# Patient Record
Sex: MALE | Race: WHITE | NOT HISPANIC OR LATINO | URBAN - METROPOLITAN AREA
[De-identification: names, ages, dates, MRNs, and addresses within clinical notes are randomized per-mention and may not be internally consistent; named-entity substitution may affect disease eponyms.]

---

## 2018-05-02 ENCOUNTER — OUTPATIENT (OUTPATIENT)
Dept: OUTPATIENT SERVICES | Facility: HOSPITAL | Age: 73
LOS: 1 days | Discharge: HOME | End: 2018-05-02

## 2018-05-02 ENCOUNTER — RESULT REVIEW (OUTPATIENT)
Age: 73
End: 2018-05-02

## 2018-05-02 VITALS
DIASTOLIC BLOOD PRESSURE: 92 MMHG | SYSTOLIC BLOOD PRESSURE: 161 MMHG | WEIGHT: 250 LBS | HEART RATE: 78 BPM | TEMPERATURE: 98 F | RESPIRATION RATE: 18 BRPM | HEIGHT: 65 IN

## 2018-05-02 VITALS — RESPIRATION RATE: 18 BRPM | DIASTOLIC BLOOD PRESSURE: 77 MMHG | SYSTOLIC BLOOD PRESSURE: 127 MMHG | HEART RATE: 69 BPM

## 2018-05-02 DIAGNOSIS — Z98.890 OTHER SPECIFIED POSTPROCEDURAL STATES: Chronic | ICD-10-CM

## 2018-05-02 DIAGNOSIS — Z90.49 ACQUIRED ABSENCE OF OTHER SPECIFIED PARTS OF DIGESTIVE TRACT: Chronic | ICD-10-CM

## 2018-05-04 LAB — SURGICAL PATHOLOGY STUDY: SIGNIFICANT CHANGE UP

## 2018-05-08 DIAGNOSIS — D12.3 BENIGN NEOPLASM OF TRANSVERSE COLON: ICD-10-CM

## 2018-05-08 DIAGNOSIS — Z12.11 ENCOUNTER FOR SCREENING FOR MALIGNANT NEOPLASM OF COLON: ICD-10-CM

## 2018-05-08 DIAGNOSIS — D12.5 BENIGN NEOPLASM OF SIGMOID COLON: ICD-10-CM

## 2018-05-08 DIAGNOSIS — Z86.010 PERSONAL HISTORY OF COLONIC POLYPS: ICD-10-CM

## 2018-05-08 DIAGNOSIS — K64.8 OTHER HEMORRHOIDS: ICD-10-CM

## 2018-05-08 DIAGNOSIS — K64.4 RESIDUAL HEMORRHOIDAL SKIN TAGS: ICD-10-CM

## 2018-05-08 DIAGNOSIS — I10 ESSENTIAL (PRIMARY) HYPERTENSION: ICD-10-CM

## 2018-05-08 DIAGNOSIS — E11.9 TYPE 2 DIABETES MELLITUS WITHOUT COMPLICATIONS: ICD-10-CM

## 2018-05-08 DIAGNOSIS — D12.2 BENIGN NEOPLASM OF ASCENDING COLON: ICD-10-CM

## 2018-08-31 PROBLEM — I10 ESSENTIAL (PRIMARY) HYPERTENSION: Chronic | Status: ACTIVE | Noted: 2018-05-02

## 2018-08-31 PROBLEM — Z00.00 ENCOUNTER FOR PREVENTIVE HEALTH EXAMINATION: Status: ACTIVE | Noted: 2018-08-31

## 2018-08-31 PROBLEM — E11.9 TYPE 2 DIABETES MELLITUS WITHOUT COMPLICATIONS: Chronic | Status: ACTIVE | Noted: 2018-05-02

## 2018-09-04 ENCOUNTER — EMERGENCY (EMERGENCY)
Facility: HOSPITAL | Age: 73
LOS: 0 days | Discharge: HOME | End: 2018-09-05
Attending: EMERGENCY MEDICINE | Admitting: EMERGENCY MEDICINE

## 2018-09-04 VITALS
SYSTOLIC BLOOD PRESSURE: 189 MMHG | OXYGEN SATURATION: 95 % | RESPIRATION RATE: 20 BRPM | DIASTOLIC BLOOD PRESSURE: 130 MMHG | HEIGHT: 65 IN | WEIGHT: 248.02 LBS | HEART RATE: 116 BPM | TEMPERATURE: 98 F

## 2018-09-04 DIAGNOSIS — Z79.84 LONG TERM (CURRENT) USE OF ORAL HYPOGLYCEMIC DRUGS: ICD-10-CM

## 2018-09-04 DIAGNOSIS — Z79.899 OTHER LONG TERM (CURRENT) DRUG THERAPY: ICD-10-CM

## 2018-09-04 DIAGNOSIS — Z79.891 LONG TERM (CURRENT) USE OF OPIATE ANALGESIC: ICD-10-CM

## 2018-09-04 DIAGNOSIS — Z98.890 OTHER SPECIFIED POSTPROCEDURAL STATES: Chronic | ICD-10-CM

## 2018-09-04 DIAGNOSIS — E11.9 TYPE 2 DIABETES MELLITUS WITHOUT COMPLICATIONS: ICD-10-CM

## 2018-09-04 DIAGNOSIS — Z79.82 LONG TERM (CURRENT) USE OF ASPIRIN: ICD-10-CM

## 2018-09-04 DIAGNOSIS — L02.31 CUTANEOUS ABSCESS OF BUTTOCK: ICD-10-CM

## 2018-09-04 DIAGNOSIS — Z98.890 OTHER SPECIFIED POSTPROCEDURAL STATES: ICD-10-CM

## 2018-09-04 DIAGNOSIS — Z90.49 ACQUIRED ABSENCE OF OTHER SPECIFIED PARTS OF DIGESTIVE TRACT: ICD-10-CM

## 2018-09-04 DIAGNOSIS — I10 ESSENTIAL (PRIMARY) HYPERTENSION: ICD-10-CM

## 2018-09-04 DIAGNOSIS — Z79.811 LONG TERM (CURRENT) USE OF AROMATASE INHIBITORS: ICD-10-CM

## 2018-09-04 DIAGNOSIS — Z90.49 ACQUIRED ABSENCE OF OTHER SPECIFIED PARTS OF DIGESTIVE TRACT: Chronic | ICD-10-CM

## 2018-09-04 DIAGNOSIS — Z79.2 LONG TERM (CURRENT) USE OF ANTIBIOTICS: ICD-10-CM

## 2018-09-05 LAB
HCT VFR BLD CALC: 44.6 % — SIGNIFICANT CHANGE UP (ref 42–52)
HGB BLD-MCNC: 14.9 G/DL — SIGNIFICANT CHANGE UP (ref 14–18)
MCHC RBC-ENTMCNC: 30.2 PG — SIGNIFICANT CHANGE UP (ref 27–31)
MCHC RBC-ENTMCNC: 33.4 G/DL — SIGNIFICANT CHANGE UP (ref 32–37)
MCV RBC AUTO: 90.5 FL — SIGNIFICANT CHANGE UP (ref 80–94)
NRBC # BLD: 0 /100 WBCS — SIGNIFICANT CHANGE UP (ref 0–0)
PLATELET # BLD AUTO: 172 K/UL — SIGNIFICANT CHANGE UP (ref 130–400)
RBC # BLD: 4.93 M/UL — SIGNIFICANT CHANGE UP (ref 4.7–6.1)
RBC # FLD: 12.8 % — SIGNIFICANT CHANGE UP (ref 11.5–14.5)
WBC # BLD: 9.32 K/UL — SIGNIFICANT CHANGE UP (ref 4.8–10.8)
WBC # FLD AUTO: 9.32 K/UL — SIGNIFICANT CHANGE UP (ref 4.8–10.8)

## 2018-09-05 RX ORDER — OXYCODONE AND ACETAMINOPHEN 5; 325 MG/1; MG/1
1 TABLET ORAL ONCE
Qty: 0 | Refills: 0 | Status: DISCONTINUED | OUTPATIENT
Start: 2018-09-05 | End: 2018-09-05

## 2018-09-05 RX ORDER — CEFAZOLIN SODIUM 1 G
2000 VIAL (EA) INJECTION ONCE
Qty: 0 | Refills: 0 | Status: COMPLETED | OUTPATIENT
Start: 2018-09-05 | End: 2018-09-05

## 2018-09-05 RX ADMIN — Medication 110 MILLIGRAM(S): at 04:32

## 2018-09-05 RX ADMIN — OXYCODONE AND ACETAMINOPHEN 1 TABLET(S): 5; 325 TABLET ORAL at 04:00

## 2018-09-05 NOTE — ED PROVIDER NOTE - NS ED ROS FT
Constitutional: (-) fever  Eyes/ENT: (-) blurry vision, (-) epistaxis  Neurological: (-) headache, (-) altered mental status  Psychiatric: (-) hallucinations  Allergic/Immunologic: (-) pruritus

## 2018-09-05 NOTE — ED PROVIDER NOTE - PHYSICAL EXAMINATION
Physical Exam    Vital Signs: I have reviewed the initial vital signs.  Constitutional: well-nourished, appears stated age, no acute distress  Eyes: Conjunctiva pink, Sclera clear, PERRLA, EOMI.  Cardiovascular: S1 and S2, regular rate, regular rhythm, well-perfused extremities, radial pulses equal and 2+  Respiratory: unlabored respiratory effort, clear to auscultation bilaterally no wheezing, rales and rhonchi  Gastrointestinal: soft, non-tender abdomen, no pulsatile mass, normal bowl sounds  Musculoskeletal: supple neck, no lower extremity edema, no midline tenderness  Integumentary: warm, dry, no rash + Left buttock area of swelling erythema induration and fluctuance.   Neurologic: awake, alert, cranial nerves II-XII grossly intact, extremities’ motor and sensory functions grossly intact  Psychiatric: appropriate mood, appropriate affect

## 2018-09-05 NOTE — ED ADULT NURSE NOTE - NSIMPLEMENTINTERV_GEN_ALL_ED
Implemented All Universal Safety Interventions:  [place] to call system. Call bell, personal items and telephone within reach. Instruct patient to call for assistance. Room bathroom lighting operational. Non-slip footwear when patient is off stretcher. Physically safe environment: no spills, clutter or unnecessary equipment. Stretcher in lowest position, wheels locked, appropriate side rails in place.

## 2018-09-05 NOTE — ED PROVIDER NOTE - OBJECTIVE STATEMENT
73 year old male past medical history of Diabetes, Hypertension states that he started 2 weeks ago with swelling on buttocks states getting worse. patient seen by GI dr. brito given cipro and flagyl completed a few days but getting worse. no fever/chills. no trouble breathing, no chest pain, no abd pain, no nausea, vomiting, diarrhea. no trouble with BM.

## 2018-09-12 ENCOUNTER — APPOINTMENT (OUTPATIENT)
Dept: SURGERY | Facility: CLINIC | Age: 73
End: 2018-09-12
Payer: MEDICARE

## 2018-09-12 VITALS
HEIGHT: 65 IN | DIASTOLIC BLOOD PRESSURE: 84 MMHG | BODY MASS INDEX: 41.32 KG/M2 | SYSTOLIC BLOOD PRESSURE: 142 MMHG | WEIGHT: 248 LBS | TEMPERATURE: 98.2 F | HEART RATE: 78 BPM

## 2018-09-12 DIAGNOSIS — Z78.9 OTHER SPECIFIED HEALTH STATUS: ICD-10-CM

## 2018-09-12 DIAGNOSIS — K61.0 ANAL ABSCESS: ICD-10-CM

## 2018-09-12 PROCEDURE — 99203 OFFICE O/P NEW LOW 30 MIN: CPT

## 2019-01-14 ENCOUNTER — TRANSCRIPTION ENCOUNTER (OUTPATIENT)
Age: 74
End: 2019-01-14

## 2019-10-28 ENCOUNTER — APPOINTMENT (OUTPATIENT)
Dept: NEUROLOGY | Facility: CLINIC | Age: 74
End: 2019-10-28
Payer: MEDICARE

## 2019-10-28 VITALS
HEIGHT: 65 IN | WEIGHT: 260 LBS | BODY MASS INDEX: 43.32 KG/M2 | TEMPERATURE: 94.3 F | OXYGEN SATURATION: 96 % | HEART RATE: 71 BPM | DIASTOLIC BLOOD PRESSURE: 70 MMHG | SYSTOLIC BLOOD PRESSURE: 132 MMHG

## 2019-10-28 DIAGNOSIS — Z86.69 PERSONAL HISTORY OF OTHER DISEASES OF THE NERVOUS SYSTEM AND SENSE ORGANS: ICD-10-CM

## 2019-10-28 PROCEDURE — 99214 OFFICE O/P EST MOD 30 MIN: CPT

## 2019-10-28 RX ORDER — INSULIN DEGLUDEC INJECTION 100 U/ML
100 INJECTION, SOLUTION SUBCUTANEOUS
Refills: 0 | Status: ACTIVE | COMMUNITY

## 2019-10-28 RX ORDER — METFORMIN HYDROCHLORIDE 500 MG/1
500 TABLET, COATED ORAL TWICE DAILY
Refills: 0 | Status: ACTIVE | COMMUNITY

## 2019-10-28 RX ORDER — GABAPENTIN 100 MG
100 TABLET ORAL
Refills: 0 | Status: ACTIVE | COMMUNITY

## 2019-10-28 RX ORDER — LOSARTAN POTASSIUM 50 MG/1
50 TABLET, FILM COATED ORAL DAILY
Refills: 0 | Status: ACTIVE | COMMUNITY

## 2019-10-28 NOTE — ASSESSMENT
[FreeTextEntry1] : Errol is here for followup. 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 I will see him in followup in 6 months.

## 2019-10-28 NOTE — PHYSICAL EXAM
[FreeTextEntry1] : Awake alert oriented x3. Follows commands appears slightly tired and sleepy. Shows good attention and concentration his memory recall is 3 out of 3 and his executive behavior is normal.\par Cranial nerve exam is normal\par Motor exam is normal\par Sensory exam is normal except that there is a slight decreased pinprick in the distal lower extremity. The deep tendon reflexes are present except in both ankles.\par His gait is a stable Romberg is negative.\par The carotid exam shows no bruit.\par The pulse is regular and sinus.

## 2019-10-28 NOTE — HISTORY OF PRESENT ILLNESS
[FreeTextEntry1] : Errol is now 74 years old. She was by me in 2015 after having weakness right sided upper extremity shaking/clonic activity. The workup at that point showed left and also a smaller right subdural hematoma. 4 Zestril and at this happened after his head trauma couple of months prior to that. Since then he has been taking keys Keppra and is doing very well. There is no episodes suggestive of seizure no confusion on to state no episodes of nocturnal events.\par Aside from this he does have excessive daytime sleepiness. There is a good possibility that he does have obstructive sleep apnea however he does not like to do a sleep study .\par He does have diabetes recently his hemoglobin A1c was around 10 which is quite high. He had modified his diet and that is now the fingersticks are better and he is supposed to have another hemoglobin A1 C. measurement. He saw his primary physician just yesterday according to him outside these other blood tests were OK.\par He describes his mood to be good. Currently his sleep pattern he is intubated at the goes to bed around 1 AM and wakes up around 9 AM and he naps for 1-2 hours every afternoon.\par He does also have dysesthesia in both feet and takes gabapentin for that reason.\par He denies having back pain he does have left knee pain and still debating whether he should go for surgery or not.

## 2020-01-08 ENCOUNTER — RX RENEWAL (OUTPATIENT)
Age: 75
End: 2020-01-08

## 2020-04-27 ENCOUNTER — APPOINTMENT (OUTPATIENT)
Dept: NEUROLOGY | Facility: CLINIC | Age: 75
End: 2020-04-27
Payer: MEDICARE

## 2020-04-27 PROCEDURE — 99441: CPT

## 2020-04-27 PROCEDURE — 99446 NTRPROF PH1/NTRNET/EHR 5-10: CPT

## 2021-05-19 ENCOUNTER — APPOINTMENT (OUTPATIENT)
Dept: NEUROLOGY | Facility: CLINIC | Age: 76
End: 2021-05-19
Payer: MEDICARE

## 2021-05-19 VITALS
HEART RATE: 82 BPM | DIASTOLIC BLOOD PRESSURE: 70 MMHG | BODY MASS INDEX: 43.9 KG/M2 | OXYGEN SATURATION: 99 % | WEIGHT: 263.5 LBS | HEIGHT: 65 IN | TEMPERATURE: 96.9 F | SYSTOLIC BLOOD PRESSURE: 130 MMHG

## 2021-05-19 DIAGNOSIS — E66.9 OBESITY, UNSPECIFIED: ICD-10-CM

## 2021-05-19 DIAGNOSIS — I10 ESSENTIAL (PRIMARY) HYPERTENSION: ICD-10-CM

## 2021-05-19 PROCEDURE — 99213 OFFICE O/P EST LOW 20 MIN: CPT

## 2021-05-20 NOTE — HISTORY OF PRESENT ILLNESS
[FreeTextEntry1] : Errol is a 76 year old male with PMH of DM, Neuropathy, HTN, Obesity and seizure disorder. He is here today for a follow up. He remains seizure free on Keppra 500 mgs ER qhs. Sleeping pattern is the same , he tends to go to sleep late which is always the same. Errol does have left knee pain which is limiting his ability and it is evident that he has gained more weight since last visit. Errol saw his endocrinologist recently but did not due the blood work. \par Pt is upset today due to recent loss of his younger brother due to MI. \par

## 2021-05-20 NOTE — DISCUSSION/SUMMARY
[Safety Recommendations] : The patient was advised in regards to the risk of seizures and general seizure safety recommendations including not to be bathing alone, climbing to high places and operating heavy machinery. [Compliance with Medications] : The importance of compliance with medications was reinforced. [Sleep Hygiene/Sleep Disruption Risks] : Sleep hygiene and the risks of sleep disruption were discussed. [FreeTextEntry1] : Errol is a 76 year old male with PMH of DM, Neuropathy, HTN, Obesity and seizure disorder. \par Plan:\par - continue same dose of Keppra, Rx given for levels\par - spoke with pt at length about importance of weight loss and cardiology follow up\par - pt was also advised to see orthopedic surgeon to discuss his left knee pain\par - reinforced proper sleep hygiene\par \par Case discussed with Dr. Ortega\par \par Manjula Hatch, DNP, ACNP-BC

## 2021-05-20 NOTE — PHYSICAL EXAM
[General Appearance - Alert] : alert [General Appearance - In No Acute Distress] : in no acute distress [Oriented To Time, Place, And Person] : oriented to person, place, and time [Cranial Nerves Optic (II)] : visual acuity intact bilaterally,  visual fields full to confrontation, pupils equal round and reactive to light [Cranial Nerves Oculomotor (III)] : extraocular motion intact [Cranial Nerves Trigeminal (V)] : facial sensation intact symmetrically [Cranial Nerves Facial (VII)] : face symmetrical [Cranial Nerves Vestibulocochlear (VIII)] : hearing was intact bilaterally [Cranial Nerves Glossopharyngeal (IX)] : tongue and palate midline [Cranial Nerves Accessory (XI - Cranial And Spinal)] : head turning and shoulder shrug symmetric [Cranial Nerves Hypoglossal (XII)] : there was no tongue deviation with protrusion [Motor Strength] : muscle strength was normal in all four extremities [Involuntary Movements] : no involuntary movements were seen [Paresis Pronator Drift Right-Sided] : no pronator drift on the right [Paresis Pronator Drift Left-Sided] : no pronator drift on the left [Motor Strength Upper Extremities Bilaterally] : strength was normal in both upper extremities [Motor Strength Lower Extremities Bilaterally] : strength was normal in both lower extremities [Tremor] : no tremor present

## 2021-07-08 ENCOUNTER — RX RENEWAL (OUTPATIENT)
Age: 76
End: 2021-07-08

## 2021-11-23 ENCOUNTER — APPOINTMENT (OUTPATIENT)
Dept: NEUROLOGY | Facility: CLINIC | Age: 76
End: 2021-11-23
Payer: MEDICARE

## 2021-11-23 VITALS
TEMPERATURE: 97 F | HEART RATE: 84 BPM | WEIGHT: 163 LBS | SYSTOLIC BLOOD PRESSURE: 132 MMHG | BODY MASS INDEX: 27.16 KG/M2 | DIASTOLIC BLOOD PRESSURE: 70 MMHG | HEIGHT: 65 IN | OXYGEN SATURATION: 99 %

## 2021-11-23 PROCEDURE — 99214 OFFICE O/P EST MOD 30 MIN: CPT

## 2021-11-23 RX ORDER — METFORMIN HYDROCHLORIDE 625 MG/1
TABLET ORAL
Refills: 0 | Status: DISCONTINUED | COMMUNITY
End: 2021-11-23

## 2021-11-23 RX ORDER — METRONIDAZOLE 500 MG/1
500 TABLET, FILM COATED ORAL
Refills: 0 | Status: DISCONTINUED | COMMUNITY
End: 2021-11-23

## 2021-11-23 RX ORDER — GABAPENTIN 100 MG/1
CAPSULE ORAL
Refills: 0 | Status: DISCONTINUED | COMMUNITY
End: 2021-11-23

## 2021-11-23 RX ORDER — INSULIN DEGLUDEC INJECTION 200 U/ML
INJECTION, SOLUTION SUBCUTANEOUS
Refills: 0 | Status: DISCONTINUED | COMMUNITY
End: 2021-11-23

## 2021-11-23 RX ORDER — LEVETIRACETAM 1000 MG/1
TABLET, FILM COATED ORAL
Refills: 0 | Status: DISCONTINUED | COMMUNITY
End: 2021-11-23

## 2021-11-23 RX ORDER — CIPROFLOXACIN HYDROCHLORIDE 500 MG/1
500 TABLET, FILM COATED ORAL
Refills: 0 | Status: DISCONTINUED | COMMUNITY
End: 2021-11-23

## 2021-11-23 RX ORDER — LOSARTAN POTASSIUM 100 MG/1
TABLET, FILM COATED ORAL
Refills: 0 | Status: DISCONTINUED | COMMUNITY
End: 2021-11-23

## 2021-11-23 RX ORDER — EXENATIDE 2 MG/.65ML
2 INJECTION, SUSPENSION, EXTENDED RELEASE SUBCUTANEOUS
Refills: 0 | Status: DISCONTINUED | COMMUNITY
End: 2021-11-23

## 2021-11-25 NOTE — ASSESSMENT
[FreeTextEntry1] : 1-Seizure D/O . doing well\par 2- Over weight\par 3- Knee disease\par 4- LBP\par 5- HTN and DM\par \par \par plan\par continue Keppra\par discussed the diet and weight and posture

## 2021-11-25 NOTE — HISTORY OF PRESENT ILLNESS
[FreeTextEntry1] : Errol is here with his wife .He is very much at his baseline. not reporting any events or seizures.\par He is seeing his  PMD regularly. Got two vaccines and planning to get the booster..\par He has had a period of LBP starting suddenly and radiating to the leg\par He did see pain management and received an epidural that helped him significantly\par \par However his usual knee pain , now on both sides is continuing. He is also contemplating  injections.\par His sleep pattern is good .\par He is not  that much active\par No weight loss\par reportedly his blood sugar and blood test were all good\par No change in the mood and the memory\par No vertigo\par No significant neck pain

## 2021-11-25 NOTE — PHYSICAL EXAM
[FreeTextEntry1] : A/A/Ox3\par good mood\par follows 4 step commands\par normal attention \par normal language\par No drift\par No dysmetria\par NO drift\par good range of motion of the neck\par Stable gait\par Slight limp on the right and slight hip tilt to the right

## 2022-04-13 ENCOUNTER — RX RENEWAL (OUTPATIENT)
Age: 77
End: 2022-04-13

## 2022-05-19 ENCOUNTER — APPOINTMENT (OUTPATIENT)
Dept: NEUROLOGY | Facility: CLINIC | Age: 77
End: 2022-05-19
Payer: MEDICARE

## 2022-05-19 VITALS
TEMPERATURE: 97.8 F | BODY MASS INDEX: 43.32 KG/M2 | WEIGHT: 260 LBS | HEART RATE: 70 BPM | OXYGEN SATURATION: 97 % | HEIGHT: 65 IN | SYSTOLIC BLOOD PRESSURE: 136 MMHG | DIASTOLIC BLOOD PRESSURE: 72 MMHG

## 2022-05-19 DIAGNOSIS — E11.9 TYPE 2 DIABETES MELLITUS W/OUT COMPLICATIONS: ICD-10-CM

## 2022-05-19 PROCEDURE — 99213 OFFICE O/P EST LOW 20 MIN: CPT

## 2022-05-19 NOTE — PHYSICAL EXAM
[FreeTextEntry1] : A/A/Ox3\par follows 4 step commands\par good mood\par good attention\par normal language\par normal CN exam\par No drift\par No dysmetria\par stable gait

## 2022-05-19 NOTE — ASSESSMENT
[FreeTextEntry1] : 1-DM\par 2- Seizure disorder\par 3-S/P right knee surgery\par 4- left knee pain\par \par \par Plan\par continue current dose of the Keppra\par F/U

## 2022-05-19 NOTE — HISTORY OF PRESENT ILLNESS
[FreeTextEntry1] : Errol is here for the F//U\par He is at his baseline. No events. vaccinated and saw his PMD \par His blood test shows a high Hb A1C  of 8.9\par normal CMP except high sugar and also normal PSA \par He is having some pain in the back and also left Knee.\par Yet is undecided regarding the knee surgery. 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

## 2022-09-09 ENCOUNTER — NON-APPOINTMENT (OUTPATIENT)
Age: 77
End: 2022-09-09

## 2022-09-16 ENCOUNTER — INPATIENT (INPATIENT)
Facility: HOSPITAL | Age: 77
LOS: 3 days | Discharge: HOME | End: 2022-09-20
Attending: INTERNAL MEDICINE | Admitting: INTERNAL MEDICINE

## 2022-09-16 VITALS
RESPIRATION RATE: 18 BRPM | WEIGHT: 251.99 LBS | HEIGHT: 65 IN | OXYGEN SATURATION: 98 % | TEMPERATURE: 98 F | SYSTOLIC BLOOD PRESSURE: 149 MMHG | HEART RATE: 89 BPM | DIASTOLIC BLOOD PRESSURE: 77 MMHG

## 2022-09-16 DIAGNOSIS — K75.81 NONALCOHOLIC STEATOHEPATITIS (NASH): ICD-10-CM

## 2022-09-16 DIAGNOSIS — Z98.890 OTHER SPECIFIED POSTPROCEDURAL STATES: Chronic | ICD-10-CM

## 2022-09-16 DIAGNOSIS — Z90.49 ACQUIRED ABSENCE OF OTHER SPECIFIED PARTS OF DIGESTIVE TRACT: Chronic | ICD-10-CM

## 2022-09-16 LAB
ALBUMIN SERPL ELPH-MCNC: 3.1 G/DL — LOW (ref 3.5–5.2)
ALP SERPL-CCNC: 176 U/L — HIGH (ref 30–115)
ALT FLD-CCNC: 61 U/L — HIGH (ref 0–41)
ANION GAP SERPL CALC-SCNC: 13 MMOL/L — SIGNIFICANT CHANGE UP (ref 7–14)
ANISOCYTOSIS BLD QL: SLIGHT — SIGNIFICANT CHANGE UP
AST SERPL-CCNC: 45 U/L — HIGH (ref 0–41)
BASE EXCESS BLDV CALC-SCNC: 0.5 MMOL/L — SIGNIFICANT CHANGE UP (ref -2–3)
BASOPHILS # BLD AUTO: 0.07 K/UL — SIGNIFICANT CHANGE UP (ref 0–0.2)
BASOPHILS NFR BLD AUTO: 0.9 % — SIGNIFICANT CHANGE UP (ref 0–1)
BILIRUB SERPL-MCNC: 0.8 MG/DL — SIGNIFICANT CHANGE UP (ref 0.2–1.2)
BUN SERPL-MCNC: 15 MG/DL — SIGNIFICANT CHANGE UP (ref 10–20)
CA-I SERPL-SCNC: 1.23 MMOL/L — SIGNIFICANT CHANGE UP (ref 1.15–1.33)
CALCIUM SERPL-MCNC: 9.1 MG/DL — SIGNIFICANT CHANGE UP (ref 8.4–10.5)
CHLORIDE SERPL-SCNC: 103 MMOL/L — SIGNIFICANT CHANGE UP (ref 98–110)
CO2 SERPL-SCNC: 22 MMOL/L — SIGNIFICANT CHANGE UP (ref 17–32)
CREAT SERPL-MCNC: 0.8 MG/DL — SIGNIFICANT CHANGE UP (ref 0.7–1.5)
EGFR: 92 ML/MIN/1.73M2 — SIGNIFICANT CHANGE UP
EOSINOPHIL # BLD AUTO: 0.26 K/UL — SIGNIFICANT CHANGE UP (ref 0–0.7)
EOSINOPHIL NFR BLD AUTO: 3.5 % — SIGNIFICANT CHANGE UP (ref 0–8)
GAS PNL BLDV: 134 MMOL/L — LOW (ref 136–145)
GAS PNL BLDV: SIGNIFICANT CHANGE UP
GIANT PLATELETS BLD QL SMEAR: SIGNIFICANT CHANGE UP
GLUCOSE SERPL-MCNC: 375 MG/DL — HIGH (ref 70–99)
HCO3 BLDV-SCNC: 27 MMOL/L — SIGNIFICANT CHANGE UP (ref 22–29)
HCT VFR BLD CALC: 41.1 % — LOW (ref 42–52)
HCT VFR BLDA CALC: 46 % — SIGNIFICANT CHANGE UP (ref 39–51)
HGB BLD CALC-MCNC: 15.3 G/DL — SIGNIFICANT CHANGE UP (ref 12.6–17.4)
HGB BLD-MCNC: 14 G/DL — SIGNIFICANT CHANGE UP (ref 14–18)
IMM GRANULOCYTES NFR BLD AUTO: 2.6 % — HIGH (ref 0.1–0.3)
LACTATE BLDV-MCNC: 1.9 MMOL/L — SIGNIFICANT CHANGE UP (ref 0.5–2)
LYMPHOCYTES # BLD AUTO: 1.29 K/UL — SIGNIFICANT CHANGE UP (ref 1.2–3.4)
LYMPHOCYTES # BLD AUTO: 17.5 % — LOW (ref 20.5–51.1)
MACROCYTES BLD QL: SLIGHT — SIGNIFICANT CHANGE UP
MCHC RBC-ENTMCNC: 32.5 PG — HIGH (ref 27–31)
MCHC RBC-ENTMCNC: 34.1 G/DL — SIGNIFICANT CHANGE UP (ref 32–37)
MCV RBC AUTO: 95.4 FL — HIGH (ref 80–94)
MONOCYTES # BLD AUTO: 0.74 K/UL — HIGH (ref 0.1–0.6)
MONOCYTES NFR BLD AUTO: 10 % — HIGH (ref 1.7–9.3)
NEUTROPHILS # BLD AUTO: 4.83 K/UL — SIGNIFICANT CHANGE UP (ref 1.4–6.5)
NEUTROPHILS NFR BLD AUTO: 65.5 % — SIGNIFICANT CHANGE UP (ref 42.2–75.2)
NRBC # BLD: 0 /100 WBCS — SIGNIFICANT CHANGE UP (ref 0–0)
NRBC # BLD: 0 /100 — SIGNIFICANT CHANGE UP (ref 0–0)
OVALOCYTES BLD QL SMEAR: SIGNIFICANT CHANGE UP
PCO2 BLDV: 47 MMHG — SIGNIFICANT CHANGE UP (ref 42–55)
PH BLDV: 7.36 — SIGNIFICANT CHANGE UP (ref 7.32–7.43)
PLAT MORPH BLD: NORMAL — SIGNIFICANT CHANGE UP
PLATELET # BLD AUTO: 121 K/UL — LOW (ref 130–400)
PLATELET CLUMP BLD QL SMEAR: SLIGHT
PLATELET COUNT - ESTIMATE: NORMAL — SIGNIFICANT CHANGE UP
PO2 BLDV: 22 MMHG — SIGNIFICANT CHANGE UP
POTASSIUM BLDV-SCNC: 4 MMOL/L — SIGNIFICANT CHANGE UP (ref 3.5–5.1)
POTASSIUM SERPL-MCNC: 4.6 MMOL/L — SIGNIFICANT CHANGE UP (ref 3.5–5)
POTASSIUM SERPL-SCNC: 4.6 MMOL/L — SIGNIFICANT CHANGE UP (ref 3.5–5)
PROT SERPL-MCNC: 6.3 G/DL — SIGNIFICANT CHANGE UP (ref 6–8)
RBC # BLD: 4.31 M/UL — LOW (ref 4.7–6.1)
RBC # FLD: 13.1 % — SIGNIFICANT CHANGE UP (ref 11.5–14.5)
RBC BLD AUTO: NORMAL — SIGNIFICANT CHANGE UP
SAO2 % BLDV: 34.1 % — SIGNIFICANT CHANGE UP
SODIUM SERPL-SCNC: 138 MMOL/L — SIGNIFICANT CHANGE UP (ref 135–146)
WBC # BLD: 7.38 K/UL — SIGNIFICANT CHANGE UP (ref 4.8–10.8)
WBC # FLD AUTO: 7.38 K/UL — SIGNIFICANT CHANGE UP (ref 4.8–10.8)

## 2022-09-16 PROCEDURE — 93010 ELECTROCARDIOGRAM REPORT: CPT

## 2022-09-16 PROCEDURE — 73090 X-RAY EXAM OF FOREARM: CPT | Mod: 26,RT

## 2022-09-16 PROCEDURE — 99284 EMERGENCY DEPT VISIT MOD MDM: CPT

## 2022-09-16 RX ORDER — CEFEPIME 1 G/1
1000 INJECTION, POWDER, FOR SOLUTION INTRAMUSCULAR; INTRAVENOUS ONCE
Refills: 0 | Status: COMPLETED | OUTPATIENT
Start: 2022-09-16 | End: 2022-09-16

## 2022-09-16 RX ORDER — VANCOMYCIN HCL 1 G
1000 VIAL (EA) INTRAVENOUS ONCE
Refills: 0 | Status: COMPLETED | OUTPATIENT
Start: 2022-09-16 | End: 2022-09-16

## 2022-09-16 RX ORDER — SODIUM CHLORIDE 9 MG/ML
1000 INJECTION, SOLUTION INTRAVENOUS ONCE
Refills: 0 | Status: COMPLETED | OUTPATIENT
Start: 2022-09-16 | End: 2022-09-16

## 2022-09-16 RX ADMIN — Medication 250 MILLIGRAM(S): at 21:16

## 2022-09-16 RX ADMIN — CEFEPIME 100 MILLIGRAM(S): 1 INJECTION, POWDER, FOR SOLUTION INTRAMUSCULAR; INTRAVENOUS at 21:16

## 2022-09-16 RX ADMIN — SODIUM CHLORIDE 1000 MILLILITER(S): 9 INJECTION, SOLUTION INTRAVENOUS at 21:17

## 2022-09-16 NOTE — ED ADULT NURSE NOTE - NSICDXPASTSURGICALHX_GEN_ALL_CORE_FT
PAST SURGICAL HISTORY:  H/O left knee surgery     History of appendectomy     History of cholecystectomy

## 2022-09-16 NOTE — ED ADULT NURSE NOTE - CHIEF COMPLAINT QUOTE
Right arm swelling after injury to elbow, place [place] Kef[place] [facility] [place] no improvement

## 2022-09-16 NOTE — ED ADULT TRIAGE NOTE - CHIEF COMPLAINT QUOTE
Right arm swelling after injury to elbow, place on Keflex by [facility] [place] last sunday no improvement

## 2022-09-17 LAB
A1C WITH ESTIMATED AVERAGE GLUCOSE RESULT: 8.6 % — HIGH (ref 4–5.6)
ALBUMIN SERPL ELPH-MCNC: 2.9 G/DL — LOW (ref 3.5–5.2)
ALP SERPL-CCNC: 153 U/L — HIGH (ref 30–115)
ALT FLD-CCNC: 56 U/L — HIGH (ref 0–41)
ANION GAP SERPL CALC-SCNC: 13 MMOL/L — SIGNIFICANT CHANGE UP (ref 7–14)
AST SERPL-CCNC: 46 U/L — HIGH (ref 0–41)
BILIRUB SERPL-MCNC: 0.9 MG/DL — SIGNIFICANT CHANGE UP (ref 0.2–1.2)
BUN SERPL-MCNC: 12 MG/DL — SIGNIFICANT CHANGE UP (ref 10–20)
CALCIUM SERPL-MCNC: 8.7 MG/DL — SIGNIFICANT CHANGE UP (ref 8.4–10.5)
CHLORIDE SERPL-SCNC: 108 MMOL/L — SIGNIFICANT CHANGE UP (ref 98–110)
CO2 SERPL-SCNC: 21 MMOL/L — SIGNIFICANT CHANGE UP (ref 17–32)
CREAT SERPL-MCNC: 0.8 MG/DL — SIGNIFICANT CHANGE UP (ref 0.7–1.5)
EGFR: 92 ML/MIN/1.73M2 — SIGNIFICANT CHANGE UP
ESTIMATED AVERAGE GLUCOSE: 200 MG/DL — HIGH (ref 68–114)
GLUCOSE BLDC GLUCOMTR-MCNC: 152 MG/DL — HIGH (ref 70–99)
GLUCOSE BLDC GLUCOMTR-MCNC: 168 MG/DL — HIGH (ref 70–99)
GLUCOSE BLDC GLUCOMTR-MCNC: 260 MG/DL — HIGH (ref 70–99)
GLUCOSE BLDC GLUCOMTR-MCNC: 263 MG/DL — HIGH (ref 70–99)
GLUCOSE BLDC GLUCOMTR-MCNC: 85 MG/DL — SIGNIFICANT CHANGE UP (ref 70–99)
GLUCOSE SERPL-MCNC: 75 MG/DL — SIGNIFICANT CHANGE UP (ref 70–99)
HCT VFR BLD CALC: 39.6 % — LOW (ref 42–52)
HCV AB S/CO SERPL IA: 0.04 COI — SIGNIFICANT CHANGE UP
HCV AB SERPL-IMP: SIGNIFICANT CHANGE UP
HGB BLD-MCNC: 13.4 G/DL — LOW (ref 14–18)
LACTATE SERPL-SCNC: 1.6 MMOL/L — SIGNIFICANT CHANGE UP (ref 0.7–2)
MCHC RBC-ENTMCNC: 32.2 PG — HIGH (ref 27–31)
MCHC RBC-ENTMCNC: 33.8 G/DL — SIGNIFICANT CHANGE UP (ref 32–37)
MCV RBC AUTO: 95.2 FL — HIGH (ref 80–94)
NRBC # BLD: 0 /100 WBCS — SIGNIFICANT CHANGE UP (ref 0–0)
PLATELET # BLD AUTO: 97 K/UL — LOW (ref 130–400)
POTASSIUM SERPL-MCNC: 4.3 MMOL/L — SIGNIFICANT CHANGE UP (ref 3.5–5)
POTASSIUM SERPL-SCNC: 4.3 MMOL/L — SIGNIFICANT CHANGE UP (ref 3.5–5)
PROT SERPL-MCNC: 6.2 G/DL — SIGNIFICANT CHANGE UP (ref 6–8)
RBC # BLD: 4.16 M/UL — LOW (ref 4.7–6.1)
RBC # FLD: 13.2 % — SIGNIFICANT CHANGE UP (ref 11.5–14.5)
SARS-COV-2 RNA SPEC QL NAA+PROBE: SIGNIFICANT CHANGE UP
SODIUM SERPL-SCNC: 142 MMOL/L — SIGNIFICANT CHANGE UP (ref 135–146)
WBC # BLD: 9.21 K/UL — SIGNIFICANT CHANGE UP (ref 4.8–10.8)
WBC # FLD AUTO: 9.21 K/UL — SIGNIFICANT CHANGE UP (ref 4.8–10.8)

## 2022-09-17 PROCEDURE — 76705 ECHO EXAM OF ABDOMEN: CPT | Mod: 26

## 2022-09-17 PROCEDURE — 99223 1ST HOSP IP/OBS HIGH 75: CPT | Mod: AI

## 2022-09-17 RX ORDER — DEXTROSE 50 % IN WATER 50 %
25 SYRINGE (ML) INTRAVENOUS ONCE
Refills: 0 | Status: DISCONTINUED | OUTPATIENT
Start: 2022-09-17 | End: 2022-09-20

## 2022-09-17 RX ORDER — SODIUM CHLORIDE 9 MG/ML
1000 INJECTION, SOLUTION INTRAVENOUS
Refills: 0 | Status: DISCONTINUED | OUTPATIENT
Start: 2022-09-17 | End: 2022-09-20

## 2022-09-17 RX ORDER — VANCOMYCIN HCL 1 G
1000 VIAL (EA) INTRAVENOUS EVERY 12 HOURS
Refills: 0 | Status: DISCONTINUED | OUTPATIENT
Start: 2022-09-17 | End: 2022-09-17

## 2022-09-17 RX ORDER — GLIMEPIRIDE 1 MG
0 TABLET ORAL
Qty: 0 | Refills: 0 | DISCHARGE

## 2022-09-17 RX ORDER — VANCOMYCIN HCL 1 G
1250 VIAL (EA) INTRAVENOUS EVERY 12 HOURS
Refills: 0 | Status: DISCONTINUED | OUTPATIENT
Start: 2022-09-17 | End: 2022-09-20

## 2022-09-17 RX ORDER — INSULIN DEGLUDEC 100 U/ML
45 INJECTION, SOLUTION SUBCUTANEOUS
Qty: 0 | Refills: 0 | DISCHARGE

## 2022-09-17 RX ORDER — INSULIN DEGLUDEC 100 U/ML
60 INJECTION, SOLUTION SUBCUTANEOUS
Qty: 0 | Refills: 0 | DISCHARGE

## 2022-09-17 RX ORDER — CEFAZOLIN SODIUM 1 G
2000 VIAL (EA) INJECTION EVERY 8 HOURS
Refills: 0 | Status: DISCONTINUED | OUTPATIENT
Start: 2022-09-17 | End: 2022-09-20

## 2022-09-17 RX ORDER — ASPIRIN/CALCIUM CARB/MAGNESIUM 324 MG
1 TABLET ORAL
Qty: 0 | Refills: 0 | DISCHARGE

## 2022-09-17 RX ORDER — INSULIN LISPRO 100/ML
1 VIAL (ML) SUBCUTANEOUS
Refills: 0 | Status: DISCONTINUED | OUTPATIENT
Start: 2022-09-17 | End: 2022-09-17

## 2022-09-17 RX ORDER — GABAPENTIN 400 MG/1
3 CAPSULE ORAL
Qty: 0 | Refills: 0 | DISCHARGE

## 2022-09-17 RX ORDER — POLYETHYLENE GLYCOL 3350 17 G/17G
17 POWDER, FOR SOLUTION ORAL DAILY
Refills: 0 | Status: DISCONTINUED | OUTPATIENT
Start: 2022-09-17 | End: 2022-09-20

## 2022-09-17 RX ORDER — INSULIN GLARGINE 100 [IU]/ML
20 INJECTION, SOLUTION SUBCUTANEOUS ONCE
Refills: 0 | Status: DISCONTINUED | OUTPATIENT
Start: 2022-09-17 | End: 2022-09-17

## 2022-09-17 RX ORDER — CEFAZOLIN SODIUM 1 G
500 VIAL (EA) INJECTION ONCE
Refills: 0 | Status: COMPLETED | OUTPATIENT
Start: 2022-09-17 | End: 2022-09-17

## 2022-09-17 RX ORDER — LEVETIRACETAM 250 MG/1
500 TABLET, FILM COATED ORAL
Qty: 0 | Refills: 0 | DISCHARGE

## 2022-09-17 RX ORDER — GLUCOSAMINE HCL/CHONDROITIN SU 500-400 MG
0 CAPSULE ORAL
Qty: 0 | Refills: 0 | DISCHARGE

## 2022-09-17 RX ORDER — GLUCAGON INJECTION, SOLUTION 0.5 MG/.1ML
1 INJECTION, SOLUTION SUBCUTANEOUS ONCE
Refills: 0 | Status: DISCONTINUED | OUTPATIENT
Start: 2022-09-17 | End: 2022-09-20

## 2022-09-17 RX ORDER — ACETAMINOPHEN 500 MG
650 TABLET ORAL ONCE
Refills: 0 | Status: COMPLETED | OUTPATIENT
Start: 2022-09-17 | End: 2022-09-18

## 2022-09-17 RX ORDER — GABAPENTIN 400 MG/1
1 CAPSULE ORAL
Qty: 0 | Refills: 0 | DISCHARGE

## 2022-09-17 RX ORDER — PYRIDOXINE HCL (VITAMIN B6) 100 MG
200 TABLET ORAL
Qty: 0 | Refills: 0 | DISCHARGE

## 2022-09-17 RX ORDER — CEFAZOLIN SODIUM 1 G
VIAL (EA) INJECTION
Refills: 0 | Status: DISCONTINUED | OUTPATIENT
Start: 2022-09-17 | End: 2022-09-17

## 2022-09-17 RX ORDER — DEXTROSE 50 % IN WATER 50 %
15 SYRINGE (ML) INTRAVENOUS ONCE
Refills: 0 | Status: DISCONTINUED | OUTPATIENT
Start: 2022-09-17 | End: 2022-09-20

## 2022-09-17 RX ORDER — CEFAZOLIN SODIUM 1 G
500 VIAL (EA) INJECTION EVERY 8 HOURS
Refills: 0 | Status: DISCONTINUED | OUTPATIENT
Start: 2022-09-17 | End: 2022-09-17

## 2022-09-17 RX ORDER — SODIUM CHLORIDE 9 MG/ML
1000 INJECTION INTRAMUSCULAR; INTRAVENOUS; SUBCUTANEOUS
Refills: 0 | Status: DISCONTINUED | OUTPATIENT
Start: 2022-09-17 | End: 2022-09-20

## 2022-09-17 RX ORDER — CEFEPIME 1 G/1
1000 INJECTION, POWDER, FOR SOLUTION INTRAMUSCULAR; INTRAVENOUS EVERY 8 HOURS
Refills: 0 | Status: DISCONTINUED | OUTPATIENT
Start: 2022-09-17 | End: 2022-09-17

## 2022-09-17 RX ORDER — DEXTROSE 50 % IN WATER 50 %
12.5 SYRINGE (ML) INTRAVENOUS ONCE
Refills: 0 | Status: DISCONTINUED | OUTPATIENT
Start: 2022-09-17 | End: 2022-09-20

## 2022-09-17 RX ORDER — LEVETIRACETAM 250 MG/1
500 TABLET, FILM COATED ORAL DAILY
Refills: 0 | Status: DISCONTINUED | OUTPATIENT
Start: 2022-09-17 | End: 2022-09-18

## 2022-09-17 RX ORDER — LOSARTAN POTASSIUM 100 MG/1
1 TABLET, FILM COATED ORAL
Qty: 0 | Refills: 0 | DISCHARGE

## 2022-09-17 RX ORDER — INSULIN GLARGINE 100 [IU]/ML
60 INJECTION, SOLUTION SUBCUTANEOUS AT BEDTIME
Refills: 0 | Status: DISCONTINUED | OUTPATIENT
Start: 2022-09-17 | End: 2022-09-20

## 2022-09-17 RX ORDER — INSULIN LISPRO 100/ML
VIAL (ML) SUBCUTANEOUS
Refills: 0 | Status: DISCONTINUED | OUTPATIENT
Start: 2022-09-17 | End: 2022-09-20

## 2022-09-17 RX ORDER — SENNA PLUS 8.6 MG/1
2 TABLET ORAL AT BEDTIME
Refills: 0 | Status: DISCONTINUED | OUTPATIENT
Start: 2022-09-17 | End: 2022-09-20

## 2022-09-17 RX ORDER — ENOXAPARIN SODIUM 100 MG/ML
40 INJECTION SUBCUTANEOUS EVERY 12 HOURS
Refills: 0 | Status: DISCONTINUED | OUTPATIENT
Start: 2022-09-17 | End: 2022-09-20

## 2022-09-17 RX ORDER — LOSARTAN POTASSIUM 100 MG/1
0.5 TABLET, FILM COATED ORAL
Qty: 0 | Refills: 0 | DISCHARGE

## 2022-09-17 RX ORDER — METFORMIN HYDROCHLORIDE 850 MG/1
500 TABLET ORAL
Qty: 0 | Refills: 0 | DISCHARGE

## 2022-09-17 RX ORDER — GABAPENTIN 400 MG/1
300 CAPSULE ORAL
Refills: 0 | Status: DISCONTINUED | OUTPATIENT
Start: 2022-09-17 | End: 2022-09-20

## 2022-09-17 RX ORDER — CEFEPIME 1 G/1
2000 INJECTION, POWDER, FOR SOLUTION INTRAMUSCULAR; INTRAVENOUS EVERY 12 HOURS
Refills: 0 | Status: DISCONTINUED | OUTPATIENT
Start: 2022-09-17 | End: 2022-09-17

## 2022-09-17 RX ORDER — LOSARTAN POTASSIUM 100 MG/1
25 TABLET, FILM COATED ORAL
Refills: 0 | Status: DISCONTINUED | OUTPATIENT
Start: 2022-09-17 | End: 2022-09-20

## 2022-09-17 RX ADMIN — Medication 100 MILLIGRAM(S): at 13:33

## 2022-09-17 RX ADMIN — LOSARTAN POTASSIUM 25 MILLIGRAM(S): 100 TABLET, FILM COATED ORAL at 06:31

## 2022-09-17 RX ADMIN — LEVETIRACETAM 500 MILLIGRAM(S): 250 TABLET, FILM COATED ORAL at 22:22

## 2022-09-17 RX ADMIN — Medication 6: at 17:04

## 2022-09-17 RX ADMIN — GABAPENTIN 300 MILLIGRAM(S): 400 CAPSULE ORAL at 17:06

## 2022-09-17 RX ADMIN — SODIUM CHLORIDE 50 MILLILITER(S): 9 INJECTION INTRAMUSCULAR; INTRAVENOUS; SUBCUTANEOUS at 17:06

## 2022-09-17 RX ADMIN — GABAPENTIN 300 MILLIGRAM(S): 400 CAPSULE ORAL at 06:31

## 2022-09-17 RX ADMIN — POLYETHYLENE GLYCOL 3350 17 GRAM(S): 17 POWDER, FOR SOLUTION ORAL at 15:19

## 2022-09-17 RX ADMIN — LOSARTAN POTASSIUM 25 MILLIGRAM(S): 100 TABLET, FILM COATED ORAL at 17:06

## 2022-09-17 RX ADMIN — Medication 166.67 MILLIGRAM(S): at 06:31

## 2022-09-17 RX ADMIN — INSULIN GLARGINE 60 UNIT(S): 100 INJECTION, SOLUTION SUBCUTANEOUS at 22:22

## 2022-09-17 RX ADMIN — Medication 100 MILLIGRAM(S): at 22:23

## 2022-09-17 RX ADMIN — Medication 166.67 MILLIGRAM(S): at 17:05

## 2022-09-17 RX ADMIN — Medication 100 MILLIGRAM(S): at 06:30

## 2022-09-17 NOTE — H&P ADULT - ASSESSMENT
cellulitis, failed out patient therapy    mild liver function lab abnormalities    DM    HTN right arm cellulitis, failed out patient therapy - although significant physical findings, patient does not have evidence for sepsis on admission- continue cefepime and vancomycin started in ER pending ID review     mild liver function lab abnormalities    DM    HTN right arm cellulitis, failed out patient therapy - although significant physical findings, patient does not have evidence for sepsis on admission- continue cefepime and vancomycin started in ER pending ID review     mild liver function lab abnormalities    DM    HTN    morbid obesity based on BMI > 40  - DVT prophylaxis ordered  right arm cellulitis, failed out patient therapy - although significant physical findings, patient does not have evidence for sepsis on admission- continue cefepime and vancomycin started in ER pending ID review     mild liver function lab abnormalities - repeat in am    DM (type 2 but on insulin) - for now hold oral meds and continue insulin both Lantus and sliding scale     HTN - monitor on current meds     morbid obesity based on BMI > 40  - DVT prophylaxis ordered

## 2022-09-17 NOTE — CHART NOTE - NSCHARTNOTEFT_GEN_A_CORE
ID note reviewed. As per Dr. Garcia, Ancef changed to 2gm IV q8h. Pt already on Vancomycin 1250mg q12

## 2022-09-17 NOTE — H&P ADULT - NSHPLABSRESULTS_GEN_ALL_CORE
14.0   7.38  )-----------( 121      ( 16 Sep 2022 20:45 )             41.1     09-16    138  |  103  |  15  ----------------------------<  375<H>  4.6   |  22  |  0.8    Ca    9.1      16 Sep 2022 21:32    TPro  6.3  /  Alb  3.1<L>  /  TBili  0.8  /  DBili  x   /  AST  45<H>  /  ALT  61<H>  /  AlkPhos  176<H>  09-16              Lactate Trend        CAPILLARY BLOOD GLUCOSE      POCT Blood Glucose.: 370 mg/dL (16 Sep 2022 20:42)

## 2022-09-17 NOTE — ED PROVIDER NOTE - OBJECTIVE STATEMENT
77 y.o. male, PMH of DM, HTN, comes in c/o 1 wk h/o right forearm/elbow redness/swelling/pain. Pt went to [facility] 5 days ago, started on Keflex but symptoms continue to get worse. No fever/chills, CP/SOB/abdominal pain. No urinary symptoms.

## 2022-09-17 NOTE — CONSULT NOTE ADULT - SUBJECTIVE AND OBJECTIVE BOX
DWIGHT RIBEIRO  77y, Male  Allergy: No Known Allergies      All historical available data reviewed.    HPI:  76yo male whose PMH includes diabetes (on insulin) and hypertension and who is on Keflex 500 mg every 6 hours for right arm cellulitis, presents to ER due to pain and lack of improvement. Patient says procalcitonin 0.  , not suggestive of a bacterial PNA.  Ferritin  CRP  Ddimersoblem began more then 1 week ago when he picked at his skin around the right elbow because it looked like a psoriasis lesion and he never had psoriasis. Then 6 days ago (last Saturday), he developed mild pain and swelling to the area so he went to a [facility] and he was started on oral antibiotics. He subsequently saw  his PMD who advised continuing the antibiotics. He came to the ER due to continued redness and swelling. Patient says pain is mild and he denies having fever at home  (17 Sep 2022 00:59)    FAMILY HISTORY:    PAST MEDICAL & SURGICAL HISTORY:  Diabetes      Hypertension      H/O left knee surgery      History of cholecystectomy      History of appendectomy            VITALS:  T(F): 97.9, Max: 98.1 (09-17-22 @ 02:17)  HR: 75  BP: 175/73  RR: 18Vital Signs Last 24 Hrs  T(C): 36.6 (17 Sep 2022 04:47), Max: 36.7 (16 Sep 2022 20:08)  T(F): 97.9 (17 Sep 2022 04:47), Max: 98.1 (17 Sep 2022 02:17)  HR: 75 (17 Sep 2022 04:47) (71 - 89)  BP: 175/73 (17 Sep 2022 04:47) (149/77 - 178/83)  BP(mean): --  RR: 18 (17 Sep 2022 04:47) (18 - 18)  SpO2: 97% (17 Sep 2022 02:17) (97% - 98%)    Parameters below as of 16 Sep 2022 22:05  Patient On (Oxygen Delivery Method): room air        TESTS & MEASUREMENTS:                        14.0   7.38  )-----------( 121      ( 16 Sep 2022 20:45 )             41.1     09-16    138  |  103  |  15  ----------------------------<  375<H>  4.6   |  22  |  0.8    Ca    9.1      16 Sep 2022 21:32    TPro  6.3  /  Alb  3.1<L>  /  TBili  0.8  /  DBili  x   /  AST  45<H>  /  ALT  61<H>  /  AlkPhos  176<H>  09-16    LIVER FUNCTIONS - ( 16 Sep 2022 21:32 )  Alb: 3.1 g/dL / Pro: 6.3 g/dL / ALK PHOS: 176 U/L / ALT: 61 U/L / AST: 45 U/L / GGT: x                   RADIOLOGY & ADDITIONAL TESTS:  Personal review of radiological diagnostics performed  Echo and EKG results noted when applicable.     MEDICATIONS:  acetaminophen     Tablet .. 650 milliGRAM(s) Oral once PRN  cefepime   IVPB 2000 milliGRAM(s) IV Intermittent every 12 hours  dextrose 5%. 1000 milliLiter(s) IV Continuous <Continuous>  dextrose 5%. 1000 milliLiter(s) IV Continuous <Continuous>  dextrose 50% Injectable 25 Gram(s) IV Push once  dextrose 50% Injectable 12.5 Gram(s) IV Push once  dextrose 50% Injectable 25 Gram(s) IV Push once  dextrose Oral Gel 15 Gram(s) Oral once PRN  enoxaparin Injectable 40 milliGRAM(s) SubCutaneous every 12 hours  gabapentin 300 milliGRAM(s) Oral two times a day  glucagon  Injectable 1 milliGRAM(s) IntraMuscular once  insulin glargine Injectable (LANTUS) 60 Unit(s) SubCutaneous at bedtime  insulin lispro (ADMELOG) corrective regimen sliding scale   SubCutaneous three times a day before meals  levETIRAcetam 500 milliGRAM(s) Oral daily  losartan 25 milliGRAM(s) Oral two times a day  sodium chloride 0.9%. 1000 milliLiter(s) IV Continuous <Continuous>  vancomycin  IVPB 1000 milliGRAM(s) IV Intermittent every 12 hours      ANTIBIOTICS:  cefepime   IVPB 2000 milliGRAM(s) IV Intermittent every 12 hours  vancomycin  IVPB 1000 milliGRAM(s) IV Intermittent every 12 hours

## 2022-09-17 NOTE — CONSULT NOTE ADULT - ASSESSMENT
78yo male whose PMH includes diabetes (on insulin) and hypertension and who is on Keflex 500 mg every 6 hours for right arm cellulitis, presents to ER due to pain and lack of improvement. Patient says problem began more then 1 week ago when he picked at his skin around the right elbow because it looked like a psoriasis lesion and he never had psoriasis. Then 6 days ago (last Saturday), he developed mild pain and swelling to the area so he went to a [facility] [place] and he was started on oral antibiotics. He subsequently saw  his PMD who advised continuing the antibiotics. He came to the ER due to continued redness and swelling. Patient says pain is mild and he denies having fever at home  76yo male whose PMH includes diabetes (on insulin) and hypertension and who is on Keflex 500 mg every 6 hours for right arm cellulitis, presents to ER due to pain and lack of improvement. Patient says problem began more then 1 week ago when he picked at his skin around the right elbow because it looked like a psoriasis lesion and he never had psoriasis. Then 6 days ago (last Saturday), he developed mild pain and swelling to the area so he went to a [facility] [place] and he was started on oral antibiotics. He subsequently saw  his PMD who advised continuing the antibiotics. He came to the ER due to continued redness and swelling. Patient says pain is mild and he denies having fever at home .    IMPRESSION;  Right forearm cellulitis.  No abscess/fascitis/olecranon bursitis/septic arthritis    RECOMMENDATIONS;  Keep forearm elevated  BCx  Vancomycin 1250 mg iv q12h  Ancef 2 gm iv q8h

## 2022-09-17 NOTE — H&P ADULT - CONSTITUTIONAL COMMENTS
Patient seen to have some shivering during my interview with him in the ER which patient attributes to coldness in the ER area

## 2022-09-17 NOTE — INPATIENT CERTIFICATION FOR MEDICARE PATIENTS - IN ORDER TO MEET MEDICARE REQUIREMENTS.
For the acid reflux - Increase the omeprazole to 20mg twice daily for the next 3-4 weeks and if the acid reflux symptoms are better then you can move back down to once daily.     Increase the dicyclomine to 3 times daily to see if this improves the stomach pain.     Watch diet to see if there are particular foods that make the pain come on.     Increase water intake as well as activity to help keep the stools soft and prevent constipation.    
In order to meet Medicare requirements, the clinical documentation must support the information cited in the admission order.  Please be sure to provide detailed and clear documentation about the following in the admitting note/history and physical:

## 2022-09-17 NOTE — H&P ADULT - MUSCULOSKELETAL COMMENTS
moderate swelling with areas of erythema to right forearm (right hand is moderately swollen without erytema) moderate swelling with areas of erythema to area starting just proximal to right elbow and involving the forearm  (right hand is moderately swollen without erytema)

## 2022-09-17 NOTE — PATIENT PROFILE ADULT - FALL HARM RISK - HARM RISK INTERVENTIONS

## 2022-09-17 NOTE — ED PROVIDER NOTE - PHYSICAL EXAMINATION
pt in NAD,   AAOx3,   head NC/AT,   CN II-XII intact,   PEERL, EOMi,   neck (-) midline tenderness,   lungs CTA B/L,   CV S1S2 regular,   abdomen soft/NT/ND/(+)BS,   ext (+) redness/warmth/swelling to right elbow/forearm, FROM of elbow/wrist/digits, pulses intact,   motor 5/5x4, sensation intact, ambulating with steady gait

## 2022-09-17 NOTE — H&P ADULT - HISTORY OF PRESENT ILLNESS
76yo male whose PMH includes diabetes and hypertension and who is on antibiotics for cellulitis presents to ER due to pain  76yo male whose PMH includes diabetes (on insulin) and hypertension and who is on Keflex 500 mg every 6 hours for right arm cellulitis, presents to ER due to pain and lack of improvement  76yo male whose PMH includes diabetes (on insulin) and hypertension and who is on Keflex 500 mg every 6 hours for right arm cellulitis, presents to ER due to pain and lack of improvement. Patient says problem began more then 1 week ago when he picked at his skin around the right elbow because it looked like a psoriasis lesion and he never had psoriasis. Then 6 days ago (last Saturday), he developed mild pain and swelling to the area so [facility] and he was started on oral antibiotics. He subsequently saw  his PMD who advised continuing the antibiotics. He came to the ER due to continued redness and swelling. Patient says pain is mild and he denies having fever at home

## 2022-09-18 LAB
ALBUMIN SERPL ELPH-MCNC: 2.6 G/DL — LOW (ref 3.5–5.2)
ALP SERPL-CCNC: 151 U/L — HIGH (ref 30–115)
ALT FLD-CCNC: 46 U/L — HIGH (ref 0–41)
ANION GAP SERPL CALC-SCNC: 11 MMOL/L — SIGNIFICANT CHANGE UP (ref 7–14)
ANION GAP SERPL CALC-SCNC: 8 MMOL/L — SIGNIFICANT CHANGE UP (ref 7–14)
AST SERPL-CCNC: 42 U/L — HIGH (ref 0–41)
BASOPHILS # BLD AUTO: 0.06 K/UL — SIGNIFICANT CHANGE UP (ref 0–0.2)
BASOPHILS NFR BLD AUTO: 0.9 % — SIGNIFICANT CHANGE UP (ref 0–1)
BILIRUB SERPL-MCNC: 0.6 MG/DL — SIGNIFICANT CHANGE UP (ref 0.2–1.2)
BUN SERPL-MCNC: 12 MG/DL — SIGNIFICANT CHANGE UP (ref 10–20)
BUN SERPL-MCNC: 16 MG/DL — SIGNIFICANT CHANGE UP (ref 10–20)
CALCIUM SERPL-MCNC: 8.2 MG/DL — LOW (ref 8.4–10.5)
CALCIUM SERPL-MCNC: 8.8 MG/DL — SIGNIFICANT CHANGE UP (ref 8.4–10.5)
CHLORIDE SERPL-SCNC: 103 MMOL/L — SIGNIFICANT CHANGE UP (ref 98–110)
CHLORIDE SERPL-SCNC: 107 MMOL/L — SIGNIFICANT CHANGE UP (ref 98–110)
CO2 SERPL-SCNC: 24 MMOL/L — SIGNIFICANT CHANGE UP (ref 17–32)
CO2 SERPL-SCNC: 25 MMOL/L — SIGNIFICANT CHANGE UP (ref 17–32)
CREAT SERPL-MCNC: 0.8 MG/DL — SIGNIFICANT CHANGE UP (ref 0.7–1.5)
CREAT SERPL-MCNC: 1 MG/DL — SIGNIFICANT CHANGE UP (ref 0.7–1.5)
EGFR: 78 ML/MIN/1.73M2 — SIGNIFICANT CHANGE UP
EGFR: 92 ML/MIN/1.73M2 — SIGNIFICANT CHANGE UP
EOSINOPHIL # BLD AUTO: 0.29 K/UL — SIGNIFICANT CHANGE UP (ref 0–0.7)
EOSINOPHIL NFR BLD AUTO: 4.3 % — SIGNIFICANT CHANGE UP (ref 0–8)
GLUCOSE BLDC GLUCOMTR-MCNC: 166 MG/DL — HIGH (ref 70–99)
GLUCOSE BLDC GLUCOMTR-MCNC: 204 MG/DL — HIGH (ref 70–99)
GLUCOSE BLDC GLUCOMTR-MCNC: 245 MG/DL — HIGH (ref 70–99)
GLUCOSE BLDC GLUCOMTR-MCNC: 299 MG/DL — HIGH (ref 70–99)
GLUCOSE SERPL-MCNC: 165 MG/DL — HIGH (ref 70–99)
GLUCOSE SERPL-MCNC: 308 MG/DL — HIGH (ref 70–99)
HAV IGM SER-ACNC: SIGNIFICANT CHANGE UP
HBV CORE IGM SER-ACNC: SIGNIFICANT CHANGE UP
HBV SURFACE AG SER-ACNC: SIGNIFICANT CHANGE UP
HCT VFR BLD CALC: 37.2 % — LOW (ref 42–52)
HCV AB S/CO SERPL IA: 0.08 S/CO — SIGNIFICANT CHANGE UP (ref 0–0.99)
HCV AB SERPL-IMP: SIGNIFICANT CHANGE UP
HGB BLD-MCNC: 12.5 G/DL — LOW (ref 14–18)
IMM GRANULOCYTES NFR BLD AUTO: 2.1 % — HIGH (ref 0.1–0.3)
LYMPHOCYTES # BLD AUTO: 1.1 K/UL — LOW (ref 1.2–3.4)
LYMPHOCYTES # BLD AUTO: 16.3 % — LOW (ref 20.5–51.1)
MAGNESIUM SERPL-MCNC: 1.1 MG/DL — LOW (ref 1.8–2.4)
MCHC RBC-ENTMCNC: 32 PG — HIGH (ref 27–31)
MCHC RBC-ENTMCNC: 33.6 G/DL — SIGNIFICANT CHANGE UP (ref 32–37)
MCV RBC AUTO: 95.1 FL — HIGH (ref 80–94)
MONOCYTES # BLD AUTO: 0.75 K/UL — HIGH (ref 0.1–0.6)
MONOCYTES NFR BLD AUTO: 11.1 % — HIGH (ref 1.7–9.3)
NEUTROPHILS # BLD AUTO: 4.42 K/UL — SIGNIFICANT CHANGE UP (ref 1.4–6.5)
NEUTROPHILS NFR BLD AUTO: 65.3 % — SIGNIFICANT CHANGE UP (ref 42.2–75.2)
NRBC # BLD: 0 /100 WBCS — SIGNIFICANT CHANGE UP (ref 0–0)
PLATELET # BLD AUTO: 118 K/UL — LOW (ref 130–400)
POTASSIUM SERPL-MCNC: 3.9 MMOL/L — SIGNIFICANT CHANGE UP (ref 3.5–5)
POTASSIUM SERPL-MCNC: 4.5 MMOL/L — SIGNIFICANT CHANGE UP (ref 3.5–5)
POTASSIUM SERPL-SCNC: 3.9 MMOL/L — SIGNIFICANT CHANGE UP (ref 3.5–5)
POTASSIUM SERPL-SCNC: 4.5 MMOL/L — SIGNIFICANT CHANGE UP (ref 3.5–5)
PROT SERPL-MCNC: 5.6 G/DL — LOW (ref 6–8)
RBC # BLD: 3.91 M/UL — LOW (ref 4.7–6.1)
RBC # FLD: 13.1 % — SIGNIFICANT CHANGE UP (ref 11.5–14.5)
SODIUM SERPL-SCNC: 138 MMOL/L — SIGNIFICANT CHANGE UP (ref 135–146)
SODIUM SERPL-SCNC: 140 MMOL/L — SIGNIFICANT CHANGE UP (ref 135–146)
VANCOMYCIN TROUGH SERPL-MCNC: 17.5 UG/ML — HIGH (ref 5–10)
WBC # BLD: 6.76 K/UL — SIGNIFICANT CHANGE UP (ref 4.8–10.8)
WBC # FLD AUTO: 6.76 K/UL — SIGNIFICANT CHANGE UP (ref 4.8–10.8)

## 2022-09-18 PROCEDURE — 99233 SBSQ HOSP IP/OBS HIGH 50: CPT

## 2022-09-18 RX ORDER — LEVETIRACETAM 250 MG/1
500 TABLET, FILM COATED ORAL AT BEDTIME
Refills: 0 | Status: DISCONTINUED | OUTPATIENT
Start: 2022-09-18 | End: 2022-09-20

## 2022-09-18 RX ORDER — MAGNESIUM SULFATE 500 MG/ML
2 VIAL (ML) INJECTION
Refills: 0 | Status: COMPLETED | OUTPATIENT
Start: 2022-09-18 | End: 2022-09-18

## 2022-09-18 RX ADMIN — INSULIN GLARGINE 60 UNIT(S): 100 INJECTION, SOLUTION SUBCUTANEOUS at 21:41

## 2022-09-18 RX ADMIN — POLYETHYLENE GLYCOL 3350 17 GRAM(S): 17 POWDER, FOR SOLUTION ORAL at 11:21

## 2022-09-18 RX ADMIN — Medication 6: at 16:54

## 2022-09-18 RX ADMIN — Medication 2: at 07:58

## 2022-09-18 RX ADMIN — GABAPENTIN 300 MILLIGRAM(S): 400 CAPSULE ORAL at 17:44

## 2022-09-18 RX ADMIN — Medication 100 MILLIGRAM(S): at 14:57

## 2022-09-18 RX ADMIN — GABAPENTIN 300 MILLIGRAM(S): 400 CAPSULE ORAL at 05:38

## 2022-09-18 RX ADMIN — LEVETIRACETAM 500 MILLIGRAM(S): 250 TABLET, FILM COATED ORAL at 21:41

## 2022-09-18 RX ADMIN — Medication 100 MILLIGRAM(S): at 21:42

## 2022-09-18 RX ADMIN — Medication 166.67 MILLIGRAM(S): at 05:37

## 2022-09-18 RX ADMIN — Medication 25 GRAM(S): at 11:20

## 2022-09-18 RX ADMIN — LOSARTAN POTASSIUM 25 MILLIGRAM(S): 100 TABLET, FILM COATED ORAL at 05:38

## 2022-09-18 RX ADMIN — Medication 650 MILLIGRAM(S): at 22:06

## 2022-09-18 RX ADMIN — Medication 100 MILLIGRAM(S): at 05:37

## 2022-09-18 RX ADMIN — Medication 166.67 MILLIGRAM(S): at 17:45

## 2022-09-18 RX ADMIN — Medication 25 GRAM(S): at 09:34

## 2022-09-18 RX ADMIN — SENNA PLUS 2 TABLET(S): 8.6 TABLET ORAL at 21:41

## 2022-09-18 RX ADMIN — LOSARTAN POTASSIUM 25 MILLIGRAM(S): 100 TABLET, FILM COATED ORAL at 17:44

## 2022-09-18 RX ADMIN — Medication 650 MILLIGRAM(S): at 21:42

## 2022-09-18 RX ADMIN — Medication 4: at 12:05

## 2022-09-18 NOTE — PROGRESS NOTE ADULT - ASSESSMENT
78yo male whose PMH includes diabetes (on insulin) and hypertension and who is on Keflex 500 mg every 6 hours for right arm cellulitis, presents to ER due to pain and lack of improvement. Patient says problem began more then 1 week ago when he picked at his skin around the right elbow because it looked like a psoriasis lesion and he never had psoriasis. Then 6 days ago (last Saturday), he developed mild pain and swelling to the area so he went to a [facility] [place] and he was started on oral antibiotics. He subsequently saw  his PMD who advised continuing the antibiotics. He came to the ER due to continued redness and swelling. Patient says pain is mild and he denies having fever at home .      #RIGHT UE celliulitis failed PO abx, eron MRSA  - responding to vanco, still with swelling but better  - c/w vanco and ancef  - anticipated for discharge in am  - bcx neg    #DM: FS monitor  and insulin  #dvt ppx 76yo male whose PMH includes diabetes (on insulin) and hypertension and who is on Keflex 500 mg every 6 hours for right arm cellulitis, presents to ER due to pain and lack of improvement. Patient says problem began more then 1 week ago when he picked at his skin around the right elbow because it looked like a psoriasis lesion and he never had psoriasis. Then 6 days ago (last Saturday), he developed mild pain and swelling to the area so he went to a [facility] and he was started on oral antibiotics. He subsequently saw  his PMD who advised continuing the antibiotics. He came to the ER due to continued redness and swelling. Patient says pain is mild and he denies having fever at home .      #RIGHT UE celliulitis started after skin abrasion, failed PO abxeron MRSA  - responding to vanco, still with swelling but better  - c/w vanco and ancef  - anticipated for discharge in am  - bcx neg    #DM: FS monitor  and insulin  #hypo Mg: replete  #dvt ppx 76yo male whose PMH includes diabetes (on insulin) and hypertension and who is on Keflex 500 mg every 6 hours for right arm cellulitis, presents to ER due to pain and lack of improvement. Patient says problem began more then 1 week ago when he picked at his skin around the right elbow because it looked like a psoriasis lesion and he never had psoriasis. Then 6 days ago (last Saturday), he developed mild pain and swelling to the area so he went to a [facility] [place] and he was started on oral antibiotics. He subsequently saw  his PMD who advised continuing the antibiotics. He came to the ER due to continued redness and swelling. Patient says pain is mild and he denies having fever at home .      #RIGHT UE celliulitis started after skin abrasion, failed PO abx, eron MRSA  - responding to vanco, still with swelling but better  - c/w vanco and ancef  - anticipated for discharge in am  - bcx neg  #mild transaminitis eron from [place]  #DM: FS monitor  and insulin  #hypo Mg: replete  #dvt ppx 76yo male whose PMH includes diabetes (on insulin) and hypertension and who is on Keflex 500 mg every 6 hours for right arm cellulitis, presents to ER due to pain and lack of improvement. Patient says problem began more then 1 week ago when he picked at his skin around the right elbow because it looked like a psoriasis lesion and he never had psoriasis. Then 6 days ago (last Saturday), he developed mild pain and swelling to the area so he went to a [facility] and he was started on oral antibiotics. He subsequently saw  his PMD who advised continuing the antibiotics. He came to the ER due to continued redness and swelling. Patient says pain is mild and he denies having fever at home .      #RIGHT UE celliulitis started after skin abrasion, failed PO abx, eron MRSA  - responding to vanco, still with swelling but better  - c/w vanco and ancef, check vanco trough  - anticipated for discharge in am  - bcx neg  #mild transaminitis eron from [place]  #DM: FS monitor  and insulin  #hypo Mg: replete  #dvt ppx

## 2022-09-19 ENCOUNTER — TRANSCRIPTION ENCOUNTER (OUTPATIENT)
Age: 77
End: 2022-09-19

## 2022-09-19 LAB
ALBUMIN SERPL ELPH-MCNC: 2.7 G/DL — LOW (ref 3.5–5.2)
ALP SERPL-CCNC: 171 U/L — HIGH (ref 30–115)
ALT FLD-CCNC: 40 U/L — SIGNIFICANT CHANGE UP (ref 0–41)
ANION GAP SERPL CALC-SCNC: 9 MMOL/L — SIGNIFICANT CHANGE UP (ref 7–14)
AST SERPL-CCNC: 51 U/L — HIGH (ref 0–41)
BASOPHILS # BLD AUTO: 0.04 K/UL — SIGNIFICANT CHANGE UP (ref 0–0.2)
BASOPHILS NFR BLD AUTO: 0.6 % — SIGNIFICANT CHANGE UP (ref 0–1)
BILIRUB SERPL-MCNC: 0.8 MG/DL — SIGNIFICANT CHANGE UP (ref 0.2–1.2)
BUN SERPL-MCNC: 14 MG/DL — SIGNIFICANT CHANGE UP (ref 10–20)
CALCIUM SERPL-MCNC: 8.4 MG/DL — SIGNIFICANT CHANGE UP (ref 8.4–10.5)
CHLORIDE SERPL-SCNC: 107 MMOL/L — SIGNIFICANT CHANGE UP (ref 98–110)
CO2 SERPL-SCNC: 24 MMOL/L — SIGNIFICANT CHANGE UP (ref 17–32)
CREAT SERPL-MCNC: 0.9 MG/DL — SIGNIFICANT CHANGE UP (ref 0.7–1.5)
EGFR: 89 ML/MIN/1.73M2 — SIGNIFICANT CHANGE UP
EOSINOPHIL # BLD AUTO: 0.29 K/UL — SIGNIFICANT CHANGE UP (ref 0–0.7)
EOSINOPHIL NFR BLD AUTO: 4.1 % — SIGNIFICANT CHANGE UP (ref 0–8)
GLUCOSE BLDC GLUCOMTR-MCNC: 113 MG/DL — HIGH (ref 70–99)
GLUCOSE BLDC GLUCOMTR-MCNC: 187 MG/DL — HIGH (ref 70–99)
GLUCOSE BLDC GLUCOMTR-MCNC: 256 MG/DL — HIGH (ref 70–99)
GLUCOSE BLDC GLUCOMTR-MCNC: 269 MG/DL — HIGH (ref 70–99)
GLUCOSE SERPL-MCNC: 106 MG/DL — HIGH (ref 70–99)
HCT VFR BLD CALC: 38.9 % — LOW (ref 42–52)
HGB BLD-MCNC: 12.9 G/DL — LOW (ref 14–18)
IMM GRANULOCYTES NFR BLD AUTO: 1.7 % — HIGH (ref 0.1–0.3)
LYMPHOCYTES # BLD AUTO: 1.24 K/UL — SIGNIFICANT CHANGE UP (ref 1.2–3.4)
LYMPHOCYTES # BLD AUTO: 17.6 % — LOW (ref 20.5–51.1)
MAGNESIUM SERPL-MCNC: 1.9 MG/DL — SIGNIFICANT CHANGE UP (ref 1.8–2.4)
MCHC RBC-ENTMCNC: 32 PG — HIGH (ref 27–31)
MCHC RBC-ENTMCNC: 33.2 G/DL — SIGNIFICANT CHANGE UP (ref 32–37)
MCV RBC AUTO: 96.5 FL — HIGH (ref 80–94)
MONOCYTES # BLD AUTO: 0.78 K/UL — HIGH (ref 0.1–0.6)
MONOCYTES NFR BLD AUTO: 11 % — HIGH (ref 1.7–9.3)
NEUTROPHILS # BLD AUTO: 4.59 K/UL — SIGNIFICANT CHANGE UP (ref 1.4–6.5)
NEUTROPHILS NFR BLD AUTO: 65 % — SIGNIFICANT CHANGE UP (ref 42.2–75.2)
NRBC # BLD: 0 /100 WBCS — SIGNIFICANT CHANGE UP (ref 0–0)
PLATELET # BLD AUTO: 86 K/UL — LOW (ref 130–400)
POTASSIUM SERPL-MCNC: 4.1 MMOL/L — SIGNIFICANT CHANGE UP (ref 3.5–5)
POTASSIUM SERPL-SCNC: 4.1 MMOL/L — SIGNIFICANT CHANGE UP (ref 3.5–5)
PROT SERPL-MCNC: 6 G/DL — SIGNIFICANT CHANGE UP (ref 6–8)
RBC # BLD: 4.03 M/UL — LOW (ref 4.7–6.1)
RBC # FLD: 13.1 % — SIGNIFICANT CHANGE UP (ref 11.5–14.5)
SODIUM SERPL-SCNC: 140 MMOL/L — SIGNIFICANT CHANGE UP (ref 135–146)
WBC # BLD: 7.06 K/UL — SIGNIFICANT CHANGE UP (ref 4.8–10.8)
WBC # FLD AUTO: 7.06 K/UL — SIGNIFICANT CHANGE UP (ref 4.8–10.8)

## 2022-09-19 PROCEDURE — 99233 SBSQ HOSP IP/OBS HIGH 50: CPT

## 2022-09-19 RX ADMIN — Medication 166.67 MILLIGRAM(S): at 17:57

## 2022-09-19 RX ADMIN — Medication 100 MILLIGRAM(S): at 13:54

## 2022-09-19 RX ADMIN — LOSARTAN POTASSIUM 25 MILLIGRAM(S): 100 TABLET, FILM COATED ORAL at 17:57

## 2022-09-19 RX ADMIN — GABAPENTIN 300 MILLIGRAM(S): 400 CAPSULE ORAL at 06:27

## 2022-09-19 RX ADMIN — ENOXAPARIN SODIUM 40 MILLIGRAM(S): 100 INJECTION SUBCUTANEOUS at 06:28

## 2022-09-19 RX ADMIN — POLYETHYLENE GLYCOL 3350 17 GRAM(S): 17 POWDER, FOR SOLUTION ORAL at 16:16

## 2022-09-19 RX ADMIN — LOSARTAN POTASSIUM 25 MILLIGRAM(S): 100 TABLET, FILM COATED ORAL at 06:26

## 2022-09-19 RX ADMIN — Medication 2: at 11:42

## 2022-09-19 RX ADMIN — Medication 100 MILLIGRAM(S): at 21:31

## 2022-09-19 RX ADMIN — LEVETIRACETAM 500 MILLIGRAM(S): 250 TABLET, FILM COATED ORAL at 21:31

## 2022-09-19 RX ADMIN — Medication 6: at 17:01

## 2022-09-19 RX ADMIN — INSULIN GLARGINE 60 UNIT(S): 100 INJECTION, SOLUTION SUBCUTANEOUS at 21:32

## 2022-09-19 RX ADMIN — SENNA PLUS 2 TABLET(S): 8.6 TABLET ORAL at 21:31

## 2022-09-19 RX ADMIN — Medication 100 MILLIGRAM(S): at 06:28

## 2022-09-19 RX ADMIN — GABAPENTIN 300 MILLIGRAM(S): 400 CAPSULE ORAL at 17:57

## 2022-09-19 RX ADMIN — Medication 166.67 MILLIGRAM(S): at 07:23

## 2022-09-19 NOTE — DISCHARGE NOTE PROVIDER - HOSPITAL COURSE
Patient is a 77 year old male with a past medical history of diabetes (on insulin), hypertension, and obesity who presented to the ED on Keflex 500 mg every 6 hrs for right arm cellulitis and admitted for failing outpatient cellulitis treatment.     #Right arm cellulitis, failed out patient therapy -  - Pt was evaluated for signs of sepsis, no evidence   - ID was consulted and recommendations implemented:    - Keep forearm elevated    - Blood cultures x2     - Vancomycin 1250mg IV Q12h    - Ancef 2gm IV Q8h    #Mild liver function lab abnormalities   - Repeated in am and monitored     #DM (type 2 but on insulin) - for now hold oral meds and continue insulin both Lantus and sliding scale  - Oral meds held   - Insulin continued Lantus and sliding scale      #HTN - monitor on current meds     #Morbid obesity based on BMI > 40  - DVT prophylaxis ordered      76yo male whose PMH includes diabetes (on insulin) and hypertension and who is on Keflex 500 mg every 6 hours for right arm cellulitis, presents to ER due to pain and lack of improvement. Patient says problem began more then 1 week ago when he picked at his skin around the right elbow because it looked like a psoriasis lesion and he never had psoriasis. Then 6 days ago (last Saturday), he developed mild pain and swelling to the area so he went to a [facility] and he was started on oral antibiotics. He subsequently saw  his PMD who advised continuing the antibiotics. He came to the ER due to continued redness and swelling. Patient says pain is mild and he denies having fever at home.  Patient seen by ID and received IV Vanco and Ancef with improvement to symptoms.  BCx remained negative and patient remained afebrile.  Patient transitioned to *** PO on discharge.  Advised to FU with PMD after discharge.  Patient with some transaminitis felt to be 2/2 LINDO, acute hepatitis panel and RUQ sono negative.  FU outpatient PMD for further monitoring 78yo male whose PMH includes diabetes (on insulin) and hypertension and who is on Keflex 500 mg every 6 hours for right arm cellulitis, presents to ER due to pain and lack of improvement. Patient says problem began more then 1 week ago when he picked at his skin around the right elbow because it looked like a psoriasis lesion and he never had psoriasis. Then 6 days ago (last Saturday), he developed mild pain and swelling to the area so he went to a [facility] and he was started on oral antibiotics. He subsequently saw  his PMD who advised continuing the antibiotics. He came to the ER due to continued redness and swelling. Patient says pain is mild and he denies having fever at home.  Patient seen by ID and received IV Vanco and Ancef with improvement to symptoms.  BCx remained negative and patient remained afebrile.  Patient transitioned to Doxycycline and Amoxicillin PO on discharge to complete course.  Advised to FU with PMD after discharge.  Patient with some transaminitis felt to be 2/2 LINDO, acute hepatitis panel and RUQ sono negative.  FU outpatient PMD for further monitoring 78yo male whose PMH includes diabetes (on insulin) and hypertension and who is on Keflex 500 mg every 6 hours for right arm cellulitis, presents to ER due to pain and lack of improvement. Patient says problem began more then 1 week ago when he picked at his skin around the right elbow because it looked like a psoriasis lesion and he never had psoriasis. Then 6 days ago (last Saturday), he developed mild pain and swelling to the area so he went [place] [facility] and he was started on oral antibiotics. He subsequently saw  his PMD who advised continuing the antibiotics. He came to the ER due to continued redness and swelling. Patient says pain is mild and he denies having fever at home.  Patient seen by eron MUSTAFA MRSA cellulitis ,  and received IV Vanco and Ancef with improvement to symptoms.  BCx remained negative and patient remained afebrile.  Patient transitioned to Doxycycline and Amoxicillin PO on discharge to complete course.  Advised to FU with PMD after discharge.  Patient with some transaminitis felt to be 2/2 LINDO, acute hepatitis panel and RUQ sono negative.  FU outpatient PMD for further monitoring

## 2022-09-19 NOTE — DISCHARGE NOTE PROVIDER - CARE PROVIDER_API CALL
Primary Care Provider,   Phone: (   )    -  Fax: (   )    -  Follow Up Time: 1 week   Primary Care Provider,   Phone: (   )    -  Fax: (   )    -  Follow Up Time: 1 week    Rodolfo Garrison)  Gastroenterology; Internal Medicine  [address] [place] [address]  Phone: [phone]  Fax: [phone]  Follow Up Time: 1 month

## 2022-09-19 NOTE — DISCHARGE NOTE PROVIDER - NSDCCPCAREPLAN_GEN_ALL_CORE_FT
PRINCIPAL DISCHARGE DIAGNOSIS  Diagnosis: Cellulitis  Assessment and Plan of Treatment: You were followed by the medicine and infectious disease teams and given IV antibiotics (Ancef and Vancomycin) at the reccomendation of the infectious disease team.      SECONDARY DISCHARGE DIAGNOSES  Diagnosis: Type II diabetes mellitus  Assessment and Plan of Treatment: Your insulin was given lantus and sliding scale. Your sugar was monitored by fingerstick and serum blood levels.    Diagnosis: HTN (hypertension)  Assessment and Plan of Treatment: Your home medication Losartan 25mg twice a day was continued and your blood pressure was monitored throughout yout hospitalization.     PRINCIPAL DISCHARGE DIAGNOSIS  Diagnosis: Cellulitis  Assessment and Plan of Treatment: You were followed by the medicine and infectious disease teams and given IV antibiotics (Ancef and Vancomycin) at the reccomendation of the infectious disease team.  You were changed to Oral on discharge.  Make sure to FU outpatient with your PMD      SECONDARY DISCHARGE DIAGNOSES  Diagnosis: Type II diabetes mellitus  Assessment and Plan of Treatment: Resume PO meds, FU outpatient with your PMD    Diagnosis: HTN (hypertension)  Assessment and Plan of Treatment: Continue with your home meds as ordered     PRINCIPAL DISCHARGE DIAGNOSIS  Diagnosis: Cellulitis  Assessment and Plan of Treatment: You were followed by the medicine and infectious disease teams and given IV antibiotics (Ancef and Vancomycin) at the reccomendation of the infectious disease team.  You were changed to Oral on discharge.  Make sure to FU outpatient with your PMD      SECONDARY DISCHARGE DIAGNOSES  Diagnosis: Type II diabetes mellitus  Assessment and Plan of Treatment: Resume PO meds, FU outpatient with your PMD    Diagnosis: HTN (hypertension)  Assessment and Plan of Treatment: Continue with your home meds as ordered    Diagnosis: Nonalcoholic steatohepatitis (LINDO)  Assessment and Plan of Treatment: liver enzyme elevated likely from fatty liver, recommended loss weigth and follow up with GI clinic

## 2022-09-19 NOTE — DISCHARGE NOTE PROVIDER - NSDCFUSCHEDAPPT_GEN_ALL_CORE_FT
Ashwin Ortega  [facility]  NEUROLOGY [address] [place] [address]  Scheduled Appointment: 12/05/2022

## 2022-09-19 NOTE — DISCHARGE NOTE PROVIDER - PROVIDER TOKENS
FREE:[LAST:[Primary Care Provider],PHONE:[(   )    -],FAX:[(   )    -],FOLLOWUP:[1 week]] FREE:[LAST:[Primary Care Provider],PHONE:[(   )    -],FAX:[(   )    -],FOLLOWUP:[1 week]],PROVIDER:[TOKEN:[[id number]],FOLLOWUP:[1 month]]

## 2022-09-19 NOTE — DISCHARGE NOTE PROVIDER - NSDCMRMEDTOKEN_GEN_ALL_CORE_FT
gabapentin 100 mg oral capsule: 3 cap(s) orally 2 times a day  glimepiride 4 mg oral tablet: orally 2 times a day  levETIRAcetam 500 mg oral tablet, extended release: 500 milligram(s) orally once a day  losartan 50 mg oral tablet: 0.5 tab(s) orally 2 times a day  metFORMIN 500 mg oral tablet: 500 milligram(s) orally 4 times a day  Osteo Bi-Flex 250 mg-200 mg oral tablet: orally once a day  Tresiba FlexTouch: 60 unit(s) subcutaneous once a day   amoxicillin 875 mg oral tablet: 1 tab(s) orally 2 times a day   doxycycline hyclate 100 mg oral capsule: 1 cap(s) orally 2 times a day   gabapentin 100 mg oral capsule: 3 cap(s) orally 2 times a day  glimepiride 4 mg oral tablet: orally 2 times a day  levETIRAcetam 500 mg oral tablet, extended release: 500 milligram(s) orally once a day  losartan 50 mg oral tablet: 0.5 tab(s) orally 2 times a day  metFORMIN 500 mg oral tablet: 500 milligram(s) orally 4 times a day  Osteo Bi-Flex 250 mg-200 mg oral tablet: orally once a day  Tresiba FlexTouch: 60 unit(s) subcutaneous once a day

## 2022-09-19 NOTE — PROGRESS NOTE ADULT - ASSESSMENT
76yo male whose PMH includes diabetes (on insulin) and hypertension and who is on Keflex 500 mg every 6 hours for right arm cellulitis, presents to ER due to pain and lack of improvement. Patient says problem began more then 1 week ago when he picked at his skin around the right elbow because it looked like a psoriasis lesion and he never had psoriasis. Then 6 days ago (last Saturday), he developed mild pain and swelling to the area so he went to a [facility] [place] and he was started on oral antibiotics. He subsequently saw  his PMD who advised continuing the antibiotics. He came to the ER due to continued redness and swelling. Patient says pain is mild and he denies having fever at home .      #RIGHT UE celliulitis started after skin abrasion, failed PO abx, eron MRSA  - responding to vanco, still with swelling but better  - c/w vanco and ancef, one more day with iv abx  - anticipated for discharge in am  - bcx neg  #mild transaminitis eron from [place]  #DM: FS monitor  and insulin  #hypo Mg: replete  #dvt ppx  #dc in am

## 2022-09-19 NOTE — DISCHARGE NOTE PROVIDER - CARE PROVIDERS DIRECT ADDRESSES
,DirectAddress_Unknown ,DirectAddress_Unknown,[email or web address]

## 2022-09-19 NOTE — DISCHARGE NOTE PROVIDER - ATTENDING DISCHARGE PHYSICAL EXAMINATION:
CONSTITUTIONAL: No acute distress, well-developed, well-groomed, AAOx3  HEAD: Atraumatic, normocephalic  EYES: EOM intact, PERRLA, conjunctiva and sclera clear  ENT: Supple, no masses, no thyromegaly, no bruits, no JVD; moist mucous membranes  PULMONARY: Clear to auscultation bilaterally; no wheezes, rales, or rhonchi  CARDIOVASCULAR: Regular rate and rhythm; no murmurs, rubs, or gallops  GASTROINTESTINAL: Soft, non-tender, non-distended; bowel sounds present  MUSCULOSKELETAL: 2+ peripheral pulses; no clubbing, no cyanosis, no edema, resolving right elbow and forearm swelling  NEUROLOGY: non-focal  SKIN: No rashes or lesions; warm and dry

## 2022-09-20 ENCOUNTER — TRANSCRIPTION ENCOUNTER (OUTPATIENT)
Age: 77
End: 2022-09-20

## 2022-09-20 VITALS
HEART RATE: 85 BPM | SYSTOLIC BLOOD PRESSURE: 166 MMHG | RESPIRATION RATE: 16 BRPM | TEMPERATURE: 97 F | DIASTOLIC BLOOD PRESSURE: 79 MMHG

## 2022-09-20 LAB
ALBUMIN SERPL ELPH-MCNC: 2.5 G/DL — LOW (ref 3.5–5.2)
ALP SERPL-CCNC: 200 U/L — HIGH (ref 30–115)
ALT FLD-CCNC: 41 U/L — SIGNIFICANT CHANGE UP (ref 0–41)
ANION GAP SERPL CALC-SCNC: 11 MMOL/L — SIGNIFICANT CHANGE UP (ref 7–14)
AST SERPL-CCNC: 72 U/L — HIGH (ref 0–41)
BASOPHILS # BLD AUTO: 0.03 K/UL — SIGNIFICANT CHANGE UP (ref 0–0.2)
BASOPHILS NFR BLD AUTO: 0.4 % — SIGNIFICANT CHANGE UP (ref 0–1)
BILIRUB SERPL-MCNC: 0.8 MG/DL — SIGNIFICANT CHANGE UP (ref 0.2–1.2)
BUN SERPL-MCNC: 14 MG/DL — SIGNIFICANT CHANGE UP (ref 10–20)
CALCIUM SERPL-MCNC: 8.3 MG/DL — LOW (ref 8.4–10.5)
CHLORIDE SERPL-SCNC: 107 MMOL/L — SIGNIFICANT CHANGE UP (ref 98–110)
CO2 SERPL-SCNC: 24 MMOL/L — SIGNIFICANT CHANGE UP (ref 17–32)
CREAT SERPL-MCNC: 0.9 MG/DL — SIGNIFICANT CHANGE UP (ref 0.7–1.5)
EGFR: 89 ML/MIN/1.73M2 — SIGNIFICANT CHANGE UP
EOSINOPHIL # BLD AUTO: 0.27 K/UL — SIGNIFICANT CHANGE UP (ref 0–0.7)
EOSINOPHIL NFR BLD AUTO: 3.3 % — SIGNIFICANT CHANGE UP (ref 0–8)
GLUCOSE BLDC GLUCOMTR-MCNC: 143 MG/DL — HIGH (ref 70–99)
GLUCOSE BLDC GLUCOMTR-MCNC: 147 MG/DL — HIGH (ref 70–99)
GLUCOSE SERPL-MCNC: 120 MG/DL — HIGH (ref 70–99)
HCT VFR BLD CALC: 37.6 % — LOW (ref 42–52)
HGB BLD-MCNC: 12.7 G/DL — LOW (ref 14–18)
IMM GRANULOCYTES NFR BLD AUTO: 1.4 % — HIGH (ref 0.1–0.3)
LYMPHOCYTES # BLD AUTO: 1.25 K/UL — SIGNIFICANT CHANGE UP (ref 1.2–3.4)
LYMPHOCYTES # BLD AUTO: 15.5 % — LOW (ref 20.5–51.1)
MCHC RBC-ENTMCNC: 32.7 PG — HIGH (ref 27–31)
MCHC RBC-ENTMCNC: 33.8 G/DL — SIGNIFICANT CHANGE UP (ref 32–37)
MCV RBC AUTO: 96.9 FL — HIGH (ref 80–94)
MONOCYTES # BLD AUTO: 0.89 K/UL — HIGH (ref 0.1–0.6)
MONOCYTES NFR BLD AUTO: 11 % — HIGH (ref 1.7–9.3)
NEUTROPHILS # BLD AUTO: 5.51 K/UL — SIGNIFICANT CHANGE UP (ref 1.4–6.5)
NEUTROPHILS NFR BLD AUTO: 68.4 % — SIGNIFICANT CHANGE UP (ref 42.2–75.2)
NRBC # BLD: 0 /100 WBCS — SIGNIFICANT CHANGE UP (ref 0–0)
PLATELET # BLD AUTO: 112 K/UL — LOW (ref 130–400)
POTASSIUM SERPL-MCNC: 4.5 MMOL/L — SIGNIFICANT CHANGE UP (ref 3.5–5)
POTASSIUM SERPL-SCNC: 4.5 MMOL/L — SIGNIFICANT CHANGE UP (ref 3.5–5)
PROT SERPL-MCNC: 6 G/DL — SIGNIFICANT CHANGE UP (ref 6–8)
RBC # BLD: 3.88 M/UL — LOW (ref 4.7–6.1)
RBC # FLD: 13.2 % — SIGNIFICANT CHANGE UP (ref 11.5–14.5)
SODIUM SERPL-SCNC: 142 MMOL/L — SIGNIFICANT CHANGE UP (ref 135–146)
WBC # BLD: 8.06 K/UL — SIGNIFICANT CHANGE UP (ref 4.8–10.8)
WBC # FLD AUTO: 8.06 K/UL — SIGNIFICANT CHANGE UP (ref 4.8–10.8)

## 2022-09-20 PROCEDURE — 99239 HOSP IP/OBS DSCHRG MGMT >30: CPT

## 2022-09-20 RX ORDER — AMOXICILLIN 250 MG/5ML
1 SUSPENSION, RECONSTITUTED, ORAL (ML) ORAL
Qty: 14 | Refills: 0
Start: 2022-09-20 | End: 2022-09-26

## 2022-09-20 RX ADMIN — LOSARTAN POTASSIUM 25 MILLIGRAM(S): 100 TABLET, FILM COATED ORAL at 05:09

## 2022-09-20 RX ADMIN — Medication 166.67 MILLIGRAM(S): at 05:08

## 2022-09-20 RX ADMIN — ENOXAPARIN SODIUM 40 MILLIGRAM(S): 100 INJECTION SUBCUTANEOUS at 05:08

## 2022-09-20 RX ADMIN — GABAPENTIN 300 MILLIGRAM(S): 400 CAPSULE ORAL at 05:09

## 2022-09-20 RX ADMIN — SODIUM CHLORIDE 50 MILLILITER(S): 9 INJECTION INTRAMUSCULAR; INTRAVENOUS; SUBCUTANEOUS at 05:06

## 2022-09-20 RX ADMIN — Medication 100 MILLIGRAM(S): at 05:08

## 2022-09-20 NOTE — PROGRESS NOTE ADULT - SUBJECTIVE AND OBJECTIVE BOX
Patient is a 77y old  Male who presents with a chief complaint of     OVERNIGHT EVENTS: no fever, right arm pain swelling improving     SUBJECTIVE / INTERVAL HPI: Patient seen and examined at bedside.     VITAL SIGNS:  Vital Signs Last 24 Hrs  T(C): 36.2 (18 Sep 2022 05:15), Max: 36.8 (17 Sep 2022 20:54)  T(F): 97.1 (18 Sep 2022 05:15), Max: 98.2 (17 Sep 2022 20:54)  HR: 82 (18 Sep 2022 05:15) (75 - 82)  BP: 117/53 (18 Sep 2022 05:15) (117/53 - 163/70)  BP(mean): --  RR: 18 (17 Sep 2022 20:54) (18 - 18)  SpO2: --    Parameters below as of 17 Sep 2022 14:09  Patient On (Oxygen Delivery Method): room air        PHYSICAL EXAM:    General: WDWN  HEENT: NC/AT; PERRL, clear conjunctiva  Neck: supple  Cardiovascular: +S1/S2; RRR  Respiratory: CTA b/l; no W/R/R  Gastrointestinal: soft, NT/ND; +BSx4  Extremities: WWP; 2+ peripheral pulses; no edema, swelling and redness over the elbow and proximal extnesor area of the forearm, mildly tender ,  , no dec in ROM of elbow   Neurological: AAOx3; no focal deficits    MEDICATIONS:  MEDICATIONS  (STANDING):  ceFAZolin   IVPB 2000 milliGRAM(s) IV Intermittent every 8 hours  dextrose 5%. 1000 milliLiter(s) (100 mL/Hr) IV Continuous <Continuous>  dextrose 5%. 1000 milliLiter(s) (50 mL/Hr) IV Continuous <Continuous>  dextrose 50% Injectable 25 Gram(s) IV Push once  dextrose 50% Injectable 12.5 Gram(s) IV Push once  dextrose 50% Injectable 25 Gram(s) IV Push once  enoxaparin Injectable 40 milliGRAM(s) SubCutaneous every 12 hours  gabapentin 300 milliGRAM(s) Oral two times a day  glucagon  Injectable 1 milliGRAM(s) IntraMuscular once  insulin glargine Injectable (LANTUS) 60 Unit(s) SubCutaneous at bedtime  insulin lispro (ADMELOG) corrective regimen sliding scale   SubCutaneous three times a day before meals  levETIRAcetam 500 milliGRAM(s) Oral at bedtime  losartan 25 milliGRAM(s) Oral two times a day  polyethylene glycol 3350 17 Gram(s) Oral daily  senna 2 Tablet(s) Oral at bedtime  sodium chloride 0.9%. 1000 milliLiter(s) (50 mL/Hr) IV Continuous <Continuous>  vancomycin  IVPB 1250 milliGRAM(s) IV Intermittent every 12 hours    MEDICATIONS  (PRN):  acetaminophen     Tablet .. 650 milliGRAM(s) Oral once PRN Temp greater or equal to 38C (100.4F), Mild Pain (1 - 3)  dextrose Oral Gel 15 Gram(s) Oral once PRN Blood Glucose LESS THAN 70 milliGRAM(s)/deciliter      ALLERGIES:  Allergies    No Known Allergies    Intolerances        LABS:                        12.5   6.76  )-----------( 118      ( 18 Sep 2022 07:09 )             37.2     09-18    140  |  107  |  12  ----------------------------<  165<H>  3.9   |  25  |  0.8    Ca    8.2<L>      18 Sep 2022 07:09  Mg     1.1     09-18    TPro  5.6<L>  /  Alb  2.6<L>  /  TBili  0.6  /  DBili  x   /  AST  42<H>  /  ALT  46<H>  /  AlkPhos  151<H>  09-18        CAPILLARY BLOOD GLUCOSE      POCT Blood Glucose.: 204 mg/dL (18 Sep 2022 11:27)    Culture - Blood (09.16.22 @ 20:45)    Specimen Source: .Blood Blood-Peripheral    Culture Results:   No growth to date.    
Patient is a 77y old  Male who presents with a chief complaint of cellulitis (19 Sep 2022 10:36)      OVERNIGHT EVENTS: remained stable    SUBJECTIVE / INTERVAL HPI: Patient seen and examined at bedside.     VITAL SIGNS:  Vital Signs Last 24 Hrs  T(C): 36.3 (19 Sep 2022 14:08), Max: 36.6 (18 Sep 2022 21:00)  T(F): 97.3 (19 Sep 2022 14:08), Max: 97.9 (18 Sep 2022 21:00)  HR: 74 (19 Sep 2022 14:08) (62 - 91)  BP: 155/69 (19 Sep 2022 14:08) (136/65 - 155/69)  BP(mean): --  RR: 16 (19 Sep 2022 14:08) (16 - 20)  SpO2: 97% (18 Sep 2022 21:00) (97% - 97%)    Parameters below as of 18 Sep 2022 21:00  Patient On (Oxygen Delivery Method): room air        PHYSICAL EXAM:    General: WDWN  HEENT: NC/AT; PERRL, clear conjunctiva  Neck: supple  Cardiovascular: +S1/S2; RRR  Respiratory: CTA b/l; no W/R/R  Gastrointestinal: soft, NT/ND; +BSx4  Extremities: WWP; 2+ peripheral pulses; no edema, swelling and redness over the elbow and proximal extnesor area of the forearm, mildly tender ,  Neurological: AAOx3; no focal deficits    MEDICATIONS:  MEDICATIONS  (STANDING):  ceFAZolin   IVPB 2000 milliGRAM(s) IV Intermittent every 8 hours  dextrose 5%. 1000 milliLiter(s) (100 mL/Hr) IV Continuous <Continuous>  dextrose 5%. 1000 milliLiter(s) (50 mL/Hr) IV Continuous <Continuous>  dextrose 50% Injectable 25 Gram(s) IV Push once  dextrose 50% Injectable 12.5 Gram(s) IV Push once  dextrose 50% Injectable 25 Gram(s) IV Push once  enoxaparin Injectable 40 milliGRAM(s) SubCutaneous every 12 hours  gabapentin 300 milliGRAM(s) Oral two times a day  glucagon  Injectable 1 milliGRAM(s) IntraMuscular once  insulin glargine Injectable (LANTUS) 60 Unit(s) SubCutaneous at bedtime  insulin lispro (ADMELOG) corrective regimen sliding scale   SubCutaneous three times a day before meals  levETIRAcetam 500 milliGRAM(s) Oral at bedtime  losartan 25 milliGRAM(s) Oral two times a day  polyethylene glycol 3350 17 Gram(s) Oral daily  senna 2 Tablet(s) Oral at bedtime  sodium chloride 0.9%. 1000 milliLiter(s) (50 mL/Hr) IV Continuous <Continuous>  vancomycin  IVPB 1250 milliGRAM(s) IV Intermittent every 12 hours    MEDICATIONS  (PRN):  dextrose Oral Gel 15 Gram(s) Oral once PRN Blood Glucose LESS THAN 70 milliGRAM(s)/deciliter      ALLERGIES:  Allergies    No Known Allergies    Intolerances        LABS:                        12.9   7.06  )-----------( 86       ( 19 Sep 2022 06:03 )             38.9     09-19    140  |  107  |  14  ----------------------------<  106<H>  4.1   |  24  |  0.9    Ca    8.4      19 Sep 2022 06:03  Mg     1.9     09-19    TPro  6.0  /  Alb  2.7<L>  /  TBili  0.8  /  DBili  x   /  AST  51<H>  /  ALT  40  /  AlkPhos  171<H>  09-19        CAPILLARY BLOOD GLUCOSE      POCT Blood Glucose.: 187 mg/dL (19 Sep 2022 11:34)      RADIOLOGY & ADDITIONAL TESTS: Reviewed.  Culture - Blood (09.16.22 @ 20:45)    Specimen Source: .Blood Blood-Peripheral    Culture Results:   No growth to date.    
DWIGHT RIBEIRO  77y, Male  Allergy: No Known Allergies      LOS  3d    CHIEF COMPLAINT: cellulitis (19 Sep 2022 10:36)      INTERVAL EVENTS/HPI  - No acute events overnight  - T(F): , Max: 98.9 (09-20-22 @ 05:16)  - reports swelling has improved     - WBC Count: 8.06 (09-20-22 @ 07:11)  WBC Count: 7.06 (09-19-22 @ 06:03)     - Creatinine, Serum: 0.9 (09-20-22 @ 07:11)  Creatinine, Serum: 0.9 (09-19-22 @ 06:03)       ROS  General: Denies rigors, nightsweats  HEENT: Denies headache, rhinorrhea, sore throat, eye pain  CV: Denies CP, palpitations  PULM: Denies wheezing, hemoptysis  GI: Denies hematemesis, hematochezia, melena  : Denies discharge, hematuria  MSK: Denies arthralgias, myalgias  SKIN: Denies rash, lesions  NEURO: Denies paresthesias, weakness  PSYCH: Denies depression, anxiety    VITALS:  T(F): 98.9, Max: 98.9 (09-20-22 @ 05:16)  HR: 82  BP: 132/62  RR: 18Vital Signs Last 24 Hrs  T(C): 37.2 (20 Sep 2022 05:16), Max: 37.2 (20 Sep 2022 05:16)  T(F): 98.9 (20 Sep 2022 05:16), Max: 98.9 (20 Sep 2022 05:16)  HR: 82 (20 Sep 2022 05:16) (74 - 94)  BP: 132/62 (20 Sep 2022 05:16) (132/62 - 161/75)  BP(mean): --  RR: 18 (20 Sep 2022 05:16) (16 - 18)  SpO2: --        PHYSICAL EXAM:  Gen: NAD, resting in bed  HEENT: Normocephalic, atraumatic  Neck: supple, no lymphadenopathy  CV: Regular rate & regular rhythm  Lungs: decreased BS at bases, no fremitus  Abdomen: Soft, BS present  Ext: Warm, well perfused; right arm with erythema, swelling up to dorsum of hand -- area of induration close to elbow but non-tender   Neuro: non focal, awake  Skin: no rash, no erythema  Lines: no phlebitis    FH: Non-contributory  Social Hx: Non-contributory    TESTS & MEASUREMENTS:                        12.7   8.06  )-----------( 112      ( 20 Sep 2022 07:11 )             37.6     09-20    142  |  107  |  14  ----------------------------<  120<H>  4.5   |  24  |  0.9    Ca    8.3<L>      20 Sep 2022 07:11  Mg     1.9     09-19    TPro  6.0  /  Alb  2.5<L>  /  TBili  0.8  /  DBili  x   /  AST  72<H>  /  ALT  41  /  AlkPhos  200<H>  09-20      LIVER FUNCTIONS - ( 20 Sep 2022 07:11 )  Alb: 2.5 g/dL / Pro: 6.0 g/dL / ALK PHOS: 200 U/L / ALT: 41 U/L / AST: 72 U/L / GGT: x               Culture - Blood (collected 09-16-22 @ 20:45)  Source: .Blood Blood-Peripheral  Preliminary Report (09-18-22 @ 02:01):    No growth to date.    Culture - Blood (collected 09-16-22 @ 20:45)  Source: .Blood Blood-Peripheral  Preliminary Report (09-18-22 @ 02:01):    No growth to date.        Lactate, Blood: 1.6 mmol/L (09-17-22 @ 07:02)  Blood Gas Venous - Lactate: 1.90 mmol/L (09-16-22 @ 21:30)      INFECTIOUS DISEASES TESTING  COVID-19 PCR: NotDetec (09-17-22 @ 00:56)      INFLAMMATORY MARKERS      RADIOLOGY & ADDITIONAL TESTS:  I have personally reviewed the last available Chest xray  CXR      CT      CARDIOLOGY TESTING  12 Lead ECG:   Ventricular Rate 82 BPM    Atrial Rate 82 BPM    P-R Interval 216 ms    QRS Duration 88 ms    Q-T Interval 350 ms    QTC Calculation(Bazett) 408 ms    P Axis 22 degrees    R Axis -18 degrees    T Axis 0 degrees    Diagnosis Line Sinus rhythm with sinus arrhythmia with 1st degree A-V block  Minimal voltage criteria for LVH, may be normal variant  Possible Anterior infarct , age undetermined  Abnormal ECG    Confirmed by VIVIANA ARECHIGA MD ([id number]) on 9/17/2022 8:56:06 AM (09-16-22 @ 21:11)      MEDICATIONS  ceFAZolin   IVPB 2000 IV Intermittent every 8 hours  dextrose 5%. 1000 IV Continuous <Continuous>  dextrose 5%. 1000 IV Continuous <Continuous>  dextrose 50% Injectable 25 IV Push once  dextrose 50% Injectable 12.5 IV Push once  dextrose 50% Injectable 25 IV Push once  enoxaparin Injectable 40 SubCutaneous every 12 hours  gabapentin 300 Oral two times a day  glucagon  Injectable 1 IntraMuscular once  insulin glargine Injectable (LANTUS) 60 SubCutaneous at bedtime  insulin lispro (ADMELOG) corrective regimen sliding scale  SubCutaneous three times a day before meals  levETIRAcetam 500 Oral at bedtime  losartan 25 Oral two times a day  polyethylene glycol 3350 17 Oral daily  senna 2 Oral at bedtime  sodium chloride 0.9%. 1000 IV Continuous <Continuous>  vancomycin  IVPB 1250 IV Intermittent every 12 hours      WEIGHT  Weight (kg): 114.3 (09-16-22 @ 20:08)  Creatinine, Serum: 0.9 mg/dL (09-20-22 @ 07:11)      ANTIBIOTICS:  ceFAZolin   IVPB 2000 milliGRAM(s) IV Intermittent every 8 hours  vancomycin  IVPB 1250 milliGRAM(s) IV Intermittent every 12 hours      All available historical records have been reviewed

## 2022-09-20 NOTE — PROGRESS NOTE ADULT - ASSESSMENT
78yo male whose PMH includes diabetes (on insulin) and hypertension and who is on Keflex 500 mg every 6 hours for right arm cellulitis, presents to ER due to pain and lack of improvement. Patient says problem began more then 1 week ago when he picked at his skin around the right elbow because it looked like a psoriasis lesion and he never had psoriasis. Then 6 days ago (last Saturday), he developed mild pain and swelling to the area so he went to a [facility] and he was started on oral antibiotics. He subsequently saw  his PMD who advised continuing the antibiotics. He came to the ER due to continued redness and swelling. Patient says pain is mild and he denies having fever at home .    IMPRESSION;  Right forearm cellulitis.  No abscess/fascitis/olecranon bursitis/septic arthritis  Blood Cx 9/16 NG    RECOMMENDATIONS;  Swelling persists but reports that improved from admission   Keep forearm elevated  Switch to PO doxy 100 mg BID and amoxicillin 875 mg BID to cover MRSA/MSSA/Strep    Please call or message on [employer] Teams if with any questions.  Spectra [phone]     76yo male whose PMH includes diabetes (on insulin) and hypertension and who is on Keflex 500 mg every 6 hours for right arm cellulitis, presents to ER due to pain and lack of improvement. Patient says problem began more then 1 week ago when he picked at his skin around the right elbow because it looked like a psoriasis lesion and he never had psoriasis. Then 6 days ago (last Saturday), he developed mild pain and swelling to the area so he went to a [facility] and he was started on oral antibiotics. He subsequently saw  his PMD who advised continuing the antibiotics. He came to the ER due to continued redness and swelling. Patient says pain is mild and he denies having fever at home .    IMPRESSION;  Right forearm cellulitis.  No abscess/fascitis/olecranon bursitis/septic arthritis  Blood Cx 9/16 NG    RECOMMENDATIONS;  Swelling persists but reports that improved from admission   Keep forearm elevated  Switch to PO doxy 100 mg BID and amoxicillin 875 mg BID to cover MRSA/MSSA/Strep for 10 more days     Please call or message on [employer] Teams if with any questions.  Spectra [phone]

## 2022-09-20 NOTE — DISCHARGE NOTE NURSING/CASE MANAGEMENT/SOCIAL WORK - NSDCPEFALRISK_GEN_ALL_CORE
For information on Fall & Injury Prevention, visit: [email or web address] OR  [email or web address] OR  https://www.cdc.gov/steadi/patient.html

## 2022-09-20 NOTE — DISCHARGE NOTE NURSING/CASE MANAGEMENT/SOCIAL WORK - PATIENT PORTAL LINK FT
You can access the FollowMyHealth Patient Portal offered by [facility] by registering at the following website: [email or web address]. By joining [employer]’s FollowMyHealth portal, you will also be able to view your health information using other applications (apps) compatible with our system.

## 2022-09-20 NOTE — CHART NOTE - NSCHARTNOTEFT_GEN_A_CORE
pt seen and examined, stable for discharge, complete 10 days of doxycyline and Augmentin    CONSTITUTIONAL: No acute distress, well-developed, well-groomed, AAOx3  HEAD: Atraumatic, normocephalic  EYES: EOM intact, PERRLA, conjunctiva and sclera clear  ENT: Supple, no masses, no thyromegaly, no bruits, no JVD; moist mucous membranes  PULMONARY: Clear to auscultation bilaterally; no wheezes, rales, or rhonchi  CARDIOVASCULAR: Regular rate and rhythm; no murmurs, rubs, or gallops  GASTROINTESTINAL: Soft, non-tender, non-distended; bowel sounds present  MUSCULOSKELETAL: 2+ peripheral pulses; no clubbing, no cyanosis, no edema. resolving R elbow and forearm swelling  NEUROLOGY: non-focal  SKIN: No rashes or lesions; warm and dry

## 2022-09-20 NOTE — CHART NOTE - NSCHARTNOTEFT_GEN_A_CORE
Called by medicine attending, patient stable for discharge.  Change PO ABX to Amoxicillin and Doxy to complete course per ID note.  COntinue home meds as ordered.  Outpatient FU with PMD on discharge.  Papers completed/Orders placed

## 2022-09-22 LAB
CULTURE RESULTS: SIGNIFICANT CHANGE UP
CULTURE RESULTS: SIGNIFICANT CHANGE UP
SPECIMEN SOURCE: SIGNIFICANT CHANGE UP
SPECIMEN SOURCE: SIGNIFICANT CHANGE UP

## 2022-09-23 DIAGNOSIS — E66.01 MORBID (SEVERE) OBESITY DUE TO EXCESS CALORIES: ICD-10-CM

## 2022-09-23 DIAGNOSIS — Z90.49 ACQUIRED ABSENCE OF OTHER SPECIFIED PARTS OF DIGESTIVE TRACT: ICD-10-CM

## 2022-09-23 DIAGNOSIS — I10 ESSENTIAL (PRIMARY) HYPERTENSION: ICD-10-CM

## 2022-09-23 DIAGNOSIS — Z79.4 LONG TERM (CURRENT) USE OF INSULIN: ICD-10-CM

## 2022-09-23 DIAGNOSIS — L03.113 CELLULITIS OF RIGHT UPPER LIMB: ICD-10-CM

## 2022-09-23 DIAGNOSIS — E11.9 TYPE 2 DIABETES MELLITUS WITHOUT COMPLICATIONS: ICD-10-CM

## 2022-09-23 DIAGNOSIS — Z20.822 CONTACT WITH AND (SUSPECTED) EXPOSURE TO COVID-19: ICD-10-CM

## 2022-09-23 DIAGNOSIS — Z79.82 LONG TERM (CURRENT) USE OF ASPIRIN: ICD-10-CM

## 2022-09-23 DIAGNOSIS — Z79.84 LONG TERM (CURRENT) USE OF ORAL HYPOGLYCEMIC DRUGS: ICD-10-CM

## 2022-09-23 DIAGNOSIS — R74.01 ELEVATION OF LEVELS OF LIVER TRANSAMINASE LEVELS: ICD-10-CM

## 2022-09-23 DIAGNOSIS — E83.42 HYPOMAGNESEMIA: ICD-10-CM

## 2022-09-30 ENCOUNTER — RX RENEWAL (OUTPATIENT)
Age: 77
End: 2022-09-30

## 2022-12-05 ENCOUNTER — APPOINTMENT (OUTPATIENT)
Dept: NEUROLOGY | Facility: CLINIC | Age: 77
End: 2022-12-05

## 2022-12-05 VITALS
SYSTOLIC BLOOD PRESSURE: 124 MMHG | WEIGHT: 252 LBS | HEART RATE: 60 BPM | TEMPERATURE: 97.4 F | BODY MASS INDEX: 41.99 KG/M2 | DIASTOLIC BLOOD PRESSURE: 68 MMHG | OXYGEN SATURATION: 98 % | HEIGHT: 65 IN

## 2022-12-05 DIAGNOSIS — G40.909 EPILEPSY, UNSPECIFIED, NOT INTRACTABLE, W/OUT STATUS EPILEPTICUS: ICD-10-CM

## 2022-12-05 PROCEDURE — 99214 OFFICE O/P EST MOD 30 MIN: CPT

## 2022-12-06 PROBLEM — G40.909 SEIZURE DISORDER: Status: ACTIVE | Noted: 2019-10-28

## 2022-12-06 NOTE — ASSESSMENT
[FreeTextEntry1] : 1- seizure D/O\par 2-HTN\par 3-DM\par 4- S/p right knee surgery\par 5- left knee disease\par 6- R/O LILIA\par \par \par plan\par discussed in length the possibility of sleep D/O and its impact \par suggest changing the sleeping schedule\par consider sleep study\par \par

## 2022-12-06 NOTE — HISTORY OF PRESENT ILLNESS
[FreeTextEntry1] : Errol is here with his wife. He appears tired and sleepy . He denies having had any events suggestive of seizure.Wife is not reporting any episodic or unusual events.\par They both report that he sleeps a lot. Goes to bed after watching a good amount of TV around the midnight and wakes up next day around 9-10.\par If does not go out siting down and watching TV in the morning or early afternoon he dozes off.\par Wife says in the car as a passenger , even in short distances he dozes off. \par He says he could be bored , he does not describe himself as being depressed . He snores.\par He occasionally has low energy and often has left knee pain.\par He saw his PMD in ? Sept. and according to him the blood tests were all good\par No significant Spinal pain\par No dizziness. No vertigo\par

## 2022-12-06 NOTE — PHYSICAL EXAM
[FreeTextEntry1] : A/A/Ox3\par follows 4 step commands\par tired\par sleepy\par good attention\par + humor\par CN- normal\par No drift\par No dysmetria\par stable gait

## 2022-12-11 ENCOUNTER — NON-APPOINTMENT (OUTPATIENT)
Age: 77
End: 2022-12-11

## 2023-01-30 ENCOUNTER — APPOINTMENT (OUTPATIENT)
Dept: UROLOGY | Facility: CLINIC | Age: 78
End: 2023-01-30
Payer: MEDICARE

## 2023-01-30 VITALS
RESPIRATION RATE: 19 BRPM | HEIGHT: 65 IN | WEIGHT: 252 LBS | BODY MASS INDEX: 41.99 KG/M2 | HEART RATE: 80 BPM | OXYGEN SATURATION: 99 % | TEMPERATURE: 98 F | SYSTOLIC BLOOD PRESSURE: 124 MMHG | DIASTOLIC BLOOD PRESSURE: 89 MMHG

## 2023-01-30 DIAGNOSIS — N47.1 PHIMOSIS: ICD-10-CM

## 2023-01-30 PROCEDURE — 99204 OFFICE O/P NEW MOD 45 MIN: CPT

## 2023-01-30 RX ORDER — CLOTRIMAZOLE AND BETAMETHASONE DIPROPIONATE 10; .5 MG/G; MG/G
1-0.05 CREAM TOPICAL TWICE DAILY
Qty: 1 | Refills: 2 | Status: ACTIVE | COMMUNITY
Start: 2023-01-30 | End: 1900-01-01

## 2023-01-30 NOTE — LETTER BODY
[Dear  ___] : Dear  [unfilled], [Consult Letter:] : I had the pleasure of evaluating your patient, [unfilled]. [Please see my note below.] : Please see my note below. [Sincerely,] : Sincerely, [FreeTextEntry3] : Zabrina Ramsay MD, FACS\par

## 2023-01-30 NOTE — PHYSICAL EXAM
[General Appearance - Well Developed] : well developed [General Appearance - Well Nourished] : well nourished [Normal Appearance] : normal appearance [Well Groomed] : well groomed [General Appearance - In No Acute Distress] : no acute distress [Edema] : no peripheral edema [Exaggerated Use Of Accessory Muscles For Inspiration] : no accessory muscle use [Respiration, Rhythm And Depth] : normal respiratory rhythm and effort [Abdomen Soft] : soft [Abdomen Tenderness] : non-tender [Costovertebral Angle Tenderness] : no ~M costovertebral angle tenderness [Urethral Meatus] : meatus normal [Penis Abnormality] : normal uncircumcised penis [Urinary Bladder Findings] : the bladder was normal on palpation [Scrotum] : the scrotum was normal [Testes Mass (___cm)] : there were no testicular masses [No Prostate Nodules] : no prostate nodules [FreeTextEntry1] : phimotic prepuce, extensive inflammatory changes of the scrotum consistent with fungal infection, scrotal telangiectasia  [Normal Station and Gait] : the gait and station were normal for the patient's age [] : no rash [No Focal Deficits] : no focal deficits [Oriented To Time, Place, And Person] : oriented to person, place, and time [Affect] : the affect was normal [Mood] : the mood was normal [Not Anxious] : not anxious [No Palpable Adenopathy] : no palpable adenopathy

## 2023-01-30 NOTE — HISTORY OF PRESENT ILLNESS
[FreeTextEntry1] : 77 yo male with recent complaints regarding bleeding from the scrotum\par no history of trauma\par denies gross hematuria\par denies dysuria\par denies frequency\par \par IPSS provided but not completed [Urinary Retention] : no urinary retention [Urinary Urgency] : no urinary urgency [Nocturia] : no nocturia [Straining] : no straining [Weak Stream] : no weak stream [Intermittency] : no intermittency [Erectile Dysfunction] : Erectile Dysfunction

## 2023-01-30 NOTE — ASSESSMENT
[FreeTextEntry1] : 79 yo with scrotal erythema c/w fungal infection\par phimosis\par scrotal telangiectasia\par \par - clotrimazole betamethasone\par - PSA\par - UA and Cx\par - renal and bladder US\par - f/u in 6 weeks to review and plan further care  [Phimosis (605\N47.1)] : transection of abdomen

## 2023-04-03 ENCOUNTER — APPOINTMENT (OUTPATIENT)
Dept: UROLOGY | Facility: CLINIC | Age: 78
End: 2023-04-03
Payer: MEDICARE

## 2023-04-03 VITALS
RESPIRATION RATE: 18 BRPM | TEMPERATURE: 97.4 F | DIASTOLIC BLOOD PRESSURE: 72 MMHG | SYSTOLIC BLOOD PRESSURE: 155 MMHG | HEART RATE: 73 BPM | OXYGEN SATURATION: 98 % | WEIGHT: 252 LBS | HEIGHT: 65 IN | BODY MASS INDEX: 41.99 KG/M2

## 2023-04-03 DIAGNOSIS — R21 RASH AND OTHER NONSPECIFIC SKIN ERUPTION: ICD-10-CM

## 2023-04-03 PROCEDURE — 99214 OFFICE O/P EST MOD 30 MIN: CPT

## 2023-04-04 ENCOUNTER — LABORATORY RESULT (OUTPATIENT)
Age: 78
End: 2023-04-04

## 2023-04-04 NOTE — PHYSICAL EXAM
[General Appearance - Well Developed] : well developed [General Appearance - Well Nourished] : well nourished [Normal Appearance] : normal appearance [Well Groomed] : well groomed [General Appearance - In No Acute Distress] : no acute distress [Abdomen Soft] : soft [Abdomen Tenderness] : non-tender [Costovertebral Angle Tenderness] : no ~M costovertebral angle tenderness [Urethral Meatus] : meatus normal [Penis Abnormality] : normal uncircumcised penis [Urinary Bladder Findings] : the bladder was normal on palpation [Scrotum] : the scrotum was normal [Testes Mass (___cm)] : there were no testicular masses [No Prostate Nodules] : no prostate nodules [FreeTextEntry1] : phimotic prepuce, with extensive inflammatory changes of the scrotum consistent with fungal infection, scrotal telangiectasia  [Edema] : no peripheral edema [] : no respiratory distress [Respiration, Rhythm And Depth] : normal respiratory rhythm and effort [Exaggerated Use Of Accessory Muscles For Inspiration] : no accessory muscle use [Oriented To Time, Place, And Person] : oriented to person, place, and time [Affect] : the affect was normal [Mood] : the mood was normal [Not Anxious] : not anxious [Normal Station and Gait] : the gait and station were normal for the patient's age [No Focal Deficits] : no focal deficits [No Palpable Adenopathy] : no palpable adenopathy

## 2023-04-04 NOTE — ASSESSMENT
[FreeTextEntry1] : 79 yo initially with scrotal erythema c/w fungal infection - now with extensive fungal rash of the groin and below his large panus \par \par phimosis\par scrotal telangiectasia\par \par - clotrimazole betamethasone had been prescribed but did not work or was not applied properly\par \par - PSA reordered\par - UA and Cx reordered\par - renal and bladder US reordered\par - nystatin powder\par - f/u in 2 weeks to re-assess - if not any better will refer to dermatology [Phimosis (605\N47.1)] : transection of abdomen

## 2023-04-04 NOTE — HISTORY OF PRESENT ILLNESS
[FreeTextEntry1] : 79 yo male with recent complaints regarding bleeding from the scrotum\par no history of trauma\par denies gross hematuria\par denies dysuria\par denies frequency\par \par IPSS had been previously provided but not completed\par UA and culture not provided\par PSA and US ordered but not completed [Urinary Retention] : no urinary retention [Urinary Urgency] : no urinary urgency [Nocturia] : no nocturia [Straining] : no straining [Weak Stream] : no weak stream [Intermittency] : no intermittency [Erectile Dysfunction] : Erectile Dysfunction

## 2023-04-05 LAB
APPEARANCE: ABNORMAL
BILIRUBIN URINE: NEGATIVE
BLOOD URINE: ABNORMAL
COLOR: YELLOW
GLUCOSE QUALITATIVE U: ABNORMAL
KETONES URINE: NEGATIVE
LEUKOCYTE ESTERASE URINE: ABNORMAL
NITRITE URINE: NEGATIVE
PH URINE: 5.5
PROTEIN URINE: ABNORMAL
PSA FREE FLD-MCNC: 48 %
PSA FREE SERPL-MCNC: 0.06 NG/ML
PSA SERPL-MCNC: 0.12 NG/ML
SPECIFIC GRAVITY URINE: 1.03
UROBILINOGEN URINE: ABNORMAL

## 2023-04-08 LAB — BACTERIA UR CULT: ABNORMAL

## 2023-04-10 ENCOUNTER — NON-APPOINTMENT (OUTPATIENT)
Age: 78
End: 2023-04-10

## 2023-04-11 ENCOUNTER — RX RENEWAL (OUTPATIENT)
Age: 78
End: 2023-04-11

## 2023-04-11 ENCOUNTER — NON-APPOINTMENT (OUTPATIENT)
Age: 78
End: 2023-04-11

## 2023-04-11 DIAGNOSIS — R31.29 OTHER MICROSCOPIC HEMATURIA: ICD-10-CM

## 2023-04-11 RX ORDER — NYSTATIN 100000 1/G
100000 POWDER TOPICAL
Qty: 1 | Refills: 0 | Status: ACTIVE | COMMUNITY
Start: 2023-04-03 | End: 1900-01-01

## 2023-04-11 RX ORDER — AMOXICILLIN AND CLAVULANATE POTASSIUM 500; 125 MG/1; MG/1
500-125 TABLET, FILM COATED ORAL
Qty: 14 | Refills: 0 | Status: ACTIVE | COMMUNITY
Start: 2023-04-11 | End: 1900-01-01

## 2023-04-11 RX ORDER — NYSTATIN 100000 [USP'U]/G
100000 POWDER TOPICAL
Qty: 30 | Refills: 0 | Status: ACTIVE | COMMUNITY
Start: 2023-04-11 | End: 1900-01-01

## 2023-04-15 ENCOUNTER — OUTPATIENT (OUTPATIENT)
Dept: OUTPATIENT SERVICES | Facility: HOSPITAL | Age: 78
LOS: 1 days | End: 2023-04-15
Payer: MEDICARE

## 2023-04-15 DIAGNOSIS — N20.0 CALCULUS OF KIDNEY: ICD-10-CM

## 2023-04-15 DIAGNOSIS — Z98.890 OTHER SPECIFIED POSTPROCEDURAL STATES: Chronic | ICD-10-CM

## 2023-04-15 DIAGNOSIS — N47.1 PHIMOSIS: ICD-10-CM

## 2023-04-15 DIAGNOSIS — Z90.49 ACQUIRED ABSENCE OF OTHER SPECIFIED PARTS OF DIGESTIVE TRACT: Chronic | ICD-10-CM

## 2023-04-15 DIAGNOSIS — Z00.8 ENCOUNTER FOR OTHER GENERAL EXAMINATION: ICD-10-CM

## 2023-04-15 PROCEDURE — 76770 US EXAM ABDO BACK WALL COMP: CPT

## 2023-04-15 PROCEDURE — 76770 US EXAM ABDO BACK WALL COMP: CPT | Mod: 26

## 2023-04-16 DIAGNOSIS — N20.0 CALCULUS OF KIDNEY: ICD-10-CM

## 2023-04-16 DIAGNOSIS — N47.1 PHIMOSIS: ICD-10-CM

## 2023-05-15 ENCOUNTER — APPOINTMENT (OUTPATIENT)
Dept: UROLOGY | Facility: CLINIC | Age: 78
End: 2023-05-15
Payer: MEDICARE

## 2023-05-15 VITALS
DIASTOLIC BLOOD PRESSURE: 68 MMHG | TEMPERATURE: 97 F | OXYGEN SATURATION: 98 % | WEIGHT: 252 LBS | HEIGHT: 63 IN | BODY MASS INDEX: 44.65 KG/M2 | SYSTOLIC BLOOD PRESSURE: 129 MMHG | RESPIRATION RATE: 17 BRPM | HEART RATE: 77 BPM

## 2023-05-15 DIAGNOSIS — N47.1 PHIMOSIS: ICD-10-CM

## 2023-05-15 PROCEDURE — 99214 OFFICE O/P EST MOD 30 MIN: CPT

## 2023-05-16 PROBLEM — N47.1 PHIMOSIS: Status: ACTIVE | Noted: 2023-01-30

## 2023-05-16 NOTE — HISTORY OF PRESENT ILLNESS
[Urinary Retention] : no urinary retention [FreeTextEntry1] : 77 yo male with fungal skin infection - managed with topical Nystatin\par the patient is here to review his lab and imaging studies\par \par 4/2023\par culture - E. COli, Strep agalactiae\par \par 4/2023 Renal and Bladder US\par no hydronephrosis\par PVR 50 cc\par \par PSA 0.12 ng/ml\par \par patient reports improvement of the rash but no resolution [Urinary Urgency] : no urinary urgency [Nocturia] : no nocturia [Straining] : no straining [Weak Stream] : no weak stream [Intermittency] : no intermittency [Erectile Dysfunction] : Erectile Dysfunction

## 2023-05-16 NOTE — ASSESSMENT
[FreeTextEntry1] : 79 yo initially with scrotal erythema c/w fungal infection - now with extensive fungal rash of the groin and below his large panus \par \par phimosis\par scrotal telangiectasia\par \par - will refer to dermatology\par - will follow with Urology as needed\par no indication for urologic intervention [Phimosis (605\N47.1)] : transection of abdomen

## 2023-05-16 NOTE — PHYSICAL EXAM
[General Appearance - Well Developed] : well developed [General Appearance - Well Nourished] : well nourished [Normal Appearance] : normal appearance [Well Groomed] : well groomed [General Appearance - In No Acute Distress] : no acute distress [Abdomen Tenderness] : non-tender [Abdomen Soft] : soft [Costovertebral Angle Tenderness] : no ~M costovertebral angle tenderness [FreeTextEntry1] : extensive fungal rash associated with a large overhanging panus [Urethral Meatus] : meatus normal [Penis Abnormality] : normal uncircumcised penis [Urinary Bladder Findings] : the bladder was normal on palpation [Scrotum] : the scrotum was normal [Testes Mass (___cm)] : there were no testicular masses [No Prostate Nodules] : no prostate nodules [Edema] : no peripheral edema [] : no respiratory distress [Respiration, Rhythm And Depth] : normal respiratory rhythm and effort [Exaggerated Use Of Accessory Muscles For Inspiration] : no accessory muscle use [Oriented To Time, Place, And Person] : oriented to person, place, and time [Affect] : the affect was normal [Mood] : the mood was normal [Not Anxious] : not anxious [Normal Station and Gait] : the gait and station were normal for the patient's age [No Focal Deficits] : no focal deficits [No Palpable Adenopathy] : no palpable adenopathy

## 2023-06-07 ENCOUNTER — RX RENEWAL (OUTPATIENT)
Age: 78
End: 2023-06-07

## 2023-06-13 ENCOUNTER — APPOINTMENT (OUTPATIENT)
Dept: NEUROLOGY | Facility: CLINIC | Age: 78
End: 2023-06-13

## 2023-08-29 ENCOUNTER — APPOINTMENT (OUTPATIENT)
Dept: ORTHOPEDIC SURGERY | Facility: CLINIC | Age: 78
End: 2023-08-29
Payer: MEDICARE

## 2023-08-29 ENCOUNTER — RESULT CHARGE (OUTPATIENT)
Age: 78
End: 2023-08-29

## 2023-08-29 PROCEDURE — 99203 OFFICE O/P NEW LOW 30 MIN: CPT

## 2023-08-29 PROCEDURE — 73562 X-RAY EXAM OF KNEE 3: CPT | Mod: 50

## 2023-08-29 NOTE — HISTORY OF PRESENT ILLNESS
[de-identified] : eval of b/l knee left worse than right has had injections from dr griffith feels pain has been getting worse wants to consider a tka  There is an effusion, clinically varus alignment, tenderness around the joint line, and a positive patella grind test.  ROM is 3-110 degrees and there is significant guarding throughout the arc of motion.  Mild quadriceps atrophy. Varus thrust is present with stress, no gross is instability noted.  has a hx of dm a1c 8 has to lose 25lb will f/u in oct

## 2023-09-12 ENCOUNTER — APPOINTMENT (OUTPATIENT)
Dept: ORTHOPEDIC SURGERY | Facility: CLINIC | Age: 78
End: 2023-09-12

## 2023-10-30 NOTE — ED PROVIDER NOTE - DISCUSSED CLINICAL AND RADIOLOGICAL FINDINGS WITH, MDM
family/patient Treatment Documentation: : This patient has been treated today with image-guided superficial radiation therapy for non-melanoma skin cancer. Written informed consent has been previously obtained from this patient for this treatment. This consent is documented in the patient's chart. The patient gave verbal consent to continue treatment today. The patient was treated with a specific radiation dose and setup as prescribed by the provider listed on this visit note.  A Radiation Therapist performed administration of radiation under the supervision of a provider. The treatment parameters and cumulative dose are indicated above. Prior to administering the radiation, the patient underwent a verification therapeutic radiology simulation-aided field setting defining relevant normal and abnormal target anatomy and acquiring images with high-frequency ultrasound in addition to data necessary to develop an optimal radiation treatment process for the patient. This process includes verification of the treatment port(s) and proper treatment positioning. All treatment ports were photographed within electronic medical records. The patient's customized lead blocking along with gross tumor volume and margin was confirmed. Considering superficial radiotherapy is clinical in setup, this requires the physician and radiation therapist to clarify the location interest being treated against initial images, ultrasound, pathology, and patient anatomy. Care was taken to ensure marcos treated were geometrically accurate and properly positioned using therapeutic radiology simulation-aided field setting verification per fraction. This process is also utilized to determine if any prescription or setup changes are necessary. These steps are therefore medically necessary to ensure safe and effective administration of radiation. Ongoing therapeutic radiology simulation-aided field setting verification is ordered throughout the course of therapy.\\nA high-frequency ultrasound image was acquired today for a two-dimensional evaluation of the tumor volume, depth, width, breadth, and response to treatment, in addition to geometric accuracy of field placement. \\n\\nThe field placement and ultrasound imaging, per fraction, is separate and distinct from the initial simulation and is an important task in providing safe administration of superficial radiation therapy. Physician evaluation of the ultrasound tumor depth will be ongoing throughout the course of treatment and is deemed medically necessary to ensure the efficacy of treatment and any necessary changes. Today's image was evaluated for determination of continuation of treatment with the current plan or with necessary changes as appropriate. According to the review of verification therapeutic radiology simulation-aided field setting and imaging, no change is required. Additionally, the use of ultrasound visualization and targeted assessment allows the patient to be able to see his or her cancer(s) progress, encouraging the patient to complete and maintain compliance through the full course of prescribed radiotherapy. Per Dr. Robin, continued ultrasound guidance and therapeutic radiology simulation-aided field setting verification per fraction is required for field placement, measurement of tumor depth, progress, and acute effect monitoring. Additional Change To Daily Dosage Administered Mid Treatment?: No Show Ultrasound In Note?: Yes Ultrasound Used Text: High frequency ultrasound depth is 1.76 mm, which is+0.44  mm in difference from previous imaging. Ultrasound Not Used Text: Ultrasound was not performed today due to Energy (Kv): 70 Calculate Total Cumulative Dose Automatically Or Manually: Manually Daily Dosage (Cgy): 275.88 Fraction Number: 12 Add X Modifier?: GODINEZ - Unusual Non-Overlapping Service Total Cumulative Dose (Cgy): 3284.16 Prescription Used: 1

## 2023-10-31 ENCOUNTER — APPOINTMENT (OUTPATIENT)
Dept: ORTHOPEDIC SURGERY | Facility: CLINIC | Age: 78
End: 2023-10-31
Payer: MEDICARE

## 2023-10-31 PROCEDURE — 99213 OFFICE O/P EST LOW 20 MIN: CPT

## 2024-03-16 NOTE — ED ADULT TRIAGE NOTE - CCCP TRG CHIEF CMPLNT
Problem: Discharge Planning  Goal: Discharge to home or other facility with appropriate resources  Outcome: Progressing  Flowsheets  Taken 3/15/2024 1930 by Rosi Ross RN  Discharge to home or other facility with appropriate resources: Identify barriers to discharge with patient and caregiver  Taken 3/15/2024 0815 by Taya Oneill RN  Discharge to home or other facility with appropriate resources: Identify barriers to discharge with patient and caregiver     Problem: Pain  Goal: Verbalizes/displays adequate comfort level or baseline comfort level  Outcome: Progressing  Flowsheets (Taken 3/15/2024 1930)  Verbalizes/displays adequate comfort level or baseline comfort level: Encourage patient to monitor pain and request assistance     Problem: Safety - Adult  Goal: Free from fall injury  Outcome: Progressing  Flowsheets  Taken 3/15/2024 1930 by Rosi Ross RN  Free From Fall Injury: Instruct family/caregiver on patient safety  Taken 3/15/2024 0815 by Taya Oneill RN  Free From Fall Injury: Instruct family/caregiver on patient safety     Problem: ABCDS Injury Assessment  Goal: Absence of physical injury  Outcome: Progressing  Flowsheets  Taken 3/15/2024 1930 by Rosi Ross RN  Absence of Physical Injury: Implement safety measures based on patient assessment  Taken 3/15/2024 0815 by Taya Oneill RN  Absence of Physical Injury: Implement safety measures based on patient assessment     Problem: Chronic Conditions and Co-morbidities  Goal: Patient's chronic conditions and co-morbidity symptoms are monitored and maintained or improved  Outcome: Progressing  Flowsheets  Taken 3/15/2024 1930 by Rosi Ross RN  Care Plan - Patient's Chronic Conditions and Co-Morbidity Symptoms are Monitored and Maintained or Improved: Monitor and assess patient's chronic conditions and comorbid symptoms for stability, deterioration, or improvement  Taken 3/15/2024 0815 by Taya Oneill  RN  Care Plan - Patient's Chronic Conditions and Co-Morbidity Symptoms are Monitored and Maintained or Improved: Monitor and assess patient's chronic conditions and comorbid symptoms for stability, deterioration, or improvement      arm pain/injury

## 2024-04-23 ENCOUNTER — APPOINTMENT (OUTPATIENT)
Dept: ORTHOPEDIC SURGERY | Facility: CLINIC | Age: 79
End: 2024-04-23
Payer: MEDICARE

## 2024-04-23 DIAGNOSIS — M17.12 UNILATERAL PRIMARY OSTEOARTHRITIS, LEFT KNEE: ICD-10-CM

## 2024-04-23 PROCEDURE — 99214 OFFICE O/P EST MOD 30 MIN: CPT

## 2024-04-23 RX ADMIN — Medication 0 MG/6ML: at 00:00

## 2024-04-23 NOTE — HISTORY OF PRESENT ILLNESS
[de-identified] : 79-year-old gentleman here for follow-up of the left knee.  Has not lost any weight. At this point we will put off plans for surgery.  Synvisc injection ordered.

## 2024-05-23 ENCOUNTER — RX RENEWAL (OUTPATIENT)
Age: 79
End: 2024-05-23

## 2024-05-23 RX ORDER — LEVETIRACETAM 500 MG/1
500 TABLET, FILM COATED, EXTENDED RELEASE ORAL
Qty: 90 | Refills: 0 | Status: ACTIVE | COMMUNITY
Start: 2021-07-08 | End: 1900-01-01

## 2024-06-07 ENCOUNTER — APPOINTMENT (OUTPATIENT)
Dept: ORTHOPEDIC SURGERY | Facility: CLINIC | Age: 79
End: 2024-06-07

## 2024-09-18 ENCOUNTER — RX RENEWAL (OUTPATIENT)
Age: 79
End: 2024-09-18

## 2024-10-23 ENCOUNTER — NON-APPOINTMENT (OUTPATIENT)
Age: 79
End: 2024-10-23

## 2024-10-23 ENCOUNTER — APPOINTMENT (OUTPATIENT)
Dept: UROLOGY | Facility: CLINIC | Age: 79
End: 2024-10-23
Payer: MEDICARE

## 2024-10-23 ENCOUNTER — LABORATORY RESULT (OUTPATIENT)
Age: 79
End: 2024-10-23

## 2024-10-23 PROCEDURE — 99214 OFFICE O/P EST MOD 30 MIN: CPT

## 2024-10-25 LAB
APPEARANCE: CLEAR
BACTERIA UR CULT: NORMAL
BILIRUBIN URINE: ABNORMAL
BLOOD URINE: NEGATIVE
COLOR: NORMAL
GLUCOSE QUALITATIVE U: 100 MG/DL
KETONES URINE: ABNORMAL MG/DL
LEUKOCYTE ESTERASE URINE: ABNORMAL
NITRITE URINE: POSITIVE
PH URINE: 5.5
PROTEIN URINE: 30 MG/DL
SPECIFIC GRAVITY URINE: >1.03
UROBILINOGEN URINE: 1 MG/DL

## 2024-11-25 ENCOUNTER — APPOINTMENT (OUTPATIENT)
Dept: NEUROLOGY | Facility: CLINIC | Age: 79
End: 2024-11-25

## 2024-11-26 ENCOUNTER — APPOINTMENT (OUTPATIENT)
Dept: ORTHOPEDIC SURGERY | Facility: CLINIC | Age: 79
End: 2024-11-26
Payer: MEDICARE

## 2024-11-26 DIAGNOSIS — M17.12 UNILATERAL PRIMARY OSTEOARTHRITIS, LEFT KNEE: ICD-10-CM

## 2024-11-26 PROCEDURE — 72170 X-RAY EXAM OF PELVIS: CPT

## 2024-11-26 PROCEDURE — 99214 OFFICE O/P EST MOD 30 MIN: CPT

## 2024-11-26 PROCEDURE — 73560 X-RAY EXAM OF KNEE 1 OR 2: CPT | Mod: 50

## 2024-12-06 ENCOUNTER — APPOINTMENT (OUTPATIENT)
Dept: UROLOGY | Facility: CLINIC | Age: 79
End: 2024-12-06

## 2024-12-13 ENCOUNTER — RX RENEWAL (OUTPATIENT)
Age: 79
End: 2024-12-13

## 2024-12-26 ENCOUNTER — APPOINTMENT (OUTPATIENT)
Dept: UROLOGY | Facility: CLINIC | Age: 79
End: 2024-12-26

## 2025-01-01 ENCOUNTER — RESULT REVIEW (OUTPATIENT)
Age: 80
End: 2025-01-01

## 2025-01-01 ENCOUNTER — TRANSCRIPTION ENCOUNTER (OUTPATIENT)
Age: 80
End: 2025-01-01

## 2025-01-01 ENCOUNTER — INPATIENT (INPATIENT)
Facility: HOSPITAL | Age: 80
LOS: 27 days | DRG: 853 | End: 2025-08-13
Attending: STUDENT IN AN ORGANIZED HEALTH CARE EDUCATION/TRAINING PROGRAM | Admitting: STUDENT IN AN ORGANIZED HEALTH CARE EDUCATION/TRAINING PROGRAM
Payer: MEDICARE

## 2025-01-01 ENCOUNTER — APPOINTMENT (OUTPATIENT)
Dept: GASTROENTEROLOGY | Facility: CLINIC | Age: 80
End: 2025-01-01

## 2025-01-01 VITALS
SYSTOLIC BLOOD PRESSURE: 119 MMHG | HEART RATE: 85 BPM | WEIGHT: 130.07 LBS | HEIGHT: 67 IN | DIASTOLIC BLOOD PRESSURE: 74 MMHG | TEMPERATURE: 99 F | OXYGEN SATURATION: 97 % | RESPIRATION RATE: 20 BRPM

## 2025-01-01 VITALS — HEART RATE: 97 BPM | RESPIRATION RATE: 24 BRPM

## 2025-01-01 DIAGNOSIS — Z71.89 OTHER SPECIFIED COUNSELING: ICD-10-CM

## 2025-01-01 DIAGNOSIS — Z51.5 ENCOUNTER FOR PALLIATIVE CARE: ICD-10-CM

## 2025-01-01 DIAGNOSIS — I95.9 HYPOTENSION, UNSPECIFIED: ICD-10-CM

## 2025-01-01 DIAGNOSIS — R41.82 ALTERED MENTAL STATUS, UNSPECIFIED: ICD-10-CM

## 2025-01-01 DIAGNOSIS — Z90.49 ACQUIRED ABSENCE OF OTHER SPECIFIED PARTS OF DIGESTIVE TRACT: Chronic | ICD-10-CM

## 2025-01-01 DIAGNOSIS — Z98.890 OTHER SPECIFIED POSTPROCEDURAL STATES: Chronic | ICD-10-CM

## 2025-01-01 LAB
-  AMPICILLIN: SIGNIFICANT CHANGE UP
-  CANDIDA ALBICANS: SIGNIFICANT CHANGE UP
-  FLUCONAZOLE: SIGNIFICANT CHANGE UP
-  GENTAMICIN SYNERGY: SIGNIFICANT CHANGE UP
-  STREPTOMYCIN SYNERGY: SIGNIFICANT CHANGE UP
-  VANCOMYCIN: SIGNIFICANT CHANGE UP
A1AT SERPL-MCNC: 120 MG/DL — SIGNIFICANT CHANGE UP (ref 90–200)
A1C WITH ESTIMATED AVERAGE GLUCOSE RESULT: 5 % — SIGNIFICANT CHANGE UP (ref 4–5.6)
ALBUMIN SERPL ELPH-MCNC: 1.3 G/DL — LOW (ref 3.5–5.2)
ALBUMIN SERPL ELPH-MCNC: 1.5 G/DL — LOW (ref 3.5–5.2)
ALBUMIN SERPL ELPH-MCNC: 1.6 G/DL — LOW (ref 3.5–5.2)
ALBUMIN SERPL ELPH-MCNC: 1.6 G/DL — LOW (ref 3.5–5.2)
ALBUMIN SERPL ELPH-MCNC: 1.7 G/DL — LOW (ref 3.5–5.2)
ALBUMIN SERPL ELPH-MCNC: 1.7 G/DL — LOW (ref 3.5–5.2)
ALBUMIN SERPL ELPH-MCNC: 1.8 G/DL — LOW (ref 3.5–5.2)
ALBUMIN SERPL ELPH-MCNC: 1.8 G/DL — LOW (ref 3.5–5.2)
ALBUMIN SERPL ELPH-MCNC: 1.9 G/DL — LOW (ref 3.5–5.2)
ALBUMIN SERPL ELPH-MCNC: 2 G/DL — LOW (ref 3.5–5.2)
ALBUMIN SERPL ELPH-MCNC: 2.2 G/DL — LOW (ref 3.5–5.2)
ALBUMIN SERPL ELPH-MCNC: 2.3 G/DL — LOW (ref 3.5–5.2)
ALBUMIN SERPL ELPH-MCNC: 2.4 G/DL — LOW (ref 3.5–5.2)
ALBUMIN SERPL ELPH-MCNC: 2.6 G/DL — LOW (ref 3.5–5.2)
ALBUMIN SERPL ELPH-MCNC: 2.7 G/DL — LOW (ref 3.5–5.2)
ALBUMIN SERPL ELPH-MCNC: 2.8 G/DL — LOW (ref 3.5–5.2)
ALBUMIN SERPL ELPH-MCNC: 2.8 G/DL — LOW (ref 3.5–5.2)
ALBUMIN SERPL ELPH-MCNC: 3.2 G/DL — LOW (ref 3.5–5.2)
ALBUMIN SERPL ELPH-MCNC: 3.3 G/DL — LOW (ref 3.5–5.2)
ALP SERPL-CCNC: 1005 U/L — HIGH (ref 30–115)
ALP SERPL-CCNC: 1118 U/L — HIGH (ref 30–115)
ALP SERPL-CCNC: 251 U/L — HIGH (ref 30–115)
ALP SERPL-CCNC: 269 U/L — HIGH (ref 30–115)
ALP SERPL-CCNC: 277 U/L — HIGH (ref 30–115)
ALP SERPL-CCNC: 292 U/L — HIGH (ref 30–115)
ALP SERPL-CCNC: 296 U/L — HIGH (ref 30–115)
ALP SERPL-CCNC: 298 U/L — HIGH (ref 30–115)
ALP SERPL-CCNC: 304 U/L — HIGH (ref 30–115)
ALP SERPL-CCNC: 306 U/L — HIGH (ref 30–115)
ALP SERPL-CCNC: 311 U/L — HIGH (ref 30–115)
ALP SERPL-CCNC: 316 U/L — HIGH (ref 30–115)
ALP SERPL-CCNC: 319 U/L — HIGH (ref 30–115)
ALP SERPL-CCNC: 329 U/L — HIGH (ref 30–115)
ALP SERPL-CCNC: 338 U/L — HIGH (ref 30–115)
ALP SERPL-CCNC: 362 U/L — HIGH (ref 30–115)
ALP SERPL-CCNC: 378 U/L — HIGH (ref 30–115)
ALP SERPL-CCNC: 410 U/L — HIGH (ref 30–115)
ALP SERPL-CCNC: 411 U/L — HIGH (ref 30–115)
ALP SERPL-CCNC: 451 U/L — HIGH (ref 30–115)
ALP SERPL-CCNC: 530 U/L — HIGH (ref 30–115)
ALP SERPL-CCNC: 538 U/L — HIGH (ref 30–115)
ALP SERPL-CCNC: 595 U/L — HIGH (ref 30–115)
ALP SERPL-CCNC: 614 U/L — HIGH (ref 30–115)
ALP SERPL-CCNC: 622 U/L — HIGH (ref 30–115)
ALP SERPL-CCNC: 751 U/L — HIGH (ref 30–115)
ALP SERPL-CCNC: 756 U/L — HIGH (ref 30–115)
ALP SERPL-CCNC: 805 U/L — HIGH (ref 30–115)
ALP SERPL-CCNC: 836 U/L — HIGH (ref 30–115)
ALP SERPL-CCNC: 938 U/L — HIGH (ref 30–115)
ALT FLD-CCNC: 104 U/L — HIGH (ref 0–41)
ALT FLD-CCNC: 127 U/L — HIGH (ref 0–41)
ALT FLD-CCNC: 16 U/L — SIGNIFICANT CHANGE UP (ref 0–41)
ALT FLD-CCNC: 165 U/L — HIGH (ref 0–41)
ALT FLD-CCNC: 179 U/L — HIGH (ref 0–41)
ALT FLD-CCNC: 21 U/L — SIGNIFICANT CHANGE UP (ref 0–41)
ALT FLD-CCNC: 21 U/L — SIGNIFICANT CHANGE UP (ref 0–41)
ALT FLD-CCNC: 23 U/L — SIGNIFICANT CHANGE UP (ref 0–41)
ALT FLD-CCNC: 24 U/L — SIGNIFICANT CHANGE UP (ref 0–41)
ALT FLD-CCNC: 24 U/L — SIGNIFICANT CHANGE UP (ref 0–41)
ALT FLD-CCNC: 25 U/L — SIGNIFICANT CHANGE UP (ref 0–41)
ALT FLD-CCNC: 26 U/L — SIGNIFICANT CHANGE UP (ref 0–41)
ALT FLD-CCNC: 27 U/L — SIGNIFICANT CHANGE UP (ref 0–41)
ALT FLD-CCNC: 28 U/L — SIGNIFICANT CHANGE UP (ref 0–41)
ALT FLD-CCNC: 28 U/L — SIGNIFICANT CHANGE UP (ref 0–41)
ALT FLD-CCNC: 29 U/L — SIGNIFICANT CHANGE UP (ref 0–41)
ALT FLD-CCNC: 30 U/L — SIGNIFICANT CHANGE UP (ref 0–41)
ALT FLD-CCNC: 32 U/L — SIGNIFICANT CHANGE UP (ref 0–41)
ALT FLD-CCNC: 34 U/L — SIGNIFICANT CHANGE UP (ref 0–41)
ALT FLD-CCNC: 40 U/L — SIGNIFICANT CHANGE UP (ref 0–41)
ALT FLD-CCNC: 45 U/L — HIGH (ref 0–41)
ALT FLD-CCNC: 48 U/L — HIGH (ref 0–41)
ALT FLD-CCNC: 49 U/L — HIGH (ref 0–41)
ALT FLD-CCNC: 52 U/L — HIGH (ref 0–41)
ALT FLD-CCNC: 69 U/L — HIGH (ref 0–41)
ALT FLD-CCNC: 89 U/L — HIGH (ref 0–41)
ALT FLD-CCNC: 94 U/L — HIGH (ref 0–41)
ALT FLD-CCNC: 96 U/L — HIGH (ref 0–41)
AMMONIA BLD-MCNC: 183 UMOL/L — CRITICAL HIGH (ref 11–55)
AMMONIA BLD-MCNC: 39 UMOL/L — SIGNIFICANT CHANGE UP (ref 11–55)
AMMONIA BLD-MCNC: 51 UMOL/L — SIGNIFICANT CHANGE UP (ref 11–55)
AMMONIA BLD-MCNC: 54 UMOL/L — SIGNIFICANT CHANGE UP (ref 11–55)
AMMONIA BLD-MCNC: 76 UMOL/L — HIGH (ref 11–55)
AMMONIA BLD-MCNC: 76 UMOL/L — HIGH (ref 11–55)
AMMONIA BLD-MCNC: 77 UMOL/L — HIGH (ref 11–55)
AMMONIA BLD-MCNC: 79 UMOL/L — HIGH (ref 11–55)
AMMONIA BLD-MCNC: 92 UMOL/L — HIGH (ref 11–55)
ANA TITR SER: NEGATIVE — SIGNIFICANT CHANGE UP
ANION GAP SERPL CALC-SCNC: 10 MMOL/L — SIGNIFICANT CHANGE UP (ref 7–14)
ANION GAP SERPL CALC-SCNC: 11 MMOL/L — SIGNIFICANT CHANGE UP (ref 7–14)
ANION GAP SERPL CALC-SCNC: 12 MMOL/L — SIGNIFICANT CHANGE UP (ref 7–14)
ANION GAP SERPL CALC-SCNC: 12 MMOL/L — SIGNIFICANT CHANGE UP (ref 7–14)
ANION GAP SERPL CALC-SCNC: 13 MMOL/L — SIGNIFICANT CHANGE UP (ref 7–14)
ANION GAP SERPL CALC-SCNC: 13 MMOL/L — SIGNIFICANT CHANGE UP (ref 7–14)
ANION GAP SERPL CALC-SCNC: 14 MMOL/L — SIGNIFICANT CHANGE UP (ref 7–14)
ANION GAP SERPL CALC-SCNC: 14 MMOL/L — SIGNIFICANT CHANGE UP (ref 7–14)
ANION GAP SERPL CALC-SCNC: 15 MMOL/L — HIGH (ref 7–14)
ANION GAP SERPL CALC-SCNC: 16 MMOL/L — HIGH (ref 7–14)
ANION GAP SERPL CALC-SCNC: 17 MMOL/L — HIGH (ref 7–14)
ANION GAP SERPL CALC-SCNC: 18 MMOL/L — HIGH (ref 7–14)
ANION GAP SERPL CALC-SCNC: 19 MMOL/L — HIGH (ref 7–14)
ANION GAP SERPL CALC-SCNC: 20 MMOL/L — HIGH (ref 7–14)
ANION GAP SERPL CALC-SCNC: 21 MMOL/L — HIGH (ref 7–14)
ANION GAP SERPL CALC-SCNC: 21 MMOL/L — HIGH (ref 7–14)
ANION GAP SERPL CALC-SCNC: 22 MMOL/L — HIGH (ref 7–14)
ANION GAP SERPL CALC-SCNC: 24 MMOL/L — HIGH (ref 7–14)
APPEARANCE UR: ABNORMAL
APPEARANCE UR: ABNORMAL
APTT BLD: 38 SEC — SIGNIFICANT CHANGE UP (ref 27–39.2)
APTT BLD: 44.6 SEC — HIGH (ref 27–39.2)
APTT BLD: 47.2 SEC — HIGH (ref 27–39.2)
APTT BLD: 48 SEC — HIGH (ref 27–39.2)
APTT BLD: 52.8 SEC — HIGH (ref 27–39.2)
APTT BLD: 88.6 SEC — CRITICAL HIGH (ref 27–39.2)
APTT BLD: 94.1 SEC — CRITICAL HIGH (ref 27–39.2)
AST SERPL-CCNC: 105 U/L — HIGH (ref 0–41)
AST SERPL-CCNC: 107 U/L — HIGH (ref 0–41)
AST SERPL-CCNC: 132 U/L — HIGH (ref 0–41)
AST SERPL-CCNC: 149 U/L — HIGH (ref 0–41)
AST SERPL-CCNC: 166 U/L — HIGH (ref 0–41)
AST SERPL-CCNC: 206 U/L — HIGH (ref 0–41)
AST SERPL-CCNC: 218 U/L — HIGH (ref 0–41)
AST SERPL-CCNC: 25 U/L — SIGNIFICANT CHANGE UP (ref 0–41)
AST SERPL-CCNC: 25 U/L — SIGNIFICANT CHANGE UP (ref 0–41)
AST SERPL-CCNC: 266 U/L — HIGH (ref 0–41)
AST SERPL-CCNC: 268 U/L — HIGH (ref 0–41)
AST SERPL-CCNC: 291 U/L — HIGH (ref 0–41)
AST SERPL-CCNC: 300 U/L — HIGH (ref 0–41)
AST SERPL-CCNC: 31 U/L — SIGNIFICANT CHANGE UP (ref 0–41)
AST SERPL-CCNC: 310 U/L — HIGH (ref 0–41)
AST SERPL-CCNC: 32 U/L — SIGNIFICANT CHANGE UP (ref 0–41)
AST SERPL-CCNC: 38 U/L — SIGNIFICANT CHANGE UP (ref 0–41)
AST SERPL-CCNC: 39 U/L — SIGNIFICANT CHANGE UP (ref 0–41)
AST SERPL-CCNC: 40 U/L — SIGNIFICANT CHANGE UP (ref 0–41)
AST SERPL-CCNC: 45 U/L — HIGH (ref 0–41)
AST SERPL-CCNC: 48 U/L — HIGH (ref 0–41)
AST SERPL-CCNC: 51 U/L — HIGH (ref 0–41)
AST SERPL-CCNC: 51 U/L — HIGH (ref 0–41)
AST SERPL-CCNC: 53 U/L — HIGH (ref 0–41)
AST SERPL-CCNC: 53 U/L — HIGH (ref 0–41)
AST SERPL-CCNC: 56 U/L — HIGH (ref 0–41)
AST SERPL-CCNC: 56 U/L — HIGH (ref 0–41)
AST SERPL-CCNC: 59 U/L — HIGH (ref 0–41)
AST SERPL-CCNC: 64 U/L — HIGH (ref 0–41)
AST SERPL-CCNC: 89 U/L — HIGH (ref 0–41)
B-OH-BUTYR SERPL-SCNC: <0.2 MMOL/L — SIGNIFICANT CHANGE UP
BASE EXCESS BLDA CALC-SCNC: -10.9 MMOL/L — LOW (ref -2–3)
BASE EXCESS BLDA CALC-SCNC: -6.1 MMOL/L — LOW (ref -2–3)
BASE EXCESS BLDV CALC-SCNC: -8.8 MMOL/L — LOW (ref -2–3)
BASOPHILS # BLD AUTO: 0 K/UL — SIGNIFICANT CHANGE UP (ref 0–0.2)
BASOPHILS # BLD AUTO: 0.01 K/UL — SIGNIFICANT CHANGE UP (ref 0–0.2)
BASOPHILS # BLD AUTO: 0.02 K/UL — SIGNIFICANT CHANGE UP (ref 0–0.2)
BASOPHILS # BLD AUTO: 0.03 K/UL — SIGNIFICANT CHANGE UP (ref 0–0.2)
BASOPHILS # BLD AUTO: 0.04 K/UL — SIGNIFICANT CHANGE UP (ref 0–0.2)
BASOPHILS # BLD AUTO: 0.06 K/UL — SIGNIFICANT CHANGE UP (ref 0–0.2)
BASOPHILS # BLD AUTO: 0.06 K/UL — SIGNIFICANT CHANGE UP (ref 0–0.2)
BASOPHILS # BLD AUTO: 0.07 K/UL — SIGNIFICANT CHANGE UP (ref 0–0.2)
BASOPHILS # BLD AUTO: 0.08 K/UL — SIGNIFICANT CHANGE UP (ref 0–0.2)
BASOPHILS NFR BLD AUTO: 0 % — SIGNIFICANT CHANGE UP (ref 0–2)
BASOPHILS NFR BLD AUTO: 0.1 % — SIGNIFICANT CHANGE UP (ref 0–2)
BASOPHILS NFR BLD AUTO: 0.2 % — SIGNIFICANT CHANGE UP (ref 0–2)
BASOPHILS NFR BLD AUTO: 0.3 % — SIGNIFICANT CHANGE UP (ref 0–2)
BASOPHILS NFR BLD AUTO: 0.4 % — SIGNIFICANT CHANGE UP (ref 0–2)
BASOPHILS NFR BLD AUTO: 0.5 % — SIGNIFICANT CHANGE UP (ref 0–2)
BASOPHILS NFR BLD AUTO: 0.5 % — SIGNIFICANT CHANGE UP (ref 0–2)
BASOPHILS NFR BLD AUTO: 0.6 % — SIGNIFICANT CHANGE UP (ref 0–2)
BASOPHILS NFR BLD AUTO: 0.9 % — SIGNIFICANT CHANGE UP (ref 0–2)
BASOPHILS NFR BLD AUTO: 1 % — SIGNIFICANT CHANGE UP (ref 0–2)
BILIRUB DIRECT SERPL-MCNC: 1.9 MG/DL — HIGH (ref 0–0.3)
BILIRUB INDIRECT FLD-MCNC: 0.8 MG/DL — SIGNIFICANT CHANGE UP (ref 0.2–1.2)
BILIRUB SERPL-MCNC: 2 MG/DL — HIGH (ref 0.2–1.2)
BILIRUB SERPL-MCNC: 2 MG/DL — HIGH (ref 0.2–1.2)
BILIRUB SERPL-MCNC: 2.1 MG/DL — HIGH (ref 0.2–1.2)
BILIRUB SERPL-MCNC: 2.1 MG/DL — HIGH (ref 0.2–1.2)
BILIRUB SERPL-MCNC: 2.2 MG/DL — HIGH (ref 0.2–1.2)
BILIRUB SERPL-MCNC: 2.2 MG/DL — HIGH (ref 0.2–1.2)
BILIRUB SERPL-MCNC: 2.4 MG/DL — HIGH (ref 0.2–1.2)
BILIRUB SERPL-MCNC: 2.5 MG/DL — HIGH (ref 0.2–1.2)
BILIRUB SERPL-MCNC: 2.6 MG/DL — HIGH (ref 0.2–1.2)
BILIRUB SERPL-MCNC: 2.7 MG/DL — HIGH (ref 0.2–1.2)
BILIRUB SERPL-MCNC: 2.9 MG/DL — HIGH (ref 0.2–1.2)
BILIRUB SERPL-MCNC: 3.1 MG/DL — HIGH (ref 0.2–1.2)
BILIRUB SERPL-MCNC: 3.2 MG/DL — HIGH (ref 0.2–1.2)
BILIRUB SERPL-MCNC: 3.2 MG/DL — HIGH (ref 0.2–1.2)
BILIRUB SERPL-MCNC: 3.3 MG/DL — HIGH (ref 0.2–1.2)
BILIRUB SERPL-MCNC: 3.6 MG/DL — HIGH (ref 0.2–1.2)
BILIRUB SERPL-MCNC: 3.6 MG/DL — HIGH (ref 0.2–1.2)
BILIRUB SERPL-MCNC: 3.8 MG/DL — HIGH (ref 0.2–1.2)
BILIRUB SERPL-MCNC: 3.8 MG/DL — HIGH (ref 0.2–1.2)
BILIRUB SERPL-MCNC: 4.3 MG/DL — HIGH (ref 0.2–1.2)
BILIRUB SERPL-MCNC: 4.4 MG/DL — HIGH (ref 0.2–1.2)
BILIRUB SERPL-MCNC: 4.5 MG/DL — HIGH (ref 0.2–1.2)
BILIRUB SERPL-MCNC: 5.4 MG/DL — HIGH (ref 0.2–1.2)
BILIRUB SERPL-MCNC: 5.7 MG/DL — HIGH (ref 0.2–1.2)
BILIRUB UR-MCNC: ABNORMAL
BILIRUB UR-MCNC: ABNORMAL
BLD GP AB SCN SERPL QL: SIGNIFICANT CHANGE UP
BUN SERPL-MCNC: 100 MG/DL — CRITICAL HIGH (ref 10–20)
BUN SERPL-MCNC: 101 MG/DL — CRITICAL HIGH (ref 10–20)
BUN SERPL-MCNC: 103 MG/DL — CRITICAL HIGH (ref 10–20)
BUN SERPL-MCNC: 103 MG/DL — CRITICAL HIGH (ref 10–20)
BUN SERPL-MCNC: 105 MG/DL — CRITICAL HIGH (ref 10–20)
BUN SERPL-MCNC: 108 MG/DL — CRITICAL HIGH (ref 10–20)
BUN SERPL-MCNC: 112 MG/DL — CRITICAL HIGH (ref 10–20)
BUN SERPL-MCNC: 113 MG/DL — CRITICAL HIGH (ref 10–20)
BUN SERPL-MCNC: 49 MG/DL — HIGH (ref 10–20)
BUN SERPL-MCNC: 56 MG/DL — HIGH (ref 10–20)
BUN SERPL-MCNC: 59 MG/DL — HIGH (ref 10–20)
BUN SERPL-MCNC: 60 MG/DL — HIGH (ref 10–20)
BUN SERPL-MCNC: 61 MG/DL — CRITICAL HIGH (ref 10–20)
BUN SERPL-MCNC: 62 MG/DL — CRITICAL HIGH (ref 10–20)
BUN SERPL-MCNC: 63 MG/DL — CRITICAL HIGH (ref 10–20)
BUN SERPL-MCNC: 66 MG/DL — CRITICAL HIGH (ref 10–20)
BUN SERPL-MCNC: 66 MG/DL — CRITICAL HIGH (ref 10–20)
BUN SERPL-MCNC: 68 MG/DL — CRITICAL HIGH (ref 10–20)
BUN SERPL-MCNC: 68 MG/DL — CRITICAL HIGH (ref 10–20)
BUN SERPL-MCNC: 69 MG/DL — CRITICAL HIGH (ref 10–20)
BUN SERPL-MCNC: 70 MG/DL — CRITICAL HIGH (ref 10–20)
BUN SERPL-MCNC: 70 MG/DL — CRITICAL HIGH (ref 10–20)
BUN SERPL-MCNC: 71 MG/DL — CRITICAL HIGH (ref 10–20)
BUN SERPL-MCNC: 72 MG/DL — CRITICAL HIGH (ref 10–20)
BUN SERPL-MCNC: 72 MG/DL — CRITICAL HIGH (ref 10–20)
BUN SERPL-MCNC: 74 MG/DL — CRITICAL HIGH (ref 10–20)
BUN SERPL-MCNC: 75 MG/DL — CRITICAL HIGH (ref 10–20)
BUN SERPL-MCNC: 77 MG/DL — CRITICAL HIGH (ref 10–20)
BUN SERPL-MCNC: 79 MG/DL — CRITICAL HIGH (ref 10–20)
BUN SERPL-MCNC: 79 MG/DL — CRITICAL HIGH (ref 10–20)
BUN SERPL-MCNC: 82 MG/DL — CRITICAL HIGH (ref 10–20)
BUN SERPL-MCNC: 92 MG/DL — CRITICAL HIGH (ref 10–20)
BUN SERPL-MCNC: 98 MG/DL — CRITICAL HIGH (ref 10–20)
CA-I SERPL-SCNC: 1.01 MMOL/L — LOW (ref 1.15–1.33)
CALCIUM SERPL-MCNC: 7.4 MG/DL — LOW (ref 8.4–10.5)
CALCIUM SERPL-MCNC: 7.5 MG/DL — LOW (ref 8.4–10.5)
CALCIUM SERPL-MCNC: 7.6 MG/DL — LOW (ref 8.4–10.5)
CALCIUM SERPL-MCNC: 7.6 MG/DL — LOW (ref 8.4–10.5)
CALCIUM SERPL-MCNC: 7.7 MG/DL — LOW (ref 8.4–10.5)
CALCIUM SERPL-MCNC: 7.8 MG/DL — LOW (ref 8.4–10.5)
CALCIUM SERPL-MCNC: 7.9 MG/DL — LOW (ref 8.4–10.5)
CALCIUM SERPL-MCNC: 8 MG/DL — LOW (ref 8.4–10.5)
CALCIUM SERPL-MCNC: 8.1 MG/DL — LOW (ref 8.4–10.5)
CALCIUM SERPL-MCNC: 8.2 MG/DL — LOW (ref 8.4–10.5)
CALCIUM SERPL-MCNC: 8.2 MG/DL — LOW (ref 8.4–10.5)
CALCIUM SERPL-MCNC: 8.3 MG/DL — LOW (ref 8.4–10.5)
CALCIUM SERPL-MCNC: 8.4 MG/DL — SIGNIFICANT CHANGE UP (ref 8.4–10.5)
CALCIUM SERPL-MCNC: 8.5 MG/DL — SIGNIFICANT CHANGE UP (ref 8.4–10.5)
CALCIUM SERPL-MCNC: 8.6 MG/DL — SIGNIFICANT CHANGE UP (ref 8.4–10.5)
CALCIUM SERPL-MCNC: 8.7 MG/DL — SIGNIFICANT CHANGE UP (ref 8.4–10.5)
CALCIUM SERPL-MCNC: 8.8 MG/DL — SIGNIFICANT CHANGE UP (ref 8.4–10.5)
CERULOPLASMIN SERPL-MCNC: 11 MG/DL — LOW (ref 15–30)
CHLORIDE SERPL-SCNC: 105 MMOL/L — SIGNIFICANT CHANGE UP (ref 98–110)
CHLORIDE SERPL-SCNC: 106 MMOL/L — SIGNIFICANT CHANGE UP (ref 98–110)
CHLORIDE SERPL-SCNC: 106 MMOL/L — SIGNIFICANT CHANGE UP (ref 98–110)
CHLORIDE SERPL-SCNC: 107 MMOL/L — SIGNIFICANT CHANGE UP (ref 98–110)
CHLORIDE SERPL-SCNC: 108 MMOL/L — SIGNIFICANT CHANGE UP (ref 98–110)
CHLORIDE SERPL-SCNC: 108 MMOL/L — SIGNIFICANT CHANGE UP (ref 98–110)
CHLORIDE SERPL-SCNC: 110 MMOL/L — SIGNIFICANT CHANGE UP (ref 98–110)
CHLORIDE SERPL-SCNC: 111 MMOL/L — HIGH (ref 98–110)
CHLORIDE SERPL-SCNC: 112 MMOL/L — HIGH (ref 98–110)
CHLORIDE SERPL-SCNC: 113 MMOL/L — HIGH (ref 98–110)
CHLORIDE SERPL-SCNC: 114 MMOL/L — HIGH (ref 98–110)
CHLORIDE SERPL-SCNC: 115 MMOL/L — HIGH (ref 98–110)
CHLORIDE SERPL-SCNC: 116 MMOL/L — HIGH (ref 98–110)
CHLORIDE SERPL-SCNC: 117 MMOL/L — HIGH (ref 98–110)
CHLORIDE SERPL-SCNC: 117 MMOL/L — HIGH (ref 98–110)
CK MB CFR SERPL CALC: 6.2 NG/ML — SIGNIFICANT CHANGE UP (ref 0.6–6.3)
CK SERPL-CCNC: 42 U/L — SIGNIFICANT CHANGE UP (ref 0–225)
CO2 SERPL-SCNC: 13 MMOL/L — LOW (ref 17–32)
CO2 SERPL-SCNC: 14 MMOL/L — LOW (ref 17–32)
CO2 SERPL-SCNC: 14 MMOL/L — LOW (ref 17–32)
CO2 SERPL-SCNC: 15 MMOL/L — LOW (ref 17–32)
CO2 SERPL-SCNC: 16 MMOL/L — LOW (ref 17–32)
CO2 SERPL-SCNC: 17 MMOL/L — SIGNIFICANT CHANGE UP (ref 17–32)
CO2 SERPL-SCNC: 17 MMOL/L — SIGNIFICANT CHANGE UP (ref 17–32)
CO2 SERPL-SCNC: 18 MMOL/L — SIGNIFICANT CHANGE UP (ref 17–32)
CO2 SERPL-SCNC: 19 MMOL/L — SIGNIFICANT CHANGE UP (ref 17–32)
CO2 SERPL-SCNC: 20 MMOL/L — SIGNIFICANT CHANGE UP (ref 17–32)
CO2 SERPL-SCNC: 20 MMOL/L — SIGNIFICANT CHANGE UP (ref 17–32)
CO2 SERPL-SCNC: 21 MMOL/L — SIGNIFICANT CHANGE UP (ref 17–32)
CO2 SERPL-SCNC: 22 MMOL/L — SIGNIFICANT CHANGE UP (ref 17–32)
CO2 SERPL-SCNC: 24 MMOL/L — SIGNIFICANT CHANGE UP (ref 17–32)
COLOR SPEC: SIGNIFICANT CHANGE UP
COLOR SPEC: SIGNIFICANT CHANGE UP
CREAT ?TM UR-MCNC: 93 MG/DL — SIGNIFICANT CHANGE UP
CREAT ?TM UR-MCNC: 95 MG/DL — SIGNIFICANT CHANGE UP
CREAT SERPL-MCNC: 1.1 MG/DL — SIGNIFICANT CHANGE UP (ref 0.7–1.5)
CREAT SERPL-MCNC: 1.2 MG/DL — SIGNIFICANT CHANGE UP (ref 0.7–1.5)
CREAT SERPL-MCNC: 1.3 MG/DL — SIGNIFICANT CHANGE UP (ref 0.7–1.5)
CREAT SERPL-MCNC: 1.3 MG/DL — SIGNIFICANT CHANGE UP (ref 0.7–1.5)
CREAT SERPL-MCNC: 1.4 MG/DL — SIGNIFICANT CHANGE UP (ref 0.7–1.5)
CREAT SERPL-MCNC: 1.5 MG/DL — SIGNIFICANT CHANGE UP (ref 0.7–1.5)
CREAT SERPL-MCNC: 1.5 MG/DL — SIGNIFICANT CHANGE UP (ref 0.7–1.5)
CREAT SERPL-MCNC: 1.8 MG/DL — HIGH (ref 0.7–1.5)
CREAT SERPL-MCNC: 1.8 MG/DL — HIGH (ref 0.7–1.5)
CREAT SERPL-MCNC: 1.9 MG/DL — HIGH (ref 0.7–1.5)
CREAT SERPL-MCNC: 2 MG/DL — HIGH (ref 0.7–1.5)
CREAT SERPL-MCNC: 2 MG/DL — HIGH (ref 0.7–1.5)
CREAT SERPL-MCNC: 2.1 MG/DL — HIGH (ref 0.7–1.5)
CREAT SERPL-MCNC: 2.2 MG/DL — HIGH (ref 0.7–1.5)
CREAT SERPL-MCNC: 2.2 MG/DL — HIGH (ref 0.7–1.5)
CREAT SERPL-MCNC: 2.3 MG/DL — HIGH (ref 0.7–1.5)
CREAT SERPL-MCNC: 2.4 MG/DL — HIGH (ref 0.7–1.5)
CREAT SERPL-MCNC: 2.4 MG/DL — HIGH (ref 0.7–1.5)
CREAT SERPL-MCNC: 2.5 MG/DL — HIGH (ref 0.7–1.5)
CREAT SERPL-MCNC: 2.6 MG/DL — HIGH (ref 0.7–1.5)
CREAT SERPL-MCNC: 2.7 MG/DL — HIGH (ref 0.7–1.5)
CREAT SERPL-MCNC: 2.8 MG/DL — HIGH (ref 0.7–1.5)
CREAT SERPL-MCNC: 2.8 MG/DL — HIGH (ref 0.7–1.5)
CREAT SERPL-MCNC: 2.9 MG/DL — HIGH (ref 0.7–1.5)
CREAT SERPL-MCNC: 2.9 MG/DL — HIGH (ref 0.7–1.5)
CREAT SERPL-MCNC: 3.1 MG/DL — HIGH (ref 0.7–1.5)
CREAT SERPL-MCNC: 3.2 MG/DL — HIGH (ref 0.7–1.5)
CRP SERPL-MCNC: 160.6 MG/L — HIGH
CULTURE RESULTS: ABNORMAL
CULTURE RESULTS: SIGNIFICANT CHANGE UP
D DIMER BLD IA.RAPID-MCNC: 3233 NG/ML DDU — HIGH
DIFF PNL FLD: ABNORMAL
DIFF PNL FLD: ABNORMAL
E FAECALIS DNA BLD POS QL NAA+NON-PROBE: SIGNIFICANT CHANGE UP
EGFR: 19 ML/MIN/1.73M2 — LOW
EGFR: 19 ML/MIN/1.73M2 — LOW
EGFR: 20 ML/MIN/1.73M2 — LOW
EGFR: 20 ML/MIN/1.73M2 — LOW
EGFR: 21 ML/MIN/1.73M2 — LOW
EGFR: 22 ML/MIN/1.73M2 — LOW
EGFR: 23 ML/MIN/1.73M2 — LOW
EGFR: 23 ML/MIN/1.73M2 — LOW
EGFR: 24 ML/MIN/1.73M2 — LOW
EGFR: 24 ML/MIN/1.73M2 — LOW
EGFR: 26 ML/MIN/1.73M2 — LOW
EGFR: 26 ML/MIN/1.73M2 — LOW
EGFR: 27 ML/MIN/1.73M2 — LOW
EGFR: 28 ML/MIN/1.73M2 — LOW
EGFR: 28 ML/MIN/1.73M2 — LOW
EGFR: 30 ML/MIN/1.73M2 — LOW
EGFR: 32 ML/MIN/1.73M2 — LOW
EGFR: 32 ML/MIN/1.73M2 — LOW
EGFR: 34 ML/MIN/1.73M2 — LOW
EGFR: 36 ML/MIN/1.73M2 — LOW
EGFR: 36 ML/MIN/1.73M2 — LOW
EGFR: 38 ML/MIN/1.73M2 — LOW
EGFR: 47 ML/MIN/1.73M2 — LOW
EGFR: 51 ML/MIN/1.73M2 — LOW
EGFR: 56 ML/MIN/1.73M2 — LOW
EGFR: 62 ML/MIN/1.73M2 — SIGNIFICANT CHANGE UP
EGFR: 69 ML/MIN/1.73M2 — SIGNIFICANT CHANGE UP
EGFR: 69 ML/MIN/1.73M2 — SIGNIFICANT CHANGE UP
EOSINOPHIL # BLD AUTO: 0 K/UL — SIGNIFICANT CHANGE UP (ref 0–0.5)
EOSINOPHIL # BLD AUTO: 0.01 K/UL — SIGNIFICANT CHANGE UP (ref 0–0.5)
EOSINOPHIL # BLD AUTO: 0.01 K/UL — SIGNIFICANT CHANGE UP (ref 0–0.5)
EOSINOPHIL # BLD AUTO: 0.03 K/UL — SIGNIFICANT CHANGE UP (ref 0–0.5)
EOSINOPHIL # BLD AUTO: 0.03 K/UL — SIGNIFICANT CHANGE UP (ref 0–0.5)
EOSINOPHIL # BLD AUTO: 0.04 K/UL — SIGNIFICANT CHANGE UP (ref 0–0.5)
EOSINOPHIL # BLD AUTO: 0.04 K/UL — SIGNIFICANT CHANGE UP (ref 0–0.5)
EOSINOPHIL # BLD AUTO: 0.06 K/UL — SIGNIFICANT CHANGE UP (ref 0–0.5)
EOSINOPHIL # BLD AUTO: 0.06 K/UL — SIGNIFICANT CHANGE UP (ref 0–0.5)
EOSINOPHIL # BLD AUTO: 0.09 K/UL — SIGNIFICANT CHANGE UP (ref 0–0.5)
EOSINOPHIL # BLD AUTO: 0.09 K/UL — SIGNIFICANT CHANGE UP (ref 0–0.5)
EOSINOPHIL # BLD AUTO: 0.1 K/UL — SIGNIFICANT CHANGE UP (ref 0–0.5)
EOSINOPHIL # BLD AUTO: 0.1 K/UL — SIGNIFICANT CHANGE UP (ref 0–0.5)
EOSINOPHIL # BLD AUTO: 0.11 K/UL — SIGNIFICANT CHANGE UP (ref 0–0.5)
EOSINOPHIL # BLD AUTO: 0.12 K/UL — SIGNIFICANT CHANGE UP (ref 0–0.5)
EOSINOPHIL # BLD AUTO: 0.14 K/UL — SIGNIFICANT CHANGE UP (ref 0–0.5)
EOSINOPHIL # BLD AUTO: 0.15 K/UL — SIGNIFICANT CHANGE UP (ref 0–0.5)
EOSINOPHIL # BLD AUTO: 0.15 K/UL — SIGNIFICANT CHANGE UP (ref 0–0.5)
EOSINOPHIL # BLD AUTO: 0.17 K/UL — SIGNIFICANT CHANGE UP (ref 0–0.5)
EOSINOPHIL # BLD AUTO: 0.19 K/UL — SIGNIFICANT CHANGE UP (ref 0–0.5)
EOSINOPHIL # BLD AUTO: 0.2 K/UL — SIGNIFICANT CHANGE UP (ref 0–0.5)
EOSINOPHIL # BLD AUTO: 0.21 K/UL — SIGNIFICANT CHANGE UP (ref 0–0.5)
EOSINOPHIL # BLD AUTO: 0.22 K/UL — SIGNIFICANT CHANGE UP (ref 0–0.5)
EOSINOPHIL # BLD AUTO: 0.24 K/UL — SIGNIFICANT CHANGE UP (ref 0–0.5)
EOSINOPHIL # BLD AUTO: 0.25 K/UL — SIGNIFICANT CHANGE UP (ref 0–0.5)
EOSINOPHIL # BLD AUTO: 0.26 K/UL — SIGNIFICANT CHANGE UP (ref 0–0.5)
EOSINOPHIL # BLD AUTO: 0.28 K/UL — SIGNIFICANT CHANGE UP (ref 0–0.5)
EOSINOPHIL NFR BLD AUTO: 0 % — SIGNIFICANT CHANGE UP (ref 0–6)
EOSINOPHIL NFR BLD AUTO: 0.1 % — SIGNIFICANT CHANGE UP (ref 0–6)
EOSINOPHIL NFR BLD AUTO: 0.1 % — SIGNIFICANT CHANGE UP (ref 0–6)
EOSINOPHIL NFR BLD AUTO: 0.3 % — SIGNIFICANT CHANGE UP (ref 0–6)
EOSINOPHIL NFR BLD AUTO: 0.3 % — SIGNIFICANT CHANGE UP (ref 0–6)
EOSINOPHIL NFR BLD AUTO: 0.4 % — SIGNIFICANT CHANGE UP (ref 0–6)
EOSINOPHIL NFR BLD AUTO: 0.5 % — SIGNIFICANT CHANGE UP (ref 0–6)
EOSINOPHIL NFR BLD AUTO: 0.6 % — SIGNIFICANT CHANGE UP (ref 0–6)
EOSINOPHIL NFR BLD AUTO: 0.9 % — SIGNIFICANT CHANGE UP (ref 0–6)
EOSINOPHIL NFR BLD AUTO: 1.1 % — SIGNIFICANT CHANGE UP (ref 0–6)
EOSINOPHIL NFR BLD AUTO: 1.3 % — SIGNIFICANT CHANGE UP (ref 0–6)
EOSINOPHIL NFR BLD AUTO: 1.3 % — SIGNIFICANT CHANGE UP (ref 0–6)
EOSINOPHIL NFR BLD AUTO: 1.5 % — SIGNIFICANT CHANGE UP (ref 0–6)
EOSINOPHIL NFR BLD AUTO: 1.6 % — SIGNIFICANT CHANGE UP (ref 0–6)
EOSINOPHIL NFR BLD AUTO: 1.6 % — SIGNIFICANT CHANGE UP (ref 0–6)
EOSINOPHIL NFR BLD AUTO: 1.8 % — SIGNIFICANT CHANGE UP (ref 0–6)
EOSINOPHIL NFR BLD AUTO: 1.8 % — SIGNIFICANT CHANGE UP (ref 0–6)
EOSINOPHIL NFR BLD AUTO: 2.2 % — SIGNIFICANT CHANGE UP (ref 0–6)
EOSINOPHIL NFR BLD AUTO: 2.3 % — SIGNIFICANT CHANGE UP (ref 0–6)
EOSINOPHIL NFR BLD AUTO: 2.7 % — SIGNIFICANT CHANGE UP (ref 0–6)
EOSINOPHIL NFR BLD AUTO: 2.9 % — SIGNIFICANT CHANGE UP (ref 0–6)
EOSINOPHIL NFR BLD AUTO: 3 % — SIGNIFICANT CHANGE UP (ref 0–6)
EOSINOPHIL NFR BLD AUTO: 3.1 % — SIGNIFICANT CHANGE UP (ref 0–6)
EOSINOPHIL NFR BLD AUTO: 3.3 % — SIGNIFICANT CHANGE UP (ref 0–6)
EOSINOPHIL NFR BLD AUTO: 3.3 % — SIGNIFICANT CHANGE UP (ref 0–6)
EOSINOPHIL NFR BLD AUTO: 3.6 % — SIGNIFICANT CHANGE UP (ref 0–6)
EOSINOPHIL NFR BLD AUTO: 3.7 % — SIGNIFICANT CHANGE UP (ref 0–6)
EOSINOPHIL NFR BLD AUTO: 3.8 % — SIGNIFICANT CHANGE UP (ref 0–6)
EOSINOPHIL NFR BLD AUTO: 4.3 % — SIGNIFICANT CHANGE UP (ref 0–6)
EOSINOPHIL NFR BLD AUTO: 4.5 % — SIGNIFICANT CHANGE UP (ref 0–6)
EOSINOPHIL NFR BLD AUTO: 5.4 % — SIGNIFICANT CHANGE UP (ref 0–6)
EOSINOPHIL NFR BLD AUTO: 6 % — SIGNIFICANT CHANGE UP (ref 0–6)
EOSINOPHIL NFR BLD AUTO: 6 % — SIGNIFICANT CHANGE UP (ref 0–6)
EOSINOPHIL NFR BLD AUTO: 7 % — HIGH (ref 0–6)
ERYTHROCYTE [SEDIMENTATION RATE] IN BLOOD: 35 MM/HR — HIGH (ref 0–15)
ESTIMATED AVERAGE GLUCOSE: 97 MG/DL — SIGNIFICANT CHANGE UP (ref 68–114)
FERRITIN SERPL-MCNC: 610 NG/ML — HIGH (ref 30–400)
GAS PNL BLDA: SIGNIFICANT CHANGE UP
GAS PNL BLDV: 134 MMOL/L — LOW (ref 136–145)
GAS PNL BLDV: SIGNIFICANT CHANGE UP
GAS PNL BLDV: SIGNIFICANT CHANGE UP
GGT SERPL-CCNC: 116 U/L — HIGH (ref 1–40)
GLUCOSE BLDC GLUCOMTR-MCNC: 101 MG/DL — HIGH (ref 70–99)
GLUCOSE BLDC GLUCOMTR-MCNC: 103 MG/DL — HIGH (ref 70–99)
GLUCOSE BLDC GLUCOMTR-MCNC: 104 MG/DL — HIGH (ref 70–99)
GLUCOSE BLDC GLUCOMTR-MCNC: 106 MG/DL — HIGH (ref 70–99)
GLUCOSE BLDC GLUCOMTR-MCNC: 107 MG/DL — HIGH (ref 70–99)
GLUCOSE BLDC GLUCOMTR-MCNC: 108 MG/DL — HIGH (ref 70–99)
GLUCOSE BLDC GLUCOMTR-MCNC: 110 MG/DL — HIGH (ref 70–99)
GLUCOSE BLDC GLUCOMTR-MCNC: 117 MG/DL — HIGH (ref 70–99)
GLUCOSE BLDC GLUCOMTR-MCNC: 118 MG/DL — HIGH (ref 70–99)
GLUCOSE BLDC GLUCOMTR-MCNC: 121 MG/DL — HIGH (ref 70–99)
GLUCOSE BLDC GLUCOMTR-MCNC: 121 MG/DL — HIGH (ref 70–99)
GLUCOSE BLDC GLUCOMTR-MCNC: 122 MG/DL — HIGH (ref 70–99)
GLUCOSE BLDC GLUCOMTR-MCNC: 123 MG/DL — HIGH (ref 70–99)
GLUCOSE BLDC GLUCOMTR-MCNC: 125 MG/DL — HIGH (ref 70–99)
GLUCOSE BLDC GLUCOMTR-MCNC: 125 MG/DL — HIGH (ref 70–99)
GLUCOSE BLDC GLUCOMTR-MCNC: 126 MG/DL — HIGH (ref 70–99)
GLUCOSE BLDC GLUCOMTR-MCNC: 127 MG/DL — HIGH (ref 70–99)
GLUCOSE BLDC GLUCOMTR-MCNC: 129 MG/DL — HIGH (ref 70–99)
GLUCOSE BLDC GLUCOMTR-MCNC: 130 MG/DL — HIGH (ref 70–99)
GLUCOSE BLDC GLUCOMTR-MCNC: 130 MG/DL — HIGH (ref 70–99)
GLUCOSE BLDC GLUCOMTR-MCNC: 136 MG/DL — HIGH (ref 70–99)
GLUCOSE BLDC GLUCOMTR-MCNC: 137 MG/DL — HIGH (ref 70–99)
GLUCOSE BLDC GLUCOMTR-MCNC: 140 MG/DL — HIGH (ref 70–99)
GLUCOSE BLDC GLUCOMTR-MCNC: 142 MG/DL — HIGH (ref 70–99)
GLUCOSE BLDC GLUCOMTR-MCNC: 150 MG/DL — HIGH (ref 70–99)
GLUCOSE BLDC GLUCOMTR-MCNC: 153 MG/DL — HIGH (ref 70–99)
GLUCOSE BLDC GLUCOMTR-MCNC: 154 MG/DL — HIGH (ref 70–99)
GLUCOSE BLDC GLUCOMTR-MCNC: 156 MG/DL — HIGH (ref 70–99)
GLUCOSE BLDC GLUCOMTR-MCNC: 158 MG/DL — HIGH (ref 70–99)
GLUCOSE BLDC GLUCOMTR-MCNC: 163 MG/DL — HIGH (ref 70–99)
GLUCOSE BLDC GLUCOMTR-MCNC: 164 MG/DL — HIGH (ref 70–99)
GLUCOSE BLDC GLUCOMTR-MCNC: 165 MG/DL — HIGH (ref 70–99)
GLUCOSE BLDC GLUCOMTR-MCNC: 167 MG/DL — HIGH (ref 70–99)
GLUCOSE BLDC GLUCOMTR-MCNC: 168 MG/DL — HIGH (ref 70–99)
GLUCOSE BLDC GLUCOMTR-MCNC: 170 MG/DL — HIGH (ref 70–99)
GLUCOSE BLDC GLUCOMTR-MCNC: 171 MG/DL — HIGH (ref 70–99)
GLUCOSE BLDC GLUCOMTR-MCNC: 173 MG/DL — HIGH (ref 70–99)
GLUCOSE BLDC GLUCOMTR-MCNC: 173 MG/DL — HIGH (ref 70–99)
GLUCOSE BLDC GLUCOMTR-MCNC: 177 MG/DL — HIGH (ref 70–99)
GLUCOSE BLDC GLUCOMTR-MCNC: 178 MG/DL — HIGH (ref 70–99)
GLUCOSE BLDC GLUCOMTR-MCNC: 179 MG/DL — HIGH (ref 70–99)
GLUCOSE BLDC GLUCOMTR-MCNC: 182 MG/DL — HIGH (ref 70–99)
GLUCOSE BLDC GLUCOMTR-MCNC: 184 MG/DL — HIGH (ref 70–99)
GLUCOSE BLDC GLUCOMTR-MCNC: 188 MG/DL — HIGH (ref 70–99)
GLUCOSE BLDC GLUCOMTR-MCNC: 192 MG/DL — HIGH (ref 70–99)
GLUCOSE BLDC GLUCOMTR-MCNC: 193 MG/DL — HIGH (ref 70–99)
GLUCOSE BLDC GLUCOMTR-MCNC: 194 MG/DL — HIGH (ref 70–99)
GLUCOSE BLDC GLUCOMTR-MCNC: 195 MG/DL — HIGH (ref 70–99)
GLUCOSE BLDC GLUCOMTR-MCNC: 200 MG/DL — HIGH (ref 70–99)
GLUCOSE BLDC GLUCOMTR-MCNC: 212 MG/DL — HIGH (ref 70–99)
GLUCOSE BLDC GLUCOMTR-MCNC: 215 MG/DL — HIGH (ref 70–99)
GLUCOSE BLDC GLUCOMTR-MCNC: 216 MG/DL — HIGH (ref 70–99)
GLUCOSE BLDC GLUCOMTR-MCNC: 217 MG/DL — HIGH (ref 70–99)
GLUCOSE BLDC GLUCOMTR-MCNC: 220 MG/DL — HIGH (ref 70–99)
GLUCOSE BLDC GLUCOMTR-MCNC: 223 MG/DL — HIGH (ref 70–99)
GLUCOSE BLDC GLUCOMTR-MCNC: 227 MG/DL — HIGH (ref 70–99)
GLUCOSE BLDC GLUCOMTR-MCNC: 231 MG/DL — HIGH (ref 70–99)
GLUCOSE BLDC GLUCOMTR-MCNC: 233 MG/DL — HIGH (ref 70–99)
GLUCOSE BLDC GLUCOMTR-MCNC: 237 MG/DL — HIGH (ref 70–99)
GLUCOSE BLDC GLUCOMTR-MCNC: 239 MG/DL — HIGH (ref 70–99)
GLUCOSE BLDC GLUCOMTR-MCNC: 241 MG/DL — HIGH (ref 70–99)
GLUCOSE BLDC GLUCOMTR-MCNC: 248 MG/DL — HIGH (ref 70–99)
GLUCOSE BLDC GLUCOMTR-MCNC: 252 MG/DL — HIGH (ref 70–99)
GLUCOSE BLDC GLUCOMTR-MCNC: 254 MG/DL — HIGH (ref 70–99)
GLUCOSE BLDC GLUCOMTR-MCNC: 255 MG/DL — HIGH (ref 70–99)
GLUCOSE BLDC GLUCOMTR-MCNC: 258 MG/DL — HIGH (ref 70–99)
GLUCOSE BLDC GLUCOMTR-MCNC: 259 MG/DL — HIGH (ref 70–99)
GLUCOSE BLDC GLUCOMTR-MCNC: 262 MG/DL — HIGH (ref 70–99)
GLUCOSE BLDC GLUCOMTR-MCNC: 263 MG/DL — HIGH (ref 70–99)
GLUCOSE BLDC GLUCOMTR-MCNC: 264 MG/DL — HIGH (ref 70–99)
GLUCOSE BLDC GLUCOMTR-MCNC: 266 MG/DL — HIGH (ref 70–99)
GLUCOSE BLDC GLUCOMTR-MCNC: 271 MG/DL — HIGH (ref 70–99)
GLUCOSE BLDC GLUCOMTR-MCNC: 273 MG/DL — HIGH (ref 70–99)
GLUCOSE BLDC GLUCOMTR-MCNC: 274 MG/DL — HIGH (ref 70–99)
GLUCOSE BLDC GLUCOMTR-MCNC: 274 MG/DL — HIGH (ref 70–99)
GLUCOSE BLDC GLUCOMTR-MCNC: 282 MG/DL — HIGH (ref 70–99)
GLUCOSE BLDC GLUCOMTR-MCNC: 289 MG/DL — HIGH (ref 70–99)
GLUCOSE BLDC GLUCOMTR-MCNC: 291 MG/DL — HIGH (ref 70–99)
GLUCOSE BLDC GLUCOMTR-MCNC: 296 MG/DL — HIGH (ref 70–99)
GLUCOSE BLDC GLUCOMTR-MCNC: 298 MG/DL — HIGH (ref 70–99)
GLUCOSE BLDC GLUCOMTR-MCNC: 303 MG/DL — HIGH (ref 70–99)
GLUCOSE BLDC GLUCOMTR-MCNC: 304 MG/DL — HIGH (ref 70–99)
GLUCOSE BLDC GLUCOMTR-MCNC: 304 MG/DL — HIGH (ref 70–99)
GLUCOSE BLDC GLUCOMTR-MCNC: 306 MG/DL — HIGH (ref 70–99)
GLUCOSE BLDC GLUCOMTR-MCNC: 309 MG/DL — HIGH (ref 70–99)
GLUCOSE BLDC GLUCOMTR-MCNC: 313 MG/DL — HIGH (ref 70–99)
GLUCOSE BLDC GLUCOMTR-MCNC: 324 MG/DL — HIGH (ref 70–99)
GLUCOSE BLDC GLUCOMTR-MCNC: 326 MG/DL — HIGH (ref 70–99)
GLUCOSE BLDC GLUCOMTR-MCNC: 328 MG/DL — HIGH (ref 70–99)
GLUCOSE BLDC GLUCOMTR-MCNC: 328 MG/DL — HIGH (ref 70–99)
GLUCOSE BLDC GLUCOMTR-MCNC: 334 MG/DL — HIGH (ref 70–99)
GLUCOSE BLDC GLUCOMTR-MCNC: 337 MG/DL — HIGH (ref 70–99)
GLUCOSE BLDC GLUCOMTR-MCNC: 341 MG/DL — HIGH (ref 70–99)
GLUCOSE BLDC GLUCOMTR-MCNC: 348 MG/DL — HIGH (ref 70–99)
GLUCOSE BLDC GLUCOMTR-MCNC: 352 MG/DL — HIGH (ref 70–99)
GLUCOSE BLDC GLUCOMTR-MCNC: 363 MG/DL — HIGH (ref 70–99)
GLUCOSE BLDC GLUCOMTR-MCNC: 369 MG/DL — HIGH (ref 70–99)
GLUCOSE BLDC GLUCOMTR-MCNC: 376 MG/DL — HIGH (ref 70–99)
GLUCOSE BLDC GLUCOMTR-MCNC: 384 MG/DL — HIGH (ref 70–99)
GLUCOSE BLDC GLUCOMTR-MCNC: 388 MG/DL — HIGH (ref 70–99)
GLUCOSE BLDC GLUCOMTR-MCNC: 406 MG/DL — HIGH (ref 70–99)
GLUCOSE BLDC GLUCOMTR-MCNC: 412 MG/DL — HIGH (ref 70–99)
GLUCOSE BLDC GLUCOMTR-MCNC: 425 MG/DL — HIGH (ref 70–99)
GLUCOSE BLDC GLUCOMTR-MCNC: 456 MG/DL — CRITICAL HIGH (ref 70–99)
GLUCOSE BLDC GLUCOMTR-MCNC: 471 MG/DL — CRITICAL HIGH (ref 70–99)
GLUCOSE BLDC GLUCOMTR-MCNC: 50 MG/DL — CRITICAL LOW (ref 70–99)
GLUCOSE BLDC GLUCOMTR-MCNC: 518 MG/DL — CRITICAL HIGH (ref 70–99)
GLUCOSE BLDC GLUCOMTR-MCNC: 55 MG/DL — LOW (ref 70–99)
GLUCOSE BLDC GLUCOMTR-MCNC: 57 MG/DL — LOW (ref 70–99)
GLUCOSE BLDC GLUCOMTR-MCNC: 57 MG/DL — LOW (ref 70–99)
GLUCOSE BLDC GLUCOMTR-MCNC: 60 MG/DL — LOW (ref 70–99)
GLUCOSE BLDC GLUCOMTR-MCNC: 61 MG/DL — LOW (ref 70–99)
GLUCOSE BLDC GLUCOMTR-MCNC: 61 MG/DL — LOW (ref 70–99)
GLUCOSE BLDC GLUCOMTR-MCNC: 62 MG/DL — LOW (ref 70–99)
GLUCOSE BLDC GLUCOMTR-MCNC: 69 MG/DL — LOW (ref 70–99)
GLUCOSE BLDC GLUCOMTR-MCNC: 70 MG/DL — SIGNIFICANT CHANGE UP (ref 70–99)
GLUCOSE BLDC GLUCOMTR-MCNC: 71 MG/DL — SIGNIFICANT CHANGE UP (ref 70–99)
GLUCOSE BLDC GLUCOMTR-MCNC: 73 MG/DL — SIGNIFICANT CHANGE UP (ref 70–99)
GLUCOSE BLDC GLUCOMTR-MCNC: 73 MG/DL — SIGNIFICANT CHANGE UP (ref 70–99)
GLUCOSE BLDC GLUCOMTR-MCNC: 76 MG/DL — SIGNIFICANT CHANGE UP (ref 70–99)
GLUCOSE BLDC GLUCOMTR-MCNC: 87 MG/DL — SIGNIFICANT CHANGE UP (ref 70–99)
GLUCOSE BLDC GLUCOMTR-MCNC: 89 MG/DL — SIGNIFICANT CHANGE UP (ref 70–99)
GLUCOSE BLDC GLUCOMTR-MCNC: 90 MG/DL — SIGNIFICANT CHANGE UP (ref 70–99)
GLUCOSE BLDC GLUCOMTR-MCNC: 91 MG/DL — SIGNIFICANT CHANGE UP (ref 70–99)
GLUCOSE BLDC GLUCOMTR-MCNC: 94 MG/DL — SIGNIFICANT CHANGE UP (ref 70–99)
GLUCOSE BLDC GLUCOMTR-MCNC: 95 MG/DL — SIGNIFICANT CHANGE UP (ref 70–99)
GLUCOSE BLDC GLUCOMTR-MCNC: 99 MG/DL — SIGNIFICANT CHANGE UP (ref 70–99)
GLUCOSE SERPL-MCNC: 108 MG/DL — HIGH (ref 70–99)
GLUCOSE SERPL-MCNC: 109 MG/DL — HIGH (ref 70–99)
GLUCOSE SERPL-MCNC: 110 MG/DL — HIGH (ref 70–99)
GLUCOSE SERPL-MCNC: 129 MG/DL — HIGH (ref 70–99)
GLUCOSE SERPL-MCNC: 131 MG/DL — HIGH (ref 70–99)
GLUCOSE SERPL-MCNC: 135 MG/DL — HIGH (ref 70–99)
GLUCOSE SERPL-MCNC: 156 MG/DL — HIGH (ref 70–99)
GLUCOSE SERPL-MCNC: 162 MG/DL — HIGH (ref 70–99)
GLUCOSE SERPL-MCNC: 164 MG/DL — HIGH (ref 70–99)
GLUCOSE SERPL-MCNC: 167 MG/DL — HIGH (ref 70–99)
GLUCOSE SERPL-MCNC: 172 MG/DL — HIGH (ref 70–99)
GLUCOSE SERPL-MCNC: 182 MG/DL — HIGH (ref 70–99)
GLUCOSE SERPL-MCNC: 207 MG/DL — HIGH (ref 70–99)
GLUCOSE SERPL-MCNC: 214 MG/DL — HIGH (ref 70–99)
GLUCOSE SERPL-MCNC: 235 MG/DL — HIGH (ref 70–99)
GLUCOSE SERPL-MCNC: 239 MG/DL — HIGH (ref 70–99)
GLUCOSE SERPL-MCNC: 246 MG/DL — HIGH (ref 70–99)
GLUCOSE SERPL-MCNC: 258 MG/DL — HIGH (ref 70–99)
GLUCOSE SERPL-MCNC: 280 MG/DL — HIGH (ref 70–99)
GLUCOSE SERPL-MCNC: 293 MG/DL — HIGH (ref 70–99)
GLUCOSE SERPL-MCNC: 296 MG/DL — HIGH (ref 70–99)
GLUCOSE SERPL-MCNC: 305 MG/DL — HIGH (ref 70–99)
GLUCOSE SERPL-MCNC: 314 MG/DL — HIGH (ref 70–99)
GLUCOSE SERPL-MCNC: 330 MG/DL — HIGH (ref 70–99)
GLUCOSE SERPL-MCNC: 334 MG/DL — HIGH (ref 70–99)
GLUCOSE SERPL-MCNC: 351 MG/DL — HIGH (ref 70–99)
GLUCOSE SERPL-MCNC: 389 MG/DL — HIGH (ref 70–99)
GLUCOSE SERPL-MCNC: 43 MG/DL — CRITICAL LOW (ref 70–99)
GLUCOSE SERPL-MCNC: 53 MG/DL — CRITICAL LOW (ref 70–99)
GLUCOSE SERPL-MCNC: 58 MG/DL — LOW (ref 70–99)
GLUCOSE SERPL-MCNC: 71 MG/DL — SIGNIFICANT CHANGE UP (ref 70–99)
GLUCOSE SERPL-MCNC: 75 MG/DL — SIGNIFICANT CHANGE UP (ref 70–99)
GLUCOSE SERPL-MCNC: 77 MG/DL — SIGNIFICANT CHANGE UP (ref 70–99)
GLUCOSE SERPL-MCNC: 90 MG/DL — SIGNIFICANT CHANGE UP (ref 70–99)
GLUCOSE SERPL-MCNC: 96 MG/DL — SIGNIFICANT CHANGE UP (ref 70–99)
GLUCOSE UR QL: NEGATIVE MG/DL — SIGNIFICANT CHANGE UP
GLUCOSE UR QL: NEGATIVE MG/DL — SIGNIFICANT CHANGE UP
GRAM STN FLD: ABNORMAL
HAV IGM SER-ACNC: SIGNIFICANT CHANGE UP
HBV CORE IGM SER-ACNC: SIGNIFICANT CHANGE UP
HBV SURFACE AG SER-ACNC: SIGNIFICANT CHANGE UP
HCO3 BLDA-SCNC: 13 MMOL/L — LOW (ref 21–28)
HCO3 BLDA-SCNC: 16 MMOL/L — LOW (ref 21–28)
HCO3 BLDV-SCNC: 15 MMOL/L — LOW (ref 22–29)
HCT VFR BLD CALC: 18.1 % — CRITICAL LOW (ref 39–50)
HCT VFR BLD CALC: 19.5 % — CRITICAL LOW (ref 39–50)
HCT VFR BLD CALC: 20.3 % — CRITICAL LOW (ref 39–50)
HCT VFR BLD CALC: 20.9 % — CRITICAL LOW (ref 39–50)
HCT VFR BLD CALC: 21.7 % — LOW (ref 39–50)
HCT VFR BLD CALC: 22 % — LOW (ref 39–50)
HCT VFR BLD CALC: 22.6 % — LOW (ref 39–50)
HCT VFR BLD CALC: 22.7 % — LOW (ref 39–50)
HCT VFR BLD CALC: 22.7 % — LOW (ref 39–50)
HCT VFR BLD CALC: 22.9 % — LOW (ref 39–50)
HCT VFR BLD CALC: 22.9 % — LOW (ref 39–50)
HCT VFR BLD CALC: 23.1 % — LOW (ref 39–50)
HCT VFR BLD CALC: 23.2 % — LOW (ref 39–50)
HCT VFR BLD CALC: 23.3 % — LOW (ref 39–50)
HCT VFR BLD CALC: 23.4 % — LOW (ref 39–50)
HCT VFR BLD CALC: 23.4 % — LOW (ref 39–50)
HCT VFR BLD CALC: 23.5 % — LOW (ref 39–50)
HCT VFR BLD CALC: 23.7 % — LOW (ref 39–50)
HCT VFR BLD CALC: 23.8 % — LOW (ref 39–50)
HCT VFR BLD CALC: 23.9 % — LOW (ref 39–50)
HCT VFR BLD CALC: 24 % — LOW (ref 39–50)
HCT VFR BLD CALC: 24 % — LOW (ref 39–50)
HCT VFR BLD CALC: 24.2 % — LOW (ref 39–50)
HCT VFR BLD CALC: 24.4 % — LOW (ref 39–50)
HCT VFR BLD CALC: 24.4 % — LOW (ref 39–50)
HCT VFR BLD CALC: 24.5 % — LOW (ref 39–50)
HCT VFR BLD CALC: 24.7 % — LOW (ref 39–50)
HCT VFR BLD CALC: 24.7 % — LOW (ref 39–50)
HCT VFR BLD CALC: 24.9 % — LOW (ref 39–50)
HCT VFR BLD CALC: 25.2 % — LOW (ref 39–50)
HCT VFR BLD CALC: 25.5 % — LOW (ref 39–50)
HCT VFR BLD CALC: 25.6 % — LOW (ref 39–50)
HCT VFR BLD CALC: 26.4 % — LOW (ref 39–50)
HCT VFR BLD CALC: 26.6 % — LOW (ref 39–50)
HCT VFR BLD CALC: 26.7 % — LOW (ref 39–50)
HCT VFR BLD CALC: 26.8 % — LOW (ref 39–50)
HCT VFR BLD CALC: 26.8 % — LOW (ref 39–50)
HCT VFR BLD CALC: 27 % — SIGNIFICANT CHANGE UP (ref 39–50)
HCT VFR BLD CALC: 28.6 % — LOW (ref 39–50)
HCT VFR BLD CALC: 29 % — LOW (ref 39–50)
HCT VFR BLD CALC: 30.1 % — LOW (ref 39–50)
HCT VFR BLD CALC: 30.5 % — LOW (ref 39–50)
HCT VFR BLD CALC: 32.8 % — LOW (ref 39–50)
HCT VFR BLDA CALC: 35 % — LOW (ref 39–51)
HCV AB S/CO SERPL IA: 0.2 S/CO — SIGNIFICANT CHANGE UP (ref 0–0.79)
HCV AB SERPL-IMP: SIGNIFICANT CHANGE UP
HGB BLD CALC-MCNC: 11.8 G/DL — LOW (ref 12.6–17.4)
HGB BLD-MCNC: 10.3 G/DL — LOW (ref 13–17)
HGB BLD-MCNC: 6 G/DL — CRITICAL LOW (ref 13–17)
HGB BLD-MCNC: 6.3 G/DL — CRITICAL LOW (ref 13–17)
HGB BLD-MCNC: 6.6 G/DL — CRITICAL LOW (ref 13–17)
HGB BLD-MCNC: 6.8 G/DL — CRITICAL LOW (ref 13–17)
HGB BLD-MCNC: 6.8 G/DL — CRITICAL LOW (ref 13–17)
HGB BLD-MCNC: 6.9 G/DL — CRITICAL LOW (ref 13–17)
HGB BLD-MCNC: 7 G/DL — CRITICAL LOW (ref 13–17)
HGB BLD-MCNC: 7.1 G/DL — LOW (ref 13–17)
HGB BLD-MCNC: 7.2 G/DL — LOW (ref 13–17)
HGB BLD-MCNC: 7.3 G/DL — LOW (ref 13–17)
HGB BLD-MCNC: 7.3 G/DL — LOW (ref 13–17)
HGB BLD-MCNC: 7.4 G/DL — LOW (ref 13–17)
HGB BLD-MCNC: 7.5 G/DL — LOW (ref 13–17)
HGB BLD-MCNC: 7.5 G/DL — LOW (ref 13–17)
HGB BLD-MCNC: 7.6 G/DL — LOW (ref 13–17)
HGB BLD-MCNC: 7.6 G/DL — LOW (ref 13–17)
HGB BLD-MCNC: 7.7 G/DL — LOW (ref 13–17)
HGB BLD-MCNC: 7.8 G/DL — LOW (ref 13–17)
HGB BLD-MCNC: 7.8 G/DL — LOW (ref 13–17)
HGB BLD-MCNC: 8 G/DL — LOW (ref 13–17)
HGB BLD-MCNC: 8.1 G/DL — SIGNIFICANT CHANGE UP (ref 13–17)
HGB BLD-MCNC: 8.3 G/DL — LOW (ref 13–17)
HGB BLD-MCNC: 8.4 G/DL — LOW (ref 13–17)
HGB BLD-MCNC: 8.4 G/DL — LOW (ref 13–17)
HGB BLD-MCNC: 8.5 G/DL — LOW (ref 13–17)
HGB BLD-MCNC: 8.6 G/DL — LOW (ref 13–17)
HGB BLD-MCNC: 8.6 G/DL — LOW (ref 13–17)
HGB BLD-MCNC: 8.7 G/DL — LOW (ref 13–17)
HGB BLD-MCNC: 8.9 G/DL — LOW (ref 13–17)
HGB BLD-MCNC: 9.3 G/DL — LOW (ref 13–17)
HGB BLD-MCNC: 9.5 G/DL — LOW (ref 13–17)
HGB BLD-MCNC: 9.6 G/DL — LOW (ref 13–17)
HOROWITZ INDEX BLDA+IHG-RTO: 40 — SIGNIFICANT CHANGE UP
HOROWITZ INDEX BLDA+IHG-RTO: 50 — SIGNIFICANT CHANGE UP
IMM GRANULOCYTES # BLD AUTO: 0.02 K/UL — SIGNIFICANT CHANGE UP (ref 0–0.07)
IMM GRANULOCYTES # BLD AUTO: 0.03 K/UL — SIGNIFICANT CHANGE UP (ref 0–0.07)
IMM GRANULOCYTES # BLD AUTO: 0.04 K/UL — SIGNIFICANT CHANGE UP (ref 0–0.07)
IMM GRANULOCYTES # BLD AUTO: 0.06 K/UL — SIGNIFICANT CHANGE UP (ref 0–0.07)
IMM GRANULOCYTES # BLD AUTO: 0.07 K/UL — SIGNIFICANT CHANGE UP (ref 0–0.07)
IMM GRANULOCYTES # BLD AUTO: 0.08 K/UL — HIGH (ref 0–0.07)
IMM GRANULOCYTES # BLD AUTO: 0.09 K/UL — HIGH (ref 0–0.07)
IMM GRANULOCYTES # BLD AUTO: 0.1 K/UL — HIGH (ref 0–0.07)
IMM GRANULOCYTES # BLD AUTO: 0.1 K/UL — HIGH (ref 0–0.07)
IMM GRANULOCYTES # BLD AUTO: 0.12 K/UL — HIGH (ref 0–0.07)
IMM GRANULOCYTES # BLD AUTO: 0.12 K/UL — HIGH (ref 0–0.07)
IMM GRANULOCYTES # BLD AUTO: 0.13 K/UL — HIGH (ref 0–0.07)
IMM GRANULOCYTES # BLD AUTO: 0.14 K/UL — HIGH (ref 0–0.07)
IMM GRANULOCYTES NFR BLD AUTO: 0.5 % — SIGNIFICANT CHANGE UP (ref 0–0.9)
IMM GRANULOCYTES NFR BLD AUTO: 0.6 % — SIGNIFICANT CHANGE UP (ref 0–0.9)
IMM GRANULOCYTES NFR BLD AUTO: 0.7 % — SIGNIFICANT CHANGE UP (ref 0–0.9)
IMM GRANULOCYTES NFR BLD AUTO: 0.8 % — SIGNIFICANT CHANGE UP (ref 0–0.9)
IMM GRANULOCYTES NFR BLD AUTO: 0.8 % — SIGNIFICANT CHANGE UP (ref 0–0.9)
IMM GRANULOCYTES NFR BLD AUTO: 0.9 % — SIGNIFICANT CHANGE UP (ref 0–0.9)
IMM GRANULOCYTES NFR BLD AUTO: 1 % — HIGH (ref 0–0.9)
IMM GRANULOCYTES NFR BLD AUTO: 1 % — SIGNIFICANT CHANGE UP (ref 0–0.9)
IMM GRANULOCYTES NFR BLD AUTO: 1.1 % — HIGH (ref 0–0.9)
IMM GRANULOCYTES NFR BLD AUTO: 1.2 % — HIGH (ref 0–0.9)
IMM GRANULOCYTES NFR BLD AUTO: 1.2 % — HIGH (ref 0–0.9)
IMM GRANULOCYTES NFR BLD AUTO: 1.3 % — HIGH (ref 0–0.9)
IMM GRANULOCYTES NFR BLD AUTO: 1.4 % — HIGH (ref 0–0.9)
IMM GRANULOCYTES NFR BLD AUTO: 1.4 % — HIGH (ref 0–0.9)
IMM GRANULOCYTES NFR BLD AUTO: 1.5 % — HIGH (ref 0–0.9)
IMMATURE PLATELET FRACTION #: 0.1 K/UL — LOW (ref 3.9–12.5)
IMMATURE PLATELET FRACTION #: 0.4 K/UL — LOW (ref 3.9–12.5)
IMMATURE PLATELET FRACTION #: 0.5 K/UL — LOW (ref 3.9–12.5)
IMMATURE PLATELET FRACTION #: 0.6 K/UL — LOW (ref 3.9–12.5)
IMMATURE PLATELET FRACTION #: 0.7 K/UL — LOW (ref 3.9–12.5)
IMMATURE PLATELET FRACTION #: 0.8 K/UL — LOW (ref 3.9–12.5)
IMMATURE PLATELET FRACTION #: 0.9 K/UL — LOW (ref 3.9–12.5)
IMMATURE PLATELET FRACTION #: 1 K/UL — LOW (ref 3.9–12.5)
IMMATURE PLATELET FRACTION #: 1.1 K/UL — LOW (ref 3.9–12.5)
IMMATURE PLATELET FRACTION #: 1.2 K/UL — LOW (ref 3.9–12.5)
IMMATURE PLATELET FRACTION #: 1.3 K/UL — LOW (ref 3.9–12.5)
IMMATURE PLATELET FRACTION #: 1.4 K/UL — LOW (ref 3.9–12.5)
IMMATURE PLATELET FRACTION #: 1.5 K/UL — LOW (ref 3.9–12.5)
IMMATURE PLATELET FRACTION #: 1.6 K/UL — LOW (ref 3.9–12.5)
IMMATURE PLATELET FRACTION #: 1.6 K/UL — LOW (ref 3.9–12.5)
IMMATURE PLATELET FRACTION #: 1.7 K/UL — LOW (ref 3.9–12.5)
IMMATURE PLATELET FRACTION #: SIGNIFICANT CHANGE UP (ref 3.9–12.5)
IMMATURE PLATELET FRACTION %: 0.7 % — LOW (ref 1.6–7.1)
IMMATURE PLATELET FRACTION %: 0.7 % — LOW (ref 1.6–7.1)
IMMATURE PLATELET FRACTION %: 0.8 % — LOW (ref 1.6–7.1)
IMMATURE PLATELET FRACTION %: 0.8 % — LOW (ref 1.6–7.1)
IMMATURE PLATELET FRACTION %: 0.9 % — LOW (ref 1.6–7.1)
IMMATURE PLATELET FRACTION %: 1 % — LOW (ref 1.6–7.1)
IMMATURE PLATELET FRACTION %: 1 % — LOW (ref 1.6–7.1)
IMMATURE PLATELET FRACTION %: 1.1 % — LOW (ref 1.6–7.1)
IMMATURE PLATELET FRACTION %: 1.3 % — LOW (ref 1.6–7.1)
IMMATURE PLATELET FRACTION %: 1.4 % — LOW (ref 1.6–7.1)
IMMATURE PLATELET FRACTION %: 1.5 % — LOW (ref 1.6–7.1)
IMMATURE PLATELET FRACTION %: 1.6 % — SIGNIFICANT CHANGE UP (ref 1.6–7.1)
IMMATURE PLATELET FRACTION %: 1.6 % — SIGNIFICANT CHANGE UP (ref 1.6–7.1)
IMMATURE PLATELET FRACTION %: 1.9 % — SIGNIFICANT CHANGE UP (ref 1.6–7.1)
IMMATURE PLATELET FRACTION %: 2 % — SIGNIFICANT CHANGE UP (ref 1.6–7.1)
IMMATURE PLATELET FRACTION %: 2.1 % — SIGNIFICANT CHANGE UP (ref 1.6–7.1)
IMMATURE PLATELET FRACTION %: 2.3 % — SIGNIFICANT CHANGE UP (ref 1.6–7.1)
IMMATURE PLATELET FRACTION %: 2.4 % — SIGNIFICANT CHANGE UP (ref 1.6–7.1)
IMMATURE PLATELET FRACTION %: 2.5 % — SIGNIFICANT CHANGE UP (ref 1.6–7.1)
IMMATURE PLATELET FRACTION %: 2.6 % — SIGNIFICANT CHANGE UP (ref 1.6–7.1)
IMMATURE PLATELET FRACTION %: 2.7 % — SIGNIFICANT CHANGE UP (ref 1.6–7.1)
IMMATURE PLATELET FRACTION %: 2.8 % — SIGNIFICANT CHANGE UP (ref 1.6–7.1)
IMMATURE PLATELET FRACTION %: 2.9 % — SIGNIFICANT CHANGE UP (ref 1.6–7.1)
IMMATURE PLATELET FRACTION %: 3 % — SIGNIFICANT CHANGE UP (ref 1.6–7.1)
IMMATURE PLATELET FRACTION %: 3.3 % — SIGNIFICANT CHANGE UP (ref 1.6–7.1)
IMMATURE PLATELET FRACTION %: SIGNIFICANT CHANGE UP (ref 1.6–7.1)
INR BLD: 1.38 RATIO — HIGH (ref 0.65–1.3)
INR BLD: 1.44 RATIO — HIGH (ref 0.65–1.3)
INR BLD: 1.45 RATIO — HIGH (ref 0.65–1.3)
INR BLD: 1.53 RATIO — HIGH (ref 0.65–1.3)
INR BLD: 1.55 RATIO — HIGH (ref 0.65–1.3)
INR BLD: 1.59 RATIO — HIGH (ref 0.65–1.3)
INR BLD: 1.6 RATIO — HIGH (ref 0.65–1.3)
INR BLD: 1.65 RATIO — HIGH (ref 0.65–1.3)
INR BLD: 1.68 RATIO — HIGH (ref 0.65–1.3)
INR BLD: 1.69 RATIO — HIGH (ref 0.65–1.3)
INR BLD: 1.75 RATIO — HIGH (ref 0.65–1.3)
INR BLD: 1.76 RATIO — HIGH (ref 0.65–1.3)
INR BLD: 1.92 RATIO — HIGH (ref 0.65–1.3)
INR BLD: 2.09 RATIO — HIGH (ref 0.65–1.3)
INR BLD: 2.11 RATIO — HIGH (ref 0.65–1.3)
INR BLD: 2.12 RATIO — HIGH (ref 0.65–1.3)
INR BLD: 2.17 RATIO — HIGH (ref 0.65–1.3)
INR BLD: 2.72 RATIO — HIGH (ref 0.65–1.3)
INR BLD: 3.22 RATIO — HIGH (ref 0.65–1.3)
KETONES UR QL: ABNORMAL MG/DL
KETONES UR QL: NEGATIVE MG/DL — SIGNIFICANT CHANGE UP
LACTATE BLDV-MCNC: 4.4 MMOL/L — CRITICAL HIGH (ref 0.5–2)
LACTATE SERPL-SCNC: 4.4 MMOL/L — CRITICAL HIGH (ref 0.7–2)
LACTATE SERPL-SCNC: 4.7 MMOL/L — CRITICAL HIGH (ref 0.7–2)
LACTATE SERPL-SCNC: 5.1 MMOL/L — CRITICAL HIGH (ref 0.7–2)
LDH SERPL L TO P-CCNC: 419 U/L — HIGH (ref 50–242)
LEUKOCYTE ESTERASE UR-ACNC: ABNORMAL
LEUKOCYTE ESTERASE UR-ACNC: ABNORMAL
LEVETIRACETAM SERPL-MCNC: 15.4 UG/ML — SIGNIFICANT CHANGE UP (ref 10–40)
LYMPHOCYTES # BLD AUTO: 0.29 K/UL — LOW (ref 1–3.3)
LYMPHOCYTES # BLD AUTO: 0.38 K/UL — LOW (ref 1–3.3)
LYMPHOCYTES # BLD AUTO: 0.4 K/UL — LOW (ref 1–3.3)
LYMPHOCYTES # BLD AUTO: 0.49 K/UL — LOW (ref 1–3.3)
LYMPHOCYTES # BLD AUTO: 0.52 K/UL — LOW (ref 1–3.3)
LYMPHOCYTES # BLD AUTO: 0.53 K/UL — LOW (ref 1–3.3)
LYMPHOCYTES # BLD AUTO: 0.54 K/UL — LOW (ref 1–3.3)
LYMPHOCYTES # BLD AUTO: 0.61 K/UL — LOW (ref 1–3.3)
LYMPHOCYTES # BLD AUTO: 0.61 K/UL — LOW (ref 1–3.3)
LYMPHOCYTES # BLD AUTO: 0.63 K/UL — LOW (ref 1–3.3)
LYMPHOCYTES # BLD AUTO: 0.7 K/UL — LOW (ref 1–3.3)
LYMPHOCYTES # BLD AUTO: 0.71 K/UL — LOW (ref 1–3.3)
LYMPHOCYTES # BLD AUTO: 0.73 K/UL — LOW (ref 1–3.3)
LYMPHOCYTES # BLD AUTO: 0.75 K/UL — LOW (ref 1–3.3)
LYMPHOCYTES # BLD AUTO: 0.77 K/UL — LOW (ref 1–3.3)
LYMPHOCYTES # BLD AUTO: 0.79 K/UL — LOW (ref 1–3.3)
LYMPHOCYTES # BLD AUTO: 0.81 K/UL — LOW (ref 1–3.3)
LYMPHOCYTES # BLD AUTO: 0.86 K/UL — SIGNIFICANT CHANGE UP (ref 1–3.3)
LYMPHOCYTES # BLD AUTO: 0.89 K/UL — LOW (ref 1–3.3)
LYMPHOCYTES # BLD AUTO: 0.89 K/UL — LOW (ref 1–3.3)
LYMPHOCYTES # BLD AUTO: 0.92 K/UL — LOW (ref 1–3.3)
LYMPHOCYTES # BLD AUTO: 0.95 K/UL — LOW (ref 1–3.3)
LYMPHOCYTES # BLD AUTO: 1.02 K/UL — SIGNIFICANT CHANGE UP (ref 1–3.3)
LYMPHOCYTES # BLD AUTO: 1.09 K/UL — SIGNIFICANT CHANGE UP (ref 1–3.3)
LYMPHOCYTES # BLD AUTO: 1.09 K/UL — SIGNIFICANT CHANGE UP (ref 1–3.3)
LYMPHOCYTES # BLD AUTO: 1.1 K/UL — SIGNIFICANT CHANGE UP (ref 1–3.3)
LYMPHOCYTES # BLD AUTO: 1.11 K/UL — SIGNIFICANT CHANGE UP (ref 1–3.3)
LYMPHOCYTES # BLD AUTO: 1.11 K/UL — SIGNIFICANT CHANGE UP (ref 1–3.3)
LYMPHOCYTES # BLD AUTO: 1.13 K/UL — SIGNIFICANT CHANGE UP (ref 1–3.3)
LYMPHOCYTES # BLD AUTO: 1.14 K/UL — SIGNIFICANT CHANGE UP (ref 1–3.3)
LYMPHOCYTES # BLD AUTO: 1.2 K/UL — SIGNIFICANT CHANGE UP (ref 1–3.3)
LYMPHOCYTES # BLD AUTO: 1.25 K/UL — SIGNIFICANT CHANGE UP (ref 1–3.3)
LYMPHOCYTES # BLD AUTO: 1.34 K/UL — SIGNIFICANT CHANGE UP (ref 1–3.3)
LYMPHOCYTES # BLD AUTO: 1.41 K/UL — SIGNIFICANT CHANGE UP (ref 1–3.3)
LYMPHOCYTES # BLD AUTO: 1.5 K/UL — SIGNIFICANT CHANGE UP (ref 1–3.3)
LYMPHOCYTES # BLD AUTO: 1.72 K/UL — SIGNIFICANT CHANGE UP (ref 1–3.3)
LYMPHOCYTES # BLD AUTO: 2.08 K/UL — SIGNIFICANT CHANGE UP (ref 1–3.3)
LYMPHOCYTES # BLD AUTO: 2.88 K/UL — SIGNIFICANT CHANGE UP (ref 1–3.3)
LYMPHOCYTES NFR BLD AUTO: 10 % — LOW (ref 13–44)
LYMPHOCYTES NFR BLD AUTO: 10.1 % — LOW (ref 13–44)
LYMPHOCYTES NFR BLD AUTO: 10.2 % — LOW (ref 13–44)
LYMPHOCYTES NFR BLD AUTO: 10.5 % — LOW (ref 13–44)
LYMPHOCYTES NFR BLD AUTO: 14.1 % — SIGNIFICANT CHANGE UP (ref 13–44)
LYMPHOCYTES NFR BLD AUTO: 14.2 % — SIGNIFICANT CHANGE UP (ref 13–44)
LYMPHOCYTES NFR BLD AUTO: 15.9 % — SIGNIFICANT CHANGE UP (ref 13–44)
LYMPHOCYTES NFR BLD AUTO: 16.3 % — SIGNIFICANT CHANGE UP (ref 13–44)
LYMPHOCYTES NFR BLD AUTO: 18.1 % — SIGNIFICANT CHANGE UP (ref 13–44)
LYMPHOCYTES NFR BLD AUTO: 18.5 % — SIGNIFICANT CHANGE UP (ref 13–44)
LYMPHOCYTES NFR BLD AUTO: 18.5 % — SIGNIFICANT CHANGE UP (ref 13–44)
LYMPHOCYTES NFR BLD AUTO: 19.2 % — SIGNIFICANT CHANGE UP (ref 13–44)
LYMPHOCYTES NFR BLD AUTO: 19.6 % — SIGNIFICANT CHANGE UP (ref 13–44)
LYMPHOCYTES NFR BLD AUTO: 19.7 % — SIGNIFICANT CHANGE UP (ref 13–44)
LYMPHOCYTES NFR BLD AUTO: 19.8 % — SIGNIFICANT CHANGE UP (ref 13–44)
LYMPHOCYTES NFR BLD AUTO: 20.3 % — SIGNIFICANT CHANGE UP (ref 13–44)
LYMPHOCYTES NFR BLD AUTO: 21 % — SIGNIFICANT CHANGE UP (ref 13–44)
LYMPHOCYTES NFR BLD AUTO: 21.1 % — SIGNIFICANT CHANGE UP (ref 13–44)
LYMPHOCYTES NFR BLD AUTO: 21.1 % — SIGNIFICANT CHANGE UP (ref 13–44)
LYMPHOCYTES NFR BLD AUTO: 22.8 % — SIGNIFICANT CHANGE UP (ref 13–44)
LYMPHOCYTES NFR BLD AUTO: 24.2 % — SIGNIFICANT CHANGE UP (ref 13–44)
LYMPHOCYTES NFR BLD AUTO: 24.3 % — SIGNIFICANT CHANGE UP (ref 13–44)
LYMPHOCYTES NFR BLD AUTO: 25.4 % — SIGNIFICANT CHANGE UP (ref 13–44)
LYMPHOCYTES NFR BLD AUTO: 25.4 % — SIGNIFICANT CHANGE UP (ref 13–44)
LYMPHOCYTES NFR BLD AUTO: 28.2 % — SIGNIFICANT CHANGE UP (ref 13–44)
LYMPHOCYTES NFR BLD AUTO: 3.3 % — LOW (ref 13–44)
LYMPHOCYTES NFR BLD AUTO: 33.6 % — SIGNIFICANT CHANGE UP (ref 13–44)
LYMPHOCYTES NFR BLD AUTO: 4.1 % — LOW (ref 13–44)
LYMPHOCYTES NFR BLD AUTO: 5.3 % — LOW (ref 13–44)
LYMPHOCYTES NFR BLD AUTO: 5.6 % — LOW (ref 13–44)
LYMPHOCYTES NFR BLD AUTO: 6.5 % — LOW (ref 13–44)
LYMPHOCYTES NFR BLD AUTO: 6.6 % — LOW (ref 13–44)
LYMPHOCYTES NFR BLD AUTO: 6.7 % — LOW (ref 13–44)
LYMPHOCYTES NFR BLD AUTO: 7.8 % — LOW (ref 13–44)
LYMPHOCYTES NFR BLD AUTO: 8 % — LOW (ref 13–44)
LYMPHOCYTES NFR BLD AUTO: 8.4 % — LOW (ref 13–44)
LYMPHOCYTES NFR BLD AUTO: 8.9 % — LOW (ref 13–44)
LYMPHOCYTES NFR BLD AUTO: 9.3 % — LOW (ref 13–44)
MAGNESIUM SERPL-MCNC: 1.3 MG/DL — LOW (ref 1.8–2.4)
MAGNESIUM SERPL-MCNC: 1.3 MG/DL — LOW (ref 1.8–2.4)
MAGNESIUM SERPL-MCNC: 1.7 MG/DL — LOW (ref 1.8–2.4)
MAGNESIUM SERPL-MCNC: 1.8 MG/DL — SIGNIFICANT CHANGE UP (ref 1.8–2.4)
MAGNESIUM SERPL-MCNC: 1.8 MG/DL — SIGNIFICANT CHANGE UP (ref 1.8–2.4)
MAGNESIUM SERPL-MCNC: 1.9 MG/DL — SIGNIFICANT CHANGE UP (ref 1.8–2.4)
MAGNESIUM SERPL-MCNC: 2 MG/DL — SIGNIFICANT CHANGE UP (ref 1.8–2.4)
MAGNESIUM SERPL-MCNC: 2.1 MG/DL — SIGNIFICANT CHANGE UP (ref 1.8–2.4)
MAGNESIUM SERPL-MCNC: 2.1 MG/DL — SIGNIFICANT CHANGE UP (ref 1.8–2.4)
MAGNESIUM SERPL-MCNC: 2.2 MG/DL — SIGNIFICANT CHANGE UP (ref 1.8–2.4)
MAGNESIUM SERPL-MCNC: 2.2 MG/DL — SIGNIFICANT CHANGE UP (ref 1.8–2.4)
MCHC RBC-ENTMCNC: 29 PG — SIGNIFICANT CHANGE UP (ref 27–34)
MCHC RBC-ENTMCNC: 29.1 PG — SIGNIFICANT CHANGE UP (ref 27–34)
MCHC RBC-ENTMCNC: 29.3 G/DL — LOW (ref 32–36)
MCHC RBC-ENTMCNC: 29.3 PG — SIGNIFICANT CHANGE UP (ref 27–34)
MCHC RBC-ENTMCNC: 29.7 G/DL — LOW (ref 32–36)
MCHC RBC-ENTMCNC: 29.7 PG — SIGNIFICANT CHANGE UP (ref 27–34)
MCHC RBC-ENTMCNC: 29.9 G/DL — LOW (ref 32–36)
MCHC RBC-ENTMCNC: 30 G/DL — LOW (ref 32–36)
MCHC RBC-ENTMCNC: 30 G/DL — LOW (ref 32–36)
MCHC RBC-ENTMCNC: 30 G/DL — SIGNIFICANT CHANGE UP (ref 32–36)
MCHC RBC-ENTMCNC: 30 PG — SIGNIFICANT CHANGE UP (ref 27–34)
MCHC RBC-ENTMCNC: 30 PG — SIGNIFICANT CHANGE UP (ref 27–34)
MCHC RBC-ENTMCNC: 30.1 G/DL — LOW (ref 32–36)
MCHC RBC-ENTMCNC: 30.3 PG — SIGNIFICANT CHANGE UP (ref 27–34)
MCHC RBC-ENTMCNC: 30.4 G/DL — LOW (ref 32–36)
MCHC RBC-ENTMCNC: 30.5 PG — SIGNIFICANT CHANGE UP (ref 27–34)
MCHC RBC-ENTMCNC: 30.5 PG — SIGNIFICANT CHANGE UP (ref 27–34)
MCHC RBC-ENTMCNC: 30.6 G/DL — LOW (ref 32–36)
MCHC RBC-ENTMCNC: 30.6 G/DL — LOW (ref 32–36)
MCHC RBC-ENTMCNC: 30.6 PG — SIGNIFICANT CHANGE UP (ref 27–34)
MCHC RBC-ENTMCNC: 30.7 G/DL — LOW (ref 32–36)
MCHC RBC-ENTMCNC: 30.7 PG — SIGNIFICANT CHANGE UP (ref 27–34)
MCHC RBC-ENTMCNC: 30.8 G/DL — LOW (ref 32–36)
MCHC RBC-ENTMCNC: 30.8 PG — SIGNIFICANT CHANGE UP (ref 27–34)
MCHC RBC-ENTMCNC: 30.9 G/DL — LOW (ref 32–36)
MCHC RBC-ENTMCNC: 31 PG — SIGNIFICANT CHANGE UP (ref 27–34)
MCHC RBC-ENTMCNC: 31 PG — SIGNIFICANT CHANGE UP (ref 27–34)
MCHC RBC-ENTMCNC: 31.1 G/DL — LOW (ref 32–36)
MCHC RBC-ENTMCNC: 31.1 PG — SIGNIFICANT CHANGE UP (ref 27–34)
MCHC RBC-ENTMCNC: 31.1 PG — SIGNIFICANT CHANGE UP (ref 27–34)
MCHC RBC-ENTMCNC: 31.2 G/DL — LOW (ref 32–36)
MCHC RBC-ENTMCNC: 31.2 PG — SIGNIFICANT CHANGE UP (ref 27–34)
MCHC RBC-ENTMCNC: 31.3 G/DL — LOW (ref 32–36)
MCHC RBC-ENTMCNC: 31.3 PG — SIGNIFICANT CHANGE UP (ref 27–34)
MCHC RBC-ENTMCNC: 31.4 G/DL — LOW (ref 32–36)
MCHC RBC-ENTMCNC: 31.4 G/DL — LOW (ref 32–36)
MCHC RBC-ENTMCNC: 31.4 PG — SIGNIFICANT CHANGE UP (ref 27–34)
MCHC RBC-ENTMCNC: 31.6 PG — SIGNIFICANT CHANGE UP (ref 27–34)
MCHC RBC-ENTMCNC: 31.7 G/DL — LOW (ref 32–36)
MCHC RBC-ENTMCNC: 31.7 PG — SIGNIFICANT CHANGE UP (ref 27–34)
MCHC RBC-ENTMCNC: 31.8 G/DL — LOW (ref 32–36)
MCHC RBC-ENTMCNC: 31.9 G/DL — LOW (ref 32–36)
MCHC RBC-ENTMCNC: 31.9 PG — SIGNIFICANT CHANGE UP (ref 27–34)
MCHC RBC-ENTMCNC: 31.9 PG — SIGNIFICANT CHANGE UP (ref 27–34)
MCHC RBC-ENTMCNC: 32 PG — SIGNIFICANT CHANGE UP (ref 27–34)
MCHC RBC-ENTMCNC: 32 PG — SIGNIFICANT CHANGE UP (ref 27–34)
MCHC RBC-ENTMCNC: 32.1 G/DL — SIGNIFICANT CHANGE UP (ref 32–36)
MCHC RBC-ENTMCNC: 32.1 PG — SIGNIFICANT CHANGE UP (ref 27–34)
MCHC RBC-ENTMCNC: 32.1 PG — SIGNIFICANT CHANGE UP (ref 27–34)
MCHC RBC-ENTMCNC: 32.2 G/DL — SIGNIFICANT CHANGE UP (ref 32–36)
MCHC RBC-ENTMCNC: 32.2 G/DL — SIGNIFICANT CHANGE UP (ref 32–36)
MCHC RBC-ENTMCNC: 32.2 PG — SIGNIFICANT CHANGE UP (ref 27–34)
MCHC RBC-ENTMCNC: 32.2 PG — SIGNIFICANT CHANGE UP (ref 27–34)
MCHC RBC-ENTMCNC: 32.3 G/DL — SIGNIFICANT CHANGE UP (ref 32–36)
MCHC RBC-ENTMCNC: 32.4 G/DL — SIGNIFICANT CHANGE UP (ref 32–36)
MCHC RBC-ENTMCNC: 32.4 PG — SIGNIFICANT CHANGE UP (ref 27–34)
MCHC RBC-ENTMCNC: 32.5 G/DL — SIGNIFICANT CHANGE UP (ref 32–36)
MCHC RBC-ENTMCNC: 32.5 PG — SIGNIFICANT CHANGE UP (ref 27–34)
MCHC RBC-ENTMCNC: 32.5 PG — SIGNIFICANT CHANGE UP (ref 27–34)
MCHC RBC-ENTMCNC: 32.7 G/DL — SIGNIFICANT CHANGE UP (ref 32–36)
MCHC RBC-ENTMCNC: 32.8 G/DL — SIGNIFICANT CHANGE UP (ref 32–36)
MCHC RBC-ENTMCNC: 33.1 G/DL — SIGNIFICANT CHANGE UP (ref 32–36)
MCHC RBC-ENTMCNC: 33.3 G/DL — SIGNIFICANT CHANGE UP (ref 32–36)
MCHC RBC-ENTMCNC: 33.3 G/DL — SIGNIFICANT CHANGE UP (ref 32–36)
MCHC RBC-ENTMCNC: 33.5 G/DL — SIGNIFICANT CHANGE UP (ref 32–36)
MCHC RBC-ENTMCNC: 33.6 G/DL — SIGNIFICANT CHANGE UP (ref 32–36)
MCHC RBC-ENTMCNC: 33.7 G/DL — SIGNIFICANT CHANGE UP (ref 32–36)
MCHC RBC-ENTMCNC: 33.8 G/DL — SIGNIFICANT CHANGE UP (ref 32–36)
MCHC RBC-ENTMCNC: 34.6 G/DL — SIGNIFICANT CHANGE UP (ref 32–36)
MCHC RBC-ENTMCNC: 34.9 G/DL — SIGNIFICANT CHANGE UP (ref 32–36)
MCV RBC AUTO: 100 FL — SIGNIFICANT CHANGE UP (ref 80–100)
MCV RBC AUTO: 100.4 FL — HIGH (ref 80–100)
MCV RBC AUTO: 100.4 FL — HIGH (ref 80–100)
MCV RBC AUTO: 100.8 FL — HIGH (ref 80–100)
MCV RBC AUTO: 100.9 FL — HIGH (ref 80–100)
MCV RBC AUTO: 101.3 FL — HIGH (ref 80–100)
MCV RBC AUTO: 101.7 FL — HIGH (ref 80–100)
MCV RBC AUTO: 101.8 FL — HIGH (ref 80–100)
MCV RBC AUTO: 104.1 FL — HIGH (ref 80–100)
MCV RBC AUTO: 104.7 FL — HIGH (ref 80–100)
MCV RBC AUTO: 105.2 FL — HIGH (ref 80–100)
MCV RBC AUTO: 105.6 FL — HIGH (ref 80–100)
MCV RBC AUTO: 106.9 FL — HIGH (ref 80–100)
MCV RBC AUTO: 107.1 FL — SIGNIFICANT CHANGE UP (ref 80–100)
MCV RBC AUTO: 107.4 FL — HIGH (ref 80–100)
MCV RBC AUTO: 110.8 FL — HIGH (ref 80–100)
MCV RBC AUTO: 86.5 FL — SIGNIFICANT CHANGE UP (ref 80–100)
MCV RBC AUTO: 87.3 FL — SIGNIFICANT CHANGE UP (ref 80–100)
MCV RBC AUTO: 87.5 FL — SIGNIFICANT CHANGE UP (ref 80–100)
MCV RBC AUTO: 87.5 FL — SIGNIFICANT CHANGE UP (ref 80–100)
MCV RBC AUTO: 87.9 FL — SIGNIFICANT CHANGE UP (ref 80–100)
MCV RBC AUTO: 88.5 FL — SIGNIFICANT CHANGE UP (ref 80–100)
MCV RBC AUTO: 88.8 FL — SIGNIFICANT CHANGE UP (ref 80–100)
MCV RBC AUTO: 89.1 FL — SIGNIFICANT CHANGE UP (ref 80–100)
MCV RBC AUTO: 90.1 FL — SIGNIFICANT CHANGE UP (ref 80–100)
MCV RBC AUTO: 92.1 FL — SIGNIFICANT CHANGE UP (ref 80–100)
MCV RBC AUTO: 92.3 FL — SIGNIFICANT CHANGE UP (ref 80–100)
MCV RBC AUTO: 93.3 FL — SIGNIFICANT CHANGE UP (ref 80–100)
MCV RBC AUTO: 93.6 FL — SIGNIFICANT CHANGE UP (ref 80–100)
MCV RBC AUTO: 95.1 FL — SIGNIFICANT CHANGE UP (ref 80–100)
MCV RBC AUTO: 96.1 FL — SIGNIFICANT CHANGE UP (ref 80–100)
MCV RBC AUTO: 96.3 FL — SIGNIFICANT CHANGE UP (ref 80–100)
MCV RBC AUTO: 97.1 FL — SIGNIFICANT CHANGE UP (ref 80–100)
MCV RBC AUTO: 98.4 FL — SIGNIFICANT CHANGE UP (ref 80–100)
MCV RBC AUTO: 98.4 FL — SIGNIFICANT CHANGE UP (ref 80–100)
MCV RBC AUTO: 98.7 FL — SIGNIFICANT CHANGE UP (ref 80–100)
MCV RBC AUTO: 99 FL — SIGNIFICANT CHANGE UP (ref 80–100)
MCV RBC AUTO: 99.6 FL — SIGNIFICANT CHANGE UP (ref 80–100)
METHOD TYPE: SIGNIFICANT CHANGE UP
MITOCHONDRIA AB SER-ACNC: SIGNIFICANT CHANGE UP
MONOCYTES # BLD AUTO: 0.22 K/UL — SIGNIFICANT CHANGE UP (ref 0–0.9)
MONOCYTES # BLD AUTO: 0.27 K/UL — SIGNIFICANT CHANGE UP (ref 0–0.9)
MONOCYTES # BLD AUTO: 0.29 K/UL — SIGNIFICANT CHANGE UP (ref 0–0.9)
MONOCYTES # BLD AUTO: 0.3 K/UL — SIGNIFICANT CHANGE UP (ref 0–0.9)
MONOCYTES # BLD AUTO: 0.34 K/UL — SIGNIFICANT CHANGE UP (ref 0–0.9)
MONOCYTES # BLD AUTO: 0.35 K/UL — SIGNIFICANT CHANGE UP (ref 0–0.9)
MONOCYTES # BLD AUTO: 0.36 K/UL — SIGNIFICANT CHANGE UP (ref 0–0.9)
MONOCYTES # BLD AUTO: 0.36 K/UL — SIGNIFICANT CHANGE UP (ref 0–0.9)
MONOCYTES # BLD AUTO: 0.38 K/UL — SIGNIFICANT CHANGE UP (ref 0–0.9)
MONOCYTES # BLD AUTO: 0.38 K/UL — SIGNIFICANT CHANGE UP (ref 0–0.9)
MONOCYTES # BLD AUTO: 0.39 K/UL — SIGNIFICANT CHANGE UP (ref 0–0.9)
MONOCYTES # BLD AUTO: 0.41 K/UL — SIGNIFICANT CHANGE UP (ref 0–0.9)
MONOCYTES # BLD AUTO: 0.42 K/UL — SIGNIFICANT CHANGE UP (ref 0–0.9)
MONOCYTES # BLD AUTO: 0.42 K/UL — SIGNIFICANT CHANGE UP (ref 0–0.9)
MONOCYTES # BLD AUTO: 0.44 K/UL — SIGNIFICANT CHANGE UP (ref 0–0.9)
MONOCYTES # BLD AUTO: 0.45 K/UL — SIGNIFICANT CHANGE UP (ref 0–0.9)
MONOCYTES # BLD AUTO: 0.46 K/UL — SIGNIFICANT CHANGE UP (ref 0–0.9)
MONOCYTES # BLD AUTO: 0.49 K/UL — SIGNIFICANT CHANGE UP (ref 0–0.9)
MONOCYTES # BLD AUTO: 0.5 K/UL — SIGNIFICANT CHANGE UP (ref 0–0.9)
MONOCYTES # BLD AUTO: 0.51 K/UL — SIGNIFICANT CHANGE UP (ref 0–0.9)
MONOCYTES # BLD AUTO: 0.52 K/UL — SIGNIFICANT CHANGE UP (ref 0–0.9)
MONOCYTES # BLD AUTO: 0.54 K/UL — SIGNIFICANT CHANGE UP (ref 0–0.9)
MONOCYTES # BLD AUTO: 0.56 K/UL — SIGNIFICANT CHANGE UP (ref 0–0.9)
MONOCYTES # BLD AUTO: 0.6 K/UL — SIGNIFICANT CHANGE UP (ref 0–0.9)
MONOCYTES # BLD AUTO: 0.61 K/UL — SIGNIFICANT CHANGE UP (ref 0–0.9)
MONOCYTES # BLD AUTO: 0.62 K/UL — SIGNIFICANT CHANGE UP (ref 0–0.9)
MONOCYTES # BLD AUTO: 0.64 K/UL — SIGNIFICANT CHANGE UP (ref 0–0.9)
MONOCYTES # BLD AUTO: 0.86 K/UL — SIGNIFICANT CHANGE UP (ref 0–0.9)
MONOCYTES # BLD AUTO: 0.93 K/UL — HIGH (ref 0–0.9)
MONOCYTES # BLD AUTO: 1.12 K/UL — HIGH (ref 0–0.9)
MONOCYTES # BLD AUTO: 1.26 K/UL — HIGH (ref 0–0.9)
MONOCYTES NFR BLD AUTO: 10.6 % — SIGNIFICANT CHANGE UP (ref 2–14)
MONOCYTES NFR BLD AUTO: 11.3 % — SIGNIFICANT CHANGE UP (ref 2–14)
MONOCYTES NFR BLD AUTO: 12 % — SIGNIFICANT CHANGE UP (ref 2–14)
MONOCYTES NFR BLD AUTO: 12.5 % — SIGNIFICANT CHANGE UP (ref 2–14)
MONOCYTES NFR BLD AUTO: 12.8 % — SIGNIFICANT CHANGE UP (ref 2–14)
MONOCYTES NFR BLD AUTO: 13.2 % — SIGNIFICANT CHANGE UP (ref 2–14)
MONOCYTES NFR BLD AUTO: 13.2 % — SIGNIFICANT CHANGE UP (ref 2–14)
MONOCYTES NFR BLD AUTO: 13.4 % — SIGNIFICANT CHANGE UP (ref 2–14)
MONOCYTES NFR BLD AUTO: 14.8 % — HIGH (ref 2–14)
MONOCYTES NFR BLD AUTO: 15.7 % — HIGH (ref 2–14)
MONOCYTES NFR BLD AUTO: 16 % — HIGH (ref 2–14)
MONOCYTES NFR BLD AUTO: 2.8 % — SIGNIFICANT CHANGE UP (ref 2–14)
MONOCYTES NFR BLD AUTO: 2.9 % — SIGNIFICANT CHANGE UP (ref 2–14)
MONOCYTES NFR BLD AUTO: 3 % — SIGNIFICANT CHANGE UP (ref 2–14)
MONOCYTES NFR BLD AUTO: 4.3 % — SIGNIFICANT CHANGE UP (ref 2–14)
MONOCYTES NFR BLD AUTO: 4.4 % — SIGNIFICANT CHANGE UP (ref 2–14)
MONOCYTES NFR BLD AUTO: 4.4 % — SIGNIFICANT CHANGE UP (ref 2–14)
MONOCYTES NFR BLD AUTO: 4.5 % — SIGNIFICANT CHANGE UP (ref 2–14)
MONOCYTES NFR BLD AUTO: 5.1 % — SIGNIFICANT CHANGE UP (ref 2–14)
MONOCYTES NFR BLD AUTO: 5.2 % — SIGNIFICANT CHANGE UP (ref 2–14)
MONOCYTES NFR BLD AUTO: 5.5 % — SIGNIFICANT CHANGE UP (ref 2–14)
MONOCYTES NFR BLD AUTO: 5.9 % — SIGNIFICANT CHANGE UP (ref 2–14)
MONOCYTES NFR BLD AUTO: 5.9 % — SIGNIFICANT CHANGE UP (ref 2–14)
MONOCYTES NFR BLD AUTO: 6 % — SIGNIFICANT CHANGE UP (ref 2–14)
MONOCYTES NFR BLD AUTO: 6.1 % — SIGNIFICANT CHANGE UP (ref 2–14)
MONOCYTES NFR BLD AUTO: 6.3 % — SIGNIFICANT CHANGE UP (ref 2–14)
MONOCYTES NFR BLD AUTO: 6.4 % — SIGNIFICANT CHANGE UP (ref 2–14)
MONOCYTES NFR BLD AUTO: 6.8 % — SIGNIFICANT CHANGE UP (ref 2–14)
MONOCYTES NFR BLD AUTO: 6.8 % — SIGNIFICANT CHANGE UP (ref 2–14)
MONOCYTES NFR BLD AUTO: 7.5 % — SIGNIFICANT CHANGE UP (ref 2–14)
MONOCYTES NFR BLD AUTO: 7.6 % — SIGNIFICANT CHANGE UP (ref 2–14)
MONOCYTES NFR BLD AUTO: 8.1 % — SIGNIFICANT CHANGE UP (ref 2–14)
MONOCYTES NFR BLD AUTO: 8.2 % — SIGNIFICANT CHANGE UP (ref 2–14)
MONOCYTES NFR BLD AUTO: 8.3 % — SIGNIFICANT CHANGE UP (ref 2–14)
MONOCYTES NFR BLD AUTO: 8.8 % — SIGNIFICANT CHANGE UP (ref 2–14)
MRSA PCR RESULT.: SIGNIFICANT CHANGE UP
NEUTROPHILS # BLD AUTO: 1.46 K/UL — LOW (ref 1.8–7.4)
NEUTROPHILS # BLD AUTO: 1.73 K/UL — LOW (ref 1.8–7.4)
NEUTROPHILS # BLD AUTO: 1.75 K/UL — LOW (ref 1.8–7.4)
NEUTROPHILS # BLD AUTO: 1.86 K/UL — SIGNIFICANT CHANGE UP (ref 1.8–7.4)
NEUTROPHILS # BLD AUTO: 10.39 K/UL — HIGH (ref 1.8–7.4)
NEUTROPHILS # BLD AUTO: 10.7 K/UL — HIGH (ref 1.8–7.4)
NEUTROPHILS # BLD AUTO: 10.8 K/UL — HIGH (ref 1.8–7.4)
NEUTROPHILS # BLD AUTO: 10.83 K/UL — HIGH (ref 1.8–7.4)
NEUTROPHILS # BLD AUTO: 11.62 K/UL — HIGH (ref 1.8–7.4)
NEUTROPHILS # BLD AUTO: 2.07 K/UL — SIGNIFICANT CHANGE UP (ref 1.8–7.4)
NEUTROPHILS # BLD AUTO: 2.09 K/UL — SIGNIFICANT CHANGE UP (ref 1.8–7.4)
NEUTROPHILS # BLD AUTO: 2.29 K/UL — SIGNIFICANT CHANGE UP (ref 1.8–7.4)
NEUTROPHILS # BLD AUTO: 2.56 K/UL — SIGNIFICANT CHANGE UP (ref 1.8–7.4)
NEUTROPHILS # BLD AUTO: 2.61 K/UL — SIGNIFICANT CHANGE UP (ref 1.8–7.4)
NEUTROPHILS # BLD AUTO: 2.75 K/UL — SIGNIFICANT CHANGE UP (ref 1.8–7.4)
NEUTROPHILS # BLD AUTO: 3.18 K/UL — SIGNIFICANT CHANGE UP (ref 1.8–7.4)
NEUTROPHILS # BLD AUTO: 3.69 K/UL — SIGNIFICANT CHANGE UP (ref 1.8–7.4)
NEUTROPHILS # BLD AUTO: 3.88 K/UL — SIGNIFICANT CHANGE UP (ref 1.8–7.4)
NEUTROPHILS # BLD AUTO: 3.93 K/UL — SIGNIFICANT CHANGE UP (ref 1.8–7.4)
NEUTROPHILS # BLD AUTO: 4 K/UL — SIGNIFICANT CHANGE UP (ref 1.8–7.4)
NEUTROPHILS # BLD AUTO: 4.24 K/UL — SIGNIFICANT CHANGE UP (ref 1.8–7.4)
NEUTROPHILS # BLD AUTO: 4.39 K/UL — SIGNIFICANT CHANGE UP (ref 1.8–7.4)
NEUTROPHILS # BLD AUTO: 4.59 K/UL — SIGNIFICANT CHANGE UP (ref 1.8–7.4)
NEUTROPHILS # BLD AUTO: 5.24 K/UL — SIGNIFICANT CHANGE UP (ref 1.8–7.4)
NEUTROPHILS # BLD AUTO: 5.33 K/UL — SIGNIFICANT CHANGE UP (ref 1.8–7.4)
NEUTROPHILS # BLD AUTO: 5.71 K/UL — SIGNIFICANT CHANGE UP (ref 1.8–7.4)
NEUTROPHILS # BLD AUTO: 5.79 K/UL — SIGNIFICANT CHANGE UP (ref 1.8–7.4)
NEUTROPHILS # BLD AUTO: 6.22 K/UL — SIGNIFICANT CHANGE UP (ref 1.8–7.4)
NEUTROPHILS # BLD AUTO: 6.48 K/UL — SIGNIFICANT CHANGE UP (ref 1.8–7.4)
NEUTROPHILS # BLD AUTO: 6.73 K/UL — SIGNIFICANT CHANGE UP (ref 1.8–7.4)
NEUTROPHILS # BLD AUTO: 7 K/UL — SIGNIFICANT CHANGE UP (ref 1.8–7.4)
NEUTROPHILS # BLD AUTO: 7.06 K/UL — SIGNIFICANT CHANGE UP (ref 1.8–7.4)
NEUTROPHILS # BLD AUTO: 7.06 K/UL — SIGNIFICANT CHANGE UP (ref 1.8–7.4)
NEUTROPHILS # BLD AUTO: 7.62 K/UL — HIGH (ref 1.8–7.4)
NEUTROPHILS # BLD AUTO: 7.92 K/UL — HIGH (ref 1.8–7.4)
NEUTROPHILS # BLD AUTO: 8.86 K/UL — HIGH (ref 1.8–7.4)
NEUTROPHILS # BLD AUTO: 9 K/UL — HIGH (ref 1.8–7.4)
NEUTROPHILS # BLD AUTO: 9.02 K/UL — HIGH (ref 1.8–7.4)
NEUTROPHILS NFR BLD AUTO: 43.3 % — SIGNIFICANT CHANGE UP (ref 43–77)
NEUTROPHILS NFR BLD AUTO: 50.9 % — SIGNIFICANT CHANGE UP (ref 43–77)
NEUTROPHILS NFR BLD AUTO: 53 % — SIGNIFICANT CHANGE UP (ref 43–77)
NEUTROPHILS NFR BLD AUTO: 55.1 % — SIGNIFICANT CHANGE UP (ref 43–77)
NEUTROPHILS NFR BLD AUTO: 58.1 % — SIGNIFICANT CHANGE UP (ref 43–77)
NEUTROPHILS NFR BLD AUTO: 58.6 % — SIGNIFICANT CHANGE UP (ref 43–77)
NEUTROPHILS NFR BLD AUTO: 59.5 % — SIGNIFICANT CHANGE UP (ref 43–77)
NEUTROPHILS NFR BLD AUTO: 61.9 % — SIGNIFICANT CHANGE UP (ref 43–77)
NEUTROPHILS NFR BLD AUTO: 62.4 % — SIGNIFICANT CHANGE UP (ref 43–77)
NEUTROPHILS NFR BLD AUTO: 62.6 % — SIGNIFICANT CHANGE UP (ref 43–77)
NEUTROPHILS NFR BLD AUTO: 66.3 % — SIGNIFICANT CHANGE UP (ref 43–77)
NEUTROPHILS NFR BLD AUTO: 67.1 % — SIGNIFICANT CHANGE UP (ref 43–77)
NEUTROPHILS NFR BLD AUTO: 67.8 % — SIGNIFICANT CHANGE UP (ref 43–77)
NEUTROPHILS NFR BLD AUTO: 68.3 % — SIGNIFICANT CHANGE UP (ref 43–77)
NEUTROPHILS NFR BLD AUTO: 69.8 % — SIGNIFICANT CHANGE UP (ref 43–77)
NEUTROPHILS NFR BLD AUTO: 70.9 % — SIGNIFICANT CHANGE UP (ref 43–77)
NEUTROPHILS NFR BLD AUTO: 71.2 % — SIGNIFICANT CHANGE UP (ref 43–77)
NEUTROPHILS NFR BLD AUTO: 72.3 % — SIGNIFICANT CHANGE UP (ref 43–77)
NEUTROPHILS NFR BLD AUTO: 75.6 % — SIGNIFICANT CHANGE UP (ref 43–77)
NEUTROPHILS NFR BLD AUTO: 75.8 % — SIGNIFICANT CHANGE UP (ref 43–77)
NEUTROPHILS NFR BLD AUTO: 76.6 % — SIGNIFICANT CHANGE UP (ref 43–77)
NEUTROPHILS NFR BLD AUTO: 76.6 % — SIGNIFICANT CHANGE UP (ref 43–77)
NEUTROPHILS NFR BLD AUTO: 79.3 % — HIGH (ref 43–77)
NEUTROPHILS NFR BLD AUTO: 80.8 % — HIGH (ref 43–77)
NEUTROPHILS NFR BLD AUTO: 80.9 % — HIGH (ref 43–77)
NEUTROPHILS NFR BLD AUTO: 81 % — HIGH (ref 43–77)
NEUTROPHILS NFR BLD AUTO: 81.1 % — HIGH (ref 43–77)
NEUTROPHILS NFR BLD AUTO: 81.4 % — HIGH (ref 43–77)
NEUTROPHILS NFR BLD AUTO: 82.6 % — HIGH (ref 43–77)
NEUTROPHILS NFR BLD AUTO: 83.4 % — HIGH (ref 43–77)
NEUTROPHILS NFR BLD AUTO: 84.7 % — HIGH (ref 43–77)
NEUTROPHILS NFR BLD AUTO: 85 % — HIGH (ref 43–77)
NEUTROPHILS NFR BLD AUTO: 85.5 % — HIGH (ref 43–77)
NEUTROPHILS NFR BLD AUTO: 85.6 % — HIGH (ref 43–77)
NEUTROPHILS NFR BLD AUTO: 88.9 % — HIGH (ref 43–77)
NEUTROPHILS NFR BLD AUTO: 90.7 % — HIGH (ref 43–77)
NEUTROPHILS NFR BLD AUTO: 91.5 % — HIGH (ref 43–77)
NEUTROPHILS NFR BLD AUTO: 92.9 % — HIGH (ref 43–77)
NITRITE UR-MCNC: POSITIVE
NITRITE UR-MCNC: POSITIVE
NRBC # BLD AUTO: 0 K/UL — SIGNIFICANT CHANGE UP (ref 0–0)
NRBC # BLD AUTO: 0.02 K/UL — HIGH (ref 0–0)
NRBC # BLD AUTO: 0.11 K/UL — HIGH (ref 0–0)
NRBC # BLD AUTO: 0.18 K/UL — HIGH (ref 0–0)
NRBC # BLD AUTO: SIGNIFICANT CHANGE UP (ref 0–0)
NRBC # FLD: 0 K/UL — SIGNIFICANT CHANGE UP (ref 0–0)
NRBC # FLD: 0.02 K/UL — HIGH (ref 0–0)
NRBC # FLD: 0.11 K/UL — HIGH (ref 0–0)
NRBC # FLD: 0.18 K/UL — HIGH (ref 0–0)
NRBC # FLD: SIGNIFICANT CHANGE UP (ref 0–0)
NRBC BLD AUTO-RTO: 0 /100 WBCS — SIGNIFICANT CHANGE UP (ref 0–0)
NRBC BLD AUTO-RTO: 2 /100 WBCS — HIGH (ref 0–0)
NRBC BLD AUTO-RTO: 2 /100 WBCS — HIGH (ref 0–0)
NRBC BLD AUTO-RTO: SIGNIFICANT CHANGE UP (ref 0–0)
ORGANISM # SPEC MICROSCOPIC CNT: ABNORMAL
ORGANISM # SPEC MICROSCOPIC CNT: SIGNIFICANT CHANGE UP
ORGANISM # SPEC MICROSCOPIC CNT: SIGNIFICANT CHANGE UP
OSMOLALITY UR: 334 MOS/KG — SIGNIFICANT CHANGE UP (ref 50–1200)
OSMOLALITY UR: 389 MOS/KG — SIGNIFICANT CHANGE UP (ref 50–1200)
PCO2 BLDA: 22 MMHG — LOW (ref 35–48)
PCO2 BLDA: 25 MMHG — LOW (ref 35–48)
PCO2 BLDV: 25 MMHG — LOW (ref 42–55)
PH BLDA: 7.33 — LOW (ref 7.35–7.45)
PH BLDA: 7.46 — HIGH (ref 7.35–7.45)
PH BLDV: 7.38 — SIGNIFICANT CHANGE UP (ref 7.32–7.43)
PH UR: 5.5 — SIGNIFICANT CHANGE UP (ref 5–8)
PH UR: 7.5 — SIGNIFICANT CHANGE UP (ref 5–8)
PHOSPHATE SERPL-MCNC: 3.2 MG/DL — SIGNIFICANT CHANGE UP (ref 2.1–4.9)
PHOSPHATE SERPL-MCNC: 3.6 MG/DL — SIGNIFICANT CHANGE UP (ref 2.1–4.9)
PHOSPHATE SERPL-MCNC: 3.6 MG/DL — SIGNIFICANT CHANGE UP (ref 2.1–4.9)
PHOSPHATE SERPL-MCNC: 3.7 MG/DL — SIGNIFICANT CHANGE UP (ref 2.1–4.9)
PHOSPHATE SERPL-MCNC: 3.9 MG/DL — SIGNIFICANT CHANGE UP (ref 2.1–4.9)
PHOSPHATE SERPL-MCNC: 4.2 MG/DL — SIGNIFICANT CHANGE UP (ref 2.1–4.9)
PHOSPHATE SERPL-MCNC: 4.3 MG/DL — SIGNIFICANT CHANGE UP (ref 2.1–4.9)
PHOSPHATE SERPL-MCNC: 4.7 MG/DL — SIGNIFICANT CHANGE UP (ref 2.1–4.9)
PHOSPHATE SERPL-MCNC: 4.8 MG/DL — SIGNIFICANT CHANGE UP (ref 2.1–4.9)
PHOSPHATE SERPL-MCNC: 5.3 MG/DL — HIGH (ref 2.1–4.9)
PLATELET # BLD AUTO: 101 K/UL — LOW (ref 150–400)
PLATELET # BLD AUTO: 23 K/UL — LOW (ref 150–400)
PLATELET # BLD AUTO: 27 K/UL — LOW (ref 150–400)
PLATELET # BLD AUTO: 35 K/UL — LOW (ref 150–400)
PLATELET # BLD AUTO: 36 K/UL — LOW (ref 150–400)
PLATELET # BLD AUTO: 36 K/UL — LOW (ref 150–400)
PLATELET # BLD AUTO: 4 K/UL — CRITICAL LOW (ref 150–400)
PLATELET # BLD AUTO: 41 K/UL — LOW (ref 150–400)
PLATELET # BLD AUTO: 44 K/UL — LOW (ref 150–400)
PLATELET # BLD AUTO: 48 K/UL — LOW (ref 150–400)
PLATELET # BLD AUTO: 49 K/UL — LOW (ref 150–400)
PLATELET # BLD AUTO: 49 K/UL — LOW (ref 150–400)
PLATELET # BLD AUTO: 51 K/UL — LOW (ref 150–400)
PLATELET # BLD AUTO: 52 K/UL — LOW (ref 150–400)
PLATELET # BLD AUTO: 53 K/UL — LOW (ref 150–400)
PLATELET # BLD AUTO: 54 K/UL — LOW (ref 150–400)
PLATELET # BLD AUTO: 55 K/UL — LOW (ref 150–400)
PLATELET # BLD AUTO: 57 K/UL — LOW (ref 150–400)
PLATELET # BLD AUTO: 58 K/UL — LOW (ref 150–400)
PLATELET # BLD AUTO: 58 K/UL — LOW (ref 150–400)
PLATELET # BLD AUTO: 59 K/UL — LOW (ref 150–400)
PLATELET # BLD AUTO: 59 K/UL — LOW (ref 150–400)
PLATELET # BLD AUTO: 61 K/UL — LOW (ref 150–400)
PLATELET # BLD AUTO: 61 K/UL — LOW (ref 150–400)
PLATELET # BLD AUTO: 64 K/UL — LOW (ref 150–400)
PLATELET # BLD AUTO: 66 K/UL — LOW (ref 150–400)
PLATELET # BLD AUTO: 66 K/UL — LOW (ref 150–400)
PLATELET # BLD AUTO: 67 K/UL — LOW (ref 150–400)
PLATELET # BLD AUTO: 68 K/UL — LOW (ref 150–400)
PLATELET # BLD AUTO: 68 K/UL — LOW (ref 150–400)
PLATELET # BLD AUTO: 71 K/UL — LOW (ref 150–400)
PLATELET # BLD AUTO: 72 K/UL — LOW (ref 150–400)
PLATELET # BLD AUTO: 79 K/UL — LOW (ref 150–400)
PLATELET # BLD AUTO: 79 K/UL — LOW (ref 150–400)
PLATELET # BLD AUTO: 82 K/UL — LOW (ref 150–400)
PLATELET # BLD AUTO: 82 K/UL — LOW (ref 150–400)
PLATELET # BLD AUTO: 86 K/UL — LOW (ref 150–400)
PLATELET # BLD AUTO: 88 K/UL — LOW (ref 150–400)
PLATELET # BLD AUTO: 88 K/UL — LOW (ref 150–400)
PLATELET # BLD AUTO: SIGNIFICANT CHANGE UP K/UL (ref 150–400)
PMV BLD: 10 FL — SIGNIFICANT CHANGE UP (ref 7–13)
PMV BLD: 10.2 FL — SIGNIFICANT CHANGE UP (ref 7–13)
PMV BLD: 10.3 FL — SIGNIFICANT CHANGE UP (ref 7–13)
PMV BLD: 10.4 FL — SIGNIFICANT CHANGE UP (ref 7–13)
PMV BLD: 10.6 FL — SIGNIFICANT CHANGE UP (ref 7–13)
PMV BLD: 10.6 FL — SIGNIFICANT CHANGE UP (ref 7–13)
PMV BLD: 10.7 FL — SIGNIFICANT CHANGE UP (ref 7–13)
PMV BLD: 10.8 FL — SIGNIFICANT CHANGE UP (ref 7–13)
PMV BLD: 10.9 FL — SIGNIFICANT CHANGE UP (ref 7–13)
PMV BLD: 11 FL — SIGNIFICANT CHANGE UP (ref 7–13)
PMV BLD: 11.1 FL — SIGNIFICANT CHANGE UP (ref 7–13)
PMV BLD: 11.1 FL — SIGNIFICANT CHANGE UP (ref 7–13)
PMV BLD: 11.3 FL — SIGNIFICANT CHANGE UP (ref 7–13)
PMV BLD: 11.5 FL — SIGNIFICANT CHANGE UP (ref 7–13)
PMV BLD: 11.6 FL — SIGNIFICANT CHANGE UP (ref 7–13)
PMV BLD: 11.7 FL — SIGNIFICANT CHANGE UP (ref 7–13)
PMV BLD: 11.8 FL — SIGNIFICANT CHANGE UP (ref 7–13)
PMV BLD: 11.8 FL — SIGNIFICANT CHANGE UP (ref 7–13)
PMV BLD: 11.9 FL — SIGNIFICANT CHANGE UP (ref 7–13)
PMV BLD: 12.6 FL — SIGNIFICANT CHANGE UP (ref 7–13)
PMV BLD: 9.5 FL — SIGNIFICANT CHANGE UP (ref 7–13)
PMV BLD: 9.6 FL — SIGNIFICANT CHANGE UP (ref 7–13)
PMV BLD: 9.7 FL — SIGNIFICANT CHANGE UP (ref 7–13)
PMV BLD: 9.8 FL — SIGNIFICANT CHANGE UP (ref 7–13)
PMV BLD: 9.8 FL — SIGNIFICANT CHANGE UP (ref 7–13)
PMV BLD: SIGNIFICANT CHANGE UP FL (ref 7–13)
PMV BLD: SIGNIFICANT CHANGE UP FL (ref 7–13)
PO2 BLDA: 130 MMHG — HIGH (ref 83–108)
PO2 BLDA: 255 MMHG — HIGH (ref 83–108)
PO2 BLDV: 60 MMHG — HIGH (ref 25–45)
POTASSIUM BLDV-SCNC: 4.1 MMOL/L — SIGNIFICANT CHANGE UP (ref 3.5–5.1)
POTASSIUM SERPL-MCNC: 3.2 MMOL/L — LOW (ref 3.5–5)
POTASSIUM SERPL-MCNC: 3.4 MMOL/L — LOW (ref 3.5–5)
POTASSIUM SERPL-MCNC: 3.5 MMOL/L — SIGNIFICANT CHANGE UP (ref 3.5–5)
POTASSIUM SERPL-MCNC: 3.6 MMOL/L — SIGNIFICANT CHANGE UP (ref 3.5–5)
POTASSIUM SERPL-MCNC: 3.6 MMOL/L — SIGNIFICANT CHANGE UP (ref 3.5–5)
POTASSIUM SERPL-MCNC: 3.7 MMOL/L — SIGNIFICANT CHANGE UP (ref 3.5–5)
POTASSIUM SERPL-MCNC: 3.7 MMOL/L — SIGNIFICANT CHANGE UP (ref 3.5–5)
POTASSIUM SERPL-MCNC: 3.8 MMOL/L — SIGNIFICANT CHANGE UP (ref 3.5–5)
POTASSIUM SERPL-MCNC: 3.9 MMOL/L — SIGNIFICANT CHANGE UP (ref 3.5–5)
POTASSIUM SERPL-MCNC: 3.9 MMOL/L — SIGNIFICANT CHANGE UP (ref 3.5–5)
POTASSIUM SERPL-MCNC: 4 MMOL/L — SIGNIFICANT CHANGE UP (ref 3.5–5)
POTASSIUM SERPL-MCNC: 4.1 MMOL/L — SIGNIFICANT CHANGE UP (ref 3.5–5)
POTASSIUM SERPL-MCNC: 4.2 MMOL/L — SIGNIFICANT CHANGE UP (ref 3.5–5)
POTASSIUM SERPL-MCNC: 4.3 MMOL/L — SIGNIFICANT CHANGE UP (ref 3.5–5)
POTASSIUM SERPL-MCNC: 4.4 MMOL/L — SIGNIFICANT CHANGE UP (ref 3.5–5)
POTASSIUM SERPL-MCNC: 4.5 MMOL/L — SIGNIFICANT CHANGE UP (ref 3.5–5)
POTASSIUM SERPL-MCNC: 4.5 MMOL/L — SIGNIFICANT CHANGE UP (ref 3.5–5)
POTASSIUM SERPL-MCNC: 4.6 MMOL/L — SIGNIFICANT CHANGE UP (ref 3.5–5)
POTASSIUM SERPL-MCNC: 4.6 MMOL/L — SIGNIFICANT CHANGE UP (ref 3.5–5)
POTASSIUM SERPL-MCNC: 4.9 MMOL/L — SIGNIFICANT CHANGE UP (ref 3.5–5)
POTASSIUM SERPL-MCNC: 5.4 MMOL/L — HIGH (ref 3.5–5)
POTASSIUM SERPL-SCNC: 3.2 MMOL/L — LOW (ref 3.5–5)
POTASSIUM SERPL-SCNC: 3.4 MMOL/L — LOW (ref 3.5–5)
POTASSIUM SERPL-SCNC: 3.5 MMOL/L — SIGNIFICANT CHANGE UP (ref 3.5–5)
POTASSIUM SERPL-SCNC: 3.6 MMOL/L — SIGNIFICANT CHANGE UP (ref 3.5–5)
POTASSIUM SERPL-SCNC: 3.6 MMOL/L — SIGNIFICANT CHANGE UP (ref 3.5–5)
POTASSIUM SERPL-SCNC: 3.7 MMOL/L — SIGNIFICANT CHANGE UP (ref 3.5–5)
POTASSIUM SERPL-SCNC: 3.7 MMOL/L — SIGNIFICANT CHANGE UP (ref 3.5–5)
POTASSIUM SERPL-SCNC: 3.8 MMOL/L — SIGNIFICANT CHANGE UP (ref 3.5–5)
POTASSIUM SERPL-SCNC: 3.9 MMOL/L — SIGNIFICANT CHANGE UP (ref 3.5–5)
POTASSIUM SERPL-SCNC: 3.9 MMOL/L — SIGNIFICANT CHANGE UP (ref 3.5–5)
POTASSIUM SERPL-SCNC: 4 MMOL/L — SIGNIFICANT CHANGE UP (ref 3.5–5)
POTASSIUM SERPL-SCNC: 4.1 MMOL/L — SIGNIFICANT CHANGE UP (ref 3.5–5)
POTASSIUM SERPL-SCNC: 4.2 MMOL/L — SIGNIFICANT CHANGE UP (ref 3.5–5)
POTASSIUM SERPL-SCNC: 4.3 MMOL/L — SIGNIFICANT CHANGE UP (ref 3.5–5)
POTASSIUM SERPL-SCNC: 4.4 MMOL/L — SIGNIFICANT CHANGE UP (ref 3.5–5)
POTASSIUM SERPL-SCNC: 4.5 MMOL/L — SIGNIFICANT CHANGE UP (ref 3.5–5)
POTASSIUM SERPL-SCNC: 4.5 MMOL/L — SIGNIFICANT CHANGE UP (ref 3.5–5)
POTASSIUM SERPL-SCNC: 4.6 MMOL/L — SIGNIFICANT CHANGE UP (ref 3.5–5)
POTASSIUM SERPL-SCNC: 4.6 MMOL/L — SIGNIFICANT CHANGE UP (ref 3.5–5)
POTASSIUM SERPL-SCNC: 4.9 MMOL/L — SIGNIFICANT CHANGE UP (ref 3.5–5)
POTASSIUM SERPL-SCNC: 5.4 MMOL/L — HIGH (ref 3.5–5)
POTASSIUM UR-SCNC: 26 MMOL/L — SIGNIFICANT CHANGE UP
POTASSIUM UR-SCNC: 31 MMOL/L — SIGNIFICANT CHANGE UP
POTASSIUM UR-SCNC: 47 MMOL/L — SIGNIFICANT CHANGE UP
PROT ?TM UR-MCNC: 59 MG/DL — SIGNIFICANT CHANGE UP
PROT ?TM UR-MCNC: 95 MG/DL — SIGNIFICANT CHANGE UP
PROT SERPL-MCNC: 3.3 G/DL — LOW (ref 6–8)
PROT SERPL-MCNC: 3.9 G/DL — LOW (ref 6–8)
PROT SERPL-MCNC: 4 G/DL — LOW (ref 6–8)
PROT SERPL-MCNC: 4 G/DL — LOW (ref 6–8)
PROT SERPL-MCNC: 4.2 G/DL — LOW (ref 6–8)
PROT SERPL-MCNC: 4.3 G/DL — LOW (ref 6–8)
PROT SERPL-MCNC: 4.3 G/DL — LOW (ref 6–8)
PROT SERPL-MCNC: 4.4 G/DL — LOW (ref 6–8)
PROT SERPL-MCNC: 4.5 G/DL — LOW (ref 6–8)
PROT SERPL-MCNC: 4.6 G/DL — LOW (ref 6–8)
PROT SERPL-MCNC: 4.6 G/DL — LOW (ref 6–8)
PROT SERPL-MCNC: 4.7 G/DL — LOW (ref 6–8)
PROT SERPL-MCNC: 4.8 G/DL — LOW (ref 6–8)
PROT SERPL-MCNC: 4.9 G/DL — LOW (ref 6–8)
PROT SERPL-MCNC: 5 G/DL — LOW (ref 6–8)
PROT SERPL-MCNC: 5 G/DL — LOW (ref 6–8)
PROT SERPL-MCNC: 5.2 G/DL — LOW (ref 6–8)
PROT SERPL-MCNC: 5.3 G/DL — LOW (ref 6–8)
PROT SERPL-MCNC: 5.3 G/DL — LOW (ref 6–8)
PROT UR-MCNC: 100 MG/DL
PROT UR-MCNC: 100 MG/DL
PROT/CREAT UR-RTO: 0.6 RATIO — HIGH (ref 0–0.2)
PROT/CREAT UR-RTO: 1 RATIO — HIGH (ref 0–0.2)
PROTHROM AB SERPL-ACNC: 16.4 SEC — HIGH (ref 9.95–12.87)
PROTHROM AB SERPL-ACNC: 17.1 SEC — HIGH (ref 9.95–12.87)
PROTHROM AB SERPL-ACNC: 17.3 SEC — HIGH (ref 9.95–12.87)
PROTHROM AB SERPL-ACNC: 18.2 SEC — HIGH (ref 9.95–12.87)
PROTHROM AB SERPL-ACNC: 18.4 SEC — HIGH (ref 9.95–12.87)
PROTHROM AB SERPL-ACNC: 18.9 SEC — HIGH (ref 9.95–12.87)
PROTHROM AB SERPL-ACNC: 19.1 SEC — HIGH (ref 9.95–12.87)
PROTHROM AB SERPL-ACNC: 19.7 SEC — HIGH (ref 9.95–12.87)
PROTHROM AB SERPL-ACNC: 20 SEC — HIGH (ref 9.95–12.87)
PROTHROM AB SERPL-ACNC: 20.2 SEC — HIGH (ref 9.95–12.87)
PROTHROM AB SERPL-ACNC: 20.9 SEC — HIGH (ref 9.95–12.87)
PROTHROM AB SERPL-ACNC: 21 SEC — HIGH (ref 9.95–12.87)
PROTHROM AB SERPL-ACNC: 22.9 SEC — HIGH (ref 9.95–12.87)
PROTHROM AB SERPL-ACNC: 25 SEC — HIGH (ref 9.95–12.87)
PROTHROM AB SERPL-ACNC: 25.3 SEC — HIGH (ref 9.95–12.87)
PROTHROM AB SERPL-ACNC: 25.4 SEC — HIGH (ref 9.95–12.87)
PROTHROM AB SERPL-ACNC: 26 SEC — HIGH (ref 9.95–12.87)
PROTHROM AB SERPL-ACNC: 32.7 SEC — HIGH (ref 9.95–12.87)
PROTHROM AB SERPL-ACNC: 38.9 SEC — HIGH (ref 9.95–12.87)
RBC # BLD: 1.88 M/UL — LOW (ref 4.2–5.8)
RBC # BLD: 2.03 M/UL — LOW (ref 4.2–5.8)
RBC # BLD: 2.05 M/UL — LOW (ref 4.2–5.8)
RBC # BLD: 2.2 M/UL — LOW (ref 4.2–5.8)
RBC # BLD: 2.25 M/UL — LOW (ref 4.2–5.8)
RBC # BLD: 2.25 M/UL — SIGNIFICANT CHANGE UP (ref 4.2–5.8)
RBC # BLD: 2.28 M/UL — LOW (ref 4.2–5.8)
RBC # BLD: 2.29 M/UL — LOW (ref 4.2–5.8)
RBC # BLD: 2.3 M/UL — LOW (ref 4.2–5.8)
RBC # BLD: 2.31 M/UL — LOW (ref 4.2–5.8)
RBC # BLD: 2.31 M/UL — LOW (ref 4.2–5.8)
RBC # BLD: 2.32 M/UL — LOW (ref 4.2–5.8)
RBC # BLD: 2.32 M/UL — LOW (ref 4.2–5.8)
RBC # BLD: 2.33 M/UL — LOW (ref 4.2–5.8)
RBC # BLD: 2.35 M/UL — LOW (ref 4.2–5.8)
RBC # BLD: 2.36 M/UL — LOW (ref 4.2–5.8)
RBC # BLD: 2.36 M/UL — LOW (ref 4.2–5.8)
RBC # BLD: 2.38 M/UL — LOW (ref 4.2–5.8)
RBC # BLD: 2.4 M/UL — LOW (ref 4.2–5.8)
RBC # BLD: 2.4 M/UL — LOW (ref 4.2–5.8)
RBC # BLD: 2.44 M/UL — LOW (ref 4.2–5.8)
RBC # BLD: 2.44 M/UL — LOW (ref 4.2–5.8)
RBC # BLD: 2.45 M/UL — LOW (ref 4.2–5.8)
RBC # BLD: 2.47 M/UL — LOW (ref 4.2–5.8)
RBC # BLD: 2.48 M/UL — LOW (ref 4.2–5.8)
RBC # BLD: 2.56 M/UL — LOW (ref 4.2–5.8)
RBC # BLD: 2.59 M/UL — LOW (ref 4.2–5.8)
RBC # BLD: 2.62 M/UL — LOW (ref 4.2–5.8)
RBC # BLD: 2.64 M/UL — LOW (ref 4.2–5.8)
RBC # BLD: 2.67 M/UL — LOW (ref 4.2–5.8)
RBC # BLD: 2.67 M/UL — LOW (ref 4.2–5.8)
RBC # BLD: 2.68 M/UL — LOW (ref 4.2–5.8)
RBC # BLD: 2.7 M/UL — LOW (ref 4.2–5.8)
RBC # BLD: 2.72 M/UL — LOW (ref 4.2–5.8)
RBC # BLD: 2.73 M/UL — LOW (ref 4.2–5.8)
RBC # BLD: 2.79 M/UL — LOW (ref 4.2–5.8)
RBC # BLD: 2.83 M/UL — LOW (ref 4.2–5.8)
RBC # BLD: 2.89 M/UL — LOW (ref 4.2–5.8)
RBC # BLD: 2.9 M/UL — LOW (ref 4.2–5.8)
RBC # BLD: 2.9 M/UL — LOW (ref 4.2–5.8)
RBC # BLD: 3.05 M/UL — LOW (ref 4.2–5.8)
RBC # BLD: 3.1 M/UL — LOW (ref 4.2–5.8)
RBC # BLD: 3.25 M/UL — LOW (ref 4.2–5.8)
RBC # FLD: 17.5 % — HIGH (ref 10.3–14.5)
RBC # FLD: 17.8 % — HIGH (ref 10.3–14.5)
RBC # FLD: 18.1 % — HIGH (ref 10.3–14.5)
RBC # FLD: 18.1 % — HIGH (ref 10.3–14.5)
RBC # FLD: 18.2 % — HIGH (ref 10.3–14.5)
RBC # FLD: 18.5 % — HIGH (ref 10.3–14.5)
RBC # FLD: 18.5 % — HIGH (ref 10.3–14.5)
RBC # FLD: 18.9 % — HIGH (ref 10.3–14.5)
RBC # FLD: 19 % — HIGH (ref 10.3–14.5)
RBC # FLD: 19.8 % — HIGH (ref 10.3–14.5)
RBC # FLD: 20.2 % — HIGH (ref 10.3–14.5)
RBC # FLD: 20.9 % — HIGH (ref 10.3–14.5)
RBC # FLD: 21 % — HIGH (ref 10.3–14.5)
RBC # FLD: 21.2 % — HIGH (ref 10.3–14.5)
RBC # FLD: 21.4 % — HIGH (ref 10.3–14.5)
RBC # FLD: 21.9 % — HIGH (ref 10.3–14.5)
RBC # FLD: 22 % — HIGH (ref 10.3–14.5)
RBC # FLD: 22 % — HIGH (ref 10.3–14.5)
RBC # FLD: 22.1 % — HIGH (ref 10.3–14.5)
RBC # FLD: 22.7 % — HIGH (ref 10.3–14.5)
RBC # FLD: 22.8 % — HIGH (ref 10.3–14.5)
RBC # FLD: 23.4 % — HIGH (ref 10.3–14.5)
RBC # FLD: 25 % — HIGH (ref 10.3–14.5)
RBC # FLD: 26.7 % — HIGH (ref 10.3–14.5)
RBC # FLD: 26.8 % — HIGH (ref 10.3–14.5)
RBC # FLD: 26.9 % — HIGH (ref 10.3–14.5)
RBC # FLD: 27 % — HIGH (ref 10.3–14.5)
RBC # FLD: 27 % — HIGH (ref 10.3–14.5)
RBC # FLD: 27.1 % — HIGH (ref 10.3–14.5)
RBC # FLD: 27.1 % — HIGH (ref 10.3–14.5)
RBC # FLD: 27.2 % — HIGH (ref 10.3–14.5)
RBC # FLD: 27.3 % — HIGH (ref 10.3–14.5)
RBC # FLD: 27.4 % — HIGH (ref 10.3–14.5)
RBC # FLD: 27.4 % — HIGH (ref 10.3–14.5)
RBC # FLD: 27.5 % — HIGH (ref 10.3–14.5)
RBC # FLD: 27.8 % — HIGH (ref 10.3–14.5)
RBC # FLD: 27.9 % — HIGH (ref 10.3–14.5)
RBC # FLD: 28.3 % — HIGH (ref 10.3–14.5)
RBC # FLD: 28.4 % — HIGH (ref 10.3–14.5)
RBC # FLD: 28.4 % — HIGH (ref 10.3–14.5)
RBC # FLD: 28.5 % — HIGH (ref 10.3–14.5)
RBC # FLD: 28.8 % — HIGH (ref 10.3–14.5)
RBC # FLD: SIGNIFICANT CHANGE UP % (ref 10.3–14.5)
S AUREUS DNA NOSE QL NAA+PROBE: SIGNIFICANT CHANGE UP
SAO2 % BLDA: 99.3 % — HIGH (ref 94–98)
SAO2 % BLDA: 99.3 % — HIGH (ref 94–98)
SAO2 % BLDV: 87.3 % — SIGNIFICANT CHANGE UP (ref 67–88)
SMOOTH MUSCLE AB SER-ACNC: SIGNIFICANT CHANGE UP
SODIUM SERPL-SCNC: 141 MMOL/L — SIGNIFICANT CHANGE UP (ref 135–146)
SODIUM SERPL-SCNC: 142 MMOL/L — SIGNIFICANT CHANGE UP (ref 135–146)
SODIUM SERPL-SCNC: 143 MMOL/L — SIGNIFICANT CHANGE UP (ref 135–146)
SODIUM SERPL-SCNC: 144 MMOL/L — SIGNIFICANT CHANGE UP (ref 135–146)
SODIUM SERPL-SCNC: 145 MMOL/L — SIGNIFICANT CHANGE UP (ref 135–146)
SODIUM SERPL-SCNC: 146 MMOL/L — SIGNIFICANT CHANGE UP (ref 135–146)
SODIUM SERPL-SCNC: 147 MMOL/L — HIGH (ref 135–146)
SODIUM SERPL-SCNC: 148 MMOL/L — HIGH (ref 135–146)
SODIUM SERPL-SCNC: 149 MMOL/L — HIGH (ref 135–146)
SODIUM SERPL-SCNC: 150 MMOL/L — HIGH (ref 135–146)
SODIUM SERPL-SCNC: 151 MMOL/L — HIGH (ref 135–146)
SODIUM SERPL-SCNC: 157 MMOL/L — HIGH (ref 135–146)
SODIUM UR-SCNC: 24 MMOL/L — SIGNIFICANT CHANGE UP
SODIUM UR-SCNC: <20 MMOL/L — SIGNIFICANT CHANGE UP
SODIUM UR-SCNC: <20 MMOL/L — SIGNIFICANT CHANGE UP
SP GR SPEC: 1.02 — SIGNIFICANT CHANGE UP (ref 1–1.03)
SP GR SPEC: 1.02 — SIGNIFICANT CHANGE UP (ref 1–1.03)
SPECIMEN SOURCE: SIGNIFICANT CHANGE UP
SURGICAL PATHOLOGY STUDY: SIGNIFICANT CHANGE UP
TROPONIN T, HIGH SENSITIVITY RESULT: 218 NG/L — CRITICAL HIGH (ref 6–21)
TROPONIN T, HIGH SENSITIVITY RESULT: 248 NG/L — CRITICAL HIGH (ref 6–21)
TROPONIN T, HIGH SENSITIVITY RESULT: 274 NG/L — CRITICAL HIGH (ref 6–21)
TROPONIN T, HIGH SENSITIVITY RESULT: 286 NG/L — CRITICAL HIGH (ref 6–21)
UROBILINOGEN FLD QL: 1 MG/DL — SIGNIFICANT CHANGE UP (ref 0.2–1)
UROBILINOGEN FLD QL: 1 MG/DL — SIGNIFICANT CHANGE UP (ref 0.2–1)
UUN UR-MCNC: 338 MG/DL — SIGNIFICANT CHANGE UP
UUN UR-MCNC: 648 MG/DL — SIGNIFICANT CHANGE UP
VANCOMYCIN TROUGH SERPL-MCNC: 45.4 UG/ML — HIGH (ref 5–10)
VANCOMYCIN TROUGH SERPL-MCNC: 47.1 UG/ML — HIGH (ref 5–10)
VANCOMYCIN TROUGH SERPL-MCNC: 53.7 UG/ML — HIGH (ref 5–10)
WBC # BLD: 11.07 K/UL — HIGH (ref 3.8–10.5)
WBC # BLD: 11.35 K/UL — HIGH (ref 3.8–10.5)
WBC # BLD: 11.62 K/UL — HIGH (ref 3.8–10.5)
WBC # BLD: 11.81 K/UL — HIGH (ref 3.8–10.5)
WBC # BLD: 13.43 K/UL — HIGH (ref 3.8–10.5)
WBC # BLD: 14.33 K/UL — HIGH (ref 3.8–10.5)
WBC # BLD: 14.57 K/UL — HIGH (ref 3.8–10.5)
WBC # BLD: 2.87 K/UL — LOW (ref 3.8–10.5)
WBC # BLD: 3.18 K/UL — LOW (ref 3.8–10.5)
WBC # BLD: 3.31 K/UL — LOW (ref 3.8–10.5)
WBC # BLD: 3.37 K/UL — LOW (ref 3.8–10.5)
WBC # BLD: 3.51 K/UL — LOW (ref 3.8–10.5)
WBC # BLD: 3.67 K/UL — LOW (ref 3.8–10.5)
WBC # BLD: 3.91 K/UL — SIGNIFICANT CHANGE UP (ref 3.8–10.5)
WBC # BLD: 3.99 K/UL — SIGNIFICANT CHANGE UP (ref 3.8–10.5)
WBC # BLD: 4.15 K/UL — SIGNIFICANT CHANGE UP (ref 3.8–10.5)
WBC # BLD: 4.49 K/UL — SIGNIFICANT CHANGE UP (ref 3.8–10.5)
WBC # BLD: 4.69 K/UL — SIGNIFICANT CHANGE UP (ref 3.8–10.5)
WBC # BLD: 5.16 K/UL — SIGNIFICANT CHANGE UP (ref 3.8–10.5)
WBC # BLD: 5.5 K/UL — SIGNIFICANT CHANGE UP (ref 3.8–10.5)
WBC # BLD: 5.59 K/UL — SIGNIFICANT CHANGE UP (ref 3.8–10.5)
WBC # BLD: 5.63 K/UL — SIGNIFICANT CHANGE UP (ref 3.8–10.5)
WBC # BLD: 5.65 K/UL — SIGNIFICANT CHANGE UP (ref 3.8–10.5)
WBC # BLD: 5.68 K/UL — SIGNIFICANT CHANGE UP (ref 3.8–10.5)
WBC # BLD: 6.46 K/UL — SIGNIFICANT CHANGE UP (ref 3.8–10.5)
WBC # BLD: 6.58 K/UL — SIGNIFICANT CHANGE UP (ref 3.8–10.5)
WBC # BLD: 6.61 K/UL — SIGNIFICANT CHANGE UP (ref 3.8–10.5)
WBC # BLD: 6.77 K/UL — SIGNIFICANT CHANGE UP (ref 3.8–10.5)
WBC # BLD: 6.85 K/UL — SIGNIFICANT CHANGE UP (ref 3.8–10.5)
WBC # BLD: 6.93 K/UL — SIGNIFICANT CHANGE UP (ref 3.8–10.5)
WBC # BLD: 7.32 K/UL — SIGNIFICANT CHANGE UP (ref 3.8–10.5)
WBC # BLD: 7.38 K/UL — SIGNIFICANT CHANGE UP (ref 3.8–10.5)
WBC # BLD: 7.99 K/UL — SIGNIFICANT CHANGE UP (ref 3.8–10.5)
WBC # BLD: 8.28 K/UL — SIGNIFICANT CHANGE UP (ref 3.8–10.5)
WBC # BLD: 8.73 K/UL — SIGNIFICANT CHANGE UP (ref 3.8–10.5)
WBC # BLD: 8.85 K/UL — SIGNIFICANT CHANGE UP (ref 3.8–10.5)
WBC # BLD: 9.22 K/UL — SIGNIFICANT CHANGE UP (ref 3.8–10.5)
WBC # BLD: 9.27 K/UL — SIGNIFICANT CHANGE UP (ref 3.8–10.5)
WBC # BLD: 9.3 K/UL — SIGNIFICANT CHANGE UP (ref 3.8–10.5)
WBC # BLD: 9.69 K/UL — SIGNIFICANT CHANGE UP (ref 3.8–10.5)
WBC # BLD: 9.95 K/UL — SIGNIFICANT CHANGE UP (ref 3.8–10.5)
WBC # BLD: 9.95 K/UL — SIGNIFICANT CHANGE UP (ref 3.8–10.5)
WBC # BLD: SIGNIFICANT CHANGE UP K/UL (ref 3.8–10.5)
WBC # FLD AUTO: 11.07 K/UL — HIGH (ref 3.8–10.5)
WBC # FLD AUTO: 11.35 K/UL — HIGH (ref 3.8–10.5)
WBC # FLD AUTO: 11.62 K/UL — HIGH (ref 3.8–10.5)
WBC # FLD AUTO: 11.81 K/UL — HIGH (ref 3.8–10.5)
WBC # FLD AUTO: 13.43 K/UL — HIGH (ref 3.8–10.5)
WBC # FLD AUTO: 14.33 K/UL — HIGH (ref 3.8–10.5)
WBC # FLD AUTO: 14.57 K/UL — HIGH (ref 3.8–10.5)
WBC # FLD AUTO: 2.87 K/UL — LOW (ref 3.8–10.5)
WBC # FLD AUTO: 3.18 K/UL — LOW (ref 3.8–10.5)
WBC # FLD AUTO: 3.31 K/UL — LOW (ref 3.8–10.5)
WBC # FLD AUTO: 3.37 K/UL — LOW (ref 3.8–10.5)
WBC # FLD AUTO: 3.51 K/UL — LOW (ref 3.8–10.5)
WBC # FLD AUTO: 3.67 K/UL — LOW (ref 3.8–10.5)
WBC # FLD AUTO: 3.91 K/UL — SIGNIFICANT CHANGE UP (ref 3.8–10.5)
WBC # FLD AUTO: 3.99 K/UL — SIGNIFICANT CHANGE UP (ref 3.8–10.5)
WBC # FLD AUTO: 4.15 K/UL — SIGNIFICANT CHANGE UP (ref 3.8–10.5)
WBC # FLD AUTO: 4.49 K/UL — SIGNIFICANT CHANGE UP (ref 3.8–10.5)
WBC # FLD AUTO: 4.69 K/UL — SIGNIFICANT CHANGE UP (ref 3.8–10.5)
WBC # FLD AUTO: 5.16 K/UL — SIGNIFICANT CHANGE UP (ref 3.8–10.5)
WBC # FLD AUTO: 5.5 K/UL — SIGNIFICANT CHANGE UP (ref 3.8–10.5)
WBC # FLD AUTO: 5.59 K/UL — SIGNIFICANT CHANGE UP (ref 3.8–10.5)
WBC # FLD AUTO: 5.63 K/UL — SIGNIFICANT CHANGE UP (ref 3.8–10.5)
WBC # FLD AUTO: 5.65 K/UL — SIGNIFICANT CHANGE UP (ref 3.8–10.5)
WBC # FLD AUTO: 5.68 K/UL — SIGNIFICANT CHANGE UP (ref 3.8–10.5)
WBC # FLD AUTO: 6.46 K/UL — SIGNIFICANT CHANGE UP (ref 3.8–10.5)
WBC # FLD AUTO: 6.58 K/UL — SIGNIFICANT CHANGE UP (ref 3.8–10.5)
WBC # FLD AUTO: 6.61 K/UL — SIGNIFICANT CHANGE UP (ref 3.8–10.5)
WBC # FLD AUTO: 6.77 K/UL — SIGNIFICANT CHANGE UP (ref 3.8–10.5)
WBC # FLD AUTO: 6.85 K/UL — SIGNIFICANT CHANGE UP (ref 3.8–10.5)
WBC # FLD AUTO: 6.93 K/UL — SIGNIFICANT CHANGE UP (ref 3.8–10.5)
WBC # FLD AUTO: 7.32 K/UL — SIGNIFICANT CHANGE UP (ref 3.8–10.5)
WBC # FLD AUTO: 7.38 K/UL — SIGNIFICANT CHANGE UP (ref 3.8–10.5)
WBC # FLD AUTO: 7.99 K/UL — SIGNIFICANT CHANGE UP (ref 3.8–10.5)
WBC # FLD AUTO: 8.28 K/UL — SIGNIFICANT CHANGE UP (ref 3.8–10.5)
WBC # FLD AUTO: 8.73 K/UL — SIGNIFICANT CHANGE UP (ref 3.8–10.5)
WBC # FLD AUTO: 8.85 K/UL — SIGNIFICANT CHANGE UP (ref 3.8–10.5)
WBC # FLD AUTO: 9.22 K/UL — SIGNIFICANT CHANGE UP (ref 3.8–10.5)
WBC # FLD AUTO: 9.27 K/UL — SIGNIFICANT CHANGE UP (ref 3.8–10.5)
WBC # FLD AUTO: 9.3 K/UL — SIGNIFICANT CHANGE UP (ref 3.8–10.5)
WBC # FLD AUTO: 9.69 K/UL — SIGNIFICANT CHANGE UP (ref 3.8–10.5)
WBC # FLD AUTO: 9.95 K/UL — SIGNIFICANT CHANGE UP (ref 3.8–10.5)
WBC # FLD AUTO: 9.95 K/UL — SIGNIFICANT CHANGE UP (ref 3.8–10.5)
WBC # FLD AUTO: SIGNIFICANT CHANGE UP K/UL (ref 3.8–10.5)

## 2025-01-01 PROCEDURE — 86255 FLUORESCENT ANTIBODY SCREEN: CPT

## 2025-01-01 PROCEDURE — 76770 US EXAM ABDO BACK WALL COMP: CPT | Mod: 26

## 2025-01-01 PROCEDURE — 99232 SBSQ HOSP IP/OBS MODERATE 35: CPT

## 2025-01-01 PROCEDURE — 71045 X-RAY EXAM CHEST 1 VIEW: CPT | Mod: 26,77

## 2025-01-01 PROCEDURE — 87077 CULTURE AEROBIC IDENTIFY: CPT

## 2025-01-01 PROCEDURE — 99233 SBSQ HOSP IP/OBS HIGH 50: CPT

## 2025-01-01 PROCEDURE — 71045 X-RAY EXAM CHEST 1 VIEW: CPT | Mod: 26

## 2025-01-01 PROCEDURE — C1769: CPT

## 2025-01-01 PROCEDURE — 88311 DECALCIFY TISSUE: CPT

## 2025-01-01 PROCEDURE — 88312 SPECIAL STAINS GROUP 1: CPT

## 2025-01-01 PROCEDURE — 74018 RADEX ABDOMEN 1 VIEW: CPT | Mod: 26

## 2025-01-01 PROCEDURE — 86038 ANTINUCLEAR ANTIBODIES: CPT

## 2025-01-01 PROCEDURE — 85730 THROMBOPLASTIN TIME PARTIAL: CPT

## 2025-01-01 PROCEDURE — 70450 CT HEAD/BRAIN W/O DYE: CPT | Mod: 26

## 2025-01-01 PROCEDURE — 84156 ASSAY OF PROTEIN URINE: CPT

## 2025-01-01 PROCEDURE — 88305 TISSUE EXAM BY PATHOLOGIST: CPT | Mod: 26

## 2025-01-01 PROCEDURE — 99291 CRITICAL CARE FIRST HOUR: CPT

## 2025-01-01 PROCEDURE — 70450 CT HEAD/BRAIN W/O DYE: CPT

## 2025-01-01 PROCEDURE — 82247 BILIRUBIN TOTAL: CPT

## 2025-01-01 PROCEDURE — 82977 ASSAY OF GGT: CPT

## 2025-01-01 PROCEDURE — 93971 EXTREMITY STUDY: CPT | Mod: 26,LT

## 2025-01-01 PROCEDURE — 85610 PROTHROMBIN TIME: CPT

## 2025-01-01 PROCEDURE — 83036 HEMOGLOBIN GLYCOSYLATED A1C: CPT

## 2025-01-01 PROCEDURE — C1889: CPT

## 2025-01-01 PROCEDURE — 93970 EXTREMITY STUDY: CPT | Mod: 26

## 2025-01-01 PROCEDURE — 82140 ASSAY OF AMMONIA: CPT

## 2025-01-01 PROCEDURE — 83735 ASSAY OF MAGNESIUM: CPT

## 2025-01-01 PROCEDURE — P9059: CPT

## 2025-01-01 PROCEDURE — 93306 TTE W/DOPPLER COMPLETE: CPT

## 2025-01-01 PROCEDURE — 99232 SBSQ HOSP IP/OBS MODERATE 35: CPT | Mod: FS

## 2025-01-01 PROCEDURE — P9100: CPT

## 2025-01-01 PROCEDURE — 95819 EEG AWAKE AND ASLEEP: CPT

## 2025-01-01 PROCEDURE — 82248 BILIRUBIN DIRECT: CPT

## 2025-01-01 PROCEDURE — 86140 C-REACTIVE PROTEIN: CPT

## 2025-01-01 PROCEDURE — 99223 1ST HOSP IP/OBS HIGH 75: CPT | Mod: AI

## 2025-01-01 PROCEDURE — 36430 TRANSFUSION BLD/BLD COMPNT: CPT

## 2025-01-01 PROCEDURE — 99223 1ST HOSP IP/OBS HIGH 75: CPT

## 2025-01-01 PROCEDURE — 93010 ELECTROCARDIOGRAM REPORT: CPT

## 2025-01-01 PROCEDURE — 94003 VENT MGMT INPAT SUBQ DAY: CPT

## 2025-01-01 PROCEDURE — 36415 COLL VENOUS BLD VENIPUNCTURE: CPT

## 2025-01-01 PROCEDURE — 99222 1ST HOSP IP/OBS MODERATE 55: CPT

## 2025-01-01 PROCEDURE — 95720 EEG PHY/QHP EA INCR W/VEEG: CPT

## 2025-01-01 PROCEDURE — 74176 CT ABD & PELVIS W/O CONTRAST: CPT

## 2025-01-01 PROCEDURE — 85018 HEMOGLOBIN: CPT

## 2025-01-01 PROCEDURE — 82330 ASSAY OF CALCIUM: CPT

## 2025-01-01 PROCEDURE — 92610 EVALUATE SWALLOWING FUNCTION: CPT | Mod: GN

## 2025-01-01 PROCEDURE — 43255 EGD CONTROL BLEEDING ANY: CPT | Mod: XU

## 2025-01-01 PROCEDURE — 74174 CTA ABD&PLVS W/CONTRAST: CPT

## 2025-01-01 PROCEDURE — 84300 ASSAY OF URINE SODIUM: CPT

## 2025-01-01 PROCEDURE — 80053 COMPREHEN METABOLIC PANEL: CPT

## 2025-01-01 PROCEDURE — 86900 BLOOD TYPING SEROLOGIC ABO: CPT

## 2025-01-01 PROCEDURE — 83605 ASSAY OF LACTIC ACID: CPT

## 2025-01-01 PROCEDURE — 82728 ASSAY OF FERRITIN: CPT

## 2025-01-01 PROCEDURE — 82803 BLOOD GASES ANY COMBINATION: CPT

## 2025-01-01 PROCEDURE — 74183 MRI ABD W/O CNTR FLWD CNTR: CPT | Mod: 26

## 2025-01-01 PROCEDURE — 95700 EEG CONT REC W/VID EEG TECH: CPT

## 2025-01-01 PROCEDURE — 86923 COMPATIBILITY TEST ELECTRIC: CPT

## 2025-01-01 PROCEDURE — 86901 BLOOD TYPING SEROLOGIC RH(D): CPT

## 2025-01-01 PROCEDURE — 88311 DECALCIFY TISSUE: CPT | Mod: 26

## 2025-01-01 PROCEDURE — 87186 SC STD MICRODIL/AGAR DIL: CPT

## 2025-01-01 PROCEDURE — 99497 ADVNCD CARE PLAN 30 MIN: CPT | Mod: 25

## 2025-01-01 PROCEDURE — 76700 US EXAM ABDOM COMPLETE: CPT | Mod: 26

## 2025-01-01 PROCEDURE — 85379 FIBRIN DEGRADATION QUANT: CPT

## 2025-01-01 PROCEDURE — 84133 ASSAY OF URINE POTASSIUM: CPT

## 2025-01-01 PROCEDURE — 85027 COMPLETE CBC AUTOMATED: CPT

## 2025-01-01 PROCEDURE — 88312 SPECIAL STAINS GROUP 1: CPT | Mod: 26

## 2025-01-01 PROCEDURE — 88304 TISSUE EXAM BY PATHOLOGIST: CPT | Mod: 26

## 2025-01-01 PROCEDURE — 93005 ELECTROCARDIOGRAM TRACING: CPT

## 2025-01-01 PROCEDURE — 87641 MR-STAPH DNA AMP PROBE: CPT

## 2025-01-01 PROCEDURE — 74176 CT ABD & PELVIS W/O CONTRAST: CPT | Mod: 26

## 2025-01-01 PROCEDURE — 76770 US EXAM ABDO BACK WALL COMP: CPT

## 2025-01-01 PROCEDURE — 87205 SMEAR GRAM STAIN: CPT

## 2025-01-01 PROCEDURE — 71045 X-RAY EXAM CHEST 1 VIEW: CPT

## 2025-01-01 PROCEDURE — 86850 RBC ANTIBODY SCREEN: CPT

## 2025-01-01 PROCEDURE — 74183 MRI ABD W/O CNTR FLWD CNTR: CPT

## 2025-01-01 PROCEDURE — A9579: CPT

## 2025-01-01 PROCEDURE — 99222 1ST HOSP IP/OBS MODERATE 55: CPT | Mod: FS

## 2025-01-01 PROCEDURE — 82553 CREATINE MB FRACTION: CPT

## 2025-01-01 PROCEDURE — 83935 ASSAY OF URINE OSMOLALITY: CPT

## 2025-01-01 PROCEDURE — 81001 URINALYSIS AUTO W/SCOPE: CPT

## 2025-01-01 PROCEDURE — 85652 RBC SED RATE AUTOMATED: CPT

## 2025-01-01 PROCEDURE — 87070 CULTURE OTHR SPECIMN AEROBIC: CPT

## 2025-01-01 PROCEDURE — C1880: CPT

## 2025-01-01 PROCEDURE — G0545: CPT

## 2025-01-01 PROCEDURE — 95819 EEG AWAKE AND ASLEEP: CPT | Mod: 26

## 2025-01-01 PROCEDURE — 82962 GLUCOSE BLOOD TEST: CPT

## 2025-01-01 PROCEDURE — 82570 ASSAY OF URINE CREATININE: CPT

## 2025-01-01 PROCEDURE — 85025 COMPLETE CBC W/AUTO DIFF WBC: CPT

## 2025-01-01 PROCEDURE — 99291 CRITICAL CARE FIRST HOUR: CPT | Mod: FS

## 2025-01-01 PROCEDURE — 99223 1ST HOSP IP/OBS HIGH 75: CPT | Mod: FS

## 2025-01-01 PROCEDURE — 80048 BASIC METABOLIC PNL TOTAL CA: CPT

## 2025-01-01 PROCEDURE — P9047: CPT | Mod: JZ

## 2025-01-01 PROCEDURE — 87150 DNA/RNA AMPLIFIED PROBE: CPT

## 2025-01-01 PROCEDURE — 88304 TISSUE EXAM BY PATHOLOGIST: CPT

## 2025-01-01 PROCEDURE — 43239 EGD BIOPSY SINGLE/MULTIPLE: CPT

## 2025-01-01 PROCEDURE — 93971 EXTREMITY STUDY: CPT | Mod: LT

## 2025-01-01 PROCEDURE — 84100 ASSAY OF PHOSPHORUS: CPT

## 2025-01-01 PROCEDURE — 94002 VENT MGMT INPAT INIT DAY: CPT

## 2025-01-01 PROCEDURE — 88305 TISSUE EXAM BY PATHOLOGIST: CPT

## 2025-01-01 PROCEDURE — 82390 ASSAY OF CERULOPLASMIN: CPT

## 2025-01-01 PROCEDURE — 83615 LACTATE (LD) (LDH) ENZYME: CPT

## 2025-01-01 PROCEDURE — P9035: CPT

## 2025-01-01 PROCEDURE — P9016: CPT

## 2025-01-01 PROCEDURE — 80202 ASSAY OF VANCOMYCIN: CPT

## 2025-01-01 PROCEDURE — 87040 BLOOD CULTURE FOR BACTERIA: CPT

## 2025-01-01 PROCEDURE — 95714 VEEG EA 12-26 HR UNMNTR: CPT

## 2025-01-01 PROCEDURE — 82103 ALPHA-1-ANTITRYPSIN TOTAL: CPT

## 2025-01-01 PROCEDURE — 84132 ASSAY OF SERUM POTASSIUM: CPT

## 2025-01-01 PROCEDURE — 74018 RADEX ABDOMEN 1 VIEW: CPT

## 2025-01-01 PROCEDURE — 74174 CTA ABD&PLVS W/CONTRAST: CPT | Mod: 26

## 2025-01-01 PROCEDURE — 93970 EXTREMITY STUDY: CPT

## 2025-01-01 PROCEDURE — 84540 ASSAY OF URINE/UREA-N: CPT

## 2025-01-01 PROCEDURE — 87640 STAPH A DNA AMP PROBE: CPT

## 2025-01-01 PROCEDURE — 37191 INS ENDOVAS VENA CAVA FILTR: CPT

## 2025-01-01 PROCEDURE — 82010 KETONE BODYS QUAN: CPT

## 2025-01-01 PROCEDURE — 84295 ASSAY OF SERUM SODIUM: CPT

## 2025-01-01 PROCEDURE — 85014 HEMATOCRIT: CPT

## 2025-01-01 PROCEDURE — 80074 ACUTE HEPATITIS PANEL: CPT

## 2025-01-01 PROCEDURE — 86381 MITOCHONDRIAL ANTIBODY EACH: CPT

## 2025-01-01 PROCEDURE — 84484 ASSAY OF TROPONIN QUANT: CPT

## 2025-01-01 PROCEDURE — 93306 TTE W/DOPPLER COMPLETE: CPT | Mod: 26

## 2025-01-01 PROCEDURE — 82550 ASSAY OF CK (CPK): CPT

## 2025-01-01 PROCEDURE — 80177 DRUG SCRN QUAN LEVETIRACETAM: CPT

## 2025-01-01 PROCEDURE — 99233 SBSQ HOSP IP/OBS HIGH 50: CPT | Mod: FS

## 2025-01-01 PROCEDURE — 99221 1ST HOSP IP/OBS SF/LOW 40: CPT | Mod: GC

## 2025-01-01 RX ORDER — SODIUM CHLORIDE 9 G/1000ML
500 INJECTION, SOLUTION INTRAVENOUS ONCE
Refills: 0 | Status: COMPLETED | OUTPATIENT
Start: 2025-01-01 | End: 2025-01-01

## 2025-01-01 RX ORDER — DEXTROSE 50 % IN WATER 50 %
40 SYRINGE (ML) INTRAVENOUS ONCE
Refills: 0 | Status: DISCONTINUED | OUTPATIENT
Start: 2025-01-01 | End: 2025-01-01

## 2025-01-01 RX ORDER — SODIUM CHLORIDE 9 G/1000ML
1000 INJECTION, SOLUTION INTRAVENOUS
Refills: 0 | Status: DISCONTINUED | OUTPATIENT
Start: 2025-01-01 | End: 2025-01-01

## 2025-01-01 RX ORDER — FLUCONAZOLE 150 MG
TABLET ORAL
Refills: 0 | Status: DISCONTINUED | OUTPATIENT
Start: 2025-01-01 | End: 2025-01-01

## 2025-01-01 RX ORDER — LACTULOSE 10 G/15ML
20 SOLUTION ORAL THREE TIMES A DAY
Refills: 0 | Status: DISCONTINUED | OUTPATIENT
Start: 2025-01-01 | End: 2025-01-01

## 2025-01-01 RX ORDER — DEXTROSE 50 % IN WATER 50 %
50 SYRINGE (ML) INTRAVENOUS ONCE
Refills: 0 | Status: COMPLETED | OUTPATIENT
Start: 2025-01-01 | End: 2025-01-01

## 2025-01-01 RX ORDER — DEXTROSE 50 % IN WATER 50 %
25 SYRINGE (ML) INTRAVENOUS ONCE
Refills: 0 | Status: COMPLETED | OUTPATIENT
Start: 2025-01-01 | End: 2025-01-01

## 2025-01-01 RX ORDER — ALBUMIN (HUMAN) 12.5 G/50ML
300 INJECTION, SOLUTION INTRAVENOUS ONCE
Refills: 0 | Status: COMPLETED | OUTPATIENT
Start: 2025-01-01 | End: 2025-01-01

## 2025-01-01 RX ORDER — INSULIN GLARGINE-YFGN 100 [IU]/ML
22 INJECTION, SOLUTION SUBCUTANEOUS AT BEDTIME
Refills: 0 | Status: DISCONTINUED | OUTPATIENT
Start: 2025-01-01 | End: 2025-01-01

## 2025-01-01 RX ORDER — INSULIN GLARGINE-YFGN 100 [IU]/ML
3 INJECTION, SOLUTION SUBCUTANEOUS EVERY MORNING
Refills: 0 | Status: DISCONTINUED | OUTPATIENT
Start: 2025-01-01 | End: 2025-01-01

## 2025-01-01 RX ORDER — VANCOMYCIN HCL IN 5 % DEXTROSE 1.5G/250ML
1000 PLASTIC BAG, INJECTION (ML) INTRAVENOUS ONCE
Refills: 0 | Status: COMPLETED | OUTPATIENT
Start: 2025-01-01 | End: 2025-01-01

## 2025-01-01 RX ORDER — MIDODRINE HYDROCHLORIDE 5 MG/1
5 TABLET ORAL EVERY 8 HOURS
Refills: 0 | Status: DISCONTINUED | OUTPATIENT
Start: 2025-01-01 | End: 2025-01-01

## 2025-01-01 RX ORDER — SODIUM BICARBONATE 1 MEQ/ML
1300 SYRINGE (ML) INTRAVENOUS EVERY 8 HOURS
Refills: 0 | Status: DISCONTINUED | OUTPATIENT
Start: 2025-01-01 | End: 2025-01-01

## 2025-01-01 RX ORDER — HYDROMORPHONE/SOD CHLOR,ISO/PF 2 MG/10 ML
0.2 SYRINGE (ML) INJECTION EVERY 8 HOURS
Refills: 0 | Status: DISCONTINUED | OUTPATIENT
Start: 2025-01-01 | End: 2025-01-01

## 2025-01-01 RX ORDER — NOREPINEPHRINE BITARTRATE 8 MG
0.05 SOLUTION INTRAVENOUS
Qty: 8 | Refills: 0 | Status: DISCONTINUED | OUTPATIENT
Start: 2025-01-01 | End: 2025-01-01

## 2025-01-01 RX ORDER — INSULIN LISPRO 100 U/ML
8 INJECTION, SOLUTION INTRAVENOUS; SUBCUTANEOUS
Refills: 0 | Status: DISCONTINUED | OUTPATIENT
Start: 2025-01-01 | End: 2025-01-01

## 2025-01-01 RX ORDER — MIDODRINE HYDROCHLORIDE 5 MG/1
10 TABLET ORAL EVERY 8 HOURS
Refills: 0 | Status: DISCONTINUED | OUTPATIENT
Start: 2025-01-01 | End: 2025-01-01

## 2025-01-01 RX ORDER — OCTREOTIDE ACETATE 500 UG/ML
200 INJECTION, SOLUTION INTRAVENOUS; SUBCUTANEOUS EVERY 8 HOURS
Refills: 0 | Status: DISCONTINUED | OUTPATIENT
Start: 2025-01-01 | End: 2025-01-01

## 2025-01-01 RX ORDER — FOLIC ACID 1 MG/1
1 TABLET ORAL DAILY
Refills: 0 | Status: DISCONTINUED | OUTPATIENT
Start: 2025-01-01 | End: 2025-01-01

## 2025-01-01 RX ORDER — DEXTROSE 50 % IN WATER 50 %
50 SYRINGE (ML) INTRAVENOUS ONCE
Refills: 0 | Status: DISCONTINUED | OUTPATIENT
Start: 2025-01-01 | End: 2025-01-01

## 2025-01-01 RX ORDER — GLUCAGON 3 MG/1
1 POWDER NASAL ONCE
Refills: 0 | Status: DISCONTINUED | OUTPATIENT
Start: 2025-01-01 | End: 2025-01-01

## 2025-01-01 RX ORDER — HEPARIN SODIUM,PORCINE/NS/PF 20/20 ML
500 SYRINGE (ML) INTRAVENOUS ONCE
Refills: 0 | Status: COMPLETED | OUTPATIENT
Start: 2025-01-01 | End: 2025-01-01

## 2025-01-01 RX ORDER — INSULIN GLARGINE-YFGN 100 [IU]/ML
15 INJECTION, SOLUTION SUBCUTANEOUS AT BEDTIME
Refills: 0 | Status: DISCONTINUED | OUTPATIENT
Start: 2025-01-01 | End: 2025-01-01

## 2025-01-01 RX ORDER — DEXTROSE 50 % IN WATER 50 %
50 SYRINGE (ML) INTRAVENOUS
Refills: 0 | Status: COMPLETED | OUTPATIENT
Start: 2025-01-01 | End: 2025-01-01

## 2025-01-01 RX ORDER — LACTULOSE 10 G/15ML
20 SOLUTION ORAL EVERY 4 HOURS
Refills: 0 | Status: DISCONTINUED | OUTPATIENT
Start: 2025-01-01 | End: 2025-01-01

## 2025-01-01 RX ORDER — LEVETIRACETAM 10 MG/ML
250 INJECTION, SOLUTION INTRAVENOUS EVERY 12 HOURS
Refills: 0 | Status: DISCONTINUED | OUTPATIENT
Start: 2025-01-01 | End: 2025-01-01

## 2025-01-01 RX ORDER — FLUCONAZOLE 150 MG
200 TABLET ORAL EVERY 24 HOURS
Refills: 0 | Status: DISCONTINUED | OUTPATIENT
Start: 2025-01-01 | End: 2025-01-01

## 2025-01-01 RX ORDER — ASPIRIN 325 MG
81 TABLET ORAL DAILY
Refills: 0 | Status: DISCONTINUED | OUTPATIENT
Start: 2025-01-01 | End: 2025-01-01

## 2025-01-01 RX ORDER — DEXTROSE 50 % IN WATER 50 %
25 SYRINGE (ML) INTRAVENOUS ONCE
Refills: 0 | Status: DISCONTINUED | OUTPATIENT
Start: 2025-01-01 | End: 2025-01-01

## 2025-01-01 RX ORDER — BUMETANIDE 1 MG/1
2 TABLET ORAL EVERY 8 HOURS
Refills: 0 | Status: DISCONTINUED | OUTPATIENT
Start: 2025-01-01 | End: 2025-01-01

## 2025-01-01 RX ORDER — FLUCONAZOLE 150 MG
200 TABLET ORAL ONCE
Refills: 0 | Status: COMPLETED | OUTPATIENT
Start: 2025-01-01 | End: 2025-01-01

## 2025-01-01 RX ORDER — INSULIN LISPRO 100 U/ML
INJECTION, SOLUTION INTRAVENOUS; SUBCUTANEOUS
Refills: 0 | Status: DISCONTINUED | OUTPATIENT
Start: 2025-01-01 | End: 2025-01-01

## 2025-01-01 RX ORDER — FENTANYL CITRATE-0.9 % NACL/PF 100MCG/2ML
0.5 SYRINGE (ML) INTRAVENOUS
Qty: 5000 | Refills: 0 | Status: DISCONTINUED | OUTPATIENT
Start: 2025-01-01 | End: 2025-01-01

## 2025-01-01 RX ORDER — INSULIN LISPRO 100 U/ML
INJECTION, SOLUTION INTRAVENOUS; SUBCUTANEOUS EVERY 6 HOURS
Refills: 0 | Status: DISCONTINUED | OUTPATIENT
Start: 2025-01-01 | End: 2025-01-01

## 2025-01-01 RX ORDER — AMPICILLIN SODIUM 1 G/1
2 INJECTION, POWDER, FOR SOLUTION INTRAMUSCULAR; INTRAVENOUS EVERY 6 HOURS
Refills: 0 | Status: COMPLETED | OUTPATIENT
Start: 2025-01-01 | End: 2025-01-01

## 2025-01-01 RX ORDER — ACETAMINOPHEN 500 MG/5ML
650 LIQUID (ML) ORAL EVERY 8 HOURS
Refills: 0 | Status: DISCONTINUED | OUTPATIENT
Start: 2025-01-01 | End: 2025-01-01

## 2025-01-01 RX ORDER — LACTULOSE 10 G/15ML
10 SOLUTION ORAL EVERY 4 HOURS
Refills: 0 | Status: DISCONTINUED | OUTPATIENT
Start: 2025-01-01 | End: 2025-01-01

## 2025-01-01 RX ORDER — PIPERACILLIN-TAZO-DEXTROSE,ISO 3.375G/5
3.38 IV SOLUTION, PIGGYBACK PREMIX FROZEN(ML) INTRAVENOUS ONCE
Refills: 0 | Status: COMPLETED | OUTPATIENT
Start: 2025-01-01 | End: 2025-01-01

## 2025-01-01 RX ORDER — SPIRONOLACTONE 25 MG
50 TABLET ORAL DAILY
Refills: 0 | Status: DISCONTINUED | OUTPATIENT
Start: 2025-01-01 | End: 2025-01-01

## 2025-01-01 RX ORDER — PIPERACILLIN-TAZO-DEXTROSE,ISO 3.375G/5
3.38 IV SOLUTION, PIGGYBACK PREMIX FROZEN(ML) INTRAVENOUS EVERY 8 HOURS
Refills: 0 | Status: DISCONTINUED | OUTPATIENT
Start: 2025-01-01 | End: 2025-01-01

## 2025-01-01 RX ORDER — HYDROMORPHONE/SOD CHLOR,ISO/PF 2 MG/10 ML
0.2 SYRINGE (ML) INJECTION ONCE
Refills: 0 | Status: DISCONTINUED | OUTPATIENT
Start: 2025-01-01 | End: 2025-01-01

## 2025-01-01 RX ORDER — INSULIN LISPRO 100 U/ML
5 INJECTION, SOLUTION INTRAVENOUS; SUBCUTANEOUS ONCE
Refills: 0 | Status: COMPLETED | OUTPATIENT
Start: 2025-01-01 | End: 2025-01-01

## 2025-01-01 RX ORDER — NOREPINEPHRINE BITARTRATE 8 MG
0.05 SOLUTION INTRAVENOUS
Qty: 16 | Refills: 0 | Status: DISCONTINUED | OUTPATIENT
Start: 2025-01-01 | End: 2025-01-01

## 2025-01-01 RX ORDER — DEXTROSE 50 % IN WATER 50 %
12.5 SYRINGE (ML) INTRAVENOUS ONCE
Refills: 0 | Status: DISCONTINUED | OUTPATIENT
Start: 2025-01-01 | End: 2025-01-01

## 2025-01-01 RX ORDER — AMPICILLIN SODIUM 1 G/1
2 INJECTION, POWDER, FOR SOLUTION INTRAMUSCULAR; INTRAVENOUS EVERY 8 HOURS
Refills: 0 | Status: DISCONTINUED | OUTPATIENT
Start: 2025-01-01 | End: 2025-01-01

## 2025-01-01 RX ORDER — SODIUM CHLORIDE 9 G/1000ML
1000 INJECTION, SOLUTION INTRAVENOUS ONCE
Refills: 0 | Status: COMPLETED | OUTPATIENT
Start: 2025-01-01 | End: 2025-01-01

## 2025-01-01 RX ORDER — DEXTROSE 50 % IN WATER 50 %
15 SYRINGE (ML) INTRAVENOUS ONCE
Refills: 0 | Status: DISCONTINUED | OUTPATIENT
Start: 2025-01-01 | End: 2025-01-01

## 2025-01-01 RX ORDER — HYDROMORPHONE/SOD CHLOR,ISO/PF 2 MG/10 ML
0.5 SYRINGE (ML) INJECTION ONCE
Refills: 0 | Status: DISCONTINUED | OUTPATIENT
Start: 2025-01-01 | End: 2025-01-01

## 2025-01-01 RX ORDER — INSULIN GLARGINE-YFGN 100 [IU]/ML
3 INJECTION, SOLUTION SUBCUTANEOUS AT BEDTIME
Refills: 0 | Status: DISCONTINUED | OUTPATIENT
Start: 2025-01-01 | End: 2025-01-01

## 2025-01-01 RX ORDER — VASOPRESSIN 20 [USP'U]/ML
0.04 INJECTION INTRAVENOUS
Qty: 40 | Refills: 0 | Status: DISCONTINUED | OUTPATIENT
Start: 2025-01-01 | End: 2025-01-01

## 2025-01-01 RX ORDER — LACTULOSE 10 G/15ML
20 SOLUTION ORAL
Refills: 0 | Status: DISCONTINUED | OUTPATIENT
Start: 2025-01-01 | End: 2025-01-01

## 2025-01-01 RX ORDER — MAGNESIUM SULFATE 500 MG/ML
2 SYRINGE (ML) INJECTION EVERY 4 HOURS
Refills: 0 | Status: COMPLETED | OUTPATIENT
Start: 2025-01-01 | End: 2025-01-01

## 2025-01-01 RX ORDER — INSULIN LISPRO 100 U/ML
5 INJECTION, SOLUTION INTRAVENOUS; SUBCUTANEOUS EVERY 6 HOURS
Refills: 0 | Status: DISCONTINUED | OUTPATIENT
Start: 2025-01-01 | End: 2025-01-01

## 2025-01-01 RX ORDER — HYDROMORPHONE/SOD CHLOR,ISO/PF 2 MG/10 ML
0.5 SYRINGE (ML) INJECTION
Refills: 0 | Status: DISCONTINUED | OUTPATIENT
Start: 2025-01-01 | End: 2025-01-01

## 2025-01-01 RX ORDER — LACTULOSE 10 G/15ML
10 SOLUTION ORAL EVERY 6 HOURS
Refills: 0 | Status: DISCONTINUED | OUTPATIENT
Start: 2025-01-01 | End: 2025-01-01

## 2025-01-01 RX ORDER — FLUCONAZOLE 150 MG
400 TABLET ORAL EVERY 24 HOURS
Refills: 0 | Status: DISCONTINUED | OUTPATIENT
Start: 2025-01-01 | End: 2025-01-01

## 2025-01-01 RX ORDER — OCTREOTIDE ACETATE 500 UG/ML
25 INJECTION, SOLUTION INTRAVENOUS; SUBCUTANEOUS
Qty: 500 | Refills: 0 | Status: DISCONTINUED | OUTPATIENT
Start: 2025-01-01 | End: 2025-01-01

## 2025-01-01 RX ORDER — LACTULOSE 10 G/15ML
200 SOLUTION ORAL ONCE
Refills: 0 | Status: COMPLETED | OUTPATIENT
Start: 2025-01-01 | End: 2025-01-01

## 2025-01-01 RX ORDER — MEROPENEM 1 G/30ML
500 INJECTION INTRAVENOUS EVERY 12 HOURS
Refills: 0 | Status: DISCONTINUED | OUTPATIENT
Start: 2025-01-01 | End: 2025-01-01

## 2025-01-01 RX ORDER — SODIUM BICARBONATE 1 MEQ/ML
50 SYRINGE (ML) INTRAVENOUS ONCE
Refills: 0 | Status: COMPLETED | OUTPATIENT
Start: 2025-01-01 | End: 2025-01-01

## 2025-01-01 RX ORDER — FENTANYL CITRATE-0.9 % NACL/PF 100MCG/2ML
0.42 SYRINGE (ML) INTRAVENOUS
Qty: 2500 | Refills: 0 | Status: DISCONTINUED | OUTPATIENT
Start: 2025-01-01 | End: 2025-01-01

## 2025-01-01 RX ORDER — INSULIN LISPRO 100 U/ML
4 INJECTION, SOLUTION INTRAVENOUS; SUBCUTANEOUS
Refills: 0 | Status: DISCONTINUED | OUTPATIENT
Start: 2025-01-01 | End: 2025-01-01

## 2025-01-01 RX ORDER — LACTULOSE 10 G/15ML
10 SOLUTION ORAL EVERY 8 HOURS
Refills: 0 | Status: DISCONTINUED | OUTPATIENT
Start: 2025-01-01 | End: 2025-01-01

## 2025-01-01 RX ORDER — MAGNESIUM SULFATE 500 MG/ML
1 SYRINGE (ML) INJECTION ONCE
Refills: 0 | Status: COMPLETED | OUTPATIENT
Start: 2025-01-01 | End: 2025-01-01

## 2025-01-01 RX ORDER — SODIUM BICARBONATE 1 MEQ/ML
650 SYRINGE (ML) INTRAVENOUS EVERY 8 HOURS
Refills: 0 | Status: DISCONTINUED | OUTPATIENT
Start: 2025-01-01 | End: 2025-01-01

## 2025-01-01 RX ORDER — FLUCONAZOLE 150 MG
200 TABLET ORAL EVERY 24 HOURS
Refills: 0 | Status: CANCELLED | OUTPATIENT
Start: 2025-01-01 | End: 2025-01-01

## 2025-01-01 RX ORDER — LORAZEPAM 4 MG/ML
2 VIAL (ML) INJECTION
Refills: 0 | Status: DISCONTINUED | OUTPATIENT
Start: 2025-01-01 | End: 2025-01-01

## 2025-01-01 RX ORDER — BUMETANIDE 1 MG/1
2 TABLET ORAL ONCE
Refills: 0 | Status: COMPLETED | OUTPATIENT
Start: 2025-01-01 | End: 2025-01-01

## 2025-01-01 RX ORDER — INSULIN LISPRO 100 U/ML
3 INJECTION, SOLUTION INTRAVENOUS; SUBCUTANEOUS
Refills: 0 | Status: DISCONTINUED | OUTPATIENT
Start: 2025-01-01 | End: 2025-01-01

## 2025-01-01 RX ORDER — VANCOMYCIN HCL IN 5 % DEXTROSE 1.5G/250ML
500 PLASTIC BAG, INJECTION (ML) INTRAVENOUS EVERY 24 HOURS
Refills: 0 | Status: DISCONTINUED | OUTPATIENT
Start: 2025-01-01 | End: 2025-01-01

## 2025-01-01 RX ORDER — SODIUM BICARBONATE 1 MEQ/ML
0.15 SYRINGE (ML) INTRAVENOUS
Qty: 150 | Refills: 0 | Status: DISCONTINUED | OUTPATIENT
Start: 2025-01-01 | End: 2025-01-01

## 2025-01-01 RX ORDER — MEROPENEM 1 G/30ML
1000 INJECTION INTRAVENOUS EVERY 12 HOURS
Refills: 0 | Status: DISCONTINUED | OUTPATIENT
Start: 2025-01-01 | End: 2025-01-01

## 2025-01-01 RX ORDER — GLYCOPYRROLATE 0.2 MG/ML
0.2 INJECTION INTRAMUSCULAR; INTRAVENOUS
Refills: 0 | Status: DISCONTINUED | OUTPATIENT
Start: 2025-01-01 | End: 2025-01-01

## 2025-01-01 RX ORDER — ACETAZOLAMIDE 250 MG/1
250 TABLET ORAL ONCE
Refills: 0 | Status: COMPLETED | OUTPATIENT
Start: 2025-01-01 | End: 2025-01-01

## 2025-01-01 RX ORDER — SODIUM BICARBONATE 1 MEQ/ML
100 SYRINGE (ML) INTRAVENOUS ONCE
Refills: 0 | Status: COMPLETED | OUTPATIENT
Start: 2025-01-01 | End: 2025-01-01

## 2025-01-01 RX ORDER — DESMOPRESSIN ACETATE 4 UG/ML
4 INJECTION INTRAVENOUS ONCE
Refills: 0 | Status: COMPLETED | OUTPATIENT
Start: 2025-01-01 | End: 2025-01-01

## 2025-01-01 RX ORDER — PROPOFOL 10 MG/ML
10 INJECTION, EMULSION INTRAVENOUS
Qty: 1000 | Refills: 0 | Status: DISCONTINUED | OUTPATIENT
Start: 2025-01-01 | End: 2025-01-01

## 2025-01-01 RX ORDER — INSULIN GLARGINE-YFGN 100 [IU]/ML
35 INJECTION, SOLUTION SUBCUTANEOUS AT BEDTIME
Refills: 0 | Status: DISCONTINUED | OUTPATIENT
Start: 2025-01-01 | End: 2025-01-01

## 2025-01-01 RX ORDER — INSULIN GLARGINE-YFGN 100 [IU]/ML
30 INJECTION, SOLUTION SUBCUTANEOUS AT BEDTIME
Refills: 0 | Status: DISCONTINUED | OUTPATIENT
Start: 2025-01-01 | End: 2025-01-01

## 2025-01-01 RX ORDER — FENTANYL CITRATE-0.9 % NACL/PF 100MCG/2ML
0.5 SYRINGE (ML) INTRAVENOUS
Qty: 2500 | Refills: 0 | Status: DISCONTINUED | OUTPATIENT
Start: 2025-01-01 | End: 2025-01-01

## 2025-01-01 RX ORDER — LEVETIRACETAM 10 MG/ML
250 INJECTION, SOLUTION INTRAVENOUS
Refills: 0 | Status: DISCONTINUED | OUTPATIENT
Start: 2025-01-01 | End: 2025-01-01

## 2025-01-01 RX ORDER — NOREPINEPHRINE BITARTRATE 8 MG
0.05 SOLUTION INTRAVENOUS
Qty: 32 | Refills: 0 | Status: DISCONTINUED | OUTPATIENT
Start: 2025-01-01 | End: 2025-01-01

## 2025-01-01 RX ORDER — INSULIN LISPRO 100 U/ML
11 INJECTION, SOLUTION INTRAVENOUS; SUBCUTANEOUS
Refills: 0 | Status: DISCONTINUED | OUTPATIENT
Start: 2025-01-01 | End: 2025-01-01

## 2025-01-01 RX ORDER — FLUCONAZOLE 150 MG
800 TABLET ORAL ONCE
Refills: 0 | Status: COMPLETED | OUTPATIENT
Start: 2025-01-01 | End: 2025-01-01

## 2025-01-01 RX ORDER — INSULIN LISPRO 100 U/ML
15 INJECTION, SOLUTION INTRAVENOUS; SUBCUTANEOUS ONCE
Refills: 0 | Status: COMPLETED | OUTPATIENT
Start: 2025-01-01 | End: 2025-01-01

## 2025-01-01 RX ORDER — INSULIN GLARGINE-YFGN 100 [IU]/ML
6 INJECTION, SOLUTION SUBCUTANEOUS AT BEDTIME
Refills: 0 | Status: DISCONTINUED | OUTPATIENT
Start: 2025-01-01 | End: 2025-01-01

## 2025-01-01 RX ORDER — CEFEPIME 2 G/20ML
2000 INJECTION, POWDER, FOR SOLUTION INTRAVENOUS EVERY 24 HOURS
Refills: 0 | Status: DISCONTINUED | OUTPATIENT
Start: 2025-01-01 | End: 2025-01-01

## 2025-01-01 RX ORDER — SODIUM BICARBONATE 1 MEQ/ML
100 SYRINGE (ML) INTRAVENOUS ONCE
Refills: 0 | Status: DISCONTINUED | OUTPATIENT
Start: 2025-01-01 | End: 2025-01-01

## 2025-01-01 RX ORDER — INSULIN LISPRO 100 U/ML
4 INJECTION, SOLUTION INTRAVENOUS; SUBCUTANEOUS EVERY 6 HOURS
Refills: 0 | Status: DISCONTINUED | OUTPATIENT
Start: 2025-01-01 | End: 2025-01-01

## 2025-01-01 RX ORDER — INSULIN GLARGINE-YFGN 100 [IU]/ML
20 INJECTION, SOLUTION SUBCUTANEOUS AT BEDTIME
Refills: 0 | Status: DISCONTINUED | OUTPATIENT
Start: 2025-01-01 | End: 2025-01-01

## 2025-01-01 RX ORDER — CEFEPIME 2 G/20ML
2000 INJECTION, POWDER, FOR SOLUTION INTRAVENOUS ONCE
Refills: 0 | Status: COMPLETED | OUTPATIENT
Start: 2025-01-01 | End: 2025-01-01

## 2025-01-01 RX ORDER — CEFEPIME 2 G/20ML
1000 INJECTION, POWDER, FOR SOLUTION INTRAVENOUS EVERY 12 HOURS
Refills: 0 | Status: DISCONTINUED | OUTPATIENT
Start: 2025-01-01 | End: 2025-01-01

## 2025-01-01 RX ORDER — LACTULOSE 10 G/15ML
20 SOLUTION ORAL EVERY 8 HOURS
Refills: 0 | Status: DISCONTINUED | OUTPATIENT
Start: 2025-01-01 | End: 2025-01-01

## 2025-01-01 RX ORDER — LACTULOSE 10 G/15ML
20 SOLUTION ORAL DAILY
Refills: 0 | Status: DISCONTINUED | OUTPATIENT
Start: 2025-01-01 | End: 2025-01-01

## 2025-01-01 RX ORDER — PHENYLEPHRINE HCL IN 0.9% NACL 0.5 MG/5ML
0.1 SYRINGE (ML) INTRAVENOUS
Qty: 160 | Refills: 0 | Status: DISCONTINUED | OUTPATIENT
Start: 2025-01-01 | End: 2025-01-01

## 2025-01-01 RX ADMIN — LACTULOSE 20 GRAM(S): 10 SOLUTION ORAL at 05:26

## 2025-01-01 RX ADMIN — INSULIN LISPRO 4 UNIT(S): 100 INJECTION, SOLUTION INTRAVENOUS; SUBCUTANEOUS at 05:56

## 2025-01-01 RX ADMIN — INSULIN LISPRO 3: 100 INJECTION, SOLUTION INTRAVENOUS; SUBCUTANEOUS at 11:52

## 2025-01-01 RX ADMIN — AMPICILLIN SODIUM 200 GRAM(S): 1 INJECTION, POWDER, FOR SOLUTION INTRAMUSCULAR; INTRAVENOUS at 05:54

## 2025-01-01 RX ADMIN — SODIUM CHLORIDE 500 MILLILITER(S): 9 INJECTION, SOLUTION INTRAVENOUS at 00:33

## 2025-01-01 RX ADMIN — AMPICILLIN SODIUM 200 GRAM(S): 1 INJECTION, POWDER, FOR SOLUTION INTRAMUSCULAR; INTRAVENOUS at 05:29

## 2025-01-01 RX ADMIN — MIDODRINE HYDROCHLORIDE 10 MILLIGRAM(S): 5 TABLET ORAL at 21:34

## 2025-01-01 RX ADMIN — LEVETIRACETAM 250 MILLIGRAM(S): 10 INJECTION, SOLUTION INTRAVENOUS at 05:20

## 2025-01-01 RX ADMIN — Medication 650 MILLIGRAM(S): at 06:28

## 2025-01-01 RX ADMIN — INSULIN LISPRO 5: 100 INJECTION, SOLUTION INTRAVENOUS; SUBCUTANEOUS at 05:47

## 2025-01-01 RX ADMIN — Medication 650 MILLIGRAM(S): at 14:12

## 2025-01-01 RX ADMIN — INSULIN LISPRO 5 UNIT(S): 100 INJECTION, SOLUTION INTRAVENOUS; SUBCUTANEOUS at 11:58

## 2025-01-01 RX ADMIN — Medication 100 MILLIGRAM(S): at 22:49

## 2025-01-01 RX ADMIN — MIDODRINE HYDROCHLORIDE 10 MILLIGRAM(S): 5 TABLET ORAL at 13:25

## 2025-01-01 RX ADMIN — INSULIN LISPRO 3: 100 INJECTION, SOLUTION INTRAVENOUS; SUBCUTANEOUS at 11:54

## 2025-01-01 RX ADMIN — Medication 25 GRAM(S): at 08:35

## 2025-01-01 RX ADMIN — LACTULOSE 20 GRAM(S): 10 SOLUTION ORAL at 05:23

## 2025-01-01 RX ADMIN — LACTULOSE 10 GRAM(S): 10 SOLUTION ORAL at 11:41

## 2025-01-01 RX ADMIN — MIDODRINE HYDROCHLORIDE 10 MILLIGRAM(S): 5 TABLET ORAL at 22:41

## 2025-01-01 RX ADMIN — INSULIN GLARGINE-YFGN 3 UNIT(S): 100 INJECTION, SOLUTION SUBCUTANEOUS at 11:38

## 2025-01-01 RX ADMIN — INSULIN LISPRO 3: 100 INJECTION, SOLUTION INTRAVENOUS; SUBCUTANEOUS at 17:59

## 2025-01-01 RX ADMIN — LEVETIRACETAM 250 MILLIGRAM(S): 10 INJECTION, SOLUTION INTRAVENOUS at 17:55

## 2025-01-01 RX ADMIN — INSULIN LISPRO 3: 100 INJECTION, SOLUTION INTRAVENOUS; SUBCUTANEOUS at 17:25

## 2025-01-01 RX ADMIN — AMPICILLIN SODIUM 200 GRAM(S): 1 INJECTION, POWDER, FOR SOLUTION INTRAMUSCULAR; INTRAVENOUS at 05:25

## 2025-01-01 RX ADMIN — AMPICILLIN SODIUM 200 GRAM(S): 1 INJECTION, POWDER, FOR SOLUTION INTRAMUSCULAR; INTRAVENOUS at 23:28

## 2025-01-01 RX ADMIN — FOLIC ACID 1 MILLIGRAM(S): 1 TABLET ORAL at 11:41

## 2025-01-01 RX ADMIN — LACTULOSE 20 GRAM(S): 10 SOLUTION ORAL at 21:35

## 2025-01-01 RX ADMIN — Medication 1 APPLICATION(S): at 11:32

## 2025-01-01 RX ADMIN — MEROPENEM 100 MILLIGRAM(S): 1 INJECTION INTRAVENOUS at 18:03

## 2025-01-01 RX ADMIN — LEVETIRACETAM 250 MILLIGRAM(S): 10 INJECTION, SOLUTION INTRAVENOUS at 17:57

## 2025-01-01 RX ADMIN — Medication 25 GRAM(S): at 16:56

## 2025-01-01 RX ADMIN — Medication 1300 MILLIGRAM(S): at 21:36

## 2025-01-01 RX ADMIN — INSULIN LISPRO 4 UNIT(S): 100 INJECTION, SOLUTION INTRAVENOUS; SUBCUTANEOUS at 05:03

## 2025-01-01 RX ADMIN — Medication 40 MILLIGRAM(S): at 12:39

## 2025-01-01 RX ADMIN — AMPICILLIN SODIUM 200 GRAM(S): 1 INJECTION, POWDER, FOR SOLUTION INTRAMUSCULAR; INTRAVENOUS at 11:31

## 2025-01-01 RX ADMIN — AMPICILLIN SODIUM 200 GRAM(S): 1 INJECTION, POWDER, FOR SOLUTION INTRAMUSCULAR; INTRAVENOUS at 23:57

## 2025-01-01 RX ADMIN — Medication 0.2 MILLIGRAM(S): at 14:05

## 2025-01-01 RX ADMIN — LEVETIRACETAM 250 MILLIGRAM(S): 10 INJECTION, SOLUTION INTRAVENOUS at 05:46

## 2025-01-01 RX ADMIN — Medication 100 MILLIGRAM(S): at 11:21

## 2025-01-01 RX ADMIN — INSULIN LISPRO 3: 100 INJECTION, SOLUTION INTRAVENOUS; SUBCUTANEOUS at 11:29

## 2025-01-01 RX ADMIN — LACTULOSE 20 GRAM(S): 10 SOLUTION ORAL at 22:22

## 2025-01-01 RX ADMIN — Medication 1 APPLICATION(S): at 12:00

## 2025-01-01 RX ADMIN — INSULIN LISPRO 15 UNIT(S): 100 INJECTION, SOLUTION INTRAVENOUS; SUBCUTANEOUS at 02:02

## 2025-01-01 RX ADMIN — LACTULOSE 20 GRAM(S): 10 SOLUTION ORAL at 17:27

## 2025-01-01 RX ADMIN — OCTREOTIDE ACETATE 200 MICROGRAM(S): 500 INJECTION, SOLUTION INTRAVENOUS; SUBCUTANEOUS at 13:14

## 2025-01-01 RX ADMIN — Medication 100 MILLIEQUIVALENT(S): at 05:06

## 2025-01-01 RX ADMIN — INSULIN GLARGINE-YFGN 20 UNIT(S): 100 INJECTION, SOLUTION SUBCUTANEOUS at 21:34

## 2025-01-01 RX ADMIN — LACTULOSE 10 GRAM(S): 10 SOLUTION ORAL at 23:30

## 2025-01-01 RX ADMIN — Medication 650 MILLIGRAM(S): at 05:20

## 2025-01-01 RX ADMIN — Medication 4.15 MICROGRAM(S)/KG/HR: at 10:22

## 2025-01-01 RX ADMIN — SODIUM CHLORIDE 120 MILLILITER(S): 9 INJECTION, SOLUTION INTRAVENOUS at 08:40

## 2025-01-01 RX ADMIN — Medication 650 MILLIGRAM(S): at 21:03

## 2025-01-01 RX ADMIN — INSULIN GLARGINE-YFGN 3 UNIT(S): 100 INJECTION, SOLUTION SUBCUTANEOUS at 21:32

## 2025-01-01 RX ADMIN — Medication 40 MILLIGRAM(S): at 05:34

## 2025-01-01 RX ADMIN — INSULIN LISPRO 4 UNIT(S): 100 INJECTION, SOLUTION INTRAVENOUS; SUBCUTANEOUS at 17:59

## 2025-01-01 RX ADMIN — INSULIN LISPRO 3: 100 INJECTION, SOLUTION INTRAVENOUS; SUBCUTANEOUS at 05:58

## 2025-01-01 RX ADMIN — AMPICILLIN SODIUM 200 GRAM(S): 1 INJECTION, POWDER, FOR SOLUTION INTRAMUSCULAR; INTRAVENOUS at 19:52

## 2025-01-01 RX ADMIN — INSULIN GLARGINE-YFGN 6 UNIT(S): 100 INJECTION, SOLUTION SUBCUTANEOUS at 21:06

## 2025-01-01 RX ADMIN — AMPICILLIN SODIUM 200 GRAM(S): 1 INJECTION, POWDER, FOR SOLUTION INTRAMUSCULAR; INTRAVENOUS at 01:24

## 2025-01-01 RX ADMIN — MEROPENEM 100 MILLIGRAM(S): 1 INJECTION INTRAVENOUS at 05:53

## 2025-01-01 RX ADMIN — Medication 1300 MILLIGRAM(S): at 23:19

## 2025-01-01 RX ADMIN — ACETAZOLAMIDE 105 MILLIGRAM(S): 250 TABLET ORAL at 06:56

## 2025-01-01 RX ADMIN — MIDODRINE HYDROCHLORIDE 10 MILLIGRAM(S): 5 TABLET ORAL at 15:52

## 2025-01-01 RX ADMIN — INSULIN LISPRO 4 UNIT(S): 100 INJECTION, SOLUTION INTRAVENOUS; SUBCUTANEOUS at 23:01

## 2025-01-01 RX ADMIN — LACTULOSE 20 GRAM(S): 10 SOLUTION ORAL at 17:31

## 2025-01-01 RX ADMIN — Medication 2 MILLIGRAM(S): at 13:57

## 2025-01-01 RX ADMIN — Medication 650 MILLIGRAM(S): at 04:22

## 2025-01-01 RX ADMIN — LACTULOSE 20 GRAM(S): 10 SOLUTION ORAL at 14:02

## 2025-01-01 RX ADMIN — INSULIN LISPRO 5 UNIT(S): 100 INJECTION, SOLUTION INTRAVENOUS; SUBCUTANEOUS at 17:48

## 2025-01-01 RX ADMIN — FOLIC ACID 1 MILLIGRAM(S): 1 TABLET ORAL at 11:12

## 2025-01-01 RX ADMIN — BUMETANIDE 2 MILLIGRAM(S): 1 TABLET ORAL at 21:03

## 2025-01-01 RX ADMIN — Medication 50 MILLIEQUIVALENT(S): at 08:56

## 2025-01-01 RX ADMIN — MIDODRINE HYDROCHLORIDE 5 MILLIGRAM(S): 5 TABLET ORAL at 21:32

## 2025-01-01 RX ADMIN — INSULIN GLARGINE-YFGN 3 UNIT(S): 100 INJECTION, SOLUTION SUBCUTANEOUS at 21:51

## 2025-01-01 RX ADMIN — Medication 100 MILLIGRAM(S): at 10:47

## 2025-01-01 RX ADMIN — NOREPINEPHRINE BITARTRATE 4.59 MICROGRAM(S)/KG/MIN: 8 SOLUTION at 12:13

## 2025-01-01 RX ADMIN — AMPICILLIN SODIUM 200 GRAM(S): 1 INJECTION, POWDER, FOR SOLUTION INTRAMUSCULAR; INTRAVENOUS at 18:27

## 2025-01-01 RX ADMIN — LEVETIRACETAM 250 MILLIGRAM(S): 10 INJECTION, SOLUTION INTRAVENOUS at 05:24

## 2025-01-01 RX ADMIN — LEVETIRACETAM 250 MILLIGRAM(S): 10 INJECTION, SOLUTION INTRAVENOUS at 05:37

## 2025-01-01 RX ADMIN — Medication 650 MILLIGRAM(S): at 12:30

## 2025-01-01 RX ADMIN — INSULIN LISPRO 4: 100 INJECTION, SOLUTION INTRAVENOUS; SUBCUTANEOUS at 18:35

## 2025-01-01 RX ADMIN — SODIUM CHLORIDE 80 MILLILITER(S): 9 INJECTION, SOLUTION INTRAVENOUS at 09:03

## 2025-01-01 RX ADMIN — Medication 40 MILLIGRAM(S): at 18:01

## 2025-01-01 RX ADMIN — INSULIN GLARGINE-YFGN 3 UNIT(S): 100 INJECTION, SOLUTION SUBCUTANEOUS at 21:33

## 2025-01-01 RX ADMIN — Medication 100 GRAM(S): at 11:42

## 2025-01-01 RX ADMIN — Medication 650 MILLIGRAM(S): at 06:02

## 2025-01-01 RX ADMIN — INSULIN LISPRO 1: 100 INJECTION, SOLUTION INTRAVENOUS; SUBCUTANEOUS at 05:35

## 2025-01-01 RX ADMIN — Medication 0.5 MILLIGRAM(S): at 17:45

## 2025-01-01 RX ADMIN — AMPICILLIN SODIUM 200 GRAM(S): 1 INJECTION, POWDER, FOR SOLUTION INTRAMUSCULAR; INTRAVENOUS at 05:05

## 2025-01-01 RX ADMIN — MIDODRINE HYDROCHLORIDE 10 MILLIGRAM(S): 5 TABLET ORAL at 21:12

## 2025-01-01 RX ADMIN — Medication 650 MILLIGRAM(S): at 18:25

## 2025-01-01 RX ADMIN — LACTULOSE 10 GRAM(S): 10 SOLUTION ORAL at 17:29

## 2025-01-01 RX ADMIN — LEVETIRACETAM 250 MILLIGRAM(S): 10 INJECTION, SOLUTION INTRAVENOUS at 17:27

## 2025-01-01 RX ADMIN — INSULIN LISPRO 4 UNIT(S): 100 INJECTION, SOLUTION INTRAVENOUS; SUBCUTANEOUS at 00:12

## 2025-01-01 RX ADMIN — SODIUM CHLORIDE 50 MILLILITER(S): 9 INJECTION, SOLUTION INTRAVENOUS at 08:19

## 2025-01-01 RX ADMIN — MEROPENEM 100 MILLIGRAM(S): 1 INJECTION INTRAVENOUS at 05:09

## 2025-01-01 RX ADMIN — LACTULOSE 20 GRAM(S): 10 SOLUTION ORAL at 22:51

## 2025-01-01 RX ADMIN — LEVETIRACETAM 250 MILLIGRAM(S): 10 INJECTION, SOLUTION INTRAVENOUS at 17:22

## 2025-01-01 RX ADMIN — INSULIN LISPRO 4: 100 INJECTION, SOLUTION INTRAVENOUS; SUBCUTANEOUS at 17:02

## 2025-01-01 RX ADMIN — CEFEPIME 100 MILLIGRAM(S): 2 INJECTION, POWDER, FOR SOLUTION INTRAVENOUS at 21:22

## 2025-01-01 RX ADMIN — Medication 100 MILLIEQUIVALENT(S): at 00:06

## 2025-01-01 RX ADMIN — NOREPINEPHRINE BITARTRATE 7.78 MICROGRAM(S)/KG/MIN: 8 SOLUTION at 20:21

## 2025-01-01 RX ADMIN — Medication 100 MILLIGRAM(S): at 22:53

## 2025-01-01 RX ADMIN — OCTREOTIDE ACETATE 200 MICROGRAM(S): 500 INJECTION, SOLUTION INTRAVENOUS; SUBCUTANEOUS at 13:58

## 2025-01-01 RX ADMIN — MEROPENEM 100 MILLIGRAM(S): 1 INJECTION INTRAVENOUS at 05:10

## 2025-01-01 RX ADMIN — LACTULOSE 10 GRAM(S): 10 SOLUTION ORAL at 18:10

## 2025-01-01 RX ADMIN — Medication 1 APPLICATION(S): at 13:49

## 2025-01-01 RX ADMIN — Medication 650 MILLIGRAM(S): at 21:51

## 2025-01-01 RX ADMIN — Medication 1.84 MICROGRAM(S)/KG/MIN: at 08:16

## 2025-01-01 RX ADMIN — INSULIN LISPRO 5 UNIT(S): 100 INJECTION, SOLUTION INTRAVENOUS; SUBCUTANEOUS at 05:35

## 2025-01-01 RX ADMIN — Medication 650 MILLIGRAM(S): at 10:58

## 2025-01-01 RX ADMIN — LACTULOSE 20 GRAM(S): 10 SOLUTION ORAL at 05:05

## 2025-01-01 RX ADMIN — INSULIN LISPRO 3: 100 INJECTION, SOLUTION INTRAVENOUS; SUBCUTANEOUS at 23:31

## 2025-01-01 RX ADMIN — AMPICILLIN SODIUM 200 GRAM(S): 1 INJECTION, POWDER, FOR SOLUTION INTRAMUSCULAR; INTRAVENOUS at 13:05

## 2025-01-01 RX ADMIN — MIDODRINE HYDROCHLORIDE 10 MILLIGRAM(S): 5 TABLET ORAL at 22:23

## 2025-01-01 RX ADMIN — Medication 650 MILLIGRAM(S): at 17:35

## 2025-01-01 RX ADMIN — MIDODRINE HYDROCHLORIDE 10 MILLIGRAM(S): 5 TABLET ORAL at 05:02

## 2025-01-01 RX ADMIN — SODIUM CHLORIDE 75 MILLILITER(S): 9 INJECTION, SOLUTION INTRAVENOUS at 21:19

## 2025-01-01 RX ADMIN — MIDODRINE HYDROCHLORIDE 10 MILLIGRAM(S): 5 TABLET ORAL at 14:44

## 2025-01-01 RX ADMIN — OCTREOTIDE ACETATE 200 MICROGRAM(S): 500 INJECTION, SOLUTION INTRAVENOUS; SUBCUTANEOUS at 16:31

## 2025-01-01 RX ADMIN — LEVETIRACETAM 250 MILLIGRAM(S): 10 INJECTION, SOLUTION INTRAVENOUS at 22:25

## 2025-01-01 RX ADMIN — Medication 1.84 MICROGRAM(S)/KG/MIN: at 11:25

## 2025-01-01 RX ADMIN — Medication 100 MILLIGRAM(S): at 22:08

## 2025-01-01 RX ADMIN — Medication 40 MILLIGRAM(S): at 06:28

## 2025-01-01 RX ADMIN — OCTREOTIDE ACETATE 200 MICROGRAM(S): 500 INJECTION, SOLUTION INTRAVENOUS; SUBCUTANEOUS at 05:13

## 2025-01-01 RX ADMIN — OCTREOTIDE ACETATE 200 MICROGRAM(S): 500 INJECTION, SOLUTION INTRAVENOUS; SUBCUTANEOUS at 05:10

## 2025-01-01 RX ADMIN — MEROPENEM 100 MILLIGRAM(S): 1 INJECTION INTRAVENOUS at 05:49

## 2025-01-01 RX ADMIN — Medication 650 MILLIGRAM(S): at 18:01

## 2025-01-01 RX ADMIN — FOLIC ACID 1 MILLIGRAM(S): 1 TABLET ORAL at 15:52

## 2025-01-01 RX ADMIN — LACTULOSE 20 GRAM(S): 10 SOLUTION ORAL at 05:01

## 2025-01-01 RX ADMIN — MEROPENEM 100 MILLIGRAM(S): 1 INJECTION INTRAVENOUS at 17:31

## 2025-01-01 RX ADMIN — AMPICILLIN SODIUM 200 GRAM(S): 1 INJECTION, POWDER, FOR SOLUTION INTRAMUSCULAR; INTRAVENOUS at 23:52

## 2025-01-01 RX ADMIN — MIDODRINE HYDROCHLORIDE 10 MILLIGRAM(S): 5 TABLET ORAL at 05:56

## 2025-01-01 RX ADMIN — AMPICILLIN SODIUM 200 GRAM(S): 1 INJECTION, POWDER, FOR SOLUTION INTRAMUSCULAR; INTRAVENOUS at 11:30

## 2025-01-01 RX ADMIN — OCTREOTIDE ACETATE 200 MICROGRAM(S): 500 INJECTION, SOLUTION INTRAVENOUS; SUBCUTANEOUS at 05:29

## 2025-01-01 RX ADMIN — Medication 650 MILLIGRAM(S): at 21:18

## 2025-01-01 RX ADMIN — LEVETIRACETAM 250 MILLIGRAM(S): 10 INJECTION, SOLUTION INTRAVENOUS at 17:17

## 2025-01-01 RX ADMIN — MEROPENEM 100 MILLIGRAM(S): 1 INJECTION INTRAVENOUS at 17:55

## 2025-01-01 RX ADMIN — INSULIN LISPRO 4 UNIT(S): 100 INJECTION, SOLUTION INTRAVENOUS; SUBCUTANEOUS at 05:37

## 2025-01-01 RX ADMIN — Medication 100 MILLIGRAM(S): at 21:54

## 2025-01-01 RX ADMIN — LACTULOSE 20 GRAM(S): 10 SOLUTION ORAL at 22:37

## 2025-01-01 RX ADMIN — INSULIN LISPRO 1: 100 INJECTION, SOLUTION INTRAVENOUS; SUBCUTANEOUS at 11:33

## 2025-01-01 RX ADMIN — INSULIN LISPRO 11 UNIT(S): 100 INJECTION, SOLUTION INTRAVENOUS; SUBCUTANEOUS at 17:02

## 2025-01-01 RX ADMIN — Medication 650 MILLIGRAM(S): at 05:37

## 2025-01-01 RX ADMIN — LACTULOSE 20 GRAM(S): 10 SOLUTION ORAL at 17:37

## 2025-01-01 RX ADMIN — MIDODRINE HYDROCHLORIDE 10 MILLIGRAM(S): 5 TABLET ORAL at 13:05

## 2025-01-01 RX ADMIN — Medication 500 MILLILITER(S): at 14:30

## 2025-01-01 RX ADMIN — MIDODRINE HYDROCHLORIDE 10 MILLIGRAM(S): 5 TABLET ORAL at 05:18

## 2025-01-01 RX ADMIN — INSULIN LISPRO 3: 100 INJECTION, SOLUTION INTRAVENOUS; SUBCUTANEOUS at 23:02

## 2025-01-01 RX ADMIN — OCTREOTIDE ACETATE 200 MICROGRAM(S): 500 INJECTION, SOLUTION INTRAVENOUS; SUBCUTANEOUS at 15:51

## 2025-01-01 RX ADMIN — Medication 100 MILLIGRAM(S): at 18:16

## 2025-01-01 RX ADMIN — Medication 40 MILLIGRAM(S): at 17:22

## 2025-01-01 RX ADMIN — Medication 50 MILLILITER(S): at 04:13

## 2025-01-01 RX ADMIN — FOLIC ACID 1 MILLIGRAM(S): 1 TABLET ORAL at 11:31

## 2025-01-01 RX ADMIN — MEROPENEM 100 MILLIGRAM(S): 1 INJECTION INTRAVENOUS at 05:25

## 2025-01-01 RX ADMIN — LACTULOSE 20 GRAM(S): 10 SOLUTION ORAL at 10:11

## 2025-01-01 RX ADMIN — LACTULOSE 20 GRAM(S): 10 SOLUTION ORAL at 21:50

## 2025-01-01 RX ADMIN — LACTULOSE 20 GRAM(S): 10 SOLUTION ORAL at 05:55

## 2025-01-01 RX ADMIN — Medication 650 MILLIGRAM(S): at 11:42

## 2025-01-01 RX ADMIN — Medication 650 MILLIGRAM(S): at 05:51

## 2025-01-01 RX ADMIN — FOLIC ACID 1 MILLIGRAM(S): 1 TABLET ORAL at 11:58

## 2025-01-01 RX ADMIN — NOREPINEPHRINE BITARTRATE 4.59 MICROGRAM(S)/KG/MIN: 8 SOLUTION at 13:27

## 2025-01-01 RX ADMIN — Medication 650 MILLIGRAM(S): at 21:34

## 2025-01-01 RX ADMIN — INSULIN LISPRO 3 UNIT(S): 100 INJECTION, SOLUTION INTRAVENOUS; SUBCUTANEOUS at 18:17

## 2025-01-01 RX ADMIN — LEVETIRACETAM 250 MILLIGRAM(S): 10 INJECTION, SOLUTION INTRAVENOUS at 05:02

## 2025-01-01 RX ADMIN — FOLIC ACID 1 MILLIGRAM(S): 1 TABLET ORAL at 11:50

## 2025-01-01 RX ADMIN — DESMOPRESSIN ACETATE 4 MICROGRAM(S): 4 INJECTION INTRAVENOUS at 10:06

## 2025-01-01 RX ADMIN — LACTULOSE 20 GRAM(S): 10 SOLUTION ORAL at 06:01

## 2025-01-01 RX ADMIN — MIDODRINE HYDROCHLORIDE 10 MILLIGRAM(S): 5 TABLET ORAL at 21:26

## 2025-01-01 RX ADMIN — INSULIN GLARGINE-YFGN 35 UNIT(S): 100 INJECTION, SOLUTION SUBCUTANEOUS at 22:51

## 2025-01-01 RX ADMIN — INSULIN LISPRO 4 UNIT(S): 100 INJECTION, SOLUTION INTRAVENOUS; SUBCUTANEOUS at 17:04

## 2025-01-01 RX ADMIN — MIDODRINE HYDROCHLORIDE 10 MILLIGRAM(S): 5 TABLET ORAL at 05:25

## 2025-01-01 RX ADMIN — LEVETIRACETAM 250 MILLIGRAM(S): 10 INJECTION, SOLUTION INTRAVENOUS at 05:05

## 2025-01-01 RX ADMIN — Medication 100 MILLIGRAM(S): at 22:01

## 2025-01-01 RX ADMIN — LEVETIRACETAM 250 MILLIGRAM(S): 10 INJECTION, SOLUTION INTRAVENOUS at 05:32

## 2025-01-01 RX ADMIN — Medication 1300 MILLIGRAM(S): at 13:08

## 2025-01-01 RX ADMIN — LACTULOSE 20 GRAM(S): 10 SOLUTION ORAL at 05:11

## 2025-01-01 RX ADMIN — Medication 650 MILLIGRAM(S): at 16:37

## 2025-01-01 RX ADMIN — Medication 0.2 MILLIGRAM(S): at 17:00

## 2025-01-01 RX ADMIN — SODIUM CHLORIDE 50 MILLILITER(S): 9 INJECTION, SOLUTION INTRAVENOUS at 08:28

## 2025-01-01 RX ADMIN — Medication 40 MILLIGRAM(S): at 05:19

## 2025-01-01 RX ADMIN — AMPICILLIN SODIUM 200 GRAM(S): 1 INJECTION, POWDER, FOR SOLUTION INTRAMUSCULAR; INTRAVENOUS at 14:00

## 2025-01-01 RX ADMIN — Medication 50 MILLILITER(S): at 20:08

## 2025-01-01 RX ADMIN — Medication 650 MILLIGRAM(S): at 11:28

## 2025-01-01 RX ADMIN — NOREPINEPHRINE BITARTRATE 7.78 MICROGRAM(S)/KG/MIN: 8 SOLUTION at 21:17

## 2025-01-01 RX ADMIN — Medication 40 MILLIGRAM(S): at 17:28

## 2025-01-01 RX ADMIN — LACTULOSE 20 GRAM(S): 10 SOLUTION ORAL at 05:07

## 2025-01-01 RX ADMIN — MIDODRINE HYDROCHLORIDE 10 MILLIGRAM(S): 5 TABLET ORAL at 21:33

## 2025-01-01 RX ADMIN — MEROPENEM 100 MILLIGRAM(S): 1 INJECTION INTRAVENOUS at 05:55

## 2025-01-01 RX ADMIN — LACTULOSE 20 GRAM(S): 10 SOLUTION ORAL at 05:19

## 2025-01-01 RX ADMIN — SODIUM CHLORIDE 75 MILLILITER(S): 9 INJECTION, SOLUTION INTRAVENOUS at 22:22

## 2025-01-01 RX ADMIN — Medication 40 MILLIGRAM(S): at 18:05

## 2025-01-01 RX ADMIN — LEVETIRACETAM 250 MILLIGRAM(S): 10 INJECTION, SOLUTION INTRAVENOUS at 05:04

## 2025-01-01 RX ADMIN — SODIUM CHLORIDE 75 MILLILITER(S): 9 INJECTION, SOLUTION INTRAVENOUS at 10:12

## 2025-01-01 RX ADMIN — Medication 4 MILLIGRAM(S): at 14:04

## 2025-01-01 RX ADMIN — Medication 650 MILLIGRAM(S): at 18:31

## 2025-01-01 RX ADMIN — INSULIN LISPRO 1: 100 INJECTION, SOLUTION INTRAVENOUS; SUBCUTANEOUS at 08:32

## 2025-01-01 RX ADMIN — Medication 650 MILLIGRAM(S): at 05:02

## 2025-01-01 RX ADMIN — VASOPRESSIN 6 UNIT(S)/MIN: 20 INJECTION INTRAVENOUS at 07:37

## 2025-01-01 RX ADMIN — INSULIN GLARGINE-YFGN 6 UNIT(S): 100 INJECTION, SOLUTION SUBCUTANEOUS at 21:24

## 2025-01-01 RX ADMIN — OCTREOTIDE ACETATE 200 MICROGRAM(S): 500 INJECTION, SOLUTION INTRAVENOUS; SUBCUTANEOUS at 05:27

## 2025-01-01 RX ADMIN — Medication 15 MILLILITER(S): at 17:36

## 2025-01-01 RX ADMIN — INSULIN LISPRO 8 UNIT(S): 100 INJECTION, SOLUTION INTRAVENOUS; SUBCUTANEOUS at 21:25

## 2025-01-01 RX ADMIN — Medication 50 MILLIEQUIVALENT(S): at 09:28

## 2025-01-01 RX ADMIN — MEROPENEM 100 MILLIGRAM(S): 1 INJECTION INTRAVENOUS at 22:37

## 2025-01-01 RX ADMIN — MIDODRINE HYDROCHLORIDE 10 MILLIGRAM(S): 5 TABLET ORAL at 14:41

## 2025-01-01 RX ADMIN — INSULIN LISPRO 3: 100 INJECTION, SOLUTION INTRAVENOUS; SUBCUTANEOUS at 11:32

## 2025-01-01 RX ADMIN — SODIUM CHLORIDE 1000 MILLILITER(S): 9 INJECTION, SOLUTION INTRAVENOUS at 00:06

## 2025-01-01 RX ADMIN — AMPICILLIN SODIUM 200 GRAM(S): 1 INJECTION, POWDER, FOR SOLUTION INTRAMUSCULAR; INTRAVENOUS at 14:41

## 2025-01-01 RX ADMIN — Medication 15 MILLILITER(S): at 05:36

## 2025-01-01 RX ADMIN — Medication 0.5 MILLIGRAM(S): at 04:15

## 2025-01-01 RX ADMIN — AMPICILLIN SODIUM 200 GRAM(S): 1 INJECTION, POWDER, FOR SOLUTION INTRAMUSCULAR; INTRAVENOUS at 17:35

## 2025-01-01 RX ADMIN — MIDODRINE HYDROCHLORIDE 10 MILLIGRAM(S): 5 TABLET ORAL at 05:29

## 2025-01-01 RX ADMIN — LACTULOSE 200 GRAM(S): 10 SOLUTION ORAL at 18:36

## 2025-01-01 RX ADMIN — Medication 1 APPLICATION(S): at 12:46

## 2025-01-01 RX ADMIN — AMPICILLIN SODIUM 200 GRAM(S): 1 INJECTION, POWDER, FOR SOLUTION INTRAMUSCULAR; INTRAVENOUS at 06:20

## 2025-01-01 RX ADMIN — INSULIN GLARGINE-YFGN 3 UNIT(S): 100 INJECTION, SOLUTION SUBCUTANEOUS at 08:12

## 2025-01-01 RX ADMIN — MEROPENEM 100 MILLIGRAM(S): 1 INJECTION INTRAVENOUS at 05:29

## 2025-01-01 RX ADMIN — LACTULOSE 20 GRAM(S): 10 SOLUTION ORAL at 11:09

## 2025-01-01 RX ADMIN — INSULIN LISPRO 2: 100 INJECTION, SOLUTION INTRAVENOUS; SUBCUTANEOUS at 11:58

## 2025-01-01 RX ADMIN — INSULIN GLARGINE-YFGN 15 UNIT(S): 100 INJECTION, SOLUTION SUBCUTANEOUS at 22:37

## 2025-01-01 RX ADMIN — Medication 650 MILLIGRAM(S): at 11:54

## 2025-01-01 RX ADMIN — Medication 650 MILLIGRAM(S): at 18:30

## 2025-01-01 RX ADMIN — SODIUM CHLORIDE 40 MILLILITER(S): 9 INJECTION, SOLUTION INTRAVENOUS at 18:23

## 2025-01-01 RX ADMIN — OCTREOTIDE ACETATE 200 MICROGRAM(S): 500 INJECTION, SOLUTION INTRAVENOUS; SUBCUTANEOUS at 13:23

## 2025-01-01 RX ADMIN — Medication 15 MILLILITER(S): at 17:29

## 2025-01-01 RX ADMIN — LACTULOSE 20 GRAM(S): 10 SOLUTION ORAL at 18:30

## 2025-01-01 RX ADMIN — Medication 250 MILLIGRAM(S): at 14:05

## 2025-01-01 RX ADMIN — INSULIN LISPRO 4 UNIT(S): 100 INJECTION, SOLUTION INTRAVENOUS; SUBCUTANEOUS at 17:30

## 2025-01-01 RX ADMIN — INSULIN LISPRO 4 UNIT(S): 100 INJECTION, SOLUTION INTRAVENOUS; SUBCUTANEOUS at 11:37

## 2025-01-01 RX ADMIN — SODIUM CHLORIDE 30 MILLILITER(S): 9 INJECTION, SOLUTION INTRAVENOUS at 10:21

## 2025-01-01 RX ADMIN — Medication 0.2 MILLIGRAM(S): at 15:12

## 2025-01-01 RX ADMIN — OCTREOTIDE ACETATE 200 MICROGRAM(S): 500 INJECTION, SOLUTION INTRAVENOUS; SUBCUTANEOUS at 22:53

## 2025-01-01 RX ADMIN — LEVETIRACETAM 250 MILLIGRAM(S): 10 INJECTION, SOLUTION INTRAVENOUS at 05:26

## 2025-01-01 RX ADMIN — LACTULOSE 20 GRAM(S): 10 SOLUTION ORAL at 17:53

## 2025-01-01 RX ADMIN — MIDODRINE HYDROCHLORIDE 10 MILLIGRAM(S): 5 TABLET ORAL at 13:02

## 2025-01-01 RX ADMIN — MIDODRINE HYDROCHLORIDE 10 MILLIGRAM(S): 5 TABLET ORAL at 05:07

## 2025-01-01 RX ADMIN — Medication 650 MILLIGRAM(S): at 12:57

## 2025-01-01 RX ADMIN — INSULIN LISPRO 1: 100 INJECTION, SOLUTION INTRAVENOUS; SUBCUTANEOUS at 23:30

## 2025-01-01 RX ADMIN — INSULIN LISPRO 4 UNIT(S): 100 INJECTION, SOLUTION INTRAVENOUS; SUBCUTANEOUS at 16:42

## 2025-01-01 RX ADMIN — SODIUM CHLORIDE 50 MILLILITER(S): 9 INJECTION, SOLUTION INTRAVENOUS at 19:16

## 2025-01-01 RX ADMIN — Medication 50 MILLIEQUIVALENT(S): at 09:32

## 2025-01-01 RX ADMIN — Medication 100 MILLIGRAM(S): at 11:38

## 2025-01-01 RX ADMIN — LACTULOSE 20 GRAM(S): 10 SOLUTION ORAL at 05:46

## 2025-01-01 RX ADMIN — Medication 1 APPLICATION(S): at 11:22

## 2025-01-01 RX ADMIN — PROPOFOL 5.88 MICROGRAM(S)/KG/MIN: 10 INJECTION, EMULSION INTRAVENOUS at 20:25

## 2025-01-01 RX ADMIN — LEVETIRACETAM 250 MILLIGRAM(S): 10 INJECTION, SOLUTION INTRAVENOUS at 17:59

## 2025-01-01 RX ADMIN — Medication 0.2 MILLIGRAM(S): at 14:00

## 2025-01-01 RX ADMIN — OCTREOTIDE ACETATE 200 MICROGRAM(S): 500 INJECTION, SOLUTION INTRAVENOUS; SUBCUTANEOUS at 22:43

## 2025-01-01 RX ADMIN — Medication 650 MILLIGRAM(S): at 14:44

## 2025-01-01 RX ADMIN — Medication 1300 MILLIGRAM(S): at 13:15

## 2025-01-01 RX ADMIN — INSULIN LISPRO 8 UNIT(S): 100 INJECTION, SOLUTION INTRAVENOUS; SUBCUTANEOUS at 11:32

## 2025-01-01 RX ADMIN — INSULIN LISPRO 5 UNIT(S): 100 INJECTION, SOLUTION INTRAVENOUS; SUBCUTANEOUS at 15:43

## 2025-01-01 RX ADMIN — Medication 1 APPLICATION(S): at 11:33

## 2025-01-01 RX ADMIN — LEVETIRACETAM 250 MILLIGRAM(S): 10 INJECTION, SOLUTION INTRAVENOUS at 17:30

## 2025-01-01 RX ADMIN — Medication 25 GRAM(S): at 05:06

## 2025-01-01 RX ADMIN — Medication 500 MILLILITER(S): at 14:04

## 2025-01-01 RX ADMIN — Medication 650 MILLIGRAM(S): at 05:11

## 2025-01-01 RX ADMIN — LEVETIRACETAM 250 MILLIGRAM(S): 10 INJECTION, SOLUTION INTRAVENOUS at 05:35

## 2025-01-01 RX ADMIN — Medication 100 MILLIGRAM(S): at 21:52

## 2025-01-01 RX ADMIN — INSULIN LISPRO 8 UNIT(S): 100 INJECTION, SOLUTION INTRAVENOUS; SUBCUTANEOUS at 05:12

## 2025-01-01 RX ADMIN — SODIUM CHLORIDE 75 MILLILITER(S): 9 INJECTION, SOLUTION INTRAVENOUS at 09:52

## 2025-01-01 RX ADMIN — Medication 40 MILLIGRAM(S): at 05:28

## 2025-01-01 RX ADMIN — Medication 40 MILLIGRAM(S): at 17:49

## 2025-01-01 RX ADMIN — Medication 650 MILLIGRAM(S): at 13:02

## 2025-01-01 RX ADMIN — Medication 650 MILLIGRAM(S): at 14:37

## 2025-01-01 RX ADMIN — FOLIC ACID 1 MILLIGRAM(S): 1 TABLET ORAL at 11:18

## 2025-01-01 RX ADMIN — LEVETIRACETAM 250 MILLIGRAM(S): 10 INJECTION, SOLUTION INTRAVENOUS at 05:19

## 2025-01-01 RX ADMIN — NOREPINEPHRINE BITARTRATE 4.59 MICROGRAM(S)/KG/MIN: 8 SOLUTION at 07:47

## 2025-01-01 RX ADMIN — Medication 650 MILLIGRAM(S): at 05:03

## 2025-01-01 RX ADMIN — AMPICILLIN SODIUM 200 GRAM(S): 1 INJECTION, POWDER, FOR SOLUTION INTRAMUSCULAR; INTRAVENOUS at 17:17

## 2025-01-01 RX ADMIN — FOLIC ACID 1 MILLIGRAM(S): 1 TABLET ORAL at 11:17

## 2025-01-01 RX ADMIN — INSULIN LISPRO 8 UNIT(S): 100 INJECTION, SOLUTION INTRAVENOUS; SUBCUTANEOUS at 08:27

## 2025-01-01 RX ADMIN — Medication 200 MILLIGRAM(S): at 05:26

## 2025-01-01 RX ADMIN — NOREPINEPHRINE BITARTRATE 7.78 MICROGRAM(S)/KG/MIN: 8 SOLUTION at 21:12

## 2025-01-01 RX ADMIN — LACTULOSE 20 GRAM(S): 10 SOLUTION ORAL at 13:05

## 2025-01-01 RX ADMIN — INSULIN LISPRO 1: 100 INJECTION, SOLUTION INTRAVENOUS; SUBCUTANEOUS at 06:24

## 2025-01-01 RX ADMIN — MIDODRINE HYDROCHLORIDE 10 MILLIGRAM(S): 5 TABLET ORAL at 13:14

## 2025-01-01 RX ADMIN — LACTULOSE 20 GRAM(S): 10 SOLUTION ORAL at 05:28

## 2025-01-01 RX ADMIN — Medication 650 MILLIGRAM(S): at 21:04

## 2025-01-01 RX ADMIN — FOLIC ACID 1 MILLIGRAM(S): 1 TABLET ORAL at 12:18

## 2025-01-01 RX ADMIN — MEROPENEM 100 MILLIGRAM(S): 1 INJECTION INTRAVENOUS at 17:21

## 2025-01-01 RX ADMIN — INSULIN LISPRO 2: 100 INJECTION, SOLUTION INTRAVENOUS; SUBCUTANEOUS at 05:31

## 2025-01-01 RX ADMIN — INSULIN LISPRO 5: 100 INJECTION, SOLUTION INTRAVENOUS; SUBCUTANEOUS at 05:45

## 2025-01-01 RX ADMIN — Medication 100 MILLIGRAM(S): at 21:40

## 2025-01-01 RX ADMIN — Medication 0.2 MILLIGRAM(S): at 21:30

## 2025-01-01 RX ADMIN — MIDODRINE HYDROCHLORIDE 10 MILLIGRAM(S): 5 TABLET ORAL at 05:24

## 2025-01-01 RX ADMIN — Medication 25 GRAM(S): at 21:45

## 2025-01-01 RX ADMIN — LEVETIRACETAM 250 MILLIGRAM(S): 10 INJECTION, SOLUTION INTRAVENOUS at 18:03

## 2025-01-01 RX ADMIN — SODIUM CHLORIDE 50 MILLILITER(S): 9 INJECTION, SOLUTION INTRAVENOUS at 10:05

## 2025-01-01 RX ADMIN — OCTREOTIDE ACETATE 200 MICROGRAM(S): 500 INJECTION, SOLUTION INTRAVENOUS; SUBCUTANEOUS at 05:02

## 2025-01-01 RX ADMIN — INSULIN LISPRO 4 UNIT(S): 100 INJECTION, SOLUTION INTRAVENOUS; SUBCUTANEOUS at 17:12

## 2025-01-01 RX ADMIN — Medication 1 APPLICATION(S): at 15:51

## 2025-01-01 RX ADMIN — INSULIN LISPRO 4: 100 INJECTION, SOLUTION INTRAVENOUS; SUBCUTANEOUS at 12:04

## 2025-01-01 RX ADMIN — NOREPINEPHRINE BITARTRATE 4.59 MICROGRAM(S)/KG/MIN: 8 SOLUTION at 05:06

## 2025-01-01 RX ADMIN — Medication 40 MILLIGRAM(S): at 05:52

## 2025-01-01 RX ADMIN — Medication 50 MILLIEQUIVALENT(S): at 11:53

## 2025-01-01 RX ADMIN — Medication 50 MILLILITER(S): at 06:35

## 2025-01-01 RX ADMIN — INSULIN LISPRO 4: 100 INJECTION, SOLUTION INTRAVENOUS; SUBCUTANEOUS at 18:03

## 2025-01-01 RX ADMIN — Medication 40 MILLIGRAM(S): at 18:28

## 2025-01-01 RX ADMIN — AMPICILLIN SODIUM 200 GRAM(S): 1 INJECTION, POWDER, FOR SOLUTION INTRAMUSCULAR; INTRAVENOUS at 05:53

## 2025-01-01 RX ADMIN — INSULIN LISPRO 8 UNIT(S): 100 INJECTION, SOLUTION INTRAVENOUS; SUBCUTANEOUS at 17:25

## 2025-01-01 RX ADMIN — Medication 1 APPLICATION(S): at 11:30

## 2025-01-01 RX ADMIN — Medication 100 MILLIGRAM(S): at 22:11

## 2025-01-01 RX ADMIN — Medication 1300 MILLIGRAM(S): at 22:38

## 2025-01-01 RX ADMIN — OCTREOTIDE ACETATE 5 MICROGRAM(S)/HR: 500 INJECTION, SOLUTION INTRAVENOUS; SUBCUTANEOUS at 07:44

## 2025-01-01 RX ADMIN — LACTULOSE 20 GRAM(S): 10 SOLUTION ORAL at 05:18

## 2025-01-01 RX ADMIN — MIDODRINE HYDROCHLORIDE 10 MILLIGRAM(S): 5 TABLET ORAL at 13:32

## 2025-01-01 RX ADMIN — LACTULOSE 20 GRAM(S): 10 SOLUTION ORAL at 09:32

## 2025-01-01 RX ADMIN — LACTULOSE 20 GRAM(S): 10 SOLUTION ORAL at 18:03

## 2025-01-01 RX ADMIN — LEVETIRACETAM 250 MILLIGRAM(S): 10 INJECTION, SOLUTION INTRAVENOUS at 05:18

## 2025-01-01 RX ADMIN — MIDODRINE HYDROCHLORIDE 5 MILLIGRAM(S): 5 TABLET ORAL at 14:30

## 2025-01-01 RX ADMIN — INSULIN LISPRO 4 UNIT(S): 100 INJECTION, SOLUTION INTRAVENOUS; SUBCUTANEOUS at 18:14

## 2025-01-01 RX ADMIN — INSULIN GLARGINE-YFGN 15 UNIT(S): 100 INJECTION, SOLUTION SUBCUTANEOUS at 22:42

## 2025-01-01 RX ADMIN — Medication 40 MILLIGRAM(S): at 06:02

## 2025-01-01 RX ADMIN — LEVETIRACETAM 250 MILLIGRAM(S): 10 INJECTION, SOLUTION INTRAVENOUS at 18:01

## 2025-01-01 RX ADMIN — Medication 0.2 MILLIGRAM(S): at 06:15

## 2025-01-01 RX ADMIN — LACTULOSE 10 GRAM(S): 10 SOLUTION ORAL at 01:32

## 2025-01-01 RX ADMIN — Medication 1300 MILLIGRAM(S): at 15:06

## 2025-01-01 RX ADMIN — INSULIN LISPRO 4 UNIT(S): 100 INJECTION, SOLUTION INTRAVENOUS; SUBCUTANEOUS at 18:18

## 2025-01-01 RX ADMIN — Medication 650 MILLIGRAM(S): at 12:18

## 2025-01-01 RX ADMIN — Medication 25 GRAM(S): at 08:54

## 2025-01-01 RX ADMIN — MEROPENEM 100 MILLIGRAM(S): 1 INJECTION INTRAVENOUS at 19:19

## 2025-01-01 RX ADMIN — FOLIC ACID 1 MILLIGRAM(S): 1 TABLET ORAL at 12:46

## 2025-01-01 RX ADMIN — SODIUM CHLORIDE 80 MILLILITER(S): 9 INJECTION, SOLUTION INTRAVENOUS at 13:24

## 2025-01-01 RX ADMIN — SODIUM CHLORIDE 75 MILLILITER(S): 9 INJECTION, SOLUTION INTRAVENOUS at 21:17

## 2025-01-01 RX ADMIN — Medication 1 APPLICATION(S): at 11:41

## 2025-01-01 RX ADMIN — SODIUM CHLORIDE 75 MILLILITER(S): 9 INJECTION, SOLUTION INTRAVENOUS at 21:18

## 2025-01-01 RX ADMIN — CEFEPIME 100 MILLIGRAM(S): 2 INJECTION, POWDER, FOR SOLUTION INTRAVENOUS at 14:05

## 2025-01-01 RX ADMIN — INSULIN LISPRO 6: 100 INJECTION, SOLUTION INTRAVENOUS; SUBCUTANEOUS at 18:14

## 2025-01-01 RX ADMIN — Medication 1 APPLICATION(S): at 14:09

## 2025-01-01 RX ADMIN — LEVETIRACETAM 250 MILLIGRAM(S): 10 INJECTION, SOLUTION INTRAVENOUS at 19:03

## 2025-01-01 RX ADMIN — OCTREOTIDE ACETATE 200 MICROGRAM(S): 500 INJECTION, SOLUTION INTRAVENOUS; SUBCUTANEOUS at 21:38

## 2025-01-01 RX ADMIN — AMPICILLIN SODIUM 200 GRAM(S): 1 INJECTION, POWDER, FOR SOLUTION INTRAMUSCULAR; INTRAVENOUS at 05:49

## 2025-01-01 RX ADMIN — VASOPRESSIN 6 UNIT(S)/MIN: 20 INJECTION INTRAVENOUS at 06:08

## 2025-01-01 RX ADMIN — NOREPINEPHRINE BITARTRATE 4.59 MICROGRAM(S)/KG/MIN: 8 SOLUTION at 09:37

## 2025-01-01 RX ADMIN — Medication 650 MILLIGRAM(S): at 13:13

## 2025-01-01 RX ADMIN — Medication 40 MILLIGRAM(S): at 05:26

## 2025-01-01 RX ADMIN — LACTULOSE 20 GRAM(S): 10 SOLUTION ORAL at 17:24

## 2025-01-01 RX ADMIN — Medication 25 GRAM(S): at 13:05

## 2025-01-01 RX ADMIN — LEVETIRACETAM 250 MILLIGRAM(S): 10 INJECTION, SOLUTION INTRAVENOUS at 18:04

## 2025-01-01 RX ADMIN — OCTREOTIDE ACETATE 200 MICROGRAM(S): 500 INJECTION, SOLUTION INTRAVENOUS; SUBCUTANEOUS at 21:36

## 2025-01-01 RX ADMIN — Medication 100 MILLIGRAM(S): at 10:59

## 2025-01-01 RX ADMIN — FOLIC ACID 1 MILLIGRAM(S): 1 TABLET ORAL at 11:29

## 2025-01-01 RX ADMIN — Medication 500 UNIT(S): at 18:01

## 2025-01-01 RX ADMIN — Medication 50 MILLIEQUIVALENT(S): at 11:25

## 2025-01-01 RX ADMIN — Medication 25 GRAM(S): at 11:43

## 2025-01-01 RX ADMIN — Medication 4.15 MICROGRAM(S)/KG/HR: at 09:58

## 2025-01-01 RX ADMIN — Medication 0.2 MILLIGRAM(S): at 14:30

## 2025-01-01 RX ADMIN — SODIUM CHLORIDE 50 MILLILITER(S): 9 INJECTION, SOLUTION INTRAVENOUS at 20:08

## 2025-01-01 RX ADMIN — Medication 500 MILLILITER(S): at 16:32

## 2025-01-01 RX ADMIN — Medication 1 APPLICATION(S): at 13:01

## 2025-01-01 RX ADMIN — Medication 15 MILLILITER(S): at 17:49

## 2025-01-01 RX ADMIN — INSULIN LISPRO 5: 100 INJECTION, SOLUTION INTRAVENOUS; SUBCUTANEOUS at 00:02

## 2025-01-01 RX ADMIN — Medication 1.84 MICROGRAM(S)/KG/MIN: at 18:25

## 2025-01-01 RX ADMIN — NOREPINEPHRINE BITARTRATE 4.59 MICROGRAM(S)/KG/MIN: 8 SOLUTION at 06:08

## 2025-01-01 RX ADMIN — OCTREOTIDE ACETATE 200 MICROGRAM(S): 500 INJECTION, SOLUTION INTRAVENOUS; SUBCUTANEOUS at 21:12

## 2025-01-01 RX ADMIN — SODIUM CHLORIDE 80 MILLILITER(S): 9 INJECTION, SOLUTION INTRAVENOUS at 05:03

## 2025-01-01 RX ADMIN — MEROPENEM 100 MILLIGRAM(S): 1 INJECTION INTRAVENOUS at 05:03

## 2025-01-01 RX ADMIN — INSULIN GLARGINE-YFGN 3 UNIT(S): 100 INJECTION, SOLUTION SUBCUTANEOUS at 08:55

## 2025-01-01 RX ADMIN — INSULIN LISPRO 2: 100 INJECTION, SOLUTION INTRAVENOUS; SUBCUTANEOUS at 17:31

## 2025-01-01 RX ADMIN — Medication 1 APPLICATION(S): at 11:57

## 2025-01-01 RX ADMIN — FOLIC ACID 1 MILLIGRAM(S): 1 TABLET ORAL at 14:09

## 2025-01-01 RX ADMIN — INSULIN GLARGINE-YFGN 3 UNIT(S): 100 INJECTION, SOLUTION SUBCUTANEOUS at 23:30

## 2025-01-01 RX ADMIN — MEROPENEM 100 MILLIGRAM(S): 1 INJECTION INTRAVENOUS at 05:36

## 2025-01-01 RX ADMIN — LEVETIRACETAM 250 MILLIGRAM(S): 10 INJECTION, SOLUTION INTRAVENOUS at 18:12

## 2025-01-01 RX ADMIN — Medication 650 MILLIGRAM(S): at 08:36

## 2025-01-01 RX ADMIN — LACTULOSE 10 GRAM(S): 10 SOLUTION ORAL at 05:36

## 2025-01-01 RX ADMIN — INSULIN LISPRO 4 UNIT(S): 100 INJECTION, SOLUTION INTRAVENOUS; SUBCUTANEOUS at 05:23

## 2025-01-01 RX ADMIN — INSULIN GLARGINE-YFGN 3 UNIT(S): 100 INJECTION, SOLUTION SUBCUTANEOUS at 08:47

## 2025-01-01 RX ADMIN — SODIUM CHLORIDE 40 MILLILITER(S): 9 INJECTION, SOLUTION INTRAVENOUS at 22:01

## 2025-01-01 RX ADMIN — Medication 650 MILLIGRAM(S): at 05:04

## 2025-01-01 RX ADMIN — Medication 40 MILLIGRAM(S): at 05:35

## 2025-01-01 RX ADMIN — Medication 100 GRAM(S): at 09:15

## 2025-01-01 RX ADMIN — Medication 102 MILLIGRAM(S): at 21:40

## 2025-01-01 RX ADMIN — LEVETIRACETAM 250 MILLIGRAM(S): 10 INJECTION, SOLUTION INTRAVENOUS at 17:34

## 2025-01-01 RX ADMIN — LEVETIRACETAM 250 MILLIGRAM(S): 10 INJECTION, SOLUTION INTRAVENOUS at 05:36

## 2025-01-01 RX ADMIN — AMPICILLIN SODIUM 200 GRAM(S): 1 INJECTION, POWDER, FOR SOLUTION INTRAMUSCULAR; INTRAVENOUS at 21:36

## 2025-01-01 RX ADMIN — INSULIN LISPRO 1: 100 INJECTION, SOLUTION INTRAVENOUS; SUBCUTANEOUS at 17:21

## 2025-01-01 RX ADMIN — LEVETIRACETAM 250 MILLIGRAM(S): 10 INJECTION, SOLUTION INTRAVENOUS at 18:28

## 2025-01-01 RX ADMIN — LACTULOSE 10 GRAM(S): 10 SOLUTION ORAL at 23:19

## 2025-01-01 RX ADMIN — Medication 1 APPLICATION(S): at 11:13

## 2025-01-01 RX ADMIN — LACTULOSE 20 GRAM(S): 10 SOLUTION ORAL at 02:30

## 2025-01-01 RX ADMIN — Medication 50 MILLILITER(S): at 11:39

## 2025-01-01 RX ADMIN — LACTULOSE 20 GRAM(S): 10 SOLUTION ORAL at 05:35

## 2025-01-01 RX ADMIN — FOLIC ACID 1 MILLIGRAM(S): 1 TABLET ORAL at 11:44

## 2025-01-01 RX ADMIN — MIDODRINE HYDROCHLORIDE 5 MILLIGRAM(S): 5 TABLET ORAL at 05:18

## 2025-01-01 RX ADMIN — Medication 1300 MILLIGRAM(S): at 14:41

## 2025-01-01 RX ADMIN — Medication 0.2 MILLIGRAM(S): at 06:00

## 2025-01-01 RX ADMIN — INSULIN LISPRO 3: 100 INJECTION, SOLUTION INTRAVENOUS; SUBCUTANEOUS at 11:38

## 2025-01-01 RX ADMIN — AMPICILLIN SODIUM 200 GRAM(S): 1 INJECTION, POWDER, FOR SOLUTION INTRAMUSCULAR; INTRAVENOUS at 11:13

## 2025-01-01 RX ADMIN — Medication 100 MILLIEQUIVALENT(S): at 19:13

## 2025-01-01 RX ADMIN — Medication 200 MILLIGRAM(S): at 12:49

## 2025-01-01 RX ADMIN — Medication 100 MILLIGRAM(S): at 22:37

## 2025-01-01 RX ADMIN — FOLIC ACID 1 MILLIGRAM(S): 1 TABLET ORAL at 12:57

## 2025-01-01 RX ADMIN — Medication 650 MILLIGRAM(S): at 13:53

## 2025-01-01 RX ADMIN — Medication 50 MILLIEQUIVALENT(S): at 10:06

## 2025-01-01 RX ADMIN — MEROPENEM 100 MILLIGRAM(S): 1 INJECTION INTRAVENOUS at 05:01

## 2025-01-01 RX ADMIN — INSULIN GLARGINE-YFGN 6 UNIT(S): 100 INJECTION, SOLUTION SUBCUTANEOUS at 22:24

## 2025-01-01 RX ADMIN — LACTULOSE 20 GRAM(S): 10 SOLUTION ORAL at 13:40

## 2025-01-01 RX ADMIN — LEVETIRACETAM 250 MILLIGRAM(S): 10 INJECTION, SOLUTION INTRAVENOUS at 17:45

## 2025-01-01 RX ADMIN — INSULIN LISPRO 3 UNIT(S): 100 INJECTION, SOLUTION INTRAVENOUS; SUBCUTANEOUS at 11:52

## 2025-01-01 RX ADMIN — Medication 0.2 MILLIGRAM(S): at 16:39

## 2025-01-01 RX ADMIN — MIDODRINE HYDROCHLORIDE 10 MILLIGRAM(S): 5 TABLET ORAL at 21:23

## 2025-01-01 RX ADMIN — MIDODRINE HYDROCHLORIDE 10 MILLIGRAM(S): 5 TABLET ORAL at 05:11

## 2025-01-01 RX ADMIN — MIDODRINE HYDROCHLORIDE 10 MILLIGRAM(S): 5 TABLET ORAL at 05:04

## 2025-01-01 RX ADMIN — INSULIN LISPRO 6: 100 INJECTION, SOLUTION INTRAVENOUS; SUBCUTANEOUS at 23:00

## 2025-01-01 RX ADMIN — Medication 5 UNIT(S): at 12:27

## 2025-01-01 RX ADMIN — Medication 15 MILLILITER(S): at 05:26

## 2025-01-01 RX ADMIN — AMPICILLIN SODIUM 200 GRAM(S): 1 INJECTION, POWDER, FOR SOLUTION INTRAMUSCULAR; INTRAVENOUS at 18:30

## 2025-01-01 RX ADMIN — Medication 40 MILLIGRAM(S): at 18:03

## 2025-01-01 RX ADMIN — LACTULOSE 20 GRAM(S): 10 SOLUTION ORAL at 18:27

## 2025-01-01 RX ADMIN — INSULIN GLARGINE-YFGN 3 UNIT(S): 100 INJECTION, SOLUTION SUBCUTANEOUS at 09:28

## 2025-01-01 RX ADMIN — FOLIC ACID 1 MILLIGRAM(S): 1 TABLET ORAL at 11:09

## 2025-01-01 RX ADMIN — Medication 15 MILLILITER(S): at 18:01

## 2025-01-01 RX ADMIN — MIDODRINE HYDROCHLORIDE 10 MILLIGRAM(S): 5 TABLET ORAL at 01:24

## 2025-01-01 RX ADMIN — INSULIN LISPRO 11 UNIT(S): 100 INJECTION, SOLUTION INTRAVENOUS; SUBCUTANEOUS at 12:24

## 2025-01-01 RX ADMIN — NOREPINEPHRINE BITARTRATE 4.59 MICROGRAM(S)/KG/MIN: 8 SOLUTION at 18:24

## 2025-01-01 RX ADMIN — INSULIN LISPRO 4: 100 INJECTION, SOLUTION INTRAVENOUS; SUBCUTANEOUS at 06:00

## 2025-01-01 RX ADMIN — INSULIN GLARGINE-YFGN 3 UNIT(S): 100 INJECTION, SOLUTION SUBCUTANEOUS at 23:28

## 2025-01-01 RX ADMIN — FOLIC ACID 1 MILLIGRAM(S): 1 TABLET ORAL at 12:30

## 2025-01-01 RX ADMIN — Medication 0.2 MILLIGRAM(S): at 00:30

## 2025-01-01 RX ADMIN — Medication 40 MILLIEQUIVALENT(S): at 11:41

## 2025-01-01 RX ADMIN — OCTREOTIDE ACETATE 200 MICROGRAM(S): 500 INJECTION, SOLUTION INTRAVENOUS; SUBCUTANEOUS at 05:57

## 2025-01-01 RX ADMIN — INSULIN LISPRO 3: 100 INJECTION, SOLUTION INTRAVENOUS; SUBCUTANEOUS at 12:13

## 2025-01-01 RX ADMIN — Medication 10 UNIT(S): at 10:22

## 2025-01-01 RX ADMIN — AMPICILLIN SODIUM 200 GRAM(S): 1 INJECTION, POWDER, FOR SOLUTION INTRAMUSCULAR; INTRAVENOUS at 21:49

## 2025-01-01 RX ADMIN — Medication 50 MILLILITER(S): at 17:16

## 2025-01-01 RX ADMIN — Medication 4.15 MICROGRAM(S)/KG/HR: at 13:09

## 2025-01-01 RX ADMIN — INSULIN LISPRO 1: 100 INJECTION, SOLUTION INTRAVENOUS; SUBCUTANEOUS at 05:13

## 2025-01-01 RX ADMIN — INSULIN GLARGINE-YFGN 3 UNIT(S): 100 INJECTION, SOLUTION SUBCUTANEOUS at 05:20

## 2025-01-01 RX ADMIN — Medication 0.5 MILLIGRAM(S): at 03:16

## 2025-01-01 RX ADMIN — Medication 100 MEQ/KG/HR: at 04:35

## 2025-01-01 RX ADMIN — Medication 15 MILLILITER(S): at 05:18

## 2025-01-01 RX ADMIN — Medication 0.5 MILLIGRAM(S): at 13:57

## 2025-01-01 RX ADMIN — LACTULOSE 20 GRAM(S): 10 SOLUTION ORAL at 05:52

## 2025-01-01 RX ADMIN — AMPICILLIN SODIUM 200 GRAM(S): 1 INJECTION, POWDER, FOR SOLUTION INTRAMUSCULAR; INTRAVENOUS at 18:05

## 2025-01-01 RX ADMIN — NOREPINEPHRINE BITARTRATE 7.78 MICROGRAM(S)/KG/MIN: 8 SOLUTION at 04:04

## 2025-01-01 RX ADMIN — Medication 100 MILLIGRAM(S): at 09:08

## 2025-01-01 RX ADMIN — INSULIN LISPRO 1: 100 INJECTION, SOLUTION INTRAVENOUS; SUBCUTANEOUS at 17:48

## 2025-01-01 RX ADMIN — INSULIN LISPRO 4: 100 INJECTION, SOLUTION INTRAVENOUS; SUBCUTANEOUS at 00:17

## 2025-01-01 RX ADMIN — Medication 40 MILLIGRAM(S): at 05:18

## 2025-01-01 RX ADMIN — OCTREOTIDE ACETATE 200 MICROGRAM(S): 500 INJECTION, SOLUTION INTRAVENOUS; SUBCUTANEOUS at 05:24

## 2025-01-01 RX ADMIN — INSULIN LISPRO 4: 100 INJECTION, SOLUTION INTRAVENOUS; SUBCUTANEOUS at 05:36

## 2025-01-01 RX ADMIN — MIDODRINE HYDROCHLORIDE 10 MILLIGRAM(S): 5 TABLET ORAL at 13:23

## 2025-01-01 RX ADMIN — Medication 4.15 MICROGRAM(S)/KG/HR: at 21:12

## 2025-01-01 RX ADMIN — INSULIN LISPRO 2: 100 INJECTION, SOLUTION INTRAVENOUS; SUBCUTANEOUS at 22:53

## 2025-01-01 RX ADMIN — Medication 0.5 MILLIGRAM(S): at 18:50

## 2025-01-01 RX ADMIN — LEVETIRACETAM 250 MILLIGRAM(S): 10 INJECTION, SOLUTION INTRAVENOUS at 16:38

## 2025-01-01 RX ADMIN — Medication 1300 MILLIGRAM(S): at 05:24

## 2025-01-01 RX ADMIN — Medication 40 MILLIGRAM(S): at 05:02

## 2025-01-01 RX ADMIN — INSULIN LISPRO 4 UNIT(S): 100 INJECTION, SOLUTION INTRAVENOUS; SUBCUTANEOUS at 23:52

## 2025-01-01 RX ADMIN — Medication 1 APPLICATION(S): at 13:59

## 2025-01-01 RX ADMIN — LACTULOSE 20 GRAM(S): 10 SOLUTION ORAL at 01:13

## 2025-01-01 RX ADMIN — INSULIN LISPRO 3: 100 INJECTION, SOLUTION INTRAVENOUS; SUBCUTANEOUS at 17:45

## 2025-01-01 RX ADMIN — Medication 4.15 MICROGRAM(S)/KG/HR: at 21:19

## 2025-01-01 RX ADMIN — LEVETIRACETAM 250 MILLIGRAM(S): 10 INJECTION, SOLUTION INTRAVENOUS at 05:52

## 2025-01-01 RX ADMIN — Medication 81 MILLIGRAM(S): at 11:53

## 2025-01-01 RX ADMIN — Medication 650 MILLIGRAM(S): at 05:27

## 2025-01-01 RX ADMIN — Medication 1 APPLICATION(S): at 11:17

## 2025-01-01 RX ADMIN — Medication 0.2 MILLIGRAM(S): at 07:00

## 2025-01-01 RX ADMIN — FOLIC ACID 1 MILLIGRAM(S): 1 TABLET ORAL at 11:53

## 2025-01-01 RX ADMIN — AMPICILLIN SODIUM 200 GRAM(S): 1 INJECTION, POWDER, FOR SOLUTION INTRAMUSCULAR; INTRAVENOUS at 12:17

## 2025-01-01 RX ADMIN — FOLIC ACID 1 MILLIGRAM(S): 1 TABLET ORAL at 11:19

## 2025-01-01 RX ADMIN — Medication 1300 MILLIGRAM(S): at 21:49

## 2025-01-01 RX ADMIN — AMPICILLIN SODIUM 200 GRAM(S): 1 INJECTION, POWDER, FOR SOLUTION INTRAMUSCULAR; INTRAVENOUS at 17:21

## 2025-01-01 RX ADMIN — NOREPINEPHRINE BITARTRATE 7.78 MICROGRAM(S)/KG/MIN: 8 SOLUTION at 09:32

## 2025-01-01 RX ADMIN — Medication 650 MILLIGRAM(S): at 22:41

## 2025-01-01 RX ADMIN — INSULIN GLARGINE-YFGN 20 UNIT(S): 100 INJECTION, SOLUTION SUBCUTANEOUS at 21:26

## 2025-01-01 RX ADMIN — Medication 1300 MILLIGRAM(S): at 22:52

## 2025-01-01 RX ADMIN — MIDODRINE HYDROCHLORIDE 10 MILLIGRAM(S): 5 TABLET ORAL at 21:50

## 2025-01-01 RX ADMIN — AMPICILLIN SODIUM 200 GRAM(S): 1 INJECTION, POWDER, FOR SOLUTION INTRAMUSCULAR; INTRAVENOUS at 02:47

## 2025-01-01 RX ADMIN — OCTREOTIDE ACETATE 200 MICROGRAM(S): 500 INJECTION, SOLUTION INTRAVENOUS; SUBCUTANEOUS at 13:50

## 2025-01-01 RX ADMIN — INSULIN LISPRO 11 UNIT(S): 100 INJECTION, SOLUTION INTRAVENOUS; SUBCUTANEOUS at 22:55

## 2025-01-01 RX ADMIN — AMPICILLIN SODIUM 200 GRAM(S): 1 INJECTION, POWDER, FOR SOLUTION INTRAMUSCULAR; INTRAVENOUS at 05:10

## 2025-01-01 RX ADMIN — Medication 1300 MILLIGRAM(S): at 05:56

## 2025-01-01 RX ADMIN — LACTULOSE 10 GRAM(S): 10 SOLUTION ORAL at 05:26

## 2025-01-01 RX ADMIN — LACTULOSE 20 GRAM(S): 10 SOLUTION ORAL at 01:47

## 2025-01-01 RX ADMIN — Medication 4.15 MICROGRAM(S)/KG/HR: at 21:18

## 2025-01-01 RX ADMIN — OCTREOTIDE ACETATE 200 MICROGRAM(S): 500 INJECTION, SOLUTION INTRAVENOUS; SUBCUTANEOUS at 13:02

## 2025-01-01 RX ADMIN — INSULIN LISPRO 1: 100 INJECTION, SOLUTION INTRAVENOUS; SUBCUTANEOUS at 23:27

## 2025-01-01 RX ADMIN — LEVETIRACETAM 250 MILLIGRAM(S): 10 INJECTION, SOLUTION INTRAVENOUS at 05:03

## 2025-01-01 RX ADMIN — Medication 1 APPLICATION(S): at 17:59

## 2025-01-01 RX ADMIN — Medication 1.84 MICROGRAM(S)/KG/MIN: at 04:21

## 2025-01-01 RX ADMIN — Medication 650 MILLIGRAM(S): at 14:03

## 2025-01-01 RX ADMIN — LACTULOSE 20 GRAM(S): 10 SOLUTION ORAL at 10:29

## 2025-01-01 RX ADMIN — AMPICILLIN SODIUM 200 GRAM(S): 1 INJECTION, POWDER, FOR SOLUTION INTRAMUSCULAR; INTRAVENOUS at 23:59

## 2025-01-01 RX ADMIN — NOREPINEPHRINE BITARTRATE 7.78 MICROGRAM(S)/KG/MIN: 8 SOLUTION at 14:24

## 2025-01-01 RX ADMIN — AMPICILLIN SODIUM 200 GRAM(S): 1 INJECTION, POWDER, FOR SOLUTION INTRAMUSCULAR; INTRAVENOUS at 00:37

## 2025-01-01 RX ADMIN — NOREPINEPHRINE BITARTRATE 4.59 MICROGRAM(S)/KG/MIN: 8 SOLUTION at 00:06

## 2025-01-01 RX ADMIN — INSULIN LISPRO 4 UNIT(S): 100 INJECTION, SOLUTION INTRAVENOUS; SUBCUTANEOUS at 05:21

## 2025-01-01 RX ADMIN — NOREPINEPHRINE BITARTRATE 7.78 MICROGRAM(S)/KG/MIN: 8 SOLUTION at 06:28

## 2025-01-01 RX ADMIN — MIDODRINE HYDROCHLORIDE 10 MILLIGRAM(S): 5 TABLET ORAL at 13:28

## 2025-01-01 RX ADMIN — Medication 650 MILLIGRAM(S): at 17:30

## 2025-01-01 RX ADMIN — MEROPENEM 100 MILLIGRAM(S): 1 INJECTION INTRAVENOUS at 18:04

## 2025-01-01 RX ADMIN — INSULIN LISPRO 4 UNIT(S): 100 INJECTION, SOLUTION INTRAVENOUS; SUBCUTANEOUS at 18:36

## 2025-01-01 RX ADMIN — Medication 25 GRAM(S): at 11:16

## 2025-01-01 RX ADMIN — Medication 0.2 MILLIGRAM(S): at 05:00

## 2025-01-01 RX ADMIN — OCTREOTIDE ACETATE 200 MICROGRAM(S): 500 INJECTION, SOLUTION INTRAVENOUS; SUBCUTANEOUS at 13:16

## 2025-01-01 RX ADMIN — AMPICILLIN SODIUM 200 GRAM(S): 1 INJECTION, POWDER, FOR SOLUTION INTRAMUSCULAR; INTRAVENOUS at 05:01

## 2025-01-01 RX ADMIN — Medication 0.2 MILLIGRAM(S): at 01:00

## 2025-01-01 RX ADMIN — Medication 0.2 MILLIGRAM(S): at 04:06

## 2025-01-01 RX ADMIN — Medication 1 APPLICATION(S): at 11:53

## 2025-01-01 RX ADMIN — LEVETIRACETAM 250 MILLIGRAM(S): 10 INJECTION, SOLUTION INTRAVENOUS at 17:38

## 2025-01-01 RX ADMIN — Medication 50 MILLILITER(S): at 15:30

## 2025-01-01 RX ADMIN — OCTREOTIDE ACETATE 200 MICROGRAM(S): 500 INJECTION, SOLUTION INTRAVENOUS; SUBCUTANEOUS at 13:04

## 2025-01-01 RX ADMIN — OCTREOTIDE ACETATE 200 MICROGRAM(S): 500 INJECTION, SOLUTION INTRAVENOUS; SUBCUTANEOUS at 22:58

## 2025-01-01 RX ADMIN — Medication 650 MILLIGRAM(S): at 05:19

## 2025-01-01 RX ADMIN — INSULIN LISPRO 4 UNIT(S): 100 INJECTION, SOLUTION INTRAVENOUS; SUBCUTANEOUS at 11:29

## 2025-01-01 RX ADMIN — MIDODRINE HYDROCHLORIDE 10 MILLIGRAM(S): 5 TABLET ORAL at 05:10

## 2025-01-01 RX ADMIN — LACTULOSE 20 GRAM(S): 10 SOLUTION ORAL at 21:34

## 2025-01-01 RX ADMIN — LEVETIRACETAM 250 MILLIGRAM(S): 10 INJECTION, SOLUTION INTRAVENOUS at 18:02

## 2025-01-01 RX ADMIN — Medication 650 MILLIGRAM(S): at 12:07

## 2025-01-01 RX ADMIN — LEVETIRACETAM 250 MILLIGRAM(S): 10 INJECTION, SOLUTION INTRAVENOUS at 05:28

## 2025-01-01 RX ADMIN — Medication 100 MILLIGRAM(S): at 22:52

## 2025-01-01 RX ADMIN — Medication 40 MILLIGRAM(S): at 05:05

## 2025-01-01 RX ADMIN — INSULIN LISPRO 4 UNIT(S): 100 INJECTION, SOLUTION INTRAVENOUS; SUBCUTANEOUS at 22:36

## 2025-01-01 RX ADMIN — AMPICILLIN SODIUM 200 GRAM(S): 1 INJECTION, POWDER, FOR SOLUTION INTRAMUSCULAR; INTRAVENOUS at 05:26

## 2025-01-01 RX ADMIN — LACTULOSE 20 GRAM(S): 10 SOLUTION ORAL at 17:04

## 2025-01-01 RX ADMIN — LEVETIRACETAM 250 MILLIGRAM(S): 10 INJECTION, SOLUTION INTRAVENOUS at 17:49

## 2025-01-01 RX ADMIN — Medication 1 APPLICATION(S): at 11:20

## 2025-01-01 RX ADMIN — Medication 100 MILLIGRAM(S): at 21:37

## 2025-01-01 RX ADMIN — AMPICILLIN SODIUM 200 GRAM(S): 1 INJECTION, POWDER, FOR SOLUTION INTRAMUSCULAR; INTRAVENOUS at 22:22

## 2025-01-01 RX ADMIN — Medication 650 MILLIGRAM(S): at 11:12

## 2025-01-01 RX ADMIN — SODIUM CHLORIDE 50 MILLILITER(S): 9 INJECTION, SOLUTION INTRAVENOUS at 02:48

## 2025-01-01 RX ADMIN — LACTULOSE 10 GRAM(S): 10 SOLUTION ORAL at 17:36

## 2025-01-01 RX ADMIN — VASOPRESSIN 6 UNIT(S)/MIN: 20 INJECTION INTRAVENOUS at 19:13

## 2025-01-01 RX ADMIN — SODIUM CHLORIDE 500 MILLILITER(S): 9 INJECTION, SOLUTION INTRAVENOUS at 12:21

## 2025-01-01 RX ADMIN — MIDODRINE HYDROCHLORIDE 10 MILLIGRAM(S): 5 TABLET ORAL at 05:46

## 2025-01-01 RX ADMIN — Medication 1 APPLICATION(S): at 22:26

## 2025-01-01 RX ADMIN — Medication 1 APPLICATION(S): at 11:48

## 2025-01-01 RX ADMIN — ALBUMIN (HUMAN) 150 MILLILITER(S): 12.5 INJECTION, SOLUTION INTRAVENOUS at 11:52

## 2025-01-01 RX ADMIN — LEVETIRACETAM 250 MILLIGRAM(S): 10 INJECTION, SOLUTION INTRAVENOUS at 05:11

## 2025-01-01 RX ADMIN — INSULIN LISPRO 4 UNIT(S): 100 INJECTION, SOLUTION INTRAVENOUS; SUBCUTANEOUS at 11:32

## 2025-01-01 RX ADMIN — MEROPENEM 100 MILLIGRAM(S): 1 INJECTION INTRAVENOUS at 13:11

## 2025-01-01 RX ADMIN — Medication 4.15 MICROGRAM(S)/KG/HR: at 06:28

## 2025-01-01 RX ADMIN — INSULIN LISPRO 2: 100 INJECTION, SOLUTION INTRAVENOUS; SUBCUTANEOUS at 05:20

## 2025-01-01 RX ADMIN — LACTULOSE 10 GRAM(S): 10 SOLUTION ORAL at 10:16

## 2025-01-01 RX ADMIN — INSULIN GLARGINE-YFGN 3 UNIT(S): 100 INJECTION, SOLUTION SUBCUTANEOUS at 23:13

## 2025-01-01 RX ADMIN — AMPICILLIN SODIUM 200 GRAM(S): 1 INJECTION, POWDER, FOR SOLUTION INTRAMUSCULAR; INTRAVENOUS at 22:39

## 2025-01-01 RX ADMIN — OCTREOTIDE ACETATE 200 MICROGRAM(S): 500 INJECTION, SOLUTION INTRAVENOUS; SUBCUTANEOUS at 05:05

## 2025-01-01 RX ADMIN — LACTULOSE 10 GRAM(S): 10 SOLUTION ORAL at 13:49

## 2025-01-01 RX ADMIN — AMPICILLIN SODIUM 200 GRAM(S): 1 INJECTION, POWDER, FOR SOLUTION INTRAMUSCULAR; INTRAVENOUS at 11:18

## 2025-01-01 RX ADMIN — Medication 0.2 MILLIGRAM(S): at 20:33

## 2025-01-01 RX ADMIN — INSULIN LISPRO 4: 100 INJECTION, SOLUTION INTRAVENOUS; SUBCUTANEOUS at 05:23

## 2025-01-01 RX ADMIN — INSULIN LISPRO 4 UNIT(S): 100 INJECTION, SOLUTION INTRAVENOUS; SUBCUTANEOUS at 11:52

## 2025-01-01 RX ADMIN — OCTREOTIDE ACETATE 200 MICROGRAM(S): 500 INJECTION, SOLUTION INTRAVENOUS; SUBCUTANEOUS at 22:38

## 2025-01-01 RX ADMIN — LACTULOSE 20 GRAM(S): 10 SOLUTION ORAL at 17:34

## 2025-01-01 RX ADMIN — INSULIN LISPRO 8 UNIT(S): 100 INJECTION, SOLUTION INTRAVENOUS; SUBCUTANEOUS at 23:32

## 2025-01-01 RX ADMIN — INSULIN LISPRO 4: 100 INJECTION, SOLUTION INTRAVENOUS; SUBCUTANEOUS at 12:18

## 2025-01-01 RX ADMIN — NOREPINEPHRINE BITARTRATE 7.78 MICROGRAM(S)/KG/MIN: 8 SOLUTION at 21:19

## 2025-01-01 RX ADMIN — Medication 1 APPLICATION(S): at 11:19

## 2025-01-01 RX ADMIN — INSULIN GLARGINE-YFGN 3 UNIT(S): 100 INJECTION, SOLUTION SUBCUTANEOUS at 10:20

## 2025-01-01 RX ADMIN — Medication 100 MILLIGRAM(S): at 21:51

## 2025-01-01 RX ADMIN — MEROPENEM 100 MILLIGRAM(S): 1 INJECTION INTRAVENOUS at 05:05

## 2025-01-01 RX ADMIN — SODIUM CHLORIDE 75 MILLILITER(S): 9 INJECTION, SOLUTION INTRAVENOUS at 11:26

## 2025-01-01 RX ADMIN — LEVETIRACETAM 250 MILLIGRAM(S): 10 INJECTION, SOLUTION INTRAVENOUS at 06:27

## 2025-01-01 RX ADMIN — INSULIN LISPRO 4 UNIT(S): 100 INJECTION, SOLUTION INTRAVENOUS; SUBCUTANEOUS at 17:21

## 2025-01-01 RX ADMIN — LEVETIRACETAM 250 MILLIGRAM(S): 10 INJECTION, SOLUTION INTRAVENOUS at 05:56

## 2025-01-01 RX ADMIN — LACTULOSE 20 GRAM(S): 10 SOLUTION ORAL at 16:38

## 2025-01-01 RX ADMIN — Medication 650 MILLIGRAM(S): at 17:41

## 2025-01-01 RX ADMIN — MEROPENEM 100 MILLIGRAM(S): 1 INJECTION INTRAVENOUS at 17:37

## 2025-01-01 RX ADMIN — MEROPENEM 100 MILLIGRAM(S): 1 INJECTION INTRAVENOUS at 17:46

## 2025-01-01 RX ADMIN — Medication 1 APPLICATION(S): at 13:28

## 2025-01-01 RX ADMIN — AMPICILLIN SODIUM 200 GRAM(S): 1 INJECTION, POWDER, FOR SOLUTION INTRAMUSCULAR; INTRAVENOUS at 05:18

## 2025-01-01 RX ADMIN — LEVETIRACETAM 250 MILLIGRAM(S): 10 INJECTION, SOLUTION INTRAVENOUS at 18:33

## 2025-01-01 RX ADMIN — OCTREOTIDE ACETATE 200 MICROGRAM(S): 500 INJECTION, SOLUTION INTRAVENOUS; SUBCUTANEOUS at 10:06

## 2025-01-01 RX ADMIN — OCTREOTIDE ACETATE 5 MICROGRAM(S)/HR: 500 INJECTION, SOLUTION INTRAVENOUS; SUBCUTANEOUS at 23:13

## 2025-01-01 RX ADMIN — INSULIN LISPRO 1: 100 INJECTION, SOLUTION INTRAVENOUS; SUBCUTANEOUS at 11:53

## 2025-01-01 RX ADMIN — Medication 650 MILLIGRAM(S): at 21:23

## 2025-01-01 RX ADMIN — MEROPENEM 100 MILLIGRAM(S): 1 INJECTION INTRAVENOUS at 19:04

## 2025-01-01 RX ADMIN — LACTULOSE 20 GRAM(S): 10 SOLUTION ORAL at 17:45

## 2025-01-01 RX ADMIN — Medication 650 MILLIGRAM(S): at 21:26

## 2025-01-01 RX ADMIN — Medication 0.2 MILLIGRAM(S): at 13:35

## 2025-01-01 RX ADMIN — LEVETIRACETAM 250 MILLIGRAM(S): 10 INJECTION, SOLUTION INTRAVENOUS at 19:18

## 2025-01-01 RX ADMIN — AMPICILLIN SODIUM 200 GRAM(S): 1 INJECTION, POWDER, FOR SOLUTION INTRAMUSCULAR; INTRAVENOUS at 22:52

## 2025-01-01 RX ADMIN — NOREPINEPHRINE BITARTRATE 7.78 MICROGRAM(S)/KG/MIN: 8 SOLUTION at 14:06

## 2025-01-01 RX ADMIN — Medication 4.15 MICROGRAM(S)/KG/HR: at 04:35

## 2025-01-01 RX ADMIN — Medication 650 MILLIGRAM(S): at 11:39

## 2025-01-01 RX ADMIN — Medication 1300 MILLIGRAM(S): at 22:23

## 2025-01-01 RX ADMIN — MEROPENEM 100 MILLIGRAM(S): 1 INJECTION INTRAVENOUS at 17:28

## 2025-01-01 RX ADMIN — LEVETIRACETAM 250 MILLIGRAM(S): 10 INJECTION, SOLUTION INTRAVENOUS at 06:02

## 2025-01-01 RX ADMIN — FOLIC ACID 1 MILLIGRAM(S): 1 TABLET ORAL at 13:49

## 2025-01-01 RX ADMIN — Medication 1300 MILLIGRAM(S): at 05:05

## 2025-01-01 RX ADMIN — LACTULOSE 20 GRAM(S): 10 SOLUTION ORAL at 01:08

## 2025-01-01 RX ADMIN — LACTULOSE 20 GRAM(S): 10 SOLUTION ORAL at 17:56

## 2025-01-01 RX ADMIN — INSULIN LISPRO 5: 100 INJECTION, SOLUTION INTRAVENOUS; SUBCUTANEOUS at 19:05

## 2025-01-01 RX ADMIN — LACTULOSE 20 GRAM(S): 10 SOLUTION ORAL at 19:02

## 2025-01-01 RX ADMIN — FOLIC ACID 1 MILLIGRAM(S): 1 TABLET ORAL at 11:13

## 2025-01-01 RX ADMIN — AMPICILLIN SODIUM 200 GRAM(S): 1 INJECTION, POWDER, FOR SOLUTION INTRAMUSCULAR; INTRAVENOUS at 17:28

## 2025-01-01 RX ADMIN — Medication 40 MILLIGRAM(S): at 17:16

## 2025-01-01 RX ADMIN — LACTULOSE 10 GRAM(S): 10 SOLUTION ORAL at 05:04

## 2025-01-01 RX ADMIN — MIDODRINE HYDROCHLORIDE 10 MILLIGRAM(S): 5 TABLET ORAL at 21:36

## 2025-01-01 RX ADMIN — INSULIN LISPRO 5 UNIT(S): 100 INJECTION, SOLUTION INTRAVENOUS; SUBCUTANEOUS at 23:29

## 2025-01-01 RX ADMIN — INSULIN LISPRO 4 UNIT(S): 100 INJECTION, SOLUTION INTRAVENOUS; SUBCUTANEOUS at 12:04

## 2025-01-01 RX ADMIN — NOREPINEPHRINE BITARTRATE 4.59 MICROGRAM(S)/KG/MIN: 8 SOLUTION at 04:21

## 2025-01-01 RX ADMIN — MIDODRINE HYDROCHLORIDE 10 MILLIGRAM(S): 5 TABLET ORAL at 14:00

## 2025-01-01 RX ADMIN — Medication 0.2 MILLIGRAM(S): at 16:54

## 2025-01-01 RX ADMIN — Medication 1 APPLICATION(S): at 12:42

## 2025-01-01 RX ADMIN — Medication 1 APPLICATION(S): at 11:26

## 2025-01-01 RX ADMIN — OCTREOTIDE ACETATE 200 MICROGRAM(S): 500 INJECTION, SOLUTION INTRAVENOUS; SUBCUTANEOUS at 05:46

## 2025-01-01 RX ADMIN — OCTREOTIDE ACETATE 200 MICROGRAM(S): 500 INJECTION, SOLUTION INTRAVENOUS; SUBCUTANEOUS at 10:55

## 2025-01-01 RX ADMIN — AMPICILLIN SODIUM 200 GRAM(S): 1 INJECTION, POWDER, FOR SOLUTION INTRAMUSCULAR; INTRAVENOUS at 13:42

## 2025-01-01 RX ADMIN — Medication 50 MILLILITER(S): at 21:45

## 2025-01-01 RX ADMIN — INSULIN LISPRO 1: 100 INJECTION, SOLUTION INTRAVENOUS; SUBCUTANEOUS at 07:56

## 2025-01-01 RX ADMIN — Medication 40 MILLIGRAM(S): at 05:10

## 2025-01-01 RX ADMIN — NOREPINEPHRINE BITARTRATE 7.78 MICROGRAM(S)/KG/MIN: 8 SOLUTION at 21:18

## 2025-01-01 RX ADMIN — Medication 50 MILLILITER(S): at 05:40

## 2025-01-01 RX ADMIN — Medication 50 MILLILITER(S): at 05:41

## 2025-01-01 RX ADMIN — Medication 40 MILLIEQUIVALENT(S): at 17:36

## 2025-01-01 RX ADMIN — INSULIN GLARGINE-YFGN 22 UNIT(S): 100 INJECTION, SOLUTION SUBCUTANEOUS at 22:37

## 2025-01-01 RX ADMIN — MEROPENEM 100 MILLIGRAM(S): 1 INJECTION INTRAVENOUS at 17:17

## 2025-01-01 RX ADMIN — AMPICILLIN SODIUM 200 GRAM(S): 1 INJECTION, POWDER, FOR SOLUTION INTRAMUSCULAR; INTRAVENOUS at 23:41

## 2025-01-01 RX ADMIN — NOREPINEPHRINE BITARTRATE 7.78 MICROGRAM(S)/KG/MIN: 8 SOLUTION at 08:06

## 2025-01-01 RX ADMIN — Medication 1 APPLICATION(S): at 23:24

## 2025-01-01 RX ADMIN — Medication 100 MILLIGRAM(S): at 10:20

## 2025-01-01 RX ADMIN — FOLIC ACID 1 MILLIGRAM(S): 1 TABLET ORAL at 12:08

## 2025-01-01 RX ADMIN — Medication 1000 MILLILITER(S): at 05:35

## 2025-01-01 RX ADMIN — Medication 650 MILLIGRAM(S): at 17:17

## 2025-01-01 RX ADMIN — LACTULOSE 20 GRAM(S): 10 SOLUTION ORAL at 09:28

## 2025-01-01 RX ADMIN — LACTULOSE 20 GRAM(S): 10 SOLUTION ORAL at 10:40

## 2025-01-01 RX ADMIN — AMPICILLIN SODIUM 200 GRAM(S): 1 INJECTION, POWDER, FOR SOLUTION INTRAMUSCULAR; INTRAVENOUS at 02:02

## 2025-01-01 RX ADMIN — INSULIN LISPRO 3: 100 INJECTION, SOLUTION INTRAVENOUS; SUBCUTANEOUS at 05:46

## 2025-01-01 RX ADMIN — Medication 650 MILLIGRAM(S): at 13:25

## 2025-01-01 RX ADMIN — Medication 40 MILLIGRAM(S): at 05:11

## 2025-01-01 RX ADMIN — Medication 100 MILLIGRAM(S): at 22:07

## 2025-01-01 RX ADMIN — LACTULOSE 20 GRAM(S): 10 SOLUTION ORAL at 17:17

## 2025-01-01 RX ADMIN — MIDODRINE HYDROCHLORIDE 10 MILLIGRAM(S): 5 TABLET ORAL at 05:33

## 2025-01-01 RX ADMIN — Medication 100 MILLIGRAM(S): at 08:48

## 2025-01-01 RX ADMIN — INSULIN GLARGINE-YFGN 35 UNIT(S): 100 INJECTION, SOLUTION SUBCUTANEOUS at 21:52

## 2025-01-01 RX ADMIN — LACTULOSE 10 GRAM(S): 10 SOLUTION ORAL at 21:17

## 2025-01-01 RX ADMIN — INSULIN LISPRO 1: 100 INJECTION, SOLUTION INTRAVENOUS; SUBCUTANEOUS at 11:37

## 2025-01-01 RX ADMIN — INSULIN LISPRO 1: 100 INJECTION, SOLUTION INTRAVENOUS; SUBCUTANEOUS at 17:11

## 2025-01-01 RX ADMIN — INSULIN LISPRO 1: 100 INJECTION, SOLUTION INTRAVENOUS; SUBCUTANEOUS at 11:38

## 2025-01-01 RX ADMIN — LEVETIRACETAM 250 MILLIGRAM(S): 10 INJECTION, SOLUTION INTRAVENOUS at 17:03

## 2025-01-01 RX ADMIN — INSULIN LISPRO 6: 100 INJECTION, SOLUTION INTRAVENOUS; SUBCUTANEOUS at 12:02

## 2025-01-01 RX ADMIN — INSULIN LISPRO 3: 100 INJECTION, SOLUTION INTRAVENOUS; SUBCUTANEOUS at 23:11

## 2025-01-01 RX ADMIN — MIDODRINE HYDROCHLORIDE 10 MILLIGRAM(S): 5 TABLET ORAL at 22:37

## 2025-01-01 RX ADMIN — LACTULOSE 10 GRAM(S): 10 SOLUTION ORAL at 15:05

## 2025-01-01 RX ADMIN — Medication 4 MILLIGRAM(S): at 14:34

## 2025-01-01 RX ADMIN — AMPICILLIN SODIUM 200 GRAM(S): 1 INJECTION, POWDER, FOR SOLUTION INTRAMUSCULAR; INTRAVENOUS at 12:08

## 2025-01-01 RX ADMIN — Medication 4.15 MICROGRAM(S)/KG/HR: at 20:21

## 2025-01-01 RX ADMIN — LACTULOSE 20 GRAM(S): 10 SOLUTION ORAL at 17:30

## 2025-01-01 RX ADMIN — Medication 1 APPLICATION(S): at 11:51

## 2025-01-01 RX ADMIN — Medication 50 MILLIEQUIVALENT(S): at 11:44

## 2025-01-01 RX ADMIN — SODIUM CHLORIDE 60 MILLILITER(S): 9 INJECTION, SOLUTION INTRAVENOUS at 23:38

## 2025-01-01 RX ADMIN — OCTREOTIDE ACETATE 5 MICROGRAM(S)/HR: 500 INJECTION, SOLUTION INTRAVENOUS; SUBCUTANEOUS at 13:10

## 2025-01-01 RX ADMIN — LACTULOSE 20 GRAM(S): 10 SOLUTION ORAL at 05:12

## 2025-01-01 RX ADMIN — INSULIN LISPRO 4 UNIT(S): 100 INJECTION, SOLUTION INTRAVENOUS; SUBCUTANEOUS at 12:18

## 2025-01-01 RX ADMIN — Medication 1300 MILLIGRAM(S): at 13:56

## 2025-01-01 RX ADMIN — LACTULOSE 20 GRAM(S): 10 SOLUTION ORAL at 14:40

## 2025-01-01 RX ADMIN — AMPICILLIN SODIUM 200 GRAM(S): 1 INJECTION, POWDER, FOR SOLUTION INTRAMUSCULAR; INTRAVENOUS at 11:19

## 2025-01-01 RX ADMIN — MIDODRINE HYDROCHLORIDE 10 MILLIGRAM(S): 5 TABLET ORAL at 05:01

## 2025-01-01 RX ADMIN — INSULIN LISPRO 2: 100 INJECTION, SOLUTION INTRAVENOUS; SUBCUTANEOUS at 18:18

## 2025-01-01 RX ADMIN — LACTULOSE 10 GRAM(S): 10 SOLUTION ORAL at 06:28

## 2025-01-01 RX ADMIN — ALBUMIN (HUMAN) 150 MILLILITER(S): 12.5 INJECTION, SOLUTION INTRAVENOUS at 12:44

## 2025-01-01 RX ADMIN — SODIUM CHLORIDE 75 MILLILITER(S): 9 INJECTION, SOLUTION INTRAVENOUS at 14:24

## 2025-01-01 RX ADMIN — BUMETANIDE 2 MILLIGRAM(S): 1 TABLET ORAL at 08:35

## 2025-01-01 RX ADMIN — AMPICILLIN SODIUM 200 GRAM(S): 1 INJECTION, POWDER, FOR SOLUTION INTRAMUSCULAR; INTRAVENOUS at 23:47

## 2025-01-01 RX ADMIN — LEVETIRACETAM 250 MILLIGRAM(S): 10 INJECTION, SOLUTION INTRAVENOUS at 05:01

## 2025-01-01 RX ADMIN — INSULIN LISPRO 5: 100 INJECTION, SOLUTION INTRAVENOUS; SUBCUTANEOUS at 18:18

## 2025-01-01 RX ADMIN — MIDODRINE HYDROCHLORIDE 10 MILLIGRAM(S): 5 TABLET ORAL at 13:12

## 2025-01-01 RX ADMIN — Medication 1300 MILLIGRAM(S): at 05:53

## 2025-01-01 RX ADMIN — NOREPINEPHRINE BITARTRATE 7.78 MICROGRAM(S)/KG/MIN: 8 SOLUTION at 11:57

## 2025-01-01 RX ADMIN — Medication 650 MILLIGRAM(S): at 21:17

## 2025-01-01 RX ADMIN — Medication 1 APPLICATION(S): at 11:42

## 2025-01-01 RX ADMIN — AMPICILLIN SODIUM 200 GRAM(S): 1 INJECTION, POWDER, FOR SOLUTION INTRAMUSCULAR; INTRAVENOUS at 17:57

## 2025-01-01 RX ADMIN — AMPICILLIN SODIUM 200 GRAM(S): 1 INJECTION, POWDER, FOR SOLUTION INTRAMUSCULAR; INTRAVENOUS at 11:12

## 2025-01-01 RX ADMIN — AMPICILLIN SODIUM 200 GRAM(S): 1 INJECTION, POWDER, FOR SOLUTION INTRAMUSCULAR; INTRAVENOUS at 05:09

## 2025-01-01 RX ADMIN — Medication 100 MILLIGRAM(S): at 21:46

## 2025-01-01 RX ADMIN — Medication 650 MILLIGRAM(S): at 21:12

## 2025-01-01 RX ADMIN — INSULIN LISPRO 4: 100 INJECTION, SOLUTION INTRAVENOUS; SUBCUTANEOUS at 05:57

## 2025-01-01 RX ADMIN — Medication 25 GRAM(S): at 08:49

## 2025-01-01 RX ADMIN — AMPICILLIN SODIUM 200 GRAM(S): 1 INJECTION, POWDER, FOR SOLUTION INTRAMUSCULAR; INTRAVENOUS at 13:17

## 2025-01-01 RX ADMIN — Medication 50 MILLIEQUIVALENT(S): at 03:57

## 2025-01-01 RX ADMIN — Medication 650 MILLIGRAM(S): at 01:25

## 2025-01-01 RX ADMIN — Medication 0.2 MILLIGRAM(S): at 16:45

## 2025-01-01 RX ADMIN — LACTULOSE 10 GRAM(S): 10 SOLUTION ORAL at 05:20

## 2025-01-01 RX ADMIN — Medication 25 GRAM(S): at 12:56

## 2025-01-01 RX ADMIN — MIDODRINE HYDROCHLORIDE 10 MILLIGRAM(S): 5 TABLET ORAL at 05:52

## 2025-01-01 RX ADMIN — Medication 650 MILLIGRAM(S): at 05:33

## 2025-01-01 RX ADMIN — INSULIN LISPRO 1: 100 INJECTION, SOLUTION INTRAVENOUS; SUBCUTANEOUS at 00:42

## 2025-01-01 RX ADMIN — Medication 650 MILLIGRAM(S): at 05:35

## 2025-01-01 RX ADMIN — SODIUM CHLORIDE 80 MILLILITER(S): 9 INJECTION, SOLUTION INTRAVENOUS at 09:36

## 2025-01-01 RX ADMIN — Medication 650 MILLIGRAM(S): at 09:15

## 2025-01-01 RX ADMIN — OCTREOTIDE ACETATE 5 MICROGRAM(S)/HR: 500 INJECTION, SOLUTION INTRAVENOUS; SUBCUTANEOUS at 17:04

## 2025-01-01 RX ADMIN — AMPICILLIN SODIUM 200 GRAM(S): 1 INJECTION, POWDER, FOR SOLUTION INTRAMUSCULAR; INTRAVENOUS at 18:03

## 2025-01-01 RX ADMIN — Medication 4.15 MICROGRAM(S)/KG/HR: at 00:06

## 2025-01-01 RX ADMIN — AMPICILLIN SODIUM 200 GRAM(S): 1 INJECTION, POWDER, FOR SOLUTION INTRAMUSCULAR; INTRAVENOUS at 19:17

## 2025-01-01 RX ADMIN — Medication 50 MILLIEQUIVALENT(S): at 07:57

## 2025-01-01 RX ADMIN — LACTULOSE 10 GRAM(S): 10 SOLUTION ORAL at 23:10

## 2025-01-01 RX ADMIN — LACTULOSE 10 GRAM(S): 10 SOLUTION ORAL at 11:50

## 2025-01-01 RX ADMIN — Medication 1300 MILLIGRAM(S): at 17:46

## 2025-01-01 RX ADMIN — Medication 200 GRAM(S): at 13:34

## 2025-01-01 RX ADMIN — LEVETIRACETAM 250 MILLIGRAM(S): 10 INJECTION, SOLUTION INTRAVENOUS at 05:10

## 2025-01-01 RX ADMIN — Medication 650 MILLIGRAM(S): at 21:14

## 2025-01-01 RX ADMIN — LACTULOSE 20 GRAM(S): 10 SOLUTION ORAL at 13:02

## 2025-01-01 RX ADMIN — Medication 15 MILLILITER(S): at 05:19

## 2025-01-01 RX ADMIN — Medication 25 GRAM(S): at 11:32

## 2025-01-01 RX ADMIN — Medication 100 MILLIGRAM(S): at 05:04

## 2025-01-01 RX ADMIN — NOREPINEPHRINE BITARTRATE 7.78 MICROGRAM(S)/KG/MIN: 8 SOLUTION at 14:45

## 2025-01-01 RX ADMIN — Medication 100 MILLIGRAM(S): at 11:57

## 2025-01-01 RX ADMIN — LEVETIRACETAM 250 MILLIGRAM(S): 10 INJECTION, SOLUTION INTRAVENOUS at 17:16

## 2025-01-01 RX ADMIN — ALBUMIN (HUMAN) 150 MILLILITER(S): 12.5 INJECTION, SOLUTION INTRAVENOUS at 11:13

## 2025-01-01 RX ADMIN — LEVETIRACETAM 250 MILLIGRAM(S): 10 INJECTION, SOLUTION INTRAVENOUS at 17:36

## 2025-01-01 RX ADMIN — INSULIN LISPRO 3: 100 INJECTION, SOLUTION INTRAVENOUS; SUBCUTANEOUS at 05:03

## 2025-01-01 RX ADMIN — AMPICILLIN SODIUM 200 GRAM(S): 1 INJECTION, POWDER, FOR SOLUTION INTRAMUSCULAR; INTRAVENOUS at 05:03

## 2025-01-01 RX ADMIN — Medication 0.2 MILLIGRAM(S): at 13:07

## 2025-01-01 RX ADMIN — AMPICILLIN SODIUM 200 GRAM(S): 1 INJECTION, POWDER, FOR SOLUTION INTRAMUSCULAR; INTRAVENOUS at 11:44

## 2025-01-01 RX ADMIN — LEVETIRACETAM 250 MILLIGRAM(S): 10 INJECTION, SOLUTION INTRAVENOUS at 05:16

## 2025-01-01 RX ADMIN — Medication 650 MILLIGRAM(S): at 13:23

## 2025-01-01 RX ADMIN — OCTREOTIDE ACETATE 200 MICROGRAM(S): 500 INJECTION, SOLUTION INTRAVENOUS; SUBCUTANEOUS at 21:26

## 2025-01-01 RX ADMIN — Medication 1300 MILLIGRAM(S): at 05:27

## 2025-01-01 RX ADMIN — FOLIC ACID 1 MILLIGRAM(S): 1 TABLET ORAL at 11:40

## 2025-01-01 RX ADMIN — INSULIN LISPRO 4 UNIT(S): 100 INJECTION, SOLUTION INTRAVENOUS; SUBCUTANEOUS at 17:18

## 2025-01-01 RX ADMIN — Medication 0.2 MILLIGRAM(S): at 07:15

## 2025-01-01 RX ADMIN — Medication 50 MILLILITER(S): at 00:06

## 2025-01-01 RX ADMIN — LACTULOSE 20 GRAM(S): 10 SOLUTION ORAL at 13:28

## 2025-01-01 RX ADMIN — ALBUMIN (HUMAN) 150 MILLILITER(S): 12.5 INJECTION, SOLUTION INTRAVENOUS at 10:18

## 2025-01-01 RX ADMIN — LACTULOSE 20 GRAM(S): 10 SOLUTION ORAL at 13:14

## 2025-01-01 RX ADMIN — NOREPINEPHRINE BITARTRATE 7.78 MICROGRAM(S)/KG/MIN: 8 SOLUTION at 21:31

## 2025-01-01 RX ADMIN — Medication 40 MILLIGRAM(S): at 05:43

## 2025-01-01 RX ADMIN — AMPICILLIN SODIUM 200 GRAM(S): 1 INJECTION, POWDER, FOR SOLUTION INTRAMUSCULAR; INTRAVENOUS at 11:09

## 2025-01-01 RX ADMIN — Medication 40 MILLIGRAM(S): at 19:19

## 2025-01-01 RX ADMIN — Medication 1300 MILLIGRAM(S): at 05:46

## 2025-01-01 RX ADMIN — Medication 650 MILLIGRAM(S): at 13:47

## 2025-01-01 RX ADMIN — Medication 650 MILLIGRAM(S): at 21:05

## 2025-01-01 RX ADMIN — BUMETANIDE 2 MILLIGRAM(S): 1 TABLET ORAL at 05:17

## 2025-01-23 ENCOUNTER — INPATIENT (INPATIENT)
Facility: HOSPITAL | Age: 80
LOS: 11 days | Discharge: NURSING, REHAB OR LONG-TERM CARE | DRG: 999 | End: 2025-02-04
Attending: STUDENT IN AN ORGANIZED HEALTH CARE EDUCATION/TRAINING PROGRAM | Admitting: FAMILY MEDICINE
Payer: MEDICARE

## 2025-01-23 VITALS
HEART RATE: 92 BPM | SYSTOLIC BLOOD PRESSURE: 112 MMHG | DIASTOLIC BLOOD PRESSURE: 70 MMHG | HEIGHT: 65 IN | TEMPERATURE: 98 F | WEIGHT: 259.93 LBS | OXYGEN SATURATION: 97 % | RESPIRATION RATE: 18 BRPM

## 2025-01-23 DIAGNOSIS — Z98.890 OTHER SPECIFIED POSTPROCEDURAL STATES: Chronic | ICD-10-CM

## 2025-01-23 DIAGNOSIS — Z90.49 ACQUIRED ABSENCE OF OTHER SPECIFIED PARTS OF DIGESTIVE TRACT: Chronic | ICD-10-CM

## 2025-01-23 DIAGNOSIS — R29.6 REPEATED FALLS: ICD-10-CM

## 2025-01-23 LAB
ALBUMIN SERPL ELPH-MCNC: 2.2 G/DL — LOW (ref 3.5–5.2)
ALP SERPL-CCNC: 160 U/L — HIGH (ref 30–115)
ALT FLD-CCNC: 25 U/L — SIGNIFICANT CHANGE UP (ref 0–41)
ANION GAP SERPL CALC-SCNC: 15 MMOL/L — HIGH (ref 7–14)
APPEARANCE UR: CLEAR — SIGNIFICANT CHANGE UP
APTT BLD: 31.7 SEC — SIGNIFICANT CHANGE UP (ref 27–39.2)
AST SERPL-CCNC: 42 U/L — HIGH (ref 0–41)
BACTERIA # UR AUTO: ABNORMAL /HPF
BASE EXCESS BLDV CALC-SCNC: -1.5 MMOL/L — SIGNIFICANT CHANGE UP (ref -2–3)
BASOPHILS # BLD AUTO: 0.03 K/UL — SIGNIFICANT CHANGE UP (ref 0–0.2)
BASOPHILS NFR BLD AUTO: 0.3 % — SIGNIFICANT CHANGE UP (ref 0–1)
BILIRUB SERPL-MCNC: 1.9 MG/DL — HIGH (ref 0.2–1.2)
BILIRUB UR-MCNC: ABNORMAL
BUN SERPL-MCNC: 34 MG/DL — HIGH (ref 10–20)
CA-I SERPL-SCNC: 0.96 MMOL/L — LOW (ref 1.15–1.33)
CALCIUM SERPL-MCNC: 8.1 MG/DL — LOW (ref 8.4–10.5)
CHLORIDE SERPL-SCNC: 105 MMOL/L — SIGNIFICANT CHANGE UP (ref 98–110)
CK SERPL-CCNC: 91 U/L — SIGNIFICANT CHANGE UP (ref 0–225)
CO2 SERPL-SCNC: 22 MMOL/L — SIGNIFICANT CHANGE UP (ref 17–32)
COLOR SPEC: YELLOW — SIGNIFICANT CHANGE UP
COMMENT - URINE: SIGNIFICANT CHANGE UP
CREAT SERPL-MCNC: 1.1 MG/DL — SIGNIFICANT CHANGE UP (ref 0.7–1.5)
DIFF PNL FLD: ABNORMAL
EGFR: 69 ML/MIN/1.73M2 — SIGNIFICANT CHANGE UP
EOSINOPHIL # BLD AUTO: 0.05 K/UL — SIGNIFICANT CHANGE UP (ref 0–0.7)
EOSINOPHIL NFR BLD AUTO: 0.5 % — SIGNIFICANT CHANGE UP (ref 0–8)
EPI CELLS # UR: PRESENT
GAS PNL BLDV: 142 MMOL/L — SIGNIFICANT CHANGE UP (ref 136–145)
GAS PNL BLDV: SIGNIFICANT CHANGE UP
GLUCOSE BLDC GLUCOMTR-MCNC: 79 MG/DL — SIGNIFICANT CHANGE UP (ref 70–99)
GLUCOSE SERPL-MCNC: 77 MG/DL — SIGNIFICANT CHANGE UP (ref 70–99)
GLUCOSE UR QL: NEGATIVE MG/DL — SIGNIFICANT CHANGE UP
HCO3 BLDV-SCNC: 24 MMOL/L — SIGNIFICANT CHANGE UP (ref 22–29)
HCT VFR BLD CALC: 33 % — LOW (ref 42–52)
HCT VFR BLDA CALC: 32 % — LOW (ref 39–51)
HGB BLD CALC-MCNC: 10.8 G/DL — LOW (ref 12.6–17.4)
HGB BLD-MCNC: 11.1 G/DL — LOW (ref 14–18)
IMM GRANULOCYTES NFR BLD AUTO: 0.9 % — HIGH (ref 0.1–0.3)
INR BLD: 1.71 RATIO — HIGH (ref 0.65–1.3)
KETONES UR-MCNC: NEGATIVE MG/DL — SIGNIFICANT CHANGE UP
LACTATE BLDV-MCNC: 3.5 MMOL/L — HIGH (ref 0.5–2)
LACTATE SERPL-SCNC: 1.9 MMOL/L — SIGNIFICANT CHANGE UP (ref 0.7–2)
LEUKOCYTE ESTERASE UR-ACNC: ABNORMAL
LIDOCAIN IGE QN: 36 U/L — SIGNIFICANT CHANGE UP (ref 7–60)
LYMPHOCYTES # BLD AUTO: 0.55 K/UL — LOW (ref 1.2–3.4)
LYMPHOCYTES # BLD AUTO: 5.4 % — LOW (ref 20.5–51.1)
MCHC RBC-ENTMCNC: 32.6 PG — HIGH (ref 27–31)
MCHC RBC-ENTMCNC: 33.6 G/DL — SIGNIFICANT CHANGE UP (ref 32–37)
MCV RBC AUTO: 97.1 FL — HIGH (ref 80–94)
MONOCYTES # BLD AUTO: 1 K/UL — HIGH (ref 0.1–0.6)
MONOCYTES NFR BLD AUTO: 9.9 % — HIGH (ref 1.7–9.3)
NEUTROPHILS # BLD AUTO: 8.41 K/UL — HIGH (ref 1.4–6.5)
NEUTROPHILS NFR BLD AUTO: 83 % — HIGH (ref 42.2–75.2)
NITRITE UR-MCNC: POSITIVE
NRBC # BLD: 0 /100 WBCS — SIGNIFICANT CHANGE UP (ref 0–0)
NRBC BLD-RTO: 0 /100 WBCS — SIGNIFICANT CHANGE UP (ref 0–0)
PCO2 BLDV: 41 MMHG — LOW (ref 42–55)
PH BLDV: 7.37 — SIGNIFICANT CHANGE UP (ref 7.32–7.43)
PH UR: 5.5 — SIGNIFICANT CHANGE UP (ref 5–8)
PLATELET # BLD AUTO: 175 K/UL — SIGNIFICANT CHANGE UP (ref 130–400)
PMV BLD: 8.4 FL — SIGNIFICANT CHANGE UP (ref 7.4–10.4)
PO2 BLDV: 45 MMHG — SIGNIFICANT CHANGE UP (ref 25–45)
POTASSIUM BLDV-SCNC: 4 MMOL/L — SIGNIFICANT CHANGE UP (ref 3.5–5.1)
POTASSIUM SERPL-MCNC: 4.1 MMOL/L — SIGNIFICANT CHANGE UP (ref 3.5–5)
POTASSIUM SERPL-SCNC: 4.1 MMOL/L — SIGNIFICANT CHANGE UP (ref 3.5–5)
PROT SERPL-MCNC: 5.9 G/DL — LOW (ref 6–8)
PROT UR-MCNC: 30 MG/DL
PROTHROM AB SERPL-ACNC: 20.4 SEC — HIGH (ref 9.95–12.87)
RBC # BLD: 3.4 M/UL — LOW (ref 4.7–6.1)
RBC # FLD: 13.7 % — SIGNIFICANT CHANGE UP (ref 11.5–14.5)
RBC CASTS # UR COMP ASSIST: 5 /HPF — HIGH (ref 0–4)
SAO2 % BLDV: 73.9 % — SIGNIFICANT CHANGE UP (ref 67–88)
SODIUM SERPL-SCNC: 142 MMOL/L — SIGNIFICANT CHANGE UP (ref 135–146)
SP GR SPEC: >1.03 — HIGH (ref 1–1.03)
TROPONIN T, HIGH SENSITIVITY RESULT: 43 NG/L — HIGH (ref 6–21)
TROPONIN T, HIGH SENSITIVITY RESULT: 47 NG/L — HIGH (ref 6–21)
UROBILINOGEN FLD QL: 2 MG/DL (ref 0.2–1)
WBC # BLD: 10.13 K/UL — SIGNIFICANT CHANGE UP (ref 4.8–10.8)
WBC # FLD AUTO: 10.13 K/UL — SIGNIFICANT CHANGE UP (ref 4.8–10.8)
WBC UR QL: 20 /HPF — HIGH (ref 0–5)

## 2025-01-23 PROCEDURE — 82728 ASSAY OF FERRITIN: CPT

## 2025-01-23 PROCEDURE — 70551 MRI BRAIN STEM W/O DYE: CPT | Mod: MC

## 2025-01-23 PROCEDURE — 82962 GLUCOSE BLOOD TEST: CPT

## 2025-01-23 PROCEDURE — 86780 TREPONEMA PALLIDUM: CPT

## 2025-01-23 PROCEDURE — 73090 X-RAY EXAM OF FOREARM: CPT | Mod: 26,LT

## 2025-01-23 PROCEDURE — 86140 C-REACTIVE PROTEIN: CPT

## 2025-01-23 PROCEDURE — 93005 ELECTROCARDIOGRAM TRACING: CPT

## 2025-01-23 PROCEDURE — 83605 ASSAY OF LACTIC ACID: CPT

## 2025-01-23 PROCEDURE — 36415 COLL VENOUS BLD VENIPUNCTURE: CPT

## 2025-01-23 PROCEDURE — 93306 TTE W/DOPPLER COMPLETE: CPT

## 2025-01-23 PROCEDURE — 73201 CT UPPER EXTREMITY W/DYE: CPT | Mod: MC,LT

## 2025-01-23 PROCEDURE — 73060 X-RAY EXAM OF HUMERUS: CPT | Mod: 26,LT

## 2025-01-23 PROCEDURE — 92526 ORAL FUNCTION THERAPY: CPT | Mod: GN

## 2025-01-23 PROCEDURE — 97110 THERAPEUTIC EXERCISES: CPT | Mod: GP

## 2025-01-23 PROCEDURE — 94760 N-INVAS EAR/PLS OXIMETRY 1: CPT

## 2025-01-23 PROCEDURE — 82607 VITAMIN B-12: CPT

## 2025-01-23 PROCEDURE — 99223 1ST HOSP IP/OBS HIGH 75: CPT | Mod: FS

## 2025-01-23 PROCEDURE — 97162 PT EVAL MOD COMPLEX 30 MIN: CPT | Mod: GP

## 2025-01-23 PROCEDURE — 83036 HEMOGLOBIN GLYCOSYLATED A1C: CPT

## 2025-01-23 PROCEDURE — 84443 ASSAY THYROID STIM HORMONE: CPT

## 2025-01-23 PROCEDURE — 71260 CT THORAX DX C+: CPT | Mod: 26

## 2025-01-23 PROCEDURE — 85730 THROMBOPLASTIN TIME PARTIAL: CPT

## 2025-01-23 PROCEDURE — 80048 BASIC METABOLIC PNL TOTAL CA: CPT

## 2025-01-23 PROCEDURE — 70450 CT HEAD/BRAIN W/O DYE: CPT | Mod: 26

## 2025-01-23 PROCEDURE — 85018 HEMOGLOBIN: CPT

## 2025-01-23 PROCEDURE — 85027 COMPLETE CBC AUTOMATED: CPT

## 2025-01-23 PROCEDURE — 93971 EXTREMITY STUDY: CPT | Mod: LT

## 2025-01-23 PROCEDURE — 93970 EXTREMITY STUDY: CPT | Mod: 26

## 2025-01-23 PROCEDURE — 92610 EVALUATE SWALLOWING FUNCTION: CPT | Mod: GN

## 2025-01-23 PROCEDURE — 84132 ASSAY OF SERUM POTASSIUM: CPT

## 2025-01-23 PROCEDURE — 72125 CT NECK SPINE W/O DYE: CPT | Mod: 26

## 2025-01-23 PROCEDURE — 72158 MRI LUMBAR SPINE W/O & W/DYE: CPT | Mod: MC

## 2025-01-23 PROCEDURE — 83540 ASSAY OF IRON: CPT

## 2025-01-23 PROCEDURE — 80053 COMPREHEN METABOLIC PANEL: CPT

## 2025-01-23 PROCEDURE — 73030 X-RAY EXAM OF SHOULDER: CPT | Mod: 26,LT

## 2025-01-23 PROCEDURE — 85610 PROTHROMBIN TIME: CPT

## 2025-01-23 PROCEDURE — 97165 OT EVAL LOW COMPLEX 30 MIN: CPT | Mod: GO

## 2025-01-23 PROCEDURE — 36573 INSJ PICC RS&I 5 YR+: CPT | Mod: LT

## 2025-01-23 PROCEDURE — 76705 ECHO EXAM OF ABDOMEN: CPT

## 2025-01-23 PROCEDURE — 95819 EEG AWAKE AND ASLEEP: CPT

## 2025-01-23 PROCEDURE — C1751: CPT

## 2025-01-23 PROCEDURE — 82746 ASSAY OF FOLIC ACID SERUM: CPT

## 2025-01-23 PROCEDURE — 82803 BLOOD GASES ANY COMBINATION: CPT

## 2025-01-23 PROCEDURE — 85025 COMPLETE CBC W/AUTO DIFF WBC: CPT

## 2025-01-23 PROCEDURE — 80177 DRUG SCRN QUAN LEVETIRACETAM: CPT

## 2025-01-23 PROCEDURE — 85652 RBC SED RATE AUTOMATED: CPT

## 2025-01-23 PROCEDURE — 87040 BLOOD CULTURE FOR BACTERIA: CPT

## 2025-01-23 PROCEDURE — 97535 SELF CARE MNGMENT TRAINING: CPT | Mod: GO

## 2025-01-23 PROCEDURE — 84295 ASSAY OF SERUM SODIUM: CPT

## 2025-01-23 PROCEDURE — 85014 HEMATOCRIT: CPT

## 2025-01-23 PROCEDURE — 93010 ELECTROCARDIOGRAM REPORT: CPT

## 2025-01-23 PROCEDURE — 83735 ASSAY OF MAGNESIUM: CPT

## 2025-01-23 PROCEDURE — 99285 EMERGENCY DEPT VISIT HI MDM: CPT

## 2025-01-23 PROCEDURE — A9579: CPT

## 2025-01-23 PROCEDURE — 70498 CT ANGIOGRAPHY NECK: CPT | Mod: MC

## 2025-01-23 PROCEDURE — 0042T: CPT | Mod: MC

## 2025-01-23 PROCEDURE — 70496 CT ANGIOGRAPHY HEAD: CPT | Mod: MC

## 2025-01-23 PROCEDURE — 74177 CT ABD & PELVIS W/CONTRAST: CPT | Mod: 26

## 2025-01-23 PROCEDURE — 84484 ASSAY OF TROPONIN QUANT: CPT

## 2025-01-23 PROCEDURE — 97116 GAIT TRAINING THERAPY: CPT | Mod: GP

## 2025-01-23 PROCEDURE — 72157 MRI CHEST SPINE W/O & W/DYE: CPT | Mod: MC

## 2025-01-23 PROCEDURE — 83550 IRON BINDING TEST: CPT

## 2025-01-23 PROCEDURE — 70450 CT HEAD/BRAIN W/O DYE: CPT | Mod: MC

## 2025-01-23 PROCEDURE — 73080 X-RAY EXAM OF ELBOW: CPT | Mod: 26,LT

## 2025-01-23 PROCEDURE — 82330 ASSAY OF CALCIUM: CPT

## 2025-01-23 RX ORDER — SODIUM CHLORIDE 9 G/ML
1000 INJECTION, SOLUTION INTRAVENOUS
Refills: 0 | Status: DISCONTINUED | OUTPATIENT
Start: 2025-01-23 | End: 2025-02-04

## 2025-01-23 RX ORDER — SODIUM CHLORIDE 9 G/ML
1000 INJECTION, SOLUTION INTRAVENOUS ONCE
Refills: 0 | Status: COMPLETED | OUTPATIENT
Start: 2025-01-23 | End: 2025-01-23

## 2025-01-23 RX ORDER — GABAPENTIN 800 MG/1
300 TABLET ORAL EVERY 8 HOURS
Refills: 0 | Status: DISCONTINUED | OUTPATIENT
Start: 2025-01-23 | End: 2025-02-01

## 2025-01-23 RX ORDER — BACTERIOSTATIC SODIUM CHLORIDE 0.9 %
1000 VIAL (ML) INJECTION
Refills: 0 | Status: DISCONTINUED | OUTPATIENT
Start: 2025-01-23 | End: 2025-01-31

## 2025-01-23 RX ORDER — DM/PSEUDOEPHED/ACETAMINOPHEN 10-30-250
25 CAPSULE ORAL ONCE
Refills: 0 | Status: DISCONTINUED | OUTPATIENT
Start: 2025-01-23 | End: 2025-02-04

## 2025-01-23 RX ORDER — MAGNESIUM, ALUMINUM HYDROXIDE 200-225/5
30 SUSPENSION, ORAL (FINAL DOSE FORM) ORAL EVERY 4 HOURS
Refills: 0 | Status: DISCONTINUED | OUTPATIENT
Start: 2025-01-23 | End: 2025-02-04

## 2025-01-23 RX ORDER — ONDANSETRON 4 MG/1
4 TABLET, ORALLY DISINTEGRATING ORAL EVERY 8 HOURS
Refills: 0 | Status: DISCONTINUED | OUTPATIENT
Start: 2025-01-23 | End: 2025-02-04

## 2025-01-23 RX ORDER — MORPHINE SULFATE 60 MG/1
4 TABLET, FILM COATED, EXTENDED RELEASE ORAL ONCE
Refills: 0 | Status: DISCONTINUED | OUTPATIENT
Start: 2025-01-23 | End: 2025-01-23

## 2025-01-23 RX ORDER — DM/PSEUDOEPHED/ACETAMINOPHEN 10-30-250
15 CAPSULE ORAL ONCE
Refills: 0 | Status: DISCONTINUED | OUTPATIENT
Start: 2025-01-23 | End: 2025-02-04

## 2025-01-23 RX ORDER — MORPHINE SULFATE 60 MG/1
4 TABLET, FILM COATED, EXTENDED RELEASE ORAL EVERY 4 HOURS
Refills: 0 | Status: DISCONTINUED | OUTPATIENT
Start: 2025-01-23 | End: 2025-01-24

## 2025-01-23 RX ORDER — CEFEPIME HCL 1 G
2000 IV SOLUTION, PIGGYBACK, BOTTLE (EA) INTRAVENOUS EVERY 12 HOURS
Refills: 0 | Status: DISCONTINUED | OUTPATIENT
Start: 2025-01-23 | End: 2025-01-24

## 2025-01-23 RX ORDER — ANTISEPTIC SURGICAL SCRUB 0.04 MG/ML
1 SOLUTION TOPICAL
Refills: 0 | Status: DISCONTINUED | OUTPATIENT
Start: 2025-01-23 | End: 2025-02-04

## 2025-01-23 RX ORDER — LEVETIRACETAM 750 MG/1
500 TABLET, FILM COATED ORAL DAILY
Refills: 0 | Status: DISCONTINUED | OUTPATIENT
Start: 2025-01-23 | End: 2025-01-24

## 2025-01-23 RX ORDER — CEFEPIME HCL 1 G
2000 IV SOLUTION, PIGGYBACK, BOTTLE (EA) INTRAVENOUS ONCE
Refills: 0 | Status: COMPLETED | OUTPATIENT
Start: 2025-01-23 | End: 2025-01-23

## 2025-01-23 RX ORDER — INSULIN LISPRO 100/ML
VIAL (ML) SUBCUTANEOUS
Refills: 0 | Status: DISCONTINUED | OUTPATIENT
Start: 2025-01-23 | End: 2025-02-04

## 2025-01-23 RX ORDER — ACETAMINOPHEN 160 MG/5ML
650 SUSPENSION ORAL EVERY 6 HOURS
Refills: 0 | Status: DISCONTINUED | OUTPATIENT
Start: 2025-01-23 | End: 2025-01-25

## 2025-01-23 RX ORDER — LOSARTAN POTASSIUM 100 MG
25 TABLET ORAL DAILY
Refills: 0 | Status: DISCONTINUED | OUTPATIENT
Start: 2025-01-23 | End: 2025-01-27

## 2025-01-23 RX ORDER — VANCOMYCIN HYDROCHLORIDE 50 MG/ML
1000 KIT ORAL ONCE
Refills: 0 | Status: COMPLETED | OUTPATIENT
Start: 2025-01-23 | End: 2025-01-23

## 2025-01-23 RX ORDER — DM/PSEUDOEPHED/ACETAMINOPHEN 10-30-250
12.5 CAPSULE ORAL ONCE
Refills: 0 | Status: DISCONTINUED | OUTPATIENT
Start: 2025-01-23 | End: 2025-02-04

## 2025-01-23 RX ORDER — ACETAMINOPHEN, DIPHENHYDRAMINE HCL, PHENYLEPHRINE HCL 325; 25; 5 MG/1; MG/1; MG/1
5 TABLET ORAL AT BEDTIME
Refills: 0 | Status: DISCONTINUED | OUTPATIENT
Start: 2025-01-23 | End: 2025-02-04

## 2025-01-23 RX ORDER — VANCOMYCIN HYDROCHLORIDE 50 MG/ML
1000 KIT ORAL EVERY 12 HOURS
Refills: 0 | Status: DISCONTINUED | OUTPATIENT
Start: 2025-01-23 | End: 2025-01-24

## 2025-01-23 RX ORDER — LEVETIRACETAM 750 MG/1
250 TABLET, FILM COATED ORAL
Refills: 0 | Status: DISCONTINUED | OUTPATIENT
Start: 2025-01-23 | End: 2025-01-24

## 2025-01-23 RX ADMIN — SODIUM CHLORIDE 1000 MILLILITER(S): 9 INJECTION, SOLUTION INTRAVENOUS at 19:34

## 2025-01-23 RX ADMIN — MORPHINE SULFATE 4 MILLIGRAM(S): 60 TABLET, FILM COATED, EXTENDED RELEASE ORAL at 18:27

## 2025-01-23 RX ADMIN — Medication 100 MILLIGRAM(S): at 20:21

## 2025-01-23 RX ADMIN — VANCOMYCIN HYDROCHLORIDE 250 MILLIGRAM(S): KIT at 20:34

## 2025-01-23 NOTE — ED PROVIDER NOTE - ATTENDING CONTRIBUTION TO CARE
80-year-old male past medical history of diabetes hypertension brought in by daughter at bedside for multiple complaints the significant 1 being that patient's been having frequent falls.  Also noticed waxing waning confusion no fevers or chills nausea vomiting but does endorse some increased urinary frequency  CONSTITUTIONAL: WA / WN / NAD  HEAD: NCAT  EYES: PERRL; EOMI;   ENT: Normal pharynx; mucous membranes pink/moist, no erythema.  NECK: Supple; no meningeal signs  CARD: RRR; nl S1/S2; no M/R/G.   RESP: Respiratory rate and effort are normal; breath sounds clear and equal bilaterally.  ABD: Soft, NT ND   MSK/EXT: No gross deformities swelling to left elbow distal pulses in tact b/l   SKIN: Warm and dry;   NEURO: AAOx3,  PSYCH: Memory Intact, Normal Affect

## 2025-01-23 NOTE — H&P ADULT - HISTORY OF PRESENT ILLNESS
80-year-old male with past medical history of insulin-dependent diabetes, hypertension, subdural hematoma on keppra who presents for multiple falls, weakness, confusion. Per daughter at bedside, he has becoming more intermittently confused, has had multiple falls over the past 2 months (last fall was yesterday). He had a fall while getting out of bed, does not recall how it happened. Complains of back pain since then. He was admitted in 2022 for left elbow swelling and cellulitis. Now states the pain and swelling have returned. Per daughter, he has been urinating more frequently for the past 2 days. No fevers, chills, CP, SOB, palpitations, n/v/d, abdominal pain, hip pain. 80-year-old male with past medical history of insulin-dependent diabetes, hypertension, subdural hematoma on keppra who presents for multiple falls, weakness, confusion. Per daughter at bedside, he has becoming more intermittently confused, has had multiple falls over the past 2 months (last fall was yesterday). He had a fall while getting out of bed, does not recall how it happened. Complains of back pain since then. He was admitted in 2022 for left elbow swelling and cellulitis. Now states the pain and swelling have returned. Per daughter, he has been urinating more frequently for the past 2 days. No fevers, chills, CP, SOB, palpitations, n/v/d, abdominal pain, hip pain.    Home meds provided by daughter 80-year-old male with past medical history of insulin-dependent diabetes, hypertension, subdural hematoma on keppra who presents for multiple falls, weakness, confusion. Per daughter at bedside, he has becoming more intermittently confused, has had multiple falls over the past 2 months (last fall was yesterday). He had a fall while getting out of bed, does not recall how it happened. Complains of back pain since then. He was admitted in 2022 for left elbow swelling and cellulitis. Now states the pain and swelling have returned. Per daughter, he has been urinating more frequently for the past 2 days. No fevers, chills, CP, SOB, palpitations, n/v/d, abdominal pain, hip pain.  denies any numbness, weakness, change in BM, urinary pattern or pelvic numbness  Home meds provided by daughter

## 2025-01-23 NOTE — ED ADULT TRIAGE NOTE - CHIEF COMPLAINT QUOTE
Pt fell last night.  As per daughter when she went there today he was disoriented and incontinent.  Normally he is not.  On triage pt was alert/oriented x 3.  Also c/o left sided pain and swelling to arm and leg.

## 2025-01-23 NOTE — ED PROVIDER NOTE - PHYSICAL EXAMINATION
VITAL SIGNS: I have reviewed nursing notes and confirm.  CONSTITUTIONAL: well-appearing, non-toxic, NAD  SKIN: Warm dry, normal skin turgor, no acute rash, no bruising  HEAD: NCAT  EYES: EOMI, PERRLA, no scleral icterus, normal conjunctiva  ENT: Moist mucous membranes, OR clear. Normal pharynx  NECK: Supple; non tender. Full ROM. No cervical LAD  CARD: RRR, no murmurs, rubs or gallops  RESP: clear to ausculation b/l.  No rales, rhonchi, or wheezing. No increased WOB.  ABD: soft, + BS, non-tender, non-distended, no rebound or guarding. No CVA tenderness  EXT:   NEURO: normal motor. normal sensory.  PSYCH: Cooperative, appropriate. VITAL SIGNS: I have reviewed nursing notes and confirm.  CONSTITUTIONAL: well-appearing, non-toxic, NAD  SKIN: Warm dry, normal skin turgor, no acute rash, no bruising  HEAD: NCAT  EYES: EOMI, PERRLA, no scleral icterus, normal conjunctiva  ENT: Moist mucous membranes, OR clear. Normal pharynx  NECK: Supple; non tender. Full ROM. No cervical LAD  CARD: RRR, no murmurs, rubs or gallops  RESP: clear to ausculation b/l.  No rales, rhonchi, or wheezing. No increased WOB.  ABD: soft, + BS, non-tender, non-distended, no rebound or guarding. No CVA tenderness  EXT: Unable to lift left leg at baseline, left LE edema > right, no calf tenderness, no obvious deformities, compartments soft.  NEURO: normal motor. normal sensory.  PSYCH: Cooperative, appropriate.

## 2025-01-23 NOTE — H&P ADULT - NEUROLOGICAL
normal/cranial nerves II-XII intact normal/cranial nerves II-XII intact/sensation intact/responds to pain/responds to verbal commands/deep reflexes intact

## 2025-01-23 NOTE — ED PROVIDER NOTE - OBJECTIVE STATEMENT
80-year-old male with past medical history of insulin-dependent diabetes, hypertension, subdural hematoma on keppra who presents for multiple falls, weakness, confusion. Per daughter at bedside, he has becoming more intermittently confused, has had multiple falls over the past 2 months (last fall was yesterday). He had a fall while getting out of bed, does not recall how it happened. Complains of back pain since then. He was admitted in 2022 for left elbow swelling and cellulitis. Now states the pain and swelling have returned. Per daughter, he has been urinating more frequently for the past 2 days. No fevers, chills, CP, SOB, palpitations, n/v/d, abdominal pain, hip pain.

## 2025-01-23 NOTE — H&P ADULT - ASSESSMENT
80-year-old male with past medical history of insulin-dependent diabetes, hypertension, subdural hematoma on keppra who presents for multiple falls, weakness, confusion. Per daughter at bedside, he has becoming more intermittently confused, has had multiple falls over the past 2 months (last fall was yesterday). He had a fall while getting out of bed, does not recall how it happened. Complains of back pain since then. He was admitted in 2022 for left elbow swelling and cellulitis. Now states the pain and swelling have returned. Per daughter, he has been urinating more frequently for the past 2 days. No fevers, chills, CP, SOB, palpitations, n/v/d, abdominal pain, hip pain. 80-year-old male with past medical history of insulin-dependent diabetes, hypertension, subdural hematoma on keppra who presents for multiple falls, weakness, confusion. Per daughter at bedside, he has becoming more intermittently confused, has had multiple falls over the past 2 months (last fall was yesterday). He had a fall while getting out of bed, does not recall how it happened. Complains of back pain since then. He was admitted in 2022 for left elbow swelling and cellulitis. Now states the pain and swelling have returned. Per daughter, he has been urinating more frequently for the past 2 days. No fevers, chills, CP, SOB, palpitations, n/v/d, abdominal pain, hip pain.      Elevated Trops  - trend trops  - ECHO in am  - EKG in am    # Weakness / Frequent falls  - PT  - RTF  -      # UTI  - c/w abx  - IVF    # CT: Pancreatic tail hypodenisty  - Gastro consult  - RUQ US  - trend labs    # DM  - hold PO meds  - FS with SS    # HTN  - VS  - c/w home med 80-year-old male with past medical history of insulin-dependent diabetes, hypertension, subdural hematoma on keppra who presents for multiple falls, weakness, confusion. Per daughter at bedside, he has becoming more intermittently confused, has had multiple falls over the past 2 months (last fall was yesterday). He had a fall while getting out of bed, does not recall how it happened. Complains of back pain since then. He was admitted in 2022 for left elbow swelling and cellulitis. Now states the pain and swelling have returned. Per daughter, he has been urinating more frequently for the past 2 days. No fevers, chills, CP, SOB, palpitations, n/v/d, abdominal pain, hip pain.      #Elevated Trops  -EKG shows no acute ischemic changes.  trop level flat  - trend trops  - ECHO in am  - EKG in am  -Asymptomatic     # Weakness / Frequent falls  - PT  - RTF  -      # UTI  - c/w abx  - IVF  -follow up cultures    # CT: Pancreatic tail hypodenisty  - Gastro consult  - RUQ US  - trend labs    # DM  - hold PO meds  - FS with ISS    # HTN  - VS  - c/w home med    #Progress Note Handoff  Pending (specify):  as above   Family discussion:  plan of care was discussed with patient and family in details.  all questions were answered.  seems to understand, and in agreement  Disposition:  PT   80-year-old male with past medical history of insulin-dependent diabetes, hypertension, subdural hematoma on keppra who presents for multiple falls, weakness, confusion. Per daughter at bedside, he has becoming more intermittently confused, has had multiple falls over the past 2 months (last fall was yesterday). He had a fall while getting out of bed, does not recall how it happened. Complains of back pain since then. He was admitted in 2022 for left elbow swelling and cellulitis. Now states the pain and swelling have returned. Per daughter, he has been urinating more frequently for the past 2 days. No fevers, chills, CP, SOB, palpitations, n/v/d, abdominal pain, hip pain.      #Elevated Trops  -EKG shows no acute ischemic changes.  trop level flat  - trend trops  - ECHO in am  - EKG in am  -Asymptomatic     # Weakness / Frequent falls  - PT  - RTF  -    -follow up xray reading  -Discussed abdm CT scan reading with radiology lumbar degenerative changes    # UTI  - c/w abx  - IVF  -follow up cultures    # CT: Pancreatic tail hypodenisty  - Gastro consult  - RUQ US  - trend labs    # DM  - hold PO meds  - FS with ISS    # HTN  - VS  - c/w home med    #Questionable Upper extremities cellulitis  -continue with current antibiotic  -ID consult     #Progress Note Handoff  Pending (specify):  as above   Family discussion:  plan of care was discussed with patient and family in details.  all questions were answered.  seems to understand, and in agreement  Disposition:  PT

## 2025-01-23 NOTE — ED PROVIDER NOTE - CARE PLAN
Principal Discharge DX:	Frequent falls  Secondary Diagnosis:	Back pain  Secondary Diagnosis:	Acute UTI  Secondary Diagnosis:	Left arm swelling   1

## 2025-01-23 NOTE — H&P ADULT - NSHPPHYSICALEXAM_GEN_ALL_CORE
Vital Signs Last 24 Hrs  T(C): 36.7 (24 Jan 2025 05:04), Max: 36.7 (23 Jan 2025 17:20)  T(F): 98 (24 Jan 2025 05:04), Max: 98.1 (23 Jan 2025 17:20)  HR: 91 (24 Jan 2025 05:04) (82 - 92)  BP: 147/73 (24 Jan 2025 05:04) (112/70 - 147/73)  BP(mean): --  RR: 18 (24 Jan 2025 05:04) (18 - 18)  SpO2: 97% (24 Jan 2025 05:04) (97% - 99%)    Parameters below as of 23 Jan 2025 23:40  Patient On (Oxygen Delivery Method): room air

## 2025-01-23 NOTE — ED ADULT NURSE NOTE - NSFALLRISKINTERV_ED_ALL_ED
Subjective:       Patient ID: Anthony Apple is a 65 y.o. female.    Chief Complaint: Nasal Congestion, Sinus Problem, Fatigue, and Cough (Productive cough,green sputum.)    Anthony Apple presents to the clinic today for URI symptoms  She has chronic allergies with history of recurrent sinusitis   Patient Active Problem List  Diagnosis  · PAF (paroxysmal atrial fibrillation)  · Mitral valve prolapse  · Family history of colon cancer  · Gastroesophageal reflux disease without esophagitis  · Hyperthyroidism  · Osteoporosis  · Seasonal allergies  · Panic disorder  · Paroxysmal atrial fibrillation  · COVID-19  · Chondral defect of right patella  · Situational depression  · Tracheobronchitis  Taking her prescribed Singulair, Astelin, Flonase, Saline Nasal Flushes   Home Covid last night was negative   Has flare at least once yearly around this time with increased allergy symptoms (nasal congestion, post nasal drainage, eye irritation) that then turns into coughing, sore throat, hoarse voice, bronchitis.   Was recently  for her son- pets are a huge trigger for her        Review of Systems   Constitutional:  Positive for chills. Negative for diaphoresis and fever.   HENT:  Positive for congestion, postnasal drip, rhinorrhea, sinus pressure, sinus pain and sore throat. Negative for ear pain, sneezing, tinnitus and trouble swallowing.    Eyes:  Positive for itching.   Respiratory:  Positive for cough. Negative for chest tightness, shortness of breath and wheezing.    Cardiovascular:  Negative for chest pain and palpitations.   Allergic/Immunologic: Positive for environmental allergies.   Neurological:  Negative for dizziness and headaches.       Patient Active Problem List   Diagnosis    PAF (paroxysmal atrial fibrillation)    Mitral valve prolapse    Family history of colon cancer    Gastroesophageal reflux disease without esophagitis    Hyperthyroidism    Osteoporosis    Seasonal allergies    Panic disorder     Paroxysmal atrial fibrillation    COVID-19    Chondral defect of right patella    Situational depression    Tracheobronchitis       Objective:      Physical Exam  Constitutional:       General: She is not in acute distress.     Appearance: Normal appearance.   HENT:      Right Ear: Tympanic membrane is bulging.      Left Ear: Tympanic membrane is bulging.      Nose: Mucosal edema, congestion and rhinorrhea present. Rhinorrhea is purulent.      Right Turbinates: Enlarged.      Left Turbinates: Enlarged.      Right Sinus: Maxillary sinus tenderness present.      Left Sinus: Maxillary sinus tenderness present.      Mouth/Throat:      Lips: Pink.      Pharynx: Uvula midline. Posterior oropharyngeal erythema present. No oropharyngeal exudate.   Cardiovascular:      Rate and Rhythm: Normal rate and regular rhythm.      Heart sounds: Normal heart sounds. No murmur heard.  Pulmonary:      Effort: Pulmonary effort is normal. No respiratory distress.      Breath sounds: Normal breath sounds. No wheezing.   Lymphadenopathy:      Cervical: Cervical adenopathy present.   Skin:     General: Skin is warm and dry.      Findings: No rash.   Neurological:      Mental Status: She is alert and oriented to person, place, and time.   Psychiatric:         Mood and Affect: Mood normal.         Behavior: Behavior normal.         Lab Results   Component Value Date    WBC 9.74 06/01/2023    HGB 12.5 06/01/2023    HCT 38.0 06/01/2023     06/01/2023    CHOL 185 12/08/2022    TRIG 91 12/08/2022    HDL 57 12/08/2022    ALT 32 06/01/2023    AST 33 06/01/2023     06/01/2023    K 5.0 06/01/2023     06/01/2023    CREATININE 0.8 06/01/2023    BUN 13 06/01/2023    CO2 25 06/01/2023    TSH 1.870 10/07/2022    HGBA1C 5.0 09/14/2022     The 10-year ASCVD risk score (Endy THOMPSON, et al., 2019) is: 4%    Values used to calculate the score:      Age: 65 years      Sex: Female      Is Non- : No      Diabetic: No      " Tobacco smoker: No      Systolic Blood Pressure: 124 mmHg      Is BP treated: No      HDL Cholesterol: 57 mg/dL      Total Cholesterol: 185 mg/dL  Visit Vitals  /82 (BP Location: Right arm, Patient Position: Sitting, BP Method: Medium (Manual))   Pulse 74   Temp 99.1 °F (37.3 °C) (Oral)   Ht 5' 10" (1.778 m)   Wt 78.9 kg (174 lb)   SpO2 98%   BMI 24.97 kg/m²      Assessment:       1. Nasal congestion with rhinorrhea    2. Sore throat and laryngitis    3. Acute recurrent maxillary sinusitis    4. Acute cough        Plan:       1. Nasal congestion with rhinorrhea  -     methylPREDNISolone (MEDROL DOSEPACK) 4 mg tablet; use as directed  Dispense: 21 each; Refill: 0    2. Sore throat and laryngitis  -     doxycycline (VIBRAMYCIN) 100 MG Cap; Take 1 capsule (100 mg total) by mouth 2 (two) times daily.  Dispense: 20 capsule; Refill: 0  -     methylPREDNISolone (MEDROL DOSEPACK) 4 mg tablet; use as directed  Dispense: 21 each; Refill: 0    3. Acute recurrent maxillary sinusitis  -     doxycycline (VIBRAMYCIN) 100 MG Cap; Take 1 capsule (100 mg total) by mouth 2 (two) times daily.  Dispense: 20 capsule; Refill: 0  saline nasal flushes 2-3 times daily/as needed for increased sinus/nasal congestion  Warm compresses to the face for sinus pressure  Take medications as prescribed  Warm salt water gargles, throat lozenges, warm honey/lemon as needed for sore throat  maintain hydration  cool mist humidifier at night for increased congestion  rtc if s/sx worsen/not improving after 7- 10 days     4. Acute cough  -     benzonatate (TESSALON) 200 MG capsule; Take 1 capsule (200 mg total) by mouth 3 (three) times daily as needed for Cough.  Dispense: 30 capsule; Refill: 0       No follow-ups on file.           " Assistance OOB with selected safe patient handling equipment if applicable/Assistance with ambulation/Communicate fall risk and risk factors to all staff, patient, and family/Provide visual cue: yellow wristband, yellow gown, etc/Reinforce activity limits and safety measures with patient and family/Call bell, personal items and telephone in reach/Instruct patient to call for assistance before getting out of bed/chair/stretcher/Non-slip footwear applied when patient is off stretcher/[place] to call system/Physically safe environment - no spills, clutter or unnecessary equipment/Purposeful Proactive Rounding/Room/bathroom lighting operational, light cord in reach

## 2025-01-24 ENCOUNTER — RESULT REVIEW (OUTPATIENT)
Age: 80
End: 2025-01-24

## 2025-01-24 LAB
A1C WITH ESTIMATED AVERAGE GLUCOSE RESULT: 7.6 % — HIGH (ref 4–5.6)
ALBUMIN SERPL ELPH-MCNC: 1.9 G/DL — LOW (ref 3.5–5.2)
ALP SERPL-CCNC: 149 U/L — HIGH (ref 30–115)
ALT FLD-CCNC: 21 U/L — SIGNIFICANT CHANGE UP (ref 0–41)
ANION GAP SERPL CALC-SCNC: 12 MMOL/L — SIGNIFICANT CHANGE UP (ref 7–14)
AST SERPL-CCNC: 36 U/L — SIGNIFICANT CHANGE UP (ref 0–41)
BASOPHILS # BLD AUTO: 0.02 K/UL — SIGNIFICANT CHANGE UP (ref 0–0.2)
BASOPHILS NFR BLD AUTO: 0.2 % — SIGNIFICANT CHANGE UP (ref 0–1)
BILIRUB SERPL-MCNC: 1.3 MG/DL — HIGH (ref 0.2–1.2)
BUN SERPL-MCNC: 36 MG/DL — HIGH (ref 10–20)
CALCIUM SERPL-MCNC: 7.6 MG/DL — LOW (ref 8.4–10.5)
CHLORIDE SERPL-SCNC: 106 MMOL/L — SIGNIFICANT CHANGE UP (ref 98–110)
CO2 SERPL-SCNC: 22 MMOL/L — SIGNIFICANT CHANGE UP (ref 17–32)
CREAT SERPL-MCNC: 1 MG/DL — SIGNIFICANT CHANGE UP (ref 0.7–1.5)
EGFR: 77 ML/MIN/1.73M2 — SIGNIFICANT CHANGE UP
EOSINOPHIL # BLD AUTO: 0.1 K/UL — SIGNIFICANT CHANGE UP (ref 0–0.7)
EOSINOPHIL NFR BLD AUTO: 1.2 % — SIGNIFICANT CHANGE UP (ref 0–8)
ESTIMATED AVERAGE GLUCOSE: 171 MG/DL — HIGH (ref 68–114)
FOLATE SERPL-MCNC: 5 NG/ML — SIGNIFICANT CHANGE UP
GLUCOSE BLDC GLUCOMTR-MCNC: 237 MG/DL — HIGH (ref 70–99)
GLUCOSE SERPL-MCNC: 227 MG/DL — HIGH (ref 70–99)
GRAM STN FLD: ABNORMAL
HCT VFR BLD CALC: 30 % — LOW (ref 42–52)
HGB BLD-MCNC: 10.1 G/DL — LOW (ref 14–18)
IMM GRANULOCYTES NFR BLD AUTO: 1 % — HIGH (ref 0.1–0.3)
LACTATE SERPL-SCNC: 1.7 MMOL/L — SIGNIFICANT CHANGE UP (ref 0.7–2)
LYMPHOCYTES # BLD AUTO: 0.54 K/UL — LOW (ref 1.2–3.4)
LYMPHOCYTES # BLD AUTO: 6.5 % — LOW (ref 20.5–51.1)
MCHC RBC-ENTMCNC: 32.5 PG — HIGH (ref 27–31)
MCHC RBC-ENTMCNC: 33.7 G/DL — SIGNIFICANT CHANGE UP (ref 32–37)
MCV RBC AUTO: 96.5 FL — HIGH (ref 80–94)
METHOD TYPE: SIGNIFICANT CHANGE UP
MONOCYTES # BLD AUTO: 0.95 K/UL — HIGH (ref 0.1–0.6)
MONOCYTES NFR BLD AUTO: 11.4 % — HIGH (ref 1.7–9.3)
MSSA DNA SPEC QL NAA+PROBE: SIGNIFICANT CHANGE UP
NEUTROPHILS # BLD AUTO: 6.65 K/UL — HIGH (ref 1.4–6.5)
NEUTROPHILS NFR BLD AUTO: 79.7 % — HIGH (ref 42.2–75.2)
NRBC # BLD: 0 /100 WBCS — SIGNIFICANT CHANGE UP (ref 0–0)
NRBC BLD-RTO: 0 /100 WBCS — SIGNIFICANT CHANGE UP (ref 0–0)
PLATELET # BLD AUTO: 157 K/UL — SIGNIFICANT CHANGE UP (ref 130–400)
PMV BLD: 8.6 FL — SIGNIFICANT CHANGE UP (ref 7.4–10.4)
POTASSIUM SERPL-MCNC: 4.1 MMOL/L — SIGNIFICANT CHANGE UP (ref 3.5–5)
POTASSIUM SERPL-SCNC: 4.1 MMOL/L — SIGNIFICANT CHANGE UP (ref 3.5–5)
PROT SERPL-MCNC: 5.3 G/DL — LOW (ref 6–8)
RBC # BLD: 3.11 M/UL — LOW (ref 4.7–6.1)
RBC # FLD: 13.6 % — SIGNIFICANT CHANGE UP (ref 11.5–14.5)
SODIUM SERPL-SCNC: 140 MMOL/L — SIGNIFICANT CHANGE UP (ref 135–146)
SPECIMEN SOURCE: SIGNIFICANT CHANGE UP
SPECIMEN SOURCE: SIGNIFICANT CHANGE UP
T PALLIDUM AB TITR SER: NEGATIVE — SIGNIFICANT CHANGE UP
TROPONIN T, HIGH SENSITIVITY RESULT: 39 NG/L — HIGH (ref 6–21)
TSH SERPL-MCNC: 2.15 UIU/ML — SIGNIFICANT CHANGE UP (ref 0.27–4.2)
VIT B12 SERPL-MCNC: 1163 PG/ML — SIGNIFICANT CHANGE UP (ref 232–1245)
WBC # BLD: 8.34 K/UL — SIGNIFICANT CHANGE UP (ref 4.8–10.8)
WBC # FLD AUTO: 8.34 K/UL — SIGNIFICANT CHANGE UP (ref 4.8–10.8)

## 2025-01-24 PROCEDURE — 99223 1ST HOSP IP/OBS HIGH 75: CPT

## 2025-01-24 PROCEDURE — 93010 ELECTROCARDIOGRAM REPORT: CPT

## 2025-01-24 PROCEDURE — 93306 TTE W/DOPPLER COMPLETE: CPT | Mod: 26

## 2025-01-24 PROCEDURE — 99232 SBSQ HOSP IP/OBS MODERATE 35: CPT

## 2025-01-24 PROCEDURE — 76705 ECHO EXAM OF ABDOMEN: CPT | Mod: 26

## 2025-01-24 RX ORDER — LIDOCAINE HYDROCHLORIDE 30 MG/G
1 CREAM TOPICAL DAILY
Refills: 0 | Status: DISCONTINUED | OUTPATIENT
Start: 2025-01-24 | End: 2025-02-04

## 2025-01-24 RX ORDER — HEPARIN SODIUM,PORCINE 10000/ML
5000 VIAL (ML) INJECTION EVERY 12 HOURS
Refills: 0 | Status: DISCONTINUED | OUTPATIENT
Start: 2025-01-24 | End: 2025-01-27

## 2025-01-24 RX ORDER — LEVETIRACETAM 750 MG/1
500 TABLET, FILM COATED ORAL
Refills: 0 | Status: DISCONTINUED | OUTPATIENT
Start: 2025-01-24 | End: 2025-02-02

## 2025-01-24 RX ORDER — SENNOSIDES 8.6 MG
2 TABLET ORAL AT BEDTIME
Refills: 0 | Status: DISCONTINUED | OUTPATIENT
Start: 2025-01-24 | End: 2025-02-04

## 2025-01-24 RX ORDER — POLYETHYLENE GLYCOL 3350 17 G/17G
17 POWDER, FOR SOLUTION ORAL DAILY
Refills: 0 | Status: DISCONTINUED | OUTPATIENT
Start: 2025-01-24 | End: 2025-01-25

## 2025-01-24 RX ORDER — CEFTRIAXONE 250 MG/1
1000 INJECTION, POWDER, FOR SOLUTION INTRAMUSCULAR; INTRAVENOUS EVERY 24 HOURS
Refills: 0 | Status: DISCONTINUED | OUTPATIENT
Start: 2025-01-24 | End: 2025-01-24

## 2025-01-24 RX ORDER — VANCOMYCIN HYDROCHLORIDE 50 MG/ML
1750 KIT ORAL EVERY 12 HOURS
Refills: 0 | Status: DISCONTINUED | OUTPATIENT
Start: 2025-01-24 | End: 2025-01-24

## 2025-01-24 RX ORDER — INSULIN LISPRO 100/ML
4 VIAL (ML) SUBCUTANEOUS
Refills: 0 | Status: DISCONTINUED | OUTPATIENT
Start: 2025-01-24 | End: 2025-01-27

## 2025-01-24 RX ORDER — GLUCAGON 3 MG/1
1 POWDER NASAL ONCE
Refills: 0 | Status: DISCONTINUED | OUTPATIENT
Start: 2025-01-24 | End: 2025-02-04

## 2025-01-24 RX ORDER — MORPHINE SULFATE 60 MG/1
2 TABLET, FILM COATED, EXTENDED RELEASE ORAL EVERY 4 HOURS
Refills: 0 | Status: DISCONTINUED | OUTPATIENT
Start: 2025-01-24 | End: 2025-01-31

## 2025-01-24 RX ORDER — CEFAZOLIN SODIUM IN 0.9 % NACL 2 G/10 ML
2000 SYRINGE (ML) INTRAVENOUS EVERY 8 HOURS
Refills: 0 | Status: DISCONTINUED | OUTPATIENT
Start: 2025-01-24 | End: 2025-02-04

## 2025-01-24 RX ORDER — LIDOCAINE HYDROCHLORIDE 30 MG/G
1 CREAM TOPICAL EVERY 24 HOURS
Refills: 0 | Status: DISCONTINUED | OUTPATIENT
Start: 2025-01-24 | End: 2025-02-04

## 2025-01-24 RX ORDER — NYSTATIN 100000 U/G
1 POWDER TOPICAL EVERY 12 HOURS
Refills: 0 | Status: DISCONTINUED | OUTPATIENT
Start: 2025-01-24 | End: 2025-02-04

## 2025-01-24 RX ADMIN — Medication 75 MILLILITER(S): at 22:01

## 2025-01-24 RX ADMIN — VANCOMYCIN HYDROCHLORIDE 250 MILLIGRAM(S): KIT at 18:49

## 2025-01-24 RX ADMIN — Medication 5000 UNIT(S): at 05:22

## 2025-01-24 RX ADMIN — ACETAMINOPHEN 650 MILLIGRAM(S): 160 SUSPENSION ORAL at 22:05

## 2025-01-24 RX ADMIN — ACETAMINOPHEN, DIPHENHYDRAMINE HCL, PHENYLEPHRINE HCL 5 MILLIGRAM(S): 325; 25; 5 TABLET ORAL at 00:42

## 2025-01-24 RX ADMIN — LEVETIRACETAM 500 MILLIGRAM(S): 750 TABLET, FILM COATED ORAL at 17:52

## 2025-01-24 RX ADMIN — MORPHINE SULFATE 2 MILLIGRAM(S): 60 TABLET, FILM COATED, EXTENDED RELEASE ORAL at 10:51

## 2025-01-24 RX ADMIN — Medication 100 MILLIGRAM(S): at 05:23

## 2025-01-24 RX ADMIN — ANTISEPTIC SURGICAL SCRUB 1 APPLICATION(S): 0.04 SOLUTION TOPICAL at 05:29

## 2025-01-24 RX ADMIN — Medication 2: at 08:01

## 2025-01-24 RX ADMIN — LIDOCAINE HYDROCHLORIDE 1 PATCH: 30 CREAM TOPICAL at 22:02

## 2025-01-24 RX ADMIN — Medication 2 TABLET(S): at 22:03

## 2025-01-24 RX ADMIN — MORPHINE SULFATE 2 MILLIGRAM(S): 60 TABLET, FILM COATED, EXTENDED RELEASE ORAL at 15:40

## 2025-01-24 RX ADMIN — GABAPENTIN 300 MILLIGRAM(S): 800 TABLET ORAL at 22:04

## 2025-01-24 RX ADMIN — Medication 100 MILLIGRAM(S): at 22:00

## 2025-01-24 RX ADMIN — CEFTRIAXONE 100 MILLIGRAM(S): 250 INJECTION, POWDER, FOR SOLUTION INTRAMUSCULAR; INTRAVENOUS at 12:40

## 2025-01-24 RX ADMIN — Medication 3: at 12:45

## 2025-01-24 RX ADMIN — MORPHINE SULFATE 2 MILLIGRAM(S): 60 TABLET, FILM COATED, EXTENDED RELEASE ORAL at 01:12

## 2025-01-24 RX ADMIN — POLYETHYLENE GLYCOL 3350 17 GRAM(S): 17 POWDER, FOR SOLUTION ORAL at 22:03

## 2025-01-24 RX ADMIN — ACETAMINOPHEN 650 MILLIGRAM(S): 160 SUSPENSION ORAL at 23:00

## 2025-01-24 RX ADMIN — Medication 1 APPLICATION(S): at 17:52

## 2025-01-24 RX ADMIN — Medication 75 MILLILITER(S): at 00:43

## 2025-01-24 RX ADMIN — Medication 1 APPLICATION(S): at 05:22

## 2025-01-24 RX ADMIN — Medication 3: at 16:42

## 2025-01-24 RX ADMIN — VANCOMYCIN HYDROCHLORIDE 250 MILLIGRAM(S): KIT at 05:23

## 2025-01-24 RX ADMIN — MORPHINE SULFATE 2 MILLIGRAM(S): 60 TABLET, FILM COATED, EXTENDED RELEASE ORAL at 00:42

## 2025-01-24 RX ADMIN — LEVETIRACETAM 500 MILLIGRAM(S): 750 TABLET, FILM COATED ORAL at 05:22

## 2025-01-24 RX ADMIN — Medication 25 MILLIGRAM(S): at 05:22

## 2025-01-24 RX ADMIN — MORPHINE SULFATE 2 MILLIGRAM(S): 60 TABLET, FILM COATED, EXTENDED RELEASE ORAL at 10:23

## 2025-01-24 RX ADMIN — MORPHINE SULFATE 2 MILLIGRAM(S): 60 TABLET, FILM COATED, EXTENDED RELEASE ORAL at 15:06

## 2025-01-24 RX ADMIN — GABAPENTIN 300 MILLIGRAM(S): 800 TABLET ORAL at 05:22

## 2025-01-24 RX ADMIN — Medication 5000 UNIT(S): at 17:52

## 2025-01-24 NOTE — PHYSICAL THERAPY INITIAL EVALUATION ADULT - GENERAL OBSERVATIONS, REHAB EVAL
14:15-14:45 Chart reviewed. Pt encountered semireclined in bed,may be seen by Physical Therapist as confirmed with Nurse. Patient denied pain at rest but "severe" pain on low back and (L) leg with movement; RN Armando made aware; +tele/ IV lock RUE' family at bedside;+swelling LUE/LLE

## 2025-01-24 NOTE — CONSULT NOTE ADULT - ASSESSMENT
80-year-old male with past medical history of insulin-dependent diabetes, hypertension, subdural hematoma on keppra who presents for multiple falls, weakness, confusion.     ID is consulted for cellulitis  Afebrile 98.1  WBC 8.34 < 10.13  On room air  BCx 1/23 MSSA  UA WBC 20, UCx pending    Left forearm xray  Capitellum bony fragment/calcification. No radius or ulna   fracture.. Soft tissue swelling with no radiographic findings to suggest   the presence of osteomyelitis or radiopaque soft tissue foreign body.    CT chest:  No CT evidence of acute traumatic injury within the chest, abdomen or   pelvis.  CT ABDOMEN/PELVIS:  Pancreatic tail hypodensity measuring 1 cm. Differential includes IPMN.   Nonemergent/outpatient MRI may be obtained for further characterization    Antibiotic:  Vancomycin 1/23 - 1/24  Ceftriaxone 1/23 - 1/24  Ancef 1/24 ->      IMPRESSION:  MSSA bacteremia, potentially life-threatening  Suspect left forearm cellulitis  DM  Immunosuppression / Immunosenescence secondary to multiple comorbidities which could result in poor clinical outcome    RECOMMENDATIONS:  - D/C vancomycin and ceftriaxone  - IV Ancef 2g q8hrs  - Repeat 1 set of BCx q48hrs until negative  - TTE, might need HANNAH  - Offloading and frequent position changes, aspiration precaution  - Trend WBC, fever curve, transaminases, creatinine daily  - Please inform ID of any patient clinical change or any new pertinent laboratory or radiographic data      Nora Beck D.O.  Attending Physician  Division of Infectious Diseases  [facility]  Please contact me via [employer] Teams

## 2025-01-24 NOTE — CONSULT NOTE ADULT - SUBJECTIVE AND OBJECTIVE BOX
INFECTIOUS DISEASE CONSULT NOTE    Patient is a 80y old  Male who presents with a chief complaint of fall (23 Jan 2025 22:51)    HPI:  80-year-old male with past medical history of insulin-dependent diabetes, hypertension, subdural hematoma on keppra who presents for multiple falls, weakness, confusion. Per daughter at bedside, he has becoming more intermittently confused, has had multiple falls over the past 2 months (last fall was yesterday). He had a fall while getting out of bed, does not recall how it happened. Complains of back pain since then. He was admitted in 2022 for left elbow swelling and cellulitis. Now states the pain and swelling have returned. Per daughter, he has been urinating more frequently for the past 2 days. No fevers, chills, CP, SOB, palpitations, n/v/d, abdominal pain, hip pain.  denies any numbness, weakness, change in BM, urinary pattern or pelvic numbness  Home meds provided by daughter (23 Jan 2025 22:51)      Prior hospital charts reviewed [Yes]  Primary team notes reviewed [Yes]  Other consultant notes reviewed [Yes]    REVIEW OF SYSTEMS:      PAST MEDICAL & SURGICAL HISTORY:  Diabetes      Hypertension      Fall      H/O left knee surgery      History of cholecystectomy      History of appendectomy          SOCIAL HISTORY:  - Born in [place], migrated [place] in 19XX  - Currently working as / Retired  - Lives with _____; no pets  - No recent travel  - Denies tobacco use  - Denies alcohol use  - Denies illicit drug use  - Currently sexually active, has one male/female sexual partner    FAMILY HISTORY:      Allergies:  No Known Allergies      ANTIMICROBIALS:  cefTRIAXone   IVPB 1000 every 24 hours  vancomycin  IVPB 1000 every 12 hours      ANTIMICROBIALS (past 90 days):  MEDICATIONS  (STANDING):  cefepime   IVPB   100 mL/Hr IV Intermittent (01-23-25 @ 20:21)    cefepime   IVPB   100 mL/Hr IV Intermittent (01-24-25 @ 05:23)    vancomycin  IVPB   250 mL/Hr IV Intermittent (01-24-25 @ 05:23)    vancomycin  IVPB.   250 mL/Hr IV Intermittent (01-23-25 @ 20:34)        OTHER MEDS:   MEDICATIONS  (STANDING):  acetaminophen     Tablet .. 650 every 6 hours PRN  aluminum hydroxide/magnesium hydroxide/simethicone Suspension 30 every 4 hours PRN  dextrose 50% Injectable 25 once  dextrose 50% Injectable 12.5 once  dextrose 50% Injectable 25 once  dextrose Oral Gel 15 once PRN  gabapentin 300 every 8 hours  heparin   Injectable 5000 every 12 hours  insulin lispro (ADMELOG) corrective regimen sliding scale  three times a day before meals  levETIRAcetam 500 two times a day  losartan 25 daily  melatonin 5 at bedtime PRN  morphine  - Injectable 2 every 4 hours PRN  ondansetron Injectable 4 every 8 hours PRN      VITALS:  Vital Signs Last 24 Hrs  T(F): 98 (01-24-25 @ 05:04), Max: 98.1 (01-23-25 @ 17:20)    Vital Signs Last 24 Hrs  HR: 91 (01-24-25 @ 05:04) (82 - 92)  BP: 147/73 (01-24-25 @ 05:04) (112/70 - 147/73)  RR: 18 (01-24-25 @ 05:04)  SpO2: 97% (01-24-25 @ 05:04) (97% - 99%)  Wt(kg): --    EXAM:    Labs:                        10.1   8.34  )-----------( 157      ( 24 Jan 2025 07:04 )             30.0     01-24    140  |  106  |  36[H]  ----------------------------<  227[H]  4.1   |  22  |  1.0    Ca    7.6[L]      24 Jan 2025 07:04    TPro  5.3[L]  /  Alb  1.9[L]  /  TBili  1.3[H]  /  DBili  x   /  AST  36  /  ALT  21  /  AlkPhos  149[H]  01-24      WBC Trend:  WBC Count: 8.34 (01-24-25 @ 07:04)  WBC Count: 10.13 (01-23-25 @ 17:55)      Auto Neutrophil #: 6.65 K/uL (01-24-25 @ 07:04)  Auto Neutrophil #: 8.41 K/uL (01-23-25 @ 17:55)      Creatine Trend:  Creatinine: 1.0 (01-24)  Creatinine: 1.1 (01-23)      Liver Biochemical Testing Trend:  Alanine Aminotransferase (ALT/SGPT): 21 (01-24)  Alanine Aminotransferase (ALT/SGPT): 25 (01-23)  Aspartate Aminotransferase (AST/SGOT): 36 (01-24-25 @ 07:04)  Aspartate Aminotransferase (AST/SGOT): 42 (01-23-25 @ 17:55)  Bilirubin Total: 1.3 (01-24)  Bilirubin Total: 1.9 (01-23)      Trend LDH      Auto Eosinophil %: 1.2 % (01-24-25 @ 07:04)  Auto Eosinophil %: 0.5 % (01-23-25 @ 17:55)      Urinalysis Basic - ( 24 Jan 2025 07:04 )    Color: x / Appearance: x / SG: x / pH: x  Gluc: 227 mg/dL / Ketone: x  / Bili: x / Urobili: x   Blood: x / Protein: x / Nitrite: x   Leuk Esterase: x / RBC: x / WBC x   Sq Epi: x / Non Sq Epi: x / Bacteria: x          MICROBIOLOGY:        Urinalysis with Rflx Culture (collected 23 Jan 2025 19:55)      Troponin T, High Sensitivity Result: 39 (01-24)  Troponin T, High Sensitivity Result: 43 (01-23)  Troponin T, High Sensitivity Result: 47 (01-23)    Lactate, Blood: 1.7 (01-24 @ 07:04)  Lactate, Blood: 1.9 (01-23 @ 21:46)  Blood Gas Venous - Lactate: 3.5 (01-23 @ 18:35)          RADIOLOGY:  imaging below personally reviewed    < from: [facility] Duplex Lower Ext Vein Scan, Bilat (01.23.25 @ 19:55) >  IMPRESSION:  No evidence of deep venous thrombosis in either lower extremity.    < end of copied text >    < from: CT Chest w/ IV Cont (01.23.25 @ 19:21) >  IMPRESSION:  No CT evidence of acute traumatic injury within the chest, abdomen or   pelvis.    ABDOMEN/PELVIS:  Pancreatic tail hypodensity measuring 1 cm. Differential includes IPMN.   Nonemergent/outpatient MRI may be obtained for further characterization    < end of copied text >    < from: CT Cervical Spine No Cont (01.23.25 @ 19:19) >  IMPRESSION:    No acute cervical fracture or facet subluxation.      < end of copied text >     INFECTIOUS DISEASE CONSULT NOTE    Patient is a 80y old  Male who presents with a chief complaint of fall (23 Jan 2025 22:51)    HPI:  80-year-old male with past medical history of insulin-dependent diabetes, hypertension, subdural hematoma on keppra who presents for multiple falls, weakness, confusion. Per daughter at bedside, he has becoming more intermittently confused, has had multiple falls over the past 2 months (last fall was yesterday). He had a fall while getting out of bed, does not recall how it happened. Complains of back pain since then. He was admitted in 2022 for left elbow swelling and cellulitis. Now states the pain and swelling have returned. Per daughter, he has been urinating more frequently for the past 2 days. No fevers, chills, CP, SOB, palpitations, n/v/d, abdominal pain, hip pain.  denies any numbness, weakness, change in BM, urinary pattern or pelvic numbness  Home meds provided by daughter (23 Jan 2025 22:51)      Prior hospital charts reviewed [Yes]  Primary team notes reviewed [Yes]  Other consultant notes reviewed [Yes]    REVIEW OF SYSTEMS:  CONSTITUTIONAL: No fever or chills  HEAD: No lesion on scalp  EYES: No visual disturbance  ENT: No sore throat  RESPIRATORY: No cough, no shortness of breath  CARDIOVASCULAR: No chest pain or palpitations  GASTROINTESTINAL: No abdominal or epigastric pain  GENITOURINARY: No dysuria  NEUROLOGICAL: No headache/dizziness  MUSCULOSKELETAL: Left forearm swelling  SKIN: No itching, rashes  All other ROS negative except noted above      PAST MEDICAL & SURGICAL HISTORY:  Diabetes      Hypertension      Fall      H/O left knee surgery      History of cholecystectomy      History of appendectomy          SOCIAL HISTORY:  - No recent travel  - Denies tobacco use  - Denies alcohol use  - Denies illicit drug use    FAMILY HISTORY:  No family history of DM    Allergies:  No Known Allergies      ANTIMICROBIALS:  cefTRIAXone   IVPB 1000 every 24 hours  vancomycin  IVPB 1000 every 12 hours      ANTIMICROBIALS (past 90 days):  MEDICATIONS  (STANDING):  cefepime   IVPB   100 mL/Hr IV Intermittent (01-23-25 @ 20:21)    cefepime   IVPB   100 mL/Hr IV Intermittent (01-24-25 @ 05:23)    vancomycin  IVPB   250 mL/Hr IV Intermittent (01-24-25 @ 05:23)    vancomycin  IVPB.   250 mL/Hr IV Intermittent (01-23-25 @ 20:34)        OTHER MEDS:   MEDICATIONS  (STANDING):  acetaminophen     Tablet .. 650 every 6 hours PRN  aluminum hydroxide/magnesium hydroxide/simethicone Suspension 30 every 4 hours PRN  dextrose 50% Injectable 25 once  dextrose 50% Injectable 12.5 once  dextrose 50% Injectable 25 once  dextrose Oral Gel 15 once PRN  gabapentin 300 every 8 hours  heparin   Injectable 5000 every 12 hours  insulin lispro (ADMELOG) corrective regimen sliding scale  three times a day before meals  levETIRAcetam 500 two times a day  losartan 25 daily  melatonin 5 at bedtime PRN  morphine  - Injectable 2 every 4 hours PRN  ondansetron Injectable 4 every 8 hours PRN      VITALS:  Vital Signs Last 24 Hrs  T(F): 98 (01-24-25 @ 05:04), Max: 98.1 (01-23-25 @ 17:20)    Vital Signs Last 24 Hrs  HR: 91 (01-24-25 @ 05:04) (82 - 92)  BP: 147/73 (01-24-25 @ 05:04) (112/70 - 147/73)  RR: 18 (01-24-25 @ 05:04)  SpO2: 97% (01-24-25 @ 05:04) (97% - 99%)  Wt(kg): --    EXAM:  GENERAL: NAD, lying in bed  HEAD: No head lesions  EYES: Conjunctiva pink and cornea white  EAR, NOSE, MOUTH, THROAT: Normal external ears and nose, no discharges; moist mucous membranes  NECK: Supple, nontender to palpation; no JVD  RESPIRATORY: Clear to auscultation bilaterally  CARDIOVASCULAR: S1 S2  GASTROINTESTINAL: Soft, nontender, nondistended; normoactive bowel sounds  GENITOURINARY: No loyola catheter, no CVA tenderness  EXTREMITIES: No clubbing, cyanosis, or petal edema  NERVOUS SYSTEM: Alert and oriented to person, time, place and situation, speech clear. No focal deficits   MUSCULOSKELETAL: Left forearm swelling  SKIN: No rashes or lesions, no superficial thrombophlebitis  PSYCH: Normal affect      Labs:                        10.1   8.34  )-----------( 157      ( 24 Jan 2025 07:04 )             30.0     01-24    140  |  106  |  36[H]  ----------------------------<  227[H]  4.1   |  22  |  1.0    Ca    7.6[L]      24 Jan 2025 07:04    TPro  5.3[L]  /  Alb  1.9[L]  /  TBili  1.3[H]  /  DBili  x   /  AST  36  /  ALT  21  /  AlkPhos  149[H]  01-24      WBC Trend:  WBC Count: 8.34 (01-24-25 @ 07:04)  WBC Count: 10.13 (01-23-25 @ 17:55)      Auto Neutrophil #: 6.65 K/uL (01-24-25 @ 07:04)  Auto Neutrophil #: 8.41 K/uL (01-23-25 @ 17:55)      Creatine Trend:  Creatinine: 1.0 (01-24)  Creatinine: 1.1 (01-23)      Liver Biochemical Testing Trend:  Alanine Aminotransferase (ALT/SGPT): 21 (01-24)  Alanine Aminotransferase (ALT/SGPT): 25 (01-23)  Aspartate Aminotransferase (AST/SGOT): 36 (01-24-25 @ 07:04)  Aspartate Aminotransferase (AST/SGOT): 42 (01-23-25 @ 17:55)  Bilirubin Total: 1.3 (01-24)  Bilirubin Total: 1.9 (01-23)      Trend LDH      Auto Eosinophil %: 1.2 % (01-24-25 @ 07:04)  Auto Eosinophil %: 0.5 % (01-23-25 @ 17:55)      Urinalysis Basic - ( 24 Jan 2025 07:04 )    Color: x / Appearance: x / SG: x / pH: x  Gluc: 227 mg/dL / Ketone: x  / Bili: x / Urobili: x   Blood: x / Protein: x / Nitrite: x   Leuk Esterase: x / RBC: x / WBC x   Sq Epi: x / Non Sq Epi: x / Bacteria: x          MICROBIOLOGY:        Urinalysis with Rflx Culture (collected 23 Jan 2025 19:55)      Troponin T, High Sensitivity Result: 39 (01-24)  Troponin T, High Sensitivity Result: 43 (01-23)  Troponin T, High Sensitivity Result: 47 (01-23)    Lactate, Blood: 1.7 (01-24 @ 07:04)  Lactate, Blood: 1.9 (01-23 @ 21:46)  Blood Gas Venous - Lactate: 3.5 (01-23 @ 18:35)          RADIOLOGY:  imaging below personally reviewed    < from: [facility] Duplex Lower Ext Vein Scan, Bilat (01.23.25 @ 19:55) >  IMPRESSION:  No evidence of deep venous thrombosis in either lower extremity.    < end of copied text >    < from: CT Chest w/ IV Cont (01.23.25 @ 19:21) >  IMPRESSION:  No CT evidence of acute traumatic injury within the chest, abdomen or   pelvis.    ABDOMEN/PELVIS:  Pancreatic tail hypodensity measuring 1 cm. Differential includes IPMN.   Nonemergent/outpatient MRI may be obtained for further characterization    < end of copied text >    < from: CT Cervical Spine No Cont (01.23.25 @ 19:19) >  IMPRESSION:    No acute cervical fracture or facet subluxation.      < end of copied text >    < from: Xray Forearm, Left (01.23.25 @ 18:20) >  Findings/  impression: Capitellum bony fragment/calcification. No radius or ulna   fracture.. Soft tissue swelling with no radiographic findings to suggest   the presence of osteomyelitis or radiopaque soft tissue foreign body.    < end of copied text >

## 2025-01-24 NOTE — PHYSICAL THERAPY INITIAL EVALUATION ADULT - GAIT DEVIATIONS NOTED, PT EVAL
stooped posture, dec heel strike/pushoff /stance isabel LLE, incomplete foot clearance/decreased jason/decreased weight-shifting ability

## 2025-01-24 NOTE — PHYSICAL THERAPY INITIAL EVALUATION ADULT - PERTINENT HX OF CURRENT PROBLEM, REHAB EVAL
81 y/o male admitted with diagnosis of Other fall; presented to ED for multiple falls, weakness, confusion, ;ast fall on 1/22/25while trying to get out of bed, now with c/o back pain and (L) elbow/hand swelling, also has been urinating more frequently, no acute fracture on LE imaging, (-) DVT; seen by GI/ID

## 2025-01-24 NOTE — PROGRESS NOTE ADULT - SUBJECTIVE AND OBJECTIVE BOX
Patient is a 80y old  Male who presents with a chief complaint of fall (01-24-25)      Pt seen and examined at bedside. No CP or SOB.          PAST MEDICAL & SURGICAL HISTORY:  Diabetes    Hypertension    Fall    H/O left knee surgery    History of cholecystectomy    History of appendectomy        VITAL SIGNS (Last 24 hrs):  T(C): 36.7 (01-24-25 @ 13:40), Max: 36.7 (01-24-25 @ 05:04)  HR: 86 (01-24-25 @ 13:40) (75 - 91)  BP: 113/63 (01-24-25 @ 13:40) (111/68 - 147/73)  RR: 16 (01-24-25 @ 13:40) (16 - 18)  SpO2: 98% (01-24-25 @ 13:40) (96% - 99%)  Wt(kg): --  Daily Height in cm: 175.26 (23 Jan 2025 23:40)    Daily     I&O's Summary      PHYSICAL EXAM:  GENERAL: NAD, well-developed  HEAD:  Atraumatic, Normocephalic  EYES: EOMI, PERRLA, conjunctiva and sclera clear  NECK: Supple, No JVD  CHEST/LUNG: Clear to auscultation bilaterally; No wheeze  HEART: Regular rate and rhythm; No murmurs, rubs, or gallops  ABDOMEN: Soft, Nontender, Nondistended; Bowel sounds present  EXTREMITIES:  2+ Peripheral Pulses, No clubbing, cyanosis, or edema  PSYCH: AAOx3  NEUROLOGY: non-focal  SKIN: No rashes or lesions    Labs Reviewed  Spoke to patient in regards to abnormal labs.    CBC Full  -  ( 24 Jan 2025 07:04 )  WBC Count : 8.34 K/uL  Hemoglobin : 10.1 g/dL  Hematocrit : 30.0 %  Platelet Count - Automated : 157 K/uL  Mean Cell Volume : 96.5 fL  Mean Cell Hemoglobin : 32.5 pg  Mean Cell Hemoglobin Concentration : 33.7 g/dL  Auto Neutrophil # : 6.65 K/uL  Auto Lymphocyte # : 0.54 K/uL  Auto Monocyte # : 0.95 K/uL  Auto Eosinophil # : 0.10 K/uL  Auto Basophil # : 0.02 K/uL  Auto Neutrophil % : 79.7 %  Auto Lymphocyte % : 6.5 %  Auto Monocyte % : 11.4 %  Auto Eosinophil % : 1.2 %  Auto Basophil % : 0.2 %    BMP:    01-24 @ 07:04    Blood Urea Nitrogen - 36  Calcium - 7.6  Carbond Dioxide - 22  Chloride - 106  Creatinine - 1.0  Glucose - 227  Potassium - 4.1  Sodium - 140      Hemoglobin A1c -   PT/INR - ( 23 Jan 2025 17:55 )   PT: 20.40 sec;   INR: 1.71 ratio         PTT - ( 23 Jan 2025 17:55 )  PTT:31.7 sec  Urine Culture:  01-23 @ 20:05 Urine culture: --    Culture Results:   Growth in aerobic bottle: Gram Positive Cocci in Clusters  Growth in anaerobic bottle: Gram Positive Cocci in Clusters  Direct identification is available within approximately 3-5  hours either by Blood Panel Multiplexed PCR or Direct  MALDI-TOF. Details: [email or web address]  Method Type: PCR  Organism: Blood Culture PCR  Organism Identification: Blood Culture PCR  Specimen Source: .Blood BLOOD        COVID Labs  CRP:      D-Dimer:            Imaging reviewed independently and reviewed read        MEDICATIONS  (STANDING):  bacitracin   Ointment 1 Application(s) Topical two times a day  cefTRIAXone   IVPB 1000 milliGRAM(s) IV Intermittent every 24 hours  chlorhexidine 2% Cloths 1 Application(s) Topical <User Schedule>  dextrose 5%. 1000 milliLiter(s) (50 mL/Hr) IV Continuous <Continuous>  dextrose 50% Injectable 25 Gram(s) IV Push once  dextrose 50% Injectable 12.5 Gram(s) IV Push once  dextrose 50% Injectable 25 Gram(s) IV Push once  gabapentin 300 milliGRAM(s) Oral every 8 hours  heparin   Injectable 5000 Unit(s) SubCutaneous every 12 hours  insulin lispro (ADMELOG) corrective regimen sliding scale   SubCutaneous three times a day before meals  levETIRAcetam 500 milliGRAM(s) Oral two times a day  lidocaine   4% Patch 1 Patch Transdermal every 24 hours  lidocaine   4% Patch 1 Patch Transdermal daily  losartan 25 milliGRAM(s) Oral daily  senna 2 Tablet(s) Oral at bedtime  sodium chloride 0.9%. 1000 milliLiter(s) (75 mL/Hr) IV Continuous <Continuous>  vancomycin  IVPB 1750 milliGRAM(s) IV Intermittent every 12 hours    MEDICATIONS  (PRN):  acetaminophen     Tablet .. 650 milliGRAM(s) Oral every 6 hours PRN Temp greater or equal to 38C (100.4F), Mild Pain (1 - 3)  aluminum hydroxide/magnesium hydroxide/simethicone Suspension 30 milliLiter(s) Oral every 4 hours PRN Dyspepsia  dextrose Oral Gel 15 Gram(s) Oral once PRN Blood Glucose LESS THAN 70 milliGRAM(s)/deciliter  melatonin 5 milliGRAM(s) Oral at bedtime PRN Insomnia  morphine  - Injectable 2 milliGRAM(s) IV Push every 4 hours PRN Moderate Pain (4 - 6)  nystatin Powder 1 Application(s) Topical every 12 hours PRN fungal rash  ondansetron Injectable 4 milliGRAM(s) IV Push every 8 hours PRN Nausea and/or Vomiting  polyethylene glycol 3350 17 Gram(s) Oral daily PRN Constipation

## 2025-01-24 NOTE — PROGRESS NOTE ADULT - ASSESSMENT
80-year-old male with past medical history of insulin-dependent diabetes, hypertension, subdural hematoma on keppra who presents for multiple falls, weakness, confusion. Per daughter at bedside, he has becoming more intermittently confused, has had multiple falls over the past 2 months (last fall was yesterday). He had a fall while getting out of bed, does not recall how it happened. Complains of back pain since then. He was admitted in 2022 for left elbow swelling and cellulitis. Now states the pain and swelling have returned. Per daughter, he has been urinating more frequently for the past 2 days. No fevers, chills, CP, SOB, palpitations, n/v/d, abdominal pain, hip pain.      #demand ischemia in the setting of active infection  -EKG shows no acute ischemic changes.  trop level flat  - trend trops  - ECHO 1/24: 1. LV Ejection Fraction by Pan's Method with a biplane EF of 66 %.   2. Moderately enlarged left atrium.   3. Mild mitral annular calcification.   4. Sclerotic aortic valve with normal opening.   5. Ascending aorta 3.8 cm.  -Asymptomatic     # Weakness / Frequent falls  - PT  - RTF  -    -follow up xray reading  -Discussed abdm CT scan reading with radiology lumbar degenerative changes    # UTI  -c/w abx  - IVF  -follow up cultures    #staph aureus MSSA bacteremia  #Questionable left forearm cellulitis - low suspicion for septic joint - no pain with elbow movement, no swelling of olecranon, swelling at the forearm area   -repeat blood cultures on 1/25   -on vanco  -ID consulted     # CT: Pancreatic tail hypodenisty  - Gastro consult - can follow up outpatient with GI for MRCP  - RUQ US no obvious acute findings   - trend labs    # DM  - hold PO meds  - FS with ISS  - daughter wants to bring in home trulicity - 63 units     # HTN  - VS  - c/w home med      #Progress Note Handoff  Pending (specify):  as above   Family discussion:  plan of care was discussed with patient and family in details.  all questions were answered.  seems to understand, and in agreement  Disposition: DC tele, dc neuro checks

## 2025-01-24 NOTE — CONSULT NOTE ADULT - SUBJECTIVE AND OBJECTIVE BOX
Patient is   a 80-year-old male with past medical history of insulin-dependent diabetes, hypertension, subdural hematoma on keppra who presents for multiple falls, weakness, confusion. Per daughter at bedside, he has becoming more intermittently confused, has had multiple falls over the past 2 months (last fall was yesterday). He had a fall while getting out of bed, does not recall how it happened. Complains of back pain since then. He was admitted in 2022 for left elbow swelling and cellulitis. Now states the pain and swelling have returned. Per daughter, he has been urinating more frequently for the past 2 days. No fevers, chills, CP, SOB, palpitations, n/v/d, abdominal pain, hip pain.  denies any numbness, weakness, change in BM, urinary pattern or pelvic numbness  Home meds provided by daughter     PAST MEDICAL & SURGICAL HISTORY:  Diabetes  Hypertension  Fall  H/O left knee surgery  History of cholecystectomy  History of appendectomy    REVIEW OF SYSTEMS: Pt unable to offer    MEDICATIONS  (STANDING):  bacitracin   Ointment 1 Application(s) Topical two times a day  cefTRIAXone   IVPB 1000 milliGRAM(s) IV Intermittent every 24 hours  chlorhexidine 2% Cloths 1 Application(s) Topical <User Schedule>  dextrose 5%. 1000 milliLiter(s) (50 mL/Hr) IV Continuous <Continuous>  dextrose 50% Injectable 25 Gram(s) IV Push once  dextrose 50% Injectable 12.5 Gram(s) IV Push once  dextrose 50% Injectable 25 Gram(s) IV Push once  gabapentin 300 milliGRAM(s) Oral every 8 hours  heparin   Injectable 5000 Unit(s) SubCutaneous every 12 hours  insulin lispro (ADMELOG) corrective regimen sliding scale   SubCutaneous three times a day before meals  levETIRAcetam 500 milliGRAM(s) Oral two times a day  losartan 25 milliGRAM(s) Oral daily  sodium chloride 0.9%. 1000 milliLiter(s) (75 mL/Hr) IV Continuous <Continuous>    MEDICATIONS  (PRN):  acetaminophen     Tablet .. 650 milliGRAM(s) Oral every 6 hours PRN Temp greater or equal to 38C (100.4F), Mild Pain (1 - 3)  aluminum hydroxide/magnesium hydroxide/simethicone Suspension 30 milliLiter(s) Oral every 4 hours PRN Dyspepsia  dextrose Oral Gel 15 Gram(s) Oral once PRN Blood Glucose LESS THAN 70 milliGRAM(s)/deciliter  melatonin 5 milliGRAM(s) Oral at bedtime PRN Insomnia  morphine  - Injectable 2 milliGRAM(s) IV Push every 4 hours PRN Moderate Pain (4 - 6)  nystatin Powder 1 Application(s) Topical every 12 hours PRN fungal rash  ondansetron Injectable 4 milliGRAM(s) IV Push every 8 hours PRN Nausea and/or Vomiting      Allergies  No Known Allergies  Intolerances        SOCIAL HISTORY:     FAMILY HISTORY:  No pertinent family history noted        PHYSICAL EXAM:  Vital Signs Last 24 Hrs  T(C): 36.7 (24 Jan 2025 05:04), Max: 36.7 (23 Jan 2025 17:20)  T(F): 98 (24 Jan 2025 05:04), Max: 98.1 (23 Jan 2025 17:20)  HR: 75 (24 Jan 2025 10:20) (75 - 92)  BP: 111/68 (24 Jan 2025 10:20) (111/68 - 147/73)  BP(mean): --  RR: 18 (24 Jan 2025 10:20) (18 - 18)  SpO2: 96% (24 Jan 2025 10:20) (96% - 99%)    Parameters below as of 24 Jan 2025 10:20  Patient On (Oxygen Delivery Method): room air        General : NAD    HEENT:  NC/AT, PERRL, EOMI, sclera clear, mucosa moist, throat clear, trachea midline, neck supple  Cardiovascular: RRR   Respiratory: CTA  Gastrointestinal: Soft NT/ND (+)BS    Neurology:  Weakened strength & sensation   Psych: Calm  Musculoskeletal:  limited   Skin:  Moisture associated dermatitis   LABS/ CULTURES/ RADIOLOGY:                        10.1   8.34  )-----------( 157      ( 24 Jan 2025 07:04 )             30.0       140  |  106  |  36  ----------------------------<  227      [01-24-25 @ 07:04]  4.1   |  22  |  1.0        Ca     7.6     [01-24-25 @ 07:04]    TPro  5.3  /  Alb  1.9  /  TBili  1.3  /  DBili  x   /  AST  36  /  ALT  21  /  AlkPhos  149  [01-24-25 @ 07:04]    PT/INR: PT 20.40, INR 1.71       [01-23-25 @ 17:55]  PTT: 31.7       [01-23-25 @ 17:55]          Culture - Blood (collected 01-23-25 @ 20:05)  Source: .Blood BLOOD  Gram Stain (01-24-25 @ 13:03):    Growth in aerobic bottle: Gram Positive Cocci in Clusters  Preliminary Report (01-24-25 @ 13:03):    Growth in aerobic bottle: Gram Positive Cocci in Clusters    Direct identification is available within approximately 3-5    hours either by Blood Panel Multiplexed PCR or Direct    MALDI-TOF. Details: [email or web address]    Culture - Blood (collected 01-23-25 @ 20:05)  Source: .Blood BLOOD  Gram Stain (01-24-25 @ 13:04):    Growth in aerobic bottle: Gram Positive Cocci in Clusters  Preliminary Report (01-24-25 @ 13:04):    Growth in aerobic bottle: Gram Positive Cocci in Clusters

## 2025-01-24 NOTE — CONSULT NOTE ADULT - SUBJECTIVE AND OBJECTIVE BOX
Chief complaint/Reason for consult: pancreatic cyst    HPI:  80-year-old male with past medical history of insulin-dependent diabetes, hypertension, subdural hematoma on keppra who presents for multiple falls, weakness, confusion. Per daughter at bedside, he has becoming more intermittently confused, has had multiple falls over the past 2 months (last fall was yesterday). He had a fall while getting out of bed, does not recall how it happened. Complains of back pain since then. He was admitted in 2022 for left elbow swelling and cellulitis. Now states the pain and swelling have returned. Per daughter, he has been urinating more frequently for the past 2 days. No fevers, chills, CP, SOB, palpitations, n/v/d, abdominal pain, hip pain.  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 Patient denies nausea, vomiting, hematemesis, melena, blood in stool, diarrhea, constipation, abdominal pain. patient uptodate on Colonoscopy reports last one with Dr. Amy ca.      PAST MEDICAL & SURGICAL HISTORY:   Diabetes      Hypertension      Fall      H/O left knee surgery      History of cholecystectomy      History of appendectomy            Family history:  FAMILY HISTORY:    No GI cancers in first or second degree relatives    Social History: No smoking. No alcohol. No illegal drug use.    Allergies:   No Known Allergies  Intolerances      MEDICATIONS:Home Medications:  gabapentin 100 mg oral capsule: 3 cap(s) orally 2 times a day (17 Sep 2022 01:58)  levETIRAcetam 500 mg oral tablet, extended release: 500 milligram(s) orally once a day (17 Sep 2022 01:58)  losartan 50 mg oral tablet: 0.5 tab(s) orally 2 times a day (17 Sep 2022 01:58)  metFORMIN 500 mg oral tablet: 500 milligram(s) orally 4 times a day (17 Sep 2022 01:58)  Osteo Bi-Flex 250 mg-200 mg oral tablet: orally once a day (17 Sep 2022 01:58)  Tresiba FlexTouch: 60 unit(s) subcutaneous once a day (17 Sep 2022 01:58)    MEDICATIONS  (STANDING):  bacitracin   Ointment 1 Application(s) Topical two times a day  cefTRIAXone   IVPB 1000 milliGRAM(s) IV Intermittent every 24 hours  chlorhexidine 2% Cloths 1 Application(s) Topical <User Schedule>  dextrose 5%. 1000 milliLiter(s) (50 mL/Hr) IV Continuous <Continuous>  dextrose 50% Injectable 25 Gram(s) IV Push once  dextrose 50% Injectable 12.5 Gram(s) IV Push once  dextrose 50% Injectable 25 Gram(s) IV Push once  gabapentin 300 milliGRAM(s) Oral every 8 hours  heparin   Injectable 5000 Unit(s) SubCutaneous every 12 hours  insulin lispro (ADMELOG) corrective regimen sliding scale   SubCutaneous three times a day before meals  levETIRAcetam 500 milliGRAM(s) Oral two times a day  losartan 25 milliGRAM(s) Oral daily  sodium chloride 0.9%. 1000 milliLiter(s) (75 mL/Hr) IV Continuous <Continuous>  vancomycin  IVPB 1000 milliGRAM(s) IV Intermittent every 12 hours    MEDICATIONS  (PRN):  acetaminophen     Tablet .. 650 milliGRAM(s) Oral every 6 hours PRN Temp greater or equal to 38C (100.4F), Mild Pain (1 - 3)  aluminum hydroxide/magnesium hydroxide/simethicone Suspension 30 milliLiter(s) Oral every 4 hours PRN Dyspepsia  dextrose Oral Gel 15 Gram(s) Oral once PRN Blood Glucose LESS THAN 70 milliGRAM(s)/deciliter  melatonin 5 milliGRAM(s) Oral at bedtime PRN Insomnia  morphine  - Injectable 2 milliGRAM(s) IV Push every 4 hours PRN Moderate Pain (4 - 6)  nystatin Powder 1 Application(s) Topical every 12 hours PRN fungal rash  ondansetron Injectable 4 milliGRAM(s) IV Push every 8 hours PRN Nausea and/or Vomiting        REVIEW OF SYSTEMS  General:  No weight loss, fevers, or chills.  Eyes:  No reported pain or visual changes  ENT:  No sore throat or runny nose.  NECK: No stiffness   CV:  No chest pain or palpitations.  Resp:  No shortness of breath, cough, wheezing or hemoptysis  GI:  No abdominal pain, nausea, vomiting, dysphagia, diarrhea or constipation. No rectal bleeding, melena, or hematemesis.  Neuro:  No tingling, numbness       VITALS:   T(F): 98 (01-24-25 @ 05:04), Max: 98.1 (01-23-25 @ 17:20)  HR: 75 (01-24-25 @ 10:20) (75 - 92)  BP: 111/68 (01-24-25 @ 10:20) (111/68 - 147/73)  RR: 18 (01-24-25 @ 10:20) (18 - 18)  SpO2: 96% (01-24-25 @ 10:20) (96% - 99%)    PHYSICAL EXAM:  GENERAL: AAOx3, no acute distress.  HEAD:  Atraumatic, Normocephalic  EYES: conjunctiva and sclera clear  NECK: Supple, No thyromegaly   CHEST/LUNG: Clear to auscultation bilaterally; No wheeze, rhonchi, or rales  HEART: Regular rate and rhythm; normal S1, S2, No murmurs.  ABDOMEN: Soft, nontender, nondistended; Bowel sounds present  SKIN: Intact, no jaundice          LABS:  01-24    140  |  106  |  36[H]  ----------------------------<  227[H]  4.1   |  22  |  1.0    Ca    7.6[L]      24 Jan 2025 07:04    TPro  5.3[L]  /  Alb  1.9[L]  /  TBili  1.3[H]  /  DBili  x   /  AST  36  /  ALT  21  /  AlkPhos  149[H]  01-24                          10.1   8.34  )-----------( 157      ( 24 Jan 2025 07:04 )             30.0     LIVER FUNCTIONS - ( 24 Jan 2025 07:04 )  Alb: 1.9 g/dL / Pro: 5.3 g/dL / ALK PHOS: 149 U/L / ALT: 21 U/L / AST: 36 U/L / GGT: x           PT/INR - ( 23 Jan 2025 17:55 )   PT: 20.40 sec;   INR: 1.71 ratio         PTT - ( 23 Jan 2025 17:55 )  PTT:31.7 sec    IMAGING:    < from: CT Abdomen and Pelvis w/ IV Cont (01.23.25 @ 19:19) >    ACC: [id number] EXAM:  CT CHEST IC   ORDERED BY: ROOSEVELT ESQUIVEL     ACC: [id number] EXAM:  CT ABDOMEN AND PELVIS IC   ORDERED BY: ROOSEVELT ESQUIVEL     PROCEDURE DATE:  01/23/2025          INTERPRETATION:  CLINICAL INFORMATION: Fall. Trauma to the chest, abdomen   and pelvis    COMPARISON: CT abdomen and pelvis from April 29, 2009    CONTRAST/COMPLICATIONS:  IV Contrast: Omnipaque 350  95 cc administered   5 cc discarded  Oral Contrast: NONE    PROCEDURE:  CT of the Chest, Abdomen and Pelvis was performed.  Sagittal and coronal reformats were performed.    FINDINGS:  CHEST:  LUNGS AND LARGE AIRWAYS: Patent central airways. Respiratory motion,   limiting sensitivity for small nodules  PLEURA: No pleural effusion.  VESSELS: Aortic calcifications. Dense coronary artery calcifications   versus stents.  HEART: Heart size is normal. No pericardial effusion.  MEDIASTINUM AND ZENON: No lymphadenopathy.  CHEST WALL AND LOWER NECK: Within normal limits.    ABDOMEN AND PELVIS:  LIVER: Hepatic dome calcifications  BILE DUCTS: Normal caliber.  GALLBLADDER: Cholecystectomy.  SPLEEN: Within normal limits.  PANCREAS: Distal pancreatic calcifications. Distal pancreatic tail   hypodensity measuring up to 1 cm (series 303, image 197)  ADRENALS: Within normal limits.  KIDNEYS/URETERS: Bilateral perinephric fat stranding. Nonobstructing   right mid ureteral 0.6 cm calculus (coronal series 601, image 85)    BLADDER: Within normal limits.  REPRODUCTIVE ORGANS: Prostate within normal limits.    BOWEL: No bowel obstruction.  PERITONEUM/RETROPERITONEUM: Within normal limits.  VESSELS: Atherosclerotic changes. Prominent left gonadal vein  LYMPH NODES: No lymphadenopathy.  ABDOMINAL WALL: Postsurgical changes.  BONES: Osteopenia. Multilevel changes    IMPRESSION:  No CT evidence of acute traumatic injury within the chest, abdomen or   pelvis.    ABDOMEN/PELVIS:  Pancreatic tail hypodensity measuring 1 cm. Differential includes IPMN.   Nonemergent/outpatient MRI may be obtained for further characterization    ---End of Report ---            KIMANI HOWELL MD; Attending Radiologist  This document has been electronically signed. Jan 23 2025  7:41PM    < end of copied text >      < from: US Abdomen Upper Quadrant Right (01.24.25 @ 09:25) >    ACC: [id number] EXAM:  US ABDOMEN RT UPR QUADRANT   ORDERED BY: EHSAN ROWE     PROCEDURE DATE:  01/24/2025          INTERPRETATION:  CLINICAL INFORMATION: Elevated liver function tests and   pancreatic tail hypodensity on CT.    COMPARISON: Abdomen pelvis CT from one day earlier    TECHNIQUE: Sonography of the right upper quadrant.    FINDINGS: Evaluation is limited due to overlying bowel gas.    Liver: Within normal limits.  Bile ducts: Normal caliber. Common bile duct measures 6 mm.  Gallbladder: Cholecystectomy.  Pancreas: Visualized portions are within normal limits.  Right kidney: 12.8 cm. No hydronephrosis.  Ascites: None.  IVC: Visualized portions are within normal limits.    IMPRESSION:  Evaluation is limited due to overlying bowel gas.    Status post cholecystectomy.    No abnormality seen on ultrasound.    --- End of Report ---            ANUP LO MD; Attending Radiologist  This document has been electronically signed. Jan 24 2025 10:15AM    < end of copied text >

## 2025-01-24 NOTE — CONSULT NOTE ADULT - ASSESSMENT
High risk for pressure injury development or progression   Skin assessed- Ulceration noted to perineum and scrotal area - etiology  unknown - minimal bleeding noted at time of assessment                          No odor,  increased warmth, tenderness, induration, fluctuance, nor crepitus            Wound and skin care recs.   Clean perineum and scrotal area with soap and water, pat dry  apply triad hydrophilic dressing   Apply pressure dressing if bleeding continuos    Pressure  injury  preventive  measures  Skin  and incontinence care   Assess skin  and inform primary provider of any changes   Case discussed with primary Rn/ Md   Wound/ ostomy specialist  to f/u as needed     Offloading: [x ] Frequent position changes [ ] Devices/Equipment  Cleansing: [ ] Saline [ ] Soap/Water [ ] Other: ______  Topicals: [ ] Barrier Cream [ ] Antimicrobial [ ] Enzymatic Wound Debridement [] Moist  wound Healing   Dressings: [ ] Dry, sterile [ ] Allevyn  Foam [ ] Absorbant Pads [ ] Collagenase    Other Recs.   Per Primary team   Total time for bedside assessment  , review of medical records  and  discussion of plan of care with primary team greater than 35 min

## 2025-01-24 NOTE — PHYSICAL THERAPY INITIAL EVALUATION ADULT - ADDITIONAL COMMENTS
Per family at bedside, there are 3 steps outside with 2 rails to enter home, 12-15 steps inside with (R) rail going up; was still driving

## 2025-01-24 NOTE — PHYSICAL THERAPY INITIAL EVALUATION ADULT - PHYSICAL ASSIST/NONPHYSICAL ASSIST: GAIT, REHAB EVAL
posture, base of support, jason , weightshifting/verbal cues/1 person + 1 person to manage equipment

## 2025-01-24 NOTE — CONSULT NOTE ADULT - ASSESSMENT
80yMale pmh DM, HTN, subdural hematoma on Kera presents for multiple falls, weakness and confusion, Gi consulted for pancreatic tail cyst found on CT. Patient denies nausea, vomiting, hematemesis, melena, blood in stool, diarrhea, constipation, abdominal pain. patient uptodate on Colonoscopy reports last one with Dr. Reyes wnl.      #Pancreatic tail hypodensity measuring 1 cm.   Differential includes IPMN  #mildly altered LFTs  Rec  -Nonemergent/outpatient MRI may be obtained for further characterization  - Follow up with our GI office located on [address] [place] [address], Phone number [phone] with Dr. Puentes to consider EUS  -Ultrasound liver WNL, no cbd dilation, no abdominal pain    #Anemia no gross GI bleeding  Rec  -had Colonoscopy with Dr. Reyes a few years ago reportedly normal by patient   -Maintain Hemodynamic Stability   -Monitor CBC  -CMP,Optimize Electrolytes  -PT,PTT,INR  -EKG, Chest-Xray   -Transfuse prn to hgb >8  -Two large bore IV lines  -PPI ppx  -Monitor Vital Signs  -Monitor Stool For blood, frequency, consistency, melena  -Active Type and Screen  -Iron Studies, Folate, Vitamin B12 levels     Recall GI if needed

## 2025-01-25 LAB
ANION GAP SERPL CALC-SCNC: 9 MMOL/L — SIGNIFICANT CHANGE UP (ref 7–14)
BASOPHILS # BLD AUTO: 0.03 K/UL — SIGNIFICANT CHANGE UP (ref 0–0.2)
BASOPHILS NFR BLD AUTO: 0.4 % — SIGNIFICANT CHANGE UP (ref 0–1)
BUN SERPL-MCNC: 34 MG/DL — HIGH (ref 10–20)
CALCIUM SERPL-MCNC: 7.5 MG/DL — LOW (ref 8.4–10.5)
CHLORIDE SERPL-SCNC: 109 MMOL/L — SIGNIFICANT CHANGE UP (ref 98–110)
CO2 SERPL-SCNC: 22 MMOL/L — SIGNIFICANT CHANGE UP (ref 17–32)
CREAT SERPL-MCNC: 1.1 MG/DL — SIGNIFICANT CHANGE UP (ref 0.7–1.5)
EGFR: 69 ML/MIN/1.73M2 — SIGNIFICANT CHANGE UP
EOSINOPHIL # BLD AUTO: 0.2 K/UL — SIGNIFICANT CHANGE UP (ref 0–0.7)
EOSINOPHIL NFR BLD AUTO: 2.9 % — SIGNIFICANT CHANGE UP (ref 0–8)
GLUCOSE SERPL-MCNC: 163 MG/DL — HIGH (ref 70–99)
HCT VFR BLD CALC: 28 % — LOW (ref 42–52)
HGB BLD-MCNC: 9.3 G/DL — LOW (ref 14–18)
IMM GRANULOCYTES NFR BLD AUTO: 1.6 % — HIGH (ref 0.1–0.3)
LYMPHOCYTES # BLD AUTO: 0.9 K/UL — LOW (ref 1.2–3.4)
LYMPHOCYTES # BLD AUTO: 12.8 % — LOW (ref 20.5–51.1)
MCHC RBC-ENTMCNC: 32.1 PG — HIGH (ref 27–31)
MCHC RBC-ENTMCNC: 33.2 G/DL — SIGNIFICANT CHANGE UP (ref 32–37)
MCV RBC AUTO: 96.6 FL — HIGH (ref 80–94)
MONOCYTES # BLD AUTO: 1.14 K/UL — HIGH (ref 0.1–0.6)
MONOCYTES NFR BLD AUTO: 16.3 % — HIGH (ref 1.7–9.3)
NEUTROPHILS # BLD AUTO: 4.63 K/UL — SIGNIFICANT CHANGE UP (ref 1.4–6.5)
NEUTROPHILS NFR BLD AUTO: 66 % — SIGNIFICANT CHANGE UP (ref 42.2–75.2)
NRBC # BLD: 0 /100 WBCS — SIGNIFICANT CHANGE UP (ref 0–0)
NRBC BLD-RTO: 0 /100 WBCS — SIGNIFICANT CHANGE UP (ref 0–0)
PLATELET # BLD AUTO: 151 K/UL — SIGNIFICANT CHANGE UP (ref 130–400)
PMV BLD: 8.5 FL — SIGNIFICANT CHANGE UP (ref 7.4–10.4)
POTASSIUM SERPL-MCNC: 3.7 MMOL/L — SIGNIFICANT CHANGE UP (ref 3.5–5)
POTASSIUM SERPL-SCNC: 3.7 MMOL/L — SIGNIFICANT CHANGE UP (ref 3.5–5)
RBC # BLD: 2.9 M/UL — LOW (ref 4.7–6.1)
RBC # FLD: 13.3 % — SIGNIFICANT CHANGE UP (ref 11.5–14.5)
SODIUM SERPL-SCNC: 140 MMOL/L — SIGNIFICANT CHANGE UP (ref 135–146)
WBC # BLD: 7.01 K/UL — SIGNIFICANT CHANGE UP (ref 4.8–10.8)
WBC # FLD AUTO: 7.01 K/UL — SIGNIFICANT CHANGE UP (ref 4.8–10.8)

## 2025-01-25 PROCEDURE — 99232 SBSQ HOSP IP/OBS MODERATE 35: CPT

## 2025-01-25 PROCEDURE — 93971 EXTREMITY STUDY: CPT | Mod: 26,LT

## 2025-01-25 RX ORDER — ACETAMINOPHEN 160 MG/5ML
650 SUSPENSION ORAL EVERY 6 HOURS
Refills: 0 | Status: DISCONTINUED | OUTPATIENT
Start: 2025-01-25 | End: 2025-02-02

## 2025-01-25 RX ORDER — VENLAFAXINE HCL 75 MG
75 TABLET ORAL DAILY
Refills: 0 | Status: DISCONTINUED | OUTPATIENT
Start: 2025-01-25 | End: 2025-01-25

## 2025-01-25 RX ORDER — SENNOSIDES 8.6 MG
2 TABLET ORAL AT BEDTIME
Refills: 0 | Status: DISCONTINUED | OUTPATIENT
Start: 2025-01-25 | End: 2025-01-25

## 2025-01-25 RX ORDER — VENLAFAXINE HCL 75 MG
75 TABLET ORAL DAILY
Refills: 0 | Status: DISCONTINUED | OUTPATIENT
Start: 2025-01-25 | End: 2025-01-29

## 2025-01-25 RX ORDER — IBUPROFEN 600 MG/1
600 TABLET, FILM COATED ORAL EVERY 6 HOURS
Refills: 0 | Status: DISCONTINUED | OUTPATIENT
Start: 2025-01-25 | End: 2025-01-27

## 2025-01-25 RX ORDER — POLYETHYLENE GLYCOL 3350 17 G/17G
17 POWDER, FOR SOLUTION ORAL
Refills: 0 | Status: DISCONTINUED | OUTPATIENT
Start: 2025-01-25 | End: 2025-02-04

## 2025-01-25 RX ORDER — OXYCODONE HYDROCHLORIDE 30 MG/1
10 TABLET ORAL THREE TIMES A DAY
Refills: 0 | Status: DISCONTINUED | OUTPATIENT
Start: 2025-01-25 | End: 2025-02-01

## 2025-01-25 RX ADMIN — Medication 5000 UNIT(S): at 17:17

## 2025-01-25 RX ADMIN — ANTISEPTIC SURGICAL SCRUB 1 APPLICATION(S): 0.04 SOLUTION TOPICAL at 05:30

## 2025-01-25 RX ADMIN — LEVETIRACETAM 500 MILLIGRAM(S): 750 TABLET, FILM COATED ORAL at 05:27

## 2025-01-25 RX ADMIN — Medication 1 APPLICATION(S): at 05:27

## 2025-01-25 RX ADMIN — OXYCODONE HYDROCHLORIDE 10 MILLIGRAM(S): 30 TABLET ORAL at 16:00

## 2025-01-25 RX ADMIN — Medication 4: at 17:16

## 2025-01-25 RX ADMIN — ACETAMINOPHEN 650 MILLIGRAM(S): 160 SUSPENSION ORAL at 18:07

## 2025-01-25 RX ADMIN — Medication 2 TABLET(S): at 21:24

## 2025-01-25 RX ADMIN — LIDOCAINE HYDROCHLORIDE 1 PATCH: 30 CREAM TOPICAL at 20:12

## 2025-01-25 RX ADMIN — ACETAMINOPHEN 650 MILLIGRAM(S): 160 SUSPENSION ORAL at 11:30

## 2025-01-25 RX ADMIN — Medication 4 UNIT(S): at 12:04

## 2025-01-25 RX ADMIN — IBUPROFEN 600 MILLIGRAM(S): 600 TABLET, FILM COATED ORAL at 17:17

## 2025-01-25 RX ADMIN — LIDOCAINE HYDROCHLORIDE 1 PATCH: 30 CREAM TOPICAL at 10:05

## 2025-01-25 RX ADMIN — LIDOCAINE HYDROCHLORIDE 1 PATCH: 30 CREAM TOPICAL at 23:33

## 2025-01-25 RX ADMIN — Medication 25 MILLIGRAM(S): at 06:50

## 2025-01-25 RX ADMIN — MORPHINE SULFATE 2 MILLIGRAM(S): 60 TABLET, FILM COATED, EXTENDED RELEASE ORAL at 00:38

## 2025-01-25 RX ADMIN — IBUPROFEN 600 MILLIGRAM(S): 600 TABLET, FILM COATED ORAL at 23:33

## 2025-01-25 RX ADMIN — GABAPENTIN 300 MILLIGRAM(S): 800 TABLET ORAL at 21:24

## 2025-01-25 RX ADMIN — MORPHINE SULFATE 2 MILLIGRAM(S): 60 TABLET, FILM COATED, EXTENDED RELEASE ORAL at 12:03

## 2025-01-25 RX ADMIN — OXYCODONE HYDROCHLORIDE 10 MILLIGRAM(S): 30 TABLET ORAL at 15:05

## 2025-01-25 RX ADMIN — MORPHINE SULFATE 2 MILLIGRAM(S): 60 TABLET, FILM COATED, EXTENDED RELEASE ORAL at 12:35

## 2025-01-25 RX ADMIN — ACETAMINOPHEN 650 MILLIGRAM(S): 160 SUSPENSION ORAL at 23:03

## 2025-01-25 RX ADMIN — GABAPENTIN 300 MILLIGRAM(S): 800 TABLET ORAL at 05:27

## 2025-01-25 RX ADMIN — LIDOCAINE HYDROCHLORIDE 1 PATCH: 30 CREAM TOPICAL at 21:23

## 2025-01-25 RX ADMIN — POLYETHYLENE GLYCOL 3350 17 GRAM(S): 17 POWDER, FOR SOLUTION ORAL at 21:23

## 2025-01-25 RX ADMIN — Medication 75 MILLILITER(S): at 05:26

## 2025-01-25 RX ADMIN — POLYETHYLENE GLYCOL 3350 17 GRAM(S): 17 POWDER, FOR SOLUTION ORAL at 11:30

## 2025-01-25 RX ADMIN — ACETAMINOPHEN 650 MILLIGRAM(S): 160 SUSPENSION ORAL at 17:17

## 2025-01-25 RX ADMIN — Medication 4 UNIT(S): at 18:07

## 2025-01-25 RX ADMIN — Medication 5000 UNIT(S): at 05:29

## 2025-01-25 RX ADMIN — LIDOCAINE HYDROCHLORIDE 1 PATCH: 30 CREAM TOPICAL at 09:24

## 2025-01-25 RX ADMIN — Medication 1: at 07:54

## 2025-01-25 RX ADMIN — Medication 2: at 12:03

## 2025-01-25 RX ADMIN — IBUPROFEN 600 MILLIGRAM(S): 600 TABLET, FILM COATED ORAL at 23:03

## 2025-01-25 RX ADMIN — IBUPROFEN 600 MILLIGRAM(S): 600 TABLET, FILM COATED ORAL at 18:07

## 2025-01-25 RX ADMIN — Medication 1 APPLICATION(S): at 17:17

## 2025-01-25 RX ADMIN — Medication 100 MILLIGRAM(S): at 21:23

## 2025-01-25 RX ADMIN — ACETAMINOPHEN 650 MILLIGRAM(S): 160 SUSPENSION ORAL at 23:33

## 2025-01-25 RX ADMIN — MORPHINE SULFATE 2 MILLIGRAM(S): 60 TABLET, FILM COATED, EXTENDED RELEASE ORAL at 01:10

## 2025-01-25 RX ADMIN — Medication 4 UNIT(S): at 09:23

## 2025-01-25 RX ADMIN — Medication 75 MILLIGRAM(S): at 23:49

## 2025-01-25 RX ADMIN — LEVETIRACETAM 500 MILLIGRAM(S): 750 TABLET, FILM COATED ORAL at 17:17

## 2025-01-25 RX ADMIN — Medication 10 MILLIGRAM(S): at 21:24

## 2025-01-25 RX ADMIN — Medication 100 MILLIGRAM(S): at 15:09

## 2025-01-25 RX ADMIN — LIDOCAINE HYDROCHLORIDE 1 PATCH: 30 CREAM TOPICAL at 11:30

## 2025-01-25 RX ADMIN — Medication 100 MILLIGRAM(S): at 05:30

## 2025-01-25 RX ADMIN — ACETAMINOPHEN 650 MILLIGRAM(S): 160 SUSPENSION ORAL at 12:32

## 2025-01-25 NOTE — PROGRESS NOTE ADULT - SUBJECTIVE AND OBJECTIVE BOX
Patient is a 80y old  Male who presents with a chief complaint of fall (01-24-25)      Pt seen and examined at bedside. No CP or SOB.          PAST MEDICAL & SURGICAL HISTORY:  Diabetes    Hypertension    Fall    H/O left knee surgery    History of cholecystectomy    History of appendectomy        VITAL SIGNS (Last 24 hrs):  T(C): 37.1 (01-25-25 @ 14:00), Max: 37.6 (01-24-25 @ 22:32)  HR: 84 (01-25-25 @ 14:00) (65 - 85)  BP: 116/66 (01-25-25 @ 14:00) (102/63 - 145/71)  RR: 18 (01-25-25 @ 14:00) (16 - 18)  SpO2: 96% (01-25-25 @ 14:00) (94% - 96%)  Wt(kg): --  Daily     Daily     I&O's Summary      PHYSICAL EXAM:  GENERAL: NAD, well-developed  HEAD:  Atraumatic, Normocephalic  EYES: EOMI, PERRLA, conjunctiva and sclera clear  NECK: Supple, No JVD  CHEST/LUNG: Clear to auscultation bilaterally; No wheeze  HEART: Regular rate and rhythm; No murmurs, rubs, or gallops  ABDOMEN: Soft, Nontender, Nondistended; Bowel sounds present  EXTREMITIES:  2+ Peripheral Pulses, No clubbing, cyanosis, or edema  PSYCH: AAOx3  NEUROLOGY: non-focal  SKIN: No rashes or lesions    Labs Reviewed  Spoke to patient in regards to abnormal labs.    CBC Full  -  ( 25 Jan 2025 08:23 )  WBC Count : 7.01 K/uL  Hemoglobin : 9.3 g/dL  Hematocrit : 28.0 %  Platelet Count - Automated : 151 K/uL  Mean Cell Volume : 96.6 fL  Mean Cell Hemoglobin : 32.1 pg  Mean Cell Hemoglobin Concentration : 33.2 g/dL  Auto Neutrophil # : 4.63 K/uL  Auto Lymphocyte # : 0.90 K/uL  Auto Monocyte # : 1.14 K/uL  Auto Eosinophil # : 0.20 K/uL  Auto Basophil # : 0.03 K/uL  Auto Neutrophil % : 66.0 %  Auto Lymphocyte % : 12.8 %  Auto Monocyte % : 16.3 %  Auto Eosinophil % : 2.9 %  Auto Basophil % : 0.4 %    BMP:    01-25 @ 08:23    Blood Urea Nitrogen - 34  Calcium - 7.5  Carbond Dioxide - 22  Chloride - 109  Creatinine - 1.1  Glucose - 163  Potassium - 3.7  Sodium - 140      Hemoglobin A1c -   PT/INR - ( 23 Jan 2025 17:55 )   PT: 20.40 sec;   INR: 1.71 ratio         PTT - ( 23 Jan 2025 17:55 )  PTT:31.7 sec  Urine Culture:  01-23 @ 20:05 Urine culture: --    Culture Results:   Growth in aerobic and anaerobic bottles: Staphylococcus aureus  Direct identification is available within approximately 3-5  hours either by Blood Panel Multiplexed PCR or Direct  MALDI-TOF. Details: [email or web address]  Method Type: PCR  Organism: Blood Culture PCR  Organism Identification: Blood Culture PCR  Specimen Source: .Blood BLOOD        COVID Labs  CRP:      D-Dimer:            Imaging reviewed independently and reviewed read        MEDICATIONS  (STANDING):  acetaminophen     Tablet .. 650 milliGRAM(s) Oral every 6 hours  bacitracin   Ointment 1 Application(s) Topical two times a day  ceFAZolin   IVPB 2000 milliGRAM(s) IV Intermittent every 8 hours  chlorhexidine 2% Cloths 1 Application(s) Topical <User Schedule>  cyclobenzaprine 10 milliGRAM(s) Oral three times a day  dextrose 5%. 1000 milliLiter(s) (50 mL/Hr) IV Continuous <Continuous>  dextrose 50% Injectable 25 Gram(s) IV Push once  dextrose 50% Injectable 12.5 Gram(s) IV Push once  dextrose 50% Injectable 25 Gram(s) IV Push once  gabapentin 300 milliGRAM(s) Oral every 8 hours  glucagon  Injectable 1 milliGRAM(s) IntraMuscular once  heparin   Injectable 5000 Unit(s) SubCutaneous every 12 hours  ibuprofen  Tablet. 600 milliGRAM(s) Oral every 6 hours  insulin lispro (ADMELOG) corrective regimen sliding scale   SubCutaneous three times a day before meals  insulin lispro Injectable (ADMELOG) 4 Unit(s) SubCutaneous three times a day before meals  levETIRAcetam 500 milliGRAM(s) Oral two times a day  lidocaine   4% Patch 1 Patch Transdermal every 24 hours  lidocaine   4% Patch 1 Patch Transdermal daily  losartan 25 milliGRAM(s) Oral daily  senna 2 Tablet(s) Oral at bedtime  sodium chloride 0.9%. 1000 milliLiter(s) (75 mL/Hr) IV Continuous <Continuous>  Tresiba Flextouch (insulin degludec) 63 Unit(s) 63 Unit(s) SubCutaneous at bedtime  venlafaxine XR. 75 milliGRAM(s) Oral daily    MEDICATIONS  (PRN):  aluminum hydroxide/magnesium hydroxide/simethicone Suspension 30 milliLiter(s) Oral every 4 hours PRN Dyspepsia  dextrose Oral Gel 15 Gram(s) Oral once PRN Blood Glucose LESS THAN 70 milliGRAM(s)/deciliter  melatonin 5 milliGRAM(s) Oral at bedtime PRN Insomnia  morphine  - Injectable 2 milliGRAM(s) IV Push every 4 hours PRN Moderate Pain (4 - 6)  nystatin Powder 1 Application(s) Topical every 12 hours PRN fungal rash  ondansetron Injectable 4 milliGRAM(s) IV Push every 8 hours PRN Nausea and/or Vomiting  oxyCODONE    IR 10 milliGRAM(s) Oral three times a day PRN Severe Pain (7 - 10)  polyethylene glycol 3350 17 Gram(s) Oral daily PRN Constipation

## 2025-01-25 NOTE — PROGRESS NOTE ADULT - ASSESSMENT
80-year-old male with past medical history of insulin-dependent diabetes, hypertension, subdural hematoma on keppra who presents for multiple falls, weakness, confusion. Per daughter at bedside, he has becoming more intermittently confused, has had multiple falls over the past 2 months (last fall was yesterday). He had a fall while getting out of bed, does not recall how it happened. Complains of back pain since then. He was admitted in 2022 for left elbow swelling and cellulitis. Now states the pain and swelling have returned. Per daughter, he has been urinating more frequently for the past 2 days. No fevers, chills, CP, SOB, palpitations, n/v/d, abdominal pain, hip pain.    # UTI  -c/w abx  - IVF  -follow up cultures    #staph aureus MSSA bacteremia  #Questionable left forearm cellulitis - low suspicion for septic joint - no pain with elbow movement, no swelling of olecranon, swelling at the forearm area   -repeat blood cultures on 1/25   -ID consulted - start cefazolin, 1/25 off vanco    left forearm swelling after fall  -xray with no acute fracture  -obtain LUE duplex     #demand ischemia in the setting of active infection  -EKG shows no acute ischemic changes.  trop level flat  - trend trops  - ECHO 1/24: 1. LV Ejection Fraction by Pan's Method with a biplane EF of 66 %.   2. Moderately enlarged left atrium.   3. Mild mitral annular calcification.   4. Sclerotic aortic valve with normal opening.   5. Ascending aorta 3.8 cm.  -Asymptomatic     # Weakness / Frequent falls  - PT pending   - RTF  -    -Discussed abdm CT scan reading with radiology lumbar degenerative changes    # CT: Pancreatic tail hypodenisty  - Gastro consult - can follow up outpatient with GI for MRCP  - RUQ US no obvious acute findings   - trend labs    back pain  -no incontinence  -known back pain  -flexeril, ibupforen standing, tylenol standing, lidocaine patches, prn oxycodone, standing venlafaxine    depression  -start venlaxafine which will help with pts neuropathy as well   -no SI, HSI    # DM  - hold PO meds  - FS with ISS  - daughter wants to bring in home trulicity - 63 units nightly injection  - lispro 4 units with meals    # HTN  - VS  - c/w home med    #Progress Note Handoff  Pending (specify):  repeat blood cultures 1/25, potential HANNAH? LUE duplex   Family discussion:  plan of care was discussed with patient and family in details.  all questions were answered.  seems to understand, and in agreement  Disposition: DC tele, dc neuro checks  80-year-old male with past medical history of insulin-dependent diabetes, hypertension, subdural hematoma on keppra who presents for multiple falls, weakness, confusion. Per daughter at bedside, he has becoming more intermittently confused, has had multiple falls over the past 2 months (last fall was yesterday). He had a fall while getting out of bed, does not recall how it happened. Complains of back pain since then. He was admitted in 2022 for left elbow swelling and cellulitis. Now states the pain and swelling have returned. Per daughter, he has been urinating more frequently for the past 2 days. No fevers, chills, CP, SOB, palpitations, n/v/d, abdominal pain, hip pain.    # UTI  -c/w abx  - IVF  -follow up cultures    #staph aureus MSSA bacteremia, origin left forearm cellulitis?  #Questionable left forearm cellulitis - low suspicion for septic joint - no pain with elbow movement, no swelling of olecranon, swelling at the forearm area   -repeat blood cultures on 1/25   -ID consulted - start cefazolin, 1/25 off vanco    left forearm swelling after fall  -xray with no acute fracture  -obtain LUE duplex     #demand ischemia in the setting of active infection  -EKG shows no acute ischemic changes.  trop level flat  - trend trops  - ECHO 1/24: 1. LV Ejection Fraction by Pan's Method with a biplane EF of 66 %.   2. Moderately enlarged left atrium.   3. Mild mitral annular calcification.   4. Sclerotic aortic valve with normal opening.   5. Ascending aorta 3.8 cm.  -Asymptomatic     # Weakness / Frequent falls  - PT pending   - RTF  -    -Discussed abdm CT scan reading with radiology lumbar degenerative changes    # CT: Pancreatic tail hypodenisty  - Gastro consult - can follow up outpatient with GI for MRCP  - RUQ US no obvious acute findings   - trend labs    back pain  -no incontinence  -known back pain  -flexeril, ibupforen standing, tylenol standing, lidocaine patches, prn oxycodone, standing venlafaxine    depression  -start venlaxafine which will help with pts neuropathy as well   -no SI, HSI    # DM  - hold PO meds  - FS with ISS  - daughter wants to bring in home trulicity - 63 units nightly injection  - lispro 4 units with meals    # HTN  - VS  - c/w home med    #Progress Note Handoff  Pending (specify):  repeat blood cultures 1/25, potential HANNAH? LUE duplex   Family discussion:  plan of care was discussed with patient and family in details.  all questions were answered.  seems to understand, and in agreement  Disposition: DC tele, dc neuro checks

## 2025-01-26 LAB
-  CLINDAMYCIN: SIGNIFICANT CHANGE UP
-  ERYTHROMYCIN: SIGNIFICANT CHANGE UP
-  GENTAMICIN: SIGNIFICANT CHANGE UP
-  OXACILLIN: SIGNIFICANT CHANGE UP
-  PENICILLIN: SIGNIFICANT CHANGE UP
-  RIFAMPIN: SIGNIFICANT CHANGE UP
-  TETRACYCLINE: SIGNIFICANT CHANGE UP
-  TRIMETHOPRIM/SULFAMETHOXAZOLE: SIGNIFICANT CHANGE UP
-  VANCOMYCIN: SIGNIFICANT CHANGE UP
ANION GAP SERPL CALC-SCNC: 11 MMOL/L — SIGNIFICANT CHANGE UP (ref 7–14)
BASOPHILS # BLD AUTO: 0.03 K/UL — SIGNIFICANT CHANGE UP (ref 0–0.2)
BASOPHILS NFR BLD AUTO: 0.5 % — SIGNIFICANT CHANGE UP (ref 0–1)
BUN SERPL-MCNC: 32 MG/DL — HIGH (ref 10–20)
CALCIUM SERPL-MCNC: 7.8 MG/DL — LOW (ref 8.4–10.5)
CHLORIDE SERPL-SCNC: 104 MMOL/L — SIGNIFICANT CHANGE UP (ref 98–110)
CO2 SERPL-SCNC: 21 MMOL/L — SIGNIFICANT CHANGE UP (ref 17–32)
CREAT SERPL-MCNC: 1.1 MG/DL — SIGNIFICANT CHANGE UP (ref 0.7–1.5)
CULTURE RESULTS: ABNORMAL
CULTURE RESULTS: ABNORMAL
EGFR: 69 ML/MIN/1.73M2 — SIGNIFICANT CHANGE UP
EOSINOPHIL # BLD AUTO: 0.25 K/UL — SIGNIFICANT CHANGE UP (ref 0–0.7)
EOSINOPHIL NFR BLD AUTO: 4.1 % — SIGNIFICANT CHANGE UP (ref 0–8)
GLUCOSE SERPL-MCNC: 183 MG/DL — HIGH (ref 70–99)
GRAM STN FLD: ABNORMAL
HCT VFR BLD CALC: 29.6 % — LOW (ref 42–52)
HGB BLD-MCNC: 9.9 G/DL — LOW (ref 14–18)
IMM GRANULOCYTES NFR BLD AUTO: 2 % — HIGH (ref 0.1–0.3)
LYMPHOCYTES # BLD AUTO: 1.03 K/UL — LOW (ref 1.2–3.4)
LYMPHOCYTES # BLD AUTO: 16.8 % — LOW (ref 20.5–51.1)
MCHC RBC-ENTMCNC: 32.6 PG — HIGH (ref 27–31)
MCHC RBC-ENTMCNC: 33.4 G/DL — SIGNIFICANT CHANGE UP (ref 32–37)
MCV RBC AUTO: 97.4 FL — HIGH (ref 80–94)
METHOD TYPE: SIGNIFICANT CHANGE UP
MONOCYTES # BLD AUTO: 0.92 K/UL — HIGH (ref 0.1–0.6)
MONOCYTES NFR BLD AUTO: 15 % — HIGH (ref 1.7–9.3)
NEUTROPHILS # BLD AUTO: 3.79 K/UL — SIGNIFICANT CHANGE UP (ref 1.4–6.5)
NEUTROPHILS NFR BLD AUTO: 61.6 % — SIGNIFICANT CHANGE UP (ref 42.2–75.2)
NRBC # BLD: 0 /100 WBCS — SIGNIFICANT CHANGE UP (ref 0–0)
NRBC BLD-RTO: 0 /100 WBCS — SIGNIFICANT CHANGE UP (ref 0–0)
ORGANISM # SPEC MICROSCOPIC CNT: ABNORMAL
ORGANISM # SPEC MICROSCOPIC CNT: ABNORMAL
ORGANISM # SPEC MICROSCOPIC CNT: SIGNIFICANT CHANGE UP
PLATELET # BLD AUTO: 153 K/UL — SIGNIFICANT CHANGE UP (ref 130–400)
PMV BLD: 8.5 FL — SIGNIFICANT CHANGE UP (ref 7.4–10.4)
POTASSIUM SERPL-MCNC: 3.9 MMOL/L — SIGNIFICANT CHANGE UP (ref 3.5–5)
POTASSIUM SERPL-SCNC: 3.9 MMOL/L — SIGNIFICANT CHANGE UP (ref 3.5–5)
RBC # BLD: 3.04 M/UL — LOW (ref 4.7–6.1)
RBC # FLD: 13.7 % — SIGNIFICANT CHANGE UP (ref 11.5–14.5)
SODIUM SERPL-SCNC: 136 MMOL/L — SIGNIFICANT CHANGE UP (ref 135–146)
SPECIMEN SOURCE: SIGNIFICANT CHANGE UP
WBC # BLD: 6.14 K/UL — SIGNIFICANT CHANGE UP (ref 4.8–10.8)
WBC # FLD AUTO: 6.14 K/UL — SIGNIFICANT CHANGE UP (ref 4.8–10.8)

## 2025-01-26 PROCEDURE — 99232 SBSQ HOSP IP/OBS MODERATE 35: CPT

## 2025-01-26 PROCEDURE — 73201 CT UPPER EXTREMITY W/DYE: CPT | Mod: 26,LT

## 2025-01-26 RX ADMIN — Medication 5000 UNIT(S): at 06:58

## 2025-01-26 RX ADMIN — Medication 75 MILLIGRAM(S): at 13:25

## 2025-01-26 RX ADMIN — Medication 5000 UNIT(S): at 17:09

## 2025-01-26 RX ADMIN — Medication 4 UNIT(S): at 17:08

## 2025-01-26 RX ADMIN — Medication 2 TABLET(S): at 22:58

## 2025-01-26 RX ADMIN — Medication 10 MILLIGRAM(S): at 23:00

## 2025-01-26 RX ADMIN — GABAPENTIN 300 MILLIGRAM(S): 800 TABLET ORAL at 22:58

## 2025-01-26 RX ADMIN — Medication 3: at 17:07

## 2025-01-26 RX ADMIN — Medication 25 MILLIGRAM(S): at 06:57

## 2025-01-26 RX ADMIN — POLYETHYLENE GLYCOL 3350 17 GRAM(S): 17 POWDER, FOR SOLUTION ORAL at 17:09

## 2025-01-26 RX ADMIN — Medication 1 APPLICATION(S): at 06:57

## 2025-01-26 RX ADMIN — Medication 2: at 07:46

## 2025-01-26 RX ADMIN — ACETAMINOPHEN 650 MILLIGRAM(S): 160 SUSPENSION ORAL at 06:57

## 2025-01-26 RX ADMIN — LIDOCAINE HYDROCHLORIDE 1 PATCH: 30 CREAM TOPICAL at 22:58

## 2025-01-26 RX ADMIN — LIDOCAINE HYDROCHLORIDE 1 PATCH: 30 CREAM TOPICAL at 23:45

## 2025-01-26 RX ADMIN — Medication 1 APPLICATION(S): at 17:09

## 2025-01-26 RX ADMIN — ACETAMINOPHEN 650 MILLIGRAM(S): 160 SUSPENSION ORAL at 17:07

## 2025-01-26 RX ADMIN — Medication 4 UNIT(S): at 07:47

## 2025-01-26 RX ADMIN — ACETAMINOPHEN 650 MILLIGRAM(S): 160 SUSPENSION ORAL at 11:12

## 2025-01-26 RX ADMIN — Medication 10 MILLIGRAM(S): at 07:05

## 2025-01-26 RX ADMIN — POLYETHYLENE GLYCOL 3350 17 GRAM(S): 17 POWDER, FOR SOLUTION ORAL at 06:56

## 2025-01-26 RX ADMIN — LEVETIRACETAM 500 MILLIGRAM(S): 750 TABLET, FILM COATED ORAL at 06:58

## 2025-01-26 RX ADMIN — GABAPENTIN 300 MILLIGRAM(S): 800 TABLET ORAL at 13:25

## 2025-01-26 RX ADMIN — ACETAMINOPHEN 650 MILLIGRAM(S): 160 SUSPENSION ORAL at 07:00

## 2025-01-26 RX ADMIN — Medication 100 MILLIGRAM(S): at 22:59

## 2025-01-26 RX ADMIN — LIDOCAINE HYDROCHLORIDE 1 PATCH: 30 CREAM TOPICAL at 19:11

## 2025-01-26 RX ADMIN — Medication 100 MILLIGRAM(S): at 06:56

## 2025-01-26 RX ADMIN — IBUPROFEN 600 MILLIGRAM(S): 600 TABLET, FILM COATED ORAL at 07:49

## 2025-01-26 RX ADMIN — Medication 10 MILLIGRAM(S): at 14:17

## 2025-01-26 RX ADMIN — Medication 4 UNIT(S): at 11:28

## 2025-01-26 RX ADMIN — GABAPENTIN 300 MILLIGRAM(S): 800 TABLET ORAL at 06:58

## 2025-01-26 RX ADMIN — Medication 100 MILLIGRAM(S): at 13:25

## 2025-01-26 RX ADMIN — IBUPROFEN 600 MILLIGRAM(S): 600 TABLET, FILM COATED ORAL at 17:08

## 2025-01-26 RX ADMIN — ACETAMINOPHEN 650 MILLIGRAM(S): 160 SUSPENSION ORAL at 13:33

## 2025-01-26 RX ADMIN — LEVETIRACETAM 500 MILLIGRAM(S): 750 TABLET, FILM COATED ORAL at 17:08

## 2025-01-26 RX ADMIN — IBUPROFEN 600 MILLIGRAM(S): 600 TABLET, FILM COATED ORAL at 11:12

## 2025-01-26 RX ADMIN — ANTISEPTIC SURGICAL SCRUB 1 APPLICATION(S): 0.04 SOLUTION TOPICAL at 06:56

## 2025-01-26 RX ADMIN — Medication 2: at 11:28

## 2025-01-26 RX ADMIN — LIDOCAINE HYDROCHLORIDE 1 PATCH: 30 CREAM TOPICAL at 11:12

## 2025-01-26 RX ADMIN — IBUPROFEN 600 MILLIGRAM(S): 600 TABLET, FILM COATED ORAL at 11:29

## 2025-01-26 RX ADMIN — IBUPROFEN 600 MILLIGRAM(S): 600 TABLET, FILM COATED ORAL at 06:58

## 2025-01-26 NOTE — PROGRESS NOTE ADULT - ASSESSMENT
80-year-old male with past medical history of insulin-dependent diabetes, hypertension, subdural hematoma on keppra who presents for multiple falls, weakness, confusion. Per daughter at bedside, he has becoming more intermittently confused, has had multiple falls over the past 2 months (last fall was yesterday). He had a fall while getting out of bed, does not recall how it happened. Complains of back pain since then. He was admitted in 2022 for left elbow swelling and cellulitis. Now states the pain and swelling have returned. Per daughter, he has been urinating more frequently for the past 2 days. No fevers, chills, CP, SOB, palpitations, n/v/d, abdominal pain, hip pain.    # UTI  -c/w abx   -IVF  -follow up cultures    #staph aureus MSSA bacteremia, origin left forearm cellulitis?  #Questionable left forearm cellulitis - low suspicion for septic joint - no pain with elbow movement, no swelling of olecranon, swelling at the forearm area   -repeat blood cultures on 1/25 -> positive on 1/26  -ID consulted - start cefazolin, 1/25 off vanco  -pending ID reeval as cultures are still positive, need HANNAH?      left forearm swelling after fall  -xray with no acute fracture  -obtain LUE duplex -> negative  -obtain CT chest with IV contrast of left elbow     #demand ischemia in the setting of active infection  -EKG shows no acute ischemic changes.  trop level flat  - trend trops  - ECHO 1/24: 1. LV Ejection Fraction by Pan's Method with a biplane EF of 66 %.   2. Moderately enlarged left atrium.   3. Mild mitral annular calcification.   4. Sclerotic aortic valve with normal opening.   5. Ascending aorta 3.8 cm.  -Asymptomatic     # Weakness / Frequent falls  - PT pending   - RTF  -    -Discussed abdm CT scan reading with radiology lumbar degenerative changes    # CT: Pancreatic tail hypodenisty  - Gastro consult - can follow up outpatient with GI for MRCP  - RUQ US no obvious acute findings   - trend labs    back pain  -no incontinence  -known back pain  -flexeril, ibupforen standing, tylenol standing, lidocaine patches, prn oxycodone, standing venlafaxine    depression  -start venlaxafine ER which will help with pts neuropathy as well   -no SI, HSI    # DM  # hyperglycemia  - hold PO meds  - FS with ISS  - daughter wants to bring in home trulicity - 63 units nightly injection  - lispro 4 units with meals  -added nph 10 units bid  -may need to increase nph to 15 and meal time insulin     # HTN  - VS  - c/w home med    #Progress Note Handoff  Pending (specify):  repeat blood cultures 1/25, potential HANNAH? CT left elbow, monitor glucose   Family discussion:  plan of care was discussed with patient and family in details.  all questions were answered.  seems to understand, and in agreement  Disposition: rehab

## 2025-01-27 LAB
-  GENTAMICIN: SIGNIFICANT CHANGE UP
-  NITROFURANTOIN: SIGNIFICANT CHANGE UP
-  OXACILLIN: SIGNIFICANT CHANGE UP
-  PENICILLIN: SIGNIFICANT CHANGE UP
-  RIFAMPIN: SIGNIFICANT CHANGE UP
-  TETRACYCLINE: SIGNIFICANT CHANGE UP
-  TRIMETHOPRIM/SULFAMETHOXAZOLE: SIGNIFICANT CHANGE UP
-  VANCOMYCIN: SIGNIFICANT CHANGE UP
ALBUMIN SERPL ELPH-MCNC: 1.7 G/DL — LOW (ref 3.5–5.2)
ALP SERPL-CCNC: 208 U/L — HIGH (ref 30–115)
ALT FLD-CCNC: 18 U/L — SIGNIFICANT CHANGE UP (ref 0–41)
ANION GAP SERPL CALC-SCNC: 10 MMOL/L — SIGNIFICANT CHANGE UP (ref 7–14)
ANION GAP SERPL CALC-SCNC: 11 MMOL/L — SIGNIFICANT CHANGE UP (ref 7–14)
APTT BLD: 40.3 SEC — HIGH (ref 27–39.2)
AST SERPL-CCNC: 60 U/L — HIGH (ref 0–41)
BASOPHILS # BLD AUTO: 0.04 K/UL — SIGNIFICANT CHANGE UP (ref 0–0.2)
BASOPHILS # BLD AUTO: 0.05 K/UL — SIGNIFICANT CHANGE UP (ref 0–0.2)
BASOPHILS NFR BLD AUTO: 0.6 % — SIGNIFICANT CHANGE UP (ref 0–1)
BASOPHILS NFR BLD AUTO: 0.8 % — SIGNIFICANT CHANGE UP (ref 0–1)
BILIRUB SERPL-MCNC: 0.8 MG/DL — SIGNIFICANT CHANGE UP (ref 0.2–1.2)
BUN SERPL-MCNC: 27 MG/DL — HIGH (ref 10–20)
BUN SERPL-MCNC: 28 MG/DL — HIGH (ref 10–20)
CALCIUM SERPL-MCNC: 7.2 MG/DL — LOW (ref 8.4–10.5)
CALCIUM SERPL-MCNC: 7.8 MG/DL — LOW (ref 8.4–10.5)
CHLORIDE SERPL-SCNC: 105 MMOL/L — SIGNIFICANT CHANGE UP (ref 98–110)
CHLORIDE SERPL-SCNC: 106 MMOL/L — SIGNIFICANT CHANGE UP (ref 98–110)
CO2 SERPL-SCNC: 21 MMOL/L — SIGNIFICANT CHANGE UP (ref 17–32)
CO2 SERPL-SCNC: 23 MMOL/L — SIGNIFICANT CHANGE UP (ref 17–32)
CREAT SERPL-MCNC: 0.8 MG/DL — SIGNIFICANT CHANGE UP (ref 0.7–1.5)
CREAT SERPL-MCNC: 0.9 MG/DL — SIGNIFICANT CHANGE UP (ref 0.7–1.5)
CULTURE RESULTS: ABNORMAL
EGFR: 87 ML/MIN/1.73M2 — SIGNIFICANT CHANGE UP
EGFR: 91 ML/MIN/1.73M2 — SIGNIFICANT CHANGE UP
EOSINOPHIL # BLD AUTO: 0.24 K/UL — SIGNIFICANT CHANGE UP (ref 0–0.7)
EOSINOPHIL # BLD AUTO: 0.24 K/UL — SIGNIFICANT CHANGE UP (ref 0–0.7)
EOSINOPHIL NFR BLD AUTO: 3.6 % — SIGNIFICANT CHANGE UP (ref 0–8)
EOSINOPHIL NFR BLD AUTO: 3.9 % — SIGNIFICANT CHANGE UP (ref 0–8)
GAS PNL BLDA: SIGNIFICANT CHANGE UP
GLUCOSE SERPL-MCNC: 177 MG/DL — HIGH (ref 70–99)
GLUCOSE SERPL-MCNC: 232 MG/DL — HIGH (ref 70–99)
HCT VFR BLD CALC: 28.8 % — LOW (ref 42–52)
HCT VFR BLD CALC: 32.2 % — LOW (ref 42–52)
HGB BLD-MCNC: 10.9 G/DL — LOW (ref 14–18)
HGB BLD-MCNC: 9.7 G/DL — LOW (ref 14–18)
IMM GRANULOCYTES NFR BLD AUTO: 2.1 % — HIGH (ref 0.1–0.3)
IMM GRANULOCYTES NFR BLD AUTO: 2.3 % — HIGH (ref 0.1–0.3)
INR BLD: 1.47 RATIO — HIGH (ref 0.65–1.3)
LYMPHOCYTES # BLD AUTO: 0.89 K/UL — LOW (ref 1.2–3.4)
LYMPHOCYTES # BLD AUTO: 0.91 K/UL — LOW (ref 1.2–3.4)
LYMPHOCYTES # BLD AUTO: 13.8 % — LOW (ref 20.5–51.1)
LYMPHOCYTES # BLD AUTO: 14.4 % — LOW (ref 20.5–51.1)
MAGNESIUM SERPL-MCNC: 1.4 MG/DL — LOW (ref 1.8–2.4)
MCHC RBC-ENTMCNC: 32.2 PG — HIGH (ref 27–31)
MCHC RBC-ENTMCNC: 32.6 PG — HIGH (ref 27–31)
MCHC RBC-ENTMCNC: 33.7 G/DL — SIGNIFICANT CHANGE UP (ref 32–37)
MCHC RBC-ENTMCNC: 33.9 G/DL — SIGNIFICANT CHANGE UP (ref 32–37)
MCV RBC AUTO: 95.7 FL — HIGH (ref 80–94)
MCV RBC AUTO: 96.4 FL — HIGH (ref 80–94)
METHOD TYPE: SIGNIFICANT CHANGE UP
MONOCYTES # BLD AUTO: 0.92 K/UL — HIGH (ref 0.1–0.6)
MONOCYTES # BLD AUTO: 1 K/UL — HIGH (ref 0.1–0.6)
MONOCYTES NFR BLD AUTO: 14.9 % — HIGH (ref 1.7–9.3)
MONOCYTES NFR BLD AUTO: 15.2 % — HIGH (ref 1.7–9.3)
NEUTROPHILS # BLD AUTO: 3.97 K/UL — SIGNIFICANT CHANGE UP (ref 1.4–6.5)
NEUTROPHILS # BLD AUTO: 4.23 K/UL — SIGNIFICANT CHANGE UP (ref 1.4–6.5)
NEUTROPHILS NFR BLD AUTO: 64.1 % — SIGNIFICANT CHANGE UP (ref 42.2–75.2)
NEUTROPHILS NFR BLD AUTO: 64.3 % — SIGNIFICANT CHANGE UP (ref 42.2–75.2)
NRBC # BLD: 0 /100 WBCS — SIGNIFICANT CHANGE UP (ref 0–0)
NRBC # BLD: 0 /100 WBCS — SIGNIFICANT CHANGE UP (ref 0–0)
NRBC BLD-RTO: 0 /100 WBCS — SIGNIFICANT CHANGE UP (ref 0–0)
NRBC BLD-RTO: 0 /100 WBCS — SIGNIFICANT CHANGE UP (ref 0–0)
ORGANISM # SPEC MICROSCOPIC CNT: ABNORMAL
ORGANISM # SPEC MICROSCOPIC CNT: SIGNIFICANT CHANGE UP
PLATELET # BLD AUTO: 147 K/UL — SIGNIFICANT CHANGE UP (ref 130–400)
PLATELET # BLD AUTO: 161 K/UL — SIGNIFICANT CHANGE UP (ref 130–400)
PMV BLD: 8.2 FL — SIGNIFICANT CHANGE UP (ref 7.4–10.4)
PMV BLD: 8.8 FL — SIGNIFICANT CHANGE UP (ref 7.4–10.4)
POTASSIUM SERPL-MCNC: 4.1 MMOL/L — SIGNIFICANT CHANGE UP (ref 3.5–5)
POTASSIUM SERPL-MCNC: 4.4 MMOL/L — SIGNIFICANT CHANGE UP (ref 3.5–5)
POTASSIUM SERPL-SCNC: 4.1 MMOL/L — SIGNIFICANT CHANGE UP (ref 3.5–5)
POTASSIUM SERPL-SCNC: 4.4 MMOL/L — SIGNIFICANT CHANGE UP (ref 3.5–5)
PROT SERPL-MCNC: 5 G/DL — LOW (ref 6–8)
PROTHROM AB SERPL-ACNC: 17.5 SEC — HIGH (ref 9.95–12.87)
RBC # BLD: 3.01 M/UL — LOW (ref 4.7–6.1)
RBC # BLD: 3.34 M/UL — LOW (ref 4.7–6.1)
RBC # FLD: 13.2 % — SIGNIFICANT CHANGE UP (ref 11.5–14.5)
RBC # FLD: 13.3 % — SIGNIFICANT CHANGE UP (ref 11.5–14.5)
SODIUM SERPL-SCNC: 138 MMOL/L — SIGNIFICANT CHANGE UP (ref 135–146)
SODIUM SERPL-SCNC: 138 MMOL/L — SIGNIFICANT CHANGE UP (ref 135–146)
SPECIMEN SOURCE: SIGNIFICANT CHANGE UP
TROPONIN T, HIGH SENSITIVITY RESULT: 31 NG/L — HIGH (ref 6–21)
WBC # BLD: 6.19 K/UL — SIGNIFICANT CHANGE UP (ref 4.8–10.8)
WBC # BLD: 6.58 K/UL — SIGNIFICANT CHANGE UP (ref 4.8–10.8)
WBC # FLD AUTO: 6.19 K/UL — SIGNIFICANT CHANGE UP (ref 4.8–10.8)
WBC # FLD AUTO: 6.58 K/UL — SIGNIFICANT CHANGE UP (ref 4.8–10.8)

## 2025-01-27 PROCEDURE — 0042T: CPT

## 2025-01-27 PROCEDURE — 93010 ELECTROCARDIOGRAM REPORT: CPT

## 2025-01-27 PROCEDURE — 70450 CT HEAD/BRAIN W/O DYE: CPT | Mod: 26,XU

## 2025-01-27 PROCEDURE — 99233 SBSQ HOSP IP/OBS HIGH 50: CPT

## 2025-01-27 PROCEDURE — 70496 CT ANGIOGRAPHY HEAD: CPT | Mod: 26

## 2025-01-27 PROCEDURE — 70551 MRI BRAIN STEM W/O DYE: CPT | Mod: 26

## 2025-01-27 PROCEDURE — 70498 CT ANGIOGRAPHY NECK: CPT | Mod: 26

## 2025-01-27 RX ORDER — INSULIN LISPRO 100/ML
6 VIAL (ML) SUBCUTANEOUS
Refills: 0 | Status: DISCONTINUED | OUTPATIENT
Start: 2025-01-27 | End: 2025-01-28

## 2025-01-27 RX ORDER — HEPARIN SODIUM,PORCINE 10000/ML
5000 VIAL (ML) INJECTION EVERY 12 HOURS
Refills: 0 | Status: DISCONTINUED | OUTPATIENT
Start: 2025-01-28 | End: 2025-01-28

## 2025-01-27 RX ORDER — MAGNESIUM SULFATE 0.8 MEQ/ML
2 AMPUL (ML) INJECTION ONCE
Refills: 0 | Status: COMPLETED | OUTPATIENT
Start: 2025-01-27 | End: 2025-01-27

## 2025-01-27 RX ORDER — FOLIC ACID 1 MG
1 TABLET ORAL DAILY
Refills: 0 | Status: DISCONTINUED | OUTPATIENT
Start: 2025-01-27 | End: 2025-02-04

## 2025-01-27 RX ORDER — LOSARTAN POTASSIUM 100 MG
50 TABLET ORAL DAILY
Refills: 0 | Status: DISCONTINUED | OUTPATIENT
Start: 2025-01-27 | End: 2025-02-04

## 2025-01-27 RX ORDER — ASPIRIN 81 MG/1
81 TABLET, COATED ORAL DAILY
Refills: 0 | Status: DISCONTINUED | OUTPATIENT
Start: 2025-01-28 | End: 2025-02-04

## 2025-01-27 RX ORDER — ATORVASTATIN CALCIUM 80 MG/1
80 TABLET, FILM COATED ORAL AT BEDTIME
Refills: 0 | Status: DISCONTINUED | OUTPATIENT
Start: 2025-01-27 | End: 2025-02-04

## 2025-01-27 RX ORDER — ASPIRIN 81 MG/1
325 TABLET, COATED ORAL ONCE
Refills: 0 | Status: COMPLETED | OUTPATIENT
Start: 2025-01-27 | End: 2025-01-27

## 2025-01-27 RX ADMIN — IBUPROFEN 600 MILLIGRAM(S): 600 TABLET, FILM COATED ORAL at 05:13

## 2025-01-27 RX ADMIN — Medication 3: at 11:54

## 2025-01-27 RX ADMIN — GABAPENTIN 300 MILLIGRAM(S): 800 TABLET ORAL at 15:30

## 2025-01-27 RX ADMIN — ASPIRIN 325 MILLIGRAM(S): 81 TABLET, COATED ORAL at 16:12

## 2025-01-27 RX ADMIN — ACETAMINOPHEN 650 MILLIGRAM(S): 160 SUSPENSION ORAL at 00:49

## 2025-01-27 RX ADMIN — GABAPENTIN 300 MILLIGRAM(S): 800 TABLET ORAL at 22:44

## 2025-01-27 RX ADMIN — Medication 100 MILLIGRAM(S): at 15:31

## 2025-01-27 RX ADMIN — ANTISEPTIC SURGICAL SCRUB 1 APPLICATION(S): 0.04 SOLUTION TOPICAL at 05:26

## 2025-01-27 RX ADMIN — Medication 75 MILLILITER(S): at 05:14

## 2025-01-27 RX ADMIN — Medication 1 APPLICATION(S): at 05:13

## 2025-01-27 RX ADMIN — POLYETHYLENE GLYCOL 3350 17 GRAM(S): 17 POWDER, FOR SOLUTION ORAL at 05:13

## 2025-01-27 RX ADMIN — ACETAMINOPHEN 650 MILLIGRAM(S): 160 SUSPENSION ORAL at 05:13

## 2025-01-27 RX ADMIN — ACETAMINOPHEN 650 MILLIGRAM(S): 160 SUSPENSION ORAL at 11:55

## 2025-01-27 RX ADMIN — IBUPROFEN 600 MILLIGRAM(S): 600 TABLET, FILM COATED ORAL at 00:17

## 2025-01-27 RX ADMIN — Medication 75 MILLIGRAM(S): at 11:55

## 2025-01-27 RX ADMIN — IBUPROFEN 600 MILLIGRAM(S): 600 TABLET, FILM COATED ORAL at 15:25

## 2025-01-27 RX ADMIN — ACETAMINOPHEN 650 MILLIGRAM(S): 160 SUSPENSION ORAL at 19:36

## 2025-01-27 RX ADMIN — Medication 2 TABLET(S): at 22:44

## 2025-01-27 RX ADMIN — Medication 75 MILLILITER(S): at 22:39

## 2025-01-27 RX ADMIN — LIDOCAINE HYDROCHLORIDE 1 PATCH: 30 CREAM TOPICAL at 11:55

## 2025-01-27 RX ADMIN — Medication 1 APPLICATION(S): at 18:37

## 2025-01-27 RX ADMIN — Medication 10 MILLIGRAM(S): at 22:44

## 2025-01-27 RX ADMIN — ACETAMINOPHEN 650 MILLIGRAM(S): 160 SUSPENSION ORAL at 00:17

## 2025-01-27 RX ADMIN — IBUPROFEN 600 MILLIGRAM(S): 600 TABLET, FILM COATED ORAL at 00:49

## 2025-01-27 RX ADMIN — Medication 100 MILLIGRAM(S): at 05:13

## 2025-01-27 RX ADMIN — ACETAMINOPHEN 650 MILLIGRAM(S): 160 SUSPENSION ORAL at 06:46

## 2025-01-27 RX ADMIN — Medication 25 GRAM(S): at 18:36

## 2025-01-27 RX ADMIN — Medication 100 MILLIGRAM(S): at 22:43

## 2025-01-27 RX ADMIN — ACETAMINOPHEN 650 MILLIGRAM(S): 160 SUSPENSION ORAL at 13:22

## 2025-01-27 RX ADMIN — IBUPROFEN 600 MILLIGRAM(S): 600 TABLET, FILM COATED ORAL at 11:55

## 2025-01-27 RX ADMIN — LEVETIRACETAM 500 MILLIGRAM(S): 750 TABLET, FILM COATED ORAL at 18:48

## 2025-01-27 RX ADMIN — POLYETHYLENE GLYCOL 3350 17 GRAM(S): 17 POWDER, FOR SOLUTION ORAL at 18:36

## 2025-01-27 RX ADMIN — LIDOCAINE HYDROCHLORIDE 1 PATCH: 30 CREAM TOPICAL at 19:50

## 2025-01-27 RX ADMIN — Medication 300 MILLIGRAM(S): at 16:10

## 2025-01-27 RX ADMIN — LIDOCAINE HYDROCHLORIDE 1 PATCH: 30 CREAM TOPICAL at 21:30

## 2025-01-27 RX ADMIN — Medication 2: at 08:52

## 2025-01-27 RX ADMIN — Medication 25 MILLIGRAM(S): at 05:12

## 2025-01-27 RX ADMIN — LEVETIRACETAM 500 MILLIGRAM(S): 750 TABLET, FILM COATED ORAL at 05:12

## 2025-01-27 RX ADMIN — IBUPROFEN 600 MILLIGRAM(S): 600 TABLET, FILM COATED ORAL at 06:47

## 2025-01-27 RX ADMIN — ACETAMINOPHEN 650 MILLIGRAM(S): 160 SUSPENSION ORAL at 18:36

## 2025-01-27 RX ADMIN — LIDOCAINE HYDROCHLORIDE 1 PATCH: 30 CREAM TOPICAL at 22:44

## 2025-01-27 RX ADMIN — GABAPENTIN 300 MILLIGRAM(S): 800 TABLET ORAL at 05:12

## 2025-01-27 RX ADMIN — Medication 5000 UNIT(S): at 05:12

## 2025-01-27 RX ADMIN — Medication 10 MILLIGRAM(S): at 05:12

## 2025-01-27 RX ADMIN — Medication 6 UNIT(S): at 13:22

## 2025-01-27 NOTE — PROGRESS NOTE ADULT - SUBJECTIVE AND OBJECTIVE BOX
INFECTIOUS DISEASE FOLLOW UP NOTE:    Interval History/ROS: Patient is a 80y old  Male who presents with a chief complaint of fall (26 Jan 2025 18:45)    Overnight events: RRT this morning for altered mental status    REVIEW OF SYSTEMS:  CONSTITUTIONAL: No fever or chills  HEAD: No lesion on scalp  EYES: No visual disturbance  ENT: No sore throat  RESPIRATORY: No cough, no shortness of breath  CARDIOVASCULAR: No chest pain or palpitations  GASTROINTESTINAL: No abdominal or epigastric pain  GENITOURINARY: No dysuria  NEUROLOGICAL: Mild slurred speech, strength overall 4+/5  MUSCULOSKELETAL: Left arm swelling  SKIN: No itching, rashes  All other ROS negative except noted above        Prior hospital charts reviewed [Yes]  Primary team notes reviewed [Yes]  Other consultant notes reviewed [Yes]    Allergies:  No Known Allergies      ANTIMICROBIALS:   ceFAZolin   IVPB 2000 every 8 hours      OTHER MEDS: MEDICATIONS  (STANDING):  acetaminophen     Tablet .. 650 every 6 hours  aluminum hydroxide/magnesium hydroxide/simethicone Suspension 30 every 4 hours PRN  atorvastatin 80 at bedtime  cyclobenzaprine 10 three times a day  dextrose 50% Injectable 25 once  dextrose 50% Injectable 12.5 once  dextrose 50% Injectable 25 once  dextrose Oral Gel 15 once PRN  gabapentin 300 every 8 hours  glucagon  Injectable 1 once  insulin lispro (ADMELOG) corrective regimen sliding scale  three times a day before meals  insulin lispro Injectable (ADMELOG) 6 three times a day before meals  levETIRAcetam 500 two times a day  losartan 50 daily  melatonin 5 at bedtime PRN  morphine  - Injectable 2 every 4 hours PRN  ondansetron Injectable 4 every 8 hours PRN  oxyCODONE    IR 10 three times a day PRN  polyethylene glycol 3350 17 two times a day  senna 2 at bedtime  venlafaxine XR. 75 daily      Vital Signs Last 24 Hrs  T(F): 96.6 (01-27-25 @ 16:24), Max: 99.7 (01-24-25 @ 22:32)    Vital Signs Last 24 Hrs  HR: 82 (01-27-25 @ 16:24) (73 - 82)  BP: 136/64 (01-27-25 @ 16:24) (136/64 - 194/84)  RR: 18 (01-27-25 @ 13:19)  SpO2: 95% (01-27-25 @ 16:24) (95% - 98%)  Wt(kg): --    EXAM:  GENERAL: NAD, lying in bed  HEAD: No head lesions  EYES: Conjunctiva pink and cornea white  EAR, NOSE, MOUTH, THROAT: Normal external ears and nose, no discharges; moist mucous membranes  NECK: Supple, nontender to palpation; no JVD  RESPIRATORY: Clear to auscultation bilaterally  CARDIOVASCULAR: S1 S2  GASTROINTESTINAL: Soft, nontender, nondistended; normoactive bowel sounds  GENITOURINARY: No loyola catheter, no CVA tenderness  EXTREMITIES: No clubbing, cyanosis, or petal edema  NERVOUS SYSTEM: Awake and alert, not fully oriented, slight slurred speech, Strength 4+/5, no gross sensory deficit  MUSCULOSKELETAL: Left forearm swelling  SKIN: No rashes or lesions, no superficial thrombophlebitis  PSYCH: Normal affectwa    Labs:                        9.7    6.19  )-----------( 147      ( 27 Jan 2025 15:51 )             28.8     01-27    138  |  106  |  27[H]  ----------------------------<  177[H]  4.1   |  21  |  0.8    Ca    7.2[L]      27 Jan 2025 15:51  Mg     1.4     01-27    TPro  5.0[L]  /  Alb  1.7[L]  /  TBili  0.8  /  DBili  x   /  AST  60[H]  /  ALT  18  /  AlkPhos  208[H]  01-27      WBC Trend:  WBC Count: 6.19 (01-27-25 @ 15:51)  WBC Count: 6.58 (01-27-25 @ 08:52)  WBC Count: 6.14 (01-26-25 @ 07:00)  WBC Count: 7.01 (01-25-25 @ 08:23)      Creatine Trend:  Creatinine: 0.8 (01-27)  Creatinine: 0.9 (01-27)  Creatinine: 1.1 (01-26)  Creatinine: 1.1 (01-25)      Liver Biochemical Testing Trend:  Alanine Aminotransferase (ALT/SGPT): 18 (01-27)  Alanine Aminotransferase (ALT/SGPT): 21 (01-24)  Alanine Aminotransferase (ALT/SGPT): 25 (01-23)  Aspartate Aminotransferase (AST/SGOT): 60 (01-27-25 @ 15:51)  Aspartate Aminotransferase (AST/SGOT): 36 (01-24-25 @ 07:04)  Aspartate Aminotransferase (AST/SGOT): 42 (01-23-25 @ 17:55)  Bilirubin Total: 0.8 (01-27)  Bilirubin Total: 1.3 (01-24)  Bilirubin Total: 1.9 (01-23)      Trend LDH      Urinalysis Basic - ( 27 Jan 2025 15:51 )    Color: x / Appearance: x / SG: x / pH: x  Gluc: 177 mg/dL / Ketone: x  / Bili: x / Urobili: x   Blood: x / Protein: x / Nitrite: x   Leuk Esterase: x / RBC: x / WBC x   Sq Epi: x / Non Sq Epi: x / Bacteria: x        MICROBIOLOGY:        Culture - Blood (collected 25 Jan 2025 08:23)  Source: .Blood BLOOD  Final Report:    Growth in aerobic and anaerobic bottles: Staphylococcus aureus    See previous culture [id number]    Culture - Blood (collected 25 Jan 2025 08:23)  Source: .Blood BLOOD  Final Report:    Growth in aerobic and anaerobic bottles: Staphylococcus aureus    See previous culture [id number]    Culture - Blood (collected 23 Jan 2025 20:05)  Source: .Blood BLOOD  Final Report:    Growth in aerobic and anaerobic bottles: Staphylococcus aureus    Direct identification is available within approximately 3-5    hours either by Blood Panel Multiplexed PCR or Direct    MALDI-TOF. Details: [email or web address]  Organism: Blood Culture PCR  Staphylococcus aureus  Organism: Staphylococcus aureus    Sensitivities:      Method Type: LOUIS      -  Clindamycin: S <=0.25      -  Erythromycin: S <=0.25      -  Gentamicin: S <=4 Should not be used as monotherapy      -  Oxacillin: S <=0.25 Oxacillin predicts susceptibility for dicloxacillin, methicillin, and nafcillin      -  Penicillin: R >2      -  Rifampin: S <=1 Should not be used as monotherapy      -  Tetracycline: S <=4      -  Trimethoprim/Sulfamethoxazole: S <=0.5/9.5      -  Vancomycin: S 1  Organism: Blood Culture PCR    Sensitivities:      Method Type: PCR      -  Methicillin SENSITIVE Staphylococcus aureus (MSSA): Detec Any isolate of Staphylococcus aureus from a blood culture is NOT considered a contaminant.    Culture - Blood (collected 23 Jan 2025 20:05)  Source: .Blood BLOOD  Final Report:    Growth in aerobic and anaerobic bottles: Staphylococcus aureus    See previous culture [id number]    Culture - Urine (collected 23 Jan 2025 19:55)  Source: Clean Catch None  Final Report:    10,000 - 49,000 CFU/mL Staphylococcus aureus    50,000 - 99,000 CFU/mL Aerococcus urinae    Isolates of Aerococcus spp. are predictably susceptible to penicillin,    ampicillin, and vancomycin. All isolates are resistant to sulfonamides.  Organism: Staphylococcus aureus  Organism: Staphylococcus aureus    Sensitivities:      Method Type: LOUIS      -  Gentamicin: S <=4 Should not be used as monotherapy      -  Nitrofurantoin: S <=32      -  Oxacillin: S <=0.25 Oxacillin predicts susceptibility for dicloxacillin, methicillin, and nafcillin      -  Penicillin: R >2      -  Rifampin: S <=1 Should not be used as monotherapy      -  Tetracycline: S <=4      -  Trimethoprim/Sulfamethoxazole: S <=0.5/9.5      -  Vancomycin: S 1    Urinalysis with Rflx Culture (collected 23 Jan 2025 19:55)    Treponema Pallidum Antibody Interpretation: Negative (01-24-25 @ 07:04)    Troponin T, High Sensitivity Result: 31 (01-27)  Troponin T, High Sensitivity Result: 39 (01-24)  Troponin T, High Sensitivity Result: 43 (01-23)  Troponin T, High Sensitivity Result: 47 (01-23)    Blood Gas Arterial, Lactate: 1.1 (01-27 @ 14:17)      RADIOLOGY:  imaging below personally reviewed    < from: CT Brain Stroke Protocol (01.27.25 @ 14:38) >    IMPRESSION:    No acute territorial infarct, intracranial hemorrhage, or midline shift.   Further evaluation with MRI can be considered if the symptoms persist (if   no contraindication).    < end of copied text >    < from: CT Elbow w/ IV Cont, Left (01.26.25 @ 22:08) >  IMPRESSION:    Very limited exam. Artifact. Portions of the elbow are not imaged.    Diffuse soft tissue edema which may reflect cellulitis in the right   clinical setting. No definite discrete fluid collection.    Arthritic changes of the elbow with no definite radiographic evidence of   osteomyelitis.    < end of copied text >

## 2025-01-27 NOTE — RAPID RESPONSE TEAM SUMMARY - NSSITUATIONBACKGROUNDRRT_GEN_ALL_CORE
pt with change in AMS , slurred speech   NIHSS 5  [facility] called spoke with tele neuro Dr. Villeda  STAT CT head, CTA  H/N , map ordered

## 2025-01-27 NOTE — RAPID RESPONSE TEAM SUMMARY - NSMEDICATIONSRRT_GEN_ALL_CORE
per tele neuro Dr. Villeda recs   load with asa and plavix and cw 81 mg asa and 75 mg plavix  high intensity statin   neuro check q 4 hrs

## 2025-01-27 NOTE — OCCUPATIONAL THERAPY INITIAL EVALUATION ADULT - SPECIFY REASON(S)
Chart reviewed by Occupational Therapist. As per NOLAN Mejia Pt on Hold 2/2 stroke code called. OT to F/U when appropriate.

## 2025-01-27 NOTE — PROGRESS NOTE ADULT - SUBJECTIVE AND OBJECTIVE BOX
Patient is a 80y old  Male who presents with a chief complaint of fall (01-27-25)      Pt seen and examined at bedside. No CP or SOB.      ABG - ( 27 Jan 2025 14:17 )  pH: 7.42  /  pCO2: 36    /  pO2: 90    / HCO3: 23    / Base Excess: -0.8  /  SaO2: 98.8                PAST MEDICAL & SURGICAL HISTORY:  Diabetes    Hypertension    Fall    H/O left knee surgery    History of cholecystectomy    History of appendectomy        VITAL SIGNS (Last 24 hrs):  T(C): 35.4 (01-27-25 @ 17:00), Max: 36.5 (01-27-25 @ 05:36)  HR: 64 (01-27-25 @ 17:01) (63 - 82)  BP: 155/72 (01-27-25 @ 17:01) (136/64 - 194/84)  RR: 15 (01-27-25 @ 17:01) (15 - 20)  SpO2: 98% (01-27-25 @ 17:01) (95% - 99%)  Wt(kg): --  Daily     Daily     I&O's Summary    26 Jan 2025 07:01  -  27 Jan 2025 07:00  --------------------------------------------------------  IN: 0 mL / OUT: 300 mL / NET: -300 mL    27 Jan 2025 07:01  -  27 Jan 2025 20:21  --------------------------------------------------------  IN: 275 mL / OUT: 450 mL / NET: -175 mL        PHYSICAL EXAM:  GENERAL: NAD, well-developed  HEAD:  Atraumatic, Normocephalic  EYES: EOMI, PERRLA, conjunctiva and sclera clear  NECK: Supple, No JVD  CHEST/LUNG: Clear to auscultation bilaterally; No wheeze  HEART: Regular rate and rhythm; No murmurs, rubs, or gallops  ABDOMEN: Soft, Nontender, Nondistended; Bowel sounds present  EXTREMITIES:  2+ Peripheral Pulses, No clubbing, cyanosis, or edema  PSYCH: AAOx3  NEUROLOGY: non-focal  SKIN: No rashes or lesions    Labs Reviewed  Spoke to patient in regards to abnormal labs.    CBC Full  -  ( 27 Jan 2025 15:51 )  WBC Count : 6.19 K/uL  Hemoglobin : 9.7 g/dL  Hematocrit : 28.8 %  Platelet Count - Automated : 147 K/uL  Mean Cell Volume : 95.7 fL  Mean Cell Hemoglobin : 32.2 pg  Mean Cell Hemoglobin Concentration : 33.7 g/dL  Auto Neutrophil # : 3.97 K/uL  Auto Lymphocyte # : 0.89 K/uL  Auto Monocyte # : 0.92 K/uL  Auto Eosinophil # : 0.24 K/uL  Auto Basophil # : 0.04 K/uL  Auto Neutrophil % : 64.1 %  Auto Lymphocyte % : 14.4 %  Auto Monocyte % : 14.9 %  Auto Eosinophil % : 3.9 %  Auto Basophil % : 0.6 %    BMP:    01-27 @ 15:51    Blood Urea Nitrogen - 27  Calcium - 7.2  Carbond Dioxide - 21  Chloride - 106  Creatinine - 0.8  Glucose - 177  Potassium - 4.1  Sodium - 138      Hemoglobin A1c -   PT/INR - ( 27 Jan 2025 15:51 )   PT: 17.50 sec;   INR: 1.47 ratio         PTT - ( 27 Jan 2025 15:51 )  PTT:40.3 sec  Urine Culture:  01-25 @ 08:23 Urine culture: --    Culture Results:   Growth in aerobic and anaerobic bottles: Staphylococcus aureus  See previous culture [id number]  Method Type: --  Organism: --  Organism Identification: --  Specimen Source: .Blood BLOOD  01-23 @ 20:05 Urine culture: --    Culture Results:   Growth in aerobic and anaerobic bottles: Staphylococcus aureus  Direct identification is available within approximately 3-5  hours either by Blood Panel Multiplexed PCR or Direct  MALDI-TOF. Details: [email or web address]  Method Type: PCR  Organism: Blood Culture PCR  Organism Identification: Blood Culture PCR  Staphylococcus aureus  Specimen Source: .Blood BLOOD  01-23 @ 19:55 Urine culture: --    Culture Results:   10,000 - 49,000 CFU/mL Staphylococcus aureus  50,000 - 99,000 CFU/mL Aerococcus urinae  Isolates of Aerococcus spp. are predictably susceptible to penicillin,  ampicillin, and vancomycin. All isolates are resistant to sulfonamides.  Method Type: LOUIS  Organism: Staphylococcus aureus  Organism Identification: Staphylococcus aureus  Specimen Source: Clean Catch None        COVID Labs  CRP:      D-Dimer:            Imaging reviewed independently and reviewed read        MEDICATIONS  (STANDING):  acetaminophen     Tablet .. 650 milliGRAM(s) Oral every 6 hours  atorvastatin 80 milliGRAM(s) Oral at bedtime  bacitracin   Ointment 1 Application(s) Topical two times a day  ceFAZolin   IVPB 2000 milliGRAM(s) IV Intermittent every 8 hours  chlorhexidine 2% Cloths 1 Application(s) Topical <User Schedule>  cyclobenzaprine 10 milliGRAM(s) Oral three times a day  dextrose 5%. 1000 milliLiter(s) (50 mL/Hr) IV Continuous <Continuous>  dextrose 50% Injectable 25 Gram(s) IV Push once  dextrose 50% Injectable 12.5 Gram(s) IV Push once  dextrose 50% Injectable 25 Gram(s) IV Push once  folic acid 1 milliGRAM(s) Oral daily  gabapentin 300 milliGRAM(s) Oral every 8 hours  glucagon  Injectable 1 milliGRAM(s) IntraMuscular once  insulin lispro (ADMELOG) corrective regimen sliding scale   SubCutaneous three times a day before meals  insulin lispro Injectable (ADMELOG) 6 Unit(s) SubCutaneous three times a day before meals  levETIRAcetam 500 milliGRAM(s) Oral two times a day  lidocaine   4% Patch 1 Patch Transdermal daily  lidocaine   4% Patch 1 Patch Transdermal every 24 hours  losartan 50 milliGRAM(s) Oral daily  polyethylene glycol 3350 17 Gram(s) Oral two times a day  senna 2 Tablet(s) Oral at bedtime  sodium chloride 0.9%. 1000 milliLiter(s) (75 mL/Hr) IV Continuous <Continuous>  Tresiba Flextouch (insulin degludec) 63 Unit(s) 63 Unit(s) SubCutaneous at bedtime  venlafaxine XR. 75 milliGRAM(s) Oral daily    MEDICATIONS  (PRN):  aluminum hydroxide/magnesium hydroxide/simethicone Suspension 30 milliLiter(s) Oral every 4 hours PRN Dyspepsia  dextrose Oral Gel 15 Gram(s) Oral once PRN Blood Glucose LESS THAN 70 milliGRAM(s)/deciliter  melatonin 5 milliGRAM(s) Oral at bedtime PRN Insomnia  morphine  - Injectable 2 milliGRAM(s) IV Push every 4 hours PRN Moderate Pain (4 - 6)  nystatin Powder 1 Application(s) Topical every 12 hours PRN fungal rash  ondansetron Injectable 4 milliGRAM(s) IV Push every 8 hours PRN Nausea and/or Vomiting  oxyCODONE    IR 10 milliGRAM(s) Oral three times a day PRN Severe Pain (7 - 10)

## 2025-01-27 NOTE — PROGRESS NOTE ADULT - ASSESSMENT
80-year-old male with past medical history of insulin-dependent diabetes, hypertension, subdural hematoma on keppra who presents for multiple falls, weakness, confusion. Per daughter at bedside, he has becoming more intermittently confused, has had multiple falls over the past 2 months (last fall was yesterday). He had a fall while getting out of bed, does not recall how it happened. Complains of back pain since then. He was admitted in 2022 for left elbow swelling and cellulitis. Now states the pain and swelling have returned. Per daughter, he has been urinating more frequently for the past 2 days. No fevers, chills, CP, SOB, palpitations, n/v/d, abdominal pain, hip pain.    #urinary tract infection  -c/w abx   -IVF  -follow up cultures    #infectious encephalopathy versus hypertensive enceph  #hx of subdural hematoma  -pt presented with AMS likely secondary to UTI and MSSA bacteremia as below  -1/27 rapid response called for AMS with pt having increased confusion - rule out seizure vs stroke, stroke called  -chronic stenosis appreciated no intracranial abnormalities   -pt aspirin and plavix loaded with neuro recommendations - no tpa recommended  -continue aspirin and plavix   -TTE as below - stable EF, no obvious thrombus  -MRI pending  -upgraded to SDU for frequent neuro checks     #staph aureus MSSA bacteremia, origin left forearm cellulitis?  #Questionable left forearm cellulitis - low suspicion for septic joint - no pain with elbow movement, no swelling of olecranon, swelling at the forearm area   -repeat blood cultures on 1/25 -> positive on 1/26, cultures repeated 1/27  -ID consulted - start cefazolin, 1/25 off vanco  -pending ID reeval as cultures are still positive pending repeat cultures 1/27, need HANNAH?  -CT elbow with IV contrast - cellulitis, no obvious collection      left forearm swelling after fall  -xray with no acute fracture  -obtain LUE duplex -> negative  -obtain CT chest with IV contrast of left elbow     #demand ischemia in the setting of active infection  -EKG shows no acute ischemic changes.  trop level flat  - trend trops  - ECHO 1/24: 1. LV Ejection Fraction by Pan's Method with a biplane EF of 66 %.   2. Moderately enlarged left atrium.   3. Mild mitral annular calcification.   4. Sclerotic aortic valve with normal opening.   5. Ascending aorta 3.8 cm.  -Asymptomatic     # Weakness / Frequent falls  - PT pending   - RTF  -    -Discussed abdm CT scan reading with radiology lumbar degenerative changes    # CT: Pancreatic tail hypodenisty  - Gastro consult - can follow up outpatient with GI for MRCP  - RUQ US no obvious acute findings   - trend labs    back pain  -no incontinence  -known back pain  -flexeril, ibupforen standing, tylenol standing, lidocaine patches, prn oxycodone, standing venlafaxine    depression  -start venlaxafine ER which will help with pts neuropathy as well   -no SI, HSI    # DM  # hyperglycemia  - hold PO meds  - FS with ISS  - daughter wants to bring in home trulicity - 63 units nightly injection  - lispro 4 units with meals -> increased to 6 units   -added nph 10 units bid  -may need to increase nph to 15 and meal time insulin     # HTN  - VS  - losartan 50 (increased from 25 on AM 1/27)    #Progress Note Handoff  Pending (specify):  repeat blood cultures 1/25, potential HANNAH? CT left elbow, monitor glucose   Family discussion:  plan of care was discussed with patient and family in details.  all questions were answered.  seems to understand, and in agreement  Disposition: rehab     I personally reviewed labs and imaging and ordered necessary testing/medications  I discussed care of the patient with licensed providers  I personally reviewed chart and consultant recommendations  Pt has complex medical issues that require extensive diagnosis and intervention / threat to life  I have personally spent 55 minutes of time on the encounter due to providing critical care in setting of code stroke and separately reported services

## 2025-01-27 NOTE — RAPID RESPONSE TEAM SUMMARY - NSADDTLFINDINGSRRT_GEN_ALL_CORE
CT negative for stroke , no occlusion   per tele neuro Dr. Villeda recs   load with asa and plavix and cw 81 mg asa and 75 mg plavix  high intensity statin   neuro check q 4 hrs  CT negative for stroke , Mild atherosclerotic stenosis in the bilateral supraclinoid ICAs and   bilateral V4 vertebral arteries.    per tele neuro Dr. Villeda recs   load with asa and plavix and cw 81 mg asa and 75 mg plavix  high intensity statin   neuro check q 4 hrs

## 2025-01-27 NOTE — PROGRESS NOTE ADULT - ASSESSMENT
80-year-old male with past medical history of insulin-dependent diabetes, hypertension, subdural hematoma on keppra who presents for multiple falls, weakness, confusion.     ID is consulted for cellulitis  Afebrile 98.1  WBC 8.34 < 10.13  On room air  BCx 1/23 MSSA  BCx 1/25 MSSA  UA WBC 20, UCx MSSA and Aerococcus spp.    Left forearm xray  Capitellum bony fragment/calcification. No radius or ulna   fracture.. Soft tissue swelling with no radiographic findings to suggest   the presence of osteomyelitis or radiopaque soft tissue foreign body.    CT chest:  No CT evidence of acute traumatic injury within the chest, abdomen or   pelvis.  CT ABDOMEN/PELVIS:  Pancreatic tail hypodensity measuring 1 cm. Differential includes IPMN.   Nonemergent/outpatient MRI may be obtained for further characterization    Antibiotic:  Vancomycin 1/23 - 1/24  Ceftriaxone 1/23 - 1/24  Ancef 1/24 ->      IMPRESSION:  MSSA bacteremia, potentially life-threatening  Suspect left forearm cellulitis  Acute metabolic encephalopathy  DM  Immunosuppression / Immunosenescence secondary to multiple comorbidities which could result in poor clinical outcome    RECOMMENDATIONS:  - So far unclear source for MSSA bacteremia  - IV Ancef 2g q8hrs  - Repeat 1 set of BCx q48hrs until negative  - TTE no vegetation, might need HANNAH  - MRI head  - Offloading and frequent position changes, aspiration precaution  - Trend WBC, fever curve, transaminases, creatinine daily  - Please inform ID of any patient clinical change or any new pertinent laboratory or radiographic data      Nora Beck D.O.  Attending Physician  Division of Infectious Diseases  [facility]  Please contact me via [employer] Teams

## 2025-01-28 LAB
ALBUMIN SERPL ELPH-MCNC: 1.9 G/DL — LOW (ref 3.5–5.2)
ALP SERPL-CCNC: 215 U/L — HIGH (ref 30–115)
ALT FLD-CCNC: 15 U/L — SIGNIFICANT CHANGE UP (ref 0–41)
ANION GAP SERPL CALC-SCNC: 9 MMOL/L — SIGNIFICANT CHANGE UP (ref 7–14)
AST SERPL-CCNC: 64 U/L — HIGH (ref 0–41)
BASOPHILS # BLD AUTO: 0.05 K/UL — SIGNIFICANT CHANGE UP (ref 0–0.2)
BASOPHILS NFR BLD AUTO: 0.7 % — SIGNIFICANT CHANGE UP (ref 0–1)
BILIRUB SERPL-MCNC: 0.8 MG/DL — SIGNIFICANT CHANGE UP (ref 0.2–1.2)
BUN SERPL-MCNC: 23 MG/DL — HIGH (ref 10–20)
CALCIUM SERPL-MCNC: 7.9 MG/DL — LOW (ref 8.4–10.5)
CHLORIDE SERPL-SCNC: 107 MMOL/L — SIGNIFICANT CHANGE UP (ref 98–110)
CO2 SERPL-SCNC: 23 MMOL/L — SIGNIFICANT CHANGE UP (ref 17–32)
CREAT SERPL-MCNC: 0.9 MG/DL — SIGNIFICANT CHANGE UP (ref 0.7–1.5)
EGFR: 87 ML/MIN/1.73M2 — SIGNIFICANT CHANGE UP
EOSINOPHIL # BLD AUTO: 0.27 K/UL — SIGNIFICANT CHANGE UP (ref 0–0.7)
EOSINOPHIL NFR BLD AUTO: 3.8 % — SIGNIFICANT CHANGE UP (ref 0–8)
FERRITIN SERPL-MCNC: 499 NG/ML — HIGH (ref 30–400)
GLUCOSE SERPL-MCNC: 64 MG/DL — LOW (ref 70–99)
HCT VFR BLD CALC: 31.3 % — LOW (ref 42–52)
HGB BLD-MCNC: 10.7 G/DL — LOW (ref 14–18)
IMM GRANULOCYTES NFR BLD AUTO: 2.4 % — HIGH (ref 0.1–0.3)
INR BLD: 1.39 RATIO — HIGH (ref 0.65–1.3)
IRON SATN MFR SERPL: 52 % — HIGH (ref 15–50)
IRON SATN MFR SERPL: 70 UG/DL — SIGNIFICANT CHANGE UP (ref 35–150)
LYMPHOCYTES # BLD AUTO: 1.15 K/UL — LOW (ref 1.2–3.4)
LYMPHOCYTES # BLD AUTO: 16.1 % — LOW (ref 20.5–51.1)
MAGNESIUM SERPL-MCNC: 1.7 MG/DL — LOW (ref 1.8–2.4)
MCHC RBC-ENTMCNC: 32.8 PG — HIGH (ref 27–31)
MCHC RBC-ENTMCNC: 34.2 G/DL — SIGNIFICANT CHANGE UP (ref 32–37)
MCV RBC AUTO: 96 FL — HIGH (ref 80–94)
MONOCYTES # BLD AUTO: 0.89 K/UL — HIGH (ref 0.1–0.6)
MONOCYTES NFR BLD AUTO: 12.5 % — HIGH (ref 1.7–9.3)
NEUTROPHILS # BLD AUTO: 4.6 K/UL — SIGNIFICANT CHANGE UP (ref 1.4–6.5)
NEUTROPHILS NFR BLD AUTO: 64.5 % — SIGNIFICANT CHANGE UP (ref 42.2–75.2)
NRBC # BLD: 0 /100 WBCS — SIGNIFICANT CHANGE UP (ref 0–0)
NRBC BLD-RTO: 0 /100 WBCS — SIGNIFICANT CHANGE UP (ref 0–0)
PLATELET # BLD AUTO: 158 K/UL — SIGNIFICANT CHANGE UP (ref 130–400)
PMV BLD: 8.2 FL — SIGNIFICANT CHANGE UP (ref 7.4–10.4)
POTASSIUM SERPL-MCNC: 4.1 MMOL/L — SIGNIFICANT CHANGE UP (ref 3.5–5)
POTASSIUM SERPL-SCNC: 4.1 MMOL/L — SIGNIFICANT CHANGE UP (ref 3.5–5)
PROT SERPL-MCNC: 5.4 G/DL — LOW (ref 6–8)
PROTHROM AB SERPL-ACNC: 16.5 SEC — HIGH (ref 9.95–12.87)
RBC # BLD: 3.26 M/UL — LOW (ref 4.7–6.1)
RBC # FLD: 13.3 % — SIGNIFICANT CHANGE UP (ref 11.5–14.5)
SODIUM SERPL-SCNC: 139 MMOL/L — SIGNIFICANT CHANGE UP (ref 135–146)
TIBC SERPL-MCNC: 134 UG/DL — LOW (ref 220–430)
UIBC SERPL-MCNC: 64 UG/DL — LOW (ref 110–370)
WBC # BLD: 7.13 K/UL — SIGNIFICANT CHANGE UP (ref 4.8–10.8)
WBC # FLD AUTO: 7.13 K/UL — SIGNIFICANT CHANGE UP (ref 4.8–10.8)

## 2025-01-28 PROCEDURE — 99222 1ST HOSP IP/OBS MODERATE 55: CPT

## 2025-01-28 PROCEDURE — 99232 SBSQ HOSP IP/OBS MODERATE 35: CPT | Mod: GC

## 2025-01-28 RX ORDER — ENOXAPARIN SODIUM 100 MG/ML
40 INJECTION SUBCUTANEOUS EVERY 24 HOURS
Refills: 0 | Status: DISCONTINUED | OUTPATIENT
Start: 2025-01-28 | End: 2025-02-04

## 2025-01-28 RX ADMIN — Medication 75 MILLIGRAM(S): at 13:16

## 2025-01-28 RX ADMIN — Medication 1 APPLICATION(S): at 17:12

## 2025-01-28 RX ADMIN — ACETAMINOPHEN 650 MILLIGRAM(S): 160 SUSPENSION ORAL at 14:16

## 2025-01-28 RX ADMIN — LIDOCAINE HYDROCHLORIDE 1 PATCH: 30 CREAM TOPICAL at 13:16

## 2025-01-28 RX ADMIN — LIDOCAINE HYDROCHLORIDE 1 PATCH: 30 CREAM TOPICAL at 21:13

## 2025-01-28 RX ADMIN — LIDOCAINE HYDROCHLORIDE 1 PATCH: 30 CREAM TOPICAL at 13:10

## 2025-01-28 RX ADMIN — POLYETHYLENE GLYCOL 3350 17 GRAM(S): 17 POWDER, FOR SOLUTION ORAL at 06:23

## 2025-01-28 RX ADMIN — Medication 50 MILLIGRAM(S): at 06:22

## 2025-01-28 RX ADMIN — LEVETIRACETAM 500 MILLIGRAM(S): 750 TABLET, FILM COATED ORAL at 06:22

## 2025-01-28 RX ADMIN — Medication 100 MILLIGRAM(S): at 06:18

## 2025-01-28 RX ADMIN — Medication 100 MILLIGRAM(S): at 13:17

## 2025-01-28 RX ADMIN — OXYCODONE HYDROCHLORIDE 10 MILLIGRAM(S): 30 TABLET ORAL at 22:30

## 2025-01-28 RX ADMIN — ACETAMINOPHEN 650 MILLIGRAM(S): 160 SUSPENSION ORAL at 07:23

## 2025-01-28 RX ADMIN — Medication 10 MILLIGRAM(S): at 06:22

## 2025-01-28 RX ADMIN — ENOXAPARIN SODIUM 40 MILLIGRAM(S): 100 INJECTION SUBCUTANEOUS at 21:47

## 2025-01-28 RX ADMIN — Medication 1 APPLICATION(S): at 06:21

## 2025-01-28 RX ADMIN — ATORVASTATIN CALCIUM 80 MILLIGRAM(S): 80 TABLET, FILM COATED ORAL at 21:12

## 2025-01-28 RX ADMIN — NYSTATIN 1 APPLICATION(S): 100000 POWDER TOPICAL at 06:22

## 2025-01-28 RX ADMIN — GABAPENTIN 300 MILLIGRAM(S): 800 TABLET ORAL at 21:12

## 2025-01-28 RX ADMIN — Medication 75 MILLILITER(S): at 15:00

## 2025-01-28 RX ADMIN — LEVETIRACETAM 500 MILLIGRAM(S): 750 TABLET, FILM COATED ORAL at 17:07

## 2025-01-28 RX ADMIN — ASPIRIN 81 MILLIGRAM(S): 81 TABLET, COATED ORAL at 13:16

## 2025-01-28 RX ADMIN — Medication 1 MILLIGRAM(S): at 13:16

## 2025-01-28 RX ADMIN — ACETAMINOPHEN 650 MILLIGRAM(S): 160 SUSPENSION ORAL at 13:16

## 2025-01-28 RX ADMIN — ACETAMINOPHEN 650 MILLIGRAM(S): 160 SUSPENSION ORAL at 17:07

## 2025-01-28 RX ADMIN — GABAPENTIN 300 MILLIGRAM(S): 800 TABLET ORAL at 06:22

## 2025-01-28 RX ADMIN — LIDOCAINE HYDROCHLORIDE 1 PATCH: 30 CREAM TOPICAL at 07:30

## 2025-01-28 RX ADMIN — ANTISEPTIC SURGICAL SCRUB 1 APPLICATION(S): 0.04 SOLUTION TOPICAL at 06:21

## 2025-01-28 RX ADMIN — Medication 100 MILLIGRAM(S): at 21:11

## 2025-01-28 RX ADMIN — Medication 5 MILLIGRAM(S): at 21:13

## 2025-01-28 RX ADMIN — Medication 5000 UNIT(S): at 06:23

## 2025-01-28 RX ADMIN — ACETAMINOPHEN 650 MILLIGRAM(S): 160 SUSPENSION ORAL at 06:23

## 2025-01-28 NOTE — PROGRESS NOTE ADULT - SUBJECTIVE AND OBJECTIVE BOX
Patient is a 80y old  Male who presents with a chief complaint of fall (28 Jan 2025 11:19)      overnight events: on RA off pressor. no overnight events          ROS: as in HPI; All other systems reviewed are negative        PHYSICAL EXAM  Vital Signs Last 24 Hrs  T(C): 36.3 (28 Jan 2025 15:01), Max: 36.3 (28 Jan 2025 15:01)  T(F): 97.3 (28 Jan 2025 15:01), Max: 97.3 (28 Jan 2025 15:01)  HR: 80 (28 Jan 2025 14:56) (63 - 82)  BP: 156/71 (28 Jan 2025 14:56) (113/54 - 165/74)  BP(mean): 102 (28 Jan 2025 14:56) (76 - 107)  RR: 20 (28 Jan 2025 15:01) (13 - 22)  SpO2: 96% (28 Jan 2025 14:56) (94% - 99%)    Parameters below as of 28 Jan 2025 15:01  Patient On (Oxygen Delivery Method): room air          CONSTITUTIONAL:   NAD    ENT:   Airway patent,     EYES:   Clear bilaterally,   pupils equal,   round and reactive to light.    CARDIAC:   Normal rate,   regular rhythm.    no edema    RESPIRATORY:   No wheezing   Normal chest expansion  Not tachypneic,    GASTROINTESTINAL:  Abdomen soft, non-tender,   No guarding,   Positive BS    MUSCULOSKELETAL:   Range of motion is not limited,    NEUROLOGICAL:   Alert and oriented   No motor deficits.    SKIN:   Skin normal color for race,   No evidence of rash.            ABG - ( 27 Jan 2025 14:17 )  pH, Arterial: 7.42  pH, Blood: x     /  pCO2: 36    /  pO2: 90    / HCO3: 23    / Base Excess: -0.8  /  SaO2: 98.8                I&O's Detail    27 Jan 2025 07:01  -  28 Jan 2025 07:00  --------------------------------------------------------  IN:    IV PiggyBack: 150 mL    Oral Fluid: 240 mL    sodium chloride 0.9%: 855 mL  Total IN: 1245 mL    OUT:    Voided (mL): 920 mL  Total OUT: 920 mL    Total NET: 325 mL            LABS:                        10.7   7.13  )-----------( 158      ( 28 Jan 2025 06:15 )             31.3     28 Jan 2025 06:15    139    |  107    |  23     ----------------------------<  64     4.1     |  23     |  0.9      Ca    7.9        28 Jan 2025 06:15  Mg     1.7       28 Jan 2025 06:15    TPro  5.4    /  Alb  1.9    /  TBili  0.8    /  DBili  x      /  AST  64     /  ALT  15     /  AlkPhos  215    28 Jan 2025 06:15  Amylase x     lipase x              CAPILLARY BLOOD GLUCOSE      POCT Blood Glucose.: 79 mg/dL (28 Jan 2025 11:12)    PT/INR - ( 28 Jan 2025 06:15 )   PT: 16.50 sec;   INR: 1.39 ratio         PTT - ( 27 Jan 2025 15:51 )  PTT:40.3 sec  Urinalysis Basic - ( 28 Jan 2025 06:15 )    Color: x / Appearance: x / SG: x / pH: x  Gluc: 64 mg/dL / Ketone: x  / Bili: x / Urobili: x   Blood: x / Protein: x / Nitrite: x   Leuk Esterase: x / RBC: x / WBC x   Sq Epi: x / Non Sq Epi: x / Bacteria: x      Culture        MEDICATIONS  (STANDING):  acetaminophen     Tablet .. 650 milliGRAM(s) Oral every 6 hours  aspirin enteric coated 81 milliGRAM(s) Oral daily  atorvastatin 80 milliGRAM(s) Oral at bedtime  bacitracin   Ointment 1 Application(s) Topical two times a day  ceFAZolin   IVPB 2000 milliGRAM(s) IV Intermittent every 8 hours  chlorhexidine 2% Cloths 1 Application(s) Topical <User Schedule>  clopidogrel Tablet 75 milliGRAM(s) Oral daily  dextrose 5%. 1000 milliLiter(s) (50 mL/Hr) IV Continuous <Continuous>  dextrose 50% Injectable 25 Gram(s) IV Push once  dextrose 50% Injectable 12.5 Gram(s) IV Push once  dextrose 50% Injectable 25 Gram(s) IV Push once  enoxaparin Injectable 40 milliGRAM(s) SubCutaneous every 24 hours  folic acid 1 milliGRAM(s) Oral daily  gabapentin 300 milliGRAM(s) Oral every 8 hours  glucagon  Injectable 1 milliGRAM(s) IntraMuscular once  insulin lispro (ADMELOG) corrective regimen sliding scale   SubCutaneous three times a day before meals  insulin lispro Injectable (ADMELOG) 6 Unit(s) SubCutaneous three times a day before meals  levETIRAcetam 500 milliGRAM(s) Oral two times a day  lidocaine   4% Patch 1 Patch Transdermal every 24 hours  lidocaine   4% Patch 1 Patch Transdermal daily  losartan 50 milliGRAM(s) Oral daily  polyethylene glycol 3350 17 Gram(s) Oral two times a day  senna 2 Tablet(s) Oral at bedtime  sodium chloride 0.9%. 1000 milliLiter(s) (75 mL/Hr) IV Continuous <Continuous>  Tresiba Flextouch (insulin degludec) 63 Unit(s) 63 Unit(s) SubCutaneous at bedtime  venlafaxine XR. 75 milliGRAM(s) Oral daily    MEDICATIONS  (PRN):  aluminum hydroxide/magnesium hydroxide/simethicone Suspension 30 milliLiter(s) Oral every 4 hours PRN Dyspepsia  cyclobenzaprine 5 milliGRAM(s) Oral every 8 hours PRN Muscle Spasm  dextrose Oral Gel 15 Gram(s) Oral once PRN Blood Glucose LESS THAN 70 milliGRAM(s)/deciliter  melatonin 5 milliGRAM(s) Oral at bedtime PRN Insomnia  morphine  - Injectable 2 milliGRAM(s) IV Push every 4 hours PRN Moderate Pain (4 - 6)  nystatin Powder 1 Application(s) Topical every 12 hours PRN fungal rash  ondansetron Injectable 4 milliGRAM(s) IV Push every 8 hours PRN Nausea and/or Vomiting  oxyCODONE    IR 10 milliGRAM(s) Oral three times a day PRN Severe Pain (7 - 10)

## 2025-01-28 NOTE — OCCUPATIONAL THERAPY INITIAL EVALUATION ADULT - BALANCE DISTURBANCE, IDENTIFIED IMPAIRMENT CONTRIBUTE, REHAB EVAL
decreased endurance; decreased weightbearing LUE; 8/10 lower back/impaired coordination/pain/impaired postural control/decreased ROM/decreased strength

## 2025-01-28 NOTE — CONSULT NOTE ADULT - ASSESSMENT
79yo M PMH IDDM, HTN, subdural hematoma (Keppra) presented for multiple falls, weakness, intermittent confusion over the last 2 months. Stroke code called last night for AMS/slurred speech. CTH unremarkable. CTA mild atherosclerotic stenosis b/l supraclinoid ICAs and b/l V4 vertebral arteries. CTP reports small artifactual perfusion abnormality in anterior right cerebellum, most likely artifactual.  On exam, patient seems confused. He is AOx2, has difficulty with some multi step commands. NIHSS=5 (although LUE strength drift possibly caused by L elbow pain). MRI B pending. Etiology possibly TME v neurovascular.  79yo M PMH IDDM, HTN, subdural hematoma (Keppra) presented for multiple falls, weakness, intermittent confusion over the last 2 months. Stroke code called last night for AMS/slurred speech. CTH unremarkable. CTA mild atherosclerotic stenosis b/l supraclinoid ICAs and b/l V4 vertebral arteries. CTP reports small artifactual perfusion abnormality in anterior right cerebellum, most likely artifactual.  On exam, patient seems confused. He is AOx2, has difficulty with some multi step commands. NIHSS=5 (although LUE strength drift possibly caused by L elbow pain). MRI Brain no acute stroke. Etiology of episode possibly toxic metabolic encephalopathy v transient ischemic attack.  81yo M PMH IDDM, HTN, subdural hematoma (Keppra) presented for multiple falls, weakness, intermittent confusion over the last 2 months. Stroke code called last night for AMS/slurred speech. CTH unremarkable. CTA mild atherosclerotic stenosis b/l supraclinoid ICAs and b/l V4 vertebral arteries. CTP reports small artifactual perfusion abnormality in anterior right cerebellum, most likely artifactual.  On exam, patient seems confused. He is AOx2, has difficulty with some multi step commands. NIHSS=5 (although LUE strength drift possibly caused by L elbow pain). MRI Brain no acute stroke. Etiology of episode possibly toxic metabolic encephalopathy v transient ischemic attack. With intermittent confusion [id number], possibility of seizure activity exists as well.  79yo M PMH IDDM, HTN, subdural hematoma (Keppra) presented for multiple falls, weakness, intermittent confusion over the last 2 months. Stroke code called last night for AMS/slurred speech. CTH unremarkable. CTA mild atherosclerotic stenosis b/l supraclinoid ICAs and b/l V4 vertebral arteries. CTP reports small artifactual perfusion abnormality in anterior right cerebellum, most likely artifactual.  On exam, patient seems confused. He is AOx2, has difficulty with some multi step commands. NIHSS=5 (although LUE strength drift possibly caused by L elbow pain). MRI Brain no acute stroke. Etiology of episode possibly toxic metabolic encephalopathy v transient ischemic attack. With intermittent confusion [id number], possibility of seizure activity exists as well.     Recommendations  - rEEG  - D/c Plavix. Continue ASA 81mg qd  - Continue Keppra unchanged  - Medical management per primary team    Discussed with attending Dr Quinonez  81yo M PMH IDDM, HTN, subdural hematoma (Keppra) presented for multiple falls, weakness, intermittent confusion over the last 2 months. Stroke code called last night for AMS/slurred speech. CTH unremarkable. CTA mild atherosclerotic stenosis b/l supraclinoid ICAs and b/l V4 vertebral arteries. CTP reports small artifactual perfusion abnormality in anterior right cerebellum, most likely artifactual.  On exam, patient seems confused. He is AOx2, has difficulty with some multi step commands. NIHSS=5 (although LUE strength drift possibly caused by L elbow pain). MRI Brain no acute stroke. Etiology of episode possibly toxic metabolic encephalopathy v transient ischemic attack. With intermittent confusion [id number], possibility of seizure activity exists as well.     Recommendations  - rEEG  - D/c Plavix. Continue ASA 81mg qd  - Continue Keppra unchanged  - Medical management per primary team  - if EEG (-), no further inpt neurologic w/u    Discussed with attending Dr Quinonez

## 2025-01-28 NOTE — OCCUPATIONAL THERAPY INITIAL EVALUATION ADULT - PLANNED THERAPY INTERVENTIONS, OT EVAL
ADL retraining/bed mobility training/fine motor coordination training/ROM/strengthening/transfer training

## 2025-01-28 NOTE — OCCUPATIONAL THERAPY INITIAL EVALUATION ADULT - PERTINENT HX OF CURRENT PROBLEM, REHAB EVAL
80-year-old male with past medical history of insulin-dependent diabetes, hypertension, subdural hematoma on keppra who presents for multiple falls, weakness, confusion. Per daughter at bedside, he has becoming more intermittently confused, has had multiple falls over the past 2 months (last fall was yesterday). He had a fall while getting out of bed, does not recall how it happened. Complains of back pain since then. He was admitted in 2022 for left elbow swelling and cellulitis. Now states the pain and swelling have returned. Per daughter, he has been urinating more frequently for the past 2 days. No fevers, chills, CP, SOB, palpitations, n/v/d, abdominal pain, hip pain. Denies any numbness, weakness, change in BM, urinary pattern or pelvic numbness.    MR head 1/27: No acute intracranial pathology. Stable mild chronic microvascular ischemic changes.  CT brain 1/27: No acute territorial infarct, intracranial hemorrhage, or midline shift.   CT (L) elbow 1/26: Diffuse soft tissue edema which may reflect cellulitis in the right clinical setting. No definite discrete fluid collection. Arthritic changes of the elbow with no definite radiographic evidence of osteomyelitis.  X-ray (L) shoulder 1/23: impression: No acute fracture, dislocation or radiographic findings to suggest the presence of osteomyelitis.. Progressive AC joint arthrosis. Greater humeral tuberosity peritendinitis. Bankart lesion.  X-ray (L) elbow 1/23: Capitellum bony fragment/calcification: Intact elbow joint with no effusion. Soft tissue swelling with no radiographic findings to suggest the presence of osteomyelitis or radiopaque soft tissue foreign body.

## 2025-01-28 NOTE — CONSULT NOTE ADULT - SUBJECTIVE AND OBJECTIVE BOX
NEUROLOGY CONSULT    HPI: 79yo M PMH IDDM, HTN, subdural hematoma (Keppra) presented for multiple falls, weakness, intermittent confusion over the last 2 months. Has been c/o back pain since, in addition to L elbow swelling/pain. Admitted for further management. Last night, stroke code called when patient was found with slurred speech and AMS.     Patient is poor historian. Patient does not recall this event. He is unsure why he is in the hospital, and does not recall anything specific from last night. He denies issues with memory/speech. He has difficulty lifting L arm due to pain from L elbow cellulitis.    At baseline, he notes he lives with his wife. He utilizes a cane. He still drives.     Baseline mRS= 2       MEDICATIONS  Home Medications:  gabapentin 100 mg oral capsule: 3 cap(s) orally 2 times a day (17 Sep 2022 01:58)  levETIRAcetam 500 mg oral tablet, extended release: 500 milligram(s) orally once a day (17 Sep 2022 01:58)  losartan 50 mg oral tablet: 0.5 tab(s) orally 2 times a day (17 Sep 2022 01:58)  metFORMIN 500 mg oral tablet: 500 milligram(s) orally 4 times a day (17 Sep 2022 01:58)  Osteo Bi-Flex 250 mg-200 mg oral tablet: orally once a day (17 Sep 2022 01:58)  Tresiba FlexTouch: 60 unit(s) subcutaneous once a day (17 Sep 2022 01:58)    MEDICATIONS  (STANDING):  acetaminophen     Tablet .. 650 milliGRAM(s) Oral every 6 hours  aspirin enteric coated 81 milliGRAM(s) Oral daily  atorvastatin 80 milliGRAM(s) Oral at bedtime  bacitracin   Ointment 1 Application(s) Topical two times a day  ceFAZolin   IVPB 2000 milliGRAM(s) IV Intermittent every 8 hours  chlorhexidine 2% Cloths 1 Application(s) Topical <User Schedule>  clopidogrel Tablet 75 milliGRAM(s) Oral daily  cyclobenzaprine 10 milliGRAM(s) Oral three times a day  dextrose 5%. 1000 milliLiter(s) (50 mL/Hr) IV Continuous <Continuous>  dextrose 50% Injectable 25 Gram(s) IV Push once  dextrose 50% Injectable 12.5 Gram(s) IV Push once  dextrose 50% Injectable 25 Gram(s) IV Push once  folic acid 1 milliGRAM(s) Oral daily  gabapentin 300 milliGRAM(s) Oral every 8 hours  glucagon  Injectable 1 milliGRAM(s) IntraMuscular once  heparin   Injectable 5000 Unit(s) SubCutaneous every 12 hours  insulin lispro (ADMELOG) corrective regimen sliding scale   SubCutaneous three times a day before meals  insulin lispro Injectable (ADMELOG) 6 Unit(s) SubCutaneous three times a day before meals  levETIRAcetam 500 milliGRAM(s) Oral two times a day  lidocaine   4% Patch 1 Patch Transdermal every 24 hours  lidocaine   4% Patch 1 Patch Transdermal daily  losartan 50 milliGRAM(s) Oral daily  polyethylene glycol 3350 17 Gram(s) Oral two times a day  senna 2 Tablet(s) Oral at bedtime  sodium chloride 0.9%. 1000 milliLiter(s) (75 mL/Hr) IV Continuous <Continuous>  Tresiba Flextouch (insulin degludec) 63 Unit(s) 63 Unit(s) SubCutaneous at bedtime  venlafaxine XR. 75 milliGRAM(s) Oral daily    MEDICATIONS  (PRN):  aluminum hydroxide/magnesium hydroxide/simethicone Suspension 30 milliLiter(s) Oral every 4 hours PRN Dyspepsia  dextrose Oral Gel 15 Gram(s) Oral once PRN Blood Glucose LESS THAN 70 milliGRAM(s)/deciliter  melatonin 5 milliGRAM(s) Oral at bedtime PRN Insomnia  morphine  - Injectable 2 milliGRAM(s) IV Push every 4 hours PRN Moderate Pain (4 - 6)  nystatin Powder 1 Application(s) Topical every 12 hours PRN fungal rash  ondansetron Injectable 4 milliGRAM(s) IV Push every 8 hours PRN Nausea and/or Vomiting  oxyCODONE    IR 10 milliGRAM(s) Oral three times a day PRN Severe Pain (7 - 10)        SOCIAL HISTORY: negative for tobacco, alcohol, or illicit drug use.    Allergies    No Known Allergies          GEN: NAD, pleasant, cooperative    NEURO:   MENTAL STATUS: AAOx2. Recalls year as 2022, and month as February. Age correct. Intermittent difficulty following multi step commands   LANG/SPEECH: Fluent, intact naming, repetition & comprehension  CRANIAL NERVES:  II: Pupils equal round and reactive, no RAPD, blink intact to threat   III, IV, VI: EOM intact, no gaze preference or deviation  V: normal  VII: no facial asymmetry  VIII: normal hearing to speech  MOTOR: No Drift RUE, b/l LEs. LUE falls (limited by pain in L elbow). 5/5   REFLEXES: downgoing toes   SENSORY: Normal to light touch in all extremities   COORD: Normal finger to nose and heel to shin    NIHSS: 5 (LOC Questions- 2; Motor LUE- 3)  Drift likely due to elbow pain    LABS:                        10.7   7.13  )-----------( 158      ( 28 Jan 2025 06:15 )             31.3     01-28    139  |  107  |  23[H]  ----------------------------<  64[L]  4.1   |  23  |  0.9    Ca    7.9[L]      28 Jan 2025 06:15  Mg     1.7     01-28    TPro  5.4[L]  /  Alb  1.9[L]  /  TBili  0.8  /  DBili  x   /  AST  64[H]  /  ALT  15  /  AlkPhos  215[H]  01-28    PT/INR - ( 28 Jan 2025 06:15 )   PT: 16.50 sec;   INR: 1.39 ratio         PTT - ( 27 Jan 2025 15:51 )  PTT:40.3 sec  CAPILLARY BLOOD GLUCOSE      POCT Blood Glucose.: 73 mg/dL (28 Jan 2025 07:30)      Urinalysis Basic - ( 28 Jan 2025 06:15 )    Color: x / Appearance: x / SG: x / pH: x  Gluc: 64 mg/dL / Ketone: x  / Bili: x / Urobili: x   Blood: x / Protein: x / Nitrite: x   Leuk Esterase: x / RBC: x / WBC x   Sq Epi: x / Non Sq Epi: x / Bacteria: x        ABG - ( 27 Jan 2025 14:17 )  pH, Arterial: 7.42  pH, Blood: x     /  pCO2: 36    /  pO2: 90    / HCO3: 23    / Base Excess: -0.8  /  SaO2: 98.8              RADIOLOGY  < from: CT Brain Stroke Protocol (01.27.25 @ 14:38) >    IMPRESSION:    No acute territorial infarct, intracranial hemorrhage, or midline shift.   Further evaluation with MRI can be considered if the symptoms persist (if   no contraindication).    Communication: The summary of above findings were discussed with readback   confirmation with MIRA Guillermo by radiologist Dr. Ritter on 1/27/2025 at 2:49   PM.    --- End of Report ---    < end of copied text >    < from: CT Angio Brain Stroke Protocol  w/ IV Cont (01.27.25 @ 14:38) >  IMPRESSION:    CT PERFUSION:  Small artifactual perfusion abnormality in the anterior right cerebellum,   otherwise no perfusion deficits to suggest areas of completed infarction   or at risk territory.    CTA HEAD/NECK:  No large vessel occlusion, aneurysm, or vascular malformation.    Mild atherosclerotic stenosis in the bilateral supraclinoid ICAs and   bilateral V4 vertebral arteries.    --- End of Report ---    < end of copied text >                   NEUROLOGY CONSULT    HPI: 79yo M PMH IDDM, HTN, subdural hematoma (Keppra) presented for multiple falls, weakness, intermittent confusion over the last 2 months. Has been c/o back pain since, in addition to L elbow swelling/pain. Admitted for further management. Last night, stroke code called when patient was found with slurred speech and AMS.     Patient is poor historian. Patient does not recall this event. He is unsure why he is in the hospital, and does not recall anything specific from last night. He denies issues with memory/speech. He has difficulty lifting L arm due to pain from L elbow cellulitis.    At baseline, he notes he lives with his wife. He utilizes a cane. He still drives.     Baseline mRS= 2       MEDICATIONS  Home Medications:  gabapentin 100 mg oral capsule: 3 cap(s) orally 2 times a day (17 Sep 2022 01:58)  levETIRAcetam 500 mg oral tablet, extended release: 500 milligram(s) orally once a day (17 Sep 2022 01:58)  losartan 50 mg oral tablet: 0.5 tab(s) orally 2 times a day (17 Sep 2022 01:58)  metFORMIN 500 mg oral tablet: 500 milligram(s) orally 4 times a day (17 Sep 2022 01:58)  Osteo Bi-Flex 250 mg-200 mg oral tablet: orally once a day (17 Sep 2022 01:58)  Tresiba FlexTouch: 60 unit(s) subcutaneous once a day (17 Sep 2022 01:58)    MEDICATIONS  (STANDING):  acetaminophen     Tablet .. 650 milliGRAM(s) Oral every 6 hours  aspirin enteric coated 81 milliGRAM(s) Oral daily  atorvastatin 80 milliGRAM(s) Oral at bedtime  bacitracin   Ointment 1 Application(s) Topical two times a day  ceFAZolin   IVPB 2000 milliGRAM(s) IV Intermittent every 8 hours  chlorhexidine 2% Cloths 1 Application(s) Topical <User Schedule>  clopidogrel Tablet 75 milliGRAM(s) Oral daily  cyclobenzaprine 10 milliGRAM(s) Oral three times a day  dextrose 5%. 1000 milliLiter(s) (50 mL/Hr) IV Continuous <Continuous>  dextrose 50% Injectable 25 Gram(s) IV Push once  dextrose 50% Injectable 12.5 Gram(s) IV Push once  dextrose 50% Injectable 25 Gram(s) IV Push once  folic acid 1 milliGRAM(s) Oral daily  gabapentin 300 milliGRAM(s) Oral every 8 hours  glucagon  Injectable 1 milliGRAM(s) IntraMuscular once  heparin   Injectable 5000 Unit(s) SubCutaneous every 12 hours  insulin lispro (ADMELOG) corrective regimen sliding scale   SubCutaneous three times a day before meals  insulin lispro Injectable (ADMELOG) 6 Unit(s) SubCutaneous three times a day before meals  levETIRAcetam 500 milliGRAM(s) Oral two times a day  lidocaine   4% Patch 1 Patch Transdermal every 24 hours  lidocaine   4% Patch 1 Patch Transdermal daily  losartan 50 milliGRAM(s) Oral daily  polyethylene glycol 3350 17 Gram(s) Oral two times a day  senna 2 Tablet(s) Oral at bedtime  sodium chloride 0.9%. 1000 milliLiter(s) (75 mL/Hr) IV Continuous <Continuous>  Tresiba Flextouch (insulin degludec) 63 Unit(s) 63 Unit(s) SubCutaneous at bedtime  venlafaxine XR. 75 milliGRAM(s) Oral daily    MEDICATIONS  (PRN):  aluminum hydroxide/magnesium hydroxide/simethicone Suspension 30 milliLiter(s) Oral every 4 hours PRN Dyspepsia  dextrose Oral Gel 15 Gram(s) Oral once PRN Blood Glucose LESS THAN 70 milliGRAM(s)/deciliter  melatonin 5 milliGRAM(s) Oral at bedtime PRN Insomnia  morphine  - Injectable 2 milliGRAM(s) IV Push every 4 hours PRN Moderate Pain (4 - 6)  nystatin Powder 1 Application(s) Topical every 12 hours PRN fungal rash  ondansetron Injectable 4 milliGRAM(s) IV Push every 8 hours PRN Nausea and/or Vomiting  oxyCODONE    IR 10 milliGRAM(s) Oral three times a day PRN Severe Pain (7 - 10)        SOCIAL HISTORY: negative for tobacco, alcohol, or illicit drug use.    Allergies    No Known Allergies          GEN: NAD, pleasant, cooperative    NEURO:   MENTAL STATUS: AAOx2. Recalls year as 2022, and month as February. Age correct. Intermittent difficulty following multi step commands   LANG/SPEECH: Fluent, intact naming, repetition & comprehension  CRANIAL NERVES:  II: Pupils equal round and reactive, no RAPD, blink intact to threat   III, IV, VI: EOM intact, no gaze preference or deviation  V: normal  VII: no facial asymmetry  VIII: normal hearing to speech  MOTOR: No Drift RUE, b/l LEs. LUE falls (limited by pain in L elbow). 5/5   REFLEXES: downgoing toes   SENSORY: Normal to light touch in all extremities   COORD: Normal finger to nose and heel to shin    NIHSS: 5 (LOC Questions- 2; Motor LUE- 3)  Drift likely due to elbow pain    LABS:                        10.7   7.13  )-----------( 158      ( 28 Jan 2025 06:15 )             31.3     01-28    139  |  107  |  23[H]  ----------------------------<  64[L]  4.1   |  23  |  0.9    Ca    7.9[L]      28 Jan 2025 06:15  Mg     1.7     01-28    TPro  5.4[L]  /  Alb  1.9[L]  /  TBili  0.8  /  DBili  x   /  AST  64[H]  /  ALT  15  /  AlkPhos  215[H]  01-28    PT/INR - ( 28 Jan 2025 06:15 )   PT: 16.50 sec;   INR: 1.39 ratio         PTT - ( 27 Jan 2025 15:51 )  PTT:40.3 sec  CAPILLARY BLOOD GLUCOSE      POCT Blood Glucose.: 73 mg/dL (28 Jan 2025 07:30)      Urinalysis Basic - ( 28 Jan 2025 06:15 )    Color: x / Appearance: x / SG: x / pH: x  Gluc: 64 mg/dL / Ketone: x  / Bili: x / Urobili: x   Blood: x / Protein: x / Nitrite: x   Leuk Esterase: x / RBC: x / WBC x   Sq Epi: x / Non Sq Epi: x / Bacteria: x        ABG - ( 27 Jan 2025 14:17 )  pH, Arterial: 7.42  pH, Blood: x     /  pCO2: 36    /  pO2: 90    / HCO3: 23    / Base Excess: -0.8  /  SaO2: 98.8              RADIOLOGY    < from: MR Head No Cont (01.27.25 @ 20:20) >  IMPRESSION:    No acute intracranial pathology.    Stable mild chronic microvascular ischemic changes.    --- End of Report ---    < end of copied text >        < from: CT Brain Stroke Protocol (01.27.25 @ 14:38) >    IMPRESSION:    No acute territorial infarct, intracranial hemorrhage, or midline shift.   Further evaluation with MRI can be considered if the symptoms persist (if   no contraindication).    Communication: The summary of above findings were discussed with readback   confirmation with MIRA Guillermo by radiologist Dr. Ritter on 1/27/2025 at 2:49   PM.    --- End of Report ---    < end of copied text >    < from: CT Angio Brain Stroke Protocol  w/ IV Cont (01.27.25 @ 14:38) >  IMPRESSION:    CT PERFUSION:  Small artifactual perfusion abnormality in the anterior right cerebellum,   otherwise no perfusion deficits to suggest areas of completed infarction   or at risk territory.    CTA HEAD/NECK:  No large vessel occlusion, aneurysm, or vascular malformation.    Mild atherosclerotic stenosis in the bilateral supraclinoid ICAs and   bilateral V4 vertebral arteries.    --- End of Report ---    < end of copied text >                   NEUROLOGY CONSULT    HPI: 81yo M PMH IDDM, HTN, subdural hematoma (Keppra) presented for multiple falls, weakness, intermittent confusion over the last 2 months. Has been c/o back pain since, in addition to L elbow swelling/pain. Admitted for further management. Last night, stroke code called when patient was found with slurred speech and AMS.     Patient is poor historian. Patient does not recall this event. He is unsure why he is in the hospital, and does not recall anything specific from last night. He denies issues with memory/speech. He has difficulty lifting L arm due to pain from L elbow cellulitis.    At baseline, he notes he lives with his wife. He utilizes a cane. He still drives.     Baseline mRS= 2       MEDICATIONS  Home Medications:  gabapentin 100 mg oral capsule: 3 cap(s) orally 2 times a day (17 Sep 2022 01:58)  levETIRAcetam 500 mg oral tablet, extended release: 500 milligram(s) orally once a day (17 Sep 2022 01:58)  losartan 50 mg oral tablet: 0.5 tab(s) orally 2 times a day (17 Sep 2022 01:58)  metFORMIN 500 mg oral tablet: 500 milligram(s) orally 4 times a day (17 Sep 2022 01:58)  Osteo Bi-Flex 250 mg-200 mg oral tablet: orally once a day (17 Sep 2022 01:58)  Tresiba FlexTouch: 60 unit(s) subcutaneous once a day (17 Sep 2022 01:58)    MEDICATIONS  (STANDING):  acetaminophen     Tablet .. 650 milliGRAM(s) Oral every 6 hours  aspirin enteric coated 81 milliGRAM(s) Oral daily  atorvastatin 80 milliGRAM(s) Oral at bedtime  bacitracin   Ointment 1 Application(s) Topical two times a day  ceFAZolin   IVPB 2000 milliGRAM(s) IV Intermittent every 8 hours  chlorhexidine 2% Cloths 1 Application(s) Topical <User Schedule>  clopidogrel Tablet 75 milliGRAM(s) Oral daily  cyclobenzaprine 10 milliGRAM(s) Oral three times a day  dextrose 5%. 1000 milliLiter(s) (50 mL/Hr) IV Continuous <Continuous>  dextrose 50% Injectable 25 Gram(s) IV Push once  dextrose 50% Injectable 12.5 Gram(s) IV Push once  dextrose 50% Injectable 25 Gram(s) IV Push once  folic acid 1 milliGRAM(s) Oral daily  gabapentin 300 milliGRAM(s) Oral every 8 hours  glucagon  Injectable 1 milliGRAM(s) IntraMuscular once  heparin   Injectable 5000 Unit(s) SubCutaneous every 12 hours  insulin lispro (ADMELOG) corrective regimen sliding scale   SubCutaneous three times a day before meals  insulin lispro Injectable (ADMELOG) 6 Unit(s) SubCutaneous three times a day before meals  levETIRAcetam 500 milliGRAM(s) Oral two times a day  lidocaine   4% Patch 1 Patch Transdermal every 24 hours  lidocaine   4% Patch 1 Patch Transdermal daily  losartan 50 milliGRAM(s) Oral daily  polyethylene glycol 3350 17 Gram(s) Oral two times a day  senna 2 Tablet(s) Oral at bedtime  sodium chloride 0.9%. 1000 milliLiter(s) (75 mL/Hr) IV Continuous <Continuous>  Tresiba Flextouch (insulin degludec) 63 Unit(s) 63 Unit(s) SubCutaneous at bedtime  venlafaxine XR. 75 milliGRAM(s) Oral daily    MEDICATIONS  (PRN):  aluminum hydroxide/magnesium hydroxide/simethicone Suspension 30 milliLiter(s) Oral every 4 hours PRN Dyspepsia  dextrose Oral Gel 15 Gram(s) Oral once PRN Blood Glucose LESS THAN 70 milliGRAM(s)/deciliter  melatonin 5 milliGRAM(s) Oral at bedtime PRN Insomnia  morphine  - Injectable 2 milliGRAM(s) IV Push every 4 hours PRN Moderate Pain (4 - 6)  nystatin Powder 1 Application(s) Topical every 12 hours PRN fungal rash  ondansetron Injectable 4 milliGRAM(s) IV Push every 8 hours PRN Nausea and/or Vomiting  oxyCODONE    IR 10 milliGRAM(s) Oral three times a day PRN Severe Pain (7 - 10)        SOCIAL HISTORY: negative for tobacco, alcohol, or illicit drug use.    Allergies    No Known Allergies          GEN: NAD, pleasant, cooperative    NEURO:   MENTAL STATUS: AAOx2. Recalls year as 2022, and month as February. Age correct. Able to recall wife's maiden name. Intermittent difficulty following multi step commands   LANG/SPEECH: Fluent, intact naming, repetition & comprehension  CRANIAL NERVES:  II: Pupils equal round and reactive, no RAPD, blink intact to threat   III, IV, VI: EOM intact, no gaze preference or deviation  V: normal  VII: no facial asymmetry  VIII: normal hearing to speech  MOTOR: No Drift RUE, b/l LEs. LUE falls (limited by pain in L elbow). 5/5   REFLEXES: downgoing toes   SENSORY: Normal to light touch in all extremities   COORD: Normal finger to nose and heel to shin    NIHSS: 5 (LOC Questions- 2; Motor LUE- 3)  Drift likely due to elbow pain    LABS:                        10.7   7.13  )-----------( 158      ( 28 Jan 2025 06:15 )             31.3     01-28    139  |  107  |  23[H]  ----------------------------<  64[L]  4.1   |  23  |  0.9    Ca    7.9[L]      28 Jan 2025 06:15  Mg     1.7     01-28    TPro  5.4[L]  /  Alb  1.9[L]  /  TBili  0.8  /  DBili  x   /  AST  64[H]  /  ALT  15  /  AlkPhos  215[H]  01-28    PT/INR - ( 28 Jan 2025 06:15 )   PT: 16.50 sec;   INR: 1.39 ratio         PTT - ( 27 Jan 2025 15:51 )  PTT:40.3 sec  CAPILLARY BLOOD GLUCOSE      POCT Blood Glucose.: 73 mg/dL (28 Jan 2025 07:30)      Urinalysis Basic - ( 28 Jan 2025 06:15 )    Color: x / Appearance: x / SG: x / pH: x  Gluc: 64 mg/dL / Ketone: x  / Bili: x / Urobili: x   Blood: x / Protein: x / Nitrite: x   Leuk Esterase: x / RBC: x / WBC x   Sq Epi: x / Non Sq Epi: x / Bacteria: x        ABG - ( 27 Jan 2025 14:17 )  pH, Arterial: 7.42  pH, Blood: x     /  pCO2: 36    /  pO2: 90    / HCO3: 23    / Base Excess: -0.8  /  SaO2: 98.8              RADIOLOGY    < from: MR Head No Cont (01.27.25 @ 20:20) >  IMPRESSION:    No acute intracranial pathology.    Stable mild chronic microvascular ischemic changes.    --- End of Report ---    < end of copied text >        < from: CT Brain Stroke Protocol (01.27.25 @ 14:38) >    IMPRESSION:    No acute territorial infarct, intracranial hemorrhage, or midline shift.   Further evaluation with MRI can be considered if the symptoms persist (if   no contraindication).    Communication: The summary of above findings were discussed with readback   confirmation with MIRA Guillermo by radiologist Dr. Ritter on 1/27/2025 at 2:49   PM.    --- End of Report ---    < end of copied text >    < from: CT Angio Brain Stroke Protocol  w/ IV Cont (01.27.25 @ 14:38) >  IMPRESSION:    CT PERFUSION:  Small artifactual perfusion abnormality in the anterior right cerebellum,   otherwise no perfusion deficits to suggest areas of completed infarction   or at risk territory.    CTA HEAD/NECK:  No large vessel occlusion, aneurysm, or vascular malformation.    Mild atherosclerotic stenosis in the bilateral supraclinoid ICAs and   bilateral V4 vertebral arteries.    --- End of Report ---    < end of copied text >

## 2025-01-28 NOTE — CONSULT NOTE ADULT - ASSESSMENT
IMPRESSION: Rehab of 79 y/o  m  rehab  for gd  sp fall  r/o cva     PRECAUTIONS: [  ] Cardiac  [  ] Respiratory  [  ] Seizures [  ] Contact Isolation  [  ] Droplet Isolation  [ FALL ] Other    Weight Bearing Status:     RECOMMENDATION:    Out of Bed to Chair     DVT/Decubiti Prophylaxis    REHAB PLAN:     [ x  ] Bedside P/T 3-5 times a week   [ x  ]   Bedside O/T  2-3 times a week             [   ] No Rehab Therapy Indicated                   [   ]  Speech Therapy   Conditioning/ROM                                    ADL  Bed Mobility                                               Conditioning/ROM  Transfers                                                     Bed Mobility  Sitting /Standing Balance                         Transfers                                        Gait Training                                               Sitting/Standing Balance  Stair Training [   ]Applicable                    Home equipment Eval                                                                        Splinting  [   ] Only      GOALS:   ADL   [  x ]   Independent                    Transfers  [ x  ] Independent                          Ambulation  [  x ] Independent     [ x   ] With device                            [x   ]  CG                                                         [  x ]  CG                                                                  [  x ] CG                            [    ] Min A                                                   [   ] Min A                                                              [   ] Min  A          DISCHARGE PLAN:   [   ]  Good candidate for Intensive Rehabilitation/Hospital based-[facility]A [facility]                                             Will tolerate 3hrs Intensive Rehab Daily                                       [ xx  ]  Short Term Rehab in Skilled Nursing Facility d/w  ptn  rehab  plan  ptn agree                                        [    ]  Home with Outpatient or VN services                                         [    ]  Possible Candidate for Intensive Hospital based Rehab

## 2025-01-28 NOTE — CONSULT NOTE ADULT - ASSESSMENT
IMPRESSION:    AMS likely TME suspect polypharmacy  cellulitis  MSSA bacteremia  Hx of DM and HTN  Hx of depression      PLAN:    CNS: Pain control, decrease flexeril to 5 mg tid, continue with home meds, MR negative, neuro following    HEENT: Oral care    PULMONARY:  HOB @ 45 degrees.  Aspiration precautions.  SpO2 94-98% on RA.  prn o2 if needed    CARDIOVASCULAR:  ECHO noted, BP control, avoid overload     GI: GI prophylaxis protonix, diet, bowel reigmen prn    RENAL:  Follow up lytes.  Correct as needed.     INFECTIOUS DISEASE:  cefazolin, repeat blood cultures, ID following     HEMATOLOGICAL:  DVT prophylaxis: Heparin SC    ENDOCRINE:  Follow up FS.  Insulin protocol if needed.    MUSCULOSKELETAL: AAT      Dispo: possible downgrade to floor if neuro ok with neuro checks q 8 hrs   IMPRESSION:    AMS likely TME suspect polypharmacy  cellulitis  MSSA bacteremia  Hx of DM and HTN  Hx of depression      PLAN:    CNS: Pain control, decrease flexeril to 5 mg tid, continue with home meds, MR negative, neuro following    HEENT: Oral care    PULMONARY:  HOB @ 45 degrees.  Aspiration precautions.  SpO2 94-98% on RA.  prn o2 if needed    CARDIOVASCULAR:  ECHO noted, BP control, avoid overload     GI: GI prophylaxis protonix, feeding per speech, bowel reigmen prn    RENAL:  Follow up lytes.  Correct as needed.     INFECTIOUS DISEASE:  cefazolin, repeat blood cultures, ID following     HEMATOLOGICAL:  DVT prophylaxis: Heparin SC    ENDOCRINE:  Follow up FS.  Insulin protocol if needed.    MUSCULOSKELETAL: AAT      Dispo: possible downgrade to floor if neuro ok with neuro checks q 8 hrs

## 2025-01-28 NOTE — CHART NOTE - NSCHARTNOTEFT_GEN_A_CORE
Transfer Note  ---------------------------    Transfer from: SDU  Transfer to: [facility]      HOSPITAL COURSE            OBJECTIVE --  Vital Signs Last 24 Hrs  T(C): 35.6 (28 Jan 2025 07:01), Max: 36.4 (27 Jan 2025 13:19)  T(F): 96 (28 Jan 2025 07:01), Max: 97.5 (27 Jan 2025 13:19)  HR: 72 (28 Jan 2025 08:01) (63 - 82)  BP: 114/53 (28 Jan 2025 08:01) (113/54 - 194/84)  BP(mean): 76 (28 Jan 2025 08:01) (76 - 120)  RR: 15 (28 Jan 2025 08:01) (13 - 20)  SpO2: 94% (28 Jan 2025 08:01) (94% - 99%)    Parameters below as of 28 Jan 2025 08:01  Patient On (Oxygen Delivery Method): room air        I&O's Summary    27 Jan 2025 07:01  -  28 Jan 2025 07:00  --------------------------------------------------------  IN: 1245 mL / OUT: 920 mL / NET: 325 mL        MEDICATIONS  (STANDING):  acetaminophen     Tablet .. 650 milliGRAM(s) Oral every 6 hours  aspirin enteric coated 81 milliGRAM(s) Oral daily  atorvastatin 80 milliGRAM(s) Oral at bedtime  bacitracin   Ointment 1 Application(s) Topical two times a day  ceFAZolin   IVPB 2000 milliGRAM(s) IV Intermittent every 8 hours  chlorhexidine 2% Cloths 1 Application(s) Topical <User Schedule>  clopidogrel Tablet 75 milliGRAM(s) Oral daily  dextrose 5%. 1000 milliLiter(s) (50 mL/Hr) IV Continuous <Continuous>  dextrose 50% Injectable 25 Gram(s) IV Push once  dextrose 50% Injectable 12.5 Gram(s) IV Push once  dextrose 50% Injectable 25 Gram(s) IV Push once  folic acid 1 milliGRAM(s) Oral daily  gabapentin 300 milliGRAM(s) Oral every 8 hours  glucagon  Injectable 1 milliGRAM(s) IntraMuscular once  heparin   Injectable 5000 Unit(s) SubCutaneous every 12 hours  insulin lispro (ADMELOG) corrective regimen sliding scale   SubCutaneous three times a day before meals  insulin lispro Injectable (ADMELOG) 6 Unit(s) SubCutaneous three times a day before meals  levETIRAcetam 500 milliGRAM(s) Oral two times a day  lidocaine   4% Patch 1 Patch Transdermal every 24 hours  lidocaine   4% Patch 1 Patch Transdermal daily  losartan 50 milliGRAM(s) Oral daily  polyethylene glycol 3350 17 Gram(s) Oral two times a day  senna 2 Tablet(s) Oral at bedtime  sodium chloride 0.9%. 1000 milliLiter(s) (75 mL/Hr) IV Continuous <Continuous>  Tresiba Flextouch (insulin degludec) 63 Unit(s) 63 Unit(s) SubCutaneous at bedtime  venlafaxine XR. 75 milliGRAM(s) Oral daily    MEDICATIONS  (PRN):  aluminum hydroxide/magnesium hydroxide/simethicone Suspension 30 milliLiter(s) Oral every 4 hours PRN Dyspepsia  cyclobenzaprine 5 milliGRAM(s) Oral every 8 hours PRN Muscle Spasm  dextrose Oral Gel 15 Gram(s) Oral once PRN Blood Glucose LESS THAN 70 milliGRAM(s)/deciliter  melatonin 5 milliGRAM(s) Oral at bedtime PRN Insomnia  morphine  - Injectable 2 milliGRAM(s) IV Push every 4 hours PRN Moderate Pain (4 - 6)  nystatin Powder 1 Application(s) Topical every 12 hours PRN fungal rash  ondansetron Injectable 4 milliGRAM(s) IV Push every 8 hours PRN Nausea and/or Vomiting  oxyCODONE    IR 10 milliGRAM(s) Oral three times a day PRN Severe Pain (7 - 10)        LABS                                            10.7                  Neurophils% (auto):   64.5   (01-28 @ 06:15):    7.13 )-----------(158          Lymphocytes% (auto):  16.1                                          31.3                   Eosinphils% (auto):   3.8      Manual%: Neutrophils x    ; Lymphocytes x    ; Eosinophils x    ; Bands%: x    ; Blasts x                                    139    |  107    |  23                  Calcium: 7.9   / iCa: x      (01-28 @ 06:15)    ----------------------------<  64        Magnesium: 1.7                              4.1     |  23     |  0.9              Phosphorous: x        TPro  5.4    /  Alb  1.9    /  TBili  0.8    /  DBili  x      /  AST  64     /  ALT  15     /  AlkPhos  215    28 Jan 2025 06:15    ( 01-28 @ 06:15 )   PT: 16.50 sec;   INR: 1.39 ratio  aPTT: x              ASSESSMENT & PLAN:     80-year-old male with past medical history of insulin-dependent diabetes, hypertension, subdural hematoma on keppra who presents for multiple falls, weakness, & confusion.    #AMS  #Hx of SDH  - R/o stroke 2/2 septic emboli v. TME 2/2 MSSA bacteremia   - Stroke Code called due to slurred speech  - S/p ASA + Plavix load  - CTH -ve for acute pathology  - CTA H+N: No large vessel occlusion, aneurysm, or vascular malformation; Mild atherosclerotic stenosis in the bilateral supraclinoid ICAs and bilateral V4 vertebral arteries.  - CTP: Small artifactual perfusion abnormality in the anterior right cerebellum,   otherwise no perfusion deficits to suggest areas of completed infarction   or at risk territory.  - MR Head  Plan:  - C/w ASA 81 mg q24H  - C/w plavix 75 mg q24H  - C/w atorvastatin 80 mg qhs  - C/w Keppra 500 mg q12H  - Neuro checks q8H  - F/u neuro recs      #MSSA bacteremia, origin L forearm cellulitis v. UTI?  #Questionable L forearm cellulitis - low suspicion for septic joint   - BCx + for MSSA (1/23, 1/25)  - UA + for nitrites, 20 WBC, small LE, & few bacteria  - UCx + for MSSA  - ID on board  - L Forearm/Elbow XR -ve for fx, showed soft tissue swelling  - L Elbow CT: Very limited exam. Artifact. Portions of the elbow are not imaged; Diffuse soft tissue edema which may reflect cellulitis in the right clinical setting. No definite discrete fluid collection; Arthritic changes of the elbow with no definite radiographic evidence of osteomyelitis.  - TTE w/ no vegetations  Plan:   - C/w IV cefazolin 2 g q8H  - Check BCx q48H until clear    #Type II NSTEMI  - EKG shows no acute ischemic changes  - HST 47>43>39>31  - TTE: LVEF 66%  - Monitor on tele    #Chronic macrocytic anemia  - Hb 11.1 on admission  - No s/s of active bleeding  - B12 1163, WNL (1/23/25)  - Folate 5, lower end of normal (1/23/25)  - Iron studies WNL  Plan:  - C/w folic acid 1 mg q24H  - OP EGD/Colonoscopy   - Transfuse if Hb <7    #T2DM  #Hyperglycemia  - On metformin 500 mg q6H + Tresiba 60 units @ home  - A1c 7.6%  Plan:  - C/w home Tresiba 63 units qhs  - Stop NPH BID  - Increase Lispro from 4 units TIDAC to     #HTN  - C/w home losartan 50 mg q24H    #Chronic back pain  #Frequent Falls  - C/w pain control: PO Tylenol 650 mg q6H + PO oxycodone 10 mg q6H PRN, IV morphine 2 mg q4H PRN, PO cyclobenzaprine 10 mg q8H + lidocaine patches  - PT consult  - Consider MR Spine w/ IVC given MSSA bacteremia     #Depression  - C/w venlafaxine ER 75 mg q24H (started inpatient)    # Incidental pancreatic tail hypodensity  - RUQ U/S WNL  - OP MRI may be obtained for further characterization  - Follow up with GI office located on [address] [place] [address], Phone number [phone] with Dr. Puentes to consider EUS      #MISC  - DVT ppx: Hep SQ  - GI ppx: NI  - Diet:   - Activity: AAT  - Code Status: Full Code  - Dispo: GMF      For Follow-Up:  - F/u neuro Transfer Note  ---------------------------    Transfer from: SDU  Transfer to: [facility]      HOSPITAL COURSE            OBJECTIVE --  Vital Signs Last 24 Hrs  T(C): 35.6 (28 Jan 2025 07:01), Max: 36.4 (27 Jan 2025 13:19)  T(F): 96 (28 Jan 2025 07:01), Max: 97.5 (27 Jan 2025 13:19)  HR: 72 (28 Jan 2025 08:01) (63 - 82)  BP: 114/53 (28 Jan 2025 08:01) (113/54 - 194/84)  BP(mean): 76 (28 Jan 2025 08:01) (76 - 120)  RR: 15 (28 Jan 2025 08:01) (13 - 20)  SpO2: 94% (28 Jan 2025 08:01) (94% - 99%)    Parameters below as of 28 Jan 2025 08:01  Patient On (Oxygen Delivery Method): room air        I&O's Summary    27 Jan 2025 07:01  -  28 Jan 2025 07:00  --------------------------------------------------------  IN: 1245 mL / OUT: 920 mL / NET: 325 mL        MEDICATIONS  (STANDING):  acetaminophen     Tablet .. 650 milliGRAM(s) Oral every 6 hours  aspirin enteric coated 81 milliGRAM(s) Oral daily  atorvastatin 80 milliGRAM(s) Oral at bedtime  bacitracin   Ointment 1 Application(s) Topical two times a day  ceFAZolin   IVPB 2000 milliGRAM(s) IV Intermittent every 8 hours  chlorhexidine 2% Cloths 1 Application(s) Topical <User Schedule>  clopidogrel Tablet 75 milliGRAM(s) Oral daily  dextrose 5%. 1000 milliLiter(s) (50 mL/Hr) IV Continuous <Continuous>  dextrose 50% Injectable 25 Gram(s) IV Push once  dextrose 50% Injectable 12.5 Gram(s) IV Push once  dextrose 50% Injectable 25 Gram(s) IV Push once  folic acid 1 milliGRAM(s) Oral daily  gabapentin 300 milliGRAM(s) Oral every 8 hours  glucagon  Injectable 1 milliGRAM(s) IntraMuscular once  heparin   Injectable 5000 Unit(s) SubCutaneous every 12 hours  insulin lispro (ADMELOG) corrective regimen sliding scale   SubCutaneous three times a day before meals  insulin lispro Injectable (ADMELOG) 6 Unit(s) SubCutaneous three times a day before meals  levETIRAcetam 500 milliGRAM(s) Oral two times a day  lidocaine   4% Patch 1 Patch Transdermal every 24 hours  lidocaine   4% Patch 1 Patch Transdermal daily  losartan 50 milliGRAM(s) Oral daily  polyethylene glycol 3350 17 Gram(s) Oral two times a day  senna 2 Tablet(s) Oral at bedtime  sodium chloride 0.9%. 1000 milliLiter(s) (75 mL/Hr) IV Continuous <Continuous>  Tresiba Flextouch (insulin degludec) 63 Unit(s) 63 Unit(s) SubCutaneous at bedtime  venlafaxine XR. 75 milliGRAM(s) Oral daily    MEDICATIONS  (PRN):  aluminum hydroxide/magnesium hydroxide/simethicone Suspension 30 milliLiter(s) Oral every 4 hours PRN Dyspepsia  cyclobenzaprine 5 milliGRAM(s) Oral every 8 hours PRN Muscle Spasm  dextrose Oral Gel 15 Gram(s) Oral once PRN Blood Glucose LESS THAN 70 milliGRAM(s)/deciliter  melatonin 5 milliGRAM(s) Oral at bedtime PRN Insomnia  morphine  - Injectable 2 milliGRAM(s) IV Push every 4 hours PRN Moderate Pain (4 - 6)  nystatin Powder 1 Application(s) Topical every 12 hours PRN fungal rash  ondansetron Injectable 4 milliGRAM(s) IV Push every 8 hours PRN Nausea and/or Vomiting  oxyCODONE    IR 10 milliGRAM(s) Oral three times a day PRN Severe Pain (7 - 10)        LABS                                            10.7                  Neurophils% (auto):   64.5   (01-28 @ 06:15):    7.13 )-----------(158          Lymphocytes% (auto):  16.1                                          31.3                   Eosinphils% (auto):   3.8      Manual%: Neutrophils x    ; Lymphocytes x    ; Eosinophils x    ; Bands%: x    ; Blasts x                                    139    |  107    |  23                  Calcium: 7.9   / iCa: x      (01-28 @ 06:15)    ----------------------------<  64        Magnesium: 1.7                              4.1     |  23     |  0.9              Phosphorous: x        TPro  5.4    /  Alb  1.9    /  TBili  0.8    /  DBili  x      /  AST  64     /  ALT  15     /  AlkPhos  215    28 Jan 2025 06:15    ( 01-28 @ 06:15 )   PT: 16.50 sec;   INR: 1.39 ratio  aPTT: x              ASSESSMENT & PLAN:     80-year-old male with past medical history of insulin-dependent diabetes, hypertension, subdural hematoma on keppra who presents for multiple falls, weakness, & confusion.    #TME  #Hx of SDH  - R/o stroke 2/2 septic emboli v. TME 2/2 MSSA bacteremia   - Stroke Code called due to slurred speech  - S/p ASA + Plavix load  - CTH -ve for acute pathology  - CTA H+N: No large vessel occlusion, aneurysm, or vascular malformation; Mild atherosclerotic stenosis in the bilateral supraclinoid ICAs and bilateral V4 vertebral arteries.  - CTP: Small artifactual perfusion abnormality in the anterior right cerebellum,   otherwise no perfusion deficits to suggest areas of completed infarction   or at risk territory.  - MR Head: No acute intracranial pathology; Stable mild chronic microvascular ischemic changes.  - Given negative MRI, suspect TME due to polypharmacy  Plan:  - C/w ASA 81 mg q24H  - C/w plavix 75 mg q24H  - C/w atorvastatin 80 mg qhs  - C/w Keppra 500 mg q12H  - Neuro checks q8H  - F/u neuro recs    #MSSA bacteremia, origin L forearm cellulitis v. UTI?  #Questionable L forearm cellulitis - low suspicion for septic joint   - BCx + for MSSA (1/23, 1/25)  - UA + for nitrites, 20 WBC, small LE, & few bacteria  - UCx + for MSSA  - ID on board  - L Forearm/Elbow XR -ve for fx, showed soft tissue swelling  - L Elbow CT: Very limited exam. Artifact. Portions of the elbow are not imaged; Diffuse soft tissue edema which may reflect cellulitis in the right clinical setting. No definite discrete fluid collection; Arthritic changes of the elbow with no definite radiographic evidence of osteomyelitis.  - TTE w/ no vegetations  Plan:   - C/w IV cefazolin 2 g q8H  - Check BCx q48H until clear, collected 1/27    #Type II NSTEMI  - EKG shows no acute ischemic changes  - HST 47>43>39>31  - TTE: LVEF 66%  - Monitor on tele    #Chronic macrocytic anemia  - Hb 11.1 on admission  - No s/s of active bleeding  - B12 1163, WNL (1/23/25)  - Folate 5, lower end of normal (1/23/25)  - Iron studies WNL  Plan:  - C/w folic acid 1 mg q24H  - OP EGD/Colonoscopy   - Transfuse if Hb <7    #T2DM  #Hyperglycemia  - On metformin 500 mg q6H + Tresiba 60 units @ home  - A1c 7.6%  Plan:  - C/w home Tresiba 63 units qhs  - Stop NPH BID  - Increase Lispro from 4 units TIDAC to     #HTN  - C/w home losartan 50 mg q24H    #Chronic back pain  #Frequent Falls  - C/w pain control: PO Tylenol 650 mg q6H + PO oxycodone 10 mg q6H PRN, IV morphine 2 mg q4H PRN, PO cyclobenzaprine 10 mg q8H + lidocaine patches  - Decrease cyclobenzaprine 5 mg q8H PRN as patient was very lethargic, suspect 2/2 pain meds  - PT consult  - Consider MR Spine w/ IVC given MSSA bacteremia     #Depression  - C/w venlafaxine ER 75 mg q24H (started inpatient)    # Incidental pancreatic tail hypodensity  - RUQ U/S WNL  - OP MRI may be obtained for further characterization  - Follow up with GI office located on [address] [place] [address], Phone number [phone] with Dr. Puentes to consider EUS    #MISC  - DVT ppx: Lovenox SQ  - GI ppx: NI  - Diet:   - Activity: AAT  - Code Status: Full Code  - Dispo: GMF      For Follow-Up:  - F/u neuro  - F/u BCx (collected 1/27), repeat ordered for 1/29 AM, repeat q48H until clearance  - F/u ID; As per ID, will consider HANNAH and MRI Spine w/ IVC if BCx remains positive Transfer Note  ---------------------------    Transfer from: SDU  Transfer to: [facility]      HOSPITAL COURSE    79 y/o M w/ PMHx of IDDM, HTN, subdural hematoma on Keppra who presents for multiple falls, weakness, confusion. Per daughter at bedside, he has becoming more intermittently confused, has had multiple falls over the past 2 months (last fall was yesterday). He had a fall while getting out of bed, does not recall how it happened. Complains of back pain since then. He was admitted in 2022 for left elbow swelling and cellulitis. Now states the pain and swelling have returned. Per daughter, he has been urinating more frequently for the past 2 days. No fevers, chills, CP, SOB, palpitations, n/v/d, abdominal pain, hip pain, any numbness, weakness, change in BM, urinary pattern or pelvic numbness. Home meds provided by daughter.    Admitted to [facility] for TME 2/2 suspected UTI. BCx + for MSSA. UA also + for MSSA. Source of MSSA suspected UTI v. LUE cellulitis. Had TTE, which was negative. Repeat BCx 1/23, 1/25 + for MSSA. Remains on IV Cefazolin 2 g q8H as per ID. Patient subsequently noted to be more lethargic w/ slurred speech on 1/28/25. Stroke Code called. S/p ASA & Plavix load on 1/27/25. CTH, CTA H+N, CTP performed w/ no significant findings. MR Head w/ no acute findings. Currently AAOx3, however remains lethargic. TME suspected 2/2 polypharmacy, adjusted pain meds. Patient is medically stable for DGTF.        OBJECTIVE --  Vital Signs Last 24 Hrs  T(C): 35.6 (28 Jan 2025 07:01), Max: 36.4 (27 Jan 2025 13:19)  T(F): 96 (28 Jan 2025 07:01), Max: 97.5 (27 Jan 2025 13:19)  HR: 72 (28 Jan 2025 08:01) (63 - 82)  BP: 114/53 (28 Jan 2025 08:01) (113/54 - 194/84)  BP(mean): 76 (28 Jan 2025 08:01) (76 - 120)  RR: 15 (28 Jan 2025 08:01) (13 - 20)  SpO2: 94% (28 Jan 2025 08:01) (94% - 99%)    Parameters below as of 28 Jan 2025 08:01  Patient On (Oxygen Delivery Method): room air        I&O's Summary    27 Jan 2025 07:01  -  28 Jan 2025 07:00  --------------------------------------------------------  IN: 1245 mL / OUT: 920 mL / NET: 325 mL        MEDICATIONS  (STANDING):  acetaminophen     Tablet .. 650 milliGRAM(s) Oral every 6 hours  aspirin enteric coated 81 milliGRAM(s) Oral daily  atorvastatin 80 milliGRAM(s) Oral at bedtime  bacitracin   Ointment 1 Application(s) Topical two times a day  ceFAZolin   IVPB 2000 milliGRAM(s) IV Intermittent every 8 hours  chlorhexidine 2% Cloths 1 Application(s) Topical <User Schedule>  clopidogrel Tablet 75 milliGRAM(s) Oral daily  dextrose 5%. 1000 milliLiter(s) (50 mL/Hr) IV Continuous <Continuous>  dextrose 50% Injectable 25 Gram(s) IV Push once  dextrose 50% Injectable 12.5 Gram(s) IV Push once  dextrose 50% Injectable 25 Gram(s) IV Push once  folic acid 1 milliGRAM(s) Oral daily  gabapentin 300 milliGRAM(s) Oral every 8 hours  glucagon  Injectable 1 milliGRAM(s) IntraMuscular once  heparin   Injectable 5000 Unit(s) SubCutaneous every 12 hours  insulin lispro (ADMELOG) corrective regimen sliding scale   SubCutaneous three times a day before meals  insulin lispro Injectable (ADMELOG) 6 Unit(s) SubCutaneous three times a day before meals  levETIRAcetam 500 milliGRAM(s) Oral two times a day  lidocaine   4% Patch 1 Patch Transdermal every 24 hours  lidocaine   4% Patch 1 Patch Transdermal daily  losartan 50 milliGRAM(s) Oral daily  polyethylene glycol 3350 17 Gram(s) Oral two times a day  senna 2 Tablet(s) Oral at bedtime  sodium chloride 0.9%. 1000 milliLiter(s) (75 mL/Hr) IV Continuous <Continuous>  Tresiba Flextouch (insulin degludec) 63 Unit(s) 63 Unit(s) SubCutaneous at bedtime  venlafaxine XR. 75 milliGRAM(s) Oral daily    MEDICATIONS  (PRN):  aluminum hydroxide/magnesium hydroxide/simethicone Suspension 30 milliLiter(s) Oral every 4 hours PRN Dyspepsia  cyclobenzaprine 5 milliGRAM(s) Oral every 8 hours PRN Muscle Spasm  dextrose Oral Gel 15 Gram(s) Oral once PRN Blood Glucose LESS THAN 70 milliGRAM(s)/deciliter  melatonin 5 milliGRAM(s) Oral at bedtime PRN Insomnia  morphine  - Injectable 2 milliGRAM(s) IV Push every 4 hours PRN Moderate Pain (4 - 6)  nystatin Powder 1 Application(s) Topical every 12 hours PRN fungal rash  ondansetron Injectable 4 milliGRAM(s) IV Push every 8 hours PRN Nausea and/or Vomiting  oxyCODONE    IR 10 milliGRAM(s) Oral three times a day PRN Severe Pain (7 - 10)        LABS                                            10.7                  Neurophils% (auto):   64.5   (01-28 @ 06:15):    7.13 )-----------(158          Lymphocytes% (auto):  16.1                                          31.3                   Eosinphils% (auto):   3.8      Manual%: Neutrophils x    ; Lymphocytes x    ; Eosinophils x    ; Bands%: x    ; Blasts x                                    139    |  107    |  23                  Calcium: 7.9   / iCa: x      (01-28 @ 06:15)    ----------------------------<  64        Magnesium: 1.7                              4.1     |  23     |  0.9              Phosphorous: x        TPro  5.4    /  Alb  1.9    /  TBili  0.8    /  DBili  x      /  AST  64     /  ALT  15     /  AlkPhos  215    28 Jan 2025 06:15    ( 01-28 @ 06:15 )   PT: 16.50 sec;   INR: 1.39 ratio  aPTT: x              ASSESSMENT & PLAN:     80-year-old male with past medical history of insulin-dependent diabetes, hypertension, subdural hematoma on keppra who presents for multiple falls, weakness, & confusion.    #TME  #Hx of SDH  - R/o stroke 2/2 septic emboli v. TME 2/2 MSSA bacteremia   - Stroke Code called due to slurred speech  - S/p ASA + Plavix load  - CTH -ve for acute pathology  - CTA H+N: No large vessel occlusion, aneurysm, or vascular malformation; Mild atherosclerotic stenosis in the bilateral supraclinoid ICAs and bilateral V4 vertebral arteries.  - CTP: Small artifactual perfusion abnormality in the anterior right cerebellum,   otherwise no perfusion deficits to suggest areas of completed infarction   or at risk territory.  - MR Head: No acute intracranial pathology; Stable mild chronic microvascular ischemic changes.  - Given negative MRI, suspect TME due to polypharmacy  Plan:  - C/w ASA 81 mg q24H  - C/w plavix 75 mg q24H  - C/w atorvastatin 80 mg qhs  - C/w Keppra 500 mg q12H  - Neuro checks q8H  - F/u neuro recs    #MSSA bacteremia, origin L forearm cellulitis v. UTI?  #Questionable L forearm cellulitis - low suspicion for septic joint   - BCx + for MSSA (1/23, 1/25)  - UA + for nitrites, 20 WBC, small LE, & few bacteria  - UCx + for MSSA  - ID on board  - L Forearm/Elbow XR -ve for fx, showed soft tissue swelling  - L Elbow CT: Very limited exam. Artifact. Portions of the elbow are not imaged; Diffuse soft tissue edema which may reflect cellulitis in the right clinical setting. No definite discrete fluid collection; Arthritic changes of the elbow with no definite radiographic evidence of osteomyelitis.  - TTE w/ no vegetations  Plan:   - C/w IV cefazolin 2 g q8H  - Check BCx q48H until clear, collected 1/27    #Type II NSTEMI  - EKG shows no acute ischemic changes  - HST 47>43>39>31  - TTE: LVEF 66%  - Monitor on tele    #Chronic macrocytic anemia  - Hb 11.1 on admission  - No s/s of active bleeding  - B12 1163, WNL (1/23/25)  - Folate 5, lower end of normal (1/23/25)  - Iron studies WNL  Plan:  - C/w folic acid 1 mg q24H  - OP EGD/Colonoscopy   - Transfuse if Hb <7    #T2DM  #Hyperglycemia  - On metformin 500 mg q6H + Tresiba 60 units @ home  - A1c 7.6%  Plan:  - C/w home Tresiba 63 units qhs  - Stop NPH BID  - Increase Lispro from 4 units TIDAC to     #HTN  - C/w home losartan 50 mg q24H    #Chronic back pain  #Frequent Falls  - C/w pain control: PO Tylenol 650 mg q6H + PO oxycodone 10 mg q6H PRN, IV morphine 2 mg q4H PRN, PO cyclobenzaprine 10 mg q8H + lidocaine patches  - Decrease cyclobenzaprine 5 mg q8H PRN as patient was very lethargic, suspect 2/2 pain meds  - PT consult  - Consider MR Spine w/ IVC given MSSA bacteremia     #Depression  - C/w venlafaxine ER 75 mg q24H (started inpatient)    # Incidental pancreatic tail hypodensity  - RUQ U/S WNL  - OP MRI may be obtained for further characterization  - Follow up with GI office located on [address] [place] [address], Phone number [phone] with Dr. Puentes to consider EUS    #MISC  - DVT ppx: Lovenox SQ  - GI ppx: NI  - Diet:   - Activity: AAT  - Code Status: Full Code  - Dispo: GMF      For Follow-Up:  - F/u neuro  - F/u BCx (collected 1/27), repeat ordered for 1/29 AM, repeat q48H until clearance  - F/u ID; As per ID, will consider HANNAH and MRI Spine w/ IVC if BCx remains positive Transfer Note  ---------------------------    Transfer from: SDU  Transfer to: [facility]      HOSPITAL COURSE    81 y/o M w/ PMHx of IDDM, HTN, subdural hematoma on Keppra who presents for multiple falls, weakness, confusion. Per daughter at bedside, he has becoming more intermittently confused, has had multiple falls over the past 2 months (last fall was yesterday). He had a fall while getting out of bed, does not recall how it happened. Complains of back pain since then. He was admitted in 2022 for left elbow swelling and cellulitis. Now states the pain and swelling have returned. Per daughter, he has been urinating more frequently for the past 2 days. No fevers, chills, CP, SOB, palpitations, n/v/d, abdominal pain, hip pain, any numbness, weakness, change in BM, urinary pattern or pelvic numbness. Home meds provided by daughter.    Admitted to [facility] for TME 2/2 suspected UTI. BCx + for MSSA. UA also + for MSSA. Source of MSSA suspected UTI v. LUE cellulitis. Had TTE, which was negative. Repeat BCx 1/23, 1/25 + for MSSA. Remains on IV Cefazolin 2 g q8H as per ID. Patient subsequently noted to be more lethargic w/ slurred speech on 1/28/25. Stroke Code called. S/p ASA & Plavix load on 1/27/25. CTH, CTA H+N, CTP performed w/ no significant findings. MR Head w/ no acute findings. Currently AAOx3, however remains lethargic. TME suspected 2/2 polypharmacy, adjusted pain meds. Patient is medically stable for DGTF.        OBJECTIVE --  Vital Signs Last 24 Hrs  T(C): 35.6 (28 Jan 2025 07:01), Max: 36.4 (27 Jan 2025 13:19)  T(F): 96 (28 Jan 2025 07:01), Max: 97.5 (27 Jan 2025 13:19)  HR: 72 (28 Jan 2025 08:01) (63 - 82)  BP: 114/53 (28 Jan 2025 08:01) (113/54 - 194/84)  BP(mean): 76 (28 Jan 2025 08:01) (76 - 120)  RR: 15 (28 Jan 2025 08:01) (13 - 20)  SpO2: 94% (28 Jan 2025 08:01) (94% - 99%)    Parameters below as of 28 Jan 2025 08:01  Patient On (Oxygen Delivery Method): room air        I&O's Summary    27 Jan 2025 07:01  -  28 Jan 2025 07:00  --------------------------------------------------------  IN: 1245 mL / OUT: 920 mL / NET: 325 mL        MEDICATIONS  (STANDING):  acetaminophen     Tablet .. 650 milliGRAM(s) Oral every 6 hours  aspirin enteric coated 81 milliGRAM(s) Oral daily  atorvastatin 80 milliGRAM(s) Oral at bedtime  bacitracin   Ointment 1 Application(s) Topical two times a day  ceFAZolin   IVPB 2000 milliGRAM(s) IV Intermittent every 8 hours  chlorhexidine 2% Cloths 1 Application(s) Topical <User Schedule>  clopidogrel Tablet 75 milliGRAM(s) Oral daily  dextrose 5%. 1000 milliLiter(s) (50 mL/Hr) IV Continuous <Continuous>  dextrose 50% Injectable 25 Gram(s) IV Push once  dextrose 50% Injectable 12.5 Gram(s) IV Push once  dextrose 50% Injectable 25 Gram(s) IV Push once  folic acid 1 milliGRAM(s) Oral daily  gabapentin 300 milliGRAM(s) Oral every 8 hours  glucagon  Injectable 1 milliGRAM(s) IntraMuscular once  heparin   Injectable 5000 Unit(s) SubCutaneous every 12 hours  insulin lispro (ADMELOG) corrective regimen sliding scale   SubCutaneous three times a day before meals  insulin lispro Injectable (ADMELOG) 6 Unit(s) SubCutaneous three times a day before meals  levETIRAcetam 500 milliGRAM(s) Oral two times a day  lidocaine   4% Patch 1 Patch Transdermal every 24 hours  lidocaine   4% Patch 1 Patch Transdermal daily  losartan 50 milliGRAM(s) Oral daily  polyethylene glycol 3350 17 Gram(s) Oral two times a day  senna 2 Tablet(s) Oral at bedtime  sodium chloride 0.9%. 1000 milliLiter(s) (75 mL/Hr) IV Continuous <Continuous>  Tresiba Flextouch (insulin degludec) 63 Unit(s) 63 Unit(s) SubCutaneous at bedtime  venlafaxine XR. 75 milliGRAM(s) Oral daily    MEDICATIONS  (PRN):  aluminum hydroxide/magnesium hydroxide/simethicone Suspension 30 milliLiter(s) Oral every 4 hours PRN Dyspepsia  cyclobenzaprine 5 milliGRAM(s) Oral every 8 hours PRN Muscle Spasm  dextrose Oral Gel 15 Gram(s) Oral once PRN Blood Glucose LESS THAN 70 milliGRAM(s)/deciliter  melatonin 5 milliGRAM(s) Oral at bedtime PRN Insomnia  morphine  - Injectable 2 milliGRAM(s) IV Push every 4 hours PRN Moderate Pain (4 - 6)  nystatin Powder 1 Application(s) Topical every 12 hours PRN fungal rash  ondansetron Injectable 4 milliGRAM(s) IV Push every 8 hours PRN Nausea and/or Vomiting  oxyCODONE    IR 10 milliGRAM(s) Oral three times a day PRN Severe Pain (7 - 10)        LABS                                            10.7                  Neurophils% (auto):   64.5   (01-28 @ 06:15):    7.13 )-----------(158          Lymphocytes% (auto):  16.1                                          31.3                   Eosinphils% (auto):   3.8      Manual%: Neutrophils x    ; Lymphocytes x    ; Eosinophils x    ; Bands%: x    ; Blasts x                                    139    |  107    |  23                  Calcium: 7.9   / iCa: x      (01-28 @ 06:15)    ----------------------------<  64        Magnesium: 1.7                              4.1     |  23     |  0.9              Phosphorous: x        TPro  5.4    /  Alb  1.9    /  TBili  0.8    /  DBili  x      /  AST  64     /  ALT  15     /  AlkPhos  215    28 Jan 2025 06:15    ( 01-28 @ 06:15 )   PT: 16.50 sec;   INR: 1.39 ratio  aPTT: x              ASSESSMENT & PLAN:     80-year-old male with past medical history of insulin-dependent diabetes, hypertension, subdural hematoma on keppra who presents for multiple falls, weakness, & confusion.    #TME  #Hx of SDH  - R/o stroke 2/2 septic emboli v. TME 2/2 MSSA bacteremia   - Stroke Code called due to slurred speech  - S/p ASA + Plavix load  - CTH -ve for acute pathology  - CTA H+N: No large vessel occlusion, aneurysm, or vascular malformation; Mild atherosclerotic stenosis in the bilateral supraclinoid ICAs and bilateral V4 vertebral arteries.  - CTP: Small artifactual perfusion abnormality in the anterior right cerebellum,   otherwise no perfusion deficits to suggest areas of completed infarction   or at risk territory.  - MR Head: No acute intracranial pathology; Stable mild chronic microvascular ischemic changes.  - Given negative MRI, suspect TME due to polypharmacy  Plan:  - C/w ASA 81 mg q24H  - D/c plavix 75 mg q24H as per neuro  - C/w atorvastatin 80 mg qhs  - C/w Keppra 500 mg q12H  - Neuro checks q8H  - Check rEEG    #MSSA bacteremia, origin L forearm cellulitis v. UTI?  #Questionable L forearm cellulitis - low suspicion for septic joint   - BCx + for MSSA (1/23, 1/25)  - UA + for nitrites, 20 WBC, small LE, & few bacteria  - UCx + for MSSA  - ID on board  - L Forearm/Elbow XR -ve for fx, showed soft tissue swelling  - L Elbow CT: Very limited exam. Artifact. Portions of the elbow are not imaged; Diffuse soft tissue edema which may reflect cellulitis in the right clinical setting. No definite discrete fluid collection; Arthritic changes of the elbow with no definite radiographic evidence of osteomyelitis.  - TTE w/ no vegetations  Plan:   - C/w IV cefazolin 2 g q8H  - Check BCx q48H until clear, collected 1/27    #Type II NSTEMI  - EKG shows no acute ischemic changes  - HST 47>43>39>31  - TTE: LVEF 66%  - Monitor on tele    #Chronic macrocytic anemia  - Hb 11.1 on admission  - No s/s of active bleeding  - B12 1163, WNL (1/23/25)  - Folate 5, lower end of normal (1/23/25)  - Iron studies WNL  Plan:  - C/w folic acid 1 mg q24H  - OP EGD/Colonoscopy   - Transfuse if Hb <7    #T2DM  #Hyperglycemia, resolved  - On metformin 500 mg q6H + Tresiba 60 units @ home  - A1c 7.6%  - FS 70s 1/28  Plan:  - C/w home Tresiba, decreased to 20 units from 40 units as FS 70s  - D/c'd Lispro 6 units TIDAC  - C/w +1 ISS    #HTN  - C/w home losartan 50 mg q24H    #Chronic back pain  #Frequent Falls  - C/w pain control: PO Tylenol 650 mg q6H + PO oxycodone 10 mg q6H PRN, IV morphine 2 mg q4H PRN, PO cyclobenzaprine 10 mg q8H + lidocaine patches  - Decrease cyclobenzaprine 5 mg q8H PRN as patient was very lethargic, suspect 2/2 pain meds  - PT consult  - Consider MR Spine w/ IVC given MSSA bacteremia     #Depression  - C/w venlafaxine ER 75 mg q24H (started inpatient)    # Incidental pancreatic tail hypodensity  - RUQ U/S WNL  - OP MRI may be obtained for further characterization  - Follow up with GI office located on [address] [place] [address], Phone number [phone] with Dr. Puentes to consider EUS    #MISC  - DVT ppx: Lovenox SQ  - GI ppx: NI  - Diet:   - Activity: AAT  - Code Status: Full Code  - Dispo: [facility]      For Follow-Up:  - F/u rEEG  - F/u BCx (collected 1/27), repeat ordered for 1/29 AM, repeat q48H until clearance  - F/u ID; As per ID, will consider HANNAH and MRI Spine w/ IVC if BCx remains positive

## 2025-01-28 NOTE — CONSULT NOTE ADULT - TIME BILLING
I have personally seen and examined this patient.    I have reviewed all pertinent clinical information and reviewed all relevant imaging and diagnostic studies personally.   I discussed recommendations with the primary team.
75 minutes spent on total encounter; more than 50% of the visit was spent counseling and / or coordinating care by the attending physician.  The necessity of the time spent during the encounter on this date of service was due to: Coordination of care.
above.  Total time spent includes but is not limited to personal review of patient chart, available results, collateral information (if necessary) and examination of patient at bedside and time spent formulating assessment and recommendations independent of teaching time.

## 2025-01-28 NOTE — PROGRESS NOTE ADULT - ASSESSMENT
80-year-old male with past medical history of insulin-dependent diabetes, hypertension, subdural hematoma on keppra who presents for multiple falls, weakness, confusion.     ID is consulted for cellulitis  Afebrile 98.1  WBC 8.34 < 10.13  On room air  BCx 1/23 MSSA  BCx 1/25 MSSA  UA WBC 20, UCx MSSA and Aerococcus spp.    Left forearm xray  Capitellum bony fragment/calcification. No radius or ulna   fracture.. Soft tissue swelling with no radiographic findings to suggest   the presence of osteomyelitis or radiopaque soft tissue foreign body.    CT chest:  No CT evidence of acute traumatic injury within the chest, abdomen or   pelvis.  CT ABDOMEN/PELVIS:  Pancreatic tail hypodensity measuring 1 cm. Differential includes IPMN.   Nonemergent/outpatient MRI may be obtained for further characterization    Antibiotic:  Vancomycin 1/23 - 1/24  Ceftriaxone 1/23 - 1/24  Ancef 1/24 ->      IMPRESSION:  MSSA bacteremia, potentially life-threatening  Suspect left forearm cellulitis  Acute metabolic encephalopathy  DM  Immunosuppression / Immunosenescence secondary to multiple comorbidities which could result in poor clinical outcome    RECOMMENDATIONS:  - So far unclear source for MSSA bacteremia  - IV Ancef 2g q8hrs  - Repeat 1 set of BCx q48hrs until negative  - TTE no vegetation, might need HANNAH if persistent bacteremia  - Patient has chronic low back pain, if worsen or bacteremia is persistent, will consider MRI lumbar  - Offloading and frequent position changes, aspiration precaution  - Trend WBC, fever curve, transaminases, creatinine daily  - Please inform ID of any patient clinical change or any new pertinent laboratory or radiographic data      Nora Beck D.O.  Attending Physician  Division of Infectious Diseases  [facility]  Please contact me via [employer] Teams

## 2025-01-28 NOTE — CONSULT NOTE ADULT - SUBJECTIVE AND OBJECTIVE BOX
Patient is a 80y old  Male who presents with a chief complaint of fall (28 Jan 2025 08:31)      HPI:  80-year-old male with past medical history of insulin-dependent diabetes, hypertension, subdural hematoma on keppra who presents for multiple falls, weakness, confusion. Per daughter at bedside, he has becoming more intermittently confused, has had multiple falls over the past 2 months (last fall was yesterday). He had a fall while getting out of bed, does not recall how it happened. Complains of back pain since then. He was admitted in 2022 for left elbow swelling and cellulitis. Now states the pain and swelling have returned. Per daughter, he has been urinating more frequently for the past 2 days. No fevers, chills, CP, SOB, palpitations, n/v/d, abdominal pain, hip pain.  denies any numbness, weakness, change in BM, urinary pattern or pelvic numbness  Hospital stay complicated by change in his mental status with facial droop, code stroke called .  patient upgraded to SDU for neuro checks.    PAST MEDICAL & SURGICAL HISTORY:  Diabetes      Hypertension      Fall      H/O left knee surgery      History of cholecystectomy      History of appendectomy        FAMILY HISTORY:      Review of systems: Negative except as mentioned in HPI    Allergies    No Known Allergies    Intolerances        acetaminophen     Tablet .. 650 milliGRAM(s) Oral every 6 hours  aluminum hydroxide/magnesium hydroxide/simethicone Suspension 30 milliLiter(s) Oral every 4 hours PRN  aspirin enteric coated 81 milliGRAM(s) Oral daily  atorvastatin 80 milliGRAM(s) Oral at bedtime  bacitracin   Ointment 1 Application(s) Topical two times a day  ceFAZolin   IVPB 2000 milliGRAM(s) IV Intermittent every 8 hours  chlorhexidine 2% Cloths 1 Application(s) Topical <User Schedule>  clopidogrel Tablet 75 milliGRAM(s) Oral daily  cyclobenzaprine 5 milliGRAM(s) Oral every 8 hours PRN  dextrose 5%. 1000 milliLiter(s) IV Continuous <Continuous>  dextrose 50% Injectable 25 Gram(s) IV Push once  dextrose 50% Injectable 12.5 Gram(s) IV Push once  dextrose 50% Injectable 25 Gram(s) IV Push once  dextrose Oral Gel 15 Gram(s) Oral once PRN  folic acid 1 milliGRAM(s) Oral daily  gabapentin 300 milliGRAM(s) Oral every 8 hours  glucagon  Injectable 1 milliGRAM(s) IntraMuscular once  heparin   Injectable 5000 Unit(s) SubCutaneous every 12 hours  insulin lispro (ADMELOG) corrective regimen sliding scale   SubCutaneous three times a day before meals  insulin lispro Injectable (ADMELOG) 6 Unit(s) SubCutaneous three times a day before meals  levETIRAcetam 500 milliGRAM(s) Oral two times a day  lidocaine   4% Patch 1 Patch Transdermal every 24 hours  lidocaine   4% Patch 1 Patch Transdermal daily  losartan 50 milliGRAM(s) Oral daily  melatonin 5 milliGRAM(s) Oral at bedtime PRN  morphine  - Injectable 2 milliGRAM(s) IV Push every 4 hours PRN  nystatin Powder 1 Application(s) Topical every 12 hours PRN  ondansetron Injectable 4 milliGRAM(s) IV Push every 8 hours PRN  oxyCODONE    IR 10 milliGRAM(s) Oral three times a day PRN  polyethylene glycol 3350 17 Gram(s) Oral two times a day  senna 2 Tablet(s) Oral at bedtime  sodium chloride 0.9%. 1000 milliLiter(s) IV Continuous <Continuous>  Tresiba Flextouch (insulin degludec) 63 Unit(s) 63 Unit(s) SubCutaneous at bedtime  venlafaxine XR. 75 milliGRAM(s) Oral daily  : Home Meds:      PHYSICAL EXAM    ICU Vital Signs Last 24 Hrs  T(C): 35.6 (28 Jan 2025 07:01), Max: 36.4 (27 Jan 2025 13:19)  T(F): 96 (28 Jan 2025 07:01), Max: 97.5 (27 Jan 2025 13:19)  HR: 72 (28 Jan 2025 08:01) (63 - 82)  BP: 114/53 (28 Jan 2025 08:01) (113/54 - 194/84)  BP(mean): 76 (28 Jan 2025 08:01) (76 - 120)  ABP: --  ABP(mean): --  RR: 15 (28 Jan 2025 08:01) (13 - 20)  SpO2: 94% (28 Jan 2025 08:01) (94% - 99%)    O2 Parameters below as of 28 Jan 2025 08:01  Patient On (Oxygen Delivery Method): room air            General:  HEENT:  YODIT              Lymph Nodes: No cervical LN   Lungs: Bilateral BS  Cardiovascular: Regular  Abdomen: Soft, Positive BS  Extremities: No clubbing  Skin: Warm  Neurological: Non focal       01-27-25 @ 07:01  -  01-28-25 @ 07:00  --------------------------------------------------------  IN:    IV PiggyBack: 150 mL    Oral Fluid: 240 mL    sodium chloride 0.9%: 855 mL  Total IN: 1245 mL    OUT:    Voided (mL): 920 mL  Total OUT: 920 mL    Total NET: 325 mL          LABS:                          10.7   7.13  )-----------( 158      ( 28 Jan 2025 06:15 )             31.3                                               01-28    139  |  107  |  23[H]  ----------------------------<  64[L]  4.1   |  23  |  0.9    Ca    7.9[L]      28 Jan 2025 06:15  Mg     1.7     01-28    TPro  5.4[L]  /  Alb  1.9[L]  /  TBili  0.8  /  DBili  x   /  AST  64[H]  /  ALT  15  /  AlkPhos  215[H]  01-28      PT/INR - ( 28 Jan 2025 06:15 )   PT: 16.50 sec;   INR: 1.39 ratio         PTT - ( 27 Jan 2025 15:51 )  PTT:40.3 sec                                       Urinalysis Basic - ( 28 Jan 2025 06:15 )    Color: x / Appearance: x / SG: x / pH: x  Gluc: 64 mg/dL / Ketone: x  / Bili: x / Urobili: x   Blood: x / Protein: x / Nitrite: x   Leuk Esterase: x / RBC: x / WBC x   Sq Epi: x / Non Sq Epi: x / Bacteria: x                                                  LIVER FUNCTIONS - ( 28 Jan 2025 06:15 )  Alb: 1.9 g/dL / Pro: 5.4 g/dL / ALK PHOS: 215 U/L / ALT: 15 U/L / AST: 64 U/L / GGT: x                                                                                                                                   ABG - ( 27 Jan 2025 14:17 )  pH, Arterial: 7.42  pH, Blood: x     /  pCO2: 36    /  pO2: 90    / HCO3: 23    / Base Excess: -0.8  /  SaO2: 98.8          MEDICATIONS  (STANDING):  acetaminophen     Tablet .. 650 milliGRAM(s) Oral every 6 hours  aspirin enteric coated 81 milliGRAM(s) Oral daily  atorvastatin 80 milliGRAM(s) Oral at bedtime  bacitracin   Ointment 1 Application(s) Topical two times a day  ceFAZolin   IVPB 2000 milliGRAM(s) IV Intermittent every 8 hours  chlorhexidine 2% Cloths 1 Application(s) Topical <User Schedule>  clopidogrel Tablet 75 milliGRAM(s) Oral daily  dextrose 5%. 1000 milliLiter(s) (50 mL/Hr) IV Continuous <Continuous>  dextrose 50% Injectable 25 Gram(s) IV Push once  dextrose 50% Injectable 12.5 Gram(s) IV Push once  dextrose 50% Injectable 25 Gram(s) IV Push once  folic acid 1 milliGRAM(s) Oral daily  gabapentin 300 milliGRAM(s) Oral every 8 hours  glucagon  Injectable 1 milliGRAM(s) IntraMuscular once  heparin   Injectable 5000 Unit(s) SubCutaneous every 12 hours  insulin lispro (ADMELOG) corrective regimen sliding scale   SubCutaneous three times a day before meals  insulin lispro Injectable (ADMELOG) 6 Unit(s) SubCutaneous three times a day before meals  levETIRAcetam 500 milliGRAM(s) Oral two times a day  lidocaine   4% Patch 1 Patch Transdermal every 24 hours  lidocaine   4% Patch 1 Patch Transdermal daily  losartan 50 milliGRAM(s) Oral daily  polyethylene glycol 3350 17 Gram(s) Oral two times a day  senna 2 Tablet(s) Oral at bedtime  sodium chloride 0.9%. 1000 milliLiter(s) (75 mL/Hr) IV Continuous <Continuous>  Tresiba Flextouch (insulin degludec) 63 Unit(s) 63 Unit(s) SubCutaneous at bedtime  venlafaxine XR. 75 milliGRAM(s) Oral daily    MEDICATIONS  (PRN):  aluminum hydroxide/magnesium hydroxide/simethicone Suspension 30 milliLiter(s) Oral every 4 hours PRN Dyspepsia  cyclobenzaprine 5 milliGRAM(s) Oral every 8 hours PRN Muscle Spasm  dextrose Oral Gel 15 Gram(s) Oral once PRN Blood Glucose LESS THAN 70 milliGRAM(s)/deciliter  melatonin 5 milliGRAM(s) Oral at bedtime PRN Insomnia  morphine  - Injectable 2 milliGRAM(s) IV Push every 4 hours PRN Moderate Pain (4 - 6)  nystatin Powder 1 Application(s) Topical every 12 hours PRN fungal rash  ondansetron Injectable 4 milliGRAM(s) IV Push every 8 hours PRN Nausea and/or Vomiting  oxyCODONE    IR 10 milliGRAM(s) Oral three times a day PRN Severe Pain (7 - 10)

## 2025-01-28 NOTE — PROGRESS NOTE ADULT - SUBJECTIVE AND OBJECTIVE BOX
INFECTIOUS DISEASE FOLLOW UP NOTE:    Interval History/ROS: Patient is a 80y old  Male who presents with a chief complaint of fall (28 Jan 2025 15:28)    Overnight events: Lethargic today. Able to follow simple commands.    REVIEW OF SYSTEMS:  Unable to provide due to cognitive impairment      Prior hospital charts reviewed [Yes]  Primary team notes reviewed [Yes]  Other consultant notes reviewed [Yes]    Allergies:  No Known Allergies      ANTIMICROBIALS:   ceFAZolin   IVPB 2000 every 8 hours      OTHER MEDS: MEDICATIONS  (STANDING):  acetaminophen     Tablet .. 650 every 6 hours  aluminum hydroxide/magnesium hydroxide/simethicone Suspension 30 every 4 hours PRN  aspirin enteric coated 81 daily  atorvastatin 80 at bedtime  cyclobenzaprine 5 every 8 hours PRN  dextrose 50% Injectable 25 once  dextrose 50% Injectable 12.5 once  dextrose 50% Injectable 25 once  dextrose Oral Gel 15 once PRN  enoxaparin Injectable 40 every 24 hours  gabapentin 300 every 8 hours  glucagon  Injectable 1 once  insulin lispro (ADMELOG) corrective regimen sliding scale  three times a day before meals  levETIRAcetam 500 two times a day  losartan 50 daily  melatonin 5 at bedtime PRN  morphine  - Injectable 2 every 4 hours PRN  ondansetron Injectable 4 every 8 hours PRN  oxyCODONE    IR 10 three times a day PRN  polyethylene glycol 3350 17 two times a day  senna 2 at bedtime  venlafaxine XR. 75 daily      Vital Signs Last 24 Hrs  T(F): 98.3 (01-28-25 @ 21:35), Max: 99.7 (01-24-25 @ 22:32)    Vital Signs Last 24 Hrs  HR: 80 (01-28-25 @ 21:35) (64 - 80)  BP: 167/77 (01-28-25 @ 21:35) (113/54 - 167/77)  RR: 18 (01-28-25 @ 21:35)  SpO2: 98% (01-28-25 @ 21:35) (94% - 98%)  Wt(kg): --    EXAM:  GENERAL: NAD, lying in bed  HEAD: No head lesions  EYES: Conjunctiva pink and cornea white  EAR, NOSE, MOUTH, THROAT: Normal external ears and nose, no discharges; moist mucous membranes  NECK: Supple, nontender to palpation; no JVD  RESPIRATORY: Clear to auscultation bilaterally  CARDIOVASCULAR: S1 S2  GASTROINTESTINAL: Soft, nontender, nondistended; normoactive bowel sounds  GENITOURINARY: No loyola catheter, no CVA tenderness  EXTREMITIES: No clubbing, cyanosis, or petal edema  NERVOUS SYSTEM: Lethargic, barely arousable  MUSCULOSKELETAL: Left forearm swelling  SKIN: No rashes or lesions, no superficial thrombophlebitis  PSYCH: lethargic    Labs:                        10.7   7.13  )-----------( 158      ( 28 Jan 2025 06:15 )             31.3     01-28    139  |  107  |  23[H]  ----------------------------<  64[L]  4.1   |  23  |  0.9    Ca    7.9[L]      28 Jan 2025 06:15  Mg     1.7     01-28    TPro  5.4[L]  /  Alb  1.9[L]  /  TBili  0.8  /  DBili  x   /  AST  64[H]  /  ALT  15  /  AlkPhos  215[H]  01-28      WBC Trend:  WBC Count: 7.13 (01-28-25 @ 06:15)  WBC Count: 6.19 (01-27-25 @ 15:51)  WBC Count: 6.58 (01-27-25 @ 08:52)  WBC Count: 6.14 (01-26-25 @ 07:00)      Creatine Trend:  Creatinine: 0.9 (01-28)  Creatinine: 0.8 (01-27)  Creatinine: 0.9 (01-27)  Creatinine: 1.1 (01-26)      Liver Biochemical Testing Trend:  Alanine Aminotransferase (ALT/SGPT): 15 (01-28)  Alanine Aminotransferase (ALT/SGPT): 18 (01-27)  Alanine Aminotransferase (ALT/SGPT): 21 (01-24)  Alanine Aminotransferase (ALT/SGPT): 25 (01-23)  Aspartate Aminotransferase (AST/SGOT): 64 (01-28-25 @ 06:15)  Aspartate Aminotransferase (AST/SGOT): 60 (01-27-25 @ 15:51)  Aspartate Aminotransferase (AST/SGOT): 36 (01-24-25 @ 07:04)  Aspartate Aminotransferase (AST/SGOT): 42 (01-23-25 @ 17:55)  Bilirubin Total: 0.8 (01-28)  Bilirubin Total: 0.8 (01-27)  Bilirubin Total: 1.3 (01-24)  Bilirubin Total: 1.9 (01-23)      Trend LDH      Urinalysis Basic - ( 28 Jan 2025 06:15 )    Color: x / Appearance: x / SG: x / pH: x  Gluc: 64 mg/dL / Ketone: x  / Bili: x / Urobili: x   Blood: x / Protein: x / Nitrite: x   Leuk Esterase: x / RBC: x / WBC x   Sq Epi: x / Non Sq Epi: x / Bacteria: x        MICROBIOLOGY:        Culture - Blood (collected 25 Jan 2025 08:23)  Source: .Blood BLOOD  Final Report:    Growth in aerobic and anaerobic bottles: Staphylococcus aureus    See previous culture [id number]    Culture - Blood (collected 25 Jan 2025 08:23)  Source: .Blood BLOOD  Final Report:    Growth in aerobic and anaerobic bottles: Staphylococcus aureus    See previous culture [id number]    Culture - Blood (collected 23 Jan 2025 20:05)  Source: .Blood BLOOD  Final Report:    Growth in aerobic and anaerobic bottles: Staphylococcus aureus    Direct identification is available within approximately 3-5    hours either by Blood Panel Multiplexed PCR or Direct    MALDI-TOF. Details: [email or web address]  Organism: Blood Culture PCR  Staphylococcus aureus  Organism: Staphylococcus aureus    Sensitivities:      Method Type: LOUIS      -  Clindamycin: S <=0.25      -  Erythromycin: S <=0.25      -  Gentamicin: S <=4 Should not be used as monotherapy      -  Oxacillin: S <=0.25 Oxacillin predicts susceptibility for dicloxacillin, methicillin, and nafcillin      -  Penicillin: R >2      -  Rifampin: S <=1 Should not be used as monotherapy      -  Tetracycline: S <=4      -  Trimethoprim/Sulfamethoxazole: S <=0.5/9.5      -  Vancomycin: S 1  Organism: Blood Culture PCR    Sensitivities:      Method Type: PCR      -  Methicillin SENSITIVE Staphylococcus aureus (MSSA): Detec Any isolate of Staphylococcus aureus from a blood culture is NOT considered a contaminant.    Culture - Blood (collected 23 Jan 2025 20:05)  Source: .Blood BLOOD  Final Report:    Growth in aerobic and anaerobic bottles: Staphylococcus aureus    See previous culture [id number]    Culture - Urine (collected 23 Jan 2025 19:55)  Source: Clean Catch None  Final Report:    10,000 - 49,000 CFU/mL Staphylococcus aureus    50,000 - 99,000 CFU/mL Aerococcus urinae    Isolates of Aerococcus spp. are predictably susceptible to penicillin,    ampicillin, and vancomycin. All isolates are resistant to sulfonamides.  Organism: Staphylococcus aureus  Organism: Staphylococcus aureus    Sensitivities:      Method Type: LOUIS      -  Gentamicin: S <=4 Should not be used as monotherapy      -  Nitrofurantoin: S <=32      -  Oxacillin: S <=0.25 Oxacillin predicts susceptibility for dicloxacillin, methicillin, and nafcillin      -  Penicillin: R >2      -  Rifampin: S <=1 Should not be used as monotherapy      -  Tetracycline: S <=4      -  Trimethoprim/Sulfamethoxazole: S <=0.5/9.5      -  Vancomycin: S 1    Urinalysis with Rflx Culture (collected 23 Jan 2025 19:55)            Treponema Pallidum Antibody Interpretation: Negative (01-24-25 @ 07:04)                        Ferritin: 499 (01-28)          Troponin T, High Sensitivity Result: 31 (01-27)  Troponin T, High Sensitivity Result: 39 (01-24)  Troponin T, High Sensitivity Result: 43 (01-23)  Troponin T, High Sensitivity Result: 47 (01-23)    Blood Gas Arterial, Lactate: 1.1 (01-27 @ 14:17)      RADIOLOGY:  imaging below personally reviewed    < from: MR Head No Cont (01.27.25 @ 20:20) >  IMPRESSION:    No acute intracranial pathology.    Stable mild chronic microvascular ischemic changes.    < end of copied text >    < from: CT Elbow w/ IV Cont, Left (01.26.25 @ 22:08) >  IMPRESSION:    Very limited exam. Artifact. Portions of the elbow are not imaged.    Diffuse soft tissue edema which may reflect cellulitis in the right   clinical setting. No definite discrete fluid collection.    Arthritic changes of the elbow with no definite radiographic evidence of   osteomyelitis.    < end of copied text >

## 2025-01-28 NOTE — CONSULT NOTE ADULT - SUBJECTIVE AND OBJECTIVE BOX
HPI: 80-year-old male with past medical history of insulin-dependent diabetes, hypertension, subdural hematoma on keppra who presents for multiple falls, weakness, confusion. Per daughter at bedside, he has becoming more intermittently confused, has had multiple falls over the past 2 months (last fall was yesterday). He had a fall while getting out of bed, does not recall how it happened. Complains of back pain since then. He was admitted in 2022 for left elbow swelling and cellulitis. Now states the pain and swelling have returned. Per daughter, he has been urinating more frequently for the past 2 days. No fevers, chills, CP, SOB, palpitations, n/v/d, abdominal pain, hip pain.  denies any numbness, weakness, change in BM, urinary pattern or pelvic numbness  ptn seen and exam  at  bed  side  nad  no new c.o  ptn  labs  , imaging  medical and  rehab  notes  are appreciated  and  reviewed .    PTN  REFERRED TO ACUTE  REHAB  FOR  EVAL AND  TX   PAST MEDICAL & SURGICAL HISTORY:  Diabetes      Hypertension      Fall      H/O left knee surgery      History of cholecystectomy      History of appendectomy          Hospital Course:    TODAY'S SUBJECTIVE & REVIEW OF SYMPTOMS:     Constitutional WNL   Cardio WNL   Resp WNL   GI WNL  Heme WNL  Endo WNL  Skin WNL  MSK WNL  Neuro WNL  Cognitive WNL  Psych WNL      MEDICATIONS  (STANDING):  acetaminophen     Tablet .. 650 milliGRAM(s) Oral every 6 hours  aspirin enteric coated 81 milliGRAM(s) Oral daily  atorvastatin 80 milliGRAM(s) Oral at bedtime  bacitracin   Ointment 1 Application(s) Topical two times a day  ceFAZolin   IVPB 2000 milliGRAM(s) IV Intermittent every 8 hours  chlorhexidine 2% Cloths 1 Application(s) Topical <User Schedule>  clopidogrel Tablet 75 milliGRAM(s) Oral daily  dextrose 5%. 1000 milliLiter(s) (50 mL/Hr) IV Continuous <Continuous>  dextrose 50% Injectable 25 Gram(s) IV Push once  dextrose 50% Injectable 12.5 Gram(s) IV Push once  dextrose 50% Injectable 25 Gram(s) IV Push once  enoxaparin Injectable 40 milliGRAM(s) SubCutaneous every 24 hours  folic acid 1 milliGRAM(s) Oral daily  gabapentin 300 milliGRAM(s) Oral every 8 hours  glucagon  Injectable 1 milliGRAM(s) IntraMuscular once  insulin lispro (ADMELOG) corrective regimen sliding scale   SubCutaneous three times a day before meals  insulin lispro Injectable (ADMELOG) 6 Unit(s) SubCutaneous three times a day before meals  levETIRAcetam 500 milliGRAM(s) Oral two times a day  lidocaine   4% Patch 1 Patch Transdermal every 24 hours  lidocaine   4% Patch 1 Patch Transdermal daily  losartan 50 milliGRAM(s) Oral daily  polyethylene glycol 3350 17 Gram(s) Oral two times a day  senna 2 Tablet(s) Oral at bedtime  sodium chloride 0.9%. 1000 milliLiter(s) (75 mL/Hr) IV Continuous <Continuous>  Tresiba Flextouch (insulin degludec) 63 Unit(s) 63 Unit(s) SubCutaneous at bedtime  venlafaxine XR. 75 milliGRAM(s) Oral daily    MEDICATIONS  (PRN):  aluminum hydroxide/magnesium hydroxide/simethicone Suspension 30 milliLiter(s) Oral every 4 hours PRN Dyspepsia  cyclobenzaprine 5 milliGRAM(s) Oral every 8 hours PRN Muscle Spasm  dextrose Oral Gel 15 Gram(s) Oral once PRN Blood Glucose LESS THAN 70 milliGRAM(s)/deciliter  melatonin 5 milliGRAM(s) Oral at bedtime PRN Insomnia  morphine  - Injectable 2 milliGRAM(s) IV Push every 4 hours PRN Moderate Pain (4 - 6)  nystatin Powder 1 Application(s) Topical every 12 hours PRN fungal rash  ondansetron Injectable 4 milliGRAM(s) IV Push every 8 hours PRN Nausea and/or Vomiting  oxyCODONE    IR 10 milliGRAM(s) Oral three times a day PRN Severe Pain (7 - 10)      FAMILY HISTORY:      Allergies    No Known Allergies    Intolerances        SOCIAL HISTORY:    [  ] Etoh  [  ] Smoking  [  ] Substance abuse     Home Environment:  [  ] Home Alone  [x  ] Lives with Family  [  ] Home Health Aid    Dwelling:  [  ] Apartment  [x  ] Private House  [  ] Adult Home  [  ] Skilled Nursing Facility      [  ] Short Term  [  ] Long Term  [  ] Stairs       Elevator [  ]    FUNCTIONAL STATUS PTA: (Check all that apply)  Ambulation: [ x  ]Independent    [  ] Dependent     [  ] Non-Ambulatory  Assistive Device: [ x ] SA Cane  [  ]  Q Cane  [  ] Walker  [  ]  Wheelchair  ADL : [ x ] Independent  [  ]  Dependent       Vital Signs Last 24 Hrs  T(C): 35.6 (28 Jan 2025 07:01), Max: 36.4 (27 Jan 2025 13:19)  T(F): 96 (28 Jan 2025 07:01), Max: 97.5 (27 Jan 2025 13:19)  HR: 72 (28 Jan 2025 08:01) (63 - 82)  BP: 114/53 (28 Jan 2025 08:01) (113/54 - 194/84)  BP(mean): 76 (28 Jan 2025 08:01) (76 - 120)  RR: 15 (28 Jan 2025 08:01) (13 - 20)  SpO2: 94% (28 Jan 2025 08:01) (94% - 99%)    Parameters below as of 28 Jan 2025 08:01  Patient On (Oxygen Delivery Method): room air          PHYSICAL EXAM: Alert & Oriented X3  GENERAL: NAD, well-groomed, well-developed  HEAD:  Atraumatic, Normocephalic  EYES: EOMI, PERRLA, conjunctiva and sclera clear  NECK: Supple, No JVD, Normal thyroid  CHEST/LUNG: Clear to percussion bilaterally; No rales, rhonchi, wheezing, or rubs  HEART: Regular rate and rhythm; No murmurs, rubs, or gallops  ABDOMEN: Soft, Nontender, Nondistended; Bowel sounds present  EXTREMITIES:  2+ Peripheral Pulses, No clubbing, cyanosis, or edema    NERVOUS SYSTEM:  Cranial Nerves 2-12 intact [ x ] Abnormal  [  ]  ROM: WFL all extremities [ x ]  Abnormal [  ]  Motor Strength: WFL all extremities  [ x ]  Abnormal [  ]  Sensation: intact to light touch [ x ] Abnormal [  ]  Reflexes: Symmetric [ x ]  Abnormal [  ]    FUNCTIONAL STATUS:  Bed Mobility: Independent [  ]  Supervision [  ]  Needs Assistance [ x ]  N/A [  ]  Transfers: Independent [  ]  Supervision [  ]  Needs Assistance [x  ]  N/A [  ]   Ambulation: Independent [  ]  Supervision [  ]  Needs Assistance [  x]  N/A [  ]  ADL: Independent [  ] Requires Assistance [  ] N/A [x  ]  SEE PT/OT IE NOTES    LABS:                        10.7   7.13  )-----------( 158      ( 28 Jan 2025 06:15 )             31.3     01-28    139  |  107  |  23[H]  ----------------------------<  64[L]  4.1   |  23  |  0.9    Ca    7.9[L]      28 Jan 2025 06:15  Mg     1.7     01-28    TPro  5.4[L]  /  Alb  1.9[L]  /  TBili  0.8  /  DBili  x   /  AST  64[H]  /  ALT  15  /  AlkPhos  215[H]  01-28    PT/INR - ( 28 Jan 2025 06:15 )   PT: 16.50 sec;   INR: 1.39 ratio         PTT - ( 27 Jan 2025 15:51 )  PTT:40.3 sec  Urinalysis Basic - ( 28 Jan 2025 06:15 )    Color: x / Appearance: x / SG: x / pH: x  Gluc: 64 mg/dL / Ketone: x  / Bili: x / Urobili: x   Blood: x / Protein: x / Nitrite: x   Leuk Esterase: x / RBC: x / WBC x   Sq Epi: x / Non Sq Epi: x / Bacteria: x        RADIOLOGY & ADDITIONAL STUDIES: < from: CT Brain Stroke Protocol (01.27.25 @ 14:38) >  IMPRESSION:    No acute territorial infarct, intracranial hemorrhage, or midline shift.   Further evaluation with MRI can be considered if the symptoms persist (if   no contraindication).      < end of copied text >      Assesment:

## 2025-01-28 NOTE — OCCUPATIONAL THERAPY INITIAL EVALUATION ADULT - IMPAIRED TRANSFERS: SIT/STAND, REHAB EVAL
decreased endurance; decreased weightbearing LUE; 8/10 lower back/impaired balance/impaired coordination/pain/impaired postural control/decreased ROM/decreased strength

## 2025-01-28 NOTE — PROGRESS NOTE ADULT - ASSESSMENT
80-year-old male with past medical history of insulin-dependent diabetes, hypertension, subdural hematoma on keppra who presents for multiple falls, weakness, confusion. Per daughter at bedside, he has becoming more intermittently confused, has had multiple falls over the past 2 months (last fall was yesterday). He had a fall while getting out of bed, does not recall how it happened. Complains of back pain since then. He was admitted in 2022 for left elbow swelling and cellulitis. Now states the pain and swelling have returned. Per daughter, he has been urinating more frequently for the past 2 days. No fevers, chills, CP, SOB, palpitations, n/v/d, abdominal pain, hip pain.    #urinary tract infection  -c/w abx   -follow up cultures    # AMS likely TME  #hx of subdural hematoma  CT head noted  -continue aspirin   -TTE as below - stable EF, no obvious thrombus  -MRI negative  - f/u neurology    #staph aureus MSSA bacteremia, origin left forearm cellulitis?  #Questionable left forearm cellulitis - low suspicion for septic joint - no pain with elbow movement, no swelling of olecranon, swelling at the forearm area   Abx per ID  repeat Bcx      Left elbow swelling  Ct elbow noted  probable cellulitis  on Iv abs     #demand ischemia in the setting of active infection  -EKG shows no acute ischemic changes.  trop level flat  - trend trops  - ECHO 1/24: 1. LV Ejection Fraction by Pan's Method with a biplane EF of 66 %.   2. Moderately enlarged left atrium.   3. Mild mitral annular calcification.   4. Sclerotic aortic valve with normal opening.   5. Ascending aorta 3.8 cm.  -Asymptomatic     # Weakness / Frequent falls  - PT pending   - RTF  -      # CT: Pancreatic tail hypodenisty  - Gastro consult - can follow up outpatient with GI for MRCP  - RUQ US no obvious acute findings   - trend labs    back pain  -no incontinence  -known back pain  -flexeril, ibupforen standing, tylenol standing, lidocaine patches, prn oxycodone, standing venlafaxine    depression  -start venlaxafine ER which will help with pts neuropathy as well   -no SI, HSI    # DM  # hyperglycemia  - hold PO meds  - FS with ISS  - daughter wants to bring in home trulicity - 63 units nightly injection  - lispro 4 units with meals -> increased to 6 units   -nph 10 units bid    # HTN  - VS  - losartan 50 (increased from 25 on AM 1/27)    possible downgrade tele waiting final neuro recs on neuro check frequency 80-year-old male with past medical history of insulin-dependent diabetes, hypertension, subdural hematoma on keppra who presents for multiple falls, weakness, confusion. Per daughter at bedside, he has becoming more intermittently confused, has had multiple falls over the past 2 months (last fall was yesterday). He had a fall while getting out of bed, does not recall how it happened. Complains of back pain since then. He was admitted in 2022 for left elbow swelling and cellulitis. Now states the pain and swelling have returned. Per daughter, he has been urinating more frequently for the past 2 days. No fevers, chills, CP, SOB, palpitations, n/v/d, abdominal pain, hip pain.    #urinary tract infection  -c/w abx   -follow up cultures    # AMS likely TME  #hx of subdural hematoma  CT head noted  -continue aspirin   -TTE as below - stable EF, no obvious thrombus  -MRI negative  - f/u neurology    #staph aureus MSSA bacteremia, origin left forearm cellulitis?  #Questionable left forearm cellulitis - low suspicion for septic joint - no pain with elbow movement, no swelling of olecranon, swelling at the forearm area   Abx per ID  repeat Bcx      Left elbow swelling  Ct elbow noted  probable cellulitis  on Iv abs     #demand ischemia in the setting of active infection  -EKG shows no acute ischemic changes.  trop level flat  - trend trops  - ECHO 1/24: 1. LV Ejection Fraction by Pan's Method with a biplane EF of 66 %.   2. Moderately enlarged left atrium.   3. Mild mitral annular calcification.   4. Sclerotic aortic valve with normal opening.   5. Ascending aorta 3.8 cm.  -Asymptomatic     # Weakness / Frequent falls  - PT pending   - RTF  -      # CT: Pancreatic tail hypodenisty  - Gastro consult - can follow up outpatient with GI for MRCP  - RUQ US no obvious acute findings   - trend labs    back pain  -no incontinence  -known back pain  -flexeril, ibupforen standing, tylenol standing, lidocaine patches, prn oxycodone, standing venlafaxine    depression  -start venlaxafine ER which will help with pts neuropathy as well   -no SI, HSI    # DM  # hyperglycemia  - hold PO meds  - FS with ISS  - daughter wants to bring in home trulicity - 63 units nightly injection  - lispro 4 units with meals -> increased to 6 units   -nph 10 units bid    # HTN  - VS  - losartan 50 (increased from 25 on AM 1/27)    DGTF

## 2025-01-28 NOTE — SWALLOW BEDSIDE ASSESSMENT ADULT - SWALLOW EVAL: FUNCTIONAL LEVEL AT TIME OF EVAL
Per family at b/s pt. has h/o choking on all PO intake at home. Denies recurrent PNA or weight loss.

## 2025-01-28 NOTE — OCCUPATIONAL THERAPY INITIAL EVALUATION ADULT - RANGE OF MOTION EXAMINATION
[facility] RADIATION ONCOLOGY   TREATMENT SUMMARY    PATIENT:  Olga Tolentino MD: Dr. 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
(R) AROM shoulder, elbow, wrist WFL; (L) AROM shoulder 1/4 range, elbow 3/4 range, wrist WFL. (L) PROM shoulder 1/2 range, elbow WFL/deficits as listed below

## 2025-01-28 NOTE — OCCUPATIONAL THERAPY INITIAL EVALUATION ADULT - LIVES WITH, PROFILE
wife and son in a private home +3 steps to enter with (B) handrails +12-15 steps with (R) handrail +walk-in-shower +shower chair +standard toilet/children/spouse

## 2025-01-28 NOTE — PHARMACOTHERAPY INTERVENTION NOTE - COMMENTS
Recommended to obtain a repeat blood culture to assess for bacteremia clearance.
Patient on subQ heparin 5000 units q12h for DVT prophylaxis with a crcl or 98 and a BMI of 39.2 kg/m2. Recommend to switch to Lovenox 40mg q24h

## 2025-01-28 NOTE — OCCUPATIONAL THERAPY INITIAL EVALUATION ADULT - GENERAL OBSERVATIONS, REHAB EVAL
13:40-14:05; chart reviewed, ok to treat by Occupational Therapist as confirmed by NOLAN Vergara, Pt received seated in ASU recliner +tele +BP cuff +pulse ox +IV +external urine collection device with PT present in NAD. Pt reported 8/10 lower back pain; RN aware. Pt in agreement with OT IE.

## 2025-01-29 LAB
ALBUMIN SERPL ELPH-MCNC: 2 G/DL — LOW (ref 3.5–5.2)
ALP SERPL-CCNC: 222 U/L — HIGH (ref 30–115)
ALT FLD-CCNC: 13 U/L — SIGNIFICANT CHANGE UP (ref 0–41)
ANION GAP SERPL CALC-SCNC: 9 MMOL/L — SIGNIFICANT CHANGE UP (ref 7–14)
AST SERPL-CCNC: 61 U/L — HIGH (ref 0–41)
BASOPHILS # BLD AUTO: 0.05 K/UL — SIGNIFICANT CHANGE UP (ref 0–0.2)
BASOPHILS NFR BLD AUTO: 0.8 % — SIGNIFICANT CHANGE UP (ref 0–1)
BILIRUB SERPL-MCNC: 0.7 MG/DL — SIGNIFICANT CHANGE UP (ref 0.2–1.2)
BUN SERPL-MCNC: 22 MG/DL — HIGH (ref 10–20)
CALCIUM SERPL-MCNC: 7.6 MG/DL — LOW (ref 8.4–10.5)
CHLORIDE SERPL-SCNC: 107 MMOL/L — SIGNIFICANT CHANGE UP (ref 98–110)
CO2 SERPL-SCNC: 22 MMOL/L — SIGNIFICANT CHANGE UP (ref 17–32)
CREAT SERPL-MCNC: 0.8 MG/DL — SIGNIFICANT CHANGE UP (ref 0.7–1.5)
EGFR: 91 ML/MIN/1.73M2 — SIGNIFICANT CHANGE UP
EOSINOPHIL # BLD AUTO: 0.2 K/UL — SIGNIFICANT CHANGE UP (ref 0–0.7)
EOSINOPHIL NFR BLD AUTO: 3.2 % — SIGNIFICANT CHANGE UP (ref 0–8)
GLUCOSE SERPL-MCNC: 64 MG/DL — LOW (ref 70–99)
HCT VFR BLD CALC: 29 % — LOW (ref 42–52)
HGB BLD-MCNC: 9.8 G/DL — LOW (ref 14–18)
IMM GRANULOCYTES NFR BLD AUTO: 2.1 % — HIGH (ref 0.1–0.3)
LYMPHOCYTES # BLD AUTO: 0.9 K/UL — LOW (ref 1.2–3.4)
LYMPHOCYTES # BLD AUTO: 14.5 % — LOW (ref 20.5–51.1)
MAGNESIUM SERPL-MCNC: 1.7 MG/DL — LOW (ref 1.8–2.4)
MCHC RBC-ENTMCNC: 32.7 PG — HIGH (ref 27–31)
MCHC RBC-ENTMCNC: 33.8 G/DL — SIGNIFICANT CHANGE UP (ref 32–37)
MCV RBC AUTO: 96.7 FL — HIGH (ref 80–94)
MONOCYTES # BLD AUTO: 0.75 K/UL — HIGH (ref 0.1–0.6)
MONOCYTES NFR BLD AUTO: 12.1 % — HIGH (ref 1.7–9.3)
NEUTROPHILS # BLD AUTO: 4.17 K/UL — SIGNIFICANT CHANGE UP (ref 1.4–6.5)
NEUTROPHILS NFR BLD AUTO: 67.3 % — SIGNIFICANT CHANGE UP (ref 42.2–75.2)
NRBC # BLD: 0 /100 WBCS — SIGNIFICANT CHANGE UP (ref 0–0)
NRBC BLD-RTO: 0 /100 WBCS — SIGNIFICANT CHANGE UP (ref 0–0)
PLATELET # BLD AUTO: 161 K/UL — SIGNIFICANT CHANGE UP (ref 130–400)
PMV BLD: 8.5 FL — SIGNIFICANT CHANGE UP (ref 7.4–10.4)
POTASSIUM SERPL-MCNC: 4.3 MMOL/L — SIGNIFICANT CHANGE UP (ref 3.5–5)
POTASSIUM SERPL-SCNC: 4.3 MMOL/L — SIGNIFICANT CHANGE UP (ref 3.5–5)
PROT SERPL-MCNC: 5.4 G/DL — LOW (ref 6–8)
RBC # BLD: 3 M/UL — LOW (ref 4.7–6.1)
RBC # FLD: 13.6 % — SIGNIFICANT CHANGE UP (ref 11.5–14.5)
SODIUM SERPL-SCNC: 138 MMOL/L — SIGNIFICANT CHANGE UP (ref 135–146)
WBC # BLD: 6.2 K/UL — SIGNIFICANT CHANGE UP (ref 4.8–10.8)
WBC # FLD AUTO: 6.2 K/UL — SIGNIFICANT CHANGE UP (ref 4.8–10.8)

## 2025-01-29 PROCEDURE — 72157 MRI CHEST SPINE W/O & W/DYE: CPT | Mod: 26

## 2025-01-29 PROCEDURE — 99233 SBSQ HOSP IP/OBS HIGH 50: CPT

## 2025-01-29 PROCEDURE — 72158 MRI LUMBAR SPINE W/O & W/DYE: CPT | Mod: 26

## 2025-01-29 RX ORDER — BISACODYL 5 MG
10 TABLET, DELAYED RELEASE (ENTERIC COATED) ORAL ONCE
Refills: 0 | Status: COMPLETED | OUTPATIENT
Start: 2025-01-29 | End: 2025-01-29

## 2025-01-29 RX ORDER — FECAL MICROBIOTA SPORES, LIVE-BRPK 30000000 [CFU]/1
4 CAPSULE ORAL
Qty: 12 | Refills: 0
Start: 2025-01-29 | End: 2025-01-31

## 2025-01-29 RX ADMIN — ENOXAPARIN SODIUM 40 MILLIGRAM(S): 100 INJECTION SUBCUTANEOUS at 22:27

## 2025-01-29 RX ADMIN — LEVETIRACETAM 500 MILLIGRAM(S): 750 TABLET, FILM COATED ORAL at 18:35

## 2025-01-29 RX ADMIN — ACETAMINOPHEN 650 MILLIGRAM(S): 160 SUSPENSION ORAL at 18:57

## 2025-01-29 RX ADMIN — LIDOCAINE HYDROCHLORIDE 1 PATCH: 30 CREAM TOPICAL at 06:37

## 2025-01-29 RX ADMIN — Medication 100 MILLIGRAM(S): at 05:12

## 2025-01-29 RX ADMIN — ACETAMINOPHEN 650 MILLIGRAM(S): 160 SUSPENSION ORAL at 18:35

## 2025-01-29 RX ADMIN — POLYETHYLENE GLYCOL 3350 17 GRAM(S): 17 POWDER, FOR SOLUTION ORAL at 05:17

## 2025-01-29 RX ADMIN — Medication 50 MILLIGRAM(S): at 05:17

## 2025-01-29 RX ADMIN — LIDOCAINE HYDROCHLORIDE 1 PATCH: 30 CREAM TOPICAL at 22:28

## 2025-01-29 RX ADMIN — ACETAMINOPHEN 650 MILLIGRAM(S): 160 SUSPENSION ORAL at 12:18

## 2025-01-29 RX ADMIN — ACETAMINOPHEN 650 MILLIGRAM(S): 160 SUSPENSION ORAL at 05:17

## 2025-01-29 RX ADMIN — ATORVASTATIN CALCIUM 80 MILLIGRAM(S): 80 TABLET, FILM COATED ORAL at 22:27

## 2025-01-29 RX ADMIN — NYSTATIN 1 APPLICATION(S): 100000 POWDER TOPICAL at 06:37

## 2025-01-29 RX ADMIN — Medication 100 MILLIGRAM(S): at 22:27

## 2025-01-29 RX ADMIN — Medication 100 MILLIGRAM(S): at 12:21

## 2025-01-29 RX ADMIN — Medication 1 APPLICATION(S): at 05:17

## 2025-01-29 RX ADMIN — Medication 10 MILLIGRAM(S): at 18:38

## 2025-01-29 RX ADMIN — POLYETHYLENE GLYCOL 3350 17 GRAM(S): 17 POWDER, FOR SOLUTION ORAL at 18:35

## 2025-01-29 RX ADMIN — LIDOCAINE HYDROCHLORIDE 1 PATCH: 30 CREAM TOPICAL at 03:00

## 2025-01-29 RX ADMIN — Medication 1 APPLICATION(S): at 18:35

## 2025-01-29 RX ADMIN — GABAPENTIN 300 MILLIGRAM(S): 800 TABLET ORAL at 22:27

## 2025-01-29 RX ADMIN — GABAPENTIN 300 MILLIGRAM(S): 800 TABLET ORAL at 12:25

## 2025-01-29 RX ADMIN — Medication 5 MILLIGRAM(S): at 16:14

## 2025-01-29 RX ADMIN — ANTISEPTIC SURGICAL SCRUB 1 APPLICATION(S): 0.04 SOLUTION TOPICAL at 05:16

## 2025-01-29 RX ADMIN — LIDOCAINE HYDROCHLORIDE 1 PATCH: 30 CREAM TOPICAL at 18:58

## 2025-01-29 RX ADMIN — Medication 1 MILLIGRAM(S): at 12:20

## 2025-01-29 RX ADMIN — LIDOCAINE HYDROCHLORIDE 1 PATCH: 30 CREAM TOPICAL at 12:19

## 2025-01-29 RX ADMIN — LEVETIRACETAM 500 MILLIGRAM(S): 750 TABLET, FILM COATED ORAL at 05:17

## 2025-01-29 RX ADMIN — Medication 2 TABLET(S): at 22:27

## 2025-01-29 RX ADMIN — ACETAMINOPHEN 650 MILLIGRAM(S): 160 SUSPENSION ORAL at 12:48

## 2025-01-29 RX ADMIN — ASPIRIN 81 MILLIGRAM(S): 81 TABLET, COATED ORAL at 12:18

## 2025-01-29 NOTE — PROGRESS NOTE ADULT - SUBJECTIVE AND OBJECTIVE BOX
--------------------------------------------------------------------------------    24 hour events/subjective:   NAD F/U skin assessment           ROS: pt unable to offer    ALLERGIES & MEDICATIONS  --------------------------------------------------------------------------------  Allergies    No Known Allergies    Intolerances          STANDING INPATIENT MEDICATIONS    acetaminophen     Tablet .. 650 milliGRAM(s) Oral every 6 hours  aspirin enteric coated 81 milliGRAM(s) Oral daily  atorvastatin 80 milliGRAM(s) Oral at bedtime  bacitracin   Ointment 1 Application(s) Topical two times a day  ceFAZolin   IVPB 2000 milliGRAM(s) IV Intermittent every 8 hours  chlorhexidine 2% Cloths 1 Application(s) Topical <User Schedule>  dextrose 5%. 1000 milliLiter(s) IV Continuous <Continuous>  dextrose 50% Injectable 25 Gram(s) IV Push once  dextrose 50% Injectable 12.5 Gram(s) IV Push once  dextrose 50% Injectable 25 Gram(s) IV Push once  enoxaparin Injectable 40 milliGRAM(s) SubCutaneous every 24 hours  folic acid 1 milliGRAM(s) Oral daily  gabapentin 300 milliGRAM(s) Oral every 8 hours  glucagon  Injectable 1 milliGRAM(s) IntraMuscular once  insulin lispro (ADMELOG) corrective regimen sliding scale   SubCutaneous three times a day before meals  levETIRAcetam 500 milliGRAM(s) Oral two times a day  lidocaine   4% Patch 1 Patch Transdermal every 24 hours  lidocaine   4% Patch 1 Patch Transdermal daily  losartan 50 milliGRAM(s) Oral daily  polyethylene glycol 3350 17 Gram(s) Oral two times a day  senna 2 Tablet(s) Oral at bedtime  sodium chloride 0.9%. 1000 milliLiter(s) IV Continuous <Continuous>  Tresiba (Insulin Degludec) 20 Unit(s) 20 Unit(s) SubCutaneous at bedtime  venlafaxine XR. 75 milliGRAM(s) Oral daily      PRN INPATIENT MEDICATION  aluminum hydroxide/magnesium hydroxide/simethicone Suspension 30 milliLiter(s) Oral every 4 hours PRN  cyclobenzaprine 5 milliGRAM(s) Oral every 8 hours PRN  dextrose Oral Gel 15 Gram(s) Oral once PRN  melatonin 5 milliGRAM(s) Oral at bedtime PRN  morphine  - Injectable 2 milliGRAM(s) IV Push every 4 hours PRN  nystatin Powder 1 Application(s) Topical every 12 hours PRN  ondansetron Injectable 4 milliGRAM(s) IV Push every 8 hours PRN  oxyCODONE    IR 10 milliGRAM(s) Oral three times a day PRN        VITALS/PHYSICAL EXAM-   --------------------------------------------------------------------------------  T(C): 36.7 (01-29-25 @ 05:22), Max: 36.8 (01-28-25 @ 21:35)  HR: 76 (01-29-25 @ 05:22) (74 - 80)  BP: 131/72 (01-29-25 @ 05:22) (131/72 - 167/77)  RR: 18 (01-29-25 @ 05:22) (18 - 22)  SpO2: 95% (01-29-25 @ 05:22) (95% - 98%)  Wt(kg): --              LABS/ CULTURES/ RADIOLOGY:              9.8    6.20  >-----------<  161      [01-29-25 @ 07:35]              29.0     138  |  107  |  22  ----------------------------<  64      [01-29-25 @ 07:35]  4.3   |  22  |  0.8        Ca     7.6     [01-29-25 @ 07:35]      Mg     1.7     [01-29-25 @ 07:35]    TPro  5.4  /  Alb  2.0  /  TBili  0.7  /  DBili  x   /  AST  61  /  ALT  13  /  AlkPhos  222  [01-29-25 @ 07:35]    PT/INR: PT 16.50, INR 1.39       [01-28-25 @ 06:15]  PTT: 40.3       [01-27-25 @ 15:51]      Blood Gas Arterial - Calcium, Ionized: 1.24 mmol/L (01-27-25 @ 14:17)      CAPILLARY BLOOD GLUCOSE      POCT Blood Glucose.: 87 mg/dL (29 Jan 2025 11:43)  POCT Blood Glucose.: 69 mg/dL (29 Jan 2025 07:36)  POCT Blood Glucose.: 89 mg/dL (29 Jan 2025 00:04)  POCT Blood Glucose.: 107 mg/dL (28 Jan 2025 21:17)  POCT Blood Glucose.: 119 mg/dL (28 Jan 2025 16:37)                  Culture - Blood (collected 01-27-25 @ 19:13)  Source: .Blood BLOOD  Preliminary Report (01-29-25 @ 03:01):    No growth at 24 hours    Culture - Blood (collected 01-27-25 @ 19:13)  Source: .Blood BLOOD  Preliminary Report (01-29-25 @ 03:01):    No growth at 24 hours    Culture - Blood (collected 01-25-25 @ 08:23)  Source: .Blood BLOOD  Gram Stain (01-26-25 @ 17:31):    Growth in aerobic bottle: Gram Positive Cocci in Clusters    Growth in anaerobic bottle: Gram Positive Cocci in Clusters  Final Report (01-27-25 @ 14:42):    Growth in aerobic and anaerobic bottles: Staphylococcus aureus    See previous culture [id number]          A1C with Estimated Average Glucose Result: 7.6 % (01-24-25 @ 07:04)

## 2025-01-29 NOTE — PROGRESS NOTE ADULT - ASSESSMENT
High risk for pressure injury development or progression   Skin assessed- Ulceration noted to perineum and scrotal area - etiology  unknown - minimal bleeding noted at time of assessment                          No odor,  increased warmth, tenderness, induration, fluctuance, nor crepitus            Wound and skin care recs.   Continue current treatment   Clean perineum and scrotal area with soap and water, pat dry  apply triad hydrophilic dressing   Apply pressure dressing if bleeding continuos    Pressure  injury  preventive  measures  Skin  and incontinence care   Assess skin  and inform primary provider of any changes   Case discussed with primary Rn   Wound/ ostomy specialist  to f/u as needed     Offloading: [x ] Frequent position changes [ ] Devices/Equipment  Cleansing: [ ] Saline [ ] Soap/Water [ ] Other: ______  Topicals: [ ] Barrier Cream [ ] Antimicrobial [ ] Enzymatic Wound Debridement [] Moist  wound Healing   Dressings: [ ] Dry, sterile [ ] Allevyn  Foam [ ] Absorbant Pads [ ] Collagenase    Other Recs.   Per Primary team   Total time for bedside assessment  , review of medical records  and  discussion of plan of care with primary team greater than 35 min

## 2025-01-29 NOTE — SWALLOW BEDSIDE ASSESSMENT ADULT - ORAL PHASE
Decreased anterior-posterior movement of the bolus/Delayed oral transit time
Within functional limits
Within functional limits

## 2025-01-29 NOTE — SWALLOW BEDSIDE ASSESSMENT ADULT - ADDITIONAL RECOMMENDATIONS
MRI results negative, SLP d/c reconsult PRN
SLP will f/u to ensure safe PO tolerance
SLP will f/u pending MRI

## 2025-01-29 NOTE — PROGRESS NOTE ADULT - SUBJECTIVE AND OBJECTIVE BOX
DWIGHT RIBEIRO  [facility] 021 A ([facility])            Patient was evaluated and examined  by bedside,                 REVIEW OF SYSTEMS:  please see pertinent positives mentioned above, all other 12 ROS negative      T(C): , Max: 36.8 (01-28-25 @ 21:35)  HR: 76 (01-29-25 @ 05:22)  BP: 131/72 (01-29-25 @ 05:22)  RR: 18 (01-29-25 @ 05:22)  SpO2: 95% (01-29-25 @ 05:22)  CAPILLARY BLOOD GLUCOSE      POCT Blood Glucose.: 69 mg/dL (29 Jan 2025 07:36)  POCT Blood Glucose.: 89 mg/dL (29 Jan 2025 00:04)  POCT Blood Glucose.: 107 mg/dL (28 Jan 2025 21:17)  POCT Blood Glucose.: 119 mg/dL (28 Jan 2025 16:37)  POCT Blood Glucose.: 79 mg/dL (28 Jan 2025 11:12)      PHYSICAL EXAM:  General: NAD, comfortable in bed  HEENT: NCAT  Lungs: No increased WOB  CVS: RRR  Abdomen: , non-distended  Extremities: no edema        LAB  CBC  Date: 01-28-25 @ 06:15  Mean cell Rfkhktfici95.8  Mean cell Hemoglobin Conc34.2  Mean cell Volum 96.0  Platelet count-Automate 158  RBC Count 3.26  Red Cell Distrib Width13.3  WBC Count7.13  % Albumin, Urine--  Hematocrit 31.3  Hemoglobin 10.7  CBC  Date: 01-27-25 @ 15:51  Mean cell Nuoekajzxf21.2  Mean cell Hemoglobin Conc33.7  Mean cell Volum 95.7  Platelet count-Automate 147  RBC Count 3.01  Red Cell Distrib Width13.2  WBC Count6.19  % Albumin, Urine--  Hematocrit 28.8  Hemoglobin 9.7  CBC  Date: 01-27-25 @ 08:52  Mean cell Alsfdjwwyj12.6  Mean cell Hemoglobin Conc33.9  Mean cell Volum 96.4  Platelet count-Automate 161  RBC Count 3.34  Red Cell Distrib Width13.3  WBC Count6.58  % Albumin, Urine--  Hematocrit 32.2  Hemoglobin 10.9  CBC  Date: 01-26-25 @ 07:00  Mean cell Wcqqzmnxkf90.6  Mean cell Hemoglobin Conc33.4  Mean cell Volum 97.4  Platelet count-Automate 153  RBC Count 3.04  Red Cell Distrib Width13.7  WBC Count6.14  % Albumin, Urine--  Hematocrit 29.6  Hemoglobin 9.9  CBC  Date: 01-25-25 @ 08:23  Mean cell Lnjjjqzmqr59.1  Mean cell Hemoglobin Conc33.2  Mean cell Volum 96.6  Platelet count-Automate 151  RBC Count 2.90  Red Cell Distrib Width13.3  WBC Count7.01  % Albumin, Urine--  Hematocrit 28.0  Hemoglobin 9.3  CBC  Date: 01-24-25 @ 07:04  Mean cell Plkunxjnya11.5  Mean cell Hemoglobin Conc33.7  Mean cell Volum 96.5  Platelet count-Automate 157  RBC Count 3.11  Red Cell Distrib Width13.6  WBC Count8.34  % Albumin, Urine--  Hematocrit 30.0  Hemoglobin 10.1  CBC  Date: 01-23-25 @ 17:55  Mean cell Wcqgvuqwca58.6  Mean cell Hemoglobin Conc33.6  Mean cell Volum 97.1  Platelet count-Automate 175  RBC Count 3.40  Red Cell Distrib Width13.7  WBC Count10.13  % Albumin, Urine--  Hematocrit 33.0  Hemoglobin 11.1    [facility]  01-28-25 @ 06:15  Blood Gas Arterial-Calcium,Ionized--  Blood Urea Nitrogen, Serum 23 mg/dL[H] [10 - 20]  Carbon Dioxide, Serum23 mmol/L [17 - 32]  Chloride, Aynmj171 mmol/L [98 - 110]  Creatinie, Serum0.9 mg/dL [0.7 - 1.5]  Glucose, Serum64 mg/dL[L] [70 - 99]  Potassium, Serum4.1 mmol/L [3.5 - 5.0]  Sodium, Serum 139 mmol/L [135 - 146]  [facility]  01-27-25 @ 15:51  Blood Gas Arterial-Calcium,Ionized--  Blood Urea Nitrogen, Serum 27 mg/dL[H] [10 - 20]  Carbon Dioxide, Serum21 mmol/L [17 - 32]  Chloride, Ifnir860 mmol/L [98 - 110]  Creatinie, Serum0.8 mg/dL [0.7 - 1.5]  Glucose, Fbqdf612 mg/dL[H] [70 - 99]  Potassium, Serum4.1 mmol/L [3.5 - 5.0]  Sodium, Serum 138 mmol/L [135 - 146]  [facility]  01-27-25 @ 14:17  Blood Gas Arterial-Calcium,Ionized1.24 mmol/L [1.15 - 1.33]  Blood Urea Nitrogen, Serum --  Carbon Dioxide, Serum--  Chloride, Serum--  Creatinie, Serum--  Glucose, Serum--  Potassium, Serum--  Sodium, Serum --  BMP  01-27-25 @ 08:52  Blood Gas Arterial-Calcium,Ionized--  Blood Urea Nitrogen, Serum 28 mg/dL[H] [10 - 20]  Carbon Dioxide, Serum23 mmol/L [17 - 32]  Chloride, Thbbp389 mmol/L [98 - 110]  Creatinie, Serum0.9 mg/dL [0.7 - 1.5]  Glucose, Mytvy638 mg/dL[H] [70 - 99]  Potassium, Serum4.4 mmol/L [3.5 - 5.0]  Sodium, Serum 138 mmol/L [135 - 146]  [facility]  01-26-25 @ 07:00  Blood Gas Arterial-Calcium,Ionized--  Blood Urea Nitrogen, Serum 32 mg/dL[H] [10 - 20]  Carbon Dioxide, Serum21 mmol/L [17 - 32]  Chloride, Dxzzg652 mmol/L [98 - 110]  Creatinie, Serum1.1 mg/dL [0.7 - 1.5]  Glucose, Lpgcd130 mg/dL[H] [70 - 99]  Potassium, Serum3.9 mmol/L [3.5 - 5.0]  Sodium, Serum 136 mmol/L [135 - 146]  [facility]  01-25-25 @ 08:23  Blood Gas Arterial-Calcium,Ionized--  Blood Urea Nitrogen, Serum 34 mg/dL[H] [10 - 20]  Carbon Dioxide, Serum22 mmol/L [17 - 32]  Chloride, Zgwcp247 mmol/L [98 - 110]  Creatinie, Serum1.1 mg/dL [0.7 - 1.5]  Glucose, Dqrvc286 mg/dL[H] [70 - 99]  Potassium, Serum3.7 mmol/L [3.5 - 5.0]  Sodium, Serum 140 mmol/L [135 - 146]        PT/INR - ( 28 Jan 2025 06:15 )   PT: 16.50 sec;   INR: 1.39 ratio         PTT - ( 27 Jan 2025 15:51 )  PTT:40.3 sec      Microbiology:    Culture - Blood (collected 01-27-25 @ 19:13)  Source: .Blood BLOOD  Preliminary Report (01-29-25 @ 03:01):    No growth at 24 hours    Culture - Blood (collected 01-27-25 @ 19:13)  Source: .Blood BLOOD  Preliminary Report (01-29-25 @ 03:01):    No growth at 24 hours    Culture - Blood (collected 01-25-25 @ 08:23)  Source: .Blood BLOOD  Gram Stain (01-26-25 @ 17:31):    Growth in aerobic bottle: Gram Positive Cocci in Clusters    Growth in anaerobic bottle: Gram Positive Cocci in Clusters  Final Report (01-27-25 @ 14:42):    Growth in aerobic and anaerobic bottles: Staphylococcus aureus    See previous culture [id number]    Culture - Blood (collected 01-25-25 @ 08:23)  Source: .Blood BLOOD  Gram Stain (01-26-25 @ 15:48):    Growth in anaerobic bottle: Gram Positive Cocci in Clusters    Growth in aerobic bottle: Gram Positive Cocci in Clusters  Final Report (01-27-25 @ 14:27):    Growth in aerobic and anaerobic bottles: Staphylococcus aureus    See previous culture [id number]    Culture - Blood (collected 01-23-25 @ 20:05)  Source: .Blood BLOOD  Gram Stain (01-24-25 @ 13:53):    Growth in aerobic bottle: Gram Positive Cocci in Clusters    Growth in anaerobic bottle: Gram Positive Cocci in Clusters  Final Report (01-26-25 @ 09:38):    Growth in aerobic and anaerobic bottles: Staphylococcus aureus    Direct identification is available within approximately 3-5    hours either by Blood Panel Multiplexed PCR or Direct    MALDI-TOF. Details: [email or web address]  Organism: Blood Culture PCR  Staphylococcus aureus (01-26-25 @ 09:38)  Organism: Staphylococcus aureus (01-26-25 @ 09:38)      -  Clindamycin: S <=0.25      -  Oxacillin: S <=0.25 Oxacillin predicts susceptibility for dicloxacillin, methicillin, and nafcillin      -  Gentamicin: S <=4 Should not be used as monotherapy      -  Vancomycin: S 1      -  Tetracycline: S <=4      Method Type: LOUIS      -  Penicillin: R >2      -  Rifampin: S <=1 Should not be used as monotherapy      -  Erythromycin: S <=0.25      -  Trimethoprim/Sulfamethoxazole: S <=0.5/9.5  Organism: Blood Culture PCR (01-26-25 @ 09:38)      Method Type: PCR      -  Methicillin SENSITIVE Staphylococcus aureus (MSSA): Detec Any isolate of Staphylococcus aureus from a blood culture is NOT considered a contaminant.    Culture - Blood (collected 01-23-25 @ 20:05)  Source: .Blood BLOOD  Gram Stain (01-24-25 @ 13:52):    Growth in aerobic bottle: Gram Positive Cocci in Clusters    Growth in anaerobic bottle: Gram Positive Cocci in Clusters  Final Report (01-26-25 @ 09:38):    Growth in aerobic and anaerobic bottles: Staphylococcus aureus    See previous culture [id number]    Urinalysis with Rflx Culture (collected 01-23-25 @ 19:55)    Culture - Urine (collected 01-23-25 @ 19:55)  Source: Clean Catch None  Final Report (01-27-25 @ 15:57):    10,000 - 49,000 CFU/mL Staphylococcus aureus    50,000 - 99,000 CFU/mL Aerococcus urinae    Isolates of Aerococcus spp. are predictably susceptible to penicillin,    ampicillin, and vancomycin. All isolates are resistant to sulfonamides.  Organism: Staphylococcus aureus (01-27-25 @ 15:57)  Organism: Staphylococcus aureus (01-27-25 @ 15:57)      -  Nitrofurantoin: S <=32      -  Oxacillin: S <=0.25 Oxacillin predicts susceptibility for dicloxacillin, methicillin, and nafcillin      -  Gentamicin: S <=4 Should not be used as monotherapy      -  Vancomycin: S 1      -  Tetracycline: S <=4      Method Type: LOUIS      -  Penicillin: R >2      -  Rifampin: S <=1 Should not be used as monotherapy      -  Trimethoprim/Sulfamethoxazole: S <=0.5/9.5        RADIOLOGY & ADDITIONAL TESTS:        Medications:  acetaminophen     Tablet .. 650 milliGRAM(s) Oral every 6 hours  aluminum hydroxide/magnesium hydroxide/simethicone Suspension 30 milliLiter(s) Oral every 4 hours PRN  aspirin enteric coated 81 milliGRAM(s) Oral daily  atorvastatin 80 milliGRAM(s) Oral at bedtime  bacitracin   Ointment 1 Application(s) Topical two times a day  ceFAZolin   IVPB 2000 milliGRAM(s) IV Intermittent every 8 hours  chlorhexidine 2% Cloths 1 Application(s) Topical <User Schedule>  cyclobenzaprine 5 milliGRAM(s) Oral every 8 hours PRN  dextrose 5%. 1000 milliLiter(s) IV Continuous <Continuous>  dextrose 50% Injectable 25 Gram(s) IV Push once  dextrose 50% Injectable 12.5 Gram(s) IV Push once  dextrose 50% Injectable 25 Gram(s) IV Push once  dextrose Oral Gel 15 Gram(s) Oral once PRN  enoxaparin Injectable 40 milliGRAM(s) SubCutaneous every 24 hours  folic acid 1 milliGRAM(s) Oral daily  gabapentin 300 milliGRAM(s) Oral every 8 hours  glucagon  Injectable 1 milliGRAM(s) IntraMuscular once  insulin lispro (ADMELOG) corrective regimen sliding scale   SubCutaneous three times a day before meals  levETIRAcetam 500 milliGRAM(s) Oral two times a day  lidocaine   4% Patch 1 Patch Transdermal every 24 hours  lidocaine   4% Patch 1 Patch Transdermal daily  losartan 50 milliGRAM(s) Oral daily  melatonin 5 milliGRAM(s) Oral at bedtime PRN  morphine  - Injectable 2 milliGRAM(s) IV Push every 4 hours PRN  nystatin Powder 1 Application(s) Topical every 12 hours PRN  ondansetron Injectable 4 milliGRAM(s) IV Push every 8 hours PRN  oxyCODONE    IR 10 milliGRAM(s) Oral three times a day PRN  polyethylene glycol 3350 17 Gram(s) Oral two times a day  senna 2 Tablet(s) Oral at bedtime  sodium chloride 0.9%. 1000 milliLiter(s) IV Continuous <Continuous>  Tresiba (Insulin Degludec) 20 Unit(s) 20 Unit(s) SubCutaneous at bedtime  venlafaxine XR. 75 milliGRAM(s) Oral daily        Assessment and Plan:      80-year-old male with past medical history of insulin-dependent diabetes, hypertension, subdural hematoma on keppra who presents for multiple falls, weakness, & confusion.    #TME  #Hx of SDH  - R/o stroke 2/2 septic emboli v. TME 2/2 MSSA bacteremia   - Stroke Code called due to slurred speech  - S/p ASA + Plavix load  - CTH -ve for acute pathology  - CTA H+N: No large vessel occlusion, aneurysm, or vascular malformation; Mild atherosclerotic stenosis in the bilateral supraclinoid ICAs and bilateral V4 vertebral arteries.  - CTP: Small artifactual perfusion abnormality in the anterior right cerebellum,   otherwise no perfusion deficits to suggest areas of completed infarction   or at risk territory.  - MR Head: No acute intracranial pathology; Stable mild chronic microvascular ischemic changes.  - Given negative MRI, suspect TME due to polypharmacy  Plan:  - C/w ASA 81 mg q24H  - D/c plavix 75 mg q24H as per neuro  - C/w atorvastatin 80 mg qhs  - C/w Keppra 500 mg q12H  - Neuro checks q8H  - Check rEEG    #MSSA bacteremia, origin L forearm cellulitis v. UTI?  #Questionable L forearm cellulitis - low suspicion for septic joint   - BCx + for MSSA (1/23, 1/25)  - UA + for nitrites, 20 WBC, small LE, & few bacteria  - UCx + for MSSA  - ID on board  - L Forearm/Elbow XR -ve for fx, showed soft tissue swelling  - L Elbow CT: Very limited exam. Artifact. Portions of the elbow are not imaged; Diffuse soft tissue edema which may reflect cellulitis in the right clinical setting. No definite discrete fluid collection; Arthritic changes of the elbow with no definite radiographic evidence of osteomyelitis.  - TTE w/ no vegetations  Plan:   - C/w IV cefazolin 2 g q8H  - Check BCx q48H until clear, collected 1/27-->NGTD, follow with ID    #Type II NSTEMI  - EKG shows no acute ischemic changes  - HST 47>43>39>31  - TTE: LVEF 66%  - Monitor on tele    #Chronic macrocytic anemia  - Hb 11.1 on admission  - No s/s of active bleeding  - B12 1163, WNL (1/23/25)  - Folate 5, lower end of normal (1/23/25)  - Iron studies WNL  Plan:  - C/w folic acid 1 mg q24H  - OP EGD/Colonoscopy   - Transfuse if Hb <7    #T2DM  #Hyperglycemia, resolved  - On metformin 500 mg q6H + Tresiba 60 units @ home  - A1c 7.6%  - FS 70s 1/28  Plan:  - C/w home Tresiba, decreased to 20 units from 40 units as FS 70s  - D/c'd Lispro 6 units TIDAC  - C/w +1 ISS    #HTN  - C/w home losartan 50 mg q24H    #Chronic back pain  #Frequent Falls  - C/w pain control: PO Tylenol 650 mg q6H + PO oxycodone 10 mg q6H PRN, IV morphine 2 mg q4H PRN, PO cyclobenzaprine 10 mg q8H + lidocaine patches  - Decrease cyclobenzaprine 5 mg q8H PRN as patient was very lethargic, suspect 2/2 pain meds  - PT consult  - Consider MR Spine w/ IVC given MSSA bacteremia     #Depression  - C/w venlafaxine ER 75 mg q24H (started inpatient)    # Incidental pancreatic tail hypodensity  - RUQ U/S WNL  - OP MRI may be obtained for further characterization  - Follow up with GI office located on [address] [place] [address], Phone number [phone] with Dr. Puentes to consider EUS    #MISC  - DVT ppx: Lovenox SQ  - GI ppx: NI  - Diet:   - Activity: AAT  - Code Status: Full Code  - Dispo: GMF      For Follow-Up:  - F/u rEEG  - F/u BCx (collected 1/27), repeat ordered for 1/29 AM, repeat q48H until clearance  - F/u ID; As per ID, will consider HANNAH and MRI Spine w/ IVC if BCx remains positive.    Dispo: pending ID recs, final EEG     DWIGHT RIBEIRO  [facility] 021 A ([facility])            Patient was evaluated and examined  by bedside.                REVIEW OF SYSTEMS:  please see pertinent positives mentioned above, all other 12 ROS negative      T(C): , Max: 36.8 (01-28-25 @ 21:35)  HR: 76 (01-29-25 @ 05:22)  BP: 131/72 (01-29-25 @ 05:22)  RR: 18 (01-29-25 @ 05:22)  SpO2: 95% (01-29-25 @ 05:22)  CAPILLARY BLOOD GLUCOSE      POCT Blood Glucose.: 69 mg/dL (29 Jan 2025 07:36)  POCT Blood Glucose.: 89 mg/dL (29 Jan 2025 00:04)  POCT Blood Glucose.: 107 mg/dL (28 Jan 2025 21:17)  POCT Blood Glucose.: 119 mg/dL (28 Jan 2025 16:37)  POCT Blood Glucose.: 79 mg/dL (28 Jan 2025 11:12)      PHYSICAL EXAM:  General: NAD, comfortable in bed  HEENT: NCAT  Lungs: No increased WOB  CVS: RRR  Abdomen: , non-distended  Extremities: no edema        LAB  CBC  Date: 01-28-25 @ 06:15  Mean cell Bdjcasmugw71.8  Mean cell Hemoglobin Conc34.2  Mean cell Volum 96.0  Platelet count-Automate 158  RBC Count 3.26  Red Cell Distrib Width13.3  WBC Count7.13  % Albumin, Urine--  Hematocrit 31.3  Hemoglobin 10.7  CBC  Date: 01-27-25 @ 15:51  Mean cell Uivumezdhs22.2  Mean cell Hemoglobin Conc33.7  Mean cell Volum 95.7  Platelet count-Automate 147  RBC Count 3.01  Red Cell Distrib Width13.2  WBC Count6.19  % Albumin, Urine--  Hematocrit 28.8  Hemoglobin 9.7  CBC  Date: 01-27-25 @ 08:52  Mean cell Opxrkuxjzl10.6  Mean cell Hemoglobin Conc33.9  Mean cell Volum 96.4  Platelet count-Automate 161  RBC Count 3.34  Red Cell Distrib Width13.3  WBC Count6.58  % Albumin, Urine--  Hematocrit 32.2  Hemoglobin 10.9  CBC  Date: 01-26-25 @ 07:00  Mean cell Zmppcebyog62.6  Mean cell Hemoglobin Conc33.4  Mean cell Volum 97.4  Platelet count-Automate 153  RBC Count 3.04  Red Cell Distrib Width13.7  WBC Count6.14  % Albumin, Urine--  Hematocrit 29.6  Hemoglobin 9.9  CBC  Date: 01-25-25 @ 08:23  Mean cell Xahnzdaeff22.1  Mean cell Hemoglobin Conc33.2  Mean cell Volum 96.6  Platelet count-Automate 151  RBC Count 2.90  Red Cell Distrib Width13.3  WBC Count7.01  % Albumin, Urine--  Hematocrit 28.0  Hemoglobin 9.3  CBC  Date: 01-24-25 @ 07:04  Mean cell Cjgccpjbhz23.5  Mean cell Hemoglobin Conc33.7  Mean cell Volum 96.5  Platelet count-Automate 157  RBC Count 3.11  Red Cell Distrib Width13.6  WBC Count8.34  % Albumin, Urine--  Hematocrit 30.0  Hemoglobin 10.1  CBC  Date: 01-23-25 @ 17:55  Mean cell Gadayohqzc89.6  Mean cell Hemoglobin Conc33.6  Mean cell Volum 97.1  Platelet count-Automate 175  RBC Count 3.40  Red Cell Distrib Width13.7  WBC Count10.13  % Albumin, Urine--  Hematocrit 33.0  Hemoglobin 11.1    [facility]  01-28-25 @ 06:15  Blood Gas Arterial-Calcium,Ionized--  Blood Urea Nitrogen, Serum 23 mg/dL[H] [10 - 20]  Carbon Dioxide, Serum23 mmol/L [17 - 32]  Chloride, Qndau799 mmol/L [98 - 110]  Creatinie, Serum0.9 mg/dL [0.7 - 1.5]  Glucose, Serum64 mg/dL[L] [70 - 99]  Potassium, Serum4.1 mmol/L [3.5 - 5.0]  Sodium, Serum 139 mmol/L [135 - 146]  [facility]  01-27-25 @ 15:51  Blood Gas Arterial-Calcium,Ionized--  Blood Urea Nitrogen, Serum 27 mg/dL[H] [10 - 20]  Carbon Dioxide, Serum21 mmol/L [17 - 32]  Chloride, Nfwnk207 mmol/L [98 - 110]  Creatinie, Serum0.8 mg/dL [0.7 - 1.5]  Glucose, Wyvgf190 mg/dL[H] [70 - 99]  Potassium, Serum4.1 mmol/L [3.5 - 5.0]  Sodium, Serum 138 mmol/L [135 - 146]  [facility]  01-27-25 @ 14:17  Blood Gas Arterial-Calcium,Ionized1.24 mmol/L [1.15 - 1.33]  Blood Urea Nitrogen, Serum --  Carbon Dioxide, Serum--  Chloride, Serum--  Creatinie, Serum--  Glucose, Serum--  Potassium, Serum--  Sodium, Serum --  BMP  01-27-25 @ 08:52  Blood Gas Arterial-Calcium,Ionized--  Blood Urea Nitrogen, Serum 28 mg/dL[H] [10 - 20]  Carbon Dioxide, Serum23 mmol/L [17 - 32]  Chloride, Nlthv703 mmol/L [98 - 110]  Creatinie, Serum0.9 mg/dL [0.7 - 1.5]  Glucose, Syaye075 mg/dL[H] [70 - 99]  Potassium, Serum4.4 mmol/L [3.5 - 5.0]  Sodium, Serum 138 mmol/L [135 - 146]  [facility]  01-26-25 @ 07:00  Blood Gas Arterial-Calcium,Ionized--  Blood Urea Nitrogen, Serum 32 mg/dL[H] [10 - 20]  Carbon Dioxide, Serum21 mmol/L [17 - 32]  Chloride, Hzbgg750 mmol/L [98 - 110]  Creatinie, Serum1.1 mg/dL [0.7 - 1.5]  Glucose, Gpftb198 mg/dL[H] [70 - 99]  Potassium, Serum3.9 mmol/L [3.5 - 5.0]  Sodium, Serum 136 mmol/L [135 - 146]  [facility]  01-25-25 @ 08:23  Blood Gas Arterial-Calcium,Ionized--  Blood Urea Nitrogen, Serum 34 mg/dL[H] [10 - 20]  Carbon Dioxide, Serum22 mmol/L [17 - 32]  Chloride, Klmxa064 mmol/L [98 - 110]  Creatinie, Serum1.1 mg/dL [0.7 - 1.5]  Glucose, Nltjx439 mg/dL[H] [70 - 99]  Potassium, Serum3.7 mmol/L [3.5 - 5.0]  Sodium, Serum 140 mmol/L [135 - 146]        PT/INR - ( 28 Jan 2025 06:15 )   PT: 16.50 sec;   INR: 1.39 ratio         PTT - ( 27 Jan 2025 15:51 )  PTT:40.3 sec      Microbiology:    Culture - Blood (collected 01-27-25 @ 19:13)  Source: .Blood BLOOD  Preliminary Report (01-29-25 @ 03:01):    No growth at 24 hours    Culture - Blood (collected 01-27-25 @ 19:13)  Source: .Blood BLOOD  Preliminary Report (01-29-25 @ 03:01):    No growth at 24 hours    Culture - Blood (collected 01-25-25 @ 08:23)  Source: .Blood BLOOD  Gram Stain (01-26-25 @ 17:31):    Growth in aerobic bottle: Gram Positive Cocci in Clusters    Growth in anaerobic bottle: Gram Positive Cocci in Clusters  Final Report (01-27-25 @ 14:42):    Growth in aerobic and anaerobic bottles: Staphylococcus aureus    See previous culture [id number]    Culture - Blood (collected 01-25-25 @ 08:23)  Source: .Blood BLOOD  Gram Stain (01-26-25 @ 15:48):    Growth in anaerobic bottle: Gram Positive Cocci in Clusters    Growth in aerobic bottle: Gram Positive Cocci in Clusters  Final Report (01-27-25 @ 14:27):    Growth in aerobic and anaerobic bottles: Staphylococcus aureus    See previous culture [id number]    Culture - Blood (collected 01-23-25 @ 20:05)  Source: .Blood BLOOD  Gram Stain (01-24-25 @ 13:53):    Growth in aerobic bottle: Gram Positive Cocci in Clusters    Growth in anaerobic bottle: Gram Positive Cocci in Clusters  Final Report (01-26-25 @ 09:38):    Growth in aerobic and anaerobic bottles: Staphylococcus aureus    Direct identification is available within approximately 3-5    hours either by Blood Panel Multiplexed PCR or Direct    MALDI-TOF. Details: [email or web address]  Organism: Blood Culture PCR  Staphylococcus aureus (01-26-25 @ 09:38)  Organism: Staphylococcus aureus (01-26-25 @ 09:38)      -  Clindamycin: S <=0.25      -  Oxacillin: S <=0.25 Oxacillin predicts susceptibility for dicloxacillin, methicillin, and nafcillin      -  Gentamicin: S <=4 Should not be used as monotherapy      -  Vancomycin: S 1      -  Tetracycline: S <=4      Method Type: LOUIS      -  Penicillin: R >2      -  Rifampin: S <=1 Should not be used as monotherapy      -  Erythromycin: S <=0.25      -  Trimethoprim/Sulfamethoxazole: S <=0.5/9.5  Organism: Blood Culture PCR (01-26-25 @ 09:38)      Method Type: PCR      -  Methicillin SENSITIVE Staphylococcus aureus (MSSA): Detec Any isolate of Staphylococcus aureus from a blood culture is NOT considered a contaminant.    Culture - Blood (collected 01-23-25 @ 20:05)  Source: .Blood BLOOD  Gram Stain (01-24-25 @ 13:52):    Growth in aerobic bottle: Gram Positive Cocci in Clusters    Growth in anaerobic bottle: Gram Positive Cocci in Clusters  Final Report (01-26-25 @ 09:38):    Growth in aerobic and anaerobic bottles: Staphylococcus aureus    See previous culture [id number]    Urinalysis with Rflx Culture (collected 01-23-25 @ 19:55)    Culture - Urine (collected 01-23-25 @ 19:55)  Source: Clean Catch None  Final Report (01-27-25 @ 15:57):    10,000 - 49,000 CFU/mL Staphylococcus aureus    50,000 - 99,000 CFU/mL Aerococcus urinae    Isolates of Aerococcus spp. are predictably susceptible to penicillin,    ampicillin, and vancomycin. All isolates are resistant to sulfonamides.  Organism: Staphylococcus aureus (01-27-25 @ 15:57)  Organism: Staphylococcus aureus (01-27-25 @ 15:57)      -  Nitrofurantoin: S <=32      -  Oxacillin: S <=0.25 Oxacillin predicts susceptibility for dicloxacillin, methicillin, and nafcillin      -  Gentamicin: S <=4 Should not be used as monotherapy      -  Vancomycin: S 1      -  Tetracycline: S <=4      Method Type: LOUIS      -  Penicillin: R >2      -  Rifampin: S <=1 Should not be used as monotherapy      -  Trimethoprim/Sulfamethoxazole: S <=0.5/9.5        RADIOLOGY & ADDITIONAL TESTS:        Medications:  acetaminophen     Tablet .. 650 milliGRAM(s) Oral every 6 hours  aluminum hydroxide/magnesium hydroxide/simethicone Suspension 30 milliLiter(s) Oral every 4 hours PRN  aspirin enteric coated 81 milliGRAM(s) Oral daily  atorvastatin 80 milliGRAM(s) Oral at bedtime  bacitracin   Ointment 1 Application(s) Topical two times a day  ceFAZolin   IVPB 2000 milliGRAM(s) IV Intermittent every 8 hours  chlorhexidine 2% Cloths 1 Application(s) Topical <User Schedule>  cyclobenzaprine 5 milliGRAM(s) Oral every 8 hours PRN  dextrose 5%. 1000 milliLiter(s) IV Continuous <Continuous>  dextrose 50% Injectable 25 Gram(s) IV Push once  dextrose 50% Injectable 12.5 Gram(s) IV Push once  dextrose 50% Injectable 25 Gram(s) IV Push once  dextrose Oral Gel 15 Gram(s) Oral once PRN  enoxaparin Injectable 40 milliGRAM(s) SubCutaneous every 24 hours  folic acid 1 milliGRAM(s) Oral daily  gabapentin 300 milliGRAM(s) Oral every 8 hours  glucagon  Injectable 1 milliGRAM(s) IntraMuscular once  insulin lispro (ADMELOG) corrective regimen sliding scale   SubCutaneous three times a day before meals  levETIRAcetam 500 milliGRAM(s) Oral two times a day  lidocaine   4% Patch 1 Patch Transdermal every 24 hours  lidocaine   4% Patch 1 Patch Transdermal daily  losartan 50 milliGRAM(s) Oral daily  melatonin 5 milliGRAM(s) Oral at bedtime PRN  morphine  - Injectable 2 milliGRAM(s) IV Push every 4 hours PRN  nystatin Powder 1 Application(s) Topical every 12 hours PRN  ondansetron Injectable 4 milliGRAM(s) IV Push every 8 hours PRN  oxyCODONE    IR 10 milliGRAM(s) Oral three times a day PRN  polyethylene glycol 3350 17 Gram(s) Oral two times a day  senna 2 Tablet(s) Oral at bedtime  sodium chloride 0.9%. 1000 milliLiter(s) IV Continuous <Continuous>  Tresiba (Insulin Degludec) 20 Unit(s) 20 Unit(s) SubCutaneous at bedtime  venlafaxine XR. 75 milliGRAM(s) Oral daily        Assessment and Plan:      80-year-old male with past medical history of insulin-dependent diabetes, hypertension, subdural hematoma on keppra who presents for multiple falls, weakness, & confusion.        #MSSA bacteremia, origin L forearm cellulitis v. UTI?  #Questionable L forearm cellulitis - low suspicion for septic joint   - BCx + for MSSA (1/23, 1/25)  - UA + for nitrites, 20 WBC, small LE, & few bacteria  - UCx + for MSSA  - ID on board  - L Forearm/Elbow XR -ve for fx, showed soft tissue swelling  - L Elbow CT: Very limited exam. Artifact. Portions of the elbow are not imaged; Diffuse soft tissue edema which may reflect cellulitis in the right clinical setting. No definite discrete fluid collection; Arthritic changes of the elbow with no definite radiographic evidence of osteomyelitis.  - TTE w/ no vegetations  Plan:   - C/w IV cefazolin 2 g q8H  - Check BCx q48H until clear, collected 1/27-->NGTD, follow with ID  - Will obtain MRI T/L spine to ruleout OM/abscess given persistent bacteremia as patient complains about lower back pain worsening over the last couple of weeks, affecting ambulation          #TME  #Hx of SDH  - R/o stroke 2/2 septic emboli v. TME 2/2 MSSA bacteremia   - Stroke Code called due to slurred speech  - S/p ASA + Plavix load  - CTH -ve for acute pathology  - CTA H+N: No large vessel occlusion, aneurysm, or vascular malformation; Mild atherosclerotic stenosis in the bilateral supraclinoid ICAs and bilateral V4 vertebral arteries.  - CTP: Small artifactual perfusion abnormality in the anterior right cerebellum,   otherwise no perfusion deficits to suggest areas of completed infarction   or at risk territory.  - MR Head: No acute intracranial pathology; Stable mild chronic microvascular ischemic changes.  - Given negative MRI, suspect TME due to polypharmacy  Plan:  - C/w ASA 81 mg q24H  - D/c plavix 75 mg q24H as per neuro  - C/w atorvastatin 80 mg qhs  - C/w Keppra 500 mg q12H  - Neuro checks q8H  - Check rEEG    #Type II NSTEMI  - EKG shows no acute ischemic changes  - HST 47>43>39>31  - TTE: LVEF 66%  - Monitor on tele    #Chronic macrocytic anemia  - Hb 11.1 on admission  - No s/s of active bleeding  - B12 1163, WNL (1/23/25)  - Folate 5, lower end of normal (1/23/25)  - Iron studies WNL  Plan:  - C/w folic acid 1 mg q24H  - OP EGD/Colonoscopy   - Transfuse if Hb <7    #T2DM  #Hyperglycemia, resolved  - On metformin 500 mg q6H + Tresiba 60 units @ home  - A1c 7.6%  - FS 70s 1/28  Plan:  - C/w home Tresiba, decreased to 20 units from 40 units as FS 70s  - D/c'd Lispro 6 units TIDAC  - C/w +1 ISS    #HTN  - C/w home losartan 50 mg q24H    #Chronic back pain  #Frequent Falls  - C/w pain control: PO Tylenol 650 mg q6H + PO oxycodone 10 mg q6H PRN, IV morphine 2 mg q4H PRN, PO cyclobenzaprine 10 mg q8H + lidocaine patches  - Decrease cyclobenzaprine 5 mg q8H PRN as patient was very lethargic, suspect 2/2 pain meds  - PT consult  - Consider MR Spine w/ IVC given MSSA bacteremia     #Depression  - C/w venlafaxine ER 75 mg q24H (started inpatient)    # Incidental pancreatic tail hypodensity  - RUQ U/S WNL  - OP MRI may be obtained for further characterization  - Follow up with GI office located on [address] [place] [address], Phone number [phone] with Dr. Puentes to consider EUS    #MISC  - DVT ppx: Lovenox SQ  - GI ppx: NI  - Diet:   - Activity: AAT  - Code Status: Full Code  - Dispo: [facility]      For Follow-Up:  - F/u rEEG  - F/u BCx (collected 1/27), repeat ordered for 1/29 AM, repeat q48H until clearance  - F/u ID; As per ID, will consider HANNAH and MRI Spine w/ IVC if BCx remains positive.    Dispo: pending ID recs, final EEG

## 2025-01-29 NOTE — SWALLOW BEDSIDE ASSESSMENT ADULT - SWALLOW EVAL: DIAGNOSIS
Toleration of regular with thin liquids w/o overt s/s of penetration/aspiration.
Toleration of regular with thin liquids w/o overt s/s of penetration/aspiration.
Overt s/s of penetration/aspiration for thin liquids. Wet/gurgly vocal quality. Coughing post PO intake. Toleration of soft & bite sized with mildly thick liquids w/o overt s/s of penetration/aspiration.

## 2025-01-29 NOTE — SWALLOW BEDSIDE ASSESSMENT ADULT - SLP GENERAL OBSERVATIONS
Pt. received in bed awake and alert. Confusion present.
Pt. received in bed awake and alert. Baseline confusion. RAMONA. Family at b/s.
Pt. received in bed asleep awoke upon auditory arousal. Lethargic. Max cues to keep pt. awake/alert during assessment.

## 2025-01-29 NOTE — PROGRESS NOTE ADULT - ASSESSMENT
80-year-old male with past medical history of insulin-dependent diabetes, hypertension, subdural hematoma on keppra who presents for multiple falls, weakness, confusion.     ID is consulted for cellulitis  Afebrile 98.1  WBC 8.34 < 10.13  On room air  BCx 1/23 MSSA  BCx 1/25 MSSA  BCx 1/27 NGTD  UA WBC 20, UCx MSSA and Aerococcus spp.    Left forearm xray  Capitellum bony fragment/calcification. No radius or ulna   fracture.. Soft tissue swelling with no radiographic findings to suggest   the presence of osteomyelitis or radiopaque soft tissue foreign body.    CT chest:  No CT evidence of acute traumatic injury within the chest, abdomen or   pelvis.  CT ABDOMEN/PELVIS:  Pancreatic tail hypodensity measuring 1 cm. Differential includes IPMN.   Nonemergent/outpatient MRI may be obtained for further characterization    Antibiotic:  Vancomycin 1/23 - 1/24  Ceftriaxone 1/23 - 1/24  Ancef 1/24 ->      IMPRESSION:  MSSA bacteremia, potentially life-threatening  Suspect left forearm cellulitis  Acute metabolic encephalopathy  DM  Immunosuppression / Immunosenescence secondary to multiple comorbidities which could result in poor clinical outcome    RECOMMENDATIONS:  - So far unclear source for MSSA bacteremia  - IV Ancef 2g q8hrs  - TTE no vegetation, might need HANNAH if persistent bacteremia  - MRI lumbar with contrast  - Offloading and frequent position changes, aspiration precaution  - Trend WBC, fever curve, transaminases, creatinine daily  - Please inform ID of any patient clinical change or any new pertinent laboratory or radiographic data      Nora Beck D.O.  Attending Physician  Division of Infectious Diseases  [facility]  Please contact me via [employer] Teams

## 2025-01-29 NOTE — PROGRESS NOTE ADULT - SUBJECTIVE AND OBJECTIVE BOX
INFECTIOUS DISEASE FOLLOW UP NOTE:    Interval History/ROS: Patient is a 80y old  Male who presents with a chief complaint of fall (29 Jan 2025 12:50)    Overnight events: Reporting worsening low back pain, 7/10, worse on laying down.    REVIEW OF SYSTEMS:  CONSTITUTIONAL: No fever or chills  HEAD: No lesion on scalp  EYES: No visual disturbance  ENT: No sore throat  RESPIRATORY: No cough, no shortness of breath  CARDIOVASCULAR: No chest pain or palpitations  GASTROINTESTINAL: No abdominal or epigastric pain  GENITOURINARY: No dysuria  NEUROLOGICAL: Mild slurred speech, strength overall 4+/5  MUSCULOSKELETAL: Left arm swelling; low back pain  SKIN: No itching, rashes  All other ROS negative except noted above      Prior hospital charts reviewed [Yes]  Primary team notes reviewed [Yes]  Other consultant notes reviewed [Yes]    Allergies:  No Known Allergies      ANTIMICROBIALS:   ceFAZolin   IVPB 2000 every 8 hours      OTHER MEDS: MEDICATIONS  (STANDING):  acetaminophen     Tablet .. 650 every 6 hours  aluminum hydroxide/magnesium hydroxide/simethicone Suspension 30 every 4 hours PRN  aspirin enteric coated 81 daily  atorvastatin 80 at bedtime  cyclobenzaprine 5 every 8 hours PRN  dextrose 50% Injectable 25 once  dextrose 50% Injectable 12.5 once  dextrose 50% Injectable 25 once  dextrose Oral Gel 15 once PRN  enoxaparin Injectable 40 every 24 hours  gabapentin 300 every 8 hours  glucagon  Injectable 1 once  insulin lispro (ADMELOG) corrective regimen sliding scale  three times a day before meals  levETIRAcetam 500 two times a day  losartan 50 daily  melatonin 5 at bedtime PRN  morphine  - Injectable 2 every 4 hours PRN  ondansetron Injectable 4 every 8 hours PRN  oxyCODONE    IR 10 three times a day PRN  polyethylene glycol 3350 17 two times a day  senna 2 at bedtime      Vital Signs Last 24 Hrs  T(F): 98.4 (01-29-25 @ 21:19), Max: 99.7 (01-24-25 @ 22:32)    Vital Signs Last 24 Hrs  HR: 70 (01-29-25 @ 21:19) (70 - 77)  BP: 164/67 (01-29-25 @ 21:19) (131/72 - 164/67)  RR: 18 (01-29-25 @ 21:19)  SpO2: 97% (01-29-25 @ 21:19) (95% - 97%)  Wt(kg): --    EXAM:  GENERAL: NAD, lying in bed  HEAD: No head lesions  EYES: Conjunctiva pink and cornea white  EAR, NOSE, MOUTH, THROAT: Normal external ears and nose, no discharges; moist mucous membranes  NECK: Supple, nontender to palpation; no JVD  RESPIRATORY: Clear to auscultation bilaterally  CARDIOVASCULAR: S1 S2  GASTROINTESTINAL: Soft, nontender, nondistended; normoactive bowel sounds  GENITOURINARY: No loyola catheter, no CVA tenderness  EXTREMITIES: No clubbing, cyanosis, or petal edema  NERVOUS SYSTEM: Awake and alert, not fully oriented, slight slurred speech, Strength 4+/5, no gross sensory deficit  MUSCULOSKELETAL: Left forearm swelling; low back tenderness  SKIN: No rashes or lesions, no superficial thrombophlebitis  PSYCH: Normal affect    Labs:                        9.8    6.20  )-----------( 161      ( 29 Jan 2025 07:35 )             29.0     01-29    138  |  107  |  22[H]  ----------------------------<  64[L]  4.3   |  22  |  0.8    Ca    7.6[L]      29 Jan 2025 07:35  Mg     1.7     01-29    TPro  5.4[L]  /  Alb  2.0[L]  /  TBili  0.7  /  DBili  x   /  AST  61[H]  /  ALT  13  /  AlkPhos  222[H]  01-29      WBC Trend:  WBC Count: 6.20 (01-29-25 @ 07:35)  WBC Count: 7.13 (01-28-25 @ 06:15)  WBC Count: 6.19 (01-27-25 @ 15:51)  WBC Count: 6.58 (01-27-25 @ 08:52)      Creatine Trend:  Creatinine: 0.8 (01-29)  Creatinine: 0.9 (01-28)  Creatinine: 0.8 (01-27)  Creatinine: 0.9 (01-27)      Liver Biochemical Testing Trend:  Alanine Aminotransferase (ALT/SGPT): 13 (01-29)  Alanine Aminotransferase (ALT/SGPT): 15 (01-28)  Alanine Aminotransferase (ALT/SGPT): 18 (01-27)  Alanine Aminotransferase (ALT/SGPT): 21 (01-24)  Alanine Aminotransferase (ALT/SGPT): 25 (01-23)  Aspartate Aminotransferase (AST/SGOT): 61 (01-29-25 @ 07:35)  Aspartate Aminotransferase (AST/SGOT): 64 (01-28-25 @ 06:15)  Aspartate Aminotransferase (AST/SGOT): 60 (01-27-25 @ 15:51)  Aspartate Aminotransferase (AST/SGOT): 36 (01-24-25 @ 07:04)  Aspartate Aminotransferase (AST/SGOT): 42 (01-23-25 @ 17:55)  Bilirubin Total: 0.7 (01-29)  Bilirubin Total: 0.8 (01-28)  Bilirubin Total: 0.8 (01-27)  Bilirubin Total: 1.3 (01-24)  Bilirubin Total: 1.9 (01-23)      Trend LDH      Urinalysis Basic - ( 29 Jan 2025 07:35 )    Color: x / Appearance: x / SG: x / pH: x  Gluc: 64 mg/dL / Ketone: x  / Bili: x / Urobili: x   Blood: x / Protein: x / Nitrite: x   Leuk Esterase: x / RBC: x / WBC x   Sq Epi: x / Non Sq Epi: x / Bacteria: x        MICROBIOLOGY:        Culture - Blood (collected 27 Jan 2025 19:13)  Source: .Blood BLOOD  Preliminary Report:    No growth at 24 hours    Culture - Blood (collected 27 Jan 2025 19:13)  Source: .Blood BLOOD  Preliminary Report:    No growth at 24 hours    Culture - Blood (collected 25 Jan 2025 08:23)  Source: .Blood BLOOD  Final Report:    Growth in aerobic and anaerobic bottles: Staphylococcus aureus    See previous culture [id number]    Culture - Blood (collected 25 Jan 2025 08:23)  Source: .Blood BLOOD  Final Report:    Growth in aerobic and anaerobic bottles: Staphylococcus aureus    See previous culture [id number]    Culture - Blood (collected 23 Jan 2025 20:05)  Source: .Blood BLOOD  Final Report:    Growth in aerobic and anaerobic bottles: Staphylococcus aureus    Direct identification is available within approximately 3-5    hours either by Blood Panel Multiplexed PCR or Direct    MALDI-TOF. Details: [email or web address]  Organism: Blood Culture PCR  Staphylococcus aureus  Organism: Staphylococcus aureus    Sensitivities:      -  Clindamycin: S <=0.25      -  Oxacillin: S <=0.25 Oxacillin predicts susceptibility for dicloxacillin, methicillin, and nafcillin      -  Gentamicin: S <=4 Should not be used as monotherapy      -  Vancomycin: S 1      -  Tetracycline: S <=4      Method Type: LOUIS      -  Penicillin: R >2      -  Rifampin: S <=1 Should not be used as monotherapy      -  Erythromycin: S <=0.25      -  Trimethoprim/Sulfamethoxazole: S <=0.5/9.5  Organism: Blood Culture PCR    Sensitivities:      Method Type: PCR      -  Methicillin SENSITIVE Staphylococcus aureus (MSSA): Detec Any isolate of Staphylococcus aureus from a blood culture is NOT considered a contaminant.    Culture - Blood (collected 23 Jan 2025 20:05)  Source: .Blood BLOOD  Final Report:    Growth in aerobic and anaerobic bottles: Staphylococcus aureus    See previous culture [id number]    Urinalysis with Rflx Culture (collected 23 Jan 2025 19:55)    Culture - Urine (collected 23 Jan 2025 19:55)  Source: Clean Catch None  Final Report:    10,000 - 49,000 CFU/mL Staphylococcus aureus    50,000 - 99,000 CFU/mL Aerococcus urinae    Isolates of Aerococcus spp. are predictably susceptible to penicillin,    ampicillin, and vancomycin. All isolates are resistant to sulfonamides.  Organism: Staphylococcus aureus  Organism: Staphylococcus aureus    Sensitivities:      -  Nitrofurantoin: S <=32      -  Oxacillin: S <=0.25 Oxacillin predicts susceptibility for dicloxacillin, methicillin, and nafcillin      -  Gentamicin: S <=4 Should not be used as monotherapy      -  Vancomycin: S 1      -  Tetracycline: S <=4      Method Type: LOUIS      -  Penicillin: R >2      -  Rifampin: S <=1 Should not be used as monotherapy      -  Trimethoprim/Sulfamethoxazole: S <=0.5/9.5            Treponema Pallidum Antibody Interpretation: Negative (01-24-25 @ 07:04)    Ferritin: 499 (01-28)      Troponin T, High Sensitivity Result: 31 (01-27)  Troponin T, High Sensitivity Result: 39 (01-24)  Troponin T, High Sensitivity Result: 43 (01-23)  Troponin T, High Sensitivity Result: 47 (01-23)    Blood Gas Arterial, Lactate: 1.1 (01-27 @ 14:17)      RADIOLOGY:  imaging below personally reviewed    < from: MR Head No Cont (01.27.25 @ 20:20) >  IMPRESSION:    No acute intracranial pathology.    Stable mild chronic microvascular ischemic changes.    < end of copied text >

## 2025-01-30 LAB
ALBUMIN SERPL ELPH-MCNC: 1.8 G/DL — LOW (ref 3.5–5.2)
ALP SERPL-CCNC: 268 U/L — HIGH (ref 30–115)
ALT FLD-CCNC: 12 U/L — SIGNIFICANT CHANGE UP (ref 0–41)
ANION GAP SERPL CALC-SCNC: 9 MMOL/L — SIGNIFICANT CHANGE UP (ref 7–14)
AST SERPL-CCNC: 65 U/L — HIGH (ref 0–41)
BILIRUB SERPL-MCNC: 0.7 MG/DL — SIGNIFICANT CHANGE UP (ref 0.2–1.2)
BUN SERPL-MCNC: 20 MG/DL — SIGNIFICANT CHANGE UP (ref 10–20)
CALCIUM SERPL-MCNC: 8.1 MG/DL — LOW (ref 8.4–10.5)
CHLORIDE SERPL-SCNC: 109 MMOL/L — SIGNIFICANT CHANGE UP (ref 98–110)
CO2 SERPL-SCNC: 23 MMOL/L — SIGNIFICANT CHANGE UP (ref 17–32)
CREAT SERPL-MCNC: 0.8 MG/DL — SIGNIFICANT CHANGE UP (ref 0.7–1.5)
EGFR: 91 ML/MIN/1.73M2 — SIGNIFICANT CHANGE UP
GLUCOSE SERPL-MCNC: 56 MG/DL — LOW (ref 70–99)
HCT VFR BLD CALC: 28.6 % — LOW (ref 42–52)
HGB BLD-MCNC: 9.4 G/DL — LOW (ref 14–18)
MCHC RBC-ENTMCNC: 31.8 PG — HIGH (ref 27–31)
MCHC RBC-ENTMCNC: 32.9 G/DL — SIGNIFICANT CHANGE UP (ref 32–37)
MCV RBC AUTO: 96.6 FL — HIGH (ref 80–94)
NRBC # BLD: 0 /100 WBCS — SIGNIFICANT CHANGE UP (ref 0–0)
NRBC BLD-RTO: 0 /100 WBCS — SIGNIFICANT CHANGE UP (ref 0–0)
PLATELET # BLD AUTO: 145 K/UL — SIGNIFICANT CHANGE UP (ref 130–400)
PMV BLD: 8.5 FL — SIGNIFICANT CHANGE UP (ref 7.4–10.4)
POTASSIUM SERPL-MCNC: 4.2 MMOL/L — SIGNIFICANT CHANGE UP (ref 3.5–5)
POTASSIUM SERPL-SCNC: 4.2 MMOL/L — SIGNIFICANT CHANGE UP (ref 3.5–5)
PROT SERPL-MCNC: 5.4 G/DL — LOW (ref 6–8)
RBC # BLD: 2.96 M/UL — LOW (ref 4.7–6.1)
RBC # FLD: 13.7 % — SIGNIFICANT CHANGE UP (ref 11.5–14.5)
SODIUM SERPL-SCNC: 141 MMOL/L — SIGNIFICANT CHANGE UP (ref 135–146)
WBC # BLD: 5.09 K/UL — SIGNIFICANT CHANGE UP (ref 4.8–10.8)
WBC # FLD AUTO: 5.09 K/UL — SIGNIFICANT CHANGE UP (ref 4.8–10.8)

## 2025-01-30 PROCEDURE — 95816 EEG AWAKE AND DROWSY: CPT | Mod: 26

## 2025-01-30 PROCEDURE — 99233 SBSQ HOSP IP/OBS HIGH 50: CPT

## 2025-01-30 RX ORDER — BISACODYL 5 MG
10 TABLET, DELAYED RELEASE (ENTERIC COATED) ORAL ONCE
Refills: 0 | Status: COMPLETED | OUTPATIENT
Start: 2025-01-30 | End: 2025-01-30

## 2025-01-30 RX ADMIN — GABAPENTIN 300 MILLIGRAM(S): 800 TABLET ORAL at 05:08

## 2025-01-30 RX ADMIN — LIDOCAINE HYDROCHLORIDE 1 PATCH: 30 CREAM TOPICAL at 11:52

## 2025-01-30 RX ADMIN — Medication 3: at 18:17

## 2025-01-30 RX ADMIN — GABAPENTIN 300 MILLIGRAM(S): 800 TABLET ORAL at 21:39

## 2025-01-30 RX ADMIN — Medication 100 MILLIGRAM(S): at 15:17

## 2025-01-30 RX ADMIN — ACETAMINOPHEN 650 MILLIGRAM(S): 160 SUSPENSION ORAL at 01:41

## 2025-01-30 RX ADMIN — OXYCODONE HYDROCHLORIDE 10 MILLIGRAM(S): 30 TABLET ORAL at 17:49

## 2025-01-30 RX ADMIN — LIDOCAINE HYDROCHLORIDE 1 PATCH: 30 CREAM TOPICAL at 07:57

## 2025-01-30 RX ADMIN — LEVETIRACETAM 500 MILLIGRAM(S): 750 TABLET, FILM COATED ORAL at 18:43

## 2025-01-30 RX ADMIN — LEVETIRACETAM 500 MILLIGRAM(S): 750 TABLET, FILM COATED ORAL at 05:08

## 2025-01-30 RX ADMIN — ACETAMINOPHEN 650 MILLIGRAM(S): 160 SUSPENSION ORAL at 05:08

## 2025-01-30 RX ADMIN — ATORVASTATIN CALCIUM 80 MILLIGRAM(S): 80 TABLET, FILM COATED ORAL at 21:39

## 2025-01-30 RX ADMIN — Medication 1 MILLIGRAM(S): at 12:33

## 2025-01-30 RX ADMIN — Medication 50 MILLIGRAM(S): at 05:07

## 2025-01-30 RX ADMIN — ACETAMINOPHEN 650 MILLIGRAM(S): 160 SUSPENSION ORAL at 12:33

## 2025-01-30 RX ADMIN — Medication 10 MILLIGRAM(S): at 21:39

## 2025-01-30 RX ADMIN — ASPIRIN 81 MILLIGRAM(S): 81 TABLET, COATED ORAL at 12:32

## 2025-01-30 RX ADMIN — ACETAMINOPHEN 650 MILLIGRAM(S): 160 SUSPENSION ORAL at 18:43

## 2025-01-30 RX ADMIN — LIDOCAINE HYDROCHLORIDE 1 PATCH: 30 CREAM TOPICAL at 12:49

## 2025-01-30 RX ADMIN — ENOXAPARIN SODIUM 40 MILLIGRAM(S): 100 INJECTION SUBCUTANEOUS at 21:38

## 2025-01-30 RX ADMIN — Medication 1 APPLICATION(S): at 18:47

## 2025-01-30 RX ADMIN — ANTISEPTIC SURGICAL SCRUB 1 APPLICATION(S): 0.04 SOLUTION TOPICAL at 05:09

## 2025-01-30 RX ADMIN — Medication 1 APPLICATION(S): at 05:08

## 2025-01-30 RX ADMIN — MORPHINE SULFATE 2 MILLIGRAM(S): 60 TABLET, FILM COATED, EXTENDED RELEASE ORAL at 15:26

## 2025-01-30 RX ADMIN — Medication 2 TABLET(S): at 21:39

## 2025-01-30 RX ADMIN — LIDOCAINE HYDROCHLORIDE 1 PATCH: 30 CREAM TOPICAL at 00:05

## 2025-01-30 RX ADMIN — POLYETHYLENE GLYCOL 3350 17 GRAM(S): 17 POWDER, FOR SOLUTION ORAL at 18:44

## 2025-01-30 RX ADMIN — GABAPENTIN 300 MILLIGRAM(S): 800 TABLET ORAL at 15:18

## 2025-01-30 RX ADMIN — POLYETHYLENE GLYCOL 3350 17 GRAM(S): 17 POWDER, FOR SOLUTION ORAL at 05:07

## 2025-01-30 RX ADMIN — Medication 75 MILLILITER(S): at 15:17

## 2025-01-30 RX ADMIN — ACETAMINOPHEN 650 MILLIGRAM(S): 160 SUSPENSION ORAL at 00:05

## 2025-01-30 RX ADMIN — Medication 100 MILLIGRAM(S): at 05:07

## 2025-01-30 RX ADMIN — Medication 100 MILLIGRAM(S): at 21:38

## 2025-01-30 NOTE — EEG REPORT - NS EEG TEXT BOX
[place] Department of Neurology  Inpatient Routine-EEG Report      Patient Name:	DWIGHT RIBEIRO  :	1945  MRN:	-  Study Date/Time:	2025, 9:21:09 AM  Referred by:	-    Brief Clinical History:  DWIGHT RIBEIRO is a 80 year old Male; study performed to investigate for seizures or markers of epilepsy.   Diagnosis Code: R40.4 Transient alteration of awareness    Patient Medication:  TYLENOL    ASPIRIN    LIPITOR    CEFAZOLIN    LOVENOX    NEURONTIN    INSULIN    KEPPRA    COZAAR    MIRALAX    EFFEXOR    ALUMINUM HYDROXIDE    FLEXERIL    MORPHINE    MYCOSTATIN    ZOFRAN    OXYCODONE    -      Acquisition Details:  Electroencephalography was acquired using a minimum of 21 channels on an [employer] system v 9.3.1 with electrode placement according to the standard International 10-20 system following ACNS (American Clinical Neurophysiology Society) guidelines.  Anterior temporal T1 and T2 electrodes were utilized whenever possible.   The [employer] automated spike & seizure detections were all reviewed in detail, in addition to the entire raw EEG.    Findings:  Background:  continuous.   Voltage:  Normal (20uV)  Organization:  Appropriate anterior-posterior gradient  Posterior Dominant Rhythm:  7-8 Hz symmetric, well-organized, and well-modulated  Variability:  Yes	Reactivity:  Yes  Sleep:  Absent.  Focal abnormalities:  No persistent asymmetries of voltage or frequency.  Interictal Activity:  None  Focal Slowing:  None  Generalized Slowing:  Mild  Events:  1)	No electrographic seizures or significant clinical events.  Provocations:  1)	Hyperventilation: was not performed.  2)	Photic stimulation: was not performed.  Impression:  Abnormal due to generalized slowing as above    Clinical Correlation:  Findings consistent with mild diffuse electrocerebral dysfunction secondary to nonspecific etiology    Cecily Ko MD  Attending Neurologist, Division of Epilepsy

## 2025-01-30 NOTE — PROGRESS NOTE ADULT - SUBJECTIVE AND OBJECTIVE BOX
DWIGHT RIBEIRO  [facility] 021 A ([facility])            Patient was evaluated and examined  by bedside,                 REVIEW OF SYSTEMS:  please see pertinent positives mentioned above, all other 12 ROS negative      T(C): , Max: 37.2 (01-30-25 @ 04:35)  HR: 71 (01-30-25 @ 04:35)  BP: 150/71 (01-30-25 @ 04:35)  RR: 18 (01-30-25 @ 04:35)  SpO2: 95% (01-30-25 @ 04:35)  CAPILLARY BLOOD GLUCOSE      POCT Blood Glucose.: 88 mg/dL (29 Jan 2025 21:05)  POCT Blood Glucose.: 116 mg/dL (29 Jan 2025 18:17)  POCT Blood Glucose.: 87 mg/dL (29 Jan 2025 11:43)  POCT Blood Glucose.: 69 mg/dL (29 Jan 2025 07:36)      PHYSICAL EXAM:  General: In moderate distress due to back pain  HEENT: NCAT  Lungs: No increased WOB  CVS: RRR  Abdomen: , non-distended        LAB  CBC  Date: 01-29-25 @ 07:35  Mean cell Byqrnvdtvu46.7  Mean cell Hemoglobin Conc33.8  Mean cell Volum 96.7  Platelet count-Automate 161  RBC Count 3.00  Red Cell Distrib Width13.6  WBC Count6.20  % Albumin, Urine--  Hematocrit 29.0  Hemoglobin 9.8  CBC  Date: 01-28-25 @ 06:15  Mean cell Qlltfvnpuh29.8  Mean cell Hemoglobin Conc34.2  Mean cell Volum 96.0  Platelet count-Automate 158  RBC Count 3.26  Red Cell Distrib Width13.3  WBC Count7.13  % Albumin, Urine--  Hematocrit 31.3  Hemoglobin 10.7  CBC  Date: 01-27-25 @ 15:51  Mean cell Kzpxrxeaud81.2  Mean cell Hemoglobin Conc33.7  Mean cell Volum 95.7  Platelet count-Automate 147  RBC Count 3.01  Red Cell Distrib Width13.2  WBC Count6.19  % Albumin, Urine--  Hematocrit 28.8  Hemoglobin 9.7  CBC  Date: 01-27-25 @ 08:52  Mean cell Bcwvuseauo80.6  Mean cell Hemoglobin Conc33.9  Mean cell Volum 96.4  Platelet count-Automate 161  RBC Count 3.34  Red Cell Distrib Width13.3  WBC Count6.58  % Albumin, Urine--  Hematocrit 32.2  Hemoglobin 10.9  CBC  Date: 01-26-25 @ 07:00  Mean cell Qlcfhdmkwt22.6  Mean cell Hemoglobin Conc33.4  Mean cell Volum 97.4  Platelet count-Automate 153  RBC Count 3.04  Red Cell Distrib Width13.7  WBC Count6.14  % Albumin, Urine--  Hematocrit 29.6  Hemoglobin 9.9  CBC  Date: 01-25-25 @ 08:23  Mean cell Onlwgqekfw14.1  Mean cell Hemoglobin Conc33.2  Mean cell Volum 96.6  Platelet count-Automate 151  RBC Count 2.90  Red Cell Distrib Width13.3  WBC Count7.01  % Albumin, Urine--  Hematocrit 28.0  Hemoglobin 9.3  CBC  Date: 01-24-25 @ 07:04  Mean cell Wajxxhxgyo81.5  Mean cell Hemoglobin Conc33.7  Mean cell Volum 96.5  Platelet count-Automate 157  RBC Count 3.11  Red Cell Distrib Width13.6  WBC Count8.34  % Albumin, Urine--  Hematocrit 30.0  Hemoglobin 10.1  CBC  Date: 01-23-25 @ 17:55  Mean cell Tkijfbkfmy99.6  Mean cell Hemoglobin Conc33.6  Mean cell Volum 97.1  Platelet count-Automate 175  RBC Count 3.40  Red Cell Distrib Width13.7  WBC Count10.13  % Albumin, Urine--  Hematocrit 33.0  Hemoglobin 11.1    BMP  01-29-25 @ 07:35  Blood Gas Arterial-Calcium,Ionized--  Blood Urea Nitrogen, Serum 22 mg/dL[H] [10 - 20]  Carbon Dioxide, Serum22 mmol/L [17 - 32]  Chloride, Uybbi991 mmol/L [98 - 110]  Creatinie, Serum0.8 mg/dL [0.7 - 1.5]  Glucose, Serum64 mg/dL[L] [70 - 99]  Potassium, Serum4.3 mmol/L [3.5 - 5.0]  Sodium, Serum 138 mmol/L [135 - 146]  [facility]  01-28-25 @ 06:15  Blood Gas Arterial-Calcium,Ionized--  Blood Urea Nitrogen, Serum 23 mg/dL[H] [10 - 20]  Carbon Dioxide, Serum23 mmol/L [17 - 32]  Chloride, Lggyk370 mmol/L [98 - 110]  Creatinie, Serum0.9 mg/dL [0.7 - 1.5]  Glucose, Serum64 mg/dL[L] [70 - 99]  Potassium, Serum4.1 mmol/L [3.5 - 5.0]  Sodium, Serum 139 mmol/L [135 - 146]  [facility]  01-27-25 @ 15:51  Blood Gas Arterial-Calcium,Ionized--  Blood Urea Nitrogen, Serum 27 mg/dL[H] [10 - 20]  Carbon Dioxide, Serum21 mmol/L [17 - 32]  Chloride, Kwjvg116 mmol/L [98 - 110]  Creatinie, Serum0.8 mg/dL [0.7 - 1.5]  Glucose, Dsmmm811 mg/dL[H] [70 - 99]  Potassium, Serum4.1 mmol/L [3.5 - 5.0]  Sodium, Serum 138 mmol/L [135 - 146]  [facility]  01-27-25 @ 14:17  Blood Gas Arterial-Calcium,Ionized1.24 mmol/L [1.15 - 1.33]  Blood Urea Nitrogen, Serum --  Carbon Dioxide, Serum--  Chloride, Serum--  Creatinie, Serum--  Glucose, Serum--  Potassium, Serum--  Sodium, Serum --  [facility]  01-27-25 @ 08:52  Blood Gas Arterial-Calcium,Ionized--  Blood Urea Nitrogen, Serum 28 mg/dL[H] [10 - 20]  Carbon Dioxide, Serum23 mmol/L [17 - 32]  Chloride, Wtmrb676 mmol/L [98 - 110]  Creatinie, Serum0.9 mg/dL [0.7 - 1.5]  Glucose, Tevnb872 mg/dL[H] [70 - 99]  Potassium, Serum4.4 mmol/L [3.5 - 5.0]  Sodium, Serum 138 mmol/L [135 - 146]  [facility]  01-26-25 @ 07:00  Blood Gas Arterial-Calcium,Ionized--  Blood Urea Nitrogen, Serum 32 mg/dL[H] [10 - 20]  Carbon Dioxide, Serum21 mmol/L [17 - 32]  Chloride, Dzgxp749 mmol/L [98 - 110]  Creatinie, Serum1.1 mg/dL [0.7 - 1.5]  Glucose, Ggtlb090 mg/dL[H] [70 - 99]  Potassium, Serum3.9 mmol/L [3.5 - 5.0]  Sodium, Serum 136 mmol/L [135 - 146]  [facility]  01-25-25 @ 08:23  Blood Gas Arterial-Calcium,Ionized--  Blood Urea Nitrogen, Serum 34 mg/dL[H] [10 - 20]  Carbon Dioxide, Serum22 mmol/L [17 - 32]  Chloride, Himfq283 mmol/L [98 - 110]  Creatinie, Serum1.1 mg/dL [0.7 - 1.5]  Glucose, Ehwrf911 mg/dL[H] [70 - 99]  Potassium, Serum3.7 mmol/L [3.5 - 5.0]  Sodium, Serum 140 mmol/L [135 - 146]              Microbiology:    Culture - Blood (collected 01-27-25 @ 19:13)  Source: .Blood BLOOD  Preliminary Report (01-30-25 @ 03:02):    No growth at 48 Hours    Culture - Blood (collected 01-27-25 @ 19:13)  Source: .Blood BLOOD  Preliminary Report (01-30-25 @ 03:02):    No growth at 48 Hours    Culture - Blood (collected 01-25-25 @ 08:23)  Source: .Blood BLOOD  Gram Stain (01-26-25 @ 15:48):    Growth in anaerobic bottle: Gram Positive Cocci in Clusters    Growth in aerobic bottle: Gram Positive Cocci in Clusters  Final Report (01-27-25 @ 14:27):    Growth in aerobic and anaerobic bottles: Staphylococcus aureus    See previous culture [id number]    Culture - Blood (collected 01-25-25 @ 08:23)  Source: .Blood BLOOD  Gram Stain (01-26-25 @ 17:31):    Growth in aerobic bottle: Gram Positive Cocci in Clusters    Growth in anaerobic bottle: Gram Positive Cocci in Clusters  Final Report (01-27-25 @ 14:42):    Growth in aerobic and anaerobic bottles: Staphylococcus aureus    See previous culture [id number]    Culture - Blood (collected 01-23-25 @ 20:05)  Source: .Blood BLOOD  Gram Stain (01-24-25 @ 13:53):    Growth in aerobic bottle: Gram Positive Cocci in Clusters    Growth in anaerobic bottle: Gram Positive Cocci in Clusters  Final Report (01-26-25 @ 09:38):    Growth in aerobic and anaerobic bottles: Staphylococcus aureus    Direct identification is available within approximately 3-5    hours either by Blood Panel Multiplexed PCR or Direct    MALDI-TOF. Details: [email or web address]  Organism: Blood Culture PCR  Staphylococcus aureus (01-26-25 @ 09:38)  Organism: Staphylococcus aureus (01-26-25 @ 09:38)      Method Type: LOUIS      -  Clindamycin: S <=0.25      -  Erythromycin: S <=0.25      -  Gentamicin: S <=4 Should not be used as monotherapy      -  Oxacillin: S <=0.25 Oxacillin predicts susceptibility for dicloxacillin, methicillin, and nafcillin      -  Penicillin: R >2      -  Rifampin: S <=1 Should not be used as monotherapy      -  Tetracycline: S <=4      -  Trimethoprim/Sulfamethoxazole: S <=0.5/9.5      -  Vancomycin: S 1  Organism: Blood Culture PCR (01-26-25 @ 09:38)      Method Type: PCR      -  Methicillin SENSITIVE Staphylococcus aureus (MSSA): Detec Any isolate of Staphylococcus aureus from a blood culture is NOT considered a contaminant.    Culture - Blood (collected 01-23-25 @ 20:05)  Source: .Blood BLOOD  Gram Stain (01-24-25 @ 13:52):    Growth in aerobic bottle: Gram Positive Cocci in Clusters    Growth in anaerobic bottle: Gram Positive Cocci in Clusters  Final Report (01-26-25 @ 09:38):    Growth in aerobic and anaerobic bottles: Staphylococcus aureus    See previous culture [id number]    Culture - Urine (collected 01-23-25 @ 19:55)  Source: Clean Catch None  Final Report (01-27-25 @ 15:57):    10,000 - 49,000 CFU/mL Staphylococcus aureus    50,000 - 99,000 CFU/mL Aerococcus urinae    Isolates of Aerococcus spp. are predictably susceptible to penicillin,    ampicillin, and vancomycin. All isolates are resistant to sulfonamides.  Organism: Staphylococcus aureus (01-27-25 @ 15:57)  Organism: Staphylococcus aureus (01-27-25 @ 15:57)      Method Type: LOUIS      -  Gentamicin: S <=4 Should not be used as monotherapy      -  Nitrofurantoin: S <=32      -  Oxacillin: S <=0.25 Oxacillin predicts susceptibility for dicloxacillin, methicillin, and nafcillin      -  Penicillin: R >2      -  Rifampin: S <=1 Should not be used as monotherapy      -  Tetracycline: S <=4      -  Trimethoprim/Sulfamethoxazole: S <=0.5/9.5      -  Vancomycin: S 1    Urinalysis with Rflx Culture (collected 01-23-25 @ 19:55)        RADIOLOGY & ADDITIONAL TESTS:        Medications:  acetaminophen     Tablet .. 650 milliGRAM(s) Oral every 6 hours  aluminum hydroxide/magnesium hydroxide/simethicone Suspension 30 milliLiter(s) Oral every 4 hours PRN  aspirin enteric coated 81 milliGRAM(s) Oral daily  atorvastatin 80 milliGRAM(s) Oral at bedtime  bacitracin   Ointment 1 Application(s) Topical two times a day  ceFAZolin   IVPB 2000 milliGRAM(s) IV Intermittent every 8 hours  chlorhexidine 2% Cloths 1 Application(s) Topical <User Schedule>  cyclobenzaprine 5 milliGRAM(s) Oral every 8 hours PRN  dextrose 5%. 1000 milliLiter(s) IV Continuous <Continuous>  dextrose 50% Injectable 25 Gram(s) IV Push once  dextrose 50% Injectable 12.5 Gram(s) IV Push once  dextrose 50% Injectable 25 Gram(s) IV Push once  dextrose Oral Gel 15 Gram(s) Oral once PRN  enoxaparin Injectable 40 milliGRAM(s) SubCutaneous every 24 hours  folic acid 1 milliGRAM(s) Oral daily  gabapentin 300 milliGRAM(s) Oral every 8 hours  glucagon  Injectable 1 milliGRAM(s) IntraMuscular once  insulin lispro (ADMELOG) corrective regimen sliding scale   SubCutaneous three times a day before meals  levETIRAcetam 500 milliGRAM(s) Oral two times a day  lidocaine   4% Patch 1 Patch Transdermal every 24 hours  lidocaine   4% Patch 1 Patch Transdermal daily  losartan 50 milliGRAM(s) Oral daily  melatonin 5 milliGRAM(s) Oral at bedtime PRN  morphine  - Injectable 2 milliGRAM(s) IV Push every 4 hours PRN  nystatin Powder 1 Application(s) Topical every 12 hours PRN  ondansetron Injectable 4 milliGRAM(s) IV Push every 8 hours PRN  oxyCODONE    IR 10 milliGRAM(s) Oral three times a day PRN  polyethylene glycol 3350 17 Gram(s) Oral two times a day  senna 2 Tablet(s) Oral at bedtime  sodium chloride 0.9%. 1000 milliLiter(s) IV Continuous <Continuous>  Tresiba (Insulin Degludec) 20 Unit(s) 20 Unit(s) SubCutaneous at bedtime        Assessment and Plan:      80-year-old male with past medical history of insulin-dependent diabetes, hypertension, subdural hematoma on keppra who presents for multiple falls, weakness, & confusion.        #MSSA bacteremia, origin L forearm cellulitis v. UTI?  #Questionable L forearm cellulitis - low suspicion for septic joint   - BCx + for MSSA (1/23, 1/25)  - UA + for nitrites, 20 WBC, small LE, & few bacteria  - UCx + for MSSA  - ID on board  - L Forearm/Elbow XR -ve for fx, showed soft tissue swelling  - L Elbow CT: Very limited exam. Artifact. Portions of the elbow are not imaged; Diffuse soft tissue edema which may reflect cellulitis in the right clinical setting. No definite discrete fluid collection; Arthritic changes of the elbow with no definite radiographic evidence of osteomyelitis.  - TTE w/ no vegetations  Plan:   - C/w IV cefazolin 2 g q8H  - Check BCx q48H until clear, collected 1/27-->NGTD, repeat 1/29, follow with ID  - Follow MRI T/L spine to ruleout OM/abscess given persistent bacteremia as patient complains about lower back pain worsening over the last couple of weeks, affecting ambulation          #TME  #Hx of SDH  - R/o stroke 2/2 septic emboli v. TME 2/2 MSSA bacteremia   - Stroke Code called due to slurred speech  - S/p ASA + Plavix load  - CTH -ve for acute pathology  - CTA H+N: No large vessel occlusion, aneurysm, or vascular malformation; Mild atherosclerotic stenosis in the bilateral supraclinoid ICAs and bilateral V4 vertebral arteries.  - CTP: Small artifactual perfusion abnormality in the anterior right cerebellum,   otherwise no perfusion deficits to suggest areas of completed infarction   or at risk territory.  - MR Head: No acute intracranial pathology; Stable mild chronic microvascular ischemic changes.  - Given negative MRI, suspect TME due to polypharmacy  Plan:  - C/w ASA 81 mg q24H  - D/c plavix 75 mg q24H as per neuro  - C/w atorvastatin 80 mg qhs  - C/w Keppra 500 mg q12H  - Neuro checks q8H  - Check rEEG    #Type II NSTEMI  - EKG shows no acute ischemic changes  - HST 47>43>39>31  - TTE: LVEF 66%  - Monitor on tele    #Chronic macrocytic anemia  - Hb 11.1 on admission  - No s/s of active bleeding  - B12 1163, WNL (1/23/25)  - Folate 5, lower end of normal (1/23/25)  - Iron studies WNL  Plan:  - C/w folic acid 1 mg q24H  - OP EGD/Colonoscopy   - Transfuse if Hb <7    #T2DM  #Hyperglycemia, resolved  - On metformin 500 mg q6H + Tresiba 60 units @ home  - A1c 7.6%  - FS 70s 1/28  Plan:  - C/w home Tresiba, decreased to 20 units from 40 units as FS 70s  - D/c'd Lispro 6 units TIDAC  - C/w +1 ISS    #HTN  - C/w home losartan 50 mg q24H    #Chronic back pain  #Frequent Falls  - C/w pain control: PO Tylenol 650 mg q6H + PO oxycodone 10 mg q6H PRN, IV morphine 2 mg q4H PRN, PO cyclobenzaprine 10 mg q8H + lidocaine patches  - Decrease cyclobenzaprine 5 mg q8H PRN as patient was very lethargic, suspect 2/2 pain meds  - PT consult  - Consider MR Spine w/ IVC given MSSA bacteremia     #Depression  - C/w venlafaxine ER 75 mg q24H (started inpatient)    # Incidental pancreatic tail hypodensity  - RUQ U/S WNL  - OP MRI may be obtained for further characterization  - Follow up with GI office located on [address] [place] [address], Phone number [phone] with Dr. Puentes to consider EUS    #MISC  - DVT ppx: Lovenox SQ  - GI ppx: NI  - Diet:   - Activity: AAT  - Code Status: Full Code  - Dispo: GMF      For Follow-Up:  - F/u rEEG  - F/u BCx (collected 1/27), repeat ordered for 1/29 AM, repeat q48H until clearance  - F/u ID; As per ID, will consider HANNAH and MRI Spine w/ IVC if BCx remains positive.    Dispo: pending ID recs, final EEG

## 2025-01-30 NOTE — PROGRESS NOTE ADULT - SUBJECTIVE AND OBJECTIVE BOX
INFECTIOUS DISEASE FOLLOW UP NOTE:    Interval History/ROS: Patient is a 80y old  Male who presents with a chief complaint of fall (30 Jan 2025 07:15)    Overnight events: Still reporting left arm pain, having difficulty raising his left arm, mainly due to pain.     REVIEW OF SYSTEMS:  CONSTITUTIONAL: No fever or chills  HEAD: No lesion on scalp  EYES: No visual disturbance  ENT: No sore throat  RESPIRATORY: No cough, no shortness of breath  CARDIOVASCULAR: No chest pain or palpitations  GASTROINTESTINAL: No abdominal or epigastric pain  GENITOURINARY: No dysuria  NEUROLOGICAL: Mild slurred speech, strength overall 4+/5  MUSCULOSKELETAL: Left arm swelling; low back pain  SKIN: No itching, rashes  All other ROS negative except noted above      Prior hospital charts reviewed [Yes]  Primary team notes reviewed [Yes]  Other consultant notes reviewed [Yes]    Allergies:  No Known Allergies      ANTIMICROBIALS:   ceFAZolin   IVPB 2000 every 8 hours      OTHER MEDS: MEDICATIONS  (STANDING):  acetaminophen     Tablet .. 650 every 6 hours  aluminum hydroxide/magnesium hydroxide/simethicone Suspension 30 every 4 hours PRN  aspirin enteric coated 81 daily  atorvastatin 80 at bedtime  cyclobenzaprine 5 every 8 hours PRN  dextrose 50% Injectable 25 once  dextrose 50% Injectable 12.5 once  dextrose 50% Injectable 25 once  dextrose Oral Gel 15 once PRN  enoxaparin Injectable 40 every 24 hours  gabapentin 300 every 8 hours  glucagon  Injectable 1 once  insulin lispro (ADMELOG) corrective regimen sliding scale  three times a day before meals  levETIRAcetam 500 two times a day  losartan 50 daily  melatonin 5 at bedtime PRN  morphine  - Injectable 2 every 4 hours PRN  ondansetron Injectable 4 every 8 hours PRN  oxyCODONE    IR 10 three times a day PRN  polyethylene glycol 3350 17 two times a day  senna 2 at bedtime      Vital Signs Last 24 Hrs  T(F): 98.2 (01-30-25 @ 13:10), Max: 99.7 (01-24-25 @ 22:32)    Vital Signs Last 24 Hrs  HR: 81 (01-30-25 @ 13:10) (70 - 81)  BP: 171/78 (01-30-25 @ 13:10) (150/71 - 171/78)  RR: 18 (01-30-25 @ 13:10)  SpO2: 98% (01-30-25 @ 13:10) (95% - 98%)  Wt(kg): --    EXAM:  GENERAL: NAD, lying in bed  HEAD: No head lesions  EYES: Conjunctiva pink and cornea white  EAR, NOSE, MOUTH, THROAT: Normal external ears and nose, no discharges; moist mucous membranes  NECK: Supple, nontender to palpation; no JVD  RESPIRATORY: Clear to auscultation bilaterally  CARDIOVASCULAR: S1 S2  GASTROINTESTINAL: Soft, nontender, nondistended; normoactive bowel sounds  GENITOURINARY: No loyola catheter, no CVA tenderness  EXTREMITIES: No clubbing, cyanosis, or petal edema  NERVOUS SYSTEM: Awake and alert, not fully oriented, slight slurred speech, not willing to move left arm reported 2/2 pain, no gross sensory deficit  MUSCULOSKELETAL: Left forearm swelling; low back tenderness  SKIN: No rashes or lesions, no superficial thrombophlebitis  PSYCH: Normal affect    Labs:                        9.4    5.09  )-----------( 145      ( 30 Jan 2025 07:12 )             28.6     01-30    141  |  109  |  20  ----------------------------<  56[L]  4.2   |  23  |  0.8    Ca    8.1[L]      30 Jan 2025 07:12  Mg     1.7     01-29    TPro  5.4[L]  /  Alb  1.8[L]  /  TBili  0.7  /  DBili  x   /  AST  65[H]  /  ALT  12  /  AlkPhos  268[H]  01-30      WBC Trend:  WBC Count: 5.09 (01-30-25 @ 07:12)  WBC Count: 6.20 (01-29-25 @ 07:35)  WBC Count: 7.13 (01-28-25 @ 06:15)  WBC Count: 6.19 (01-27-25 @ 15:51)      Creatine Trend:  Creatinine: 0.8 (01-30)  Creatinine: 0.8 (01-29)  Creatinine: 0.9 (01-28)  Creatinine: 0.8 (01-27)      Liver Biochemical Testing Trend:  Alanine Aminotransferase (ALT/SGPT): 12 (01-30)  Alanine Aminotransferase (ALT/SGPT): 13 (01-29)  Alanine Aminotransferase (ALT/SGPT): 15 (01-28)  Alanine Aminotransferase (ALT/SGPT): 18 (01-27)  Alanine Aminotransferase (ALT/SGPT): 21 (01-24)  Aspartate Aminotransferase (AST/SGOT): 65 (01-30-25 @ 07:12)  Aspartate Aminotransferase (AST/SGOT): 61 (01-29-25 @ 07:35)  Aspartate Aminotransferase (AST/SGOT): 64 (01-28-25 @ 06:15)  Aspartate Aminotransferase (AST/SGOT): 60 (01-27-25 @ 15:51)  Aspartate Aminotransferase (AST/SGOT): 36 (01-24-25 @ 07:04)  Bilirubin Total: 0.7 (01-30)  Bilirubin Total: 0.7 (01-29)  Bilirubin Total: 0.8 (01-28)  Bilirubin Total: 0.8 (01-27)  Bilirubin Total: 1.3 (01-24)      Trend LDH      Urinalysis Basic - ( 30 Jan 2025 07:12 )    Color: x / Appearance: x / SG: x / pH: x  Gluc: 56 mg/dL / Ketone: x  / Bili: x / Urobili: x   Blood: x / Protein: x / Nitrite: x   Leuk Esterase: x / RBC: x / WBC x   Sq Epi: x / Non Sq Epi: x / Bacteria: x        MICROBIOLOGY:        Culture - Blood (collected 27 Jan 2025 19:13)  Source: .Blood BLOOD  Preliminary Report:    No growth at 48 Hours    Culture - Blood (collected 27 Jan 2025 19:13)  Source: .Blood BLOOD  Preliminary Report:    No growth at 48 Hours    Culture - Blood (collected 25 Jan 2025 08:23)  Source: .Blood BLOOD  Final Report:    Growth in aerobic and anaerobic bottles: Staphylococcus aureus    See previous culture [id number]    Culture - Blood (collected 25 Jan 2025 08:23)  Source: .Blood BLOOD  Final Report:    Growth in aerobic and anaerobic bottles: Staphylococcus aureus    See previous culture [id number]    Culture - Blood (collected 23 Jan 2025 20:05)  Source: .Blood BLOOD  Final Report:    Growth in aerobic and anaerobic bottles: Staphylococcus aureus    Direct identification is available within approximately 3-5    hours either by Blood Panel Multiplexed PCR or Direct    MALDI-TOF. Details: [email or web address]  Organism: Blood Culture PCR  Staphylococcus aureus  Organism: Staphylococcus aureus    Sensitivities:      Method Type: LOUIS      -  Clindamycin: S <=0.25      -  Erythromycin: S <=0.25      -  Gentamicin: S <=4 Should not be used as monotherapy      -  Oxacillin: S <=0.25 Oxacillin predicts susceptibility for dicloxacillin, methicillin, and nafcillin      -  Penicillin: R >2      -  Rifampin: S <=1 Should not be used as monotherapy      -  Tetracycline: S <=4      -  Trimethoprim/Sulfamethoxazole: S <=0.5/9.5      -  Vancomycin: S 1  Organism: Blood Culture PCR    Sensitivities:      Method Type: PCR      -  Methicillin SENSITIVE Staphylococcus aureus (MSSA): Detec Any isolate of Staphylococcus aureus from a blood culture is NOT considered a contaminant.    Culture - Blood (collected 23 Jan 2025 20:05)  Source: .Blood BLOOD  Final Report:    Growth in aerobic and anaerobic bottles: Staphylococcus aureus    See previous culture [id number]    Culture - Urine (collected 23 Jan 2025 19:55)  Source: Clean Catch None  Final Report:    10,000 - 49,000 CFU/mL Staphylococcus aureus    50,000 - 99,000 CFU/mL Aerococcus urinae    Isolates of Aerococcus spp. are predictably susceptible to penicillin,    ampicillin, and vancomycin. All isolates are resistant to sulfonamides.  Organism: Staphylococcus aureus  Organism: Staphylococcus aureus    Sensitivities:      Method Type: LOUIS      -  Gentamicin: S <=4 Should not be used as monotherapy      -  Nitrofurantoin: S <=32      -  Oxacillin: S <=0.25 Oxacillin predicts susceptibility for dicloxacillin, methicillin, and nafcillin      -  Penicillin: R >2      -  Rifampin: S <=1 Should not be used as monotherapy      -  Tetracycline: S <=4      -  Trimethoprim/Sulfamethoxazole: S <=0.5/9.5      -  Vancomycin: S 1    Urinalysis with Rflx Culture (collected 23 Jan 2025 19:55)            Treponema Pallidum Antibody Interpretation: Negative (01-24-25 @ 07:04)    Ferritin: 499 (01-28)      Troponin T, High Sensitivity Result: 31 (01-27)  Troponin T, High Sensitivity Result: 39 (01-24)  Troponin T, High Sensitivity Result: 43 (01-23)  Troponin T, High Sensitivity Result: 47 (01-23)        RADIOLOGY:  imaging below personally reviewed    < from: MR Thoracic Spine w/wo IV Cont (01.29.25 @ 18:14) >  IMPRESSION:    1.  No evidence for thoracic discitis osteomyelitis.    2.  T8-9 degenerative changes with moderate spinal stenosis and severe   bilateral foraminal stenosis.    < end of copied text >      < from: MR Lumbar Spine w/wo IV Cont (01.29.25 @ 18:14) >  1.  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 No evidence of abscess.    2.  Degenerative notable for moderate spinal stenosis at L4-5 and   moderate to severe foraminal stenosis at L3-4 and L4-5.    < end of copied text >      < from: TTE Echo Complete w/o Contrast w/ Doppler (01.24.25 @ 11:19) >  Summary:   1. LV Ejection Fraction by Pan's Method with a biplane EF of 66 %.   2. Moderately enlarged left atrium.   3. Mild mitral annular calcification.   4. Sclerotic aortic valve with normal opening.   5. Ascending aorta 3.8 cm.    < end of copied text >

## 2025-01-30 NOTE — PROGRESS NOTE ADULT - ASSESSMENT
80-year-old male with past medical history of insulin-dependent diabetes, hypertension, subdural hematoma on keppra who presents for multiple falls, weakness, confusion.     ID is consulted for cellulitis  Afebrile 98.1  WBC 8.34 < 10.13  On room air  BCx 1/23 MSSA  BCx 1/25 MSSA  BCx 1/27 NGTD  UA WBC 20, UCx MSSA and Aerococcus spp.    Left forearm xray  Capitellum bony fragment/calcification. No radius or ulna   fracture.. Soft tissue swelling with no radiographic findings to suggest   the presence of osteomyelitis or radiopaque soft tissue foreign body.    CT chest:  No CT evidence of acute traumatic injury within the chest, abdomen or   pelvis.  CT ABDOMEN/PELVIS:  Pancreatic tail hypodensity measuring 1 cm. Differential includes IPMN.   Nonemergent/outpatient MRI may be obtained for further characterization    Antibiotic:  Vancomycin 1/23 - 1/24  Ceftriaxone 1/23 - 1/24  Ancef 1/24 ->      IMPRESSION:  MSSA bacteremia, potentially life-threatening  Suspect left forearm cellulitis  L4-L5 vertebral osteomyelitis  Acute metabolic encephalopathy  DM  Immunosuppression / Immunosenescence secondary to multiple comorbidities which could result in poor clinical outcome    RECOMMENDATIONS:  - So far unclear source for MSSA bacteremia  - IV Ancef 2g q8hrs  - TTE no vegetation, will hold off on HANNAH for now unless worsening clinical status   - Given vertebral OM, he needs at least 6 weeks of IV abx with PICC  - PT/OT  - Offloading and frequent position changes, aspiration precaution  - Trend WBC, fever curve, transaminases, creatinine daily  - Please inform ID of any patient clinical change or any new pertinent laboratory or radiographic data      Nora Beck D.O.  Attending Physician  Division of Infectious Diseases  [facility]  Please contact me via [employer] Teams

## 2025-01-31 LAB
ALBUMIN SERPL ELPH-MCNC: 1.7 G/DL — LOW (ref 3.5–5.2)
ALP SERPL-CCNC: 344 U/L — HIGH (ref 30–115)
ALT FLD-CCNC: 18 U/L — SIGNIFICANT CHANGE UP (ref 0–41)
ANION GAP SERPL CALC-SCNC: 8 MMOL/L — SIGNIFICANT CHANGE UP (ref 7–14)
AST SERPL-CCNC: 78 U/L — HIGH (ref 0–41)
BILIRUB SERPL-MCNC: 0.7 MG/DL — SIGNIFICANT CHANGE UP (ref 0.2–1.2)
BUN SERPL-MCNC: 19 MG/DL — SIGNIFICANT CHANGE UP (ref 10–20)
CALCIUM SERPL-MCNC: 7.8 MG/DL — LOW (ref 8.4–10.5)
CHLORIDE SERPL-SCNC: 109 MMOL/L — SIGNIFICANT CHANGE UP (ref 98–110)
CO2 SERPL-SCNC: 23 MMOL/L — SIGNIFICANT CHANGE UP (ref 17–32)
CREAT SERPL-MCNC: 0.9 MG/DL — SIGNIFICANT CHANGE UP (ref 0.7–1.5)
EGFR: 87 ML/MIN/1.73M2 — SIGNIFICANT CHANGE UP
GLUCOSE SERPL-MCNC: 212 MG/DL — HIGH (ref 70–99)
HCT VFR BLD CALC: 31.8 % — LOW (ref 42–52)
HGB BLD-MCNC: 10.5 G/DL — LOW (ref 14–18)
MCHC RBC-ENTMCNC: 32.4 PG — HIGH (ref 27–31)
MCHC RBC-ENTMCNC: 33 G/DL — SIGNIFICANT CHANGE UP (ref 32–37)
MCV RBC AUTO: 98.1 FL — HIGH (ref 80–94)
NRBC # BLD: 0 /100 WBCS — SIGNIFICANT CHANGE UP (ref 0–0)
NRBC BLD-RTO: 0 /100 WBCS — SIGNIFICANT CHANGE UP (ref 0–0)
PLATELET # BLD AUTO: 146 K/UL — SIGNIFICANT CHANGE UP (ref 130–400)
PMV BLD: 8.1 FL — SIGNIFICANT CHANGE UP (ref 7.4–10.4)
POTASSIUM SERPL-MCNC: 4.5 MMOL/L — SIGNIFICANT CHANGE UP (ref 3.5–5)
POTASSIUM SERPL-SCNC: 4.5 MMOL/L — SIGNIFICANT CHANGE UP (ref 3.5–5)
PROT SERPL-MCNC: 5.5 G/DL — LOW (ref 6–8)
RBC # BLD: 3.24 M/UL — LOW (ref 4.7–6.1)
RBC # FLD: 13.7 % — SIGNIFICANT CHANGE UP (ref 11.5–14.5)
SODIUM SERPL-SCNC: 140 MMOL/L — SIGNIFICANT CHANGE UP (ref 135–146)
WBC # BLD: 5.98 K/UL — SIGNIFICANT CHANGE UP (ref 4.8–10.8)
WBC # FLD AUTO: 5.98 K/UL — SIGNIFICANT CHANGE UP (ref 4.8–10.8)

## 2025-01-31 PROCEDURE — 99233 SBSQ HOSP IP/OBS HIGH 50: CPT

## 2025-01-31 RX ADMIN — Medication 1 APPLICATION(S): at 16:35

## 2025-01-31 RX ADMIN — ACETAMINOPHEN 650 MILLIGRAM(S): 160 SUSPENSION ORAL at 16:35

## 2025-01-31 RX ADMIN — MORPHINE SULFATE 2 MILLIGRAM(S): 60 TABLET, FILM COATED, EXTENDED RELEASE ORAL at 18:14

## 2025-01-31 RX ADMIN — ASPIRIN 81 MILLIGRAM(S): 81 TABLET, COATED ORAL at 11:32

## 2025-01-31 RX ADMIN — ANTISEPTIC SURGICAL SCRUB 1 APPLICATION(S): 0.04 SOLUTION TOPICAL at 05:39

## 2025-01-31 RX ADMIN — LIDOCAINE HYDROCHLORIDE 1 PATCH: 30 CREAM TOPICAL at 11:32

## 2025-01-31 RX ADMIN — LIDOCAINE HYDROCHLORIDE 1 PATCH: 30 CREAM TOPICAL at 00:30

## 2025-01-31 RX ADMIN — MORPHINE SULFATE 2 MILLIGRAM(S): 60 TABLET, FILM COATED, EXTENDED RELEASE ORAL at 14:01

## 2025-01-31 RX ADMIN — ENOXAPARIN SODIUM 40 MILLIGRAM(S): 100 INJECTION SUBCUTANEOUS at 22:19

## 2025-01-31 RX ADMIN — GABAPENTIN 300 MILLIGRAM(S): 800 TABLET ORAL at 13:12

## 2025-01-31 RX ADMIN — Medication 1 APPLICATION(S): at 05:41

## 2025-01-31 RX ADMIN — ATORVASTATIN CALCIUM 80 MILLIGRAM(S): 80 TABLET, FILM COATED ORAL at 22:18

## 2025-01-31 RX ADMIN — Medication 1: at 07:53

## 2025-01-31 RX ADMIN — LEVETIRACETAM 500 MILLIGRAM(S): 750 TABLET, FILM COATED ORAL at 05:40

## 2025-01-31 RX ADMIN — Medication 1: at 11:31

## 2025-01-31 RX ADMIN — Medication 100 MILLIGRAM(S): at 22:18

## 2025-01-31 RX ADMIN — Medication 100 MILLIGRAM(S): at 05:40

## 2025-01-31 RX ADMIN — GABAPENTIN 300 MILLIGRAM(S): 800 TABLET ORAL at 22:18

## 2025-01-31 RX ADMIN — Medication 100 MILLIGRAM(S): at 13:12

## 2025-01-31 RX ADMIN — LEVETIRACETAM 500 MILLIGRAM(S): 750 TABLET, FILM COATED ORAL at 16:35

## 2025-01-31 RX ADMIN — ACETAMINOPHEN 650 MILLIGRAM(S): 160 SUSPENSION ORAL at 05:40

## 2025-01-31 RX ADMIN — POLYETHYLENE GLYCOL 3350 17 GRAM(S): 17 POWDER, FOR SOLUTION ORAL at 05:41

## 2025-01-31 RX ADMIN — LIDOCAINE HYDROCHLORIDE 1 PATCH: 30 CREAM TOPICAL at 19:15

## 2025-01-31 RX ADMIN — Medication 1 MILLIGRAM(S): at 11:32

## 2025-01-31 RX ADMIN — Medication 2 TABLET(S): at 22:18

## 2025-01-31 RX ADMIN — LIDOCAINE HYDROCHLORIDE 1 PATCH: 30 CREAM TOPICAL at 22:19

## 2025-01-31 RX ADMIN — Medication 2: at 16:34

## 2025-01-31 RX ADMIN — GABAPENTIN 300 MILLIGRAM(S): 800 TABLET ORAL at 05:40

## 2025-01-31 RX ADMIN — Medication 50 MILLIGRAM(S): at 05:40

## 2025-01-31 RX ADMIN — ACETAMINOPHEN 650 MILLIGRAM(S): 160 SUSPENSION ORAL at 11:32

## 2025-01-31 RX ADMIN — LIDOCAINE HYDROCHLORIDE 1 PATCH: 30 CREAM TOPICAL at 23:32

## 2025-01-31 NOTE — DISCHARGE NOTE PROVIDER - NSDCFUADDAPPT_GEN_ALL_CORE_FT
ID follow-up with Dr. Jose A Yancey for Telehealth. We will call the patient between 10:30-1:30      [id number]       [phone]       Fax [phone]

## 2025-01-31 NOTE — PROGRESS NOTE ADULT - SUBJECTIVE AND OBJECTIVE BOX
DWIGHT RIBEIRO  [facility] 025 A ([facility])            Patient was evaluated and examined  by bedside.                REVIEW OF SYSTEMS:  please see pertinent positives mentioned above, all other 12 ROS negative      T(C): , Max: 37.2 (01-31-25 @ 05:15)  HR: 92 (01-31-25 @ 05:15)  BP: 177/67 (01-31-25 @ 05:15)  RR: 18 (01-31-25 @ 05:15)  SpO2: 95% (01-31-25 @ 05:15)  CAPILLARY BLOOD GLUCOSE      POCT Blood Glucose.: 181 mg/dL (31 Jan 2025 11:16)  POCT Blood Glucose.: 188 mg/dL (31 Jan 2025 07:36)  POCT Blood Glucose.: 307 mg/dL (30 Jan 2025 20:35)  POCT Blood Glucose.: 226 mg/dL (30 Jan 2025 16:46)      PHYSICAL EXAM:  General: sleepy  HEENT: NCAT  Lungs: No increased WOB  CVS: RRR  Abdomen: , non-distended  Extremities: no edema        LAB  CBC  Date: 01-31-25 @ 06:54  Mean cell Ncfsnbqcwc05.4  Mean cell Hemoglobin Conc33.0  Mean cell Volum 98.1  Platelet count-Automate 146  RBC Count 3.24  Red Cell Distrib Width13.7  WBC Count5.98  % Albumin, Urine--  Hematocrit 31.8  Hemoglobin 10.5  CBC  Date: 01-30-25 @ 07:12  Mean cell Caxaahvceg93.8  Mean cell Hemoglobin Conc32.9  Mean cell Volum 96.6  Platelet count-Automate 145  RBC Count 2.96  Red Cell Distrib Width13.7  WBC Count5.09  % Albumin, Urine--  Hematocrit 28.6  Hemoglobin 9.4  CBC  Date: 01-29-25 @ 07:35  Mean cell Utjkwanwgj69.7  Mean cell Hemoglobin Conc33.8  Mean cell Volum 96.7  Platelet count-Automate 161  RBC Count 3.00  Red Cell Distrib Width13.6  WBC Count6.20  % Albumin, Urine--  Hematocrit 29.0  Hemoglobin 9.8  CBC  Date: 01-28-25 @ 06:15  Mean cell Ypnajhyiuk60.8  Mean cell Hemoglobin Conc34.2  Mean cell Volum 96.0  Platelet count-Automate 158  RBC Count 3.26  Red Cell Distrib Width13.3  WBC Count7.13  % Albumin, Urine--  Hematocrit 31.3  Hemoglobin 10.7  CBC  Date: 01-27-25 @ 15:51  Mean cell Cmtybedtgm22.2  Mean cell Hemoglobin Conc33.7  Mean cell Volum 95.7  Platelet count-Automate 147  RBC Count 3.01  Red Cell Distrib Width13.2  WBC Count6.19  % Albumin, Urine--  Hematocrit 28.8  Hemoglobin 9.7  CBC  Date: 01-27-25 @ 08:52  Mean cell Meyisvwyfj84.6  Mean cell Hemoglobin Conc33.9  Mean cell Volum 96.4  Platelet count-Automate 161  RBC Count 3.34  Red Cell Distrib Width13.3  WBC Count6.58  % Albumin, Urine--  Hematocrit 32.2  Hemoglobin 10.9  CBC  Date: 01-26-25 @ 07:00  Mean cell Dtuvelntqa00.6  Mean cell Hemoglobin Conc33.4  Mean cell Volum 97.4  Platelet count-Automate 153  RBC Count 3.04  Red Cell Distrib Width13.7  WBC Count6.14  % Albumin, Urine--  Hematocrit 29.6  Hemoglobin 9.9  CBC  Date: 01-25-25 @ 08:23  Mean cell Kbuzjwufin05.1  Mean cell Hemoglobin Conc33.2  Mean cell Volum 96.6  Platelet count-Automate 151  RBC Count 2.90  Red Cell Distrib Width13.3  WBC Count7.01  % Albumin, Urine--  Hematocrit 28.0  Hemoglobin 9.3    BMP  01-31-25 @ 06:54  Blood Gas Arterial-Calcium,Ionized--  Blood Urea Nitrogen, Serum 19 mg/dL [10 - 20]  Carbon Dioxide, Serum23 mmol/L [17 - 32]  Chloride, Xxira572 mmol/L [98 - 110]  Creatinie, Serum0.9 mg/dL [0.7 - 1.5]  Glucose, Gtjzv714 mg/dL[H] [70 - 99]  Potassium, Serum4.5 mmol/L [3.5 - 5.0]  Sodium, Serum 140 mmol/L [135 - 146]  [facility]  01-30-25 @ 07:12  Blood Gas Arterial-Calcium,Ionized--  Blood Urea Nitrogen, Serum 20 mg/dL [10 - 20]  Carbon Dioxide, Serum23 mmol/L [17 - 32]  Chloride, Szpmd312 mmol/L [98 - 110]  Creatinie, Serum0.8 mg/dL [0.7 - 1.5]  Glucose, Serum56 mg/dL[L] [70 - 99]  Potassium, Serum4.2 mmol/L [3.5 - 5.0]  Sodium, Serum 141 mmol/L [135 - 146]  [facility]  01-29-25 @ 07:35  Blood Gas Arterial-Calcium,Ionized--  Blood Urea Nitrogen, Serum 22 mg/dL[H] [10 - 20]  Carbon Dioxide, Serum22 mmol/L [17 - 32]  Chloride, Qmcuq570 mmol/L [98 - 110]  Creatinie, Serum0.8 mg/dL [0.7 - 1.5]  Glucose, Serum64 mg/dL[L] [70 - 99]  Potassium, Serum4.3 mmol/L [3.5 - 5.0]  Sodium, Serum 138 mmol/L [135 - 146]  [facility]  01-28-25 @ 06:15  Blood Gas Arterial-Calcium,Ionized--  Blood Urea Nitrogen, Serum 23 mg/dL[H] [10 - 20]  Carbon Dioxide, Serum23 mmol/L [17 - 32]  Chloride, Slurp316 mmol/L [98 - 110]  Creatinie, Serum0.9 mg/dL [0.7 - 1.5]  Glucose, Serum64 mg/dL[L] [70 - 99]  Potassium, Serum4.1 mmol/L [3.5 - 5.0]  Sodium, Serum 139 mmol/L [135 - 146]  [facility]  01-27-25 @ 15:51  Blood Gas Arterial-Calcium,Ionized--  Blood Urea Nitrogen, Serum 27 mg/dL[H] [10 - 20]  Carbon Dioxide, Serum21 mmol/L [17 - 32]  Chloride, Xayvi695 mmol/L [98 - 110]  Creatinie, Serum0.8 mg/dL [0.7 - 1.5]  Glucose, Qccns198 mg/dL[H] [70 - 99]  Potassium, Serum4.1 mmol/L [3.5 - 5.0]  Sodium, Serum 138 mmol/L [135 - 146]  [facility]  01-27-25 @ 14:17  Blood Gas Arterial-Calcium,Ionized1.24 mmol/L [1.15 - 1.33]  Blood Urea Nitrogen, Serum --  Carbon Dioxide, Serum--  Chloride, Serum--  Creatinie, Serum--  Glucose, Serum--  Potassium, Serum--  Sodium, Serum --  BMP  01-27-25 @ 08:52  Blood Gas Arterial-Calcium,Ionized--  Blood Urea Nitrogen, Serum 28 mg/dL[H] [10 - 20]  Carbon Dioxide, Serum23 mmol/L [17 - 32]  Chloride, Hlixa105 mmol/L [98 - 110]  Creatinie, Serum0.9 mg/dL [0.7 - 1.5]  Glucose, Necqh746 mg/dL[H] [70 - 99]  Potassium, Serum4.4 mmol/L [3.5 - 5.0]  Sodium, Serum 138 mmol/L [135 - 146]              Microbiology:    Culture - Blood (collected 01-29-25 @ 07:35)  Source: .Blood BLOOD  Preliminary Report (01-30-25 @ 21:01):    No growth at 24 hours    Culture - Blood (collected 01-27-25 @ 19:13)  Source: .Blood BLOOD  Preliminary Report (01-31-25 @ 03:01):    No growth at 72 Hours    Culture - Blood (collected 01-27-25 @ 19:13)  Source: .Blood BLOOD  Preliminary Report (01-31-25 @ 03:01):    No growth at 72 Hours    Culture - Blood (collected 01-25-25 @ 08:23)  Source: .Blood BLOOD  Gram Stain (01-26-25 @ 15:48):    Growth in anaerobic bottle: Gram Positive Cocci in Clusters    Growth in aerobic bottle: Gram Positive Cocci in Clusters  Final Report (01-27-25 @ 14:27):    Growth in aerobic and anaerobic bottles: Staphylococcus aureus    See previous culture [id number]    Culture - Blood (collected 01-25-25 @ 08:23)  Source: .Blood BLOOD  Gram Stain (01-26-25 @ 17:31):    Growth in aerobic bottle: Gram Positive Cocci in Clusters    Growth in anaerobic bottle: Gram Positive Cocci in Clusters  Final Report (01-27-25 @ 14:42):    Growth in aerobic and anaerobic bottles: Staphylococcus aureus    See previous culture [id number]    Culture - Blood (collected 01-23-25 @ 20:05)  Source: .Blood BLOOD  Gram Stain (01-24-25 @ 13:53):    Growth in aerobic bottle: Gram Positive Cocci in Clusters    Growth in anaerobic bottle: Gram Positive Cocci in Clusters  Final Report (01-26-25 @ 09:38):    Growth in aerobic and anaerobic bottles: Staphylococcus aureus    Direct identification is available within approximately 3-5    hours either by Blood Panel Multiplexed PCR or Direct    MALDI-TOF. Details: [email or web address]  Organism: Blood Culture PCR  Staphylococcus aureus (01-26-25 @ 09:38)  Organism: Staphylococcus aureus (01-26-25 @ 09:38)      Method Type: LOUIS      -  Clindamycin: S <=0.25      -  Erythromycin: S <=0.25      -  Gentamicin: S <=4 Should not be used as monotherapy      -  Oxacillin: S <=0.25 Oxacillin predicts susceptibility for dicloxacillin, methicillin, and nafcillin      -  Penicillin: R >2      -  Rifampin: S <=1 Should not be used as monotherapy      -  Tetracycline: S <=4      -  Trimethoprim/Sulfamethoxazole: S <=0.5/9.5      -  Vancomycin: S 1  Organism: Blood Culture PCR (01-26-25 @ 09:38)      Method Type: PCR      -  Methicillin SENSITIVE Staphylococcus aureus (MSSA): Detec Any isolate of Staphylococcus aureus from a blood culture is NOT considered a contaminant.    Culture - Blood (collected 01-23-25 @ 20:05)  Source: .Blood BLOOD  Gram Stain (01-24-25 @ 13:52):    Growth in aerobic bottle: Gram Positive Cocci in Clusters    Growth in anaerobic bottle: Gram Positive Cocci in Clusters  Final Report (01-26-25 @ 09:38):    Growth in aerobic and anaerobic bottles: Staphylococcus aureus    See previous culture [id number]    Culture - Urine (collected 01-23-25 @ 19:55)  Source: Clean Catch None  Final Report (01-27-25 @ 15:57):    10,000 - 49,000 CFU/mL Staphylococcus aureus    50,000 - 99,000 CFU/mL Aerococcus urinae    Isolates of Aerococcus spp. are predictably susceptible to penicillin,    ampicillin, and vancomycin. All isolates are resistant to sulfonamides.  Organism: Staphylococcus aureus (01-27-25 @ 15:57)  Organism: Staphylococcus aureus (01-27-25 @ 15:57)      Method Type: LOUIS      -  Gentamicin: S <=4 Should not be used as monotherapy      -  Nitrofurantoin: S <=32      -  Oxacillin: S <=0.25 Oxacillin predicts susceptibility for dicloxacillin, methicillin, and nafcillin      -  Penicillin: R >2      -  Rifampin: S <=1 Should not be used as monotherapy      -  Tetracycline: S <=4      -  Trimethoprim/Sulfamethoxazole: S <=0.5/9.5      -  Vancomycin: S 1    Urinalysis with Rflx Culture (collected 01-23-25 @ 19:55)        RADIOLOGY & ADDITIONAL TESTS:        Medications:  acetaminophen     Tablet .. 650 milliGRAM(s) Oral every 6 hours  aluminum hydroxide/magnesium hydroxide/simethicone Suspension 30 milliLiter(s) Oral every 4 hours PRN  aspirin enteric coated 81 milliGRAM(s) Oral daily  atorvastatin 80 milliGRAM(s) Oral at bedtime  bacitracin   Ointment 1 Application(s) Topical two times a day  ceFAZolin   IVPB 2000 milliGRAM(s) IV Intermittent every 8 hours  chlorhexidine 2% Cloths 1 Application(s) Topical <User Schedule>  cyclobenzaprine 5 milliGRAM(s) Oral every 8 hours PRN  dextrose 5%. 1000 milliLiter(s) IV Continuous <Continuous>  dextrose 50% Injectable 25 Gram(s) IV Push once  dextrose 50% Injectable 12.5 Gram(s) IV Push once  dextrose 50% Injectable 25 Gram(s) IV Push once  dextrose Oral Gel 15 Gram(s) Oral once PRN  enoxaparin Injectable 40 milliGRAM(s) SubCutaneous every 24 hours  folic acid 1 milliGRAM(s) Oral daily  gabapentin 300 milliGRAM(s) Oral every 8 hours  glucagon  Injectable 1 milliGRAM(s) IntraMuscular once  insulin lispro (ADMELOG) corrective regimen sliding scale   SubCutaneous three times a day before meals  levETIRAcetam 500 milliGRAM(s) Oral two times a day  lidocaine   4% Patch 1 Patch Transdermal every 24 hours  lidocaine   4% Patch 1 Patch Transdermal daily  losartan 50 milliGRAM(s) Oral daily  melatonin 5 milliGRAM(s) Oral at bedtime PRN  morphine  - Injectable 2 milliGRAM(s) IV Push every 4 hours PRN  nystatin Powder 1 Application(s) Topical every 12 hours PRN  ondansetron Injectable 4 milliGRAM(s) IV Push every 8 hours PRN  oxyCODONE    IR 10 milliGRAM(s) Oral three times a day PRN  polyethylene glycol 3350 17 Gram(s) Oral two times a day  senna 2 Tablet(s) Oral at bedtime  Tresiba (Insulin Degludec) 20 Unit(s) 20 Unit(s) SubCutaneous at bedtime        Assessment and Plan:      80-year-old male with past medical history of insulin-dependent diabetes, hypertension, subdural hematoma on keppra who presents for multiple falls, weakness, & confusion.        #MSSA bacteremia, origin L forearm cellulitis v. UTI?  #Acute OM/discitis  #Questionable L forearm cellulitis - low suspicion for septic joint   - BCx + for MSSA (1/23, 1/25)  - UA + for nitrites, 20 WBC, small LE, & few bacteria  - UCx + for MSSA  - ID on board  - L Forearm/Elbow XR -ve for fx, showed soft tissue swelling  - L Elbow CT: Very limited exam. Artifact. Portions of the elbow are not imaged; Diffuse soft tissue edema which may reflect cellulitis in the right clinical setting. No definite discrete fluid collection; Arthritic changes of the elbow with no definite radiographic evidence of osteomyelitis.  - TTE w/ no vegetations  Plan:   - C/w IV cefazolin 2 g q8H  - Check BCx q48H until clear, collected 1/27-->NGTD, repeat 1/29, follow with ID  - Follow MRI T/L spine -->L4-5 OM and discitis, plan for 6 weeks of antibiotics, IR consulted for PICC placement        #TME  #Hx of SDH  - R/o stroke 2/2 septic emboli v. TME 2/2 MSSA bacteremia   - Stroke Code called due to slurred speech  - S/p ASA + Plavix load  - CTH -ve for acute pathology  - CTA H+N: No large vessel occlusion, aneurysm, or vascular malformation; Mild atherosclerotic stenosis in the bilateral supraclinoid ICAs and bilateral V4 vertebral arteries.  - CTP: Small artifactual perfusion abnormality in the anterior right cerebellum,   otherwise no perfusion deficits to suggest areas of completed infarction   or at risk territory.  - MR Head: No acute intracranial pathology; Stable mild chronic microvascular ischemic changes.  - Given negative MRI, suspect TME due to polypharmacy  Plan:  - C/w ASA 81 mg q24H  - D/c plavix 75 mg q24H as per neuro  - C/w atorvastatin 80 mg qhs  - C/w Keppra 500 mg q12H  - Neuro checks q8H  - Check rEEG    #Type II NSTEMI  - EKG shows no acute ischemic changes  - HST 47>43>39>31  - TTE: LVEF 66%  - Monitor on tele    #Chronic macrocytic anemia  - Hb 11.1 on admission  - No s/s of active bleeding  - B12 1163, WNL (1/23/25)  - Folate 5, lower end of normal (1/23/25)  - Iron studies WNL  Plan:  - C/w folic acid 1 mg q24H  - OP EGD/Colonoscopy   - Transfuse if Hb <7    #T2DM  #Hyperglycemia, resolved  - On metformin 500 mg q6H + Tresiba 60 units @ home  - A1c 7.6%  - FS 70s 1/28  Plan:  - C/w home Tresiba, decreased to 20 units from 40 units as FS 70s  - D/c'd Lispro 6 units TIDAC  - C/w +1 ISS    #HTN  - C/w home losartan 50 mg q24H    #Chronic back pain  #Frequent Falls  - C/w pain control: PO Tylenol 650 mg q6H + PO oxycodone 10 mg q6H PRN, IV morphine 2 mg q4H PRN, PO cyclobenzaprine 10 mg q8H + lidocaine patches  - Decrease cyclobenzaprine 5 mg q8H PRN as patient was very lethargic, suspect 2/2 pain meds  - PT consult  - Consider MR Spine w/ IVC given MSSA bacteremia     #Depression  - C/w venlafaxine ER 75 mg q24H (started inpatient)    # Incidental pancreatic tail hypodensity  - RUQ U/S WNL  - OP MRI may be obtained for further characterization  - Follow up with GI office located on [address] [place] [address], Phone number [phone] with Dr. Puentes to consider EUS    #MISC  - DVT ppx: Lovenox SQ  - GI ppx: NI  - Diet:   - Activity: AAT  - Code Status: Full Code  - Dispo: [facility]      For Follow-Up:  - F/u rEEG  - F/u BCx (collected 1/27), repeat ordered for 1/29 AM, repeat q48H until clearance  - F/u ID; As per ID, will consider HANNAH and MRI Spine w/ IVC if BCx remains positive.    Dispo: pending ID recs, final EEG

## 2025-01-31 NOTE — PROGRESS NOTE ADULT - SUBJECTIVE AND OBJECTIVE BOX
INFECTIOUS DISEASE FOLLOW UP NOTE:    Interval History/ROS: Patient is a 80y old  Male who presents with a chief complaint of fall (31 Jan 2025 13:42)    Overnight events: No overnight event.     REVIEW OF SYSTEMS:  CONSTITUTIONAL: No fever or chills  HEAD: No lesion on scalp  EYES: No visual disturbance  ENT: No sore throat  RESPIRATORY: No cough, no shortness of breath  CARDIOVASCULAR: No chest pain or palpitations  GASTROINTESTINAL: No abdominal or epigastric pain  GENITOURINARY: No dysuria  NEUROLOGICAL: Mild slurred speech, strength overall 4+/5  MUSCULOSKELETAL: Left arm swelling; low back pain  SKIN: No itching, rashes  All other ROS negative except noted above    Prior hospital charts reviewed [Yes]  Primary team notes reviewed [Yes]  Other consultant notes reviewed [Yes]    Allergies:  No Known Allergies      ANTIMICROBIALS:   ceFAZolin   IVPB 2000 every 8 hours      OTHER MEDS: MEDICATIONS  (STANDING):  acetaminophen     Tablet .. 650 every 6 hours  aluminum hydroxide/magnesium hydroxide/simethicone Suspension 30 every 4 hours PRN  aspirin enteric coated 81 daily  atorvastatin 80 at bedtime  cyclobenzaprine 5 every 8 hours PRN  dextrose 50% Injectable 25 once  dextrose 50% Injectable 12.5 once  dextrose 50% Injectable 25 once  dextrose Oral Gel 15 once PRN  enoxaparin Injectable 40 every 24 hours  gabapentin 300 every 8 hours  glucagon  Injectable 1 once  insulin lispro (ADMELOG) corrective regimen sliding scale  three times a day before meals  levETIRAcetam 500 two times a day  losartan 50 daily  melatonin 5 at bedtime PRN  morphine  - Injectable 2 every 4 hours PRN  ondansetron Injectable 4 every 8 hours PRN  oxyCODONE    IR 10 three times a day PRN  polyethylene glycol 3350 17 two times a day  senna 2 at bedtime      Vital Signs Last 24 Hrs  T(F): 97.9 (01-31-25 @ 20:00), Max: 99 (01-30-25 @ 04:35)    Vital Signs Last 24 Hrs  HR: 81 (01-31-25 @ 20:00) (81 - 92)  BP: 151/71 (01-31-25 @ 20:00) (151/71 - 177/67)  RR: 18 (01-31-25 @ 20:00)  SpO2: 98% (01-31-25 @ 20:00) (95% - 98%)  Wt(kg): --    EXAM:  GENERAL: NAD, lying in bed  HEAD: No head lesions  EYES: Conjunctiva pink and cornea white  EAR, NOSE, MOUTH, THROAT: Normal external ears and nose, no discharges; moist mucous membranes  NECK: Supple, nontender to palpation; no JVD  RESPIRATORY: Clear to auscultation bilaterally  CARDIOVASCULAR: S1 S2  GASTROINTESTINAL: Soft, nontender, nondistended; normoactive bowel sounds  GENITOURINARY: No loyola catheter, no CVA tenderness  EXTREMITIES: No clubbing, cyanosis, or petal edema  NERVOUS SYSTEM: Awake and alert, not fully oriented, slight slurred speech, not willing to move left arm reported 2/2 pain, no gross sensory deficit  MUSCULOSKELETAL: Left forearm swelling; low back tenderness  SKIN: No rashes or lesions, no superficial thrombophlebitis  PSYCH: Normal affect    Labs:                        10.5   5.98  )-----------( 146      ( 31 Jan 2025 06:54 )             31.8     01-31    140  |  109  |  19  ----------------------------<  212[H]  4.5   |  23  |  0.9    Ca    7.8[L]      31 Jan 2025 06:54    TPro  5.5[L]  /  Alb  1.7[L]  /  TBili  0.7  /  DBili  x   /  AST  78[H]  /  ALT  18  /  AlkPhos  344[H]  01-31      WBC Trend:  WBC Count: 5.98 (01-31-25 @ 06:54)  WBC Count: 5.09 (01-30-25 @ 07:12)  WBC Count: 6.20 (01-29-25 @ 07:35)  WBC Count: 7.13 (01-28-25 @ 06:15)      Creatine Trend:  Creatinine: 0.9 (01-31)  Creatinine: 0.8 (01-30)  Creatinine: 0.8 (01-29)  Creatinine: 0.9 (01-28)      Liver Biochemical Testing Trend:  Alanine Aminotransferase (ALT/SGPT): 18 (01-31)  Alanine Aminotransferase (ALT/SGPT): 12 (01-30)  Alanine Aminotransferase (ALT/SGPT): 13 (01-29)  Alanine Aminotransferase (ALT/SGPT): 15 (01-28)  Alanine Aminotransferase (ALT/SGPT): 18 (01-27)  Aspartate Aminotransferase (AST/SGOT): 78 (01-31-25 @ 06:54)  Aspartate Aminotransferase (AST/SGOT): 65 (01-30-25 @ 07:12)  Aspartate Aminotransferase (AST/SGOT): 61 (01-29-25 @ 07:35)  Aspartate Aminotransferase (AST/SGOT): 64 (01-28-25 @ 06:15)  Aspartate Aminotransferase (AST/SGOT): 60 (01-27-25 @ 15:51)  Bilirubin Total: 0.7 (01-31)  Bilirubin Total: 0.7 (01-30)  Bilirubin Total: 0.7 (01-29)  Bilirubin Total: 0.8 (01-28)  Bilirubin Total: 0.8 (01-27)      Trend LDH      Urinalysis Basic - ( 31 Jan 2025 06:54 )    Color: x / Appearance: x / SG: x / pH: x  Gluc: 212 mg/dL / Ketone: x  / Bili: x / Urobili: x   Blood: x / Protein: x / Nitrite: x   Leuk Esterase: x / RBC: x / WBC x   Sq Epi: x / Non Sq Epi: x / Bacteria: x        MICROBIOLOGY:        Culture - Blood (collected 30 Jan 2025 07:12)  Source: .Blood BLOOD  Preliminary Report:    No growth at 24 hours    Culture - Blood (collected 30 Jan 2025 07:12)  Source: .Blood BLOOD  Preliminary Report:    No growth at 24 hours    Culture - Blood (collected 29 Jan 2025 07:35)  Source: .Blood BLOOD  Preliminary Report:    No growth at 48 Hours    Culture - Blood (collected 27 Jan 2025 19:13)  Source: .Blood BLOOD  Preliminary Report:    No growth at 72 Hours    Culture - Blood (collected 27 Jan 2025 19:13)  Source: .Blood BLOOD  Preliminary Report:    No growth at 72 Hours    Culture - Blood (collected 25 Jan 2025 08:23)  Source: .Blood BLOOD  Final Report:    Growth in aerobic and anaerobic bottles: Staphylococcus aureus    See previous culture [id number]    Culture - Blood (collected 25 Jan 2025 08:23)  Source: .Blood BLOOD  Final Report:    Growth in aerobic and anaerobic bottles: Staphylococcus aureus    See previous culture [id number]    Culture - Blood (collected 23 Jan 2025 20:05)  Source: .Blood BLOOD  Final Report:    Growth in aerobic and anaerobic bottles: Staphylococcus aureus    Direct identification is available within approximately 3-5    hours either by Blood Panel Multiplexed PCR or Direct    MALDI-TOF. Details: [email or web address]  Organism: Blood Culture PCR  Staphylococcus aureus  Organism: Staphylococcus aureus    Sensitivities:      Method Type: LOUIS      -  Clindamycin: S <=0.25      -  Erythromycin: S <=0.25      -  Gentamicin: S <=4 Should not be used as monotherapy      -  Oxacillin: S <=0.25 Oxacillin predicts susceptibility for dicloxacillin, methicillin, and nafcillin      -  Penicillin: R >2      -  Rifampin: S <=1 Should not be used as monotherapy      -  Tetracycline: S <=4      -  Trimethoprim/Sulfamethoxazole: S <=0.5/9.5      -  Vancomycin: S 1  Organism: Blood Culture PCR    Sensitivities:      Method Type: PCR      -  Methicillin SENSITIVE Staphylococcus aureus (MSSA): Detec Any isolate of Staphylococcus aureus from a blood culture is NOT considered a contaminant.    Culture - Blood (collected 23 Jan 2025 20:05)  Source: .Blood BLOOD  Final Report:    Growth in aerobic and anaerobic bottles: Staphylococcus aureus    See previous culture [id number]    Culture - Urine (collected 23 Jan 2025 19:55)  Source: Clean Catch None  Final Report:    10,000 - 49,000 CFU/mL Staphylococcus aureus    50,000 - 99,000 CFU/mL Aerococcus urinae    Isolates of Aerococcus spp. are predictably susceptible to penicillin,    ampicillin, and vancomycin. All isolates are resistant to sulfonamides.  Organism: Staphylococcus aureus  Organism: Staphylococcus aureus    Sensitivities:      Method Type: LOUIS      -  Gentamicin: S <=4 Should not be used as monotherapy      -  Nitrofurantoin: S <=32      -  Oxacillin: S <=0.25 Oxacillin predicts susceptibility for dicloxacillin, methicillin, and nafcillin      -  Penicillin: R >2      -  Rifampin: S <=1 Should not be used as monotherapy      -  Tetracycline: S <=4      -  Trimethoprim/Sulfamethoxazole: S <=0.5/9.5      -  Vancomycin: S 1      Ferritin: 499 (01-28)      Troponin T, High Sensitivity Result: 31 (01-27)        RADIOLOGY:  imaging below personally reviewed    < from: MR Thoracic Spine w/wo IV Cont (01.29.25 @ 18:14) >  IMPRESSION:    1.  No evidence for thoracic discitis osteomyelitis.    2.  T8-9 degenerative changes with moderate spinal stenosis and severe   bilateral foraminal stenosis.    < end of copied text >    < from: MR Lumbar Spine w/wo IV Cont (01.29.25 @ 18:14) >  1.  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 No evidence of abscess.    2.  Degenerative notable for moderate spinal stenosis at L4-5 and   moderate to severe foraminal stenosis at L3-4 and L4-5.    < end of copied text >

## 2025-01-31 NOTE — DISCHARGE NOTE PROVIDER - PROVIDER TOKENS
PROVIDER:[TOKEN:[[id number]],FOLLOWUP:[1 week]] PROVIDER:[TOKEN:[[id number]],FOLLOWUP:[1 week]],PROVIDER:[TOKEN:[[id number]],FOLLOWUP:[1 week]]

## 2025-01-31 NOTE — DISCHARGE NOTE PROVIDER - NSDCFUSCHEDAPPT_GEN_ALL_CORE_FT
Frederick Ramsay  [facility] Physician [facility]  UROLOGY [address] [facility]  Scheduled Appointment: 02/13/2025

## 2025-01-31 NOTE — DISCHARGE NOTE PROVIDER - NSDCMRMEDTOKEN_GEN_ALL_CORE_FT
gabapentin 100 mg oral capsule: 3 cap(s) orally 2 times a day  levETIRAcetam 500 mg oral tablet, extended release: 500 milligram(s) orally once a day  losartan 50 mg oral tablet: 0.5 tab(s) orally 2 times a day  metFORMIN 500 mg oral tablet: 500 milligram(s) orally 4 times a day  Osteo Bi-Flex 250 mg-200 mg oral tablet: orally once a day  Tresiba FlexTouch: 60 unit(s) subcutaneous once a day   acetaminophen 500 mg oral tablet: 2 tab(s) orally every 8 hours  aluminum hydroxide-magnesium hydroxide 200 mg-200 mg/5 mL oral suspension: 30 milliliter(s) orally every 4 hours As needed Dyspepsia  aspirin 81 mg oral delayed release tablet: 1 tab(s) orally once a day  atorvastatin 80 mg oral tablet: 1 tab(s) orally once a day (at bedtime)  ceFAZolin 2 g intravenous injection: 2 gram(s) intravenous every 8 hours End date is 3/9/2025  cyclobenzaprine 5 mg oral tablet: 1 tab(s) orally every 8 hours As needed Muscle Spasm  folic acid 1 mg oral tablet: 1 tab(s) orally once a day  gabapentin 100 mg oral capsule: 3 cap(s) orally 2 times a day  insulin lispro 100 units/mL injectable solution: 1 international unit(s) injectable 3 times a day (before meals) Sliding scale instructions  Inject 2 Units for -199  Inject 4 Units for -249  Inject 6 Units for -299  Inject 8 Units for -349  Inkect 10 Units for  to 399  Inject 12 Units for  or more and call MD  Keppra 250 mg oral tablet: 1 tab(s) orally 2 times a day  lidocaine 4% topical film: Apply topically to affected area every 24 hours Lower back  losartan 50 mg oral tablet: 0.5 tab(s) orally 2 times a day  melatonin 5 mg oral tablet: 1 tab(s) orally once a day (at bedtime) As needed Insomnia  metFORMIN 500 mg oral tablet: 500 milligram(s) orally 4 times a day  morphine: 2 milligram(s) intravenous every 8 hours as needed for  severe pain  nystatin 100,000 units/g topical powder: 1 Apply topically to affected area every 12 hours As needed fungal rash  ondansetron 2 mg/mL injectable solution: 4 milligram(s) injectable every 8 hours as needed for  nausea  Osteo Bi-Flex 250 mg-200 mg oral tablet: orally once a day  oxyCODONE 10 mg oral tablet: 1 tab(s) orally 3 times a day As needed Severe Pain (7 - 10)  polyethylene glycol 3350 oral powder for reconstitution: 17 gram(s) orally 2 times a day  senna leaf extract oral tablet: 2 tab(s) orally once a day (at bedtime)  Tresiba FlexTouch: 60 unit(s) subcutaneous once a day

## 2025-01-31 NOTE — DISCHARGE NOTE PROVIDER - NSDCCPCAREPLAN_GEN_ALL_CORE_FT
PRINCIPAL DISCHARGE DIAGNOSIS  Diagnosis: Frequent falls  Assessment and Plan of Treatment:       SECONDARY DISCHARGE DIAGNOSES  Diagnosis: Back pain  Assessment and Plan of Treatment:     Diagnosis: Acute UTI  Assessment and Plan of Treatment:     Diagnosis: Left arm swelling  Assessment and Plan of Treatment:      PRINCIPAL DISCHARGE DIAGNOSIS  Diagnosis: Frequent falls  Assessment and Plan of Treatment: pain control: PO Tylenol 650 mg q6H + PO oxycodone 10 mg q6H PRN, IV morphine 2 mg q4H PRN, PO cyclobenzaprine 10 mg q8H + lidocaine patches  - Decrease cyclobenzaprine 5 mg q8H PRN as patient was very lethargic, suspect 2/2 pain meds  - PT consult  - Consider MR Spine w/ IVC given MSSA bacteremia         SECONDARY DISCHARGE DIAGNOSES  Diagnosis: Back pain  Assessment and Plan of Treatment: pain control: PO Tylenol 650 mg q6H + PO oxycodone 10 mg q6H PRN, IV morphine 2 mg q4H PRN, PO cyclobenzaprine 10 mg q8H + lidocaine patches  - Decrease cyclobenzaprine 5 mg q8H PRN as patient was very lethargic, suspect 2/2 pain meds  - PT consult  - Consider MR Spine w/ IVC given MSSA bacteremia       Diagnosis: Acute UTI  Assessment and Plan of Treatment: - Treated with IV cefazolin 2 g q8H  - Check BCx q48H until clear, collected 1/27-->NGTD, repeat 1/29 - clear  - Follow MRI T/L spine to ruleout OM/abscess given persistent bacteremia as patient complains about lower back pain worsening over the last couple of weeks, affecting ambulation    Diagnosis: Left arm swelling  Assessment and Plan of Treatment: - Treated with IV cefazolin 2 g q8H  - Check BCx q48H until clear, collected 1/27-->NGTD, repeat 1/29 - clear  - Follow MRI T/L spine to ruleout OM/abscess given persistent bacteremia as patient complains about lower back pain worsening over the last couple of weeks, affecting ambulation

## 2025-01-31 NOTE — DISCHARGE NOTE PROVIDER - NSDCHC_MEDRECSTATUS_GEN_ALL_CORE
no Admission Reconciliation is Completed  Discharge Reconciliation is Not Complete Admission Reconciliation is Completed  Discharge Reconciliation is Completed

## 2025-01-31 NOTE — DISCHARGE NOTE PROVIDER - CARE PROVIDER_API CALL
RINA DUQUE  [address] [place] [id number]  Phone: [phone]  Fax: [phone]  Follow Up Time: 1 week   RINA DUQUE  [address] [place] [address]  Phone: [phone]  Fax: [phone]  Follow Up Time: 1 week    Deloris Ribera  Gastroenterology  [address] [place] [address]  Phone: [phone]  Fax: [phone]  Follow Up Time: 1 week

## 2025-01-31 NOTE — DISCHARGE NOTE PROVIDER - NSFOLLOWUPCLINICS_GEN_ALL_ED_FT
[facility]  Gastroenterology  [address] [place] [address]  Phone: [phone]  Fax: [phone]  Follow Up Time: 1 week

## 2025-01-31 NOTE — DISCHARGE NOTE PROVIDER - CARE PROVIDERS DIRECT ADDRESSES
[email or web address]

## 2025-01-31 NOTE — PROGRESS NOTE ADULT - ASSESSMENT
80-year-old male with past medical history of insulin-dependent diabetes, hypertension, subdural hematoma on keppra who presents for multiple falls, weakness, confusion.     ID is consulted for cellulitis  Afebrile 98.1  WBC 8.34 < 10.13  On room air  BCx 1/23 MSSA  BCx 1/25 MSSA  BCx 1/27 NGTD  UA WBC 20, UCx MSSA and Aerococcus spp.    Left forearm xray  Capitellum bony fragment/calcification. No radius or ulna   fracture.. Soft tissue swelling with no radiographic findings to suggest   the presence of osteomyelitis or radiopaque soft tissue foreign body.    CT chest:  No CT evidence of acute traumatic injury within the chest, abdomen or   pelvis.  CT ABDOMEN/PELVIS:  Pancreatic tail hypodensity measuring 1 cm. Differential includes IPMN.   Nonemergent/outpatient MRI may be obtained for further characterization    Antibiotic:  Vancomycin 1/23 - 1/24  Ceftriaxone 1/23 - 1/24  Ancef 1/24 ->      IMPRESSION:  MSSA bacteremia, potentially life-threatening  Suspect left forearm cellulitis  L4-L5 vertebral osteomyelitis  Acute metabolic encephalopathy  DM  Immunosuppression / Immunosenescence secondary to multiple comorbidities which could result in poor clinical outcome    RECOMMENDATIONS:  - So far unclear source for MSSA bacteremia  - IV Ancef 2g q8hrs  - TTE no vegetation, will hold off on HANNAH for now unless worsening clinical status   - Given vertebral OM, he needs at least 6 weeks of IV abx with PICC  - PT/OT  - Offloading and frequent position changes, aspiration precaution  - Trend WBC, fever curve, transaminases, creatinine daily  - Please inform ID of any patient clinical change or any new pertinent laboratory or radiographic data      Nora Beck D.O.  Attending Physician  Division of Infectious Diseases  [facility]  Please contact me via [employer] Teams

## 2025-01-31 NOTE — DISCHARGE NOTE PROVIDER - NSDCCPGOAL_GEN_ALL_CORE_FT
To get better and follow your care plan as instructed. To get better and follow your care plan as instructed. 1)Complete IV antibiotics until 3/9/2025. Will follow with Dr Fuentes from ID. 2) After your are done with antibiotics, follow with primary care doctor and GI doctor. You will need an MRI as an outpatient to check on a spot that you have on the pancreas that was noted on the CT scan. You will need a colonoscopy and EGD to check for areas of chronic blood loss in your gut.

## 2025-01-31 NOTE — DISCHARGE NOTE PROVIDER - HOSPITAL COURSE
80-year-old male with past medical history of insulin-dependent diabetes, hypertension, subdural hematoma on keppra who presents for multiple falls, weakness, & confusion.    #MSSA bacteremia, origin L forearm cellulitis v. UTI?  #Questionable L forearm cellulitis - low suspicion for septic joint   - BCx + for MSSA (1/23, 1/25)  - UA + for nitrites, 20 WBC, small LE, & few bacteria  - UCx + for MSSA  - ID on board  - L Forearm/Elbow XR -ve for fx, showed soft tissue swelling  - L Elbow CT: Very limited exam. Artifact. Portions of the elbow are not imaged; Diffuse soft tissue edema which may reflect cellulitis in the right clinical setting. No definite discrete fluid collection; Arthritic changes of the elbow with no definite radiographic evidence of osteomyelitis.  - TTE w/ no vegetations  Plan:   - Treated with IV cefazolin 2 g q8H  - Check BCx q48H until clear, collected 1/27-->NGTD, repeat 1/29, follow with ID  - Follow MRI T/L spine to ruleout OM/abscess given persistent bacteremia as patient complains about lower back pain worsening over the last couple of weeks, affecting ambulation    #TME  #Hx of SDH  - R/o stroke 2/2 septic emboli v. TME 2/2 MSSA bacteremia   - Stroke Code called due to slurred speech  - S/p ASA + Plavix load  - CTH -ve for acute pathology  - CTA H+N: No large vessel occlusion, aneurysm, or vascular malformation; Mild atherosclerotic stenosis in the bilateral supraclinoid ICAs and bilateral V4 vertebral arteries.  - CTP: Small artifactual perfusion abnormality in the anterior right cerebellum,   otherwise no perfusion deficits to suggest areas of completed infarction   or at risk territory.  - MR Head: No acute intracranial pathology; Stable mild chronic microvascular ischemic changes.  - Given negative MRI, suspect TME due to polypharmacy  Plan:  - C/w ASA 81 mg q24H  - D/c plavix 75 mg q24H as per neuro  - C/w atorvastatin 80 mg qhs  - C/w Keppra 500 mg q12H  - Neuro checks q8H  - Check rEEG    #Type II NSTEMI  - EKG shows no acute ischemic changes  - HST 47>43>39>31  - TTE: LVEF 66%  - Monitor on tele    #Chronic macrocytic anemia  - Hb 11.1 on admission  - No s/s of active bleeding  - B12 1163, WNL (1/23/25)  - Folate 5, lower end of normal (1/23/25)  - Iron studies WNL  Plan:  - C/w folic acid 1 mg q24H  - OP EGD/Colonoscopy   - Transfuse if Hb <7    #T2DM  #Hyperglycemia, resolved  - On metformin 500 mg q6H + Tresiba 60 units @ home  - A1c 7.6%  - FS 70s 1/28  Plan:  - C/w home Tresiba, decreased to 20 units from 40 units as FS 70s  - D/c'd Lispro 6 units TIDAC  - C/w +1 ISS    #HTN  - C/w home losartan 50 mg q24H    #Chronic back pain  #Frequent Falls  - C/w pain control: PO Tylenol 650 mg q6H + PO oxycodone 10 mg q6H PRN, IV morphine 2 mg q4H PRN, PO cyclobenzaprine 10 mg q8H + lidocaine patches  - Decrease cyclobenzaprine 5 mg q8H PRN as patient was very lethargic, suspect 2/2 pain meds  - PT consult  - Consider MR Spine w/ IVC given MSSA bacteremia     #Depression  - C/w venlafaxine ER 75 mg q24H (started inpatient)    # Incidental pancreatic tail hypodensity  - RUQ U/S WNL  - OP MRI may be obtained for further characterization  - Follow up with GI office located on [address] [place] [address], Phone number [phone] with Dr. Puentes to consider EUS    For Follow-Up:  - F/u rEEG  - F/u BCx (collected 1/27), repeat ordered for 1/29 AM, repeat q48H until clearance  - F/u ID; As per ID, will consider [facility]ANNA[facility] and MRI Spine w/ IVC if BCx remains positive.   80-year-old male with past medical history of insulin-dependent diabetes, hypertension, subdural hematoma on keppra who presents for multiple falls, weakness, & confusion.    #MSSA bacteremia, origin L forearm cellulitis v. UTI?  #Questionable L forearm cellulitis - low suspicion for septic joint   - BCx + for MSSA (1/23, 1/25)  - UA + for nitrites, 20 WBC, small LE, & few bacteria  - UCx + for MSSA  - ID on board  - L Forearm/Elbow XR -ve for fx, showed soft tissue swelling  - L Elbow CT: Very limited exam. Artifact. Portions of the elbow are not imaged; Diffuse soft tissue edema which may reflect cellulitis in the right clinical setting. No definite discrete fluid collection; Arthritic changes of the elbow with no definite radiographic evidence of osteomyelitis.  - TTE w/ no vegetations  Plan:   - Treated with IV cefazolin 2 g q8H  - Check BCx q48H until clear, collected 1/27-->NGTD, repeat 1/29 - clear  - Follow MRI T/L spine to ruleout OM/abscess given persistent bacteremia as patient complains about lower back pain worsening over the last couple of weeks, affecting ambulation    #TME  #Hx of SDH  - R/o stroke 2/2 septic emboli v. TME 2/2 MSSA bacteremia   - Stroke Code called due to slurred speech  - S/p ASA + Plavix load  - CTH -ve for acute pathology  - CTA H+N: No large vessel occlusion, aneurysm, or vascular malformation; Mild atherosclerotic stenosis in the bilateral supraclinoid ICAs and bilateral V4 vertebral arteries.  - CTP: Small artifactual perfusion abnormality in the anterior right cerebellum,   otherwise no perfusion deficits to suggest areas of completed infarction   or at risk territory.  - MR Head: No acute intracranial pathology; Stable mild chronic microvascular ischemic changes.  - Given negative MRI, suspect TME due to polypharmacy  Plan:  - C/w ASA 81 mg q24H  - D/c plavix 75 mg q24H as per neuro  - C/w atorvastatin 80 mg qhs  - C/w Keppra 500 mg q12H  - Neuro checks q8H  - Check rEEG    #Type II NSTEMI  - EKG shows no acute ischemic changes  - HST 47>43>39>31  - TTE: LVEF 66%  - Monitor on tele    #Chronic macrocytic anemia  - Hb 11.1 on admission  - No s/s of active bleeding  - B12 1163, WNL (1/23/25)  - Folate 5, lower end of normal (1/23/25)  - Iron studies WNL  Plan:  - C/w folic acid 1 mg q24H  - OP EGD/Colonoscopy   - Transfuse if Hb <7    #T2DM  #Hyperglycemia, resolved  - On metformin 500 mg q6H + Tresiba 60 units @ home  - A1c 7.6%  - FS 70s 1/28  Plan:  - C/w home Tresiba, decreased to 20 units from 40 units as FS 70s  - D/c'd Lispro 6 units TIDAC  - C/w +1 ISS    #HTN  - C/w home losartan 50 mg q24H    #Chronic back pain  #Frequent Falls  - C/w pain control: PO Tylenol 650 mg q6H + PO oxycodone 10 mg q6H PRN, IV morphine 2 mg q4H PRN, PO cyclobenzaprine 10 mg q8H + lidocaine patches  - Decrease cyclobenzaprine 5 mg q8H PRN as patient was very lethargic, suspect 2/2 pain meds  - PT consult  - Consider MR Spine w/ IVC given MSSA bacteremia     #Depression  - C/w venlafaxine ER 75 mg q24H (started inpatient)    # Incidental pancreatic tail hypodensity  - RUQ U/S WNL  - OP MRI may be obtained for further characterization  - Follow up with GI office located on [address] [place] [address], Phone number [phone] with Dr. Puentes to consider EUS    For Follow-Up:  - F/u rEEG  - F/u BCx (collected 1/27), repeat ordered for 1/29 AM, repeat q48H until clearance  - F/u ID; As per ID, will consider [facility]ANNA[facility] and MRI Spine w/ IVC if BCx remains positive.   80-year-old male with past medical history of insulin-dependent diabetes, hypertension, subdural hematoma on keppra who presents for multiple falls, weakness, & confusion.  80-year-old male with past medical history of insulin-dependent diabetes, hypertension, subdural hematoma on keppra who presents for multiple falls, weakness, & confusion.      #MSSA bacteremia, origin L forearm cellulitis v. UTI?  #Acute OM/discitis  #Questionable L forearm cellulitis - low suspicion for septic joint   - BCx + for MSSA (1/23, 1/25)  - UA + for nitrites, 20 WBC, small LE, & few bacteria  - UCx + for MSSA  - ID on board  - L Forearm/Elbow XR -ve for fx, showed soft tissue swelling  - L Elbow CT: Very limited exam. Artifact. Portions of the elbow are not imaged; Diffuse soft tissue edema which may reflect cellulitis in the right clinical setting. No definite discrete fluid collection; Arthritic changes of the elbow with no definite radiographic evidence of osteomyelitis.  - TTE w/ no vegetations  Plan:   -BCx now clear  - Follow MRI T/L spine -->L4-5 OM and discitis, plan for 6 weeks of antibiotics, PICC placed 2/3/2025  - C/w IV cefazolin 2 g q8H  until 3/29/2025  - Weekly CBC, CMP, ESR/CRP  - ID follow-up with Dr. Jose A Yancey for Telehealth. We will call the patient between 10:30-1:30      [address] [facility]       [phone]       Fax [phone]      #Scrotal lesion  -Uro consult  >small skin tear due to edema  - Recommend elevate scrotum to alleviate scrotal edema and pressure off skin tear.  - Continue to apply barrier cream to avoid maceration of scrotal skin.    #TME  #Hx of SDH  - R/o stroke 2/2 septic emboli v. TME 2/2 MSSA bacteremia   - Stroke Code called due to slurred speech  - S/p ASA + Plavix load  - CTH -ve for acute pathology  - CTA H+N: No large vessel occlusion, aneurysm, or vascular malformation; Mild atherosclerotic stenosis in the bilateral supraclinoid ICAs and bilateral V4 vertebral arteries.  - CTP: Small artifactual perfusion abnormality in the anterior right cerebellum,   otherwise no perfusion deficits to suggest areas of completed infarction   or at risk territory.  - MR Head: No acute intracranial pathology; Stable mild chronic microvascular ischemic changes.  - Given negative MRI, suspect TME due to polypharmacy  Plan:  - C/w ASA 81 mg q24H  - D/c plavix 75 mg q24H as per neuro  - C/w atorvastatin 80 mg qhs  - C/w Keppra 250 mg q12H  - Neuro checks d/c  -Reeg negative    #Type II NSTEMI  - EKG shows no acute ischemic changes  - HST 47>43>39>31  - TTE: LVEF 66%  - stop monitoring    #Chronic macrocytic anemia  - Hb 11.1 on admission  - No s/s of active bleeding  - B12 1163, WNL (1/23/25)  - Folate 5, lower end of normal (1/23/25)  - Iron studies WNL  Plan:  - C/w folic acid 1 mg q24H  - OP EGD/Colonoscopy   - Transfuse if Hb <7    #T2DM  #Hyperglycemia, resolved  - On metformin 500 mg q6H + Tresiba 60 units @ home  - A1c 7.6%  - FS 70s 1/28  Plan:  - C/w home Tresiba 60U, resume metforming at d/c  - C/w +1 ISS    #HTN  - C/w home losartan 50 mg q24H    #Chronic back pain  #Frequent Falls  - C/w pain control: PO Tylenol 650 mg q6H + PO oxycodone 10 mg q6H PRN, IV morphine 2 mg q4H PRN, PO cyclobenzaprine 10 mg q8H + lidocaine patches  - Decrease cyclobenzaprine 5 mg q8H PRN as patient was very lethargic, suspect 2/2 pain meds  - PT consult    #Depression  - C/w venlafaxine ER 75 mg q24H (started inpatient)    # Incidental pancreatic tail hypodensity  - RUQ U/S WNL  - OP MRI may be obtained for further characterization  - Follow up with GI office located on [address] [place] [address], Phone number [phone] with Dr. Puentes to consider EUS     80-year-old male with past medical history of insulin-dependent diabetes, hypertension, subdural hematoma on keppra who presents for multiple falls, weakness, & confusion.      #MSSA bacteremia, origin L forearm cellulitis v. UTI?  #Acute OM/discitis  #Questionable L forearm cellulitis - low suspicion for septic joint   - BCx + for MSSA (1/23, 1/25)  - UA + for nitrites, 20 WBC, small LE, & few bacteria  - UCx + for MSSA  - ID on board  - L Forearm/Elbow XR -ve for fx, showed soft tissue swelling  - L Elbow CT: Very limited exam. Artifact. Portions of the elbow are not imaged; Diffuse soft tissue edema which may reflect cellulitis in the right clinical setting. No definite discrete fluid collection; Arthritic changes of the elbow with no definite radiographic evidence of osteomyelitis.  - TTE w/ no vegetations  - Follow MRI T/L spine -->L4-5 OM and discitis,  Plan:   -BCx now clear   -plan for 6 weeks of antibiotics, PICC placed 2/3/2025  - C/w IV cefazolin 2 g q8H  until 3/29/2025  - Weekly CBC, CMP, ESR/CRP  - ID follow-up with Dr. Jose A Yancey for Telehealth. We will call the patient between 10:30-1:30      [address] [facility]       [phone]       Fax [phone]      # Incidental pancreatic tail hypodensity  - RUQ U/S WNL  - outpatient MRI may be obtained for further characterization after acute illness resolves  - Follow up with GI office located on [address] [place] [address], Phone number [phone] with Dr. Puentes to consider EUS      #Scrotal lesion  -Uro consult  >small skin tear due to edema  - Recommend elevate scrotum to alleviate scrotal edema and pressure off skin tear.  - Continue to apply barrier cream to avoid maceration of scrotal skin.    #TME  #Hx of SDH  - R/o stroke 2/2 septic emboli v. TME 2/2 MSSA bacteremia   - Stroke Code called due to slurred speech  - S/p ASA + Plavix load  - CTH -ve for acute pathology  - CTA H+N: No large vessel occlusion, aneurysm, or vascular malformation; Mild atherosclerotic stenosis in the bilateral supraclinoid ICAs and bilateral V4 vertebral arteries.  - CTP: Small artifactual perfusion abnormality in the anterior right cerebellum,   otherwise no perfusion deficits to suggest areas of completed infarction   or at risk territory.  - MR Head: No acute intracranial pathology; Stable mild chronic microvascular ischemic changes.  - Given negative MRI, suspect TME due to polypharmacy  Plan:  - C/w ASA 81 mg q24H  - D/c plavix 75 mg q24H as per neuro  - C/w atorvastatin 80 mg qhs  - C/w Keppra 250 mg q12H  - Neuro checks d/c  -Reeg negative      #Chronic macrocytic anemia  - Hb 11.1 on admission  - No s/s of active bleeding  - B12 1163, WNL (1/23/25)  - Folate 5, lower end of normal (1/23/25)  - Iron studies WNL  Plan:  - C/w folic acid 1 mg q24H  - OP EGD/Colonoscopy - Follow up with GI office located on [address] [place] [address], Phone number [phone] with Dr. Puentes to consider EUS  - Transfuse if Hb <7    #T2DM  #Hyperglycemia, resolved  - On metformin 500 mg q6H + Tresiba 60 units @ home  - A1c 7.6%  - FS 70s 1/28  Plan:  - C/w home Tresiba 60U, resume metforming at d/c  - C/w +1 ISS    #HTN  - C/w home losartan 50 mg q24H    #Chronic back pain  #Frequent Falls  - C/w pain control: PO Tylenol 1gr TID+ PO oxycodone 10 mg q8H PRN, IV morphine 2 mg q8hr PRN, PO cyclobenzaprine 10 mg q8H + lidocaine patches  - Decrease cyclobenzaprine 5 mg q8H PRN as patient was very lethargic, suspect 2/2 pain meds  - PT consult    #Depression  - Discontinued venlafaxine, patient no longer taking as per daughter     80-year-old male with past medical history of insulin-dependent diabetes, hypertension, subdural hematoma on keppra who presents for multiple falls, weakness, & confusion.      #MSSA bacteremia, origin L forearm cellulitis v. UTI?  #Acute OM/discitis  #Questionable L forearm cellulitis - low suspicion for septic joint   - BCx + for MSSA (1/23, 1/25)  - UA + for nitrites, 20 WBC, small LE, & few bacteria  - UCx + for MSSA  - ID on board  - L Forearm/Elbow XR -ve for fx, showed soft tissue swelling  - L Elbow CT: Very limited exam. Artifact. Portions of the elbow are not imaged; Diffuse soft tissue edema which may reflect cellulitis in the right clinical setting. No definite discrete fluid collection; Arthritic changes of the elbow with no definite radiographic evidence of osteomyelitis.  - TTE w/ no vegetations  - Follow MRI T/L spine -->L4-5 OM and discitis,  Plan:   -BCx now clear   -plan for 6 weeks of antibiotics, PICC placed 2/3/2025  - C/w IV cefazolin 2 g q8H  until 3/9/2025  - Weekly CBC, CMP, ESR/CRP  - ID follow-up with Dr. Jose A Yancey for Telehealth. We will call the patient between 10:30-1:30      [address] [facility]       [phone]       Fax [phone]      # Incidental pancreatic tail hypodensity  - RUQ U/S WNL  - outpatient MRI may be obtained for further characterization after acute illness resolves  - Follow up with GI office located on [address] [place] [address], Phone number [phone] with Dr. Puentes to consider EUS      #Scrotal lesion  -Uro consult  >small skin tear due to edema  - Recommend elevate scrotum to alleviate scrotal edema and pressure off skin tear.  - Continue to apply barrier cream to avoid maceration of scrotal skin.    #TME  #Hx of SDH  - R/o stroke 2/2 septic emboli v. TME 2/2 MSSA bacteremia   - Stroke Code called due to slurred speech  - S/p ASA + Plavix load  - CTH -ve for acute pathology  - CTA H+N: No large vessel occlusion, aneurysm, or vascular malformation; Mild atherosclerotic stenosis in the bilateral supraclinoid ICAs and bilateral V4 vertebral arteries.  - CTP: Small artifactual perfusion abnormality in the anterior right cerebellum,   otherwise no perfusion deficits to suggest areas of completed infarction   or at risk territory.  - MR Head: No acute intracranial pathology; Stable mild chronic microvascular ischemic changes.  - Given negative MRI, suspect TME due to polypharmacy  Plan:  - C/w ASA 81 mg q24H  - D/c plavix 75 mg q24H as per neuro  - C/w atorvastatin 80 mg qhs  - C/w Keppra 250 mg q12H  - Neuro checks d/c  -Reeg negative      #Chronic macrocytic anemia  - Hb 11.1 on admission  - No s/s of active bleeding  - B12 1163, WNL (1/23/25)  - Folate 5, lower end of normal (1/23/25)  - Iron studies WNL  Plan:  - C/w folic acid 1 mg q24H  - OP EGD/Colonoscopy - Follow up with GI office located on [address] [place] [address], Phone number [phone] with Dr. Puentes to consider EUS  - Transfuse if Hb <7    #T2DM  #Hyperglycemia, resolved  - On metformin 500 mg q6H + Tresiba 60 units @ home  - A1c 7.6%  - FS 70s 1/28  Plan:  - C/w home Tresiba 60U, resume metforming at d/c  - C/w +1 ISS    #HTN  - C/w home losartan 50 mg q24H    #Chronic back pain  #Frequent Falls  - C/w pain control: PO Tylenol 1gr TID+ PO oxycodone 10 mg q8H PRN, IV morphine 2 mg q8hr PRN, PO cyclobenzaprine 10 mg q8H + lidocaine patches  - Decrease cyclobenzaprine 5 mg q8H PRN as patient was very lethargic, suspect 2/2 pain meds  - PT consult    #Depression  - Discontinued venlafaxine, patient no longer taking as per daughter

## 2025-02-01 DIAGNOSIS — N50.9 DISORDER OF MALE GENITAL ORGANS, UNSPECIFIED: ICD-10-CM

## 2025-02-01 LAB
ALBUMIN SERPL ELPH-MCNC: 1.8 G/DL — LOW (ref 3.5–5.2)
ALP SERPL-CCNC: 303 U/L — HIGH (ref 30–115)
ALT FLD-CCNC: 13 U/L — SIGNIFICANT CHANGE UP (ref 0–41)
ANION GAP SERPL CALC-SCNC: 9 MMOL/L — SIGNIFICANT CHANGE UP (ref 7–14)
AST SERPL-CCNC: 55 U/L — HIGH (ref 0–41)
BILIRUB SERPL-MCNC: 0.6 MG/DL — SIGNIFICANT CHANGE UP (ref 0.2–1.2)
BUN SERPL-MCNC: 16 MG/DL — SIGNIFICANT CHANGE UP (ref 10–20)
CALCIUM SERPL-MCNC: 7.6 MG/DL — LOW (ref 8.4–10.5)
CHLORIDE SERPL-SCNC: 107 MMOL/L — SIGNIFICANT CHANGE UP (ref 98–110)
CO2 SERPL-SCNC: 24 MMOL/L — SIGNIFICANT CHANGE UP (ref 17–32)
CREAT SERPL-MCNC: 0.8 MG/DL — SIGNIFICANT CHANGE UP (ref 0.7–1.5)
EGFR: 91 ML/MIN/1.73M2 — SIGNIFICANT CHANGE UP
GLUCOSE SERPL-MCNC: 166 MG/DL — HIGH (ref 70–99)
HCT VFR BLD CALC: 29.8 % — LOW (ref 42–52)
HGB BLD-MCNC: 9.8 G/DL — LOW (ref 14–18)
MCHC RBC-ENTMCNC: 32.2 PG — HIGH (ref 27–31)
MCHC RBC-ENTMCNC: 32.9 G/DL — SIGNIFICANT CHANGE UP (ref 32–37)
MCV RBC AUTO: 98 FL — HIGH (ref 80–94)
NRBC # BLD: 0 /100 WBCS — SIGNIFICANT CHANGE UP (ref 0–0)
NRBC BLD-RTO: 0 /100 WBCS — SIGNIFICANT CHANGE UP (ref 0–0)
PLATELET # BLD AUTO: 147 K/UL — SIGNIFICANT CHANGE UP (ref 130–400)
PMV BLD: 8.5 FL — SIGNIFICANT CHANGE UP (ref 7.4–10.4)
POTASSIUM SERPL-MCNC: 4.3 MMOL/L — SIGNIFICANT CHANGE UP (ref 3.5–5)
POTASSIUM SERPL-SCNC: 4.3 MMOL/L — SIGNIFICANT CHANGE UP (ref 3.5–5)
PROT SERPL-MCNC: 5.5 G/DL — LOW (ref 6–8)
RBC # BLD: 3.04 M/UL — LOW (ref 4.7–6.1)
RBC # FLD: 13.8 % — SIGNIFICANT CHANGE UP (ref 11.5–14.5)
SODIUM SERPL-SCNC: 140 MMOL/L — SIGNIFICANT CHANGE UP (ref 135–146)
WBC # BLD: 5.32 K/UL — SIGNIFICANT CHANGE UP (ref 4.8–10.8)
WBC # FLD AUTO: 5.32 K/UL — SIGNIFICANT CHANGE UP (ref 4.8–10.8)

## 2025-02-01 PROCEDURE — 70450 CT HEAD/BRAIN W/O DYE: CPT | Mod: 26

## 2025-02-01 PROCEDURE — 99233 SBSQ HOSP IP/OBS HIGH 50: CPT

## 2025-02-01 PROCEDURE — 99232 SBSQ HOSP IP/OBS MODERATE 35: CPT | Mod: FS

## 2025-02-01 RX ORDER — CLONIDINE HYDROCHLORIDE 0.2 MG/1
0.1 TABLET ORAL ONCE
Refills: 0 | Status: COMPLETED | OUTPATIENT
Start: 2025-02-01 | End: 2025-02-01

## 2025-02-01 RX ORDER — GABAPENTIN 800 MG/1
300 TABLET ORAL AT BEDTIME
Refills: 0 | Status: DISCONTINUED | OUTPATIENT
Start: 2025-02-01 | End: 2025-02-04

## 2025-02-01 RX ADMIN — Medication 2: at 12:06

## 2025-02-01 RX ADMIN — Medication 50 MILLIGRAM(S): at 06:14

## 2025-02-01 RX ADMIN — GABAPENTIN 300 MILLIGRAM(S): 800 TABLET ORAL at 06:14

## 2025-02-01 RX ADMIN — Medication 5 UNIT(S): at 22:33

## 2025-02-01 RX ADMIN — ATORVASTATIN CALCIUM 80 MILLIGRAM(S): 80 TABLET, FILM COATED ORAL at 21:45

## 2025-02-01 RX ADMIN — ANTISEPTIC SURGICAL SCRUB 1 APPLICATION(S): 0.04 SOLUTION TOPICAL at 06:15

## 2025-02-01 RX ADMIN — LEVETIRACETAM 500 MILLIGRAM(S): 750 TABLET, FILM COATED ORAL at 06:15

## 2025-02-01 RX ADMIN — OXYCODONE HYDROCHLORIDE 10 MILLIGRAM(S): 30 TABLET ORAL at 11:32

## 2025-02-01 RX ADMIN — Medication 2 TABLET(S): at 21:45

## 2025-02-01 RX ADMIN — ENOXAPARIN SODIUM 40 MILLIGRAM(S): 100 INJECTION SUBCUTANEOUS at 21:47

## 2025-02-01 RX ADMIN — LEVETIRACETAM 500 MILLIGRAM(S): 750 TABLET, FILM COATED ORAL at 16:42

## 2025-02-01 RX ADMIN — LIDOCAINE HYDROCHLORIDE 1 PATCH: 30 CREAM TOPICAL at 22:54

## 2025-02-01 RX ADMIN — LIDOCAINE HYDROCHLORIDE 1 PATCH: 30 CREAM TOPICAL at 11:33

## 2025-02-01 RX ADMIN — LIDOCAINE HYDROCHLORIDE 1 PATCH: 30 CREAM TOPICAL at 21:46

## 2025-02-01 RX ADMIN — Medication 100 MILLIGRAM(S): at 21:46

## 2025-02-01 RX ADMIN — Medication 1: at 08:02

## 2025-02-01 RX ADMIN — CLONIDINE HYDROCHLORIDE 0.1 MILLIGRAM(S): 0.2 TABLET ORAL at 21:45

## 2025-02-01 RX ADMIN — POLYETHYLENE GLYCOL 3350 17 GRAM(S): 17 POWDER, FOR SOLUTION ORAL at 06:15

## 2025-02-01 RX ADMIN — Medication 1 APPLICATION(S): at 06:14

## 2025-02-01 RX ADMIN — Medication 1 MILLIGRAM(S): at 11:33

## 2025-02-01 RX ADMIN — POLYETHYLENE GLYCOL 3350 17 GRAM(S): 17 POWDER, FOR SOLUTION ORAL at 16:43

## 2025-02-01 RX ADMIN — ACETAMINOPHEN 650 MILLIGRAM(S): 160 SUSPENSION ORAL at 06:14

## 2025-02-01 RX ADMIN — GABAPENTIN 300 MILLIGRAM(S): 800 TABLET ORAL at 13:03

## 2025-02-01 RX ADMIN — Medication 3: at 16:41

## 2025-02-01 RX ADMIN — ACETAMINOPHEN 650 MILLIGRAM(S): 160 SUSPENSION ORAL at 16:42

## 2025-02-01 RX ADMIN — GABAPENTIN 300 MILLIGRAM(S): 800 TABLET ORAL at 21:45

## 2025-02-01 RX ADMIN — ACETAMINOPHEN 650 MILLIGRAM(S): 160 SUSPENSION ORAL at 11:33

## 2025-02-01 RX ADMIN — Medication 100 MILLIGRAM(S): at 13:03

## 2025-02-01 RX ADMIN — ASPIRIN 81 MILLIGRAM(S): 81 TABLET, COATED ORAL at 11:33

## 2025-02-01 RX ADMIN — LIDOCAINE HYDROCHLORIDE 1 PATCH: 30 CREAM TOPICAL at 07:09

## 2025-02-01 RX ADMIN — Medication 1 APPLICATION(S): at 16:42

## 2025-02-01 RX ADMIN — Medication 100 MILLIGRAM(S): at 06:14

## 2025-02-01 NOTE — CHART NOTE - NSCHARTNOTEFT_GEN_A_CORE
Spoke to patient's daughter who notes that despite prescription label. patient takes Gabapentin 300 mg at night only (once a day). Have changed Gabapentin to this). Also apparently patient's most recent bottle of Keppra 500 mg XR daily has not yet been opened. At this time will not change dose (to 250 mg BID?) but will check level in am>

## 2025-02-01 NOTE — CONSULT NOTE ADULT - CONSULT REQUESTED DATE/TIME
24-Jan-2025 13:10
28-Jan-2025 12:43
24-Jan-2025 09:02
28-Jan-2025 08:32
24-Jan-2025 10:56
28-Jan-2025 11:19
01-Feb-2025 16:19

## 2025-02-01 NOTE — CONSULT NOTE ADULT - SUBJECTIVE AND OBJECTIVE BOX
.  HPI:  80-year-old male with past medical history of insulin-dependent diabetes, hypertension, subdural hematoma on Keppra who presents for multiple falls, weakness, confusion.  Per daughter at bedside, he has becoming more intermittently confused, has had multiple falls over the past 2 months (last fall was yesterday).  He had a fall while getting out of bed, does not recall how it happened.  Complains of back pain since then.  He was admitted in 2022 for left elbow swelling and cellulitis.  Now states the pain and swelling have returned.  Per daughter, he has been urinating more frequently for the past 2 days.  No fevers, chills, CP, SOB, palpitations, n/v/d, abdominal pain, hip pain.  denies any numbness, weakness, change in BM, urinary pattern or pelvic numbness  Home meds provided by daughter (23 Jan 2025 22:51).      Urology consulted to evaluate the scrotal lesion.  Patient reports he had the lesion prior to admission but unsure how long.  He reports he was told it worsened when he was admitted for the leg cellulitis.    He denies injury but reports he is basically bed bound lately due to multiple falls.    He denies scrotal pain, fevers, chills, or urology complaints other than frequency.    Patient's nurse reports the lesion appeared worse on admission but last day or so it has improved and only looks like a small skin tear on undersurface of his scrotum.            PAST MEDICAL & SURGICAL HISTORY:  Diabetes    Hypertension    Fall    H/O left knee surgery    History of cholecystectomy    History of appendectomy        Home Medications:  gabapentin 100 mg oral capsule: 3 cap(s) orally 2 times a day (17 Sep 2022 01:58)  levETIRAcetam 500 mg oral tablet, extended release: 500 milligram(s) orally once a day (17 Sep 2022 01:58)  losartan 50 mg oral tablet: 0.5 tab(s) orally 2 times a day (17 Sep 2022 01:58)  metFORMIN 500 mg oral tablet: 500 milligram(s) orally 4 times a day (17 Sep 2022 01:58)  Osteo Bi-Flex 250 mg-200 mg oral tablet: orally once a day (17 Sep 2022 01:58)  Tresiba FlexTouch: 60 unit(s) subcutaneous once a day (17 Sep 2022 01:58)        HOSPITAL MEDICATIONS  (STANDING):  acetaminophen     Tablet .. 650 milliGRAM(s) Oral every 6 hours  aspirin enteric coated 81 milliGRAM(s) Oral daily  atorvastatin 80 milliGRAM(s) Oral at bedtime  bacitracin   Ointment 1 Application(s) Topical two times a day  ceFAZolin   IVPB 2000 milliGRAM(s) IV Intermittent every 8 hours  chlorhexidine 2% Cloths 1 Application(s) Topical <User Schedule>  dextrose 5%. 1000 milliLiter(s) (50 mL/Hr) IV Continuous <Continuous>  dextrose 50% Injectable 25 Gram(s) IV Push once  dextrose 50% Injectable 12.5 Gram(s) IV Push once  dextrose 50% Injectable 25 Gram(s) IV Push once  enoxaparin Injectable 40 milliGRAM(s) SubCutaneous every 24 hours  folic acid 1 milliGRAM(s) Oral daily  gabapentin 300 milliGRAM(s) Oral every 8 hours  glucagon  Injectable 1 milliGRAM(s) IntraMuscular once  insulin lispro (ADMELOG) corrective regimen sliding scale   SubCutaneous three times a day before meals  levETIRAcetam 500 milliGRAM(s) Oral two times a day  lidocaine   4% Patch 1 Patch Transdermal every 24 hours  lidocaine   4% Patch 1 Patch Transdermal daily  losartan 50 milliGRAM(s) Oral daily  polyethylene glycol 3350 17 Gram(s) Oral two times a day  senna 2 Tablet(s) Oral at bedtime  Tresiba (Insulin Degludec) 20 Unit(s) 20 Unit(s) SubCutaneous at bedtime    MEDICATIONS  (PRN):  aluminum hydroxide/magnesium hydroxide/simethicone Suspension 30 milliLiter(s) Oral every 4 hours PRN Dyspepsia  cyclobenzaprine 5 milliGRAM(s) Oral every 8 hours PRN Muscle Spasm  dextrose Oral Gel 15 Gram(s) Oral once PRN Blood Glucose LESS THAN 70 milliGRAM(s)/deciliter  melatonin 5 milliGRAM(s) Oral at bedtime PRN Insomnia  nystatin Powder 1 Application(s) Topical every 12 hours PRN fungal rash  ondansetron Injectable 4 milliGRAM(s) IV Push every 8 hours PRN Nausea and/or Vomiting  oxyCODONE    IR 10 milliGRAM(s) Oral three times a day PRN Severe Pain (7 - 10)        Allergies  No Known Allergies        SOCIAL HISTORY:  Tobacco use:  Denies use  Alcohol use:  Denies use  No illicit drug use        FAMILY HISTORY:  Patient unsure of family history        REVIEW OF SYSTEMS:  CONSTITUTIONAL: No weakness, fevers or chills  EYES/ENT: No visual changes;  No vertigo or throat pain   NECK: No pain or stiffness  RESPIRATORY: No cough, wheezing, hemoptysis; No shortness of breath  CARDIOVASCULAR: No chest pain or palpitations  GASTROINTESTINAL: No abdominal or epigastric pain. No nausea, vomiting, or hematemesis; No diarrhea or constipation. No melena or hematochezia.  GENITOURINARY:  (+) Scrotal edema with small skin tear.  (+) Frequency.  No dysuria or hematuria  NEUROLOGICAL: No numbness or weakness  SKIN:  (+) Scrotal edema with small skin tear.  No itching, burning or rashes.    All other review of systems is negative unless indicated above.        Vital Signs Last 24 Hrs  T(C): 36.6 (01 Feb 2025 13:40), Max: 36.7 (01 Feb 2025 05:00)  T(F): 97.8 (01 Feb 2025 13:40), Max: 98.1 (01 Feb 2025 05:00)  HR: 77 (01 Feb 2025 13:40) (77 - 89)  BP: 165/76 (01 Feb 2025 13:40) (144/71 - 165/76)  RR: 18 (01 Feb 2025 13:40) (18 - 18)  SpO2: 97% (01 Feb 2025 13:40) (97% - 98%)    Parameters below as of 01 Feb 2025 05:00  Patient On (Oxygen Delivery Method): room air        PHYSICAL EXAM:  General:  WD, obese male conversant in NAD, currently resting comfortably.  HEENT:  NC/AT, EOMI, normal hearing,  no LAD, neck supple.  Pulmonary:   CTA B/L, Normal respiratory effort.  Cardiovascular:  S1 & S2, NSR, no murmurs, rubs or gallops.  Abdominal:   + BS, soft,  No abdominal tenderness with palpation.  No suprapubic tenderness.    Genitourinary:  (+) Scrotal and penile edema,  Small skin tear to posterior scrotal area which is in contact with bed.  Tear edges clean without necrotic tissue, bleeding, discharge or surrounding erythema.    Extremities:  (+) Peripheral lower extremity edema.  Decreased ROM with stiffness with lower extremities.   Neuro:  Awake, alert & oriented.            LABS:                         9.8    5.32  )-----------( 147      ( 01 Feb 2025 07:06 )             29.8     02-01    140  |  107  |  16  ----------------------------<  166[H]  4.3   |  24  |  0.8    Ca    7.6[L]      01 Feb 2025 07:06    TPro  5.5[L]  /  Alb  1.8[L]  /  TBili  0.6  /  DBili  x   /  AST  55[H]  /  ALT  13  /  AlkPhos  303[H]  02-01          Urinalysis Basic - ( 01 Feb 2025 07:06 )    Color: x / Appearance: x / SG: x / pH: x  Gluc: 166 mg/dL / Ketone: x  / Bili: x / Urobili: x   Blood: x / Protein: x / Nitrite: x   Leuk Esterase: x / RBC: x / WBC x   Sq Epi: x / Non Sq Epi: x / Bacteria: x          Blood cultures:  Culture - Blood (collected 30 Jan 2025 07:12)  Source: .Blood BLOOD  Preliminary Report (31 Jan 2025 22:01):    No growth at 24 hours    Culture - Blood (collected 30 Jan 2025 07:12)  Source: .Blood BLOOD  Preliminary Report (31 Jan 2025 22:01):    No growth at 24 hours        Imaging:  CT Abdomen and Pelvis w/ IV Cont (01.23.25 @ 19:19)   IMPRESSION:  No CT evidence of acute traumatic injury within the chest, abdomen or   pelvis.    ABDOMEN/PELVIS:  Pancreatic tail hypodensity measuring 1 cm. Differential includes IPMN.   Nonemergent/outpatient MRI may be obtained for further characterization

## 2025-02-01 NOTE — CONSULT NOTE ADULT - CONSULT REASON
Cellulitis
stroke code, dysarthria R arm pronator drift
Skin assessment and treatment recommendation
pancreatic cyst
AMS
Scrotal lesion
ro cva

## 2025-02-01 NOTE — CONSULT NOTE ADULT - NS ATTEND AMEND GEN_ALL_CORE FT
incidental pancreatic tail cyst- WIll arrange for OP evaluation for EUS
Patient seen at bedside with Urology PA.   Case discussed and agree with plan as noted above.
Pt w/ h/o SDH and LRE on keppra p/w intermittent confusion s/o TME with current UTI.  Neuroimaging negative for acute structural etiology.  Recommend REEG and continue current dose of keppra for now.  In meantime treat UTI and additional causes for TME. Rest of recommendations as above.

## 2025-02-01 NOTE — CONSULT NOTE ADULT - ASSESSMENT
.  Impression:  80-year-old male with past medical history of insulin-dependent diabetes, hypertension, subdural hematoma on Keppra who presents for multiple falls, weakness, confusion.  Per daughter at bedside, he has becoming more intermittently confused, has had multiple falls over the past 2 months (last fall was yesterday).  Urology consulted to evaluate the scrotal lesion.      .

## 2025-02-01 NOTE — PROGRESS NOTE ADULT - SUBJECTIVE AND OBJECTIVE BOX
DWIGHT RIBEIRO  [facility]-S 4I 025 A (SI-S 4I)            Patient was evaluated and examined  by bedside,                 REVIEW OF SYSTEMS:  please see pertinent positives mentioned above, all other 12 ROS negative      T(C): , Max: 36.9 (01-31-25 @ 13:43)  HR: 89 (02-01-25 @ 05:00)  BP: 144/71 (02-01-25 @ 05:00)  RR: 18 (02-01-25 @ 05:00)  SpO2: 98% (02-01-25 @ 05:00)  CAPILLARY BLOOD GLUCOSE      POCT Blood Glucose.: 206 mg/dL (01 Feb 2025 11:52)  POCT Blood Glucose.: 157 mg/dL (01 Feb 2025 07:48)  POCT Blood Glucose.: 261 mg/dL (31 Jan 2025 21:07)  POCT Blood Glucose.: 244 mg/dL (31 Jan 2025 16:23)      PHYSICAL EXAM:  General: tired  HEENT: NCAT  Lungs: No increased WOB  CVS: RRR  Abdomen: , non-distended  Extremities: 1+ edema      LAB  CBC  Date: 02-01-25 @ 07:06  Mean cell Mqztpodmos29.2  Mean cell Hemoglobin Conc32.9  Mean cell Volum 98.0  Platelet count-Automate 147  RBC Count 3.04  Red Cell Distrib Width13.8  WBC Count5.32  % Albumin, Urine--  Hematocrit 29.8  Hemoglobin 9.8  CBC  Date: 01-31-25 @ 06:54  Mean cell Kkzlcyqhvd70.4  Mean cell Hemoglobin Conc33.0  Mean cell Volum 98.1  Platelet count-Automate 146  RBC Count 3.24  Red Cell Distrib Width13.7  WBC Count5.98  % Albumin, Urine--  Hematocrit 31.8  Hemoglobin 10.5  CBC  Date: 01-30-25 @ 07:12  Mean cell Cqloxulhst56.8  Mean cell Hemoglobin Conc32.9  Mean cell Volum 96.6  Platelet count-Automate 145  RBC Count 2.96  Red Cell Distrib Width13.7  WBC Count5.09  % Albumin, Urine--  Hematocrit 28.6  Hemoglobin 9.4  CBC  Date: 01-29-25 @ 07:35  Mean cell Thkjympfvy95.7  Mean cell Hemoglobin Conc33.8  Mean cell Volum 96.7  Platelet count-Automate 161  RBC Count 3.00  Red Cell Distrib Width13.6  WBC Count6.20  % Albumin, Urine--  Hematocrit 29.0  Hemoglobin 9.8  CBC  Date: 01-28-25 @ 06:15  Mean cell Yeebfndbgm22.8  Mean cell Hemoglobin Conc34.2  Mean cell Volum 96.0  Platelet count-Automate 158  RBC Count 3.26  Red Cell Distrib Width13.3  WBC Count7.13  % Albumin, Urine--  Hematocrit 31.3  Hemoglobin 10.7  CBC  Date: 01-27-25 @ 15:51  Mean cell Ourtbhruzf02.2  Mean cell Hemoglobin Conc33.7  Mean cell Volum 95.7  Platelet count-Automate 147  RBC Count 3.01  Red Cell Distrib Width13.2  WBC Count6.19  % Albumin, Urine--  Hematocrit 28.8  Hemoglobin 9.7  CBC  Date: 01-27-25 @ 08:52  Mean cell Pfrwlfjyab78.6  Mean cell Hemoglobin Conc33.9  Mean cell Volum 96.4  Platelet count-Automate 161  RBC Count 3.34  Red Cell Distrib Width13.3  WBC Count6.58  % Albumin, Urine--  Hematocrit 32.2  Hemoglobin 10.9  CBC  Date: 01-26-25 @ 07:00  Mean cell Fuyevskzqy59.6  Mean cell Hemoglobin Conc33.4  Mean cell Volum 97.4  Platelet count-Automate 153  RBC Count 3.04  Red Cell Distrib Width13.7  WBC Count6.14  % Albumin, Urine--  Hematocrit 29.6  Hemoglobin 9.9    [facility]  02-01-25 @ 07:06  Blood Gas Arterial-Calcium,Ionized--  Blood Urea Nitrogen, Serum 16 mg/dL [10 - 20]  Carbon Dioxide, Serum24 mmol/L [17 - 32]  Chloride, Etklc542 mmol/L [98 - 110]  Creatinie, Serum0.8 mg/dL [0.7 - 1.5]  Glucose, Odjpf217 mg/dL[H] [70 - 99]  Potassium, Serum4.3 mmol/L [3.5 - 5.0]  Sodium, Serum 140 mmol/L [135 - 146]  [facility]  01-31-25 @ 06:54  Blood Gas Arterial-Calcium,Ionized--  Blood Urea Nitrogen, Serum 19 mg/dL [10 - 20]  Carbon Dioxide, Serum23 mmol/L [17 - 32]  Chloride, Heyym787 mmol/L [98 - 110]  Creatinie, Serum0.9 mg/dL [0.7 - 1.5]  Glucose, Gimhx541 mg/dL[H] [70 - 99]  Potassium, Serum4.5 mmol/L [3.5 - 5.0]  Sodium, Serum 140 mmol/L [135 - 146]  [facility]  01-30-25 @ 07:12  Blood Gas Arterial-Calcium,Ionized--  Blood Urea Nitrogen, Serum 20 mg/dL [10 - 20]  Carbon Dioxide, Serum23 mmol/L [17 - 32]  Chloride, Hshep962 mmol/L [98 - 110]  Creatinie, Serum0.8 mg/dL [0.7 - 1.5]  Glucose, Serum56 mg/dL[L] [70 - 99]  Potassium, Serum4.2 mmol/L [3.5 - 5.0]  Sodium, Serum 141 mmol/L [135 - 146]  [facility]  01-29-25 @ 07:35  Blood Gas Arterial-Calcium,Ionized--  Blood Urea Nitrogen, Serum 22 mg/dL[H] [10 - 20]  Carbon Dioxide, Serum22 mmol/L [17 - 32]  Chloride, Hqiju987 mmol/L [98 - 110]  Creatinie, Serum0.8 mg/dL [0.7 - 1.5]  Glucose, Serum64 mg/dL[L] [70 - 99]  Potassium, Serum4.3 mmol/L [3.5 - 5.0]  Sodium, Serum 138 mmol/L [135 - 146]  [facility]  01-28-25 @ 06:15  Blood Gas Arterial-Calcium,Ionized--  Blood Urea Nitrogen, Serum 23 mg/dL[H] [10 - 20]  Carbon Dioxide, Serum23 mmol/L [17 - 32]  Chloride, Dgvnk811 mmol/L [98 - 110]  Creatinie, Serum0.9 mg/dL [0.7 - 1.5]  Glucose, Serum64 mg/dL[L] [70 - 99]  Potassium, Serum4.1 mmol/L [3.5 - 5.0]  Sodium, Serum 139 mmol/L [135 - 146]  [facility]  01-27-25 @ 15:51  Blood Gas Arterial-Calcium,Ionized--  Blood Urea Nitrogen, Serum 27 mg/dL[H] [10 - 20]  Carbon Dioxide, Serum21 mmol/L [17 - 32]  Chloride, Iiwch063 mmol/L [98 - 110]  Creatinie, Serum0.8 mg/dL [0.7 - 1.5]  Glucose, Xypzm471 mg/dL[H] [70 - 99]  Potassium, Serum4.1 mmol/L [3.5 - 5.0]  Sodium, Serum 138 mmol/L [135 - 146]  BMP  01-27-25 @ 14:17  Blood Gas Arterial-Calcium,Ionized1.24 mmol/L [1.15 - 1.33]  Blood Urea Nitrogen, Serum --  Carbon Dioxide, Serum--  Chloride, Serum--  Creatinie, Serum--  Glucose, Serum--  Potassium, Serum--  Sodium, Serum --              Microbiology:    Culture - Blood (collected 01-30-25 @ 07:12)  Source: .Blood BLOOD  Preliminary Report (01-31-25 @ 22:01):    No growth at 24 hours    Culture - Blood (collected 01-30-25 @ 07:12)  Source: .Blood BLOOD  Preliminary Report (01-31-25 @ 22:01):    No growth at 24 hours    Culture - Blood (collected 01-29-25 @ 07:35)  Source: .Blood BLOOD  Preliminary Report (01-31-25 @ 21:01):    No growth at 48 Hours    Culture - Blood (collected 01-27-25 @ 19:13)  Source: .Blood BLOOD  Preliminary Report (02-01-25 @ 03:01):    No growth at 4 days    Culture - Blood (collected 01-27-25 @ 19:13)  Source: .Blood BLOOD  Preliminary Report (02-01-25 @ 03:01):    No growth at 4 days    Culture - Blood (collected 01-25-25 @ 08:23)  Source: .Blood BLOOD  Gram Stain (01-26-25 @ 15:48):    Growth in anaerobic bottle: Gram Positive Cocci in Clusters    Growth in aerobic bottle: Gram Positive Cocci in Clusters  Final Report (01-27-25 @ 14:27):    Growth in aerobic and anaerobic bottles: Staphylococcus aureus    See previous culture [id number]    Culture - Blood (collected 01-25-25 @ 08:23)  Source: .Blood BLOOD  Gram Stain (01-26-25 @ 17:31):    Growth in aerobic bottle: Gram Positive Cocci in Clusters    Growth in anaerobic bottle: Gram Positive Cocci in Clusters  Final Report (01-27-25 @ 14:42):    Growth in aerobic and anaerobic bottles: Staphylococcus aureus    See previous culture [id number]    Culture - Blood (collected 01-23-25 @ 20:05)  Source: .Blood BLOOD  Gram Stain (01-24-25 @ 13:53):    Growth in aerobic bottle: Gram Positive Cocci in Clusters    Growth in anaerobic bottle: Gram Positive Cocci in Clusters  Final Report (01-26-25 @ 09:38):    Growth in aerobic and anaerobic bottles: Staphylococcus aureus    Direct identification is available within approximately 3-5    hours either by Blood Panel Multiplexed PCR or Direct    MALDI-TOF. Details: [email or web address]  Organism: Blood Culture PCR  Staphylococcus aureus (01-26-25 @ 09:38)  Organism: Staphylococcus aureus (01-26-25 @ 09:38)      Method Type: LOUIS      -  Clindamycin: S <=0.25      -  Erythromycin: S <=0.25      -  Gentamicin: S <=4 Should not be used as monotherapy      -  Oxacillin: S <=0.25 Oxacillin predicts susceptibility for dicloxacillin, methicillin, and nafcillin      -  Penicillin: R >2      -  Rifampin: S <=1 Should not be used as monotherapy      -  Tetracycline: S <=4      -  Trimethoprim/Sulfamethoxazole: S <=0.5/9.5      -  Vancomycin: S 1  Organism: Blood Culture PCR (01-26-25 @ 09:38)      Method Type: PCR      -  Methicillin SENSITIVE Staphylococcus aureus (MSSA): Detec Any isolate of Staphylococcus aureus from a blood culture is NOT considered a contaminant.    Culture - Blood (collected 01-23-25 @ 20:05)  Source: .Blood BLOOD  Gram Stain (01-24-25 @ 13:52):    Growth in aerobic bottle: Gram Positive Cocci in Clusters    Growth in anaerobic bottle: Gram Positive Cocci in Clusters  Final Report (01-26-25 @ 09:38):    Growth in aerobic and anaerobic bottles: Staphylococcus aureus    See previous culture [id number]    Culture - Urine (collected 01-23-25 @ 19:55)  Source: Clean Catch None  Final Report (01-27-25 @ 15:57):    10,000 - 49,000 CFU/mL Staphylococcus aureus    50,000 - 99,000 CFU/mL Aerococcus urinae    Isolates of Aerococcus spp. are predictably susceptible to penicillin,    ampicillin, and vancomycin. All isolates are resistant to sulfonamides.  Organism: Staphylococcus aureus (01-27-25 @ 15:57)  Organism: Staphylococcus aureus (01-27-25 @ 15:57)      Method Type: LOUIS      -  Gentamicin: S <=4 Should not be used as monotherapy      -  Nitrofurantoin: S <=32      -  Oxacillin: S <=0.25 Oxacillin predicts susceptibility for dicloxacillin, methicillin, and nafcillin      -  Penicillin: R >2      -  Rifampin: S <=1 Should not be used as monotherapy      -  Tetracycline: S <=4      -  Trimethoprim/Sulfamethoxazole: S <=0.5/9.5      -  Vancomycin: S 1    Urinalysis with Rflx Culture (collected 01-23-25 @ 19:55)        RADIOLOGY & ADDITIONAL TESTS:        Medications:  acetaminophen     Tablet .. 650 milliGRAM(s) Oral every 6 hours  aluminum hydroxide/magnesium hydroxide/simethicone Suspension 30 milliLiter(s) Oral every 4 hours PRN  aspirin enteric coated 81 milliGRAM(s) Oral daily  atorvastatin 80 milliGRAM(s) Oral at bedtime  bacitracin   Ointment 1 Application(s) Topical two times a day  ceFAZolin   IVPB 2000 milliGRAM(s) IV Intermittent every 8 hours  chlorhexidine 2% Cloths 1 Application(s) Topical <User Schedule>  cyclobenzaprine 5 milliGRAM(s) Oral every 8 hours PRN  dextrose 5%. 1000 milliLiter(s) IV Continuous <Continuous>  dextrose 50% Injectable 25 Gram(s) IV Push once  dextrose 50% Injectable 12.5 Gram(s) IV Push once  dextrose 50% Injectable 25 Gram(s) IV Push once  dextrose Oral Gel 15 Gram(s) Oral once PRN  enoxaparin Injectable 40 milliGRAM(s) SubCutaneous every 24 hours  folic acid 1 milliGRAM(s) Oral daily    gabapentin 300 milliGRAM(s) Oral every 8 hours  glucagon  Injectable 1 milliGRAM(s) IntraMuscular once  insulin lispro (ADMELOG) corrective regimen sliding scale   SubCutaneous three times a day before meals  levETIRAcetam 500 milliGRAM(s) Oral two times a day  lidocaine   4% Patch 1 Patch Transdermal every 24 hours  lidocaine   4% Patch 1 Patch Transdermal daily  losartan 50 milliGRAM(s) Oral daily  melatonin 5 milliGRAM(s) Oral at bedtime PRN  nystatin Powder 1 Application(s) Topical every 12 hours PRN  ondansetron Injectable 4 milliGRAM(s) IV Push every 8 hours PRN  oxyCODONE    IR 10 milliGRAM(s) Oral three times a day PRN  polyethylene glycol 3350 17 Gram(s) Oral two times a day  senna 2 Tablet(s) Oral at bedtime  Tresiba (Insulin Degludec) 20 Unit(s) 20 Unit(s) SubCutaneous at bedtime        Assessment and Plan:      80-year-old male with past medical history of insulin-dependent diabetes, hypertension, subdural hematoma on keppra who presents for multiple falls, weakness, & confusion.        #MSSA bacteremia, origin L forearm cellulitis v. UTI?  #Acute OM/discitis  #Questionable L forearm cellulitis - low suspicion for septic joint   - BCx + for MSSA (1/23, 1/25)  - UA + for nitrites, 20 WBC, small LE, & few bacteria  - UCx + for MSSA  - ID on board  - L Forearm/Elbow XR -ve for fx, showed soft tissue swelling  - L Elbow CT: Very limited exam. Artifact. Portions of the elbow are not imaged; Diffuse soft tissue edema which may reflect cellulitis in the right clinical setting. No definite discrete fluid collection; Arthritic changes of the elbow with no definite radiographic evidence of osteomyelitis.  - TTE w/ no vegetations  Plan:   - C/w IV cefazolin 2 g q8H  - Check BCx q48H until clear, collected 1/27-->NGTD, repeat 1/29, follow with ID  - Follow MRI T/L spine -->L4-5 OM and discitis, plan for 6 weeks of antibiotics, IR consulted for PICC placement-->plan for PICC on Monday      #Scrotal lesion  -Uro consult    #TME  #Hx of SDH  - R/o stroke 2/2 septic emboli v. TME 2/2 MSSA bacteremia   - Stroke Code called due to slurred speech  - S/p ASA + Plavix load  - CTH -ve for acute pathology  - CTA H+N: No large vessel occlusion, aneurysm, or vascular malformation; Mild atherosclerotic stenosis in the bilateral supraclinoid ICAs and bilateral V4 vertebral arteries.  - CTP: Small artifactual perfusion abnormality in the anterior right cerebellum,   otherwise no perfusion deficits to suggest areas of completed infarction   or at risk territory.  - MR Head: No acute intracranial pathology; Stable mild chronic microvascular ischemic changes.  - Given negative MRI, suspect TME due to polypharmacy  Plan:  - C/w ASA 81 mg q24H  - D/c plavix 75 mg q24H as per neuro  - C/w atorvastatin 80 mg qhs  - C/w Keppra 500 mg q12H  - Neuro checks q8H  - Check rEEG    #Type II NSTEMI  - EKG shows no acute ischemic changes  - HST 47>43>39>31  - TTE: LVEF 66%  - Monitor on tele    #Chronic macrocytic anemia  - Hb 11.1 on admission  - No s/s of active bleeding  - B12 1163, WNL (1/23/25)  - Folate 5, lower end of normal (1/23/25)  - Iron studies WNL  Plan:  - C/w folic acid 1 mg q24H  - OP EGD/Colonoscopy   - Transfuse if Hb <7    #T2DM  #Hyperglycemia, resolved  - On metformin 500 mg q6H + Tresiba 60 units @ home  - A1c 7.6%  - FS 70s 1/28  Plan:  - C/w home Tresiba, decreased to 20 units from 40 units as FS 70s  - D/c'd Lispro 6 units TIDAC  - C/w +1 ISS    #HTN  - C/w home losartan 50 mg q24H    #Chronic back pain  #Frequent Falls  - C/w pain control: PO Tylenol 650 mg q6H + PO oxycodone 10 mg q6H PRN, IV morphine 2 mg q4H PRN, PO cyclobenzaprine 10 mg q8H + lidocaine patches  - Decrease cyclobenzaprine 5 mg q8H PRN as patient was very lethargic, suspect 2/2 pain meds  - PT consult  - Consider MR Spine w/ IVC given MSSA bacteremia     #Depression  - C/w venlafaxine ER 75 mg q24H (started inpatient)    # Incidental pancreatic tail hypodensity  - RUQ U/S WNL  - OP MRI may be obtained for further characterization  - Follow up with GI office located on [address] [place] [address], Phone number [phone] with Dr. Puentes to consider EUS    #MISC  - DVT ppx: Lovenox SQ  - GI ppx: NI  - Diet:   - Activity: AAT  - Code Status: Full Code  - Dispo: [facility]      Dispo: pending ID recs, final EEG

## 2025-02-01 NOTE — CONSULT NOTE ADULT - PROBLEM SELECTOR RECOMMENDATION 9
.  Plan:  # Small scrotal skin tear due to edema and friction from bed sheets    - Recommend elevate scrotum to alleviate scrotal edema and pressure off skin tear.  - Continue to apply barrier cream to avoid maceration of scrotal skin.  - Patient already on Ceftriaxone for UTI.   - Monitor scrotal skin and skin tear.  Nursing admission photos reviewed currently appears improved since admission and no overt signs of infection.    - No acute urological intervention needed.  - Urology to sign off and may recall as needed while in hospital.   - Case d/w Dr. Ramos and agrees with above.     - Urology PA to bill.

## 2025-02-02 LAB
ALBUMIN SERPL ELPH-MCNC: 1.9 G/DL — LOW (ref 3.5–5.2)
ALP SERPL-CCNC: 328 U/L — HIGH (ref 30–115)
ALT FLD-CCNC: 16 U/L — SIGNIFICANT CHANGE UP (ref 0–41)
ANION GAP SERPL CALC-SCNC: 7 MMOL/L — SIGNIFICANT CHANGE UP (ref 7–14)
AST SERPL-CCNC: 63 U/L — HIGH (ref 0–41)
BILIRUB SERPL-MCNC: 0.5 MG/DL — SIGNIFICANT CHANGE UP (ref 0.2–1.2)
BUN SERPL-MCNC: 20 MG/DL — SIGNIFICANT CHANGE UP (ref 10–20)
CALCIUM SERPL-MCNC: 8 MG/DL — LOW (ref 8.4–10.5)
CHLORIDE SERPL-SCNC: 110 MMOL/L — SIGNIFICANT CHANGE UP (ref 98–110)
CO2 SERPL-SCNC: 24 MMOL/L — SIGNIFICANT CHANGE UP (ref 17–32)
CREAT SERPL-MCNC: 1 MG/DL — SIGNIFICANT CHANGE UP (ref 0.7–1.5)
CULTURE RESULTS: SIGNIFICANT CHANGE UP
CULTURE RESULTS: SIGNIFICANT CHANGE UP
EGFR: 77 ML/MIN/1.73M2 — SIGNIFICANT CHANGE UP
GLUCOSE SERPL-MCNC: 292 MG/DL — HIGH (ref 70–99)
HCT VFR BLD CALC: 31.3 % — LOW (ref 42–52)
HGB BLD-MCNC: 9.7 G/DL — LOW (ref 14–18)
MCHC RBC-ENTMCNC: 30.9 PG — SIGNIFICANT CHANGE UP (ref 27–31)
MCHC RBC-ENTMCNC: 31 G/DL — LOW (ref 32–37)
MCV RBC AUTO: 99.7 FL — HIGH (ref 80–94)
NRBC # BLD: 0 /100 WBCS — SIGNIFICANT CHANGE UP (ref 0–0)
NRBC BLD-RTO: 0 /100 WBCS — SIGNIFICANT CHANGE UP (ref 0–0)
PLATELET # BLD AUTO: 155 K/UL — SIGNIFICANT CHANGE UP (ref 130–400)
PMV BLD: 8.7 FL — SIGNIFICANT CHANGE UP (ref 7.4–10.4)
POTASSIUM SERPL-MCNC: 4.6 MMOL/L — SIGNIFICANT CHANGE UP (ref 3.5–5)
POTASSIUM SERPL-SCNC: 4.6 MMOL/L — SIGNIFICANT CHANGE UP (ref 3.5–5)
PROT SERPL-MCNC: 5.6 G/DL — LOW (ref 6–8)
RBC # BLD: 3.14 M/UL — LOW (ref 4.7–6.1)
RBC # FLD: 14.1 % — SIGNIFICANT CHANGE UP (ref 11.5–14.5)
SODIUM SERPL-SCNC: 141 MMOL/L — SIGNIFICANT CHANGE UP (ref 135–146)
SPECIMEN SOURCE: SIGNIFICANT CHANGE UP
SPECIMEN SOURCE: SIGNIFICANT CHANGE UP
WBC # BLD: 4.92 K/UL — SIGNIFICANT CHANGE UP (ref 4.8–10.8)
WBC # FLD AUTO: 4.92 K/UL — SIGNIFICANT CHANGE UP (ref 4.8–10.8)

## 2025-02-02 PROCEDURE — 99221 1ST HOSP IP/OBS SF/LOW 40: CPT | Mod: FS

## 2025-02-02 PROCEDURE — 99233 SBSQ HOSP IP/OBS HIGH 50: CPT

## 2025-02-02 RX ORDER — LEVETIRACETAM 750 MG/1
250 TABLET, FILM COATED ORAL
Refills: 0 | Status: DISCONTINUED | OUTPATIENT
Start: 2025-02-02 | End: 2025-02-04

## 2025-02-02 RX ORDER — OXYCODONE HYDROCHLORIDE 30 MG/1
10 TABLET ORAL THREE TIMES A DAY
Refills: 0 | Status: DISCONTINUED | OUTPATIENT
Start: 2025-02-02 | End: 2025-02-04

## 2025-02-02 RX ORDER — INSULIN LISPRO 100/ML
4 VIAL (ML) SUBCUTANEOUS
Refills: 0 | Status: DISCONTINUED | OUTPATIENT
Start: 2025-02-02 | End: 2025-02-04

## 2025-02-02 RX ORDER — ACETAMINOPHEN 160 MG/5ML
1000 SUSPENSION ORAL EVERY 8 HOURS
Refills: 0 | Status: DISCONTINUED | OUTPATIENT
Start: 2025-02-02 | End: 2025-02-04

## 2025-02-02 RX ORDER — MORPHINE SULFATE 60 MG/1
2 TABLET, FILM COATED, EXTENDED RELEASE ORAL EVERY 4 HOURS
Refills: 0 | Status: DISCONTINUED | OUTPATIENT
Start: 2025-02-02 | End: 2025-02-04

## 2025-02-02 RX ORDER — LOSARTAN POTASSIUM 100 MG
25 TABLET ORAL ONCE
Refills: 0 | Status: COMPLETED | OUTPATIENT
Start: 2025-02-02 | End: 2025-02-02

## 2025-02-02 RX ORDER — INSULIN GLARGINE-YFGN 100 [IU]/ML
30 INJECTION, SOLUTION SUBCUTANEOUS AT BEDTIME
Refills: 0 | Status: DISCONTINUED | OUTPATIENT
Start: 2025-02-02 | End: 2025-02-02

## 2025-02-02 RX ORDER — INSULIN GLARGINE-YFGN 100 [IU]/ML
10 INJECTION, SOLUTION SUBCUTANEOUS AT BEDTIME
Refills: 0 | Status: DISCONTINUED | OUTPATIENT
Start: 2025-02-02 | End: 2025-02-02

## 2025-02-02 RX ADMIN — LIDOCAINE HYDROCHLORIDE 1 PATCH: 30 CREAM TOPICAL at 11:28

## 2025-02-02 RX ADMIN — Medication 4 UNIT(S): at 17:04

## 2025-02-02 RX ADMIN — ANTISEPTIC SURGICAL SCRUB 1 APPLICATION(S): 0.04 SOLUTION TOPICAL at 05:55

## 2025-02-02 RX ADMIN — ASPIRIN 81 MILLIGRAM(S): 81 TABLET, COATED ORAL at 11:29

## 2025-02-02 RX ADMIN — Medication 4 UNIT(S): at 11:54

## 2025-02-02 RX ADMIN — ATORVASTATIN CALCIUM 80 MILLIGRAM(S): 80 TABLET, FILM COATED ORAL at 21:55

## 2025-02-02 RX ADMIN — ACETAMINOPHEN 650 MILLIGRAM(S): 160 SUSPENSION ORAL at 05:53

## 2025-02-02 RX ADMIN — ACETAMINOPHEN 1000 MILLIGRAM(S): 160 SUSPENSION ORAL at 21:53

## 2025-02-02 RX ADMIN — POLYETHYLENE GLYCOL 3350 17 GRAM(S): 17 POWDER, FOR SOLUTION ORAL at 17:04

## 2025-02-02 RX ADMIN — Medication 1 MILLIGRAM(S): at 11:29

## 2025-02-02 RX ADMIN — Medication 4: at 08:03

## 2025-02-02 RX ADMIN — Medication 10 UNIT(S): at 22:03

## 2025-02-02 RX ADMIN — Medication 25 MILLIGRAM(S): at 21:54

## 2025-02-02 RX ADMIN — Medication 1 APPLICATION(S): at 05:53

## 2025-02-02 RX ADMIN — LEVETIRACETAM 250 MILLIGRAM(S): 750 TABLET, FILM COATED ORAL at 17:05

## 2025-02-02 RX ADMIN — ACETAMINOPHEN 1000 MILLIGRAM(S): 160 SUSPENSION ORAL at 15:49

## 2025-02-02 RX ADMIN — LIDOCAINE HYDROCHLORIDE 1 PATCH: 30 CREAM TOPICAL at 22:01

## 2025-02-02 RX ADMIN — Medication 4: at 17:04

## 2025-02-02 RX ADMIN — GABAPENTIN 300 MILLIGRAM(S): 800 TABLET ORAL at 21:59

## 2025-02-02 RX ADMIN — ACETAMINOPHEN 650 MILLIGRAM(S): 160 SUSPENSION ORAL at 11:29

## 2025-02-02 RX ADMIN — ENOXAPARIN SODIUM 40 MILLIGRAM(S): 100 INJECTION SUBCUTANEOUS at 22:02

## 2025-02-02 RX ADMIN — OXYCODONE HYDROCHLORIDE 10 MILLIGRAM(S): 30 TABLET ORAL at 18:40

## 2025-02-02 RX ADMIN — Medication 100 MILLIGRAM(S): at 15:46

## 2025-02-02 RX ADMIN — Medication 100 MILLIGRAM(S): at 22:05

## 2025-02-02 RX ADMIN — Medication 50 MILLIGRAM(S): at 05:53

## 2025-02-02 RX ADMIN — OXYCODONE HYDROCHLORIDE 10 MILLIGRAM(S): 30 TABLET ORAL at 08:03

## 2025-02-02 RX ADMIN — POLYETHYLENE GLYCOL 3350 17 GRAM(S): 17 POWDER, FOR SOLUTION ORAL at 05:54

## 2025-02-02 RX ADMIN — Medication 2 TABLET(S): at 21:55

## 2025-02-02 RX ADMIN — Medication 3: at 11:54

## 2025-02-02 RX ADMIN — Medication 100 MILLIGRAM(S): at 05:55

## 2025-02-02 RX ADMIN — Medication 1 APPLICATION(S): at 17:05

## 2025-02-02 NOTE — DIETITIAN INITIAL EVALUATION ADULT - [id number]
[facility]  CARDIOLOGY PROGRESS NOTE    Patient:  Bhavesh Bishop Date of Service:  9/19/2021   YOB: 1950 Admission Date:  9/15/2021   MRN:  [id number] Attending:  Lakisha Tarango MD   PCP:  Bisi Liz MD Hospital Day:  Hospital Day: 5     Summary of Hospitalization:  Bhavesh Bishop is a 70 year old male with past medical history of CAD s/p ALECIA to LCx 7/10/2019, chronic HFrEF, ischemic cardiomyopathy s/p single chamber ICD 2017, pulmonary hypertension, atrial fibrillation, NSVT, hypertension and dyslipidemia who was admitted on 9/15/2021 for shortness of breath. Patient has been following with his PCP since June for management of heart failure symptoms including lower extremity edema, CHAMBERLAIN, orthopnea, and PND. Initially patient's symptoms improved with increased diuretic, but patient notes that his CHAMBERLAIN became worse again 5 days ago prompting presentation to his PCP office. Patient was tachycardic and labs noted GEE and patient was instructed to present to the ED for ultimate admission for further work up and treatment. In the ED ECG revealed sinus tachycardia. CXR was negative for acute disease in the chest. The patient was given 250 cc NS and adenosine and admitted for further care. Following admission patient remained in sinus tachycardia with HR in the 130s and hypotensive with SBP 80s-90s.      Cardiology consulted for further management. Patient recalls history as described above. Patient has been following with his PCP since June for management of heart failure symptoms including lower extremity edema, CHAMBERLAIN, orthopnea, and PND. Adjustment of diuretic dose did resolve lower extremity edema, orthopnea, and PND, but patient notes that his CHAMBERLAIN became worse again 5 days ago prompting presentation to his PCP office. Currently patient is resting comfortably in the bed. He denies any shortness of breath at rest. He also denies lower extremity edema, chest  pian, orthopnea, PND, palpitations, dizziness, and lightheadedness. States he is actually feeling quite well while just resting.     Of note, recently his [facility] remote transmissions showed 7 episodes of treated VT with ATP (between 8/31-9/8) , and 21 episodes of NSVT, and increasing optivol measurements      Patient had a heart rate in the 130s, 18 mg of IVP adenosine were given on 9/16, and the underlying rhythm revealed Afib.    INTERVAL EVENTS     - no acute events overnight  - BP- 110 s/80s-120 s/70, MN- Tachycardic up to 128, RR-N , Sating well on room air , No fever  - Labs- CBC- wnl, Cr- 1.27>>1.20, I/O +1.5 since admit, -48 over 24 hrs, UO 1.9/24 hrs       Tele O/N:  Atrial Fibrillation    CARDIAC STUDIES     EKG:   Date:9/16/2021  Atrial fibrillation ( after administering 18 mg of adenosine)    Remote device check 9/9/2021  Appropriate function and programming of single chamber ICD.  Programmed VVI.  Presenting rhythm is VS (somewhat fast).  Measured lead data is stable.  Remaining battery longevity is 7.9 years   0%  There were 7 ATP-terminated VT/VF events, last occurred 9/8/2021 at 0302.  Longest lasted 24 seconds.  Average CL: 250 msec.   21 NSVT episodes + 3 AF episodes also listed. Longest AF event 8 minutes.  AF burden 0.2%  Patient has history of ischemic CMP + AF on coreg (not on anticoagulation due to low AF burden).   Last EF 24% (ECHO: 8/06799).   EP RN has left messages x 2 for patient to call us back to arrange for office visit, last visit 1/25/20218.     Echocardiography  9/17/2021  Severe LV cavity enlargement. Severe LV systolic dysfunction, LVEF 13%.  Mild RV cavity enlargement. Severe RV systolic dysfunction. Mild pulmonary hypertension, RVSP 45 mmHg.  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  Large mural LV apical thrombus in inferoapex (3.5 x 1.2 cm).  Moderate tricuspid valve regurgitation.  Aortic valve sclerosis. Mild to moderate aortic valve  regurgitation.  Tethered mitral valve leaflets. Moderate-to-severe functional mitral regurgitation.  Trivial pericardial effusion.  Critical results were conveyed to the referring provider..  Compared to prior study, LV and RV function are worse. Large thrombus was not seen on prior study..    8/3/2021  Severely reduced left ventricular systolic function with ejection fraction of 24 %.  Mildly increased left ventricular wall thickness.  Severely enlarged left ventricular chamber size.  Grade I left ventricular diastolic dysfunction.  Severely elevated right ventricular systolic pressure 74 mmHg.  Moderately reduced right ventricular systolic function.  Moderately enlarged right ventricular chamber size.  Left atrial volume index of 28.8 ml/m².  Dilated ascending aorta, 3.7 cm.       Stress Echo 6/4/2019  Abnormal stress echocardiogram.  Baseline enlarged LV with wall motion abnormalities.  LV dilated at peak stress with wall motion becoming akinetic ( mostly mid to apical LV ).    CT Angio Coronary Arteries 6/25/2019  1. Technically difficult study compromised by severely elevated calcium score. [id number]=1385  2. The proximal LAD becomes proximally occluded at the site of bifurcation into D1 for approximately ~5 cm and reconstitutes distally via collaterals from the diagonal.   3. The circumflex coronary artery  has probable proximal stent which is small and its lumen cannot be fully assessed due to blooming artifact. Proximal to the stent there is a moderate amount of calcified plaque causing probable moderate to severe stenosis.  4. The proximal and mid right coronary artery has small amount of calcified and noncalcified plaque causing minimal stenosis (0-25%). The distal right coronary artery has a probable stent which appears patent.  5. The left ventricle is severely dilated.  6. Right ventricular and biatrial enlargement.   7. The ascending aorta is mildly dilated measuring 4.3 x 4.2 cm.    Coronary Angiogram  7/10/2019  · Prox RCA lesion with 40% stenosis.  · Dist RCA lesion with 25% stenosis.  · Prox Cx lesion with 85% stenosis.  · Mid Cx to Dist Cx lesion with 50% stenosis.  · Prox LAD to Mid LAD lesion with 100% stenosis.  1. Successful intervention of 80% proximal LCX stenosis with a Resolute 3.5.18mm ALECIA by direct stenting technique.  Multiple high pressure inflations were performed due to heavy calcification.  Well apposed with 20% residual stenosis.  2. Known  of mLAD with good collaterals left to left and right to left    Cardiac Surgeries & Devices:  Date: 7/12/2017  Single chamber ICD (implantable cardioverter defbrillator)      Other:    NM PULMONARY VENTILATION AND PERFUSION  on 9/16/2021  Moderate-sized mismatch perfusion defect in the medial right middle lobe.  Additional small matched ventilation/perfusion defects in the apical  posterior left upper lobe and anterior basal right lower lobe. Intermediate  probability for pulmonary embolism by PIOPED criteria.    CURRENT MEDICATIONS     Scheduled:   • potassium CHLORIDE  40 mEq Oral Once   • metoPROLOL succinate  12.5 mg Oral BID   • furosemide  20 mg Intravenous BID   • atorvastatin  40 mg Oral Nightly   • digoxin  125 mcg Oral Daily   • sodium chloride (PF)  2 mL Intracatheter 2 times per day   • aspirin  81 mg Oral Daily   • Potassium Standard Replacement Protocol   Does not apply See Admin Instructions   • Magnesium Standard Replacement Protocol   Does not apply See Admin Instructions       Infusions:   • heparin (porcine) 25,000 units/250 mL in dextrose 5 % infusion 16 Units/kg/hr (09/19/21 0500)       PRN:   heparin (porcine), heparin (porcine), sodium chloride, sodium chloride, sodium chloride, ondansetron, acetaminophen, polyethylene glycol, docusate sodium-sennosides    PHYSICAL EXAM     Vital Signs:  Visit Vitals  /77 (BP Location: LUE - Left upper extremity, Patient Position: Sitting)   Pulse (!) 128   Temp 98.5 °F (36.9 °C) (Oral)   Resp  No active nutrition diagnosis identified at this time/Patient meeting estimated nutrient needs 18    5' 9\" (1.753 m)   Wt 98.1 kg   SpO2 99%   BMI 31.94 kg/m²     Intake & Output:       Physical Exam:    General:  Awake, alert,  no apparent distress, conversant  HEENT:  Normocephalic/atraumatic, anicteric sclerae  Neck:  Supple, no JVD  Chest: Bilateral symmetrical excursions  Heart:  Irregular rate and rhythm, tachycardic +S1S2, no overt murmur appreciated  Lungs:  Clear to auscultation bilaterally; no rales, rhonchi, or wheezing; unlabored respiratory effort  Abdomen:  Soft, non-tender, non-distended  Extremities:  Extremities grossly normal in appearance, 1 plus lower extremity edema bilaterally  MSK:  No joint deformity or visual evidence of inflammation  Neuro:  Awake, alert, regards examiner, tracks across room, follows commands, moving extremities spontaneously    LABORATORY RESULTS     Cardiac Markers:   No results found    BNP:   No results found for: BNP    CBC:  Recent Labs   Lab 09/19/21  0401 09/18/21  0553 09/17/21  0651   WBC 8.0 7.0 9.0   HGB 12.8* 12.4* 12.8*   HCT 39.3 38.3* 39.3    211 217       CMP:  Recent Labs   Lab 09/19/21  0401 09/18/21  0553 09/17/21  0651 09/16/21  0335 09/15/21  1626 09/15/21  1208   SODIUM 137 139 138 137 138 137   POTASSIUM 4.0 4.0 4.2 3.7 4.0 4.1   CHLORIDE 105 105 104 100 100 98   CO2 26 26 25 29 29 32   BUN 24* 21* 26* 30* 26* 23*   CREATININE 1.27* 1.20* 1.35* 1.66* 1.63* 1.80*   GLUCOSE 97 96 105* 107* 116* 112*   ALBUMIN  --   --   --   --  3.5* 3.6   GPT  --   --   --   --  29 24   ALKPT  --   --   --   --  50 47   BILIRUBIN  --   --   --   --  1.1* 1.2*   MG  --   --   --  2.5* 2.3 2.5*       Lipid Panel:   Cholesterol (mg/dL)   Date Value   08/20/2020 134     HDL (mg/dL)   Date Value   08/20/2020 30 (L)     Cholesterol/ HDL Ratio (no units)   Date Value   08/20/2020 4.5 (H)     Triglycerides (mg/dL)   Date Value   08/20/2020 212 (H)     LDL (mg/dL)   Date Value   08/20/2020 62       HbA1c:   Hemoglobin A1C (%)   Date Value   05/01/2018 5.1        TSH:  TSH (mcUnits/mL)   Date Value   09/15/2021 2.981       Coagulation Studies:   Recent Labs   Lab 09/19/21  0401 09/18/21  1200 09/18/21  0553 09/16/21  1454   PT  --   --   --  14.6*   PTT 53* 59* 68* 24   INR  --   --   --  1.4       ASSESSMENT & RECOMMENDATIONS     Bhavesh Bishop is a 70 year old male with a PMHx significant for CAD with ALECIA to LCX, HFrEF, Ischemic Cardiomyopathy s/p Mdt single Lead ICD, Pulmonary hypertension. Atrial Fibrillation, HTN, HLD.who was admitted on 9/15/2021 with complaints of progressively worsening shortness of breath. He notes that his diuretic regimen has been changed recently. EP consulted for management of Atrial Flutter and anticoagulation recommendations. On Device check, noted to have 7 episodes of VT, which terminated with ATP, 21 episodes of NSVT, and an increasing AF/AT burden.  Patient was given 18 mg IVP adenosine which revealed an underlying rhythm of Afib.  V/Q scan done on 9/16 was significant for intermediate probability of pulmonary embolism (see V/Q scan).   Echocardiography done on 9/17/2021 showed a drop in the EF from 24 to 13 %, Large apical aneurysm! Severe RV and LV systolic dysfunction. Patient was started on heparin drip on 9/16    #Large mural LV apical thrombus in inferoapex (3.5 x 1.2 cm). (9/17/2021)  -Patient on heparin gtt  -Warfarin will be the anticoagulation of choice for the thrombus after cardiac catheterization (likely early next week)    # Ischemic Cardiomyopathy s/p ICD placement ( Echo on 9/17/2021  LVEF 13%)  EF dropped to 13 from 24 %  Large apical aneurysm! Severe RV and LV systolic dysfunction  Probably due to the Afib with RVR, less likely due to another ischemic event  Recommendations  - Dc torsemide and start Lasix 20 mg IV BID   - on atorvastatin 40 mg, aspirin , Digoxin   - will change Lopressor to Toprol XL 12.5 mg BID   -GDMT ( patient was on ACE prior to admission) when tolerated  - Consider Heart Failure  consult  -Will need cardiac catheterization (likely early this week), keep on heparin drip    # Atrial fibrillation  Patient has a history of Afib and NSVT ( wasn't on anticoagulation due to low AF burden)  -Patient received Digoxin (loaded with a total of 1 mg), adenosine 6 mg and 18 mg on 9/16  - EP on board appreciate recommendations   Recommendations  - continue Heparin drip  - will change Lopressor to Toprol XL 12.5 mg BID   - Continue digoxin   - Consider PO amiodarone if HR do not improve   - Avoid QTc Prolonging medications, as last QTc was 535  - Electrolyte goals K>4, Mg>2,   - On discharge patient will require oral anticoagulant ( most likely warfarin since patient has LV thrombus)    # Intermediate probability of pulmonary embolism   Recommendations  -when GEE improves consider doing a CTA however seeing the need to anticoagulate regardless (LV thrombus, Afib) hold off of CTA  -Heparin drip for now  -US lower extremities: No sonographic evidence for DVT within either lower extremity.    # GEE, improving   Baseline creatinine (about 1), Cr 1.27>>1.20>>1.35  Recommendations  - Monitor creatinine  -hold off nephrotoxic drugs     # Pulmonary Hypertension:   Mild pulmonary hypertension, RVSP 45 mmHg (9/17/2021)Previously : RVSP 74 mmHg per echo 8/2020 (previously 26.2 mmHg per echo 6/2019). No overt right-sided heart failure on exam.     # Right arm thrombophlebitis?vs. Cellulitis vs hematoma  Ice for now, if pain worsens, US of right arm.    The patient will be seen with the attending physician,Dr. Ponce with whom the above will be discussed    Ulisses Larkin MD  IM PGY1  Pager-- [phone]    Overnight & Weekend Fellow Coverage:  Monday - Thursday (5pm-7am):  Please page the on call fellow at [phone].  Friday 5pm - Monday 7am:  Please refer to the weekend call schedule (page personal pager).  If patient underwent a cardiac procedure this admission, please page the on call cath fellow for  procedure-related questions from 5pm-7am.    Dr. Ponce    Patient seen and examined with the fellow and cardiology team. Agree with the history and physical, past medical history, surgical history, social history, and review of systems. Medication list, vitals, and labs reviewed. Agree with the assessment and plan, which was formulated by me.    Venkat Ponce MD  9/19/2021  4:03 PM

## 2025-02-02 NOTE — PROGRESS NOTE ADULT - SUBJECTIVE AND OBJECTIVE BOX
DWIGHT RIBEIRO  [facility]-S 4I 025 A (SI-S 4I)            Patient was evaluated and examined  by bedside,                 REVIEW OF SYSTEMS:  please see pertinent positives mentioned above, all other 12 ROS negative      T(C): , Max: 36.9 (02-02-25 @ 05:47)  HR: 83 (02-02-25 @ 05:47)  BP: 145/68 (02-02-25 @ 06:57)  RR: 18 (02-02-25 @ 05:47)  SpO2: 99% (02-02-25 @ 05:47)  CAPILLARY BLOOD GLUCOSE      POCT Blood Glucose.: 269 mg/dL (02 Feb 2025 11:47)  POCT Blood Glucose.: 327 mg/dL (02 Feb 2025 07:55)  POCT Blood Glucose.: 340 mg/dL (01 Feb 2025 22:00)  POCT Blood Glucose.: 272 mg/dL (01 Feb 2025 16:11)      PHYSICAL EXAM:    General: lethargic but arousable  HEENT: NCAT  Lungs: No increased WOB  CVS: RRR  Abdomen: , non-distended  Extremities: 2-3+ edema        LAB  CBC  Date: 02-02-25 @ 06:54  Mean cell Yydfaxkgmy14.9  Mean cell Hemoglobin Conc31.0  Mean cell Volum 99.7  Platelet count-Automate 155  RBC Count 3.14  Red Cell Distrib Width14.1  WBC Count4.92  % Albumin, Urine--  Hematocrit 31.3  Hemoglobin 9.7  CBC  Date: 02-01-25 @ 07:06  Mean cell Hgxtofnvua56.2  Mean cell Hemoglobin Conc32.9  Mean cell Volum 98.0  Platelet count-Automate 147  RBC Count 3.04  Red Cell Distrib Width13.8  WBC Count5.32  % Albumin, Urine--  Hematocrit 29.8  Hemoglobin 9.8  CBC  Date: 01-31-25 @ 06:54  Mean cell Edunutkqsg67.4  Mean cell Hemoglobin Conc33.0  Mean cell Volum 98.1  Platelet count-Automate 146  RBC Count 3.24  Red Cell Distrib Width13.7  WBC Count5.98  % Albumin, Urine--  Hematocrit 31.8  Hemoglobin 10.5  CBC  Date: 01-30-25 @ 07:12  Mean cell Dijugswycr46.8  Mean cell Hemoglobin Conc32.9  Mean cell Volum 96.6  Platelet count-Automate 145  RBC Count 2.96  Red Cell Distrib Width13.7  WBC Count5.09  % Albumin, Urine--  Hematocrit 28.6  Hemoglobin 9.4  CBC  Date: 01-29-25 @ 07:35  Mean cell Cbmkccnywp86.7  Mean cell Hemoglobin Conc33.8  Mean cell Volum 96.7  Platelet count-Automate 161  RBC Count 3.00  Red Cell Distrib Width13.6  WBC Count6.20  % Albumin, Urine--  Hematocrit 29.0  Hemoglobin 9.8  CBC  Date: 01-28-25 @ 06:15  Mean cell Yobhxnndcq79.8  Mean cell Hemoglobin Conc34.2  Mean cell Volum 96.0  Platelet count-Automate 158  RBC Count 3.26  Red Cell Distrib Width13.3  WBC Count7.13  % Albumin, Urine--  Hematocrit 31.3  Hemoglobin 10.7  CBC  Date: 01-27-25 @ 15:51  Mean cell Pfyrxswcej80.2  Mean cell Hemoglobin Conc33.7  Mean cell Volum 95.7  Platelet count-Automate 147  RBC Count 3.01  Red Cell Distrib Width13.2  WBC Count6.19  % Albumin, Urine--  Hematocrit 28.8  Hemoglobin 9.7  CBC  Date: 01-27-25 @ 08:52  Mean cell Jysrhvtzwx87.6  Mean cell Hemoglobin Conc33.9  Mean cell Volum 96.4  Platelet count-Automate 161  RBC Count 3.34  Red Cell Distrib Width13.3  WBC Count6.58  % Albumin, Urine--  Hematocrit 32.2  Hemoglobin 10.9    [facility]  02-02-25 @ 06:54  Blood Gas Arterial-Calcium,Ionized--  Blood Urea Nitrogen, Serum 20 mg/dL [10 - 20]  Carbon Dioxide, Serum24 mmol/L [17 - 32]  Chloride, Ntuvx964 mmol/L [98 - 110]  Creatinie, Serum1.0 mg/dL [0.7 - 1.5]  Glucose, Qxamc245 mg/dL[H] [70 - 99]  Potassium, Serum4.6 mmol/L [3.5 - 5.0]  Sodium, Serum 141 mmol/L [135 - 146]  [facility]  02-01-25 @ 07:06  Blood Gas Arterial-Calcium,Ionized--  Blood Urea Nitrogen, Serum 16 mg/dL [10 - 20]  Carbon Dioxide, Serum24 mmol/L [17 - 32]  Chloride, Cjpgg154 mmol/L [98 - 110]  Creatinie, Serum0.8 mg/dL [0.7 - 1.5]  Glucose, Duuwg221 mg/dL[H] [70 - 99]  Potassium, Serum4.3 mmol/L [3.5 - 5.0]  Sodium, Serum 140 mmol/L [135 - 146]  BMP  01-31-25 @ 06:54  Blood Gas Arterial-Calcium,Ionized--  Blood Urea Nitrogen, Serum 19 mg/dL [10 - 20]  Carbon Dioxide, Serum23 mmol/L [17 - 32]  Chloride, Pamid231 mmol/L [98 - 110]  Creatinie, Serum0.9 mg/dL [0.7 - 1.5]  Glucose, Jfays804 mg/dL[H] [70 - 99]  Potassium, Serum4.5 mmol/L [3.5 - 5.0]  Sodium, Serum 140 mmol/L [135 - 146]  BMP  01-30-25 @ 07:12  Blood Gas Arterial-Calcium,Ionized--  Blood Urea Nitrogen, Serum 20 mg/dL [10 - 20]  Carbon Dioxide, Serum23 mmol/L [17 - 32]  Chloride, Ovcup941 mmol/L [98 - 110]  Creatinie, Serum0.8 mg/dL [0.7 - 1.5]  Glucose, Serum56 mg/dL[L] [70 - 99]  Potassium, Serum4.2 mmol/L [3.5 - 5.0]  Sodium, Serum 141 mmol/L [135 - 146]  [facility]  01-29-25 @ 07:35  Blood Gas Arterial-Calcium,Ionized--  Blood Urea Nitrogen, Serum 22 mg/dL[H] [10 - 20]  Carbon Dioxide, Serum22 mmol/L [17 - 32]  Chloride, Pasqa453 mmol/L [98 - 110]  Creatinie, Serum0.8 mg/dL [0.7 - 1.5]  Glucose, Serum64 mg/dL[L] [70 - 99]  Potassium, Serum4.3 mmol/L [3.5 - 5.0]  Sodium, Serum 138 mmol/L [135 - 146]              Microbiology:    Culture - Blood (collected 01-30-25 @ 07:12)  Source: .Blood BLOOD  Preliminary Report (02-01-25 @ 22:01):    No growth at 48 Hours    Culture - Blood (collected 01-30-25 @ 07:12)  Source: .Blood BLOOD  Preliminary Report (02-01-25 @ 22:01):    No growth at 48 Hours    Culture - Blood (collected 01-29-25 @ 07:35)  Source: .Blood BLOOD  Preliminary Report (02-01-25 @ 21:01):    No growth at 72 Hours    Culture - Blood (collected 01-27-25 @ 19:13)  Source: .Blood BLOOD  Final Report (02-02-25 @ 03:01):    No growth at 5 days    Culture - Blood (collected 01-27-25 @ 19:13)  Source: .Blood BLOOD  Final Report (02-02-25 @ 03:01):    No growth at 5 days    Culture - Blood (collected 01-25-25 @ 08:23)  Source: .Blood BLOOD  Gram Stain (01-26-25 @ 15:48):    Growth in anaerobic bottle: Gram Positive Cocci in Clusters    Growth in aerobic bottle: Gram Positive Cocci in Clusters  Final Report (01-27-25 @ 14:27):    Growth in aerobic and anaerobic bottles: Staphylococcus aureus    See previous culture [id number]    Culture - Blood (collected 01-25-25 @ 08:23)  Source: .Blood BLOOD  Gram Stain (01-26-25 @ 17:31):    Growth in aerobic bottle: Gram Positive Cocci in Clusters    Growth in anaerobic bottle: Gram Positive Cocci in Clusters  Final Report (01-27-25 @ 14:42):    Growth in aerobic and anaerobic bottles: Staphylococcus aureus    See previous culture [id number]    Culture - Blood (collected 01-23-25 @ 20:05)  Source: .Blood BLOOD  Gram Stain (01-24-25 @ 13:53):    Growth in aerobic bottle: Gram Positive Cocci in Clusters    Growth in anaerobic bottle: Gram Positive Cocci in Clusters  Final Report (01-26-25 @ 09:38):    Growth in aerobic and anaerobic bottles: Staphylococcus aureus    Direct identification is available within approximately 3-5    hours either by Blood Panel Multiplexed PCR or Direct    MALDI-TOF. Details: [email or web address]  Organism: Blood Culture PCR  Staphylococcus aureus (01-26-25 @ 09:38)  Organism: Staphylococcus aureus (01-26-25 @ 09:38)      Method Type: LOUIS      -  Clindamycin: S <=0.25      -  Erythromycin: S <=0.25      -  Gentamicin: S <=4 Should not be used as monotherapy      -  Oxacillin: S <=0.25 Oxacillin predicts susceptibility for dicloxacillin, methicillin, and nafcillin      -  Penicillin: R >2      -  Rifampin: S <=1 Should not be used as monotherapy      -  Tetracycline: S <=4      -  Trimethoprim/Sulfamethoxazole: S <=0.5/9.5      -  Vancomycin: S 1  Organism: Blood Culture PCR (01-26-25 @ 09:38)      Method Type: PCR      -  Methicillin SENSITIVE Staphylococcus aureus (MSSA): Detec Any isolate of Staphylococcus aureus from a blood culture is NOT considered a contaminant.    Culture - Blood (collected 01-23-25 @ 20:05)  Source: .Blood BLOOD  Gram Stain (01-24-25 @ 13:52):    Growth in aerobic bottle: Gram Positive Cocci in Clusters    Growth in anaerobic bottle: Gram Positive Cocci in Clusters  Final Report (01-26-25 @ 09:38):    Growth in aerobic and anaerobic bottles: Staphylococcus aureus    See previous culture [id number]    Culture - Urine (collected 01-23-25 @ 19:55)  Source: Clean Catch None  Final Report (01-27-25 @ 15:57):    10,000 - 49,000 CFU/mL Staphylococcus aureus    50,000 - 99,000 CFU/mL Aerococcus urinae    Isolates of Aerococcus spp. are predictably susceptible to penicillin,    ampicillin, and vancomycin. All isolates are resistant to sulfonamides.  Organism: Staphylococcus aureus (01-27-25 @ 15:57)  Organism: Staphylococcus aureus (01-27-25 @ 15:57)      Method Type: LOUIS      -  Gentamicin: S <=4 Should not be used as monotherapy      -  Nitrofurantoin: S <=32      -  Oxacillin: S <=0.25 Oxacillin predicts susceptibility for dicloxacillin, methicillin, and nafcillin      -  Penicillin: R >2      -  Rifampin: S <=1 Should not be used as monotherapy      -  Tetracycline: S <=4      -  Trimethoprim/Sulfamethoxazole: S <=0.5/9.5      -  Vancomycin: S 1    Urinalysis with Rflx Culture (collected 01-23-25 @ 19:55)        RADIOLOGY & ADDITIONAL TESTS:        Medications:  acetaminophen     Tablet .. 1000 milliGRAM(s) Oral every 8 hours  aluminum hydroxide/magnesium hydroxide/simethicone Suspension 30 milliLiter(s) Oral every 4 hours PRN  aspirin enteric coated 81 milliGRAM(s) Oral daily  atorvastatin 80 milliGRAM(s) Oral at bedtime  bacitracin   Ointment 1 Application(s) Topical two times a day  ceFAZolin   IVPB 2000 milliGRAM(s) IV Intermittent every 8 hours  chlorhexidine 2% Cloths 1 Application(s) Topical <User Schedule>  cyclobenzaprine 5 milliGRAM(s) Oral every 8 hours PRN  dextrose 5%. 1000 milliLiter(s) IV Continuous <Continuous>  dextrose 50% Injectable 25 Gram(s) IV Push once  dextrose 50% Injectable 12.5 Gram(s) IV Push once  dextrose 50% Injectable 25 Gram(s) IV Push once  dextrose Oral Gel 15 Gram(s) Oral once PRN  enoxaparin Injectable 40 milliGRAM(s) SubCutaneous every 24 hours  folic acid 1 milliGRAM(s) Oral daily  gabapentin 300 milliGRAM(s) Oral at bedtime  glucagon  Injectable 1 milliGRAM(s) IntraMuscular once  insulin lispro (ADMELOG) corrective regimen sliding scale   SubCutaneous three times a day before meals  insulin lispro Injectable (ADMELOG) 4 Unit(s) SubCutaneous three times a day before meals  levETIRAcetam 250 milliGRAM(s) Oral two times a day  lidocaine   4% Patch 1 Patch Transdermal daily  lidocaine   4% Patch 1 Patch Transdermal every 24 hours  losartan 50 milliGRAM(s) Oral daily  melatonin 5 milliGRAM(s) Oral at bedtime PRN  nystatin Powder 1 Application(s) Topical every 12 hours PRN  ondansetron Injectable 4 milliGRAM(s) IV Push every 8 hours PRN  oxyCODONE    IR 10 milliGRAM(s) Oral three times a day PRN  polyethylene glycol 3350 17 Gram(s) Oral two times a day  senna 2 Tablet(s) Oral at bedtime        Assessment and Plan:    80-year-old male with past medical history of insulin-dependent diabetes, hypertension, subdural hematoma on keppra who presents for multiple falls, weakness, & confusion.        #MSSA bacteremia, origin L forearm cellulitis v. UTI?  #Acute OM/discitis  #Questionable L forearm cellulitis - low suspicion for septic joint   - BCx + for MSSA (1/23, 1/25)  - UA + for nitrites, 20 WBC, small LE, & few bacteria  - UCx + for MSSA  - ID on board  - L Forearm/Elbow XR -ve for fx, showed soft tissue swelling  - L Elbow CT: Very limited exam. Artifact. Portions of the elbow are not imaged; Diffuse soft tissue edema which may reflect cellulitis in the right clinical setting. No definite discrete fluid collection; Arthritic changes of the elbow with no definite radiographic evidence of osteomyelitis.  - TTE w/ no vegetations  Plan:   - C/w IV cefazolin 2 g q8H  - Check BCx q48H until clear, collected 1/27-->NGTD, repeat 1/29, follow with ID  - Follow MRI T/L spine -->L4-5 OM and discitis, plan for 6 weeks of antibiotics, IR consulted for PICC placement-->plan for PICC on Monday      #Scrotal lesion  -Uro consult    #TME  #Hx of SDH  - R/o stroke 2/2 septic emboli v. TME 2/2 MSSA bacteremia   - Stroke Code called due to slurred speech  - S/p ASA + Plavix load  - CTH -ve for acute pathology  - CTA H+N: No large vessel occlusion, aneurysm, or vascular malformation; Mild atherosclerotic stenosis in the bilateral supraclinoid ICAs and bilateral V4 vertebral arteries.  - CTP: Small artifactual perfusion abnormality in the anterior right cerebellum,   otherwise no perfusion deficits to suggest areas of completed infarction   or at risk territory.  - MR Head: No acute intracranial pathology; Stable mild chronic microvascular ischemic changes.  - Given negative MRI, suspect TME due to polypharmacy  Plan:  - C/w ASA 81 mg q24H  - D/c plavix 75 mg q24H as per neuro  - C/w atorvastatin 80 mg qhs  - C/w Keppra 500 mg q12H  - Neuro checks q8H  - Check rEEG    #Type II NSTEMI  - EKG shows no acute ischemic changes  - HST 47>43>39>31  - TTE: LVEF 66%  - Monitor on tele    #Chronic macrocytic anemia  - Hb 11.1 on admission  - No s/s of active bleeding  - B12 1163, WNL (1/23/25)  - Folate 5, lower end of normal (1/23/25)  - Iron studies WNL  Plan:  - C/w folic acid 1 mg q24H  - OP EGD/Colonoscopy   - Transfuse if Hb <7    #T2DM  #Hyperglycemia, resolved  - On metformin 500 mg q6H + Tresiba 60 units @ home  - A1c 7.6%  - FS 70s 1/28  Plan:  - C/w home Tresiba, decreased to 20 units from 40 units as FS 70s  - D/c'd Lispro 6 units TIDAC  - C/w +1 ISS    #HTN  - C/w home losartan 50 mg q24H    #Chronic back pain  #Frequent Falls  - C/w pain control: PO Tylenol 650 mg q6H + PO oxycodone 10 mg q6H PRN, IV morphine 2 mg q4H PRN, PO cyclobenzaprine 10 mg q8H + lidocaine patches  - Decrease cyclobenzaprine 5 mg q8H PRN as patient was very lethargic, suspect 2/2 pain meds  - PT consult  - Consider MR Spine w/ IVC given MSSA bacteremia     #Depression  - C/w venlafaxine ER 75 mg q24H (started inpatient)    # Incidental pancreatic tail hypodensity  - RUQ U/S WNL  - OP MRI may be obtained for further characterization  - Follow up with GI office located on [address] [place] [address], Phone number [phone] with Dr. Puentes to consider EUS    #MISC  - DVT ppx: Lovenox SQ  - GI ppx: NI  - Diet:   - Activity: AAT  - Code Status: Full Code  - Dispo: GMF      Dispo: pending ID recs, final EEG

## 2025-02-02 NOTE — DIETITIAN INITIAL EVALUATION ADULT - PERTINENT MEDS FT
MEDICATIONS  (STANDING):  acetaminophen     Tablet .. 1000 milliGRAM(s) Oral every 8 hours  aspirin enteric coated 81 milliGRAM(s) Oral daily  atorvastatin 80 milliGRAM(s) Oral at bedtime  bacitracin   Ointment 1 Application(s) Topical two times a day  ceFAZolin   IVPB 2000 milliGRAM(s) IV Intermittent every 8 hours  folic acid 1 milliGRAM(s) Oral daily  gabapentin 300 milliGRAM(s) Oral at bedtime  insulin lispro (ADMELOG) corrective regimen sliding scale   SubCutaneous three times a day before meals  insulin lispro Injectable (ADMELOG) 4 Unit(s) SubCutaneous three times a day before meals  levETIRAcetam 250 milliGRAM(s) Oral two times a day  lidocaine   4% Patch 1 Patch Transdermal daily  lidocaine   4% Patch 1 Patch Transdermal every 24 hours  losartan 50 milliGRAM(s) Oral daily  polyethylene glycol 3350 17 Gram(s) Oral two times a day  senna 2 Tablet(s) Oral at bedtime  Tresiba (insulin degludec) 30 Unit(s) 30 Unit(s) SubCutaneous daily  Tresiba(insulin Degludec) 10 Unit(s) 10 Unit(s) SubCutaneous once    MEDICATIONS  (PRN):  aluminum hydroxide/magnesium hydroxide/simethicone Suspension 30 milliLiter(s) Oral every 4 hours PRN Dyspepsia  cyclobenzaprine 5 milliGRAM(s) Oral every 8 hours PRN Muscle Spasm  dextrose Oral Gel 15 Gram(s) Oral once PRN Blood Glucose LESS THAN 70 milliGRAM(s)/deciliter  melatonin 5 milliGRAM(s) Oral at bedtime PRN Insomnia  morphine  - Injectable 2 milliGRAM(s) IV Push every 4 hours PRN Severe Pain (7 - 10), painful movements  nystatin Powder 1 Application(s) Topical every 12 hours PRN fungal rash  ondansetron Injectable 4 milliGRAM(s) IV Push every 8 hours PRN Nausea and/or Vomiting  oxyCODONE    IR 10 milliGRAM(s) Oral three times a day PRN Severe Pain (7 - 10)

## 2025-02-02 NOTE — DIETITIAN INITIAL EVALUATION ADULT - NS FNS DIET ORDER
Diet, DASH/TLC:   Sodium & Cholesterol Restricted  Consistent Carbohydrate {Evening Snack} (CSTCHOSN)  Supplement Feeding Modality:  Oral  Ensure Enlive Cans or Servings Per Day:  1       Frequency:  Three Times a day (02-01-25 @ 16:22) [Active]

## 2025-02-02 NOTE — CHART NOTE - NSCHARTNOTEFT_GEN_A_CORE
Spoke to patient's daughter who notes patient has hesitancy to move due to pain with movement. Today's progress note mentions morphine 2mg prn. Will order this prn severe pain or before anticipated painful movement but watching for any sedating effects from this(at least until reviewed by day team)

## 2025-02-02 NOTE — DIETITIAN INITIAL EVALUATION ADULT - NUTRITON FOCUSED PHYSICAL EXAM
yes...
Hoenig, David M (MD)  Urology  [facility]- Dept. of Urology, [address] [place] [address]  Phone: [phone]  Fax: [phone]  Follow Up Time:

## 2025-02-02 NOTE — DIETITIAN INITIAL EVALUATION ADULT - OTHER INFO
pt is 80 year old male with hx of DM, HTN, SDH p/w multiple falls, weakness and confusion. pt admitted with elevated trops, frequent falls,  UTI, + staph aureus MSSA bacteremia L arm cellulitis vs UTI  1/27 s/p stroke code. D/C pending ID recc's.

## 2025-02-02 NOTE — CHART NOTE - NSCHARTNOTEFT_GEN_A_CORE
As noted on prior note, have added 10 units Tresiba s/q to the 30 units already given based on the following facts, 1) patient is much more alert and eating better (daughter has assisted in feeding patient) 2) FSBS were high during the day  3) usual dose of Tresiba at home is 63 units (I checked the prescription itself) As noted on prior note, have added 10 units Tresiba s/q to the 30 units already given based on the following facts, 1) patient is much more alert and eating better (daughter has assisted in feeding patient) 2) FSBS were high during the day  3) usual dose of Tresiba at home is 63 units (I checked the prescription itself) Of note, patient did not get any Lantus tonight

## 2025-02-02 NOTE — DIETITIAN INITIAL EVALUATION ADULT - ORAL INTAKE PTA/DIET HISTORY
as per pt consumes a regular diet PTA with good po intake. NKFA or intolerances noted. pt denies any recent weight changes      presently on a DASH/TLC CHO consistent diet with ensure 3x daily, tolerating well pt consumes >75% of meals and none of the ensure

## 2025-02-02 NOTE — CHART NOTE - NSCHARTNOTEFT_GEN_A_CORE
FSBS is 401, will give 10 units regular insulin along with his long acting insulin. Also BP is on high side, on Losartan 50 mg. Will give an extra 25 mg of Losartan but if ineffective, consider Clonidine (ordered once before for him) FSBS is 401, will give 10 units regular insulin along with his long acting insulin (which based on today's FSBS, will increase). Also BP is on high side, on Losartan 50 mg. Will give an extra 25 mg of Losartan but if ineffective, consider Clonidine (ordered once before for him) FSBS is 401, will give 10 units regular insulin along with his long acting insulin (which based on today's FSBS, will increase by 10 units to 40 units total). Also BP is on high side, on Losartan 50 mg. Will give an extra 25 mg of Losartan but if ineffective, consider Clonidine (ordered once before for him)

## 2025-02-02 NOTE — DIETITIAN INITIAL EVALUATION ADULT - PERTINENT LABORATORY DATA
02-02    141  |  110  |  20  ----------------------------<  292[H]  4.6   |  24  |  1.0    Ca    8.0[L]      02 Feb 2025 06:54    TPro  5.6[L]  /  Alb  1.9[L]  /  TBili  0.5  /  DBili  x   /  AST  63[H]  /  ALT  16  /  AlkPhos  328[H]  02-02  POCT Blood Glucose.: 401 mg/dL (02-02-25 @ 21:06)  A1C with Estimated Average Glucose Result: 7.6 % (01-24-25 @ 07:04)

## 2025-02-03 LAB
ALBUMIN SERPL ELPH-MCNC: 1.9 G/DL — LOW (ref 3.5–5.2)
ALP SERPL-CCNC: 309 U/L — HIGH (ref 30–115)
ALT FLD-CCNC: 12 U/L — SIGNIFICANT CHANGE UP (ref 0–41)
ANION GAP SERPL CALC-SCNC: 8 MMOL/L — SIGNIFICANT CHANGE UP (ref 7–14)
APTT BLD: 49.9 SEC — HIGH (ref 27–39.2)
AST SERPL-CCNC: 48 U/L — HIGH (ref 0–41)
BILIRUB SERPL-MCNC: 0.5 MG/DL — SIGNIFICANT CHANGE UP (ref 0.2–1.2)
BUN SERPL-MCNC: 22 MG/DL — HIGH (ref 10–20)
CALCIUM SERPL-MCNC: 8.5 MG/DL — SIGNIFICANT CHANGE UP (ref 8.4–10.5)
CHLORIDE SERPL-SCNC: 106 MMOL/L — SIGNIFICANT CHANGE UP (ref 98–110)
CO2 SERPL-SCNC: 26 MMOL/L — SIGNIFICANT CHANGE UP (ref 17–32)
CREAT SERPL-MCNC: 0.9 MG/DL — SIGNIFICANT CHANGE UP (ref 0.7–1.5)
CULTURE RESULTS: SIGNIFICANT CHANGE UP
EGFR: 87 ML/MIN/1.73M2 — SIGNIFICANT CHANGE UP
GLUCOSE SERPL-MCNC: 246 MG/DL — HIGH (ref 70–99)
HCT VFR BLD CALC: 29.5 % — LOW (ref 42–52)
HGB BLD-MCNC: 9.7 G/DL — LOW (ref 14–18)
INR BLD: 1.35 RATIO — HIGH (ref 0.65–1.3)
MCHC RBC-ENTMCNC: 32.2 PG — HIGH (ref 27–31)
MCHC RBC-ENTMCNC: 32.9 G/DL — SIGNIFICANT CHANGE UP (ref 32–37)
MCV RBC AUTO: 98 FL — HIGH (ref 80–94)
NRBC # BLD: 0 /100 WBCS — SIGNIFICANT CHANGE UP (ref 0–0)
NRBC BLD-RTO: 0 /100 WBCS — SIGNIFICANT CHANGE UP (ref 0–0)
PLATELET # BLD AUTO: 167 K/UL — SIGNIFICANT CHANGE UP (ref 130–400)
PMV BLD: 8.3 FL — SIGNIFICANT CHANGE UP (ref 7.4–10.4)
POTASSIUM SERPL-MCNC: 4.6 MMOL/L — SIGNIFICANT CHANGE UP (ref 3.5–5)
POTASSIUM SERPL-SCNC: 4.6 MMOL/L — SIGNIFICANT CHANGE UP (ref 3.5–5)
PROT SERPL-MCNC: 5.8 G/DL — LOW (ref 6–8)
PROTHROM AB SERPL-ACNC: 16 SEC — HIGH (ref 9.95–12.87)
RBC # BLD: 3.01 M/UL — LOW (ref 4.7–6.1)
RBC # FLD: 14.3 % — SIGNIFICANT CHANGE UP (ref 11.5–14.5)
SODIUM SERPL-SCNC: 140 MMOL/L — SIGNIFICANT CHANGE UP (ref 135–146)
SPECIMEN SOURCE: SIGNIFICANT CHANGE UP
WBC # BLD: 4.83 K/UL — SIGNIFICANT CHANGE UP (ref 4.8–10.8)
WBC # FLD AUTO: 4.83 K/UL — SIGNIFICANT CHANGE UP (ref 4.8–10.8)

## 2025-02-03 PROCEDURE — 99233 SBSQ HOSP IP/OBS HIGH 50: CPT

## 2025-02-03 PROCEDURE — 36573 INSJ PICC RS&I 5 YR+: CPT | Mod: LT

## 2025-02-03 RX ORDER — INSULIN LISPRO 100/ML
10 VIAL (ML) SUBCUTANEOUS ONCE
Refills: 0 | Status: COMPLETED | OUTPATIENT
Start: 2025-02-03 | End: 2025-02-03

## 2025-02-03 RX ADMIN — OXYCODONE HYDROCHLORIDE 10 MILLIGRAM(S): 30 TABLET ORAL at 09:12

## 2025-02-03 RX ADMIN — ACETAMINOPHEN 1000 MILLIGRAM(S): 160 SUSPENSION ORAL at 13:59

## 2025-02-03 RX ADMIN — POLYETHYLENE GLYCOL 3350 17 GRAM(S): 17 POWDER, FOR SOLUTION ORAL at 17:43

## 2025-02-03 RX ADMIN — Medication 4 UNIT(S): at 08:47

## 2025-02-03 RX ADMIN — ANTISEPTIC SURGICAL SCRUB 1 APPLICATION(S): 0.04 SOLUTION TOPICAL at 06:26

## 2025-02-03 RX ADMIN — MORPHINE SULFATE 2 MILLIGRAM(S): 60 TABLET, FILM COATED, EXTENDED RELEASE ORAL at 20:32

## 2025-02-03 RX ADMIN — Medication 100 MILLIGRAM(S): at 22:08

## 2025-02-03 RX ADMIN — ACETAMINOPHEN 1000 MILLIGRAM(S): 160 SUSPENSION ORAL at 22:55

## 2025-02-03 RX ADMIN — Medication 4 UNIT(S): at 17:42

## 2025-02-03 RX ADMIN — ENOXAPARIN SODIUM 40 MILLIGRAM(S): 100 INJECTION SUBCUTANEOUS at 21:57

## 2025-02-03 RX ADMIN — LIDOCAINE HYDROCHLORIDE 1 PATCH: 30 CREAM TOPICAL at 08:48

## 2025-02-03 RX ADMIN — Medication 100 MILLIGRAM(S): at 06:27

## 2025-02-03 RX ADMIN — ASPIRIN 81 MILLIGRAM(S): 81 TABLET, COATED ORAL at 13:59

## 2025-02-03 RX ADMIN — Medication 1 APPLICATION(S): at 17:43

## 2025-02-03 RX ADMIN — ATORVASTATIN CALCIUM 80 MILLIGRAM(S): 80 TABLET, FILM COATED ORAL at 21:44

## 2025-02-03 RX ADMIN — LEVETIRACETAM 250 MILLIGRAM(S): 750 TABLET, FILM COATED ORAL at 17:45

## 2025-02-03 RX ADMIN — Medication 50 MILLIGRAM(S): at 06:25

## 2025-02-03 RX ADMIN — Medication 2: at 17:42

## 2025-02-03 RX ADMIN — POLYETHYLENE GLYCOL 3350 17 GRAM(S): 17 POWDER, FOR SOLUTION ORAL at 06:27

## 2025-02-03 RX ADMIN — Medication 10 UNIT(S): at 03:12

## 2025-02-03 RX ADMIN — Medication 2 TABLET(S): at 21:44

## 2025-02-03 RX ADMIN — Medication 100 MILLIGRAM(S): at 13:59

## 2025-02-03 RX ADMIN — ACETAMINOPHEN 1000 MILLIGRAM(S): 160 SUSPENSION ORAL at 21:45

## 2025-02-03 RX ADMIN — Medication 1 MILLIGRAM(S): at 14:07

## 2025-02-03 RX ADMIN — GABAPENTIN 300 MILLIGRAM(S): 800 TABLET ORAL at 21:44

## 2025-02-03 RX ADMIN — MORPHINE SULFATE 2 MILLIGRAM(S): 60 TABLET, FILM COATED, EXTENDED RELEASE ORAL at 10:39

## 2025-02-03 RX ADMIN — Medication 3: at 08:47

## 2025-02-03 RX ADMIN — MORPHINE SULFATE 2 MILLIGRAM(S): 60 TABLET, FILM COATED, EXTENDED RELEASE ORAL at 20:00

## 2025-02-03 RX ADMIN — Medication 10 UNIT(S): at 04:31

## 2025-02-03 RX ADMIN — LEVETIRACETAM 250 MILLIGRAM(S): 750 TABLET, FILM COATED ORAL at 06:25

## 2025-02-03 RX ADMIN — ACETAMINOPHEN 1000 MILLIGRAM(S): 160 SUSPENSION ORAL at 06:26

## 2025-02-03 RX ADMIN — LIDOCAINE HYDROCHLORIDE 1 PATCH: 30 CREAM TOPICAL at 09:00

## 2025-02-03 RX ADMIN — Medication 1 APPLICATION(S): at 06:26

## 2025-02-03 NOTE — CHART NOTE - NSCHARTNOTESELECT_GEN_ALL_CORE
Event Note
PA Note/Event Note
Transfer Note
Event Note
Event Note

## 2025-02-03 NOTE — PROGRESS NOTE ADULT - SUBJECTIVE AND OBJECTIVE BOX
DWIGHT RIBEIRO  [facility] 025 A ([facility])            Patient was evaluated and examined  by bedside,                 REVIEW OF SYSTEMS:  please see pertinent positives mentioned above, all other 12 ROS negative      T(C): , Max: 37 (02-02-25 @ 13:51)  HR: 80 (02-03-25 @ 10:00)  BP: 132/62 (02-03-25 @ 10:00)  RR: 18 (02-03-25 @ 10:00)  SpO2: 95% (02-03-25 @ 10:00)  CAPILLARY BLOOD GLUCOSE      POCT Blood Glucose.: 225 mg/dL (03 Feb 2025 07:37)  POCT Blood Glucose.: 255 mg/dL (03 Feb 2025 06:40)  POCT Blood Glucose.: 365 mg/dL (03 Feb 2025 03:01)  POCT Blood Glucose.: 394 mg/dL (02 Feb 2025 23:50)  POCT Blood Glucose.: 409 mg/dL (02 Feb 2025 23:48)  POCT Blood Glucose.: 401 mg/dL (02 Feb 2025 21:06)  POCT Blood Glucose.: 320 mg/dL (02 Feb 2025 16:40)  POCT Blood Glucose.: 269 mg/dL (02 Feb 2025 11:47)      PHYSICAL EXAM:  General: NAD, comfortable in bed  HEENT: NCAT  Lungs: No increased WOB  CVS: RRR  Abdomen: , non-distended  Extremities: no edema        LAB  CBC  Date: 02-03-25 @ 06:35  Mean cell Lcfsteriec62.2  Mean cell Hemoglobin Conc32.9  Mean cell Volum 98.0  Platelet count-Automate 167  RBC Count 3.01  Red Cell Distrib Width14.3  WBC Count4.83  % Albumin, Urine--  Hematocrit 29.5  Hemoglobin 9.7  CBC  Date: 02-02-25 @ 06:54  Mean cell Wefujaxqwc00.9  Mean cell Hemoglobin Conc31.0  Mean cell Volum 99.7  Platelet count-Automate 155  RBC Count 3.14  Red Cell Distrib Width14.1  WBC Count4.92  % Albumin, Urine--  Hematocrit 31.3  Hemoglobin 9.7  CBC  Date: 02-01-25 @ 07:06  Mean cell Uwzspeyoit87.2  Mean cell Hemoglobin Conc32.9  Mean cell Volum 98.0  Platelet count-Automate 147  RBC Count 3.04  Red Cell Distrib Width13.8  WBC Count5.32  % Albumin, Urine--  Hematocrit 29.8  Hemoglobin 9.8  CBC  Date: 01-31-25 @ 06:54  Mean cell Ttbcxokwlt66.4  Mean cell Hemoglobin Conc33.0  Mean cell Volum 98.1  Platelet count-Automate 146  RBC Count 3.24  Red Cell Distrib Width13.7  WBC Count5.98  % Albumin, Urine--  Hematocrit 31.8  Hemoglobin 10.5  CBC  Date: 01-30-25 @ 07:12  Mean cell Wpckxkeuju52.8  Mean cell Hemoglobin Conc32.9  Mean cell Volum 96.6  Platelet count-Automate 145  RBC Count 2.96  Red Cell Distrib Width13.7  WBC Count5.09  % Albumin, Urine--  Hematocrit 28.6  Hemoglobin 9.4  CBC  Date: 01-29-25 @ 07:35  Mean cell Ajdsrsgghe65.7  Mean cell Hemoglobin Conc33.8  Mean cell Volum 96.7  Platelet count-Automate 161  RBC Count 3.00  Red Cell Distrib Width13.6  WBC Count6.20  % Albumin, Urine--  Hematocrit 29.0  Hemoglobin 9.8  CBC  Date: 01-28-25 @ 06:15  Mean cell Vmcxbpyrlm62.8  Mean cell Hemoglobin Conc34.2  Mean cell Volum 96.0  Platelet count-Automate 158  RBC Count 3.26  Red Cell Distrib Width13.3  WBC Count7.13  % Albumin, Urine--  Hematocrit 31.3  Hemoglobin 10.7  CBC  Date: 01-27-25 @ 15:51  Mean cell Ghlsnyopvi81.2  Mean cell Hemoglobin Conc33.7  Mean cell Volum 95.7  Platelet count-Automate 147  RBC Count 3.01  Red Cell Distrib Width13.2  WBC Count6.19  % Albumin, Urine--  Hematocrit 28.8  Hemoglobin 9.7    [facility]  02-03-25 @ 06:35  Blood Gas Arterial-Calcium,Ionized--  Blood Urea Nitrogen, Serum 22 mg/dL[H] [10 - 20]  Carbon Dioxide, Serum26 mmol/L [17 - 32]  Chloride, Bpalp931 mmol/L [98 - 110]  Creatinie, Serum0.9 mg/dL [0.7 - 1.5]  Glucose, Niopf801 mg/dL[H] [70 - 99]  Potassium, Serum4.6 mmol/L [3.5 - 5.0]  Sodium, Serum 140 mmol/L [135 - 146]  [facility]  02-02-25 @ 06:54  Blood Gas Arterial-Calcium,Ionized--  Blood Urea Nitrogen, Serum 20 mg/dL [10 - 20]  Carbon Dioxide, Serum24 mmol/L [17 - 32]  Chloride, Nmxln353 mmol/L [98 - 110]  Creatinie, Serum1.0 mg/dL [0.7 - 1.5]  Glucose, Upnuy990 mg/dL[H] [70 - 99]  Potassium, Serum4.6 mmol/L [3.5 - 5.0]  Sodium, Serum 141 mmol/L [135 - 146]  [facility]  02-01-25 @ 07:06  Blood Gas Arterial-Calcium,Ionized--  Blood Urea Nitrogen, Serum 16 mg/dL [10 - 20]  Carbon Dioxide, Serum24 mmol/L [17 - 32]  Chloride, Umfxk324 mmol/L [98 - 110]  Creatinie, Serum0.8 mg/dL [0.7 - 1.5]  Glucose, Hxook772 mg/dL[H] [70 - 99]  Potassium, Serum4.3 mmol/L [3.5 - 5.0]  Sodium, Serum 140 mmol/L [135 - 146]  [facility]  01-31-25 @ 06:54  Blood Gas Arterial-Calcium,Ionized--  Blood Urea Nitrogen, Serum 19 mg/dL [10 - 20]  Carbon Dioxide, Serum23 mmol/L [17 - 32]  Chloride, Tssve925 mmol/L [98 - 110]  Creatinie, Serum0.9 mg/dL [0.7 - 1.5]  Glucose, Ivfvh931 mg/dL[H] [70 - 99]  Potassium, Serum4.5 mmol/L [3.5 - 5.0]  Sodium, Serum 140 mmol/L [135 - 146]  [facility]  01-30-25 @ 07:12  Blood Gas Arterial-Calcium,Ionized--  Blood Urea Nitrogen, Serum 20 mg/dL [10 - 20]  Carbon Dioxide, Serum23 mmol/L [17 - 32]  Chloride, Nerxc384 mmol/L [98 - 110]  Creatinie, Serum0.8 mg/dL [0.7 - 1.5]  Glucose, Serum56 mg/dL[L] [70 - 99]  Potassium, Serum4.2 mmol/L [3.5 - 5.0]  Sodium, Serum 141 mmol/L [135 - 146]        PT/INR - ( 03 Feb 2025 06:35 )   PT: 16.00 sec;   INR: 1.35 ratio         PTT - ( 03 Feb 2025 06:35 )  PTT:49.9 sec      Microbiology:    Culture - Blood (collected 01-30-25 @ 07:12)  Source: .Blood BLOOD  Preliminary Report (02-02-25 @ 22:00):    No growth at 72 Hours    Culture - Blood (collected 01-30-25 @ 07:12)  Source: .Blood BLOOD  Preliminary Report (02-02-25 @ 22:00):    No growth at 72 Hours    Culture - Blood (collected 01-29-25 @ 07:35)  Source: .Blood BLOOD  Preliminary Report (02-02-25 @ 21:00):    No growth at 4 days    Culture - Blood (collected 01-27-25 @ 19:13)  Source: .Blood BLOOD  Final Report (02-02-25 @ 03:01):    No growth at 5 days    Culture - Blood (collected 01-27-25 @ 19:13)  Source: .Blood BLOOD  Final Report (02-02-25 @ 03:01):    No growth at 5 days    Culture - Blood (collected 01-25-25 @ 08:23)  Source: .Blood BLOOD  Gram Stain (01-26-25 @ 15:48):    Growth in anaerobic bottle: Gram Positive Cocci in Clusters    Growth in aerobic bottle: Gram Positive Cocci in Clusters  Final Report (01-27-25 @ 14:27):    Growth in aerobic and anaerobic bottles: Staphylococcus aureus    See previous culture [id number]    Culture - Blood (collected 01-25-25 @ 08:23)  Source: .Blood BLOOD  Gram Stain (01-26-25 @ 17:31):    Growth in aerobic bottle: Gram Positive Cocci in Clusters    Growth in anaerobic bottle: Gram Positive Cocci in Clusters  Final Report (01-27-25 @ 14:42):    Growth in aerobic and anaerobic bottles: Staphylococcus aureus    See previous culture [id number]    Culture - Blood (collected 01-23-25 @ 20:05)  Source: .Blood BLOOD  Gram Stain (01-24-25 @ 13:53):    Growth in aerobic bottle: Gram Positive Cocci in Clusters    Growth in anaerobic bottle: Gram Positive Cocci in Clusters  Final Report (01-26-25 @ 09:38):    Growth in aerobic and anaerobic bottles: Staphylococcus aureus    Direct identification is available within approximately 3-5    hours either by Blood Panel Multiplexed PCR or Direct    MALDI-TOF. Details: [email or web address]  Organism: Blood Culture PCR  Staphylococcus aureus (01-26-25 @ 09:38)  Organism: Staphylococcus aureus (01-26-25 @ 09:38)      Method Type: LOUIS      -  Clindamycin: S <=0.25      -  Erythromycin: S <=0.25      -  Gentamicin: S <=4 Should not be used as monotherapy      -  Oxacillin: S <=0.25 Oxacillin predicts susceptibility for dicloxacillin, methicillin, and nafcillin      -  Penicillin: R >2      -  Rifampin: S <=1 Should not be used as monotherapy      -  Tetracycline: S <=4      -  Trimethoprim/Sulfamethoxazole: S <=0.5/9.5      -  Vancomycin: S 1  Organism: Blood Culture PCR (01-26-25 @ 09:38)      Method Type: PCR      -  Methicillin SENSITIVE Staphylococcus aureus (MSSA): Detec Any isolate of Staphylococcus aureus from a blood culture is NOT considered a contaminant.    Culture - Blood (collected 01-23-25 @ 20:05)  Source: .Blood BLOOD  Gram Stain (01-24-25 @ 13:52):    Growth in aerobic bottle: Gram Positive Cocci in Clusters    Growth in anaerobic bottle: Gram Positive Cocci in Clusters  Final Report (01-26-25 @ 09:38):    Growth in aerobic and anaerobic bottles: Staphylococcus aureus    See previous culture [id number]    Culture - Urine (collected 01-23-25 @ 19:55)  Source: Clean Catch None  Final Report (01-27-25 @ 15:57):    10,000 - 49,000 CFU/mL Staphylococcus aureus    50,000 - 99,000 CFU/mL Aerococcus urinae    Isolates of Aerococcus spp. are predictably susceptible to penicillin,    ampicillin, and vancomycin. All isolates are resistant to sulfonamides.  Organism: Staphylococcus aureus (01-27-25 @ 15:57)  Organism: Staphylococcus aureus (01-27-25 @ 15:57)      Method Type: LOUIS      -  Gentamicin: S <=4 Should not be used as monotherapy      -  Nitrofurantoin: S <=32      -  Oxacillin: S <=0.25 Oxacillin predicts susceptibility for dicloxacillin, methicillin, and nafcillin      -  Penicillin: R >2      -  Rifampin: S <=1 Should not be used as monotherapy      -  Tetracycline: S <=4      -  Trimethoprim/Sulfamethoxazole: S <=0.5/9.5      -  Vancomycin: S 1    Urinalysis with Rflx Culture (collected 01-23-25 @ 19:55)        RADIOLOGY & ADDITIONAL TESTS:        Medications:  acetaminophen     Tablet .. 1000 milliGRAM(s) Oral every 8 hours  aluminum hydroxide/magnesium hydroxide/simethicone Suspension 30 milliLiter(s) Oral every 4 hours PRN  aspirin enteric coated 81 milliGRAM(s) Oral daily  atorvastatin 80 milliGRAM(s) Oral at bedtime  bacitracin   Ointment 1 Application(s) Topical two times a day  ceFAZolin   IVPB 2000 milliGRAM(s) IV Intermittent every 8 hours  chlorhexidine 2% Cloths 1 Application(s) Topical <User Schedule>  cyclobenzaprine 5 milliGRAM(s) Oral every 8 hours PRN  dextrose 5%. 1000 milliLiter(s) IV Continuous <Continuous>  dextrose 50% Injectable 25 Gram(s) IV Push once  dextrose 50% Injectable 12.5 Gram(s) IV Push once  dextrose 50% Injectable 25 Gram(s) IV Push once  dextrose Oral Gel 15 Gram(s) Oral once PRN  enoxaparin Injectable 40 milliGRAM(s) SubCutaneous every 24 hours  folic acid 1 milliGRAM(s) Oral daily  gabapentin 300 milliGRAM(s) Oral at bedtime  glucagon  Injectable 1 milliGRAM(s) IntraMuscular once  insulin lispro (ADMELOG) corrective regimen sliding scale   SubCutaneous three times a day before meals  insulin lispro Injectable (ADMELOG) 4 Unit(s) SubCutaneous three times a day before meals  levETIRAcetam 250 milliGRAM(s) Oral two times a day  lidocaine   4% Patch 1 Patch Transdermal every 24 hours  lidocaine   4% Patch 1 Patch Transdermal daily  losartan 50 milliGRAM(s) Oral daily  melatonin 5 milliGRAM(s) Oral at bedtime PRN  morphine  - Injectable 2 milliGRAM(s) IV Push every 4 hours PRN  nystatin Powder 1 Application(s) Topical every 12 hours PRN  ondansetron Injectable 4 milliGRAM(s) IV Push every 8 hours PRN  oxyCODONE    IR 10 milliGRAM(s) Oral three times a day PRN  polyethylene glycol 3350 17 Gram(s) Oral two times a day  senna 2 Tablet(s) Oral at bedtime  Tresiba (insulin degludec) 30 Unit(s) 30 Unit(s) SubCutaneous daily        Assessment and Plan:     DWIGHT RIBEIRO  [facility] 025 A ([facility])            Patient was evaluated and examined  by bedside,                 REVIEW OF SYSTEMS:  please see pertinent positives mentioned above, all other 12 ROS negative      T(C): , Max: 37 (02-02-25 @ 13:51)  HR: 80 (02-03-25 @ 10:00)  BP: 132/62 (02-03-25 @ 10:00)  RR: 18 (02-03-25 @ 10:00)  SpO2: 95% (02-03-25 @ 10:00)  CAPILLARY BLOOD GLUCOSE      POCT Blood Glucose.: 225 mg/dL (03 Feb 2025 07:37)  POCT Blood Glucose.: 255 mg/dL (03 Feb 2025 06:40)  POCT Blood Glucose.: 365 mg/dL (03 Feb 2025 03:01)  POCT Blood Glucose.: 394 mg/dL (02 Feb 2025 23:50)  POCT Blood Glucose.: 409 mg/dL (02 Feb 2025 23:48)  POCT Blood Glucose.: 401 mg/dL (02 Feb 2025 21:06)  POCT Blood Glucose.: 320 mg/dL (02 Feb 2025 16:40)  POCT Blood Glucose.: 269 mg/dL (02 Feb 2025 11:47)      PHYSICAL EXAM:  General: NAD, comfortable in bed  HEENT: NCAT  Lungs: No increased WOB  CVS: RRR  Abdomen: , non-distended  Extremities: no edema        LAB  CBC  Date: 02-03-25 @ 06:35  Mean cell Zmrclhhhot54.2  Mean cell Hemoglobin Conc32.9  Mean cell Volum 98.0  Platelet count-Automate 167  RBC Count 3.01  Red Cell Distrib Width14.3  WBC Count4.83  % Albumin, Urine--  Hematocrit 29.5  Hemoglobin 9.7  CBC  Date: 02-02-25 @ 06:54  Mean cell Ihmzuoxvpl55.9  Mean cell Hemoglobin Conc31.0  Mean cell Volum 99.7  Platelet count-Automate 155  RBC Count 3.14  Red Cell Distrib Width14.1  WBC Count4.92  % Albumin, Urine--  Hematocrit 31.3  Hemoglobin 9.7  CBC  Date: 02-01-25 @ 07:06  Mean cell Kzlxpawpha01.2  Mean cell Hemoglobin Conc32.9  Mean cell Volum 98.0  Platelet count-Automate 147  RBC Count 3.04  Red Cell Distrib Width13.8  WBC Count5.32  % Albumin, Urine--  Hematocrit 29.8  Hemoglobin 9.8  CBC  Date: 01-31-25 @ 06:54  Mean cell Hbmrakimkv30.4  Mean cell Hemoglobin Conc33.0  Mean cell Volum 98.1  Platelet count-Automate 146  RBC Count 3.24  Red Cell Distrib Width13.7  WBC Count5.98  % Albumin, Urine--  Hematocrit 31.8  Hemoglobin 10.5  CBC  Date: 01-30-25 @ 07:12  Mean cell Grstuipfdf89.8  Mean cell Hemoglobin Conc32.9  Mean cell Volum 96.6  Platelet count-Automate 145  RBC Count 2.96  Red Cell Distrib Width13.7  WBC Count5.09  % Albumin, Urine--  Hematocrit 28.6  Hemoglobin 9.4  CBC  Date: 01-29-25 @ 07:35  Mean cell Pvyyveghyz12.7  Mean cell Hemoglobin Conc33.8  Mean cell Volum 96.7  Platelet count-Automate 161  RBC Count 3.00  Red Cell Distrib Width13.6  WBC Count6.20  % Albumin, Urine--  Hematocrit 29.0  Hemoglobin 9.8  CBC  Date: 01-28-25 @ 06:15  Mean cell Cpnebzlswd35.8  Mean cell Hemoglobin Conc34.2  Mean cell Volum 96.0  Platelet count-Automate 158  RBC Count 3.26  Red Cell Distrib Width13.3  WBC Count7.13  % Albumin, Urine--  Hematocrit 31.3  Hemoglobin 10.7  CBC  Date: 01-27-25 @ 15:51  Mean cell Gorpapsipm57.2  Mean cell Hemoglobin Conc33.7  Mean cell Volum 95.7  Platelet count-Automate 147  RBC Count 3.01  Red Cell Distrib Width13.2  WBC Count6.19  % Albumin, Urine--  Hematocrit 28.8  Hemoglobin 9.7    [facility]  02-03-25 @ 06:35  Blood Gas Arterial-Calcium,Ionized--  Blood Urea Nitrogen, Serum 22 mg/dL[H] [10 - 20]  Carbon Dioxide, Serum26 mmol/L [17 - 32]  Chloride, Hrqnd047 mmol/L [98 - 110]  Creatinie, Serum0.9 mg/dL [0.7 - 1.5]  Glucose, Vwrhl169 mg/dL[H] [70 - 99]  Potassium, Serum4.6 mmol/L [3.5 - 5.0]  Sodium, Serum 140 mmol/L [135 - 146]  [facility]  02-02-25 @ 06:54  Blood Gas Arterial-Calcium,Ionized--  Blood Urea Nitrogen, Serum 20 mg/dL [10 - 20]  Carbon Dioxide, Serum24 mmol/L [17 - 32]  Chloride, Ztyez121 mmol/L [98 - 110]  Creatinie, Serum1.0 mg/dL [0.7 - 1.5]  Glucose, Zrjuf257 mg/dL[H] [70 - 99]  Potassium, Serum4.6 mmol/L [3.5 - 5.0]  Sodium, Serum 141 mmol/L [135 - 146]  [facility]  02-01-25 @ 07:06  Blood Gas Arterial-Calcium,Ionized--  Blood Urea Nitrogen, Serum 16 mg/dL [10 - 20]  Carbon Dioxide, Serum24 mmol/L [17 - 32]  Chloride, Jdfvm561 mmol/L [98 - 110]  Creatinie, Serum0.8 mg/dL [0.7 - 1.5]  Glucose, Sgbgi695 mg/dL[H] [70 - 99]  Potassium, Serum4.3 mmol/L [3.5 - 5.0]  Sodium, Serum 140 mmol/L [135 - 146]  [facility]  01-31-25 @ 06:54  Blood Gas Arterial-Calcium,Ionized--  Blood Urea Nitrogen, Serum 19 mg/dL [10 - 20]  Carbon Dioxide, Serum23 mmol/L [17 - 32]  Chloride, Gqypt517 mmol/L [98 - 110]  Creatinie, Serum0.9 mg/dL [0.7 - 1.5]  Glucose, Ydvrq484 mg/dL[H] [70 - 99]  Potassium, Serum4.5 mmol/L [3.5 - 5.0]  Sodium, Serum 140 mmol/L [135 - 146]  [facility]  01-30-25 @ 07:12  Blood Gas Arterial-Calcium,Ionized--  Blood Urea Nitrogen, Serum 20 mg/dL [10 - 20]  Carbon Dioxide, Serum23 mmol/L [17 - 32]  Chloride, Yunsk098 mmol/L [98 - 110]  Creatinie, Serum0.8 mg/dL [0.7 - 1.5]  Glucose, Serum56 mg/dL[L] [70 - 99]  Potassium, Serum4.2 mmol/L [3.5 - 5.0]  Sodium, Serum 141 mmol/L [135 - 146]        PT/INR - ( 03 Feb 2025 06:35 )   PT: 16.00 sec;   INR: 1.35 ratio         PTT - ( 03 Feb 2025 06:35 )  PTT:49.9 sec      Microbiology:    Culture - Blood (collected 01-30-25 @ 07:12)  Source: .Blood BLOOD  Preliminary Report (02-02-25 @ 22:00):    No growth at 72 Hours    Culture - Blood (collected 01-30-25 @ 07:12)  Source: .Blood BLOOD  Preliminary Report (02-02-25 @ 22:00):    No growth at 72 Hours    Culture - Blood (collected 01-29-25 @ 07:35)  Source: .Blood BLOOD  Preliminary Report (02-02-25 @ 21:00):    No growth at 4 days    Culture - Blood (collected 01-27-25 @ 19:13)  Source: .Blood BLOOD  Final Report (02-02-25 @ 03:01):    No growth at 5 days    Culture - Blood (collected 01-27-25 @ 19:13)  Source: .Blood BLOOD  Final Report (02-02-25 @ 03:01):    No growth at 5 days    Culture - Blood (collected 01-25-25 @ 08:23)  Source: .Blood BLOOD  Gram Stain (01-26-25 @ 15:48):    Growth in anaerobic bottle: Gram Positive Cocci in Clusters    Growth in aerobic bottle: Gram Positive Cocci in Clusters  Final Report (01-27-25 @ 14:27):    Growth in aerobic and anaerobic bottles: Staphylococcus aureus    See previous culture [id number]    Culture - Blood (collected 01-25-25 @ 08:23)  Source: .Blood BLOOD  Gram Stain (01-26-25 @ 17:31):    Growth in aerobic bottle: Gram Positive Cocci in Clusters    Growth in anaerobic bottle: Gram Positive Cocci in Clusters  Final Report (01-27-25 @ 14:42):    Growth in aerobic and anaerobic bottles: Staphylococcus aureus    See previous culture [id number]    Culture - Blood (collected 01-23-25 @ 20:05)  Source: .Blood BLOOD  Gram Stain (01-24-25 @ 13:53):    Growth in aerobic bottle: Gram Positive Cocci in Clusters    Growth in anaerobic bottle: Gram Positive Cocci in Clusters  Final Report (01-26-25 @ 09:38):    Growth in aerobic and anaerobic bottles: Staphylococcus aureus    Direct identification is available within approximately 3-5    hours either by Blood Panel Multiplexed PCR or Direct    MALDI-TOF. Details: [email or web address]  Organism: Blood Culture PCR  Staphylococcus aureus (01-26-25 @ 09:38)  Organism: Staphylococcus aureus (01-26-25 @ 09:38)      Method Type: LOUIS      -  Clindamycin: S <=0.25      -  Erythromycin: S <=0.25      -  Gentamicin: S <=4 Should not be used as monotherapy      -  Oxacillin: S <=0.25 Oxacillin predicts susceptibility for dicloxacillin, methicillin, and nafcillin      -  Penicillin: R >2      -  Rifampin: S <=1 Should not be used as monotherapy      -  Tetracycline: S <=4      -  Trimethoprim/Sulfamethoxazole: S <=0.5/9.5      -  Vancomycin: S 1  Organism: Blood Culture PCR (01-26-25 @ 09:38)      Method Type: PCR      -  Methicillin SENSITIVE Staphylococcus aureus (MSSA): Detec Any isolate of Staphylococcus aureus from a blood culture is NOT considered a contaminant.    Culture - Blood (collected 01-23-25 @ 20:05)  Source: .Blood BLOOD  Gram Stain (01-24-25 @ 13:52):    Growth in aerobic bottle: Gram Positive Cocci in Clusters    Growth in anaerobic bottle: Gram Positive Cocci in Clusters  Final Report (01-26-25 @ 09:38):    Growth in aerobic and anaerobic bottles: Staphylococcus aureus    See previous culture [id number]    Culture - Urine (collected 01-23-25 @ 19:55)  Source: Clean Catch None  Final Report (01-27-25 @ 15:57):    10,000 - 49,000 CFU/mL Staphylococcus aureus    50,000 - 99,000 CFU/mL Aerococcus urinae    Isolates of Aerococcus spp. are predictably susceptible to penicillin,    ampicillin, and vancomycin. All isolates are resistant to sulfonamides.  Organism: Staphylococcus aureus (01-27-25 @ 15:57)  Organism: Staphylococcus aureus (01-27-25 @ 15:57)      Method Type: LOUIS      -  Gentamicin: S <=4 Should not be used as monotherapy      -  Nitrofurantoin: S <=32      -  Oxacillin: S <=0.25 Oxacillin predicts susceptibility for dicloxacillin, methicillin, and nafcillin      -  Penicillin: R >2      -  Rifampin: S <=1 Should not be used as monotherapy      -  Tetracycline: S <=4      -  Trimethoprim/Sulfamethoxazole: S <=0.5/9.5      -  Vancomycin: S 1    Urinalysis with Rflx Culture (collected 01-23-25 @ 19:55)        RADIOLOGY & ADDITIONAL TESTS:        Medications:  acetaminophen     Tablet .. 1000 milliGRAM(s) Oral every 8 hours  aluminum hydroxide/magnesium hydroxide/simethicone Suspension 30 milliLiter(s) Oral every 4 hours PRN  aspirin enteric coated 81 milliGRAM(s) Oral daily  atorvastatin 80 milliGRAM(s) Oral at bedtime  bacitracin   Ointment 1 Application(s) Topical two times a day  ceFAZolin   IVPB 2000 milliGRAM(s) IV Intermittent every 8 hours  chlorhexidine 2% Cloths 1 Application(s) Topical <User Schedule>  cyclobenzaprine 5 milliGRAM(s) Oral every 8 hours PRN  dextrose 5%. 1000 milliLiter(s) IV Continuous <Continuous>  dextrose 50% Injectable 25 Gram(s) IV Push once  dextrose 50% Injectable 12.5 Gram(s) IV Push once  dextrose 50% Injectable 25 Gram(s) IV Push once  dextrose Oral Gel 15 Gram(s) Oral once PRN  enoxaparin Injectable 40 milliGRAM(s) SubCutaneous every 24 hours  folic acid 1 milliGRAM(s) Oral daily  gabapentin 300 milliGRAM(s) Oral at bedtime  glucagon  Injectable 1 milliGRAM(s) IntraMuscular once  insulin lispro (ADMELOG) corrective regimen sliding scale   SubCutaneous three times a day before meals  insulin lispro Injectable (ADMELOG) 4 Unit(s) SubCutaneous three times a day before meals  levETIRAcetam 250 milliGRAM(s) Oral two times a day  lidocaine   4% Patch 1 Patch Transdermal every 24 hours  lidocaine   4% Patch 1 Patch Transdermal daily  losartan 50 milliGRAM(s) Oral daily  melatonin 5 milliGRAM(s) Oral at bedtime PRN  morphine  - Injectable 2 milliGRAM(s) IV Push every 4 hours PRN  nystatin Powder 1 Application(s) Topical every 12 hours PRN  ondansetron Injectable 4 milliGRAM(s) IV Push every 8 hours PRN  oxyCODONE    IR 10 milliGRAM(s) Oral three times a day PRN  polyethylene glycol 3350 17 Gram(s) Oral two times a day  senna 2 Tablet(s) Oral at bedtime  Tresiba (insulin degludec) 30 Unit(s) 30 Unit(s) SubCutaneous daily        Assessment and Plan:      80-year-old male with past medical history of insulin-dependent diabetes, hypertension, subdural hematoma on keppra who presents for multiple falls, weakness, & confusion.        #MSSA bacteremia, origin L forearm cellulitis v. UTI?  #Acute OM/discitis  #Questionable L forearm cellulitis - low suspicion for septic joint   - BCx + for MSSA (1/23, 1/25)  - UA + for nitrites, 20 WBC, small LE, & few bacteria  - UCx + for MSSA  - ID on board  - L Forearm/Elbow XR -ve for fx, showed soft tissue swelling  - L Elbow CT: Very limited exam. Artifact. Portions of the elbow are not imaged; Diffuse soft tissue edema which may reflect cellulitis in the right clinical setting. No definite discrete fluid collection; Arthritic changes of the elbow with no definite radiographic evidence of osteomyelitis.  - TTE w/ no vegetations  Plan:   - C/w IV cefazolin 2 g q8H  - Check BCx q48H until clear, collected 1/27-->NGTD, repeat 1/29, follow with ID  - Follow MRI T/L spine -->L4-5 OM and discitis, plan for 6 weeks of antibiotics, IR consulted for PICC placement-->plan for PICC on Monday      #Scrotal lesion  -Uro consult    #TME  #Hx of SDH  - R/o stroke 2/2 septic emboli v. TME 2/2 MSSA bacteremia   - Stroke Code called due to slurred speech  - S/p ASA + Plavix load  - CTH -ve for acute pathology  - CTA H+N: No large vessel occlusion, aneurysm, or vascular malformation; Mild atherosclerotic stenosis in the bilateral supraclinoid ICAs and bilateral V4 vertebral arteries.  - CTP: Small artifactual perfusion abnormality in the anterior right cerebellum,   otherwise no perfusion deficits to suggest areas of completed infarction   or at risk territory.  - MR Head: No acute intracranial pathology; Stable mild chronic microvascular ischemic changes.  - Given negative MRI, suspect TME due to polypharmacy  Plan:  - C/w ASA 81 mg q24H  - D/c plavix 75 mg q24H as per neuro  - C/w atorvastatin 80 mg qhs  - C/w Keppra 500 mg q12H  - Neuro checks q8H  - Check rEEG    #Type II NSTEMI  - EKG shows no acute ischemic changes  - HST 47>43>39>31  - TTE: LVEF 66%  - Monitor on tele    #Chronic macrocytic anemia  - Hb 11.1 on admission  - No s/s of active bleeding  - B12 1163, WNL (1/23/25)  - Folate 5, lower end of normal (1/23/25)  - Iron studies WNL  Plan:  - C/w folic acid 1 mg q24H  - OP EGD/Colonoscopy   - Transfuse if Hb <7    #T2DM  #Hyperglycemia, resolved  - On metformin 500 mg q6H + Tresiba 60 units @ home  - A1c 7.6%  - FS 70s 1/28  Plan:  - C/w home Tresiba, decreased to 20 units from 40 units as FS 70s  - D/c'd Lispro 6 units TIDAC  - C/w +1 ISS    #HTN  - C/w home losartan 50 mg q24H    #Chronic back pain  #Frequent Falls  - C/w pain control: PO Tylenol 650 mg q6H + PO oxycodone 10 mg q6H PRN, IV morphine 2 mg q4H PRN, PO cyclobenzaprine 10 mg q8H + lidocaine patches  - Decrease cyclobenzaprine 5 mg q8H PRN as patient was very lethargic, suspect 2/2 pain meds  - PT consult      #Depression  - C/w venlafaxine ER 75 mg q24H (started inpatient)    # Incidental pancreatic tail hypodensity  - RUQ U/S WNL  - OP MRI may be obtained for further characterization  - Follow up with GI office located on [address] [place] [address], Phone number [phone] with Dr. Puentes to consider EUS    #MISC  - DVT ppx: Lovenox SQ  - GI ppx: NI  - Diet:   - Activity: AAT  - Code Status: Full Code  - Dispo: STR, pending PICC placement and family selection, will need 6 weeks of abx

## 2025-02-03 NOTE — PROGRESS NOTE ADULT - SUBJECTIVE AND OBJECTIVE BOX
T(C): 36.7 (02-03-25 @ 05:04), Max: 37 (02-02-25 @ 13:51)  HR: 73 (02-03-25 @ 05:04) (73 - 88)  BP: 148/73 (02-03-25 @ 05:04) (148/73 - 173/72)  RR: 18 (02-03-25 @ 05:04) (18 - 18)  SpO2: 97% (02-03-25 @ 05:04) (96% - 97%)      Patient was stable overnight and expresses no new complaints     PE: ptn seen and exam at  bed  side  ptn  nad  ptn dtr is  at  bed  side  providing  hx  and  d/w  ptn and  dtr  rehab  plan  ptn  is  schedule for  picc  line  for iv abx  for  spine  om  ,ptn  will  benefet  from  STR at [facility] and  cont  iv abx        Alert   LUNGS- clear  COR- RRR  ABD- SOFT, NT  EXTR- w/o edema  NEURO- stable    02-03    140  |  106  |  22[H]  ----------------------------<  246[H]  4.6   |  26  |  0.9    Ca    8.5      03 Feb 2025 06:35    TPro  5.8[L]  /  Alb  1.9[L]  /  TBili  0.5  /  DBili  x   /  AST  48[H]  /  ALT  12  /  AlkPhos  309[H]  02-03                            9.7    4.92  )-----------( 155      ( 02 Feb 2025 06:54 )             31.3       INR: 1.35 ratio (02-03-25 @ 06:35)        Rehab of _ gd  sp  fall  spine  om lbp  r/o cva     Continue acute rehab program. bed side  rehab  pt ot and  dc  to  SNF for  STR d/w  ptn and  dtr  both  agree

## 2025-02-03 NOTE — PROGRESS NOTE ADULT - SUBJECTIVE AND OBJECTIVE BOX
INFECTIOUS DISEASE FOLLOW UP NOTE:    Interval History/ROS: Patient is a 80y old  Male who presents with a chief complaint of fall (03 Feb 2025 10:51)    Overnight events: More awake today. Still having back pain    REVIEW OF SYSTEMS:  CONSTITUTIONAL: No fever or chills  HEAD: No lesion on scalp  EYES: No visual disturbance  ENT: No sore throat  RESPIRATORY: No cough, no shortness of breath  CARDIOVASCULAR: No chest pain or palpitations  GASTROINTESTINAL: No abdominal or epigastric pain  GENITOURINARY: No dysuria  NEUROLOGICAL: Mild slurred speech, strength overall 4+/5  MUSCULOSKELETAL: Left arm swelling; low back pain  SKIN: No itching, rashes  All other ROS negative except noted above      Prior hospital charts reviewed [Yes]  Primary team notes reviewed [Yes]  Other consultant notes reviewed [Yes]    Allergies:  No Known Allergies      ANTIMICROBIALS:   ceFAZolin   IVPB 2000 every 8 hours      OTHER MEDS: MEDICATIONS  (STANDING):  acetaminophen     Tablet .. 1000 every 8 hours  aluminum hydroxide/magnesium hydroxide/simethicone Suspension 30 every 4 hours PRN  aspirin enteric coated 81 daily  atorvastatin 80 at bedtime  cyclobenzaprine 5 every 8 hours PRN  dextrose 50% Injectable 25 once  dextrose 50% Injectable 12.5 once  dextrose 50% Injectable 25 once  dextrose Oral Gel 15 once PRN  enoxaparin Injectable 40 every 24 hours  gabapentin 300 at bedtime  glucagon  Injectable 1 once  insulin lispro (ADMELOG) corrective regimen sliding scale  three times a day before meals  insulin lispro Injectable (ADMELOG) 4 three times a day before meals  levETIRAcetam 250 two times a day  losartan 50 daily  melatonin 5 at bedtime PRN  morphine  - Injectable 2 every 4 hours PRN  ondansetron Injectable 4 every 8 hours PRN  oxyCODONE    IR 10 three times a day PRN  polyethylene glycol 3350 17 two times a day  senna 2 at bedtime      Vital Signs Last 24 Hrs  T(F): 98 (02-03-25 @ 20:08), Max: 99 (01-30-25 @ 04:35)    Vital Signs Last 24 Hrs  HR: 82 (02-03-25 @ 20:08) (73 - 82)  BP: 166/72 (02-03-25 @ 20:08) (132/62 - 166/73)  RR: 18 (02-03-25 @ 20:08)  SpO2: 95% (02-03-25 @ 20:08) (95% - 97%)  Wt(kg): --    EXAM:  GENERAL: NAD, lying in bed  HEAD: No head lesions  EYES: Conjunctiva pink and cornea white  EAR, NOSE, MOUTH, THROAT: Normal external ears and nose, no discharges; moist mucous membranes  NECK: Supple, nontender to palpation; no JVD  RESPIRATORY: Clear to auscultation bilaterally  CARDIOVASCULAR: S1 S2  GASTROINTESTINAL: Soft, nontender, nondistended; normoactive bowel sounds  GENITOURINARY: No loyola catheter, no CVA tenderness  EXTREMITIES: No clubbing, cyanosis, or petal edema  NERVOUS SYSTEM: Awake and alert, not fully oriented, slight slurred speech, not willing to move left arm reported 2/2 pain, no gross sensory deficit  MUSCULOSKELETAL: Left forearm swelling; low back tenderness  SKIN: No rashes or lesions, no superficial thrombophlebitis  PSYCH: Normal affect    Labs:                        9.7    4.83  )-----------( 167      ( 03 Feb 2025 06:35 )             29.5     02-03    140  |  106  |  22[H]  ----------------------------<  246[H]  4.6   |  26  |  0.9    Ca    8.5      03 Feb 2025 06:35    TPro  5.8[L]  /  Alb  1.9[L]  /  TBili  0.5  /  DBili  x   /  AST  48[H]  /  ALT  12  /  AlkPhos  309[H]  02-03      WBC Trend:  WBC Count: 4.83 (02-03-25 @ 06:35)  WBC Count: 4.92 (02-02-25 @ 06:54)  WBC Count: 5.32 (02-01-25 @ 07:06)  WBC Count: 5.98 (01-31-25 @ 06:54)      Creatine Trend:  Creatinine: 0.9 (02-03)  Creatinine: 1.0 (02-02)  Creatinine: 0.8 (02-01)  Creatinine: 0.9 (01-31)      Liver Biochemical Testing Trend:  Alanine Aminotransferase (ALT/SGPT): 12 (02-03)  Alanine Aminotransferase (ALT/SGPT): 16 (02-02)  Alanine Aminotransferase (ALT/SGPT): 13 (02-01)  Alanine Aminotransferase (ALT/SGPT): 18 (01-31)  Alanine Aminotransferase (ALT/SGPT): 12 (01-30)  Aspartate Aminotransferase (AST/SGOT): 48 (02-03-25 @ 06:35)  Aspartate Aminotransferase (AST/SGOT): 63 (02-02-25 @ 06:54)  Aspartate Aminotransferase (AST/SGOT): 55 (02-01-25 @ 07:06)  Aspartate Aminotransferase (AST/SGOT): 78 (01-31-25 @ 06:54)  Aspartate Aminotransferase (AST/SGOT): 65 (01-30-25 @ 07:12)  Bilirubin Total: 0.5 (02-03)  Bilirubin Total: 0.5 (02-02)  Bilirubin Total: 0.6 (02-01)  Bilirubin Total: 0.7 (01-31)  Bilirubin Total: 0.7 (01-30)      Trend LDH      Urinalysis Basic - ( 03 Feb 2025 06:35 )    Color: x / Appearance: x / SG: x / pH: x  Gluc: 246 mg/dL / Ketone: x  / Bili: x / Urobili: x   Blood: x / Protein: x / Nitrite: x   Leuk Esterase: x / RBC: x / WBC x   Sq Epi: x / Non Sq Epi: x / Bacteria: x        MICROBIOLOGY:        Culture - Blood (collected 30 Jan 2025 07:12)  Source: .Blood BLOOD  Preliminary Report:    No growth at 4 days    Culture - Blood (collected 30 Jan 2025 07:12)  Source: .Blood BLOOD  Preliminary Report:    No growth at 4 days    Culture - Blood (collected 29 Jan 2025 07:35)  Source: .Blood BLOOD  Final Report:    No growth at 5 days    Culture - Blood (collected 27 Jan 2025 19:13)  Source: .Blood BLOOD  Final Report:    No growth at 5 days    Culture - Blood (collected 27 Jan 2025 19:13)  Source: .Blood BLOOD  Final Report:    No growth at 5 days    Culture - Blood (collected 25 Jan 2025 08:23)  Source: .Blood BLOOD  Final Report:    Growth in aerobic and anaerobic bottles: Staphylococcus aureus    See previous culture [id number]    Culture - Blood (collected 25 Jan 2025 08:23)  Source: .Blood BLOOD  Final Report:    Growth in aerobic and anaerobic bottles: Staphylococcus aureus    See previous culture [id number]    Culture - Blood (collected 23 Jan 2025 20:05)  Source: .Blood BLOOD  Final Report:    Growth in aerobic and anaerobic bottles: Staphylococcus aureus    Direct identification is available within approximately 3-5    hours either by Blood Panel Multiplexed PCR or Direct    MALDI-TOF. Details: [email or web address]  Organism: Blood Culture PCR  Staphylococcus aureus  Organism: Staphylococcus aureus    Sensitivities:      Method Type: LOUIS      -  Clindamycin: S <=0.25      -  Erythromycin: S <=0.25      -  Gentamicin: S <=4 Should not be used as monotherapy      -  Oxacillin: S <=0.25 Oxacillin predicts susceptibility for dicloxacillin, methicillin, and nafcillin      -  Penicillin: R >2      -  Rifampin: S <=1 Should not be used as monotherapy      -  Tetracycline: S <=4      -  Trimethoprim/Sulfamethoxazole: S <=0.5/9.5      -  Vancomycin: S 1  Organism: Blood Culture PCR    Sensitivities:      Method Type: PCR      -  Methicillin SENSITIVE Staphylococcus aureus (MSSA): Detec Any isolate of Staphylococcus aureus from a blood culture is NOT considered a contaminant.    Culture - Blood (collected 23 Jan 2025 20:05)  Source: .Blood BLOOD  Final Report:    Growth in aerobic and anaerobic bottles: Staphylococcus aureus    See previous culture [id number]    Culture - Urine (collected 23 Jan 2025 19:55)  Source: Clean Catch None  Final Report:    10,000 - 49,000 CFU/mL Staphylococcus aureus    50,000 - 99,000 CFU/mL Aerococcus urinae    Isolates of Aerococcus spp. are predictably susceptible to penicillin,    ampicillin, and vancomycin. All isolates are resistant to sulfonamides.  Organism: Staphylococcus aureus  Organism: Staphylococcus aureus    Sensitivities:      Method Type: LOUIS      -  Gentamicin: S <=4 Should not be used as monotherapy      -  Nitrofurantoin: S <=32      -  Oxacillin: S <=0.25 Oxacillin predicts susceptibility for dicloxacillin, methicillin, and nafcillin      -  Penicillin: R >2      -  Rifampin: S <=1 Should not be used as monotherapy      -  Tetracycline: S <=4      -  Trimethoprim/Sulfamethoxazole: S <=0.5/9.5      -  Vancomycin: S 1    Ferritin: 499 (01-28)    RADIOLOGY:  imaging below personally reviewed    < from: CT Head No Cont (02.01.25 @ 09:20) >  IMPRESSION:  No acute intracranial pathology. No evidence of midline shift, mass   effect or intracranial hemorrhage.    If symptoms persist an MRI of the brain can be obtained for further   evaluation.    < end of copied text >    < from: MR Thoracic Spine w/wo IV Cont (01.29.25 @ 18:14) >  IMPRESSION:    1.  No evidence for thoracic discitis osteomyelitis.    2.  T8-9 degenerative changes with moderate spinal stenosis and severe   bilateral foraminal stenosis.    < end of copied text >    < from: MR Lumbar Spine w/wo IV Cont (01.29.25 @ 18:14) >    1.  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 No evidence of abscess.    2.  Degenerative notable for moderate spinal stenosis at L4-5 and   moderate to severe foraminal stenosis at L3-4 and L4-5.    < end of copied text >

## 2025-02-03 NOTE — PROGRESS NOTE ADULT - TIME BILLING
I have personally seen and examined this patient.    I have reviewed all pertinent clinical information and reviewed all relevant imaging and diagnostic studies personally.   I discussed recommendations with the primary team.

## 2025-02-03 NOTE — PROCEDURE NOTE - ADDITIONAL PROCEDURE DETAILS
Successful placement of Bard 5Fr 47cm Single Lumen Power PICC. Tip noted at cavoatrial junction. Ready for use.

## 2025-02-03 NOTE — CHART NOTE - NSCHARTNOTEFT_GEN_A_CORE
Despite 10 units regular insulin given last night, FSBS remains high at 365. Will try 10 more units of regular insulin along with 10 units Lispro and repeat FSBS around 0600

## 2025-02-03 NOTE — PROGRESS NOTE ADULT - ASSESSMENT
80-year-old male with past medical history of insulin-dependent diabetes, hypertension, subdural hematoma on keppra who presents for multiple falls, weakness, confusion.     ID is consulted for cellulitis  Afebrile 98.1  WBC 8.34 < 10.13  On room air  BCx 1/23 MSSA  BCx 1/25 MSSA  BCx 1/27 NGTD  UA WBC 20, UCx MSSA and Aerococcus spp.    Left forearm xray  Capitellum bony fragment/calcification. No radius or ulna   fracture.. Soft tissue swelling with no radiographic findings to suggest   the presence of osteomyelitis or radiopaque soft tissue foreign body.    CT chest:  No CT evidence of acute traumatic injury within the chest, abdomen or   pelvis.  CT ABDOMEN/PELVIS:  Pancreatic tail hypodensity measuring 1 cm. Differential includes IPMN.   Nonemergent/outpatient MRI may be obtained for further characterization    Antibiotic:  Vancomycin 1/23 - 1/24  Ceftriaxone 1/23 - 1/24  Ancef 1/24 ->      IMPRESSION:  MSSA bacteremia, potentially life-threatening  Suspect left forearm cellulitis  L4-L5 vertebral osteomyelitis  Acute metabolic encephalopathy  DM  Immunosuppression / Immunosenescence secondary to multiple comorbidities which could result in poor clinical outcome    RECOMMENDATIONS:  - So far unclear source for MSSA bacteremia  - IV Ancef 2g q8hrs  - TTE no vegetation, will hold off on HANNAH for now unless worsening clinical status  - ESR/CRP  - Given vertebral OM, he needs at least 6 weeks of IV Ancef with PICC from 1st negative BCx (until 3/9)  - PT/OT  - Offloading and frequent position changes, aspiration precaution  - Trend WBC, fever curve, transaminases, creatinine daily  - Please inform ID of any patient clinical change or any new pertinent laboratory or radiographic data    - Weekly CBC, CMP, ESR/CRP  - ID follow-up with Dr. Jose A Yancey for Telehealth. We will call the patient between 10:30-1:30      [address]       [phone]       Fax [phone]      Nora Beck D.O.  Attending Physician  Division of Infectious Diseases  [facility]  Please contact me via [employer] Teams

## 2025-02-04 ENCOUNTER — TRANSCRIPTION ENCOUNTER (OUTPATIENT)
Age: 80
End: 2025-02-04

## 2025-02-04 VITALS — DIASTOLIC BLOOD PRESSURE: 77 MMHG | SYSTOLIC BLOOD PRESSURE: 162 MMHG | HEART RATE: 90 BPM

## 2025-02-04 LAB
ALBUMIN SERPL ELPH-MCNC: 1.8 G/DL — LOW (ref 3.5–5.2)
ALP SERPL-CCNC: 294 U/L — HIGH (ref 30–115)
ALT FLD-CCNC: 10 U/L — SIGNIFICANT CHANGE UP (ref 0–41)
ANION GAP SERPL CALC-SCNC: 7 MMOL/L — SIGNIFICANT CHANGE UP (ref 7–14)
AST SERPL-CCNC: 50 U/L — HIGH (ref 0–41)
BILIRUB SERPL-MCNC: 0.6 MG/DL — SIGNIFICANT CHANGE UP (ref 0.2–1.2)
BUN SERPL-MCNC: 19 MG/DL — SIGNIFICANT CHANGE UP (ref 10–20)
CALCIUM SERPL-MCNC: 8.3 MG/DL — LOW (ref 8.4–10.5)
CHLORIDE SERPL-SCNC: 105 MMOL/L — SIGNIFICANT CHANGE UP (ref 98–110)
CO2 SERPL-SCNC: 25 MMOL/L — SIGNIFICANT CHANGE UP (ref 17–32)
CREAT SERPL-MCNC: 0.9 MG/DL — SIGNIFICANT CHANGE UP (ref 0.7–1.5)
CRP SERPL-MCNC: 81.1 MG/L — HIGH
CULTURE RESULTS: SIGNIFICANT CHANGE UP
CULTURE RESULTS: SIGNIFICANT CHANGE UP
EGFR: 87 ML/MIN/1.73M2 — SIGNIFICANT CHANGE UP
ERYTHROCYTE [SEDIMENTATION RATE] IN BLOOD: 127 MM/HR — HIGH (ref 0–10)
GLUCOSE SERPL-MCNC: 193 MG/DL — HIGH (ref 70–99)
HCT VFR BLD CALC: 31.8 % — LOW (ref 42–52)
HGB BLD-MCNC: 10.7 G/DL — LOW (ref 14–18)
LEVETIRACETAM SERPL-MCNC: 6 UG/ML — LOW (ref 10–40)
MCHC RBC-ENTMCNC: 32.1 PG — HIGH (ref 27–31)
MCHC RBC-ENTMCNC: 33.6 G/DL — SIGNIFICANT CHANGE UP (ref 32–37)
MCV RBC AUTO: 95.5 FL — HIGH (ref 80–94)
NRBC # BLD: 0 /100 WBCS — SIGNIFICANT CHANGE UP (ref 0–0)
NRBC BLD-RTO: 0 /100 WBCS — SIGNIFICANT CHANGE UP (ref 0–0)
PLATELET # BLD AUTO: 161 K/UL — SIGNIFICANT CHANGE UP (ref 130–400)
PMV BLD: 8.2 FL — SIGNIFICANT CHANGE UP (ref 7.4–10.4)
POTASSIUM SERPL-MCNC: 5 MMOL/L — SIGNIFICANT CHANGE UP (ref 3.5–5)
POTASSIUM SERPL-SCNC: 5 MMOL/L — SIGNIFICANT CHANGE UP (ref 3.5–5)
PROT SERPL-MCNC: 5.9 G/DL — LOW (ref 6–8)
RBC # BLD: 3.33 M/UL — LOW (ref 4.7–6.1)
RBC # FLD: 14.3 % — SIGNIFICANT CHANGE UP (ref 11.5–14.5)
SODIUM SERPL-SCNC: 137 MMOL/L — SIGNIFICANT CHANGE UP (ref 135–146)
SPECIMEN SOURCE: SIGNIFICANT CHANGE UP
SPECIMEN SOURCE: SIGNIFICANT CHANGE UP
WBC # BLD: 6.03 K/UL — SIGNIFICANT CHANGE UP (ref 4.8–10.8)
WBC # FLD AUTO: 6.03 K/UL — SIGNIFICANT CHANGE UP (ref 4.8–10.8)

## 2025-02-04 PROCEDURE — 99239 HOSP IP/OBS DSCHRG MGMT >30: CPT

## 2025-02-04 RX ORDER — ACETAMINOPHEN 160 MG/5ML
2 SUSPENSION ORAL
Qty: 0 | Refills: 0 | DISCHARGE
Start: 2025-02-04

## 2025-02-04 RX ORDER — SENNOSIDES 8.6 MG
2 TABLET ORAL
Qty: 0 | Refills: 0 | DISCHARGE
Start: 2025-02-04

## 2025-02-04 RX ORDER — MAGNESIUM, ALUMINUM HYDROXIDE 200-225/5
30 SUSPENSION, ORAL (FINAL DOSE FORM) ORAL
Qty: 0 | Refills: 0 | DISCHARGE
Start: 2025-02-04

## 2025-02-04 RX ORDER — ACETAMINOPHEN, DIPHENHYDRAMINE HCL, PHENYLEPHRINE HCL 325; 25; 5 MG/1; MG/1; MG/1
1 TABLET ORAL
Qty: 0 | Refills: 0 | DISCHARGE
Start: 2025-02-04

## 2025-02-04 RX ORDER — POLYETHYLENE GLYCOL 3350 17 G/17G
17 POWDER, FOR SOLUTION ORAL
Qty: 0 | Refills: 0 | DISCHARGE
Start: 2025-02-04

## 2025-02-04 RX ORDER — MORPHINE SULFATE 60 MG/1
2 TABLET, FILM COATED, EXTENDED RELEASE ORAL
Qty: 0 | Refills: 0 | DISCHARGE
Start: 2025-02-04

## 2025-02-04 RX ORDER — LIDOCAINE HYDROCHLORIDE 30 MG/G
1 CREAM TOPICAL
Qty: 0 | Refills: 0 | DISCHARGE
Start: 2025-02-04

## 2025-02-04 RX ORDER — ASPIRIN 81 MG/1
1 TABLET, COATED ORAL
Qty: 0 | Refills: 0 | DISCHARGE
Start: 2025-02-04

## 2025-02-04 RX ORDER — CEFAZOLIN SODIUM IN 0.9 % NACL 2 G/10 ML
2 SYRINGE (ML) INTRAVENOUS
Qty: 0 | Refills: 0 | DISCHARGE
Start: 2025-02-04 | End: 2025-03-09

## 2025-02-04 RX ORDER — INSULIN LISPRO 100/ML
1 VIAL (ML) SUBCUTANEOUS
Qty: 0 | Refills: 0 | DISCHARGE
Start: 2025-02-04

## 2025-02-04 RX ORDER — NYSTATIN 100000 U/G
1 POWDER TOPICAL
Qty: 0 | Refills: 0 | DISCHARGE
Start: 2025-02-04

## 2025-02-04 RX ORDER — LEVETIRACETAM 750 MG/1
1 TABLET, FILM COATED ORAL
Qty: 0 | Refills: 0 | DISCHARGE
Start: 2025-02-04

## 2025-02-04 RX ORDER — ATORVASTATIN CALCIUM 80 MG/1
1 TABLET, FILM COATED ORAL
Qty: 0 | Refills: 0 | DISCHARGE
Start: 2025-02-04

## 2025-02-04 RX ORDER — FOLIC ACID 1 MG
1 TABLET ORAL
Qty: 0 | Refills: 0 | DISCHARGE
Start: 2025-02-04

## 2025-02-04 RX ORDER — ONDANSETRON 4 MG/1
4 TABLET, ORALLY DISINTEGRATING ORAL
Qty: 0 | Refills: 0 | DISCHARGE
Start: 2025-02-04

## 2025-02-04 RX ORDER — OXYCODONE HYDROCHLORIDE 30 MG/1
1 TABLET ORAL
Qty: 0 | Refills: 0 | DISCHARGE
Start: 2025-02-04

## 2025-02-04 RX ADMIN — Medication 50 MILLIGRAM(S): at 05:10

## 2025-02-04 RX ADMIN — OXYCODONE HYDROCHLORIDE 10 MILLIGRAM(S): 30 TABLET ORAL at 14:04

## 2025-02-04 RX ADMIN — POLYETHYLENE GLYCOL 3350 17 GRAM(S): 17 POWDER, FOR SOLUTION ORAL at 05:10

## 2025-02-04 RX ADMIN — ACETAMINOPHEN 1000 MILLIGRAM(S): 160 SUSPENSION ORAL at 14:01

## 2025-02-04 RX ADMIN — ACETAMINOPHEN 1000 MILLIGRAM(S): 160 SUSPENSION ORAL at 17:29

## 2025-02-04 RX ADMIN — ACETAMINOPHEN 1000 MILLIGRAM(S): 160 SUSPENSION ORAL at 05:10

## 2025-02-04 RX ADMIN — Medication 100 MILLIGRAM(S): at 06:25

## 2025-02-04 RX ADMIN — ANTISEPTIC SURGICAL SCRUB 1 APPLICATION(S): 0.04 SOLUTION TOPICAL at 05:18

## 2025-02-04 RX ADMIN — ACETAMINOPHEN 1000 MILLIGRAM(S): 160 SUSPENSION ORAL at 06:24

## 2025-02-04 RX ADMIN — MORPHINE SULFATE 2 MILLIGRAM(S): 60 TABLET, FILM COATED, EXTENDED RELEASE ORAL at 12:51

## 2025-02-04 RX ADMIN — OXYCODONE HYDROCHLORIDE 10 MILLIGRAM(S): 30 TABLET ORAL at 17:29

## 2025-02-04 RX ADMIN — Medication 1 APPLICATION(S): at 05:10

## 2025-02-04 RX ADMIN — ASPIRIN 81 MILLIGRAM(S): 81 TABLET, COATED ORAL at 13:59

## 2025-02-04 RX ADMIN — MORPHINE SULFATE 2 MILLIGRAM(S): 60 TABLET, FILM COATED, EXTENDED RELEASE ORAL at 13:48

## 2025-02-04 RX ADMIN — Medication 1 MILLIGRAM(S): at 14:00

## 2025-02-04 RX ADMIN — LEVETIRACETAM 250 MILLIGRAM(S): 750 TABLET, FILM COATED ORAL at 05:18

## 2025-02-04 NOTE — PROGRESS NOTE ADULT - SUBJECTIVE AND OBJECTIVE BOX
DWIGHT RIBEIRO  [facility] 025 A ([facility])            Patient was evaluated and examined  by bedside,                 REVIEW OF SYSTEMS:  please see pertinent positives mentioned above, all other 12 ROS negative      T(C): , Max: 37.2 (02-04-25 @ 04:20)  HR: 85 (02-04-25 @ 04:20)  BP: 185/91 (02-04-25 @ 04:20)  RR: 18 (02-04-25 @ 04:20)  SpO2: 96% (02-04-25 @ 04:20)  CAPILLARY BLOOD GLUCOSE      POCT Blood Glucose.: 180 mg/dL (04 Feb 2025 07:35)  POCT Blood Glucose.: 223 mg/dL (03 Feb 2025 22:22)  POCT Blood Glucose.: 205 mg/dL (03 Feb 2025 20:33)  POCT Blood Glucose.: 213 mg/dL (03 Feb 2025 16:32)  POCT Blood Glucose.: 210 mg/dL (03 Feb 2025 13:45)      PHYSICAL EXAM:  General: NAD, tired  HEENT: NCAT  Lungs: No increased WOB  CVS: RRR  Abdomen: , non-distended  Extremities: 1+ edema      LAB  CBC  Date: 02-04-25 @ 06:24  Mean cell Ukcshigqlc40.1  Mean cell Hemoglobin Conc33.6  Mean cell Volum 95.5  Platelet count-Automate 161  RBC Count 3.33  Red Cell Distrib Width14.3  WBC Count6.03  % Albumin, Urine--  Hematocrit 31.8  Hemoglobin 10.7  CBC  Date: 02-03-25 @ 06:35  Mean cell Klysuxcdjf22.2  Mean cell Hemoglobin Conc32.9  Mean cell Volum 98.0  Platelet count-Automate 167  RBC Count 3.01  Red Cell Distrib Width14.3  WBC Count4.83  % Albumin, Urine--  Hematocrit 29.5  Hemoglobin 9.7  CBC  Date: 02-02-25 @ 06:54  Mean cell Vfgwdwtikv15.9  Mean cell Hemoglobin Conc31.0  Mean cell Volum 99.7  Platelet count-Automate 155  RBC Count 3.14  Red Cell Distrib Width14.1  WBC Count4.92  % Albumin, Urine--  Hematocrit 31.3  Hemoglobin 9.7  CBC  Date: 02-01-25 @ 07:06  Mean cell Fdkbtbapwp66.2  Mean cell Hemoglobin Conc32.9  Mean cell Volum 98.0  Platelet count-Automate 147  RBC Count 3.04  Red Cell Distrib Width13.8  WBC Count5.32  % Albumin, Urine--  Hematocrit 29.8  Hemoglobin 9.8  CBC  Date: 01-31-25 @ 06:54  Mean cell Ouflfuppme19.4  Mean cell Hemoglobin Conc33.0  Mean cell Volum 98.1  Platelet count-Automate 146  RBC Count 3.24  Red Cell Distrib Width13.7  WBC Count5.98  % Albumin, Urine--  Hematocrit 31.8  Hemoglobin 10.5  CBC  Date: 01-30-25 @ 07:12  Mean cell Tvwntngxlc07.8  Mean cell Hemoglobin Conc32.9  Mean cell Volum 96.6  Platelet count-Automate 145  RBC Count 2.96  Red Cell Distrib Width13.7  WBC Count5.09  % Albumin, Urine--  Hematocrit 28.6  Hemoglobin 9.4  CBC  Date: 01-29-25 @ 07:35  Mean cell Gnvbfxvjen76.7  Mean cell Hemoglobin Conc33.8  Mean cell Volum 96.7  Platelet count-Automate 161  RBC Count 3.00  Red Cell Distrib Width13.6  WBC Count6.20  % Albumin, Urine--  Hematocrit 29.0  Hemoglobin 9.8    [facility]  02-04-25 @ 06:24  Blood Gas Arterial-Calcium,Ionized--  Blood Urea Nitrogen, Serum 19 mg/dL [10 - 20]  Carbon Dioxide, Serum25 mmol/L [17 - 32]  Chloride, Qguwk010 mmol/L [98 - 110]  Creatinie, Serum0.9 mg/dL [0.7 - 1.5]  Glucose, Ktbxh171 mg/dL[H] [70 - 99]  Potassium, Serum5.0 mmol/L [3.5 - 5.0]  Sodium, Serum 137 mmol/L [135 - 146]  [facility]  02-03-25 @ 06:35  Blood Gas Arterial-Calcium,Ionized--  Blood Urea Nitrogen, Serum 22 mg/dL[H] [10 - 20]  Carbon Dioxide, Serum26 mmol/L [17 - 32]  Chloride, Tnbxh139 mmol/L [98 - 110]  Creatinie, Serum0.9 mg/dL [0.7 - 1.5]  Glucose, Mlhxl935 mg/dL[H] [70 - 99]  Potassium, Serum4.6 mmol/L [3.5 - 5.0]  Sodium, Serum 140 mmol/L [135 - 146]  [facility]  02-02-25 @ 06:54  Blood Gas Arterial-Calcium,Ionized--  Blood Urea Nitrogen, Serum 20 mg/dL [10 - 20]  Carbon Dioxide, Serum24 mmol/L [17 - 32]  Chloride, Hrlvp151 mmol/L [98 - 110]  Creatinie, Serum1.0 mg/dL [0.7 - 1.5]  Glucose, Kvjvx000 mg/dL[H] [70 - 99]  Potassium, Serum4.6 mmol/L [3.5 - 5.0]  Sodium, Serum 141 mmol/L [135 - 146]  BMP  02-01-25 @ 07:06  Blood Gas Arterial-Calcium,Ionized--  Blood Urea Nitrogen, Serum 16 mg/dL [10 - 20]  Carbon Dioxide, Serum24 mmol/L [17 - 32]  Chloride, Hllip652 mmol/L [98 - 110]  Creatinie, Serum0.8 mg/dL [0.7 - 1.5]  Glucose, Vbmau797 mg/dL[H] [70 - 99]  Potassium, Serum4.3 mmol/L [3.5 - 5.0]  Sodium, Serum 140 mmol/L [135 - 146]  [facility]  01-31-25 @ 06:54  Blood Gas Arterial-Calcium,Ionized--  Blood Urea Nitrogen, Serum 19 mg/dL [10 - 20]  Carbon Dioxide, Serum23 mmol/L [17 - 32]  Chloride, Ndakg220 mmol/L [98 - 110]  Creatinie, Serum0.9 mg/dL [0.7 - 1.5]  Glucose, Odspl084 mg/dL[H] [70 - 99]  Potassium, Serum4.5 mmol/L [3.5 - 5.0]  Sodium, Serum 140 mmol/L [135 - 146]        PT/INR - ( 03 Feb 2025 06:35 )   PT: 16.00 sec;   INR: 1.35 ratio         PTT - ( 03 Feb 2025 06:35 )  PTT:49.9 sec      Microbiology:    Culture - Blood (collected 01-30-25 @ 07:12)  Source: .Blood BLOOD  Preliminary Report (02-03-25 @ 22:00):    No growth at 4 days    Culture - Blood (collected 01-30-25 @ 07:12)  Source: .Blood BLOOD  Preliminary Report (02-03-25 @ 22:00):    No growth at 4 days    Culture - Blood (collected 01-29-25 @ 07:35)  Source: .Blood BLOOD  Final Report (02-03-25 @ 21:00):    No growth at 5 days    Culture - Blood (collected 01-27-25 @ 19:13)  Source: .Blood BLOOD  Final Report (02-02-25 @ 03:01):    No growth at 5 days    Culture - Blood (collected 01-27-25 @ 19:13)  Source: .Blood BLOOD  Final Report (02-02-25 @ 03:01):    No growth at 5 days    Culture - Blood (collected 01-25-25 @ 08:23)  Source: .Blood BLOOD  Gram Stain (01-26-25 @ 15:48):    Growth in anaerobic bottle: Gram Positive Cocci in Clusters    Growth in aerobic bottle: Gram Positive Cocci in Clusters  Final Report (01-27-25 @ 14:27):    Growth in aerobic and anaerobic bottles: Staphylococcus aureus    See previous culture [id number]    Culture - Blood (collected 01-25-25 @ 08:23)  Source: .Blood BLOOD  Gram Stain (01-26-25 @ 17:31):    Growth in aerobic bottle: Gram Positive Cocci in Clusters    Growth in anaerobic bottle: Gram Positive Cocci in Clusters  Final Report (01-27-25 @ 14:42):    Growth in aerobic and anaerobic bottles: Staphylococcus aureus    See previous culture [id number]    Culture - Blood (collected 01-23-25 @ 20:05)  Source: .Blood BLOOD  Gram Stain (01-24-25 @ 13:53):    Growth in aerobic bottle: Gram Positive Cocci in Clusters    Growth in anaerobic bottle: Gram Positive Cocci in Clusters  Final Report (01-26-25 @ 09:38):    Growth in aerobic and anaerobic bottles: Staphylococcus aureus    Direct identification is available within approximately 3-5    hours either by Blood Panel Multiplexed PCR or Direct    MALDI-TOF. Details: [email or web address]  Organism: Blood Culture PCR  Staphylococcus aureus (01-26-25 @ 09:38)  Organism: Staphylococcus aureus (01-26-25 @ 09:38)      Method Type: LOUIS      -  Clindamycin: S <=0.25      -  Erythromycin: S <=0.25      -  Gentamicin: S <=4 Should not be used as monotherapy      -  Oxacillin: S <=0.25 Oxacillin predicts susceptibility for dicloxacillin, methicillin, and nafcillin      -  Penicillin: R >2      -  Rifampin: S <=1 Should not be used as monotherapy      -  Tetracycline: S <=4      -  Trimethoprim/Sulfamethoxazole: S <=0.5/9.5      -  Vancomycin: S 1  Organism: Blood Culture PCR (01-26-25 @ 09:38)      Method Type: PCR      -  Methicillin SENSITIVE Staphylococcus aureus (MSSA): Detec Any isolate of Staphylococcus aureus from a blood culture is NOT considered a contaminant.    Culture - Blood (collected 01-23-25 @ 20:05)  Source: .Blood BLOOD  Gram Stain (01-24-25 @ 13:52):    Growth in aerobic bottle: Gram Positive Cocci in Clusters    Growth in anaerobic bottle: Gram Positive Cocci in Clusters  Final Report (01-26-25 @ 09:38):    Growth in aerobic and anaerobic bottles: Staphylococcus aureus    See previous culture [id number]    Culture - Urine (collected 01-23-25 @ 19:55)  Source: Clean Catch None  Final Report (01-27-25 @ 15:57):    10,000 - 49,000 CFU/mL Staphylococcus aureus    50,000 - 99,000 CFU/mL Aerococcus urinae    Isolates of Aerococcus spp. are predictably susceptible to penicillin,    ampicillin, and vancomycin. All isolates are resistant to sulfonamides.  Organism: Staphylococcus aureus (01-27-25 @ 15:57)  Organism: Staphylococcus aureus (01-27-25 @ 15:57)      Method Type: LOUIS      -  Gentamicin: S <=4 Should not be used as monotherapy      -  Nitrofurantoin: S <=32      -  Oxacillin: S <=0.25 Oxacillin predicts susceptibility for dicloxacillin, methicillin, and nafcillin      -  Penicillin: R >2      -  Rifampin: S <=1 Should not be used as monotherapy      -  Tetracycline: S <=4      -  Trimethoprim/Sulfamethoxazole: S <=0.5/9.5      -  Vancomycin: S 1    Urinalysis with Rflx Culture (collected 01-23-25 @ 19:55)        RADIOLOGY & ADDITIONAL TESTS:        Medications:  acetaminophen     Tablet .. 1000 milliGRAM(s) Oral every 8 hours  aluminum hydroxide/magnesium hydroxide/simethicone Suspension 30 milliLiter(s) Oral every 4 hours PRN  aspirin enteric coated 81 milliGRAM(s) Oral daily  atorvastatin 80 milliGRAM(s) Oral at bedtime  bacitracin   Ointment 1 Application(s) Topical two times a day  ceFAZolin   IVPB 2000 milliGRAM(s) IV Intermittent every 8 hours  chlorhexidine 2% Cloths 1 Application(s) Topical <User Schedule>  cyclobenzaprine 5 milliGRAM(s) Oral every 8 hours PRN  dextrose 5%. 1000 milliLiter(s) IV Continuous <Continuous>  dextrose 50% Injectable 25 Gram(s) IV Push once  dextrose 50% Injectable 12.5 Gram(s) IV Push once  dextrose 50% Injectable 25 Gram(s) IV Push once  dextrose Oral Gel 15 Gram(s) Oral once PRN  enoxaparin Injectable 40 milliGRAM(s) SubCutaneous every 24 hours  folic acid 1 milliGRAM(s) Oral daily  gabapentin 300 milliGRAM(s) Oral at bedtime  glucagon  Injectable 1 milliGRAM(s) IntraMuscular once  insulin lispro (ADMELOG) corrective regimen sliding scale   SubCutaneous three times a day before meals  insulin lispro Injectable (ADMELOG) 4 Unit(s) SubCutaneous three times a day before meals  levETIRAcetam 250 milliGRAM(s) Oral two times a day  lidocaine   4% Patch 1 Patch Transdermal daily  lidocaine   4% Patch 1 Patch Transdermal every 24 hours  losartan 50 milliGRAM(s) Oral daily  melatonin 5 milliGRAM(s) Oral at bedtime PRN  morphine  - Injectable 2 milliGRAM(s) IV Push every 4 hours PRN  nystatin Powder 1 Application(s) Topical every 12 hours PRN  ondansetron Injectable 4 milliGRAM(s) IV Push every 8 hours PRN  oxyCODONE    IR 10 milliGRAM(s) Oral three times a day PRN  polyethylene glycol 3350 17 Gram(s) Oral two times a day  senna 2 Tablet(s) Oral at bedtime  Tresiba ( Insulin Degludec) 60 Unit(s) 60 Unit(s) SubCutaneous at bedtime        Assessment and Plan:      80-year-old male with past medical history of insulin-dependent diabetes, hypertension, subdural hematoma on keppra who presents for multiple falls, weakness, & confusion.    Plan for today  -D/c to STR ([place])    #MSSA bacteremia, origin L forearm cellulitis v. UTI?  #Acute OM/discitis  #Questionable L forearm cellulitis - low suspicion for septic joint   - BCx + for MSSA (1/23, 1/25)  - UA + for nitrites, 20 WBC, small LE, & few bacteria  - UCx + for MSSA  - ID on board  - L Forearm/Elbow XR -ve for fx, showed soft tissue swelling  - L Elbow CT: Very limited exam. Artifact. Portions of the elbow are not imaged; Diffuse soft tissue edema which may reflect cellulitis in the right clinical setting. No definite discrete fluid collection; Arthritic changes of the elbow with no definite radiographic evidence of osteomyelitis.  - TTE w/ no vegetations  Plan:   -BCx now clear  - Follow MRI T/L spine -->L4-5 OM and discitis, plan for 6 weeks of antibiotics, PICC placed 2/3/2025  - C/w IV cefazolin 2 g q8H  until 3/29/2025  - Weekly CBC, CMP, ESR/CRP  - ID follow-up with Dr. Jose A Yancey for Telehealth. We will call the patient between 10:30-1:30      [address]       [phone]       Fax [phone]      #Scrotal lesion  -Uro consult  >small skin tear due to edema  - Recommend elevate scrotum to alleviate scrotal edema and pressure off skin tear.  - Continue to apply barrier cream to avoid maceration of scrotal skin.      #TME  #Hx of SDH  - R/o stroke 2/2 septic emboli v. TME 2/2 MSSA bacteremia   - Stroke Code called due to slurred speech  - S/p ASA + Plavix load  - CTH -ve for acute pathology  - CTA H+N: No large vessel occlusion, aneurysm, or vascular malformation; Mild atherosclerotic stenosis in the bilateral supraclinoid ICAs and bilateral V4 vertebral arteries.  - CTP: Small artifactual perfusion abnormality in the anterior right cerebellum,   otherwise no perfusion deficits to suggest areas of completed infarction   or at risk territory.  - MR Head: No acute intracranial pathology; Stable mild chronic microvascular ischemic changes.  - Given negative MRI, suspect TME due to polypharmacy  Plan:  - C/w ASA 81 mg q24H  - D/c plavix 75 mg q24H as per neuro  - C/w atorvastatin 80 mg qhs  - C/w Keppra 250 mg q12H  - Neuro checks d/c  -Reeg negative    #Type II NSTEMI  - EKG shows no acute ischemic changes  - HST 47>43>39>31  - TTE: LVEF 66%  - stop monitoring    #Chronic macrocytic anemia  - Hb 11.1 on admission  - No s/s of active bleeding  - B12 1163, WNL (1/23/25)  - Folate 5, lower end of normal (1/23/25)  - Iron studies WNL  Plan:  - C/w folic acid 1 mg q24H  - OP EGD/Colonoscopy   - Transfuse if Hb <7    #T2DM  #Hyperglycemia, resolved  - On metformin 500 mg q6H + Tresiba 60 units @ home  - A1c 7.6%  - FS 70s 1/28  Plan:  - C/w home Tresiba 60U, resume metforming at d/c  - C/w +1 ISS    #HTN  - C/w home losartan 50 mg q24H    #Chronic back pain  #Frequent Falls  - C/w pain control: PO Tylenol 650 mg q6H + PO oxycodone 10 mg q6H PRN, IV morphine 2 mg q4H PRN, PO cyclobenzaprine 10 mg q8H + lidocaine patches  - Decrease cyclobenzaprine 5 mg q8H PRN as patient was very lethargic, suspect 2/2 pain meds  - PT consult      #Depression  - C/w venlafaxine ER 75 mg q24H (started inpatient)    # Incidental pancreatic tail hypodensity  - RUQ U/S WNL  - OP MRI may be obtained for further characterization  - Follow up with GI office located on [address] [place] [address], Phone number [phone] with Dr. Puentes to consider EUS    #MISC  - DVT ppx: Lovenox SQ  - GI ppx: NI  - Diet:   - Activity: AAT  - Code Status: Full Code  - Dispo: STR, pending PICC placement and family selection, will need 6 weeks of abx           DWIGHT RIBEIRO  [facility] 025 A ([facility])            Patient was evaluated and examined  by bedside,                 REVIEW OF SYSTEMS:  please see pertinent positives mentioned above, all other 12 ROS negative      T(C): , Max: 37.2 (02-04-25 @ 04:20)  HR: 85 (02-04-25 @ 04:20)  BP: 185/91 (02-04-25 @ 04:20)  RR: 18 (02-04-25 @ 04:20)  SpO2: 96% (02-04-25 @ 04:20)  CAPILLARY BLOOD GLUCOSE      POCT Blood Glucose.: 180 mg/dL (04 Feb 2025 07:35)  POCT Blood Glucose.: 223 mg/dL (03 Feb 2025 22:22)  POCT Blood Glucose.: 205 mg/dL (03 Feb 2025 20:33)  POCT Blood Glucose.: 213 mg/dL (03 Feb 2025 16:32)  POCT Blood Glucose.: 210 mg/dL (03 Feb 2025 13:45)      PHYSICAL EXAM:  General: NAD, tired  HEENT: NCAT  Lungs: No increased WOB  CVS: RRR  Abdomen: , non-distended  Extremities: 1+ edema      LAB  CBC  Date: 02-04-25 @ 06:24  Mean cell Dbqjucjeff16.1  Mean cell Hemoglobin Conc33.6  Mean cell Volum 95.5  Platelet count-Automate 161  RBC Count 3.33  Red Cell Distrib Width14.3  WBC Count6.03  % Albumin, Urine--  Hematocrit 31.8  Hemoglobin 10.7  CBC  Date: 02-03-25 @ 06:35  Mean cell Pxouahsdac07.2  Mean cell Hemoglobin Conc32.9  Mean cell Volum 98.0  Platelet count-Automate 167  RBC Count 3.01  Red Cell Distrib Width14.3  WBC Count4.83  % Albumin, Urine--  Hematocrit 29.5  Hemoglobin 9.7  CBC  Date: 02-02-25 @ 06:54  Mean cell Druyuzfvrh03.9  Mean cell Hemoglobin Conc31.0  Mean cell Volum 99.7  Platelet count-Automate 155  RBC Count 3.14  Red Cell Distrib Width14.1  WBC Count4.92  % Albumin, Urine--  Hematocrit 31.3  Hemoglobin 9.7  CBC  Date: 02-01-25 @ 07:06  Mean cell Mzewddaymr37.2  Mean cell Hemoglobin Conc32.9  Mean cell Volum 98.0  Platelet count-Automate 147  RBC Count 3.04  Red Cell Distrib Width13.8  WBC Count5.32  % Albumin, Urine--  Hematocrit 29.8  Hemoglobin 9.8  CBC  Date: 01-31-25 @ 06:54  Mean cell Bftkonvjpn08.4  Mean cell Hemoglobin Conc33.0  Mean cell Volum 98.1  Platelet count-Automate 146  RBC Count 3.24  Red Cell Distrib Width13.7  WBC Count5.98  % Albumin, Urine--  Hematocrit 31.8  Hemoglobin 10.5  CBC  Date: 01-30-25 @ 07:12  Mean cell Uqvcjbndyn20.8  Mean cell Hemoglobin Conc32.9  Mean cell Volum 96.6  Platelet count-Automate 145  RBC Count 2.96  Red Cell Distrib Width13.7  WBC Count5.09  % Albumin, Urine--  Hematocrit 28.6  Hemoglobin 9.4  CBC  Date: 01-29-25 @ 07:35  Mean cell Uttqrqsodc23.7  Mean cell Hemoglobin Conc33.8  Mean cell Volum 96.7  Platelet count-Automate 161  RBC Count 3.00  Red Cell Distrib Width13.6  WBC Count6.20  % Albumin, Urine--  Hematocrit 29.0  Hemoglobin 9.8    [facility]  02-04-25 @ 06:24  Blood Gas Arterial-Calcium,Ionized--  Blood Urea Nitrogen, Serum 19 mg/dL [10 - 20]  Carbon Dioxide, Serum25 mmol/L [17 - 32]  Chloride, Padqw141 mmol/L [98 - 110]  Creatinie, Serum0.9 mg/dL [0.7 - 1.5]  Glucose, Qcztz910 mg/dL[H] [70 - 99]  Potassium, Serum5.0 mmol/L [3.5 - 5.0]  Sodium, Serum 137 mmol/L [135 - 146]  [facility]  02-03-25 @ 06:35  Blood Gas Arterial-Calcium,Ionized--  Blood Urea Nitrogen, Serum 22 mg/dL[H] [10 - 20]  Carbon Dioxide, Serum26 mmol/L [17 - 32]  Chloride, Btyop792 mmol/L [98 - 110]  Creatinie, Serum0.9 mg/dL [0.7 - 1.5]  Glucose, Edtxy060 mg/dL[H] [70 - 99]  Potassium, Serum4.6 mmol/L [3.5 - 5.0]  Sodium, Serum 140 mmol/L [135 - 146]  [facility]  02-02-25 @ 06:54  Blood Gas Arterial-Calcium,Ionized--  Blood Urea Nitrogen, Serum 20 mg/dL [10 - 20]  Carbon Dioxide, Serum24 mmol/L [17 - 32]  Chloride, Bjlsn491 mmol/L [98 - 110]  Creatinie, Serum1.0 mg/dL [0.7 - 1.5]  Glucose, Kiahx259 mg/dL[H] [70 - 99]  Potassium, Serum4.6 mmol/L [3.5 - 5.0]  Sodium, Serum 141 mmol/L [135 - 146]  BMP  02-01-25 @ 07:06  Blood Gas Arterial-Calcium,Ionized--  Blood Urea Nitrogen, Serum 16 mg/dL [10 - 20]  Carbon Dioxide, Serum24 mmol/L [17 - 32]  Chloride, Jdpzn931 mmol/L [98 - 110]  Creatinie, Serum0.8 mg/dL [0.7 - 1.5]  Glucose, Orjsx419 mg/dL[H] [70 - 99]  Potassium, Serum4.3 mmol/L [3.5 - 5.0]  Sodium, Serum 140 mmol/L [135 - 146]  [facility]  01-31-25 @ 06:54  Blood Gas Arterial-Calcium,Ionized--  Blood Urea Nitrogen, Serum 19 mg/dL [10 - 20]  Carbon Dioxide, Serum23 mmol/L [17 - 32]  Chloride, Wvmba472 mmol/L [98 - 110]  Creatinie, Serum0.9 mg/dL [0.7 - 1.5]  Glucose, Xruhd504 mg/dL[H] [70 - 99]  Potassium, Serum4.5 mmol/L [3.5 - 5.0]  Sodium, Serum 140 mmol/L [135 - 146]        PT/INR - ( 03 Feb 2025 06:35 )   PT: 16.00 sec;   INR: 1.35 ratio         PTT - ( 03 Feb 2025 06:35 )  PTT:49.9 sec      Microbiology:    Culture - Blood (collected 01-30-25 @ 07:12)  Source: .Blood BLOOD  Preliminary Report (02-03-25 @ 22:00):    No growth at 4 days    Culture - Blood (collected 01-30-25 @ 07:12)  Source: .Blood BLOOD  Preliminary Report (02-03-25 @ 22:00):    No growth at 4 days    Culture - Blood (collected 01-29-25 @ 07:35)  Source: .Blood BLOOD  Final Report (02-03-25 @ 21:00):    No growth at 5 days    Culture - Blood (collected 01-27-25 @ 19:13)  Source: .Blood BLOOD  Final Report (02-02-25 @ 03:01):    No growth at 5 days    Culture - Blood (collected 01-27-25 @ 19:13)  Source: .Blood BLOOD  Final Report (02-02-25 @ 03:01):    No growth at 5 days    Culture - Blood (collected 01-25-25 @ 08:23)  Source: .Blood BLOOD  Gram Stain (01-26-25 @ 15:48):    Growth in anaerobic bottle: Gram Positive Cocci in Clusters    Growth in aerobic bottle: Gram Positive Cocci in Clusters  Final Report (01-27-25 @ 14:27):    Growth in aerobic and anaerobic bottles: Staphylococcus aureus    See previous culture [id number]    Culture - Blood (collected 01-25-25 @ 08:23)  Source: .Blood BLOOD  Gram Stain (01-26-25 @ 17:31):    Growth in aerobic bottle: Gram Positive Cocci in Clusters    Growth in anaerobic bottle: Gram Positive Cocci in Clusters  Final Report (01-27-25 @ 14:42):    Growth in aerobic and anaerobic bottles: Staphylococcus aureus    See previous culture [id number]    Culture - Blood (collected 01-23-25 @ 20:05)  Source: .Blood BLOOD  Gram Stain (01-24-25 @ 13:53):    Growth in aerobic bottle: Gram Positive Cocci in Clusters    Growth in anaerobic bottle: Gram Positive Cocci in Clusters  Final Report (01-26-25 @ 09:38):    Growth in aerobic and anaerobic bottles: Staphylococcus aureus    Direct identification is available within approximately 3-5    hours either by Blood Panel Multiplexed PCR or Direct    MALDI-TOF. Details: [email or web address]  Organism: Blood Culture PCR  Staphylococcus aureus (01-26-25 @ 09:38)  Organism: Staphylococcus aureus (01-26-25 @ 09:38)      Method Type: LOUIS      -  Clindamycin: S <=0.25      -  Erythromycin: S <=0.25      -  Gentamicin: S <=4 Should not be used as monotherapy      -  Oxacillin: S <=0.25 Oxacillin predicts susceptibility for dicloxacillin, methicillin, and nafcillin      -  Penicillin: R >2      -  Rifampin: S <=1 Should not be used as monotherapy      -  Tetracycline: S <=4      -  Trimethoprim/Sulfamethoxazole: S <=0.5/9.5      -  Vancomycin: S 1  Organism: Blood Culture PCR (01-26-25 @ 09:38)      Method Type: PCR      -  Methicillin SENSITIVE Staphylococcus aureus (MSSA): Detec Any isolate of Staphylococcus aureus from a blood culture is NOT considered a contaminant.    Culture - Blood (collected 01-23-25 @ 20:05)  Source: .Blood BLOOD  Gram Stain (01-24-25 @ 13:52):    Growth in aerobic bottle: Gram Positive Cocci in Clusters    Growth in anaerobic bottle: Gram Positive Cocci in Clusters  Final Report (01-26-25 @ 09:38):    Growth in aerobic and anaerobic bottles: Staphylococcus aureus    See previous culture [id number]    Culture - Urine (collected 01-23-25 @ 19:55)  Source: Clean Catch None  Final Report (01-27-25 @ 15:57):    10,000 - 49,000 CFU/mL Staphylococcus aureus    50,000 - 99,000 CFU/mL Aerococcus urinae    Isolates of Aerococcus spp. are predictably susceptible to penicillin,    ampicillin, and vancomycin. All isolates are resistant to sulfonamides.  Organism: Staphylococcus aureus (01-27-25 @ 15:57)  Organism: Staphylococcus aureus (01-27-25 @ 15:57)      Method Type: LOUIS      -  Gentamicin: S <=4 Should not be used as monotherapy      -  Nitrofurantoin: S <=32      -  Oxacillin: S <=0.25 Oxacillin predicts susceptibility for dicloxacillin, methicillin, and nafcillin      -  Penicillin: R >2      -  Rifampin: S <=1 Should not be used as monotherapy      -  Tetracycline: S <=4      -  Trimethoprim/Sulfamethoxazole: S <=0.5/9.5      -  Vancomycin: S 1    Urinalysis with Rflx Culture (collected 01-23-25 @ 19:55)        RADIOLOGY & ADDITIONAL TESTS:        Medications:  acetaminophen     Tablet .. 1000 milliGRAM(s) Oral every 8 hours  aluminum hydroxide/magnesium hydroxide/simethicone Suspension 30 milliLiter(s) Oral every 4 hours PRN  aspirin enteric coated 81 milliGRAM(s) Oral daily  atorvastatin 80 milliGRAM(s) Oral at bedtime  bacitracin   Ointment 1 Application(s) Topical two times a day  ceFAZolin   IVPB 2000 milliGRAM(s) IV Intermittent every 8 hours  chlorhexidine 2% Cloths 1 Application(s) Topical <User Schedule>  cyclobenzaprine 5 milliGRAM(s) Oral every 8 hours PRN  dextrose 5%. 1000 milliLiter(s) IV Continuous <Continuous>  dextrose 50% Injectable 25 Gram(s) IV Push once  dextrose 50% Injectable 12.5 Gram(s) IV Push once  dextrose 50% Injectable 25 Gram(s) IV Push once  dextrose Oral Gel 15 Gram(s) Oral once PRN  enoxaparin Injectable 40 milliGRAM(s) SubCutaneous every 24 hours  folic acid 1 milliGRAM(s) Oral daily  gabapentin 300 milliGRAM(s) Oral at bedtime  glucagon  Injectable 1 milliGRAM(s) IntraMuscular once  insulin lispro (ADMELOG) corrective regimen sliding scale   SubCutaneous three times a day before meals  insulin lispro Injectable (ADMELOG) 4 Unit(s) SubCutaneous three times a day before meals  levETIRAcetam 250 milliGRAM(s) Oral two times a day  lidocaine   4% Patch 1 Patch Transdermal daily  lidocaine   4% Patch 1 Patch Transdermal every 24 hours  losartan 50 milliGRAM(s) Oral daily  melatonin 5 milliGRAM(s) Oral at bedtime PRN  morphine  - Injectable 2 milliGRAM(s) IV Push every 4 hours PRN  nystatin Powder 1 Application(s) Topical every 12 hours PRN  ondansetron Injectable 4 milliGRAM(s) IV Push every 8 hours PRN  oxyCODONE    IR 10 milliGRAM(s) Oral three times a day PRN  polyethylene glycol 3350 17 Gram(s) Oral two times a day  senna 2 Tablet(s) Oral at bedtime  Tresiba ( Insulin Degludec) 60 Unit(s) 60 Unit(s) SubCutaneous at bedtime        Assessment and Plan:      80-year-old male with past medical history of insulin-dependent diabetes, hypertension, subdural hematoma on keppra who presents for multiple falls, weakness, & confusion.    Plan for today  -D/c to STR ([place])    #MSSA bacteremia, origin L forearm cellulitis v. UTI?  #Acute OM/discitis  #Questionable L forearm cellulitis - low suspicion for septic joint   - BCx + for MSSA (1/23, 1/25)  - UA + for nitrites, 20 WBC, small LE, & few bacteria  - UCx + for MSSA  - ID on board  - L Forearm/Elbow XR -ve for fx, showed soft tissue swelling  - L Elbow CT: Very limited exam. Artifact. Portions of the elbow are not imaged; Diffuse soft tissue edema which may reflect cellulitis in the right clinical setting. No definite discrete fluid collection; Arthritic changes of the elbow with no definite radiographic evidence of osteomyelitis.  - TTE w/ no vegetations  Plan:   -BCx now clear  - Follow MRI T/L spine -->L4-5 OM and discitis, plan for 6 weeks of antibiotics, PICC placed 2/3/2025  - C/w IV cefazolin 2 g q8H  until 3/9/2025  - Weekly CBC, CMP, ESR/CRP  - ID follow-up with Dr. Jose A Yancey for Telehealth. We will call the patient between 10:30-1:30      [address] [facility]       [phone]       Fax [phone]      #Scrotal lesion  -Uro consult  >small skin tear due to edema  - Recommend elevate scrotum to alleviate scrotal edema and pressure off skin tear.  - Continue to apply barrier cream to avoid maceration of scrotal skin.      #TME  #Hx of SDH  - R/o stroke 2/2 septic emboli v. TME 2/2 MSSA bacteremia   - Stroke Code called due to slurred speech  - S/p ASA + Plavix load  - CTH -ve for acute pathology  - CTA H+N: No large vessel occlusion, aneurysm, or vascular malformation; Mild atherosclerotic stenosis in the bilateral supraclinoid ICAs and bilateral V4 vertebral arteries.  - CTP: Small artifactual perfusion abnormality in the anterior right cerebellum,   otherwise no perfusion deficits to suggest areas of completed infarction   or at risk territory.  - MR Head: No acute intracranial pathology; Stable mild chronic microvascular ischemic changes.  - Given negative MRI, suspect TME due to polypharmacy  Plan:  - C/w ASA 81 mg q24H  - D/c plavix 75 mg q24H as per neuro  - C/w atorvastatin 80 mg qhs  - C/w Keppra 250 mg q12H  - Neuro checks d/c  -Reeg negative    #Type II NSTEMI  - EKG shows no acute ischemic changes  - HST 47>43>39>31  - TTE: LVEF 66%  - stop monitoring    #Chronic macrocytic anemia  - Hb 11.1 on admission  - No s/s of active bleeding  - B12 1163, WNL (1/23/25)  - Folate 5, lower end of normal (1/23/25)  - Iron studies WNL  Plan:  - C/w folic acid 1 mg q24H  - OP EGD/Colonoscopy   - Transfuse if Hb <7    #T2DM  #Hyperglycemia, resolved  - On metformin 500 mg q6H + Tresiba 60 units @ home  - A1c 7.6%  - FS 70s 1/28  Plan:  - C/w home Tresiba 60U, resume metforming at d/c  - C/w +1 ISS    #HTN  - C/w home losartan 50 mg q24H    #Chronic back pain  #Frequent Falls  - C/w pain control: PO Tylenol 650 mg q6H + PO oxycodone 10 mg q6H PRN, IV morphine 2 mg q4H PRN, PO cyclobenzaprine 10 mg q8H + lidocaine patches  - Decrease cyclobenzaprine 5 mg q8H PRN as patient was very lethargic, suspect 2/2 pain meds  - PT consult      #Depression  - C/w venlafaxine ER 75 mg q24H (started inpatient)    # Incidental pancreatic tail hypodensity  - RUQ U/S WNL  - OP MRI may be obtained for further characterization  - Follow up with GI office located on [address] [place] [address], Phone number [phone] with Dr. Puentes to consider EUS    #MISC  - DVT ppx: Lovenox SQ  - GI ppx: NI  - Diet:   - Activity: AAT  - Code Status: Full Code  - Dispo: STR, pending PICC placement and family selection, will need 6 weeks of abx

## 2025-02-04 NOTE — DISCHARGE NOTE NURSING/CASE MANAGEMENT/SOCIAL WORK - NSDCFUADDAPPT_GEN_ALL_CORE_FT
ID follow-up with Dr. Jose A Yancey for Telehealth. We will call the patient between 10:30-1:30      [address]       [phone]       Fax [phone]

## 2025-02-04 NOTE — DISCHARGE NOTE NURSING/CASE MANAGEMENT/SOCIAL WORK - FINANCIAL ASSISTANCE
[facility] provides services at a reduced cost to those who are determined to be eligible through [facility]’s financial assistance program. Information regarding [facility]’s financial assistance program can be found by going to [email or web address] or by calling [phone].

## 2025-02-04 NOTE — PROGRESS NOTE ADULT - PROVIDER SPECIALTY LIST ADULT
Hospitalist
Infectious Disease
Infectious Disease
Internal Medicine
Internal Medicine
Physiatry
Wound Care
Internal Medicine
Hospitalist
Hospitalist
Infectious Disease

## 2025-02-04 NOTE — DISCHARGE NOTE NURSING/CASE MANAGEMENT/SOCIAL WORK - PATIENT PORTAL LINK FT
You can access the FollowMyHealth Patient Portal offered by [facility] by registering at the following website: [email or web address]. By joining [employer]’s FollowMyHealth portal, you will also be able to view your health information using other applications (apps) compatible with our system.

## 2025-02-05 LAB — LEVETIRACETAM SERPL-MCNC: 17.3 UG/ML — SIGNIFICANT CHANGE UP (ref 10–40)

## 2025-02-06 LAB — LEVETIRACETAM SERPL-MCNC: 11.5 UG/ML — SIGNIFICANT CHANGE UP (ref 10–40)

## 2025-02-10 DIAGNOSIS — Z79.4 LONG TERM (CURRENT) USE OF INSULIN: ICD-10-CM

## 2025-02-10 DIAGNOSIS — D63.8 ANEMIA IN OTHER CHRONIC DISEASES CLASSIFIED ELSEWHERE: ICD-10-CM

## 2025-02-10 DIAGNOSIS — B95.61 METHICILLIN SUSCEPTIBLE STAPHYLOCOCCUS AUREUS INFECTION AS THE CAUSE OF DISEASES CLASSIFIED ELSEWHERE: ICD-10-CM

## 2025-02-10 DIAGNOSIS — M46.26 OSTEOMYELITIS OF VERTEBRA, LUMBAR REGION: ICD-10-CM
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
## 2025-02-10 DIAGNOSIS — N39.0 URINARY TRACT INFECTION, SITE NOT SPECIFIED: ICD-10-CM

## 2025-02-10 DIAGNOSIS — Z79.899 OTHER LONG TERM (CURRENT) DRUG THERAPY: ICD-10-CM

## 2025-02-10 DIAGNOSIS — M46.46 DISCITIS, UNSPECIFIED, LUMBAR REGION: ICD-10-CM

## 2025-02-10 DIAGNOSIS — E11.65 TYPE 2 DIABETES MELLITUS WITH HYPERGLYCEMIA: ICD-10-CM

## 2025-02-10 DIAGNOSIS — N49.2 INFLAMMATORY DISORDERS OF SCROTUM: ICD-10-CM

## 2025-02-10 DIAGNOSIS — D84.9 IMMUNODEFICIENCY, UNSPECIFIED: ICD-10-CM

## 2025-02-10 DIAGNOSIS — E11.69 TYPE 2 DIABETES MELLITUS WITH OTHER SPECIFIED COMPLICATION: ICD-10-CM

## 2025-02-10 DIAGNOSIS — G93.41 METABOLIC ENCEPHALOPATHY: ICD-10-CM

## 2025-02-10 DIAGNOSIS — R53.1 WEAKNESS: ICD-10-CM

## 2025-02-10 DIAGNOSIS — F32.A DEPRESSION, UNSPECIFIED: ICD-10-CM

## 2025-02-10 DIAGNOSIS — R78.81 BACTEREMIA: ICD-10-CM
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
## 2025-02-10 DIAGNOSIS — L03.114 CELLULITIS OF LEFT UPPER LIMB: ICD-10-CM

## 2025-02-10 DIAGNOSIS — R29.6 REPEATED FALLS: ICD-10-CM

## 2025-02-19 ENCOUNTER — EMERGENCY (EMERGENCY)
Facility: HOSPITAL | Age: 80
LOS: 0 days | Discharge: HOME | End: 2025-02-19
Payer: MEDICARE

## 2025-02-19 VITALS — HEIGHT: 69 IN | WEIGHT: 250 LBS

## 2025-02-19 VITALS
SYSTOLIC BLOOD PRESSURE: 180 MMHG | HEART RATE: 90 BPM | DIASTOLIC BLOOD PRESSURE: 85 MMHG | OXYGEN SATURATION: 96 % | RESPIRATION RATE: 18 BRPM

## 2025-02-19 DIAGNOSIS — R07.9 CHEST PAIN, UNSPECIFIED: ICD-10-CM

## 2025-02-19 DIAGNOSIS — Z99.3 DEPENDENCE ON WHEELCHAIR: ICD-10-CM

## 2025-02-19 DIAGNOSIS — E11.9 TYPE 2 DIABETES MELLITUS WITHOUT COMPLICATIONS: ICD-10-CM

## 2025-02-19 DIAGNOSIS — Z90.49 ACQUIRED ABSENCE OF OTHER SPECIFIED PARTS OF DIGESTIVE TRACT: Chronic | ICD-10-CM

## 2025-02-19 DIAGNOSIS — Z86.79 PERSONAL HISTORY OF OTHER DISEASES OF THE CIRCULATORY SYSTEM: ICD-10-CM

## 2025-02-19 DIAGNOSIS — L89.90 PRESSURE ULCER OF UNSPECIFIED SITE, UNSPECIFIED STAGE: ICD-10-CM

## 2025-02-19 DIAGNOSIS — I10 ESSENTIAL (PRIMARY) HYPERTENSION: ICD-10-CM

## 2025-02-19 DIAGNOSIS — B95.61 METHICILLIN SUSCEPTIBLE STAPHYLOCOCCUS AUREUS INFECTION AS THE CAUSE OF DISEASES CLASSIFIED ELSEWHERE: ICD-10-CM

## 2025-02-19 DIAGNOSIS — Z98.890 OTHER SPECIFIED POSTPROCEDURAL STATES: Chronic | ICD-10-CM

## 2025-02-19 DIAGNOSIS — Z95.9 PRESENCE OF CARDIAC AND VASCULAR IMPLANT AND GRAFT, UNSPECIFIED: ICD-10-CM
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
## 2025-02-19 LAB
ALBUMIN SERPL ELPH-MCNC: 2 G/DL — LOW (ref 3.5–5.2)
ALP SERPL-CCNC: 323 U/L — HIGH (ref 30–115)
ALT FLD-CCNC: 10 U/L — SIGNIFICANT CHANGE UP (ref 0–41)
ANION GAP SERPL CALC-SCNC: 11 MMOL/L — SIGNIFICANT CHANGE UP (ref 7–14)
APTT BLD: 39.9 SEC — HIGH (ref 27–39.2)
AST SERPL-CCNC: 93 U/L — HIGH (ref 0–41)
BASOPHILS # BLD AUTO: 0.03 K/UL — SIGNIFICANT CHANGE UP (ref 0–0.2)
BASOPHILS NFR BLD AUTO: 0.6 % — SIGNIFICANT CHANGE UP (ref 0–1)
BILIRUB SERPL-MCNC: 1.2 MG/DL — SIGNIFICANT CHANGE UP (ref 0.2–1.2)
BUN SERPL-MCNC: 13 MG/DL — SIGNIFICANT CHANGE UP (ref 10–20)
CALCIUM SERPL-MCNC: 7.7 MG/DL — LOW (ref 8.4–10.5)
CHLORIDE SERPL-SCNC: 107 MMOL/L — SIGNIFICANT CHANGE UP (ref 98–110)
CO2 SERPL-SCNC: 25 MMOL/L — SIGNIFICANT CHANGE UP (ref 17–32)
CREAT SERPL-MCNC: 0.8 MG/DL — SIGNIFICANT CHANGE UP (ref 0.7–1.5)
EGFR: 91 ML/MIN/1.73M2 — SIGNIFICANT CHANGE UP
EOSINOPHIL # BLD AUTO: 0.59 K/UL — SIGNIFICANT CHANGE UP (ref 0–0.7)
EOSINOPHIL NFR BLD AUTO: 11.7 % — HIGH (ref 0–8)
GLUCOSE SERPL-MCNC: 115 MG/DL — HIGH (ref 70–99)
HCT VFR BLD CALC: 29.4 % — LOW (ref 42–52)
HGB BLD-MCNC: 9.5 G/DL — LOW (ref 14–18)
IMM GRANULOCYTES NFR BLD AUTO: 0.6 % — HIGH (ref 0.1–0.3)
INR BLD: 1.61 RATIO — HIGH (ref 0.65–1.3)
LIDOCAIN IGE QN: 19 U/L — SIGNIFICANT CHANGE UP (ref 7–60)
LYMPHOCYTES # BLD AUTO: 0.88 K/UL — LOW (ref 1.2–3.4)
LYMPHOCYTES # BLD AUTO: 17.5 % — LOW (ref 20.5–51.1)
MAGNESIUM SERPL-MCNC: 1.2 MG/DL — LOW (ref 1.8–2.4)
MCHC RBC-ENTMCNC: 31.7 PG — HIGH (ref 27–31)
MCHC RBC-ENTMCNC: 32.3 G/DL — SIGNIFICANT CHANGE UP (ref 32–37)
MCV RBC AUTO: 98 FL — HIGH (ref 80–94)
MONOCYTES # BLD AUTO: 0.64 K/UL — HIGH (ref 0.1–0.6)
MONOCYTES NFR BLD AUTO: 12.7 % — HIGH (ref 1.7–9.3)
NEUTROPHILS # BLD AUTO: 2.86 K/UL — SIGNIFICANT CHANGE UP (ref 1.4–6.5)
NEUTROPHILS NFR BLD AUTO: 56.9 % — SIGNIFICANT CHANGE UP (ref 42.2–75.2)
NRBC BLD AUTO-RTO: 0 /100 WBCS — SIGNIFICANT CHANGE UP (ref 0–0)
NT-PROBNP SERPL-SCNC: 234 PG/ML — SIGNIFICANT CHANGE UP (ref 0–300)
PLATELET # BLD AUTO: 133 K/UL — SIGNIFICANT CHANGE UP (ref 130–400)
PMV BLD: 8.3 FL — SIGNIFICANT CHANGE UP (ref 7.4–10.4)
POTASSIUM SERPL-MCNC: 4 MMOL/L — SIGNIFICANT CHANGE UP (ref 3.5–5)
POTASSIUM SERPL-SCNC: 4 MMOL/L — SIGNIFICANT CHANGE UP (ref 3.5–5)
PROT SERPL-MCNC: 6 G/DL — SIGNIFICANT CHANGE UP (ref 6–8)
PROTHROM AB SERPL-ACNC: 19.2 SEC — HIGH (ref 9.95–12.87)
RBC # BLD: 3 M/UL — LOW (ref 4.7–6.1)
RBC # FLD: 15.3 % — HIGH (ref 11.5–14.5)
SODIUM SERPL-SCNC: 143 MMOL/L — SIGNIFICANT CHANGE UP (ref 135–146)
TROPONIN T, HIGH SENSITIVITY RESULT: 28 NG/L — HIGH (ref 6–21)
TROPONIN T, HIGH SENSITIVITY RESULT: 43 NG/L — HIGH (ref 6–21)
WBC # BLD: 5.03 K/UL — SIGNIFICANT CHANGE UP (ref 4.8–10.8)
WBC # FLD AUTO: 5.03 K/UL — SIGNIFICANT CHANGE UP (ref 4.8–10.8)

## 2025-02-19 PROCEDURE — 85025 COMPLETE CBC W/AUTO DIFF WBC: CPT

## 2025-02-19 PROCEDURE — 83690 ASSAY OF LIPASE: CPT

## 2025-02-19 PROCEDURE — 80053 COMPREHEN METABOLIC PANEL: CPT

## 2025-02-19 PROCEDURE — 99285 EMERGENCY DEPT VISIT HI MDM: CPT | Mod: FS

## 2025-02-19 PROCEDURE — 93005 ELECTROCARDIOGRAM TRACING: CPT

## 2025-02-19 PROCEDURE — 36415 COLL VENOUS BLD VENIPUNCTURE: CPT

## 2025-02-19 PROCEDURE — 85730 THROMBOPLASTIN TIME PARTIAL: CPT

## 2025-02-19 PROCEDURE — 70450 CT HEAD/BRAIN W/O DYE: CPT | Mod: 26

## 2025-02-19 PROCEDURE — 96375 TX/PRO/DX INJ NEW DRUG ADDON: CPT

## 2025-02-19 PROCEDURE — 96374 THER/PROPH/DIAG INJ IV PUSH: CPT

## 2025-02-19 PROCEDURE — 83880 ASSAY OF NATRIURETIC PEPTIDE: CPT

## 2025-02-19 PROCEDURE — 83735 ASSAY OF MAGNESIUM: CPT

## 2025-02-19 PROCEDURE — 70450 CT HEAD/BRAIN W/O DYE: CPT

## 2025-02-19 PROCEDURE — 71045 X-RAY EXAM CHEST 1 VIEW: CPT

## 2025-02-19 PROCEDURE — 71045 X-RAY EXAM CHEST 1 VIEW: CPT | Mod: 26

## 2025-02-19 PROCEDURE — 84484 ASSAY OF TROPONIN QUANT: CPT | Mod: 91

## 2025-02-19 PROCEDURE — 85610 PROTHROMBIN TIME: CPT

## 2025-02-19 PROCEDURE — 99285 EMERGENCY DEPT VISIT HI MDM: CPT | Mod: 25

## 2025-02-19 PROCEDURE — 93010 ELECTROCARDIOGRAM REPORT: CPT

## 2025-02-19 RX ORDER — MORPHINE SULFATE 60 MG/1
4 TABLET, FILM COATED, EXTENDED RELEASE ORAL ONCE
Refills: 0 | Status: DISCONTINUED | OUTPATIENT
Start: 2025-02-19 | End: 2025-02-19

## 2025-02-19 RX ORDER — MAGNESIUM SULFATE 0.8 MEQ/ML
2 AMPUL (ML) INJECTION ONCE
Refills: 0 | Status: COMPLETED | OUTPATIENT
Start: 2025-02-19 | End: 2025-02-19

## 2025-02-19 RX ORDER — LOSARTAN POTASSIUM 100 MG
50 TABLET ORAL ONCE
Refills: 0 | Status: COMPLETED | OUTPATIENT
Start: 2025-02-19 | End: 2025-02-19

## 2025-02-19 RX ORDER — AMLODIPINE BESYLATE 5 MG
5 TABLET ORAL ONCE
Refills: 0 | Status: COMPLETED | OUTPATIENT
Start: 2025-02-19 | End: 2025-02-19

## 2025-02-19 RX ADMIN — MORPHINE SULFATE 4 MILLIGRAM(S): 60 TABLET, FILM COATED, EXTENDED RELEASE ORAL at 16:37

## 2025-02-19 RX ADMIN — Medication 5 MILLIGRAM(S): at 16:57

## 2025-02-19 RX ADMIN — Medication 25 GRAM(S): at 13:57

## 2025-02-19 RX ADMIN — Medication 50 MILLIGRAM(S): at 16:57

## 2025-02-19 NOTE — ED PROVIDER NOTE - CLINICAL SUMMARY MEDICAL DECISION MAKING FREE TEXT BOX
With above past medical history sent from [place] [facility] after complaining of chest pain and noted to have elevated blood pressure, this happened after a wheelchair transfer during which he seemed to have decreased level of consciousness, on exam today vitals appreciated, chronically ill but nontoxic-appearing and alert and oriented x 2 at his baseline with family at bedside, head NC/AT, PERRLA, dry mucous membranes, neck supple, cor regular, lungs clear, abdomen soft nontender, calves nontender, globally weak with multiple pressure ulcers noninfected, neurologically nonfocal, labs and studies appreciated, will discharge back to [place]

## 2025-02-19 NOTE — ED PROVIDER NOTE - WHICH SHOWED
EKG normal sinus rhythm 89 with first-degree AV block no ST elevations; chest x-ray no infiltrate no effusion no pneumothorax

## 2025-02-19 NOTE — ED ADULT NURSE NOTE - NSFALLUNIVINTERV_ED_ALL_ED
Bed/Stretcher in lowest position, wheels locked, appropriate side rails in place/Call bell, personal items and telephone in reach/Instruct patient to call for assistance before getting out of bed/chair/stretcher/Non-slip footwear applied when patient is off stretcher/[place] to call system/Physically safe environment - no spills, clutter or unnecessary equipment/Purposeful proactive rounding/Room/bathroom lighting operational, light cord in reach

## 2025-02-19 NOTE — ED PROVIDER NOTE - ATTENDING APP SHARED VISIT CONTRIBUTION OF CARE
With above past medical history sent from [facility] after complaining of chest pain and noted to have elevated blood pressure, this happened after a wheelchair transfer during which he seemed to have decreased level of consciousness, on exam today vitals appreciated, chronically ill but nontoxic-appearing and alert and oriented x 2 at his baseline with family at bedside, head NC/AT, PERRLA, dry mucous membranes, neck supple, cor regular, lungs clear, abdomen soft nontender, calves nontender, globally weak with multiple pressure ulcers noninfected, neurologically nonfocal, labs and studies appreciated, will discharge back to [place]

## 2025-02-19 NOTE — ED PROVIDER NOTE - PHYSICAL EXAMINATION
VITAL SIGNS: I have reviewed nursing notes and confirm.  CONSTITUTIONAL: 79 yo M laying on stretcher; in no acute distress.  SKIN: Skin exam is warm and dry, no acute rash.  HEAD: Normocephalic; atraumatic.  EYES: Conjunctiva and sclera clear.  ENT: No nasal discharge; airway clear.   CARD: S1, S2 normal; no murmurs, gallops, or rubs. Regular rate and rhythm.  RESP: No wheezes, rales or rhonchi. Speaking in full sentences.   ABD: Normal bowel sounds; soft; non-distended; non-tender; No rebound or guarding. No CVA tenderness.  EXT: Normal ROM. No calf TTP.   NEURO: Alert, oriented. Grossly unremarkable. No focal deficits.

## 2025-02-19 NOTE — ED PROVIDER NOTE - OBJECTIVE STATEMENT
80-year-old male with past medical history of HTN, DM, prior subdural hematoma, and MSSA bacteremia currently on cefazolin 2G Q8H until 3/9/2025 via right arm PICC presents to the ED sent in from [facility] for evaluation of brief episode of decreased responsiveness this morning after eating breakfast.  Patient was sitting in his wheelchair, became sleepy, staff had trouble arousing him, they placed him back into bed and upon then he was at baseline.  No seizure-like activity occurred.  While in bed patient began to complain of chest pain.  Staff took BP noted to be elevated, gave 1 SL nitro and sent patient to ED.  Currently patient denies any complaints, states chest pain has resolved. Pt denies fever, chills, nausea, vomiting, abdominal pain, diarrhea, headache, dizziness, weakness, SOB, back pain, LOC, trauma, urinary symptoms, cough, calf pain/swelling, recent travel, recent surgery.

## 2025-02-19 NOTE — ED PROVIDER NOTE - PATIENT PORTAL LINK FT
You can access the FollowMyHealth Patient Portal offered by [facility] by registering at the following website: [email or web address]. By joining [employer]’s FollowMyHealth portal, you will also be able to view your health information using other applications (apps) compatible with our system.

## 2025-03-31 ENCOUNTER — APPOINTMENT (OUTPATIENT)
Dept: UROLOGY | Facility: CLINIC | Age: 80
End: 2025-03-31
Payer: MEDICARE

## 2025-03-31 VITALS
DIASTOLIC BLOOD PRESSURE: 69 MMHG | SYSTOLIC BLOOD PRESSURE: 125 MMHG | RESPIRATION RATE: 18 BRPM | BODY MASS INDEX: 45 KG/M2 | OXYGEN SATURATION: 97 % | HEART RATE: 82 BPM | WEIGHT: 254 LBS | HEIGHT: 63 IN

## 2025-03-31 DIAGNOSIS — N20.0 CALCULUS OF KIDNEY: ICD-10-CM

## 2025-03-31 PROCEDURE — 99214 OFFICE O/P EST MOD 30 MIN: CPT

## 2025-04-13 ENCOUNTER — INPATIENT (INPATIENT)
Facility: HOSPITAL | Age: 80
LOS: 66 days | Discharge: HOME | DRG: 72 | End: 2025-06-19
Attending: STUDENT IN AN ORGANIZED HEALTH CARE EDUCATION/TRAINING PROGRAM | Admitting: INTERNAL MEDICINE
Payer: MEDICARE

## 2025-04-13 VITALS
WEIGHT: 250 LBS | HEART RATE: 61 BPM | SYSTOLIC BLOOD PRESSURE: 105 MMHG | RESPIRATION RATE: 22 BRPM | HEIGHT: 69 IN | DIASTOLIC BLOOD PRESSURE: 69 MMHG | OXYGEN SATURATION: 98 %

## 2025-04-13 DIAGNOSIS — Z90.49 ACQUIRED ABSENCE OF OTHER SPECIFIED PARTS OF DIGESTIVE TRACT: Chronic | ICD-10-CM

## 2025-04-13 DIAGNOSIS — Z98.890 OTHER SPECIFIED POSTPROCEDURAL STATES: Chronic | ICD-10-CM

## 2025-04-13 LAB
ALBUMIN SERPL ELPH-MCNC: 2.1 G/DL — LOW (ref 3.5–5.2)
ALP SERPL-CCNC: 146 U/L — HIGH (ref 30–115)
ALT FLD-CCNC: 16 U/L — SIGNIFICANT CHANGE UP (ref 0–41)
ANION GAP SERPL CALC-SCNC: 18 MMOL/L — HIGH (ref 7–14)
APPEARANCE UR: ABNORMAL
APTT BLD: 33.4 SEC — SIGNIFICANT CHANGE UP (ref 27–39.2)
AST SERPL-CCNC: 56 U/L — HIGH (ref 0–41)
BACTERIA # UR AUTO: ABNORMAL /HPF
BASE EXCESS BLDV CALC-SCNC: 2 MMOL/L — SIGNIFICANT CHANGE UP (ref -2–3)
BASOPHILS # BLD AUTO: 0.01 K/UL — SIGNIFICANT CHANGE UP (ref 0–0.2)
BASOPHILS NFR BLD AUTO: 0.1 % — SIGNIFICANT CHANGE UP (ref 0–1)
BILIRUB SERPL-MCNC: 0.7 MG/DL — SIGNIFICANT CHANGE UP (ref 0.2–1.2)
BILIRUB UR-MCNC: ABNORMAL
BUN SERPL-MCNC: 28 MG/DL — HIGH (ref 10–20)
CA-I SERPL-SCNC: 0.91 MMOL/L — LOW (ref 1.15–1.33)
CALCIUM SERPL-MCNC: 6.7 MG/DL — LOW (ref 8.4–10.5)
CHLORIDE SERPL-SCNC: 108 MMOL/L — SIGNIFICANT CHANGE UP (ref 98–110)
CK SERPL-CCNC: 187 U/L — SIGNIFICANT CHANGE UP (ref 0–225)
CO2 SERPL-SCNC: 20 MMOL/L — SIGNIFICANT CHANGE UP (ref 17–32)
COLOR SPEC: SIGNIFICANT CHANGE UP
CREAT SERPL-MCNC: 1.5 MG/DL — SIGNIFICANT CHANGE UP (ref 0.7–1.5)
CRP SERPL-MCNC: 78.7 MG/L — HIGH
DIFF PNL FLD: ABNORMAL
EGFR: 47 ML/MIN/1.73M2 — LOW
EGFR: 47 ML/MIN/1.73M2 — LOW
EOSINOPHIL # BLD AUTO: 0.18 K/UL — SIGNIFICANT CHANGE UP (ref 0–0.7)
EOSINOPHIL NFR BLD AUTO: 2.5 % — SIGNIFICANT CHANGE UP (ref 0–8)
EPI CELLS # UR: SIGNIFICANT CHANGE UP
ERYTHROCYTE [SEDIMENTATION RATE] IN BLOOD: 50 MM/HR — HIGH (ref 0–10)
FLUAV AG NPH QL: SIGNIFICANT CHANGE UP
FLUBV AG NPH QL: SIGNIFICANT CHANGE UP
GAS PNL BLDV: 141 MMOL/L — SIGNIFICANT CHANGE UP (ref 136–145)
GAS PNL BLDV: SIGNIFICANT CHANGE UP
GAS PNL BLDV: SIGNIFICANT CHANGE UP
GLUCOSE BLDC GLUCOMTR-MCNC: 97 MG/DL — SIGNIFICANT CHANGE UP (ref 70–99)
GLUCOSE SERPL-MCNC: 85 MG/DL — SIGNIFICANT CHANGE UP (ref 70–99)
GLUCOSE UR QL: NEGATIVE MG/DL — SIGNIFICANT CHANGE UP
HCO3 BLDV-SCNC: 24 MMOL/L — SIGNIFICANT CHANGE UP (ref 22–29)
HCT VFR BLD CALC: 30.2 % — LOW (ref 42–52)
HCT VFR BLDA CALC: 31 % — LOW (ref 39–51)
HGB BLD CALC-MCNC: 10.3 G/DL — LOW (ref 12.6–17.4)
HGB BLD-MCNC: 10 G/DL — LOW (ref 14–18)
IMM GRANULOCYTES NFR BLD AUTO: 0.4 % — HIGH (ref 0.1–0.3)
INR BLD: 1.57 RATIO — HIGH (ref 0.65–1.3)
KETONES UR-MCNC: 15 MG/DL
LACTATE BLDV-MCNC: 3.8 MMOL/L — HIGH (ref 0.5–2)
LACTATE SERPL-SCNC: 3.2 MMOL/L — HIGH (ref 0.7–2)
LEUKOCYTE ESTERASE UR-ACNC: ABNORMAL
LYMPHOCYTES # BLD AUTO: 2.07 K/UL — SIGNIFICANT CHANGE UP (ref 1.2–3.4)
LYMPHOCYTES # BLD AUTO: 28.6 % — SIGNIFICANT CHANGE UP (ref 20.5–51.1)
MCHC RBC-ENTMCNC: 32.2 PG — HIGH (ref 27–31)
MCHC RBC-ENTMCNC: 33.1 G/DL — SIGNIFICANT CHANGE UP (ref 32–37)
MCV RBC AUTO: 97.1 FL — HIGH (ref 80–94)
MONOCYTES # BLD AUTO: 0.6 K/UL — SIGNIFICANT CHANGE UP (ref 0.1–0.6)
MONOCYTES NFR BLD AUTO: 8.3 % — SIGNIFICANT CHANGE UP (ref 1.7–9.3)
NEUTROPHILS # BLD AUTO: 4.35 K/UL — SIGNIFICANT CHANGE UP (ref 1.4–6.5)
NEUTROPHILS NFR BLD AUTO: 60.1 % — SIGNIFICANT CHANGE UP (ref 42.2–75.2)
NITRITE UR-MCNC: NEGATIVE — SIGNIFICANT CHANGE UP
NRBC BLD AUTO-RTO: 0 /100 WBCS — SIGNIFICANT CHANGE UP (ref 0–0)
PCO2 BLDV: 30 MMHG — LOW (ref 42–55)
PH BLDV: 7.52 — HIGH (ref 7.32–7.43)
PH UR: 5 — SIGNIFICANT CHANGE UP (ref 5–8)
PLATELET # BLD AUTO: 151 K/UL — SIGNIFICANT CHANGE UP (ref 130–400)
PMV BLD: 9.7 FL — SIGNIFICANT CHANGE UP (ref 7.4–10.4)
PO2 BLDV: 43 MMHG — SIGNIFICANT CHANGE UP (ref 25–45)
POTASSIUM BLDV-SCNC: 4.1 MMOL/L — SIGNIFICANT CHANGE UP (ref 3.5–5.1)
POTASSIUM SERPL-MCNC: 4.1 MMOL/L — SIGNIFICANT CHANGE UP (ref 3.5–5)
POTASSIUM SERPL-SCNC: 4.1 MMOL/L — SIGNIFICANT CHANGE UP (ref 3.5–5)
PROT SERPL-MCNC: 4.7 G/DL — LOW (ref 6–8)
PROT UR-MCNC: 30 MG/DL
PROTHROM AB SERPL-ACNC: 18.7 SEC — HIGH (ref 9.95–12.87)
RBC # BLD: 3.11 M/UL — LOW (ref 4.7–6.1)
RBC # FLD: 15.9 % — HIGH (ref 11.5–14.5)
RBC CASTS # UR COMP ASSIST: 12 /HPF — HIGH (ref 0–4)
RSV RNA NPH QL NAA+NON-PROBE: SIGNIFICANT CHANGE UP
SAO2 % BLDV: 69.5 % — SIGNIFICANT CHANGE UP (ref 67–88)
SARS-COV-2 RNA SPEC QL NAA+PROBE: SIGNIFICANT CHANGE UP
SODIUM SERPL-SCNC: 146 MMOL/L — SIGNIFICANT CHANGE UP (ref 135–146)
SOURCE RESPIRATORY: SIGNIFICANT CHANGE UP
SP GR SPEC: 1.02 — SIGNIFICANT CHANGE UP (ref 1–1.03)
TROPONIN T, HIGH SENSITIVITY RESULT: 174 NG/L — CRITICAL HIGH (ref 6–21)
UROBILINOGEN FLD QL: 1 MG/DL — SIGNIFICANT CHANGE UP (ref 0.2–1)
WBC # BLD: 7.24 K/UL — SIGNIFICANT CHANGE UP (ref 4.8–10.8)
WBC # FLD AUTO: 7.24 K/UL — SIGNIFICANT CHANGE UP (ref 4.8–10.8)
WBC UR QL: 5 /HPF — SIGNIFICANT CHANGE UP (ref 0–5)

## 2025-04-13 PROCEDURE — 71045 X-RAY EXAM CHEST 1 VIEW: CPT | Mod: 26

## 2025-04-13 PROCEDURE — 99291 CRITICAL CARE FIRST HOUR: CPT | Mod: FS

## 2025-04-13 PROCEDURE — 93010 ELECTROCARDIOGRAM REPORT: CPT

## 2025-04-13 PROCEDURE — 74177 CT ABD & PELVIS W/CONTRAST: CPT | Mod: 26

## 2025-04-13 PROCEDURE — 70450 CT HEAD/BRAIN W/O DYE: CPT | Mod: 26

## 2025-04-13 RX ORDER — VANCOMYCIN HCL IN 5 % DEXTROSE 1.5G/250ML
1500 PLASTIC BAG, INJECTION (ML) INTRAVENOUS ONCE
Refills: 0 | Status: COMPLETED | OUTPATIENT
Start: 2025-04-13 | End: 2025-04-13

## 2025-04-13 RX ORDER — SODIUM CHLORIDE 9 G/1000ML
1000 INJECTION, SOLUTION INTRAVENOUS ONCE
Refills: 0 | Status: COMPLETED | OUTPATIENT
Start: 2025-04-13 | End: 2025-04-13

## 2025-04-13 RX ADMIN — SODIUM CHLORIDE 1000 MILLILITER(S): 9 INJECTION, SOLUTION INTRAVENOUS at 21:35

## 2025-04-13 RX ADMIN — Medication 4 MILLIGRAM(S): at 21:59

## 2025-04-13 RX ADMIN — Medication 250 MILLIGRAM(S): at 22:17

## 2025-04-13 RX ADMIN — SODIUM CHLORIDE 1000 MILLILITER(S): 9 INJECTION, SOLUTION INTRAVENOUS at 22:45

## 2025-04-13 RX ADMIN — SODIUM CHLORIDE 1000 MILLILITER(S): 9 INJECTION, SOLUTION INTRAVENOUS at 21:04

## 2025-04-13 RX ADMIN — SODIUM CHLORIDE 1000 MILLILITER(S): 9 INJECTION, SOLUTION INTRAVENOUS at 22:00

## 2025-04-13 NOTE — ED PROVIDER NOTE - CLINICAL SUMMARY MEDICAL DECISION MAKING FREE TEXT BOX
67-year-old male, history of thyroid cancer, presents with right upper quadrant pain radiating to the back today.  No fever, vomiting or diarrhea.  Labs noted for WBC 7.8, hemoglobin 15, BUN 16, creatinine 0.9, lipase 26, AST 32, ALT 48.  Right upper quadrant sonogram no gallstones.  CT abdomen consistent with SBO.  Given IV fluids, morphine, Toradol.  Surgery consulted.  Will admit. 80-year-old male, history of HTN, DM, subdural hematoma, recent admission for osteomyelitis, presents to ED with altered mental status. and back pain.  No fever. exam shows alert patient in no distress, HEENT NCAT PERRL, neck supple, lungs clear, RR S1S2, abdomen soft NT +BS, no CCE, stage 1 and 2 sacral decubitus, no discharge, neuro  alert no deficits.  Labs noted for WBC 7.2, hemoglobin 10, BUN 28, creatinine 1.5, lactate 3.8, AST 56, ALT 16. urinalysis negative.  Nasal swab negative. CT head no acute pathology.  CT abdomen shows ascites, cirrhosis, erosive changes L4 L5. given IV fluids and vancomycin.  Will admit.

## 2025-04-13 NOTE — ED PROVIDER NOTE - CARE PLAN
Principal Discharge DX:	Metabolic encephalopathy  Secondary Diagnosis:	Osteomyelitis  Secondary Diagnosis:	Elevated troponin   1

## 2025-04-13 NOTE — ED ADULT NURSE NOTE - NSFALLHARMRISKINTERV_ED_ALL_ED
Assistance OOB with selected safe patient handling equipment if applicable/Assistance with ambulation/Communicate risk of Fall with Harm to all staff, patient, and family/Encourage patient to sit up slowly, dangle for a short time, stand at bedside before walking/Monitor gait and stability/Monitor for mental status changes and reorient to person, place, and time, as needed/Move patient closer to nursing station/within visual sight of ED staff/Provide patient with walking aids/Provide visual cue: red socks, yellow wristband, yellow gown, etc/Reinforce activity limits and safety measures with patient and family/Review medications for side effects contributing to fall risk/Toileting schedule using arm’s reach rule for commode and bathroom/Use of alarms - bed, stretcher, chair and/or video monitoring/Bed in lowest position, wheels locked, appropriate side rails in place/Call bell, personal items and telephone in reach/Instruct patient to call for assistance before getting out of bed/chair/stretcher/Non-slip footwear applied when patient is off stretcher/[place] to call system/Physically safe environment - no spills, clutter or unnecessary equipment/Purposeful Proactive Rounding/Room/bathroom lighting operational, light cord in reach

## 2025-04-13 NOTE — ED PROVIDER NOTE - PHYSICAL EXAMINATION
CONSTITUTIONAL: Chronically ill elderly female; in no apparent distress.   EYES: PERRL; EOM intact.   CARDIOVASCULAR: Normal S1, S2; no murmurs, rubs, or gallops.   RESPIRATORY: Normal chest excursion with respiration; breath sounds clear and equal bilaterally; no wheezes, rhonchi, or rales.  GI: Obese round and soft abdomen.  No focal tenderness.  No rebound or guarding  MS: No deformities or extremities.  No midline tenderness to neck and back.  SKIN: Healing stage I-II sacral decubitus.  No bleeding, surrounding erythema or discharge   NEURO/PSYCH: A & O x 4; grossly unremarkable.

## 2025-04-13 NOTE — ED PROVIDER NOTE - OBJECTIVE STATEMENT
80 years old male history of hypertension, diabetes, subdural hematoma on Keppra, frequent fall, recent admission (January 2025) for osteomyelitis of L4-5 requiring IV antibiotic for 6 weeks BIBA from home status post change of mental status.  As per wife, patient was discharged from rehab facility on April 4.  Patient was supposed to have MRI of lower back on April 5 but patient refused.  Patient baseline uses walker to ambulate.  By report patient has been doing okay since his discharge from the facility.  Patient had 1 episode of fall on his buttocks few days ago that she was able to help patient to get up with no difficulty.  Patient become confused and calling for his father tonight over the past few hours so she called EMS.  No vomiting, diarrhea, change of appetite noted by wife.  Wife also report patient has a sacral decub which she has been taking care of.  Patient is alert and oriented x 1-2 (oriented to family and place) during evaluation.  Denies headache or abdominal pain during evaluation.

## 2025-04-14 DIAGNOSIS — G93.41 METABOLIC ENCEPHALOPATHY: ICD-10-CM

## 2025-04-14 LAB
A1C WITH ESTIMATED AVERAGE GLUCOSE RESULT: 6 % — HIGH (ref 4–5.6)
AMMONIA BLD-MCNC: 149 UMOL/L — HIGH (ref 11–55)
ANION GAP SERPL CALC-SCNC: 22 MMOL/L — HIGH (ref 7–14)
BUN SERPL-MCNC: 30 MG/DL — HIGH (ref 10–20)
CALCIUM SERPL-MCNC: 6.6 MG/DL — LOW (ref 8.4–10.5)
CHLORIDE SERPL-SCNC: 104 MMOL/L — SIGNIFICANT CHANGE UP (ref 98–110)
CHOLEST SERPL-MCNC: 49 MG/DL — SIGNIFICANT CHANGE UP
CO2 SERPL-SCNC: 17 MMOL/L — SIGNIFICANT CHANGE UP (ref 17–32)
CREAT SERPL-MCNC: 1.4 MG/DL — SIGNIFICANT CHANGE UP (ref 0.7–1.5)
EGFR: 51 ML/MIN/1.73M2 — LOW
EGFR: 51 ML/MIN/1.73M2 — LOW
ESTIMATED AVERAGE GLUCOSE: 126 MG/DL — HIGH (ref 68–114)
GLUCOSE BLDC GLUCOMTR-MCNC: 171 MG/DL — HIGH (ref 70–99)
GLUCOSE BLDC GLUCOMTR-MCNC: 196 MG/DL — HIGH (ref 70–99)
GLUCOSE BLDC GLUCOMTR-MCNC: 200 MG/DL — HIGH (ref 70–99)
GLUCOSE SERPL-MCNC: 166 MG/DL — HIGH (ref 70–99)
HCT VFR BLD CALC: 28.1 % — LOW (ref 42–52)
HDLC SERPL-MCNC: 15 MG/DL — LOW
HGB BLD-MCNC: 9.1 G/DL — LOW (ref 14–18)
LACTATE SERPL-SCNC: 4.4 MMOL/L — CRITICAL HIGH (ref 0.7–2)
LACTATE SERPL-SCNC: 5.2 MMOL/L — CRITICAL HIGH (ref 0.7–2)
LACTATE SERPL-SCNC: 5.2 MMOL/L — CRITICAL HIGH (ref 0.7–2)
LDLC SERPL-MCNC: 16 MG/DL — SIGNIFICANT CHANGE UP
LIPID PNL WITH DIRECT LDL SERPL: 16 MG/DL — SIGNIFICANT CHANGE UP
MCHC RBC-ENTMCNC: 32.3 PG — HIGH (ref 27–31)
MCHC RBC-ENTMCNC: 32.4 G/DL — SIGNIFICANT CHANGE UP (ref 32–37)
MCV RBC AUTO: 99.6 FL — HIGH (ref 80–94)
NONHDLC SERPL-MCNC: 34 MG/DL — SIGNIFICANT CHANGE UP
NRBC BLD AUTO-RTO: 0 /100 WBCS — SIGNIFICANT CHANGE UP (ref 0–0)
PLATELET # BLD AUTO: 141 K/UL — SIGNIFICANT CHANGE UP (ref 130–400)
PMV BLD: 9.4 FL — SIGNIFICANT CHANGE UP (ref 7.4–10.4)
POTASSIUM SERPL-MCNC: 4.8 MMOL/L — SIGNIFICANT CHANGE UP (ref 3.5–5)
POTASSIUM SERPL-SCNC: 4.8 MMOL/L — SIGNIFICANT CHANGE UP (ref 3.5–5)
RBC # BLD: 2.82 M/UL — LOW (ref 4.7–6.1)
RBC # FLD: 16.1 % — HIGH (ref 11.5–14.5)
SODIUM SERPL-SCNC: 143 MMOL/L — SIGNIFICANT CHANGE UP (ref 135–146)
T3 SERPL-MCNC: 88 NG/DL — SIGNIFICANT CHANGE UP (ref 80–200)
T4 AB SER-ACNC: 6.3 UG/DL — SIGNIFICANT CHANGE UP (ref 4.6–12)
TRIGL SERPL-MCNC: 81 MG/DL — SIGNIFICANT CHANGE UP
TROPONIN T, HIGH SENSITIVITY RESULT: 131 NG/L — CRITICAL HIGH (ref 6–21)
TROPONIN T, HIGH SENSITIVITY RESULT: 140 NG/L — CRITICAL HIGH (ref 6–21)
TSH SERPL-MCNC: 4.97 UIU/ML — HIGH (ref 0.27–4.2)
WBC # BLD: 7.57 K/UL — SIGNIFICANT CHANGE UP (ref 4.8–10.8)
WBC # FLD AUTO: 7.57 K/UL — SIGNIFICANT CHANGE UP (ref 4.8–10.8)

## 2025-04-14 PROCEDURE — 92526 ORAL FUNCTION THERAPY: CPT | Mod: GN

## 2025-04-14 PROCEDURE — 36430 TRANSFUSION BLD/BLD COMPNT: CPT

## 2025-04-14 PROCEDURE — 83935 ASSAY OF URINE OSMOLALITY: CPT

## 2025-04-14 PROCEDURE — 84540 ASSAY OF URINE/UREA-N: CPT

## 2025-04-14 PROCEDURE — 85379 FIBRIN DEGRADATION QUANT: CPT

## 2025-04-14 PROCEDURE — 93971 EXTREMITY STUDY: CPT | Mod: RT

## 2025-04-14 PROCEDURE — 72158 MRI LUMBAR SPINE W/O & W/DYE: CPT

## 2025-04-14 PROCEDURE — 84133 ASSAY OF URINE POTASSIUM: CPT

## 2025-04-14 PROCEDURE — 93005 ELECTROCARDIOGRAM TRACING: CPT

## 2025-04-14 PROCEDURE — 99223 1ST HOSP IP/OBS HIGH 75: CPT | Mod: FS

## 2025-04-14 PROCEDURE — 82728 ASSAY OF FERRITIN: CPT

## 2025-04-14 PROCEDURE — 82140 ASSAY OF AMMONIA: CPT

## 2025-04-14 PROCEDURE — 80048 BASIC METABOLIC PNL TOTAL CA: CPT

## 2025-04-14 PROCEDURE — 83615 LACTATE (LD) (LDH) ENZYME: CPT

## 2025-04-14 PROCEDURE — 82746 ASSAY OF FOLIC ACID SERUM: CPT

## 2025-04-14 PROCEDURE — 74018 RADEX ABDOMEN 1 VIEW: CPT

## 2025-04-14 PROCEDURE — 85610 PROTHROMBIN TIME: CPT

## 2025-04-14 PROCEDURE — 82803 BLOOD GASES ANY COMBINATION: CPT

## 2025-04-14 PROCEDURE — 83540 ASSAY OF IRON: CPT

## 2025-04-14 PROCEDURE — 81001 URINALYSIS AUTO W/SCOPE: CPT

## 2025-04-14 PROCEDURE — 83735 ASSAY OF MAGNESIUM: CPT

## 2025-04-14 PROCEDURE — 80053 COMPREHEN METABOLIC PANEL: CPT

## 2025-04-14 PROCEDURE — 86140 C-REACTIVE PROTEIN: CPT

## 2025-04-14 PROCEDURE — 97162 PT EVAL MOD COMPLEX 30 MIN: CPT | Mod: GP

## 2025-04-14 PROCEDURE — 84466 ASSAY OF TRANSFERRIN: CPT

## 2025-04-14 PROCEDURE — 94760 N-INVAS EAR/PLS OXIMETRY 1: CPT

## 2025-04-14 PROCEDURE — 83550 IRON BINDING TEST: CPT

## 2025-04-14 PROCEDURE — 83036 HEMOGLOBIN GLYCOSYLATED A1C: CPT

## 2025-04-14 PROCEDURE — 95819 EEG AWAKE AND ASLEEP: CPT

## 2025-04-14 PROCEDURE — 85027 COMPLETE CBC AUTOMATED: CPT

## 2025-04-14 PROCEDURE — 70450 CT HEAD/BRAIN W/O DYE: CPT | Mod: MC

## 2025-04-14 PROCEDURE — 84443 ASSAY THYROID STIM HORMONE: CPT

## 2025-04-14 PROCEDURE — P9047: CPT | Mod: JZ

## 2025-04-14 PROCEDURE — 85385 FIBRINOGEN ANTIGEN: CPT

## 2025-04-14 PROCEDURE — 36415 COLL VENOUS BLD VENIPUNCTURE: CPT

## 2025-04-14 PROCEDURE — P9016: CPT

## 2025-04-14 PROCEDURE — 84156 ASSAY OF PROTEIN URINE: CPT

## 2025-04-14 PROCEDURE — 85730 THROMBOPLASTIN TIME PARTIAL: CPT

## 2025-04-14 PROCEDURE — 97110 THERAPEUTIC EXERCISES: CPT | Mod: GP

## 2025-04-14 PROCEDURE — P9045: CPT | Mod: JZ

## 2025-04-14 PROCEDURE — A9579: CPT

## 2025-04-14 PROCEDURE — 86850 RBC ANTIBODY SCREEN: CPT

## 2025-04-14 PROCEDURE — 82550 ASSAY OF CK (CPK): CPT

## 2025-04-14 PROCEDURE — 85045 AUTOMATED RETICULOCYTE COUNT: CPT

## 2025-04-14 PROCEDURE — 93306 TTE W/DOPPLER COMPLETE: CPT

## 2025-04-14 PROCEDURE — 87086 URINE CULTURE/COLONY COUNT: CPT

## 2025-04-14 PROCEDURE — 86923 COMPATIBILITY TEST ELECTRIC: CPT

## 2025-04-14 PROCEDURE — 85025 COMPLETE CBC W/AUTO DIFF WBC: CPT

## 2025-04-14 PROCEDURE — 82607 VITAMIN B-12: CPT

## 2025-04-14 PROCEDURE — 83010 ASSAY OF HAPTOGLOBIN QUANT: CPT

## 2025-04-14 PROCEDURE — 78800 RP LOCLZJ TUM 1 AREA 1 D IMG: CPT

## 2025-04-14 PROCEDURE — 84484 ASSAY OF TROPONIN QUANT: CPT

## 2025-04-14 PROCEDURE — 87640 STAPH A DNA AMP PROBE: CPT

## 2025-04-14 PROCEDURE — 97166 OT EVAL MOD COMPLEX 45 MIN: CPT | Mod: GO

## 2025-04-14 PROCEDURE — 84480 ASSAY TRIIODOTHYRONINE (T3): CPT

## 2025-04-14 PROCEDURE — 82962 GLUCOSE BLOOD TEST: CPT

## 2025-04-14 PROCEDURE — 74174 CTA ABD&PLVS W/CONTRAST: CPT | Mod: MC

## 2025-04-14 PROCEDURE — 84436 ASSAY OF TOTAL THYROXINE: CPT

## 2025-04-14 PROCEDURE — 86901 BLOOD TYPING SEROLOGIC RH(D): CPT

## 2025-04-14 PROCEDURE — 92610 EVALUATE SWALLOWING FUNCTION: CPT | Mod: GN

## 2025-04-14 PROCEDURE — 97530 THERAPEUTIC ACTIVITIES: CPT | Mod: GO

## 2025-04-14 PROCEDURE — 87040 BLOOD CULTURE FOR BACTERIA: CPT

## 2025-04-14 PROCEDURE — 87077 CULTURE AEROBIC IDENTIFY: CPT

## 2025-04-14 PROCEDURE — 86900 BLOOD TYPING SEROLOGIC ABO: CPT

## 2025-04-14 PROCEDURE — 97165 OT EVAL LOW COMPLEX 30 MIN: CPT | Mod: GO

## 2025-04-14 PROCEDURE — 87641 MR-STAPH DNA AMP PROBE: CPT

## 2025-04-14 PROCEDURE — 84300 ASSAY OF URINE SODIUM: CPT

## 2025-04-14 PROCEDURE — 80061 LIPID PANEL: CPT

## 2025-04-14 PROCEDURE — 93925 LOWER EXTREMITY STUDY: CPT

## 2025-04-14 PROCEDURE — 82570 ASSAY OF URINE CREATININE: CPT

## 2025-04-14 PROCEDURE — 84100 ASSAY OF PHOSPHORUS: CPT

## 2025-04-14 PROCEDURE — 85652 RBC SED RATE AUTOMATED: CPT

## 2025-04-14 PROCEDURE — 76700 US EXAM ABDOM COMPLETE: CPT

## 2025-04-14 PROCEDURE — 71045 X-RAY EXAM CHEST 1 VIEW: CPT

## 2025-04-14 PROCEDURE — 84145 PROCALCITONIN (PCT): CPT

## 2025-04-14 PROCEDURE — 83690 ASSAY OF LIPASE: CPT

## 2025-04-14 PROCEDURE — A9556: CPT

## 2025-04-14 PROCEDURE — 80076 HEPATIC FUNCTION PANEL: CPT

## 2025-04-14 PROCEDURE — 83605 ASSAY OF LACTIC ACID: CPT

## 2025-04-14 RX ORDER — CYCLOBENZAPRINE HYDROCHLORIDE 15 MG/1
5 CAPSULE, EXTENDED RELEASE ORAL THREE TIMES A DAY
Refills: 0 | Status: DISCONTINUED | OUTPATIENT
Start: 2025-04-14 | End: 2025-04-16

## 2025-04-14 RX ORDER — LIDOCAINE HYDROCHLORIDE 20 MG/ML
1 JELLY TOPICAL DAILY
Refills: 0 | Status: DISCONTINUED | OUTPATIENT
Start: 2025-04-14 | End: 2025-06-19

## 2025-04-14 RX ORDER — LACTULOSE 10 G/15ML
200 SOLUTION ORAL ONCE
Refills: 0 | Status: COMPLETED | OUTPATIENT
Start: 2025-04-14 | End: 2025-04-14

## 2025-04-14 RX ORDER — MAGNESIUM, ALUMINUM HYDROXIDE 200-200 MG
30 TABLET,CHEWABLE ORAL EVERY 4 HOURS
Refills: 0 | Status: DISCONTINUED | OUTPATIENT
Start: 2025-04-14 | End: 2025-06-19

## 2025-04-14 RX ORDER — INSULIN LISPRO 100 U/ML
INJECTION, SOLUTION INTRAVENOUS; SUBCUTANEOUS AT BEDTIME
Refills: 0 | Status: DISCONTINUED | OUTPATIENT
Start: 2025-04-14 | End: 2025-04-14

## 2025-04-14 RX ORDER — AMPICILLIN SODIUM 1 G/1
INJECTION, POWDER, FOR SOLUTION INTRAMUSCULAR; INTRAVENOUS
Refills: 0 | Status: DISCONTINUED | OUTPATIENT
Start: 2025-04-14 | End: 2025-04-14

## 2025-04-14 RX ORDER — DEXTROSE 50 % IN WATER 50 %
25 SYRINGE (ML) INTRAVENOUS ONCE
Refills: 0 | Status: DISCONTINUED | OUTPATIENT
Start: 2025-04-14 | End: 2025-06-19

## 2025-04-14 RX ORDER — ATORVASTATIN CALCIUM 80 MG/1
80 TABLET, FILM COATED ORAL AT BEDTIME
Refills: 0 | Status: DISCONTINUED | OUTPATIENT
Start: 2025-04-14 | End: 2025-04-30

## 2025-04-14 RX ORDER — POLYETHYLENE GLYCOL 3350 17 G/17G
17 POWDER, FOR SOLUTION ORAL
Refills: 0 | Status: DISCONTINUED | OUTPATIENT
Start: 2025-04-14 | End: 2025-05-01

## 2025-04-14 RX ORDER — ONDANSETRON HCL/PF 4 MG/2 ML
4 VIAL (ML) INJECTION EVERY 8 HOURS
Refills: 0 | Status: DISCONTINUED | OUTPATIENT
Start: 2025-04-14 | End: 2025-06-19

## 2025-04-14 RX ORDER — AMPICILLIN SODIUM 1 G/1
1 INJECTION, POWDER, FOR SOLUTION INTRAMUSCULAR; INTRAVENOUS EVERY 6 HOURS
Refills: 0 | Status: DISCONTINUED | OUTPATIENT
Start: 2025-04-14 | End: 2025-04-14

## 2025-04-14 RX ORDER — SODIUM CHLORIDE 9 G/1000ML
1000 INJECTION, SOLUTION INTRAVENOUS
Refills: 0 | Status: DISCONTINUED | OUTPATIENT
Start: 2025-04-14 | End: 2025-04-16

## 2025-04-14 RX ORDER — AZITHROMYCIN 250 MG
250 CAPSULE ORAL DAILY
Refills: 0 | Status: DISCONTINUED | OUTPATIENT
Start: 2025-04-15 | End: 2025-04-15

## 2025-04-14 RX ORDER — FOLIC ACID 1 MG/1
1 TABLET ORAL DAILY
Refills: 0 | Status: DISCONTINUED | OUTPATIENT
Start: 2025-04-14 | End: 2025-06-19

## 2025-04-14 RX ORDER — SENNA 187 MG
2 TABLET ORAL AT BEDTIME
Refills: 0 | Status: DISCONTINUED | OUTPATIENT
Start: 2025-04-14 | End: 2025-06-19

## 2025-04-14 RX ORDER — SODIUM CHLORIDE 9 G/1000ML
1000 INJECTION, SOLUTION INTRAVENOUS
Refills: 0 | Status: DISCONTINUED | OUTPATIENT
Start: 2025-04-14 | End: 2025-04-14

## 2025-04-14 RX ORDER — AMPICILLIN SODIUM 1 G/1
1500 INJECTION, POWDER, FOR SOLUTION INTRAMUSCULAR; INTRAVENOUS EVERY 6 HOURS
Refills: 0 | Status: DISCONTINUED | OUTPATIENT
Start: 2025-04-14 | End: 2025-04-14

## 2025-04-14 RX ORDER — LACTULOSE 10 G/15ML
20 SOLUTION ORAL
Refills: 0 | Status: DISCONTINUED | OUTPATIENT
Start: 2025-04-14 | End: 2025-04-15

## 2025-04-14 RX ORDER — AZITHROMYCIN 250 MG
500 CAPSULE ORAL ONCE
Refills: 0 | Status: COMPLETED | OUTPATIENT
Start: 2025-04-14 | End: 2025-04-14

## 2025-04-14 RX ORDER — ENOXAPARIN SODIUM 100 MG/ML
40 INJECTION SUBCUTANEOUS EVERY 24 HOURS
Refills: 0 | Status: DISCONTINUED | OUTPATIENT
Start: 2025-04-14 | End: 2025-04-15

## 2025-04-14 RX ORDER — DEXTROSE 50 % IN WATER 50 %
15 SYRINGE (ML) INTRAVENOUS ONCE
Refills: 0 | Status: DISCONTINUED | OUTPATIENT
Start: 2025-04-14 | End: 2025-06-19

## 2025-04-14 RX ORDER — INSULIN LISPRO 100 U/ML
INJECTION, SOLUTION INTRAVENOUS; SUBCUTANEOUS
Refills: 0 | Status: DISCONTINUED | OUTPATIENT
Start: 2025-04-14 | End: 2025-04-24

## 2025-04-14 RX ORDER — AZITHROMYCIN 250 MG
500 CAPSULE ORAL DAILY
Refills: 0 | Status: DISCONTINUED | OUTPATIENT
Start: 2025-04-14 | End: 2025-04-14

## 2025-04-14 RX ORDER — ASPIRIN 325 MG
81 TABLET ORAL DAILY
Refills: 0 | Status: DISCONTINUED | OUTPATIENT
Start: 2025-04-14 | End: 2025-04-15

## 2025-04-14 RX ORDER — SODIUM CHLORIDE 9 G/1000ML
1000 INJECTION, SOLUTION INTRAVENOUS
Refills: 0 | Status: DISCONTINUED | OUTPATIENT
Start: 2025-04-14 | End: 2025-06-19

## 2025-04-14 RX ORDER — MELATONIN 5 MG
3 TABLET ORAL AT BEDTIME
Refills: 0 | Status: DISCONTINUED | OUTPATIENT
Start: 2025-04-14 | End: 2025-06-19

## 2025-04-14 RX ORDER — ACETAMINOPHEN 500 MG/5ML
650 LIQUID (ML) ORAL EVERY 6 HOURS
Refills: 0 | Status: DISCONTINUED | OUTPATIENT
Start: 2025-04-14 | End: 2025-06-08

## 2025-04-14 RX ORDER — DEXTROSE 50 % IN WATER 50 %
12.5 SYRINGE (ML) INTRAVENOUS ONCE
Refills: 0 | Status: DISCONTINUED | OUTPATIENT
Start: 2025-04-14 | End: 2025-06-19

## 2025-04-14 RX ORDER — LEVETIRACETAM 10 MG/ML
250 INJECTION, SOLUTION INTRAVENOUS
Refills: 0 | Status: DISCONTINUED | OUTPATIENT
Start: 2025-04-14 | End: 2025-04-14

## 2025-04-14 RX ORDER — LOSARTAN POTASSIUM 100 MG/1
25 TABLET, FILM COATED ORAL
Refills: 0 | Status: DISCONTINUED | OUTPATIENT
Start: 2025-04-14 | End: 2025-04-15

## 2025-04-14 RX ORDER — LEVETIRACETAM 10 MG/ML
250 INJECTION, SOLUTION INTRAVENOUS
Refills: 0 | Status: DISCONTINUED | OUTPATIENT
Start: 2025-04-14 | End: 2025-05-19

## 2025-04-14 RX ORDER — GLUCAGON 3 MG/1
1 POWDER NASAL ONCE
Refills: 0 | Status: DISCONTINUED | OUTPATIENT
Start: 2025-04-14 | End: 2025-06-19

## 2025-04-14 RX ORDER — AMPICILLIN SODIUM 1 G/1
1 INJECTION, POWDER, FOR SOLUTION INTRAMUSCULAR; INTRAVENOUS ONCE
Refills: 0 | Status: COMPLETED | OUTPATIENT
Start: 2025-04-14 | End: 2025-04-14

## 2025-04-14 RX ADMIN — Medication 2 MILLIGRAM(S): at 12:55

## 2025-04-14 RX ADMIN — Medication 4 MILLIGRAM(S): at 19:57

## 2025-04-14 RX ADMIN — ENOXAPARIN SODIUM 40 MILLIGRAM(S): 100 INJECTION SUBCUTANEOUS at 12:49

## 2025-04-14 RX ADMIN — Medication 2 MILLIGRAM(S): at 19:57

## 2025-04-14 RX ADMIN — AMPICILLIN SODIUM 100 GRAM(S): 1 INJECTION, POWDER, FOR SOLUTION INTRAMUSCULAR; INTRAVENOUS at 17:29

## 2025-04-14 RX ADMIN — SODIUM CHLORIDE 75 MILLILITER(S): 9 INJECTION, SOLUTION INTRAVENOUS at 09:58

## 2025-04-14 RX ADMIN — INSULIN LISPRO 2: 100 INJECTION, SOLUTION INTRAVENOUS; SUBCUTANEOUS at 09:23

## 2025-04-14 RX ADMIN — LACTULOSE 200 GRAM(S): 10 SOLUTION ORAL at 23:55

## 2025-04-14 RX ADMIN — LEVETIRACETAM 250 MILLIGRAM(S): 10 INJECTION, SOLUTION INTRAVENOUS at 06:30

## 2025-04-14 RX ADMIN — LEVETIRACETAM 250 MILLIGRAM(S): 10 INJECTION, SOLUTION INTRAVENOUS at 17:13

## 2025-04-14 NOTE — H&P ADULT - NSHPLABSRESULTS_GEN_ALL_CORE
10.0   7.24  )-----------( 151      ( 2025 21:20 )             30.2           146  |  108  |  28[H]  ----------------------------<  85  4.1   |  20  |  1.5    Ca    6.7[L]      2025 21:20    TPro  4.7[L]  /  Alb  2.1[L]  /  TBili  0.7  /  DBili  x   /  AST  56[H]  /  ALT  16  /  AlkPhos  146[H]                    Urinalysis Basic - ( 2025 21:20 )    Color: Dark Yellow / Appearance: Cloudy / S.021 / pH: x  Gluc: 85 mg/dL / Ketone: 15 mg/dL  / Bili: Moderate / Urobili: 1.0 mg/dL   Blood: x / Protein: 30 mg/dL / Nitrite: Negative   Leuk Esterase: Small / RBC: 12 /HPF / WBC 5 /HPF   Sq Epi: x / Non Sq Epi: x / Bacteria: Few /HPF        PT/INR - ( 2025 21:20 )   PT: 18.70 sec;   INR: 1.57 ratio         PTT - ( 2025 21:20 )  PTT:33.4 sec    Lactate Trend   @ 23:23 Lactate:3.2

## 2025-04-14 NOTE — CHART NOTE - NSCHARTNOTEFT_GEN_A_CORE
Patient too lethargic to take morning doses of Keppra (will switch to IVP) and Losartan (will monitor BP for now)

## 2025-04-14 NOTE — CHART NOTE - NSCHARTNOTEFT_GEN_A_CORE
patient seen and examined in the ED.                         9.1    7.57  )-----------( 141      ( 14 Apr 2025 05:42 )             28.1     Patient w mild elevation in LFTs and imaging suggestive of cirrohsis   Of note patient has advanced dementia and is a poor historian but believes he has had a cough    Respiratory alkalosis noted w CHARLENE on labs (baseline 0;8 w current Cr= 1.4)     Patient w left sided mild crackles that correlate w CXR findings:  < from: Xray Chest 1 View- PORTABLE-Urgent (04.13.25 @ 21:25) >    IMPRESSION:    Right basilar discoid atelectasis. Left basilar opacity/pleural effusion    < end of copied text >    < from: CT Abdomen and Pelvis w/ IV Cont (04.13.25 @ 22:56) >    FINDINGS:    LOWER CHEST: Bilateral small left greater than right pleural effusions   with adjacent compressive atelectasis. Bilateral subsegmental   atelectasis. Cardiomegaly.    < end of copied text >    Given above patient started on empiric Abx treatment for CAP (ampicillin and azithromycin)    UA negative. not source of increased confusion  Patient was meant to have f/u MRI out patient which he did not obtain to f/u on his OM of L4-L5    Lactate elevated, f/u repeat  c/w LR at 75cc/hr until repeat labs, will reassess in AM     Patient currently vitally stable w/o NC patient seen and examined in the ED.                         9.1    7.57  )-----------( 141      ( 14 Apr 2025 05:42 )             28.1     Patient w mild elevation in LFTs and imaging suggestive of cirrohsis   Of note patient has advanced dementia and is a poor historian but believes he has had a cough    Respiratory alkalosis noted w CHARELNE on labs (baseline 0;8 w current Cr= 1.4)     Patient w left sided mild crackles that correlate w CXR findings:  < from: Xray Chest 1 View- PORTABLE-Urgent (04.13.25 @ 21:25) >    IMPRESSION:    Right basilar discoid atelectasis. Left basilar opacity/pleural effusion    < end of copied text >    < from: CT Abdomen and Pelvis w/ IV Cont (04.13.25 @ 22:56) >    FINDINGS:    LOWER CHEST: Bilateral small left greater than right pleural effusions   with adjacent compressive atelectasis. Bilateral subsegmental   atelectasis. Cardiomegaly.    < end of copied text >    Given above patient started on empiric Abx treatment for CAP (ampicillin and azithromycin)    UA negative. not source of increased confusion  Patient was meant to have f/u MRI out patient which he did not obtain to f/u on his OM of L4-L5    Lactate elevated, f/u repeat  c/w LR at 75cc/hr until repeat labs, will reassess in AM     Patient currently vitally stable w/o NC      Lab called at 17:17 reporting repeat lactate of 5.2  Increasing fluid rate, will trend q 6hr, consult ID patient seen and examined in the ED.                         9.1    7.57  )-----------( 141      ( 14 Apr 2025 05:42 )             28.1     Patient w mild elevation in LFTs and imaging suggestive of cirrohsis   Of note patient has advanced dementia and is a poor historian but believes he has had a cough    Respiratory alkalosis noted w CHARLENE on labs (baseline 0;8 w current Cr= 1.4)     Patient w left sided mild crackles that correlate w CXR findings:  < from: Xray Chest 1 View- PORTABLE-Urgent (04.13.25 @ 21:25) >    IMPRESSION:    Right basilar discoid atelectasis. Left basilar opacity/pleural effusion    < end of copied text >    < from: CT Abdomen and Pelvis w/ IV Cont (04.13.25 @ 22:56) >    FINDINGS:    LOWER CHEST: Bilateral small left greater than right pleural effusions   with adjacent compressive atelectasis. Bilateral subsegmental   atelectasis. Cardiomegaly.    < end of copied text >    Given above patient was started on empiric Abx treatment for CAP (ampicillin and azithromycin) however once ammonia returned at 149 w Cirrotic liver noted on imaging and AMS likely Metabolic Encephalopathy due to decompensated liver cirrosis  starting Lactulose 20g up to 4x per day for goal of 3 bm per day, f/u GI recs    UA negative. not source of increased confusion  Patient was meant to have f/u MRI out patient which he did not obtain to f/u on his OM of L4-L5    Lactate elevated, f/u repeat  c/w LR at 75cc/hr until repeat labs    Patient currently vitally stable w/o NC      Lab called at 17:17 reporting repeat lactate of 5.2  Increasing fluid rate, will trend q 6hr, NO BM, suspect decompensated cirrosis over CAP

## 2025-04-14 NOTE — H&P ADULT - NSHPPHYSICALEXAM_GEN_ALL_CORE
VITALS:   T(C): 37.2 (04-13-25 @ 21:00), Max: 37.2 (04-13-25 @ 20:40)  HR: 99 (04-13-25 @ 23:00) (61 - 106)  BP: 101/57 (04-13-25 @ 23:00) (91/46 - 108/56)  RR: 20 (04-13-25 @ 21:45) (20 - 22)  SpO2: 94% (04-13-25 @ 21:45) (94% - 98%)    GENERAL: NAD, lying in bed comfortably asleep   HEAD:  Atraumatic, normocephalic  EYES: EOMI, PERRLA, conjunctiva and sclera clear  ENT: + dry  mucous membranes  NECK: Supple, no JVD  HEART: Regular rate and rhythm, no murmurs, rubs, or gallops  LUNGS: Unlabored respirations.  + CTAB + 4 L NC   ABDOMEN: Soft, nontender, nondistended, +BS  + ascites   EXTREMITIES: 2+ peripheral pulses bilaterally. No clubbing, cyanosis, or edema  NERVOUS SYSTEM:  A/O x 2   SKIN: No rashes or lesions

## 2025-04-14 NOTE — CONSULT NOTE ADULT - ASSESSMENT
80 years old male history of hypertension, diabetes, subdural hematoma on Keppra, hx mechanical falls, recent admission (January 2025) for osteomyelitis of L4-5 w completion of abx.  BIBA from home status post change of mental status.      Impression:  #Elevated troponin  #?New onset Afib  #HTN, DM      Pt is awake and unresponsive to verbal or painful stimuli (sternal rub)  Initial ECG showed possible Afib, but upon further review noted to be Sinus with first degree AV block and PAC's  Currently on tele appears in sinus rhythm  Troponin elevated possibly 2/2 demand   TTE 1/24/25: EF 66%      Plan:  Check lipid profile, HgA1C, TSH  Repeat TTE  Cont telemetry monitoring  Monitor lytes  Consider neuro eval  Monitor lytes    Discussed with Dr. Molina

## 2025-04-14 NOTE — CHART NOTE - NSCHARTNOTEFT_GEN_A_CORE
Called for rise in lactic acid to 5.2 (was 3.2 earlier). Actually was just started on antibiotics, will also increase IV fluids rate and repeat lactic acid level in the am Called for rise in lactic acid to 5.2 (was 3.2 earlier). Will increase IV fluids rate and repeat lactic acid level in the am

## 2025-04-14 NOTE — PATIENT PROFILE ADULT - FALL HARM RISK - HARM RISK INTERVENTIONS

## 2025-04-14 NOTE — H&P ADULT - ASSESSMENT
#Acute metabolic encephalopathy       #Recent Spinal Osteomyelititis   continue IV abx       #elevated troponin         Code Status:  DVT ppx:  79 yo male presented to ED via EMS for AMS above baseline       #Acute on chronic metabolic encephalopathy  #recent OM/discitis w abx completion March 2025   #HX of SDH   CTH negative   ammonia / vitamin panel / thyroid   continue ASA   keppra 250 mg q 12 hrs   hold gabapentin & oxy for now   fall/aspiration precautions     #elevated troponin   #Hx type II NSTEMI   EKG reviewed   Trop 174 -> 131 , follow trend   last TTE LVEF 66% (jan 2025), ECHO am  cardiology consulted      #Hx HLD  #Hx HTN   resume statin / losartan     #chronic macrocytic anemia   no s/s active bleeding   resume folate   recent iron studies Jan 25     #DM 2   A1C 7.6% prev   hold home meds   SSI for now   Carb control     #Hx of  mechanical falls   #chronic back pain   PRN pain control   fall precautions   PT consult     Code Status: Full code   DVT ppx: lovenox

## 2025-04-14 NOTE — CHART NOTE - NSCHARTNOTEFT_GEN_A_CORE
Patient is too lethargic to take scheduled oral dose of lactulose. Will order tonight's dose as an enema

## 2025-04-14 NOTE — H&P ADULT - HISTORY OF PRESENT ILLNESS
80 years old male history of hypertension, diabetes, subdural hematoma on Keppra, frequent fall, recent admission (January 2025) for osteomyelitis of L4-5 requiring IV antibiotic for 6 weeks BIBA from home status post change of mental status.  As per wife, patient was discharged from rehab facility on April 4.  Patient was supposed to have MRI of lower back on April 5 but patient refused.  Patient baseline uses walker to ambulate.  By report patient has been doing okay since his discharge from the facility.  Patient had 1 episode of fall on his buttocks few days ago that she was able to help patient to get up with no difficulty.  Patient become confused and calling for his father tonight over the past few hours so she called EMS.  No vomiting, diarrhea, change of appetite noted by wife.  Wife also report patient has a sacral decub which she has been taking care of.  Patient is alert and oriented x 1-2 (oriented to family and place) during evaluation.  Denies headache or abdominal pain during evaluation. 80 years old male history of hypertension, diabetes, subdural hematoma on Keppra, hx mechanical falls, recent admission (January 2025) for osteomyelitis of L4-5 w completion of abx.  BIBA from home status post change of mental status.  Per provider note, patient was discharged from rehab facility on April 4.  Patient was supposed to have MRI of lower back on April 5 but patient refused.  Patient baseline uses walker to ambulate.  By report patient has been doing okay since his discharge from the facility.  Patient had 1 episode of fall on his buttocks few days ago that she was able to help patient to get up with no difficulty.  Patient become confused and calling for his father tonight over the past few hours so she called EMS.  No ROS given mental status, no family at bedside.

## 2025-04-14 NOTE — CONSULT NOTE ADULT - SUBJECTIVE AND OBJECTIVE BOX
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  BIBA from home status post change of mental status.  Per provider note, patient was discharged from rehab facility on April 4.  Patient was supposed to have MRI of lower back on April 5 but patient refused.  Patient baseline uses walker to ambulate.  By report patient has been doing okay since his discharge from the facility.  Patient had 1 episode of fall on his buttocks few days ago that she was able to help patient to get up with no difficulty.  Patient become confused and calling for his father tonight over the past few hours so she called EMS.  No ROS given mental status, no family at bedside.     (14 Apr 2025 00:29)        HPI-Cardiology   Pt with the above Hx and HPI, evaluated at bedside. Pt presented for eval s/p change of mental status. Pt is awake, but unresponsive, at baseline. Unable to obtain HPI, Hx and ROS due to Pt's mental status. Radiology tests and hospital records, were reviewed, as well as previous notes on this patient.      PAST MEDICAL & SURGICAL HISTORY  Diabetes    Hypertension    Fall    H/O left knee surgery    History of cholecystectomy    History of appendectomy          ALLERGIES:  No Known Allergies      MEDICATIONS:  MEDICATIONS  (STANDING):  aspirin enteric coated 81 milliGRAM(s) Oral daily  atorvastatin 80 milliGRAM(s) Oral at bedtime  chlorhexidine 2% Cloths 1 Application(s) Topical <User Schedule>  dextrose 5%. 1000 milliLiter(s) (100 mL/Hr) IV Continuous <Continuous>  dextrose 5%. 1000 milliLiter(s) (50 mL/Hr) IV Continuous <Continuous>  dextrose 50% Injectable 25 Gram(s) IV Push once  dextrose 50% Injectable 12.5 Gram(s) IV Push once  dextrose 50% Injectable 25 Gram(s) IV Push once  enoxaparin Injectable 40 milliGRAM(s) SubCutaneous every 24 hours  folic acid 1 milliGRAM(s) Oral daily  glucagon  Injectable 1 milliGRAM(s) IntraMuscular once  insulin lispro (ADMELOG) corrective regimen sliding scale   SubCutaneous three times a day before meals  lactated ringers. 1000 milliLiter(s) (75 mL/Hr) IV Continuous <Continuous>  levETIRAcetam   Injectable 250 milliGRAM(s) IV Push <User Schedule>  lidocaine   4% Patch 1 Patch Transdermal daily  losartan 25 milliGRAM(s) Oral two times a day  senna 2 Tablet(s) Oral at bedtime    MEDICATIONS  (PRN):  acetaminophen     Tablet .. 650 milliGRAM(s) Oral every 6 hours PRN Temp greater or equal to 38C (100.4F), Mild Pain (1 - 3)  aluminum hydroxide/magnesium hydroxide/simethicone Suspension 30 milliLiter(s) Oral every 4 hours PRN Dyspepsia  cyclobenzaprine 5 milliGRAM(s) Oral three times a day PRN Muscle Spasm  dextrose Oral Gel 15 Gram(s) Oral once PRN Blood Glucose LESS THAN 70 milliGRAM(s)/deciliter  melatonin 3 milliGRAM(s) Oral at bedtime PRN Insomnia  morphine  - Injectable 2 milliGRAM(s) IV Push every 8 hours PRN for severe pain  ondansetron Injectable 4 milliGRAM(s) IV Push every 8 hours PRN Nausea and/or Vomiting  polyethylene glycol 3350 17 Gram(s) Oral two times a day PRN Constipation      HOME MEDICATIONS:  Home Medications:  acetaminophen 500 mg oral tablet: 2 tab(s) orally every 8 hours (04 Feb 2025 10:56)  aluminum hydroxide-magnesium hydroxide 200 mg-200 mg/5 mL oral suspension: 30 milliliter(s) orally every 4 hours As needed Dyspepsia (04 Feb 2025 10:56)  aspirin 81 mg oral delayed release tablet: 1 tab(s) orally once a day (04 Feb 2025 10:56)  atorvastatin 80 mg oral tablet: 1 tab(s) orally once a day (at bedtime) (04 Feb 2025 10:56)  ceFAZolin 2 g intravenous injection: 2 gram(s) intravenous every 8 hours End date is 3/9/2025 (04 Feb 2025 10:56)  cyclobenzaprine 5 mg oral tablet: 1 tab(s) orally every 8 hours As needed Muscle Spasm (04 Feb 2025 10:56)  folic acid 1 mg oral tablet: 1 tab(s) orally once a day (04 Feb 2025 10:56)  gabapentin 100 mg oral capsule: 3 cap(s) orally 2 times a day (17 Sep 2022 01:58)  insulin lispro 100 units/mL injectable solution: 1 international unit(s) injectable 3 times a day (before meals) Sliding scale instructions  Inject 2 Units for -199  Inject 4 Units for -249  Inject 6 Units for -299  Inject 8 Units for -349  Inkect 10 Units for  to 399  Inject 12 Units for  or more and call MD (04 Feb 2025 10:56)  Keppra 250 mg oral tablet: 1 tab(s) orally 2 times a day (04 Feb 2025 10:56)  lidocaine 4% topical film: Apply topically to affected area every 24 hours Lower back (04 Feb 2025 10:56)  losartan 50 mg oral tablet: 0.5 tab(s) orally 2 times a day (17 Sep 2022 01:58)  melatonin 5 mg oral tablet: 1 tab(s) orally once a day (at bedtime) As needed Insomnia (04 Feb 2025 10:56)  metFORMIN 500 mg oral tablet: 500 milligram(s) orally 4 times a day (17 Sep 2022 01:58)  morphine: 2 milligram(s) intravenous every 8 hours as needed for  severe pain (04 Feb 2025 10:56)  nystatin 100,000 units/g topical powder: 1 Apply topically to affected area every 12 hours As needed fungal rash (04 Feb 2025 10:56)  ondansetron 2 mg/mL injectable solution: 4 milligram(s) injectable every 8 hours as needed for  nausea (04 Feb 2025 10:56)  Osteo Bi-Flex 250 mg-200 mg oral tablet: orally once a day (17 Sep 2022 01:58)  oxyCODONE 10 mg oral tablet: 1 tab(s) orally 3 times a day As needed Severe Pain (7 - 10) (04 Feb 2025 10:56)  polyethylene glycol 3350 oral powder for reconstitution: 17 gram(s) orally 2 times a day (04 Feb 2025 10:56)  senna leaf extract oral tablet: 2 tab(s) orally once a day (at bedtime) (04 Feb 2025 10:56)  Tresiba FlexTouch: 60 unit(s) subcutaneous once a day (17 Sep 2022 01:58)      VITALS:   T(F): 97.6 (04-14 @ 05:38), Max: 99 (04-13 @ 20:40)  HR: 100 (04-14 @ 05:38) (61 - 106)  BP: 135/63 (04-14 @ 05:38) (91/46 - 135/63)  BP(mean): 66 (04-13 @ 21:00) (66 - 66)  RR: 20 (04-14 @ 05:38) (20 - 22)  SpO2: 98% (04-14 @ 05:38) (94% - 98%)          REVIEW OF SYSTEMS:  See HPI    PHYSICAL EXAM:  NEURO: patient is awake, and not responsive to verbal or stimuli (sternal rub)  GEN: Not in acute distress  NECK: no thyroid enlargement, no JVD  LUNGS: Clear to auscultation bilaterally   CARDIOVASCULAR: S1/S2 present, RRR , no murmurs or rubs, no carotid bruits,  + PP bilaterally  ABD: Soft, non-tender, non-distended, +BS  EXT: No ASHTYN  SKIN: Intact      LABS:                        9.1    7.57  )-----------( 141      ( 14 Apr 2025 05:42 )             28.1     04-14    143  |  104  |  30[H]  ----------------------------<  166[H]  4.8   |  17  |  1.4    Ca    6.6[L]      14 Apr 2025 05:42    TPro  4.7[L]  /  Alb  2.1[L]  /  TBili  0.7  /  DBili  x   /  AST  56[H]  /  ALT  16  /  AlkPhos  146[H]  04-13    PT/INR - ( 13 Apr 2025 21:20 )   PT: 18.70 sec;   INR: 1.57 ratio         PTT - ( 13 Apr 2025 21:20 )  PTT:33.4 sec  Lactate, Blood: 3.2 mmol/L *H* (04-13-25 @ 23:23)  Sedimentation Rate, Erythrocyte: 50 mm/Hr *H* (04-13-25 @ 21:20)          04-14 Chol 49 LDL -- HDL 15[L] Trig 81      RADIOLOGY:    < from: TTE Echo Complete w/o Contrast w/ Doppler (01.24.25 @ 11:19) >    Summary:   1. LV Ejection Fraction by Pan's Method with a biplane EF of 66 %.   2. Moderately enlarged left atrium.   3. Mild mitral annular calcification.   4. Sclerotic aortic valve with normal opening.   5. Ascending aorta 3.8 cm.      < end of copied text >    < from: Xray Chest 1 View- PORTABLE-Urgent (04.13.25 @ 21:25) >  IMPRESSION:    Right basilar discoid atelectasis. Left basilar opacity/pleural effusion    --- End of Report ---    < end of copied text >      ECG:  < from: 12 Lead ECG (04.13.25 @ 21:35) >  Atrial fibrillation with rapid ventricular response  Low voltage QRS  Inferior infarct , age undetermined  Possible Anterolateral infarct , age undetermined  Abnormal ECG    Confirmed by Tulio Molina ([id number]) on 4/14/2025 7:34:35 AM    < end of copied text >

## 2025-04-15 ENCOUNTER — RESULT REVIEW (OUTPATIENT)
Age: 80
End: 2025-04-15

## 2025-04-15 LAB
-  BACTEROIDES FRAGILIS: SIGNIFICANT CHANGE UP
ALBUMIN SERPL ELPH-MCNC: 1.8 G/DL — LOW (ref 3.5–5.2)
ALBUMIN SERPL ELPH-MCNC: 2 G/DL — LOW (ref 3.5–5.2)
ALP SERPL-CCNC: 125 U/L — HIGH (ref 30–115)
ALP SERPL-CCNC: 145 U/L — HIGH (ref 30–115)
ALT FLD-CCNC: 17 U/L — SIGNIFICANT CHANGE UP (ref 0–41)
ALT FLD-CCNC: 19 U/L — SIGNIFICANT CHANGE UP (ref 0–41)
AMMONIA BLD-MCNC: 244 UMOL/L — CRITICAL HIGH (ref 11–55)
AMMONIA BLD-MCNC: 88 UMOL/L — HIGH (ref 11–55)
ANION GAP SERPL CALC-SCNC: 14 MMOL/L — SIGNIFICANT CHANGE UP (ref 7–14)
ANION GAP SERPL CALC-SCNC: 14 MMOL/L — SIGNIFICANT CHANGE UP (ref 7–14)
ANION GAP SERPL CALC-SCNC: 18 MMOL/L — HIGH (ref 7–14)
APPEARANCE UR: ABNORMAL
AST SERPL-CCNC: 86 U/L — HIGH (ref 0–41)
AST SERPL-CCNC: 90 U/L — HIGH (ref 0–41)
BACTERIA # UR AUTO: ABNORMAL /HPF
BASE EXCESS BLDA CALC-SCNC: 1.6 MMOL/L — SIGNIFICANT CHANGE UP (ref -2–3)
BILIRUB SERPL-MCNC: 0.6 MG/DL — SIGNIFICANT CHANGE UP (ref 0.2–1.2)
BILIRUB SERPL-MCNC: 0.7 MG/DL — SIGNIFICANT CHANGE UP (ref 0.2–1.2)
BILIRUB UR-MCNC: NEGATIVE — SIGNIFICANT CHANGE UP
BUN SERPL-MCNC: 39 MG/DL — HIGH (ref 10–20)
BUN SERPL-MCNC: 39 MG/DL — HIGH (ref 10–20)
BUN SERPL-MCNC: 40 MG/DL — HIGH (ref 10–20)
CALCIUM SERPL-MCNC: 6.3 MG/DL — LOW (ref 8.4–10.5)
CALCIUM SERPL-MCNC: 6.3 MG/DL — LOW (ref 8.4–10.5)
CALCIUM SERPL-MCNC: 6.6 MG/DL — LOW (ref 8.4–10.5)
CHLORIDE SERPL-SCNC: 106 MMOL/L — SIGNIFICANT CHANGE UP (ref 98–110)
CHLORIDE SERPL-SCNC: 107 MMOL/L — SIGNIFICANT CHANGE UP (ref 98–110)
CHLORIDE SERPL-SCNC: 110 MMOL/L — SIGNIFICANT CHANGE UP (ref 98–110)
CO2 SERPL-SCNC: 22 MMOL/L — SIGNIFICANT CHANGE UP (ref 17–32)
CO2 SERPL-SCNC: 23 MMOL/L — SIGNIFICANT CHANGE UP (ref 17–32)
CO2 SERPL-SCNC: 24 MMOL/L — SIGNIFICANT CHANGE UP (ref 17–32)
COLOR SPEC: SIGNIFICANT CHANGE UP
CREAT ?TM UR-MCNC: 94 MG/DL — SIGNIFICANT CHANGE UP
CREAT SERPL-MCNC: 1.8 MG/DL — HIGH (ref 0.7–1.5)
CREAT SERPL-MCNC: 1.9 MG/DL — HIGH (ref 0.7–1.5)
CREAT SERPL-MCNC: 2.1 MG/DL — HIGH (ref 0.7–1.5)
CULTURE RESULTS: ABNORMAL
DIFF PNL FLD: ABNORMAL
EGFR: 32 ML/MIN/1.73M2 — LOW
EGFR: 32 ML/MIN/1.73M2 — LOW
EGFR: 36 ML/MIN/1.73M2 — LOW
EGFR: 36 ML/MIN/1.73M2 — LOW
EGFR: 38 ML/MIN/1.73M2 — LOW
EGFR: 38 ML/MIN/1.73M2 — LOW
EPI CELLS # UR: SIGNIFICANT CHANGE UP
FOLATE SERPL-MCNC: 15.1 NG/ML — SIGNIFICANT CHANGE UP
GLUCOSE BLDC GLUCOMTR-MCNC: 136 MG/DL — HIGH (ref 70–99)
GLUCOSE BLDC GLUCOMTR-MCNC: 142 MG/DL — HIGH (ref 70–99)
GLUCOSE BLDC GLUCOMTR-MCNC: 161 MG/DL — HIGH (ref 70–99)
GLUCOSE BLDC GLUCOMTR-MCNC: 179 MG/DL — HIGH (ref 70–99)
GLUCOSE BLDC GLUCOMTR-MCNC: 182 MG/DL — HIGH (ref 70–99)
GLUCOSE SERPL-MCNC: 127 MG/DL — HIGH (ref 70–99)
GLUCOSE SERPL-MCNC: 142 MG/DL — HIGH (ref 70–99)
GLUCOSE SERPL-MCNC: 142 MG/DL — HIGH (ref 70–99)
GLUCOSE UR QL: NEGATIVE MG/DL — SIGNIFICANT CHANGE UP
GRAM STN FLD: ABNORMAL
GRAM STN FLD: ABNORMAL
GRAN CASTS # UR COMP ASSIST: PRESENT
HCO3 BLDA-SCNC: 22 MMOL/L — SIGNIFICANT CHANGE UP (ref 21–28)
HCT VFR BLD CALC: 23.8 % — LOW (ref 42–52)
HCT VFR BLD CALC: 24.7 % — LOW (ref 42–52)
HCT VFR BLD CALC: 27.1 % — LOW (ref 42–52)
HCT VFR BLD CALC: 27.5 % — LOW (ref 42–52)
HGB BLD-MCNC: 7.9 G/DL — LOW (ref 14–18)
HGB BLD-MCNC: 8 G/DL — LOW (ref 14–18)
HGB BLD-MCNC: 8.8 G/DL — LOW (ref 14–18)
HGB BLD-MCNC: 8.9 G/DL — LOW (ref 14–18)
KETONES UR-MCNC: NEGATIVE MG/DL — SIGNIFICANT CHANGE UP
LACTATE SERPL-SCNC: 4.2 MMOL/L — CRITICAL HIGH (ref 0.7–2)
LEUKOCYTE ESTERASE UR-ACNC: ABNORMAL
LIDOCAIN IGE QN: 38 U/L — SIGNIFICANT CHANGE UP (ref 7–60)
MAGNESIUM SERPL-MCNC: 0.9 MG/DL — LOW (ref 1.8–2.4)
MAGNESIUM SERPL-MCNC: 1.4 MG/DL — LOW (ref 1.8–2.4)
MCHC RBC-ENTMCNC: 32 PG — HIGH (ref 27–31)
MCHC RBC-ENTMCNC: 32.2 PG — HIGH (ref 27–31)
MCHC RBC-ENTMCNC: 32.4 G/DL — SIGNIFICANT CHANGE UP (ref 32–37)
MCHC RBC-ENTMCNC: 32.4 G/DL — SIGNIFICANT CHANGE UP (ref 32–37)
MCHC RBC-ENTMCNC: 32.5 G/DL — SIGNIFICANT CHANGE UP (ref 32–37)
MCHC RBC-ENTMCNC: 33.2 G/DL — SIGNIFICANT CHANGE UP (ref 32–37)
MCV RBC AUTO: 96.4 FL — HIGH (ref 80–94)
MCV RBC AUTO: 98.8 FL — HIGH (ref 80–94)
MCV RBC AUTO: 98.9 FL — HIGH (ref 80–94)
MCV RBC AUTO: 99.3 FL — HIGH (ref 80–94)
METHOD TYPE: SIGNIFICANT CHANGE UP
NITRITE UR-MCNC: NEGATIVE — SIGNIFICANT CHANGE UP
NRBC BLD AUTO-RTO: 0 /100 WBCS — SIGNIFICANT CHANGE UP (ref 0–0)
OSMOLALITY UR: 424 MOS/KG — SIGNIFICANT CHANGE UP (ref 50–1200)
PCO2 BLDA: 25 MMHG — LOW (ref 35–48)
PH BLDA: 7.56 — HIGH (ref 7.35–7.45)
PH UR: 8.5 (ref 5–8)
PHOSPHATE SERPL-MCNC: 4 MG/DL — SIGNIFICANT CHANGE UP (ref 2.1–4.9)
PLATELET # BLD AUTO: 115 K/UL — LOW (ref 130–400)
PLATELET # BLD AUTO: 121 K/UL — LOW (ref 130–400)
PLATELET # BLD AUTO: 142 K/UL — SIGNIFICANT CHANGE UP (ref 130–400)
PLATELET # BLD AUTO: 151 K/UL — SIGNIFICANT CHANGE UP (ref 130–400)
PMV BLD: 9.2 FL — SIGNIFICANT CHANGE UP (ref 7.4–10.4)
PMV BLD: 9.3 FL — SIGNIFICANT CHANGE UP (ref 7.4–10.4)
PMV BLD: 9.3 FL — SIGNIFICANT CHANGE UP (ref 7.4–10.4)
PMV BLD: 9.4 FL — SIGNIFICANT CHANGE UP (ref 7.4–10.4)
PO2 BLDA: 96 MMHG — SIGNIFICANT CHANGE UP (ref 83–108)
POTASSIUM SERPL-MCNC: 3.6 MMOL/L — SIGNIFICANT CHANGE UP (ref 3.5–5)
POTASSIUM SERPL-MCNC: 3.9 MMOL/L — SIGNIFICANT CHANGE UP (ref 3.5–5)
POTASSIUM SERPL-MCNC: 4.5 MMOL/L — SIGNIFICANT CHANGE UP (ref 3.5–5)
POTASSIUM SERPL-SCNC: 3.6 MMOL/L — SIGNIFICANT CHANGE UP (ref 3.5–5)
POTASSIUM SERPL-SCNC: 3.9 MMOL/L — SIGNIFICANT CHANGE UP (ref 3.5–5)
POTASSIUM SERPL-SCNC: 4.5 MMOL/L — SIGNIFICANT CHANGE UP (ref 3.5–5)
POTASSIUM UR-SCNC: 62 MMOL/L — SIGNIFICANT CHANGE UP
PROT ?TM UR-MCNC: 44 MG/DL — SIGNIFICANT CHANGE UP
PROT SERPL-MCNC: 4.6 G/DL — LOW (ref 6–8)
PROT SERPL-MCNC: 4.6 G/DL — LOW (ref 6–8)
PROT UR-MCNC: 30 MG/DL
PROT/CREAT UR-RTO: 0.5 RATIO — HIGH (ref 0–0.2)
RBC # BLD: 2.47 M/UL — LOW (ref 4.7–6.1)
RBC # BLD: 2.5 M/UL — LOW (ref 4.7–6.1)
RBC # BLD: 2.73 M/UL — LOW (ref 4.7–6.1)
RBC # BLD: 2.78 M/UL — LOW (ref 4.7–6.1)
RBC # FLD: 16 % — HIGH (ref 11.5–14.5)
RBC # FLD: 16.1 % — HIGH (ref 11.5–14.5)
RBC # FLD: 16.1 % — HIGH (ref 11.5–14.5)
RBC # FLD: 16.2 % — HIGH (ref 11.5–14.5)
RBC CASTS # UR COMP ASSIST: 6 /HPF — HIGH (ref 0–4)
SAO2 % BLDA: 99 % — HIGH (ref 94–98)
SODIUM SERPL-SCNC: 143 MMOL/L — SIGNIFICANT CHANGE UP (ref 135–146)
SODIUM SERPL-SCNC: 147 MMOL/L — HIGH (ref 135–146)
SODIUM SERPL-SCNC: 148 MMOL/L — HIGH (ref 135–146)
SODIUM UR-SCNC: 38 MMOL/L — SIGNIFICANT CHANGE UP
SP GR SPEC: >1.03 — HIGH (ref 1–1.03)
SPECIMEN SOURCE: SIGNIFICANT CHANGE UP
SPECIMEN SOURCE: SIGNIFICANT CHANGE UP
UROBILINOGEN FLD QL: 1 MG/DL — SIGNIFICANT CHANGE UP (ref 0.2–1)
UUN UR-MCNC: 168 MG/DL — SIGNIFICANT CHANGE UP
VIT B12 SERPL-MCNC: 1554 PG/ML — HIGH (ref 232–1245)
WBC # BLD: 6.95 K/UL — SIGNIFICANT CHANGE UP (ref 4.8–10.8)
WBC # BLD: 7.3 K/UL — SIGNIFICANT CHANGE UP (ref 4.8–10.8)
WBC # BLD: 7.45 K/UL — SIGNIFICANT CHANGE UP (ref 4.8–10.8)
WBC # BLD: 7.53 K/UL — SIGNIFICANT CHANGE UP (ref 4.8–10.8)
WBC # FLD AUTO: 6.95 K/UL — SIGNIFICANT CHANGE UP (ref 4.8–10.8)
WBC # FLD AUTO: 7.3 K/UL — SIGNIFICANT CHANGE UP (ref 4.8–10.8)
WBC # FLD AUTO: 7.45 K/UL — SIGNIFICANT CHANGE UP (ref 4.8–10.8)
WBC # FLD AUTO: 7.53 K/UL — SIGNIFICANT CHANGE UP (ref 4.8–10.8)
WBC UR QL: 15 /HPF — HIGH (ref 0–5)

## 2025-04-15 PROCEDURE — 99223 1ST HOSP IP/OBS HIGH 75: CPT

## 2025-04-15 PROCEDURE — 71045 X-RAY EXAM CHEST 1 VIEW: CPT | Mod: 26,77

## 2025-04-15 PROCEDURE — 99233 SBSQ HOSP IP/OBS HIGH 50: CPT

## 2025-04-15 PROCEDURE — 99222 1ST HOSP IP/OBS MODERATE 55: CPT | Mod: FS

## 2025-04-15 PROCEDURE — 70450 CT HEAD/BRAIN W/O DYE: CPT | Mod: 26

## 2025-04-15 PROCEDURE — 99232 SBSQ HOSP IP/OBS MODERATE 35: CPT

## 2025-04-15 PROCEDURE — 93306 TTE W/DOPPLER COMPLETE: CPT | Mod: 26

## 2025-04-15 PROCEDURE — 71045 X-RAY EXAM CHEST 1 VIEW: CPT | Mod: 26

## 2025-04-15 PROCEDURE — 99291 CRITICAL CARE FIRST HOUR: CPT

## 2025-04-15 RX ORDER — SODIUM CHLORIDE 9 G/1000ML
1000 INJECTION, SOLUTION INTRAVENOUS
Refills: 0 | Status: DISCONTINUED | OUTPATIENT
Start: 2025-04-15 | End: 2025-04-15

## 2025-04-15 RX ORDER — FUROSEMIDE 10 MG/ML
40 INJECTION INTRAMUSCULAR; INTRAVENOUS ONCE
Refills: 0 | Status: COMPLETED | OUTPATIENT
Start: 2025-04-15 | End: 2025-04-15

## 2025-04-15 RX ORDER — NYSTATIN 100000 [USP'U]/G
1 CREAM TOPICAL
Refills: 0 | Status: DISCONTINUED | OUTPATIENT
Start: 2025-04-15 | End: 2025-06-19

## 2025-04-15 RX ORDER — LACTULOSE 10 G/15ML
20 SOLUTION ORAL
Refills: 0 | Status: DISCONTINUED | OUTPATIENT
Start: 2025-04-15 | End: 2025-04-16

## 2025-04-15 RX ORDER — OCTREOTIDE ACETATE 500 UG/ML
50 INJECTION, SOLUTION INTRAVENOUS; SUBCUTANEOUS
Qty: 500 | Refills: 0 | Status: DISCONTINUED | OUTPATIENT
Start: 2025-04-15 | End: 2025-04-16

## 2025-04-15 RX ORDER — MAGNESIUM SULFATE 500 MG/ML
4 SYRINGE (ML) INJECTION ONCE
Refills: 0 | Status: COMPLETED | OUTPATIENT
Start: 2025-04-15 | End: 2025-04-15

## 2025-04-15 RX ORDER — CEFTRIAXONE 500 MG/1
2000 INJECTION, POWDER, FOR SOLUTION INTRAMUSCULAR; INTRAVENOUS EVERY 24 HOURS
Refills: 0 | Status: COMPLETED | OUTPATIENT
Start: 2025-04-15 | End: 2025-04-21

## 2025-04-15 RX ORDER — METRONIDAZOLE 250 MG
500 TABLET ORAL EVERY 12 HOURS
Refills: 0 | Status: DISCONTINUED | OUTPATIENT
Start: 2025-04-15 | End: 2025-04-28

## 2025-04-15 RX ADMIN — SODIUM CHLORIDE 100 MILLILITER(S): 9 INJECTION, SOLUTION INTRAVENOUS at 06:08

## 2025-04-15 RX ADMIN — Medication 100 MILLIGRAM(S): at 15:41

## 2025-04-15 RX ADMIN — LACTULOSE 20 GRAM(S): 10 SOLUTION ORAL at 01:08

## 2025-04-15 RX ADMIN — Medication 10 MG/HR: at 20:01

## 2025-04-15 RX ADMIN — LIDOCAINE HYDROCHLORIDE 1 PATCH: 20 JELLY TOPICAL at 12:43

## 2025-04-15 RX ADMIN — ATORVASTATIN CALCIUM 80 MILLIGRAM(S): 80 TABLET, FILM COATED ORAL at 22:28

## 2025-04-15 RX ADMIN — CEFTRIAXONE 100 MILLIGRAM(S): 500 INJECTION, POWDER, FOR SOLUTION INTRAMUSCULAR; INTRAVENOUS at 14:02

## 2025-04-15 RX ADMIN — LACTULOSE 20 GRAM(S): 10 SOLUTION ORAL at 15:42

## 2025-04-15 RX ADMIN — Medication 25 GRAM(S): at 15:27

## 2025-04-15 RX ADMIN — NYSTATIN 1 APPLICATION(S): 100000 CREAM TOPICAL at 18:12

## 2025-04-15 RX ADMIN — Medication 1 APPLICATION(S): at 06:08

## 2025-04-15 RX ADMIN — LACTULOSE 20 GRAM(S): 10 SOLUTION ORAL at 08:37

## 2025-04-15 RX ADMIN — LACTULOSE 20 GRAM(S): 10 SOLUTION ORAL at 20:45

## 2025-04-15 RX ADMIN — LACTULOSE 20 GRAM(S): 10 SOLUTION ORAL at 10:21

## 2025-04-15 RX ADMIN — FUROSEMIDE 40 MILLIGRAM(S): 10 INJECTION INTRAMUSCULAR; INTRAVENOUS at 01:33

## 2025-04-15 RX ADMIN — LACTULOSE 20 GRAM(S): 10 SOLUTION ORAL at 14:05

## 2025-04-15 RX ADMIN — INSULIN LISPRO 2: 100 INJECTION, SOLUTION INTRAVENOUS; SUBCUTANEOUS at 08:44

## 2025-04-15 RX ADMIN — LACTULOSE 20 GRAM(S): 10 SOLUTION ORAL at 12:41

## 2025-04-15 RX ADMIN — LACTULOSE 20 GRAM(S): 10 SOLUTION ORAL at 22:28

## 2025-04-15 RX ADMIN — Medication 81 MILLIGRAM(S): at 12:41

## 2025-04-15 RX ADMIN — Medication 2 TABLET(S): at 22:28

## 2025-04-15 RX ADMIN — OCTREOTIDE ACETATE 10 MICROGRAM(S)/HR: 500 INJECTION, SOLUTION INTRAVENOUS; SUBCUTANEOUS at 20:01

## 2025-04-15 RX ADMIN — FOLIC ACID 1 MILLIGRAM(S): 1 TABLET ORAL at 12:41

## 2025-04-15 RX ADMIN — LACTULOSE 20 GRAM(S): 10 SOLUTION ORAL at 18:12

## 2025-04-15 RX ADMIN — LEVETIRACETAM 250 MILLIGRAM(S): 10 INJECTION, SOLUTION INTRAVENOUS at 18:12

## 2025-04-15 RX ADMIN — LIDOCAINE HYDROCHLORIDE 1 PATCH: 20 JELLY TOPICAL at 20:00

## 2025-04-15 RX ADMIN — LEVETIRACETAM 250 MILLIGRAM(S): 10 INJECTION, SOLUTION INTRAVENOUS at 06:08

## 2025-04-15 NOTE — CHART NOTE - NSCHARTNOTEFT_GEN_A_CORE
Patient remains lethargic as noted on arrival to Telemetry floor, however he is now tachypneic. Concern for fluid overload. Patient remains lethargic as noted on arrival to Telemetry floor, however he is now tachypneic. ABG suggesting hyperventilation (respiratory alkalosis) and although CXR to me is not significantly changed from previous, have concern for fluid overload. Have stopped IV fluids (ABG also showed further decrease in lactic acid) and given dose of IV Lasix. Asked for critical care consult, suggested CAT of the head (in progress)

## 2025-04-15 NOTE — CONSULT NOTE ADULT - ASSESSMENT
79yo M PMH HTN, DM, subdural hematoma (on Keppra, h/o mechanical falls, recent admission (January 2025) for osteomyelitis of L4-L5 (completed antibiotics) presented for AMS. Found to have questionable liver cirrhosis and elevated ammonia. On exam, no focal neuro deficits although patient lethargic, not following commands. Etiology of AMS most likely hepatic encephalopathy.  81yo M PMH HTN, DM, subdural hematoma (on Keppra, h/o mechanical falls, recent admission (January 2025) for osteomyelitis of L4-L5 (completed antibiotics) presented for AMS. Found to have questionable liver cirrhosis and elevated ammonia. On exam, no focal neuro deficits although patient lethargic, not following commands. Etiology of AMS most likely hepatic encephalopathy, r/o infection or seizure activity.     Recommendations  - rEEG  - MRI L-spine with and without contrast   - Medical management per primary team    Discussed with attending Dr Aguiar

## 2025-04-15 NOTE — DIETITIAN INITIAL EVALUATION ADULT - CALCULATED FROM (G/KG)
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 Patient given IM epinephrine on arrival, methylprednisolone and benadryl. Symptoms improved after Epi. Will CTM for 4 hours.

## 2025-04-15 NOTE — CONSULT NOTE ADULT - SUBJECTIVE AND OBJECTIVE BOX
Patient is a 80y old  Male who presents with a chief complaint of acute mentation changes (15 Apr 2025 03:26)      HPI:  80 years old male history of hypertension, diabetes, subdural hematoma on Keppra, hx mechanical falls, recent admission (January 2025) for osteomyelitis of L4-5 w completion of abx.  BIBA from home status post change of mental status.  Per provider note, patient was discharged from rehab facility on April 4.  Patient was supposed to have MRI of lower back on April 5 but patient refused.  Patient baseline uses walker to ambulate.  By report patient has been doing okay since his discharge from the facility.  Patient had 1 episode of fall on his buttocks few days ago that she was able to help patient to get up with no difficulty.  Patient become confused and calling for his father tonight over the past few hours so she called EMS.  No ROS given mental status, no family at bedside.     (14 Apr 2025 00:29)  patient now altered     PAST MEDICAL & SURGICAL HISTORY:  Diabetes      Hypertension      Fall      H/O left knee surgery      History of cholecystectomy      History of appendectomy        Allergies    No Known Allergies    Intolerances      Family history : no cardiovscular family history   Home Medications:  acetaminophen 500 mg oral tablet: 2 tab(s) orally every 8 hours (04 Feb 2025 10:56)  aluminum hydroxide-magnesium hydroxide 200 mg-200 mg/5 mL oral suspension: 30 milliliter(s) orally every 4 hours As needed Dyspepsia (04 Feb 2025 10:56)  aspirin 81 mg oral delayed release tablet: 1 tab(s) orally once a day (04 Feb 2025 10:56)  atorvastatin 80 mg oral tablet: 1 tab(s) orally once a day (at bedtime) (04 Feb 2025 10:56)  ceFAZolin 2 g intravenous injection: 2 gram(s) intravenous every 8 hours End date is 3/9/2025 (04 Feb 2025 10:56)  cyclobenzaprine 5 mg oral tablet: 1 tab(s) orally every 8 hours As needed Muscle Spasm (04 Feb 2025 10:56)  folic acid 1 mg oral tablet: 1 tab(s) orally once a day (04 Feb 2025 10:56)  gabapentin 100 mg oral capsule: 3 cap(s) orally 2 times a day (17 Sep 2022 01:58)  insulin lispro 100 units/mL injectable solution: 1 international unit(s) injectable 3 times a day (before meals) Sliding scale instructions  Inject 2 Units for -199  Inject 4 Units for -249  Inject 6 Units for -299  Inject 8 Units for -349  Inkect 10 Units for  to 399  Inject 12 Units for  or more and call MD (04 Feb 2025 10:56)  Keppra 250 mg oral tablet: 1 tab(s) orally 2 times a day (04 Feb 2025 10:56)  lidocaine 4% topical film: Apply topically to affected area every 24 hours Lower back (04 Feb 2025 10:56)  losartan 50 mg oral tablet: 0.5 tab(s) orally 2 times a day (17 Sep 2022 01:58)  melatonin 5 mg oral tablet: 1 tab(s) orally once a day (at bedtime) As needed Insomnia (04 Feb 2025 10:56)  metFORMIN 500 mg oral tablet: 500 milligram(s) orally 4 times a day (17 Sep 2022 01:58)  morphine: 2 milligram(s) intravenous every 8 hours as needed for  severe pain (04 Feb 2025 10:56)  nystatin 100,000 units/g topical powder: 1 Apply topically to affected area every 12 hours As needed fungal rash (04 Feb 2025 10:56)  ondansetron 2 mg/mL injectable solution: 4 milligram(s) injectable every 8 hours as needed for  nausea (04 Feb 2025 10:56)  Osteo Bi-Flex 250 mg-200 mg oral tablet: orally once a day (17 Sep 2022 01:58)  oxyCODONE 10 mg oral tablet: 1 tab(s) orally 3 times a day As needed Severe Pain (7 - 10) (04 Feb 2025 10:56)  polyethylene glycol 3350 oral powder for reconstitution: 17 gram(s) orally 2 times a day (04 Feb 2025 10:56)  senna leaf extract oral tablet: 2 tab(s) orally once a day (at bedtime) (04 Feb 2025 10:56)  Tresiba FlexTouch: 60 unit(s) subcutaneous once a day (17 Sep 2022 01:58)    Occupation:  Alochol: Denied  Smoking: Denied  Drug Use: Denied  Marital Status:         ROS: as in HPI; All other systems reviewed are negative    ICU Vital Signs Last 24 Hrs  T(C): 36.2 (15 Apr 2025 07:01), Max: 36.7 (15 Apr 2025 04:32)  T(F): 97.1 (15 Apr 2025 07:01), Max: 98 (15 Apr 2025 04:32)  HR: 83 (15 Apr 2025 06:10) (72 - 108)  BP: 123/56 (15 Apr 2025 06:10) (97/61 - 146/77)  BP(mean): 81 (15 Apr 2025 06:10) (81 - 97)  ABP: --  ABP(mean): --  RR: 18 (15 Apr 2025 07:01) (16 - 25)  SpO2: 100% (15 Apr 2025 06:10) (94% - 100%)    O2 Parameters below as of 15 Apr 2025 06:10  Patient On (Oxygen Delivery Method): nasal cannula  O2 Flow (L/min): 4            Physical Examination:    General: lethargic altered     HEENT: Pupils equal, reactive to light.  Symmetric.    PULM: Clear to auscultation bilaterally, no significant sputum production    CVS: Regular rate and rhythm, no murmurs, rubs, or gallops    ABD: Soft, nondistended, nontender, normoactive bowel sounds, no masses    EXT: No edema, nontender, no clubbing     SKIN: Warm and well perfused, no rashes noted.    Neurology : no motor or sensory deficit     Musculoskeletal : move all extremity              ABG - ( 15 Apr 2025 01:03 )  pH, Arterial: 7.56  pH, Blood: x     /  pCO2: 25    /  pO2: 96    / HCO3: 22    / Base Excess: 1.6   /  SaO2: 99.0                I&O's Detail    14 Apr 2025 07:01  -  15 Apr 2025 07:00  --------------------------------------------------------  IN:    dextrose 5% + sodium chloride 0.9%: 200 mL  Total IN: 200 mL    OUT:  Total OUT: 0 mL    Total NET: 200 mL            LABS:                        8.9    7.53  )-----------( 151      ( 15 Apr 2025 05:29 )             27.5     15 Apr 2025 05:29    147    |  106    |  39     ----------------------------<  142    4.5     |  23     |  2.1      Ca    6.6        15 Apr 2025 05:29    TPro  4.6    /  Alb  2.0    /  TBili  0.7    /  DBili  x      /  AST  86     /  ALT  19     /  AlkPhos  145    15 Apr 2025 05:29  Amylase x     lipase x              CAPILLARY BLOOD GLUCOSE      POCT Blood Glucose.: 179 mg/dL (15 Apr 2025 00:41)    PT/INR - ( 13 Apr 2025 21:20 )   PT: 18.70 sec;   INR: 1.57 ratio         PTT - ( 13 Apr 2025 21:20 )  PTT:33.4 sec  Urinalysis Basic - ( 15 Apr 2025 05:29 )    Color: x / Appearance: x / SG: x / pH: x  Gluc: 142 mg/dL / Ketone: x  / Bili: x / Urobili: x   Blood: x / Protein: x / Nitrite: x   Leuk Esterase: x / RBC: x / WBC x   Sq Epi: x / Non Sq Epi: x / Bacteria: x      Culture    Urinalysis with Rflx Culture (collected 13 Apr 2025 21:20)      Lactate, Blood: 4.4 mmol/L (04-14-25 @ 21:20)  Lactate, Blood: 5.2 mmol/L (04-14-25 @ 19:17)  Lactate, Blood: 5.2 mmol/L (04-14-25 @ 15:02)  Lactate, Blood: 3.2 mmol/L (04-13-25 @ 23:23)      MEDICATIONS  (STANDING):  aspirin enteric coated 81 milliGRAM(s) Oral daily  atorvastatin 80 milliGRAM(s) Oral at bedtime  azithromycin   Tablet 250 milliGRAM(s) Oral daily  chlorhexidine 2% Cloths 1 Application(s) Topical <User Schedule>  dextrose 5% + sodium chloride 0.9%. 1000 milliLiter(s) (100 mL/Hr) IV Continuous <Continuous>  dextrose 5%. 1000 milliLiter(s) (100 mL/Hr) IV Continuous <Continuous>  dextrose 5%. 1000 milliLiter(s) (50 mL/Hr) IV Continuous <Continuous>  dextrose 50% Injectable 25 Gram(s) IV Push once  dextrose 50% Injectable 12.5 Gram(s) IV Push once  dextrose 50% Injectable 25 Gram(s) IV Push once  enoxaparin Injectable 40 milliGRAM(s) SubCutaneous every 24 hours  folic acid 1 milliGRAM(s) Oral daily  glucagon  Injectable 1 milliGRAM(s) IntraMuscular once  insulin lispro (ADMELOG) corrective regimen sliding scale   SubCutaneous three times a day before meals  lactated ringers. 1000 milliLiter(s) (125 mL/Hr) IV Continuous <Continuous>  lactulose Syrup 20 Gram(s) Enteral Tube every 2 hours  levETIRAcetam   Injectable 250 milliGRAM(s) IV Push <User Schedule>  lidocaine   4% Patch 1 Patch Transdermal daily  losartan 25 milliGRAM(s) Oral two times a day  nystatin Powder 1 Application(s) Topical two times a day  rifAXIMin 550 milliGRAM(s) Oral two times a day  senna 2 Tablet(s) Oral at bedtime    MEDICATIONS  (PRN):  acetaminophen     Tablet .. 650 milliGRAM(s) Oral every 6 hours PRN Temp greater or equal to 38C (100.4F), Mild Pain (1 - 3)  aluminum hydroxide/magnesium hydroxide/simethicone Suspension 30 milliLiter(s) Oral every 4 hours PRN Dyspepsia  cyclobenzaprine 5 milliGRAM(s) Oral three times a day PRN Muscle Spasm  dextrose Oral Gel 15 Gram(s) Oral once PRN Blood Glucose LESS THAN 70 milliGRAM(s)/deciliter  melatonin 3 milliGRAM(s) Oral at bedtime PRN Insomnia  ondansetron Injectable 4 milliGRAM(s) IV Push every 8 hours PRN Nausea and/or Vomiting  polyethylene glycol 3350 17 Gram(s) Oral two times a day PRN Constipation        RADIOLOGY: ***     CXR: no infiltrate   TLC:  OG:  ET tube:        [facility] ICU:  ECHO:

## 2025-04-15 NOTE — CONSULT NOTE ADULT - ASSESSMENT
Case of a 80yMale pmh HTN, DM, subdural hematoma on keppra, osteomyelitis of L4-5 had completion of abx here for AMS. GI consulted for liver cirrhosis rule HE. Patient with some blood in stool today reported as well., no hematemesis.      # Liver cirrhosis with portal hypertension. Mild-to-moderate ascites.   Anasarca. Hepatic dome subcentimeter hypodense 1.3 cm hypodensity, not   fully characterized .   #rectal exam-4/15/25-brown stool in rectal vault and on finger  Rec  For-Mild-to-moderate ascites.   -Ultrasound guided diagnostic paracentesis: Obtain serum albumin same day as paracentesis, please transfuse albumin 8g/l if more than 5L removed   -Obtain fluid studies: Paracentesis Panel which includes Cultures, Cell count, albumin, total protein, cytology, AFB smear and culture. Separate order of amylase fluid and triglycerides fluid  -MELD Score-15  -HE-possible give lactulose through NG tube titrate to 3-4 bms  -MR abdomen with IV contrast recommended.  -obtain RUQ ultrasound  -check AFP  -Abdominal ultrasound AFP every 6 months for HCC screening  -EGD for esophageal varices screening   -Check Hepatitis B Sag, Hep B SAB, Hep A Ab, Hep C Ab,Smooth Muscle Antibody, AMA, VIOLA, Ceruloplasmin, Ferritin, Alpha-1-antitrypsin, CMV, Herpes serologies,   - For Vaccinations: Please f/u in clinic for being uptodate with HAV/HBV/Influenza/Pneumococcal vaccines.  - Lifestyle/Dietary modifications: Calorie intake 25-40 Kcal/kg/day; Protein intake: 1.2-1.5 gm/kg/day  - Avoid smoking and NSAIDs    #anemia episode of maroon colored stool reported  #rectal exam-4/15/25-brown stool in rectal vault and on finger  Rec  -transfuse prn to hgb >7  -will follow and consider EGD if further bleeding  -Colonoscopy as outpatient for now  - Follow up with our [facility] located at [address] [place] [address]. Phone Number: [phone]     # Chronic pancreatitis, with limited evaluation for acute inflammation   due to background fluid. Correlation with pancreatic enzymes recommended.  Rec  -can check lipase    # Erosive endplate changes centered at the L4-5 level, consistent with   patient's history ofrecent lumbar spine osteomyelitis.  Rec  - Care as per primary team    Case of a 80yMale pmh HTN, DM, subdural hematoma on keppra, osteomyelitis of L4-5 had completion of abx here for AMS. GI consulted for liver cirrhosis rule HE. Patient with some blood in stool today reported as well., no hematemesis.      # Liver cirrhosis with portal hypertension. Mild-to-moderate ascites.   Anasarca. Hepatic dome subcentimeter hypodense 1.3 cm hypodensity, not fully characterized .   #rectal exam-4/15/25-brown stool in rectal vault and on finger    Rec  -For-Mild-to-moderate ascites please get ultrasound guided diagnostic / therapeutic paracentesis: Obtain serum albumin same day as paracentesis, avoid removing > 4 L in light of patient's CHARLENE   -Obtain fluid studies: Paracentesis Panel which includes Cell count and differential, albumin, total protein, cytology, AFB smear and culture. Separate order of amylase fluid and triglycerides fluid  -MELD Score-15  -HE-very likely in light of elevated ammonia - give lactulose through NG tube q1-2 h for now and then titrate to 3-4 bms / d  -MR abdomen with IV contrast recommended to evaluate the patient's liver lesion seen on CT  -obtain RUQ ultrasound  -check AFP  -Abdominal ultrasound AFP every 6 months for HCC screening  -inpatient EGD for esophageal varices screening once clinically improved as this is the patient's first decompensation of cirrhosis + reported maroon stools prior to our eval  -Check Hepatitis B Sag, Hep B SAB, Hep A Ab, Hep C Ab, Smooth Muscle Antibody, AMA, VIOLA, Ceruloplasmin, Ferritin, Alpha-1-antitrypsin   -For Vaccinations: Please f/u in clinic for being uptodate with HAV/HBV/Influenza/Pneumococcal vaccines.  -Lifestyle/Dietary modifications: Calorie intake 25-40 Kcal/kg/day; Protein intake: 1.2-1.5 gm/kg/day  -Avoid smoking and NSAIDs  -Abstain from alcohol and all illicit drugs    #anemia episode of maroon colored stool reported  #rectal exam-4/15/25-brown stool in rectal vault and on finger  Rec  -transfuse prn to hgb >7  -will follow and consider EGD sooner if further bleeding  -Colonoscopy as outpatient for now  -Follow up with our [facility] located at [address] [place] [address]. Phone Number: [phone]     # Chronic pancreatitis, with limited evaluation for acute inflammation due to background fluid  ? hx of EtOH use as pt has cirrhosis and evidence of chronic pancreatitis  unable to obtain hx due to pt's current clinical status  Rec  -can check lipase    # Erosive endplate changes centered at the L4-5 level, consistent with patient's history ofrecent lumbar spine osteomyelitis.  Rec  - Care as per primary team    Case of a 80yMale pmh HTN, DM, subdural hematoma on keppra, osteomyelitis of L4-5 had completion of abx here for AMS. GI consulted for liver cirrhosis rule HE. Patient with some blood in stool today reported as well., no hematemesis.      # Liver cirrhosis with portal hypertension. Mild-to-moderate ascites.   Anasarca. Hepatic dome subcentimeter hypodense 1.3 cm hypodensity, not fully characterized .   #rectal exam-4/15/25-brown stool in rectal vault and on finger    Rec  -For-Mild-to-moderate ascites please get ultrasound guided diagnostic / therapeutic paracentesis: Obtain serum albumin same day as paracentesis, avoid removing > 4 L in light of patient's CHARLENE   -Obtain fluid studies: Paracentesis Panel which includes Cell count and differential, albumin, total protein, cytology, AFB smear and culture. Separate order of amylase fluid and triglycerides fluid  -MELD Score-15    -CHARLENE pre-renal vs hepatorenal - consider trial of albumin 1g/kg/d x 48 hrs   avoid diuretics  nephrology eval appreciated    -HE-very likely in light of elevated ammonia - give lactulose through NG tube q1-2 h for now and then titrate to 3-4 bms / d  neurology eval appreciated; pending EEG and MRI    -MR abdomen with IV contrast recommended to evaluate the patient's liver lesion seen on CT  -obtain RUQ ultrasound  -check AFP  -Abdominal ultrasound AFP every 6 months for HCC screening  -inpatient EGD for esophageal varices screening once clinically improved as this is the patient's first decompensation of cirrhosis + reported maroon stools prior to our eval  -Check Hepatitis B Sag, Hep B SAB, Hep A Ab, Hep C Ab, Smooth Muscle Antibody, AMA, VIOLA, Ceruloplasmin, Ferritin, Alpha-1-antitrypsin   -For Vaccinations: Please f/u in clinic for being uptodate with HAV/HBV/Influenza/Pneumococcal vaccines.  -Lifestyle/Dietary modifications: Calorie intake 25-40 Kcal/kg/day; Protein intake: 1.2-1.5 gm/kg/day  -Avoid smoking and NSAIDs  -Abstain from alcohol and all illicit drugs      #anemia episode of maroon colored stool reported  #rectal exam-4/15/25-brown stool in rectal vault and on finger  Rec  -transfuse prn to hgb >7  -will follow and consider EGD sooner if further bleeding  -Colonoscopy as outpatient for now  -Follow up with our [facility] located at [address] [place] [address]. Phone Number: [phone]     # Chronic pancreatitis, with limited evaluation for acute inflammation due to background fluid  ? hx of EtOH use as pt has cirrhosis and evidence of chronic pancreatitis  unable to obtain hx due to pt's current clinical status  Rec  -can check lipase    # Erosive endplate changes centered at the L4-5 level, consistent with patient's history ofrecent lumbar spine osteomyelitis.  Rec  - Care as per primary team

## 2025-04-15 NOTE — PROGRESS NOTE ADULT - SUBJECTIVE AND OBJECTIVE BOX
Patient is a 80y old  Male who presents with a chief complaint of acute mentation changes (15 Apr 2025 10:00)      INTERVAL HPI/OVERNIGHT EVENTS:   No overnight events   Afebrile, hemodynamically stable     ICU Vital Signs Last 24 Hrs  T(C): 36.2 (15 Apr 2025 07:01), Max: 36.7 (15 Apr 2025 04:32)  T(F): 97.1 (15 Apr 2025 07:01), Max: 98 (15 Apr 2025 04:32)  HR: 83 (15 Apr 2025 06:10) (72 - 108)  BP: 123/56 (15 Apr 2025 06:10) (97/61 - 146/77)  BP(mean): 81 (15 Apr 2025 06:10) (81 - 97)  ABP: --  ABP(mean): --  RR: 18 (15 Apr 2025 07:01) (16 - 25)  SpO2: 100% (15 Apr 2025 06:10) (94% - 100%)    O2 Parameters below as of 15 Apr 2025 06:10  Patient On (Oxygen Delivery Method): nasal cannula  O2 Flow (L/min): 4        I&O's Summary    14 Apr 2025 07:01  -  15 Apr 2025 07:00  --------------------------------------------------------  IN: 200 mL / OUT: 0 mL / NET: 200 mL          LABS:                        8.9    7.53  )-----------( 151      ( 15 Apr 2025 05:29 )             27.5     04-15    147[H]  |  106  |  39[H]  ----------------------------<  142[H]  4.5   |  23  |  2.1[H]    Ca    6.6[L]      15 Apr 2025 05:29    TPro  4.6[L]  /  Alb  2.0[L]  /  TBili  0.7  /  DBili  x   /  AST  86[H]  /  ALT  19  /  AlkPhos  145[H]  04-15    PT/INR - ( 13 Apr 2025 21:20 )   PT: 18.70 sec;   INR: 1.57 ratio         PTT - ( 13 Apr 2025 21:20 )  PTT:33.4 sec  Urinalysis Basic - ( 15 Apr 2025 05:29 )    Color: x / Appearance: x / SG: x / pH: x  Gluc: 142 mg/dL / Ketone: x  / Bili: x / Urobili: x   Blood: x / Protein: x / Nitrite: x   Leuk Esterase: x / RBC: x / WBC x   Sq Epi: x / Non Sq Epi: x / Bacteria: x      CAPILLARY BLOOD GLUCOSE      POCT Blood Glucose.: 182 mg/dL (15 Apr 2025 08:42)  POCT Blood Glucose.: 179 mg/dL (15 Apr 2025 00:41)  POCT Blood Glucose.: 200 mg/dL (14 Apr 2025 17:05)  POCT Blood Glucose.: 171 mg/dL (14 Apr 2025 12:19)    ABG - ( 15 Apr 2025 01:03 )  pH, Arterial: 7.56  pH, Blood: x     /  pCO2: 25    /  pO2: 96    / HCO3: 22    / Base Excess: 1.6   /  SaO2: 99.0                RADIOLOGY & ADDITIONAL TESTS:    Consultant(s) Notes Reviewed:  [x ] YES  [ ] NO    MEDICATIONS  (STANDING):  aspirin enteric coated 81 milliGRAM(s) Oral daily  atorvastatin 80 milliGRAM(s) Oral at bedtime  cefTRIAXone   IVPB 2000 milliGRAM(s) IV Intermittent every 24 hours  chlorhexidine 2% Cloths 1 Application(s) Topical <User Schedule>  dextrose 5%. 1000 milliLiter(s) (100 mL/Hr) IV Continuous <Continuous>  dextrose 5%. 1000 milliLiter(s) (50 mL/Hr) IV Continuous <Continuous>  dextrose 50% Injectable 25 Gram(s) IV Push once  dextrose 50% Injectable 12.5 Gram(s) IV Push once  dextrose 50% Injectable 25 Gram(s) IV Push once  folic acid 1 milliGRAM(s) Oral daily  glucagon  Injectable 1 milliGRAM(s) IntraMuscular once  insulin lispro (ADMELOG) corrective regimen sliding scale   SubCutaneous three times a day before meals  lactated ringers. 1000 milliLiter(s) (75 mL/Hr) IV Continuous <Continuous>  lactulose Syrup 20 Gram(s) Enteral Tube every 2 hours  levETIRAcetam   Injectable 250 milliGRAM(s) IV Push <User Schedule>  lidocaine   4% Patch 1 Patch Transdermal daily  nystatin Powder 1 Application(s) Topical two times a day  rifAXIMin 550 milliGRAM(s) Oral two times a day  senna 2 Tablet(s) Oral at bedtime    MEDICATIONS  (PRN):  acetaminophen     Tablet .. 650 milliGRAM(s) Oral every 6 hours PRN Temp greater or equal to 38C (100.4F), Mild Pain (1 - 3)  aluminum hydroxide/magnesium hydroxide/simethicone Suspension 30 milliLiter(s) Oral every 4 hours PRN Dyspepsia  cyclobenzaprine 5 milliGRAM(s) Oral three times a day PRN Muscle Spasm  dextrose Oral Gel 15 Gram(s) Oral once PRN Blood Glucose LESS THAN 70 milliGRAM(s)/deciliter  melatonin 3 milliGRAM(s) Oral at bedtime PRN Insomnia  ondansetron Injectable 4 milliGRAM(s) IV Push every 8 hours PRN Nausea and/or Vomiting  polyethylene glycol 3350 17 Gram(s) Oral two times a day PRN Constipation      PHYSICAL EXAM:  GENERAL:   HEAD:  Atraumatic, Normocephalic  EYES: EOMI, PERRLA, conjunctiva and sclera clear  NECK: Supple, No JVD, Normal thyroid, no enlarged nodes  NERVOUS SYSTEM:  Altered, responds to pain, mumbling   CHEST/LUNG: B/L good air entry; No rales, rhonchi, or wheezing  HEART: S1S2 normal, no S3, Regular rate and rhythm; No murmurs  ABDOMEN: Soft, Nontender, Nondistended; Bowel sounds present  EXTREMITIES:  2+ Peripheral Pulses, No clubbing, cyanosis, or edema  LYMPH: No lymphadenopathy noted  SKIN: No rashes or lesions    Care Discussed with Consultants/Other Providers [ x] YES  [ ] NO

## 2025-04-15 NOTE — PROGRESS NOTE ADULT - SUBJECTIVE AND OBJECTIVE BOX
Subjective/Objective:     HPI-Cardiology/Events/Updates  Pt evaluated at bedside. No overnight events and Pt is nonverbal. Radiology tests and hospital records, were reviewed, as well as previous notes on this patient.         MEDICATIONS  (STANDING):  atorvastatin 80 milliGRAM(s) Oral at bedtime  cefTRIAXone   IVPB 2000 milliGRAM(s) IV Intermittent every 24 hours  chlorhexidine 2% Cloths 1 Application(s) Topical <User Schedule>  dextrose 5%. 1000 milliLiter(s) (100 mL/Hr) IV Continuous <Continuous>  dextrose 5%. 1000 milliLiter(s) (50 mL/Hr) IV Continuous <Continuous>  dextrose 50% Injectable 25 Gram(s) IV Push once  dextrose 50% Injectable 12.5 Gram(s) IV Push once  dextrose 50% Injectable 25 Gram(s) IV Push once  folic acid 1 milliGRAM(s) Oral daily  glucagon  Injectable 1 milliGRAM(s) IntraMuscular once  insulin lispro (ADMELOG) corrective regimen sliding scale   SubCutaneous three times a day before meals  lactated ringers. 1000 milliLiter(s) (75 mL/Hr) IV Continuous <Continuous>  lactulose Syrup 20 Gram(s) Enteral Tube every 2 hours  levETIRAcetam   Injectable 250 milliGRAM(s) IV Push <User Schedule>  lidocaine   4% Patch 1 Patch Transdermal daily  metroNIDAZOLE  IVPB 500 milliGRAM(s) IV Intermittent every 12 hours  nystatin Powder 1 Application(s) Topical two times a day  rifAXIMin 550 milliGRAM(s) Oral two times a day  senna 2 Tablet(s) Oral at bedtime    MEDICATIONS  (PRN):  acetaminophen     Tablet .. 650 milliGRAM(s) Oral every 6 hours PRN Temp greater or equal to 38C (100.4F), Mild Pain (1 - 3)  aluminum hydroxide/magnesium hydroxide/simethicone Suspension 30 milliLiter(s) Oral every 4 hours PRN Dyspepsia  cyclobenzaprine 5 milliGRAM(s) Oral three times a day PRN Muscle Spasm  dextrose Oral Gel 15 Gram(s) Oral once PRN Blood Glucose LESS THAN 70 milliGRAM(s)/deciliter  melatonin 3 milliGRAM(s) Oral at bedtime PRN Insomnia  ondansetron Injectable 4 milliGRAM(s) IV Push every 8 hours PRN Nausea and/or Vomiting  polyethylene glycol 3350 17 Gram(s) Oral two times a day PRN Constipation          Vital Signs Last 24 Hrs  T(C): 35.9 (15 Apr 2025 15:05), Max: 36.7 (15 Apr 2025 04:32)  T(F): 96.7 (15 Apr 2025 15:05), Max: 98 (15 Apr 2025 04:32)  HR: 83 (15 Apr 2025 06:10) (72 - 99)  BP: 110/53 (15 Apr 2025 15:05) (97/61 - 140/69)  BP(mean): 77 (15 Apr 2025 15:05) (77 - 97)  RR: 20 (15 Apr 2025 15:05) (16 - 25)  SpO2: 100% (15 Apr 2025 06:10) (94% - 100%)    Parameters below as of 15 Apr 2025 15:05  Patient On (Oxygen Delivery Method): nasal cannula      I&O's Detail    14 Apr 2025 07:01  -  15 Apr 2025 07:00  --------------------------------------------------------  IN:    dextrose 5% + sodium chloride 0.9%: 200 mL  Total IN: 200 mL    OUT:  Total OUT: 0 mL    Total NET: 200 mL      15 Apr 2025 07:01  -  15 Apr 2025 17:04  --------------------------------------------------------  IN:  Total IN: 0 mL    OUT:    Indwelling Catheter - Urethral (mL): 400 mL  Total OUT: 400 mL    Total NET: -400 mL            PHYSICAL EXAM:  GENERAL:  81y/o Male NAD, resting comfortably.  HEAD:  Atraumatic, Normocephalic  EYES: EOMI, PERRLA, conjunctiva and sclera clear  NECK: Supple, No JVD, no cervical lymphadenopathy, non-tender  CHEST/LUNG: Clear to auscultation bilaterally; No wheeze, rhonchi, or rales  HEART: Regular rate and rhythm; S1&S2  ABDOMEN: Soft, Nontender, Nondistended x 4 quadrants; Bowel sounds present  EXTREMITIES:   Peripheral Pulses Present, No clubbing, no cyanosis, or no edema, no calf tenderness  PSYCH: AAOx3, cooperative, appropriate  NEUROLOGY: WNL  SKIN: WNL              LABS:                          8.8    7.30  )-----------( 115      ( 15 Apr 2025 16:00 )             27.1     PT/INR - ( 13 Apr 2025 21:20 )   PT: 18.70 sec;   INR: 1.57 ratio         PTT - ( 13 Apr 2025 21:20 )  PTT:33.4 sec  15 Apr 2025 13:01    143    |  107    |  39[H]  ----------------------------<  127[H]  3.9     |  22     |  1.8[H]  15 Apr 2025 05:29    147[H]  |  106    |  39[H]  ----------------------------<  142[H]  4.5     |  23     |  2.1[H]    Ca    6.3[L]      15 Apr 2025 13:01  Ca    6.6[L]      15 Apr 2025 05:29  Mg     0.9[L]     15 Apr 2025 13:01    TPro  4.6[L]  /  Alb  1.8[L]  /  TBili  0.6    /  DBili  x      /  AST  90[H]  /  ALT  17     /  AlkPhos  125[H]  15 Apr 2025 13:01  TPro  4.6[L]  /  Alb  2.0[L]  /  TBili  0.7    /  DBili  x      /  AST  86[H]  /  ALT  19     /  AlkPhos  145[H]  15 Apr 2025 05:29            Diagnostic testing:  < from: Xray Chest 1 View-PORTABLE IMMEDIATE (Xray Chest 1 View-PORTABLE IMMEDIATE .) (04.15.25 @ 07:16) >  FINDINGS/  IMPRESSION:    Support devices: Interval placement of enteric tube, tip overlying the   right abdomen.    Cardiac/mediastinum/hilum: Unchanged.    Lung parenchyma/Pleura: Low lung volumes. Left basilar opacity. No   pneumothorax.    Skeleton/soft tissues: Unchanged.    --- End of Report ---    < end of copied text >            < from: TTE Echo Complete w/o Contrast w/ Doppler (04.15.25 @ 08:07) >    Summary:   1. Technically difficult study.   2. Normal global left ventricular systolic function.   3. Left ventricular ejection fraction, by visual estimation, is 60 to   65%.   4. The left ventricular diastolic function could not be assessed in this   study.   5. Left atrial size not well visualized.   6. Right atrial size not well visualized.   7. No evidence of mitral valve regurgitation.   8. Dilatation of the ascending aorta, measures 3.9 cm.   9. Normal pulmonary artery pressure.  10. There is no evidence of pericardial effusion.    < end of copied text >        Assessment and Recommendations:  80 years old male history of hypertension, diabetes, subdural hematoma on Keppra, hx mechanical falls, recent admission (January 2025) for osteomyelitis of L4-5 w completion of abx.  BIBA from home status post change of mental status.      Impression:  #Elevated troponin  #?New onset Afib  #HTN, DM      Pt is awake and unresponsive to verbal or painful stimuli (sternal rub)  Initial ECG showed possible Afib, but upon further review noted to be Sinus with first degree AV block and PAC's  Currently on tele appears in sinus rhythm  Troponin elevated possibly 2/2 demand   TTE 1/24/25: EF 66%      Plan:  Cont telemetry monitoring  Monitor lytes  May likely need ischemic work up when neurologic and GI causes worked up and Pt is A&O.  Please repeat ECG

## 2025-04-15 NOTE — CONSULT NOTE ADULT - ASSESSMENT
High risk for pressure injury development or progression   Skin assessed- B/L heel blanchable redness                        Sacrum and  B/L buttock  erythema  with denuded skin and dark pigmentation - possibly due to chronic moisture and friction combination                      ??  Deep tissue pressure injury to B/L buttock - intact dark red skin tissue surrounding macerated area - monitor for progression           Wound and skin care recs.   Clean sacrum and b/l buttock , pat dry  apply triad hydrophilic dressing   Pressure  injury  preventive  measures  Skin  and incontinence care   Assess skin  and inform primary provider of any changes   Case discussed with primary Rn  Wound/ ostomy specialist  to f/u as needed     Offloading: [x ] Frequent position changes [ ] Devices/Equipment  Cleansing: [ ] Saline [ ] Soap/Water [ ] Other: ______  Topicals: [ ] Barrier Cream [ ] Antimicrobial [ ] Enzymatic Wound Debridement [] Moist  wound Healing   Dressings: [ ] Dry, sterile [ ] Allevyn  Foam [ ] Absorbant Pads [ ] Collagenase    Other Recs.   Per Primary team   Total time for bedside assessment  , review of medical records  and  discussion of plan of care with primary team greater than 35 min

## 2025-04-15 NOTE — DIETITIAN INITIAL EVALUATION ADULT - OTHER INFO
pt is 80 year old male with hx of HTN, DM, SDH, mechanical falls, OM of L4-5 in January of 2025, BIBA 2/2 changes in mental status, recent d/c from STR. pt admitted with acute/chronic metabolic encephalopathy, elevated trops, new onset decompensated liver cirrhosis, CHARLENE. s/p SLP eval --> NPO with alternate means of nutrition. s/p NGT placement, pt remains lethargic. pt is 80 year old male with hx of HTN, DM, SDH, mechanical falls, OM of L4-5 in January of 2025, BIBA 2/2 changes in mental status, recent d/c from STR. pt admitted with acute/chronic metabolic encephalopathy, elevated trops, new onset decompensated liver cirrhosis, CHARLENE. s/p SLP eval --> NPO with alternate means of nutrition. s/p NGT placement, pt remains lethargic. elevated NH4- to be given lactulose today, possible maroon colored BM noted today, GI following.

## 2025-04-15 NOTE — CONSULT NOTE ADULT - SUBJECTIVE AND OBJECTIVE BOX
NEPHROLOGY CONSULTATION NOTE    DWIGHT RIBEIRO  80y  Male  MRN-[id number]    CC:   Patient is a 80y old  Male who presents with a chief complaint of acute mentation changes (15 Apr 2025 11:36)      HPI:  80 years old male history of hypertension, diabetes, subdural hematoma on Keppra, hx mechanical falls, recent admission (2025) for osteomyelitis of L4-5 w completion of abx.  BIBA from home status post change of mental status.  Per provider note, patient was discharged from rehab facility on .  Patient was supposed to have MRI of lower back on  but patient refused.  Patient baseline uses walker to ambulate.  By report patient has been doing okay since his discharge from the facility.  Patient had 1 episode of fall on his buttocks few days ago that she was able to help patient to get up with no difficulty.  Patient become confused and calling for his father tonight over the past few hours so she called EMS.  No ROS given mental status, no family at bedside.     (2025 00:29)      PAST MEDICAL & SURGICAL HISTORY:  Diabetes      Hypertension      Fall      H/O left knee surgery      History of cholecystectomy      History of appendectomy        Allergies:  No Known Allergies    Home Medications Reviewed  Hospital Medications:   MEDICATIONS  (STANDING):  atorvastatin 80 milliGRAM(s) Oral at bedtime  cefTRIAXone   IVPB 2000 milliGRAM(s) IV Intermittent every 24 hours  chlorhexidine 2% Cloths 1 Application(s) Topical <User Schedule>  folic acid 1 milliGRAM(s) Oral daily  insulin lispro (ADMELOG) corrective regimen sliding scale   SubCutaneous three times a day before meals  lactated ringers. 1000 milliLiter(s) (75 mL/Hr) IV Continuous <Continuous>  lactulose Syrup 20 Gram(s) Enteral Tube every 2 hours  levETIRAcetam   Injectable 250 milliGRAM(s) IV Push <User Schedule>  lidocaine   4% Patch 1 Patch Transdermal daily  magnesium sulfate  IVPB 4 Gram(s) IV Intermittent once  nystatin Powder 1 Application(s) Topical two times a day  rifAXIMin 550 milliGRAM(s) Oral two times a day  senna 2 Tablet(s) Oral at bedtime    MEDICATIONS  (PRN):  acetaminophen     Tablet .. 650 milliGRAM(s) Oral every 6 hours PRN Temp greater or equal to 38C (100.4F), Mild Pain (1 - 3)  aluminum hydroxide/magnesium hydroxide/simethicone Suspension 30 milliLiter(s) Oral every 4 hours PRN Dyspepsia  cyclobenzaprine 5 milliGRAM(s) Oral three times a day PRN Muscle Spasm  dextrose Oral Gel 15 Gram(s) Oral once PRN Blood Glucose LESS THAN 70 milliGRAM(s)/deciliter  melatonin 3 milliGRAM(s) Oral at bedtime PRN Insomnia  ondansetron Injectable 4 milliGRAM(s) IV Push every 8 hours PRN Nausea and/or Vomiting  polyethylene glycol 3350 17 Gram(s) Oral two times a day PRN Constipation    Home Medications:  acetaminophen 500 mg oral tablet: 2 tab(s) orally every 8 hours (2025 10:56)  aluminum hydroxide-magnesium hydroxide 200 mg-200 mg/5 mL oral suspension: 30 milliliter(s) orally every 4 hours As needed Dyspepsia (2025 10:56)  aspirin 81 mg oral delayed release tablet: 1 tab(s) orally once a day (2025 10:56)  atorvastatin 80 mg oral tablet: 1 tab(s) orally once a day (at bedtime) (2025 10:56)  ceFAZolin 2 g intravenous injection: 2 gram(s) intravenous every 8 hours End date is 3/9/2025 (2025 10:56)  cyclobenzaprine 5 mg oral tablet: 1 tab(s) orally every 8 hours As needed Muscle Spasm (2025 10:56)  folic acid 1 mg oral tablet: 1 tab(s) orally once a day (2025 10:56)  gabapentin 100 mg oral capsule: 3 cap(s) orally 2 times a day (17 Sep 2022 01:58)  insulin lispro 100 units/mL injectable solution: 1 international unit(s) injectable 3 times a day (before meals) Sliding scale instructions  Inject 2 Units for -199  Inject 4 Units for -249  Inject 6 Units for -299  Inject 8 Units for -349  Inkect 10 Units for  to 399  Inject 12 Units for  or more and call MD (2025 10:56)  Keppra 250 mg oral tablet: 1 tab(s) orally 2 times a day (2025 10:56)  lidocaine 4% topical film: Apply topically to affected area every 24 hours Lower back (2025 10:56)  losartan 50 mg oral tablet: 0.5 tab(s) orally 2 times a day (17 Sep 2022 01:58)  melatonin 5 mg oral tablet: 1 tab(s) orally once a day (at bedtime) As needed Insomnia (2025 10:56)  metFORMIN 500 mg oral tablet: 500 milligram(s) orally 4 times a day (17 Sep 2022 01:58)  morphine: 2 milligram(s) intravenous every 8 hours as needed for  severe pain (2025 10:56)  nystatin 100,000 units/g topical powder: 1 Apply topically to affected area every 12 hours As needed fungal rash (2025 10:56)  ondansetron 2 mg/mL injectable solution: 4 milligram(s) injectable every 8 hours as needed for  nausea (2025 10:56)  Osteo Bi-Flex 250 mg-200 mg oral tablet: orally once a day (17 Sep 2022 01:58)  oxyCODONE 10 mg oral tablet: 1 tab(s) orally 3 times a day As needed Severe Pain (7 - 10) (2025 10:56)  polyethylene glycol 3350 oral powder for reconstitution: 17 gram(s) orally 2 times a day (2025 10:56)  senna leaf extract oral tablet: 2 tab(s) orally once a day (at bedtime) (2025 10:56)  Tresiba FlexTouch: 60 unit(s) subcutaneous once a day (17 Sep 2022 01:58)      SOCIAL HISTORY:  Social History:      FAMILY HISTORY:      REVIEW OF SYSTEMS:  Unable to obtain d/t poor mentation    VITALS:  T(F): 97.1 (04-15-25 @ 07:01), Max: 98 (04-15-25 @ 04:32)  HR: 83 (04-15-25 @ 06:10)  BP: 123/56 (04-15-25 @ 06:10)  RR: 18 (04-15-25 @ 07:01)  SpO2: 100% (04-15-25 @ 06:10)    Height (cm): 175.3 (04-15 @ 04:50)  Weight (kg): 117.8 (04-15 @ 04:50)  BMI (kg/m2): 38.3 (04-15 @ 04:50)  BSA (m2): 2.31 (04-15 @ 04:50)  I&O's Detail    2025 07:01  -  15 Apr 2025 07:00  --------------------------------------------------------  IN:    dextrose 5% + sodium chloride 0.9%: 200 mL  Total IN: 200 mL    OUT:  Total OUT: 0 mL    Total NET: 200 mL      15 Apr 2025 07:01  -  15 Apr 2025 15:00  --------------------------------------------------------  IN:  Total IN: 0 mL    OUT:    Indwelling Catheter - Urethral (mL): 400 mL  Total OUT: 400 mL    Total NET: -400 mL            I&O's Summary    2025 07:01  -  15 Apr 2025 07:00  --------------------------------------------------------  IN: 200 mL / OUT: 0 mL / NET: 200 mL    15 Apr 2025 07:01  -  15 Apr 2025 15:00  --------------------------------------------------------  IN: 0 mL / OUT: 400 mL / NET: -400 mL        PHYSICAL EXAM:  Gen: NAD  resp: b/l breath sounds  abd: soft  ext: no edema    LABS:  Daily Height in cm: 175.3 (15 Apr 2025 04:50)    Daily Weight in k.8 (15 Apr 2025 07:01)  ABG - ( 15 Apr 2025 01:03 )  pH, Arterial: 7.56  pH, Blood: x     /  pCO2: 25    /  pO2: 96    / HCO3: 22    / Base Excess: 1.6   /  SaO2: 99.0        Lactate, Blood: 4.2 mmol/L (04-15-25 @ 05:29)  Blood Gas Arterial, Lactate: 3.1 mmol/L (04-15-25 @ 01:03)  Lactate, Blood: 4.4 mmol/L (25 @ 21:20)    04-15    143  |  107  |  39[H]  ----------------------------<  127[H]  3.9   |  22  |  1.8[H]    Ca    6.3[L]      15 Apr 2025 13:01  Mg     0.9     04-15    TPro  4.6[L]  /  Alb  1.8[L]  /  TBili  0.6  /  DBili      /  AST  90[H]  /  ALT  17  /  AlkPhos  125[H]  04-15      Creatinine Trend:   Creatinine: 1.8 mg/dL (04-15-25 @ 13:01)  Creatinine: 2.1 mg/dL (04-15-25 @ 05:29)  Creatinine: 1.4 mg/dL (25 @ 05:42)  Creatinine: 1.5 mg/dL (25 @ 21:20)  Creatinine: 0.8 mg/dL (25 @ 12:40)                        7.9    7.45  )-----------( 121      ( 15 Apr 2025 13:01 )             23.8     Mean Cell Volume: 96.4 fL (04-15-25 @ 13:01)    Urine Studies:  Protein, Urine: 30 mg/dL (04-15-25 @ 14:10)  Protein, Urine: 30 mg/dL (25 @ 21:20)  White Blood Cell - Urine: 5 /HPF (25 @ 21:20)  Red Blood Cell - Urine: 12 /HPF (25 @ 21:20)    Sodium, Random Urine: 38.0 mmoL/L (04-15 @ 14:10)  Creatinine, Random Urine: 94 mg/dL (04-15 @ 14:10)            RADIOLOGY & ADDITIONAL STUDIES:        Xray Chest 1 View- PORTABLE-Urgent:   ACC: [id number] EXAM:  XR CHEST PORTABLE URGENT 1V   ORDERED BY: PHILIPPE BOWLES     PROCEDURE DATE:  04/15/2025          INTERPRETATION:  CLINICAL HISTORY: Follow-up.    COMPARISON: 2025.    TECHNIQUE: Portable frontal chest radiograph. Low lung volume.    FINDINGS:    Support devices: None.    Cardiac/mediastinum/hilum: Stable.    Lung parenchyma/Pleura: Left basilar opacity, slightly improved. No   pneumothorax is seen.    Skeleton/soft tissues: Stable.      IMPRESSION: Low lung volume.    Left basilar opacity, slightly improved.    --- End of Report ---            MATHIEU YEPEZ MD; Attending Radiologist  This document has been electronically signed. Apr 15 2025  6:08AM (04-15-25 @ 01:18)        < from: CT Abdomen and Pelvis w/ IV Cont (25 @ 22:56) >    ACC: [id number] EXAM:  CT ABDOMEN AND PELVIS IC   ORDERED BY: DOREEN CAPELLAN     PROCEDURE DATE:  2025          INTERPRETATION:  CLINICAL STATEMENT: Abdominal pain and fever.    TECHNIQUE: Contiguous axial CT images were obtained from the lower chest   to the pubic symphysis following administration of intravenous contrast.   98 cc administered of Omnipaque 350 (8 cc discarded). Oral contrast was   not administered. Reformatted images in the coronal and sagittal planes   were acquired.    COMPARISON: CT abdomen and pelvis 2025.      FINDINGS:    LOWER CHEST: Bilateral small left greater than right pleural effusions   with adjacent compressive atelectasis. Bilateral subsegmental   atelectasis. Cardiomegaly.    HEPATOBILIARY: Liver cirrhosis. Post cholecystectomy. Patent portal vein.   Stable hepatic dome calcifications. Hepatic dome subcentimeter hypodense   1.3 cm hypodensity, not fully characterized (series 302, 77).    SPLEEN: Perisplenic varices with left splenorenal shunt. No splenomegaly.    PANCREAS: Limited evaluation of pancreas, including previously described   hypodense lesion, due to background free fluid. Atrophic pancreas with   multiple calcifications, likely sequela of chronic pancreatitis.    ADRENAL GLANDS: Unremarkable.    KIDNEYS: Symmetric enhancement. No hydronephrosis.    ABDOMINOPELVIC NODES: Unremarkable.    PELVIC ORGANS: Contracted urinary bladder.    PERITONEUM/MESENTERY/BOWEL: Mild-to-moderate abdominopelvic ascites. No   abnormal bowel dilation or wall thickening. No intraperitoneal free air.   Scattered colonic diverticula.    BONES/SOFT TISSUES: Stable umbilical hernia containing mesenteric fat and   fluid. Erosive endplate changes centered at the L4-5 level. Degenerative   changes of the spine. Diffuse anasarca.    OTHER: Atherosclerotic calcifications of the abdominal aorta. Engorgement   of the inferior mesenteric vein.      IMPRESSION:    1.  Erosive endplate changes centered at the L4-5 level, consistent with   patient's history ofrecent lumbar spine osteomyelitis.    2.  Liver cirrhosis with portal hypertension. Mild-to-moderate ascites.   Anasarca. Hepatic dome subcentimeter hypodense 1.3 cm hypodensity, not   fully characterized . Outpatient MR abdomen with IV contrast recommended.    3.  Chronic pancreatitis, with limited evaluation for acute inflammation   due to background fluid. Correlation with pancreatic enzymes recommended.    --- End of Report ---          DOMINGO HERCULES MD; Resident Radiologist  This document has been electronically signed.  SYDNEY COBIAN MD; Attending Radiologist  This document has been electronically signed. 2025 12:17AM    < end of copied text >

## 2025-04-15 NOTE — DIETITIAN INITIAL EVALUATION ADULT - ENTERAL
Medical team requesting feeds to run at a lower continuous rate in jaziel of bolus, pt with unprotected airway  therefore Recommend Peptamen  ml q 6 hrs to be infused over 3 hours with H20 flushes of 30 ml pre and post feed, increase as tolerated on day 2 to 240 ml q 6 hrs and on day 3 to 360 ml q 6 hrs with H20 flushes of 30 ml pre and post feed will provide pt with 1800 kcals, 114g protein, 1458 ml free water and 1680 ml total volume meeting >80% of estimated needs Medical team requesting feeds to run at a lower continuous rate in jaziel of bolus, pt with unprotected airway  therefore Recommend Peptamen  ml q 6 hrs to be infused over 3 hours with H20 flushes of 30 ml pre and post feed, increase as tolerated on day 2 to goal rate of  360 ml q 6 hrs with H20 flushes of 30 ml pre and post feed will provide pt with 1800 kcals, 114g protein, 1458 ml free water and 1680 ml total volume meeting >80% of estimated needs  pt with unprotected airway  therefore Recommend bolus feeds of Peptamen AF  360 ml q 6 hrs with H20 flushes of 30 ml pre and post feed will provide pt with 1800 kcals, 114g protein, 1458 ml free water and 1680 ml total volume meeting >80% of estimated needs

## 2025-04-15 NOTE — CHART NOTE - NSCHARTNOTEFT_GEN_A_CORE
On my view of CT scan, there seems to be streaky artifact and not optimal positioning but I don't see any evidence of a bleed (await official report)

## 2025-04-15 NOTE — CONSULT NOTE ADULT - ATTENDING COMMENTS
Patient seen and examined and agree with above except as noted.  Patients history, labs, imaging, vitals, meds and notes reviewed personally.  NO responsive  spoke with family at bedside    Plan as above

## 2025-04-15 NOTE — CHART NOTE - NSCHARTNOTEFT_GEN_A_CORE
Patient had 2 BMs that were Maroon colored. One around noon, CBC did show a drop from 8.9 to 7.9 however repeat showed 8.8 hb. Again at around 6:30 p. HD stable. Spoke with GI. NPO, Octreotide and PPI drip, monitor CBC. And transfuse if <7. Patient already on rocephin and flagyl for gram - bacteremia

## 2025-04-15 NOTE — DIETITIAN INITIAL EVALUATION ADULT - PERTINENT MEDS FT
MEDICATIONS  (STANDING):  atorvastatin 80 milliGRAM(s) Oral at bedtime  cefTRIAXone   IVPB 2000 milliGRAM(s) IV Intermittent every 24 hours  folic acid 1 milliGRAM(s) Oral daily  glucagon  Injectable 1 milliGRAM(s) IntraMuscular once  insulin lispro (ADMELOG) corrective regimen sliding scale   SubCutaneous three times a day before meals  lactated ringers. 1000 milliLiter(s) (75 mL/Hr) IV Continuous <Continuous>  lactulose Syrup 20 Gram(s) Enteral Tube every 2 hours  levETIRAcetam   Injectable 250 milliGRAM(s) IV Push <User Schedule>  lidocaine   4% Patch 1 Patch Transdermal daily  metroNIDAZOLE  IVPB 500 milliGRAM(s) IV Intermittent every 12 hours  nystatin Powder 1 Application(s) Topical two times a day  rifAXIMin 550 milliGRAM(s) Oral two times a day  senna 2 Tablet(s) Oral at bedtime    MEDICATIONS  (PRN):  acetaminophen     Tablet .. 650 milliGRAM(s) Oral every 6 hours PRN Temp greater or equal to 38C (100.4F), Mild Pain (1 - 3)  aluminum hydroxide/magnesium hydroxide/simethicone Suspension 30 milliLiter(s) Oral every 4 hours PRN Dyspepsia  cyclobenzaprine 5 milliGRAM(s) Oral three times a day PRN Muscle Spasm  dextrose Oral Gel 15 Gram(s) Oral once PRN Blood Glucose LESS THAN 70 milliGRAM(s)/deciliter  melatonin 3 milliGRAM(s) Oral at bedtime PRN Insomnia  ondansetron Injectable 4 milliGRAM(s) IV Push every 8 hours PRN Nausea and/or Vomiting  polyethylene glycol 3350 17 Gram(s) Oral two times a day PRN Constipation

## 2025-04-15 NOTE — CONSULT NOTE ADULT - CONSULT REASON
79yo male w/progressive, persistent AMS. CThead neg for acute CVA. possible hepatic encephalopathy as CT A/P-nodular liver ?cirrhosis, ammonia elevated

## 2025-04-15 NOTE — CONSULT NOTE ADULT - SUBJECTIVE AND OBJECTIVE BOX
Patient is a 80y old  Male who presents with a chief complaint of acute mentation changes (14 Apr 2025 10:43)      HPI:  80 years old male history of hypertension, diabetes, subdural hematoma on Keppra, hx mechanical falls, recent admission (January 2025) for osteomyelitis of L4-5 w completion of abx.  BIBA from home status post change of mental status.  Per provider note, patient was discharged from rehab facility on April 4.  Patient was supposed to have MRI of lower back on April 5 but patient refused.  Patient baseline uses walker to ambulate.  By report patient has been doing okay since his discharge from the facility.  Patient had 1 episode of fall on his buttocks few days ago that she was able to help patient to get up with no difficulty.  Patient become confused and calling for his father tonight over the past few hours so she called EMS.  No ROS given mental status, no family at bedside.     (14 Apr 2025 00:29)  CC consulted, for pt's progressive altered mental status    PAST MEDICAL & SURGICAL HISTORY:  Diabetes      Hypertension      Fall      H/O left knee surgery      History of cholecystectomy      History of appendectomy        Allergies    No Known Allergies    Intolerances      FAMILY HISTORY:    Home Medications:  acetaminophen 500 mg oral tablet: 2 tab(s) orally every 8 hours (04 Feb 2025 10:56)  aluminum hydroxide-magnesium hydroxide 200 mg-200 mg/5 mL oral suspension: 30 milliliter(s) orally every 4 hours As needed Dyspepsia (04 Feb 2025 10:56)  aspirin 81 mg oral delayed release tablet: 1 tab(s) orally once a day (04 Feb 2025 10:56)  atorvastatin 80 mg oral tablet: 1 tab(s) orally once a day (at bedtime) (04 Feb 2025 10:56)  ceFAZolin 2 g intravenous injection: 2 gram(s) intravenous every 8 hours End date is 3/9/2025 (04 Feb 2025 10:56)  cyclobenzaprine 5 mg oral tablet: 1 tab(s) orally every 8 hours As needed Muscle Spasm (04 Feb 2025 10:56)  folic acid 1 mg oral tablet: 1 tab(s) orally once a day (04 Feb 2025 10:56)  gabapentin 100 mg oral capsule: 3 cap(s) orally 2 times a day (17 Sep 2022 01:58)  insulin lispro 100 units/mL injectable solution: 1 international unit(s) injectable 3 times a day (before meals) Sliding scale instructions  Inject 2 Units for -199  Inject 4 Units for -249  Inject 6 Units for -299  Inject 8 Units for -349  Inkect 10 Units for  to 399  Inject 12 Units for  or more and call MD (04 Feb 2025 10:56)  Keppra 250 mg oral tablet: 1 tab(s) orally 2 times a day (04 Feb 2025 10:56)  lidocaine 4% topical film: Apply topically to affected area every 24 hours Lower back (04 Feb 2025 10:56)  losartan 50 mg oral tablet: 0.5 tab(s) orally 2 times a day (17 Sep 2022 01:58)  melatonin 5 mg oral tablet: 1 tab(s) orally once a day (at bedtime) As needed Insomnia (04 Feb 2025 10:56)  metFORMIN 500 mg oral tablet: 500 milligram(s) orally 4 times a day (17 Sep 2022 01:58)  morphine: 2 milligram(s) intravenous every 8 hours as needed for  severe pain (04 Feb 2025 10:56)  nystatin 100,000 units/g topical powder: 1 Apply topically to affected area every 12 hours As needed fungal rash (04 Feb 2025 10:56)  ondansetron 2 mg/mL injectable solution: 4 milligram(s) injectable every 8 hours as needed for  nausea (04 Feb 2025 10:56)  Osteo Bi-Flex 250 mg-200 mg oral tablet: orally once a day (17 Sep 2022 01:58)  oxyCODONE 10 mg oral tablet: 1 tab(s) orally 3 times a day As needed Severe Pain (7 - 10) (04 Feb 2025 10:56)  polyethylene glycol 3350 oral powder for reconstitution: 17 gram(s) orally 2 times a day (04 Feb 2025 10:56)  senna leaf extract oral tablet: 2 tab(s) orally once a day (at bedtime) (04 Feb 2025 10:56)  Tresiba FlexTouch: 60 unit(s) subcutaneous once a day (17 Sep 2022 01:58)    Occupation:  Alochol: Denied  Smoking: Denied  Drug Use: Denied  Marital Status:         ROS: as in HPI; All other systems reviewed are negative    ICU Vital Signs Last 24 Hrs  T(C): 36.4 (14 Apr 2025 05:38), Max: 36.4 (14 Apr 2025 05:38)  T(F): 97.6 (14 Apr 2025 05:38), Max: 97.6 (14 Apr 2025 05:38)  HR: 72 (15 Apr 2025 02:49) (72 - 108)  BP: 97/61 (15 Apr 2025 02:49) (97/61 - 146/77)  BP(mean): --  ABP: --  ABP(mean): --  RR: 20 (15 Apr 2025 02:49) (20 - 20)  SpO2: 98% (15 Apr 2025 02:49) (94% - 100%)    O2 Parameters below as of 15 Apr 2025 02:49  Patient On (Oxygen Delivery Method): nasal cannula  O2 Flow (L/min): 4            Physical Examination:    General: elderly W male pt somnolent. unresponsive to pain, but pt saturating well       HEENT: Pupils equal, reactive to light.  Symmetric.    PULM: Clear to auscultation bilaterally, no significant sputum production    CVS: Regular rate and rhythm, no murmurs, rubs, or gallops    ABD: Soft, nondistended, nontender, normoactive bowel sounds, no masses    EXT: No edema, nontender    SKIN: Warm and well perfused, no rashes noted.      ABG - ( 15 Apr 2025 01:03 )  pH, Arterial: 7.56  pH, Blood: x     /  pCO2: 25    /  pO2: 96    / HCO3: 22    / Base Excess: 1.6   /  SaO2: 99.0      I&O's Detail        LABS:                        9.1    7.57  )-----------( 141      ( 14 Apr 2025 05:42 )             28.1     14 Apr 2025 05:42    143    |  104    |  30     ----------------------------<  166    4.8     |  17     |  1.4      Ca    6.6        14 Apr 2025 05:42            CAPILLARY BLOOD GLUCOSE      POCT Blood Glucose.: 179 mg/dL (15 Apr 2025 00:41)    PT/INR - ( 13 Apr 2025 21:20 )   PT: 18.70 sec;   INR: 1.57 ratio         PTT - ( 13 Apr 2025 21:20 )  PTT:33.4 sec  Urinalysis Basic - ( 14 Apr 2025 05:42 )    Color: x / Appearance: x / SG: x / pH: x  Gluc: 166 mg/dL / Ketone: x  / Bili: x / Urobili: x   Blood: x / Protein: x / Nitrite: x   Leuk Esterase: x / RBC: x / WBC x   Sq Epi: x / Non Sq Epi: x / Bacteria: x      Culture    Urinalysis with Rflx Culture (collected 13 Apr 2025 21:20)      Lactate, Blood: 4.4 mmol/L (04-14-25 @ 21:20)  Lactate, Blood: 5.2 mmol/L (04-14-25 @ 19:17)  Lactate, Blood: 5.2 mmol/L (04-14-25 @ 15:02)  Lactate, Blood: 3.2 mmol/L (04-13-25 @ 23:23)      MEDICATIONS  (STANDING):  aspirin enteric coated 81 milliGRAM(s) Oral daily  atorvastatin 80 milliGRAM(s) Oral at bedtime  azithromycin   Tablet 250 milliGRAM(s) Oral daily  chlorhexidine 2% Cloths 1 Application(s) Topical <User Schedule>  dextrose 5%. 1000 milliLiter(s) (100 mL/Hr) IV Continuous <Continuous>  dextrose 5%. 1000 milliLiter(s) (50 mL/Hr) IV Continuous <Continuous>  dextrose 50% Injectable 25 Gram(s) IV Push once  dextrose 50% Injectable 12.5 Gram(s) IV Push once  dextrose 50% Injectable 25 Gram(s) IV Push once  enoxaparin Injectable 40 milliGRAM(s) SubCutaneous every 24 hours  folic acid 1 milliGRAM(s) Oral daily  glucagon  Injectable 1 milliGRAM(s) IntraMuscular once  insulin lispro (ADMELOG) corrective regimen sliding scale   SubCutaneous three times a day before meals  lactated ringers. 1000 milliLiter(s) (125 mL/Hr) IV Continuous <Continuous>  lactulose Retention Enema 200 Gram(s) Rectal once  lactulose Syrup 20 Gram(s) Oral four times a day  levETIRAcetam   Injectable 250 milliGRAM(s) IV Push <User Schedule>  lidocaine   4% Patch 1 Patch Transdermal daily  losartan 25 milliGRAM(s) Oral two times a day  senna 2 Tablet(s) Oral at bedtime    MEDICATIONS  (PRN):  acetaminophen     Tablet .. 650 milliGRAM(s) Oral every 6 hours PRN Temp greater or equal to 38C (100.4F), Mild Pain (1 - 3)  aluminum hydroxide/magnesium hydroxide/simethicone Suspension 30 milliLiter(s) Oral every 4 hours PRN Dyspepsia  cyclobenzaprine 5 milliGRAM(s) Oral three times a day PRN Muscle Spasm  dextrose Oral Gel 15 Gram(s) Oral once PRN Blood Glucose LESS THAN 70 milliGRAM(s)/deciliter  melatonin 3 milliGRAM(s) Oral at bedtime PRN Insomnia  morphine  - Injectable 2 milliGRAM(s) IV Push every 8 hours PRN for severe pain  ondansetron Injectable 4 milliGRAM(s) IV Push every 8 hours PRN Nausea and/or Vomiting  polyethylene glycol 3350 17 Gram(s) Oral two times a day PRN Constipation        RADIOLOGY: ***   CT head : no acute pathology             IMPRESSION:  79yo male w/progressive & persistent unresponsive mental state. r/o hepatic encephalopathy (pt w: elevated ammonia 179 w/ nodular liver)       PLAN:  case discussed w/ ICU MD Calv[place], Transfer pt to ICU   CNS: urgent CT head is negative for acute CVA  neuro. consult. Pt remains full code status    HEENT:    PULMONARY: monitor pt's respiratory statrus, ABG , Will intubated if needed      CARDIOVASCULAR:    GI: pt with elevated ammonia & nodular liver , possible cirrhosis , place NGT start lactulose 20gm q 2hrs until diarrhea then q 6-8hrs    RENAL:    INFECTIOUS DISEASE:    HEMATOLOGICAL:  DVT prophylaxis.    ENDOCRINE:  Follow up FS.  Insulin protocol if needed.    MUSCULOSKELETAL:        CRITICAL CARE TIME SPENT: ***

## 2025-04-15 NOTE — PROGRESS NOTE ADULT - ASSESSMENT
81 yo male presented to ED via EMS for AMS above baseline       #AMS  #New onset decompensated liver Cirrhosis  #recent OM/discitis w abx completion March 2025   #HX of SDH   #Suspected GIB?  #Ascites  CTH negative   Ammonia levels elevated. Repeat today   had one episode of suspected GIB per nurse ([facility])  Lactulose 20 q2h until 3-4 BMs  Hold AC   Will attempt a para today for diagnostic purposes   Rocephin for now   continue ASA   keppra 250 mg q 12 hrs   fall/aspiration precautions   GI and neuro eval    #CHARLENE   - Suspected pre-renal vs hepatorenal   - IVF   - Nephro consult   - Monitor Accurate I&Os     #elevated troponin   #Hx type II NSTEMI   EKG reviewed   Trops peaked and trending down   last TTE LVEF 66% (jan 2025), ECHO am  cardiology consulted      #Hx HLD  #Hx HTN   resume statin /   Hold losartan      #DM 2   A1C 7.6% prev   hold home meds   SSI for now   Carb control     #Hx of  mechanical falls   #chronic back pain   PRN pain control   fall precautions   PT consult when able     Code Status: Full code   DVT ppx: lovenox

## 2025-04-15 NOTE — DIETITIAN INITIAL EVALUATION ADULT - ORAL INTAKE PTA/DIET HISTORY
as per family at bedside pt consumes a diabetic diet PTA with good po intake, NKFA or intolerances. Family uncertain of any weight changes. as per EMR review weight hx is as follows: 114.3 kgs in September of 2022 vs 120.6 kgs in January of 2025 vs 117.8 kg this admission       presently NPO as pr SLP eval, NGT In place with EN order for Jevity 1.2 at 20 ml/hr increase to goal rate of 70 ml/hr x 18 hrs not yet initiated.

## 2025-04-15 NOTE — CONSULT NOTE ADULT - SUBJECTIVE AND OBJECTIVE BOX
Patient is a 80 years old male history of hypertension, diabetes, subdural hematoma on Keppra, hx mechanical falls, recent admission (January 2025) for osteomyelitis of L4-5 w completion of abx.  BIBA from home status post change of mental status.  Per provider note, patient was discharged from rehab facility on April 4.  Patient was supposed to have MRI of lower back on April 5 but patient refused.  Patient baseline uses walker to ambulate.  By report patient has been doing okay since his discharge from the facility.  Patient had 1 episode of fall on his buttocks few days ago that she was able to help patient to get up with no difficulty.  Patient become confused and calling for his father tonight over the past few hours so she called EMS.  No ROS given mental status, no family at bedside.         PAST MEDICAL & SURGICAL HISTORY:  Diabetes  Hypertension  Fall  H/O left knee surgery  History of cholecystectomy  History of appendectomy          REVIEW OF SYSTEMS: Pt unable to offer  MEDICATIONS  (STANDING):  aspirin enteric coated 81 milliGRAM(s) Oral daily  atorvastatin 80 milliGRAM(s) Oral at bedtime  cefTRIAXone   IVPB 2000 milliGRAM(s) IV Intermittent every 24 hours  chlorhexidine 2% Cloths 1 Application(s) Topical <User Schedule>  dextrose 5%. 1000 milliLiter(s) (100 mL/Hr) IV Continuous <Continuous>  dextrose 5%. 1000 milliLiter(s) (50 mL/Hr) IV Continuous <Continuous>  dextrose 50% Injectable 25 Gram(s) IV Push once  dextrose 50% Injectable 12.5 Gram(s) IV Push once  dextrose 50% Injectable 25 Gram(s) IV Push once  folic acid 1 milliGRAM(s) Oral daily  glucagon  Injectable 1 milliGRAM(s) IntraMuscular once  insulin lispro (ADMELOG) corrective regimen sliding scale   SubCutaneous three times a day before meals  lactated ringers. 1000 milliLiter(s) (75 mL/Hr) IV Continuous <Continuous>  lactulose Syrup 20 Gram(s) Enteral Tube every 2 hours  levETIRAcetam   Injectable 250 milliGRAM(s) IV Push <User Schedule>  lidocaine   4% Patch 1 Patch Transdermal daily  losartan 25 milliGRAM(s) Oral two times a day  nystatin Powder 1 Application(s) Topical two times a day  rifAXIMin 550 milliGRAM(s) Oral two times a day  senna 2 Tablet(s) Oral at bedtime    MEDICATIONS  (PRN):  acetaminophen     Tablet .. 650 milliGRAM(s) Oral every 6 hours PRN Temp greater or equal to 38C (100.4F), Mild Pain (1 - 3)  aluminum hydroxide/magnesium hydroxide/simethicone Suspension 30 milliLiter(s) Oral every 4 hours PRN Dyspepsia  cyclobenzaprine 5 milliGRAM(s) Oral three times a day PRN Muscle Spasm  dextrose Oral Gel 15 Gram(s) Oral once PRN Blood Glucose LESS THAN 70 milliGRAM(s)/deciliter  melatonin 3 milliGRAM(s) Oral at bedtime PRN Insomnia  ondansetron Injectable 4 milliGRAM(s) IV Push every 8 hours PRN Nausea and/or Vomiting  polyethylene glycol 3350 17 Gram(s) Oral two times a day PRN Constipation      Allergies  No Known Allergies  Intolerances        SOCIAL HISTORY:      FAMILY HISTORY:         PHYSICAL EXAM:  Vital Signs Last 24 Hrs  T(C): 36.2 (15 Apr 2025 07:01), Max: 36.7 (15 Apr 2025 04:32)  T(F): 97.1 (15 Apr 2025 07:01), Max: 98 (15 Apr 2025 04:32)  HR: 83 (15 Apr 2025 06:10) (72 - 108)  BP: 123/56 (15 Apr 2025 06:10) (97/61 - 146/77)  BP(mean): 81 (15 Apr 2025 06:10) (81 - 97)  RR: 18 (15 Apr 2025 07:01) (16 - 25)  SpO2: 100% (15 Apr 2025 06:10) (94% - 100%)    Parameters below as of 15 Apr 2025 06:10  Patient On (Oxygen Delivery Method): nasal cannula  O2 Flow (L/min): 4    General : NAD    HEENT:  NC/AT, PERRL, EOMI, sclera clear, mucosa moist, throat clear, trachea midline, neck supple  Cardiovascular: RRR   Respiratory: CTA  Gastrointestinal: Soft NT/ND (+)BS , incontinence  : incontinence     Neurology:  Weakened strength & sensation   Psych: Calm  Musculoskeletal:  limited   Skin:  Moisture associated dermatitis / friction wounds                LABS/ CULTURES/ RADIOLOGY:                        8.9    7.53  )-----------( 151      ( 15 Apr 2025 05:29 )             27.5       147  |  106  |  39  ----------------------------<  142      [04-15-25 @ 05:29]  4.5   |  23  |  2.1        Ca     6.6     [04-15-25 @ 05:29]    TPro  4.6  /  Alb  2.0  /  TBili  0.7  /  DBili  x   /  AST  86  /  ALT  19  /  AlkPhos  145  [04-15-25 @ 05:29]    PT/INR: PT 18.70, INR 1.57       [04-13-25 @ 21:20]  PTT: 33.4       [04-13-25 @ 21:20]

## 2025-04-15 NOTE — CONSULT NOTE ADULT - SUBJECTIVE AND OBJECTIVE BOX
NEUROLOGY CONSULT    HPI:  80 years old male history of hypertension, diabetes, subdural hematoma on Keppra, hx mechanical falls, recent admission (January 2025) for osteomyelitis of L4-5 w completion of abx.  BIBA from home status post change of mental status.  Per provider note, patient was discharged from rehab facility on April 4.  Patient was supposed to have MRI of lower back on April 5 but patient refused.  Patient baseline uses walker to ambulate.  By report patient has been doing okay since his discharge from the facility.  Patient had 1 episode of fall on his buttocks few days ago that she was able to help patient to get up with no difficulty.  Patient become confused and calling for his father tonight over the past few hours so she called EMS.  No ROS given mental status, no family at bedside.     (14 Apr 2025 00:29)     MEDICATIONS  Home Medications:  acetaminophen 500 mg oral tablet: 2 tab(s) orally every 8 hours (04 Feb 2025 10:56)  aluminum hydroxide-magnesium hydroxide 200 mg-200 mg/5 mL oral suspension: 30 milliliter(s) orally every 4 hours As needed Dyspepsia (04 Feb 2025 10:56)  aspirin 81 mg oral delayed release tablet: 1 tab(s) orally once a day (04 Feb 2025 10:56)  atorvastatin 80 mg oral tablet: 1 tab(s) orally once a day (at bedtime) (04 Feb 2025 10:56)  ceFAZolin 2 g intravenous injection: 2 gram(s) intravenous every 8 hours End date is 3/9/2025 (04 Feb 2025 10:56)  cyclobenzaprine 5 mg oral tablet: 1 tab(s) orally every 8 hours As needed Muscle Spasm (04 Feb 2025 10:56)  folic acid 1 mg oral tablet: 1 tab(s) orally once a day (04 Feb 2025 10:56)  gabapentin 100 mg oral capsule: 3 cap(s) orally 2 times a day (17 Sep 2022 01:58)  insulin lispro 100 units/mL injectable solution: 1 international unit(s) injectable 3 times a day (before meals) Sliding scale instructions  Inject 2 Units for -199  Inject 4 Units for -249  Inject 6 Units for -299  Inject 8 Units for -349  Inkect 10 Units for  to 399  Inject 12 Units for  or more and call MD (04 Feb 2025 10:56)  Keppra 250 mg oral tablet: 1 tab(s) orally 2 times a day (04 Feb 2025 10:56)  lidocaine 4% topical film: Apply topically to affected area every 24 hours Lower back (04 Feb 2025 10:56)  losartan 50 mg oral tablet: 0.5 tab(s) orally 2 times a day (17 Sep 2022 01:58)  melatonin 5 mg oral tablet: 1 tab(s) orally once a day (at bedtime) As needed Insomnia (04 Feb 2025 10:56)  metFORMIN 500 mg oral tablet: 500 milligram(s) orally 4 times a day (17 Sep 2022 01:58)  morphine: 2 milligram(s) intravenous every 8 hours as needed for  severe pain (04 Feb 2025 10:56)  nystatin 100,000 units/g topical powder: 1 Apply topically to affected area every 12 hours As needed fungal rash (04 Feb 2025 10:56)  ondansetron 2 mg/mL injectable solution: 4 milligram(s) injectable every 8 hours as needed for  nausea (04 Feb 2025 10:56)  Osteo Bi-Flex 250 mg-200 mg oral tablet: orally once a day (17 Sep 2022 01:58)  oxyCODONE 10 mg oral tablet: 1 tab(s) orally 3 times a day As needed Severe Pain (7 - 10) (04 Feb 2025 10:56)  polyethylene glycol 3350 oral powder for reconstitution: 17 gram(s) orally 2 times a day (04 Feb 2025 10:56)  senna leaf extract oral tablet: 2 tab(s) orally once a day (at bedtime) (04 Feb 2025 10:56)  Tresiba FlexTouch: 60 unit(s) subcutaneous once a day (17 Sep 2022 01:58)    MEDICATIONS  (STANDING):  aspirin enteric coated 81 milliGRAM(s) Oral daily  atorvastatin 80 milliGRAM(s) Oral at bedtime  cefTRIAXone   IVPB 2000 milliGRAM(s) IV Intermittent every 24 hours  chlorhexidine 2% Cloths 1 Application(s) Topical <User Schedule>  dextrose 5%. 1000 milliLiter(s) (100 mL/Hr) IV Continuous <Continuous>  dextrose 5%. 1000 milliLiter(s) (50 mL/Hr) IV Continuous <Continuous>  dextrose 50% Injectable 25 Gram(s) IV Push once  dextrose 50% Injectable 12.5 Gram(s) IV Push once  dextrose 50% Injectable 25 Gram(s) IV Push once  folic acid 1 milliGRAM(s) Oral daily  glucagon  Injectable 1 milliGRAM(s) IntraMuscular once  insulin lispro (ADMELOG) corrective regimen sliding scale   SubCutaneous three times a day before meals  lactated ringers. 1000 milliLiter(s) (75 mL/Hr) IV Continuous <Continuous>  lactulose Syrup 20 Gram(s) Enteral Tube every 2 hours  levETIRAcetam   Injectable 250 milliGRAM(s) IV Push <User Schedule>  lidocaine   4% Patch 1 Patch Transdermal daily  nystatin Powder 1 Application(s) Topical two times a day  rifAXIMin 550 milliGRAM(s) Oral two times a day  senna 2 Tablet(s) Oral at bedtime    MEDICATIONS  (PRN):  acetaminophen     Tablet .. 650 milliGRAM(s) Oral every 6 hours PRN Temp greater or equal to 38C (100.4F), Mild Pain (1 - 3)  aluminum hydroxide/magnesium hydroxide/simethicone Suspension 30 milliLiter(s) Oral every 4 hours PRN Dyspepsia  cyclobenzaprine 5 milliGRAM(s) Oral three times a day PRN Muscle Spasm  dextrose Oral Gel 15 Gram(s) Oral once PRN Blood Glucose LESS THAN 70 milliGRAM(s)/deciliter  melatonin 3 milliGRAM(s) Oral at bedtime PRN Insomnia  ondansetron Injectable 4 milliGRAM(s) IV Push every 8 hours PRN Nausea and/or Vomiting  polyethylene glycol 3350 17 Gram(s) Oral two times a day PRN Constipation      Allergies    No Known Allergies      GEN: NAD    NEURO:   MENTAL STATUS: Awakens to repeated physical stimuli but falls immediately back to sleep   LANG/SPEECH: minimal verbal output   CRANIAL NERVES:  II: Pupils equal round and reactive; no RAPD, blink intact to threat   III, IV, VI: EOM intact, no gaze preference or deviation  VII: no facial asymmetry  VIII: normal hearing to speech  MOTOR/SENSORY: Withdraws from noxious stimuli  REFLEXES: downgoing toes  COORD: No abnormal movements         LABS:                        8.9    7.53  )-----------( 151      ( 15 Apr 2025 05:29 )             27.5     04-15    147[H]  |  106  |  39[H]  ----------------------------<  142[H]  4.5   |  23  |  2.1[H]    Ca    6.6[L]      15 Apr 2025 05:29    TPro  4.6[L]  /  Alb  2.0[L]  /  TBili  0.7  /  DBili  x   /  AST  86[H]  /  ALT  19  /  AlkPhos  145[H]  04-15    Hemoglobin A1C:   Vitamin B12   PT/INR - ( 13 Apr 2025 21:20 )   PT: 18.70 sec;   INR: 1.57 ratio         PTT - ( 13 Apr 2025 21:20 )  PTT:33.4 sec  CAPILLARY BLOOD GLUCOSE      POCT Blood Glucose.: 182 mg/dL (15 Apr 2025 08:42)      Urinalysis Basic - ( 15 Apr 2025 05:29 )    Color: x / Appearance: x / SG: x / pH: x  Gluc: 142 mg/dL / Ketone: x  / Bili: x / Urobili: x   Blood: x / Protein: x / Nitrite: x   Leuk Esterase: x / RBC: x / WBC x   Sq Epi: x / Non Sq Epi: x / Bacteria: x        ABG - ( 15 Apr 2025 01:03 )  pH, Arterial: 7.56  pH, Blood: x     /  pCO2: 25    /  pO2: 96    / HCO3: 22    / Base Excess: 1.6   /  SaO2: 99.0                Microbiology:    Urinalysis with Rflx Culture (collected 13 Apr 2025 21:20)        RADIOLOGY  < from: CT Head No Cont (04.15.25 @ 02:25) >  IMPRESSION:    Motion-degraded exam, however, no definite acute intracranial pathology.    --- End of Report ---    < end of copied text >             NEUROLOGY CONSULT    HPI:  80 years old male history of hypertension, diabetes, subdural hematoma on Keppra, hx mechanical falls, recent admission (January 2025) for osteomyelitis of L4-5 w completion of abx.  BIBA from home status post change of mental status.  Per provider note, patient was discharged from rehab facility on April 4.  Patient was supposed to have MRI of lower back on April 5 but patient refused.  Patient baseline uses walker to ambulate.  By report patient has been doing okay since his discharge from the facility.  Patient had 1 episode of fall on his buttocks few days ago that she was able to help patient to get up with no difficulty.  Patient become confused and calling for his father tonight over the past few hours so she called EMS.  No ROS given mental status, no family at bedside.     (14 Apr 2025 00:29)     MEDICATIONS  Home Medications:  acetaminophen 500 mg oral tablet: 2 tab(s) orally every 8 hours (04 Feb 2025 10:56)  aluminum hydroxide-magnesium hydroxide 200 mg-200 mg/5 mL oral suspension: 30 milliliter(s) orally every 4 hours As needed Dyspepsia (04 Feb 2025 10:56)  aspirin 81 mg oral delayed release tablet: 1 tab(s) orally once a day (04 Feb 2025 10:56)  atorvastatin 80 mg oral tablet: 1 tab(s) orally once a day (at bedtime) (04 Feb 2025 10:56)  ceFAZolin 2 g intravenous injection: 2 gram(s) intravenous every 8 hours End date is 3/9/2025 (04 Feb 2025 10:56)  cyclobenzaprine 5 mg oral tablet: 1 tab(s) orally every 8 hours As needed Muscle Spasm (04 Feb 2025 10:56)  folic acid 1 mg oral tablet: 1 tab(s) orally once a day (04 Feb 2025 10:56)  gabapentin 100 mg oral capsule: 3 cap(s) orally 2 times a day (17 Sep 2022 01:58)  insulin lispro 100 units/mL injectable solution: 1 international unit(s) injectable 3 times a day (before meals) Sliding scale instructions  Inject 2 Units for -199  Inject 4 Units for -249  Inject 6 Units for -299  Inject 8 Units for -349  Inkect 10 Units for  to 399  Inject 12 Units for  or more and call MD (04 Feb 2025 10:56)  Keppra 250 mg oral tablet: 1 tab(s) orally 2 times a day (04 Feb 2025 10:56)  lidocaine 4% topical film: Apply topically to affected area every 24 hours Lower back (04 Feb 2025 10:56)  losartan 50 mg oral tablet: 0.5 tab(s) orally 2 times a day (17 Sep 2022 01:58)  melatonin 5 mg oral tablet: 1 tab(s) orally once a day (at bedtime) As needed Insomnia (04 Feb 2025 10:56)  metFORMIN 500 mg oral tablet: 500 milligram(s) orally 4 times a day (17 Sep 2022 01:58)  morphine: 2 milligram(s) intravenous every 8 hours as needed for  severe pain (04 Feb 2025 10:56)  nystatin 100,000 units/g topical powder: 1 Apply topically to affected area every 12 hours As needed fungal rash (04 Feb 2025 10:56)  ondansetron 2 mg/mL injectable solution: 4 milligram(s) injectable every 8 hours as needed for  nausea (04 Feb 2025 10:56)  Osteo Bi-Flex 250 mg-200 mg oral tablet: orally once a day (17 Sep 2022 01:58)  oxyCODONE 10 mg oral tablet: 1 tab(s) orally 3 times a day As needed Severe Pain (7 - 10) (04 Feb 2025 10:56)  polyethylene glycol 3350 oral powder for reconstitution: 17 gram(s) orally 2 times a day (04 Feb 2025 10:56)  senna leaf extract oral tablet: 2 tab(s) orally once a day (at bedtime) (04 Feb 2025 10:56)  Tresiba FlexTouch: 60 unit(s) subcutaneous once a day (17 Sep 2022 01:58)    MEDICATIONS  (STANDING):  aspirin enteric coated 81 milliGRAM(s) Oral daily  atorvastatin 80 milliGRAM(s) Oral at bedtime  cefTRIAXone   IVPB 2000 milliGRAM(s) IV Intermittent every 24 hours  chlorhexidine 2% Cloths 1 Application(s) Topical <User Schedule>  dextrose 5%. 1000 milliLiter(s) (100 mL/Hr) IV Continuous <Continuous>  dextrose 5%. 1000 milliLiter(s) (50 mL/Hr) IV Continuous <Continuous>  dextrose 50% Injectable 25 Gram(s) IV Push once  dextrose 50% Injectable 12.5 Gram(s) IV Push once  dextrose 50% Injectable 25 Gram(s) IV Push once  folic acid 1 milliGRAM(s) Oral daily  glucagon  Injectable 1 milliGRAM(s) IntraMuscular once  insulin lispro (ADMELOG) corrective regimen sliding scale   SubCutaneous three times a day before meals  lactated ringers. 1000 milliLiter(s) (75 mL/Hr) IV Continuous <Continuous>  lactulose Syrup 20 Gram(s) Enteral Tube every 2 hours  levETIRAcetam   Injectable 250 milliGRAM(s) IV Push <User Schedule>  lidocaine   4% Patch 1 Patch Transdermal daily  nystatin Powder 1 Application(s) Topical two times a day  rifAXIMin 550 milliGRAM(s) Oral two times a day  senna 2 Tablet(s) Oral at bedtime    MEDICATIONS  (PRN):  acetaminophen     Tablet .. 650 milliGRAM(s) Oral every 6 hours PRN Temp greater or equal to 38C (100.4F), Mild Pain (1 - 3)  aluminum hydroxide/magnesium hydroxide/simethicone Suspension 30 milliLiter(s) Oral every 4 hours PRN Dyspepsia  cyclobenzaprine 5 milliGRAM(s) Oral three times a day PRN Muscle Spasm  dextrose Oral Gel 15 Gram(s) Oral once PRN Blood Glucose LESS THAN 70 milliGRAM(s)/deciliter  melatonin 3 milliGRAM(s) Oral at bedtime PRN Insomnia  ondansetron Injectable 4 milliGRAM(s) IV Push every 8 hours PRN Nausea and/or Vomiting  polyethylene glycol 3350 17 Gram(s) Oral two times a day PRN Constipation      Allergies    No Known Allergies      GEN: NAD    NEURO:   MENTAL STATUS: Awakens to repeated physical stimuli but falls immediately back to sleep. Not following commands   LANG/SPEECH: minimal verbal output   CRANIAL NERVES:  II: Pupils equal round and reactive; no RAPD, blink intact to threat   III, IV, VI: EOM intact, no gaze preference or deviation  VII: no facial asymmetry  VIII: normal hearing to speech  MOTOR/SENSORY: Withdraws from noxious stimuli  REFLEXES: downgoing toes  COORD: No abnormal movements         LABS:                        8.9    7.53  )-----------( 151      ( 15 Apr 2025 05:29 )             27.5     04-15    147[H]  |  106  |  39[H]  ----------------------------<  142[H]  4.5   |  23  |  2.1[H]    Ca    6.6[L]      15 Apr 2025 05:29    TPro  4.6[L]  /  Alb  2.0[L]  /  TBili  0.7  /  DBili  x   /  AST  86[H]  /  ALT  19  /  AlkPhos  145[H]  04-15    Hemoglobin A1C:   Vitamin B12   PT/INR - ( 13 Apr 2025 21:20 )   PT: 18.70 sec;   INR: 1.57 ratio         PTT - ( 13 Apr 2025 21:20 )  PTT:33.4 sec  CAPILLARY BLOOD GLUCOSE      POCT Blood Glucose.: 182 mg/dL (15 Apr 2025 08:42)      Urinalysis Basic - ( 15 Apr 2025 05:29 )    Color: x / Appearance: x / SG: x / pH: x  Gluc: 142 mg/dL / Ketone: x  / Bili: x / Urobili: x   Blood: x / Protein: x / Nitrite: x   Leuk Esterase: x / RBC: x / WBC x   Sq Epi: x / Non Sq Epi: x / Bacteria: x        ABG - ( 15 Apr 2025 01:03 )  pH, Arterial: 7.56  pH, Blood: x     /  pCO2: 25    /  pO2: 96    / HCO3: 22    / Base Excess: 1.6   /  SaO2: 99.0                Microbiology:    Urinalysis with Rflx Culture (collected 13 Apr 2025 21:20)        RADIOLOGY  < from: CT Head No Cont (04.15.25 @ 02:25) >  IMPRESSION:    Motion-degraded exam, however, no definite acute intracranial pathology.    --- End of Report ---    < end of copied text >

## 2025-04-15 NOTE — DIETITIAN INITIAL EVALUATION ADULT - PERTINENT LABORATORY DATA
04-15    143  |  107  |  39[H]  ----------------------------<  127[H]  3.9   |  22  |  1.8[H]    Ca    6.3[L]      15 Apr 2025 13:01  Mg     0.9     04-15    TPro  4.6[L]  /  Alb  1.8[L]  /  TBili  0.6  /  DBili  x   /  AST  90[H]  /  ALT  17  /  AlkPhos  125[H]  04-15  POCT Blood Glucose.: 136 mg/dL (04-15-25 @ 15:46)  A1C with Estimated Average Glucose Result: 6.0 % (04-14-25 @ 05:42)  A1C with Estimated Average Glucose Result: 7.6 % (01-24-25 @ 07:04)                          7.9    7.45  )-----------( 121      ( 15 Apr 2025 13:01 )             23.8

## 2025-04-15 NOTE — CONSULT NOTE ADULT - ASSESSMENT
CHARLENE likely pre-renal / component of contrast induced nephropathy  Liver cirrhosis  Metabolic encephalopathy  - non oliguric  - creat down-trending  - CT noted/ no hydronephrosis    Recommendations:  - cont iv fluids for today  - increase free water by NGT  - [facility] / strict i/o   - low calcium, check vitD level, phos, PTH, Mg  - check iron stores CHARLENE likely pre-renal / component of contrast induced nephropathy  Liver cirrhosis  Metabolic encephalopathy  - non oliguric  - creat down-trending  - CT noted/ no hydronephrosis    Recommendations:  - cont iv fluids for today  - increase free water by NGT  - [facility] / strict i/o   - low calcium, check vitD level, phos, PTH, Mg  - check iron stores  - neuro/GI f/u  CHARLENE likely pre-renal / component of contrast induced nephropathy  Liver cirrhosis  Metabolic encephalopathy  Bacteremia  - non oliguric  - creat down-trending  - CT noted/ no hydronephrosis    Recommendations:  - cont iv fluids for today  - increase free water by NGT  - [facility] / strict i/o   - low calcium, check vitD level, phos, PTH, Mg  - check iron stores  - abx per ID recs  - neuro/GI f/u

## 2025-04-15 NOTE — CONSULT NOTE ADULT - SUBJECTIVE AND OBJECTIVE BOX
Chief complaint/Reason for consult: AMS    HPI:  80 years old male history of hypertension, diabetes, subdural hematoma on Keppra, hx mechanical falls, recent admission (January 2025) for osteomyelitis of L4-5 w completion of abx.  BIBA from home status post change of mental status.  Per provider note, patient was discharged from rehab facility on April 4.  Patient was supposed to have MRI of lower back on April 5 but patient refused.  Patient baseline uses walker to ambulate.  By report patient has been doing okay since his discharge from the facility.  Patient had 1 episode of fall on his buttocks few days ago that she was able to help patient to get up with no difficulty.  Patient become confused and calling for his father tonight over the past few hours so she called EMS.  No ROS given mental status, no family at bedside.     (14 Apr 2025 00:29)    GI Updates: 80yMale pmh HTN, DM, subdural hematoma on keppra, osteomyelitis of L4-5 had completion of abx here for AMS. GI consulted for liver cirrhosis rule HE. Patient with some blood in stool today reported as well., no hematemesis.      PAST MEDICAL & SURGICAL HISTORY:   Diabetes  Hypertension  Fall  H/O left knee surgery  History of cholecystectomy  History of appendectomy      Family history:  FAMILY HISTORY:    No GI cancers in first or second degree relatives    Social History: No smoking. No alcohol. No illegal drug use.    Allergies:   No Known Allergies      MEDICATIONS: Home Medications:  acetaminophen 500 mg oral tablet: 2 tab(s) orally every 8 hours (04 Feb 2025 10:56)  aluminum hydroxide-magnesium hydroxide 200 mg-200 mg/5 mL oral suspension: 30 milliliter(s) orally every 4 hours As needed Dyspepsia (04 Feb 2025 10:56)  aspirin 81 mg oral delayed release tablet: 1 tab(s) orally once a day (04 Feb 2025 10:56)  atorvastatin 80 mg oral tablet: 1 tab(s) orally once a day (at bedtime) (04 Feb 2025 10:56)  ceFAZolin 2 g intravenous injection: 2 gram(s) intravenous every 8 hours End date is 3/9/2025 (04 Feb 2025 10:56)  cyclobenzaprine 5 mg oral tablet: 1 tab(s) orally every 8 hours As needed Muscle Spasm (04 Feb 2025 10:56)  folic acid 1 mg oral tablet: 1 tab(s) orally once a day (04 Feb 2025 10:56)  gabapentin 100 mg oral capsule: 3 cap(s) orally 2 times a day (17 Sep 2022 01:58)  insulin lispro 100 units/mL injectable solution: 1 international unit(s) injectable 3 times a day (before meals) Sliding scale instructions  Inject 2 Units for -199  Inject 4 Units for -249  Inject 6 Units for -299  Inject 8 Units for -349  Inkect 10 Units for  to 399  Inject 12 Units for  or more and call MD (04 Feb 2025 10:56)  Keppra 250 mg oral tablet: 1 tab(s) orally 2 times a day (04 Feb 2025 10:56)  lidocaine 4% topical film: Apply topically to affected area every 24 hours Lower back (04 Feb 2025 10:56)  losartan 50 mg oral tablet: 0.5 tab(s) orally 2 times a day (17 Sep 2022 01:58)  melatonin 5 mg oral tablet: 1 tab(s) orally once a day (at bedtime) As needed Insomnia (04 Feb 2025 10:56)  metFORMIN 500 mg oral tablet: 500 milligram(s) orally 4 times a day (17 Sep 2022 01:58)  morphine: 2 milligram(s) intravenous every 8 hours as needed for  severe pain (04 Feb 2025 10:56)  nystatin 100,000 units/g topical powder: 1 Apply topically to affected area every 12 hours As needed fungal rash (04 Feb 2025 10:56)  ondansetron 2 mg/mL injectable solution: 4 milligram(s) injectable every 8 hours as needed for  nausea (04 Feb 2025 10:56)  Osteo Bi-Flex 250 mg-200 mg oral tablet: orally once a day (17 Sep 2022 01:58)  oxyCODONE 10 mg oral tablet: 1 tab(s) orally 3 times a day As needed Severe Pain (7 - 10) (04 Feb 2025 10:56)  polyethylene glycol 3350 oral powder for reconstitution: 17 gram(s) orally 2 times a day (04 Feb 2025 10:56)  senna leaf extract oral tablet: 2 tab(s) orally once a day (at bedtime) (04 Feb 2025 10:56)  Tresiba FlexTouch: 60 unit(s) subcutaneous once a day (17 Sep 2022 01:58)          MEDICATIONS  (STANDING):  aspirin enteric coated 81 milliGRAM(s) Oral daily  atorvastatin 80 milliGRAM(s) Oral at bedtime  cefTRIAXone   IVPB 2000 milliGRAM(s) IV Intermittent every 24 hours  chlorhexidine 2% Cloths 1 Application(s) Topical <User Schedule>  dextrose 5%. 1000 milliLiter(s) (100 mL/Hr) IV Continuous <Continuous>  dextrose 5%. 1000 milliLiter(s) (50 mL/Hr) IV Continuous <Continuous>  dextrose 50% Injectable 25 Gram(s) IV Push once  dextrose 50% Injectable 12.5 Gram(s) IV Push once  dextrose 50% Injectable 25 Gram(s) IV Push once  folic acid 1 milliGRAM(s) Oral daily  glucagon  Injectable 1 milliGRAM(s) IntraMuscular once  insulin lispro (ADMELOG) corrective regimen sliding scale   SubCutaneous three times a day before meals  lactated ringers. 1000 milliLiter(s) (75 mL/Hr) IV Continuous <Continuous>  lactulose Syrup 20 Gram(s) Enteral Tube every 2 hours  levETIRAcetam   Injectable 250 milliGRAM(s) IV Push <User Schedule>  lidocaine   4% Patch 1 Patch Transdermal daily  nystatin Powder 1 Application(s) Topical two times a day  rifAXIMin 550 milliGRAM(s) Oral two times a day  senna 2 Tablet(s) Oral at bedtime    MEDICATIONS  (PRN):  acetaminophen     Tablet .. 650 milliGRAM(s) Oral every 6 hours PRN Temp greater or equal to 38C (100.4F), Mild Pain (1 - 3)  aluminum hydroxide/magnesium hydroxide/simethicone Suspension 30 milliLiter(s) Oral every 4 hours PRN Dyspepsia  cyclobenzaprine 5 milliGRAM(s) Oral three times a day PRN Muscle Spasm  dextrose Oral Gel 15 Gram(s) Oral once PRN Blood Glucose LESS THAN 70 milliGRAM(s)/deciliter  melatonin 3 milliGRAM(s) Oral at bedtime PRN Insomnia  ondansetron Injectable 4 milliGRAM(s) IV Push every 8 hours PRN Nausea and/or Vomiting  polyethylene glycol 3350 17 Gram(s) Oral two times a day PRN Constipation        REVIEW OF SYSTEMS  unobtainable       VITALS:   T(F): 97.1 (04-15-25 @ 07:01), Max: 98 (04-15-25 @ 04:32)  HR: 83 (04-15-25 @ 06:10) (72 - 108)  BP: 123/56 (04-15-25 @ 06:10) (97/61 - 146/77)  RR: 18 (04-15-25 @ 07:01) (16 - 25)  SpO2: 100% (04-15-25 @ 06:10) (94% - 100%)    PHYSICAL EXAM:  GENERAL: +NG tube  HEAD:  Atraumatic, Normocephalic  EYES: conjunctiva and sclera clear  NECK: Supple, No thyromegaly   CHEST/LUNG: Clear to auscultation bilaterally; No wheeze, rhonchi, or rales  HEART: Regular rate and rhythm; normal S1, S2, No murmurs.  ABDOMEN: Soft, nontender, nondistended; Bowel sounds present  NEUROLOGY: No asterixis or tremor  SKIN: Intact, no jaundice  Rectal exam-brown stool in rectal vault and on fingers        LABS:  04-15    147[H]  |  106  |  39[H]  ----------------------------<  142[H]  4.5   |  23  |  2.1[H]    Ca    6.6[L]      15 Apr 2025 05:29    TPro  4.6[L]  /  Alb  2.0[L]  /  TBili  0.7  /  DBili  x   /  AST  86[H]  /  ALT  19  /  AlkPhos  145[H]  04-15                          8.9    7.53  )-----------( 151      ( 15 Apr 2025 05:29 )             27.5     LIVER FUNCTIONS - ( 15 Apr 2025 05:29 )  Alb: 2.0 g/dL / Pro: 4.6 g/dL / ALK PHOS: 145 U/L / ALT: 19 U/L / AST: 86 U/L / GGT: x           PT/INR - ( 13 Apr 2025 21:20 )   PT: 18.70 sec;   INR: 1.57 ratio         PTT - ( 13 Apr 2025 21:20 )  PTT:33.4 sec    IMAGING:      < from: CT Abdomen and Pelvis w/ IV Cont (04.13.25 @ 22:56) >    ACC: [id number] EXAM:  CT ABDOMEN AND PELVIS IC   ORDERED BY: DOREEN CAPELLAN     PROCEDURE DATE:  04/13/2025          INTERPRETATION:  CLINICAL STATEMENT: Abdominal pain and fever.    TECHNIQUE: Contiguous axial CT images were obtained from the lower chest   to the pubic symphysis following administration of intravenous contrast.   98 cc administered of Omnipaque 350 (8 cc discarded). Oral contrast was   not administered. Reformatted images in the coronal and sagittal planes   were acquired.    COMPARISON: CT abdomen and pelvis 1/23/2025.      FINDINGS:    LOWER CHEST: Bilateral small left greater than right pleural effusions   with adjacent compressive atelectasis. Bilateral subsegmental   atelectasis. Cardiomegaly.    HEPATOBILIARY: Liver cirrhosis. Post cholecystectomy. Patent portal vein.   Stable hepatic dome calcifications. Hepatic dome subcentimeter hypodense   1.3 cm hypodensity, not fully characterized (series 302, 77).    SPLEEN: Perisplenic varices with left splenorenal shunt. No splenomegaly.    PANCREAS: Limited evaluation of pancreas, including previously described   hypodense lesion, due to background free fluid. Atrophic pancreas with   multiple calcifications, likely sequela of chronic pancreatitis.    ADRENAL GLANDS: Unremarkable.    KIDNEYS: Symmetric enhancement. No hydronephrosis.    ABDOMINOPELVIC NODES: Unremarkable.    PELVIC ORGANS: Contracted urinary bladder.    PERITONEUM/MESENTERY/BOWEL: Mild-to-moderate abdominopelvic ascites. No   abnormal bowel dilation or wall thickening. No intraperitoneal free air.   Scattered colonic diverticula.    BONES/SOFT TISSUES: Stable umbilical hernia containing mesenteric fat and   fluid. Erosive endplate changes centered at the L4-5 level. Degenerative   changes of the spine. Diffuse anasarca.    OTHER: Atherosclerotic calcifications of the abdominal aorta. Engorgement   of the inferior mesenteric vein.      IMPRESSION:    1.  Erosive endplate changes centered at the L4-5 level, consistent with   patient's history ofrecent lumbar spine osteomyelitis.    2.  Liver cirrhosis with portal hypertension. Mild-to-moderate ascites.   Anasarca. Hepatic dome subcentimeter hypodense 1.3 cm hypodensity, not   fully characterized . Outpatient MR abdomen with IV contrast recommended.    3.  Chronic pancreatitis, with limited evaluation for acute inflammation   due to background fluid. Correlation with pancreatic enzymes recommended.    --- End of Report ---          DOMINGO HERCULES MD; Resident Radiologist  This document has been electronically signed.  SYDNEY COBIAN MD; Attending Radiologist  This document has been electronically signed. Apr 14 2025 12:17AM    < end of copied text >

## 2025-04-15 NOTE — CONSULT NOTE ADULT - ASSESSMENT
IMPRESSION:  alter mental status ?? metabolic encephalopathy   seizure hx   hypernatremia   CHARLENE   elevated trop   afib     PLAN:    CNS: VEEG   neurology consult   ?? need for LP no fever  no wbc   check ammonia level   possible need MRI follow enurology   HEENT: oral care     PULMONARY: keep pox >92% aspiration precaution   repeat ABG now   daily cxr   CARDIOVASCULAR: echo , follow trop   follow cardiology     GI: GI prophylaxis.  follow LFT , hepatology eval   rafiximine and lactulose   check Ammonia level today   start NG feed      RENAL: start IB fluid LR 75cc/ hr   free water 250cc Q6 hrs   send urine lytes , cr and osmolarity   serum osmolarity     INFECTIOUS DISEASE: ID eval   procal   bldcx       HEMATOLOGICAL:  DVT prophylaxis. follow h/h and plt     ENDOCRINE:  Follow up FS.  Insulin protocol if needed.    MICU         CRITICAL CARE TIME SPENT: ***     IMPRESSION:  alter mental status ?? metabolic encephalopathy   seizure hx   liver cirrhosis     hypernatremia   CHARLENE   elevated trop   afib     PLAN:    CNS: VEEG   neurology consult   ?? need for LP no fever  no wbc   check ammonia level   possible need MRI follow neurology   HEENT: oral care     PULMONARY: keep pox >92% aspiration precaution   repeat ABG now   daily cxr   CARDIOVASCULAR: echo , follow trop   follow cardiology     GI: GI prophylaxis.  follow LFT , hepatology eval   rafiximine and lactulose   check Ammonia level today   start NG feed    check lipase     RENAL: start IB fluid LR 75cc/ hr   free water 250cc Q6 hrs   send urine lytes , cr and osmolarity   serum osmolarity     INFECTIOUS DISEASE: ID eval   procal   bldcx       HEMATOLOGICAL:  DVT prophylaxis. follow h/h and plt     ENDOCRINE:  Follow up FS.  Insulin protocol if needed.    SDU        CRITICAL CARE TIME SPENT: ***

## 2025-04-16 ENCOUNTER — TRANSCRIPTION ENCOUNTER (OUTPATIENT)
Age: 80
End: 2025-04-16

## 2025-04-16 LAB
-  AMOXICILLIN/CLAVULANIC ACID: SIGNIFICANT CHANGE UP
-  AMPICILLIN/SULBACTAM: SIGNIFICANT CHANGE UP
-  AMPICILLIN: SIGNIFICANT CHANGE UP
-  AZTREONAM: SIGNIFICANT CHANGE UP
-  CEFAZOLIN: SIGNIFICANT CHANGE UP
-  CEFEPIME: SIGNIFICANT CHANGE UP
-  CEFOXITIN: SIGNIFICANT CHANGE UP
-  CEFTRIAXONE: SIGNIFICANT CHANGE UP
-  CEFUROXIME: SIGNIFICANT CHANGE UP
-  CIPROFLOXACIN: SIGNIFICANT CHANGE UP
-  ERTAPENEM: SIGNIFICANT CHANGE UP
-  GENTAMICIN: SIGNIFICANT CHANGE UP
-  LEVOFLOXACIN: SIGNIFICANT CHANGE UP
-  MEROPENEM: SIGNIFICANT CHANGE UP
-  NITROFURANTOIN: SIGNIFICANT CHANGE UP
-  PIPERACILLIN/TAZOBACTAM: SIGNIFICANT CHANGE UP
-  TOBRAMYCIN: SIGNIFICANT CHANGE UP
-  TRIMETHOPRIM/SULFAMETHOXAZOLE: SIGNIFICANT CHANGE UP
ABO RH CONFIRMATION: SIGNIFICANT CHANGE UP
ALBUMIN SERPL ELPH-MCNC: 1.8 G/DL — LOW (ref 3.5–5.2)
ALBUMIN SERPL ELPH-MCNC: 2.8 G/DL — LOW (ref 3.5–5.2)
ALP SERPL-CCNC: 100 U/L — SIGNIFICANT CHANGE UP (ref 30–115)
ALP SERPL-CCNC: 142 U/L — HIGH (ref 30–115)
ALT FLD-CCNC: 16 U/L — SIGNIFICANT CHANGE UP (ref 0–41)
ALT FLD-CCNC: 24 U/L — SIGNIFICANT CHANGE UP (ref 0–41)
AMMONIA BLD-MCNC: 60 UMOL/L — HIGH (ref 11–55)
ANION GAP SERPL CALC-SCNC: 12 MMOL/L — SIGNIFICANT CHANGE UP (ref 7–14)
ANION GAP SERPL CALC-SCNC: 14 MMOL/L — SIGNIFICANT CHANGE UP (ref 7–14)
ANISOCYTOSIS BLD QL: SLIGHT — SIGNIFICANT CHANGE UP
APTT BLD: 33.6 SEC — SIGNIFICANT CHANGE UP (ref 27–39.2)
AST SERPL-CCNC: 110 U/L — HIGH (ref 0–41)
AST SERPL-CCNC: 70 U/L — HIGH (ref 0–41)
BILIRUB SERPL-MCNC: 0.8 MG/DL — SIGNIFICANT CHANGE UP (ref 0.2–1.2)
BILIRUB SERPL-MCNC: 0.9 MG/DL — SIGNIFICANT CHANGE UP (ref 0.2–1.2)
BLD GP AB SCN SERPL QL: SIGNIFICANT CHANGE UP
BUN SERPL-MCNC: 35 MG/DL — HIGH (ref 10–20)
BUN SERPL-MCNC: 39 MG/DL — HIGH (ref 10–20)
BURR CELLS BLD QL SMEAR: PRESENT — SIGNIFICANT CHANGE UP
CALCIUM SERPL-MCNC: 6.4 MG/DL — LOW (ref 8.4–10.5)
CALCIUM SERPL-MCNC: 6.7 MG/DL — LOW (ref 8.4–10.5)
CHLORIDE SERPL-SCNC: 109 MMOL/L — SIGNIFICANT CHANGE UP (ref 98–110)
CHLORIDE SERPL-SCNC: 110 MMOL/L — SIGNIFICANT CHANGE UP (ref 98–110)
CO2 SERPL-SCNC: 24 MMOL/L — SIGNIFICANT CHANGE UP (ref 17–32)
CO2 SERPL-SCNC: 25 MMOL/L — SIGNIFICANT CHANGE UP (ref 17–32)
CREAT SERPL-MCNC: 1.6 MG/DL — HIGH (ref 0.7–1.5)
CREAT SERPL-MCNC: 1.8 MG/DL — HIGH (ref 0.7–1.5)
CULTURE RESULTS: ABNORMAL
CULTURE RESULTS: ABNORMAL
DACRYOCYTES BLD QL SMEAR: SLIGHT — SIGNIFICANT CHANGE UP
EGFR: 38 ML/MIN/1.73M2 — LOW
EGFR: 38 ML/MIN/1.73M2 — LOW
EGFR: 44 ML/MIN/1.73M2 — LOW
EGFR: 44 ML/MIN/1.73M2 — LOW
GLUCOSE BLDC GLUCOMTR-MCNC: 126 MG/DL — HIGH (ref 70–99)
GLUCOSE BLDC GLUCOMTR-MCNC: 153 MG/DL — HIGH (ref 70–99)
GLUCOSE BLDC GLUCOMTR-MCNC: 162 MG/DL — HIGH (ref 70–99)
GLUCOSE BLDC GLUCOMTR-MCNC: 178 MG/DL — HIGH (ref 70–99)
GLUCOSE SERPL-MCNC: 114 MG/DL — HIGH (ref 70–99)
GLUCOSE SERPL-MCNC: 155 MG/DL — HIGH (ref 70–99)
HCT VFR BLD CALC: 17.8 % — LOW (ref 42–52)
HCT VFR BLD CALC: 18.1 % — LOW (ref 42–52)
HCT VFR BLD CALC: 24 % — LOW (ref 42–52)
HCT VFR BLD CALC: 24.4 % — LOW (ref 42–52)
HGB BLD-MCNC: 5.8 G/DL — CRITICAL LOW (ref 14–18)
HGB BLD-MCNC: 5.8 G/DL — CRITICAL LOW (ref 14–18)
HGB BLD-MCNC: 7.9 G/DL — LOW (ref 14–18)
HGB BLD-MCNC: 7.9 G/DL — LOW (ref 14–18)
HYPOCHROMIA BLD QL: SLIGHT — SIGNIFICANT CHANGE UP
INR BLD: 1.55 RATIO — HIGH (ref 0.65–1.3)
MACROCYTES BLD QL: SLIGHT — SIGNIFICANT CHANGE UP
MAGNESIUM SERPL-MCNC: 1.7 MG/DL — LOW (ref 1.8–2.4)
MCHC RBC-ENTMCNC: 31.5 PG — HIGH (ref 27–31)
MCHC RBC-ENTMCNC: 31.9 PG — HIGH (ref 27–31)
MCHC RBC-ENTMCNC: 32 G/DL — SIGNIFICANT CHANGE UP (ref 32–37)
MCHC RBC-ENTMCNC: 32 PG — HIGH (ref 27–31)
MCHC RBC-ENTMCNC: 32.2 PG — HIGH (ref 27–31)
MCHC RBC-ENTMCNC: 32.4 G/DL — SIGNIFICANT CHANGE UP (ref 32–37)
MCHC RBC-ENTMCNC: 32.6 G/DL — SIGNIFICANT CHANGE UP (ref 32–37)
MCHC RBC-ENTMCNC: 32.9 G/DL — SIGNIFICANT CHANGE UP (ref 32–37)
MCV RBC AUTO: 98 FL — HIGH (ref 80–94)
MCV RBC AUTO: 98.3 FL — HIGH (ref 80–94)
MCV RBC AUTO: 98.4 FL — HIGH (ref 80–94)
MCV RBC AUTO: 98.4 FL — HIGH (ref 80–94)
METHOD TYPE: SIGNIFICANT CHANGE UP
NRBC BLD AUTO-RTO: 0 /100 WBCS — SIGNIFICANT CHANGE UP (ref 0–0)
ORGANISM # SPEC MICROSCOPIC CNT: ABNORMAL
ORGANISM # SPEC MICROSCOPIC CNT: ABNORMAL
ORGANISM # SPEC MICROSCOPIC CNT: SIGNIFICANT CHANGE UP
ORGANISM # SPEC MICROSCOPIC CNT: SIGNIFICANT CHANGE UP
OVALOCYTES BLD QL SMEAR: SLIGHT — SIGNIFICANT CHANGE UP
PHOSPHATE SERPL-MCNC: 3.8 MG/DL — SIGNIFICANT CHANGE UP (ref 2.1–4.9)
PLAT MORPH BLD: NORMAL — SIGNIFICANT CHANGE UP
PLATELET # BLD AUTO: 130 K/UL — SIGNIFICANT CHANGE UP (ref 130–400)
PLATELET # BLD AUTO: 134 K/UL — SIGNIFICANT CHANGE UP (ref 130–400)
PLATELET # BLD AUTO: 80 K/UL — LOW (ref 130–400)
PLATELET # BLD AUTO: 86 K/UL — LOW (ref 130–400)
PLATELET COUNT - ESTIMATE: ABNORMAL
PMV BLD: 9.1 FL — SIGNIFICANT CHANGE UP (ref 7.4–10.4)
PMV BLD: 9.2 FL — SIGNIFICANT CHANGE UP (ref 7.4–10.4)
PMV BLD: 9.6 FL — SIGNIFICANT CHANGE UP (ref 7.4–10.4)
PMV BLD: 9.7 FL — SIGNIFICANT CHANGE UP (ref 7.4–10.4)
POTASSIUM SERPL-MCNC: 2.8 MMOL/L — LOW (ref 3.5–5)
POTASSIUM SERPL-MCNC: 3.8 MMOL/L — SIGNIFICANT CHANGE UP (ref 3.5–5)
POTASSIUM SERPL-SCNC: 2.8 MMOL/L — LOW (ref 3.5–5)
POTASSIUM SERPL-SCNC: 3.8 MMOL/L — SIGNIFICANT CHANGE UP (ref 3.5–5)
PROCALCITONIN SERPL-MCNC: 1.06 NG/ML — HIGH (ref 0.02–0.1)
PROT SERPL-MCNC: 4.5 G/DL — LOW (ref 6–8)
PROT SERPL-MCNC: 4.6 G/DL — LOW (ref 6–8)
PROTHROM AB SERPL-ACNC: 18.4 SEC — HIGH (ref 9.95–12.87)
RBC # BLD: 1.81 M/UL — LOW (ref 4.7–6.1)
RBC # BLD: 1.84 M/UL — LOW (ref 4.7–6.1)
RBC # BLD: 2.45 M/UL — LOW (ref 4.7–6.1)
RBC # BLD: 2.48 M/UL — LOW (ref 4.7–6.1)
RBC # FLD: 16.1 % — HIGH (ref 11.5–14.5)
RBC # FLD: 16.2 % — HIGH (ref 11.5–14.5)
RBC # FLD: 16.2 % — HIGH (ref 11.5–14.5)
RBC # FLD: 16.4 % — HIGH (ref 11.5–14.5)
RBC BLD AUTO: NORMAL — SIGNIFICANT CHANGE UP
SODIUM SERPL-SCNC: 147 MMOL/L — HIGH (ref 135–146)
SODIUM SERPL-SCNC: 147 MMOL/L — HIGH (ref 135–146)
SPECIMEN SOURCE: SIGNIFICANT CHANGE UP
SPECIMEN SOURCE: SIGNIFICANT CHANGE UP
TARGETS BLD QL SMEAR: SLIGHT — SIGNIFICANT CHANGE UP
WBC # BLD: 4.06 K/UL — LOW (ref 4.8–10.8)
WBC # BLD: 4.1 K/UL — LOW (ref 4.8–10.8)
WBC # BLD: 6.75 K/UL — SIGNIFICANT CHANGE UP (ref 4.8–10.8)
WBC # BLD: 7.55 K/UL — SIGNIFICANT CHANGE UP (ref 4.8–10.8)
WBC # FLD AUTO: 4.06 K/UL — LOW (ref 4.8–10.8)
WBC # FLD AUTO: 4.1 K/UL — LOW (ref 4.8–10.8)
WBC # FLD AUTO: 6.75 K/UL — SIGNIFICANT CHANGE UP (ref 4.8–10.8)
WBC # FLD AUTO: 7.55 K/UL — SIGNIFICANT CHANGE UP (ref 4.8–10.8)

## 2025-04-16 PROCEDURE — 99233 SBSQ HOSP IP/OBS HIGH 50: CPT

## 2025-04-16 PROCEDURE — 99291 CRITICAL CARE FIRST HOUR: CPT

## 2025-04-16 PROCEDURE — 95816 EEG AWAKE AND DROWSY: CPT | Mod: 26

## 2025-04-16 PROCEDURE — 71045 X-RAY EXAM CHEST 1 VIEW: CPT | Mod: 26

## 2025-04-16 PROCEDURE — 93010 ELECTROCARDIOGRAM REPORT: CPT

## 2025-04-16 PROCEDURE — 43255 EGD CONTROL BLEEDING ANY: CPT

## 2025-04-16 RX ORDER — MAGNESIUM SULFATE 500 MG/ML
2 SYRINGE (ML) INJECTION ONCE
Refills: 0 | Status: COMPLETED | OUTPATIENT
Start: 2025-04-16 | End: 2025-04-16

## 2025-04-16 RX ORDER — SODIUM CHLORIDE 9 G/1000ML
1000 INJECTION, SOLUTION INTRAVENOUS
Refills: 0 | Status: DISCONTINUED | OUTPATIENT
Start: 2025-04-16 | End: 2025-04-16

## 2025-04-16 RX ORDER — LACTULOSE 10 G/15ML
200 SOLUTION ORAL EVERY 6 HOURS
Refills: 0 | Status: DISCONTINUED | OUTPATIENT
Start: 2025-04-16 | End: 2025-04-16

## 2025-04-16 RX ORDER — ALBUMIN (HUMAN) 12.5 G/50ML
400 INJECTION, SOLUTION INTRAVENOUS ONCE
Refills: 0 | Status: COMPLETED | OUTPATIENT
Start: 2025-04-16 | End: 2025-04-16

## 2025-04-16 RX ORDER — LACTULOSE 10 G/15ML
20 SOLUTION ORAL EVERY 8 HOURS
Refills: 0 | Status: DISCONTINUED | OUTPATIENT
Start: 2025-04-16 | End: 2025-04-18

## 2025-04-16 RX ORDER — POLYETHYLENE GLYCOL-3350 AND ELECTROLYTES 236; 6.74; 5.86; 2.97; 22.74 G/274.31G; G/274.31G; G/274.31G; G/274.31G; G/274.31G
4000 POWDER, FOR SOLUTION ORAL ONCE
Refills: 0 | Status: COMPLETED | OUTPATIENT
Start: 2025-04-16 | End: 2025-04-16

## 2025-04-16 RX ORDER — DEXMEDETOMIDINE HYDROCHLORIDE IN SODIUM CHLORIDE 4 UG/ML
0.5 INJECTION INTRAVENOUS
Qty: 400 | Refills: 0 | Status: DISCONTINUED | OUTPATIENT
Start: 2025-04-16 | End: 2025-04-24

## 2025-04-16 RX ORDER — LACTULOSE 10 G/15ML
20 SOLUTION ORAL EVERY 8 HOURS
Refills: 0 | Status: DISCONTINUED | OUTPATIENT
Start: 2025-04-16 | End: 2025-04-16

## 2025-04-16 RX ADMIN — Medication 25 GRAM(S): at 01:04

## 2025-04-16 RX ADMIN — LIDOCAINE HYDROCHLORIDE 1 PATCH: 20 JELLY TOPICAL at 19:25

## 2025-04-16 RX ADMIN — INSULIN LISPRO 2: 100 INJECTION, SOLUTION INTRAVENOUS; SUBCUTANEOUS at 06:00

## 2025-04-16 RX ADMIN — CEFTRIAXONE 100 MILLIGRAM(S): 500 INJECTION, POWDER, FOR SOLUTION INTRAMUSCULAR; INTRAVENOUS at 14:25

## 2025-04-16 RX ADMIN — Medication 25 GRAM(S): at 06:01

## 2025-04-16 RX ADMIN — LIDOCAINE HYDROCHLORIDE 1 PATCH: 20 JELLY TOPICAL at 00:47

## 2025-04-16 RX ADMIN — LEVETIRACETAM 250 MILLIGRAM(S): 10 INJECTION, SOLUTION INTRAVENOUS at 17:25

## 2025-04-16 RX ADMIN — Medication 50 MILLIEQUIVALENT(S): at 23:56

## 2025-04-16 RX ADMIN — SODIUM CHLORIDE 70 MILLILITER(S): 9 INJECTION, SOLUTION INTRAVENOUS at 08:25

## 2025-04-16 RX ADMIN — LEVETIRACETAM 250 MILLIGRAM(S): 10 INJECTION, SOLUTION INTRAVENOUS at 05:11

## 2025-04-16 RX ADMIN — ATORVASTATIN CALCIUM 80 MILLIGRAM(S): 80 TABLET, FILM COATED ORAL at 20:41

## 2025-04-16 RX ADMIN — Medication 1 APPLICATION(S): at 05:10

## 2025-04-16 RX ADMIN — Medication 100 MILLIGRAM(S): at 17:25

## 2025-04-16 RX ADMIN — Medication 40 MILLIGRAM(S): at 17:25

## 2025-04-16 RX ADMIN — INSULIN LISPRO 2: 100 INJECTION, SOLUTION INTRAVENOUS; SUBCUTANEOUS at 16:38

## 2025-04-16 RX ADMIN — FOLIC ACID 1 MILLIGRAM(S): 1 TABLET ORAL at 11:02

## 2025-04-16 RX ADMIN — LACTULOSE 20 GRAM(S): 10 SOLUTION ORAL at 05:11

## 2025-04-16 RX ADMIN — SODIUM CHLORIDE 75 MILLILITER(S): 9 INJECTION, SOLUTION INTRAVENOUS at 04:24

## 2025-04-16 RX ADMIN — LACTULOSE 20 GRAM(S): 10 SOLUTION ORAL at 20:41

## 2025-04-16 RX ADMIN — NYSTATIN 1 APPLICATION(S): 100000 CREAM TOPICAL at 17:25

## 2025-04-16 RX ADMIN — Medication 100 MILLIGRAM(S): at 05:11

## 2025-04-16 RX ADMIN — NYSTATIN 1 APPLICATION(S): 100000 CREAM TOPICAL at 05:11

## 2025-04-16 RX ADMIN — LIDOCAINE HYDROCHLORIDE 1 PATCH: 20 JELLY TOPICAL at 11:02

## 2025-04-16 RX ADMIN — ALBUMIN (HUMAN) 200 MILLILITER(S): 12.5 INJECTION, SOLUTION INTRAVENOUS at 11:47

## 2025-04-16 RX ADMIN — POLYETHYLENE GLYCOL-3350 AND ELECTROLYTES 4000 MILLILITER(S): 236; 6.74; 5.86; 2.97; 22.74 POWDER, FOR SOLUTION ORAL at 18:12

## 2025-04-16 RX ADMIN — Medication 50 MILLIEQUIVALENT(S): at 04:24

## 2025-04-16 RX ADMIN — Medication 50 MILLIEQUIVALENT(S): at 01:04

## 2025-04-16 RX ADMIN — DEXMEDETOMIDINE HYDROCHLORIDE IN SODIUM CHLORIDE 14.7 MICROGRAM(S)/KG/HR: 4 INJECTION INTRAVENOUS at 20:28

## 2025-04-16 RX ADMIN — INSULIN LISPRO 2: 100 INJECTION, SOLUTION INTRAVENOUS; SUBCUTANEOUS at 11:04

## 2025-04-16 NOTE — CHART NOTE - NSCHARTNOTEFT_GEN_A_CORE
patient s/p EGD had clean based duodenal ulcer which was clipped  no varices   but still will do Colonoscopy tomorrow  -prep for tomorrow   npo aftermidnight

## 2025-04-16 NOTE — PROGRESS NOTE ADULT - ASSESSMENT
80 years old male with a medical  history of hypertension, diabetes, subdural hematoma on Keppra, hx mechanical falls, recent admission (January 2025) for osteomyelitis of L4-5 w completion of abx.  BIBA from home status post change of mental status. In hospital pt found with bacteremia, GI bleed and liver cirrhosis    metabolic and hepatic encephalopathy / GI bleed / acute blood loss anemia / bacteremia     - NPO for EGD today   - IV octreotide and Protonix   - bedside abdominal US with no clear pocket of fluid for paracentesis   - continue rifaximin and lactulose   - continue antibiotics as per ID   - sq insulin hospital protocol   - follow H/H and transfuse to maintain hgb>7   - 50 minutes total spent today on patient care

## 2025-04-16 NOTE — CHART NOTE - NSCHARTNOTEFT_GEN_A_CORE
Alerted by lab patient hgb 5.8.   Patient assessed at bedside.     ICU Vital Signs Last 24 Hrs  T(C): 36.7 (16 Apr 2025 19:01), Max: 37.1 (16 Apr 2025 11:00)  T(F): 98 (16 Apr 2025 19:01), Max: 98.7 (16 Apr 2025 11:00)  HR: 63 (16 Apr 2025 23:00) (63 - 104)  BP: 109/55 (16 Apr 2025 23:00) (88/52 - 144/56)  BP(mean): 77 (16 Apr 2025 23:00) (66 - 103)  ABP: --  ABP(mean): --  RR: 18 (16 Apr 2025 23:00) (11 - 27)  SpO2: 100% (16 Apr 2025 23:00) (96% - 100%)    O2 Parameters below as of 16 Apr 2025 22:00  Patient On (Oxygen Delivery Method): nasal cannula  O2 Flow (L/min): 2      EXAM  General: Awake, non-verbal (consistent with prior exam)   Ext: Peripheral pitting edema     PLAN  Patient with acute drop in hemoglobin.   No change in vitals. No bloody BMs overnight.     2U PRBC STAT   Trend Hgb   Keep Active T + S      Jaime MEYERS

## 2025-04-16 NOTE — CONSULT NOTE ADULT - CRITICAL CARE ATTENDING COMMENT
pt seen w PA and agree w above.  Pt w ams unclear etiology, sent for ct head w Hx of subdural ? hematoma v other.  Elevated ammonia level.  Will monitor in ICU, continue lactulose, neuro eval.
The patient's injury acutely impairs one or more vital organ systems, and there is a high probability of imminent or life threatening deterioration in the patient’s condition. The care of this patient requires complex medical decision making in the field of Infectious Diseases and extensive interpretation of laboratory, microbiological, and radiographic data

## 2025-04-16 NOTE — PROGRESS NOTE ADULT - ASSESSMENT
79 yo male presented to ED via EMS for AMS above baseline       #AMS  #New onset decompensated liver Cirrhosis  #recent OM/discitis w abx completion March 2025   #HX of SDH   #Suspected GIB?  #Ascites  #gram negative bacteremia   CTH negative   Ammonia levels 60 today, mental status better.   Lactulose 20 q2h until 3-4 BMs  Hold AC   Protonix and octreotide drip.   GI FU if EGD to be done, may also need a colonoscopy.   Will attempt a para today for diagnostic purposes   Rocephin and flagyl. ID FU   continue ASA   keppra 250 mg q 12 hrs   fall/aspiration precautions   GI and neuro eval    #CHARLENE   - Suspected pre-renal vs hepatorenal   - IVF   - Nephro consult   - Monitor Accurate I&Os     #elevated troponin   #Hx type II NSTEMI   EKG reviewed   Trops peaked and trending down   last TTE LVEF 66% (jan 2025), ECHO am  cardiology consulted      #Hx HLD  #Hx HTN   resume statin /   Hold losartan      #DM 2   A1C 7.6% prev   hold home meds   SSI for now   Carb control     #Hx of  mechanical falls   #chronic back pain   PRN pain control   fall precautions   PT consult when able     Code Status: Full code   DVT ppx: on hold

## 2025-04-16 NOTE — EEG REPORT - NS EEG TEXT BOX
[facility]  Inpatient Routine-EEG Report      Patient Name:	DWIGHT RIBEIRO    :	1945  MRN:	-  Study Date/Time:	4/15/2025, 5:45:42 PM  Referred by:	-    Brief Clinical History:  DWIGHT RIBEIRO is a 80 year old Male; study performed to investigate for seizures or markers of epilepsy.   Diagnosis Code: R40.4 Transient alteration of awareness    Patient Medication:  Miralax    Zofran    Melatonin    Flexeril    Tylenol    Senna    Xifaxan    Mycostatin    Flagyl    Magnesium sulfate    Lidocaine    Keppra    Lactulose    Admelog    Folic acid    Rocephin    Lipitor      Acquisition Details:  Electroencephalography was acquired using a minimum of 21 channels on an [employer] Neu[employer] system v 9.3.1 with electrode placement according to the standard International 10-20 system following ACNS (American Clinical Neurophysiology Society) guidelines.  Anterior temporal T1 and T2 electrodes were utilized whenever possible.   The [employer] automated spike & seizure detections were all reviewed in detail, in addition to the entire raw EEG.    Findings:  Background:  continuous.   Voltage:  Suppressed (all <10 uV)  Organization:  Absent  Posterior Dominant Rhythm:  No clear PDR   Variability:  No	Reactivity:  No  Sleep:  Absent.  Focal abnormalities:  No persistent asymmetries of voltage or frequency.  Interictal Activity:  None  Focal Slowing:  None  Generalized Slowing:  Severe  Events:  1)	No electrographic seizures or significant clinical events.  Provocations:  1)	Hyperventilation: was not performed.  2)	Photic stimulation: was not performed.  Impression:  Abnormal due to diffusely attenuated severe slowing    Clinical Correlation:  Findings consistent with diffuse electrocerebral dysfunction secondary to nonspecific etiology    Cecily Ko MD  Attending Neurologist, Division of Epilepsy

## 2025-04-16 NOTE — PROGRESS NOTE ADULT - ASSESSMENT
IMPRESSION:  alter mental status ?? metabolic encephalopathy   seizure hx   liver cirrhosis   bacteremia   GI bleed   hypernatremia   CHARLENE   elevated trop   afib     PLAN:    CNS:    neurology follow up MRI Lumbar   follow ammonia level       HEENT: oral care     PULMONARY: keep pox >92% aspiration precaution   repeat ABG now   daily cxr   CARDIOVASCULAR: echo , follow trop   follow cardiology     GI: GI prophylaxis.  calrify with GI regarding EGD and if can start feed   PPI Q12 hrs   follow LFT , hepatology eval   rafiximine and lactulose   check Ammonia level today             RENAL:  continue IV fluid LR 75cc/ hr   free water 250cc Q4 hrs   follow NA level   follow renal     INFECTIOUS DISEASE: ID eval   switch cefepime to zosyn for now   procal   bldcx       HEMATOLOGICAL:  DVT prophylaxis. follow h/h and plt     ENDOCRINE:  Follow up FS.  Insulin protocol if needed.    SDU        CRITICAL CARE TIME SPENT: ***     IMPRESSION:  alter mental status ?? metabolic encephalopathy   seizure hx   liver cirrhosis   bacteremia   GI bleed   hypernatremia   CHARLENE   elevated trop   afib     PLAN:    CNS:    neurology follow up MRI Lumbar   follow ammonia level       HEENT: oral care     PULMONARY: keep pox >92% aspiration precaution   repeat ABG now   daily cxr   CARDIOVASCULAR: echo , follow trop   follow cardiology     GI: GI prophylaxis.  follow GI for EGD   octreotide / PPI drip   follow LFT , hepatology eval   rafiximine and lactulose   check Ammonia level today             RENAL: switch IV fluid D 1/2 NS 75 cc/ hr   follow NA level   follow renal     INFECTIOUS DISEASE: ID eval   rocephine and flagyl   procal   bldcx       HEMATOLOGICAL:  DVT prophylaxis. follow h/h and plt     ENDOCRINE:  Follow up FS.  Insulin protocol if needed.    MICU         CRITICAL CARE TIME SPENT: ***

## 2025-04-16 NOTE — PROGRESS NOTE ADULT - SUBJECTIVE AND OBJECTIVE BOX
Patient is a 80y old  Male who presents with a chief complaint of acute mentation changes (15 Apr 2025 17:04)      INTERVAL HPI/OVERNIGHT EVENTS:   No overnight events   Afebrile, hemodynamically stable     ICU Vital Signs Last 24 Hrs  T(C): 36.4 (16 Apr 2025 07:01), Max: 36.4 (16 Apr 2025 07:01)  T(F): 97.6 (16 Apr 2025 07:01), Max: 97.6 (16 Apr 2025 07:01)  HR: 85 (16 Apr 2025 06:00) (66 - 97)  BP: 88/52 (16 Apr 2025 06:00) (86/50 - 129/76)  BP(mean): 66 (16 Apr 2025 06:00) (62 - 95)  ABP: --  ABP(mean): --  RR: 16 (16 Apr 2025 07:01) (11 - 22)  SpO2: 100% (16 Apr 2025 06:00) (99% - 100%)    O2 Parameters below as of 16 Apr 2025 06:00  Patient On (Oxygen Delivery Method): nasal cannula  O2 Flow (L/min): 2        I&O's Summary    15 Apr 2025 07:01  -  16 Apr 2025 07:00  --------------------------------------------------------  IN: 1665 mL / OUT: 1115 mL / NET: 550 mL    16 Apr 2025 07:01  -  16 Apr 2025 07:27  --------------------------------------------------------  IN: 0 mL / OUT: 40 mL / NET: -40 mL          LABS:                        7.9    7.55  )-----------( 134      ( 16 Apr 2025 04:44 )             24.0     04-16    147[H]  |  109  |  39[H]  ----------------------------<  155[H]  3.8   |  24  |  1.8[H]    Ca    6.4[L]      16 Apr 2025 04:44  Phos  3.8     04-16  Mg     1.7     04-16    TPro  4.6[L]  /  Alb  1.8[L]  /  TBili  0.8  /  DBili  x   /  AST  110[H]  /  ALT  24  /  AlkPhos  142[H]  04-16    PT/INR - ( 16 Apr 2025 04:45 )   PT: 18.40 sec;   INR: 1.55 ratio         PTT - ( 16 Apr 2025 04:45 )  PTT:33.6 sec  Urinalysis Basic - ( 16 Apr 2025 04:44 )    Color: x / Appearance: x / SG: x / pH: x  Gluc: 155 mg/dL / Ketone: x  / Bili: x / Urobili: x   Blood: x / Protein: x / Nitrite: x   Leuk Esterase: x / RBC: x / WBC x   Sq Epi: x / Non Sq Epi: x / Bacteria: x      CAPILLARY BLOOD GLUCOSE      POCT Blood Glucose.: 153 mg/dL (16 Apr 2025 05:49)  POCT Blood Glucose.: 161 mg/dL (15 Apr 2025 21:55)  POCT Blood Glucose.: 136 mg/dL (15 Apr 2025 15:46)  POCT Blood Glucose.: 142 mg/dL (15 Apr 2025 11:27)  POCT Blood Glucose.: 182 mg/dL (15 Apr 2025 08:42)    ABG - ( 15 Apr 2025 01:03 )  pH, Arterial: 7.56  pH, Blood: x     /  pCO2: 25    /  pO2: 96    / HCO3: 22    / Base Excess: 1.6   /  SaO2: 99.0                RADIOLOGY & ADDITIONAL TESTS:    Consultant(s) Notes Reviewed:  [x ] YES  [ ] NO    MEDICATIONS  (STANDING):  atorvastatin 80 milliGRAM(s) Oral at bedtime  cefTRIAXone   IVPB 2000 milliGRAM(s) IV Intermittent every 24 hours  chlorhexidine 2% Cloths 1 Application(s) Topical <User Schedule>  dextrose 5%. 1000 milliLiter(s) (100 mL/Hr) IV Continuous <Continuous>  dextrose 5%. 1000 milliLiter(s) (50 mL/Hr) IV Continuous <Continuous>  dextrose 50% Injectable 25 Gram(s) IV Push once  dextrose 50% Injectable 12.5 Gram(s) IV Push once  dextrose 50% Injectable 25 Gram(s) IV Push once  folic acid 1 milliGRAM(s) Oral daily  glucagon  Injectable 1 milliGRAM(s) IntraMuscular once  insulin lispro (ADMELOG) corrective regimen sliding scale   SubCutaneous three times a day before meals  lactated ringers. 1000 milliLiter(s) (75 mL/Hr) IV Continuous <Continuous>  lactulose Syrup 20 Gram(s) Enteral Tube every 8 hours  levETIRAcetam   Injectable 250 milliGRAM(s) IV Push <User Schedule>  lidocaine   4% Patch 1 Patch Transdermal daily  metroNIDAZOLE  IVPB 500 milliGRAM(s) IV Intermittent every 12 hours  nystatin Powder 1 Application(s) Topical two times a day  octreotide  Infusion 50 MICROgram(s)/Hr (10 mL/Hr) IV Continuous <Continuous>  pantoprazole Infusion 8 mG/Hr (10 mL/Hr) IV Continuous <Continuous>  rifAXIMin 550 milliGRAM(s) Oral two times a day  senna 2 Tablet(s) Oral at bedtime    MEDICATIONS  (PRN):  acetaminophen     Tablet .. 650 milliGRAM(s) Oral every 6 hours PRN Temp greater or equal to 38C (100.4F), Mild Pain (1 - 3)  aluminum hydroxide/magnesium hydroxide/simethicone Suspension 30 milliLiter(s) Oral every 4 hours PRN Dyspepsia  cyclobenzaprine 5 milliGRAM(s) Oral three times a day PRN Muscle Spasm  dextrose Oral Gel 15 Gram(s) Oral once PRN Blood Glucose LESS THAN 70 milliGRAM(s)/deciliter  melatonin 3 milliGRAM(s) Oral at bedtime PRN Insomnia  ondansetron Injectable 4 milliGRAM(s) IV Push every 8 hours PRN Nausea and/or Vomiting  polyethylene glycol 3350 17 Gram(s) Oral two times a day PRN Constipation      PHYSICAL EXAM:  GENERAL:   HEAD:  Atraumatic, Normocephalic  EYES: EOMI, PERRLA, conjunctiva and sclera clear  NECK: Supple, No JVD, Normal thyroid, no enlarged nodes  NERVOUS SYSTEM:  Moaning, not responsive to verbal stimuli, localizes and withdraws to pain.   CHEST/LUNG: B/L good air entry; No rales, rhonchi, or wheezing  HEART: S1S2 normal, no S3, Regular rate and rhythm; No murmurs  ABDOMEN: Mild distention, no tenderness.   EXTREMITIES:  2+ Peripheral Pulses, No clubbing, cyanosis, or edema  LYMPH: No lymphadenopathy noted      Care Discussed with Consultants/Other Providers [ x] YES  [ ] NO

## 2025-04-16 NOTE — PROGRESS NOTE ADULT - ASSESSMENT
CHARLENE likely ATN iso infection / component of contrast induced nephropathy  Liver cirrhosis  Metabolic encephalopathy  Bacteremia r/o SBP  - non oliguric  - creat stable  - mild hypernatremia  - CT noted/ no hydronephrosis  - s/p EGD / colonoscopy tomorrow- NPO    Recommendations:  - start 1/2NS at 75cc/hr / repeat BMP tonight.  If Na level is up-trending, change to D5w  - cont [facility] / strict i/o   - low calcium, check vitD level/ start calcium carbonate q12  - phos noted at goal  - check iron stores  - abx per ID recs  - neuro/GI f/u

## 2025-04-16 NOTE — PROGRESS NOTE ADULT - SUBJECTIVE AND OBJECTIVE BOX
Patient seen and evaluated this am, still with bloody BM's today, moaning in bed, not following commands      T(F): 98.7 (04-16-25 @ 11:00), Max: 98.7 (04-16-25 @ 11:00)  HR: 95 (04-16-25 @ 12:00)  BP: 109/56 (04-16-25 @ 12:00)  RR: 16 (04-16-25 @ 12:00)  SpO2: 98% (04-16-25 @ 12:00) (97% - 100%)    PHYSICAL EXAM:  GENERAL: NAD  HEAD:  Atraumatic, Normocephalic  EYES: EOMI, PERRLA, conjunctiva and sclera clear  NERVOUS SYSTEM:   no focal deficits   CHEST/LUNG: Clear to percussion bilaterally; No rales, rhonchi, wheezing, or rubs  HEART: Regular rate and rhythm; No murmurs, rubs, or gallops  ABDOMEN: obese, Soft, Nontender, Nondistended  EXTREMITIES:  2+ Peripheral Pulses, No clubbing, cyanosis, or edema    LABS  04-16    147[H]  |  109  |  39[H]  ----------------------------<  155[H]  3.8   |  24  |  1.8[H]    Ca    6.4[L]      16 Apr 2025 04:44  Phos  3.8     04-16  Mg     1.7     04-16    TPro  4.6[L]  /  Alb  1.8[L]  /  TBili  0.8  /  DBili  x   /  AST  110[H]  /  ALT  24  /  AlkPhos  142[H]  04-16       Ammonia, Serum (04.15.25 @ 13:01)   Ammonia, Serum: 244 -> 60        Lipase (04.15.25 @ 13:01)   Lipase: 38 U/L                 7.9    6.75  )-----------( 130      ( 16 Apr 2025 11:23 )             24.4     PT/INR - ( 16 Apr 2025 04:45 )   PT: 18.40 sec;   INR: 1.55 ratio         PTT - ( 16 Apr 2025 04:45 )  PTT:33.6 sec      Culture Results:   Growth in anaerobic bottle: Gram Negative Rods (04-13-25)  Culture Results:   Growth in anaerobic bottle: Gram Negative Rods  Direct identification is available within approximately 3-5  hours either by Blood Panel Multiplexed PCR or Direct  MALDI-TOF. Details: [email or web address] (04-13-25)  Culture Results:   10,000 - 49,000 CFU/mL Proteus mirabilis (04-13-25)    RADIOLOGY  < from: CT Head No Cont (04.15.25 @ 02:25) >  IMPRESSION:    Motion-degraded exam, however, no definite acute intracranial pathology.    < end of copied text >  < from: CT Abdomen and Pelvis w/ IV Cont (04.13.25 @ 22:56) >    IMPRESSION:    1.  Erosive endplate changes centered at the L4-5 level, consistent with   patient's history ofrecent lumbar spine osteomyelitis.    2.  Liver cirrhosis with portal hypertension. Mild-to-moderate ascites.   Anasarca. Hepatic dome subcentimeter hypodense 1.3 cm hypodensity, not   fully characterized . Outpatient MR abdomen with IV contrast recommended.    3.  Chronic pancreatitis, with limited evaluation for acute inflammation   due to background fluid. Correlation with pancreatic enzymes recommended.    < end of copied text >    MEDICATIONS  (STANDING):  atorvastatin 80 milliGRAM(s) Oral at bedtime  cefTRIAXone   IVPB 2000 milliGRAM(s) IV Intermittent every 24 hours  chlorhexidine 2% Cloths 1 Application(s) Topical <User Schedule>  folic acid 1 milliGRAM(s) Oral daily  insulin lispro (ADMELOG) corrective regimen sliding scale   SubCutaneous three times a day before meals  lactulose Syrup 20 Gram(s) Enteral Tube every 8 hours  levETIRAcetam   Injectable 250 milliGRAM(s) IV Push <User Schedule>  lidocaine   4% Patch 1 Patch Transdermal daily  metroNIDAZOLE  IVPB 500 milliGRAM(s) IV Intermittent every 12 hours  nystatin Powder 1 Application(s) Topical two times a day  octreotide  Infusion 50 MICROgram(s)/Hr (10 mL/Hr) IV Continuous <Continuous>  pantoprazole Infusion 8 mG/Hr (10 mL/Hr) IV Continuous <Continuous>  rifAXIMin 550 milliGRAM(s) Oral two times a day  senna 2 Tablet(s) Oral at bedtime    MEDICATIONS  (PRN):  acetaminophen     Tablet .. 650 milliGRAM(s) Oral every 6 hours PRN Temp greater or equal to 38C (100.4F), Mild Pain (1 - 3)  aluminum hydroxide/magnesium hydroxide/simethicone Suspension 30 milliLiter(s) Oral every 4 hours PRN Dyspepsia  dextrose Oral Gel 15 Gram(s) Oral once PRN Blood Glucose LESS THAN 70 milliGRAM(s)/deciliter  melatonin 3 milliGRAM(s) Oral at bedtime PRN Insomnia  ondansetron Injectable 4 milliGRAM(s) IV Push every 8 hours PRN Nausea and/or Vomiting  polyethylene glycol 3350 17 Gram(s) Oral two times a day PRN Constipation

## 2025-04-16 NOTE — PROGRESS NOTE ADULT - SUBJECTIVE AND OBJECTIVE BOX
Subjective/Objective:     HPI-Cardiology/Events/Updates  Pt evaluated at bedside. No overnight events and Pt has no complaints. Radiology tests and hospital records, were reviewed, as well as previous notes on this patient.         MEDICATIONS  (STANDING):  atorvastatin 80 milliGRAM(s) Oral at bedtime  cefTRIAXone   IVPB 2000 milliGRAM(s) IV Intermittent every 24 hours  chlorhexidine 2% Cloths 1 Application(s) Topical <User Schedule>  dextrose 5%. 1000 milliLiter(s) (100 mL/Hr) IV Continuous <Continuous>  dextrose 5%. 1000 milliLiter(s) (50 mL/Hr) IV Continuous <Continuous>  dextrose 50% Injectable 25 Gram(s) IV Push once  dextrose 50% Injectable 12.5 Gram(s) IV Push once  dextrose 50% Injectable 25 Gram(s) IV Push once  folic acid 1 milliGRAM(s) Oral daily  glucagon  Injectable 1 milliGRAM(s) IntraMuscular once  insulin lispro (ADMELOG) corrective regimen sliding scale   SubCutaneous three times a day before meals  lactulose Syrup 20 Gram(s) Enteral Tube every 8 hours  levETIRAcetam   Injectable 250 milliGRAM(s) IV Push <User Schedule>  lidocaine   4% Patch 1 Patch Transdermal daily  metroNIDAZOLE  IVPB 500 milliGRAM(s) IV Intermittent every 12 hours  nystatin Powder 1 Application(s) Topical two times a day  octreotide  Infusion 50 MICROgram(s)/Hr (10 mL/Hr) IV Continuous <Continuous>  pantoprazole Infusion 8 mG/Hr (10 mL/Hr) IV Continuous <Continuous>  rifAXIMin 550 milliGRAM(s) Oral two times a day  senna 2 Tablet(s) Oral at bedtime    MEDICATIONS  (PRN):  acetaminophen     Tablet .. 650 milliGRAM(s) Oral every 6 hours PRN Temp greater or equal to 38C (100.4F), Mild Pain (1 - 3)  aluminum hydroxide/magnesium hydroxide/simethicone Suspension 30 milliLiter(s) Oral every 4 hours PRN Dyspepsia  dextrose Oral Gel 15 Gram(s) Oral once PRN Blood Glucose LESS THAN 70 milliGRAM(s)/deciliter  melatonin 3 milliGRAM(s) Oral at bedtime PRN Insomnia  ondansetron Injectable 4 milliGRAM(s) IV Push every 8 hours PRN Nausea and/or Vomiting  polyethylene glycol 3350 17 Gram(s) Oral two times a day PRN Constipation          Vital Signs Last 24 Hrs  T(C): 37.1 (16 Apr 2025 13:25), Max: 37.1 (16 Apr 2025 11:00)  T(F): 98.7 (16 Apr 2025 11:00), Max: 98.7 (16 Apr 2025 11:00)  HR: 95 (16 Apr 2025 13:25) (66 - 104)  BP: 109/56 (16 Apr 2025 13:25) (86/50 - 140/52)  BP(mean): 81 (16 Apr 2025 13:25) (62 - 95)  RR: 16 (16 Apr 2025 13:25) (11 - 22)  SpO2: 98% (16 Apr 2025 13:25) (97% - 100%)    Parameters below as of 16 Apr 2025 13:25    O2 Flow (L/min): 2    I&O's Detail    15 Apr 2025 07:01  -  16 Apr 2025 07:00  --------------------------------------------------------  IN:    IV PiggyBack: 450 mL    Lactated Ringers: 975 mL    Octreotide: 120 mL    Pantoprazole: 120 mL  Total IN: 1665 mL    OUT:    Indwelling Catheter - Urethral (mL): 1115 mL  Total OUT: 1115 mL    Total NET: 550 mL      16 Apr 2025 07:01  -  16 Apr 2025 13:31  --------------------------------------------------------  IN:    dextrose 5% + sodium chloride 0.45%: 210 mL    IV PiggyBack: 400 mL    Lactated Ringers: 150 mL    Octreotide: 50 mL    Pantoprazole: 50 mL  Total IN: 860 mL    OUT:    Indwelling Catheter - Urethral (mL): 170 mL  Total OUT: 170 mL    Total NET: 690 mL            PHYSICAL EXAM:  GENERAL:  81y/o Male NAD, resting comfortably.  HEAD:  Atraumatic, Normocephalic  EYES: EOMI, PERRLA, conjunctiva and sclera clear  NECK: Supple, No JVD, no cervical lymphadenopathy, non-tender  CHEST/LUNG: Clear to auscultation bilaterally; No wheeze, rhonchi, or rales  HEART: Regular rate and rhythm; S1&S2  ABDOMEN: Soft, Nontender, Nondistended x 4 quadrants; Bowel sounds present  EXTREMITIES:   Peripheral Pulses Present, No clubbing, no cyanosis, or no edema, no calf tenderness  PSYCH: AAOx3, cooperative, appropriate  NEUROLOGY: WNL  SKIN: WNL            LABS:                          7.9    6.75  )-----------( 130      ( 16 Apr 2025 11:23 )             24.4     PT/INR - ( 16 Apr 2025 04:45 )   PT: 18.40 sec;   INR: 1.55 ratio         PTT - ( 16 Apr 2025 04:45 )  PTT:33.6 sec  16 Apr 2025 04:44    147[H]  |  109    |  39[H]  ----------------------------<  155[H]  3.8     |  24     |  1.8[H]  15 Apr 2025 21:34    148[H]  |  110    |  40[H]  ----------------------------<  142[H]  3.6     |  24     |  1.9[H]    Ca    6.4[L]      16 Apr 2025 04:44  Ca    6.3[L]      15 Apr 2025 21:34  Phos  3.8       16 Apr 2025 04:44  Phos  4.0       15 Apr 2025 21:34  Mg     1.7[L]     16 Apr 2025 04:44  Mg     1.4[L]     15 Apr 2025 21:34    TPro  4.6[L]  /  Alb  1.8[L]  /  TBili  0.8    /  DBili  x      /  AST  110[H]  /  ALT  24     /  AlkPhos  142[H]  16 Apr 2025 04:44  TPro  4.6[L]  /  Alb  1.8[L]  /  TBili  0.6    /  DBili  x      /  AST  90[H]  /  ALT  17     /  AlkPhos  125[H]  15 Apr 2025 13:01                Diagnostic testing:  < from: Xray Chest 1 View-PORTABLE IMMEDIATE (Xray Chest 1 View-PORTABLE IMMEDIATE .) (04.15.25 @ 07:16) >  FINDINGS/  IMPRESSION:    Support devices: Interval placement of enteric tube, tip overlying the   right abdomen.    Cardiac/mediastinum/hilum: Unchanged.    Lung parenchyma/Pleura: Low lung volumes. Left basilar opacity. No   pneumothorax.    Skeleton/soft tissues: Unchanged.    --- End of Report ---    < end of copied text >            < from: TTE Echo Complete w/o Contrast w/ Doppler (04.15.25 @ 08:07) >  Summary:   1. Technically difficult study.   2. Normal global left ventricular systolic function.   3. Left ventricular ejection fraction, by visual estimation, is 60 to   65%.   4. The left ventricular diastolic function could not be assessed in this   study.   5. Left atrial size not well visualized.   6. Right atrial size not well visualized.   7. No evidence of mitral valve regurgitation.   8. Dilatation of the ascending aorta, measures 3.9 cm.   9. Normal pulmonary artery pressure.  10. There is no evidence of pericardial effusion.    < end of copied text >        Assessment and Recommendations:  80 years old male history of hypertension, diabetes, subdural hematoma on Keppra, hx mechanical falls, recent admission (January 2025) for osteomyelitis of L4-5 w completion of abx.  BIBA from home status post change of mental status.      Impression:  #Elevated troponin  #?New onset Afib  #HTN, DM      Pt is awake and unresponsive to verbal or painful stimuli (sternal rub)  Initial ECG showed possible Afib, but upon further review noted to be Sinus with first degree AV block and PAC's  Currently on tele appears in sinus rhythm  Troponin elevated possibly 2/2 demand   TTE: EF 60-65%, normal global LVSF      Plan:  Cont telemetry monitoring  Monitor lytes  May likely need ischemic work up when neurologic and GI causes worked up and Pt is A&O.           Subjective/Objective:     HPI-Cardiology/Events/Updates  Pt evaluated at bedside. No overnight events and Pt has no complaints. Radiology tests and hospital records, were reviewed, as well as previous notes on this patient.         MEDICATIONS  (STANDING):  atorvastatin 80 milliGRAM(s) Oral at bedtime  cefTRIAXone   IVPB 2000 milliGRAM(s) IV Intermittent every 24 hours  chlorhexidine 2% Cloths 1 Application(s) Topical <User Schedule>  dextrose 5%. 1000 milliLiter(s) (100 mL/Hr) IV Continuous <Continuous>  dextrose 5%. 1000 milliLiter(s) (50 mL/Hr) IV Continuous <Continuous>  dextrose 50% Injectable 25 Gram(s) IV Push once  dextrose 50% Injectable 12.5 Gram(s) IV Push once  dextrose 50% Injectable 25 Gram(s) IV Push once  folic acid 1 milliGRAM(s) Oral daily  glucagon  Injectable 1 milliGRAM(s) IntraMuscular once  insulin lispro (ADMELOG) corrective regimen sliding scale   SubCutaneous three times a day before meals  lactulose Syrup 20 Gram(s) Enteral Tube every 8 hours  levETIRAcetam   Injectable 250 milliGRAM(s) IV Push <User Schedule>  lidocaine   4% Patch 1 Patch Transdermal daily  metroNIDAZOLE  IVPB 500 milliGRAM(s) IV Intermittent every 12 hours  nystatin Powder 1 Application(s) Topical two times a day  octreotide  Infusion 50 MICROgram(s)/Hr (10 mL/Hr) IV Continuous <Continuous>  pantoprazole Infusion 8 mG/Hr (10 mL/Hr) IV Continuous <Continuous>  rifAXIMin 550 milliGRAM(s) Oral two times a day  senna 2 Tablet(s) Oral at bedtime    MEDICATIONS  (PRN):  acetaminophen     Tablet .. 650 milliGRAM(s) Oral every 6 hours PRN Temp greater or equal to 38C (100.4F), Mild Pain (1 - 3)  aluminum hydroxide/magnesium hydroxide/simethicone Suspension 30 milliLiter(s) Oral every 4 hours PRN Dyspepsia  dextrose Oral Gel 15 Gram(s) Oral once PRN Blood Glucose LESS THAN 70 milliGRAM(s)/deciliter  melatonin 3 milliGRAM(s) Oral at bedtime PRN Insomnia  ondansetron Injectable 4 milliGRAM(s) IV Push every 8 hours PRN Nausea and/or Vomiting  polyethylene glycol 3350 17 Gram(s) Oral two times a day PRN Constipation          Vital Signs Last 24 Hrs  T(C): 37.1 (16 Apr 2025 13:25), Max: 37.1 (16 Apr 2025 11:00)  T(F): 98.7 (16 Apr 2025 11:00), Max: 98.7 (16 Apr 2025 11:00)  HR: 95 (16 Apr 2025 13:25) (66 - 104)  BP: 109/56 (16 Apr 2025 13:25) (86/50 - 140/52)  BP(mean): 81 (16 Apr 2025 13:25) (62 - 95)  RR: 16 (16 Apr 2025 13:25) (11 - 22)  SpO2: 98% (16 Apr 2025 13:25) (97% - 100%)    Parameters below as of 16 Apr 2025 13:25    O2 Flow (L/min): 2    I&O's Detail    15 Apr 2025 07:01  -  16 Apr 2025 07:00  --------------------------------------------------------  IN:    IV PiggyBack: 450 mL    Lactated Ringers: 975 mL    Octreotide: 120 mL    Pantoprazole: 120 mL  Total IN: 1665 mL    OUT:    Indwelling Catheter - Urethral (mL): 1115 mL  Total OUT: 1115 mL    Total NET: 550 mL      16 Apr 2025 07:01  -  16 Apr 2025 13:31  --------------------------------------------------------  IN:    dextrose 5% + sodium chloride 0.45%: 210 mL    IV PiggyBack: 400 mL    Lactated Ringers: 150 mL    Octreotide: 50 mL    Pantoprazole: 50 mL  Total IN: 860 mL    OUT:    Indwelling Catheter - Urethral (mL): 170 mL  Total OUT: 170 mL    Total NET: 690 mL            PHYSICAL EXAM:  GENERAL:  81y/o Male NAD, resting comfortably.  HEAD:  Atraumatic, Normocephalic  EYES: EOMI, PERRLA, conjunctiva and sclera clear  NECK: Supple, No JVD, no cervical lymphadenopathy, non-tender  CHEST/LUNG: Clear to auscultation bilaterally; No wheeze, rhonchi, or rales  HEART: Regular rate and rhythm; S1&S2  ABDOMEN: Soft, Nontender, Nondistended x 4 quadrants; Bowel sounds present  EXTREMITIES:   Peripheral Pulses Present, No clubbing, no cyanosis, or no edema, no calf tenderness  PSYCH: AAOx3, cooperative, appropriate  NEUROLOGY: WNL  SKIN: WNL            LABS:                          7.9    6.75  )-----------( 130      ( 16 Apr 2025 11:23 )             24.4     PT/INR - ( 16 Apr 2025 04:45 )   PT: 18.40 sec;   INR: 1.55 ratio         PTT - ( 16 Apr 2025 04:45 )  PTT:33.6 sec  16 Apr 2025 04:44    147[H]  |  109    |  39[H]  ----------------------------<  155[H]  3.8     |  24     |  1.8[H]  15 Apr 2025 21:34    148[H]  |  110    |  40[H]  ----------------------------<  142[H]  3.6     |  24     |  1.9[H]    Ca    6.4[L]      16 Apr 2025 04:44  Ca    6.3[L]      15 Apr 2025 21:34  Phos  3.8       16 Apr 2025 04:44  Phos  4.0       15 Apr 2025 21:34  Mg     1.7[L]     16 Apr 2025 04:44  Mg     1.4[L]     15 Apr 2025 21:34    TPro  4.6[L]  /  Alb  1.8[L]  /  TBili  0.8    /  DBili  x      /  AST  110[H]  /  ALT  24     /  AlkPhos  142[H]  16 Apr 2025 04:44  TPro  4.6[L]  /  Alb  1.8[L]  /  TBili  0.6    /  DBili  x      /  AST  90[H]  /  ALT  17     /  AlkPhos  125[H]  15 Apr 2025 13:01                Diagnostic testing:  < from: Xray Chest 1 View-PORTABLE IMMEDIATE (Xray Chest 1 View-PORTABLE IMMEDIATE .) (04.15.25 @ 07:16) >  FINDINGS/  IMPRESSION:    Support devices: Interval placement of enteric tube, tip overlying the   right abdomen.    Cardiac/mediastinum/hilum: Unchanged.    Lung parenchyma/Pleura: Low lung volumes. Left basilar opacity. No   pneumothorax.    Skeleton/soft tissues: Unchanged.    --- End of Report ---    < end of copied text >            < from: TTE Echo Complete w/o Contrast w/ Doppler (04.15.25 @ 08:07) >  Summary:   1. Technically difficult study.   2. Normal global left ventricular systolic function.   3. Left ventricular ejection fraction, by visual estimation, is 60 to   65%.   4. The left ventricular diastolic function could not be assessed in this   study.   5. Left atrial size not well visualized.   6. Right atrial size not well visualized.   7. No evidence of mitral valve regurgitation.   8. Dilatation of the ascending aorta, measures 3.9 cm.   9. Normal pulmonary artery pressure.  10. There is no evidence of pericardial effusion.    < end of copied text >        Assessment and Recommendations:  80 years old male history of hypertension, diabetes, subdural hematoma on Keppra, hx mechanical falls, recent admission (January 2025) for osteomyelitis of L4-5 w completion of abx.  BIBA from home status post change of mental status.      Impression:  #Elevated troponin  #?New onset Afib  #HTN, DM      Pt is awake and unresponsive to verbal or painful stimuli (sternal rub)  Initial ECG showed possible Afib, but upon further review noted to be Sinus with first degree AV block and PAC's on tele   Currently on tele appears in sinus rhythm  Troponin elevated possibly 2/2 demand   TTE: EF 60-65%, normal global LVSF      Plan:  Cont telemetry monitoring  Monitor lytes  May likely need ischemic work up when neurologic and GI causes worked up and Pt is A&O.

## 2025-04-16 NOTE — CONSULT NOTE ADULT - ASSESSMENT
80 years old male history of hypertension, diabetes, subdural hematoma on Keppra, hx mechanical falls, recent admission (January 2025) for osteomyelitis of L4-5 w completion of abx.  BIBA from home status post change of mental status.     ID is consulted for bacteremia  Afebrile  WBC 7.55 < 7.45  On room air  BCx 4/14 Bacteroides fragilis  UA WBC 5, UCx low count P. mirabilis    CT A/P 4/13  1.  Erosive endplate changes centered at the L4-5 level, consistent with   patient's history ofrecent lumbar spine osteomyelitis.  2.  Liver cirrhosis with portal hypertension. Mild-to-moderate ascites.   Anasarca. Hepatic dome subcentimeter hypodense 1.3 cm hypodensity, not   fully characterized . Outpatient MR abdomen with IV contrast recommended.  3.  Chronic pancreatitis, with limited evaluation for acute inflammation   due to background fluid. Correlation with pancreatic enzymes recommended.    Antibiotics:  Vancomycin 4/13  Ampicillin 4/14  Ceftriaxone 4/15 ->  Flagyl 4/15 ->      IMPRESSION:      RECOMMENDATIONS:        * THIS IS AN INCOMPLETE NOTE. FINAL RECOMMENDATION IS PENDING *   80 years old male history of hypertension, diabetes, subdural hematoma on Keppra, hx mechanical falls, recent admission (January 2025) for osteomyelitis of L4-5 w completion of abx.  BIBA from home status post change of mental status.     ID is consulted for bacteremia  Afebrile  WBC 7.55 < 7.45  On room air  BCx 4/14 Bacteroides fragilis  UA WBC 5, UCx low count P. mirabilis    CT A/P 4/13  1.  Erosive endplate changes centered at the L4-5 level, consistent with   patient's history ofrecent lumbar spine osteomyelitis.  2.  Liver cirrhosis with portal hypertension. Mild-to-moderate ascites.   Anasarca. Hepatic dome subcentimeter hypodense 1.3 cm hypodensity, not   fully characterized . Outpatient MR abdomen with IV contrast recommended.  3.  Chronic pancreatitis, with limited evaluation for acute inflammation   due to background fluid. Correlation with pancreatic enzymes recommended.    Antibiotics:  Vancomycin 4/13  Ampicillin 4/14  Ceftriaxone 4/15 ->  Flagyl 4/15 ->      IMPRESSION:  Bacteroides bacteremia, potentially life threatening  Possible SBP?  Liver cirrhosis  Lower GI bleed  Hx L4-L5 osteomyelitis  Liver lesion  Immunosuppression / Immunosenescence secondary to multiple comorbidities which could result in poor clinical outcome    RECOMMENDATIONS:  - IV ceftriaxone 2g q24hrs  - IV Flagyl 500mg q12hrs  - Reasonable to give albumin for presumed SBP: 1.5g/kg on day 1 and 1g/kg on day 3  - Attempt diagnostic thoracentesis, if able please send cell count, protein, glucose, LDH, gram stain and culture  - GI follow up  - Consider MRI abdomen when patient is stable  - Offloading and frequent position changes, aspiration precaution  - Trend WBC, fever curve, transaminases, creatinine daily  - Please inform ID of any patient clinical change or any new pertinent laboratory or radiographic data      Nora Beck D.O.  Attending Physician  Division of Infectious Diseases  [facility] - [facility]  Please contact me via [employer] Teams

## 2025-04-16 NOTE — CHART NOTE - NSCHARTNOTEFT_GEN_A_CORE
patient for EGD today spoke with and updated patient's family [phone] risks and benefits of EGD discussed with family they want to proceed

## 2025-04-16 NOTE — CONSULT NOTE ADULT - SUBJECTIVE AND OBJECTIVE BOX
INFECTIOUS DISEASE CONSULT NOTE    Patient is a 80y old  Male who presents with a chief complaint of acute mentation changes (16 Apr 2025 07:26)    HPI:  80 years old male history of hypertension, diabetes, subdural hematoma on Keppra, hx mechanical falls, recent admission (January 2025) for osteomyelitis of L4-5 w completion of abx.  BIBA from home status post change of mental status.  Per provider note, patient was discharged from rehab facility on April 4.  Patient was supposed to have MRI of lower back on April 5 but patient refused.  Patient baseline uses walker to ambulate.  By report patient has been doing okay since his discharge from the facility.  Patient had 1 episode of fall on his buttocks few days ago that she was able to help patient to get up with no difficulty.  Patient become confused and calling for his father tonight over the past few hours so she called EMS.  No ROS given mental status, no family at bedside.     (14 Apr 2025 00:29)         Prior hospital charts reviewed [Yes]  Primary team notes reviewed [Yes]  Other consultant notes reviewed [Yes]    REVIEW OF SYSTEMS:      PAST MEDICAL & SURGICAL HISTORY:  Diabetes      Hypertension      Fall      H/O left knee surgery      History of cholecystectomy      History of appendectomy          SOCIAL HISTORY:  - Born in [place], migrated [place] in 19XX  - Currently working as / Retired  - Lives with _____; no pets  - No recent travel  - Denies tobacco use  - Denies alcohol use  - Denies illicit drug use  - Currently sexually active, has one male/female sexual partner    FAMILY HISTORY:      Allergies:  No Known Allergies      ANTIMICROBIALS:  cefTRIAXone   IVPB 2000 every 24 hours  metroNIDAZOLE  IVPB 500 every 12 hours  rifAXIMin 550 two times a day      ANTIMICROBIALS (past 90 days):  MEDICATIONS  (STANDING):    ampicillin  IVPB   100 mL/Hr IV Intermittent (04-14-25 @ 17:29)    cefTRIAXone   IVPB   100 mL/Hr IV Intermittent (04-15-25 @ 14:02)    metroNIDAZOLE  IVPB   100 mL/Hr IV Intermittent (04-16-25 @ 05:11)   100 mL/Hr IV Intermittent (04-15-25 @ 15:41)    rifAXIMin   550 milliGRAM(s) Oral (04-16-25 @ 05:11)   550 milliGRAM(s) Oral (04-15-25 @ 18:11)   550 milliGRAM(s) Oral (04-15-25 @ 10:31)    vancomycin  IVPB.   250 mL/Hr IV Intermittent (04-13-25 @ 22:17)        OTHER MEDS:   MEDICATIONS  (STANDING):  acetaminophen     Tablet .. 650 every 6 hours PRN  aluminum hydroxide/magnesium hydroxide/simethicone Suspension 30 every 4 hours PRN  atorvastatin 80 at bedtime  cyclobenzaprine 5 three times a day PRN  dextrose 50% Injectable 25 once  dextrose 50% Injectable 12.5 once  dextrose 50% Injectable 25 once  dextrose Oral Gel 15 once PRN  glucagon  Injectable 1 once  insulin lispro (ADMELOG) corrective regimen sliding scale  three times a day before meals  lactulose Syrup 20 every 8 hours  levETIRAcetam   Injectable 250 <User Schedule>  melatonin 3 at bedtime PRN  octreotide  Infusion 50 <Continuous>  ondansetron Injectable 4 every 8 hours PRN  pantoprazole Infusion 8 <Continuous>  polyethylene glycol 3350 17 two times a day PRN  senna 2 at bedtime      VITALS:  Vital Signs Last 24 Hrs  T(F): 97.6 (04-16-25 @ 07:01), Max: 99 (04-13-25 @ 20:40)    Vital Signs Last 24 Hrs  HR: 104 (04-16-25 @ 08:00) (66 - 104)  BP: 140/52 (04-16-25 @ 08:00) (86/50 - 140/52)  RR: 13 (04-16-25 @ 08:00)  SpO2: 98% (04-16-25 @ 08:00) (98% - 100%)  Wt(kg): --    EXAM:    Labs:                        7.9    7.55  )-----------( 134      ( 16 Apr 2025 04:44 )             24.0     04-16    147[H]  |  109  |  39[H]  ----------------------------<  155[H]  3.8   |  24  |  1.8[H]    Ca    6.4[L]      16 Apr 2025 04:44  Phos  3.8     04-16  Mg     1.7     04-16    TPro  4.6[L]  /  Alb  1.8[L]  /  TBili  0.8  /  DBili  x   /  AST  110[H]  /  ALT  24  /  AlkPhos  142[H]  04-16      WBC Trend:  WBC Count: 7.55 (04-16-25 @ 04:44)  WBC Count: 6.95 (04-15-25 @ 21:30)  WBC Count: 7.30 (04-15-25 @ 16:00)  WBC Count: 7.45 (04-15-25 @ 13:01)      Auto Neutrophil #: 4.17 K/uL (01-29-25 @ 07:35)  Auto Neutrophil #: 4.60 K/uL (01-28-25 @ 06:15)  Auto Neutrophil #: 3.97 K/uL (01-27-25 @ 15:51)  Auto Neutrophil #: 4.23 K/uL (01-27-25 @ 08:52)  Auto Neutrophil #: 3.79 K/uL (01-26-25 @ 07:00)      Creatine Trend:  Creatinine: 1.8 (04-16)  Creatinine: 1.9 (04-15)  Creatinine: 1.8 (04-15)  Creatinine: 2.1 (04-15)      Liver Biochemical Testing Trend:  Alanine Aminotransferase (ALT/SGPT): 24 (04-16)  Alanine Aminotransferase (ALT/SGPT): 17 (04-15)  Alanine Aminotransferase (ALT/SGPT): 19 (04-15)  Alanine Aminotransferase (ALT/SGPT): 16 (04-13)  Alanine Aminotransferase (ALT/SGPT): 10 (02-19)  Aspartate Aminotransferase (AST/SGOT): 110 (04-16-25 @ 04:44)  Aspartate Aminotransferase (AST/SGOT): 90 (04-15-25 @ 13:01)  Aspartate Aminotransferase (AST/SGOT): 86 (04-15-25 @ 05:29)  Aspartate Aminotransferase (AST/SGOT): 56 (04-13-25 @ 21:20)  Aspartate Aminotransferase (AST/SGOT): 93 (02-19-25 @ 12:40)  Bilirubin Total: 0.8 (04-16)  Bilirubin Total: 0.6 (04-15)  Bilirubin Total: 0.7 (04-15)  Bilirubin Total: 0.7 (04-13)  Bilirubin Total: 1.2 (02-19)      Trend LDH          Urinalysis Basic - ( 16 Apr 2025 04:44 )    Color: x / Appearance: x / SG: x / pH: x  Gluc: 155 mg/dL / Ketone: x  / Bili: x / Urobili: x   Blood: x / Protein: x / Nitrite: x   Leuk Esterase: x / RBC: x / WBC x   Sq Epi: x / Non Sq Epi: x / Bacteria: x          MICROBIOLOGY:        Urinalysis with Rflx Culture (collected 13 Apr 2025 21:20)    Culture - Blood (collected 13 Apr 2025 21:20)  Source: Blood Blood-Peripheral  Gram Stain (15 Apr 2025 15:06):    Growth in anaerobic bottle: Gram Negative Rods  Preliminary Report (15 Apr 2025 15:06):    Growth in anaerobic bottle: Gram Negative Rods    Culture - Urine (collected 13 Apr 2025 21:20)  Source: Catheterized None  Final Report (15 Apr 2025 23:45):    10,000 - 49,000 CFU/mL Proteus mirabilis    Culture - Blood (collected 13 Apr 2025 21:20)  Source: Blood Blood-Peripheral  Gram Stain (15 Apr 2025 14:15):    Growth in anaerobic bottle: Gram Negative Rods  Preliminary Report (15 Apr 2025 14:16):    Growth in anaerobic bottle: Gram Negative Rods    Direct identification is available within approximately 3-5    hours either by Blood Panel Multiplexed PCR or Direct    MALDI-TOF. Details: [email or web address]  Organism: Blood Culture PCR (15 Apr 2025 15:23)  Organism: Blood Culture PCR (15 Apr 2025 15:23)      -  Bacteroides fragilis: Detec      Method Type: PCR    Procalcitonin: 1.06 (04-15)    C-Reactive Protein: 78.7 (04-13)      Troponin T, High Sensitivity Result: 140 (04-14)  Troponin T, High Sensitivity Result: 131 (04-14)  Troponin T, High Sensitivity Result: 174 (04-13)    Lactate, Blood: 4.2 (04-15 @ 05:29)  Blood Gas Arterial, Lactate: 3.1 (04-15 @ 01:03)  Lactate, Blood: 4.4 (04-14 @ 21:20)  Lactate, Blood: 5.2 (04-14 @ 19:17)    A1C with Estimated Average Glucose Result: 6.0 % (04-14-25 @ 05:42)  A1C with Estimated Average Glucose Result: 7.6 % (01-24-25 @ 07:04)        RADIOLOGY:  imaging below personally reviewed    < from: CT Head No Cont (04.13.25 @ 22:56) >  Impression:    No evidence of acute intracranial abnormality.    < end of copied text >    < from: CT Abdomen and Pelvis w/ IV Cont (04.13.25 @ 22:56) >  IMPRESSION:    1.  Erosive endplate changes centered at the L4-5 level, consistent with   patient's history ofrecent lumbar spine osteomyelitis.    2.  Liver cirrhosis with portal hypertension. Mild-to-moderate ascites.   Anasarca. Hepatic dome subcentimeter hypodense 1.3 cm hypodensity, not   fully characterized . Outpatient MR abdomen with IV contrast recommended.    3.  Chronic pancreatitis, with limited evaluation for acute inflammation   due to background fluid. Correlation with pancreatic enzymes recommended.    < end of copied text >    < from: Xray Chest 1 View- PORTABLE-Urgent (Xray Chest 1 View- PORTABLE-Urgent .) (04.15.25 @ 01:18) >  IMPRESSION: Low lung volume.    Left basilar opacity, slightly improved.      < end of copied text >    < from: CT Head No Cont (04.15.25 @ 02:25) >  IMPRESSION:    Motion-degraded exam, however, no definite acute intracranial pathology.    < end of copied text >     INFECTIOUS DISEASE CONSULT NOTE    Patient is a 80y old  Male who presents with a chief complaint of acute mentation changes (16 Apr 2025 07:26)    HPI:  80 years old male history of hypertension, diabetes, subdural hematoma on Keppra, hx mechanical falls, recent admission (January 2025) for osteomyelitis of L4-5 w completion of abx.  BIBA from home status post change of mental status.  Per provider note, patient was discharged from rehab facility on April 4.  Patient was supposed to have MRI of lower back on April 5 but patient refused.  Patient baseline uses walker to ambulate.  By report patient has been doing okay since his discharge from the facility.  Patient had 1 episode of fall on his buttocks few days ago that she was able to help patient to get up with no difficulty.  Patient become confused and calling for his father tonight over the past few hours so she called EMS.  No ROS given mental status, no family at bedside.     (14 Apr 2025 00:29)     Prior hospital charts reviewed [Yes]  Primary team notes reviewed [Yes]  Other consultant notes reviewed [Yes]    REVIEW OF SYSTEMS:  Unable to provide due to cognitive impairment      PAST MEDICAL & SURGICAL HISTORY:  Diabetes      Hypertension      Fall      H/O left knee surgery      History of cholecystectomy      History of appendectomy          SOCIAL HISTORY:  Unable to provide due to cognitive impairment    FAMILY HISTORY:  Unable to provide due to cognitive impairment    Allergies:  No Known Allergies      ANTIMICROBIALS:  cefTRIAXone   IVPB 2000 every 24 hours  metroNIDAZOLE  IVPB 500 every 12 hours  rifAXIMin 550 two times a day      ANTIMICROBIALS (past 90 days):  MEDICATIONS  (STANDING):    ampicillin  IVPB   100 mL/Hr IV Intermittent (04-14-25 @ 17:29)    cefTRIAXone   IVPB   100 mL/Hr IV Intermittent (04-15-25 @ 14:02)    metroNIDAZOLE  IVPB   100 mL/Hr IV Intermittent (04-16-25 @ 05:11)   100 mL/Hr IV Intermittent (04-15-25 @ 15:41)    rifAXIMin   550 milliGRAM(s) Oral (04-16-25 @ 05:11)   550 milliGRAM(s) Oral (04-15-25 @ 18:11)   550 milliGRAM(s) Oral (04-15-25 @ 10:31)    vancomycin  IVPB.   250 mL/Hr IV Intermittent (04-13-25 @ 22:17)        OTHER MEDS:   MEDICATIONS  (STANDING):  acetaminophen     Tablet .. 650 every 6 hours PRN  aluminum hydroxide/magnesium hydroxide/simethicone Suspension 30 every 4 hours PRN  atorvastatin 80 at bedtime  cyclobenzaprine 5 three times a day PRN  dextrose 50% Injectable 25 once  dextrose 50% Injectable 12.5 once  dextrose 50% Injectable 25 once  dextrose Oral Gel 15 once PRN  glucagon  Injectable 1 once  insulin lispro (ADMELOG) corrective regimen sliding scale  three times a day before meals  lactulose Syrup 20 every 8 hours  levETIRAcetam   Injectable 250 <User Schedule>  melatonin 3 at bedtime PRN  octreotide  Infusion 50 <Continuous>  ondansetron Injectable 4 every 8 hours PRN  pantoprazole Infusion 8 <Continuous>  polyethylene glycol 3350 17 two times a day PRN  senna 2 at bedtime      VITALS:  Vital Signs Last 24 Hrs  T(F): 97.6 (04-16-25 @ 07:01), Max: 99 (04-13-25 @ 20:40)    Vital Signs Last 24 Hrs  HR: 104 (04-16-25 @ 08:00) (66 - 104)  BP: 140/52 (04-16-25 @ 08:00) (86/50 - 140/52)  RR: 13 (04-16-25 @ 08:00)  SpO2: 98% (04-16-25 @ 08:00) (98% - 100%)  Wt(kg): --    EXAM:  GENERAL: Lethargic, lying in bed  HEAD: No head lesions  EYES: Conjunctiva pink and cornea white  EAR, NOSE, MOUTH, THROAT: Normal external ears and nose, no discharges; moist mucous membranes; + NGT; + NC  NECK: Supple, nontender to palpation; no JVD  RESPIRATORY: Bibasilar crackles  CARDIOVASCULAR: S1 S2  GASTROINTESTINAL: Soft, no significant distention; normoactive bowel sounds  GENITOURINARY: + [facility] catheter, no CVA tenderness  EXTREMITIES: No clubbing, cyanosis, + diffuse anasarca  NERVOUS SYSTEM: Lethargic, not aoursable, not answering questions  MUSCULOSKELETAL: No joint erythema, swelling  SKIN: No rashes or lesions, no superficial thrombophlebitis  PSYCH: Not responding    Labs:                        7.9    7.55  )-----------( 134      ( 16 Apr 2025 04:44 )             24.0     04-16    147[H]  |  109  |  39[H]  ----------------------------<  155[H]  3.8   |  24  |  1.8[H]    Ca    6.4[L]      16 Apr 2025 04:44  Phos  3.8     04-16  Mg     1.7     04-16    TPro  4.6[L]  /  Alb  1.8[L]  /  TBili  0.8  /  DBili  x   /  AST  110[H]  /  ALT  24  /  AlkPhos  142[H]  04-16      WBC Trend:  WBC Count: 7.55 (04-16-25 @ 04:44)  WBC Count: 6.95 (04-15-25 @ 21:30)  WBC Count: 7.30 (04-15-25 @ 16:00)  WBC Count: 7.45 (04-15-25 @ 13:01)      Auto Neutrophil #: 4.17 K/uL (01-29-25 @ 07:35)  Auto Neutrophil #: 4.60 K/uL (01-28-25 @ 06:15)  Auto Neutrophil #: 3.97 K/uL (01-27-25 @ 15:51)  Auto Neutrophil #: 4.23 K/uL (01-27-25 @ 08:52)  Auto Neutrophil #: 3.79 K/uL (01-26-25 @ 07:00)      Creatine Trend:  Creatinine: 1.8 (04-16)  Creatinine: 1.9 (04-15)  Creatinine: 1.8 (04-15)  Creatinine: 2.1 (04-15)      Liver Biochemical Testing Trend:  Alanine Aminotransferase (ALT/SGPT): 24 (04-16)  Alanine Aminotransferase (ALT/SGPT): 17 (04-15)  Alanine Aminotransferase (ALT/SGPT): 19 (04-15)  Alanine Aminotransferase (ALT/SGPT): 16 (04-13)  Alanine Aminotransferase (ALT/SGPT): 10 (02-19)  Aspartate Aminotransferase (AST/SGOT): 110 (04-16-25 @ 04:44)  Aspartate Aminotransferase (AST/SGOT): 90 (04-15-25 @ 13:01)  Aspartate Aminotransferase (AST/SGOT): 86 (04-15-25 @ 05:29)  Aspartate Aminotransferase (AST/SGOT): 56 (04-13-25 @ 21:20)  Aspartate Aminotransferase (AST/SGOT): 93 (02-19-25 @ 12:40)  Bilirubin Total: 0.8 (04-16)  Bilirubin Total: 0.6 (04-15)  Bilirubin Total: 0.7 (04-15)  Bilirubin Total: 0.7 (04-13)  Bilirubin Total: 1.2 (02-19)    Trend LDH    Urinalysis Basic - ( 16 Apr 2025 04:44 )    Color: x / Appearance: x / SG: x / pH: x  Gluc: 155 mg/dL / Ketone: x  / Bili: x / Urobili: x   Blood: x / Protein: x / Nitrite: x   Leuk Esterase: x / RBC: x / WBC x   Sq Epi: x / Non Sq Epi: x / Bacteria: x    MICROBIOLOGY:    Urinalysis with Rflx Culture (collected 13 Apr 2025 21:20)    Culture - Blood (collected 13 Apr 2025 21:20)  Source: Blood Blood-Peripheral  Gram Stain (15 Apr 2025 15:06):    Growth in anaerobic bottle: Gram Negative Rods  Preliminary Report (15 Apr 2025 15:06):    Growth in anaerobic bottle: Gram Negative Rods    Culture - Urine (collected 13 Apr 2025 21:20)  Source: Catheterized None  Final Report (15 Apr 2025 23:45):    10,000 - 49,000 CFU/mL Proteus mirabilis    Culture - Blood (collected 13 Apr 2025 21:20)  Source: Blood Blood-Peripheral  Gram Stain (15 Apr 2025 14:15):    Growth in anaerobic bottle: Gram Negative Rods  Preliminary Report (15 Apr 2025 14:16):    Growth in anaerobic bottle: Gram Negative Rods    Direct identification is available within approximately 3-5    hours either by Blood Panel Multiplexed PCR or Direct    MALDI-TOF. Details: [email or web address]  Organism: Blood Culture PCR (15 Apr 2025 15:23)  Organism: Blood Culture PCR (15 Apr 2025 15:23)      -  Bacteroides fragilis: Detec      Method Type: PCR    Procalcitonin: 1.06 (04-15)    C-Reactive Protein: 78.7 (04-13)      Troponin T, High Sensitivity Result: 140 (04-14)  Troponin T, High Sensitivity Result: 131 (04-14)  Troponin T, High Sensitivity Result: 174 (04-13)    Lactate, Blood: 4.2 (04-15 @ 05:29)  Blood Gas Arterial, Lactate: 3.1 (04-15 @ 01:03)  Lactate, Blood: 4.4 (04-14 @ 21:20)  Lactate, Blood: 5.2 (04-14 @ 19:17)    A1C with Estimated Average Glucose Result: 6.0 % (04-14-25 @ 05:42)  A1C with Estimated Average Glucose Result: 7.6 % (01-24-25 @ 07:04)        RADIOLOGY:  imaging below personally reviewed    < from: CT Head No Cont (04.13.25 @ 22:56) >  Impression:    No evidence of acute intracranial abnormality.    < end of copied text >    < from: CT Abdomen and Pelvis w/ IV Cont (04.13.25 @ 22:56) >  IMPRESSION:    1.  Erosive endplate changes centered at the L4-5 level, consistent with   patient's history ofrecent lumbar spine osteomyelitis.    2.  Liver cirrhosis with portal hypertension. Mild-to-moderate ascites.   Anasarca. Hepatic dome subcentimeter hypodense 1.3 cm hypodensity, not   fully characterized . Outpatient MR abdomen with IV contrast recommended.    3.  Chronic pancreatitis, with limited evaluation for acute inflammation   due to background fluid. Correlation with pancreatic enzymes recommended.    < end of copied text >    < from: Xray Chest 1 View- PORTABLE-Urgent (Xray Chest 1 View- PORTABLE-Urgent .) (04.15.25 @ 01:18) >  IMPRESSION: Low lung volume.    Left basilar opacity, slightly improved.      < end of copied text >    < from: CT Head No Cont (04.15.25 @ 02:25) >  IMPRESSION:    Motion-degraded exam, however, no definite acute intracranial pathology.    < end of copied text >

## 2025-04-16 NOTE — PROGRESS NOTE ADULT - SUBJECTIVE AND OBJECTIVE BOX
Nephrology Progress Note    DWIGHT RIBEIRO  [id number]  80y  Male    S:  Patient is seen and examined, events over the last 24h noted.    O:  Allergies:  No Known Allergies    Hospital Medications:   MEDICATIONS  (STANDING):  atorvastatin 80 milliGRAM(s) Oral at bedtime  cefTRIAXone   IVPB 2000 milliGRAM(s) IV Intermittent every 24 hours  folic acid 1 milliGRAM(s) Oral daily  insulin lispro (ADMELOG) corrective regimen sliding scale   SubCutaneous three times a day before meals  lactulose Syrup 20 Gram(s) Enteral Tube every 8 hours  levETIRAcetam   Injectable 250 milliGRAM(s) IV Push <User Schedule>  lidocaine   4% Patch 1 Patch Transdermal daily  metroNIDAZOLE  IVPB 500 milliGRAM(s) IV Intermittent every 12 hours  nystatin Powder 1 Application(s) Topical two times a day  pantoprazole  Injectable 40 milliGRAM(s) IV Push every 12 hours  polyethylene glycol/electrolyte Solution. 4000 milliLiter(s) Oral once  rifAXIMin 550 milliGRAM(s) Oral two times a day  senna 2 Tablet(s) Oral at bedtime    MEDICATIONS  (PRN):  acetaminophen     Tablet .. 650 milliGRAM(s) Oral every 6 hours PRN Temp greater or equal to 38C (100.4F), Mild Pain (1 - 3)  aluminum hydroxide/magnesium hydroxide/simethicone Suspension 30 milliLiter(s) Oral every 4 hours PRN Dyspepsia  dextrose Oral Gel 15 Gram(s) Oral once PRN Blood Glucose LESS THAN 70 milliGRAM(s)/deciliter  melatonin 3 milliGRAM(s) Oral at bedtime PRN Insomnia  ondansetron Injectable 4 milliGRAM(s) IV Push every 8 hours PRN Nausea and/or Vomiting  polyethylene glycol 3350 17 Gram(s) Oral two times a day PRN Constipation    Home Medications:  acetaminophen 500 mg oral tablet: 2 tab(s) orally every 8 hours (2025 10:56)  aluminum hydroxide-magnesium hydroxide 200 mg-200 mg/5 mL oral suspension: 30 milliliter(s) orally every 4 hours As needed Dyspepsia (2025 10:56)  amLODIPine 5 mg oral tablet: 1 tab(s) orally once a day (2025 11:45)  aspirin 81 mg oral delayed release tablet: 1 tab(s) orally once a day (2025 10:56)  atorvastatin 80 mg oral tablet: 1 tab(s) orally once a day (at bedtime) (2025 10:56)  ceFAZolin 2 g intravenous injection: 2 gram(s) intravenous every 8 hours End date is 3/9/2025 (2025 10:56)  folic acid 1 mg oral tablet: 1 tab(s) orally once a day (2025 10:56)  gabapentin 100 mg oral capsule: 3 cap(s) orally 2 times a day (17 Sep 2022 01:58)  gabapentin 100 mg oral capsule: 1 cap(s) orally 2 times a day (2025 11:45)  Keppra 250 mg oral tablet: 2 tab(s) orally 2 times a day (2025 11:48)  Lasix 20 mg oral tablet: 1 tab(s) orally once a day (2025 11:45)  lidocaine 4% topical film: Apply topically to affected area every 24 hours Lower back (2025 10:56)  losartan 50 mg oral tablet: 0.5 tab(s) orally 2 times a day (17 Sep 2022 01:58)  metFORMIN 500 mg oral tablet: 500 milligram(s) orally 2 times a day (2025 11:46)  morphine: 2 milligram(s) intravenous every 8 hours as needed for  severe pain (2025 10:56)  nystatin 100,000 units/g topical powder: 1 Apply topically to affected area every 12 hours As needed fungal rash (2025 10:56)  Osteo Bi-Flex 250 mg-200 mg oral tablet: orally once a day (17 Sep 2022 01:58)  oxyCODONE 10 mg oral tablet: 1 tab(s) orally 3 times a day As needed Severe Pain (7 - 10) (2025 10:56)  polyethylene glycol 3350 oral powder for reconstitution: 17 gram(s) orally 2 times a day (2025 10:56)  QUEtiapine 50 mg oral tablet: 1 tab(s) orally 2 times a day (2025 11:45)  senna leaf extract oral tablet: 2 tab(s) orally once a day (at bedtime) (2025 10:56)  Tresiba FlexTouch: 60 unit(s) subcutaneous once a day (17 Sep 2022 01:58)      VITALS:  Daily Height in cm: 175.26 (2025 13:25)    Daily Weight in k.8 (2025 07:01)  T(F): 97.9 (25 @ 15:01), Max: 98.7 (25 @ 11:00)  HR: 88 (25 @ 14:00)  BP: 109/55 (25 @ 14:00)  RR: 20 (25 @ 15:01)  SpO2: 100% (25 @ 14:00)  Wt(kg): --  I&O's Detail    15 Apr 2025 07:  -  2025 07:00  --------------------------------------------------------  IN:    IV PiggyBack: 450 mL    Lactated Ringers: 975 mL    Octreotide: 120 mL    Pantoprazole: 120 mL  Total IN: 1665 mL    OUT:    Indwelling Catheter - Urethral (mL): 1115 mL  Total OUT: 1115 mL    Total NET: 550 mL      2025 07:  -  2025 15:45  --------------------------------------------------------  IN:    dextrose 5% + sodium chloride 0.45%: 210 mL    IV PiggyBack: 450 mL    Lactated Ringers: 150 mL    Octreotide: 80 mL    Pantoprazole: 80 mL  Total IN: 970 mL    OUT:    Indwelling Catheter - Urethral (mL): 400 mL  Total OUT: 400 mL    Total NET: 570 mL        I&O's Summary    15 Apr 2025 07:  -  2025 07:00  --------------------------------------------------------  IN: 1665 mL / OUT: 1115 mL / NET: 550 mL    2025 07:01  -  2025 15:45  --------------------------------------------------------  IN: 970 mL / OUT: 400 mL / NET: 570 mL      Height (cm): 175.3 ( @ 13:25)  Weight (kg): 117.8 ( @ 13:25)  BMI (kg/m2): 38.3 (:25)  BSA (m2): 2.31 (:)    PHYSICAL EXAM:  Gen: lethargic, +NGT  Chest: b/l breath sounds  Abd: soft  Extremities: mild edema      LABS:  ABG - ( 15 Apr 2025 01:03 )  pH, Arterial: 7.56  pH, Blood: x     /  pCO2: 25    /  pO2: 96    / HCO3: 22    / Base Excess: 1.6   /  SaO2: 99.0              147[H]  |  109  |  39[H]  ----------------------------<  155[H]  3.8   |  24  |  1.8[H]    Ca    6.4[L]      2025 04:44  Phos  3.8       Mg     1.7         TPro  4.6[L]  /  Alb  1.8[L]  /  TBili  0.8  /  DBili      /  AST  110[H]  /  ALT  24  /  AlkPhos  142[H]      Phosphorus: 3.8 mg/dL (25 @ 04:44)  Phosphorus: 4.0 mg/dL (04-15-25 @ 21:34)                            7.9    6.75  )-----------( 130      ( 2025 11:23 )             24.4     Mean Cell Volume: 98.4 fL (25 @ 11:23)      Urine Studies:  Protein, Urine: 30 mg/dL (04-15-25 @ 14:10)  White Blood Cell - Urine: 15 /HPF (04-15-25 @ 14:10)  Red Blood Cell - Urine: 6 /HPF (04-15-25 @ 14:10)  Granular Cast: Present (04-15-25 @ 14:10)    Urea Nitrogen,  Random Urine: 168 mg/dL (04-15-25 @ 14:10)    Sodium, Random Urine: 38.0 mmoL/L (04-15 @ 14:10)  Creatinine, Random Urine: 94 mg/dL (04-15 @ 14:10)    Creatinine trend:  Creatinine: 1.8 mg/dL (25 @ 04:44)  Creatinine: 1.9 mg/dL (04-15-25 @ 21:34)  Creatinine: 1.8 mg/dL (04-15-25 @ 13:01)  Creatinine: 2.1 mg/dL (04-15-25 @ 05:29)  Creatinine: 1.4 mg/dL (25 @ 05:42)  Creatinine: 1.5 mg/dL (25 @ 21:20)  Creatinine: 0.8 mg/dL (25 @ 12:40)

## 2025-04-16 NOTE — PROGRESS NOTE ADULT - SUBJECTIVE AND OBJECTIVE BOX
Patient is a 80y old  Male who presents with a chief complaint of acute mentation changes (15 Apr 2025 17:04)      Over Night Events:  Patient seen and examined.   more awake still lethargic    on PPI drip for possible GI bleed     ROS:  See HPI    PHYSICAL EXAM    ICU Vital Signs Last 24 Hrs  T(C): 36.4 (16 Apr 2025 07:01), Max: 36.4 (16 Apr 2025 07:01)  T(F): 97.6 (16 Apr 2025 07:01), Max: 97.6 (16 Apr 2025 07:01)  HR: 85 (16 Apr 2025 06:00) (66 - 97)  BP: 88/52 (16 Apr 2025 06:00) (86/50 - 129/76)  BP(mean): 66 (16 Apr 2025 06:00) (62 - 95)  ABP: --  ABP(mean): --  RR: 16 (16 Apr 2025 07:01) (11 - 22)  SpO2: 100% (16 Apr 2025 06:00) (99% - 100%)    O2 Parameters below as of 16 Apr 2025 06:00  Patient On (Oxygen Delivery Method): nasal cannula  O2 Flow (L/min): 2          General: lethargic   HEENT:    Ng tube             Lymph Nodes:    Lungs: Bilateral BS  Cardiovascular: Regular   Abdomen: Soft, Positive BS  Extremities: No clubbing   Skin: warm   Neurological: no focal   Musculoskeletal: move all ext     I&O's Detail    15 Apr 2025 07:01  -  16 Apr 2025 07:00  --------------------------------------------------------  IN:    IV PiggyBack: 450 mL    Lactated Ringers: 975 mL    Octreotide: 120 mL    Pantoprazole: 120 mL  Total IN: 1665 mL    OUT:    Indwelling Catheter - Urethral (mL): 1115 mL  Total OUT: 1115 mL    Total NET: 550 mL      16 Apr 2025 07:01  -  16 Apr 2025 07:21  --------------------------------------------------------  IN:  Total IN: 0 mL    OUT:    Indwelling Catheter - Urethral (mL): 40 mL  Total OUT: 40 mL    Total NET: -40 mL          LABS:                          7.9    7.55  )-----------( 134      ( 16 Apr 2025 04:44 )             24.0         16 Apr 2025 04:44    147    |  109    |  39     ----------------------------<  155    3.8     |  24     |  1.8      Ca    6.4        16 Apr 2025 04:44  Phos  3.8       16 Apr 2025 04:44  Mg     1.7       16 Apr 2025 04:44    TPro  4.6    /  Alb  1.8    /  TBili  0.8    /  DBili  x      /  AST  110    /  ALT  24     /  AlkPhos  142    16 Apr 2025 04:44  Amylase x     lipase x                                                 PT/INR - ( 16 Apr 2025 04:45 )   PT: 18.40 sec;   INR: 1.55 ratio         PTT - ( 16 Apr 2025 04:45 )  PTT:33.6 sec                                       Urinalysis Basic - ( 16 Apr 2025 04:44 )    Color: x / Appearance: x / SG: x / pH: x  Gluc: 155 mg/dL / Ketone: x  / Bili: x / Urobili: x   Blood: x / Protein: x / Nitrite: x   Leuk Esterase: x / RBC: x / WBC x   Sq Epi: x / Non Sq Epi: x / Bacteria: x        Lactate, Blood: 4.2 mmol/L (04-15-25 @ 05:29)  Lactate, Blood: 4.4 mmol/L (04-14-25 @ 21:20)  Lactate, Blood: 5.2 mmol/L (04-14-25 @ 19:17)  Lactate, Blood: 5.2 mmol/L (04-14-25 @ 15:02)  Lactate, Blood: 3.2 mmol/L (04-13-25 @ 23:23)                                                          Urinalysis with Rflx Culture (collected 13 Apr 2025 21:20)    Culture - Blood (collected 13 Apr 2025 21:20)  Source: Blood Blood-Peripheral  Gram Stain (15 Apr 2025 15:06):    Growth in anaerobic bottle: Gram Negative Rods  Preliminary Report (15 Apr 2025 15:06):    Growth in anaerobic bottle: Gram Negative Rods    Culture - Urine (collected 13 Apr 2025 21:20)  Source: Catheterized None  Final Report (15 Apr 2025 23:45):    10,000 - 49,000 CFU/mL Proteus mirabilis    Culture - Blood (collected 13 Apr 2025 21:20)  Source: Blood Blood-Peripheral  Gram Stain (15 Apr 2025 14:15):    Growth in anaerobic bottle: Gram Negative Rods  Preliminary Report (15 Apr 2025 14:16):    Growth in anaerobic bottle: Gram Negative Rods    Direct identification is available within approximately 3-5    hours either by Blood Panel Multiplexed PCR or Direct    MALDI-TOF. Details: [email or web address]  Organism: Blood Culture PCR (15 Apr 2025 15:23)  Organism: Blood Culture PCR (15 Apr 2025 15:23)                                                                                       ABG - ( 15 Apr 2025 01:03 )  pH, Arterial: 7.56  pH, Blood: x     /  pCO2: 25    /  pO2: 96    / HCO3: 22    / Base Excess: 1.6   /  SaO2: 99.0                MEDICATIONS  (STANDING):  atorvastatin 80 milliGRAM(s) Oral at bedtime  cefTRIAXone   IVPB 2000 milliGRAM(s) IV Intermittent every 24 hours  chlorhexidine 2% Cloths 1 Application(s) Topical <User Schedule>  dextrose 5%. 1000 milliLiter(s) (100 mL/Hr) IV Continuous <Continuous>  dextrose 5%. 1000 milliLiter(s) (50 mL/Hr) IV Continuous <Continuous>  dextrose 50% Injectable 25 Gram(s) IV Push once  dextrose 50% Injectable 12.5 Gram(s) IV Push once  dextrose 50% Injectable 25 Gram(s) IV Push once  folic acid 1 milliGRAM(s) Oral daily  glucagon  Injectable 1 milliGRAM(s) IntraMuscular once  insulin lispro (ADMELOG) corrective regimen sliding scale   SubCutaneous three times a day before meals  lactated ringers. 1000 milliLiter(s) (75 mL/Hr) IV Continuous <Continuous>  lactulose Syrup 20 Gram(s) Enteral Tube every 8 hours  levETIRAcetam   Injectable 250 milliGRAM(s) IV Push <User Schedule>  lidocaine   4% Patch 1 Patch Transdermal daily  metroNIDAZOLE  IVPB 500 milliGRAM(s) IV Intermittent every 12 hours  nystatin Powder 1 Application(s) Topical two times a day  octreotide  Infusion 50 MICROgram(s)/Hr (10 mL/Hr) IV Continuous <Continuous>  pantoprazole Infusion 8 mG/Hr (10 mL/Hr) IV Continuous <Continuous>  rifAXIMin 550 milliGRAM(s) Oral two times a day  senna 2 Tablet(s) Oral at bedtime    MEDICATIONS  (PRN):  acetaminophen     Tablet .. 650 milliGRAM(s) Oral every 6 hours PRN Temp greater or equal to 38C (100.4F), Mild Pain (1 - 3)  aluminum hydroxide/magnesium hydroxide/simethicone Suspension 30 milliLiter(s) Oral every 4 hours PRN Dyspepsia  cyclobenzaprine 5 milliGRAM(s) Oral three times a day PRN Muscle Spasm  dextrose Oral Gel 15 Gram(s) Oral once PRN Blood Glucose LESS THAN 70 milliGRAM(s)/deciliter  melatonin 3 milliGRAM(s) Oral at bedtime PRN Insomnia  ondansetron Injectable 4 milliGRAM(s) IV Push every 8 hours PRN Nausea and/or Vomiting  polyethylene glycol 3350 17 Gram(s) Oral two times a day PRN Constipation          Xrays:      ECHO:  [facility] ICU:

## 2025-04-17 ENCOUNTER — TRANSCRIPTION ENCOUNTER (OUTPATIENT)
Age: 80
End: 2025-04-17

## 2025-04-17 LAB
ALBUMIN SERPL ELPH-MCNC: 2.8 G/DL — LOW (ref 3.5–5.2)
ALP SERPL-CCNC: 120 U/L — HIGH (ref 30–115)
ALT FLD-CCNC: 19 U/L — SIGNIFICANT CHANGE UP (ref 0–41)
AMMONIA BLD-MCNC: 55 UMOL/L — SIGNIFICANT CHANGE UP (ref 11–55)
ANION GAP SERPL CALC-SCNC: 11 MMOL/L — SIGNIFICANT CHANGE UP (ref 7–14)
ANION GAP SERPL CALC-SCNC: 14 MMOL/L — SIGNIFICANT CHANGE UP (ref 7–14)
APTT BLD: 36.1 SEC — SIGNIFICANT CHANGE UP (ref 27–39.2)
AST SERPL-CCNC: 76 U/L — HIGH (ref 0–41)
BASOPHILS # BLD AUTO: 0.03 K/UL — SIGNIFICANT CHANGE UP (ref 0–0.2)
BASOPHILS NFR BLD AUTO: 0.6 % — SIGNIFICANT CHANGE UP (ref 0–1)
BILIRUB SERPL-MCNC: 1.3 MG/DL — HIGH (ref 0.2–1.2)
BUN SERPL-MCNC: 30 MG/DL — HIGH (ref 10–20)
BUN SERPL-MCNC: 31 MG/DL — HIGH (ref 10–20)
CALCIUM SERPL-MCNC: 6.7 MG/DL — LOW (ref 8.4–10.5)
CALCIUM SERPL-MCNC: 6.7 MG/DL — LOW (ref 8.4–10.5)
CHLORIDE SERPL-SCNC: 109 MMOL/L — SIGNIFICANT CHANGE UP (ref 98–110)
CHLORIDE SERPL-SCNC: 110 MMOL/L — SIGNIFICANT CHANGE UP (ref 98–110)
CO2 SERPL-SCNC: 25 MMOL/L — SIGNIFICANT CHANGE UP (ref 17–32)
CO2 SERPL-SCNC: 25 MMOL/L — SIGNIFICANT CHANGE UP (ref 17–32)
CREAT SERPL-MCNC: 1.3 MG/DL — SIGNIFICANT CHANGE UP (ref 0.7–1.5)
CREAT SERPL-MCNC: 1.4 MG/DL — SIGNIFICANT CHANGE UP (ref 0.7–1.5)
EGFR: 51 ML/MIN/1.73M2 — LOW
EGFR: 51 ML/MIN/1.73M2 — LOW
EGFR: 56 ML/MIN/1.73M2 — LOW
EGFR: 56 ML/MIN/1.73M2 — LOW
EOSINOPHIL # BLD AUTO: 0.3 K/UL — SIGNIFICANT CHANGE UP (ref 0–0.7)
EOSINOPHIL NFR BLD AUTO: 6.1 % — SIGNIFICANT CHANGE UP (ref 0–8)
GLUCOSE BLDC GLUCOMTR-MCNC: 166 MG/DL — HIGH (ref 70–99)
GLUCOSE BLDC GLUCOMTR-MCNC: 186 MG/DL — HIGH (ref 70–99)
GLUCOSE BLDC GLUCOMTR-MCNC: 193 MG/DL — HIGH (ref 70–99)
GLUCOSE SERPL-MCNC: 160 MG/DL — HIGH (ref 70–99)
GLUCOSE SERPL-MCNC: 173 MG/DL — HIGH (ref 70–99)
HCT VFR BLD CALC: 25.8 % — LOW (ref 42–52)
HCT VFR BLD CALC: 27 % — LOW (ref 42–52)
HGB BLD-MCNC: 8.6 G/DL — LOW (ref 14–18)
HGB BLD-MCNC: 9 G/DL — LOW (ref 14–18)
IMM GRANULOCYTES NFR BLD AUTO: 0.8 % — HIGH (ref 0.1–0.3)
INR BLD: 1.79 RATIO — HIGH (ref 0.65–1.3)
LYMPHOCYTES # BLD AUTO: 0.71 K/UL — LOW (ref 1.2–3.4)
LYMPHOCYTES # BLD AUTO: 14.5 % — LOW (ref 20.5–51.1)
MAGNESIUM SERPL-MCNC: 1.5 MG/DL — LOW (ref 1.8–2.4)
MCHC RBC-ENTMCNC: 32.3 PG — HIGH (ref 27–31)
MCHC RBC-ENTMCNC: 32.6 PG — HIGH (ref 27–31)
MCHC RBC-ENTMCNC: 33.3 G/DL — SIGNIFICANT CHANGE UP (ref 32–37)
MCHC RBC-ENTMCNC: 33.3 G/DL — SIGNIFICANT CHANGE UP (ref 32–37)
MCV RBC AUTO: 97 FL — HIGH (ref 80–94)
MCV RBC AUTO: 97.8 FL — HIGH (ref 80–94)
MONOCYTES # BLD AUTO: 0.39 K/UL — SIGNIFICANT CHANGE UP (ref 0.1–0.6)
MONOCYTES NFR BLD AUTO: 8 % — SIGNIFICANT CHANGE UP (ref 1.7–9.3)
NEUTROPHILS # BLD AUTO: 3.43 K/UL — SIGNIFICANT CHANGE UP (ref 1.4–6.5)
NEUTROPHILS NFR BLD AUTO: 70 % — SIGNIFICANT CHANGE UP (ref 42.2–75.2)
NRBC BLD AUTO-RTO: 0 /100 WBCS — SIGNIFICANT CHANGE UP (ref 0–0)
NRBC BLD AUTO-RTO: 0 /100 WBCS — SIGNIFICANT CHANGE UP (ref 0–0)
PLATELET # BLD AUTO: 103 K/UL — LOW (ref 130–400)
PLATELET # BLD AUTO: 95 K/UL — LOW (ref 130–400)
PMV BLD: 9.3 FL — SIGNIFICANT CHANGE UP (ref 7.4–10.4)
PMV BLD: 9.4 FL — SIGNIFICANT CHANGE UP (ref 7.4–10.4)
POTASSIUM SERPL-MCNC: 3.2 MMOL/L — LOW (ref 3.5–5)
POTASSIUM SERPL-MCNC: 3.5 MMOL/L — SIGNIFICANT CHANGE UP (ref 3.5–5)
POTASSIUM SERPL-SCNC: 3.2 MMOL/L — LOW (ref 3.5–5)
POTASSIUM SERPL-SCNC: 3.5 MMOL/L — SIGNIFICANT CHANGE UP (ref 3.5–5)
PROT SERPL-MCNC: 4.9 G/DL — LOW (ref 6–8)
PROTHROM AB SERPL-ACNC: 21.4 SEC — HIGH (ref 9.95–12.87)
RBC # BLD: 2.66 M/UL — LOW (ref 4.7–6.1)
RBC # BLD: 2.76 M/UL — LOW (ref 4.7–6.1)
RBC # FLD: 17 % — HIGH (ref 11.5–14.5)
RBC # FLD: 17.7 % — HIGH (ref 11.5–14.5)
SODIUM SERPL-SCNC: 146 MMOL/L — SIGNIFICANT CHANGE UP (ref 135–146)
SODIUM SERPL-SCNC: 148 MMOL/L — HIGH (ref 135–146)
WBC # BLD: 4.79 K/UL — LOW (ref 4.8–10.8)
WBC # BLD: 4.9 K/UL — SIGNIFICANT CHANGE UP (ref 4.8–10.8)
WBC # FLD AUTO: 4.79 K/UL — LOW (ref 4.8–10.8)
WBC # FLD AUTO: 4.9 K/UL — SIGNIFICANT CHANGE UP (ref 4.8–10.8)

## 2025-04-17 PROCEDURE — 99233 SBSQ HOSP IP/OBS HIGH 50: CPT

## 2025-04-17 PROCEDURE — 45378 DIAGNOSTIC COLONOSCOPY: CPT

## 2025-04-17 PROCEDURE — 43239 EGD BIOPSY SINGLE/MULTIPLE: CPT

## 2025-04-17 PROCEDURE — 99291 CRITICAL CARE FIRST HOUR: CPT

## 2025-04-17 RX ORDER — MAGNESIUM SULFATE 500 MG/ML
2 SYRINGE (ML) INJECTION
Refills: 0 | Status: COMPLETED | OUTPATIENT
Start: 2025-04-17 | End: 2025-04-17

## 2025-04-17 RX ORDER — TRAMADOL HYDROCHLORIDE 50 MG/1
50 TABLET, FILM COATED ORAL ONCE
Refills: 0 | Status: DISCONTINUED | OUTPATIENT
Start: 2025-04-17 | End: 2025-04-17

## 2025-04-17 RX ORDER — SODIUM CHLORIDE 9 G/1000ML
1000 INJECTION, SOLUTION INTRAVENOUS
Refills: 0 | Status: DISCONTINUED | OUTPATIENT
Start: 2025-04-17 | End: 2025-04-18

## 2025-04-17 RX ORDER — OLANZAPINE 10 MG/1
5 TABLET ORAL ONCE
Refills: 0 | Status: COMPLETED | OUTPATIENT
Start: 2025-04-17 | End: 2025-04-17

## 2025-04-17 RX ADMIN — Medication 2 TABLET(S): at 21:36

## 2025-04-17 RX ADMIN — OLANZAPINE 5 MILLIGRAM(S): 10 TABLET ORAL at 22:37

## 2025-04-17 RX ADMIN — Medication 25 GRAM(S): at 21:49

## 2025-04-17 RX ADMIN — Medication 100 MILLIGRAM(S): at 17:56

## 2025-04-17 RX ADMIN — LIDOCAINE HYDROCHLORIDE 1 PATCH: 20 JELLY TOPICAL at 00:00

## 2025-04-17 RX ADMIN — LIDOCAINE HYDROCHLORIDE 1 PATCH: 20 JELLY TOPICAL at 16:31

## 2025-04-17 RX ADMIN — Medication 40 MILLIGRAM(S): at 17:59

## 2025-04-17 RX ADMIN — SODIUM CHLORIDE 50 MILLILITER(S): 9 INJECTION, SOLUTION INTRAVENOUS at 08:32

## 2025-04-17 RX ADMIN — Medication 40 MILLIEQUIVALENT(S): at 20:05

## 2025-04-17 RX ADMIN — LACTULOSE 20 GRAM(S): 10 SOLUTION ORAL at 21:36

## 2025-04-17 RX ADMIN — Medication 50 MILLIEQUIVALENT(S): at 20:05

## 2025-04-17 RX ADMIN — CEFTRIAXONE 100 MILLIGRAM(S): 500 INJECTION, POWDER, FOR SOLUTION INTRAMUSCULAR; INTRAVENOUS at 16:29

## 2025-04-17 RX ADMIN — LEVETIRACETAM 250 MILLIGRAM(S): 10 INJECTION, SOLUTION INTRAVENOUS at 05:06

## 2025-04-17 RX ADMIN — Medication 3 MILLIGRAM(S): at 21:36

## 2025-04-17 RX ADMIN — NYSTATIN 1 APPLICATION(S): 100000 CREAM TOPICAL at 06:56

## 2025-04-17 RX ADMIN — Medication 50 MILLIEQUIVALENT(S): at 04:00

## 2025-04-17 RX ADMIN — LIDOCAINE HYDROCHLORIDE 1 PATCH: 20 JELLY TOPICAL at 19:52

## 2025-04-17 RX ADMIN — TRAMADOL HYDROCHLORIDE 50 MILLIGRAM(S): 50 TABLET, FILM COATED ORAL at 21:00

## 2025-04-17 RX ADMIN — Medication 40 MILLIGRAM(S): at 05:07

## 2025-04-17 RX ADMIN — Medication 100 MILLIGRAM(S): at 05:07

## 2025-04-17 RX ADMIN — INSULIN LISPRO 2: 100 INJECTION, SOLUTION INTRAVENOUS; SUBCUTANEOUS at 16:33

## 2025-04-17 RX ADMIN — Medication 2 MILLIGRAM(S): at 22:10

## 2025-04-17 RX ADMIN — NYSTATIN 1 APPLICATION(S): 100000 CREAM TOPICAL at 18:03

## 2025-04-17 RX ADMIN — LEVETIRACETAM 250 MILLIGRAM(S): 10 INJECTION, SOLUTION INTRAVENOUS at 17:59

## 2025-04-17 RX ADMIN — TRAMADOL HYDROCHLORIDE 50 MILLIGRAM(S): 50 TABLET, FILM COATED ORAL at 20:16

## 2025-04-17 RX ADMIN — ATORVASTATIN CALCIUM 80 MILLIGRAM(S): 80 TABLET, FILM COATED ORAL at 21:36

## 2025-04-17 RX ADMIN — Medication 25 GRAM(S): at 20:05

## 2025-04-17 RX ADMIN — Medication 2 MILLIGRAM(S): at 21:35

## 2025-04-17 RX ADMIN — Medication 1 APPLICATION(S): at 05:08

## 2025-04-17 RX ADMIN — LACTULOSE 20 GRAM(S): 10 SOLUTION ORAL at 05:06

## 2025-04-17 RX ADMIN — SODIUM CHLORIDE 50 MILLILITER(S): 9 INJECTION, SOLUTION INTRAVENOUS at 21:36

## 2025-04-17 NOTE — PROGRESS NOTE ADULT - SUBJECTIVE AND OBJECTIVE BOX
Subjective/Objective: pt. remains non-verbal     HPI-Cardiology/Events/Updates  Pt evaluated at bedside. No overnight events and Pt has no complaints. Radiology tests and hospital records, were reviewed, as well as previous notes on this patient.           acetaminophen     Tablet .. 650 milliGRAM(s) Oral every 6 hours PRN  aluminum hydroxide/magnesium hydroxide/simethicone Suspension 30 milliLiter(s) Oral every 4 hours PRN  atorvastatin 80 milliGRAM(s) Oral at bedtime  cefTRIAXone   IVPB 2000 milliGRAM(s) IV Intermittent every 24 hours  chlorhexidine 2% Cloths 1 Application(s) Topical <User Schedule>  dexMEDEtomidine Infusion 0.5 MICROgram(s)/kG/Hr IV Continuous <Continuous>  dextrose 5% + sodium chloride 0.45%. 1000 milliLiter(s) IV Continuous <Continuous>  dextrose 5%. 1000 milliLiter(s) IV Continuous <Continuous>  dextrose 5%. 1000 milliLiter(s) IV Continuous <Continuous>  dextrose 50% Injectable 25 Gram(s) IV Push once  dextrose 50% Injectable 12.5 Gram(s) IV Push once  dextrose 50% Injectable 25 Gram(s) IV Push once  dextrose Oral Gel 15 Gram(s) Oral once PRN  folic acid 1 milliGRAM(s) Oral daily  glucagon  Injectable 1 milliGRAM(s) IntraMuscular once  insulin lispro (ADMELOG) corrective regimen sliding scale   SubCutaneous three times a day before meals  lactulose Syrup 20 Gram(s) Enteral Tube every 8 hours  levETIRAcetam   Injectable 250 milliGRAM(s) IV Push <User Schedule>  lidocaine   4% Patch 1 Patch Transdermal daily  melatonin 3 milliGRAM(s) Oral at bedtime PRN  metroNIDAZOLE  IVPB 500 milliGRAM(s) IV Intermittent every 12 hours  nystatin Powder 1 Application(s) Topical two times a day  ondansetron Injectable 4 milliGRAM(s) IV Push every 8 hours PRN  pantoprazole  Injectable 40 milliGRAM(s) IV Push every 12 hours  polyethylene glycol 3350 17 Gram(s) Oral two times a day PRN  rifAXIMin 550 milliGRAM(s) Oral two times a day  senna 2 Tablet(s) Oral at bedtime                            8.6    4.79  )-----------( 95       ( 17 Apr 2025 08:59 )             25.8       04-17    146  |  110  |  31[H]  ----------------------------<  160[H]  3.5   |  25  |  1.3    Ca    6.7[L]      17 Apr 2025 08:59  Phos  3.8     04-16  Mg     1.7     04-16    TPro  4.5[L]  /  Alb  2.8[L]  /  TBili  0.9  /  DBili  x   /  AST  70[H]  /  ALT  16  /  AlkPhos  100  04-16            T(C): 36.6 (04-17-25 @ 11:56), Max: 36.7 (04-16-25 @ 19:01)  HR: 64 (04-17-25 @ 12:30) (49 - 102)  BP: 141/65 (04-17-25 @ 12:30) (94/52 - 141/65)  RR: 14 (04-17-25 @ 12:30) (11 - 52)  SpO2: 98% (04-17-25 @ 12:30) (95% - 100%)  Wt(kg): --    I&O's Summary    16 Apr 2025 07:01  -  17 Apr 2025 07:00  --------------------------------------------------------  IN: 6084.5 mL / OUT: 1455 mL / NET: 4629.5 mL    17 Apr 2025 07:01  -  17 Apr 2025 13:47  --------------------------------------------------------  IN: 150 mL / OUT: 250 mL / NET: -100 mL              PHYSICAL EXAM:  GENERAL:  remains lethargic and non-verbal   HEAD:  Atraumatic, Normocephalic  EYES: EOMI, PERRLA, conjunctiva and sclera clear  NECK: Supple, No JVD, no cervical lymphadenopathy, non-tender  CHEST/LUNG: Clear to auscultation bilaterally; No wheeze, rhonchi, or rales  HEART: Regular rate and rhythm; S1&S2  ABDOMEN: Soft, Nontender, Nondistended x 4 quadrants; Bowel sounds present  EXTREMITIES:   Peripheral Pulses Present, No clubbing, no cyanosis, or no edema, no calf tenderness            LABS:                          7.9    6.75  )-----------( 130      ( 16 Apr 2025 11:23 )             24.4     PT/INR - ( 16 Apr 2025 04:45 )   PT: 18.40 sec;   INR: 1.55 ratio         PTT - ( 16 Apr 2025 04:45 )  PTT:33.6 sec  16 Apr 2025 04:44    147[H]  |  109    |  39[H]  ----------------------------<  155[H]  3.8     |  24     |  1.8[H]  15 Apr 2025 21:34    148[H]  |  110    |  40[H]  ----------------------------<  142[H]  3.6     |  24     |  1.9[H]    Ca    6.4[L]      16 Apr 2025 04:44  Ca    6.3[L]      15 Apr 2025 21:34  Phos  3.8       16 Apr 2025 04:44  Phos  4.0       15 Apr 2025 21:34  Mg     1.7[L]     16 Apr 2025 04:44  Mg     1.4[L]     15 Apr 2025 21:34    TPro  4.6[L]  /  Alb  1.8[L]  /  TBili  0.8    /  DBili  x      /  AST  110[H]  /  ALT  24     /  AlkPhos  142[H]  16 Apr 2025 04:44  TPro  4.6[L]  /  Alb  1.8[L]  /  TBili  0.6    /  DBili  x      /  AST  90[H]  /  ALT  17     /  AlkPhos  125[H]  15 Apr 2025 13:01                Diagnostic testing:  < from: Xray Chest 1 View-PORTABLE IMMEDIATE (Xray Chest 1 View-PORTABLE IMMEDIATE .) (04.15.25 @ 07:16) >  FINDINGS/  IMPRESSION:    Support devices: Interval placement of enteric tube, tip overlying the   right abdomen.    Cardiac/mediastinum/hilum: Unchanged.    Lung parenchyma/Pleura: Low lung volumes. Left basilar opacity. No   pneumothorax.    Skeleton/soft tissues: Unchanged.    --- End of Report ---    < end of copied text >            < from: TTE Echo Complete w/o Contrast w/ Doppler (04.15.25 @ 08:07) >  Summary:   1. Technically difficult study.   2. Normal global left ventricular systolic function.   3. Left ventricular ejection fraction, by visual estimation, is 60 to   65%.   4. The left ventricular diastolic function could not be assessed in this   study.   5. Left atrial size not well visualized.   6. Right atrial size not well visualized.   7. No evidence of mitral valve regurgitation.   8. Dilatation of the ascending aorta, measures 3.9 cm.   9. Normal pulmonary artery pressure.  10. There is no evidence of pericardial effusion.    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  BIBA from home status post change of mental status.      Impression:  #Elevated troponin  #?New onset Afib  #HTN, DM    -Prior ECG with likely Afib however patient with acute blood loss anemia to Hb ~ 5 and has history of SDH  -Therefore ac on hold   -Elevated troponin likely secondary to demand  -Given AMS, GIB, severe anemia, and history of SDH, no urgent ischemic evaluation indicated at this time  -Follow up GI   -Continue with supportive care per ICU   -Further cardiac workup pending clinical course     Iris Elmore MD

## 2025-04-17 NOTE — PROGRESS NOTE ADULT - SUBJECTIVE AND OBJECTIVE BOX
Nephrology progress note    Patient is seen and examined, events over the last 24 h noted .  Lying in bed   has milla ESCOBAR    Allergies:  No Known Allergies    Hospital Medications:   MEDICATIONS  (STANDING):  atorvastatin 80 milliGRAM(s) Oral at bedtime  cefTRIAXone   IVPB 2000 milliGRAM(s) IV Intermittent every 24 hours  dexMEDEtomidine Infusion 0.5 MICROgram(s)/kG/Hr (14.7 mL/Hr) IV Continuous <Continuous>  folic acid 1 milliGRAM(s) Oral daily  glucagon  Injectable 1 milliGRAM(s) IntraMuscular once  insulin lispro (ADMELOG) corrective regimen sliding scale   SubCutaneous three times a day before meals  lactulose Syrup 20 Gram(s) Enteral Tube every 8 hours  levETIRAcetam   Injectable 250 milliGRAM(s) IV Push <User Schedule>  lidocaine   4% Patch 1 Patch Transdermal daily  metroNIDAZOLE  IVPB 500 milliGRAM(s) IV Intermittent every 12 hours  nystatin Powder 1 Application(s) Topical two times a day  pantoprazole  Injectable 40 milliGRAM(s) IV Push every 12 hours  rifAXIMin 550 milliGRAM(s) Oral two times a day  senna 2 Tablet(s) Oral at bedtime        VITALS:  T(F): 96 (04-17-25 @ 08:00), Max: 98.7 (04-16-25 @ 11:00)  HR: 53 (04-17-25 @ 09:00)  BP: 123/58 (04-17-25 @ 09:00)  RR: 17 (04-17-25 @ 09:00)  SpO2: 95% (04-17-25 @ 09:00)      04-15 @ 07:01  -  04-16 @ 07:00  --------------------------------------------------------  IN: 1665 mL / OUT: 1115 mL / NET: 550 mL    04-16 @ 07:01  -  04-17 @ 07:00  --------------------------------------------------------  IN: 6084.5 mL / OUT: 1455 mL / NET: 4629.5 mL      Height (cm): 175.3 (04-16 @ 13:25)  Weight (kg): 117.8 (04-16 @ 13:25)  BMI (kg/m2): 38.3 (04-16 @ 13:25)  BSA (m2): 2.31 (04-16 @ 13:25)    PHYSICAL EXAM:  Constitutional: has NGT   Respiratory: CTAB, no wheezes, rales or rhonchi  Cardiovascular: S1, S2, RRR  Gastrointestinal: BS+, soft, NT/ND  Extremities: No cyanosis or clubbing. No peripheral edema  :   loyola.   Skin: No rashes    LABS:  04-17    146  |  110  |  31[H]  ----------------------------<  160[H]  3.5   |  25  |  1.3  Creatinine Trend: 1.3<--, 1.6<--, 1.8<--, 1.9<--, 1.8<--, 2.1<--  Ca    6.7[L]      17 Apr 2025 08:59  Phos  3.8     04-16  Mg     1.7     04-16    TPro  4.5[L]  /  Alb  2.8[L]  /  TBili  0.9  /  DBili      /  AST  70[H]  /  ALT  16  /  AlkPhos  100  04-16                          8.6    4.79  )-----------( 95       ( 17 Apr 2025 08:59 )             25.8       Urine Studies:  Urinalysis Basic - ( 17 Apr 2025 08:59 )    Color:  / Appearance:  / SG:  / pH:   Gluc: 160 mg/dL / Ketone:   / Bili:  / Urobili:    Blood:  / Protein:  / Nitrite:    Leuk Esterase:  / RBC:  / WBC    Sq Epi:  / Non Sq Epi:  / Bacteria:       Sodium, Random Urine: 38.0 mmoL/L (04-15 @ 14:10)  Creatinine, Random Urine: 94 mg/dL (04-15 @ 14:10)  Protein/Creatinine Ratio Calculation: 0.5 Ratio (04-15 @ 14:10)  Osmolality, Random Urine: 424 mos/kg (04-15 @ 14:10)  Potassium, Random Urine: 62 mmol/L (04-15 @ 14:10)      Iron 70, TIBC 134, %sat 52      [01-28-25 @ 06:15]  Ferritin 499      [01-28-25 @ 06:15]  TSH 4.97      [04-14-25 @ 05:42]  Lipid: chol 49, TG 81, HDL 15, LDL --      [04-14-25 @ 05:42]    HBsAg Nonreact      [09-18-22 @ 07:09]  HCV 0.08, Nonreact      [09-18-22 @ 07:09]    Syphilis Screen (Treponema Pallidum Ab) Negative      [01-24-25 @ 07:04]      RADIOLOGY & ADDITIONAL STUDIES:

## 2025-04-17 NOTE — PROGRESS NOTE ADULT - ASSESSMENT
79 yo male presented to ED via EMS for AMS above baseline       #AMS  #New onset decompensated liver Cirrhosis  #recent OM/discitis w abx completion March 2025   #HX of SDH   #Suspected GIB?  #Ascites  #gram negative bacteremia   # Bradycardia  # hypokalemia s/p 3 units repletion  CTH negative   Ammonia levels 60, f/u repeat am, mental status better.   Lactulose 20 q2h until 3-4 BMS  Hold AC   Protonix and octreotide drip.   GI FU if EGD to be done, colonoscopy likely today  s/p 2 units PRBC after drop of Hg overnight to 5.8.   no window for para likely due to body habitus  Rocephin and flagyl. ID FU   continue ASA   Keppra 250 mg q 12 hrs   fall/aspiration precautions   GI and neuro eval    #CHARLENE improving  - Suspected pre-renal vs hepatorenal   - c/w D5 1/2 NS  - Nephro consult  - Monitor Accurate I&Os     #elevated troponin   #Hx type II NSTEMI   EKG reviewed   Trops peaked and trending down   last TTE LVEF 66% (jan 2025), ECHO am  cardiology consulted      #Hx HLD  #Hx HTN   resume statin /   Hold losartan      #DM 2   A1C 7.6% prev   hold home meds   SSI for now   Carb control     #Hx of  mechanical falls   #chronic back pain   PRN pain control   fall precautions   PT consult when able     Code Status: Full code   DVT ppx: on hold

## 2025-04-17 NOTE — PRE-ANESTHESIA EVALUATION ADULT - NSANTHADDINFOFT_GEN_ALL_CORE
Pt is high risk for Cardiac event perioperatively, including cardiac arrest. and possible intubation.

## 2025-04-17 NOTE — PROGRESS NOTE ADULT - ASSESSMENT
IMPRESSION:  alter mental status ?? metabolic encephalopathy   seizure hx   liver cirrhosis   bacteremia   GI bleed   hypernatremia   CHARLENE   elevated trop   afib     PLAN:    CNS:    neurology follow up MRI Lumbar when stable   follow ammonia level       HEENT: oral care     PULMONARY: keep pox >92% aspiration precaution   daily cxr   CARDIOVASCULAR:  keep iv fluid D5 1/2 NS follow BMP   follow cardiology     GI: GI prophylaxis. s/p EGD pending colonoscopy today    PPI Q12 hrs   follow LFT , hepatology eval   rafiximine and lactulose   check Ammonia level today             RENAL: IV fluid D 1/2 NS 75 cc/ hr   follow NA level   follow renal     INFECTIOUS DISEASE: IDfollow up   rocephine and flagyl   procal   bldcx       HEMATOLOGICAL:  DVT prophylaxis. follow h/h and plt   hold AC   serial h/h   ENDOCRINE:  Follow up FS.  Insulin protocol if needed.    MICU         CRITICAL CARE TIME SPENT: ***     IMPRESSION:  alter mental status ?? metabolic encephalopathy   seizure hx   liver cirrhosis   bacteremia   GI bleed   hypernatremia   CHARLENE   elevated trop   afib     PLAN:    CNS:    neurology follow up MRI Lumbar when stable   follow ammonia level       HEENT: oral care     PULMONARY: keep pox >92% aspiration precaution   daily cxr   CARDIOVASCULAR:  keep iv fluid D5 1/2 NS follow BMP   follow cardiology     GI: GI prophylaxis. s/p EGD pending colonoscopy today    PPI Q12 hrs   follow LFT , hepatology eval   rafiximine and lactulose   check Ammonia level today             RENAL: IV fluid D 1/2 NS 75 cc/ hr   follow NA level   follow renal   BMP now   INFECTIOUS DISEASE: IDfollow up   rocephine and flagyl   procal   bldcx       HEMATOLOGICAL:  DVT prophylaxis. follow h/h and plt   hold AC   serial h/h   ENDOCRINE:  Follow up FS.  Insulin protocol if needed.    MICU         CRITICAL CARE TIME SPENT: ***

## 2025-04-17 NOTE — PRE-ANESTHESIA EVALUATION ADULT - NSANTHPMHFT_GEN_ALL_CORE
Chart reviewed, case D/W Dr Cade. Labs checked. H/H 8.6/25.8  PMH: Metabolic encephalopathy, Pt responding only pain. Chart reviewed, case D/W Dr Cade. Labs checked. H/H 8.6/25.8  PMH: Metabolic encephalopathy, Pt responding only pain.  Wife at the bedside, all risks including poor out come including cardiac arrest, respiratory arrest and intubation D/W her in detail and she understood and agreed to proceed with the procedure.

## 2025-04-17 NOTE — PROGRESS NOTE ADULT - ASSESSMENT
CHARLENE likely ATN iso infection / component of contrast induced nephropathy  Liver cirrhosis  Metabolic encephalopathy  Bacteremia r/o SBP  - non oliguric  - creat better  - mild hypernatremia  - CT noted/ no hydronephrosis  - s/p EGD / colonoscopy tomorrow- NPO    Recommendations:  - cont 1/2NS at 75cc/hr  - cont loyola / strict i/o   - low calcium, check vitD level/ start calcium carbonate q12/ Ca if corrected to alb is ok   - phos noted at goal  - check iron stores  - abx per ID recs  - neuro/GI f/u     will sign off recall PRN / call using TEAMS or on [phone]

## 2025-04-17 NOTE — PROGRESS NOTE ADULT - SUBJECTIVE AND OBJECTIVE BOX
INFECTIOUS DISEASE FOLLOW UP NOTE:    Interval History/ROS: Patient is a 80y old  Male who presents with a chief complaint of acute mentation changes (17 Apr 2025 08:53)      Overnight events:    REVIEW OF SYSTEMS:        Prior hospital charts reviewed [Yes]  Primary team notes reviewed [Yes]  Other consultant notes reviewed [Yes]    Allergies:  No Known Allergies      ANTIMICROBIALS:   cefTRIAXone   IVPB 2000 every 24 hours  metroNIDAZOLE  IVPB 500 every 12 hours  rifAXIMin 550 two times a day      OTHER MEDS: MEDICATIONS  (STANDING):  acetaminophen     Tablet .. 650 every 6 hours PRN  aluminum hydroxide/magnesium hydroxide/simethicone Suspension 30 every 4 hours PRN  atorvastatin 80 at bedtime  dexMEDEtomidine Infusion 0.5 <Continuous>  dextrose 50% Injectable 25 once  dextrose 50% Injectable 12.5 once  dextrose 50% Injectable 25 once  dextrose Oral Gel 15 once PRN  glucagon  Injectable 1 once  insulin lispro (ADMELOG) corrective regimen sliding scale  three times a day before meals  lactulose Syrup 20 every 8 hours  levETIRAcetam   Injectable 250 <User Schedule>  melatonin 3 at bedtime PRN  ondansetron Injectable 4 every 8 hours PRN  pantoprazole  Injectable 40 every 12 hours  polyethylene glycol 3350 17 two times a day PRN  senna 2 at bedtime      Vital Signs Last 24 Hrs  T(F): 96 (04-17-25 @ 08:00), Max: 99 (04-13-25 @ 20:40)    Vital Signs Last 24 Hrs  HR: 53 (04-17-25 @ 09:00) (49 - 102)  BP: 123/58 (04-17-25 @ 09:00) (94/52 - 144/56)  RR: 17 (04-17-25 @ 09:00)  SpO2: 95% (04-17-25 @ 09:00) (95% - 100%)  Wt(kg): --    EXAM:      Labs:                        8.6    4.79  )-----------( 95       ( 17 Apr 2025 08:59 )             25.8     04-16    147[H]  |  110  |  35[H]  ----------------------------<  114[H]  2.8[L]   |  25  |  1.6[H]    Ca    6.7[L]      16 Apr 2025 21:49  Phos  3.8     04-16  Mg     1.7     04-16    TPro  4.5[L]  /  Alb  2.8[L]  /  TBili  0.9  /  DBili  x   /  AST  70[H]  /  ALT  16  /  AlkPhos  100  04-16      WBC Trend:  WBC Count: 4.79 (04-17-25 @ 08:59)  WBC Count: 4.06 (04-16-25 @ 23:16)  WBC Count: 4.10 (04-16-25 @ 21:48)  WBC Count: 6.75 (04-16-25 @ 11:23)      Creatine Trend:  Creatinine: 1.6 (04-16)  Creatinine: 1.8 (04-16)  Creatinine: 1.9 (04-15)  Creatinine: 1.8 (04-15)      Liver Biochemical Testing Trend:  Alanine Aminotransferase (ALT/SGPT): 16 (04-16)  Alanine Aminotransferase (ALT/SGPT): 24 (04-16)  Alanine Aminotransferase (ALT/SGPT): 17 (04-15)  Alanine Aminotransferase (ALT/SGPT): 19 (04-15)  Alanine Aminotransferase (ALT/SGPT): 16 (04-13)  Aspartate Aminotransferase (AST/SGOT): 70 (04-16-25 @ 21:49)  Aspartate Aminotransferase (AST/SGOT): 110 (04-16-25 @ 04:44)  Aspartate Aminotransferase (AST/SGOT): 90 (04-15-25 @ 13:01)  Aspartate Aminotransferase (AST/SGOT): 86 (04-15-25 @ 05:29)  Aspartate Aminotransferase (AST/SGOT): 56 (04-13-25 @ 21:20)  Bilirubin Total: 0.9 (04-16)  Bilirubin Total: 0.8 (04-16)  Bilirubin Total: 0.6 (04-15)  Bilirubin Total: 0.7 (04-15)  Bilirubin Total: 0.7 (04-13)      Trend LDH      Urinalysis Basic - ( 16 Apr 2025 21:49 )    Color: x / Appearance: x / SG: x / pH: x  Gluc: 114 mg/dL / Ketone: x  / Bili: x / Urobili: x   Blood: x / Protein: x / Nitrite: x   Leuk Esterase: x / RBC: x / WBC x   Sq Epi: x / Non Sq Epi: x / Bacteria: x        MICROBIOLOGY:        Urinalysis with Rflx Culture (collected 13 Apr 2025 21:20)    Culture - Blood (collected 13 Apr 2025 21:20)  Source: Blood Blood-Peripheral  Final Report:    Growth in anaerobic bottle: Bacteroides fragilis    "Susceptibilities not performed"    Culture - Urine (collected 13 Apr 2025 21:20)  Source: Catheterized None  Final Report:    10,000 - 49,000 CFU/mL Proteus mirabilis  Organism: Proteus mirabilis  Organism: Proteus mirabilis    Sensitivities:      -  Levofloxacin: S <=0.5      -  Tobramycin: S <=2      -  Nitrofurantoin: R >64 Should not be used to treat pyelonephritis      -  Aztreonam: S <=4      -  Gentamicin: S <=2      -  Cefazolin: S <=2 For uncomplicated UTI with K. pneumoniae, E. coli, or P. mirablis: LOUIS <=16 is sensitive and LOUIS >=32 is resistant. This also predicts results for oral agents cefaclor, cefdinir, cefpodoxime, cefprozil, cefuroxime axetil, cephalexin and locarbef for uncomplicated UTI. Note that some isolates may be susceptible to these agents while testing resistant to cefazolin.      -  Cefepime: S <=2      -  Piperacillin/Tazobactam: S <=8      -  Ciprofloxacin: S <=0.25      -  Ceftriaxone: S <=1      -  Ampicillin: S <=8 These ampicillin results predict results for amoxicillin      Method Type: LOUIS      -  Meropenem: S <=1      -  Ampicillin/Sulbactam: S <=4/2      -  Cefoxitin: S <=8      -  Cefuroxime: S <=4      -  Amoxicillin/Clavulanic Acid: S <=8/4      -  Trimethoprim/Sulfamethoxazole: S <=0.5/9.5      -  Ertapenem: S <=0.5    Culture - Blood (collected 13 Apr 2025 21:20)  Source: Blood Blood-Peripheral  Final Report:    Growth in anaerobic bottle: Bacteroides fragilis "Susceptibilities not    performed"    Direct identification is available within approximately 3-5    hours either by Blood Panel Multiplexed PCR or Direct    MALDI-TOF. Details: [email or web address]  Organism: Blood Culture PCR  Organism: Blood Culture PCR    Sensitivities:      -  Bacteroides fragilis: Detec      Method Type: PCR    Culture - Blood (collected 30 Jan 2025 07:12)  Source: .Blood BLOOD  Final Report:    No growth at 5 days    Culture - Blood (collected 30 Jan 2025 07:12)  Source: .Blood BLOOD  Final Report:    No growth at 5 days    Culture - Blood (collected 29 Jan 2025 07:35)  Source: .Blood BLOOD  Final Report:    No growth at 5 days    Culture - Blood (collected 27 Jan 2025 19:13)  Source: .Blood BLOOD  Final Report:    No growth at 5 days    Culture - Blood (collected 27 Jan 2025 19:13)  Source: .Blood BLOOD  Final Report:    No growth at 5 days    Culture - Blood (collected 25 Jan 2025 08:23)  Source: .Blood BLOOD  Final Report:    Growth in aerobic and anaerobic bottles: Staphylococcus aureus    See previous culture [id number]    Procalcitonin: 1.06 (04-15)    C-Reactive Protein: 78.7 (04-13)      Troponin T, High Sensitivity Result: 140 (04-14)  Troponin T, High Sensitivity Result: 131 (04-14)  Troponin T, High Sensitivity Result: 174 (04-13)    Lactate, Blood: 4.2 (04-15 @ 05:29)  Blood Gas Arterial, Lactate: 3.1 (04-15 @ 01:03)  Lactate, Blood: 4.4 (04-14 @ 21:20)  Lactate, Blood: 5.2 (04-14 @ 19:17)      RADIOLOGY:  imaging below personally reviewed    < from: Xray Chest 1 View- PORTABLE-Urgent (Xray Chest 1 View- PORTABLE-Urgent .) (04.16.25 @ 17:42) >    IMPRESSION: Low lung volume.    Bibasilar opacities, unchanged.    NGT.    < end of copied text >    < from: CT Abdomen and Pelvis w/ IV Cont (04.13.25 @ 22:56) >  IMPRESSION:    1.  Erosive endplate changes centered at the L4-5 level, consistent with   patient's history ofrecent lumbar spine osteomyelitis.    2.  Liver cirrhosis with portal hypertension. Mild-to-moderate ascites.   Anasarca. Hepatic dome subcentimeter hypodense 1.3 cm hypodensity, not   fully characterized . Outpatient MR abdomen with IV contrast recommended.    3.  Chronic pancreatitis, with limited evaluation for acute inflammation   due to background fluid. Correlation with pancreatic enzymes recommended.    < end of copied text >     INFECTIOUS DISEASE FOLLOW UP NOTE:    Interval History/ROS: Patient is a 80y old  Male who presents with a chief complaint of acute mentation changes (17 Apr 2025 08:53)    Overnight events: Off pressor, s/p EGD. On NC. Not responding    REVIEW OF SYSTEMS:  Unable to provide due to cognitive impairment      Prior hospital charts reviewed [Yes]  Primary team notes reviewed [Yes]  Other consultant notes reviewed [Yes]    Allergies:  No Known Allergies      ANTIMICROBIALS:   cefTRIAXone   IVPB 2000 every 24 hours  metroNIDAZOLE  IVPB 500 every 12 hours  rifAXIMin 550 two times a day      OTHER MEDS: MEDICATIONS  (STANDING):  acetaminophen     Tablet .. 650 every 6 hours PRN  aluminum hydroxide/magnesium hydroxide/simethicone Suspension 30 every 4 hours PRN  atorvastatin 80 at bedtime  dexMEDEtomidine Infusion 0.5 <Continuous>  dextrose 50% Injectable 25 once  dextrose 50% Injectable 12.5 once  dextrose 50% Injectable 25 once  dextrose Oral Gel 15 once PRN  glucagon  Injectable 1 once  insulin lispro (ADMELOG) corrective regimen sliding scale  three times a day before meals  lactulose Syrup 20 every 8 hours  levETIRAcetam   Injectable 250 <User Schedule>  melatonin 3 at bedtime PRN  ondansetron Injectable 4 every 8 hours PRN  pantoprazole  Injectable 40 every 12 hours  polyethylene glycol 3350 17 two times a day PRN  senna 2 at bedtime      Vital Signs Last 24 Hrs  T(F): 96 (04-17-25 @ 08:00), Max: 99 (04-13-25 @ 20:40)    Vital Signs Last 24 Hrs  HR: 53 (04-17-25 @ 09:00) (49 - 102)  BP: 123/58 (04-17-25 @ 09:00) (94/52 - 144/56)  RR: 17 (04-17-25 @ 09:00)  SpO2: 95% (04-17-25 @ 09:00) (95% - 100%)  Wt(kg): --    EXAM:  GENERAL: Lethargic, lying in bed  HEAD: No head lesions  EYES: Conjunctiva pink and cornea white  EAR, NOSE, MOUTH, THROAT: Normal external ears and nose, no discharges; moist mucous membranes; + NGT; + NC  NECK: Supple, nontender to palpation; no JVD  RESPIRATORY: Bibasilar crackles  CARDIOVASCULAR: S1 S2  GASTROINTESTINAL: Soft, no significant distention; normoactive bowel sounds  GENITOURINARY: + loyola catheter, no CVA tenderness  EXTREMITIES: No clubbing, cyanosis, + diffuse anasarca  NERVOUS SYSTEM: Lethargic, not aoursable, not answering questions  MUSCULOSKELETAL: No joint erythema, swelling  SKIN: No rashes or lesions, no superficial thrombophlebitis  PSYCH: Not responding    Labs:                        8.6    4.79  )-----------( 95       ( 17 Apr 2025 08:59 )             25.8     04-16    147[H]  |  110  |  35[H]  ----------------------------<  114[H]  2.8[L]   |  25  |  1.6[H]    Ca    6.7[L]      16 Apr 2025 21:49  Phos  3.8     04-16  Mg     1.7     04-16    TPro  4.5[L]  /  Alb  2.8[L]  /  TBili  0.9  /  DBili  x   /  AST  70[H]  /  ALT  16  /  AlkPhos  100  04-16      WBC Trend:  WBC Count: 4.79 (04-17-25 @ 08:59)  WBC Count: 4.06 (04-16-25 @ 23:16)  WBC Count: 4.10 (04-16-25 @ 21:48)  WBC Count: 6.75 (04-16-25 @ 11:23)      Creatine Trend:  Creatinine: 1.6 (04-16)  Creatinine: 1.8 (04-16)  Creatinine: 1.9 (04-15)  Creatinine: 1.8 (04-15)      Liver Biochemical Testing Trend:  Alanine Aminotransferase (ALT/SGPT): 16 (04-16)  Alanine Aminotransferase (ALT/SGPT): 24 (04-16)  Alanine Aminotransferase (ALT/SGPT): 17 (04-15)  Alanine Aminotransferase (ALT/SGPT): 19 (04-15)  Alanine Aminotransferase (ALT/SGPT): 16 (04-13)  Aspartate Aminotransferase (AST/SGOT): 70 (04-16-25 @ 21:49)  Aspartate Aminotransferase (AST/SGOT): 110 (04-16-25 @ 04:44)  Aspartate Aminotransferase (AST/SGOT): 90 (04-15-25 @ 13:01)  Aspartate Aminotransferase (AST/SGOT): 86 (04-15-25 @ 05:29)  Aspartate Aminotransferase (AST/SGOT): 56 (04-13-25 @ 21:20)  Bilirubin Total: 0.9 (04-16)  Bilirubin Total: 0.8 (04-16)  Bilirubin Total: 0.6 (04-15)  Bilirubin Total: 0.7 (04-15)  Bilirubin Total: 0.7 (04-13)      Trend LDH      Urinalysis Basic - ( 16 Apr 2025 21:49 )    Color: x / Appearance: x / SG: x / pH: x  Gluc: 114 mg/dL / Ketone: x  / Bili: x / Urobili: x   Blood: x / Protein: x / Nitrite: x   Leuk Esterase: x / RBC: x / WBC x   Sq Epi: x / Non Sq Epi: x / Bacteria: x        MICROBIOLOGY:        Urinalysis with Rflx Culture (collected 13 Apr 2025 21:20)    Culture - Blood (collected 13 Apr 2025 21:20)  Source: Blood Blood-Peripheral  Final Report:    Growth in anaerobic bottle: Bacteroides fragilis    "Susceptibilities not performed"    Culture - Urine (collected 13 Apr 2025 21:20)  Source: Catheterized None  Final Report:    10,000 - 49,000 CFU/mL Proteus mirabilis  Organism: Proteus mirabilis  Organism: Proteus mirabilis    Sensitivities:      -  Levofloxacin: S <=0.5      -  Tobramycin: S <=2      -  Nitrofurantoin: R >64 Should not be used to treat pyelonephritis      -  Aztreonam: S <=4      -  Gentamicin: S <=2      -  Cefazolin: S <=2 For uncomplicated UTI with K. pneumoniae, E. coli, or P. mirablis: LOUIS <=16 is sensitive and LOUIS >=32 is resistant. This also predicts results for oral agents cefaclor, cefdinir, cefpodoxime, cefprozil, cefuroxime axetil, cephalexin and locarbef for uncomplicated UTI. Note that some isolates may be susceptible to these agents while testing resistant to cefazolin.      -  Cefepime: S <=2      -  Piperacillin/Tazobactam: S <=8      -  Ciprofloxacin: S <=0.25      -  Ceftriaxone: S <=1      -  Ampicillin: S <=8 These ampicillin results predict results for amoxicillin      Method Type: LOUIS      -  Meropenem: S <=1      -  Ampicillin/Sulbactam: S <=4/2      -  Cefoxitin: S <=8      -  Cefuroxime: S <=4      -  Amoxicillin/Clavulanic Acid: S <=8/4      -  Trimethoprim/Sulfamethoxazole: S <=0.5/9.5      -  Ertapenem: S <=0.5    Culture - Blood (collected 13 Apr 2025 21:20)  Source: Blood Blood-Peripheral  Final Report:    Growth in anaerobic bottle: Bacteroides fragilis "Susceptibilities not    performed"    Direct identification is available within approximately 3-5    hours either by Blood Panel Multiplexed PCR or Direct    MALDI-TOF. Details: [email or web address]  Organism: Blood Culture PCR  Organism: Blood Culture PCR    Sensitivities:      -  Bacteroides fragilis: Detec      Method Type: PCR    Culture - Blood (collected 30 Jan 2025 07:12)  Source: .Blood BLOOD  Final Report:    No growth at 5 days    Culture - Blood (collected 30 Jan 2025 07:12)  Source: .Blood BLOOD  Final Report:    No growth at 5 days    Culture - Blood (collected 29 Jan 2025 07:35)  Source: .Blood BLOOD  Final Report:    No growth at 5 days    Culture - Blood (collected 27 Jan 2025 19:13)  Source: .Blood BLOOD  Final Report:    No growth at 5 days    Culture - Blood (collected 27 Jan 2025 19:13)  Source: .Blood BLOOD  Final Report:    No growth at 5 days    Culture - Blood (collected 25 Jan 2025 08:23)  Source: .Blood BLOOD  Final Report:    Growth in aerobic and anaerobic bottles: Staphylococcus aureus    See previous culture [id number]    Procalcitonin: 1.06 (04-15)    C-Reactive Protein: 78.7 (04-13)      Troponin T, High Sensitivity Result: 140 (04-14)  Troponin T, High Sensitivity Result: 131 (04-14)  Troponin T, High Sensitivity Result: 174 (04-13)    Lactate, Blood: 4.2 (04-15 @ 05:29)  Blood Gas Arterial, Lactate: 3.1 (04-15 @ 01:03)  Lactate, Blood: 4.4 (04-14 @ 21:20)  Lactate, Blood: 5.2 (04-14 @ 19:17)      RADIOLOGY:  imaging below personally reviewed    < from: Xray Chest 1 View- PORTABLE-Urgent (Xray Chest 1 View- PORTABLE-Urgent .) (04.16.25 @ 17:42) >    IMPRESSION: Low lung volume.    Bibasilar opacities, unchanged.    NGT.    < end of copied text >    < from: CT Abdomen and Pelvis w/ IV Cont (04.13.25 @ 22:56) >  IMPRESSION:    1.  Erosive endplate changes centered at the L4-5 level, consistent with   patient's history ofrecent lumbar spine osteomyelitis.    2.  Liver cirrhosis with portal hypertension. Mild-to-moderate ascites.   Anasarca. Hepatic dome subcentimeter hypodense 1.3 cm hypodensity, not   fully characterized . Outpatient MR abdomen with IV contrast recommended.    3.  Chronic pancreatitis, with limited evaluation for acute inflammation   due to background fluid. Correlation with pancreatic enzymes recommended.    < end of copied text >

## 2025-04-17 NOTE — PROGRESS NOTE ADULT - SUBJECTIVE AND OBJECTIVE BOX
Patient seen and evaluated this am, still confused in bed, received 2 units PRBC overnight      T(F): 96 (04-17-25 @ 08:00), Max: 98.7 (04-16-25 @ 11:00)  HR: 53 (04-17-25 @ 09:00)  BP: 123/58 (04-17-25 @ 09:00)  RR: 17 (04-17-25 @ 09:00)  SpO2: 95% (04-17-25 @ 09:00) (95% - 100%)    PHYSICAL EXAM:  GENERAL: NAD  HEAD:  Atraumatic, Normocephalic  EYES: EOMI, PERRLA, conjunctiva and sclera clear  NERVOUS SYSTEM:  no focal deficits   CHEST/LUNG:  bilateral rhonchi  HEART: Regular rate and rhythm; No murmurs, rubs, or gallops  ABDOMEN: Soft, Nontender, Nondistended; Bowel sounds present  EXTREMITIES:  2+ Peripheral Pulses, No clubbing, cyanosis, or edema    LABS  04-17    146  |  110  |  31[H]  ----------------------------<  160[H]  3.5   |  25  |  1.3    Ca    6.7[L]      17 Apr 2025 08:59  Phos  3.8     04-16  Mg     1.7     04-16    TPro  4.5[L]  /  Alb  2.8[L]  /  TBili  0.9  /  DBili  x   /  AST  70[H]  /  ALT  16  /  AlkPhos  100  04-16                          8.6    4.79  )-----------( 95       ( 17 Apr 2025 08:59 )             25.8     PT/INR - ( 16 Apr 2025 04:45 )   PT: 18.40 sec;   INR: 1.55 ratio         PTT - ( 16 Apr 2025 04:45 )  PTT:33.6 sec    EGD:    Impressions:    Normal mucosa in the whole esophagus.    Erosions in the stomach compatible with erosive gastritis.    Ulcer in the duodenal bulb. (Endoclip).      Culture Results:   Growth in anaerobic bottle: Bacteroides fragilis  "Susceptibilities not performed" (04-13-25)  Culture Results:   Growth in anaerobic bottle: Bacteroides fragilis "Susceptibilities not  performed"  Direct identification is available within approximately 3-5  hours either by Blood Panel Multiplexed PCR or Direct  MALDI-TOF. Details: [email or web address] (04-13-25)  Culture Results:   10,000 - 49,000 CFU/mL Proteus mirabilis (04-13-25)    RADIOLOGY  < from: CT Head No Cont (04.15.25 @ 02:25) >  IMPRESSION:    Motion-degraded exam, however, no definite acute intracranial pathology.      < end of copied text >    MEDICATIONS  (STANDING):  atorvastatin 80 milliGRAM(s) Oral at bedtime  cefTRIAXone   IVPB 2000 milliGRAM(s) IV Intermittent every 24 hours  chlorhexidine 2% Cloths 1 Application(s) Topical <User Schedule>  dexMEDEtomidine Infusion 0.5 MICROgram(s)/kG/Hr (14.7 mL/Hr) IV Continuous <Continuous>  folic acid 1 milliGRAM(s) Oral daily  insulin lispro (ADMELOG) corrective regimen sliding scale   SubCutaneous three times a day before meals  lactulose Syrup 20 Gram(s) Enteral Tube every 8 hours  levETIRAcetam   Injectable 250 milliGRAM(s) IV Push <User Schedule>  lidocaine   4% Patch 1 Patch Transdermal daily  metroNIDAZOLE  IVPB 500 milliGRAM(s) IV Intermittent every 12 hours  nystatin Powder 1 Application(s) Topical two times a day  pantoprazole  Injectable 40 milliGRAM(s) IV Push every 12 hours  rifAXIMin 550 milliGRAM(s) Oral two times a day  senna 2 Tablet(s) Oral at bedtime    MEDICATIONS  (PRN):  acetaminophen     Tablet .. 650 milliGRAM(s) Oral every 6 hours PRN Temp greater or equal to 38C (100.4F), Mild Pain (1 - 3)  aluminum hydroxide/magnesium hydroxide/simethicone Suspension 30 milliLiter(s) Oral every 4 hours PRN Dyspepsia  dextrose Oral Gel 15 Gram(s) Oral once PRN Blood Glucose LESS THAN 70 milliGRAM(s)/deciliter  melatonin 3 milliGRAM(s) Oral at bedtime PRN Insomnia  ondansetron Injectable 4 milliGRAM(s) IV Push every 8 hours PRN Nausea and/or Vomiting  polyethylene glycol 3350 17 Gram(s) Oral two times a day PRN Constipation

## 2025-04-17 NOTE — PROGRESS NOTE ADULT - ASSESSMENT
80 years old male with a medical  history of hypertension, diabetes, subdural hematoma on Keppra, hx mechanical falls, recent admission (January 2025) for osteomyelitis of L4-5 w completion of abx.  BIBA from home status post change of mental status. In hospital pt found with bacteremia, GI bleed and liver cirrhosis    metabolic and hepatic encephalopathy / GI bleed / acute blood loss anemia / bacteremia     - s/p transfusion of 2 units PRBC hgb from 5.8 -> 8.6   - no more rectal bleeding as per nursing   - s/p  EGD as above with gastritis and duodenal ulcer s/p endoclip   - continue IV  Protonix   - prepped for colonoscopy for today as per GI   - continue rifaximin and lactulose   - continue Rocephin and Flagyl  as per ID   - supplement hypokalemia and hypomagnesemia and follow repeat lytes   - follow final and repeat BC   - sq insulin hospital protocol   - follow H/H and transfuse to maintain hgb>7   - 50 minutes total spent today on patient care

## 2025-04-17 NOTE — PROGRESS NOTE ADULT - SUBJECTIVE AND OBJECTIVE BOX
Patient is a 80y old  Male who presents with a chief complaint of acute mentation changes (17 Apr 2025 07:11)      INTERVAL HPI/OVERNIGHT EVENTS:   No overnight events   Afebrile, hemodynamically stable     ICU Vital Signs Last 24 Hrs  T(C): 35.4 (17 Apr 2025 07:01), Max: 37.1 (16 Apr 2025 11:00)  T(F): 95.7 (17 Apr 2025 07:01), Max: 98.7 (16 Apr 2025 11:00)  HR: 50 (17 Apr 2025 06:00) (49 - 102)  BP: 114/62 (17 Apr 2025 06:00) (94/52 - 144/56)  BP(mean): 85 (17 Apr 2025 06:00) (67 - 103)  ABP: --  ABP(mean): --  RR: 16 (17 Apr 2025 07:01) (11 - 34)  SpO2: 97% (17 Apr 2025 06:00) (96% - 100%)    O2 Parameters below as of 17 Apr 2025 06:00  Patient On (Oxygen Delivery Method): nasal cannula  O2 Flow (L/min): 2        I&O's Summary    16 Apr 2025 07:01  -  17 Apr 2025 07:00  --------------------------------------------------------  IN: 6084.5 mL / OUT: 1455 mL / NET: 4629.5 mL          LABS:                        5.8    4.06  )-----------( 80       ( 16 Apr 2025 23:16 )             17.8     04-16    147[H]  |  110  |  35[H]  ----------------------------<  114[H]  2.8[L]   |  25  |  1.6[H]    Ca    6.7[L]      16 Apr 2025 21:49  Phos  3.8     04-16  Mg     1.7     04-16    TPro  4.5[L]  /  Alb  2.8[L]  /  TBili  0.9  /  DBili  x   /  AST  70[H]  /  ALT  16  /  AlkPhos  100  04-16    PT/INR - ( 16 Apr 2025 04:45 )   PT: 18.40 sec;   INR: 1.55 ratio         PTT - ( 16 Apr 2025 04:45 )  PTT:33.6 sec  Urinalysis Basic - ( 16 Apr 2025 21:49 )    Color: x / Appearance: x / SG: x / pH: x  Gluc: 114 mg/dL / Ketone: x  / Bili: x / Urobili: x   Blood: x / Protein: x / Nitrite: x   Leuk Esterase: x / RBC: x / WBC x   Sq Epi: x / Non Sq Epi: x / Bacteria: x      CAPILLARY BLOOD GLUCOSE      POCT Blood Glucose.: 166 mg/dL (17 Apr 2025 07:41)  POCT Blood Glucose.: 126 mg/dL (16 Apr 2025 22:02)  POCT Blood Glucose.: 162 mg/dL (16 Apr 2025 16:25)  POCT Blood Glucose.: 178 mg/dL (16 Apr 2025 10:56)        RADIOLOGY & ADDITIONAL TESTS:    Consultant(s) Notes Reviewed:  [x ] YES  [ ] NO    MEDICATIONS  (STANDING):  atorvastatin 80 milliGRAM(s) Oral at bedtime  cefTRIAXone   IVPB 2000 milliGRAM(s) IV Intermittent every 24 hours  chlorhexidine 2% Cloths 1 Application(s) Topical <User Schedule>  dexMEDEtomidine Infusion 0.5 MICROgram(s)/kG/Hr (14.7 mL/Hr) IV Continuous <Continuous>  dextrose 5% + sodium chloride 0.45%. 1000 milliLiter(s) (50 mL/Hr) IV Continuous <Continuous>  dextrose 5%. 1000 milliLiter(s) (100 mL/Hr) IV Continuous <Continuous>  dextrose 5%. 1000 milliLiter(s) (50 mL/Hr) IV Continuous <Continuous>  dextrose 50% Injectable 25 Gram(s) IV Push once  dextrose 50% Injectable 12.5 Gram(s) IV Push once  dextrose 50% Injectable 25 Gram(s) IV Push once  folic acid 1 milliGRAM(s) Oral daily  glucagon  Injectable 1 milliGRAM(s) IntraMuscular once  insulin lispro (ADMELOG) corrective regimen sliding scale   SubCutaneous three times a day before meals  lactulose Syrup 20 Gram(s) Enteral Tube every 8 hours  levETIRAcetam   Injectable 250 milliGRAM(s) IV Push <User Schedule>  lidocaine   4% Patch 1 Patch Transdermal daily  metroNIDAZOLE  IVPB 500 milliGRAM(s) IV Intermittent every 12 hours  nystatin Powder 1 Application(s) Topical two times a day  pantoprazole  Injectable 40 milliGRAM(s) IV Push every 12 hours  rifAXIMin 550 milliGRAM(s) Oral two times a day  senna 2 Tablet(s) Oral at bedtime    MEDICATIONS  (PRN):  acetaminophen     Tablet .. 650 milliGRAM(s) Oral every 6 hours PRN Temp greater or equal to 38C (100.4F), Mild Pain (1 - 3)  aluminum hydroxide/magnesium hydroxide/simethicone Suspension 30 milliLiter(s) Oral every 4 hours PRN Dyspepsia  dextrose Oral Gel 15 Gram(s) Oral once PRN Blood Glucose LESS THAN 70 milliGRAM(s)/deciliter  melatonin 3 milliGRAM(s) Oral at bedtime PRN Insomnia  ondansetron Injectable 4 milliGRAM(s) IV Push every 8 hours PRN Nausea and/or Vomiting  polyethylene glycol 3350 17 Gram(s) Oral two times a day PRN Constipation      PHYSICAL EXAM:  GENERAL:   HEAD:  Atraumatic, Normocephalic  EYES: EOMI, PERRLA, conjunctiva and sclera clear  NECK: Supple, No JVD, Normal thyroid, no enlarged nodes  NERVOUS SYSTEM:  Alert & Awake.   CHEST/LUNG: B/L good air entry; No rales, rhonchi, or wheezing  HEART: S1S2 normal, no S3, Regular rate and rhythm; No murmurs  ABDOMEN: Soft, Nontender, Nondistended; Bowel sounds present  EXTREMITIES:  2+ Peripheral Pulses, No clubbing, cyanosis, or edema  LYMPH: No lymphadenopathy noted  SKIN: No rashes or lesions    Care Discussed with Consultants/Other Providers [ x] YES  [ ] NO

## 2025-04-17 NOTE — PROGRESS NOTE ADULT - SUBJECTIVE AND OBJECTIVE BOX
Patient is a 80y old  Male who presents with a chief complaint of acute mentation changes (16 Apr 2025 15:45)      Over Night Events:  Patient seen and examined.   s/p EGD s/p clipping duodenal Ulcer   being transfuse     ROS:  See HPI    PHYSICAL EXAM    ICU Vital Signs Last 24 Hrs  T(C): 35.2 (17 Apr 2025 05:15), Max: 37.1 (16 Apr 2025 11:00)  T(F): 95.3 (17 Apr 2025 05:15), Max: 98.7 (16 Apr 2025 11:00)  HR: 50 (17 Apr 2025 06:00) (49 - 104)  BP: 114/62 (17 Apr 2025 06:00) (94/52 - 144/56)  BP(mean): 85 (17 Apr 2025 06:00) (67 - 103)  ABP: --  ABP(mean): --  RR: 17 (17 Apr 2025 06:00) (11 - 34)  SpO2: 97% (17 Apr 2025 06:00) (96% - 100%)    O2 Parameters below as of 17 Apr 2025 06:00  Patient On (Oxygen Delivery Method): nasal cannula  O2 Flow (L/min): 2          General: lethargic on precedex   HEENT:  Ng tub e                 Lungs: Bilateral BS  Cardiovascular: Regular   Abdomen: Soft, Positive BS  Extremities: No clubbing   Skin: warm   Neurological:   Musculoskeletal: move all ext     I&O's Detail    16 Apr 2025 07:01  -  17 Apr 2025 07:00  --------------------------------------------------------  IN:    Albumin 25%  -  50 mL: 400 mL    Dexmedetomidine: 155.9 mL    dextrose 5% + sodium chloride 0.45%: 210 mL    Enteral Tube Flush: 4030 mL    IV PiggyBack: 350 mL    Lactated Ringers: 150 mL    Octreotide: 80 mL    Pantoprazole: 80 mL    PRBCs (Packed Red Blood Cells): 339 mL  Total IN: 5794.9 mL    OUT:    Indwelling Catheter - Urethral (mL): 1455 mL  Total OUT: 1455 mL    Total NET: 4339.9 mL          LABS:                          5.8    4.06  )-----------( 80       ( 16 Apr 2025 23:16 )             17.8         16 Apr 2025 21:49    147    |  110    |  35     ----------------------------<  114    2.8     |  25     |  1.6      Ca    6.7        16 Apr 2025 21:49  Phos  3.8       16 Apr 2025 04:44  Mg     1.7       16 Apr 2025 04:44    TPro  4.5    /  Alb  2.8    /  TBili  0.9    /  DBili  x      /  AST  70     /  ALT  16     /  AlkPhos  100    16 Apr 2025 21:49  Amylase x     lipase x                                                 PT/INR - ( 16 Apr 2025 04:45 )   PT: 18.40 sec;   INR: 1.55 ratio         PTT - ( 16 Apr 2025 04:45 )  PTT:33.6 sec                                       Urinalysis Basic - ( 16 Apr 2025 21:49 )    Color: x / Appearance: x / SG: x / pH: x  Gluc: 114 mg/dL / Ketone: x  / Bili: x / Urobili: x   Blood: x / Protein: x / Nitrite: x   Leuk Esterase: x / RBC: x / WBC x   Sq Epi: x / Non Sq Epi: x / Bacteria: x        Lactate, Blood: 4.2 mmol/L (04-15-25 @ 05:29)  Lactate, Blood: 4.4 mmol/L (04-14-25 @ 21:20)  Lactate, Blood: 5.2 mmol/L (04-14-25 @ 19:17)  Lactate, Blood: 5.2 mmol/L (04-14-25 @ 15:02)                                                                                                                                               MEDICATIONS  (STANDING):  atorvastatin 80 milliGRAM(s) Oral at bedtime  cefTRIAXone   IVPB 2000 milliGRAM(s) IV Intermittent every 24 hours  chlorhexidine 2% Cloths 1 Application(s) Topical <User Schedule>  dexMEDEtomidine Infusion 0.5 MICROgram(s)/kG/Hr (14.7 mL/Hr) IV Continuous <Continuous>  dextrose 5%. 1000 milliLiter(s) (100 mL/Hr) IV Continuous <Continuous>  dextrose 5%. 1000 milliLiter(s) (50 mL/Hr) IV Continuous <Continuous>  dextrose 50% Injectable 25 Gram(s) IV Push once  dextrose 50% Injectable 12.5 Gram(s) IV Push once  dextrose 50% Injectable 25 Gram(s) IV Push once  folic acid 1 milliGRAM(s) Oral daily  glucagon  Injectable 1 milliGRAM(s) IntraMuscular once  insulin lispro (ADMELOG) corrective regimen sliding scale   SubCutaneous three times a day before meals  lactulose Syrup 20 Gram(s) Enteral Tube every 8 hours  levETIRAcetam   Injectable 250 milliGRAM(s) IV Push <User Schedule>  lidocaine   4% Patch 1 Patch Transdermal daily  metroNIDAZOLE  IVPB 500 milliGRAM(s) IV Intermittent every 12 hours  nystatin Powder 1 Application(s) Topical two times a day  pantoprazole  Injectable 40 milliGRAM(s) IV Push every 12 hours  rifAXIMin 550 milliGRAM(s) Oral two times a day  senna 2 Tablet(s) Oral at bedtime    MEDICATIONS  (PRN):  acetaminophen     Tablet .. 650 milliGRAM(s) Oral every 6 hours PRN Temp greater or equal to 38C (100.4F), Mild Pain (1 - 3)  aluminum hydroxide/magnesium hydroxide/simethicone Suspension 30 milliLiter(s) Oral every 4 hours PRN Dyspepsia  dextrose Oral Gel 15 Gram(s) Oral once PRN Blood Glucose LESS THAN 70 milliGRAM(s)/deciliter  melatonin 3 milliGRAM(s) Oral at bedtime PRN Insomnia  ondansetron Injectable 4 milliGRAM(s) IV Push every 8 hours PRN Nausea and/or Vomiting  polyethylene glycol 3350 17 Gram(s) Oral two times a day PRN Constipation          Xrays:                                                                                    ECHO:  [facility] ICU:

## 2025-04-17 NOTE — SWALLOW BEDSIDE ASSESSMENT ADULT - NS ASR SWALLOW FINDINGS DISCUS
MD Johnson, covering RN Psencer/Physician/Nursing/Patient/Family MD Menendez, covering RN Spencer/Physician/Nursing/Patient/Family MD Menendez, covering RN Spencer, SHIRAZ Van/Physician/Nursing/Patient/Family

## 2025-04-17 NOTE — PROGRESS NOTE ADULT - ASSESSMENT
80 years old male history of hypertension, diabetes, subdural hematoma on Keppra, hx mechanical falls, recent admission (January 2025) for osteomyelitis of L4-5 w completion of abx.  BIBA from home status post change of mental status.     ID is consulted for bacteremia  Afebrile  WBC 7.55 < 7.45  On room air  BCx 4/14 Bacteroides fragilis  UA WBC 5, UCx low count P. mirabilis    CT A/P 4/13  1.  Erosive endplate changes centered at the L4-5 level, consistent with   patient's history ofrecent lumbar spine osteomyelitis.  2.  Liver cirrhosis with portal hypertension. Mild-to-moderate ascites.   Anasarca. Hepatic dome subcentimeter hypodense 1.3 cm hypodensity, not   fully characterized . Outpatient MR abdomen with IV contrast recommended.  3.  Chronic pancreatitis, with limited evaluation for acute inflammation   due to background fluid. Correlation with pancreatic enzymes recommended.    Antibiotics:  Vancomycin 4/13  Ampicillin 4/14  Ceftriaxone 4/15 ->  Flagyl 4/15 ->      IMPRESSION:  Bacteroides bacteremia, potentially life threatening  Possible SBP?  Liver cirrhosis  Lower GI bleed  Hx L4-L5 osteomyelitis  Liver lesion  Immunosuppression / Immunosenescence secondary to multiple comorbidities which could result in poor clinical outcome    RECOMMENDATIONS:  - IV ceftriaxone 2g q24hrs  - IV Flagyl 500mg q12hrs  - Reasonable to give albumin for presumed SBP: 1.5g/kg on day 1 and 1g/kg on day 3  - Attempt diagnostic thoracentesis, if able please send cell count, protein, glucose, LDH, gram stain and culture  - GI follow up  - Consider MRI abdomen when patient is stable  - Offloading and frequent position changes, aspiration precaution  - Trend WBC, fever curve, transaminases, creatinine daily  - Please inform ID of any patient clinical change or any new pertinent laboratory or radiographic data      * THIS IS AN INCOMPLETE NOTE. FINAL RECOMMENDATION IS PENDING *   80 years old male history of hypertension, diabetes, subdural hematoma on Keppra, hx mechanical falls, recent admission (January 2025) for osteomyelitis of L4-5 w completion of abx.  BIBA from home status post change of mental status.     ID is consulted for bacteremia  Afebrile  WBC 7.55 < 7.45  On room air  BCx 4/14 Bacteroides fragilis  UA WBC 5, UCx low count P. mirabilis    CT A/P 4/13  1.  Erosive endplate changes centered at the L4-5 level, consistent with   patient's history ofrecent lumbar spine osteomyelitis.  2.  Liver cirrhosis with portal hypertension. Mild-to-moderate ascites.   Anasarca. Hepatic dome subcentimeter hypodense 1.3 cm hypodensity, not   fully characterized . Outpatient MR abdomen with IV contrast recommended.  3.  Chronic pancreatitis, with limited evaluation for acute inflammation   due to background fluid. Correlation with pancreatic enzymes recommended.    Antibiotics:  Vancomycin 4/13  Ampicillin 4/14  Ceftriaxone 4/15 ->  Flagyl 4/15 ->      IMPRESSION:  Bacteroides bacteremia, potentially life threatening  Possible SBP?  Liver cirrhosis  Lower GI bleed  Hx L4-L5 osteomyelitis  Liver lesion  Immunosuppression / Immunosenescence secondary to multiple comorbidities which could result in poor clinical outcome    RECOMMENDATIONS:  - IV ceftriaxone 2g q24hrs  - IV Flagyl 500mg q12hrs  - Reasonable to give albumin for presumed SBP: 1.5g/kg on day 1 and 1g/kg on day 3  - Attempt diagnostic thoracentesis, if able please send cell count, protein, glucose, LDH, gram stain and culture  - GI follow up  - Consider MRI abdomen when patient is stable  - Offloading and frequent position changes, aspiration precaution  - Trend WBC, fever curve, transaminases, creatinine daily  - Please inform ID of any patient clinical change or any new pertinent laboratory or radiographic data      Nora Beck D.O.  Attending Physician  Division of Infectious Diseases  [facility]  Please contact me via [employer] Teams

## 2025-04-17 NOTE — PROVIDER CONTACT NOTE (OTHER) - ACTION/TREATMENT ORDERED:
50 tramadol, 2mg morphine for pain  5mg zyprexa for restlessness
GI consulted, start protonix drip and octreatide

## 2025-04-17 NOTE — PROVIDER CONTACT NOTE (OTHER) - ASSESSMENT
Upon arrival of shift pt was restless, moaning w/ tense facial expressions. Pt stating pain initially but unable to identify where. 50mg tramadol admin w/ no good effect, admin 2mg morphine w/ pt now stating he does not have pain. Pt continues to be restless calling out for help, unable to identify needs. Now denying pain/states is comfortable. Despite T&P, skin care, oral care, emotional support- pt continues to call out.

## 2025-04-17 NOTE — SWALLOW BEDSIDE ASSESSMENT ADULT - SLP GENERAL OBSERVATIONS
Pt received asleep, arousable to verbal stim, difficulty keeping eyes open however verbally responsive, AOx1, confused. Requesting water. O2 via NC. Spouse and son at b/s. Pt received asleep, arousable to verbal stim, difficulty keeping eyes open however verbally responsive, AOx1, confused. Requesting water. O2 via NC. NGT in situ. pouse and son at b/s.

## 2025-04-18 LAB
ALBUMIN SERPL ELPH-MCNC: 2.6 G/DL — LOW (ref 3.5–5.2)
ALP SERPL-CCNC: 109 U/L — SIGNIFICANT CHANGE UP (ref 30–115)
ALT FLD-CCNC: 16 U/L — SIGNIFICANT CHANGE UP (ref 0–41)
AMMONIA BLD-MCNC: 31 UMOL/L — SIGNIFICANT CHANGE UP (ref 11–55)
ANION GAP SERPL CALC-SCNC: 13 MMOL/L — SIGNIFICANT CHANGE UP (ref 7–14)
ANION GAP SERPL CALC-SCNC: 13 MMOL/L — SIGNIFICANT CHANGE UP (ref 7–14)
AST SERPL-CCNC: 57 U/L — HIGH (ref 0–41)
BILIRUB SERPL-MCNC: 0.9 MG/DL — SIGNIFICANT CHANGE UP (ref 0.2–1.2)
BUN SERPL-MCNC: 24 MG/DL — HIGH (ref 10–20)
BUN SERPL-MCNC: 26 MG/DL — HIGH (ref 10–20)
CALCIUM SERPL-MCNC: 6.7 MG/DL — LOW (ref 8.4–10.5)
CALCIUM SERPL-MCNC: 6.8 MG/DL — LOW (ref 8.4–10.5)
CHLORIDE SERPL-SCNC: 110 MMOL/L — SIGNIFICANT CHANGE UP (ref 98–110)
CHLORIDE SERPL-SCNC: 112 MMOL/L — HIGH (ref 98–110)
CO2 SERPL-SCNC: 22 MMOL/L — SIGNIFICANT CHANGE UP (ref 17–32)
CO2 SERPL-SCNC: 23 MMOL/L — SIGNIFICANT CHANGE UP (ref 17–32)
CREAT SERPL-MCNC: 1.1 MG/DL — SIGNIFICANT CHANGE UP (ref 0.7–1.5)
CREAT SERPL-MCNC: 1.2 MG/DL — SIGNIFICANT CHANGE UP (ref 0.7–1.5)
EGFR: 62 ML/MIN/1.73M2 — SIGNIFICANT CHANGE UP
EGFR: 62 ML/MIN/1.73M2 — SIGNIFICANT CHANGE UP
EGFR: 69 ML/MIN/1.73M2 — SIGNIFICANT CHANGE UP
EGFR: 69 ML/MIN/1.73M2 — SIGNIFICANT CHANGE UP
GLUCOSE BLDC GLUCOMTR-MCNC: 155 MG/DL — HIGH (ref 70–99)
GLUCOSE BLDC GLUCOMTR-MCNC: 169 MG/DL — HIGH (ref 70–99)
GLUCOSE BLDC GLUCOMTR-MCNC: 174 MG/DL — HIGH (ref 70–99)
GLUCOSE BLDC GLUCOMTR-MCNC: 187 MG/DL — HIGH (ref 70–99)
GLUCOSE SERPL-MCNC: 155 MG/DL — HIGH (ref 70–99)
GLUCOSE SERPL-MCNC: 176 MG/DL — HIGH (ref 70–99)
HCT VFR BLD CALC: 27.4 % — LOW (ref 42–52)
HGB BLD-MCNC: 9 G/DL — LOW (ref 14–18)
MAGNESIUM SERPL-MCNC: 1.8 MG/DL — SIGNIFICANT CHANGE UP (ref 1.8–2.4)
MCHC RBC-ENTMCNC: 31.9 PG — HIGH (ref 27–31)
MCHC RBC-ENTMCNC: 32.8 G/DL — SIGNIFICANT CHANGE UP (ref 32–37)
MCV RBC AUTO: 97.2 FL — HIGH (ref 80–94)
NRBC BLD AUTO-RTO: 0 /100 WBCS — SIGNIFICANT CHANGE UP (ref 0–0)
PLATELET # BLD AUTO: 97 K/UL — LOW (ref 130–400)
PMV BLD: 9.4 FL — SIGNIFICANT CHANGE UP (ref 7.4–10.4)
POTASSIUM SERPL-MCNC: 3.2 MMOL/L — LOW (ref 3.5–5)
POTASSIUM SERPL-MCNC: 3.5 MMOL/L — SIGNIFICANT CHANGE UP (ref 3.5–5)
POTASSIUM SERPL-SCNC: 3.2 MMOL/L — LOW (ref 3.5–5)
POTASSIUM SERPL-SCNC: 3.5 MMOL/L — SIGNIFICANT CHANGE UP (ref 3.5–5)
PROT SERPL-MCNC: 4.6 G/DL — LOW (ref 6–8)
RBC # BLD: 2.82 M/UL — LOW (ref 4.7–6.1)
RBC # FLD: 17.6 % — HIGH (ref 11.5–14.5)
SODIUM SERPL-SCNC: 145 MMOL/L — SIGNIFICANT CHANGE UP (ref 135–146)
SODIUM SERPL-SCNC: 148 MMOL/L — HIGH (ref 135–146)
WBC # BLD: 5.31 K/UL — SIGNIFICANT CHANGE UP (ref 4.8–10.8)
WBC # FLD AUTO: 5.31 K/UL — SIGNIFICANT CHANGE UP (ref 4.8–10.8)

## 2025-04-18 PROCEDURE — 99291 CRITICAL CARE FIRST HOUR: CPT

## 2025-04-18 PROCEDURE — 99233 SBSQ HOSP IP/OBS HIGH 50: CPT

## 2025-04-18 RX ORDER — LACTULOSE 10 G/15ML
20 SOLUTION ORAL EVERY 8 HOURS
Refills: 0 | Status: DISCONTINUED | OUTPATIENT
Start: 2025-04-18 | End: 2025-04-21

## 2025-04-18 RX ORDER — ALBUMIN (HUMAN) 12.5 G/50ML
400 INJECTION, SOLUTION INTRAVENOUS ONCE
Refills: 0 | Status: COMPLETED | OUTPATIENT
Start: 2025-04-18 | End: 2025-04-18

## 2025-04-18 RX ADMIN — ATORVASTATIN CALCIUM 80 MILLIGRAM(S): 80 TABLET, FILM COATED ORAL at 21:00

## 2025-04-18 RX ADMIN — LEVETIRACETAM 250 MILLIGRAM(S): 10 INJECTION, SOLUTION INTRAVENOUS at 17:14

## 2025-04-18 RX ADMIN — SODIUM CHLORIDE 50 MILLILITER(S): 9 INJECTION, SOLUTION INTRAVENOUS at 05:03

## 2025-04-18 RX ADMIN — LIDOCAINE HYDROCHLORIDE 1 PATCH: 20 JELLY TOPICAL at 19:49

## 2025-04-18 RX ADMIN — Medication 40 MILLIGRAM(S): at 05:04

## 2025-04-18 RX ADMIN — Medication 1 APPLICATION(S): at 05:06

## 2025-04-18 RX ADMIN — LIDOCAINE HYDROCHLORIDE 1 PATCH: 20 JELLY TOPICAL at 11:47

## 2025-04-18 RX ADMIN — INSULIN LISPRO 2: 100 INJECTION, SOLUTION INTRAVENOUS; SUBCUTANEOUS at 17:14

## 2025-04-18 RX ADMIN — Medication 100 MILLIGRAM(S): at 17:15

## 2025-04-18 RX ADMIN — INSULIN LISPRO 2: 100 INJECTION, SOLUTION INTRAVENOUS; SUBCUTANEOUS at 11:49

## 2025-04-18 RX ADMIN — ALBUMIN (HUMAN) 200 MILLILITER(S): 12.5 INJECTION, SOLUTION INTRAVENOUS at 10:38

## 2025-04-18 RX ADMIN — NYSTATIN 1 APPLICATION(S): 100000 CREAM TOPICAL at 17:15

## 2025-04-18 RX ADMIN — Medication 100 MILLIGRAM(S): at 05:05

## 2025-04-18 RX ADMIN — LACTULOSE 20 GRAM(S): 10 SOLUTION ORAL at 13:07

## 2025-04-18 RX ADMIN — LEVETIRACETAM 250 MILLIGRAM(S): 10 INJECTION, SOLUTION INTRAVENOUS at 05:04

## 2025-04-18 RX ADMIN — Medication 40 MILLIGRAM(S): at 17:15

## 2025-04-18 RX ADMIN — Medication 2 TABLET(S): at 21:00

## 2025-04-18 RX ADMIN — LACTULOSE 20 GRAM(S): 10 SOLUTION ORAL at 05:04

## 2025-04-18 RX ADMIN — LIDOCAINE HYDROCHLORIDE 1 PATCH: 20 JELLY TOPICAL at 05:00

## 2025-04-18 RX ADMIN — NYSTATIN 1 APPLICATION(S): 100000 CREAM TOPICAL at 05:05

## 2025-04-18 RX ADMIN — Medication 40 MILLIEQUIVALENT(S): at 14:12

## 2025-04-18 RX ADMIN — CEFTRIAXONE 100 MILLIGRAM(S): 500 INJECTION, POWDER, FOR SOLUTION INTRAMUSCULAR; INTRAVENOUS at 13:06

## 2025-04-18 RX ADMIN — INSULIN LISPRO 2: 100 INJECTION, SOLUTION INTRAVENOUS; SUBCUTANEOUS at 07:57

## 2025-04-18 RX ADMIN — FOLIC ACID 1 MILLIGRAM(S): 1 TABLET ORAL at 11:48

## 2025-04-18 NOTE — PROGRESS NOTE ADULT - ASSESSMENT
80 years old male history of hypertension, diabetes, subdural hematoma on Keppra, hx mechanical falls, recent admission (January 2025) for osteomyelitis of L4-5 w completion of abx.  BIBA from home status post change of mental status.     ID is consulted for bacteremia  Afebrile  WBC 7.55 < 7.45  On room air  BCx 4/14 Bacteroides fragilis  UA WBC 5, UCx low count P. mirabilis    CT A/P 4/13  1.  Erosive endplate changes centered at the L4-5 level, consistent with   patient's history ofrecent lumbar spine osteomyelitis.  2.  Liver cirrhosis with portal hypertension. Mild-to-moderate ascites.   Anasarca. Hepatic dome subcentimeter hypodense 1.3 cm hypodensity, not   fully characterized . Outpatient MR abdomen with IV contrast recommended.  3.  Chronic pancreatitis, with limited evaluation for acute inflammation   due to background fluid. Correlation with pancreatic enzymes recommended.    Antibiotics:  Vancomycin 4/13  Ampicillin 4/14  Ceftriaxone 4/15 ->  Flagyl 4/15 ->      IMPRESSION:  Bacteroides bacteremia, potentially life threatening  Possible SBP?  Liver cirrhosis  Lower GI bleed  Hx L4-L5 osteomyelitis  Liver lesion  Immunosuppression / Immunosenescence secondary to multiple comorbidities which could result in poor clinical outcome    RECOMMENDATIONS:  - IV ceftriaxone 2g q24hrs  - IV Flagyl 500mg q12hrs  - Reasonable to give albumin for presumed SBP: 1.5g/kg on day 1 and 1g/kg on day 3  - Attempt diagnostic thoracentesis, if able please send cell count, protein, glucose, LDH, gram stain and culture  - GI follow up  - Consider MRI abdomen when patient is stable  - Offloading and frequent position changes, aspiration precaution  - Trend WBC, fever curve, transaminases, creatinine daily  - Please inform ID of any patient clinical change or any new pertinent laboratory or radiographic data        * THIS IS AN INCOMPLETE NOTE. FINAL RECOMMENDATION IS PENDING *   80 years old male history of hypertension, diabetes, subdural hematoma on Keppra, hx mechanical falls, recent admission (January 2025) for osteomyelitis of L4-5 w completion of abx.  BIBA from home status post change of mental status.     ID is consulted for bacteremia  Afebrile  WBC 7.55 < 7.45  On room air  BCx 4/14 Bacteroides fragilis  UA WBC 5, UCx low count P. mirabilis    CT A/P 4/13  1.  Erosive endplate changes centered at the L4-5 level, consistent with   patient's history ofrecent lumbar spine osteomyelitis.  2.  Liver cirrhosis with portal hypertension. Mild-to-moderate ascites.   Anasarca. Hepatic dome subcentimeter hypodense 1.3 cm hypodensity, not   fully characterized . Outpatient MR abdomen with IV contrast recommended.  3.  Chronic pancreatitis, with limited evaluation for acute inflammation   due to background fluid. Correlation with pancreatic enzymes recommended.    Antibiotics:  Vancomycin 4/13  Ampicillin 4/14  Ceftriaxone 4/15 ->  Flagyl 4/15 ->      IMPRESSION:  Bacteroides bacteremia, potentially life threatening  Possible SBP?  Liver cirrhosis  Lower GI bleed  Hx L4-L5 osteomyelitis  Liver lesion  Immunosuppression / Immunosenescence secondary to multiple comorbidities which could result in poor clinical outcome    RECOMMENDATIONS:  - IV ceftriaxone 2g q24hrs for 7 days (until 4/21)  - IV Flagyl 500mg q12hrs for 14 day (until 4/28)  - Reasonable to give albumin for presumed SBP: 1.5g/kg on day 1 and 1g/kg on day 3  - GI follow up  - Consider MRI abdomen when patient is stable  - Offloading and frequent position changes, aspiration precaution  - Trend WBC, fever curve, transaminases, creatinine daily  - Please inform ID of any patient clinical change or any new pertinent laboratory or radiographic data      Nora Beck D.O.  Attending Physician  Division of Infectious Diseases  [facility]  Please contact me via [employer] Teams

## 2025-04-18 NOTE — SWALLOW BEDSIDE ASSESSMENT ADULT - ADDITIONAL RECOMMENDATIONS
Calorie count to ensure sufficient PO intake prior to d/c'ing NGT.   SLP to f/u to ensure safe PO tolerance

## 2025-04-18 NOTE — PROGRESS NOTE ADULT - ASSESSMENT
Case of a 80yMale pmh HTN, DM, subdural hematoma on keppra, osteomyelitis of L4-5 had completion of abx here for AMS. GI consulted for liver cirrhosis rule HE. Patient with some blood in stool today reported as well., no hematemesis.      # Liver cirrhosis with portal hypertension. Mild-to-moderate ascites.   Anasarca. Hepatic dome subcentimeter hypodense 1.3 cm hypodensity, not fully characterized .   #rectal exam-4/15/25-brown stool in rectal vault and on finger    Rec  MR liver protocol as outpatient   -For-Mild-to-moderate ascites please get ultrasound guided diagnostic / therapeutic paracentesis: Obtain serum albumin same day as paracentesis, avoid removing > 4 L in light of patient's CHARLENE   -Obtain fluid studies: Paracentesis Panel which includes Cell count and differential, albumin, total protein, cytology, AFB smear and culture. Separate order of amylase fluid and triglycerides fluid  -MELD Score-15  - follow up with hepatology as outpatient     -CHARLENE pre-renal vs hepatorenal - s/p albumin 1g/kg/d x 48 hrs, improving   avoid diuretics  nephrology eval appreciated    -HE-very likely in light of elevated ammonia - give lactulose through NG tube q1-2 h for now and then titrate to 3-4 bms / d  neurology eval appreciated  -For Vaccinations: Please f/u in clinic for being uptodate with HAV/HBV/Influenza/Pneumococcal vaccines.  -Lifestyle/Dietary modifications: Calorie intake 25-40 Kcal/kg/day; Protein intake: 1.2-1.5 gm/kg/day  -Avoid smoking and NSAIDs  -Abstain from alcohol and all illicit drugs      #anemia episode of maroon colored stool reported  #rectal exam-4/15/25-brown stool in rectal vault and on finger  Rec  -transfuse prn to hgb >7  - s/p EGD and colonoscopy revealing duodenal ulcer.   - repeat colonoscopy in one year   - PPI BID   -Follow up with our [facility] located at [address] [place] [address]. Phone Number: [phone]     # Chronic pancreatitis, with limited evaluation for acute inflammation due to background fluid  ? hx of EtOH use as pt has cirrhosis and evidence of chronic pancreatitis

## 2025-04-18 NOTE — PROGRESS NOTE ADULT - SUBJECTIVE AND OBJECTIVE BOX
Gastroenterology progress note:     Patient is a 80y old  Male who presents with a chief complaint of acute mentation changes (18 Apr 2025 12:29)       Admitted on: 04-14-25    We are following the patient for s/p endoscopy     no signs of bleeding     PAST MEDICAL & SURGICAL HISTORY:  Diabetes      Hypertension      Fall      H/O left knee surgery      History of cholecystectomy      History of appendectomy          MEDICATIONS  (STANDING):  atorvastatin 80 milliGRAM(s) Oral at bedtime  cefTRIAXone   IVPB 2000 milliGRAM(s) IV Intermittent every 24 hours  chlorhexidine 2% Cloths 1 Application(s) Topical <User Schedule>  dexMEDEtomidine Infusion 0.5 MICROgram(s)/kG/Hr (14.7 mL/Hr) IV Continuous <Continuous>  dextrose 5%. 1000 milliLiter(s) (100 mL/Hr) IV Continuous <Continuous>  dextrose 5%. 1000 milliLiter(s) (50 mL/Hr) IV Continuous <Continuous>  dextrose 50% Injectable 25 Gram(s) IV Push once  dextrose 50% Injectable 12.5 Gram(s) IV Push once  dextrose 50% Injectable 25 Gram(s) IV Push once  folic acid 1 milliGRAM(s) Oral daily  glucagon  Injectable 1 milliGRAM(s) IntraMuscular once  insulin lispro (ADMELOG) corrective regimen sliding scale   SubCutaneous three times a day before meals  levETIRAcetam   Injectable 250 milliGRAM(s) IV Push <User Schedule>  lidocaine   4% Patch 1 Patch Transdermal daily  metroNIDAZOLE  IVPB 500 milliGRAM(s) IV Intermittent every 12 hours  nystatin Powder 1 Application(s) Topical two times a day  pantoprazole  Injectable 40 milliGRAM(s) IV Push every 12 hours  rifAXIMin 550 milliGRAM(s) Oral two times a day  senna 2 Tablet(s) Oral at bedtime    MEDICATIONS  (PRN):  acetaminophen     Tablet .. 650 milliGRAM(s) Oral every 6 hours PRN Temp greater or equal to 38C (100.4F), Mild Pain (1 - 3)  aluminum hydroxide/magnesium hydroxide/simethicone Suspension 30 milliLiter(s) Oral every 4 hours PRN Dyspepsia  dextrose Oral Gel 15 Gram(s) Oral once PRN Blood Glucose LESS THAN 70 milliGRAM(s)/deciliter  lactulose Syrup 20 Gram(s) Enteral Tube every 8 hours PRN constiipation  melatonin 3 milliGRAM(s) Oral at bedtime PRN Insomnia  ondansetron Injectable 4 milliGRAM(s) IV Push every 8 hours PRN Nausea and/or Vomiting  polyethylene glycol 3350 17 Gram(s) Oral two times a day PRN Constipation      Allergies  No Known Allergies    Physical Examination:  T(C): 36.6 (04-18-25 @ 07:05), Max: 37 (04-17-25 @ 20:00)  HR: 63 (04-18-25 @ 11:00) (57 - 73)  BP: 129/60 (04-18-25 @ 11:00) (100/55 - 147/67)  RR: 14 (04-18-25 @ 11:00) (2 - 27)  SpO2: 97% (04-18-25 @ 11:00) (90% - 100%)      04-17-25 @ 07:01  -  04-18-25 @ 07:00  --------------------------------------------------------  IN: 2000 mL / OUT: 1210 mL / NET: 790 mL    04-18-25 @ 07:01  -  04-18-25 @ 15:25  --------------------------------------------------------  IN: 0 mL / OUT: 335 mL / NET: -335 mL    GENERAL: +NG tube  HEAD:  Atraumatic, Normocephalic  EYES: conjunctiva and sclera clear  NECK: Supple, No thyromegaly   CHEST/LUNG: no distress  HEART: Regular rate and rhythm; normal S1, S2, No murmurs.  ABDOMEN: Soft, nontender, nondistended; Bowel sounds present  NEUROLOGY: No asterixis or tremor  SKIN: Intact, no jaundice  Rectal exam-brown stool in rectal vault and on fingers         Data:                        9.0    5.31  )-----------( 97       ( 18 Apr 2025 05:19 )             27.4     Hgb trend:  9.0  04-18-25 @ 05:19  9.0  04-17-25 @ 18:30  8.6  04-17-25 @ 08:59  5.8  04-16-25 @ 23:16  5.8  04-16-25 @ 21:48  7.9  04-16-25 @ 11:23  7.9  04-16-25 @ 04:44  8.0  04-15-25 @ 21:30  8.8  04-15-25 @ 16:00      04-16-25 @ 07:01  -  04-17-25 @ 07:00  --------------------------------------------------------  IN: 1058 mL      04-18    148[H]  |  112[H]  |  24[H]  ----------------------------<  155[H]  3.2[L]   |  23  |  1.1    Ca    6.8[L]      18 Apr 2025 11:13  Mg     1.8     04-18    TPro  4.6[L]  /  Alb  2.6[L]  /  TBili  0.9  /  DBili  x   /  AST  57[H]  /  ALT  16  /  AlkPhos  109  04-18    Liver panel trend:  TBili 0.9   /   AST 57   /   ALT 16   /   AlkP 109   /   Tptn 4.6   /   Alb 2.6    /   DBili --      04-18  TBili 1.3   /   AST 76   /   ALT 19   /   AlkP 120   /   Tptn 4.9   /   Alb 2.8    /   DBili --      04-17  TBili 0.9   /   AST 70   /   ALT 16   /   AlkP 100   /   Tptn 4.5   /   Alb 2.8    /   DBili --      04-16  TBili 0.8   /      /   ALT 24   /   AlkP 142   /   Tptn 4.6   /   Alb 1.8    /   DBili --      04-16  TBili 0.6   /   AST 90   /   ALT 17   /   AlkP 125   /   Tptn 4.6   /   Alb 1.8    /   DBili --      04-15  TBili 0.7   /   AST 86   /   ALT 19   /   AlkP 145   /   Tptn 4.6   /   Alb 2.0    /   DBili --      04-15  TBili 0.7   /   AST 56   /   ALT 16   /   AlkP 146   /   Tptn 4.7   /   Alb 2.1    /   DBili --      04-13      PT/INR - ( 17 Apr 2025 18:30 )   PT: 21.40 sec;   INR: 1.79 ratio         PTT - ( 17 Apr 2025 18:30 )  PTT:36.1 sec    Culture - Blood (collected 16 Apr 2025 15:15)  Source: Blood None  Preliminary Report (18 Apr 2025 01:02):    No growth at 24 hours    < from: Colonoscopy (04.17.25 @ 11:30) >  Findings:    Mucosa Normal mucosa was noted in the terminal ileum. No blood seen.    Protruding lesions Internal and external hemorrhoids were noted.    Additional findings There was a small clot seen in the cecum, otherwise liquid  brownish stool was seen without evidence of bleeding. Aggressive irrigation  performed, large lesions not seen however small or flat lesions could have been  missed. Thecolon was otherwise unremarkable.    < end of copied text >       Radiology:

## 2025-04-18 NOTE — PROGRESS NOTE ADULT - SUBJECTIVE AND OBJECTIVE BOX
Patient is a 80y old  Male who presents with a chief complaint of acute mentation changes (18 Apr 2025 07:11)      INTERVAL HPI/OVERNIGHT EVENTS:   No overnight events   Afebrile, hemodynamically stable     ICU Vital Signs Last 24 Hrs  T(C): 36.6 (18 Apr 2025 07:05), Max: 37 (17 Apr 2025 20:00)  T(F): 97.9 (18 Apr 2025 07:05), Max: 98.6 (17 Apr 2025 20:00)  HR: 63 (18 Apr 2025 05:00) (52 - 70)  BP: 123/59 (18 Apr 2025 05:00) (100/55 - 141/65)  BP(mean): 85 (18 Apr 2025 05:00) (75 - 93)  ABP: --  ABP(mean): --  RR: 13 (18 Apr 2025 07:05) (2 - 52)  SpO2: 98% (18 Apr 2025 07:05) (95% - 100%)    O2 Parameters below as of 18 Apr 2025 03:00  Patient On (Oxygen Delivery Method): nasal cannula  O2 Flow (L/min): 2        I&O's Summary    17 Apr 2025 07:01  -  18 Apr 2025 07:00  --------------------------------------------------------  IN: 2000 mL / OUT: 1210 mL / NET: 790 mL    18 Apr 2025 07:01  -  18 Apr 2025 08:41  --------------------------------------------------------  IN: 0 mL / OUT: 135 mL / NET: -135 mL          LABS:                        9.0    5.31  )-----------( 97       ( 18 Apr 2025 05:19 )             27.4     04-18    145  |  110  |  26[H]  ----------------------------<  176[H]  3.5   |  22  |  1.2    Ca    6.7[L]      18 Apr 2025 05:19  Mg     1.8     04-18    TPro  4.9[L]  /  Alb  2.8[L]  /  TBili  1.3[H]  /  DBili  x   /  AST  76[H]  /  ALT  19  /  AlkPhos  120[H]  04-17    PT/INR - ( 17 Apr 2025 18:30 )   PT: 21.40 sec;   INR: 1.79 ratio         PTT - ( 17 Apr 2025 18:30 )  PTT:36.1 sec  Urinalysis Basic - ( 18 Apr 2025 05:19 )    Color: x / Appearance: x / SG: x / pH: x  Gluc: 176 mg/dL / Ketone: x  / Bili: x / Urobili: x   Blood: x / Protein: x / Nitrite: x   Leuk Esterase: x / RBC: x / WBC x   Sq Epi: x / Non Sq Epi: x / Bacteria: x      CAPILLARY BLOOD GLUCOSE      POCT Blood Glucose.: 187 mg/dL (18 Apr 2025 07:13)  POCT Blood Glucose.: 193 mg/dL (17 Apr 2025 22:16)  POCT Blood Glucose.: 186 mg/dL (17 Apr 2025 16:26)        RADIOLOGY & ADDITIONAL TESTS:    Consultant(s) Notes Reviewed:  [x ] YES  [ ] NO    MEDICATIONS  (STANDING):  atorvastatin 80 milliGRAM(s) Oral at bedtime  cefTRIAXone   IVPB 2000 milliGRAM(s) IV Intermittent every 24 hours  chlorhexidine 2% Cloths 1 Application(s) Topical <User Schedule>  dexMEDEtomidine Infusion 0.5 MICROgram(s)/kG/Hr (14.7 mL/Hr) IV Continuous <Continuous>  dextrose 5%. 1000 milliLiter(s) (100 mL/Hr) IV Continuous <Continuous>  dextrose 5%. 1000 milliLiter(s) (50 mL/Hr) IV Continuous <Continuous>  dextrose 50% Injectable 25 Gram(s) IV Push once  dextrose 50% Injectable 12.5 Gram(s) IV Push once  dextrose 50% Injectable 25 Gram(s) IV Push once  folic acid 1 milliGRAM(s) Oral daily  glucagon  Injectable 1 milliGRAM(s) IntraMuscular once  insulin lispro (ADMELOG) corrective regimen sliding scale   SubCutaneous three times a day before meals  lactulose Syrup 20 Gram(s) Enteral Tube every 8 hours  levETIRAcetam   Injectable 250 milliGRAM(s) IV Push <User Schedule>  lidocaine   4% Patch 1 Patch Transdermal daily  metroNIDAZOLE  IVPB 500 milliGRAM(s) IV Intermittent every 12 hours  nystatin Powder 1 Application(s) Topical two times a day  pantoprazole  Injectable 40 milliGRAM(s) IV Push every 12 hours  rifAXIMin 550 milliGRAM(s) Oral two times a day  senna 2 Tablet(s) Oral at bedtime    MEDICATIONS  (PRN):  acetaminophen     Tablet .. 650 milliGRAM(s) Oral every 6 hours PRN Temp greater or equal to 38C (100.4F), Mild Pain (1 - 3)  aluminum hydroxide/magnesium hydroxide/simethicone Suspension 30 milliLiter(s) Oral every 4 hours PRN Dyspepsia  dextrose Oral Gel 15 Gram(s) Oral once PRN Blood Glucose LESS THAN 70 milliGRAM(s)/deciliter  melatonin 3 milliGRAM(s) Oral at bedtime PRN Insomnia  ondansetron Injectable 4 milliGRAM(s) IV Push every 8 hours PRN Nausea and/or Vomiting  polyethylene glycol 3350 17 Gram(s) Oral two times a day PRN Constipation      PHYSICAL EXAM:  GENERAL:   HEAD:  Atraumatic, Normocephalic  EYES: EOMI, PERRLA, conjunctiva and sclera clear  NECK: Supple, No JVD, Normal thyroid, no enlarged nodes  NERVOUS SYSTEM:  Alert & Awake. AAO1-2. MS at baseline per dtr   CHEST/LUNG: B/L good air entry; No rales, rhonchi, or wheezing  HEART: S1S2 normal, no S3, Regular rate and rhythm; No murmurs  ABDOMEN: Soft, Nontender, Nondistended; Bowel sounds present  EXTREMITIES:  2+ Peripheral Pulses, No clubbing, cyanosis, or edema  LYMPH: No lymphadenopathy noted  SKIN: No rashes or lesions    Care Discussed with Consultants/Other Providers [ x] YES  [ ] NO

## 2025-04-18 NOTE — PROGRESS NOTE ADULT - SUBJECTIVE AND OBJECTIVE BOX
Patient is a 80y old  Male who presents with a chief complaint of acute mentation changes (17 Apr 2025 13:46)      Over Night Events:  Patient seen and examined.   more awake follow command   off precedex   s/p precedex       ROS:  See HPI    PHYSICAL EXAM    ICU Vital Signs Last 24 Hrs  T(C): 36.8 (18 Apr 2025 04:00), Max: 37 (17 Apr 2025 20:00)  T(F): 98.2 (18 Apr 2025 04:00), Max: 98.6 (17 Apr 2025 20:00)  HR: 63 (18 Apr 2025 05:00) (52 - 70)  BP: 123/59 (18 Apr 2025 05:00) (100/55 - 141/65)  BP(mean): 85 (18 Apr 2025 05:00) (75 - 93)  ABP: --  ABP(mean): --  RR: 13 (18 Apr 2025 05:00) (2 - 52)  SpO2: 100% (18 Apr 2025 05:00) (95% - 100%)    O2 Parameters below as of 18 Apr 2025 03:00  Patient On (Oxygen Delivery Method): nasal cannula  O2 Flow (L/min): 2          General: Awake no focal   HEENT:        pale       Lymph Nodes: NO cervical LN   Lungs: Bilateral BS  Cardiovascular: Regular   Abdomen: Soft, Positive BS  Extremities: No clubbing   Skin: warm   Neurological: no focal deficit   Musculoskeletal: move all ext     I&O's Detail    17 Apr 2025 07:01  -  18 Apr 2025 07:00  --------------------------------------------------------  IN:    dextrose 5% + sodium chloride 0.45%: 1000 mL    IV PiggyBack: 550 mL    Oral Fluid: 450 mL  Total IN: 2000 mL    OUT:    Dexmedetomidine: 0 mL    Indwelling Catheter - Urethral (mL): 1210 mL  Total OUT: 1210 mL    Total NET: 790 mL          LABS:                          9.0    4.90  )-----------( 103      ( 17 Apr 2025 18:30 )             27.0         17 Apr 2025 18:30    148    |  109    |  30     ----------------------------<  173    3.2     |  25     |  1.4      Ca    6.7        17 Apr 2025 18:30  Mg     1.5       17 Apr 2025 18:30    TPro  4.9    /  Alb  2.8    /  TBili  1.3    /  DBili  x      /  AST  76     /  ALT  19     /  AlkPhos  120    17 Apr 2025 18:30  Amylase x     lipase x                                                 PT/INR - ( 17 Apr 2025 18:30 )   PT: 21.40 sec;   INR: 1.79 ratio         PTT - ( 17 Apr 2025 18:30 )  PTT:36.1 sec                                       Urinalysis Basic - ( 17 Apr 2025 18:30 )    Color: x / Appearance: x / SG: x / pH: x  Gluc: 173 mg/dL / Ketone: x  / Bili: x / Urobili: x   Blood: x / Protein: x / Nitrite: x   Leuk Esterase: x / RBC: x / WBC x   Sq Epi: x / Non Sq Epi: x / Bacteria: x                                                                Culture - Blood (collected 16 Apr 2025 15:15)  Source: Blood None  Preliminary Report (18 Apr 2025 01:02):    No growth at 24 hours                                                                                           MEDICATIONS  (STANDING):  atorvastatin 80 milliGRAM(s) Oral at bedtime  cefTRIAXone   IVPB 2000 milliGRAM(s) IV Intermittent every 24 hours  chlorhexidine 2% Cloths 1 Application(s) Topical <User Schedule>  dexMEDEtomidine Infusion 0.5 MICROgram(s)/kG/Hr (14.7 mL/Hr) IV Continuous <Continuous>  dextrose 5% + sodium chloride 0.45%. 1000 milliLiter(s) (50 mL/Hr) IV Continuous <Continuous>  dextrose 5%. 1000 milliLiter(s) (100 mL/Hr) IV Continuous <Continuous>  dextrose 5%. 1000 milliLiter(s) (50 mL/Hr) IV Continuous <Continuous>  dextrose 50% Injectable 25 Gram(s) IV Push once  dextrose 50% Injectable 12.5 Gram(s) IV Push once  dextrose 50% Injectable 25 Gram(s) IV Push once  folic acid 1 milliGRAM(s) Oral daily  glucagon  Injectable 1 milliGRAM(s) IntraMuscular once  insulin lispro (ADMELOG) corrective regimen sliding scale   SubCutaneous three times a day before meals  lactulose Syrup 20 Gram(s) Enteral Tube every 8 hours  levETIRAcetam   Injectable 250 milliGRAM(s) IV Push <User Schedule>  lidocaine   4% Patch 1 Patch Transdermal daily  metroNIDAZOLE  IVPB 500 milliGRAM(s) IV Intermittent every 12 hours  nystatin Powder 1 Application(s) Topical two times a day  pantoprazole  Injectable 40 milliGRAM(s) IV Push every 12 hours  rifAXIMin 550 milliGRAM(s) Oral two times a day  senna 2 Tablet(s) Oral at bedtime    MEDICATIONS  (PRN):  acetaminophen     Tablet .. 650 milliGRAM(s) Oral every 6 hours PRN Temp greater or equal to 38C (100.4F), Mild Pain (1 - 3)  aluminum hydroxide/magnesium hydroxide/simethicone Suspension 30 milliLiter(s) Oral every 4 hours PRN Dyspepsia  dextrose Oral Gel 15 Gram(s) Oral once PRN Blood Glucose LESS THAN 70 milliGRAM(s)/deciliter  melatonin 3 milliGRAM(s) Oral at bedtime PRN Insomnia  ondansetron Injectable 4 milliGRAM(s) IV Push every 8 hours PRN Nausea and/or Vomiting  polyethylene glycol 3350 17 Gram(s) Oral two times a day PRN Constipation          Xrays:                                                                                     ECHO:  [facility] ICU:

## 2025-04-18 NOTE — PROGRESS NOTE ADULT - SUBJECTIVE AND OBJECTIVE BOX
Subjective/Objective: pt. now responds to questions but is confused and AO x 0.     HPI-Cardiology/Events/Updates  Pt evaluated at bedside. No overnight events and Pt has no complaints. Radiology tests and hospital records, were reviewed, as well as previous notes on this patient.         acetaminophen     Tablet .. 650 milliGRAM(s) Oral every 6 hours PRN  aluminum hydroxide/magnesium hydroxide/simethicone Suspension 30 milliLiter(s) Oral every 4 hours PRN  atorvastatin 80 milliGRAM(s) Oral at bedtime  cefTRIAXone   IVPB 2000 milliGRAM(s) IV Intermittent every 24 hours  chlorhexidine 2% Cloths 1 Application(s) Topical <User Schedule>  dexMEDEtomidine Infusion 0.5 MICROgram(s)/kG/Hr IV Continuous <Continuous>  dextrose 5%. 1000 milliLiter(s) IV Continuous <Continuous>  dextrose 5%. 1000 milliLiter(s) IV Continuous <Continuous>  dextrose 50% Injectable 25 Gram(s) IV Push once  dextrose 50% Injectable 12.5 Gram(s) IV Push once  dextrose 50% Injectable 25 Gram(s) IV Push once  dextrose Oral Gel 15 Gram(s) Oral once PRN  folic acid 1 milliGRAM(s) Oral daily  glucagon  Injectable 1 milliGRAM(s) IntraMuscular once  insulin lispro (ADMELOG) corrective regimen sliding scale   SubCutaneous three times a day before meals  lactulose Syrup 20 Gram(s) Enteral Tube every 8 hours  levETIRAcetam   Injectable 250 milliGRAM(s) IV Push <User Schedule>  lidocaine   4% Patch 1 Patch Transdermal daily  melatonin 3 milliGRAM(s) Oral at bedtime PRN  metroNIDAZOLE  IVPB 500 milliGRAM(s) IV Intermittent every 12 hours  nystatin Powder 1 Application(s) Topical two times a day  ondansetron Injectable 4 milliGRAM(s) IV Push every 8 hours PRN  pantoprazole  Injectable 40 milliGRAM(s) IV Push every 12 hours  polyethylene glycol 3350 17 Gram(s) Oral two times a day PRN  rifAXIMin 550 milliGRAM(s) Oral two times a day  senna 2 Tablet(s) Oral at bedtime                            9.0    5.31  )-----------( 97       ( 18 Apr 2025 05:19 )             27.4       04-18    145  |  110  |  26[H]  ----------------------------<  176[H]  3.5   |  22  |  1.2    Ca    6.7[L]      18 Apr 2025 05:19  Mg     1.8     04-18    TPro  4.9[L]  /  Alb  2.8[L]  /  TBili  1.3[H]  /  DBili  x   /  AST  76[H]  /  ALT  19  /  AlkPhos  120[H]  04-17            T(C): 36.6 (04-18-25 @ 07:05), Max: 37 (04-17-25 @ 20:00)  HR: 63 (04-18-25 @ 11:00) (57 - 73)  BP: 129/60 (04-18-25 @ 11:00) (100/55 - 147/67)  RR: 14 (04-18-25 @ 11:00) (2 - 27)  SpO2: 97% (04-18-25 @ 11:00) (90% - 100%)  Wt(kg): --    I&O's Summary    17 Apr 2025 07:01  -  18 Apr 2025 07:00  --------------------------------------------------------  IN: 2000 mL / OUT: 1210 mL / NET: 790 mL    18 Apr 2025 07:01  -  18 Apr 2025 12:30  --------------------------------------------------------  IN: 0 mL / OUT: 135 mL / NET: -135 mL        PHYSICAL EXAM:  GENERAL:  remains lethargic and non-verbal   HEAD:  Atraumatic, Normocephalic  EYES: EOMI, PERRLA, conjunctiva and sclera clear  NECK: Supple, No JVD, no cervical lymphadenopathy, non-tender  CHEST/LUNG: Clear to auscultation bilaterally; No wheeze, rhonchi, or rales  HEART: Regular rate and rhythm; S1&S2  ABDOMEN: Soft, Nontender, Nondistended x 4 quadrants; Bowel sounds present  EXTREMITIES:   Peripheral Pulses Present, No clubbing, no cyanosis, or no edema, no calf tenderness               Diagnostic testing:  < from: Xray Chest 1 View-PORTABLE IMMEDIATE (Xray Chest 1 View-PORTABLE IMMEDIATE .) (04.15.25 @ 07:16) >  FINDINGS/  IMPRESSION:    Support devices: Interval placement of enteric tube, tip overlying the   right abdomen.    Cardiac/mediastinum/hilum: Unchanged.    Lung parenchyma/Pleura: Low lung volumes. Left basilar opacity. No   pneumothorax.    Skeleton/soft tissues: Unchanged.    --- End of Report ---    < end of copied text >            < from: TTE Echo Complete w/o Contrast w/ Doppler (04.15.25 @ 08:07) >  Summary:   1. Technically difficult study.   2. Normal global left ventricular systolic function.   3. Left ventricular ejection fraction, by visual estimation, is 60 to   65%.   4. The left ventricular diastolic function could not be assessed in this   study.   5. Left atrial size not well visualized.   6. Right atrial size not well visualized.   7. No evidence of mitral valve regurgitation.   8. Dilatation of the ascending aorta, measures 3.9 cm.   9. Normal pulmonary artery pressure.  10. There is no evidence of pericardial effusion.    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  BIBA from home status post change of mental status.      Impression:  #Elevated troponin  #?New onset Afib  #HTN, DM    -Prior ECG with likely Afib however patient with acute blood loss anemia to Hb ~ 5 and has history of SDH  -Therefore ac on hold   -EP consult with Dr. Davidson called for further evaluation   -Elevated troponin likely secondary to demand  -Given AMS, GIB, severe anemia, and history of SDH, no urgent ischemic evaluation indicated at this time  -Follow up GI   -Continue with supportive care per ICU   -AMS improving   -Monitor H/H   -Further cardiac workup pending clinical course     Iris Elmore MD

## 2025-04-18 NOTE — PROGRESS NOTE ADULT - ASSESSMENT
IMPRESSION:  alter mental status ?? metabolic encephalopathy   seizure hx   liver cirrhosis   bacteremia   GI bleed   hypernatremia   CHARLENE   elevated trop   afib     PLAN:    CNS:    neurology follow up   MRI Lumbar    follow ammonia level       HEENT: oral care     PULMONARY: keep pox >92% aspiration precaution   daily cxr repeat cxr today   CARDIOVASCULAR: IF NA above 145 switch fluid to D5 w 50cc/ hr   follow cardiology     GI: GI prophylaxis.  follow GI if ok to start feed   speech and swallow eval if failed NG feed    PPI Q12 hrs   follow LFT , hepatology / Gi follow up   rafiximine and lactulose   check Ammonia level today     s/p albumin     RENAL: IV fluid D 1/2 NS 75 cc/ hr switch to D5w 50cc/ hr if Na >145  when start feed start free water 250 cc Q4 hrs   follow NA level   follow renal   BMP now   INFECTIOUS DISEASE: IDfollow up   rocephine and flagyl   procal   bldcx       HEMATOLOGICAL:  DVT prophylaxis. follow h/h and plt   hold AC   serial h/h   ENDOCRINE:  Follow up FS.  Insulin protocol if needed.  SDU         CRITICAL CARE TIME SPENT: ***

## 2025-04-18 NOTE — PROGRESS NOTE ADULT - SUBJECTIVE AND OBJECTIVE BOX
INFECTIOUS DISEASE FOLLOW UP NOTE:    Interval History/ROS: Patient is a 80y old  Male who presents with a chief complaint of acute mentation changes (18 Apr 2025 08:41)      Overnight events:    REVIEW OF SYSTEMS:        Prior hospital charts reviewed [Yes]  Primary team notes reviewed [Yes]  Other consultant notes reviewed [Yes]    Allergies:  No Known Allergies      ANTIMICROBIALS:   cefTRIAXone   IVPB 2000 every 24 hours  metroNIDAZOLE  IVPB 500 every 12 hours  rifAXIMin 550 two times a day      OTHER MEDS: MEDICATIONS  (STANDING):  acetaminophen     Tablet .. 650 every 6 hours PRN  aluminum hydroxide/magnesium hydroxide/simethicone Suspension 30 every 4 hours PRN  atorvastatin 80 at bedtime  dexMEDEtomidine Infusion 0.5 <Continuous>  dextrose 50% Injectable 25 once  dextrose 50% Injectable 12.5 once  dextrose 50% Injectable 25 once  dextrose Oral Gel 15 once PRN  glucagon  Injectable 1 once  insulin lispro (ADMELOG) corrective regimen sliding scale  three times a day before meals  lactulose Syrup 20 every 8 hours  levETIRAcetam   Injectable 250 <User Schedule>  melatonin 3 at bedtime PRN  ondansetron Injectable 4 every 8 hours PRN  pantoprazole  Injectable 40 every 12 hours  polyethylene glycol 3350 17 two times a day PRN  senna 2 at bedtime      Vital Signs Last 24 Hrs  T(F): 97.9 (04-18-25 @ 07:05), Max: 99 (04-13-25 @ 20:40)    Vital Signs Last 24 Hrs  HR: 63 (04-18-25 @ 05:00) (52 - 70)  BP: 123/59 (04-18-25 @ 05:00) (100/55 - 141/65)  RR: 13 (04-18-25 @ 07:05)  SpO2: 98% (04-18-25 @ 07:05) (95% - 100%)  Wt(kg): --    EXAM:      Labs:                        9.0    5.31  )-----------( 97       ( 18 Apr 2025 05:19 )             27.4     04-18    145  |  110  |  26[H]  ----------------------------<  176[H]  3.5   |  22  |  1.2    Ca    6.7[L]      18 Apr 2025 05:19  Mg     1.8     04-18    TPro  4.9[L]  /  Alb  2.8[L]  /  TBili  1.3[H]  /  DBili  x   /  AST  76[H]  /  ALT  19  /  AlkPhos  120[H]  04-17      WBC Trend:  WBC Count: 5.31 (04-18-25 @ 05:19)  WBC Count: 4.90 (04-17-25 @ 18:30)  WBC Count: 4.79 (04-17-25 @ 08:59)  WBC Count: 4.06 (04-16-25 @ 23:16)      Creatine Trend:  Creatinine: 1.2 (04-18)  Creatinine: 1.4 (04-17)  Creatinine: 1.3 (04-17)  Creatinine: 1.6 (04-16)      Liver Biochemical Testing Trend:  Alanine Aminotransferase (ALT/SGPT): 19 (04-17)  Alanine Aminotransferase (ALT/SGPT): 16 (04-16)  Alanine Aminotransferase (ALT/SGPT): 24 (04-16)  Alanine Aminotransferase (ALT/SGPT): 17 (04-15)  Alanine Aminotransferase (ALT/SGPT): 19 (04-15)  Aspartate Aminotransferase (AST/SGOT): 76 (04-17-25 @ 18:30)  Aspartate Aminotransferase (AST/SGOT): 70 (04-16-25 @ 21:49)  Aspartate Aminotransferase (AST/SGOT): 110 (04-16-25 @ 04:44)  Aspartate Aminotransferase (AST/SGOT): 90 (04-15-25 @ 13:01)  Aspartate Aminotransferase (AST/SGOT): 86 (04-15-25 @ 05:29)  Bilirubin Total: 1.3 (04-17)  Bilirubin Total: 0.9 (04-16)  Bilirubin Total: 0.8 (04-16)  Bilirubin Total: 0.6 (04-15)  Bilirubin Total: 0.7 (04-15)      Trend LDH      Urinalysis Basic - ( 18 Apr 2025 05:19 )    Color: x / Appearance: x / SG: x / pH: x  Gluc: 176 mg/dL / Ketone: x  / Bili: x / Urobili: x   Blood: x / Protein: x / Nitrite: x   Leuk Esterase: x / RBC: x / WBC x   Sq Epi: x / Non Sq Epi: x / Bacteria: x        MICROBIOLOGY:        Culture - Blood (collected 16 Apr 2025 15:15)  Source: Blood None  Preliminary Report:    No growth at 24 hours    Urinalysis with Rflx Culture (collected 13 Apr 2025 21:20)    Culture - Blood (collected 13 Apr 2025 21:20)  Source: Blood Blood-Peripheral  Final Report:    Growth in anaerobic bottle: Bacteroides fragilis    "Susceptibilities not performed"    Culture - Urine (collected 13 Apr 2025 21:20)  Source: Catheterized None  Final Report:    10,000 - 49,000 CFU/mL Proteus mirabilis  Organism: Proteus mirabilis  Organism: Proteus mirabilis    Sensitivities:      -  Levofloxacin: S <=0.5      -  Tobramycin: S <=2      -  Nitrofurantoin: R >64 Should not be used to treat pyelonephritis      -  Aztreonam: S <=4      -  Gentamicin: S <=2      -  Cefazolin: S <=2 For uncomplicated UTI with K. pneumoniae, E. coli, or P. mirablis: LOUIS <=16 is sensitive and LOUIS >=32 is resistant. This also predicts results for oral agents cefaclor, cefdinir, cefpodoxime, cefprozil, cefuroxime axetil, cephalexin and locarbef for uncomplicated UTI. Note that some isolates may be susceptible to these agents while testing resistant to cefazolin.      -  Cefepime: S <=2      -  Piperacillin/Tazobactam: S <=8      -  Ciprofloxacin: S <=0.25      -  Ceftriaxone: S <=1      -  Ampicillin: S <=8 These ampicillin results predict results for amoxicillin      Method Type: LOUIS      -  Meropenem: S <=1      -  Ampicillin/Sulbactam: S <=4/2      -  Cefoxitin: S <=8      -  Cefuroxime: S <=4      -  Amoxicillin/Clavulanic Acid: S <=8/4      -  Trimethoprim/Sulfamethoxazole: S <=0.5/9.5      -  Ertapenem: S <=0.5    Culture - Blood (collected 13 Apr 2025 21:20)  Source: Blood Blood-Peripheral  Final Report:    Growth in anaerobic bottle: Bacteroides fragilis "Susceptibilities not    performed"    Direct identification is available within approximately 3-5    hours either by Blood Panel Multiplexed PCR or Direct    MALDI-TOF. Details: [email or web address]  Organism: Blood Culture PCR  Organism: Blood Culture PCR    Sensitivities:      -  Bacteroides fragilis: Detec      Method Type: PCR    Culture - Blood (collected 30 Jan 2025 07:12)  Source: .Blood BLOOD  Final Report:    No growth at 5 days    Culture - Blood (collected 30 Jan 2025 07:12)  Source: .Blood BLOOD  Final Report:    No growth at 5 days    Culture - Blood (collected 29 Jan 2025 07:35)  Source: .Blood BLOOD  Final Report:    No growth at 5 days    Culture - Blood (collected 27 Jan 2025 19:13)  Source: .Blood BLOOD  Final Report:    No growth at 5 days    Culture - Blood (collected 27 Jan 2025 19:13)  Source: .Blood BLOOD  Final Report:    No growth at 5 days    Procalcitonin: 1.06 (04-15)    C-Reactive Protein: 78.7 (04-13)      Troponin T, High Sensitivity Result: 140 (04-14)  Troponin T, High Sensitivity Result: 131 (04-14)  Troponin T, High Sensitivity Result: 174 (04-13)        RADIOLOGY:  imaging below personally reviewed     INFECTIOUS DISEASE FOLLOW UP NOTE:    Interval History/ROS: Patient is a 80y old  Male who presents with a chief complaint of acute mentation changes (18 Apr 2025 08:41)    Overnight events: more awake today; s/p colonoscopy, no more melena    REVIEW OF SYSTEMS:  Limited due to cognitive impairment      Prior hospital charts reviewed [Yes]  Primary team notes reviewed [Yes]  Other consultant notes reviewed [Yes]    Allergies:  No Known Allergies      ANTIMICROBIALS:   cefTRIAXone   IVPB 2000 every 24 hours  metroNIDAZOLE  IVPB 500 every 12 hours  rifAXIMin 550 two times a day      OTHER MEDS: MEDICATIONS  (STANDING):  acetaminophen     Tablet .. 650 every 6 hours PRN  aluminum hydroxide/magnesium hydroxide/simethicone Suspension 30 every 4 hours PRN  atorvastatin 80 at bedtime  dexMEDEtomidine Infusion 0.5 <Continuous>  dextrose 50% Injectable 25 once  dextrose 50% Injectable 12.5 once  dextrose 50% Injectable 25 once  dextrose Oral Gel 15 once PRN  glucagon  Injectable 1 once  insulin lispro (ADMELOG) corrective regimen sliding scale  three times a day before meals  lactulose Syrup 20 every 8 hours  levETIRAcetam   Injectable 250 <User Schedule>  melatonin 3 at bedtime PRN  ondansetron Injectable 4 every 8 hours PRN  pantoprazole  Injectable 40 every 12 hours  polyethylene glycol 3350 17 two times a day PRN  senna 2 at bedtime      Vital Signs Last 24 Hrs  T(F): 97.9 (04-18-25 @ 07:05), Max: 99 (04-13-25 @ 20:40)    Vital Signs Last 24 Hrs  HR: 63 (04-18-25 @ 05:00) (52 - 70)  BP: 123/59 (04-18-25 @ 05:00) (100/55 - 141/65)  RR: 13 (04-18-25 @ 07:05)  SpO2: 98% (04-18-25 @ 07:05) (95% - 100%)  Wt(kg): --    EXAM:  GENERAL: NAD, lying in bed  HEAD: No head lesions  EYES: Conjunctiva pink and cornea white  EAR, NOSE, MOUTH, THROAT: Normal external ears and nose, no discharges; moist mucous membranes; + NGT; + NC  NECK: Supple, nontender to palpation; no JVD  RESPIRATORY: Bibasilar crackles  CARDIOVASCULAR: S1 S2  GASTROINTESTINAL: Soft, no significant distention; normoactive bowel sounds  GENITOURINARY: + loyola catheter, no CVA tenderness  EXTREMITIES: No clubbing, cyanosis, + diffuse anasarca  NERVOUS SYSTEM: Awake and aoursable, able to answer simple questions  MUSCULOSKELETAL: No joint erythema, swelling  SKIN: No rashes or lesions, no superficial thrombophlebitis  PSYCH: Calm    Labs:                        9.0    5.31  )-----------( 97       ( 18 Apr 2025 05:19 )             27.4     04-18    145  |  110  |  26[H]  ----------------------------<  176[H]  3.5   |  22  |  1.2    Ca    6.7[L]      18 Apr 2025 05:19  Mg     1.8     04-18    TPro  4.9[L]  /  Alb  2.8[L]  /  TBili  1.3[H]  /  DBili  x   /  AST  76[H]  /  ALT  19  /  AlkPhos  120[H]  04-17      WBC Trend:  WBC Count: 5.31 (04-18-25 @ 05:19)  WBC Count: 4.90 (04-17-25 @ 18:30)  WBC Count: 4.79 (04-17-25 @ 08:59)  WBC Count: 4.06 (04-16-25 @ 23:16)      Creatine Trend:  Creatinine: 1.2 (04-18)  Creatinine: 1.4 (04-17)  Creatinine: 1.3 (04-17)  Creatinine: 1.6 (04-16)      Liver Biochemical Testing Trend:  Alanine Aminotransferase (ALT/SGPT): 19 (04-17)  Alanine Aminotransferase (ALT/SGPT): 16 (04-16)  Alanine Aminotransferase (ALT/SGPT): 24 (04-16)  Alanine Aminotransferase (ALT/SGPT): 17 (04-15)  Alanine Aminotransferase (ALT/SGPT): 19 (04-15)  Aspartate Aminotransferase (AST/SGOT): 76 (04-17-25 @ 18:30)  Aspartate Aminotransferase (AST/SGOT): 70 (04-16-25 @ 21:49)  Aspartate Aminotransferase (AST/SGOT): 110 (04-16-25 @ 04:44)  Aspartate Aminotransferase (AST/SGOT): 90 (04-15-25 @ 13:01)  Aspartate Aminotransferase (AST/SGOT): 86 (04-15-25 @ 05:29)  Bilirubin Total: 1.3 (04-17)  Bilirubin Total: 0.9 (04-16)  Bilirubin Total: 0.8 (04-16)  Bilirubin Total: 0.6 (04-15)  Bilirubin Total: 0.7 (04-15)      Trend LDH      Urinalysis Basic - ( 18 Apr 2025 05:19 )    Color: x / Appearance: x / SG: x / pH: x  Gluc: 176 mg/dL / Ketone: x  / Bili: x / Urobili: x   Blood: x / Protein: x / Nitrite: x   Leuk Esterase: x / RBC: x / WBC x   Sq Epi: x / Non Sq Epi: x / Bacteria: x        MICROBIOLOGY:        Culture - Blood (collected 16 Apr 2025 15:15)  Source: Blood None  Preliminary Report:    No growth at 24 hours    Urinalysis with Rflx Culture (collected 13 Apr 2025 21:20)    Culture - Blood (collected 13 Apr 2025 21:20)  Source: Blood Blood-Peripheral  Final Report:    Growth in anaerobic bottle: Bacteroides fragilis    "Susceptibilities not performed"    Culture - Urine (collected 13 Apr 2025 21:20)  Source: Catheterized None  Final Report:    10,000 - 49,000 CFU/mL Proteus mirabilis  Organism: Proteus mirabilis  Organism: Proteus mirabilis    Sensitivities:      -  Levofloxacin: S <=0.5      -  Tobramycin: S <=2      -  Nitrofurantoin: R >64 Should not be used to treat pyelonephritis      -  Aztreonam: S <=4      -  Gentamicin: S <=2      -  Cefazolin: S <=2 For uncomplicated UTI with K. pneumoniae, E. coli, or P. mirablis: LOUIS <=16 is sensitive and LOUIS >=32 is resistant. This also predicts results for oral agents cefaclor, cefdinir, cefpodoxime, cefprozil, cefuroxime axetil, cephalexin and locarbef for uncomplicated UTI. Note that some isolates may be susceptible to these agents while testing resistant to cefazolin.      -  Cefepime: S <=2      -  Piperacillin/Tazobactam: S <=8      -  Ciprofloxacin: S <=0.25      -  Ceftriaxone: S <=1      -  Ampicillin: S <=8 These ampicillin results predict results for amoxicillin      Method Type: LOUIS      -  Meropenem: S <=1      -  Ampicillin/Sulbactam: S <=4/2      -  Cefoxitin: S <=8      -  Cefuroxime: S <=4      -  Amoxicillin/Clavulanic Acid: S <=8/4      -  Trimethoprim/Sulfamethoxazole: S <=0.5/9.5      -  Ertapenem: S <=0.5    Culture - Blood (collected 13 Apr 2025 21:20)  Source: Blood Blood-Peripheral  Final Report:    Growth in anaerobic bottle: Bacteroides fragilis "Susceptibilities not    performed"    Direct identification is available within approximately 3-5    hours either by Blood Panel Multiplexed PCR or Direct    MALDI-TOF. Details: [email or web address]  Organism: Blood Culture PCR  Organism: Blood Culture PCR    Sensitivities:      -  Bacteroides fragilis: Detec      Method Type: PCR    Culture - Blood (collected 30 Jan 2025 07:12)  Source: .Blood BLOOD  Final Report:    No growth at 5 days    Culture - Blood (collected 30 Jan 2025 07:12)  Source: .Blood BLOOD  Final Report:    No growth at 5 days    Culture - Blood (collected 29 Jan 2025 07:35)  Source: .Blood BLOOD  Final Report:    No growth at 5 days    Culture - Blood (collected 27 Jan 2025 19:13)  Source: .Blood BLOOD  Final Report:    No growth at 5 days    Culture - Blood (collected 27 Jan 2025 19:13)  Source: .Blood BLOOD  Final Report:    No growth at 5 days    Procalcitonin: 1.06 (04-15)    C-Reactive Protein: 78.7 (04-13)      Troponin T, High Sensitivity Result: 140 (04-14)  Troponin T, High Sensitivity Result: 131 (04-14)  Troponin T, High Sensitivity Result: 174 (04-13)        RADIOLOGY:  imaging below personally reviewed    < from: Xray Chest 1 View- PORTABLE-Urgent (Xray Chest 1 View- PORTABLE-Urgent .) (04.16.25 @ 17:42) >  IMPRESSION: Low lung volume.    Bibasilar opacities, unchanged.    NGT.    < end of copied text >    < from: CT Head No Cont (04.15.25 @ 02:25) >  IMPRESSION:    Motion-degraded exam, however, no definite acute intracranial pathology.    < end of copied text >

## 2025-04-18 NOTE — CHART NOTE - NSCHARTNOTEFT_GEN_A_CORE
Registered Dietitian Follow-Up     Patient Profile Reviewed                           Yes [X]   No []     Nutrition History Previously Obtained        Yes [X]  No []       Pertinent  Information:     pt is 80 year old male with hx of HTN, DM, SDH, mechanical falls, OM of L4-5 in 2025, BIBA 2/2 changes in mental status, recent d/c from STR. pt admitted with acute/chronic metabolic encephalopathy, elevated trops, new onset decompensated liver cirrhosis, chronic pancreatitis, CHARLENE.  anasarca, s/p SLP eval --> NPO with alternate means of nutrition. s/p NGT placement, pt remains lethargic. elevated NH4- to be given lactulose today, possible maroon colored BM noted today, GI following.     s/p EGD had clean based duodenal ulcer which was clipped   s/p colonoscopy with small clot seen in the cecum  NGT remains in place with po diet as per SLP eval.   today pt was lethargic with poor po intake,       Diet order:   Diet, Soft and Bite Sized:   DASH/TLC {Sodium & Cholesterol Restricted} (DASH) (25 @ 12:35) [Active]        Anthropometrics:  Height (cm): 175.3 (25 @ 11:56)  Weight (kg): 117.8 (25 @ 11:56)  BMI (kg/m2): 38.3 (25 @ 11:56)      Daily Weight in k.3 (-18), Weight in k.8 (-16), Weight in k.8 (-15)  % Weight Change    MEDICATIONS  (STANDING):  atorvastatin 80 milliGRAM(s) Oral at bedtime  cefTRIAXone   IVPB 2000 milliGRAM(s) IV Intermittent every 24 hours  chlorhexidine 2% Cloths 1 Application(s) Topical <User Schedule>  dexMEDEtomidine Infusion 0.5 MICROgram(s)/kG/Hr (14.7 mL/Hr) IV Continuous <Continuous>  dextrose 5%. 1000 milliLiter(s) (100 mL/Hr) IV Continuous <Continuous>  folic acid 1 milliGRAM(s) Oral daily  glucagon  Injectable 1 milliGRAM(s) IntraMuscular once  insulin lispro (ADMELOG) corrective regimen sliding scale   SubCutaneous three times a day before meals  levETIRAcetam   Injectable 250 milliGRAM(s) IV Push <User Schedule>  lidocaine   4% Patch 1 Patch Transdermal daily  metroNIDAZOLE  IVPB 500 milliGRAM(s) IV Intermittent every 12 hours  nystatin Powder 1 Application(s) Topical two times a day  pantoprazole  Injectable 40 milliGRAM(s) IV Push every 12 hours  rifAXIMin 550 milliGRAM(s) Oral two times a day  senna 2 Tablet(s) Oral at bedtime    MEDICATIONS  (PRN):  acetaminophen     Tablet .. 650 milliGRAM(s) Oral every 6 hours PRN Temp greater or equal to 38C (100.4F), Mild Pain (1 - 3)  aluminum hydroxide/magnesium hydroxide/simethicone Suspension 30 milliLiter(s) Oral every 4 hours PRN Dyspepsia  dextrose Oral Gel 15 Gram(s) Oral once PRN Blood Glucose LESS THAN 70 milliGRAM(s)/deciliter  lactulose Syrup 20 Gram(s) Enteral Tube every 8 hours PRN constiipation  melatonin 3 milliGRAM(s) Oral at bedtime PRN Insomnia  ondansetron Injectable 4 milliGRAM(s) IV Push every 8 hours PRN Nausea and/or Vomiting  polyethylene glycol 3350 17 Gram(s) Oral two times a day PRN Constipation    Pertinent Labs:  @ 11:13: Na 148[H], BUN 24[H], Cr 1.1, [H], K+ 3.2[L], Phos --, Mg --, Alk Phos 109, ALT/SGPT 16, AST/SGOT 57[H], HbA1c --   @ 05:19: Na 145, BUN 26[H], Cr 1.2, [H], K+ 3.5, Phos --, Mg 1.8, Alk Phos --, ALT/SGPT --, AST/SGOT --, HbA1c --    Finger Sticks:  POCT Blood Glucose.: 155 mg/dL ( @ 21:45)  POCT Blood Glucose.: 174 mg/dL ( @ 16:18)  POCT Blood Glucose.: 169 mg/dL ( @ 11:35)  POCT Blood Glucose.: 187 mg/dL ( @ 07:13)    Physical Findings:  - Appearance: lethargic   - GI function: +BS  - Tubes: NGT in place clamped  - Oral/Mouth cavity:  - Skin: B/L buttock DTI   - Edema: generalized 3+ edema, B/L feed and hands + 4      Nutrition Requirements:  Weight Used: 117.8 kgs      Estimated Energy Needs      2383-4880 kcals (12-16 kcals/kg/BW)  105-140g protein (1.5-2.0g/kg.IBW)  1;1 kcal for estimated fluid needs      Nutrient Intake: presently <25% of meals consumed 2/2 lethargy     [] Previous Nutrition Diagnosis:            [X] Ongoing          [] Resolved    inadequate pro-energy intake remains      Goal/Expected Outcome: pt to tolerate po diet vs EN and meet at least 80% of estimated needs within 3 days     Indicator/Monitoring:  tolerance to po diet vs EN, lab/meds, bowel fxn, NFPF    Recommendation:   -if poor po intake remains recommend  EN via NGT of  Peptamen  ml q 8 hrs with H20 flushes of 30 ml pre and post feed, increase as tolerated on day 2 to 360 ml q 6 hrs with H20 flushes of 30 ml pre and post feed will provide pt with 1800 kcals, 114g protein, 1458 ml free water and 1680 ml total volume meeting >80% of estimated needs    HIGH RISK

## 2025-04-18 NOTE — PROGRESS NOTE ADULT - ASSESSMENT
81 yo male presented to ED via EMS for AMS above baseline       #AMS  #New onset decompensated liver Cirrhosis  #recent OM/discitis w abx completion March 2025   #HX of SDH   #Suspected GIB?  #Ascites  #gram negative bacteremia   # Bradycardia  # hypokalemia s/p 3 units repletion  CTH negative   Ammonia levels wnl. Mental status back to BL  Lactulose 20 q8h.   Hold AC   Protonix IV BID. S/p colonoscopy and EGD. No active bleeding. Duodenal ulcer clipped. off octreotide drop  s/p 2 units PRBC after drop of Hg overnight to 5.8.   no window for para likely due to body habitus  Rocephin and flagyl. ID FU   continue ASA   Keppra 250 mg q 12 hrs   fall/aspiration precautions   GI and neuro eval    #CHARLENE improving  - Suspected pre-renal vs hepatorenal   - Hold IVF. Will give albumin again today   - Nephro consult  - Monitor Accurate I&Os     #elevated troponin   #Hx type II NSTEMI   EKG reviewed   Trops peaked and trending down   last TTE LVEF 66% (jan 2025), ECHO am  cardiology consulted      #Hx HLD  #Hx HTN   resume statin /   Hold losartan      #DM 2   A1C 7.6% prev   hold home meds   SSI for now   Carb control     #Hx of  mechanical falls   #chronic back pain   PRN pain control   fall precautions   PT consult when able     Code Status: Full code   DVT ppx: on hold

## 2025-04-19 LAB
ALBUMIN SERPL ELPH-MCNC: 3.4 G/DL — LOW (ref 3.5–5.2)
ALP SERPL-CCNC: 97 U/L — SIGNIFICANT CHANGE UP (ref 30–115)
ALT FLD-CCNC: 14 U/L — SIGNIFICANT CHANGE UP (ref 0–41)
ANION GAP SERPL CALC-SCNC: 14 MMOL/L — SIGNIFICANT CHANGE UP (ref 7–14)
AST SERPL-CCNC: 49 U/L — HIGH (ref 0–41)
BASOPHILS # BLD AUTO: 0.02 K/UL — SIGNIFICANT CHANGE UP (ref 0–0.2)
BASOPHILS NFR BLD AUTO: 0.4 % — SIGNIFICANT CHANGE UP (ref 0–1)
BILIRUB SERPL-MCNC: 1.6 MG/DL — HIGH (ref 0.2–1.2)
BUN SERPL-MCNC: 18 MG/DL — SIGNIFICANT CHANGE UP (ref 10–20)
CALCIUM SERPL-MCNC: 7.4 MG/DL — LOW (ref 8.4–10.5)
CHLORIDE SERPL-SCNC: 109 MMOL/L — SIGNIFICANT CHANGE UP (ref 98–110)
CO2 SERPL-SCNC: 23 MMOL/L — SIGNIFICANT CHANGE UP (ref 17–32)
CREAT SERPL-MCNC: 0.9 MG/DL — SIGNIFICANT CHANGE UP (ref 0.7–1.5)
EGFR: 87 ML/MIN/1.73M2 — SIGNIFICANT CHANGE UP
EGFR: 87 ML/MIN/1.73M2 — SIGNIFICANT CHANGE UP
EOSINOPHIL # BLD AUTO: 0.21 K/UL — SIGNIFICANT CHANGE UP (ref 0–0.7)
EOSINOPHIL NFR BLD AUTO: 4.2 % — SIGNIFICANT CHANGE UP (ref 0–8)
GLUCOSE BLDC GLUCOMTR-MCNC: 136 MG/DL — HIGH (ref 70–99)
GLUCOSE BLDC GLUCOMTR-MCNC: 146 MG/DL — HIGH (ref 70–99)
GLUCOSE BLDC GLUCOMTR-MCNC: 167 MG/DL — HIGH (ref 70–99)
GLUCOSE BLDC GLUCOMTR-MCNC: 173 MG/DL — HIGH (ref 70–99)
GLUCOSE SERPL-MCNC: 159 MG/DL — HIGH (ref 70–99)
HCT VFR BLD CALC: 26.7 % — LOW (ref 42–52)
HGB BLD-MCNC: 8.7 G/DL — LOW (ref 14–18)
IMM GRANULOCYTES NFR BLD AUTO: 1.2 % — HIGH (ref 0.1–0.3)
LYMPHOCYTES # BLD AUTO: 0.63 K/UL — LOW (ref 1.2–3.4)
LYMPHOCYTES # BLD AUTO: 12.7 % — LOW (ref 20.5–51.1)
MAGNESIUM SERPL-MCNC: 1.5 MG/DL — LOW (ref 1.8–2.4)
MCHC RBC-ENTMCNC: 32.3 PG — HIGH (ref 27–31)
MCHC RBC-ENTMCNC: 32.6 G/DL — SIGNIFICANT CHANGE UP (ref 32–37)
MCV RBC AUTO: 99.3 FL — HIGH (ref 80–94)
MONOCYTES # BLD AUTO: 0.47 K/UL — SIGNIFICANT CHANGE UP (ref 0.1–0.6)
MONOCYTES NFR BLD AUTO: 9.5 % — HIGH (ref 1.7–9.3)
NEUTROPHILS # BLD AUTO: 3.56 K/UL — SIGNIFICANT CHANGE UP (ref 1.4–6.5)
NEUTROPHILS NFR BLD AUTO: 72 % — SIGNIFICANT CHANGE UP (ref 42.2–75.2)
NRBC BLD AUTO-RTO: 0 /100 WBCS — SIGNIFICANT CHANGE UP (ref 0–0)
PLATELET # BLD AUTO: 82 K/UL — LOW (ref 130–400)
PMV BLD: 9.2 FL — SIGNIFICANT CHANGE UP (ref 7.4–10.4)
POTASSIUM SERPL-MCNC: 3.7 MMOL/L — SIGNIFICANT CHANGE UP (ref 3.5–5)
POTASSIUM SERPL-SCNC: 3.7 MMOL/L — SIGNIFICANT CHANGE UP (ref 3.5–5)
PROT SERPL-MCNC: 5.2 G/DL — LOW (ref 6–8)
RBC # BLD: 2.69 M/UL — LOW (ref 4.7–6.1)
RBC # FLD: 17.8 % — HIGH (ref 11.5–14.5)
SODIUM SERPL-SCNC: 146 MMOL/L — SIGNIFICANT CHANGE UP (ref 135–146)
WBC # BLD: 4.95 K/UL — SIGNIFICANT CHANGE UP (ref 4.8–10.8)
WBC # FLD AUTO: 4.95 K/UL — SIGNIFICANT CHANGE UP (ref 4.8–10.8)

## 2025-04-19 PROCEDURE — 99233 SBSQ HOSP IP/OBS HIGH 50: CPT

## 2025-04-19 RX ORDER — MAGNESIUM SULFATE 500 MG/ML
2 SYRINGE (ML) INJECTION ONCE
Refills: 0 | Status: COMPLETED | OUTPATIENT
Start: 2025-04-19 | End: 2025-04-19

## 2025-04-19 RX ADMIN — Medication 40 MILLIGRAM(S): at 05:15

## 2025-04-19 RX ADMIN — CEFTRIAXONE 100 MILLIGRAM(S): 500 INJECTION, POWDER, FOR SOLUTION INTRAMUSCULAR; INTRAVENOUS at 13:00

## 2025-04-19 RX ADMIN — Medication 100 MILLIGRAM(S): at 05:16

## 2025-04-19 RX ADMIN — Medication 100 MILLIGRAM(S): at 17:10

## 2025-04-19 RX ADMIN — LIDOCAINE HYDROCHLORIDE 1 PATCH: 20 JELLY TOPICAL at 20:12

## 2025-04-19 RX ADMIN — NYSTATIN 1 APPLICATION(S): 100000 CREAM TOPICAL at 17:14

## 2025-04-19 RX ADMIN — LIDOCAINE HYDROCHLORIDE 1 PATCH: 20 JELLY TOPICAL at 12:57

## 2025-04-19 RX ADMIN — FOLIC ACID 1 MILLIGRAM(S): 1 TABLET ORAL at 12:58

## 2025-04-19 RX ADMIN — LEVETIRACETAM 250 MILLIGRAM(S): 10 INJECTION, SOLUTION INTRAVENOUS at 17:09

## 2025-04-19 RX ADMIN — INSULIN LISPRO 2: 100 INJECTION, SOLUTION INTRAVENOUS; SUBCUTANEOUS at 07:41

## 2025-04-19 RX ADMIN — LEVETIRACETAM 250 MILLIGRAM(S): 10 INJECTION, SOLUTION INTRAVENOUS at 05:15

## 2025-04-19 RX ADMIN — Medication 1 APPLICATION(S): at 05:15

## 2025-04-19 RX ADMIN — Medication 25 GRAM(S): at 07:42

## 2025-04-19 RX ADMIN — Medication 40 MILLIGRAM(S): at 17:12

## 2025-04-19 RX ADMIN — INSULIN LISPRO 2: 100 INJECTION, SOLUTION INTRAVENOUS; SUBCUTANEOUS at 16:58

## 2025-04-19 RX ADMIN — Medication 2 TABLET(S): at 21:28

## 2025-04-19 RX ADMIN — NYSTATIN 1 APPLICATION(S): 100000 CREAM TOPICAL at 05:15

## 2025-04-19 RX ADMIN — ATORVASTATIN CALCIUM 80 MILLIGRAM(S): 80 TABLET, FILM COATED ORAL at 21:28

## 2025-04-19 RX ADMIN — LIDOCAINE HYDROCHLORIDE 1 PATCH: 20 JELLY TOPICAL at 00:08

## 2025-04-19 NOTE — PROGRESS NOTE ADULT - SUBJECTIVE AND OBJECTIVE BOX
Patient is a 80y old  Male who presents with a chief complaint of acute mentation changes (18 Apr 2025 15:25)      Over Night Events:  Patient seen and examined.   awake no agitated     ROS:  See HPI    PHYSICAL EXAM    ICU Vital Signs Last 24 Hrs  T(C): 36.8 (18 Apr 2025 23:06), Max: 36.8 (18 Apr 2025 23:06)  T(F): 98.3 (18 Apr 2025 23:06), Max: 98.3 (18 Apr 2025 23:06)  HR: 80 (19 Apr 2025 04:45) (63 - 80)  BP: 144/66 (19 Apr 2025 04:45) (121/57 - 172/70)  BP(mean): 95 (19 Apr 2025 04:45) (82 - 101)  ABP: --  ABP(mean): --  RR: 16 (19 Apr 2025 04:45) (12 - 18)  SpO2: 96% (19 Apr 2025 04:45) (90% - 98%)    O2 Parameters below as of 19 Apr 2025 04:45  Patient On (Oxygen Delivery Method): nasal cannula  O2 Flow (L/min): 2          General:awake   HEENT:                Lymph Nodes: NO cervical LN   Lungs: Bilateral BS  Cardiovascular: Regular   Abdomen: Soft, Positive BS  Extremities: No clubbing   Skin: warm   Neurological:   Musculoskeletal: move all ext     I&O's Detail    17 Apr 2025 07:01  -  18 Apr 2025 07:00  --------------------------------------------------------  IN:    dextrose 5% + sodium chloride 0.45%: 1000 mL    IV PiggyBack: 550 mL    Oral Fluid: 450 mL  Total IN: 2000 mL    OUT:    Dexmedetomidine: 0 mL    Indwelling Catheter - Urethral (mL): 1210 mL  Total OUT: 1210 mL    Total NET: 790 mL      18 Apr 2025 07:01  -  19 Apr 2025 05:42  --------------------------------------------------------  IN:  Total IN: 0 mL    OUT:    Indwelling Catheter - Urethral (mL): 835 mL  Total OUT: 835 mL    Total NET: -835 mL          LABS:                          9.0    5.31  )-----------( 97       ( 18 Apr 2025 05:19 )             27.4         18 Apr 2025 11:13    148    |  112    |  24     ----------------------------<  155    3.2     |  23     |  1.1      Ca    6.8        18 Apr 2025 11:13  Mg     1.8       18 Apr 2025 05:19    TPro  4.6    /  Alb  2.6    /  TBili  0.9    /  DBili  x      /  AST  57     /  ALT  16     /  AlkPhos  109    18 Apr 2025 11:13  Amylase x     lipase x                                                 PT/INR - ( 17 Apr 2025 18:30 )   PT: 21.40 sec;   INR: 1.79 ratio         PTT - ( 17 Apr 2025 18:30 )  PTT:36.1 sec                                       Urinalysis Basic - ( 18 Apr 2025 11:13 )    Color: x / Appearance: x / SG: x / pH: x  Gluc: 155 mg/dL / Ketone: x  / Bili: x / Urobili: x   Blood: x / Protein: x / Nitrite: x   Leuk Esterase: x / RBC: x / WBC x   Sq Epi: x / Non Sq Epi: x / Bacteria: x                                                                Culture - Blood (collected 17 Apr 2025 18:30)  Source: Blood Blood  Preliminary Report (19 Apr 2025 03:01):    No growth at 24 hours    Culture - Blood (collected 16 Apr 2025 15:15)  Source: Blood None  Preliminary Report (19 Apr 2025 01:01):    No growth at 48 Hours                                                                                           MEDICATIONS  (STANDING):  atorvastatin 80 milliGRAM(s) Oral at bedtime  cefTRIAXone   IVPB 2000 milliGRAM(s) IV Intermittent every 24 hours  chlorhexidine 2% Cloths 1 Application(s) Topical <User Schedule>  dexMEDEtomidine Infusion 0.5 MICROgram(s)/kG/Hr (14.7 mL/Hr) IV Continuous <Continuous>  dextrose 5%. 1000 milliLiter(s) (100 mL/Hr) IV Continuous <Continuous>  dextrose 5%. 1000 milliLiter(s) (50 mL/Hr) IV Continuous <Continuous>  dextrose 50% Injectable 25 Gram(s) IV Push once  dextrose 50% Injectable 12.5 Gram(s) IV Push once  dextrose 50% Injectable 25 Gram(s) IV Push once  folic acid 1 milliGRAM(s) Oral daily  glucagon  Injectable 1 milliGRAM(s) IntraMuscular once  insulin lispro (ADMELOG) corrective regimen sliding scale   SubCutaneous three times a day before meals  levETIRAcetam   Injectable 250 milliGRAM(s) IV Push <User Schedule>  lidocaine   4% Patch 1 Patch Transdermal daily  metroNIDAZOLE  IVPB 500 milliGRAM(s) IV Intermittent every 12 hours  nystatin Powder 1 Application(s) Topical two times a day  pantoprazole  Injectable 40 milliGRAM(s) IV Push every 12 hours  rifAXIMin 550 milliGRAM(s) Oral two times a day  senna 2 Tablet(s) Oral at bedtime    MEDICATIONS  (PRN):  acetaminophen     Tablet .. 650 milliGRAM(s) Oral every 6 hours PRN Temp greater or equal to 38C (100.4F), Mild Pain (1 - 3)  aluminum hydroxide/magnesium hydroxide/simethicone Suspension 30 milliLiter(s) Oral every 4 hours PRN Dyspepsia  dextrose Oral Gel 15 Gram(s) Oral once PRN Blood Glucose LESS THAN 70 milliGRAM(s)/deciliter  lactulose Syrup 20 Gram(s) Enteral Tube every 8 hours PRN constiipation  melatonin 3 milliGRAM(s) Oral at bedtime PRN Insomnia  ondansetron Injectable 4 milliGRAM(s) IV Push every 8 hours PRN Nausea and/or Vomiting  polyethylene glycol 3350 17 Gram(s) Oral two times a day PRN Constipation          Xrays:                                                                                  ECHO:  [facility] ICU:

## 2025-04-19 NOTE — PROGRESS NOTE ADULT - ASSESSMENT
IMPRESSION:  alter mental status ?? metabolic encephalopathy   seizure hx   liver cirrhosis   bacteremia   GI bleed   hypernatremia   CHARLENE   elevated trop   afib     PLAN:    CNS:    neurology follow up   MRI Lumbar    follow ammonia level       HEENT: oral care     PULMONARY: keep pox >92% aspiration precaution   daily cxr repeat cxr today   CARDIOVASCULAR: IF NA above 145 switch fluid to D5 w 50cc/ hr   follow cardiology     GI: GI prophylaxis.  follow GI if ok to start feed   speech and swallow eval if failed NG feed    PPI Q12 hrs   follow LFT , hepatology / Gi follow up   rafiximine and lactulose   check Ammonia level today     s/p albumin infusion     RENAL:  D5w 50cc/ hr if Na >145  when start feed start free water 250 cc Q4 hrs   follow NA level   follow renal   BMP now   INFECTIOUS DISEASE: IDfollow up   rocephine and flagyl   procal   bldcx       HEMATOLOGICAL:  DVT prophylaxis. follow h/h and plt   hold AC   serial h/h   ENDOCRINE:  Follow up FS.  Insulin protocol if needed.  SDU         CRITICAL CARE TIME SPENT: ***

## 2025-04-19 NOTE — PROGRESS NOTE ADULT - ASSESSMENT
#New onset decompensated liver Cirrhosis and new ascities  rocephin flagyl for possible SBP  mental status resolved, back to basleine    #HX of SDH   outpt f/u    #GIB  s/p 2 units PRBC   - no further bleeding episodes   -S/p colonoscopy and EGD    #gram negative bacteremia   - on abx   - resolving    # Bradycardia  resolved      #CHARLENE  - resolved     dm2  - ssi

## 2025-04-19 NOTE — PROGRESS NOTE ADULT - SUBJECTIVE AND OBJECTIVE BOX
Pt says he feels back to normal  says he remembers me now, no complaints. asking about going home    T(F): , Max: 98.5 (04-19-25 @ 07:05)  HR: 78 (04-19-25 @ 16:00) (69 - 82)  BP: 169/75 (04-19-25 @ 16:00)  RR: 15 (04-19-25 @ 16:00)  SpO2: 98% (04-19-25 @ 16:00)  IN: 100 mL / OUT: 1355 mL / NET: -1255 mL    IN: 490 mL / OUT: 525 mL / NET: -35 mL      General: No apparent distress  Cardiovascular: S1, S2  Gastrointestinal: Soft, Non-tender, Non-distended  Respiratory: Good air entry bilaterally  Musculoskeletal: Moves all extremities  Lymphatic: No edema  Neurologic: No gross motor deficit  Dermatologic: Skin dry                          8.7    4.95  )-----------( 82       ( 19 Apr 2025 05:31 )             26.7     04-19    146  |  109  |  18  ----------------------------<  159[H]  3.7   |  23  |  0.9    Ca    7.4[L]      19 Apr 2025 05:31  Mg     1.5     04-19    TPro  5.2[L]  /  Alb  3.4[L]  /  TBili  1.6[H]  /  DBili  x   /  AST  49[H]  /  ALT  14  /  AlkPhos  97  04-19      Culture - Blood (collected 17 Apr 2025 18:30)  Source: Blood Blood  Preliminary Report (19 Apr 2025 03:01):    No growth at 24 hours

## 2025-04-20 LAB
ALBUMIN SERPL ELPH-MCNC: 2.9 G/DL — LOW (ref 3.5–5.2)
ALP SERPL-CCNC: 97 U/L — SIGNIFICANT CHANGE UP (ref 30–115)
ALT FLD-CCNC: 11 U/L — SIGNIFICANT CHANGE UP (ref 0–41)
AMMONIA BLD-MCNC: 36 UMOL/L — SIGNIFICANT CHANGE UP (ref 11–55)
ANION GAP SERPL CALC-SCNC: 12 MMOL/L — SIGNIFICANT CHANGE UP (ref 7–14)
AST SERPL-CCNC: 46 U/L — HIGH (ref 0–41)
BASOPHILS # BLD AUTO: 0.01 K/UL — SIGNIFICANT CHANGE UP (ref 0–0.2)
BASOPHILS NFR BLD AUTO: 0.2 % — SIGNIFICANT CHANGE UP (ref 0–1)
BILIRUB SERPL-MCNC: 1.6 MG/DL — HIGH (ref 0.2–1.2)
BUN SERPL-MCNC: 13 MG/DL — SIGNIFICANT CHANGE UP (ref 10–20)
CALCIUM SERPL-MCNC: 7.5 MG/DL — LOW (ref 8.4–10.5)
CHLORIDE SERPL-SCNC: 109 MMOL/L — SIGNIFICANT CHANGE UP (ref 98–110)
CO2 SERPL-SCNC: 22 MMOL/L — SIGNIFICANT CHANGE UP (ref 17–32)
CREAT SERPL-MCNC: 0.7 MG/DL — SIGNIFICANT CHANGE UP (ref 0.7–1.5)
EGFR: 94 ML/MIN/1.73M2 — SIGNIFICANT CHANGE UP
EGFR: 94 ML/MIN/1.73M2 — SIGNIFICANT CHANGE UP
EOSINOPHIL # BLD AUTO: 0.23 K/UL — SIGNIFICANT CHANGE UP (ref 0–0.7)
EOSINOPHIL NFR BLD AUTO: 4.5 % — SIGNIFICANT CHANGE UP (ref 0–8)
GLUCOSE BLDC GLUCOMTR-MCNC: 140 MG/DL — HIGH (ref 70–99)
GLUCOSE BLDC GLUCOMTR-MCNC: 144 MG/DL — HIGH (ref 70–99)
GLUCOSE BLDC GLUCOMTR-MCNC: 156 MG/DL — HIGH (ref 70–99)
GLUCOSE SERPL-MCNC: 143 MG/DL — HIGH (ref 70–99)
HCT VFR BLD CALC: 29.9 % — LOW (ref 42–52)
HGB BLD-MCNC: 9.6 G/DL — LOW (ref 14–18)
IMM GRANULOCYTES NFR BLD AUTO: 0.8 % — HIGH (ref 0.1–0.3)
LYMPHOCYTES # BLD AUTO: 0.68 K/UL — LOW (ref 1.2–3.4)
LYMPHOCYTES # BLD AUTO: 13.2 % — LOW (ref 20.5–51.1)
MAGNESIUM SERPL-MCNC: 1.4 MG/DL — LOW (ref 1.8–2.4)
MCHC RBC-ENTMCNC: 31.6 PG — HIGH (ref 27–31)
MCHC RBC-ENTMCNC: 32.1 G/DL — SIGNIFICANT CHANGE UP (ref 32–37)
MCV RBC AUTO: 98.4 FL — HIGH (ref 80–94)
MONOCYTES # BLD AUTO: 0.55 K/UL — SIGNIFICANT CHANGE UP (ref 0.1–0.6)
MONOCYTES NFR BLD AUTO: 10.7 % — HIGH (ref 1.7–9.3)
NEUTROPHILS # BLD AUTO: 3.64 K/UL — SIGNIFICANT CHANGE UP (ref 1.4–6.5)
NEUTROPHILS NFR BLD AUTO: 70.6 % — SIGNIFICANT CHANGE UP (ref 42.2–75.2)
NRBC BLD AUTO-RTO: 0 /100 WBCS — SIGNIFICANT CHANGE UP (ref 0–0)
PLATELET # BLD AUTO: 93 K/UL — LOW (ref 130–400)
PMV BLD: 9.4 FL — SIGNIFICANT CHANGE UP (ref 7.4–10.4)
POTASSIUM SERPL-MCNC: 3.6 MMOL/L — SIGNIFICANT CHANGE UP (ref 3.5–5)
POTASSIUM SERPL-SCNC: 3.6 MMOL/L — SIGNIFICANT CHANGE UP (ref 3.5–5)
PROT SERPL-MCNC: 5 G/DL — LOW (ref 6–8)
RBC # BLD: 3.04 M/UL — LOW (ref 4.7–6.1)
RBC # FLD: 17.2 % — HIGH (ref 11.5–14.5)
SODIUM SERPL-SCNC: 143 MMOL/L — SIGNIFICANT CHANGE UP (ref 135–146)
WBC # BLD: 5.15 K/UL — SIGNIFICANT CHANGE UP (ref 4.8–10.8)
WBC # FLD AUTO: 5.15 K/UL — SIGNIFICANT CHANGE UP (ref 4.8–10.8)

## 2025-04-20 PROCEDURE — 99233 SBSQ HOSP IP/OBS HIGH 50: CPT

## 2025-04-20 PROCEDURE — 71045 X-RAY EXAM CHEST 1 VIEW: CPT | Mod: 26

## 2025-04-20 PROCEDURE — 99232 SBSQ HOSP IP/OBS MODERATE 35: CPT

## 2025-04-20 RX ORDER — MAGNESIUM SULFATE 500 MG/ML
2 SYRINGE (ML) INJECTION ONCE
Refills: 0 | Status: COMPLETED | OUTPATIENT
Start: 2025-04-20 | End: 2025-04-20

## 2025-04-20 RX ADMIN — NYSTATIN 1 APPLICATION(S): 100000 CREAM TOPICAL at 05:22

## 2025-04-20 RX ADMIN — NYSTATIN 1 APPLICATION(S): 100000 CREAM TOPICAL at 18:02

## 2025-04-20 RX ADMIN — Medication 2 TABLET(S): at 21:05

## 2025-04-20 RX ADMIN — FOLIC ACID 1 MILLIGRAM(S): 1 TABLET ORAL at 12:23

## 2025-04-20 RX ADMIN — CEFTRIAXONE 100 MILLIGRAM(S): 500 INJECTION, POWDER, FOR SOLUTION INTRAMUSCULAR; INTRAVENOUS at 15:22

## 2025-04-20 RX ADMIN — ATORVASTATIN CALCIUM 80 MILLIGRAM(S): 80 TABLET, FILM COATED ORAL at 21:05

## 2025-04-20 RX ADMIN — LIDOCAINE HYDROCHLORIDE 1 PATCH: 20 JELLY TOPICAL at 00:32

## 2025-04-20 RX ADMIN — LIDOCAINE HYDROCHLORIDE 1 PATCH: 20 JELLY TOPICAL at 19:49

## 2025-04-20 RX ADMIN — Medication 100 MILLIGRAM(S): at 18:09

## 2025-04-20 RX ADMIN — LEVETIRACETAM 250 MILLIGRAM(S): 10 INJECTION, SOLUTION INTRAVENOUS at 18:06

## 2025-04-20 RX ADMIN — Medication 40 MILLIGRAM(S): at 05:21

## 2025-04-20 RX ADMIN — LIDOCAINE HYDROCHLORIDE 1 PATCH: 20 JELLY TOPICAL at 12:23

## 2025-04-20 RX ADMIN — Medication 1 APPLICATION(S): at 05:22

## 2025-04-20 RX ADMIN — LIDOCAINE HYDROCHLORIDE 1 PATCH: 20 JELLY TOPICAL at 23:39

## 2025-04-20 RX ADMIN — Medication 25 GRAM(S): at 08:52

## 2025-04-20 RX ADMIN — INSULIN LISPRO 2: 100 INJECTION, SOLUTION INTRAVENOUS; SUBCUTANEOUS at 12:11

## 2025-04-20 RX ADMIN — LEVETIRACETAM 250 MILLIGRAM(S): 10 INJECTION, SOLUTION INTRAVENOUS at 05:21

## 2025-04-20 RX ADMIN — Medication 100 MILLIGRAM(S): at 05:21

## 2025-04-20 RX ADMIN — Medication 40 MILLIGRAM(S): at 18:05

## 2025-04-20 NOTE — PROGRESS NOTE ADULT - SUBJECTIVE AND OBJECTIVE BOX
Pt calm, asking when he can go    T(F): , Max: 98.5 (04-20-25 @ 15:17)  HR: 81 (04-20-25 @ 23:20) (77 - 85)  BP: 149/57 (04-20-25 @ 23:20)  RR: 15 (04-20-25 @ 23:20)  SpO2: 99% (04-20-25 @ 23:20)  IN: 555 mL / OUT: 1625 mL / NET: -1070 mL    IN: 470 mL / OUT: 640 mL / NET: -170 mL      General: No apparent distress, still with NGT  Cardiovascular: S1, S2  Gastrointestinal: Soft, Non-tender, Non-distended  Respiratory: Good air entry bilaterally  Musculoskeletal: Moves all extremities  Lymphatic: No edema  Neurologic: No gross motor deficit  Dermatologic: Skin dry                          9.6    5.15  )-----------( 93       ( 20 Apr 2025 05:39 )             29.9     04-20    143  |  109  |  13  ----------------------------<  143[H]  3.6   |  22  |  0.7    Ca    7.5[L]      20 Apr 2025 05:39  Mg     1.4     04-20    TPro  5.0[L]  /  Alb  2.9[L]  /  TBili  1.6[H]  /  DBili  x   /  AST  46[H]  /  ALT  11  /  AlkPhos  97  04-20

## 2025-04-20 NOTE — PROGRESS NOTE ADULT - SUBJECTIVE AND OBJECTIVE BOX
Patient is a 80y old  Male who presents with a chief complaint of acute mentation changes (19 Apr 2025 09:35)      Over Night Events:  Patient seen and examined.   awake follow simple command   no pressors     ROS:  See HPI    PHYSICAL EXAM    ICU Vital Signs Last 24 Hrs  T(C): 36.5 (19 Apr 2025 23:01), Max: 36.9 (19 Apr 2025 07:05)  T(F): 97.7 (19 Apr 2025 23:01), Max: 98.5 (19 Apr 2025 07:05)  HR: 72 (19 Apr 2025 20:00) (72 - 82)  BP: 156/71 (19 Apr 2025 20:00) (156/71 - 169/75)  BP(mean): 102 (19 Apr 2025 20:00) (102 - 108)  ABP: --  ABP(mean): --  RR: 18 (19 Apr 2025 23:01) (14 - 18)  SpO2: 98% (19 Apr 2025 20:00) (96% - 98%)    O2 Parameters below as of 19 Apr 2025 23:01  Patient On (Oxygen Delivery Method): nasal cannula            General: awake   HEENT:   Ng tube              Lymph Nodes: NO cervical LN   Lungs: Bilateral BS  Cardiovascular: Regular   Abdomen: Soft, Positive BS  Extremities: No clubbing   Skin: warm   Neurological: no focal   Musculoskeletal: move all ext     I&O's Detail    18 Apr 2025 07:01  -  19 Apr 2025 07:00  --------------------------------------------------------  IN:    IV PiggyBack: 100 mL  Total IN: 100 mL    OUT:    Indwelling Catheter - Urethral (mL): 1355 mL  Total OUT: 1355 mL    Total NET: -1255 mL      19 Apr 2025 07:01  -  20 Apr 2025 06:20  --------------------------------------------------------  IN:    Enteral Tube Flush: 100 mL    IV PiggyBack: 150 mL    Oral Fluid: 305 mL  Total IN: 555 mL    OUT:    Indwelling Catheter - Urethral (mL): 1075 mL  Total OUT: 1075 mL    Total NET: -520 mL          LABS:                          8.7    4.95  )-----------( 82       ( 19 Apr 2025 05:31 )             26.7         19 Apr 2025 05:31    146    |  109    |  18     ----------------------------<  159    3.7     |  23     |  0.9      Ca    7.4        19 Apr 2025 05:31  Mg     1.5       19 Apr 2025 05:31                                                                                      Urinalysis Basic - ( 19 Apr 2025 05:31 )    Color: x / Appearance: x / SG: x / pH: x  Gluc: 159 mg/dL / Ketone: x  / Bili: x / Urobili: x   Blood: x / Protein: x / Nitrite: x   Leuk Esterase: x / RBC: x / WBC x   Sq Epi: x / Non Sq Epi: x / Bacteria: x                                                                Culture - Blood (collected 17 Apr 2025 18:30)  Source: Blood Blood  Preliminary Report (20 Apr 2025 03:01):    No growth at 48 Hours                                                                                           MEDICATIONS  (STANDING):  atorvastatin 80 milliGRAM(s) Oral at bedtime  cefTRIAXone   IVPB 2000 milliGRAM(s) IV Intermittent every 24 hours  chlorhexidine 2% Cloths 1 Application(s) Topical <User Schedule>  dexMEDEtomidine Infusion 0.5 MICROgram(s)/kG/Hr (14.7 mL/Hr) IV Continuous <Continuous>  dextrose 5%. 1000 milliLiter(s) (50 mL/Hr) IV Continuous <Continuous>  dextrose 5%. 1000 milliLiter(s) (100 mL/Hr) IV Continuous <Continuous>  dextrose 50% Injectable 25 Gram(s) IV Push once  dextrose 50% Injectable 12.5 Gram(s) IV Push once  dextrose 50% Injectable 25 Gram(s) IV Push once  folic acid 1 milliGRAM(s) Oral daily  glucagon  Injectable 1 milliGRAM(s) IntraMuscular once  insulin lispro (ADMELOG) corrective regimen sliding scale   SubCutaneous three times a day before meals  levETIRAcetam   Injectable 250 milliGRAM(s) IV Push <User Schedule>  lidocaine   4% Patch 1 Patch Transdermal daily  metroNIDAZOLE  IVPB 500 milliGRAM(s) IV Intermittent every 12 hours  nystatin Powder 1 Application(s) Topical two times a day  pantoprazole  Injectable 40 milliGRAM(s) IV Push every 12 hours  rifAXIMin 550 milliGRAM(s) Oral two times a day  senna 2 Tablet(s) Oral at bedtime    MEDICATIONS  (PRN):  acetaminophen     Tablet .. 650 milliGRAM(s) Oral every 6 hours PRN Temp greater or equal to 38C (100.4F), Mild Pain (1 - 3)  aluminum hydroxide/magnesium hydroxide/simethicone Suspension 30 milliLiter(s) Oral every 4 hours PRN Dyspepsia  dextrose Oral Gel 15 Gram(s) Oral once PRN Blood Glucose LESS THAN 70 milliGRAM(s)/deciliter  lactulose Syrup 20 Gram(s) Enteral Tube every 8 hours PRN constiipation  melatonin 3 milliGRAM(s) Oral at bedtime PRN Insomnia  ondansetron Injectable 4 milliGRAM(s) IV Push every 8 hours PRN Nausea and/or Vomiting  polyethylene glycol 3350 17 Gram(s) Oral two times a day PRN Constipation          Xrays:                                                                                    ECHO:  [facility] ICU:

## 2025-04-20 NOTE — PROGRESS NOTE ADULT - ASSESSMENT
#decompensated liver Cirrhosis and ascities  rocephin flagyl for possible SBP, likely can stop after tomorrow  mental status resolved, back to baseline    #HX of SDH   outpt f/u    #GIB  s/p 2 units PRBC   - no further bleeding episodes   -S/p colonoscopy and EGD    #gram negative bacteremia   - on abx   - resolving    # Bradycardia  resolved      #CHARLENE  - resolved     dm2  - ssi       downgrade PRN

## 2025-04-20 NOTE — SWALLOW BEDSIDE ASSESSMENT ADULT - SWALLOW EVAL: RECOMMENDED DIET
soft + bite sized consistency and thin liquids NGT as primary means of nutrition and hydration. Supplemental PO- soft + bite sized consistency and thin liquids

## 2025-04-20 NOTE — PROGRESS NOTE ADULT - ASSESSMENT
IMPRESSION:  alter mental status ?? metabolic encephalopathy   seizure hx   liver cirrhosis   bacteremia   GI bleed   hypernatremia   CHARLENE   elevated trop   afib     PLAN:    CNS:    neurology follow up   MRI Lumbar    follow ammonia level       HEENT: oral care     PULMONARY: keep pox >92% aspiration precaution     repeat cxr today   CARDIOVASCULAR: IF NA above 145 switch fluid to D5 w 50cc/ hr   follow cardiology     GI: GI prophylaxis.  follow GI if ok to start feed   speech and swallow eval if failed NG feed    PPI Q12 hrs   follow LFT , hepatology / Gi follow up   rafiximine and lactulose   repeat  Ammonia level today     s/p albumin infusion     RENAL:  D5w 50cc/ hr if Na >145  when start feed start free water 250 cc Q4 hrs   follow NA level   follow renal   BMP now   INFECTIOUS DISEASE: IDfollow up   rocephine and flagyl   procal   bldcx       HEMATOLOGICAL:  DVT prophylaxis. follow h/h and plt   hold AC   serial h/h   ENDOCRINE:  Follow up FS.  Insulin protocol if needed.  SDU         CRITICAL CARE TIME SPENT: ***

## 2025-04-21 LAB
ALBUMIN SERPL ELPH-MCNC: 2.5 G/DL — LOW (ref 3.5–5.2)
ALP SERPL-CCNC: 92 U/L — SIGNIFICANT CHANGE UP (ref 30–115)
ALT FLD-CCNC: 9 U/L — SIGNIFICANT CHANGE UP (ref 0–41)
ANION GAP SERPL CALC-SCNC: 10 MMOL/L — SIGNIFICANT CHANGE UP (ref 7–14)
AST SERPL-CCNC: 32 U/L — SIGNIFICANT CHANGE UP (ref 0–41)
BILIRUB SERPL-MCNC: 1.3 MG/DL — HIGH (ref 0.2–1.2)
BUN SERPL-MCNC: 12 MG/DL — SIGNIFICANT CHANGE UP (ref 10–20)
CALCIUM SERPL-MCNC: 7.4 MG/DL — LOW (ref 8.4–10.5)
CHLORIDE SERPL-SCNC: 112 MMOL/L — HIGH (ref 98–110)
CO2 SERPL-SCNC: 25 MMOL/L — SIGNIFICANT CHANGE UP (ref 17–32)
CREAT SERPL-MCNC: 0.7 MG/DL — SIGNIFICANT CHANGE UP (ref 0.7–1.5)
EGFR: 94 ML/MIN/1.73M2 — SIGNIFICANT CHANGE UP
EGFR: 94 ML/MIN/1.73M2 — SIGNIFICANT CHANGE UP
GLUCOSE BLDC GLUCOMTR-MCNC: 147 MG/DL — HIGH (ref 70–99)
GLUCOSE BLDC GLUCOMTR-MCNC: 147 MG/DL — HIGH (ref 70–99)
GLUCOSE BLDC GLUCOMTR-MCNC: 160 MG/DL — HIGH (ref 70–99)
GLUCOSE BLDC GLUCOMTR-MCNC: 214 MG/DL — HIGH (ref 70–99)
GLUCOSE SERPL-MCNC: 138 MG/DL — HIGH (ref 70–99)
HCT VFR BLD CALC: 24.8 % — LOW (ref 42–52)
HGB BLD-MCNC: 8.1 G/DL — LOW (ref 14–18)
MAGNESIUM SERPL-MCNC: 1.4 MG/DL — LOW (ref 1.8–2.4)
MCHC RBC-ENTMCNC: 32.3 PG — HIGH (ref 27–31)
MCHC RBC-ENTMCNC: 32.7 G/DL — SIGNIFICANT CHANGE UP (ref 32–37)
MCV RBC AUTO: 98.8 FL — HIGH (ref 80–94)
NRBC BLD AUTO-RTO: 0 /100 WBCS — SIGNIFICANT CHANGE UP (ref 0–0)
PLATELET # BLD AUTO: 94 K/UL — LOW (ref 130–400)
PMV BLD: 9.3 FL — SIGNIFICANT CHANGE UP (ref 7.4–10.4)
POTASSIUM SERPL-MCNC: 3.4 MMOL/L — LOW (ref 3.5–5)
POTASSIUM SERPL-SCNC: 3.4 MMOL/L — LOW (ref 3.5–5)
PROT SERPL-MCNC: 4.3 G/DL — LOW (ref 6–8)
RBC # BLD: 2.51 M/UL — LOW (ref 4.7–6.1)
RBC # FLD: 17.3 % — HIGH (ref 11.5–14.5)
SODIUM SERPL-SCNC: 147 MMOL/L — HIGH (ref 135–146)
WBC # BLD: 6.33 K/UL — SIGNIFICANT CHANGE UP (ref 4.8–10.8)
WBC # FLD AUTO: 6.33 K/UL — SIGNIFICANT CHANGE UP (ref 4.8–10.8)

## 2025-04-21 PROCEDURE — 99233 SBSQ HOSP IP/OBS HIGH 50: CPT

## 2025-04-21 RX ORDER — MAGNESIUM SULFATE 500 MG/ML
2 SYRINGE (ML) INJECTION
Refills: 0 | Status: COMPLETED | OUTPATIENT
Start: 2025-04-21 | End: 2025-04-21

## 2025-04-21 RX ORDER — LACTULOSE 10 G/15ML
20 SOLUTION ORAL
Refills: 0 | Status: DISCONTINUED | OUTPATIENT
Start: 2025-04-21 | End: 2025-05-01

## 2025-04-21 RX ORDER — OLANZAPINE 10 MG/1
5 TABLET ORAL ONCE
Refills: 0 | Status: COMPLETED | OUTPATIENT
Start: 2025-04-21 | End: 2025-04-21

## 2025-04-21 RX ADMIN — OLANZAPINE 5 MILLIGRAM(S): 10 TABLET ORAL at 22:03

## 2025-04-21 RX ADMIN — Medication 25 GRAM(S): at 13:00

## 2025-04-21 RX ADMIN — Medication 40 MILLIEQUIVALENT(S): at 11:17

## 2025-04-21 RX ADMIN — Medication 40 MILLIGRAM(S): at 17:37

## 2025-04-21 RX ADMIN — LEVETIRACETAM 250 MILLIGRAM(S): 10 INJECTION, SOLUTION INTRAVENOUS at 17:37

## 2025-04-21 RX ADMIN — Medication 100 MILLIGRAM(S): at 17:37

## 2025-04-21 RX ADMIN — LIDOCAINE HYDROCHLORIDE 1 PATCH: 20 JELLY TOPICAL at 11:17

## 2025-04-21 RX ADMIN — INSULIN LISPRO 4: 100 INJECTION, SOLUTION INTRAVENOUS; SUBCUTANEOUS at 16:43

## 2025-04-21 RX ADMIN — LACTULOSE 20 GRAM(S): 10 SOLUTION ORAL at 11:17

## 2025-04-21 RX ADMIN — LACTULOSE 20 GRAM(S): 10 SOLUTION ORAL at 17:36

## 2025-04-21 RX ADMIN — Medication 1 APPLICATION(S): at 05:42

## 2025-04-21 RX ADMIN — LIDOCAINE HYDROCHLORIDE 1 PATCH: 20 JELLY TOPICAL at 23:30

## 2025-04-21 RX ADMIN — Medication 25 GRAM(S): at 11:16

## 2025-04-21 RX ADMIN — LEVETIRACETAM 250 MILLIGRAM(S): 10 INJECTION, SOLUTION INTRAVENOUS at 05:41

## 2025-04-21 RX ADMIN — FOLIC ACID 1 MILLIGRAM(S): 1 TABLET ORAL at 11:17

## 2025-04-21 RX ADMIN — Medication 40 MILLIGRAM(S): at 05:41

## 2025-04-21 RX ADMIN — LACTULOSE 20 GRAM(S): 10 SOLUTION ORAL at 16:46

## 2025-04-21 RX ADMIN — NYSTATIN 1 APPLICATION(S): 100000 CREAM TOPICAL at 05:42

## 2025-04-21 RX ADMIN — LIDOCAINE HYDROCHLORIDE 1 PATCH: 20 JELLY TOPICAL at 19:22

## 2025-04-21 RX ADMIN — Medication 100 MILLIGRAM(S): at 05:42

## 2025-04-21 RX ADMIN — CEFTRIAXONE 100 MILLIGRAM(S): 500 INJECTION, POWDER, FOR SOLUTION INTRAMUSCULAR; INTRAVENOUS at 16:42

## 2025-04-21 RX ADMIN — NYSTATIN 1 APPLICATION(S): 100000 CREAM TOPICAL at 17:36

## 2025-04-21 NOTE — PROGRESS NOTE ADULT - SUBJECTIVE AND OBJECTIVE BOX
DWIGHT RIBEIRO 80y Male  MRN#: [id number]   Hospital Day: 7d    SUBJECTIVE  Patient is a 80y old Male who presents with a chief complaint of acute mentation changes (21 Apr 2025 09:58)  Currently admitted to medicine with the primary diagnosis of Metabolic encephalopathy      INTERVAL HPI AND OVERNIGHT EVENTS:  Patient was examined and seen at bedside. This morning he is resting comfortably  appears confused  speaks, but does not answer when asked what his name is  says why      OBJECTIVE  PAST MEDICAL & SURGICAL HISTORY  Diabetes    Hypertension    Fall    H/O left knee surgery    History of cholecystectomy    History of appendectomy      ALLERGIES:  No Known Allergies    MEDICATIONS:  STANDING MEDICATIONS  atorvastatin 80 milliGRAM(s) Oral at bedtime  cefTRIAXone   IVPB 2000 milliGRAM(s) IV Intermittent every 24 hours  chlorhexidine 2% Cloths 1 Application(s) Topical <User Schedule>  dexMEDEtomidine Infusion 0.5 MICROgram(s)/kG/Hr IV Continuous <Continuous>  dextrose 5%. 1000 milliLiter(s) IV Continuous <Continuous>  dextrose 5%. 1000 milliLiter(s) IV Continuous <Continuous>  dextrose 50% Injectable 25 Gram(s) IV Push once  dextrose 50% Injectable 12.5 Gram(s) IV Push once  dextrose 50% Injectable 25 Gram(s) IV Push once  folic acid 1 milliGRAM(s) Oral daily  glucagon  Injectable 1 milliGRAM(s) IntraMuscular once  insulin lispro (ADMELOG) corrective regimen sliding scale   SubCutaneous three times a day before meals  lactulose Syrup 20 Gram(s) Enteral Tube every 3 hours  levETIRAcetam   Injectable 250 milliGRAM(s) IV Push <User Schedule>  lidocaine   4% Patch 1 Patch Transdermal daily  magnesium sulfate  IVPB 2 Gram(s) IV Intermittent every 2 hours  metroNIDAZOLE  IVPB 500 milliGRAM(s) IV Intermittent every 12 hours  nystatin Powder 1 Application(s) Topical two times a day  pantoprazole  Injectable 40 milliGRAM(s) IV Push every 12 hours  rifAXIMin 550 milliGRAM(s) Oral two times a day  senna 2 Tablet(s) Oral at bedtime    PRN MEDICATIONS  acetaminophen     Tablet .. 650 milliGRAM(s) Oral every 6 hours PRN  aluminum hydroxide/magnesium hydroxide/simethicone Suspension 30 milliLiter(s) Oral every 4 hours PRN  dextrose Oral Gel 15 Gram(s) Oral once PRN  melatonin 3 milliGRAM(s) Oral at bedtime PRN  ondansetron Injectable 4 milliGRAM(s) IV Push every 8 hours PRN  polyethylene glycol 3350 17 Gram(s) Oral two times a day PRN      VITAL SIGNS: Last 24 Hours  T(C): 37.1 (21 Apr 2025 08:09), Max: 37.1 (21 Apr 2025 08:09)  T(F): 98.7 (21 Apr 2025 08:09), Max: 98.7 (21 Apr 2025 08:09)  HR: 100 (21 Apr 2025 08:10) (79 - 100)  BP: 119/66 (21 Apr 2025 08:10) (119/66 - 155/75)  BP(mean): 84 (21 Apr 2025 08:10) (84 - 106)  RR: 20 (21 Apr 2025 08:10) (14 - 24)  SpO2: 99% (21 Apr 2025 08:10) (98% - 100%)    LABS:                        8.1    6.33  )-----------( 94       ( 21 Apr 2025 05:34 )             24.8     04-21    147[H]  |  112[H]  |  12  ----------------------------<  138[H]  3.4[L]   |  25  |  0.7    Ca    7.4[L]      21 Apr 2025 05:34  Mg     1.4     04-21    TPro  4.3[L]  /  Alb  2.5[L]  /  TBili  1.3[H]  /  DBili  x   /  AST  32  /  ALT  9   /  AlkPhos  92  04-21      Urinalysis Basic - ( 21 Apr 2025 05:34 )    Color: x / Appearance: x / SG: x / pH: x  Gluc: 138 mg/dL / Ketone: x  / Bili: x / Urobili: x   Blood: x / Protein: x / Nitrite: x   Leuk Esterase: x / RBC: x / WBC x   Sq Epi: x / Non Sq Epi: x / Bacteria: x                RADIOLOGY:      PHYSICAL EXAM:  Gen: confused  HEENT: PERRL, EOMI, mouth clr, nose clr  Neck: no nodes, no JVD, thyroid nl  lungs: clr  hrt: s1 s2 rrr no murmur  abd: soft, NT/ND, no HS megaly  ext: no edema, no c/c  neuro: aa ox 0?, not asnwering question, but speaks and si aware and awake

## 2025-04-21 NOTE — PROGRESS NOTE ADULT - ASSESSMENT
IMPRESSION  metabolic encephalopathy due to GIB  seizure hx   liver cirrhosis   bacteremia   GI bleed   hypernatremia   CHARLENE   elevated trop   afib     PLAN:    CNS:    neurology follow up   MRI Lumbar    follow ammonia level       HEENT: oral care     PULMONARY: keep pox >92% aspiration precaution   repeat cxr PRN    CARDIOVASCULAR:   follow cardiology     GI: GI prophylaxis.     start feed if no Endoscopy planned  speech and swallow eval   PPI Q12 hrs   follow LFT , hepatology / Gi follow up   rafiximine   lactulose to create mushy BM  repeat  Ammonia level   s/p albumin infusion     RENAL:  D5w 50cc/ hr if Na >145  when start feed start free water 250 cc Q4 hrs   follow NA level   follow renal   CMP daily    INFECTIOUS DISEASE:   ID  follow up   rocephin and flagyl   procal   bldcx       HEMATOLOGICAL:    DVT prophylaxis. follow h/h and plt   hold AC   serial h/h     ENDOCRINE:  Follow up FS.  Insulin protocol if needed.  SDU

## 2025-04-21 NOTE — PROGRESS NOTE ADULT - ASSESSMENT
80 years old male history of hypertension, diabetes, subdural hematoma on Keppra, hx mechanical falls, recent admission (January 2025) for osteomyelitis of L4-5 w completion of abx.  BIBA from home status post change of mental status.     ID is consulted for bacteremia  Afebrile  WBC 7.55 < 7.45  On room air  BCx 4/14 Bacteroides fragilis  UA WBC 5, UCx low count P. mirabilis    CT A/P 4/13  1.  Erosive endplate changes centered at the L4-5 level, consistent with   patient's history ofrecent lumbar spine osteomyelitis.  2.  Liver cirrhosis with portal hypertension. Mild-to-moderate ascites.   Anasarca. Hepatic dome subcentimeter hypodense 1.3 cm hypodensity, not   fully characterized . Outpatient MR abdomen with IV contrast recommended.  3.  Chronic pancreatitis, with limited evaluation for acute inflammation   due to background fluid. Correlation with pancreatic enzymes recommended.    Antibiotics:  Vancomycin 4/13  Ampicillin 4/14  Ceftriaxone 4/15 ->  Flagyl 4/15 ->      IMPRESSION:  Bacteroides bacteremia, potentially life threatening  Possible SBP?  Liver cirrhosis  Lower GI bleed  Hx L4-L5 osteomyelitis  Liver lesion  Immunosuppression / Immunosenescence secondary to multiple comorbidities which could result in poor clinical outcome    RECOMMENDATIONS:  - IV ceftriaxone 2g q24hrs for 7 days (until 4/21), completed albumin x 2 doses  - IV Flagyl 500mg q12hrs for 14 day (until 4/28)  - GI follow up  - Consider MRI abdomen when patient is stable  - Offloading and frequent position changes, aspiration precaution  - Trend WBC, fever curve, transaminases, creatinine daily  - Please inform ID of any patient clinical change or any new pertinent laboratory or radiographic data      Nora Beck D.O.  Attending Physician  Division of Infectious Diseases  [facility]  Please contact me via [employer] Teams

## 2025-04-21 NOTE — PROGRESS NOTE ADULT - ASSESSMENT
80yMale pmh HTN, DM, subdural hematoma on keppra, osteomyelitis of L4-5 had completion of abx here for AMS. GI consulted for liver cirrhosis rule HE. Patient with some blood in stool today reported as well., no hematemesis.      # Liver cirrhosis with portal hypertension. Mild-to-moderate ascites.   Anasarca. Hepatic dome subcentimeter hypodense 1.3 cm hypodensity, not fully characterized .   #rectal exam-4/15/25-brown stool in rectal vault and on finger    Rec  MR liver protocol as outpatient   -For-Mild-to-moderate ascites please get ultrasound guided diagnostic / therapeutic paracentesis: Obtain serum albumin same day as paracentesis, avoid removing > 4 L in light of patient's CHARLENE   -Obtain fluid studies: Paracentesis Panel which includes Cell count and differential, albumin, total protein, cytology, AFB smear and culture. Separate order of amylase fluid and triglycerides fluid  -MELD Score-15  - follow up with hepatology as outpatient      - s/p albumin 1g/kg/d x 48 hrs, improving   avoid diuretics  nephrology eval appreciated    -HE-very likely in light of elevated ammonia - give lactulose and  titrate to 3-4 bms / daily  neurology eval appreciated  -For Vaccinations: Please f/u in clinic for being uptodate with HAV/HBV/Influenza/Pneumococcal vaccines.  -Lifestyle/Dietary modifications: Calorie intake 25-40 Kcal/kg/day; Protein intake: 1.2-1.5 gm/kg/day  -Avoid smoking and NSAIDs  -Abstain from alcohol and all illicit drugs      #anemia episode of maroon colored stool reported  #rectal exam-4/15/25-brown stool in rectal vault and on finger  Rec  -transfuse prn to hgb >7  - s/p EGD and colonoscopy revealing duodenal ulcer.   - repeat colonoscopy in one year   - PPI BID   -Follow up with our [facility] located at [address] [place] [address]. Phone Number: [phone]             80yMale pmh HTN, DM, subdural hematoma on keppra, osteomyelitis of L4-5 had completion of abx here for AMS. GI consulted for liver cirrhosis rule HE. Patient with some blood in stool today reported as well., no hematemesis.      # Liver cirrhosis with portal hypertension. Mild-to-moderate ascites.   Anasarca. Hepatic dome subcentimeter hypodense 1.3 cm hypodensity, not fully characterized .   #rectal exam-4/15/25-brown stool in rectal vault and on finger    Rec  MR liver protocol as outpatient   -For-Mild-to-moderate ascites if there is a window, get ultrasound guided diagnostic / therapeutic paracentesis: Obtain serum albumin same day as paracentesis, avoid removing > 4 L in light of patient's CHARLENE   -Obtain fluid studies: Paracentesis Panel which includes Cell count and differential, albumin, total protein, cytology, AFB smear and culture. Separate order of amylase fluid and triglycerides fluid  -MELD Score-15  - follow up with hepatology as outpatient      - s/p albumin 1g/kg/d x 48 hrs, improving   avoid diuretics  nephrology eval appreciated    -HE-very likely in light of elevated ammonia - give lactulose and  titrate to 3-4 bms / daily  neurology eval appreciated  -For Vaccinations: Please f/u in clinic for being uptodate with HAV/HBV/Influenza/Pneumococcal vaccines.  -Lifestyle/Dietary modifications: Calorie intake 25-40 Kcal/kg/day; Protein intake: 1.2-1.5 gm/kg/day  -Avoid smoking and NSAIDs  -Abstain from alcohol and all illicit drugs      #anemia episode of maroon colored stool reported  #rectal exam-4/15/25-brown stool in rectal vault and on finger  Rec  -transfuse prn to hgb >7  - s/p EGD and colonoscopy revealing duodenal ulcer.   - repeat colonoscopy in one year   - PPI BID   -Follow up with our [facility] located at [address] [place] [address]. Phone Number: [phone]

## 2025-04-21 NOTE — PROGRESS NOTE ADULT - SUBJECTIVE AND OBJECTIVE BOX
INFECTIOUS DISEASE FOLLOW UP NOTE:    Interval History/ROS: Patient is a 80y old  Male who presents with a chief complaint of acute mentation changes (21 Apr 2025 09:27)      Overnight events:    REVIEW OF SYSTEMS:        Prior hospital charts reviewed [Yes]  Primary team notes reviewed [Yes]  Other consultant notes reviewed [Yes]    Allergies:  No Known Allergies      ANTIMICROBIALS:   cefTRIAXone   IVPB 2000 every 24 hours  metroNIDAZOLE  IVPB 500 every 12 hours  rifAXIMin 550 two times a day      OTHER MEDS: MEDICATIONS  (STANDING):  acetaminophen     Tablet .. 650 every 6 hours PRN  aluminum hydroxide/magnesium hydroxide/simethicone Suspension 30 every 4 hours PRN  atorvastatin 80 at bedtime  dexMEDEtomidine Infusion 0.5 <Continuous>  dextrose 50% Injectable 25 once  dextrose 50% Injectable 12.5 once  dextrose 50% Injectable 25 once  dextrose Oral Gel 15 once PRN  glucagon  Injectable 1 once  insulin lispro (ADMELOG) corrective regimen sliding scale  three times a day before meals  lactulose Syrup 20 every 8 hours PRN  levETIRAcetam   Injectable 250 <User Schedule>  melatonin 3 at bedtime PRN  ondansetron Injectable 4 every 8 hours PRN  pantoprazole  Injectable 40 every 12 hours  polyethylene glycol 3350 17 two times a day PRN  senna 2 at bedtime      Vital Signs Last 24 Hrs  T(F): 98.7 (04-21-25 @ 08:09), Max: 98.7 (04-16-25 @ 11:00)    Vital Signs Last 24 Hrs  HR: 100 (04-21-25 @ 08:10) (77 - 100)  BP: 119/66 (04-21-25 @ 08:10) (119/66 - 155/75)  RR: 20 (04-21-25 @ 08:10)  SpO2: 99% (04-21-25 @ 08:10) (94% - 100%)  Wt(kg): --    EXAM:      Labs:                        8.1    6.33  )-----------( 94       ( 21 Apr 2025 05:34 )             24.8     04-21    147[H]  |  112[H]  |  12  ----------------------------<  138[H]  3.4[L]   |  25  |  0.7    Ca    7.4[L]      21 Apr 2025 05:34  Mg     1.4     04-21    TPro  4.3[L]  /  Alb  2.5[L]  /  TBili  1.3[H]  /  DBili  x   /  AST  32  /  ALT  9   /  AlkPhos  92  04-21      WBC Trend:  WBC Count: 6.33 (04-21-25 @ 05:34)  WBC Count: 5.15 (04-20-25 @ 05:39)  WBC Count: 4.95 (04-19-25 @ 05:31)  WBC Count: 5.31 (04-18-25 @ 05:19)      Creatine Trend:  Creatinine: 0.7 (04-21)  Creatinine: 0.7 (04-20)  Creatinine: 0.9 (04-19)  Creatinine: 1.1 (04-18)      Liver Biochemical Testing Trend:  Alanine Aminotransferase (ALT/SGPT): 9 (04-21)  Alanine Aminotransferase (ALT/SGPT): 11 (04-20)  Alanine Aminotransferase (ALT/SGPT): 14 (04-19)  Alanine Aminotransferase (ALT/SGPT): 16 (04-18)  Alanine Aminotransferase (ALT/SGPT): 19 (04-17)  Aspartate Aminotransferase (AST/SGOT): 32 (04-21-25 @ 05:34)  Aspartate Aminotransferase (AST/SGOT): 46 (04-20-25 @ 05:39)  Aspartate Aminotransferase (AST/SGOT): 49 (04-19-25 @ 05:31)  Aspartate Aminotransferase (AST/SGOT): 57 (04-18-25 @ 11:13)  Aspartate Aminotransferase (AST/SGOT): 76 (04-17-25 @ 18:30)  Bilirubin Total: 1.3 (04-21)  Bilirubin Total: 1.6 (04-20)  Bilirubin Total: 1.6 (04-19)  Bilirubin Total: 0.9 (04-18)  Bilirubin Total: 1.3 (04-17)      Trend LDH      Urinalysis Basic - ( 21 Apr 2025 05:34 )    Color: x / Appearance: x / SG: x / pH: x  Gluc: 138 mg/dL / Ketone: x  / Bili: x / Urobili: x   Blood: x / Protein: x / Nitrite: x   Leuk Esterase: x / RBC: x / WBC x   Sq Epi: x / Non Sq Epi: x / Bacteria: x        MICROBIOLOGY:        Culture - Blood (collected 17 Apr 2025 18:30)  Source: Blood Blood  Preliminary Report:    No growth at 72 Hours    Culture - Blood (collected 16 Apr 2025 15:15)  Source: Blood None  Preliminary Report:    No growth at 4 days    Urinalysis with Rflx Culture (collected 13 Apr 2025 21:20)    Culture - Blood (collected 13 Apr 2025 21:20)  Source: Blood Blood-Peripheral  Final Report:    Growth in anaerobic bottle: Bacteroides fragilis    "Susceptibilities not performed"    Culture - Urine (collected 13 Apr 2025 21:20)  Source: Catheterized None  Final Report:    10,000 - 49,000 CFU/mL Proteus mirabilis  Organism: Proteus mirabilis  Organism: Proteus mirabilis    Sensitivities:      -  Levofloxacin: S <=0.5      -  Tobramycin: S <=2      -  Nitrofurantoin: R >64 Should not be used to treat pyelonephritis      -  Aztreonam: S <=4      -  Gentamicin: S <=2      -  Cefazolin: S <=2 For uncomplicated UTI with K. pneumoniae, E. coli, or P. mirablis: LOUIS <=16 is sensitive and LOUIS >=32 is resistant. This also predicts results for oral agents cefaclor, cefdinir, cefpodoxime, cefprozil, cefuroxime axetil, cephalexin and locarbef for uncomplicated UTI. Note that some isolates may be susceptible to these agents while testing resistant to cefazolin.      -  Cefepime: S <=2      -  Piperacillin/Tazobactam: S <=8      -  Ciprofloxacin: S <=0.25      -  Ceftriaxone: S <=1      -  Ampicillin: S <=8 These ampicillin results predict results for amoxicillin      Method Type: LOUIS      -  Meropenem: S <=1      -  Ampicillin/Sulbactam: S <=4/2      -  Cefoxitin: S <=8      -  Cefuroxime: S <=4      -  Amoxicillin/Clavulanic Acid: S <=8/4      -  Trimethoprim/Sulfamethoxazole: S <=0.5/9.5      -  Ertapenem: S <=0.5    Culture - Blood (collected 13 Apr 2025 21:20)  Source: Blood Blood-Peripheral  Final Report:    Growth in anaerobic bottle: Bacteroides fragilis "Susceptibilities not    performed"    Direct identification is available within approximately 3-5    hours either by Blood Panel Multiplexed PCR or Direct    MALDI-TOF. Details: [email or web address]  Organism: Blood Culture PCR  Organism: Blood Culture PCR    Sensitivities:      -  Bacteroides fragilis: Detec      Method Type: PCR    Culture - Blood (collected 30 Jan 2025 07:12)  Source: .Blood BLOOD  Final Report:    No growth at 5 days    Culture - Blood (collected 30 Jan 2025 07:12)  Source: .Blood BLOOD  Final Report:    No growth at 5 days    Culture - Blood (collected 29 Jan 2025 07:35)  Source: .Blood BLOOD  Final Report:    No growth at 5 days    Culture - Blood (collected 27 Jan 2025 19:13)  Source: .Blood BLOOD  Final Report:    No growth at 5 days                              Procalcitonin: 1.06 (04-15)                    RADIOLOGY:  imaging below personally reviewed   INFECTIOUS DISEASE FOLLOW UP NOTE:    Interval History/ROS: Patient is a 80y old  Male who presents with a chief complaint of acute mentation changes (21 Apr 2025 09:27)    Overnight events: More awake today but very confused    REVIEW OF SYSTEMS:  Unable to provide due to cognitive impairment      Prior hospital charts reviewed [Yes]  Primary team notes reviewed [Yes]  Other consultant notes reviewed [Yes]    Allergies:  No Known Allergies      ANTIMICROBIALS:   cefTRIAXone   IVPB 2000 every 24 hours  metroNIDAZOLE  IVPB 500 every 12 hours  rifAXIMin 550 two times a day      OTHER MEDS: MEDICATIONS  (STANDING):  acetaminophen     Tablet .. 650 every 6 hours PRN  aluminum hydroxide/magnesium hydroxide/simethicone Suspension 30 every 4 hours PRN  atorvastatin 80 at bedtime  dexMEDEtomidine Infusion 0.5 <Continuous>  dextrose 50% Injectable 25 once  dextrose 50% Injectable 12.5 once  dextrose 50% Injectable 25 once  dextrose Oral Gel 15 once PRN  glucagon  Injectable 1 once  insulin lispro (ADMELOG) corrective regimen sliding scale  three times a day before meals  lactulose Syrup 20 every 8 hours PRN  levETIRAcetam   Injectable 250 <User Schedule>  melatonin 3 at bedtime PRN  ondansetron Injectable 4 every 8 hours PRN  pantoprazole  Injectable 40 every 12 hours  polyethylene glycol 3350 17 two times a day PRN  senna 2 at bedtime      Vital Signs Last 24 Hrs  T(F): 98.7 (04-21-25 @ 08:09), Max: 98.7 (04-16-25 @ 11:00)    Vital Signs Last 24 Hrs  HR: 100 (04-21-25 @ 08:10) (77 - 100)  BP: 119/66 (04-21-25 @ 08:10) (119/66 - 155/75)  RR: 20 (04-21-25 @ 08:10)  SpO2: 99% (04-21-25 @ 08:10) (94% - 100%)  Wt(kg): --    EXAM:  GENERAL: NAD, lying in bed  HEAD: No head lesions  EYES: Conjunctiva pink and cornea white  EAR, NOSE, MOUTH, THROAT: Normal external ears and nose, no discharges; moist mucous membranes; + NC  NECK: Supple, nontender to palpation; no JVD  RESPIRATORY: Bibasilar crackles  CARDIOVASCULAR: S1 S2  GASTROINTESTINAL: Soft, no significant distention; normoactive bowel sounds  GENITOURINARY: + loyola catheter, no CVA tenderness  EXTREMITIES: No clubbing, cyanosis, + diffuse anasarca  NERVOUS SYSTEM: Awake and alert but not orieted, still having UE tremor  MUSCULOSKELETAL: No joint erythema, swelling  SKIN: No rashes or lesions, no superficial thrombophlebitis  PSYCH: Agitated    Labs:                        8.1    6.33  )-----------( 94       ( 21 Apr 2025 05:34 )             24.8     04-21    147[H]  |  112[H]  |  12  ----------------------------<  138[H]  3.4[L]   |  25  |  0.7    Ca    7.4[L]      21 Apr 2025 05:34  Mg     1.4     04-21    TPro  4.3[L]  /  Alb  2.5[L]  /  TBili  1.3[H]  /  DBili  x   /  AST  32  /  ALT  9   /  AlkPhos  92  04-21      WBC Trend:  WBC Count: 6.33 (04-21-25 @ 05:34)  WBC Count: 5.15 (04-20-25 @ 05:39)  WBC Count: 4.95 (04-19-25 @ 05:31)  WBC Count: 5.31 (04-18-25 @ 05:19)      Creatine Trend:  Creatinine: 0.7 (04-21)  Creatinine: 0.7 (04-20)  Creatinine: 0.9 (04-19)  Creatinine: 1.1 (04-18)      Liver Biochemical Testing Trend:  Alanine Aminotransferase (ALT/SGPT): 9 (04-21)  Alanine Aminotransferase (ALT/SGPT): 11 (04-20)  Alanine Aminotransferase (ALT/SGPT): 14 (04-19)  Alanine Aminotransferase (ALT/SGPT): 16 (04-18)  Alanine Aminotransferase (ALT/SGPT): 19 (04-17)  Aspartate Aminotransferase (AST/SGOT): 32 (04-21-25 @ 05:34)  Aspartate Aminotransferase (AST/SGOT): 46 (04-20-25 @ 05:39)  Aspartate Aminotransferase (AST/SGOT): 49 (04-19-25 @ 05:31)  Aspartate Aminotransferase (AST/SGOT): 57 (04-18-25 @ 11:13)  Aspartate Aminotransferase (AST/SGOT): 76 (04-17-25 @ 18:30)  Bilirubin Total: 1.3 (04-21)  Bilirubin Total: 1.6 (04-20)  Bilirubin Total: 1.6 (04-19)  Bilirubin Total: 0.9 (04-18)  Bilirubin Total: 1.3 (04-17)      Trend LDH      Urinalysis Basic - ( 21 Apr 2025 05:34 )    Color: x / Appearance: x / SG: x / pH: x  Gluc: 138 mg/dL / Ketone: x  / Bili: x / Urobili: x   Blood: x / Protein: x / Nitrite: x   Leuk Esterase: x / RBC: x / WBC x   Sq Epi: x / Non Sq Epi: x / Bacteria: x        MICROBIOLOGY:        Culture - Blood (collected 17 Apr 2025 18:30)  Source: Blood Blood  Preliminary Report:    No growth at 72 Hours    Culture - Blood (collected 16 Apr 2025 15:15)  Source: Blood None  Preliminary Report:    No growth at 4 days    Urinalysis with Rflx Culture (collected 13 Apr 2025 21:20)    Culture - Blood (collected 13 Apr 2025 21:20)  Source: Blood Blood-Peripheral  Final Report:    Growth in anaerobic bottle: Bacteroides fragilis    "Susceptibilities not performed"    Culture - Urine (collected 13 Apr 2025 21:20)  Source: Catheterized None  Final Report:    10,000 - 49,000 CFU/mL Proteus mirabilis  Organism: Proteus mirabilis  Organism: Proteus mirabilis    Sensitivities:      -  Levofloxacin: S <=0.5      -  Tobramycin: S <=2      -  Nitrofurantoin: R >64 Should not be used to treat pyelonephritis      -  Aztreonam: S <=4      -  Gentamicin: S <=2      -  Cefazolin: S <=2 For uncomplicated UTI with K. pneumoniae, E. coli, or P. mirablis: LOUIS <=16 is sensitive and LOUIS >=32 is resistant. This also predicts results for oral agents cefaclor, cefdinir, cefpodoxime, cefprozil, cefuroxime axetil, cephalexin and locarbef for uncomplicated UTI. Note that some isolates may be susceptible to these agents while testing resistant to cefazolin.      -  Cefepime: S <=2      -  Piperacillin/Tazobactam: S <=8      -  Ciprofloxacin: S <=0.25      -  Ceftriaxone: S <=1      -  Ampicillin: S <=8 These ampicillin results predict results for amoxicillin      Method Type: LOUIS      -  Meropenem: S <=1      -  Ampicillin/Sulbactam: S <=4/2      -  Cefoxitin: S <=8      -  Cefuroxime: S <=4      -  Amoxicillin/Clavulanic Acid: S <=8/4      -  Trimethoprim/Sulfamethoxazole: S <=0.5/9.5      -  Ertapenem: S <=0.5    Culture - Blood (collected 13 Apr 2025 21:20)  Source: Blood Blood-Peripheral  Final Report:    Growth in anaerobic bottle: Bacteroides fragilis "Susceptibilities not    performed"    Direct identification is available within approximately 3-5    hours either by Blood Panel Multiplexed PCR or Direct    MALDI-TOF. Details: [email or web address]  Organism: Blood Culture PCR  Organism: Blood Culture PCR    Sensitivities:      -  Bacteroides fragilis: Detec      Method Type: PCR    Culture - Blood (collected 30 Jan 2025 07:12)  Source: .Blood BLOOD  Final Report:    No growth at 5 days    Culture - Blood (collected 30 Jan 2025 07:12)  Source: .Blood BLOOD  Final Report:    No growth at 5 days    Culture - Blood (collected 29 Jan 2025 07:35)  Source: .Blood BLOOD  Final Report:    No growth at 5 days    Culture - Blood (collected 27 Jan 2025 19:13)  Source: .Blood BLOOD  Final Report:    No growth at 5 days    Procalcitonin: 1.06 (04-15)    RADIOLOGY:  imaging below personally reviewed    < from: Xray Chest 1 View-PORTABLE IMMEDIATE (Xray Chest 1 View-PORTABLE IMMEDIATE .) (04.20.25 @ 07:49) >    IMPRESSION: Low lung volume.    Bilateral opacities, unchanged.    NGT.    < end of copied text >

## 2025-04-21 NOTE — PROGRESS NOTE ADULT - ASSESSMENT
80-year-old male with multiple comorbidities presenting with altered mental status in the setting of:    1. Severe encephalopathy - Multifactorial etiology:     - Hepatic encephalopathy (elevated ammonia, cirrhosis)     - EEG showing diffusely attenuated severe slowing without seizure activity     - Bacteroides fragilis bacteremia (now cleared with negative repeat cultures)     - Medication effects    2. Resolved Bacteroides fragilis bacteremia     - Likely GI source given:       - Duodenal ulcer identified on EGD (requiring endoclip)       - Erosive gastritis       - Small clot in cecum on colonoscopy     - Infection adequately treated with negative repeat blood cultures    3. Liver cirrhosis with complications:     - Portal hypertension     - Ascites     - Hepatic encephalopathy (on lactulose 20g q2h)     - 1.3 cm hepatic dome lesion requiring characterization    4. GI findings with evidence of ongoing blood loss:     - Duodenal ulcer (endoclipped)     - Erosive gastritis     - Internal/external hemorrhoids     - Small clot in cecum     - Hemoglobin drop from 9.6 to 8.1 within 24 hours     - No recent bowel movements to assess for melena/hematochezia    5. Acute kidney injury - Now resolved    6. Chronic issues: Diabetes, hypertension, macrocytic anemia, mechanical falls, history of osteomyelitis    1. Neurological:     - Continue intensive hepatic encephalopathy treatment with lactulose 20g q2h     - Target 3-4 soft bowel movements daily     - Continue Keppra 250mg q12h for seizure prophylaxis     - Daily neurologic assessments    2. Infectious Disease:     - Complete current antibiotic course (ceftriaxone 2g q24h and metronidazole 500mg q12h)     - Blood cultures now negative, indicating clearance of bacteremia    3. Hepatobiliary/GI:     - Continue Protonix 40mg q12h as currently prescribed     - repeat Hgb at 3pm given significant drop (9.6 to 8.1)     - GI fu      Follow-up:  - Repeat CBC

## 2025-04-21 NOTE — SWALLOW BEDSIDE ASSESSMENT ADULT - SLP GENERAL OBSERVATIONS
Pt received awake and alert, 2L O2 via NC. NGT in situ. Declined all PO trials. Increased confusion.

## 2025-04-21 NOTE — SWALLOW BEDSIDE ASSESSMENT ADULT - SWALLOW EVAL: FUNCTIONAL LEVEL AT TIME OF EVAL
Lethargic however improved feeding awareness
Obtunded, minimally responsive to sternal rub, O2 via NC. Moaning in pain.
Obtunded, minimally responsive to sternal rub, O2 via NC, Gwen Galeas in situ
Lethargic however improved feeding awareness
Per family at b/s pt. accepting min. amounts of juice and applesauce only. C/o food taste like metal.
Poor PO intake as per RN, accepting min. amounts of juice only

## 2025-04-21 NOTE — PROGRESS NOTE ADULT - SUBJECTIVE AND OBJECTIVE BOX
80yMale  Being followed for rectal bleeding, liver cirrhosis   Interval history:  No hematemesis, melena, blood in stool reported. Patient tolerating diet. Patient more alert      PAST MEDICAL & SURGICAL HISTORY:   Diabetes      Hypertension      Fall      H/O left knee surgery      History of cholecystectomy      History of appendectomy                Social History: No smoking. No alcohol. No illegal drug use.            MEDICATIONS  (STANDING):  atorvastatin 80 milliGRAM(s) Oral at bedtime  cefTRIAXone   IVPB 2000 milliGRAM(s) IV Intermittent every 24 hours  chlorhexidine 2% Cloths 1 Application(s) Topical <User Schedule>  dexMEDEtomidine Infusion 0.5 MICROgram(s)/kG/Hr (14.7 mL/Hr) IV Continuous <Continuous>  dextrose 5%. 1000 milliLiter(s) (100 mL/Hr) IV Continuous <Continuous>  dextrose 5%. 1000 milliLiter(s) (50 mL/Hr) IV Continuous <Continuous>  dextrose 50% Injectable 25 Gram(s) IV Push once  dextrose 50% Injectable 12.5 Gram(s) IV Push once  dextrose 50% Injectable 25 Gram(s) IV Push once  folic acid 1 milliGRAM(s) Oral daily  glucagon  Injectable 1 milliGRAM(s) IntraMuscular once  insulin lispro (ADMELOG) corrective regimen sliding scale   SubCutaneous three times a day before meals  lactulose Syrup 20 Gram(s) Enteral Tube every 3 hours  levETIRAcetam   Injectable 250 milliGRAM(s) IV Push <User Schedule>  lidocaine   4% Patch 1 Patch Transdermal daily  metroNIDAZOLE  IVPB 500 milliGRAM(s) IV Intermittent every 12 hours  nystatin Powder 1 Application(s) Topical two times a day  pantoprazole  Injectable 40 milliGRAM(s) IV Push every 12 hours  rifAXIMin 550 milliGRAM(s) Oral two times a day  senna 2 Tablet(s) Oral at bedtime    MEDICATIONS  (PRN):  acetaminophen     Tablet .. 650 milliGRAM(s) Oral every 6 hours PRN Temp greater or equal to 38C (100.4F), Mild Pain (1 - 3)  aluminum hydroxide/magnesium hydroxide/simethicone Suspension 30 milliLiter(s) Oral every 4 hours PRN Dyspepsia  dextrose Oral Gel 15 Gram(s) Oral once PRN Blood Glucose LESS THAN 70 milliGRAM(s)/deciliter  melatonin 3 milliGRAM(s) Oral at bedtime PRN Insomnia  ondansetron Injectable 4 milliGRAM(s) IV Push every 8 hours PRN Nausea and/or Vomiting  polyethylene glycol 3350 17 Gram(s) Oral two times a day PRN Constipation      Allergies:   No Known Allergies  Intolerances          REVIEW OF SYSTEMS:  unobtainable         VITAL SIGNS:   T(F): 98.7 (04-21-25 @ 08:09), Max: 98.7 (04-21-25 @ 08:09)  HR: 100 (04-21-25 @ 08:10) (79 - 100)  BP: 119/66 (04-21-25 @ 08:10) (119/66 - 155/75)  RR: 20 (04-21-25 @ 08:10) (14 - 24)  SpO2: 99% (04-21-25 @ 08:10) (98% - 100%)    PHYSICAL EXAM:  GENERAL: AAOx1 to person not place or time, no acute distress.  HEAD:  Atraumatic, Normocephalic  EYES: conjunctiva and sclera clear  NECK: Supple, no thyromegaly  CHEST/LUNG: Clear to auscultation bilaterally; No wheeze, rhonchi, or rales  HEART: Regular rate and rhythm; normal S1, S2, No murmurs.  ABDOMEN: Soft, nontender, nondistended; Bowel sounds present  NEUROLOGY: No asterixis or tremor.   SKIN: Intact, no jaundice            LABS:                        8.1    6.33  )-----------( 94       ( 21 Apr 2025 05:34 )             24.8     04-21    147[H]  |  112[H]  |  12  ----------------------------<  138[H]  3.4[L]   |  25  |  0.7    Ca    7.4[L]      21 Apr 2025 05:34  Mg     1.4     04-21    TPro  4.3[L]  /  Alb  2.5[L]  /  TBili  1.3[H]  /  DBili  x   /  AST  32  /  ALT  9   /  AlkPhos  92  04-21    LIVER FUNCTIONS - ( 21 Apr 2025 05:34 )  Alb: 2.5 g/dL / Pro: 4.3 g/dL / ALK PHOS: 92 U/L / ALT: 9 U/L / AST: 32 U/L / GGT: x               IMAGING:    < from: CT Abdomen and Pelvis w/ IV Cont (04.13.25 @ 22:56) >    ACC: [id number] EXAM:  CT ABDOMEN AND PELVIS IC   ORDERED BY: DOREEN CAPELLAN     PROCEDURE DATE:  04/13/2025          INTERPRETATION:  CLINICAL STATEMENT: Abdominal pain and fever.    TECHNIQUE: Contiguous axial CT images were obtained from the lower chest   to the pubic symphysis following administration of intravenous contrast.   98 cc administered of Omnipaque 350 (8 cc discarded). Oral contrast was   not administered. Reformatted images in the coronal and sagittal planes   were acquired.    COMPARISON: CT abdomen and pelvis 1/23/2025.      FINDINGS:    LOWER CHEST: Bilateral small left greater than right pleural effusions   with adjacent compressive atelectasis. Bilateral subsegmental   atelectasis. Cardiomegaly.    HEPATOBILIARY: Liver cirrhosis. Post cholecystectomy. Patent portal vein.   Stable hepatic dome calcifications. Hepatic dome subcentimeter hypodense   1.3 cm hypodensity, not fully characterized (series 302, 77).    SPLEEN: Perisplenic varices with left splenorenal shunt. No splenomegaly.    PANCREAS: Limited evaluation of pancreas, including previously described   hypodense lesion, due to background free fluid. Atrophic pancreas with   multiple calcifications, likely sequela of chronic pancreatitis.    ADRENAL GLANDS: Unremarkable.    KIDNEYS: Symmetric enhancement. No hydronephrosis.    ABDOMINOPELVIC NODES: Unremarkable.    PELVIC ORGANS: Contracted urinary bladder.    PERITONEUM/MESENTERY/BOWEL: Mild-to-moderate abdominopelvic ascites. No   abnormal bowel dilation or wall thickening. No intraperitoneal free air.   Scattered colonic diverticula.    BONES/SOFT TISSUES: Stable umbilical hernia containing mesenteric fat and   fluid. Erosive endplate changes centered at the L4-5 level. Degenerative   changes of the spine. Diffuse anasarca.    OTHER: Atherosclerotic calcifications of the abdominal aorta. Engorgement   of the inferior mesenteric vein.      IMPRESSION:    1.  Erosive endplate changes centered at the L4-5 level, consistent with   patient's history ofrecent lumbar spine osteomyelitis.    2.  Liver cirrhosis with portal hypertension. Mild-to-moderate ascites.   Anasarca. Hepatic dome subcentimeter hypodense 1.3 cm hypodensity, not   fully characterized . Outpatient MR abdomen with IV contrast recommended.    3.  Chronic pancreatitis, with limited evaluation for acute inflammation   due to background fluid. Correlation with pancreatic enzymes recommended.    --- End of Report ---          DOMINGO HERCULES MD; Resident Radiologist  This document has been electronically signed.  SYDNEY COBIAN MD; Attending Radiologist  This document has been electronically signed. Apr 14 2025 12:17AM    < end of copied text >

## 2025-04-21 NOTE — PROGRESS NOTE ADULT - SUBJECTIVE AND OBJECTIVE BOX
Patient is a 80y old  Male who presents with a chief complaint of acute mentation changes (20 Apr 2025 08:24)        HPI:  80 years old male history of hypertension, diabetes, subdural hematoma on Keppra, hx mechanical falls, recent admission (January 2025) for osteomyelitis of L4-5 w completion of abx.  BIBA from home status post change of mental status.  Per provider note, patient was discharged from rehab facility on April 4.  Patient was supposed to have MRI of lower back on April 5 but patient refused.  Patient baseline uses walker to ambulate.  By report patient has been doing okay since his discharge from the facility.  Patient had 1 episode of fall on his buttocks few days ago that she was able to help patient to get up with no difficulty.  Patient become confused and calling for his father tonight over the past few hours so she called EMS.  No ROS given mental status, no family at bedside.     (14 Apr 2025 00:29)      PAST MEDICAL & SURGICAL HISTORY:  Diabetes      Hypertension      Fall      H/O left knee surgery      History of cholecystectomy      History of appendectomy          FAMILY HISTORY:        Pt evaluated on rounds.  I reviewed the radiology tests and hospital record.    I reviewed previous notes on this patient.    Interval Events: No overnight events.      REVIEW OF SYSTEMS:   see HPI      OBJECTIVE:  ICU Vital Signs Last 24 Hrs  T(C): 37.1 (21 Apr 2025 08:09), Max: 37.1 (21 Apr 2025 08:09)  T(F): 98.7 (21 Apr 2025 08:09), Max: 98.7 (21 Apr 2025 08:09)  HR: 100 (21 Apr 2025 08:10) (77 - 100)  BP: 119/66 (21 Apr 2025 08:10) (119/66 - 155/75)  BP(mean): 84 (21 Apr 2025 08:10) (84 - 106)  ABP: --  ABP(mean): --  RR: 20 (21 Apr 2025 08:10) (13 - 24)  SpO2: 99% (21 Apr 2025 08:10) (94% - 100%)    O2 Parameters below as of 21 Apr 2025 08:10  Patient On (Oxygen Delivery Method): nasal cannula  O2 Flow (L/min): 2            04-20 @ 07:01  -  04-21 @ 07:00  --------------------------------------------------------  IN: 570 mL / OUT: 890 mL / NET: -320 mL      CAPILLARY BLOOD GLUCOSE      POCT Blood Glucose.: 147 mg/dL (21 Apr 2025 08:27)        PHYSICAL EXAM:     · ENMT:   Airway patent,   Nasal mucosa clear.  Mouth with normal mucosa.   No thrush    · EYES:   Clear bilaterally,   pupils equal,   round and reactive to light.    · CARDIAC:   Normal rate,   regular rhythm.    Heart sounds S1, S2.   No murmurs, no rubs or gallops on auscultation  no edema        CAROTID:   normal systolic impulse  no bruits    · RESPIRATORY:   rales  normal chest expansion  no retractions or use of accessory muscles  percussion of chest demonstrates no hyperresonance or dullness    · GASTROINTESTINAL:  Abdomen soft,   non-tender,   + BS  liver/spleen not palpable    · SKIN:   Skin normal color for race,   warm, dry   No evidence of rash.    · HEME LYMPH:   no splenomegaly.  No cervical  lymphadenopathy.  no inguinal lymphadenopathy    HOSPITAL MEDICATIONS:  MEDICATIONS  (STANDING):  atorvastatin 80 milliGRAM(s) Oral at bedtime  cefTRIAXone   IVPB 2000 milliGRAM(s) IV Intermittent every 24 hours  chlorhexidine 2% Cloths 1 Application(s) Topical <User Schedule>  dexMEDEtomidine Infusion 0.5 MICROgram(s)/kG/Hr (14.7 mL/Hr) IV Continuous <Continuous>  dextrose 5%. 1000 milliLiter(s) (100 mL/Hr) IV Continuous <Continuous>  dextrose 5%. 1000 milliLiter(s) (50 mL/Hr) IV Continuous <Continuous>  dextrose 50% Injectable 25 Gram(s) IV Push once  dextrose 50% Injectable 12.5 Gram(s) IV Push once  dextrose 50% Injectable 25 Gram(s) IV Push once  folic acid 1 milliGRAM(s) Oral daily  glucagon  Injectable 1 milliGRAM(s) IntraMuscular once  insulin lispro (ADMELOG) corrective regimen sliding scale   SubCutaneous three times a day before meals  levETIRAcetam   Injectable 250 milliGRAM(s) IV Push <User Schedule>  lidocaine   4% Patch 1 Patch Transdermal daily  metroNIDAZOLE  IVPB 500 milliGRAM(s) IV Intermittent every 12 hours  nystatin Powder 1 Application(s) Topical two times a day  pantoprazole  Injectable 40 milliGRAM(s) IV Push every 12 hours  rifAXIMin 550 milliGRAM(s) Oral two times a day  senna 2 Tablet(s) Oral at bedtime    MEDICATIONS  (PRN):  acetaminophen     Tablet .. 650 milliGRAM(s) Oral every 6 hours PRN Temp greater or equal to 38C (100.4F), Mild Pain (1 - 3)  aluminum hydroxide/magnesium hydroxide/simethicone Suspension 30 milliLiter(s) Oral every 4 hours PRN Dyspepsia  dextrose Oral Gel 15 Gram(s) Oral once PRN Blood Glucose LESS THAN 70 milliGRAM(s)/deciliter  lactulose Syrup 20 Gram(s) Enteral Tube every 8 hours PRN constiipation  melatonin 3 milliGRAM(s) Oral at bedtime PRN Insomnia  ondansetron Injectable 4 milliGRAM(s) IV Push every 8 hours PRN Nausea and/or Vomiting  polyethylene glycol 3350 17 Gram(s) Oral two times a day PRN Constipation    dextrose 5% + sodium chloride 0.45%.: Solution, 1000 milliLiter(s) infuse at 50 mL/Hr  dextrose 5% + sodium chloride 0.45%.: Solution, 1000 milliLiter(s) infuse at 70 mL/Hr  lactated ringers.: Solution, 1000 milliLiter(s) infuse at 75 mL/Hr  Provider's Contact #: [phone]  lactated ringers.: Solution, 1000 milliLiter(s) infuse at 75 mL/Hr  lactated ringers Bolus:   1000 milliLiter(s), IV Bolus, once, infuse over 60 Minute(s), Stop After 1 Doses  Provider's Contact #: [phone]  lactated ringers Bolus:   1000 milliLiter(s), IV Bolus, once, infuse over 60 Minute(s), Stop After 1 Doses  Provider's Contact #: [phone]      LABS:                        8.1    6.33  )-----------( 94       ( 21 Apr 2025 05:34 )             24.8     04-21    147[H]  |  112[H]  |  12  ----------------------------<  138[H]  3.4[L]   |  25  |  0.7    Ca    7.4[L]      21 Apr 2025 05:34  Mg     1.4     04-21    TPro  4.3[L]  /  Alb  2.5[L]  /  TBili  1.3[H]  /  DBili  x   /  AST  32  /  ALT  9   /  AlkPhos  92  04-21      Urinalysis Basic - ( 21 Apr 2025 05:34 )    Color: x / Appearance: x / SG: x / pH: x  Gluc: 138 mg/dL / Ketone: x  / Bili: x / Urobili: x   Blood: x / Protein: x / Nitrite: x   Leuk Esterase: x / RBC: x / WBC x   Sq Epi: x / Non Sq Epi: x / Bacteria: x                            RADIOLOGY: Today I personally interpreted the latest CXR and other pertinent films.

## 2025-04-22 LAB
ALBUMIN SERPL ELPH-MCNC: 2.4 G/DL — LOW (ref 3.5–5.2)
ALP SERPL-CCNC: 112 U/L — SIGNIFICANT CHANGE UP (ref 30–115)
ALT FLD-CCNC: 10 U/L — SIGNIFICANT CHANGE UP (ref 0–41)
ANION GAP SERPL CALC-SCNC: 10 MMOL/L — SIGNIFICANT CHANGE UP (ref 7–14)
APTT BLD: 36.5 SEC — SIGNIFICANT CHANGE UP (ref 27–39.2)
AST SERPL-CCNC: 35 U/L — SIGNIFICANT CHANGE UP (ref 0–41)
BILIRUB DIRECT SERPL-MCNC: 0.6 MG/DL — HIGH (ref 0–0.3)
BILIRUB INDIRECT FLD-MCNC: 0.6 MG/DL — SIGNIFICANT CHANGE UP (ref 0.2–1.2)
BILIRUB SERPL-MCNC: 1.2 MG/DL — SIGNIFICANT CHANGE UP (ref 0.2–1.2)
BLD GP AB SCN SERPL QL: SIGNIFICANT CHANGE UP
BUN SERPL-MCNC: 13 MG/DL — SIGNIFICANT CHANGE UP (ref 10–20)
CALCIUM SERPL-MCNC: 7.4 MG/DL — LOW (ref 8.4–10.5)
CHLORIDE SERPL-SCNC: 110 MMOL/L — SIGNIFICANT CHANGE UP (ref 98–110)
CO2 SERPL-SCNC: 25 MMOL/L — SIGNIFICANT CHANGE UP (ref 17–32)
CREAT SERPL-MCNC: 0.7 MG/DL — SIGNIFICANT CHANGE UP (ref 0.7–1.5)
CULTURE RESULTS: SIGNIFICANT CHANGE UP
EGFR: 94 ML/MIN/1.73M2 — SIGNIFICANT CHANGE UP
EGFR: 94 ML/MIN/1.73M2 — SIGNIFICANT CHANGE UP
GLUCOSE BLDC GLUCOMTR-MCNC: 154 MG/DL — HIGH (ref 70–99)
GLUCOSE BLDC GLUCOMTR-MCNC: 157 MG/DL — HIGH (ref 70–99)
GLUCOSE BLDC GLUCOMTR-MCNC: 167 MG/DL — HIGH (ref 70–99)
GLUCOSE BLDC GLUCOMTR-MCNC: 169 MG/DL — HIGH (ref 70–99)
GLUCOSE SERPL-MCNC: 149 MG/DL — HIGH (ref 70–99)
HCT VFR BLD CALC: 22.8 % — LOW (ref 42–52)
HCT VFR BLD CALC: 23.2 % — LOW (ref 42–52)
HGB BLD-MCNC: 7.4 G/DL — LOW (ref 14–18)
HGB BLD-MCNC: 7.5 G/DL — LOW (ref 14–18)
INR BLD: 2.54 RATIO — HIGH (ref 0.65–1.3)
MAGNESIUM SERPL-MCNC: 1.4 MG/DL — LOW (ref 1.8–2.4)
MCHC RBC-ENTMCNC: 31.6 PG — HIGH (ref 27–31)
MCHC RBC-ENTMCNC: 32 PG — HIGH (ref 27–31)
MCHC RBC-ENTMCNC: 32.3 G/DL — SIGNIFICANT CHANGE UP (ref 32–37)
MCHC RBC-ENTMCNC: 32.5 G/DL — SIGNIFICANT CHANGE UP (ref 32–37)
MCV RBC AUTO: 97.9 FL — HIGH (ref 80–94)
MCV RBC AUTO: 98.7 FL — HIGH (ref 80–94)
NRBC BLD AUTO-RTO: 0 /100 WBCS — SIGNIFICANT CHANGE UP (ref 0–0)
NRBC BLD AUTO-RTO: 0 /100 WBCS — SIGNIFICANT CHANGE UP (ref 0–0)
PLATELET # BLD AUTO: 92 K/UL — LOW (ref 130–400)
PLATELET # BLD AUTO: 96 K/UL — LOW (ref 130–400)
PMV BLD: 9.1 FL — SIGNIFICANT CHANGE UP (ref 7.4–10.4)
PMV BLD: 9.2 FL — SIGNIFICANT CHANGE UP (ref 7.4–10.4)
POTASSIUM SERPL-MCNC: 3 MMOL/L — LOW (ref 3.5–5)
POTASSIUM SERPL-SCNC: 3 MMOL/L — LOW (ref 3.5–5)
PROT SERPL-MCNC: 4.3 G/DL — LOW (ref 6–8)
PROTHROM AB SERPL-ACNC: 30.5 SEC — HIGH (ref 9.95–12.87)
RBC # BLD: 2.31 M/UL — LOW (ref 4.7–6.1)
RBC # BLD: 2.37 M/UL — LOW (ref 4.7–6.1)
RBC # FLD: 17.7 % — HIGH (ref 11.5–14.5)
RBC # FLD: 18 % — HIGH (ref 11.5–14.5)
SODIUM SERPL-SCNC: 145 MMOL/L — SIGNIFICANT CHANGE UP (ref 135–146)
SPECIMEN SOURCE: SIGNIFICANT CHANGE UP
WBC # BLD: 5.37 K/UL — SIGNIFICANT CHANGE UP (ref 4.8–10.8)
WBC # BLD: 5.44 K/UL — SIGNIFICANT CHANGE UP (ref 4.8–10.8)
WBC # FLD AUTO: 5.37 K/UL — SIGNIFICANT CHANGE UP (ref 4.8–10.8)
WBC # FLD AUTO: 5.44 K/UL — SIGNIFICANT CHANGE UP (ref 4.8–10.8)

## 2025-04-22 PROCEDURE — 99233 SBSQ HOSP IP/OBS HIGH 50: CPT

## 2025-04-22 PROCEDURE — 99232 SBSQ HOSP IP/OBS MODERATE 35: CPT | Mod: GC

## 2025-04-22 PROCEDURE — 71045 X-RAY EXAM CHEST 1 VIEW: CPT | Mod: 26

## 2025-04-22 RX ORDER — QUETIAPINE FUMARATE 25 MG/1
50 TABLET ORAL ONCE
Refills: 0 | Status: COMPLETED | OUTPATIENT
Start: 2025-04-22 | End: 2025-04-22

## 2025-04-22 RX ORDER — MAGNESIUM SULFATE 500 MG/ML
2 SYRINGE (ML) INJECTION
Refills: 0 | Status: COMPLETED | OUTPATIENT
Start: 2025-04-22 | End: 2025-04-22

## 2025-04-22 RX ORDER — OCTREOTIDE ACETATE 500 UG/ML
25 INJECTION, SOLUTION INTRAVENOUS; SUBCUTANEOUS
Qty: 500 | Refills: 0 | Status: DISCONTINUED | OUTPATIENT
Start: 2025-04-22 | End: 2025-04-22

## 2025-04-22 RX ORDER — OCTREOTIDE ACETATE 500 UG/ML
50 INJECTION, SOLUTION INTRAVENOUS; SUBCUTANEOUS
Qty: 500 | Refills: 0 | Status: DISCONTINUED | OUTPATIENT
Start: 2025-04-22 | End: 2025-04-22

## 2025-04-22 RX ORDER — HALOPERIDOL 10 MG/1
5 TABLET ORAL ONCE
Refills: 0 | Status: COMPLETED | OUTPATIENT
Start: 2025-04-22 | End: 2025-04-22

## 2025-04-22 RX ADMIN — LEVETIRACETAM 250 MILLIGRAM(S): 10 INJECTION, SOLUTION INTRAVENOUS at 05:22

## 2025-04-22 RX ADMIN — LIDOCAINE HYDROCHLORIDE 1 PATCH: 20 JELLY TOPICAL at 11:30

## 2025-04-22 RX ADMIN — Medication 100 MILLIGRAM(S): at 05:23

## 2025-04-22 RX ADMIN — NYSTATIN 1 APPLICATION(S): 100000 CREAM TOPICAL at 05:25

## 2025-04-22 RX ADMIN — Medication 1 APPLICATION(S): at 05:23

## 2025-04-22 RX ADMIN — HALOPERIDOL 5 MILLIGRAM(S): 10 TABLET ORAL at 10:17

## 2025-04-22 RX ADMIN — Medication 25 GRAM(S): at 17:27

## 2025-04-22 RX ADMIN — Medication 40 MILLIEQUIVALENT(S): at 17:27

## 2025-04-22 RX ADMIN — INSULIN LISPRO 2: 100 INJECTION, SOLUTION INTRAVENOUS; SUBCUTANEOUS at 12:07

## 2025-04-22 RX ADMIN — ATORVASTATIN CALCIUM 80 MILLIGRAM(S): 80 TABLET, FILM COATED ORAL at 21:18

## 2025-04-22 RX ADMIN — Medication 40 MILLIGRAM(S): at 05:23

## 2025-04-22 RX ADMIN — Medication 50 MILLIEQUIVALENT(S): at 15:05

## 2025-04-22 RX ADMIN — Medication 102 MILLIGRAM(S): at 17:25

## 2025-04-22 RX ADMIN — INSULIN LISPRO 2: 100 INJECTION, SOLUTION INTRAVENOUS; SUBCUTANEOUS at 18:07

## 2025-04-22 RX ADMIN — FOLIC ACID 1 MILLIGRAM(S): 1 TABLET ORAL at 15:05

## 2025-04-22 RX ADMIN — LACTULOSE 20 GRAM(S): 10 SOLUTION ORAL at 23:41

## 2025-04-22 RX ADMIN — Medication 2 MILLIGRAM(S): at 11:30

## 2025-04-22 RX ADMIN — LACTULOSE 20 GRAM(S): 10 SOLUTION ORAL at 15:04

## 2025-04-22 RX ADMIN — Medication 100 MILLIGRAM(S): at 17:26

## 2025-04-22 RX ADMIN — Medication 40 MILLIEQUIVALENT(S): at 15:05

## 2025-04-22 RX ADMIN — Medication 25 GRAM(S): at 19:39

## 2025-04-22 RX ADMIN — Medication 2 TABLET(S): at 21:32

## 2025-04-22 RX ADMIN — LIDOCAINE HYDROCHLORIDE 1 PATCH: 20 JELLY TOPICAL at 19:42

## 2025-04-22 RX ADMIN — INSULIN LISPRO 2: 100 INJECTION, SOLUTION INTRAVENOUS; SUBCUTANEOUS at 08:14

## 2025-04-22 RX ADMIN — LIDOCAINE HYDROCHLORIDE 1 PATCH: 20 JELLY TOPICAL at 23:04

## 2025-04-22 RX ADMIN — LACTULOSE 20 GRAM(S): 10 SOLUTION ORAL at 21:18

## 2025-04-22 RX ADMIN — Medication 3 MILLIGRAM(S): at 21:18

## 2025-04-22 RX ADMIN — Medication 40 MILLIEQUIVALENT(S): at 21:18

## 2025-04-22 RX ADMIN — Medication 10 MG/HR: at 09:46

## 2025-04-22 RX ADMIN — Medication 2 MILLIGRAM(S): at 11:12

## 2025-04-22 RX ADMIN — QUETIAPINE FUMARATE 50 MILLIGRAM(S): 25 TABLET ORAL at 21:18

## 2025-04-22 RX ADMIN — LACTULOSE 20 GRAM(S): 10 SOLUTION ORAL at 17:26

## 2025-04-22 RX ADMIN — NYSTATIN 1 APPLICATION(S): 100000 CREAM TOPICAL at 17:28

## 2025-04-22 RX ADMIN — LEVETIRACETAM 250 MILLIGRAM(S): 10 INJECTION, SOLUTION INTRAVENOUS at 17:30

## 2025-04-22 RX ADMIN — Medication 25 GRAM(S): at 15:13

## 2025-04-22 NOTE — PROGRESS NOTE ADULT - SUBJECTIVE AND OBJECTIVE BOX
INFECTIOUS DISEASE FOLLOW UP NOTE:    Interval History/ROS: Patient is a 80y old  Male who presents with a chief complaint of acute mentation changes (22 Apr 2025 08:31)      Overnight events:    REVIEW OF SYSTEMS:        Prior hospital charts reviewed [Yes]  Primary team notes reviewed [Yes]  Other consultant notes reviewed [Yes]    Allergies:  No Known Allergies      ANTIMICROBIALS:   metroNIDAZOLE  IVPB 500 every 12 hours  rifAXIMin 550 two times a day      OTHER MEDS: MEDICATIONS  (STANDING):  acetaminophen     Tablet .. 650 every 6 hours PRN  aluminum hydroxide/magnesium hydroxide/simethicone Suspension 30 every 4 hours PRN  atorvastatin 80 at bedtime  dexMEDEtomidine Infusion 0.5 <Continuous>  dextrose 50% Injectable 25 once  dextrose 50% Injectable 12.5 once  dextrose 50% Injectable 25 once  dextrose Oral Gel 15 once PRN  glucagon  Injectable 1 once  insulin lispro (ADMELOG) corrective regimen sliding scale  three times a day before meals  lactulose Syrup 20 every 3 hours  levETIRAcetam   Injectable 250 <User Schedule>  melatonin 3 at bedtime PRN  ondansetron Injectable 4 every 8 hours PRN  pantoprazole  Injectable 40 every 12 hours  pantoprazole Infusion 8 <Continuous>  polyethylene glycol 3350 17 two times a day PRN  senna 2 at bedtime      Vital Signs Last 24 Hrs  T(F): 99.1 (04-22-25 @ 07:01), Max: 99.1 (04-22-25 @ 07:01)    Vital Signs Last 24 Hrs  HR: 85 (04-22-25 @ 05:33) (85 - 103)  BP: 122/59 (04-22-25 @ 05:33) (115/70 - 133/65)  RR: 30 (04-22-25 @ 07:01)  SpO2: 98% (04-22-25 @ 07:01) (92% - 98%)  Wt(kg): --    EXAM:      Labs:                        7.5    5.37  )-----------( 96       ( 22 Apr 2025 00:35 )             23.2     04-21    147[H]  |  112[H]  |  12  ----------------------------<  138[H]  3.4[L]   |  25  |  0.7    Ca    7.4[L]      21 Apr 2025 05:34  Mg     1.4     04-21    TPro  4.3[L]  /  Alb  2.5[L]  /  TBili  1.3[H]  /  DBili  x   /  AST  32  /  ALT  9   /  AlkPhos  92  04-21      WBC Trend:  WBC Count: 5.37 (04-22-25 @ 00:35)  WBC Count: 6.33 (04-21-25 @ 05:34)  WBC Count: 5.15 (04-20-25 @ 05:39)  WBC Count: 4.95 (04-19-25 @ 05:31)      Creatine Trend:  Creatinine: 0.7 (04-21)  Creatinine: 0.7 (04-20)  Creatinine: 0.9 (04-19)  Creatinine: 1.1 (04-18)      Liver Biochemical Testing Trend:  Alanine Aminotransferase (ALT/SGPT): 9 (04-21)  Alanine Aminotransferase (ALT/SGPT): 11 (04-20)  Alanine Aminotransferase (ALT/SGPT): 14 (04-19)  Alanine Aminotransferase (ALT/SGPT): 16 (04-18)  Alanine Aminotransferase (ALT/SGPT): 19 (04-17)  Aspartate Aminotransferase (AST/SGOT): 32 (04-21-25 @ 05:34)  Aspartate Aminotransferase (AST/SGOT): 46 (04-20-25 @ 05:39)  Aspartate Aminotransferase (AST/SGOT): 49 (04-19-25 @ 05:31)  Aspartate Aminotransferase (AST/SGOT): 57 (04-18-25 @ 11:13)  Aspartate Aminotransferase (AST/SGOT): 76 (04-17-25 @ 18:30)  Bilirubin Total: 1.3 (04-21)  Bilirubin Total: 1.6 (04-20)  Bilirubin Total: 1.6 (04-19)  Bilirubin Total: 0.9 (04-18)  Bilirubin Total: 1.3 (04-17)      Trend LDH      Urinalysis Basic - ( 21 Apr 2025 05:34 )    Color: x / Appearance: x / SG: x / pH: x  Gluc: 138 mg/dL / Ketone: x  / Bili: x / Urobili: x   Blood: x / Protein: x / Nitrite: x   Leuk Esterase: x / RBC: x / WBC x   Sq Epi: x / Non Sq Epi: x / Bacteria: x        MICROBIOLOGY:        Culture - Blood (collected 17 Apr 2025 18:30)  Source: Blood Blood  Preliminary Report:    No growth at 4 days    Culture - Blood (collected 16 Apr 2025 15:15)  Source: Blood None  Final Report:    No growth at 5 days    Urinalysis with Rflx Culture (collected 13 Apr 2025 21:20)    Culture - Blood (collected 13 Apr 2025 21:20)  Source: Blood Blood-Peripheral  Final Report:    Growth in anaerobic bottle: Bacteroides fragilis    "Susceptibilities not performed"    Culture - Urine (collected 13 Apr 2025 21:20)  Source: Catheterized None  Final Report:    10,000 - 49,000 CFU/mL Proteus mirabilis  Organism: Proteus mirabilis  Organism: Proteus mirabilis    Sensitivities:      -  Levofloxacin: S <=0.5      -  Tobramycin: S <=2      -  Nitrofurantoin: R >64 Should not be used to treat pyelonephritis      -  Aztreonam: S <=4      -  Gentamicin: S <=2      -  Cefazolin: S <=2 For uncomplicated UTI with K. pneumoniae, E. coli, or P. mirablis: LOUIS <=16 is sensitive and LOUIS >=32 is resistant. This also predicts results for oral agents cefaclor, cefdinir, cefpodoxime, cefprozil, cefuroxime axetil, cephalexin and locarbef for uncomplicated UTI. Note that some isolates may be susceptible to these agents while testing resistant to cefazolin.      -  Cefepime: S <=2      -  Piperacillin/Tazobactam: S <=8      -  Ciprofloxacin: S <=0.25      -  Ceftriaxone: S <=1      -  Ampicillin: S <=8 These ampicillin results predict results for amoxicillin      Method Type: LOUIS      -  Meropenem: S <=1      -  Ampicillin/Sulbactam: S <=4/2      -  Cefoxitin: S <=8      -  Cefuroxime: S <=4      -  Amoxicillin/Clavulanic Acid: S <=8/4      -  Trimethoprim/Sulfamethoxazole: S <=0.5/9.5      -  Ertapenem: S <=0.5    Culture - Blood (collected 13 Apr 2025 21:20)  Source: Blood Blood-Peripheral  Final Report:    Growth in anaerobic bottle: Bacteroides fragilis "Susceptibilities not    performed"    Direct identification is available within approximately 3-5    hours either by Blood Panel Multiplexed PCR or Direct    MALDI-TOF. Details: [email or web address]  Organism: Blood Culture PCR  Organism: Blood Culture PCR    Sensitivities:      -  Bacteroides fragilis: Detec      Method Type: PCR    Culture - Blood (collected 30 Jan 2025 07:12)  Source: .Blood BLOOD  Final Report:    No growth at 5 days    Culture - Blood (collected 30 Jan 2025 07:12)  Source: .Blood BLOOD  Final Report:    No growth at 5 days    Culture - Blood (collected 29 Jan 2025 07:35)  Source: .Blood BLOOD  Final Report:    No growth at 5 days    Culture - Blood (collected 27 Jan 2025 19:13)  Source: .Blood BLOOD  Final Report:    No growth at 5 days      Procalcitonin: 1.06 (04-15)      RADIOLOGY:  imaging below personally reviewed   INFECTIOUS DISEASE FOLLOW UP NOTE:    Interval History/ROS: Patient is a 80y old  Male who presents with a chief complaint of acute mentation changes (22 Apr 2025 08:31)    Overnight events: Afebrile but very confused today. Can get agitated.    REVIEW OF SYSTEMS:  Unable to provide due to cognitive impairment      Prior hospital charts reviewed [Yes]  Primary team notes reviewed [Yes]  Other consultant notes reviewed [Yes]    Allergies:  No Known Allergies      ANTIMICROBIALS:   metroNIDAZOLE  IVPB 500 every 12 hours  rifAXIMin 550 two times a day      OTHER MEDS: MEDICATIONS  (STANDING):  acetaminophen     Tablet .. 650 every 6 hours PRN  aluminum hydroxide/magnesium hydroxide/simethicone Suspension 30 every 4 hours PRN  atorvastatin 80 at bedtime  dexMEDEtomidine Infusion 0.5 <Continuous>  dextrose 50% Injectable 25 once  dextrose 50% Injectable 12.5 once  dextrose 50% Injectable 25 once  dextrose Oral Gel 15 once PRN  glucagon  Injectable 1 once  insulin lispro (ADMELOG) corrective regimen sliding scale  three times a day before meals  lactulose Syrup 20 every 3 hours  levETIRAcetam   Injectable 250 <User Schedule>  melatonin 3 at bedtime PRN  ondansetron Injectable 4 every 8 hours PRN  pantoprazole  Injectable 40 every 12 hours  pantoprazole Infusion 8 <Continuous>  polyethylene glycol 3350 17 two times a day PRN  senna 2 at bedtime      Vital Signs Last 24 Hrs  T(F): 99.1 (04-22-25 @ 07:01), Max: 99.1 (04-22-25 @ 07:01)    Vital Signs Last 24 Hrs  HR: 85 (04-22-25 @ 05:33) (85 - 103)  BP: 122/59 (04-22-25 @ 05:33) (115/70 - 133/65)  RR: 30 (04-22-25 @ 07:01)  SpO2: 98% (04-22-25 @ 07:01) (92% - 98%)  Wt(kg): --    EXAM:  GENERAL: NAD, lying in bed  HEAD: No head lesions  EYES: Conjunctiva pink and cornea white  EAR, NOSE, MOUTH, THROAT: Normal external ears and nose, no discharges; moist mucous membranes; + NC  NECK: Supple, nontender to palpation; no JVD  RESPIRATORY: Bibasilar crackles  CARDIOVASCULAR: S1 S2  GASTROINTESTINAL: Soft, no significant distention; normoactive bowel sounds  GENITOURINARY: + loyola catheter, no CVA tenderness  EXTREMITIES: No clubbing, cyanosis, + diffuse anasarca  NERVOUS SYSTEM: Awake and alert but not orieted, still having UE tremor  MUSCULOSKELETAL: No joint erythema, swelling  SKIN: No rashes or lesions, no superficial thrombophlebitis  PSYCH: Agitated    Labs:                        7.5    5.37  )-----------( 96       ( 22 Apr 2025 00:35 )             23.2     04-21    147[H]  |  112[H]  |  12  ----------------------------<  138[H]  3.4[L]   |  25  |  0.7    Ca    7.4[L]      21 Apr 2025 05:34  Mg     1.4     04-21    TPro  4.3[L]  /  Alb  2.5[L]  /  TBili  1.3[H]  /  DBili  x   /  AST  32  /  ALT  9   /  AlkPhos  92  04-21      WBC Trend:  WBC Count: 5.37 (04-22-25 @ 00:35)  WBC Count: 6.33 (04-21-25 @ 05:34)  WBC Count: 5.15 (04-20-25 @ 05:39)  WBC Count: 4.95 (04-19-25 @ 05:31)      Creatine Trend:  Creatinine: 0.7 (04-21)  Creatinine: 0.7 (04-20)  Creatinine: 0.9 (04-19)  Creatinine: 1.1 (04-18)      Liver Biochemical Testing Trend:  Alanine Aminotransferase (ALT/SGPT): 9 (04-21)  Alanine Aminotransferase (ALT/SGPT): 11 (04-20)  Alanine Aminotransferase (ALT/SGPT): 14 (04-19)  Alanine Aminotransferase (ALT/SGPT): 16 (04-18)  Alanine Aminotransferase (ALT/SGPT): 19 (04-17)  Aspartate Aminotransferase (AST/SGOT): 32 (04-21-25 @ 05:34)  Aspartate Aminotransferase (AST/SGOT): 46 (04-20-25 @ 05:39)  Aspartate Aminotransferase (AST/SGOT): 49 (04-19-25 @ 05:31)  Aspartate Aminotransferase (AST/SGOT): 57 (04-18-25 @ 11:13)  Aspartate Aminotransferase (AST/SGOT): 76 (04-17-25 @ 18:30)  Bilirubin Total: 1.3 (04-21)  Bilirubin Total: 1.6 (04-20)  Bilirubin Total: 1.6 (04-19)  Bilirubin Total: 0.9 (04-18)  Bilirubin Total: 1.3 (04-17)      Trend LDH      Urinalysis Basic - ( 21 Apr 2025 05:34 )    Color: x / Appearance: x / SG: x / pH: x  Gluc: 138 mg/dL / Ketone: x  / Bili: x / Urobili: x   Blood: x / Protein: x / Nitrite: x   Leuk Esterase: x / RBC: x / WBC x   Sq Epi: x / Non Sq Epi: x / Bacteria: x        MICROBIOLOGY:        Culture - Blood (collected 17 Apr 2025 18:30)  Source: Blood Blood  Preliminary Report:    No growth at 4 days    Culture - Blood (collected 16 Apr 2025 15:15)  Source: Blood None  Final Report:    No growth at 5 days    Urinalysis with Rflx Culture (collected 13 Apr 2025 21:20)    Culture - Blood (collected 13 Apr 2025 21:20)  Source: Blood Blood-Peripheral  Final Report:    Growth in anaerobic bottle: Bacteroides fragilis    "Susceptibilities not performed"    Culture - Urine (collected 13 Apr 2025 21:20)  Source: Catheterized None  Final Report:    10,000 - 49,000 CFU/mL Proteus mirabilis  Organism: Proteus mirabilis  Organism: Proteus mirabilis    Sensitivities:      -  Levofloxacin: S <=0.5      -  Tobramycin: S <=2      -  Nitrofurantoin: R >64 Should not be used to treat pyelonephritis      -  Aztreonam: S <=4      -  Gentamicin: S <=2      -  Cefazolin: S <=2 For uncomplicated UTI with K. pneumoniae, E. coli, or P. mirablis: LOUIS <=16 is sensitive and LOUIS >=32 is resistant. This also predicts results for oral agents cefaclor, cefdinir, cefpodoxime, cefprozil, cefuroxime axetil, cephalexin and locarbef for uncomplicated UTI. Note that some isolates may be susceptible to these agents while testing resistant to cefazolin.      -  Cefepime: S <=2      -  Piperacillin/Tazobactam: S <=8      -  Ciprofloxacin: S <=0.25      -  Ceftriaxone: S <=1      -  Ampicillin: S <=8 These ampicillin results predict results for amoxicillin      Method Type: LOUIS      -  Meropenem: S <=1      -  Ampicillin/Sulbactam: S <=4/2      -  Cefoxitin: S <=8      -  Cefuroxime: S <=4      -  Amoxicillin/Clavulanic Acid: S <=8/4      -  Trimethoprim/Sulfamethoxazole: S <=0.5/9.5      -  Ertapenem: S <=0.5    Culture - Blood (collected 13 Apr 2025 21:20)  Source: Blood Blood-Peripheral  Final Report:    Growth in anaerobic bottle: Bacteroides fragilis "Susceptibilities not    performed"    Direct identification is available within approximately 3-5    hours either by Blood Panel Multiplexed PCR or Direct    MALDI-TOF. Details: [email or web address]  Organism: Blood Culture PCR  Organism: Blood Culture PCR    Sensitivities:      -  Bacteroides fragilis: Detec      Method Type: PCR    Culture - Blood (collected 30 Jan 2025 07:12)  Source: .Blood BLOOD  Final Report:    No growth at 5 days    Culture - Blood (collected 30 Jan 2025 07:12)  Source: .Blood BLOOD  Final Report:    No growth at 5 days    Culture - Blood (collected 29 Jan 2025 07:35)  Source: .Blood BLOOD  Final Report:    No growth at 5 days    Culture - Blood (collected 27 Jan 2025 19:13)  Source: .Blood BLOOD  Final Report:    No growth at 5 days      Procalcitonin: 1.06 (04-15)      RADIOLOGY:  imaging below personally reviewed    < from: Xray Chest 1 View AP/PA (04.22.25 @ 12:20) >  IMPRESSION:    Enteric tube terminates below the left hemidiaphragm.    Essentially stable bilateral opacities.    < end of copied text >

## 2025-04-22 NOTE — PROGRESS NOTE ADULT - SUBJECTIVE AND OBJECTIVE BOX
Patient is a 80y old  Male who presents with a chief complaint of acute mentation changes (22 Apr 2025 09:20)        Over Night Events:    Yesterday had bloody BM, hgb drop on CBC, GI on board    ROS:  See HPI    PHYSICAL EXAM    ICU Vital Signs Last 24 Hrs  T(C): 37.3 (22 Apr 2025 07:01), Max: 37.3 (22 Apr 2025 07:01)  T(F): 99.1 (22 Apr 2025 07:01), Max: 99.1 (22 Apr 2025 07:01)  HR: 85 (22 Apr 2025 05:33) (85 - 103)  BP: 122/59 (22 Apr 2025 05:33) (115/70 - 133/65)  BP(mean): 85 (22 Apr 2025 05:33) (85 - 93)  ABP: --  ABP(mean): --  RR: 30 (22 Apr 2025 07:01) (15 - 30)  SpO2: 98% (22 Apr 2025 07:01) (92% - 98%)    O2 Parameters below as of 21 Apr 2025 23:15  Patient On (Oxygen Delivery Method): room air        General: NAD, confused  Lungs: Bilateral BS  Cardiovascular: Regular   Gastrointestinal: Soft, non distended  Extremities: No clubbing   Skin: Warm, intact  Neurological: confused, awake     04-21-25 @ 07:01  -  04-22-25 @ 07:00  --------------------------------------------------------  IN:    IV PiggyBack: 100 mL    Oral Fluid: 450 mL  Total IN: 550 mL    OUT:    Indwelling Catheter - Urethral (mL): 355 mL  Total OUT: 355 mL    Total NET: 195 mL    LABS:                            7.5    5.37  )-----------( 96       ( 22 Apr 2025 00:35 )             23.2                                               04-21    147[H]  |  112[H]  |  12  ----------------------------<  138[H]  3.4[L]   |  25  |  0.7    Ca    7.4[L]      21 Apr 2025 05:34  Mg     1.4     04-21    TPro  4.3[L]  /  Alb  2.5[L]  /  TBili  1.3[H]  /  DBili  x   /  AST  32  /  ALT  9   /  AlkPhos  92  04-21                                             Urinalysis Basic - ( 21 Apr 2025 05:34 )    Color: x / Appearance: x / SG: x / pH: x  Gluc: 138 mg/dL / Ketone: x  / Bili: x / Urobili: x   Blood: x / Protein: x / Nitrite: x   Leuk Esterase: x / RBC: x / WBC x   Sq Epi: x / Non Sq Epi: x / Bacteria: x                                                LIVER FUNCTIONS - ( 21 Apr 2025 05:34 )  Alb: 2.5 g/dL / Pro: 4.3 g/dL / ALK PHOS: 92 U/L / ALT: 9 U/L / AST: 32 U/L / GGT: x                                                                                                                                 MEDICATIONS  (STANDING):  atorvastatin 80 milliGRAM(s) Oral at bedtime  chlorhexidine 2% Cloths 1 Application(s) Topical <User Schedule>  dexMEDEtomidine Infusion 0.5 MICROgram(s)/kG/Hr (14.7 mL/Hr) IV Continuous <Continuous>  dextrose 5%. 1000 milliLiter(s) (50 mL/Hr) IV Continuous <Continuous>  dextrose 5%. 1000 milliLiter(s) (100 mL/Hr) IV Continuous <Continuous>  dextrose 50% Injectable 25 Gram(s) IV Push once  dextrose 50% Injectable 12.5 Gram(s) IV Push once  dextrose 50% Injectable 25 Gram(s) IV Push once  folic acid 1 milliGRAM(s) Oral daily  glucagon  Injectable 1 milliGRAM(s) IntraMuscular once  insulin lispro (ADMELOG) corrective regimen sliding scale   SubCutaneous three times a day before meals  lactulose Syrup 20 Gram(s) Enteral Tube every 3 hours  levETIRAcetam   Injectable 250 milliGRAM(s) IV Push <User Schedule>  lidocaine   4% Patch 1 Patch Transdermal daily  metroNIDAZOLE  IVPB 500 milliGRAM(s) IV Intermittent every 12 hours  nystatin Powder 1 Application(s) Topical two times a day  pantoprazole  Injectable 40 milliGRAM(s) IV Push every 12 hours  pantoprazole Infusion 8 mG/Hr (10 mL/Hr) IV Continuous <Continuous>  rifAXIMin 550 milliGRAM(s) Oral two times a day  senna 2 Tablet(s) Oral at bedtime    MEDICATIONS  (PRN):  acetaminophen     Tablet .. 650 milliGRAM(s) Oral every 6 hours PRN Temp greater or equal to 38C (100.4F), Mild Pain (1 - 3)  aluminum hydroxide/magnesium hydroxide/simethicone Suspension 30 milliLiter(s) Oral every 4 hours PRN Dyspepsia  dextrose Oral Gel 15 Gram(s) Oral once PRN Blood Glucose LESS THAN 70 milliGRAM(s)/deciliter  melatonin 3 milliGRAM(s) Oral at bedtime PRN Insomnia  ondansetron Injectable 4 milliGRAM(s) IV Push every 8 hours PRN Nausea and/or Vomiting  polyethylene glycol 3350 17 Gram(s) Oral two times a day PRN Constipation      Xrays:                                                                                     ECHO

## 2025-04-22 NOTE — PROGRESS NOTE ADULT - SUBJECTIVE AND OBJECTIVE BOX
80yMale  Being followed for blood in stool, liver cirrhosis   Interval history: patient with small episode of blood in stool today.      PAST MEDICAL & SURGICAL HISTORY:   Diabetes      Hypertension      Fall      H/O left knee surgery      History of cholecystectomy      History of appendectomy                Social History: No smoking. No alcohol. No illegal drug use.            MEDICATIONS  (STANDING):  atorvastatin 80 milliGRAM(s) Oral at bedtime  chlorhexidine 2% Cloths 1 Application(s) Topical <User Schedule>  dexMEDEtomidine Infusion 0.5 MICROgram(s)/kG/Hr (14.7 mL/Hr) IV Continuous <Continuous>  dextrose 5%. 1000 milliLiter(s) (100 mL/Hr) IV Continuous <Continuous>  dextrose 5%. 1000 milliLiter(s) (50 mL/Hr) IV Continuous <Continuous>  dextrose 50% Injectable 25 Gram(s) IV Push once  dextrose 50% Injectable 12.5 Gram(s) IV Push once  dextrose 50% Injectable 25 Gram(s) IV Push once  folic acid 1 milliGRAM(s) Oral daily  glucagon  Injectable 1 milliGRAM(s) IntraMuscular once  insulin lispro (ADMELOG) corrective regimen sliding scale   SubCutaneous three times a day before meals  lactulose Syrup 20 Gram(s) Enteral Tube every 3 hours  levETIRAcetam   Injectable 250 milliGRAM(s) IV Push <User Schedule>  lidocaine   4% Patch 1 Patch Transdermal daily  metroNIDAZOLE  IVPB 500 milliGRAM(s) IV Intermittent every 12 hours  nystatin Powder 1 Application(s) Topical two times a day  octreotide  Infusion 50 MICROgram(s)/Hr (10 mL/Hr) IV Continuous <Continuous>  pantoprazole  Injectable 40 milliGRAM(s) IV Push every 12 hours  pantoprazole Infusion 8 mG/Hr (10 mL/Hr) IV Continuous <Continuous>  rifAXIMin 550 milliGRAM(s) Oral two times a day  senna 2 Tablet(s) Oral at bedtime    MEDICATIONS  (PRN):  acetaminophen     Tablet .. 650 milliGRAM(s) Oral every 6 hours PRN Temp greater or equal to 38C (100.4F), Mild Pain (1 - 3)  aluminum hydroxide/magnesium hydroxide/simethicone Suspension 30 milliLiter(s) Oral every 4 hours PRN Dyspepsia  dextrose Oral Gel 15 Gram(s) Oral once PRN Blood Glucose LESS THAN 70 milliGRAM(s)/deciliter  melatonin 3 milliGRAM(s) Oral at bedtime PRN Insomnia  morphine  - Injectable 2 milliGRAM(s) IV Push every 8 hours PRN Severe Pain (7 - 10)  ondansetron Injectable 4 milliGRAM(s) IV Push every 8 hours PRN Nausea and/or Vomiting  polyethylene glycol 3350 17 Gram(s) Oral two times a day PRN Constipation      Allergies:   No Known Allergies  Intolerances          REVIEW OF SYSTEMS:  unobtainable     VITAL SIGNS:   T(F): 99.1 (04-22-25 @ 07:01), Max: 99.1 (04-22-25 @ 07:01)  HR: 85 (04-22-25 @ 09:48) (85 - 103)  BP: 115/60 (04-22-25 @ 09:48) (115/60 - 133/65)  RR: 21 (04-22-25 @ 09:48) (15 - 30)  SpO2: 96% (04-22-25 @ 09:48) (92% - 98%)    PHYSICAL EXAM:  GENERAL: not responding to prompts  HEAD:  Atraumatic, Normocephalic  EYES: conjunctiva and sclera clear  NECK: Supple, no thyromegaly  CHEST/LUNG: Clear to auscultation bilaterally; No wheeze, rhonchi, or rales  HEART: Regular rate and rhythm; normal S1, S2, No murmurs.  ABDOMEN: Soft, nontender, nondistended; Bowel sounds present  NEUROLOGY: No asterixis or tremor.   SKIN: Intact, no jaundice            LABS:                        7.4    5.44  )-----------( 92       ( 22 Apr 2025 12:07 )             22.8     04-22    145  |  110  |  13  ----------------------------<  149[H]  3.0[L]   |  25  |  0.7    Ca    7.4[L]      22 Apr 2025 12:07  Mg     1.4     04-21    TPro  4.3[L]  /  Alb  2.5[L]  /  TBili  1.3[H]  /  DBili  x   /  AST  32  /  ALT  9   /  AlkPhos  92  04-21    LIVER FUNCTIONS - ( 21 Apr 2025 05:34 )  Alb: 2.5 g/dL / Pro: 4.3 g/dL / ALK PHOS: 92 U/L / ALT: 9 U/L / AST: 32 U/L / GGT: x           PT/INR - ( 22 Apr 2025 12:07 )   PT: 30.50 sec;   INR: 2.54 ratio         PTT - ( 22 Apr 2025 12:07 )  PTT:36.5 sec    IMAGING:    < from: CT Abdomen and Pelvis w/ IV Cont (04.13.25 @ 22:56) >    ACC: [id number] EXAM:  CT ABDOMEN AND PELVIS IC   ORDERED BY: DOREEN CAPELLAN     PROCEDURE DATE:  04/13/2025          INTERPRETATION:  CLINICAL STATEMENT: Abdominal pain and fever.    TECHNIQUE: Contiguous axial CT images were obtained from the lower chest   to the pubic symphysis following administration of intravenous contrast.   98 cc administered of Omnipaque 350 (8 cc discarded). Oral contrast was   not administered. Reformatted images in the coronal and sagittal planes   were acquired.    COMPARISON: CT abdomen and pelvis 1/23/2025.      FINDINGS:    LOWER CHEST: Bilateral small left greater than right pleural effusions   with adjacent compressive atelectasis. Bilateral subsegmental   atelectasis. Cardiomegaly.    HEPATOBILIARY: Liver cirrhosis. Post cholecystectomy. Patent portal vein.   Stable hepatic dome calcifications. Hepatic dome subcentimeter hypodense   1.3 cm hypodensity, not fully characterized (series 302, 77).    SPLEEN: Perisplenic varices with left splenorenal shunt. No splenomegaly.    PANCREAS: Limited evaluation of pancreas, including previously described   hypodense lesion, due to background free fluid. Atrophic pancreas with   multiple calcifications, likely sequela of chronic pancreatitis.    ADRENAL GLANDS: Unremarkable.    KIDNEYS: Symmetric enhancement. No hydronephrosis.    ABDOMINOPELVIC NODES: Unremarkable.    PELVIC ORGANS: Contracted urinary bladder.    PERITONEUM/MESENTERY/BOWEL: Mild-to-moderate abdominopelvic ascites. No   abnormal bowel dilation or wall thickening. No intraperitoneal free air.   Scattered colonic diverticula.    BONES/SOFT TISSUES: Stable umbilical hernia containing mesenteric fat and   fluid. Erosive endplate changes centered at the L4-5 level. Degenerative   changes of the spine. Diffuse anasarca.    OTHER: Atherosclerotic calcifications of the abdominal aorta. Engorgement   of the inferior mesenteric vein.      IMPRESSION:    1.  Erosive endplate changes centered at the L4-5 level, consistent with   patient's history ofrecent lumbar spine osteomyelitis.    2.  Liver cirrhosis with portal hypertension. Mild-to-moderate ascites.   Anasarca. Hepatic dome subcentimeter hypodense 1.3 cm hypodensity, not   fully characterized . Outpatient MR abdomen with IV contrast recommended.    3.  Chronic pancreatitis, with limited evaluation for acute inflammation   due to background fluid. Correlation with pancreatic enzymes recommended.    --- End of Report ---          DOMINGO HERCULES MD; Resident Radiologist  This document has been electronically signed.  SYDNEY COBIAN MD; Attending Radiologist  This document has been electronically signed. Apr 14 2025 12:17AM    < end of copied text >

## 2025-04-22 NOTE — PROGRESS NOTE ADULT - ASSESSMENT
80 years old male history of hypertension, diabetes, subdural hematoma on Keppra, hx mechanical falls, recent admission (January 2025) for osteomyelitis of L4-5 w completion of abx.  BIBA from home status post change of mental status.     ID is consulted for bacteremia  Afebrile  WBC 7.55 < 7.45  On room air  BCx 4/14 Bacteroides fragilis  UA WBC 5, UCx low count P. mirabilis    CT A/P 4/13  1.  Erosive endplate changes centered at the L4-5 level, consistent with   patient's history ofrecent lumbar spine osteomyelitis.  2.  Liver cirrhosis with portal hypertension. Mild-to-moderate ascites.   Anasarca. Hepatic dome subcentimeter hypodense 1.3 cm hypodensity, not   fully characterized . Outpatient MR abdomen with IV contrast recommended.  3.  Chronic pancreatitis, with limited evaluation for acute inflammation   due to background fluid. Correlation with pancreatic enzymes recommended.    Antibiotics:  Vancomycin 4/13  Ampicillin 4/14  Ceftriaxone 4/15 ->  Flagyl 4/15 ->      IMPRESSION:  Bacteroides bacteremia, potentially life threatening  Possible SBP?  Liver cirrhosis  Lower GI bleed  Hx L4-L5 osteomyelitis  Liver lesion  Immunosuppression / Immunosenescence secondary to multiple comorbidities which could result in poor clinical outcome    RECOMMENDATIONS:  - Completed 7 days of ceftriaxone, completed albumin x 2 doses  - IV Flagyl 500mg q12hrs for 14 day (until 4/28)  - GI follow up  - Consider MRI abdomen when patient is stable  - Offloading and frequent position changes, aspiration precaution  - Trend WBC, fever curve, transaminases, creatinine daily  - Please inform ID of any patient clinical change or any new pertinent laboratory or radiographic data        * THIS IS AN INCOMPLETE NOTE. FINAL RECOMMENDATION IS PENDING *   80 years old male history of hypertension, diabetes, subdural hematoma on Keppra, hx mechanical falls, recent admission (January 2025) for osteomyelitis of L4-5 w completion of abx.  BIBA from home status post change of mental status.     ID is consulted for bacteremia  Afebrile  WBC 7.55 < 7.45  On room air  BCx 4/14 Bacteroides fragilis  UA WBC 5, UCx low count P. mirabilis    CT A/P 4/13  1.  Erosive endplate changes centered at the L4-5 level, consistent with   patient's history ofrecent lumbar spine osteomyelitis.  2.  Liver cirrhosis with portal hypertension. Mild-to-moderate ascites.   Anasarca. Hepatic dome subcentimeter hypodense 1.3 cm hypodensity, not   fully characterized . Outpatient MR abdomen with IV contrast recommended.  3.  Chronic pancreatitis, with limited evaluation for acute inflammation   due to background fluid. Correlation with pancreatic enzymes recommended.    Antibiotics:  Vancomycin 4/13  Ampicillin 4/14  Ceftriaxone 4/15 ->  Flagyl 4/15 ->      IMPRESSION:  Bacteroides bacteremia, potentially life threatening  Possible SBP?  Liver cirrhosis  Lower GI bleed  Hx L4-L5 osteomyelitis  Liver lesion  Immunosuppression / Immunosenescence secondary to multiple comorbidities which could result in poor clinical outcome    RECOMMENDATIONS:  - Completed 7 days of ceftriaxone, completed albumin x 2 doses  - IV Flagyl 500mg q12hrs for 14 day (until 4/28)  - GI follow up  - Consider MRI abdomen when patient is stable  - Offloading and frequent position changes, aspiration precaution  - Trend WBC, fever curve, transaminases, creatinine daily  - Please inform ID of any patient clinical change or any new pertinent laboratory or radiographic data      Nora Beck D.O.  Attending Physician  Division of Infectious Diseases  [facility]  Please contact me via [employer] Teams

## 2025-04-22 NOTE — PROGRESS NOTE ADULT - ASSESSMENT
80-year-old male with multiple comorbidities presenting with altered mental status in the setting of:    A+P    1. Severe encephalopathy -      - Hepatic encephalopathy (ho cirrhosis)     - EEG showing diffusely attenuated severe slowing without seizure activity    2. Resolved Bacteroides fragilis bacteremia     - Likely GI source given:       - Duodenal ulcer identified on EGD (requiring endoclip)       - Erosive gastritis       - Small clot in cecum on colonoscopy     - Infection adequately treated with negative repeat blood cultures    3. Liver cirrhosis with complications:     - Portal hypertension     - Ascites     - Hepatic encephalopathy (on lactulose 20g q2h)     - 1.3 cm hepatic dome lesion requiring characterization    4. GI findings with evidence of ongoing blood loss:     - Duodenal ulcer (endoclipped)     - Erosive gastritis     - Internal/external hemorrhoids     - Small clot in cecum     - Hemoglobin drop from 9.6 => 8.1 => 7.5     - On elarge BM yesterday that was dark vs black    5. Acute kidney injury - Now resolved    6. Chronic issues: Diabetes, hypertension, macrocytic anemia, mechanical falls, history of osteomyelitis    1. Neurological:     - Continue intensive hepatic encephalopathy treatment with lactulose 20g q2h     - Target 3-4 soft bowel movements daily     - Continue Keppra 250mg q12h for seizure prophylaxis     - Daily neurologic assessments    2. Infectious Disease:     - Complete current antibiotic course (ceftriaxone 2g q24h and metronidazole 500mg q12h)     - Blood cultures now negative, indicating clearance of bacteremia    3. Hepatobiliary/GI:     - started protonix ggt     - pending GI fu     - repeating cbc at 11     - NPO till GI fu    Follow-up:  - Repeat CBC    80-year-old male with multiple comorbidities presenting with altered mental status in the setting of:    A+P    1. Severe encephalopathy -      - likely Hepatic encephalopathy     - HO liver cirrhosis     - EEG showing diffusely attenuated severe slowing without seizure activity    - lactulose target 2-3 MB per day      - Continue Keppra 250mg q12h for seizure prophylaxis    2. Resolved Bacteroides fragilis bacteremia     - Likely GI source given:     - Infection adequately treated with negative repeat blood cultures    - Abx per ID    3. Liver cirrhosis with complications:     - Portal hypertension     - Ascites     - Hepatic encephalopathy (on lactulose 20g q2h)     - 1.3 cm hepatic dome lesion requiring characterization    4. GI findings with evidence of ongoing blood loss:     - Duodenal ulcer (endoclipped)     - Erosive gastritis     - Internal/external hemorrhoids     - Small clot in cecum     - Hemoglobin drop from 9.6 => 8.1 => 7.5     - ongoing abigail    - PPI iv, NPO, serial cbc, 2 large bore iv access, target hb>7    SDU

## 2025-04-22 NOTE — PROGRESS NOTE ADULT - SUBJECTIVE AND OBJECTIVE BOX
DWIGHT RIBEIRO 80y Male  MRN#: [id number]   Hospital Day: 8d    SUBJECTIVE  Patient is a 80y old Male who presents with a chief complaint of acute mentation changes (21 Apr 2025 13:50)  Currently admitted to medicine with the primary diagnosis of Metabolic encephalopathy      INTERVAL HPI AND OVERNIGHT EVENTS:  Patient was examined and seen at bedside. This morning he is resting comfortably in bed  He is more responsive today, conversing a bit more, appears more awake as well, but still has a hard answering question like what his name is or where he is    OBJECTIVE  PAST MEDICAL & SURGICAL HISTORY  Diabetes    Hypertension    Fall    H/O left knee surgery    History of cholecystectomy    History of appendectomy      ALLERGIES:  No Known Allergies    MEDICATIONS:  STANDING MEDICATIONS  atorvastatin 80 milliGRAM(s) Oral at bedtime  chlorhexidine 2% Cloths 1 Application(s) Topical <User Schedule>  dexMEDEtomidine Infusion 0.5 MICROgram(s)/kG/Hr IV Continuous <Continuous>  dextrose 5%. 1000 milliLiter(s) IV Continuous <Continuous>  dextrose 5%. 1000 milliLiter(s) IV Continuous <Continuous>  dextrose 50% Injectable 25 Gram(s) IV Push once  dextrose 50% Injectable 12.5 Gram(s) IV Push once  dextrose 50% Injectable 25 Gram(s) IV Push once  folic acid 1 milliGRAM(s) Oral daily  glucagon  Injectable 1 milliGRAM(s) IntraMuscular once  insulin lispro (ADMELOG) corrective regimen sliding scale   SubCutaneous three times a day before meals  lactulose Syrup 20 Gram(s) Enteral Tube every 3 hours  levETIRAcetam   Injectable 250 milliGRAM(s) IV Push <User Schedule>  lidocaine   4% Patch 1 Patch Transdermal daily  metroNIDAZOLE  IVPB 500 milliGRAM(s) IV Intermittent every 12 hours  nystatin Powder 1 Application(s) Topical two times a day  pantoprazole  Injectable 40 milliGRAM(s) IV Push every 12 hours  pantoprazole Infusion 8 mG/Hr IV Continuous <Continuous>  rifAXIMin 550 milliGRAM(s) Oral two times a day  senna 2 Tablet(s) Oral at bedtime    PRN MEDICATIONS  acetaminophen     Tablet .. 650 milliGRAM(s) Oral every 6 hours PRN  aluminum hydroxide/magnesium hydroxide/simethicone Suspension 30 milliLiter(s) Oral every 4 hours PRN  dextrose Oral Gel 15 Gram(s) Oral once PRN  melatonin 3 milliGRAM(s) Oral at bedtime PRN  ondansetron Injectable 4 milliGRAM(s) IV Push every 8 hours PRN  polyethylene glycol 3350 17 Gram(s) Oral two times a day PRN      VITAL SIGNS: Last 24 Hours  T(C): 37.3 (22 Apr 2025 07:01), Max: 37.3 (22 Apr 2025 07:01)  T(F): 99.1 (22 Apr 2025 07:01), Max: 99.1 (22 Apr 2025 07:01)  HR: 85 (22 Apr 2025 05:33) (85 - 103)  BP: 122/59 (22 Apr 2025 05:33) (115/70 - 133/65)  BP(mean): 85 (22 Apr 2025 05:33) (85 - 93)  RR: 30 (22 Apr 2025 07:01) (15 - 30)  SpO2: 98% (22 Apr 2025 07:01) (92% - 98%)    LABS:                        7.5    5.37  )-----------( 96       ( 22 Apr 2025 00:35 )             23.2     04-21    147[H]  |  112[H]  |  12  ----------------------------<  138[H]  3.4[L]   |  25  |  0.7    Ca    7.4[L]      21 Apr 2025 05:34  Mg     1.4     04-21    TPro  4.3[L]  /  Alb  2.5[L]  /  TBili  1.3[H]  /  DBili  x   /  AST  32  /  ALT  9   /  AlkPhos  92  04-21      Urinalysis Basic - ( 21 Apr 2025 05:34 )    Color: x / Appearance: x / SG: x / pH: x  Gluc: 138 mg/dL / Ketone: x  / Bili: x / Urobili: x   Blood: x / Protein: x / Nitrite: x   Leuk Esterase: x / RBC: x / WBC x   Sq Epi: x / Non Sq Epi: x / Bacteria: x                RADIOLOGY:      PHYSICAL EXAM:  Gen: awake / resting in bed  HEENT: PERRL, EOMI, mouth clr, nose clr  Neck: no nodes, no JVD, thyroid nl  lungs: clr  hrt: s1 s2 rrr no murmur  abd: soft, NT/ND, no HS megaly  ext: no edema, no c/c  neuro: aa ox 0, speaks but unable to answer question, appears aware of what is going on, but seems to have word finding difficulty   DWIGHT RIBEIRO 80y Male  MRN#: [id number]   Hospital Day: 8d    SUBJECTIVE  Patient is a 80y old Male who presents with a chief complaint of acute mentation changes (21 Apr 2025 13:50)  Currently admitted to medicine with the primary diagnosis of Metabolic encephalopathy      INTERVAL HPI AND OVERNIGHT EVENTS:  Patient was examined and seen at bedside. This morning he is resting comfortably in bed  He is more responsive today, conversing a bit more, appears more awake as well, but still has a hard answering question like what his name is or where he is    OBJECTIVE  PAST MEDICAL & SURGICAL HISTORY  Diabetes    Hypertension    Fall    H/O left knee surgery    History of cholecystectomy    History of appendectomy      ALLERGIES:  No Known Allergies    MEDICATIONS:  STANDING MEDICATIONS  atorvastatin 80 milliGRAM(s) Oral at bedtime  chlorhexidine 2% Cloths 1 Application(s) Topical <User Schedule>  dexMEDEtomidine Infusion 0.5 MICROgram(s)/kG/Hr IV Continuous <Continuous>  dextrose 5%. 1000 milliLiter(s) IV Continuous <Continuous>  dextrose 5%. 1000 milliLiter(s) IV Continuous <Continuous>  dextrose 50% Injectable 25 Gram(s) IV Push once  dextrose 50% Injectable 12.5 Gram(s) IV Push once  dextrose 50% Injectable 25 Gram(s) IV Push once  folic acid 1 milliGRAM(s) Oral daily  glucagon  Injectable 1 milliGRAM(s) IntraMuscular once  insulin lispro (ADMELOG) corrective regimen sliding scale   SubCutaneous three times a day before meals  lactulose Syrup 20 Gram(s) Enteral Tube every 3 hours  levETIRAcetam   Injectable 250 milliGRAM(s) IV Push <User Schedule>  lidocaine   4% Patch 1 Patch Transdermal daily  metroNIDAZOLE  IVPB 500 milliGRAM(s) IV Intermittent every 12 hours  nystatin Powder 1 Application(s) Topical two times a day  pantoprazole  Injectable 40 milliGRAM(s) IV Push every 12 hours  pantoprazole Infusion 8 mG/Hr IV Continuous <Continuous>  rifAXIMin 550 milliGRAM(s) Oral two times a day  senna 2 Tablet(s) Oral at bedtime    PRN MEDICATIONS  acetaminophen     Tablet .. 650 milliGRAM(s) Oral every 6 hours PRN  aluminum hydroxide/magnesium hydroxide/simethicone Suspension 30 milliLiter(s) Oral every 4 hours PRN  dextrose Oral Gel 15 Gram(s) Oral once PRN  melatonin 3 milliGRAM(s) Oral at bedtime PRN  ondansetron Injectable 4 milliGRAM(s) IV Push every 8 hours PRN  polyethylene glycol 3350 17 Gram(s) Oral two times a day PRN      VITAL SIGNS: Last 24 Hours  T(C): 37.3 (22 Apr 2025 07:01), Max: 37.3 (22 Apr 2025 07:01)  T(F): 99.1 (22 Apr 2025 07:01), Max: 99.1 (22 Apr 2025 07:01)  HR: 85 (22 Apr 2025 05:33) (85 - 103)  BP: 122/59 (22 Apr 2025 05:33) (115/70 - 133/65)  BP(mean): 85 (22 Apr 2025 05:33) (85 - 93)  RR: 30 (22 Apr 2025 07:01) (15 - 30)  SpO2: 98% (22 Apr 2025 07:01) (92% - 98%)    LABS:                        7.5    5.37  )-----------( 96       ( 22 Apr 2025 00:35 )             23.2     04-21    147[H]  |  112[H]  |  12  ----------------------------<  138[H]  3.4[L]   |  25  |  0.7    Ca    7.4[L]      21 Apr 2025 05:34  Mg     1.4     04-21    TPro  4.3[L]  /  Alb  2.5[L]  /  TBili  1.3[H]  /  DBili  x   /  AST  32  /  ALT  9   /  AlkPhos  92  04-21      Urinalysis Basic - ( 21 Apr 2025 05:34 )    Color: x / Appearance: x / SG: x / pH: x  Gluc: 138 mg/dL / Ketone: x  / Bili: x / Urobili: x   Blood: x / Protein: x / Nitrite: x   Leuk Esterase: x / RBC: x / WBC x   Sq Epi: x / Non Sq Epi: x / Bacteria: x                RADIOLOGY:      PHYSICAL EXAM:  Gen: awake / resting in bed  HEENT: PERRL, EOMI, mouth clr, nose clr  Neck: no nodes, no JVD, thyroid nl  lungs: clr  hrt: s1 s2 rrr no murmur  abd: soft, NT/ND,   ext: no edema, no c/c  neuro: confused, awake, follows simple commands

## 2025-04-22 NOTE — PROGRESS NOTE ADULT - ASSESSMENT
80yMale pmh HTN, DM, subdural hematoma on keppra, osteomyelitis of L4-5 had completion of abx here for AMS. GI consulted for liver cirrhosis rule HE. Patient with some blood in stool., no hematemesis.      # Liver cirrhosis with portal hypertension. Mild-to-moderate ascites.   Anasarca. Hepatic dome subcentimeter hypodense 1.3 cm hypodensity, not fully characterized .   #rectal exam-4/15/25-brown stool in rectal vault and on finger    Rec  MR liver protocol as outpatient   -For-Mild-to-moderate ascites if there is a window, get ultrasound guided diagnostic / therapeutic paracentesis: Obtain serum albumin same day as paracentesis, avoid removing > 4 L in light of patient's CHARLENE   -Obtain fluid studies: Paracentesis Panel which includes Cell count and differential, albumin, total protein, cytology, AFB smear and culture. Separate order of amylase fluid and triglycerides fluid  -MELD Score-15  - follow up with hepatology as outpatient   -INR higher consider vitamin k 10mg x 3 days, stat LFTs  avoid diuretics  nephrology eval appreciated    -HE-very likely in light of elevated ammonia - give lactulose and  titrate to 3-4 bms / daily  neurology eval appreciated  -For Vaccinations: Please f/u in clinic for being uptodate with HAV/HBV/Influenza/Pneumococcal vaccines.  -Lifestyle/Dietary modifications: Calorie intake 25-40 Kcal/kg/day; Protein intake: 1.2-1.5 gm/kg/day  -Avoid smoking and NSAIDs  -Abstain from alcohol and all illicit drugs      #anemia episode of maroon colored stool reported  #rectal exam-4/15/25-brown stool in rectal vault and on finger  had small episode of blood in stool today  Rec  -transfuse prn to hgb >7  - s/p EGD and colonoscopy revealing duodenal ulcer.   - repeat colonoscopy in one year   - PPI BID   -Follow up with our [facility] located at [address] [place] [address]. Phone Number: [phone]

## 2025-04-22 NOTE — PHYSICAL THERAPY INITIAL EVALUATION ADULT - SPECIFY REASON(S)
Attempted to See PT this AM for Bedside PT , Namrata Jennings Notified to defer PT at this time 2/2 Pt  not Medically Appropriate for Skill PT, Will D/c PT at this time ,  Reconsult in future as appropriated .
pt is agitated, poor mental status, unable to follow commands
Ateempted to see PT this PM for bedside pT , Namrata Castellanos Notified to defer PT at this time 2/2 pt medically not stable and lethargic, Will hold PT and F/u as appropriated.
Attempted to see PT this PM for Bedside PT , NOLAN Zuleta Notified to defer PT at this time 2/2 Pt lethargic and Not appropriated for  Skill PT At this time , Will hold PT and F/u as appropriated .
Attempted to treat patient this afternoon.  Wife present at bedside.  Patient refusing PT at this time and was agitated.  Will followup as appropriate

## 2025-04-22 NOTE — PROGRESS NOTE ADULT - ASSESSMENT
IMPRESSION    Encephalopathy, metabolic vs hpeatic   GIB, s/p 3 uPRBC, s/p prior EGD with duodenal ulcer  seizure hx   Liver cirrhosis   Bacteremia bacteroides  hypernatremia   CHARLENE   elevated trop   Afib     PLAN:    CNS:    neurology follow up   follow ammonia level       HEENT: oral care     PULMONARY: keep pox >92% aspiration precaution   repeat cxr PRN    CARDIOVASCULAR:   follow cardiology     GI:   PPI ggt, GI is following, NPO, possible repeat EGD later  Speech and swallow eval   Needs NGT for lactulose, rifaxamin  follow LFT , hepatology / Gi follow up     RENAL:  D5w 50cc/ hr if Na >145  when start feed start free water 250 cc Q4 hrs   follow NA level   follow renal  CMP daily    INFECTIOUS DISEASE:   ID  follow up   rocephin and flagyl   bldcx noted    HEMATOLOGICAL:    DVT prophylaxis. follow h/h and plt   hold AC   serial h/h     ENDOCRINE:  Follow up FS.  Insulin protocol if needed.  SDU

## 2025-04-23 DIAGNOSIS — R41.82 ALTERED MENTAL STATUS, UNSPECIFIED: ICD-10-CM

## 2025-04-23 DIAGNOSIS — Z71.89 OTHER SPECIFIED COUNSELING: ICD-10-CM

## 2025-04-23 DIAGNOSIS — Z51.5 ENCOUNTER FOR PALLIATIVE CARE: ICD-10-CM

## 2025-04-23 DIAGNOSIS — R52 PAIN, UNSPECIFIED: ICD-10-CM

## 2025-04-23 LAB
ALBUMIN SERPL ELPH-MCNC: 2.2 G/DL — LOW (ref 3.5–5.2)
ALP SERPL-CCNC: 100 U/L — SIGNIFICANT CHANGE UP (ref 30–115)
ALT FLD-CCNC: 10 U/L — SIGNIFICANT CHANGE UP (ref 0–41)
ANION GAP SERPL CALC-SCNC: 15 MMOL/L — HIGH (ref 7–14)
ANION GAP SERPL CALC-SCNC: 9 MMOL/L — SIGNIFICANT CHANGE UP (ref 7–14)
APTT BLD: 39.8 SEC — HIGH (ref 27–39.2)
AST SERPL-CCNC: 34 U/L — SIGNIFICANT CHANGE UP (ref 0–41)
BILIRUB DIRECT SERPL-MCNC: 0.7 MG/DL — HIGH (ref 0–0.3)
BILIRUB INDIRECT FLD-MCNC: 0.5 MG/DL — SIGNIFICANT CHANGE UP (ref 0.2–1.2)
BILIRUB SERPL-MCNC: 1.2 MG/DL — SIGNIFICANT CHANGE UP (ref 0.2–1.2)
BUN SERPL-MCNC: 12 MG/DL — SIGNIFICANT CHANGE UP (ref 10–20)
BUN SERPL-MCNC: 13 MG/DL — SIGNIFICANT CHANGE UP (ref 10–20)
CALCIUM SERPL-MCNC: 7.3 MG/DL — LOW (ref 8.4–10.5)
CALCIUM SERPL-MCNC: 7.5 MG/DL — LOW (ref 8.4–10.5)
CHLORIDE SERPL-SCNC: 116 MMOL/L — HIGH (ref 98–110)
CHLORIDE SERPL-SCNC: 117 MMOL/L — HIGH (ref 98–110)
CO2 SERPL-SCNC: 19 MMOL/L — SIGNIFICANT CHANGE UP (ref 17–32)
CO2 SERPL-SCNC: 25 MMOL/L — SIGNIFICANT CHANGE UP (ref 17–32)
CREAT SERPL-MCNC: 0.8 MG/DL — SIGNIFICANT CHANGE UP (ref 0.7–1.5)
CREAT SERPL-MCNC: 1.2 MG/DL — SIGNIFICANT CHANGE UP (ref 0.7–1.5)
CULTURE RESULTS: SIGNIFICANT CHANGE UP
EGFR: 62 ML/MIN/1.73M2 — SIGNIFICANT CHANGE UP
EGFR: 62 ML/MIN/1.73M2 — SIGNIFICANT CHANGE UP
EGFR: 91 ML/MIN/1.73M2 — SIGNIFICANT CHANGE UP
EGFR: 91 ML/MIN/1.73M2 — SIGNIFICANT CHANGE UP
GLUCOSE BLDC GLUCOMTR-MCNC: 147 MG/DL — HIGH (ref 70–99)
GLUCOSE BLDC GLUCOMTR-MCNC: 162 MG/DL — HIGH (ref 70–99)
GLUCOSE BLDC GLUCOMTR-MCNC: 189 MG/DL — HIGH (ref 70–99)
GLUCOSE BLDC GLUCOMTR-MCNC: 204 MG/DL — HIGH (ref 70–99)
GLUCOSE SERPL-MCNC: 145 MG/DL — HIGH (ref 70–99)
GLUCOSE SERPL-MCNC: 159 MG/DL — HIGH (ref 70–99)
HCT VFR BLD CALC: 19.3 % — LOW (ref 42–52)
HCT VFR BLD CALC: 19.5 % — LOW (ref 42–52)
HCT VFR BLD CALC: 23 % — LOW (ref 42–52)
HGB BLD-MCNC: 6.2 G/DL — CRITICAL LOW (ref 14–18)
HGB BLD-MCNC: 6.3 G/DL — CRITICAL LOW (ref 14–18)
HGB BLD-MCNC: 7.3 G/DL — LOW (ref 14–18)
INR BLD: 2.1 RATIO — HIGH (ref 0.65–1.3)
INR BLD: 2.44 RATIO — HIGH (ref 0.65–1.3)
MAGNESIUM SERPL-MCNC: 2 MG/DL — SIGNIFICANT CHANGE UP (ref 1.8–2.4)
MCHC RBC-ENTMCNC: 31.3 PG — HIGH (ref 27–31)
MCHC RBC-ENTMCNC: 31.5 PG — HIGH (ref 27–31)
MCHC RBC-ENTMCNC: 31.7 G/DL — LOW (ref 32–37)
MCHC RBC-ENTMCNC: 31.8 G/DL — LOW (ref 32–37)
MCHC RBC-ENTMCNC: 32.6 G/DL — SIGNIFICANT CHANGE UP (ref 32–37)
MCHC RBC-ENTMCNC: 32.6 PG — HIGH (ref 27–31)
MCV RBC AUTO: 100 FL — HIGH (ref 80–94)
MCV RBC AUTO: 98.7 FL — HIGH (ref 80–94)
MCV RBC AUTO: 99 FL — HIGH (ref 80–94)
NRBC BLD AUTO-RTO: 0 /100 WBCS — SIGNIFICANT CHANGE UP (ref 0–0)
PLATELET # BLD AUTO: 107 K/UL — LOW (ref 130–400)
PLATELET # BLD AUTO: 88 K/UL — LOW (ref 130–400)
PLATELET # BLD AUTO: 97 K/UL — LOW (ref 130–400)
PMV BLD: 9.1 FL — SIGNIFICANT CHANGE UP (ref 7.4–10.4)
PMV BLD: 9.2 FL — SIGNIFICANT CHANGE UP (ref 7.4–10.4)
PMV BLD: 9.2 FL — SIGNIFICANT CHANGE UP (ref 7.4–10.4)
POTASSIUM SERPL-MCNC: 4.2 MMOL/L — SIGNIFICANT CHANGE UP (ref 3.5–5)
POTASSIUM SERPL-MCNC: 5.1 MMOL/L — HIGH (ref 3.5–5)
POTASSIUM SERPL-SCNC: 4.2 MMOL/L — SIGNIFICANT CHANGE UP (ref 3.5–5)
POTASSIUM SERPL-SCNC: 5.1 MMOL/L — HIGH (ref 3.5–5)
PROT SERPL-MCNC: 4 G/DL — LOW (ref 6–8)
PROTHROM AB SERPL-ACNC: 25.2 SEC — HIGH (ref 9.95–12.87)
PROTHROM AB SERPL-ACNC: 29.3 SEC — HIGH (ref 9.95–12.87)
RBC # BLD: 1.93 M/UL — LOW (ref 4.7–6.1)
RBC # BLD: 1.97 M/UL — LOW (ref 4.7–6.1)
RBC # BLD: 2.33 M/UL — LOW (ref 4.7–6.1)
RBC # FLD: 17.8 % — HIGH (ref 11.5–14.5)
RBC # FLD: 18.5 % — HIGH (ref 11.5–14.5)
RBC # FLD: 18.9 % — HIGH (ref 11.5–14.5)
SODIUM SERPL-SCNC: 150 MMOL/L — HIGH (ref 135–146)
SODIUM SERPL-SCNC: 151 MMOL/L — HIGH (ref 135–146)
SPECIMEN SOURCE: SIGNIFICANT CHANGE UP
WBC # BLD: 4.21 K/UL — LOW (ref 4.8–10.8)
WBC # BLD: 7.11 K/UL — SIGNIFICANT CHANGE UP (ref 4.8–10.8)
WBC # BLD: 8.57 K/UL — SIGNIFICANT CHANGE UP (ref 4.8–10.8)
WBC # FLD AUTO: 4.21 K/UL — LOW (ref 4.8–10.8)
WBC # FLD AUTO: 7.11 K/UL — SIGNIFICANT CHANGE UP (ref 4.8–10.8)
WBC # FLD AUTO: 8.57 K/UL — SIGNIFICANT CHANGE UP (ref 4.8–10.8)

## 2025-04-23 PROCEDURE — 99232 SBSQ HOSP IP/OBS MODERATE 35: CPT | Mod: GC

## 2025-04-23 PROCEDURE — 74174 CTA ABD&PLVS W/CONTRAST: CPT | Mod: 26

## 2025-04-23 PROCEDURE — 99233 SBSQ HOSP IP/OBS HIGH 50: CPT

## 2025-04-23 PROCEDURE — 99223 1ST HOSP IP/OBS HIGH 75: CPT

## 2025-04-23 RX ORDER — SODIUM CHLORIDE 9 G/1000ML
1000 INJECTION, SOLUTION INTRAVENOUS
Refills: 0 | Status: DISCONTINUED | OUTPATIENT
Start: 2025-04-23 | End: 2025-04-23

## 2025-04-23 RX ORDER — OCTREOTIDE ACETATE 500 UG/ML
25 INJECTION, SOLUTION INTRAVENOUS; SUBCUTANEOUS
Qty: 500 | Refills: 0 | Status: DISCONTINUED | OUTPATIENT
Start: 2025-04-23 | End: 2025-04-28

## 2025-04-23 RX ORDER — SODIUM CHLORIDE 9 G/1000ML
1000 INJECTION, SOLUTION INTRAVENOUS
Refills: 0 | Status: DISCONTINUED | OUTPATIENT
Start: 2025-04-23 | End: 2025-04-28

## 2025-04-23 RX ORDER — OLANZAPINE 10 MG/1
5 TABLET ORAL ONCE
Refills: 0 | Status: COMPLETED | OUTPATIENT
Start: 2025-04-23 | End: 2025-04-23

## 2025-04-23 RX ORDER — PROTHROMBIN COMPLEX CONCENTRATE (HUMAN) 25.5; 16.5; 24; 22; 22; 26 [IU]/ML; [IU]/ML; [IU]/ML; [IU]/ML; [IU]/ML; [IU]/ML
2000 POWDER, FOR SOLUTION INTRAVENOUS ONCE
Refills: 0 | Status: COMPLETED | OUTPATIENT
Start: 2025-04-23 | End: 2025-04-23

## 2025-04-23 RX ADMIN — LACTULOSE 20 GRAM(S): 10 SOLUTION ORAL at 17:30

## 2025-04-23 RX ADMIN — LACTULOSE 20 GRAM(S): 10 SOLUTION ORAL at 23:52

## 2025-04-23 RX ADMIN — INSULIN LISPRO 2: 100 INJECTION, SOLUTION INTRAVENOUS; SUBCUTANEOUS at 06:34

## 2025-04-23 RX ADMIN — Medication 40 MILLIEQUIVALENT(S): at 09:38

## 2025-04-23 RX ADMIN — LEVETIRACETAM 250 MILLIGRAM(S): 10 INJECTION, SOLUTION INTRAVENOUS at 06:32

## 2025-04-23 RX ADMIN — Medication 40 MILLIEQUIVALENT(S): at 15:10

## 2025-04-23 RX ADMIN — Medication 1 APPLICATION(S): at 06:33

## 2025-04-23 RX ADMIN — LACTULOSE 20 GRAM(S): 10 SOLUTION ORAL at 06:32

## 2025-04-23 RX ADMIN — Medication 102 MILLIGRAM(S): at 11:41

## 2025-04-23 RX ADMIN — INSULIN LISPRO 4: 100 INJECTION, SOLUTION INTRAVENOUS; SUBCUTANEOUS at 17:31

## 2025-04-23 RX ADMIN — FOLIC ACID 1 MILLIGRAM(S): 1 TABLET ORAL at 11:42

## 2025-04-23 RX ADMIN — LACTULOSE 20 GRAM(S): 10 SOLUTION ORAL at 09:39

## 2025-04-23 RX ADMIN — Medication 100 MILLIGRAM(S): at 17:32

## 2025-04-23 RX ADMIN — LIDOCAINE HYDROCHLORIDE 1 PATCH: 20 JELLY TOPICAL at 19:00

## 2025-04-23 RX ADMIN — LIDOCAINE HYDROCHLORIDE 1 PATCH: 20 JELLY TOPICAL at 11:43

## 2025-04-23 RX ADMIN — OCTREOTIDE ACETATE 5 MICROGRAM(S)/HR: 500 INJECTION, SOLUTION INTRAVENOUS; SUBCUTANEOUS at 17:33

## 2025-04-23 RX ADMIN — Medication 650 MILLIGRAM(S): at 22:37

## 2025-04-23 RX ADMIN — Medication 3 MILLIGRAM(S): at 22:07

## 2025-04-23 RX ADMIN — PROTHROMBIN COMPLEX CONCENTRATE (HUMAN) 400 INTERNATIONAL UNIT(S): 25.5; 16.5; 24; 22; 22; 26 POWDER, FOR SOLUTION INTRAVENOUS at 17:50

## 2025-04-23 RX ADMIN — INSULIN LISPRO 2: 100 INJECTION, SOLUTION INTRAVENOUS; SUBCUTANEOUS at 11:42

## 2025-04-23 RX ADMIN — SODIUM CHLORIDE 50 MILLILITER(S): 9 INJECTION, SOLUTION INTRAVENOUS at 23:51

## 2025-04-23 RX ADMIN — LIDOCAINE HYDROCHLORIDE 1 PATCH: 20 JELLY TOPICAL at 22:07

## 2025-04-23 RX ADMIN — Medication 40 MILLIGRAM(S): at 18:48

## 2025-04-23 RX ADMIN — Medication 650 MILLIGRAM(S): at 22:07

## 2025-04-23 RX ADMIN — Medication 2 TABLET(S): at 22:08

## 2025-04-23 RX ADMIN — SODIUM CHLORIDE 75 MILLILITER(S): 9 INJECTION, SOLUTION INTRAVENOUS at 11:38

## 2025-04-23 RX ADMIN — Medication 40 MILLIEQUIVALENT(S): at 06:32

## 2025-04-23 RX ADMIN — ATORVASTATIN CALCIUM 80 MILLIGRAM(S): 80 TABLET, FILM COATED ORAL at 22:06

## 2025-04-23 RX ADMIN — Medication 40 MILLIEQUIVALENT(S): at 17:30

## 2025-04-23 RX ADMIN — Medication 40 MILLIEQUIVALENT(S): at 22:07

## 2025-04-23 RX ADMIN — NYSTATIN 1 APPLICATION(S): 100000 CREAM TOPICAL at 17:42

## 2025-04-23 RX ADMIN — LEVETIRACETAM 250 MILLIGRAM(S): 10 INJECTION, SOLUTION INTRAVENOUS at 17:40

## 2025-04-23 RX ADMIN — LACTULOSE 20 GRAM(S): 10 SOLUTION ORAL at 11:41

## 2025-04-23 RX ADMIN — NYSTATIN 1 APPLICATION(S): 100000 CREAM TOPICAL at 06:33

## 2025-04-23 RX ADMIN — Medication 10 MG/HR: at 07:54

## 2025-04-23 RX ADMIN — LACTULOSE 20 GRAM(S): 10 SOLUTION ORAL at 15:10

## 2025-04-23 RX ADMIN — LACTULOSE 20 GRAM(S): 10 SOLUTION ORAL at 22:06

## 2025-04-23 RX ADMIN — Medication 10 MG/HR: at 06:33

## 2025-04-23 RX ADMIN — DEXMEDETOMIDINE HYDROCHLORIDE IN SODIUM CHLORIDE 14.7 MICROGRAM(S)/KG/HR: 4 INJECTION INTRAVENOUS at 23:50

## 2025-04-23 RX ADMIN — Medication 100 MILLIGRAM(S): at 06:33

## 2025-04-23 NOTE — PROGRESS NOTE ADULT - ASSESSMENT
IMPRESSION    Encephalopathy, metabolic vs hepatic   GIB, s/p 3 uPRBC, s/p prior EGD with duodenal ulcer  seizure hx   Liver cirrhosis   Bacteremia bacteroides  hypernatremia   CHARLENE   elevated trop   Afib     PLAN:    CNS:  neurology follow up   follow ammonia level       HEENT: oral care     PULMONARY: keep pox >92% aspiration precaution   repeat cxr PRN    CARDIOVASCULAR:   follow cardiology     GI:   PPI ggt, GI is following, NPO, possible repeat EGD later  Speech and swallow eval   Needs NGT for lactulose, rifaxamin  follow LFT , hepatology / Gi follow up     RENAL:  D5w 50cc/ hr if Na >145  when start feed start free water 250 cc Q4 hrs   follow NA level   follow renal  CMP daily    INFECTIOUS DISEASE:   ID  follow up   rocephin and flagyl   bldcx noted    HEMATOLOGICAL:    DVT prophylaxis. follow h/h and plt   hold AC   serial h/h   Give blood     ENDOCRINE:  Follow up FS.  Insulin protocol if needed.  SDU

## 2025-04-23 NOTE — PROGRESS NOTE ADULT - SUBJECTIVE AND OBJECTIVE BOX
Patient is a 80y old  Male who presents with a chief complaint of acute mentation changes (22 Apr 2025 13:29)      overnight events: drop in HB. persistent low volume abigail. HD stable. confused.        ROS: as in HPI; All other systems reviewed are negative        PHYSICAL EXAM  Vital Signs Last 24 Hrs  T(C): 36.6 (23 Apr 2025 08:00), Max: 36.6 (23 Apr 2025 08:00)  T(F): 97.9 (23 Apr 2025 08:00), Max: 97.9 (23 Apr 2025 08:00)  HR: 86 (23 Apr 2025 05:30) (77 - 86)  BP: 120/52 (23 Apr 2025 05:30) (115/60 - 120/52)  BP(mean): 75 (23 Apr 2025 05:30) (75 - 81)  RR: 16 (23 Apr 2025 07:01) (12 - 21)  SpO2: 96% (23 Apr 2025 05:30) (96% - 99%)    Parameters below as of 22 Apr 2025 20:00  Patient On (Oxygen Delivery Method): room air          CONSTITUTIONAL:  NAD    ENT:   Airway patent,   NG tube    EYES:   Clear bilaterally,   pupils equal,   round and reactive to light.    CARDIAC:   normal rate  irregular    RESPIRATORY:   No wheezing   Normal chest expansion  Not tachypneic,    GASTROINTESTINAL:  Abdomen soft, non-tender,   No guarding,   Positive BS    MUSCULOSKELETAL:   Range of motion is not limited,    NEUROLOGICAL:   confused  No motor deficits.    SKIN:   Skin normal color for race,   No evidence of rash.                I&O's Detail    22 Apr 2025 07:01  -  23 Apr 2025 07:00  --------------------------------------------------------  IN:    Enteral Tube Flush: 50 mL    IV PiggyBack: 250 mL    Pantoprazole: 180 mL  Total IN: 480 mL    OUT:    Dexmedetomidine: 0 mL    Indwelling Catheter - Urethral (mL): 520 mL  Total OUT: 520 mL    Total NET: -40 mL            LABS:                        6.3    4.21  )-----------( 88       ( 23 Apr 2025 05:31 )             19.3     23 Apr 2025 05:31    150    |  116    |  12     ----------------------------<  159    4.2     |  25     |  0.8      Ca    7.5        23 Apr 2025 05:31  Mg     2.0       23 Apr 2025 05:31    TPro  4.0    /  Alb  2.2    /  TBili  1.2    /  DBili  0.7    /  AST  34     /  ALT  10     /  AlkPhos  100    23 Apr 2025 05:31  Amylase x     lipase x              CAPILLARY BLOOD GLUCOSE      POCT Blood Glucose.: 189 mg/dL (23 Apr 2025 06:27)    PT/INR - ( 22 Apr 2025 12:07 )   PT: 30.50 sec;   INR: 2.54 ratio         PTT - ( 22 Apr 2025 12:07 )  PTT:36.5 sec  Urinalysis Basic - ( 23 Apr 2025 05:31 )    Color: x / Appearance: x / SG: x / pH: x  Gluc: 159 mg/dL / Ketone: x  / Bili: x / Urobili: x   Blood: x / Protein: x / Nitrite: x   Leuk Esterase: x / RBC: x / WBC x   Sq Epi: x / Non Sq Epi: x / Bacteria: x      Culture        MEDICATIONS  (STANDING):  atorvastatin 80 milliGRAM(s) Oral at bedtime  chlorhexidine 2% Cloths 1 Application(s) Topical <User Schedule>  dexMEDEtomidine Infusion 0.5 MICROgram(s)/kG/Hr (14.7 mL/Hr) IV Continuous <Continuous>  dextrose 50% Injectable 25 Gram(s) IV Push once  dextrose 50% Injectable 12.5 Gram(s) IV Push once  dextrose 50% Injectable 25 Gram(s) IV Push once  folic acid 1 milliGRAM(s) Oral daily  glucagon  Injectable 1 milliGRAM(s) IntraMuscular once  insulin lispro (ADMELOG) corrective regimen sliding scale   SubCutaneous three times a day before meals  lactulose Syrup 20 Gram(s) Enteral Tube every 3 hours  levETIRAcetam   Injectable 250 milliGRAM(s) IV Push <User Schedule>  lidocaine   4% Patch 1 Patch Transdermal daily  metroNIDAZOLE  IVPB 500 milliGRAM(s) IV Intermittent every 12 hours  nystatin Powder 1 Application(s) Topical two times a day  pantoprazole Infusion 8 mG/Hr (10 mL/Hr) IV Continuous <Continuous>  potassium chloride   Powder 40 milliEquivalent(s) Oral every 4 hours  rifAXIMin 550 milliGRAM(s) Oral two times a day  senna 2 Tablet(s) Oral at bedtime    MEDICATIONS  (PRN):  acetaminophen     Tablet .. 650 milliGRAM(s) Oral every 6 hours PRN Temp greater or equal to 38C (100.4F), Mild Pain (1 - 3)  aluminum hydroxide/magnesium hydroxide/simethicone Suspension 30 milliLiter(s) Oral every 4 hours PRN Dyspepsia  dextrose Oral Gel 15 Gram(s) Oral once PRN Blood Glucose LESS THAN 70 milliGRAM(s)/deciliter  melatonin 3 milliGRAM(s) Oral at bedtime PRN Insomnia  morphine  - Injectable 2 milliGRAM(s) IV Push every 8 hours PRN Severe Pain (7 - 10)  ondansetron Injectable 4 milliGRAM(s) IV Push every 8 hours PRN Nausea and/or Vomiting  polyethylene glycol 3350 17 Gram(s) Oral two times a day PRN Constipation

## 2025-04-23 NOTE — PROGRESS NOTE ADULT - SUBJECTIVE AND OBJECTIVE BOX
Patient is a 80y old  Male who presents with a chief complaint of acute mentation changes (23 Apr 2025 09:05)        Over Night Events:    hgb dropped, having BRB in the stool. getting 1u prbc    ROS:  See HPI    PHYSICAL EXAM    ICU Vital Signs Last 24 Hrs  T(C): 36.6 (23 Apr 2025 08:00), Max: 36.6 (23 Apr 2025 08:00)  T(F): 97.9 (23 Apr 2025 08:00), Max: 97.9 (23 Apr 2025 08:00)  HR: 97 (23 Apr 2025 08:00) (77 - 97)  BP: 115/55 (23 Apr 2025 07:01) (115/55 - 120/52)  BP(mean): 79 (23 Apr 2025 07:01) (75 - 80)  ABP: --  ABP(mean): --  RR: 16 (23 Apr 2025 07:01) (12 - 18)  SpO2: 98% (23 Apr 2025 07:01) (96% - 99%)    O2 Parameters below as of 23 Apr 2025 07:01  Patient On (Oxygen Delivery Method): nasal cannula  O2 Flow (L/min): 2      General: NAD  Lungs: Bilateral BS  Cardiovascular: Regular   Gastrointestinal: Soft, Positive BS  Extremities: No clubbing  Neurological: altered      04-22-25 @ 07:01  -  04-23-25 @ 07:00  --------------------------------------------------------  IN:    Enteral Tube Flush: 50 mL    IV PiggyBack: 250 mL    Pantoprazole: 180 mL  Total IN: 480 mL    OUT:    Dexmedetomidine: 0 mL    Indwelling Catheter - Urethral (mL): 520 mL  Total OUT: 520 mL    Total NET: -40 mL      04-23-25 @ 07:01  -  04-23-25 @ 09:57  --------------------------------------------------------  IN:    Pantoprazole: 30 mL  Total IN: 30 mL    OUT:  Total OUT: 0 mL    Total NET: 30 mL          LABS:                            6.3    4.21  )-----------( 88       ( 23 Apr 2025 05:31 )             19.3                                               04-23    150[H]  |  116[H]  |  12  ----------------------------<  159[H]  4.2   |  25  |  0.8    Ca    7.5[L]      23 Apr 2025 05:31  Mg     2.0     04-23    TPro  4.0[L]  /  Alb  2.2[L]  /  TBili  1.2  /  DBili  0.7[H]  /  AST  34  /  ALT  10  /  AlkPhos  100  04-23      PT/INR - ( 23 Apr 2025 05:31 )   PT: 25.20 sec;   INR: 2.10 ratio         PTT - ( 23 Apr 2025 05:31 )  PTT:39.8 sec                                       Urinalysis Basic - ( 23 Apr 2025 05:31 )    Color: x / Appearance: x / SG: x / pH: x  Gluc: 159 mg/dL / Ketone: x  / Bili: x / Urobili: x   Blood: x / Protein: x / Nitrite: x   Leuk Esterase: x / RBC: x / WBC x   Sq Epi: x / Non Sq Epi: x / Bacteria: x                                                  LIVER FUNCTIONS - ( 23 Apr 2025 05:31 )  Alb: 2.2 g/dL / Pro: 4.0 g/dL / ALK PHOS: 100 U/L / ALT: 10 U/L / AST: 34 U/L / GGT: x                                                                                                                                       MEDICATIONS  (STANDING):  atorvastatin 80 milliGRAM(s) Oral at bedtime  chlorhexidine 2% Cloths 1 Application(s) Topical <User Schedule>  dexMEDEtomidine Infusion 0.5 MICROgram(s)/kG/Hr (14.7 mL/Hr) IV Continuous <Continuous>  dextrose 5%. 1000 milliLiter(s) (50 mL/Hr) IV Continuous <Continuous>  dextrose 5%. 1000 milliLiter(s) (100 mL/Hr) IV Continuous <Continuous>  dextrose 50% Injectable 25 Gram(s) IV Push once  dextrose 50% Injectable 12.5 Gram(s) IV Push once  dextrose 50% Injectable 25 Gram(s) IV Push once  folic acid 1 milliGRAM(s) Oral daily  glucagon  Injectable 1 milliGRAM(s) IntraMuscular once  insulin lispro (ADMELOG) corrective regimen sliding scale   SubCutaneous three times a day before meals  lactulose Syrup 20 Gram(s) Enteral Tube every 3 hours  levETIRAcetam   Injectable 250 milliGRAM(s) IV Push <User Schedule>  lidocaine   4% Patch 1 Patch Transdermal daily  metroNIDAZOLE  IVPB 500 milliGRAM(s) IV Intermittent every 12 hours  nystatin Powder 1 Application(s) Topical two times a day  pantoprazole Infusion 8 mG/Hr (10 mL/Hr) IV Continuous <Continuous>  potassium chloride   Powder 40 milliEquivalent(s) Oral every 4 hours  rifAXIMin 550 milliGRAM(s) Oral two times a day  senna 2 Tablet(s) Oral at bedtime    MEDICATIONS  (PRN):  acetaminophen     Tablet .. 650 milliGRAM(s) Oral every 6 hours PRN Temp greater or equal to 38C (100.4F), Mild Pain (1 - 3)  aluminum hydroxide/magnesium hydroxide/simethicone Suspension 30 milliLiter(s) Oral every 4 hours PRN Dyspepsia  dextrose Oral Gel 15 Gram(s) Oral once PRN Blood Glucose LESS THAN 70 milliGRAM(s)/deciliter  melatonin 3 milliGRAM(s) Oral at bedtime PRN Insomnia  morphine  - Injectable 2 milliGRAM(s) IV Push every 8 hours PRN Severe Pain (7 - 10)  ondansetron Injectable 4 milliGRAM(s) IV Push every 8 hours PRN Nausea and/or Vomiting  polyethylene glycol 3350 17 Gram(s) Oral two times a day PRN Constipation      Xrays:                                                                                     ECHO

## 2025-04-23 NOTE — PROGRESS NOTE ADULT - ASSESSMENT
80yMale pmh HTN, DM, subdural hematoma on keppra, osteomyelitis of L4-5 had completion of abx here for AMS. GI consulted for liver cirrhosis rule HE. Patient with some blood in stool., no hematemesis.      # Liver cirrhosis with portal hypertension. Mild-to-moderate ascites.   Anasarca. Hepatic dome subcentimeter hypodense 1.3 cm hypodensity, not fully characterized .   #rectal exam-4/15/25-brown stool in rectal vault and on finger    Rec  MR liver protocol as outpatient   -For-Mild-to-moderate ascites if there is a window, get ultrasound guided diagnostic / therapeutic paracentesis: Obtain serum albumin same day as paracentesis, avoid removing > 4 L in light of patient's CHARLENE   -Obtain fluid studies: Paracentesis Panel which includes Cell count and differential, albumin, total protein, cytology, AFB smear and culture. Separate order of amylase fluid and triglycerides fluid  -MELD Score-15  - follow up with hepatology as outpatient   -INR higher consider vitamin k 10mg x 3 days, stat LFTs  avoid diuretics  nephrology eval appreciated    -HE-very likely in light of elevated ammonia - give lactulose and  titrate to 3-4 bms / daily, it was just restarted last night  neurology eval appreciated  -For Vaccinations: Please f/u in clinic for being uptodate with HAV/HBV/Influenza/Pneumococcal vaccines.  -Lifestyle/Dietary modifications: Calorie intake 25-40 Kcal/kg/day; Protein intake: 1.2-1.5 gm/kg/day  -Avoid smoking and NSAIDs  -Abstain from alcohol and all illicit drugs      #anemia episode of maroon colored stool reported  #rectal exam-4/15/25-brown stool in rectal vault and on finger  had small episode of blood in stool today  patient with small bm with minimal blood in it 4/23/25 reported by nursing team  Rec  -transfuse prn to hgb >7  - s/p EGD and colonoscopy revealing duodenal ulcer.   - repeat colonoscopy in one year   - PPI BID   -Follow up with our [facility] located at [address] [place] [address]. Phone Number: [phone]    80yMale pmh HTN, DM, subdural hematoma on keppra, osteomyelitis of L4-5 had completion of abx here for AMS. GI consulted for liver cirrhosis rule HE. Patient with some blood in stool., no hematemesis.      # Liver cirrhosis with portal hypertension. Mild-to-moderate ascites.   Anasarca. Hepatic dome subcentimeter hypodense 1.3 cm hypodensity, not fully characterized .   #rectal exam-4/15/25-brown stool in rectal vault and on finger    Rec  MR liver protocol as outpatient   -For-Mild-to-moderate ascites if there is a window, get ultrasound guided diagnostic / therapeutic paracentesis: Obtain serum albumin same day as paracentesis, avoid removing > 4 L in light of patient's CHARLENE   -Obtain fluid studies: Paracentesis Panel which includes Cell count and differential, albumin, total protein, cytology, AFB smear and culture. Separate order of amylase fluid and triglycerides fluid  -MELD Score-15  - follow up with hepatology as outpatient   -INR higher consider vitamin k 10mg x 3 days, stat LFTs  avoid diuretics  nephrology eval appreciated    -HE-very likely in light of elevated ammonia - give lactulose and  titrate to 3-4 bms / daily, it was just restarted last night  neurology eval appreciated  -For Vaccinations: Please f/u in clinic for being uptodate with HAV/HBV/Influenza/Pneumococcal vaccines.  -Lifestyle/Dietary modifications: Calorie intake 25-40 Kcal/kg/day; Protein intake: 1.2-1.5 gm/kg/day  -Avoid smoking and NSAIDs  -Abstain from alcohol and all illicit drugs      #anemia episode of maroon colored stool reported  #rectal exam-4/15/25-brown stool in rectal vault and on finger  had small episode of blood in stool today  patient with small bm with minimal blood in it 4/23/25 reported by nursing team  Rec  -transfuse prn to hgb >7  - s/p EGD and colonoscopy revealing duodenal ulcer.   - repeat colonoscopy in one year if within goals of care  - PPI BID   -Monitor Hb and bms  -Follow up with our [facility] located at [address] [place] [address]. Phone Number: [phone]

## 2025-04-23 NOTE — PROGRESS NOTE ADULT - ASSESSMENT
Impression:  Acute blood loss anemia  AMS  hepatic encephalopathy  Liver cirrhosis .  bacteroides fragilis bacteremia   Hypernatremia  Afib      Plan:    Transfuse hb target >7  Active type and screen  free water, frequent bmp  GI follow up  PPI BID  Lactulose via NG tube  target BM 2-3  Abx per ID  no AC due to GI bleed  No urgent plan for ischemic evaluation per cardiology  poor prognosis  palliative care for GOC

## 2025-04-23 NOTE — SWALLOW BEDSIDE ASSESSMENT ADULT - SWALLOW EVAL: RECOMMENDED DIET
NGT as primary means of nutrition/hydration; soft & bite sized consistency and thin liquids as accepted/tolerated

## 2025-04-23 NOTE — PROGRESS NOTE ADULT - SUBJECTIVE AND OBJECTIVE BOX
DWIGHT IRBEIRO 80y Male  MRN#: [id number]   Hospital Day: 9d    SUBJECTIVE  Patient is a 80y old Male who presents with a chief complaint of acute mentation changes (23 Apr 2025 08:46)  Currently admitted to medicine with the primary diagnosis of Metabolic encephalopathy      INTERVAL HPI AND OVERNIGHT EVENTS:  Patient was examined and seen at bedside. This morning he is resting comfortably in bed  pt is still confused  not cooperative with nursing needs      OBJECTIVE  PAST MEDICAL & SURGICAL HISTORY  Diabetes    Hypertension    Fall    H/O left knee surgery    History of cholecystectomy    History of appendectomy      ALLERGIES:  No Known Allergies    MEDICATIONS:  STANDING MEDICATIONS  atorvastatin 80 milliGRAM(s) Oral at bedtime  chlorhexidine 2% Cloths 1 Application(s) Topical <User Schedule>  dexMEDEtomidine Infusion 0.5 MICROgram(s)/kG/Hr IV Continuous <Continuous>  dextrose 5%. 1000 milliLiter(s) IV Continuous <Continuous>  dextrose 5%. 1000 milliLiter(s) IV Continuous <Continuous>  dextrose 50% Injectable 25 Gram(s) IV Push once  dextrose 50% Injectable 12.5 Gram(s) IV Push once  dextrose 50% Injectable 25 Gram(s) IV Push once  folic acid 1 milliGRAM(s) Oral daily  glucagon  Injectable 1 milliGRAM(s) IntraMuscular once  insulin lispro (ADMELOG) corrective regimen sliding scale   SubCutaneous three times a day before meals  lactulose Syrup 20 Gram(s) Enteral Tube every 3 hours  levETIRAcetam   Injectable 250 milliGRAM(s) IV Push <User Schedule>  lidocaine   4% Patch 1 Patch Transdermal daily  metroNIDAZOLE  IVPB 500 milliGRAM(s) IV Intermittent every 12 hours  nystatin Powder 1 Application(s) Topical two times a day  pantoprazole Infusion 8 mG/Hr IV Continuous <Continuous>  potassium chloride   Powder 40 milliEquivalent(s) Oral every 4 hours  rifAXIMin 550 milliGRAM(s) Oral two times a day  senna 2 Tablet(s) Oral at bedtime    PRN MEDICATIONS  acetaminophen     Tablet .. 650 milliGRAM(s) Oral every 6 hours PRN  aluminum hydroxide/magnesium hydroxide/simethicone Suspension 30 milliLiter(s) Oral every 4 hours PRN  dextrose Oral Gel 15 Gram(s) Oral once PRN  melatonin 3 milliGRAM(s) Oral at bedtime PRN  morphine  - Injectable 2 milliGRAM(s) IV Push every 8 hours PRN  ondansetron Injectable 4 milliGRAM(s) IV Push every 8 hours PRN  polyethylene glycol 3350 17 Gram(s) Oral two times a day PRN      VITAL SIGNS: Last 24 Hours  T(C): 36.6 (23 Apr 2025 08:00), Max: 36.6 (23 Apr 2025 08:00)  T(F): 97.9 (23 Apr 2025 08:00), Max: 97.9 (23 Apr 2025 08:00)  HR: 86 (23 Apr 2025 05:30) (77 - 86)  BP: 120/52 (23 Apr 2025 05:30) (115/60 - 120/52)  BP(mean): 75 (23 Apr 2025 05:30) (75 - 81)  RR: 16 (23 Apr 2025 07:01) (12 - 21)  SpO2: 96% (23 Apr 2025 05:30) (96% - 99%)    LABS:                        6.3    4.21  )-----------( 88       ( 23 Apr 2025 05:31 )             19.3     04-23    150[H]  |  116[H]  |  12  ----------------------------<  159[H]  4.2   |  25  |  0.8    Ca    7.5[L]      23 Apr 2025 05:31  Mg     2.0     04-23    TPro  4.0[L]  /  Alb  2.2[L]  /  TBili  1.2  /  DBili  0.7[H]  /  AST  34  /  ALT  10  /  AlkPhos  100  04-23    PT/INR - ( 22 Apr 2025 12:07 )   PT: 30.50 sec;   INR: 2.54 ratio         PTT - ( 22 Apr 2025 12:07 )  PTT:36.5 sec  Urinalysis Basic - ( 23 Apr 2025 05:31 )    Color: x / Appearance: x / SG: x / pH: x  Gluc: 159 mg/dL / Ketone: x  / Bili: x / Urobili: x   Blood: x / Protein: x / Nitrite: x   Leuk Esterase: x / RBC: x / WBC x   Sq Epi: x / Non Sq Epi: x / Bacteria: x                RADIOLOGY:      PHYSICAL EXAM:  Gen: confused, lying with eyes closed, keeps saying names  HEENT: PERRL, EOMI, mouth clr, nose clr  Neck: no nodes, no JVD, thyroid nl  lungs: clr  hrt: s1 s2 rrr no murmur  abd: distended but soft  ext: no edema, no c/c  neuro: aa ox0, moves all extremities, but talks

## 2025-04-23 NOTE — PROGRESS NOTE ADULT - ASSESSMENT
80 years old male history of hypertension, diabetes, subdural hematoma on Keppra, hx mechanical falls, recent admission (January 2025) for osteomyelitis of L4-5 w completion of abx.  BIBA from home status post change of mental status.     ID is consulted for bacteremia  Afebrile  WBC 7.55 < 7.45  On room air  BCx 4/14 Bacteroides fragilis  UA WBC 5, UCx low count P. mirabilis    CT A/P 4/13  1.  Erosive endplate changes centered at the L4-5 level, consistent with   patient's history ofrecent lumbar spine osteomyelitis.  2.  Liver cirrhosis with portal hypertension. Mild-to-moderate ascites.   Anasarca. Hepatic dome subcentimeter hypodense 1.3 cm hypodensity, not   fully characterized . Outpatient MR abdomen with IV contrast recommended.  3.  Chronic pancreatitis, with limited evaluation for acute inflammation   due to background fluid. Correlation with pancreatic enzymes recommended.    Antibiotics:  Vancomycin 4/13  Ampicillin 4/14  Ceftriaxone 4/15 - 4/21  Flagyl 4/15 ->      IMPRESSION:  Bacteroides bacteremia, potentially life threatening  Acute blood loss anemia  Possible SBP?  Liver cirrhosis  Lower GI bleed  Hx L4-L5 osteomyelitis  Liver lesion  Immunosuppression / Immunosenescence secondary to multiple comorbidities which could result in poor clinical outcome    RECOMMENDATIONS:  - Completed 7 days of ceftriaxone, completed albumin x 2 doses  - IV Flagyl 500mg q12hrs for 14 day (until 4/28)  - Consider repeating CT A/P without contrast given anemia  - GI follow up  - Consider MRI abdomen when patient is stable  - Offloading and frequent position changes, aspiration precaution  - Trend WBC, fever curve, transaminases, creatinine daily  - Please inform ID of any patient clinical change or any new pertinent laboratory or radiographic data      Nora Beck D.O.  Attending Physician  Division of Infectious Diseases  [facility]  Please contact me via [employer] Teams

## 2025-04-23 NOTE — PROGRESS NOTE ADULT - SUBJECTIVE AND OBJECTIVE BOX
INFECTIOUS DISEASE FOLLOW UP NOTE:    Interval History/ROS: Patient is a 80y old  Male who presents with a chief complaint of acute mentation changes (23 Apr 2025 12:18)    Overnight events: Drop of hemoglobin today, still confused and agitated from time to time. Overnight with BMs with smears of blood    REVIEW OF SYSTEMS:  Unable to provide due to cognitive impairment      Prior hospital charts reviewed [Yes]  Primary team notes reviewed [Yes]  Other consultant notes reviewed [Yes]    Allergies:  No Known Allergies      ANTIMICROBIALS:   metroNIDAZOLE  IVPB 500 every 12 hours  rifAXIMin 550 two times a day      OTHER MEDS: MEDICATIONS  (STANDING):  acetaminophen     Tablet .. 650 every 6 hours PRN  aluminum hydroxide/magnesium hydroxide/simethicone Suspension 30 every 4 hours PRN  atorvastatin 80 at bedtime  dexMEDEtomidine Infusion 0.5 <Continuous>  dextrose 50% Injectable 25 once  dextrose 50% Injectable 12.5 once  dextrose 50% Injectable 25 once  dextrose Oral Gel 15 once PRN  glucagon  Injectable 1 once  insulin lispro (ADMELOG) corrective regimen sliding scale  three times a day before meals  lactulose Syrup 20 every 3 hours  levETIRAcetam   Injectable 250 <User Schedule>  melatonin 3 at bedtime PRN  morphine  - Injectable 2 every 8 hours PRN  OLANZapine Injectable 5 once  ondansetron Injectable 4 every 8 hours PRN  pantoprazole Infusion 8 <Continuous>  polyethylene glycol 3350 17 two times a day PRN  senna 2 at bedtime      Vital Signs Last 24 Hrs  T(F): 97.9 (04-23-25 @ 08:00), Max: 99.1 (04-22-25 @ 07:01)    Vital Signs Last 24 Hrs  HR: 97 (04-23-25 @ 08:00) (77 - 97)  BP: 115/55 (04-23-25 @ 07:01) (115/55 - 120/52)  RR: 16 (04-23-25 @ 07:01)  SpO2: 98% (04-23-25 @ 07:01) (96% - 99%)  Wt(kg): --    EXAM:  GENERAL: NAD, lying in bed  HEAD: No head lesions  EYES: Conjunctiva pink and cornea white  EAR, NOSE, MOUTH, THROAT: Normal external ears and nose, no discharges; moist mucous membranes; + NC  NECK: Supple, nontender to palpation; no JVD  RESPIRATORY: Bibasilar crackles  CARDIOVASCULAR: S1 S2  GASTROINTESTINAL: Soft, no significant distention; normoactive bowel sounds  GENITOURINARY: + loyola catheter, no CVA tenderness  EXTREMITIES: No clubbing, cyanosis, + diffuse anasarca  NERVOUS SYSTEM: Awake and alert but not orieted, still having UE tremor  MUSCULOSKELETAL: No joint erythema, swelling  SKIN: No rashes or lesions, no superficial thrombophlebitis  PSYCH: Agitated    Labs:                        6.3    4.21  )-----------( 88       ( 23 Apr 2025 05:31 )             19.3     04-23    150[H]  |  116[H]  |  12  ----------------------------<  159[H]  4.2   |  25  |  0.8    Ca    7.5[L]      23 Apr 2025 05:31  Mg     2.0     04-23    TPro  4.0[L]  /  Alb  2.2[L]  /  TBili  1.2  /  DBili  0.7[H]  /  AST  34  /  ALT  10  /  AlkPhos  100  04-23      WBC Trend:  WBC Count: 4.21 (04-23-25 @ 05:31)  WBC Count: 5.44 (04-22-25 @ 12:07)  WBC Count: 5.37 (04-22-25 @ 00:35)  WBC Count: 6.33 (04-21-25 @ 05:34)      Creatine Trend:  Creatinine: 0.8 (04-23)  Creatinine: 0.7 (04-22)  Creatinine: 0.7 (04-21)  Creatinine: 0.7 (04-20)      Liver Biochemical Testing Trend:  Alanine Aminotransferase (ALT/SGPT): 10 (04-23)  Alanine Aminotransferase (ALT/SGPT): 10 (04-22)  Alanine Aminotransferase (ALT/SGPT): 9 (04-21)  Alanine Aminotransferase (ALT/SGPT): 11 (04-20)  Alanine Aminotransferase (ALT/SGPT): 14 (04-19)  Aspartate Aminotransferase (AST/SGOT): 34 (04-23-25 @ 05:31)  Aspartate Aminotransferase (AST/SGOT): 35 (04-22-25 @ 13:40)  Aspartate Aminotransferase (AST/SGOT): 32 (04-21-25 @ 05:34)  Aspartate Aminotransferase (AST/SGOT): 46 (04-20-25 @ 05:39)  Aspartate Aminotransferase (AST/SGOT): 49 (04-19-25 @ 05:31)  Bilirubin Direct: 0.7 (04-23)  Bilirubin Total: 1.2 (04-23)  Bilirubin Direct: 0.6 (04-22)  Bilirubin Total: 1.2 (04-22)  Bilirubin Total: 1.3 (04-21)      Trend LDH      Urinalysis Basic - ( 23 Apr 2025 05:31 )    Color: x / Appearance: x / SG: x / pH: x  Gluc: 159 mg/dL / Ketone: x  / Bili: x / Urobili: x   Blood: x / Protein: x / Nitrite: x   Leuk Esterase: x / RBC: x / WBC x   Sq Epi: x / Non Sq Epi: x / Bacteria: x        MICROBIOLOGY:        Culture - Blood (collected 17 Apr 2025 18:30)  Source: Blood Blood  Final Report:    No growth at 5 days    Culture - Blood (collected 16 Apr 2025 15:15)  Source: Blood None  Final Report:    No growth at 5 days    Culture - Urine (collected 13 Apr 2025 21:20)  Source: Catheterized None  Final Report:    10,000 - 49,000 CFU/mL Proteus mirabilis  Organism: Proteus mirabilis  Organism: Proteus mirabilis    Sensitivities:      Method Type: LOUIS      -  Amoxicillin/Clavulanic Acid: S <=8/4      -  Ampicillin: S <=8 These ampicillin results predict results for amoxicillin      -  Ampicillin/Sulbactam: S <=4/2      -  Aztreonam: S <=4      -  Cefazolin: S <=2 For uncomplicated UTI with K. pneumoniae, E. coli, or P. mirablis: LOUIS <=16 is sensitive and LOUIS >=32 is resistant. This also predicts results for oral agents cefaclor, cefdinir, cefpodoxime, cefprozil, cefuroxime axetil, cephalexin and locarbef for uncomplicated UTI. Note that some isolates may be susceptible to these agents while testing resistant to cefazolin.      -  Cefepime: S <=2      -  Cefoxitin: S <=8      -  Ceftriaxone: S <=1      -  Cefuroxime: S <=4      -  Ciprofloxacin: S <=0.25      -  Ertapenem: S <=0.5      -  Gentamicin: S <=2      -  Levofloxacin: S <=0.5      -  Meropenem: S <=1      -  Nitrofurantoin: R >64 Should not be used to treat pyelonephritis      -  Piperacillin/Tazobactam: S <=8      -  Tobramycin: S <=2      -  Trimethoprim/Sulfamethoxazole: S <=0.5/9.5    Culture - Blood (collected 13 Apr 2025 21:20)  Source: Blood Blood-Peripheral  Final Report:    Growth in anaerobic bottle: Bacteroides fragilis    "Susceptibilities not performed"    Urinalysis with Rflx Culture (collected 13 Apr 2025 21:20)    Culture - Blood (collected 13 Apr 2025 21:20)  Source: Blood Blood-Peripheral  Final Report:    Growth in anaerobic bottle: Bacteroides fragilis "Susceptibilities not    performed"    Direct identification is available within approximately 3-5    hours either by Blood Panel Multiplexed PCR or Direct    MALDI-TOF. Details: [email or web address]  Organism: Blood Culture PCR  Organism: Blood Culture PCR    Sensitivities:      Method Type: PCR      -  Bacteroides fragilis: Detec    Culture - Blood (collected 30 Jan 2025 07:12)  Source: .Blood BLOOD  Final Report:    No growth at 5 days    Culture - Blood (collected 30 Jan 2025 07:12)  Source: .Blood BLOOD  Final Report:    No growth at 5 days    Culture - Blood (collected 29 Jan 2025 07:35)  Source: .Blood BLOOD  Final Report:    No growth at 5 days    Culture - Blood (collected 27 Jan 2025 19:13)  Source: .Blood BLOOD  Final Report:    No growth at 5 days      RADIOLOGY:  imaging below personally reviewed    < from: Xray Chest 1 View AP/PA (04.22.25 @ 12:20) >  IMPRESSION:    Enteric tube terminates below the left hemidiaphragm.    Essentially stable bilateral opacities.      < end of copied text >

## 2025-04-23 NOTE — CONSULT NOTE ADULT - SUBJECTIVE AND OBJECTIVE BOX
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 fragilis bacteremia, and acute blood loss anemia. Patient is on IV morphine for pain. Patient is on lactulose for encephalopathy, IV abx, and frequent H/H monitoring, requiring pRBC. Palliative consulted for GOC. Patient is full code.    PERTINENT PM/SXH:   Diabetes    Hypertension    Fall      H/O left knee surgery    History of cholecystectomy    History of appendectomy      FAMILY HISTORY:  no pertinent family history      SOCIAL HISTORY:   Significant other/partner[ ]  Children[ ]  Hindu/Spirituality:  Substance hx:  [ ]   Tobacco hx:  [ ]   Alcohol hx: [ ]   Living Situation: [ ]Home  [ ]Long term care  [ ]Rehab [ ]Other  Home Services: [ ] HHA [ ] Visting RN [ ] Hospice  Occupation:  Home Opioid hx:  [ ] Y [ ] N [ ] I-Stop Reference No:    ADVANCE DIRECTIVES:    [ ] Full Code [ ] DNR  MOLST  [ ]  Living Will  [ ]   DECISION MAKER(s):  [ ] Health Care Proxy(s)  [ ] Surrogate(s)  [ ] Guardian           Name(s): Phone Number(s):    BASELINE (I)ADL(s) (prior to admission):  [place]: [ ]Total  [ ] Moderate [ ]Dependent  Palliative Performance Status Version 2:         %    http://npcrc.org/files/news/palliative_performance_scale_ppsv2.pdf    Allergies    No Known Allergies    Intolerances    MEDICATIONS  (STANDING):  atorvastatin 80 milliGRAM(s) Oral at bedtime  chlorhexidine 2% Cloths 1 Application(s) Topical <User Schedule>  dexMEDEtomidine Infusion 0.5 MICROgram(s)/kG/Hr (14.7 mL/Hr) IV Continuous <Continuous>  dextrose 5%. 1000 milliLiter(s) (100 mL/Hr) IV Continuous <Continuous>  dextrose 5%. 1000 milliLiter(s) (50 mL/Hr) IV Continuous <Continuous>  dextrose 5%. 1000 milliLiter(s) (750 mL/Hr) IV Continuous <Continuous>  dextrose 50% Injectable 25 Gram(s) IV Push once  dextrose 50% Injectable 12.5 Gram(s) IV Push once  dextrose 50% Injectable 25 Gram(s) IV Push once  folic acid 1 milliGRAM(s) Oral daily  glucagon  Injectable 1 milliGRAM(s) IntraMuscular once  insulin lispro (ADMELOG) corrective regimen sliding scale   SubCutaneous three times a day before meals  lactulose Syrup 20 Gram(s) Enteral Tube every 3 hours  levETIRAcetam   Injectable 250 milliGRAM(s) IV Push <User Schedule>  lidocaine   4% Patch 1 Patch Transdermal daily  metroNIDAZOLE  IVPB 500 milliGRAM(s) IV Intermittent every 12 hours  nystatin Powder 1 Application(s) Topical two times a day  pantoprazole Infusion 8 mG/Hr (10 mL/Hr) IV Continuous <Continuous>  phytonadione  IVPB 10 milliGRAM(s) IV Intermittent once  potassium chloride   Powder 40 milliEquivalent(s) Oral every 4 hours  rifAXIMin 550 milliGRAM(s) Oral two times a day  senna 2 Tablet(s) Oral at bedtime    MEDICATIONS  (PRN):  acetaminophen     Tablet .. 650 milliGRAM(s) Oral every 6 hours PRN Temp greater or equal to 38C (100.4F), Mild Pain (1 - 3)  aluminum hydroxide/magnesium hydroxide/simethicone Suspension 30 milliLiter(s) Oral every 4 hours PRN Dyspepsia  dextrose Oral Gel 15 Gram(s) Oral once PRN Blood Glucose LESS THAN 70 milliGRAM(s)/deciliter  melatonin 3 milliGRAM(s) Oral at bedtime PRN Insomnia  morphine  - Injectable 2 milliGRAM(s) IV Push every 8 hours PRN Severe Pain (7 - 10)  ondansetron Injectable 4 milliGRAM(s) IV Push every 8 hours PRN Nausea and/or Vomiting  polyethylene glycol 3350 17 Gram(s) Oral two times a day PRN Constipation      PRESENT SYMPTOMS: [ ]Unable to obtain due to poor mentation   Source if other than patient:  [ ]Family   [ ]Team   [ X]All review of systems negative including pain and dyspnea unless noted below    All components of pain assessment below addressed. Blank spaces indicate that the patient did/could not complete the assessment.  Pain: [ ]yes [ ]no  QOL impact -   Location -                    Aggravating factors -  Quality -  Radiation -  Timing-  Severity (0-10 scale):  Minimal acceptable level (0-10 scale):     CPOT:    https://www.[facility].org/getattachment/[email or web address]/Critical-Care-Pain-Observation-Tool-(CPOT)    PAIN AD Score:   http://geriatrictoolkit.[facility]/cog/painad.pdf (press ctrl +  left click to view)    Dyspnea:                           [ ]None[ ]Mild [ ]Moderate [ ]Severe     Respiratory Distress Observation Scale (RDOS):   A score of 0 to 2 signifies little or no respiratory distress, 3 signifies mild distress, scores 4 to 6 indicate moderate distress, and scores greater than 7 signify severe distress  https://www.[facility].ca/sites/default/files/PDFS/749995-lpalwxyyyfv-htacypdg-pnqbgtiwrlz-iqxrg.pdf    Anxiety:                             [ ]None[ ]Mild [ ]Moderate [ ]Severe   Fatigue:                             [ ]None[ ]Mild [ ]Moderate [ ]Severe   Nausea:                             [ ]None[ ]Mild [ ]Moderate [ ]Severe   Loss of appetite:              [ ]None[ ]Mild [ ]Moderate [ ]Severe   Constipation:                    [ ]None[ ]Mild [ ]Moderate [ ]Severe    Other Symptoms:    PHYSICAL EXAM:  Vital Signs Last 24 Hrs  T(C): 36.6 (23 Apr 2025 08:00), Max: 36.6 (23 Apr 2025 08:00)  T(F): 97.9 (23 Apr 2025 08:00), Max: 97.9 (23 Apr 2025 08:00)  HR: 97 (23 Apr 2025 08:00) (77 - 97)  BP: 115/55 (23 Apr 2025 07:01) (115/55 - 120/52)  BP(mean): 79 (23 Apr 2025 07:01) (75 - 80)  RR: 16 (23 Apr 2025 07:01) (12 - 18)  SpO2: 98% (23 Apr 2025 07:01) (96% - 99%)    Parameters below as of 23 Apr 2025 07:01  Patient On (Oxygen Delivery Method): nasal cannula  O2 Flow (L/min): 2   I&O's Summary    22 Apr 2025 07:01  -  23 Apr 2025 07:00  --------------------------------------------------------  IN: 480 mL / OUT: 520 mL / NET: -40 mL    23 Apr 2025 07:01  -  23 Apr 2025 10:10  --------------------------------------------------------  IN: 30 mL / OUT: 0 mL / NET: 30 mL        GENERAL:  [X ] No acute distress [ ]Lethargic  [ ]Unarousable  [ ]Verbal  [ ]Non-Verbal [ ]Cachexia    BEHAVIORAL/PSYCH:  [ ]Alert and Oriented x  [ ] Anxiety [ ] Delirium [ ] Agitation [X ] Calm   EYES: [ ] No scleral icterus [ ] Scleral icterus [ ] Closed  ENMT:  [ ]Dry mouth  [ ]No external oral lesions [ X] No external ear or nose lesions  CARDIOVASCULAR:  [ ]Regular [ ]Irregular [ ]Tachy [ ]Not Tachy  [ ]Jenaro [ ] Edema [ ] No edema  PULMONARY:  [ ]Tachypnea  [ ]Audible excessive secretions [X ] No labored breathing [ ] labored breathing  GASTROINTESTINAL: [ ]Soft  [ ]Distended  [ X]Not distended [ ]Non tender [ ]Tender  MUSCULOSKELETAL: [ ]No clubbing [ ] clubbing  [ X] No cyanosis [ ] cyanosis  NEUROLOGIC: [ ]No focal deficits  [ ]Follows commands  [ ]Does not follow commands  [ ]Cognitive impairment  [ ]Dysphagia  [ ]Dysarthria  [ ]Paresis   SKIN: [ ] Jaundiced [X ] Non-jaundiced [ ]Rash [ ]No Rash [ ] Warm [ ] Dry  MISC/LINES: [ ] ET tube [ ] Trach [ ]NGT/OGT [ ]PEG [ ]Michel    CRITICAL CARE:  [ ] Shock Present  [ ]Septic [ ]Cardiogenic [ ]Neurologic [ ]Hypovolemic  [ ]  Vasopressors [ ]  Inotropes   [ ]Respiratory failure present [ ]Mechanical ventilation [ ]Non-invasive ventilatory support [ ]High flow  [ ]Acute  [ ]Chronic [ ]Hypoxic  [ ]Hypercarbic [ ]Other  [ ]Other organ failure     LABS: reviewed by me, notable for: CBC WNL, eleated Na, UA WNL                        6.3    4.21  )-----------( 88       ( 23 Apr 2025 05:31 )             19.3   04-23    150[H]  |  116[H]  |  12  ----------------------------<  159[H]  4.2   |  25  |  0.8    Ca    7.5[L]      23 Apr 2025 05:31  Mg     2.0     04-23    TPro  4.0[L]  /  Alb  2.2[L]  /  TBili  1.2  /  DBili  0.7[H]  /  AST  34  /  ALT  10  /  AlkPhos  100  04-23  PT/INR - ( 23 Apr 2025 05:31 )   PT: 25.20 sec;   INR: 2.10 ratio         PTT - ( 23 Apr 2025 05:31 )  PTT:39.8 sec    Urinalysis Basic - ( 23 Apr 2025 05:31 )    Color: x / Appearance: x / SG: x / pH: x  Gluc: 159 mg/dL / Ketone: x  / Bili: x / Urobili: x   Blood: x / Protein: x / Nitrite: x   Leuk Esterase: x / RBC: x / WBC x   Sq Epi: x / Non Sq Epi: x / Bacteria: x      RADIOLOGY & ADDITIONAL STUDIES: CXR personally reviewed by me: 4/20: bilateral opacities    PROTEIN CALORIE MALNUTRITION PRESENT: [ ]mild [ ]moderate [ ]severe [ ]underweight [ ]morbid obesity  https://www.andeal.org/vault/2440/web/files/ONC/Table_Clinical%20Characteristics%20to%20Document%20Malnutrition-White%20JV%20et%20al%202012.pdf    Height (cm): 175.3 (04-17-25 @ 11:56), 175.3 (02-19-25 @ 12:20), 175.3 (01-23-25 @ 23:40)  Weight (kg): 117.8 (04-17-25 @ 11:56), 113.4 (02-19-25 @ 12:20), 120.6 (01-23-25 @ 23:40)  BMI (kg/m2): 38.3 (04-17-25 @ 11:56), 36.9 (02-19-25 @ 12:20), 39.2 (01-23-25 @ 23:40)    [ ]PPSV2 < or = to 30% [ ]significant weight loss  [ ]poor nutritional intake  [ ]anasarca      [ ]Artificial Nutrition          Palliative Care Spiritual/Emotional Screening Tool Question  Severity (0-4):                    OR                    [X ] Unable to determine/NA  Score of 2 or greater indicates recommendation of Chaplaincy referral  Chaplaincy Referral: [ ] Yes [ ] Refused [ ] Following     Caregiver [place]:  [ ] Yes [ ] No    OR    [x ] Unable to determine. Will assess at later time if appropriate.  Social Work Referral [ ]  Patient and Family Centered Care Referral [ ]    Anticipatory Grief Present: [ ] Yes [ ] No    OR     [ x] Unable to determine. Will assess at later time if appropriate.  Social Work Referral [ ]  Patient and Family Centered Care Referral [ ]    REFERRALS:   [ ]Chaplaincy  [ ]Hospice  [ ]Child Life  [ ]Social Work  [ ]Case management [ ]Holistic Therapy     Palliative care education provided to patient and/or family    Goals of Care Document:     ______________  Ang Dunne MD  Palliative Medicine  [facility]   of Geriatric and Palliative Medicine  [phone]   HPI: 80M with PMH including HTN, DM, SDH on keppra, mechanical falls, OM (1/2025) here with AMS, and found to have hepatic encephalopathy, B. fragilis bacteremia, and acute blood loss anemia. Patient is on IV morphine for pain. Patient is on lactulose for encephalopathy, IV abx, and frequent H/H monitoring, requiring pRBC. Palliative consulted for GOC. Patient is full code.    PERTINENT PM/SXH:   Diabetes    Hypertension    Fall      H/O left knee surgery    History of cholecystectomy    History of appendectomy      FAMILY HISTORY:  no pertinent family history      SOCIAL HISTORY:   Significant other/partner[X ]  Children[ ]  Quaker/Spirituality:  Substance hx:  [ ]   Tobacco hx:  [ ]   Alcohol hx: [ ]   Living Situation: [X ]Home  [ ]Long term care  [ ]Rehab [ ]Other  Home Services: [ ] HHA [ ] Visting RN [ ] Hospice  Occupation:  Home Opioid hx:  [ ] Y [ ] N [ ] I-Stop Reference No:    ADVANCE DIRECTIVES:    [ ] Full Code [ ] DNR  MOLST  [ ]  Living Will  [ ]   DECISION MAKER(s):  [ ] Health Care Proxy(s)  [ ] Surrogate(s)  [ ] Guardian           Name(s): Phone Number(s): spouse Edwige [phone]    BASELINE (I)ADL(s) (prior to admission):  [place]: [ ]Total  [ ] Moderate [ ]Dependent  Palliative Performance Status Version 2:         %    http://npcrc.org/files/news/palliative_performance_scale_ppsv2.pdf    Allergies    No Known Allergies    Intolerances    MEDICATIONS  (STANDING):  atorvastatin 80 milliGRAM(s) Oral at bedtime  chlorhexidine 2% Cloths 1 Application(s) Topical <User Schedule>  dexMEDEtomidine Infusion 0.5 MICROgram(s)/kG/Hr (14.7 mL/Hr) IV Continuous <Continuous>  dextrose 5%. 1000 milliLiter(s) (100 mL/Hr) IV Continuous <Continuous>  dextrose 5%. 1000 milliLiter(s) (50 mL/Hr) IV Continuous <Continuous>  dextrose 5%. 1000 milliLiter(s) (750 mL/Hr) IV Continuous <Continuous>  dextrose 50% Injectable 25 Gram(s) IV Push once  dextrose 50% Injectable 12.5 Gram(s) IV Push once  dextrose 50% Injectable 25 Gram(s) IV Push once  folic acid 1 milliGRAM(s) Oral daily  glucagon  Injectable 1 milliGRAM(s) IntraMuscular once  insulin lispro (ADMELOG) corrective regimen sliding scale   SubCutaneous three times a day before meals  lactulose Syrup 20 Gram(s) Enteral Tube every 3 hours  levETIRAcetam   Injectable 250 milliGRAM(s) IV Push <User Schedule>  lidocaine   4% Patch 1 Patch Transdermal daily  metroNIDAZOLE  IVPB 500 milliGRAM(s) IV Intermittent every 12 hours  nystatin Powder 1 Application(s) Topical two times a day  pantoprazole Infusion 8 mG/Hr (10 mL/Hr) IV Continuous <Continuous>  phytonadione  IVPB 10 milliGRAM(s) IV Intermittent once  potassium chloride   Powder 40 milliEquivalent(s) Oral every 4 hours  rifAXIMin 550 milliGRAM(s) Oral two times a day  senna 2 Tablet(s) Oral at bedtime    MEDICATIONS  (PRN):  acetaminophen     Tablet .. 650 milliGRAM(s) Oral every 6 hours PRN Temp greater or equal to 38C (100.4F), Mild Pain (1 - 3)  aluminum hydroxide/magnesium hydroxide/simethicone Suspension 30 milliLiter(s) Oral every 4 hours PRN Dyspepsia  dextrose Oral Gel 15 Gram(s) Oral once PRN Blood Glucose LESS THAN 70 milliGRAM(s)/deciliter  melatonin 3 milliGRAM(s) Oral at bedtime PRN Insomnia  morphine  - Injectable 2 milliGRAM(s) IV Push every 8 hours PRN Severe Pain (7 - 10)  ondansetron Injectable 4 milliGRAM(s) IV Push every 8 hours PRN Nausea and/or Vomiting  polyethylene glycol 3350 17 Gram(s) Oral two times a day PRN Constipation      PRESENT SYMPTOMS: [ X][ Difficult to obtain due to poor mentation   Source if other than patient:  [ ]Family   [ ]Team   [ X]All review of systems negative including pain and dyspnea unless noted below    All components of pain assessment below addressed. Blank spaces indicate that the patient did/could not complete the assessment.  Pain: [ ]yes [ ]no  QOL impact -   Location -                    Aggravating factors -  Quality -  Radiation -  Timing-  Severity (0-10 scale):  Minimal acceptable level (0-10 scale):     CPOT:    https://www.[facility].org/getattachment/[email or web address]/Critical-Care-Pain-Observation-Tool-(CPOT)    PAIN AD Score: 0  http://geriatrictoolkit.[facility]/cog/painad.pdf (press ctrl +  left click to view)    Dyspnea:                           [ ]None[ ]Mild [ ]Moderate [ ]Severe     Respiratory Distress Observation Scale (RDOS): 1  A score of 0 to 2 signifies little or no respiratory distress, 3 signifies mild distress, scores 4 to 6 indicate moderate distress, and scores greater than 7 signify severe distress  https://www.[facility].ca/sites/default/files/PDFS/646467-nigdyvvsqyv-wigqbkev-kdqbiejsomb-gwvjv.pdf    Anxiety:                             [ ]None[ ]Mild [ ]Moderate [ ]Severe   Fatigue:                             [ ]None[ ]Mild [ ]Moderate [ ]Severe   Nausea:                             [ ]None[ ]Mild [ ]Moderate [ ]Severe   Loss of appetite:              [ ]None[ ]Mild [ ]Moderate [ ]Severe   Constipation:                    [ ]None[ ]Mild [ ]Moderate [ ]Severe    Other Symptoms:    PHYSICAL EXAM:  Vital Signs Last 24 Hrs  T(C): 36.6 (23 Apr 2025 08:00), Max: 36.6 (23 Apr 2025 08:00)  T(F): 97.9 (23 Apr 2025 08:00), Max: 97.9 (23 Apr 2025 08:00)  HR: 97 (23 Apr 2025 08:00) (77 - 97)  BP: 115/55 (23 Apr 2025 07:01) (115/55 - 120/52)  BP(mean): 79 (23 Apr 2025 07:01) (75 - 80)  RR: 16 (23 Apr 2025 07:01) (12 - 18)  SpO2: 98% (23 Apr 2025 07:01) (96% - 99%)    Parameters below as of 23 Apr 2025 07:01  Patient On (Oxygen Delivery Method): nasal cannula  O2 Flow (L/min): 2   I&O's Summary    22 Apr 2025 07:01  -  23 Apr 2025 07:00  --------------------------------------------------------  IN: 480 mL / OUT: 520 mL / NET: -40 mL    23 Apr 2025 07:01  -  23 Apr 2025 10:10  --------------------------------------------------------  IN: 30 mL / OUT: 0 mL / NET: 30 mL        GENERAL:  [X ] No acute distress [ ]Lethargic  [ ]Unarousable  [ ]Verbal  [ ]Non-Verbal [ ]Cachexia    BEHAVIORAL/PSYCH:  [ ]Alert and Oriented x  [ ] Anxiety [ ] Delirium [ ] Agitation [X ] Calm   EYES: [ ] No scleral icterus [ ] Scleral icterus [ ] Closed  ENMT:  [ ]Dry mouth  [ ]No external oral lesions [ X] No external ear or nose lesions  CARDIOVASCULAR:  [ ]Regular [ ]Irregular [ ]Tachy [ ]Not Tachy  [ ]Jenaro [ ] Edema [ ] No edema  PULMONARY:  [ ]Tachypnea  [ ]Audible excessive secretions [X ] No labored breathing [ ] labored breathing  GASTROINTESTINAL: [ ]Soft  [ ]Distended  [ X]Not distended [ ]Non tender [ ]Tender  MUSCULOSKELETAL: [ ]No clubbing [ ] clubbing  [ X] No cyanosis [ ] cyanosis  NEUROLOGIC: [ ]No focal deficits  [ ]Follows commands  [ ]Does not follow commands  [ ]Cognitive impairment  [ ]Dysphagia  [ ]Dysarthria  [ ]Paresis   SKIN: [ ] Jaundiced [X ] Non-jaundiced [ ]Rash [ ]No Rash [ ] Warm [ ] Dry  MISC/LINES: [ ] ET tube [ ] Trach [ ]NGT/OGT [ ]PEG [ ]Michel    CRITICAL CARE:  [ ] Shock Present  [ ]Septic [ ]Cardiogenic [ ]Neurologic [ ]Hypovolemic  [ ]  Vasopressors [ ]  Inotropes   [ ]Respiratory failure present [ ]Mechanical ventilation [ ]Non-invasive ventilatory support [ ]High flow  [ ]Acute  [ ]Chronic [ ]Hypoxic  [ ]Hypercarbic [ ]Other  [ ]Other organ failure     LABS: reviewed by me, notable for: CBC WNL, eleated Na, UA WNL                        6.3    4.21  )-----------( 88       ( 23 Apr 2025 05:31 )             19.3   04-23    150[H]  |  116[H]  |  12  ----------------------------<  159[H]  4.2   |  25  |  0.8    Ca    7.5[L]      23 Apr 2025 05:31  Mg     2.0     04-23    TPro  4.0[L]  /  Alb  2.2[L]  /  TBili  1.2  /  DBili  0.7[H]  /  AST  34  /  ALT  10  /  AlkPhos  100  04-23  PT/INR - ( 23 Apr 2025 05:31 )   PT: 25.20 sec;   INR: 2.10 ratio         PTT - ( 23 Apr 2025 05:31 )  PTT:39.8 sec    Urinalysis Basic - ( 23 Apr 2025 05:31 )    Color: x / Appearance: x / SG: x / pH: x  Gluc: 159 mg/dL / Ketone: x  / Bili: x / Urobili: x   Blood: x / Protein: x / Nitrite: x   Leuk Esterase: x / RBC: x / WBC x   Sq Epi: x / Non Sq Epi: x / Bacteria: x      RADIOLOGY & ADDITIONAL STUDIES: CXR personally reviewed by me: 4/20: bilateral opacities    PROTEIN CALORIE MALNUTRITION PRESENT: [ ]mild [ ]moderate [ ]severe [ ]underweight [ ]morbid obesity  https://www.andeal.org/vault/2440/web/files/ONC/Table_Clinical%20Characteristics%20to%20Document%20Malnutrition-White%20JV%20et%20al%202012.pdf    Height (cm): 175.3 (04-17-25 @ 11:56), 175.3 (02-19-25 @ 12:20), 175.3 (01-23-25 @ 23:40)  Weight (kg): 117.8 (04-17-25 @ 11:56), 113.4 (02-19-25 @ 12:20), 120.6 (01-23-25 @ 23:40)  BMI (kg/m2): 38.3 (04-17-25 @ 11:56), 36.9 (02-19-25 @ 12:20), 39.2 (01-23-25 @ 23:40)    [ ]PPSV2 < or = to 30% [ ]significant weight loss  [ ]poor nutritional intake  [ ]anasarca      [ ]Artificial Nutrition          Palliative Care Spiritual/Emotional Screening Tool Question  Severity (0-4):                    OR                    [X ] Unable to determine/NA  Score of 2 or greater indicates recommendation of Chaplaincy referral  Chaplaincy Referral: [ ] Yes [ ] Refused [ ] Following     Caregiver [place]:  [ ] Yes [ ] No    OR    [x ] Unable to determine. Will assess at later time if appropriate.  Social Work Referral [ ]  Patient and Family Centered Care Referral [ ]    Anticipatory Grief Present: [ ] Yes [ ] No    OR     [ x] Unable to determine. Will assess at later time if appropriate.  Social Work Referral [ ]  Patient and Family Centered Care Referral [ ]    REFERRALS:   [ ]Chaplaincy  [ ]Hospice  [ ]Child Life  [ ]Social Work  [ ]Case management [ ]Holistic Therapy     Palliative care education provided to patient and/or family    Goals of Care Document:     ______________  Ang Dunne MD  Palliative Medicine  [facility]   of Geriatric and Palliative Medicine  [phone]

## 2025-04-23 NOTE — PROGRESS NOTE ADULT - ASSESSMENT
**Problem 1: Hepatic Encephalopathy**  - Assessment: Severe encephalopathy likely due to liver cirrhosis, confirmed by EEG showing severe diffused slowing without seizure activity.  - Plan:      - Continue seizure prophylaxis with Keppra 250mg q12h.     - Administer Lactulose 20g q2h for ammonia reduction.    **Problem 2: Bacteroides Fragilis Bacteremia, Resolved**  - Assessment: Previously confirmed bacteremia likely originating from a gastrointestinal source, now resolved with negative repeat cultures.  - Plan:      - Maintain current antibiotic regimen as per Infectious Diseases recommendations.    **Problem 3: Decompensated Liver Cirrhosis**  - Assessment: Decompensated liver cirrhosis evidenced by hepatic encephalopathy, portal hypertension, ascites, and a suspicious 1.3 cm hepatic dome lesion.  - Plan:      - Order imaging for detailed characterization of hepatic lesion.     - Continue current management strategies for ascites and encephalopathy.    **Problem 4: Gastrointestinal Hemorrhage with Acute Blood Loss Anemia**  - Assessment: Active blood loss manifested as ongoing melena with hemoglobin drop from 7.4 to 6.3, associated with duodenal ulcer, erosive gastritis, hemorrhoids, and cecal clot.  - Plan:      - getting 1U prbc today     - prtotonix ggt     - GI fu     - Enforce NPO status to minimize GI activity.     - Regular monitoring with serial complete blood counts.     - Maintain readiness for acute management with 2 large bore IV accesses.     - Transfuse 1 unit PRBC; maintain hemoglobin above 7 g/dL.

## 2025-04-23 NOTE — CONSULT NOTE ADULT - ASSESSMENT
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 fragilis bacteremia, and acute blood loss anemia. Patient is on IV morphine for pain. Patient is on lactulose for encephalopathy, IV abx, and frequent H/H monitoring, requiring pRBC. Palliative consulted for GOC. Patient is full code.   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 fragilis bacteremia, and acute blood loss anemia. Patient is on IV morphine for pain. Patient is on lactulose for encephalopathy, IV abx, and frequent H/H monitoring, requiring pRBC. Palliative consulted for GOC. Patient is full code.    - left message for wife with palliative callback to discuss GOC, patient is confused today  - c/w IV morphine PRN for pain  - monitor AMS, c/w lactulose, monitor in ICU  - will follow    ______________  Ang Dunne MD  Palliative Medicine  [facility]   of Geriatric and Palliative Medicine  [phone]

## 2025-04-23 NOTE — PROGRESS NOTE ADULT - SUBJECTIVE AND OBJECTIVE BOX
80yMale  Being followed for liver cirrhosis   Interval history: patient with small bm with minimal blood in it today. patient still confused.      PAST MEDICAL & SURGICAL HISTORY:   Diabetes  Hypertension  Fall  H/O left knee surgery  History of cholecystectomy  History of appendectomy                Social History: No smoking. No alcohol. No illegal drug use.            MEDICATIONS  (STANDING):  atorvastatin 80 milliGRAM(s) Oral at bedtime  chlorhexidine 2% Cloths 1 Application(s) Topical <User Schedule>  dexMEDEtomidine Infusion 0.5 MICROgram(s)/kG/Hr (14.7 mL/Hr) IV Continuous <Continuous>  dextrose 5%. 1000 milliLiter(s) (100 mL/Hr) IV Continuous <Continuous>  dextrose 5%. 1000 milliLiter(s) (50 mL/Hr) IV Continuous <Continuous>  dextrose 5%. 1000 milliLiter(s) (75 mL/Hr) IV Continuous <Continuous>  dextrose 50% Injectable 25 Gram(s) IV Push once  dextrose 50% Injectable 12.5 Gram(s) IV Push once  dextrose 50% Injectable 25 Gram(s) IV Push once  folic acid 1 milliGRAM(s) Oral daily  glucagon  Injectable 1 milliGRAM(s) IntraMuscular once  insulin lispro (ADMELOG) corrective regimen sliding scale   SubCutaneous three times a day before meals  lactulose Syrup 20 Gram(s) Enteral Tube every 3 hours  levETIRAcetam   Injectable 250 milliGRAM(s) IV Push <User Schedule>  lidocaine   4% Patch 1 Patch Transdermal daily  metroNIDAZOLE  IVPB 500 milliGRAM(s) IV Intermittent every 12 hours  nystatin Powder 1 Application(s) Topical two times a day  pantoprazole Infusion 8 mG/Hr (10 mL/Hr) IV Continuous <Continuous>  potassium chloride   Powder 40 milliEquivalent(s) Oral every 4 hours  rifAXIMin 550 milliGRAM(s) Oral two times a day  senna 2 Tablet(s) Oral at bedtime    MEDICATIONS  (PRN):  acetaminophen     Tablet .. 650 milliGRAM(s) Oral every 6 hours PRN Temp greater or equal to 38C (100.4F), Mild Pain (1 - 3)  aluminum hydroxide/magnesium hydroxide/simethicone Suspension 30 milliLiter(s) Oral every 4 hours PRN Dyspepsia  dextrose Oral Gel 15 Gram(s) Oral once PRN Blood Glucose LESS THAN 70 milliGRAM(s)/deciliter  melatonin 3 milliGRAM(s) Oral at bedtime PRN Insomnia  morphine  - Injectable 2 milliGRAM(s) IV Push every 8 hours PRN Severe Pain (7 - 10)  ondansetron Injectable 4 milliGRAM(s) IV Push every 8 hours PRN Nausea and/or Vomiting  polyethylene glycol 3350 17 Gram(s) Oral two times a day PRN Constipation      Allergies:   No Known Allergies  Intolerances          REVIEW OF SYSTEMS:  unobtainable         VITAL SIGNS:   T(F): 97.9 (04-23-25 @ 08:00), Max: 97.9 (04-23-25 @ 08:00)  HR: 97 (04-23-25 @ 08:00) (77 - 97)  BP: 115/55 (04-23-25 @ 07:01) (115/55 - 120/52)  RR: 16 (04-23-25 @ 07:01) (12 - 18)  SpO2: 98% (04-23-25 @ 07:01) (96% - 99%)    PHYSICAL EXAM:  GENERAL: confused  HEAD:  Atraumatic, Normocephalic  EYES: conjunctiva and sclera clear  NECK: Supple, no thyromegaly  CHEST/LUNG: Clear to auscultation bilaterally; No wheeze, rhonchi, or rales  HEART: Regular rate and rhythm; normal S1, S2, No murmurs.  ABDOMEN: Soft, nontender, nondistended; Bowel sounds present  Neuro-did not appreciate asterixis on exam  SKIN: Intact, no jaundice            LABS:                        6.3    4.21  )-----------( 88       ( 23 Apr 2025 05:31 )             19.3     04-23    150[H]  |  116[H]  |  12  ----------------------------<  159[H]  4.2   |  25  |  0.8    Ca    7.5[L]      23 Apr 2025 05:31  Mg     2.0     04-23    TPro  4.0[L]  /  Alb  2.2[L]  /  TBili  1.2  /  DBili  0.7[H]  /  AST  34  /  ALT  10  /  AlkPhos  100  04-23    LIVER FUNCTIONS - ( 23 Apr 2025 05:31 )  Alb: 2.2 g/dL / Pro: 4.0 g/dL / ALK PHOS: 100 U/L / ALT: 10 U/L / AST: 34 U/L / GGT: x           PT/INR - ( 23 Apr 2025 05:31 )   PT: 25.20 sec;   INR: 2.10 ratio         PTT - ( 23 Apr 2025 05:31 )  PTT:39.8 sec    IMAGING:    < from: US Abdomen Upper Quadrant Right (01.24.25 @ 09:25) >    ACC: [id number] EXAM:  US ABDOMEN RT UPR QUADRANT   ORDERED BY: EHSAN ROWE     PROCEDURE DATE:  01/24/2025          INTERPRETATION:  CLINICAL INFORMATION: Elevated liver function tests and   pancreatic tail hypodensity on CT.    COMPARISON: Abdomen pelvis CT from one day earlier    TECHNIQUE: Sonography of the right upper quadrant.    FINDINGS: Evaluation is limited due to overlying bowel gas.    Liver: Within normal limits.  Bile ducts: Normal caliber. Common bile duct measures 6 mm.  Gallbladder: Cholecystectomy.  Pancreas: Visualized portions are within normal limits.  Right kidney: 12.8 cm. No hydronephrosis.  Ascites: None.  IVC: Visualized portions are within normal limits.    IMPRESSION:  Evaluation is limited due to overlying bowel gas.    Status post cholecystectomy.    No abnormality seen on ultrasound.    --- End of Report ---            ANUP LO MD; Attending Radiologist  This document has been electronically signed. Jan 24 2025 10:15AM    < end of copied text >      < from: CT Abdomen and Pelvis w/ IV Cont (04.13.25 @ 22:56) >    ACC: [id number] EXAM:  CT ABDOMEN AND PELVIS IC   ORDERED BY: DOREEN CAPELLAN     PROCEDURE DATE:  04/13/2025          INTERPRETATION:  CLINICAL STATEMENT: Abdominal pain and fever.    TECHNIQUE: Contiguous axial CT images were obtained from the lower chest   to the pubic symphysis following administration of intravenous contrast.   98 cc administered of Omnipaque 350 (8 cc discarded). Oral contrast was   not administered. Reformatted images in the coronal and sagittal planes   were acquired.    COMPARISON: CT abdomen and pelvis 1/23/2025.      FINDINGS:    LOWER CHEST: Bilateral small left greater than right pleural effusions   with adjacent compressive atelectasis. Bilateral subsegmental   atelectasis. Cardiomegaly.    HEPATOBILIARY: Liver cirrhosis. Post cholecystectomy. Patent portal vein.   Stable hepatic dome calcifications. Hepatic dome subcentimeter hypodense   1.3 cm hypodensity, not fully characterized (series 302, 77).    SPLEEN: Perisplenic varices with left splenorenal shunt. No splenomegaly.    PANCREAS: Limited evaluation of pancreas, including previously described   hypodense lesion, due to background free fluid. Atrophic pancreas with   multiple calcifications, likely sequela of chronic pancreatitis.    ADRENAL GLANDS: Unremarkable.    KIDNEYS: Symmetric enhancement. No hydronephrosis.    ABDOMINOPELVIC NODES: Unremarkable.    PELVIC ORGANS: Contracted urinary bladder.    PERITONEUM/MESENTERY/BOWEL: Mild-to-moderate abdominopelvic ascites. No   abnormal bowel dilation or wall thickening. No intraperitoneal free air.   Scattered colonic diverticula.    BONES/SOFT TISSUES: Stable umbilical hernia containing mesenteric fat and   fluid. Erosive endplate changes centered at the L4-5 level. Degenerative   changes of the spine. Diffuse anasarca.    OTHER: Atherosclerotic calcifications of the abdominal aorta. Engorgement   of the inferior mesenteric vein.      IMPRESSION:    1.  Erosive endplate changes centered at the L4-5 level, consistent with   patient's history ofrecent lumbar spine osteomyelitis.    2.  Liver cirrhosis with portal hypertension. Mild-to-moderate ascites.   Anasarca. Hepatic dome subcentimeter hypodense 1.3 cm hypodensity, not   fully characterized . Outpatient MR abdomen with IV contrast recommended.    3.  Chronic pancreatitis, with limited evaluation for acute inflammation   due to background fluid. Correlation with pancreatic enzymes recommended.    --- End of Report ---          DOMINGO HERCULES MD; Resident Radiologist  This document has been electronically signed.  SYDNEY COBIAN MD; Attending Radiologist  This document has been electronically signed. Apr 14 2025 12:17AM    < end of copied text >

## 2025-04-24 LAB
ALBUMIN SERPL ELPH-MCNC: 1.9 G/DL — LOW (ref 3.5–5.2)
ALP SERPL-CCNC: 77 U/L — SIGNIFICANT CHANGE UP (ref 30–115)
ALT FLD-CCNC: 9 U/L — SIGNIFICANT CHANGE UP (ref 0–41)
AMMONIA BLD-MCNC: 22 UMOL/L — SIGNIFICANT CHANGE UP (ref 11–55)
ANION GAP SERPL CALC-SCNC: 9 MMOL/L — SIGNIFICANT CHANGE UP (ref 7–14)
APTT BLD: 52 SEC — HIGH (ref 27–39.2)
AST SERPL-CCNC: 30 U/L — SIGNIFICANT CHANGE UP (ref 0–41)
BASOPHILS # BLD AUTO: 0.02 K/UL — SIGNIFICANT CHANGE UP (ref 0–0.2)
BASOPHILS NFR BLD AUTO: 0.3 % — SIGNIFICANT CHANGE UP (ref 0–1)
BILIRUB SERPL-MCNC: 1.1 MG/DL — SIGNIFICANT CHANGE UP (ref 0.2–1.2)
BUN SERPL-MCNC: 13 MG/DL — SIGNIFICANT CHANGE UP (ref 10–20)
CALCIUM SERPL-MCNC: 7.3 MG/DL — LOW (ref 8.4–10.5)
CHLORIDE SERPL-SCNC: 121 MMOL/L — HIGH (ref 98–110)
CO2 SERPL-SCNC: 20 MMOL/L — SIGNIFICANT CHANGE UP (ref 17–32)
CREAT SERPL-MCNC: 1.2 MG/DL — SIGNIFICANT CHANGE UP (ref 0.7–1.5)
D DIMER BLD IA.RAPID-MCNC: 6196 NG/ML DDU — HIGH
EGFR: 62 ML/MIN/1.73M2 — SIGNIFICANT CHANGE UP
EGFR: 62 ML/MIN/1.73M2 — SIGNIFICANT CHANGE UP
EOSINOPHIL # BLD AUTO: 0.12 K/UL — SIGNIFICANT CHANGE UP (ref 0–0.7)
EOSINOPHIL NFR BLD AUTO: 2 % — SIGNIFICANT CHANGE UP (ref 0–8)
GLUCOSE BLDC GLUCOMTR-MCNC: 137 MG/DL — HIGH (ref 70–99)
GLUCOSE BLDC GLUCOMTR-MCNC: 201 MG/DL — HIGH (ref 70–99)
GLUCOSE BLDC GLUCOMTR-MCNC: 204 MG/DL — HIGH (ref 70–99)
GLUCOSE SERPL-MCNC: 182 MG/DL — HIGH (ref 70–99)
HCT VFR BLD CALC: 23.3 % — LOW (ref 42–52)
HCT VFR BLD CALC: 25.6 % — LOW (ref 42–52)
HGB BLD-MCNC: 7.7 G/DL — LOW (ref 14–18)
HGB BLD-MCNC: 8.4 G/DL — LOW (ref 14–18)
IMM GRANULOCYTES NFR BLD AUTO: 1.5 % — HIGH (ref 0.1–0.3)
INR BLD: 2.43 RATIO — HIGH (ref 0.65–1.3)
LDH SERPL L TO P-CCNC: 275 U/L — HIGH (ref 50–242)
LYMPHOCYTES # BLD AUTO: 1.36 K/UL — SIGNIFICANT CHANGE UP (ref 1.2–3.4)
LYMPHOCYTES # BLD AUTO: 22.5 % — SIGNIFICANT CHANGE UP (ref 20.5–51.1)
MAGNESIUM SERPL-MCNC: 1.9 MG/DL — SIGNIFICANT CHANGE UP (ref 1.8–2.4)
MCHC RBC-ENTMCNC: 32.1 PG — HIGH (ref 27–31)
MCHC RBC-ENTMCNC: 32.2 PG — HIGH (ref 27–31)
MCHC RBC-ENTMCNC: 32.8 G/DL — SIGNIFICANT CHANGE UP (ref 32–37)
MCHC RBC-ENTMCNC: 33 G/DL — SIGNIFICANT CHANGE UP (ref 32–37)
MCV RBC AUTO: 97.1 FL — HIGH (ref 80–94)
MCV RBC AUTO: 98.1 FL — HIGH (ref 80–94)
MONOCYTES # BLD AUTO: 0.67 K/UL — HIGH (ref 0.1–0.6)
MONOCYTES NFR BLD AUTO: 11.1 % — HIGH (ref 1.7–9.3)
NEUTROPHILS # BLD AUTO: 3.78 K/UL — SIGNIFICANT CHANGE UP (ref 1.4–6.5)
NEUTROPHILS NFR BLD AUTO: 62.6 % — SIGNIFICANT CHANGE UP (ref 42.2–75.2)
NRBC BLD AUTO-RTO: 0 /100 WBCS — SIGNIFICANT CHANGE UP (ref 0–0)
NRBC BLD AUTO-RTO: 0 /100 WBCS — SIGNIFICANT CHANGE UP (ref 0–0)
PLATELET # BLD AUTO: 74 K/UL — LOW (ref 130–400)
PLATELET # BLD AUTO: 88 K/UL — LOW (ref 130–400)
PMV BLD: 10.2 FL — SIGNIFICANT CHANGE UP (ref 7.4–10.4)
PMV BLD: 9.8 FL — SIGNIFICANT CHANGE UP (ref 7.4–10.4)
POTASSIUM SERPL-MCNC: 6 MMOL/L — CRITICAL HIGH (ref 3.5–5)
POTASSIUM SERPL-SCNC: 6 MMOL/L — CRITICAL HIGH (ref 3.5–5)
PROT SERPL-MCNC: 3.5 G/DL — LOW (ref 6–8)
PROTHROM AB SERPL-ACNC: 29.2 SEC — HIGH (ref 9.95–12.87)
RBC # BLD: 2.3 M/UL — LOW (ref 4.7–6.1)
RBC # BLD: 2.4 M/UL — LOW (ref 4.7–6.1)
RBC # BLD: 2.61 M/UL — LOW (ref 4.7–6.1)
RBC # FLD: 17.1 % — HIGH (ref 11.5–14.5)
RBC # FLD: 18 % — HIGH (ref 11.5–14.5)
RETICS #: 70.6 K/UL — SIGNIFICANT CHANGE UP (ref 25–125)
RETICS/RBC NFR: 3.1 % — HIGH (ref 0.5–1.5)
SODIUM SERPL-SCNC: 150 MMOL/L — HIGH (ref 135–146)
WBC # BLD: 6.04 K/UL — SIGNIFICANT CHANGE UP (ref 4.8–10.8)
WBC # BLD: 8.43 K/UL — SIGNIFICANT CHANGE UP (ref 4.8–10.8)
WBC # FLD AUTO: 6.04 K/UL — SIGNIFICANT CHANGE UP (ref 4.8–10.8)
WBC # FLD AUTO: 8.43 K/UL — SIGNIFICANT CHANGE UP (ref 4.8–10.8)

## 2025-04-24 PROCEDURE — 99233 SBSQ HOSP IP/OBS HIGH 50: CPT

## 2025-04-24 PROCEDURE — 71045 X-RAY EXAM CHEST 1 VIEW: CPT | Mod: 26

## 2025-04-24 PROCEDURE — 93925 LOWER EXTREMITY STUDY: CPT | Mod: 26

## 2025-04-24 PROCEDURE — 99232 SBSQ HOSP IP/OBS MODERATE 35: CPT

## 2025-04-24 PROCEDURE — 93971 EXTREMITY STUDY: CPT | Mod: 26,RT

## 2025-04-24 RX ORDER — INSULIN LISPRO 100 U/ML
INJECTION, SOLUTION INTRAVENOUS; SUBCUTANEOUS EVERY 6 HOURS
Refills: 0 | Status: DISCONTINUED | OUTPATIENT
Start: 2025-04-24 | End: 2025-05-08

## 2025-04-24 RX ORDER — MIDODRINE HYDROCHLORIDE 5 MG/1
10 TABLET ORAL EVERY 8 HOURS
Refills: 0 | Status: DISCONTINUED | OUTPATIENT
Start: 2025-04-24 | End: 2025-04-30

## 2025-04-24 RX ORDER — SODIUM ZIRCONIUM CYCLOSILICATE 5 G/5G
10 POWDER, FOR SUSPENSION ORAL ONCE
Refills: 0 | Status: COMPLETED | OUTPATIENT
Start: 2025-04-24 | End: 2025-04-24

## 2025-04-24 RX ORDER — SODIUM CHLORIDE 9 G/1000ML
500 INJECTION, SOLUTION INTRAVENOUS ONCE
Refills: 0 | Status: COMPLETED | OUTPATIENT
Start: 2025-04-24 | End: 2025-04-24

## 2025-04-24 RX ORDER — NOREPINEPHRINE BITARTRATE 8 MG
0.05 SOLUTION INTRAVENOUS
Qty: 8 | Refills: 0 | Status: DISCONTINUED | OUTPATIENT
Start: 2025-04-24 | End: 2025-04-24

## 2025-04-24 RX ADMIN — NYSTATIN 1 APPLICATION(S): 100000 CREAM TOPICAL at 05:28

## 2025-04-24 RX ADMIN — MIDODRINE HYDROCHLORIDE 10 MILLIGRAM(S): 5 TABLET ORAL at 13:27

## 2025-04-24 RX ADMIN — LACTULOSE 20 GRAM(S): 10 SOLUTION ORAL at 17:54

## 2025-04-24 RX ADMIN — OCTREOTIDE ACETATE 5 MICROGRAM(S)/HR: 500 INJECTION, SOLUTION INTRAVENOUS; SUBCUTANEOUS at 12:50

## 2025-04-24 RX ADMIN — MIDODRINE HYDROCHLORIDE 10 MILLIGRAM(S): 5 TABLET ORAL at 09:58

## 2025-04-24 RX ADMIN — ATORVASTATIN CALCIUM 80 MILLIGRAM(S): 80 TABLET, FILM COATED ORAL at 21:57

## 2025-04-24 RX ADMIN — INSULIN LISPRO 4: 100 INJECTION, SOLUTION INTRAVENOUS; SUBCUTANEOUS at 09:26

## 2025-04-24 RX ADMIN — Medication 102 MILLIGRAM(S): at 11:07

## 2025-04-24 RX ADMIN — Medication 100 MILLIGRAM(S): at 17:56

## 2025-04-24 RX ADMIN — OCTREOTIDE ACETATE 5 MICROGRAM(S)/HR: 500 INJECTION, SOLUTION INTRAVENOUS; SUBCUTANEOUS at 03:55

## 2025-04-24 RX ADMIN — LACTULOSE 20 GRAM(S): 10 SOLUTION ORAL at 09:58

## 2025-04-24 RX ADMIN — LEVETIRACETAM 250 MILLIGRAM(S): 10 INJECTION, SOLUTION INTRAVENOUS at 05:29

## 2025-04-24 RX ADMIN — LACTULOSE 20 GRAM(S): 10 SOLUTION ORAL at 21:57

## 2025-04-24 RX ADMIN — MIDODRINE HYDROCHLORIDE 10 MILLIGRAM(S): 5 TABLET ORAL at 21:57

## 2025-04-24 RX ADMIN — Medication 100 MILLIGRAM(S): at 05:22

## 2025-04-24 RX ADMIN — NYSTATIN 1 APPLICATION(S): 100000 CREAM TOPICAL at 18:20

## 2025-04-24 RX ADMIN — Medication 102 MILLIGRAM(S): at 01:15

## 2025-04-24 RX ADMIN — LACTULOSE 20 GRAM(S): 10 SOLUTION ORAL at 15:26

## 2025-04-24 RX ADMIN — Medication 40 MILLIGRAM(S): at 17:54

## 2025-04-24 RX ADMIN — FOLIC ACID 1 MILLIGRAM(S): 1 TABLET ORAL at 12:51

## 2025-04-24 RX ADMIN — LIDOCAINE HYDROCHLORIDE 1 PATCH: 20 JELLY TOPICAL at 11:32

## 2025-04-24 RX ADMIN — INSULIN LISPRO 4: 100 INJECTION, SOLUTION INTRAVENOUS; SUBCUTANEOUS at 12:32

## 2025-04-24 RX ADMIN — SODIUM CHLORIDE 500 MILLILITER(S): 9 INJECTION, SOLUTION INTRAVENOUS at 06:18

## 2025-04-24 RX ADMIN — Medication 40 MILLIGRAM(S): at 05:23

## 2025-04-24 RX ADMIN — LIDOCAINE HYDROCHLORIDE 1 PATCH: 20 JELLY TOPICAL at 23:00

## 2025-04-24 RX ADMIN — LACTULOSE 20 GRAM(S): 10 SOLUTION ORAL at 05:22

## 2025-04-24 RX ADMIN — SODIUM CHLORIDE 50 MILLILITER(S): 9 INJECTION, SOLUTION INTRAVENOUS at 14:19

## 2025-04-24 RX ADMIN — LIDOCAINE HYDROCHLORIDE 1 PATCH: 20 JELLY TOPICAL at 20:27

## 2025-04-24 RX ADMIN — LACTULOSE 20 GRAM(S): 10 SOLUTION ORAL at 03:44

## 2025-04-24 RX ADMIN — LACTULOSE 20 GRAM(S): 10 SOLUTION ORAL at 12:52

## 2025-04-24 RX ADMIN — LEVETIRACETAM 250 MILLIGRAM(S): 10 INJECTION, SOLUTION INTRAVENOUS at 17:57

## 2025-04-24 RX ADMIN — Medication 1 APPLICATION(S): at 05:24

## 2025-04-24 RX ADMIN — SODIUM ZIRCONIUM CYCLOSILICATE 10 GRAM(S): 5 POWDER, FOR SUSPENSION ORAL at 09:27

## 2025-04-24 NOTE — PROGRESS NOTE ADULT - ASSESSMENT
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 fragilis bacteremia, and acute blood loss anemia. Patient is on IV morphine for pain. Patient is on lactulose for encephalopathy, IV abx, and frequent H/H monitoring, requiring pRBC. Palliative consulted for GOC. Patient is full code.    - left message for wife with palliative callback to discuss GOC, patient is confused today; awaiting callback  - high risk, on octreotide gtt, requiring ICU care  - monitor AMS, c/w lactulose, monitor in ICU  - will follow    ______________  Ang Dunne MD  Palliative Medicine  [facility]   [facility]  [phone]

## 2025-04-24 NOTE — PHARMACOTHERAPY INTERVENTION NOTE - COMMENTS
Patient no longer requiring NE drip- s/w provider- okay to discontinue Patient no longer requiring NE and Precedex drips - s/w provider- okay to discontinue

## 2025-04-24 NOTE — PROGRESS NOTE ADULT - ASSESSMENT
1. Hepatic Encephalopathy - Improving w/ lactulose, Keppra  2. Bacteroides Bacteremia - Resolved, on Flagyl till 4/28  3. Decompensated Cirrhosis - Mod ascites, portal HTN, 1.3cm liver lesion  4. GI Bleed - Active but minimal, Hgb stabilized post-transfusion  5. L4-5 Osteomyelitis - Completed abx    PLAN  1. Hepatic Encephalopathy     - Continue Lactulose, titrate 3-4 BMs/day     - Keppra 250mg q12h     - Neurology f/u noted    2. Bacteremia     - Flagyl 500mg IV q12h until 4/28     - Monitor WBC, fever, LFTs, Cr daily     - Inform ID of changes    3. Cirrhosis     - MR liver protocol outpatient     - No window for paracentesis now     - S/P Vit K x3d for INR     - Avoid diuretics    4. GI Bleed     - NPO now     - S/P 3 PRBC yesterday     - Transfuse if Hgb <7     - PPI BID     - Monitor Hgb, BMs       1. Hepatic Encephalopathy - Improving w/ lactulose, Keppra  2. Bacteroides Bacteremia - Resolved, on Flagyl till 4/28  3. Decompensated Cirrhosis - Mod ascites, portal HTN, 1.3cm liver lesion  4. GI Bleed - Active but minimal, Hgb stabilized post-transfusion  5. L4-5 Osteomyelitis - Completed abx  6. R arm swelling due to contrast extravasation CT CT scan    PLAN  1. Hepatic Encephalopathy     - Continue Lactulose, titrate 3-4 BMs/day     - Keppra 250mg q12h     - Neurology f/u noted    2. Bacteremia     - Flagyl 500mg IV q12h until 4/28     - Monitor WBC, fever, LFTs, Cr daily     - Inform ID of changes    3. Cirrhosis     - MR liver protocol outpatient     - No window for paracentesis now     - S/P Vit K x3d for INR     - Avoid diuretics    4. GI Bleed     - NPO now     - S/P 3 PRBC yesterday     - Transfuse if Hgb <7     - PPI BID     - Monitor Hgb, BMs    5. R arm swelling w thrombophlebitis     - warm compress for now     - duplex is NG, will treat conservatively

## 2025-04-24 NOTE — PROGRESS NOTE ADULT - ASSESSMENT
Impression:  Acute blood loss anemia  AMS  hepatic encephalopathy  Liver cirrhosis .  bacteroides fragilis bacteremia   Hypernatremia  Hyperkalemia  Afib        Plan:    Transfuse hb target >7  Active type and screen  free water, frequent bmp  Lokelma  Insulin dextrose calcium  GI follow up  PPI BID  Lactulose via NG tube  target BM 2-3  Abx per ID  CT AP non contrast  no AC due to GI bleed  No urgent plan for ischemic evaluation per cardiology  poor prognosis  palliative care  Low threshold for intubation  Oral Oral   SpO2: 100% 99%   Weight: 22.2 kg (48 lb 15.1 oz)        Physical Exam  Constitutional:       General: He is active.      Appearance: He is well-developed.   HENT:      Head: Normocephalic and atraumatic.      Comments: Left ear bulging, injected tympanic membrane, tympanic effusion present     Mouth/Throat:      Mouth: Mucous membranes are moist.      Pharynx: No oropharyngeal exudate or posterior oropharyngeal erythema.   Eyes:      Extraocular Movements: Extraocular movements intact.      Pupils: Pupils are equal, round, and reactive to light.   Cardiovascular:      Rate and Rhythm: Normal rate and regular rhythm.   Pulmonary:      Effort: Pulmonary effort is normal.      Breath sounds: Normal breath sounds.   Abdominal:      General: Abdomen is flat.      Palpations: Abdomen is soft.   Musculoskeletal:         General: Normal range of motion.   Skin:     General: Skin is warm.      Capillary Refill: Capillary refill takes less than 2 seconds.   Neurological:      General: No focal deficit present.      Mental Status: He is alert.         Diagnostic Study Results     Labs -     Recent Results (from the past 12 hour(s))   COVID-19 & Influenza Combo    Collection Time: 03/01/25  9:19 PM    Specimen: Nasopharyngeal   Result Value Ref Range    Source Nasopharyngeal      SARS-CoV-2, PCR Not detected NOTD      Rapid Influenza A By PCR Detected (A) NOTD      Rapid Influenza B By PCR Not detected NOTD        Labs Reviewed   COVID-19 & INFLUENZA COMBO - Abnormal; Notable for the following components:       Result Value    Rapid Influenza A By PCR Detected (*)     All other components within normal limits       Radiologic Studies -   No orders to display         The laboratory results, imaging results, and other diagnostic exams were reviewed in the EMR.    Medical Decision Making   I am the first provider for this patient.    I reviewed the vital signs, available nursing notes, past medical history, past

## 2025-04-24 NOTE — SWALLOW BEDSIDE ASSESSMENT ADULT - SWALLOW EVAL: RECOMMENDED DIET
NPO + NGT Reason For Visit  FERDINAND BHATIA is an established patient here today for a chief complaint of generalized weakness this week.   :  services not used.   A chaperone is not applicable. He is unaccompanied.        Quality    Adult Wellness CI alcohol use, not having considered quitting drinking, not getting angry when talked to about drinking, not having a drink or two in the morning to get going, not drinking alcohol regularly, and feeling guilty about it, no tobacco use, does not have feelings of hopelessness (PHQ-2), no Anhedonia (PHQ-2), preventive medicine therapy for influenza, pain scale level reviewed, taking aspirin and discussion of risks and benefits of Aspirin.   IVD/CAD CI oral antiplatelet therapy prescribed, Ejection fraction assessed: 10/02/2018 and surrogate decision maker documented.      History of Present Illness  Pt here for about 1 week's h/o increased fatigues. Reports some congestion but no cough, no sore throat, no f/c, no n/v/d, no CP, no SoB, no other URI symptoms. Pt mainly states that he's had trouble breathing through his nose as he's been congested, and having more difficulty sleeping.          Review of Systems    All other systems reviewed and negative.      Allergies  Cipro TABS    Current Meds   1. Aspirin 81 MG Oral Tablet Chewable; CHEW AND SWALLOW 1 TABLET DAILY;   Therapy: (Recorded:30May2018) to Recorded   2. Brilinta 90 MG Oral Tablet; TAKE 1 TABLET TWICE DAILY;   Therapy: (Recorded:30May2018) to Recorded   3. Fluticasone Propionate 50 MCG/ACT Nasal Suspension; USE 1 SPRAY IN EACH   NOSTRIL ONCE DAILY;   Therapy: 30May2018 to (Last Rx:30May2018)  Requested for: 30May2018 Ordered   4. Lipitor 40 MG Oral Tablet; TAKE 1 TABLET AT BEDTIME;   Therapy: (Recorded:30May2018) to Recorded   5. Lisinopril 5 MG Oral Tablet; TAKE 1 TABLET DAILY;   Therapy: (Recorded:30May2018) to Recorded   6. Montelukast Sodium 10 MG Oral Tablet; TAKE 1 TABLET AT BEDTIME;   Therapy:  2018 to (Evaluate:12Ghc5682)  Requested for: 01Kgj5593; Last   Rx:[id number] Ordered   7. Naproxen 500 MG Oral Tablet; TAKE 1 TABLET EVERY 12 HOURS AS NEEDED;   Therapy: 82Xtn5301 to (Evaluate:2018)  Requested for: 61Kon9374; Last   Rx:[id number] Ordered    Active Problems  Allergic rhinitis (J30.9)  CAD (coronary artery disease) (I25.10)  Dyslipidemia (E78.5)  Elevated PSA (R97.20)    Surgical History  History of arterial stent placement    Family History  Mother   Family history of High cholesterol  Father   Family history of diabetes mellitus (Z83.3)  Paternal Grandmother   Family history of diabetes mellitus (Z83.3)  Maternal Grandfather   Family history of myocardial infarction (Z82.49)   · Last Impression: 06 Dec 2017   age 44  Verena Dyer    Social History    Never a smoker  No alcohol use    Review  Past medical history, problem list, family medical history, surgical history and social history reviewed.      Vitals  Signs   Recorded: 2018 03:31PM   Weight: 170 lb   BMI Calculated: 26.63  BSA Calculated: 1.89  Systolic: 108  Diastolic: 74  Temperature: 97.2 F  Heart Rate: 60  Respiration: 16  O2 Saturation: 97  Pain Scale: 00    Physical Exam  Constitutional: alert, in no acute distress and current vital signs reviewed.   Head and Face: atraumatic, no deformities, normocephalic, normal facies. no tenderness of facial sinuses.   Eyes: no discharge, normal conjunctiva, no eyelid swelling, no ptosis and the sclerae were normal. pupils equal, round and reactive to light and accommodation, conjugate gaze and extraocular movements were intact.   ENT: normal appearing outer ear, normal appearing nose. examination of the tympanic membrane showed normal landmarks, normal appearing external canal. nasal mucosa moist and pink, no nasal discharge. normal lips. oral mucosa pink and moist, no oral lesions.   Neck: normal appearing neck, supple neck and no mass was seen. thyroid not enlarged and  no thyroid nodules.   Lymphatic: no lymphadenopathy.   Pulmonary: no respiratory distress, normal respiratory rate and effort and no accessory muscle use. breath sounds clear to auscultation bilaterally.   Cardiovascular: normal rate, no murmurs were heard, regular rhythm, normal S1 and normal S2. edema was not present in the lower extremities.   Musculoskeletal: normal gait.   Neurologic: cranial nerves grossly intact.      Assessment  Allergic rhinitis (J30.9)  Need for influenza vaccination (Z23)    Plan  Fluticasone Propionate 50 MCG/ACT Nasal Suspension; USE 1 SPRAY IN  EACH NOSTRIL ONCE DAILY  Plans:     Plan:   1. Allergic rhinitis: likely cause for pt's symptoms, pt's fatigue likely due to lack of sleep. Restart Flonase, restart Singulair; trial Mucinex D. Med SE / warning signs relayed.    2. HM: flu vaccine today.      Signatures   Electronically signed by : Ivis Brown, ; Oct 11 2018  3:34PM CST    Electronically signed by : ARIADNA HUDSON M.D.; Oct 11 2018  3:46PM CST    Electronically signed by : ARIADNA HUDSON M.D.; Oct 11 2018  4:20PM CST

## 2025-04-24 NOTE — PROGRESS NOTE ADULT - SUBJECTIVE AND OBJECTIVE BOX
DWIGHT RIBEIRO 80y Male  MRN#: [id number]   Hospital Day: 10d    SUBJECTIVE  Patient is a 80y old Male who presents with a chief complaint of acute mentation changes (24 Apr 2025 14:25)  Currently admitted to medicine with the primary diagnosis of Metabolic encephalopathy      INTERVAL HPI AND OVERNIGHT EVENTS:  Patient was examined and seen at bedside. This morning he is resting comfortably in bed and reports no issues or overnight events.    OBJECTIVE  PAST MEDICAL & SURGICAL HISTORY  Diabetes    Hypertension    Fall    H/O left knee surgery    History of cholecystectomy    History of appendectomy      ALLERGIES:  No Known Allergies    MEDICATIONS:  STANDING MEDICATIONS  atorvastatin 80 milliGRAM(s) Oral at bedtime  chlorhexidine 2% Cloths 1 Application(s) Topical <User Schedule>  dextrose 5% + sodium chloride 0.45%. 1000 milliLiter(s) IV Continuous <Continuous>  dextrose 5%. 1000 milliLiter(s) IV Continuous <Continuous>  dextrose 5%. 1000 milliLiter(s) IV Continuous <Continuous>  dextrose 50% Injectable 25 Gram(s) IV Push once  dextrose 50% Injectable 12.5 Gram(s) IV Push once  dextrose 50% Injectable 25 Gram(s) IV Push once  folic acid 1 milliGRAM(s) Oral daily  glucagon  Injectable 1 milliGRAM(s) IntraMuscular once  insulin lispro (ADMELOG) corrective regimen sliding scale   SubCutaneous every 6 hours  lactulose Syrup 20 Gram(s) Enteral Tube every 3 hours  levETIRAcetam   Injectable 250 milliGRAM(s) IV Push <User Schedule>  lidocaine   4% Patch 1 Patch Transdermal daily  metroNIDAZOLE  IVPB 500 milliGRAM(s) IV Intermittent every 12 hours  midodrine 10 milliGRAM(s) Oral every 8 hours  nystatin Powder 1 Application(s) Topical two times a day  octreotide  Infusion 25 MICROgram(s)/Hr IV Continuous <Continuous>  pantoprazole  Injectable 40 milliGRAM(s) IV Push every 12 hours  rifAXIMin 550 milliGRAM(s) Oral two times a day  senna 2 Tablet(s) Oral at bedtime    PRN MEDICATIONS  acetaminophen     Tablet .. 650 milliGRAM(s) Oral every 6 hours PRN  aluminum hydroxide/magnesium hydroxide/simethicone Suspension 30 milliLiter(s) Oral every 4 hours PRN  dextrose Oral Gel 15 Gram(s) Oral once PRN  melatonin 3 milliGRAM(s) Oral at bedtime PRN  ondansetron Injectable 4 milliGRAM(s) IV Push every 8 hours PRN  polyethylene glycol 3350 17 Gram(s) Oral two times a day PRN      VITAL SIGNS: Last 24 Hours  T(C): 35.7 (24 Apr 2025 11:30), Max: 36.2 (23 Apr 2025 19:07)  T(F): 96.3 (24 Apr 2025 11:30), Max: 97.2 (23 Apr 2025 19:07)  HR: 69 (24 Apr 2025 14:00) (50 - 122)  BP: 118/76 (24 Apr 2025 14:00) (80/46 - 155/97)  BP(mean): 93 (24 Apr 2025 14:00) (59 - 120)  RR: 17 (24 Apr 2025 14:00) (13 - 27)  SpO2: 97% (24 Apr 2025 14:00) (93% - 100%)    LABS:                        8.4    8.43  )-----------( 88       ( 24 Apr 2025 14:45 )             25.6     04-24    150[H]  |  121[H]  |  13  ----------------------------<  182[H]  6.0[HH]   |  20  |  1.2    Ca    7.3[L]      24 Apr 2025 05:53  Mg     1.9     04-24    TPro  3.5[L]  /  Alb  1.9[L]  /  TBili  1.1  /  DBili  x   /  AST  30  /  ALT  9   /  AlkPhos  77  04-24    PT/INR - ( 24 Apr 2025 05:53 )   PT: 29.20 sec;   INR: 2.43 ratio         PTT - ( 24 Apr 2025 05:53 )  PTT:52.0 sec  Urinalysis Basic - ( 24 Apr 2025 05:53 )    Color: x / Appearance: x / SG: x / pH: x  Gluc: 182 mg/dL / Ketone: x  / Bili: x / Urobili: x   Blood: x / Protein: x / Nitrite: x   Leuk Esterase: x / RBC: x / WBC x   Sq Epi: x / Non Sq Epi: x / Bacteria: x                RADIOLOGY:      PHYSICAL EXAM:  Gen: altered, keeps calling out names, drowsy  HEENT: PERRL, EOMI, mouth clr, nose clr  Neck: no nodes, no JVD, thyroid nl  lungs: clr  hrt: s1 s2 rrr no murmur  abd: soft, NT/ND, no HS megaly  ext: RUE is swollen and hard.   neuro: aa ox0

## 2025-04-24 NOTE — PROGRESS NOTE ADULT - SUBJECTIVE AND OBJECTIVE BOX
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 fragilis bacteremia, and acute blood loss anemia. Patient is on IV morphine for pain. Patient is on lactulose for encephalopathy, IV abx, and frequent H/H monitoring, requiring pRBC. Palliative consulted for GOC. Patient is full code.    INTERVAL EVENTS  4/24: patient without acute distress    ADVANCE DIRECTIVES:    [ ] Full Code [ ] DNR  MOLST  [ ]  Living Will  [ ]   DECISION MAKER(s):  [ ] Health Care Proxy(s)  [ ] Surrogate(s)  [ ] Guardian           Name(s): Phone Number(s): spouse Edwige [phone]    BASELINE (I)ADL(s) (prior to admission):  [place]: [ ]Total  [ ] Moderate [ ]Dependent  Palliative Performance Status Version 2:         %    [email or web address]    Allergies    No Known Allergies    Intolerances    MEDICATIONS  (STANDING):  atorvastatin 80 milliGRAM(s) Oral at bedtime  chlorhexidine 2% Cloths 1 Application(s) Topical <User Schedule>  dexMEDEtomidine Infusion 0.5 MICROgram(s)/kG/Hr (14.7 mL/Hr) IV Continuous <Continuous>  dextrose 5% + sodium chloride 0.45%. 1000 milliLiter(s) (50 mL/Hr) IV Continuous <Continuous>  dextrose 5%. 1000 milliLiter(s) (100 mL/Hr) IV Continuous <Continuous>  dextrose 5%. 1000 milliLiter(s) (50 mL/Hr) IV Continuous <Continuous>  dextrose 50% Injectable 25 Gram(s) IV Push once  dextrose 50% Injectable 12.5 Gram(s) IV Push once  dextrose 50% Injectable 25 Gram(s) IV Push once  folic acid 1 milliGRAM(s) Oral daily  glucagon  Injectable 1 milliGRAM(s) IntraMuscular once  insulin lispro (ADMELOG) corrective regimen sliding scale   SubCutaneous every 6 hours  lactulose Syrup 20 Gram(s) Enteral Tube every 3 hours  levETIRAcetam   Injectable 250 milliGRAM(s) IV Push <User Schedule>  lidocaine   4% Patch 1 Patch Transdermal daily  metroNIDAZOLE  IVPB 500 milliGRAM(s) IV Intermittent every 12 hours  midodrine 10 milliGRAM(s) Oral every 8 hours  norepinephrine Infusion 0.05 MICROgram(s)/kG/Min (11 mL/Hr) IV Continuous <Continuous>  nystatin Powder 1 Application(s) Topical two times a day  octreotide  Infusion 25 MICROgram(s)/Hr (5 mL/Hr) IV Continuous <Continuous>  pantoprazole  Injectable 40 milliGRAM(s) IV Push every 12 hours  rifAXIMin 550 milliGRAM(s) Oral two times a day  senna 2 Tablet(s) Oral at bedtime    MEDICATIONS  (PRN):  acetaminophen     Tablet .. 650 milliGRAM(s) Oral every 6 hours PRN Temp greater or equal to 38C (100.4F), Mild Pain (1 - 3)  aluminum hydroxide/magnesium hydroxide/simethicone Suspension 30 milliLiter(s) Oral every 4 hours PRN Dyspepsia  dextrose Oral Gel 15 Gram(s) Oral once PRN Blood Glucose LESS THAN 70 milliGRAM(s)/deciliter  melatonin 3 milliGRAM(s) Oral at bedtime PRN Insomnia  ondansetron Injectable 4 milliGRAM(s) IV Push every 8 hours PRN Nausea and/or Vomiting  polyethylene glycol 3350 17 Gram(s) Oral two times a day PRN Constipation      PRESENT SYMPTOMS: [ X][ Difficult to obtain due to poor mentation   Source if other than patient:  [ ]Family   [ ]Team   [ X]All review of systems negative including pain and dyspnea unless noted below    All components of pain assessment below addressed. Blank spaces indicate that the patient did/could not complete the assessment.  Pain: [ ]yes [ ]no  QOL impact -   Location -                    Aggravating factors -  Quality -  Radiation -  Timing-  Severity (0-10 scale):  Minimal acceptable level (0-10 scale):     CPOT:    https://www.[facility].org/getattachment/zom49p43-4d5p-2v0g-0y9g-8638n0050m7o/Critical-Care-Pain-Observation-Tool-(CPOT)    PAIN AD Score: 0  [email or web address] (press ctrl +  left click to view)    Dyspnea:                           [ ]None[ ]Mild [ ]Moderate [ ]Severe     Respiratory Distress Observation Scale (RDOS): 0  A score of 0 to 2 signifies little or no respiratory distress, 3 signifies mild distress, scores 4 to 6 indicate moderate distress, and scores greater than 7 signify severe distress  [email or web address]    Anxiety:                             [ ]None[ ]Mild [ ]Moderate [ ]Severe   Fatigue:                             [ ]None[ ]Mild [ ]Moderate [ ]Severe   Nausea:                             [ ]None[ ]Mild [ ]Moderate [ ]Severe   Loss of appetite:              [ ]None[ ]Mild [ ]Moderate [ ]Severe   Constipation:                    [ ]None[ ]Mild [ ]Moderate [ ]Severe    Other Symptoms:    PHYSICAL EXAM:  Vital Signs Last 24 Hrs  T(C): 35.7 (24 Apr 2025 11:30), Max: 36.3 (23 Apr 2025 15:14)  T(F): 96.3 (24 Apr 2025 11:30), Max: 97.3 (23 Apr 2025 15:14)  HR: 69 (24 Apr 2025 14:00) (50 - 122)  BP: 118/76 (24 Apr 2025 14:00) (80/46 - 155/97)  BP(mean): 93 (24 Apr 2025 14:00) (59 - 120)  RR: 17 (24 Apr 2025 14:00) (13 - 27)  SpO2: 97% (24 Apr 2025 14:00) (93% - 100%)    Parameters below as of 24 Apr 2025 13:31  Patient On (Oxygen Delivery Method): nasal cannula  O2 Flow (L/min): 2    GENERAL:  [X ] No acute distress [ ]Lethargic  [ ]Unarousable  [ ]Verbal  [ ]Non-Verbal [ ]Cachexia    BEHAVIORAL/PSYCH:  [ ]Alert and Oriented x  [ ] Anxiety [ ] Delirium [ ] Agitation [X ] Calm   EYES: [ ] No scleral icterus [ ] Scleral icterus [ ] Closed  ENMT:  [ ]Dry mouth  [ ]No external oral lesions [ X] No external ear or nose lesions  CARDIOVASCULAR:  [ ]Regular [ ]Irregular [ ]Tachy [ ]Not Tachy  [ ]Jenaro [ ] Edema [ ] No edema  PULMONARY:  [ ]Tachypnea  [ ]Audible excessive secretions [X ] No labored breathing [ ] labored breathing  GASTROINTESTINAL: [ ]Soft  [ ]Distended  [ X]Not distended [ ]Non tender [ ]Tender  MUSCULOSKELETAL: [ ]No clubbing [ ] clubbing  [ X] No cyanosis [ ] cyanosis  NEUROLOGIC: [ ]No focal deficits  [ ]Follows commands  [ ]Does not follow commands  [ ]Cognitive impairment  [ ]Dysphagia  [ ]Dysarthria  [ ]Paresis   SKIN: [ ] Jaundiced [X ] Non-jaundiced [ ]Rash [ ]No Rash [ ] Warm [ ] Dry  MISC/LINES: [ ] ET tube [ ] Trach [ ]NGT/OGT [ ]PEG [ ]Michel    CRITICAL CARE:  [ ] Shock Present  [ ]Septic [ ]Cardiogenic [ ]Neurologic [ ]Hypovolemic  [ ]  Vasopressors [ ]  Inotropes   [ ]Respiratory failure present [ ]Mechanical ventilation [ ]Non-invasive ventilatory support [ ]High flow  [ ]Acute  [ ]Chronic [ ]Hypoxic  [ ]Hypercarbic [ ]Other  [ ]Other organ failure     LABS: reviewed by me, notable for: CBC WNL, elevated Na, Ca low                                   7.7    6.04  )-----------( 74       ( 24 Apr 2025 05:53 )             23.3     04-24    150[H]  |  121[H]  |  13  ----------------------------<  182[H]  6.0[HH]   |  20  |  1.2    Ca    7.3[L]      24 Apr 2025 05:53  Mg     1.9     04-24          RADIOLOGY & ADDITIONAL STUDIES: CXR personally reviewed by me: 4/24: bilateral opacities    PROTEIN CALORIE MALNUTRITION PRESENT: [ ]mild [ ]moderate [ ]severe [ ]underweight [ ]morbid obesity  https://www.andeal.org/vault/2440/web/files/ONC/Table_Clinical%20Characteristics%20to%20Document%20Malnutrition-White%20JV%20et%20al%202012.pdf    Height (cm): 175.3 (04-17-25 @ 11:56), 175.3 (02-19-25 @ 12:20), 175.3 (01-23-25 @ 23:40)  Weight (kg): 117.8 (04-17-25 @ 11:56), 113.4 (02-19-25 @ 12:20), 120.6 (01-23-25 @ 23:40)  BMI (kg/m2): 38.3 (04-17-25 @ 11:56), 36.9 (02-19-25 @ 12:20), 39.2 (01-23-25 @ 23:40)    [ ]PPSV2 < or = to 30% [ ]significant weight loss  [ ]poor nutritional intake  [ ]anasarca      [ ]Artificial Nutrition          Palliative Care Spiritual/Emotional Screening Tool Question  Severity (0-4):                    OR                    [X ] Unable to determine/NA  Score of 2 or greater indicates recommendation of Chaplaincy referral  Chaplaincy Referral: [ ] Yes [ ] Refused [ ] Following     Caregiver [place]:  [ ] Yes [ ] No    OR    [x ] Unable to determine. Will assess at later time if appropriate.  Social Work Referral [ ]  Patient and Family Centered Care Referral [ ]    Anticipatory Grief Present: [ ] Yes [ ] No    OR     [ x] Unable to determine. Will assess at later time if appropriate.  Social Work Referral [ ]  Patient and Family Centered Care Referral [ ]    REFERRALS:   [ ]Chaplaincy  [ ]Hospice  [ ]Child Life  [ ]Social Work  [ ]Case management [ ]Holistic Therapy     Palliative care education provided to patient and/or family    Goals of Care Document:     ______________  Ang Dunne MD  Palliative Medicine  [facility]   of Geriatric and Palliative Medicine  [phone]

## 2025-04-24 NOTE — PROGRESS NOTE ADULT - SUBJECTIVE AND OBJECTIVE BOX
Patient is a 80y old  Male who presents with a chief complaint of acute mentation changes (24 Apr 2025 08:31)      overnight events: lethargic. 3 unit PRBC in 24 hr. off pressor. on octreotide drip          ROS: as in HPI; All other systems reviewed are negative        PHYSICAL EXAM  Vital Signs Last 24 Hrs  T(C): 35.7 (24 Apr 2025 07:01), Max: 36.3 (23 Apr 2025 15:14)  T(F): 96.2 (24 Apr 2025 07:01), Max: 97.3 (23 Apr 2025 15:14)  HR: 53 (24 Apr 2025 06:00) (50 - 122)  BP: 80/46 (24 Apr 2025 06:00) (80/46 - 155/97)  BP(mean): 59 (24 Apr 2025 06:00) (59 - 120)  RR: 18 (24 Apr 2025 07:01) (16 - 27)  SpO2: 95% (24 Apr 2025 06:00) (93% - 100%)    Parameters below as of 24 Apr 2025 02:00  Patient On (Oxygen Delivery Method): nasal cannula  O2 Flow (L/min): 2        CONSTITUTIONAL:  ill appearing    ENT:   Airway patent,   NG tube    EYES:   Clear bilaterally,   pupils equal,   round and reactive to light.    CARDIAC:   Normal rate,   regular rhythm.    no edema    RESPIRATORY:   No wheezing   Normal chest expansion  Not tachypneic,    GASTROINTESTINAL:  Abdomen soft, non-tender,   No guarding,   Positive BS  abd distended    MUSCULOSKELETAL:   Range of motion is not limited,    NEUROLOGICAL:   lethargic  confused  No motor deficits.    SKIN:   Skin normal color for race,   No evidence of rash.                I&O's Detail    23 Apr 2025 07:01  -  24 Apr 2025 07:00  --------------------------------------------------------  IN:    Dexmedetomidine: 82.4 mL    dextrose 5%: 375 mL    dextrose 5%: 225 mL    dextrose 5% + sodium chloride 0.45%: 350 mL    Enteral Tube Flush: 800 mL    IV PiggyBack: 150 mL    Octreotide: 75 mL    Pantoprazole: 100 mL    PRBCs (Packed Red Blood Cells): 1051 mL  Total IN: 3208.4 mL    OUT:    Indwelling Catheter - Urethral (mL): 157 mL  Total OUT: 157 mL    Total NET: 3051.4 mL            LABS:                        7.7    6.04  )-----------( 74       ( 24 Apr 2025 05:53 )             23.3     24 Apr 2025 05:53    150    |  121    |  13     ----------------------------<  182    6.0     |  20     |  1.2      Ca    7.3        24 Apr 2025 05:53  Mg     1.9       24 Apr 2025 05:53    TPro  3.5    /  Alb  1.9    /  TBili  1.1    /  DBili  x      /  AST  30     /  ALT  9      /  AlkPhos  77     24 Apr 2025 05:53  Amylase x     lipase x              CAPILLARY BLOOD GLUCOSE      POCT Blood Glucose.: 201 mg/dL (24 Apr 2025 08:32)    PT/INR - ( 24 Apr 2025 05:53 )   PT: 29.20 sec;   INR: 2.43 ratio         PTT - ( 24 Apr 2025 05:53 )  PTT:52.0 sec  Urinalysis Basic - ( 24 Apr 2025 05:53 )    Color: x / Appearance: x / SG: x / pH: x  Gluc: 182 mg/dL / Ketone: x  / Bili: x / Urobili: x   Blood: x / Protein: x / Nitrite: x   Leuk Esterase: x / RBC: x / WBC x   Sq Epi: x / Non Sq Epi: x / Bacteria: x      Culture        MEDICATIONS  (STANDING):  atorvastatin 80 milliGRAM(s) Oral at bedtime  chlorhexidine 2% Cloths 1 Application(s) Topical <User Schedule>  dexMEDEtomidine Infusion 0.5 MICROgram(s)/kG/Hr (14.7 mL/Hr) IV Continuous <Continuous>  dextrose 5% + sodium chloride 0.45%. 1000 milliLiter(s) (50 mL/Hr) IV Continuous <Continuous>  dextrose 5%. 1000 milliLiter(s) (100 mL/Hr) IV Continuous <Continuous>  dextrose 5%. 1000 milliLiter(s) (50 mL/Hr) IV Continuous <Continuous>  dextrose 50% Injectable 25 Gram(s) IV Push once  dextrose 50% Injectable 12.5 Gram(s) IV Push once  dextrose 50% Injectable 25 Gram(s) IV Push once  folic acid 1 milliGRAM(s) Oral daily  glucagon  Injectable 1 milliGRAM(s) IntraMuscular once  insulin lispro (ADMELOG) corrective regimen sliding scale   SubCutaneous three times a day before meals  lactulose Syrup 20 Gram(s) Enteral Tube every 3 hours  levETIRAcetam   Injectable 250 milliGRAM(s) IV Push <User Schedule>  lidocaine   4% Patch 1 Patch Transdermal daily  metroNIDAZOLE  IVPB 500 milliGRAM(s) IV Intermittent every 12 hours  midodrine 10 milliGRAM(s) Oral every 8 hours  norepinephrine Infusion 0.05 MICROgram(s)/kG/Min (11 mL/Hr) IV Continuous <Continuous>  nystatin Powder 1 Application(s) Topical two times a day  octreotide  Infusion 25 MICROgram(s)/Hr (5 mL/Hr) IV Continuous <Continuous>  pantoprazole  Injectable 40 milliGRAM(s) IV Push every 12 hours  rifAXIMin 550 milliGRAM(s) Oral two times a day  senna 2 Tablet(s) Oral at bedtime  sodium zirconium cyclosilicate 10 Gram(s) Oral once    MEDICATIONS  (PRN):  acetaminophen     Tablet .. 650 milliGRAM(s) Oral every 6 hours PRN Temp greater or equal to 38C (100.4F), Mild Pain (1 - 3)  aluminum hydroxide/magnesium hydroxide/simethicone Suspension 30 milliLiter(s) Oral every 4 hours PRN Dyspepsia  dextrose Oral Gel 15 Gram(s) Oral once PRN Blood Glucose LESS THAN 70 milliGRAM(s)/deciliter  melatonin 3 milliGRAM(s) Oral at bedtime PRN Insomnia  ondansetron Injectable 4 milliGRAM(s) IV Push every 8 hours PRN Nausea and/or Vomiting  polyethylene glycol 3350 17 Gram(s) Oral two times a day PRN Constipation

## 2025-04-24 NOTE — PROGRESS NOTE ADULT - SUBJECTIVE AND OBJECTIVE BOX
INFECTIOUS DISEASE FOLLOW UP NOTE:    Interval History/ROS: Patient is a 80y old  Male who presents with a chief complaint of acute mentation changes (24 Apr 2025 09:07)      Overnight events:    REVIEW OF SYSTEMS:        Prior hospital charts reviewed [Yes]  Primary team notes reviewed [Yes]  Other consultant notes reviewed [Yes]    Allergies:  No Known Allergies      ANTIMICROBIALS:   metroNIDAZOLE  IVPB 500 every 12 hours  rifAXIMin 550 two times a day      OTHER MEDS: MEDICATIONS  (STANDING):  acetaminophen     Tablet .. 650 every 6 hours PRN  aluminum hydroxide/magnesium hydroxide/simethicone Suspension 30 every 4 hours PRN  atorvastatin 80 at bedtime  dexMEDEtomidine Infusion 0.5 <Continuous>  dextrose 50% Injectable 25 once  dextrose 50% Injectable 12.5 once  dextrose 50% Injectable 25 once  dextrose Oral Gel 15 once PRN  glucagon  Injectable 1 once  insulin lispro (ADMELOG) corrective regimen sliding scale  three times a day before meals  lactulose Syrup 20 every 3 hours  levETIRAcetam   Injectable 250 <User Schedule>  melatonin 3 at bedtime PRN  midodrine 10 every 8 hours  norepinephrine Infusion 0.05 <Continuous>  octreotide  Infusion 25 <Continuous>  ondansetron Injectable 4 every 8 hours PRN  pantoprazole  Injectable 40 every 12 hours  polyethylene glycol 3350 17 two times a day PRN  senna 2 at bedtime      Vital Signs Last 24 Hrs  T(F): 96.2 (04-24-25 @ 07:01), Max: 99.1 (04-22-25 @ 07:01)    Vital Signs Last 24 Hrs  HR: 53 (04-24-25 @ 06:00) (50 - 122)  BP: 80/46 (04-24-25 @ 06:00) (80/46 - 155/97)  RR: 18 (04-24-25 @ 07:01)  SpO2: 95% (04-24-25 @ 06:00) (93% - 100%)  Wt(kg): --    EXAM:      Labs:                        7.7    6.04  )-----------( 74       ( 24 Apr 2025 05:53 )             23.3     04-24    150[H]  |  121[H]  |  13  ----------------------------<  182[H]  6.0[HH]   |  20  |  1.2    Ca    7.3[L]      24 Apr 2025 05:53  Mg     1.9     04-24    TPro  3.5[L]  /  Alb  1.9[L]  /  TBili  1.1  /  DBili  x   /  AST  30  /  ALT  9   /  AlkPhos  77  04-24      WBC Trend:  WBC Count: 6.04 (04-24-25 @ 05:53)  WBC Count: 8.57 (04-23-25 @ 21:50)  WBC Count: 7.11 (04-23-25 @ 15:31)  WBC Count: 4.21 (04-23-25 @ 05:31)      Creatine Trend:  Creatinine: 1.2 (04-24)  Creatinine: 1.2 (04-23)  Creatinine: 0.8 (04-23)  Creatinine: 0.7 (04-22)      Liver Biochemical Testing Trend:  Alanine Aminotransferase (ALT/SGPT): 9 (04-24)  Alanine Aminotransferase (ALT/SGPT): 10 (04-23)  Alanine Aminotransferase (ALT/SGPT): 10 (04-22)  Alanine Aminotransferase (ALT/SGPT): 9 (04-21)  Alanine Aminotransferase (ALT/SGPT): 11 (04-20)  Aspartate Aminotransferase (AST/SGOT): 30 (04-24-25 @ 05:53)  Aspartate Aminotransferase (AST/SGOT): 34 (04-23-25 @ 05:31)  Aspartate Aminotransferase (AST/SGOT): 35 (04-22-25 @ 13:40)  Aspartate Aminotransferase (AST/SGOT): 32 (04-21-25 @ 05:34)  Aspartate Aminotransferase (AST/SGOT): 46 (04-20-25 @ 05:39)  Bilirubin Total: 1.1 (04-24)  Bilirubin Direct: 0.7 (04-23)  Bilirubin Total: 1.2 (04-23)  Bilirubin Direct: 0.6 (04-22)  Bilirubin Total: 1.2 (04-22)      Trend LDH  04-24-25 @ 05:53  275[H]      Urinalysis Basic - ( 24 Apr 2025 05:53 )    Color: x / Appearance: x / SG: x / pH: x  Gluc: 182 mg/dL / Ketone: x  / Bili: x / Urobili: x   Blood: x / Protein: x / Nitrite: x   Leuk Esterase: x / RBC: x / WBC x   Sq Epi: x / Non Sq Epi: x / Bacteria: x        MICROBIOLOGY:        Culture - Blood (collected 17 Apr 2025 18:30)  Source: Blood Blood  Final Report:    No growth at 5 days    Culture - Blood (collected 16 Apr 2025 15:15)  Source: Blood None  Final Report:    No growth at 5 days    Culture - Urine (collected 13 Apr 2025 21:20)  Source: Catheterized None  Final Report:    10,000 - 49,000 CFU/mL Proteus mirabilis  Organism: Proteus mirabilis  Organism: Proteus mirabilis    Sensitivities:      Method Type: LOUIS      -  Amoxicillin/Clavulanic Acid: S <=8/4      -  Ampicillin: S <=8 These ampicillin results predict results for amoxicillin      -  Ampicillin/Sulbactam: S <=4/2      -  Aztreonam: S <=4      -  Cefazolin: S <=2 For uncomplicated UTI with K. pneumoniae, E. coli, or P. mirablis: LOUIS <=16 is sensitive and LOUIS >=32 is resistant. This also predicts results for oral agents cefaclor, cefdinir, cefpodoxime, cefprozil, cefuroxime axetil, cephalexin and locarbef for uncomplicated UTI. Note that some isolates may be susceptible to these agents while testing resistant to cefazolin.      -  Cefepime: S <=2      -  Cefoxitin: S <=8      -  Ceftriaxone: S <=1      -  Cefuroxime: S <=4      -  Ciprofloxacin: S <=0.25      -  Ertapenem: S <=0.5      -  Gentamicin: S <=2      -  Levofloxacin: S <=0.5      -  Meropenem: S <=1      -  Nitrofurantoin: R >64 Should not be used to treat pyelonephritis      -  Piperacillin/Tazobactam: S <=8      -  Tobramycin: S <=2      -  Trimethoprim/Sulfamethoxazole: S <=0.5/9.5    Culture - Blood (collected 13 Apr 2025 21:20)  Source: Blood Blood-Peripheral  Final Report:    Growth in anaerobic bottle: Bacteroides fragilis    "Susceptibilities not performed"    Urinalysis with Rflx Culture (collected 13 Apr 2025 21:20)    Culture - Blood (collected 13 Apr 2025 21:20)  Source: Blood Blood-Peripheral  Final Report:    Growth in anaerobic bottle: Bacteroides fragilis "Susceptibilities not    performed"    Direct identification is available within approximately 3-5    hours either by Blood Panel Multiplexed PCR or Direct    MALDI-TOF. Details: [email or web address]  Organism: Blood Culture PCR  Organism: Blood Culture PCR    Sensitivities:      Method Type: PCR      -  Bacteroides fragilis: Detec    Culture - Blood (collected 30 Jan 2025 07:12)  Source: .Blood BLOOD  Final Report:    No growth at 5 days    Culture - Blood (collected 30 Jan 2025 07:12)  Source: .Blood BLOOD  Final Report:    No growth at 5 days    Culture - Blood (collected 29 Jan 2025 07:35)  Source: .Blood BLOOD  Final Report:    No growth at 5 days    Culture - Blood (collected 27 Jan 2025 19:13)  Source: .Blood BLOOD  Final Report:    No growth at 5 days    D-Dimer Assay, Quantitative: 6196 (04-24)    Lactate Dehydrogenase, Serum: 275 (04-24)      RADIOLOGY:  imaging below personally reviewed   INFECTIOUS DISEASE FOLLOW UP NOTE:    Interval History/ROS: Patient is a 80y old  Male who presents with a chief complaint of acute mentation changes (24 Apr 2025 09:07)    Overnight events: Lethargic today.     REVIEW OF SYSTEMS:  Unable to provide due to cognitive impairment      Prior hospital charts reviewed [Yes]  Primary team notes reviewed [Yes]  Other consultant notes reviewed [Yes]    Allergies:  No Known Allergies      ANTIMICROBIALS:   metroNIDAZOLE  IVPB 500 every 12 hours  rifAXIMin 550 two times a day      OTHER MEDS: MEDICATIONS  (STANDING):  acetaminophen     Tablet .. 650 every 6 hours PRN  aluminum hydroxide/magnesium hydroxide/simethicone Suspension 30 every 4 hours PRN  atorvastatin 80 at bedtime  dexMEDEtomidine Infusion 0.5 <Continuous>  dextrose 50% Injectable 25 once  dextrose 50% Injectable 12.5 once  dextrose 50% Injectable 25 once  dextrose Oral Gel 15 once PRN  glucagon  Injectable 1 once  insulin lispro (ADMELOG) corrective regimen sliding scale  three times a day before meals  lactulose Syrup 20 every 3 hours  levETIRAcetam   Injectable 250 <User Schedule>  melatonin 3 at bedtime PRN  midodrine 10 every 8 hours  norepinephrine Infusion 0.05 <Continuous>  octreotide  Infusion 25 <Continuous>  ondansetron Injectable 4 every 8 hours PRN  pantoprazole  Injectable 40 every 12 hours  polyethylene glycol 3350 17 two times a day PRN  senna 2 at bedtime      Vital Signs Last 24 Hrs  T(F): 96.2 (04-24-25 @ 07:01), Max: 99.1 (04-22-25 @ 07:01)    Vital Signs Last 24 Hrs  HR: 53 (04-24-25 @ 06:00) (50 - 122)  BP: 80/46 (04-24-25 @ 06:00) (80/46 - 155/97)  RR: 18 (04-24-25 @ 07:01)  SpO2: 95% (04-24-25 @ 06:00) (93% - 100%)  Wt(kg): --    EXAM:  GENERAL: NAD, lying in bed  HEAD: No head lesions  EYES: Conjunctiva pink and cornea white  EAR, NOSE, MOUTH, THROAT: Normal external ears and nose, no discharges; moist mucous membranes; + NC  NECK: Supple, nontender to palpation; no JVD  RESPIRATORY: Bibasilar crackles  CARDIOVASCULAR: S1 S2  GASTROINTESTINAL: Soft, no significant distention; normoactive bowel sounds  GENITOURINARY: + loyola catheter, no CVA tenderness  EXTREMITIES: No clubbing, cyanosis, + diffuse anasarca  NERVOUS SYSTEM: Lethargic today  MUSCULOSKELETAL: No joint erythema, swelling  SKIN: No rashes or lesions, no superficial thrombophlebitis  PSYCH: Lethargic    Labs:                        7.7    6.04  )-----------( 74       ( 24 Apr 2025 05:53 )             23.3     04-24    150[H]  |  121[H]  |  13  ----------------------------<  182[H]  6.0[HH]   |  20  |  1.2    Ca    7.3[L]      24 Apr 2025 05:53  Mg     1.9     04-24    TPro  3.5[L]  /  Alb  1.9[L]  /  TBili  1.1  /  DBili  x   /  AST  30  /  ALT  9   /  AlkPhos  77  04-24      WBC Trend:  WBC Count: 6.04 (04-24-25 @ 05:53)  WBC Count: 8.57 (04-23-25 @ 21:50)  WBC Count: 7.11 (04-23-25 @ 15:31)  WBC Count: 4.21 (04-23-25 @ 05:31)      Creatine Trend:  Creatinine: 1.2 (04-24)  Creatinine: 1.2 (04-23)  Creatinine: 0.8 (04-23)  Creatinine: 0.7 (04-22)      Liver Biochemical Testing Trend:  Alanine Aminotransferase (ALT/SGPT): 9 (04-24)  Alanine Aminotransferase (ALT/SGPT): 10 (04-23)  Alanine Aminotransferase (ALT/SGPT): 10 (04-22)  Alanine Aminotransferase (ALT/SGPT): 9 (04-21)  Alanine Aminotransferase (ALT/SGPT): 11 (04-20)  Aspartate Aminotransferase (AST/SGOT): 30 (04-24-25 @ 05:53)  Aspartate Aminotransferase (AST/SGOT): 34 (04-23-25 @ 05:31)  Aspartate Aminotransferase (AST/SGOT): 35 (04-22-25 @ 13:40)  Aspartate Aminotransferase (AST/SGOT): 32 (04-21-25 @ 05:34)  Aspartate Aminotransferase (AST/SGOT): 46 (04-20-25 @ 05:39)  Bilirubin Total: 1.1 (04-24)  Bilirubin Direct: 0.7 (04-23)  Bilirubin Total: 1.2 (04-23)  Bilirubin Direct: 0.6 (04-22)  Bilirubin Total: 1.2 (04-22)      Trend LDH  04-24-25 @ 05:53  275[H]      Urinalysis Basic - ( 24 Apr 2025 05:53 )    Color: x / Appearance: x / SG: x / pH: x  Gluc: 182 mg/dL / Ketone: x  / Bili: x / Urobili: x   Blood: x / Protein: x / Nitrite: x   Leuk Esterase: x / RBC: x / WBC x   Sq Epi: x / Non Sq Epi: x / Bacteria: x        MICROBIOLOGY:        Culture - Blood (collected 17 Apr 2025 18:30)  Source: Blood Blood  Final Report:    No growth at 5 days    Culture - Blood (collected 16 Apr 2025 15:15)  Source: Blood None  Final Report:    No growth at 5 days    Culture - Urine (collected 13 Apr 2025 21:20)  Source: Catheterized None  Final Report:    10,000 - 49,000 CFU/mL Proteus mirabilis  Organism: Proteus mirabilis  Organism: Proteus mirabilis    Sensitivities:      Method Type: LOUIS      -  Amoxicillin/Clavulanic Acid: S <=8/4      -  Ampicillin: S <=8 These ampicillin results predict results for amoxicillin      -  Ampicillin/Sulbactam: S <=4/2      -  Aztreonam: S <=4      -  Cefazolin: S <=2 For uncomplicated UTI with K. pneumoniae, E. coli, or P. mirablis: LOUIS <=16 is sensitive and LOUIS >=32 is resistant. This also predicts results for oral agents cefaclor, cefdinir, cefpodoxime, cefprozil, cefuroxime axetil, cephalexin and locarbef for uncomplicated UTI. Note that some isolates may be susceptible to these agents while testing resistant to cefazolin.      -  Cefepime: S <=2      -  Cefoxitin: S <=8      -  Ceftriaxone: S <=1      -  Cefuroxime: S <=4      -  Ciprofloxacin: S <=0.25      -  Ertapenem: S <=0.5      -  Gentamicin: S <=2      -  Levofloxacin: S <=0.5      -  Meropenem: S <=1      -  Nitrofurantoin: R >64 Should not be used to treat pyelonephritis      -  Piperacillin/Tazobactam: S <=8      -  Tobramycin: S <=2      -  Trimethoprim/Sulfamethoxazole: S <=0.5/9.5    Culture - Blood (collected 13 Apr 2025 21:20)  Source: Blood Blood-Peripheral  Final Report:    Growth in anaerobic bottle: Bacteroides fragilis    "Susceptibilities not performed"    Urinalysis with Rflx Culture (collected 13 Apr 2025 21:20)    Culture - Blood (collected 13 Apr 2025 21:20)  Source: Blood Blood-Peripheral  Final Report:    Growth in anaerobic bottle: Bacteroides fragilis "Susceptibilities not    performed"    Direct identification is available within approximately 3-5    hours either by Blood Panel Multiplexed PCR or Direct    MALDI-TOF. Details: [email or web address]  Organism: Blood Culture PCR  Organism: Blood Culture PCR    Sensitivities:      Method Type: PCR      -  Bacteroides fragilis: Detec    Culture - Blood (collected 30 Jan 2025 07:12)  Source: .Blood BLOOD  Final Report:    No growth at 5 days    Culture - Blood (collected 30 Jan 2025 07:12)  Source: .Blood BLOOD  Final Report:    No growth at 5 days    Culture - Blood (collected 29 Jan 2025 07:35)  Source: .Blood BLOOD  Final Report:    No growth at 5 days    Culture - Blood (collected 27 Jan 2025 19:13)  Source: .Blood BLOOD  Final Report:    No growth at 5 days    D-Dimer Assay, Quantitative: 6196 (04-24)    Lactate Dehydrogenase, Serum: 275 (04-24)      RADIOLOGY:  imaging below personally reviewed      < from: Xray Chest 1 View AP/PA (04.22.25 @ 12:20) >  IMPRESSION:    Enteric tube terminates below the left hemidiaphragm.    Essentially stable bilateral opacities.      < end of copied text >    < from: CT Angio Abdomen and Pelvis w/ IV Cont (04.23.25 @ 21:11) >  No evidence of active GI bleeding.    Small lobulated cirrhotic liver.    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    < end of copied text >

## 2025-04-24 NOTE — PROGRESS NOTE ADULT - SUBJECTIVE AND OBJECTIVE BOX
80yMale  Being followed for   Interval history:      PAST MEDICAL & SURGICAL HISTORY:  Diabetes  Hypertension  Fall  H/O left knee surgery  History of cholecystectomy  History of appendectomy        Social History: No smoking. No alcohol. No illegal drug use.          MEDICATIONS  (STANDING):  atorvastatin 80 milliGRAM(s) Oral at bedtime  chlorhexidine 2% Cloths 1 Application(s) Topical <User Schedule>  dexMEDEtomidine Infusion 0.5 MICROgram(s)/kG/Hr (14.7 mL/Hr) IV Continuous <Continuous>  dextrose 5% + sodium chloride 0.45%. 1000 milliLiter(s) (50 mL/Hr) IV Continuous <Continuous>  dextrose 5%. 1000 milliLiter(s) (100 mL/Hr) IV Continuous <Continuous>  dextrose 5%. 1000 milliLiter(s) (50 mL/Hr) IV Continuous <Continuous>  dextrose 50% Injectable 25 Gram(s) IV Push once  dextrose 50% Injectable 12.5 Gram(s) IV Push once  dextrose 50% Injectable 25 Gram(s) IV Push once  folic acid 1 milliGRAM(s) Oral daily  glucagon  Injectable 1 milliGRAM(s) IntraMuscular once  insulin lispro (ADMELOG) corrective regimen sliding scale   SubCutaneous every 6 hours  lactulose Syrup 20 Gram(s) Enteral Tube every 3 hours  levETIRAcetam   Injectable 250 milliGRAM(s) IV Push <User Schedule>  lidocaine   4% Patch 1 Patch Transdermal daily  metroNIDAZOLE  IVPB 500 milliGRAM(s) IV Intermittent every 12 hours  midodrine 10 milliGRAM(s) Oral every 8 hours  norepinephrine Infusion 0.05 MICROgram(s)/kG/Min (11 mL/Hr) IV Continuous <Continuous>  nystatin Powder 1 Application(s) Topical two times a day  octreotide  Infusion 25 MICROgram(s)/Hr (5 mL/Hr) IV Continuous <Continuous>  pantoprazole  Injectable 40 milliGRAM(s) IV Push every 12 hours  rifAXIMin 550 milliGRAM(s) Oral two times a day  senna 2 Tablet(s) Oral at bedtime    MEDICATIONS  (PRN):  acetaminophen     Tablet .. 650 milliGRAM(s) Oral every 6 hours PRN Temp greater or equal to 38C (100.4F), Mild Pain (1 - 3)  aluminum hydroxide/magnesium hydroxide/simethicone Suspension 30 milliLiter(s) Oral every 4 hours PRN Dyspepsia  dextrose Oral Gel 15 Gram(s) Oral once PRN Blood Glucose LESS THAN 70 milliGRAM(s)/deciliter  melatonin 3 milliGRAM(s) Oral at bedtime PRN Insomnia  ondansetron Injectable 4 milliGRAM(s) IV Push every 8 hours PRN Nausea and/or Vomiting  polyethylene glycol 3350 17 Gram(s) Oral two times a day PRN Constipation      Allergies:   No Known Allergies  Intolerances          REVIEW OF SYSTEMS:  General:  No weight loss, fevers, or chills.  Eyes:  No reported pain or visual changes  ENT:  No sore throat or runny nose.  NECK: No stiffness   CV:  No chest pain or palpitations.  Resp:  No shortness of breath, cough  GI:  No abdominal pain, nausea, vomiting, dysphagia, diarrhea or constipation. No rectal bleeding, melena, or hematemesis.  Neuro:  No tingling, numbness         VITAL SIGNS:   T(F): 96.3 (04-24-25 @ 11:30), Max: 97.3 (04-23-25 @ 15:14)  HR: 70 (04-24-25 @ 11:30) (50 - 122)  BP: 127/61 (04-24-25 @ 11:30) (80/46 - 155/97)  RR: 18 (04-24-25 @ 11:30) (13 - 27)  SpO2: 96% (04-24-25 @ 11:30) (59% - 100%)    PHYSICAL EXAM:  GENERAL: AAOx3, no acute distress.  HEAD:  Atraumatic, Normocephalic  EYES: conjunctiva and sclera clear  NECK: Supple, no thyromegaly  CHEST/LUNG: Clear to auscultation bilaterally; No wheeze, rhonchi, or rales  HEART: Regular rate and rhythm; normal S1, S2, No murmurs.  ABDOMEN: Soft, nontender, nondistended; Bowel sounds present  NEUROLOGY: No asterixis or tremor.   SKIN: Intact, no jaundice            LABS:                        7.7    6.04  )-----------( 74       ( 24 Apr 2025 05:53 )             23.3     04-24    150[H]  |  121[H]  |  13  ----------------------------<  182[H]  6.0[HH]   |  20  |  1.2    Ca    7.3[L]      24 Apr 2025 05:53  Mg     1.9     04-24    TPro  3.5[L]  /  Alb  1.9[L]  /  TBili  1.1  /  DBili  x   /  AST  30  /  ALT  9   /  AlkPhos  77  04-24    LIVER FUNCTIONS - ( 24 Apr 2025 05:53 )  Alb: 1.9 g/dL / Pro: 3.5 g/dL / ALK PHOS: 77 U/L / ALT: 9 U/L / AST: 30 U/L / GGT: x           PT/INR - ( 24 Apr 2025 05:53 )   PT: 29.20 sec;   INR: 2.43 ratio         PTT - ( 24 Apr 2025 05:53 )  PTT:52.0 sec    IMAGING:    < from: CT Angio Abdomen and Pelvis w/ IV Cont (04.23.25 @ 21:11) >    ACC: [id number] EXAM:  CT ANGIO ABD PELV (W)AW IC   ORDERED BY: LYNDA LLANOS     PROCEDURE DATE:  04/23/2025          INTERPRETATION:  CLINICAL INFORMATION: Anemia. Melena. GI bleeding.   Evaluate for active extravasation.  WBC 7.11  Hgb 6.2  PMHx of   cirrhosis.DM, HTN, cholecystectomy, appendectomy    COMPARISON: CT scan of the abdomen and pelvis dated 4/13/2025      CONTRAST/COMPLICATIONS:  IV Contrast: Omnipaque 350  95 cc administered   5 cc discarded  Oral Contrast: None  Complications: None reported at time of study completion    PROCEDURE:  CT of the Abdomen and Pelvis was performed.  Precontrast, Arterial and Delayed phases were performed.  Sagittal and coronal reformats were performed.    FINDINGS:    TUBES: NG tube in place extending to the stomach.  LOWER CHEST: Moderate bilateral pleural effusions with areas of   atelectasis at both lung bases. Cardiomegaly. No pericardial effusion.    LIVER: Small lobulated cirrhotic liver. No evidence of mass. The portal   vein is patent.  BILE DUCTS: Normal caliber.  GALLBLADDER: Post cholecystectomy.  SPLEEN: Within normal limits.  PANCREAS: Pancreatic atrophy with multiple calcifications.  ADRENALS: Within normal limits.  KIDNEYS/URETERS: There is symmetric renal enhancement. Noevidence of   hydronephrosis, calcified stones, or solid renal mass.    BLADDER: The bladder is decompressed with Michel catheter in place.  REPRODUCTIVE ORGANS: No evidence of pelvic mass or fluid collection.    BOWEL: No evidence of active GI bleeding. Endoscopic clip in the region   of the descending duodenum.  No evidence of obstruction, colitis, inflammatory process. Post   appendectomy.    PERITONEUM/RETROPERITONEUM: Moderate amount of ascites is noted in the   upper abdomen and pelvis. No evidence of free intraperitoneal air.  VESSELS: Normal caliber aorta. 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  LYMPH NODES: No lymphadenopathy.  ABDOMINAL WALL: Anasarca  BONES: Degenerative changes of the spine are noted. Chronic fracture   right 10th rib.    IMPRESSION:    No evidence of active GI bleeding.    Small lobulated cirrhotic liver.    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    --- End of Report ---            ISAAC MURPHY MD; Attending Interventional Radiologist  This document has been electronically signed. Apr 23 2025 10:30PM    < end of copied text >         80yMale  Being followed for liver cirrhosis, blood in stool  Interval history:  No hematemesis, melena, patient with some blood in stool reported yesterday. Patient with bm overnight with some blood.      PAST MEDICAL & SURGICAL HISTORY:  Diabetes  Hypertension  Fall  H/O left knee surgery  History of cholecystectomy  History of appendectomy        Social History: No smoking. No alcohol. No illegal drug use.          MEDICATIONS  (STANDING):  atorvastatin 80 milliGRAM(s) Oral at bedtime  chlorhexidine 2% Cloths 1 Application(s) Topical <User Schedule>  dexMEDEtomidine Infusion 0.5 MICROgram(s)/kG/Hr (14.7 mL/Hr) IV Continuous <Continuous>  dextrose 5% + sodium chloride 0.45%. 1000 milliLiter(s) (50 mL/Hr) IV Continuous <Continuous>  dextrose 5%. 1000 milliLiter(s) (100 mL/Hr) IV Continuous <Continuous>  dextrose 5%. 1000 milliLiter(s) (50 mL/Hr) IV Continuous <Continuous>  dextrose 50% Injectable 25 Gram(s) IV Push once  dextrose 50% Injectable 12.5 Gram(s) IV Push once  dextrose 50% Injectable 25 Gram(s) IV Push once  folic acid 1 milliGRAM(s) Oral daily  glucagon  Injectable 1 milliGRAM(s) IntraMuscular once  insulin lispro (ADMELOG) corrective regimen sliding scale   SubCutaneous every 6 hours  lactulose Syrup 20 Gram(s) Enteral Tube every 3 hours  levETIRAcetam   Injectable 250 milliGRAM(s) IV Push <User Schedule>  lidocaine   4% Patch 1 Patch Transdermal daily  metroNIDAZOLE  IVPB 500 milliGRAM(s) IV Intermittent every 12 hours  midodrine 10 milliGRAM(s) Oral every 8 hours  norepinephrine Infusion 0.05 MICROgram(s)/kG/Min (11 mL/Hr) IV Continuous <Continuous>  nystatin Powder 1 Application(s) Topical two times a day  octreotide  Infusion 25 MICROgram(s)/Hr (5 mL/Hr) IV Continuous <Continuous>  pantoprazole  Injectable 40 milliGRAM(s) IV Push every 12 hours  rifAXIMin 550 milliGRAM(s) Oral two times a day  senna 2 Tablet(s) Oral at bedtime    MEDICATIONS  (PRN):  acetaminophen     Tablet .. 650 milliGRAM(s) Oral every 6 hours PRN Temp greater or equal to 38C (100.4F), Mild Pain (1 - 3)  aluminum hydroxide/magnesium hydroxide/simethicone Suspension 30 milliLiter(s) Oral every 4 hours PRN Dyspepsia  dextrose Oral Gel 15 Gram(s) Oral once PRN Blood Glucose LESS THAN 70 milliGRAM(s)/deciliter  melatonin 3 milliGRAM(s) Oral at bedtime PRN Insomnia  ondansetron Injectable 4 milliGRAM(s) IV Push every 8 hours PRN Nausea and/or Vomiting  polyethylene glycol 3350 17 Gram(s) Oral two times a day PRN Constipation      Allergies:   No Known Allergies  Intolerances          REVIEW OF SYSTEMS:  unobtainable       VITAL SIGNS:   T(F): 96.3 (04-24-25 @ 11:30), Max: 97.3 (04-23-25 @ 15:14)  HR: 70 (04-24-25 @ 11:30) (50 - 122)  BP: 127/61 (04-24-25 @ 11:30) (80/46 - 155/97)  RR: 18 (04-24-25 @ 11:30) (13 - 27)  SpO2: 96% (04-24-25 @ 11:30) (59% - 100%)    PHYSICAL EXAM:  GENERAL: NG tube +confused  HEAD:  Atraumatic, Normocephalic  EYES: conjunctiva and sclera clear  NECK: Supple, no thyromegaly  CHEST/LUNG: Clear to auscultation bilaterally; No wheeze, rhonchi, or rales  HEART: Regular rate and rhythm; normal S1, S2, No murmurs.  ABDOMEN: Soft, nontender, nondistended; Bowel sounds present  NEUROLOGY: No asterixis or tremor.   SKIN: Intact, no jaundice            LABS:                        7.7    6.04  )-----------( 74       ( 24 Apr 2025 05:53 )             23.3     04-24    150[H]  |  121[H]  |  13  ----------------------------<  182[H]  6.0[HH]   |  20  |  1.2    Ca    7.3[L]      24 Apr 2025 05:53  Mg     1.9     04-24    TPro  3.5[L]  /  Alb  1.9[L]  /  TBili  1.1  /  DBili  x   /  AST  30  /  ALT  9   /  AlkPhos  77  04-24    LIVER FUNCTIONS - ( 24 Apr 2025 05:53 )  Alb: 1.9 g/dL / Pro: 3.5 g/dL / ALK PHOS: 77 U/L / ALT: 9 U/L / AST: 30 U/L / GGT: x           PT/INR - ( 24 Apr 2025 05:53 )   PT: 29.20 sec;   INR: 2.43 ratio         PTT - ( 24 Apr 2025 05:53 )  PTT:52.0 sec    IMAGING:    < from: CT Angio Abdomen and Pelvis w/ IV Cont (04.23.25 @ 21:11) >    ACC: [id number] EXAM:  CT ANGIO ABD PELV (W)AW IC   ORDERED BY: LYNDA LLANOS     PROCEDURE DATE:  04/23/2025          INTERPRETATION:  CLINICAL INFORMATION: Anemia. Melena. GI bleeding.   Evaluate for active extravasation.  WBC 7.11  Hgb 6.2  PMHx of   cirrhosis.DM, HTN, cholecystectomy, appendectomy    COMPARISON: CT scan of the abdomen and pelvis dated 4/13/2025      CONTRAST/COMPLICATIONS:  IV Contrast: Omnipaque 350  95 cc administered   5 cc discarded  Oral Contrast: None  Complications: None reported at time of study completion    PROCEDURE:  CT of the Abdomen and Pelvis was performed.  Precontrast, Arterial and Delayed phases were performed.  Sagittal and coronal reformats were performed.    FINDINGS:    TUBES: NG tube in place extending to the stomach.  LOWER CHEST: Moderate bilateral pleural effusions with areas of   atelectasis at both lung bases. Cardiomegaly. No pericardial effusion.    LIVER: Small lobulated cirrhotic liver. No evidence of mass. The portal   vein is patent.  BILE DUCTS: Normal caliber.  GALLBLADDER: Post cholecystectomy.  SPLEEN: Within normal limits.  PANCREAS: Pancreatic atrophy with multiple calcifications.  ADRENALS: Within normal limits.  KIDNEYS/URETERS: There is symmetric renal enhancement. Noevidence of   hydronephrosis, calcified stones, or solid renal mass.    BLADDER: The bladder is decompressed with Michel catheter in place.  REPRODUCTIVE ORGANS: No evidence of pelvic mass or fluid collection.    BOWEL: No evidence of active GI bleeding. Endoscopic clip in the region   of the descending duodenum.  No evidence of obstruction, colitis, inflammatory process. Post   appendectomy.    PERITONEUM/RETROPERITONEUM: Moderate amount of ascites is noted in the   upper abdomen and pelvis. No evidence of free intraperitoneal air.  VESSELS: Normal caliber aorta. 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  LYMPH NODES: No lymphadenopathy.  ABDOMINAL WALL: Anasarca  BONES: Degenerative changes of the spine are noted. Chronic fracture   right 10th rib.    IMPRESSION:    No evidence of active GI bleeding.    Small lobulated cirrhotic liver.    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    --- End of Report ---            ISAAC MURPHY MD; Attending Interventional Radiologist  This document has been electronically signed. Apr 23 2025 10:30PM    < end of copied text >

## 2025-04-24 NOTE — OCCUPATIONAL THERAPY INITIAL EVALUATION ADULT - SPECIFY REASON(S)
As discussed with NOLAN Ayala, patient is not appropriate for OT IE at this time 2/2 lethargic. OT to f/u when appropriate.
Chart reviewed. Attempted to see pt for bedside OT IE. Pt lethargic at this time. OT to f/u when appropriate; NOALN Vergara aware.

## 2025-04-24 NOTE — PROGRESS NOTE ADULT - ASSESSMENT
80yMale pmh HTN, DM, subdural hematoma on keppra, osteomyelitis of L4-5 had completion of abx here for AMS. GI consulted for liver cirrhosis rule HE. Patient with some blood in stool., no hematemesis.      # Liver cirrhosis with portal hypertension. Mild-to-moderate ascites.   Anasarca. Hepatic dome subcentimeter hypodense 1.3 cm hypodensity, not fully characterized .   #Rectal exam-4/15/25-brown stool in rectal vault and on finger  reported blood in stool intermittently   will do rectal exam later today  Rec  -MR liver protocol as outpatient   -For moderate ascites if there is a window, get ultrasound guided diagnostic / therapeutic paracentesis: Obtain serum albumin same day as paracentesis  -Obtain fluid studies: Paracentesis Panel which includes Cell count and differential, albumin, total protein, cytology, AFB smear and culture. Separate order of amylase fluid and triglycerides fluid  -MELD Score-15  - follow up with hepatology as outpatient   -INR higher consider vitamin k 10mg x 3 days  avoid diuretics  nephrology eval appreciated    -HE-very likely in light of elevated ammonia - give lactulose and  titrate to 3-4 bms / daily, it was just restarted last night  neurology eval appreciated  -For Vaccinations: Please f/u in clinic for being uptodate with HAV/HBV/Influenza/Pneumococcal vaccines.  -Lifestyle/Dietary modifications: Calorie intake 25-40 Kcal/kg/day; Protein intake: 1.2-1.5 gm/kg/day  -Avoid smoking and NSAIDs  -Abstain from alcohol and all illicit drugs      #anemia episode of maroon colored stool reported  #rectal exam-4/15/25-brown stool in rectal vault and on finger  had small episode of blood in stool today  patient with small bm with minimal blood in it 4/23/25 reported by nursing team  Rec  -transfuse prn to hgb >7  - s/p EGD and colonoscopy revealing duodenal ulcer.   - repeat colonoscopy in one year if within goals of care  - PPI BID   -Monitor Hb and bms  -Follow up with our [facility] located at [address] [place] [address]. Phone Number: [phone]    80yMale pmh HTN, DM, subdural hematoma on keppra, osteomyelitis of L4-5 had completion of abx here for AMS. GI consulted for liver cirrhosis rule HE. Patient with some blood in stool., no hematemesis.      # Liver cirrhosis with portal hypertension. Mild-to-moderate ascites.   Anasarca. Hepatic dome subcentimeter hypodense 1.3 cm hypodensity, not fully characterized .   #Rectal exam-4/15/25-brown stool in rectal vault and on finger  reported blood in stool intermittently   will do rectal exam later today  Rec  -MR liver protocol as outpatient   -For moderate ascites if there is a window, get ultrasound guided diagnostic / therapeutic paracentesis: Obtain serum albumin same day as paracentesis  -Obtain fluid studies: Paracentesis Panel which includes Cell count and differential, albumin, total protein, cytology, AFB smear and culture. Separate order of amylase fluid and triglycerides fluid  -MELD Score-15  - follow up with hepatology as outpatient   -INR higher consider vitamin k 10mg x 3 days  avoid diuretics  nephrology eval appreciated    -HE-very likely in light of elevated ammonia - give lactulose and  titrate to 3-4 bms / daily, it was just restarted night of 4/22  neurology eval appreciated  -For Vaccinations: Please f/u in clinic for being uptodate with HAV/HBV/Influenza/Pneumococcal vaccines.  -Lifestyle/Dietary modifications: Calorie intake 25-40 Kcal/kg/day; Protein intake: 1.2-1.5 gm/kg/day  -Avoid smoking and NSAIDs  -Abstain from alcohol and all illicit drugs      #anemia episode of maroon colored stool reported  #rectal exam-4/15/25-brown stool in rectal vault and on finger  had small episode of blood in stool today  patient with small bm with minimal blood in it 4/23/25 reported by nursing team  Rec  -transfuse prn to hgb >7  - s/p EGD and colonoscopy revealing duodenal ulcer.   - repeat colonoscopy in one year if within goals of care  - PPI BID   -Monitor Hb and bms  -Follow up with our [facility] located at [address] [place] [address]. Phone Number: [phone]    80yMale pmh HTN, DM, subdural hematoma on keppra, osteomyelitis of L4-5 had completion of abx here for AMS. GI consulted for liver cirrhosis rule HE. Patient with some blood in stool., no hematemesis.      # Liver cirrhosis with portal hypertension. Mild-to-moderate ascites.   Anasarca. Hepatic dome subcentimeter hypodense 1.3 cm hypodensity, not fully characterized .   #Rectal exam-4/15/25-brown stool in rectal vault and on finger  reported blood in stool intermittently   repeat rectal exam 4/24/25-brown stool in rectal vault and on finger   Rec  -MR liver protocol as outpatient   -For moderate ascites if there is a window, get ultrasound guided diagnostic / therapeutic paracentesis: Obtain serum albumin same day as paracentesis  -Obtain fluid studies: Paracentesis Panel which includes Cell count and differential, albumin, total protein, cytology, AFB smear and culture. Separate order of amylase fluid and triglycerides fluid  -MELD Score-15  - follow up with hepatology as outpatient   -INR higher consider vitamin k 10mg x 3 days  avoid diuretics  nephrology eval appreciated    -HE-very likely in light of elevated ammonia - give lactulose and  titrate to 3-4 bms / daily, it was just restarted night of 4/22  neurology eval appreciated  -For Vaccinations: Please f/u in clinic for being uptodate with HAV/HBV/Influenza/Pneumococcal vaccines.  -Lifestyle/Dietary modifications: Calorie intake 25-40 Kcal/kg/day; Protein intake: 1.2-1.5 gm/kg/day  -Avoid smoking and NSAIDs  -Abstain from alcohol and all illicit drugs      #anemia episode of maroon colored stool reported  #rectal exam-4/15/25-brown stool in rectal vault and on finger  had small episode of blood in stool today  patient with small bm with minimal blood in it 4/23/25 reported by nursing team  Rec  -transfuse prn to hgb >7  - s/p EGD and colonoscopy revealing duodenal ulcer.   - repeat colonoscopy in one year if within goals of care  - PPI BID   -Monitor Hb and bms  -Follow up with our [facility] located at [address] [place] [address]. Phone Number: [phone]    80yMale pmh HTN, DM, subdural hematoma on keppra, osteomyelitis of L4-5 had completion of abx here for AMS. GI consulted for liver cirrhosis rule HE. Patient with some blood in stool., no hematemesis.      # Liver cirrhosis with portal hypertension. Mild-to-moderate ascites.   Anasarca. Hepatic dome subcentimeter hypodense 1.3 cm hypodensity, not fully characterized    Rec  -MR liver protocol as outpatient   -For moderate ascites if there is a window, get ultrasound guided diagnostic / therapeutic paracentesis: Obtain serum albumin same day as paracentesis  -Obtain fluid studies: Paracentesis Panel which includes Cell count and differential, albumin, total protein, cytology, AFB smear and culture. Separate order of amylase fluid and triglycerides fluid  -MELD Score-15  -follow up with hepatology as outpatient   -INR higher consider IV vitamin k 10mg x 3 days  -avoid diuretics  -nephrology eval appreciated    -HE-very likely in light of elevated ammonia - give lactulose and  titrate to 3-4 bms / daily, it was just restarted night of 4/22  neurology eval appreciated  -For Vaccinations: Please f/u in clinic for being uptodate with HAV/HBV/Influenza/Pneumococcal vaccines.  -Lifestyle/Dietary modifications: Calorie intake 25-40 Kcal/kg/day; Protein intake: 1.2-1.5 gm/kg/day  -Avoid smoking and NSAIDs  -Abstain from alcohol and all illicit drugs      #anemia episode of maroon colored stool reported  rectal exam-4/15/25-brown stool in rectal vault and on finger  had small episode of blood in stool today  patient with small bm with minimal blood in it 4/23/25 reported by nursing team    Rec  -transfuse prn to hgb >7  - s/p EGD and colonoscopy revealing duodenal ulcer.   - repeat colonoscopy in one year if within goals of care  -PO PPI BID   -Monitor Hb and bms  -Follow up with our [facility] located at [address] [place] [address]. Phone Number: [phone]

## 2025-04-24 NOTE — PROGRESS NOTE ADULT - ASSESSMENT
IMPRESSION    Encephalopathy, metabolic vs hepatic   GIB, s/p 3 uPRBC, s/p prior EGD with duodenal ulcer  seizure hx   Liver cirrhosis   Bacteremia bacteroides  hypernatremia   CHARLENE   elevated trop   Afib     PLAN:    CNS:  neurology follow up   follow ammonia level  Hold off precedex  Haldol PRN for agitation, monitor QTC      HEENT: oral care     PULMONARY: keep pox >92% aspiration precaution   repeat cxr PRN    CARDIOVASCULAR:   follow cardiology    GI:   PPI BID, GI is following, not recommending repeat EGD although continious melena noted, If GI not doing any priocedure would restart feeds, CTA A&P noted, abdominal US today, paracentesis if there is a suitable pocket Speech and swallow eval   Needs NGT for lactulose, rifaxamin  Follow LFT , hepatology / Gi follow up     RENAL:  D5w 50cc/ hr if Na >145  when start feed start free water 250 cc Q4 hrs   follow NA level   follow renal  CMP daily    INFECTIOUS DISEASE:   ID  follow up   rocephin and flagyl   bldcx noted    HEMATOLOGICAL:    DVT prophylaxis. follow h/h and plt   hold AC   serial h/h   Give blood    ENDOCRINE:  Follow up FS.  Insulin protocol if needed.  SDU

## 2025-04-24 NOTE — PROGRESS NOTE ADULT - ASSESSMENT
80 years old male history of hypertension, diabetes, subdural hematoma on Keppra, hx mechanical falls, recent admission (January 2025) for osteomyelitis of L4-5 w completion of abx.  BIBA from home status post change of mental status.     ID is consulted for bacteremia  Afebrile  WBC 7.55 < 7.45  On room air  BCx 4/14 Bacteroides fragilis  UA WBC 5, UCx low count P. mirabilis    CT A/P 4/13  1.  Erosive endplate changes centered at the L4-5 level, consistent with   patient's history ofrecent lumbar spine osteomyelitis.  2.  Liver cirrhosis with portal hypertension. Mild-to-moderate ascites.   Anasarca. Hepatic dome subcentimeter hypodense 1.3 cm hypodensity, not   fully characterized . Outpatient MR abdomen with IV contrast recommended.  3.  Chronic pancreatitis, with limited evaluation for acute inflammation   due to background fluid. Correlation with pancreatic enzymes recommended.    Antibiotics:  Vancomycin 4/13  Ampicillin 4/14  Ceftriaxone 4/15 - 4/21  Flagyl 4/15 ->      IMPRESSION:  Bacteroides bacteremia, potentially life threatening  Acute blood loss anemia  Possible SBP?  Liver cirrhosis  Lower GI bleed  Hx L4-L5 osteomyelitis  Liver lesion  Immunosuppression / Immunosenescence secondary to multiple comorbidities which could result in poor clinical outcome    RECOMMENDATIONS:  - Completed 7 days of ceftriaxone, completed albumin x 2 doses  - IV Flagyl 500mg q12hrs for 14 day (until 4/28)  - GI follow up  - Offloading and frequent position changes, aspiration precaution  - Trend WBC, fever curve, transaminases, creatinine daily  - Please inform ID of any patient clinical change or any new pertinent laboratory or radiographic data      Nora Beck D.O.  Attending Physician  Division of Infectious Diseases  [facility]  Please contact me via [employer] Teams

## 2025-04-24 NOTE — PROGRESS NOTE ADULT - SUBJECTIVE AND OBJECTIVE BOX
Patient is a 80y old  Male who presents with a chief complaint of acute mentation changes (23 Apr 2025 14:40)        Over Night Events:    Bloody BM noted overnight, 3 u PRBC given in the last 24 hrs, TCA A&P no note of bleed    ROS:  See HPI    PHYSICAL EXAM    ICU Vital Signs Last 24 Hrs  T(C): 35.7 (24 Apr 2025 07:01), Max: 36.3 (23 Apr 2025 15:14)  T(F): 96.2 (24 Apr 2025 07:01), Max: 97.3 (23 Apr 2025 15:14)  HR: 53 (24 Apr 2025 06:00) (50 - 122)  BP: 80/46 (24 Apr 2025 06:00) (80/46 - 155/97)  BP(mean): 59 (24 Apr 2025 06:00) (59 - 120)  ABP: --  ABP(mean): --  RR: 18 (24 Apr 2025 07:01) (16 - 27)  SpO2: 95% (24 Apr 2025 06:00) (93% - 100%)    O2 Parameters below as of 24 Apr 2025 02:00  Patient On (Oxygen Delivery Method): nasal cannula  O2 Flow (L/min): 2    General: NAD  Lungs: Bilateral BS  Cardiovascular: Regular   Gastrointestinal: Soft, distended  Extremities: No clubbing     Skin: DTI sacrum  Neurological: confused, altered, opens eyes      04-23-25 @ 07:01  -  04-24-25 @ 07:00  --------------------------------------------------------  IN:    Dexmedetomidine: 82.4 mL    dextrose 5%: 375 mL    dextrose 5%: 225 mL    dextrose 5% + sodium chloride 0.45%: 350 mL    Enteral Tube Flush: 800 mL    IV PiggyBack: 150 mL    Octreotide: 75 mL    Pantoprazole: 100 mL    PRBCs (Packed Red Blood Cells): 1051 mL  Total IN: 3208.4 mL    OUT:    Indwelling Catheter - Urethral (mL): 157 mL  Total OUT: 157 mL    Total NET: 3051.4 mL    LABS:                            7.7    6.04  )-----------( 74       ( 24 Apr 2025 05:53 )             23.3                                               04-24    150[H]  |  121[H]  |  13  ----------------------------<  182[H]  x    |  20  |  1.2    Ca    7.3[L]      24 Apr 2025 05:53  Mg     1.9     04-24    TPro  3.5[L]  /  Alb  1.9[L]  /  TBili  1.1  /  DBili  x   /  AST  30  /  ALT  9   /  AlkPhos  77  04-24      PT/INR - ( 24 Apr 2025 05:53 )   PT: 29.20 sec;   INR: 2.43 ratio         PTT - ( 24 Apr 2025 05:53 )  PTT:52.0 sec                                       Urinalysis Basic - ( 24 Apr 2025 05:53 )    Color: x / Appearance: x / SG: x / pH: x  Gluc: 182 mg/dL / Ketone: x  / Bili: x / Urobili: x   Blood: x / Protein: x / Nitrite: x   Leuk Esterase: x / RBC: x / WBC x   Sq Epi: x / Non Sq Epi: x / Bacteria: x                                                  LIVER FUNCTIONS - ( 24 Apr 2025 05:53 )  Alb: 1.9 g/dL / Pro: 3.5 g/dL / ALK PHOS: 77 U/L / ALT: 9 U/L / AST: 30 U/L / GGT: x                                                                                                                                   MEDICATIONS  (STANDING):  atorvastatin 80 milliGRAM(s) Oral at bedtime  chlorhexidine 2% Cloths 1 Application(s) Topical <User Schedule>  dexMEDEtomidine Infusion 0.5 MICROgram(s)/kG/Hr (14.7 mL/Hr) IV Continuous <Continuous>  dextrose 5% + sodium chloride 0.45%. 1000 milliLiter(s) (50 mL/Hr) IV Continuous <Continuous>  dextrose 5%. 1000 milliLiter(s) (100 mL/Hr) IV Continuous <Continuous>  dextrose 5%. 1000 milliLiter(s) (50 mL/Hr) IV Continuous <Continuous>  dextrose 50% Injectable 25 Gram(s) IV Push once  dextrose 50% Injectable 12.5 Gram(s) IV Push once  dextrose 50% Injectable 25 Gram(s) IV Push once  folic acid 1 milliGRAM(s) Oral daily  glucagon  Injectable 1 milliGRAM(s) IntraMuscular once  insulin lispro (ADMELOG) corrective regimen sliding scale   SubCutaneous three times a day before meals  lactulose Syrup 20 Gram(s) Enteral Tube every 3 hours  levETIRAcetam   Injectable 250 milliGRAM(s) IV Push <User Schedule>  lidocaine   4% Patch 1 Patch Transdermal daily  metroNIDAZOLE  IVPB 500 milliGRAM(s) IV Intermittent every 12 hours  nystatin Powder 1 Application(s) Topical two times a day  octreotide  Infusion 25 MICROgram(s)/Hr (5 mL/Hr) IV Continuous <Continuous>  pantoprazole  Injectable 40 milliGRAM(s) IV Push every 12 hours  rifAXIMin 550 milliGRAM(s) Oral two times a day  senna 2 Tablet(s) Oral at bedtime    MEDICATIONS  (PRN):  acetaminophen     Tablet .. 650 milliGRAM(s) Oral every 6 hours PRN Temp greater or equal to 38C (100.4F), Mild Pain (1 - 3)  aluminum hydroxide/magnesium hydroxide/simethicone Suspension 30 milliLiter(s) Oral every 4 hours PRN Dyspepsia  dextrose Oral Gel 15 Gram(s) Oral once PRN Blood Glucose LESS THAN 70 milliGRAM(s)/deciliter  melatonin 3 milliGRAM(s) Oral at bedtime PRN Insomnia  ondansetron Injectable 4 milliGRAM(s) IV Push every 8 hours PRN Nausea and/or Vomiting  polyethylene glycol 3350 17 Gram(s) Oral two times a day PRN Constipation      Xrays:                                                                                     ECHO

## 2025-04-25 LAB
ALBUMIN SERPL ELPH-MCNC: 2.3 G/DL — LOW (ref 3.5–5.2)
ALP SERPL-CCNC: 99 U/L — SIGNIFICANT CHANGE UP (ref 30–115)
ALT FLD-CCNC: 14 U/L — SIGNIFICANT CHANGE UP (ref 0–41)
AMMONIA BLD-MCNC: 38 UMOL/L — SIGNIFICANT CHANGE UP (ref 11–55)
ANION GAP SERPL CALC-SCNC: 13 MMOL/L — SIGNIFICANT CHANGE UP (ref 7–14)
APTT BLD: SIGNIFICANT CHANGE UP SEC (ref 27–39.2)
AST SERPL-CCNC: 54 U/L — HIGH (ref 0–41)
BILIRUB SERPL-MCNC: 1.7 MG/DL — HIGH (ref 0.2–1.2)
BLD GP AB SCN SERPL QL: SIGNIFICANT CHANGE UP
BUN SERPL-MCNC: 14 MG/DL — SIGNIFICANT CHANGE UP (ref 10–20)
CALCIUM SERPL-MCNC: 7.8 MG/DL — LOW (ref 8.4–10.5)
CHLORIDE SERPL-SCNC: 118 MMOL/L — HIGH (ref 98–110)
CO2 SERPL-SCNC: 19 MMOL/L — SIGNIFICANT CHANGE UP (ref 17–32)
CREAT SERPL-MCNC: 1.3 MG/DL — SIGNIFICANT CHANGE UP (ref 0.7–1.5)
EGFR: 56 ML/MIN/1.73M2 — LOW
EGFR: 56 ML/MIN/1.73M2 — LOW
GLUCOSE BLDC GLUCOMTR-MCNC: 129 MG/DL — HIGH (ref 70–99)
GLUCOSE BLDC GLUCOMTR-MCNC: 155 MG/DL — HIGH (ref 70–99)
GLUCOSE BLDC GLUCOMTR-MCNC: 160 MG/DL — HIGH (ref 70–99)
GLUCOSE BLDC GLUCOMTR-MCNC: 192 MG/DL — HIGH (ref 70–99)
GLUCOSE SERPL-MCNC: 147 MG/DL — HIGH (ref 70–99)
HAPTOGLOB SERPL-MCNC: <20 MG/DL — LOW (ref 34–200)
HCT VFR BLD CALC: 23.6 % — LOW (ref 42–52)
HCT VFR BLD CALC: 24.2 % — LOW (ref 42–52)
HCT VFR BLD CALC: 33.4 % — LOW (ref 42–52)
HGB BLD-MCNC: 10.8 G/DL — LOW (ref 14–18)
HGB BLD-MCNC: 7.7 G/DL — LOW (ref 14–18)
HGB BLD-MCNC: 7.8 G/DL — LOW (ref 14–18)
INR BLD: 2.17 RATIO — HIGH (ref 0.65–1.3)
MCHC RBC-ENTMCNC: 30.4 PG — SIGNIFICANT CHANGE UP (ref 27–31)
MCHC RBC-ENTMCNC: 30.7 PG — SIGNIFICANT CHANGE UP (ref 27–31)
MCHC RBC-ENTMCNC: 30.9 PG — SIGNIFICANT CHANGE UP (ref 27–31)
MCHC RBC-ENTMCNC: 32.2 G/DL — SIGNIFICANT CHANGE UP (ref 32–37)
MCHC RBC-ENTMCNC: 32.3 G/DL — SIGNIFICANT CHANGE UP (ref 32–37)
MCHC RBC-ENTMCNC: 32.6 G/DL — SIGNIFICANT CHANGE UP (ref 32–37)
MCV RBC AUTO: 94 FL — SIGNIFICANT CHANGE UP (ref 80–94)
MCV RBC AUTO: 94.2 FL — HIGH (ref 80–94)
MCV RBC AUTO: 95.4 FL — HIGH (ref 80–94)
MRSA PCR RESULT.: NEGATIVE — SIGNIFICANT CHANGE UP
NRBC BLD AUTO-RTO: 0 /100 WBCS — SIGNIFICANT CHANGE UP (ref 0–0)
PLATELET # BLD AUTO: 77 K/UL — LOW (ref 130–400)
PLATELET # BLD AUTO: 92 K/UL — LOW (ref 130–400)
PLATELET # BLD AUTO: 97 K/UL — LOW (ref 130–400)
PMV BLD: 9.1 FL — SIGNIFICANT CHANGE UP (ref 7.4–10.4)
PMV BLD: 9.2 FL — SIGNIFICANT CHANGE UP (ref 7.4–10.4)
PMV BLD: 9.8 FL — SIGNIFICANT CHANGE UP (ref 7.4–10.4)
POTASSIUM SERPL-MCNC: 5.2 MMOL/L — HIGH (ref 3.5–5)
POTASSIUM SERPL-SCNC: 5.2 MMOL/L — HIGH (ref 3.5–5)
PROT SERPL-MCNC: 4.3 G/DL — LOW (ref 6–8)
PROTHROM AB SERPL-ACNC: 26 SEC — HIGH (ref 9.95–12.87)
RBC # BLD: 2.51 M/UL — LOW (ref 4.7–6.1)
RBC # BLD: 2.57 M/UL — LOW (ref 4.7–6.1)
RBC # BLD: 3.5 M/UL — LOW (ref 4.7–6.1)
RBC # FLD: 19.9 % — HIGH (ref 11.5–14.5)
RBC # FLD: 20.1 % — HIGH (ref 11.5–14.5)
RBC # FLD: 20.4 % — HIGH (ref 11.5–14.5)
SODIUM SERPL-SCNC: 150 MMOL/L — HIGH (ref 135–146)
WBC # BLD: 11.04 K/UL — HIGH (ref 4.8–10.8)
WBC # BLD: 11.67 K/UL — HIGH (ref 4.8–10.8)
WBC # BLD: 9.29 K/UL — SIGNIFICANT CHANGE UP (ref 4.8–10.8)
WBC # FLD AUTO: 11.04 K/UL — HIGH (ref 4.8–10.8)
WBC # FLD AUTO: 11.67 K/UL — HIGH (ref 4.8–10.8)
WBC # FLD AUTO: 9.29 K/UL — SIGNIFICANT CHANGE UP (ref 4.8–10.8)

## 2025-04-25 PROCEDURE — 99233 SBSQ HOSP IP/OBS HIGH 50: CPT

## 2025-04-25 PROCEDURE — 99232 SBSQ HOSP IP/OBS MODERATE 35: CPT | Mod: GC

## 2025-04-25 RX ORDER — SILVER SULFADIAZINE 1 %
1 CREAM (GRAM) TOPICAL
Refills: 0 | Status: DISCONTINUED | OUTPATIENT
Start: 2025-04-25 | End: 2025-06-19

## 2025-04-25 RX ORDER — SODIUM ZIRCONIUM CYCLOSILICATE 5 G/5G
5 POWDER, FOR SUSPENSION ORAL ONCE
Refills: 0 | Status: COMPLETED | OUTPATIENT
Start: 2025-04-25 | End: 2025-04-25

## 2025-04-25 RX ORDER — SODIUM CHLORIDE 9 G/1000ML
1000 INJECTION, SOLUTION INTRAVENOUS
Refills: 0 | Status: DISCONTINUED | OUTPATIENT
Start: 2025-04-25 | End: 2025-04-26

## 2025-04-25 RX ORDER — FUROSEMIDE 10 MG/ML
40 INJECTION INTRAMUSCULAR; INTRAVENOUS DAILY
Refills: 0 | Status: DISCONTINUED | OUTPATIENT
Start: 2025-04-25 | End: 2025-04-27

## 2025-04-25 RX ORDER — FUROSEMIDE 10 MG/ML
40 INJECTION INTRAMUSCULAR; INTRAVENOUS ONCE
Refills: 0 | Status: COMPLETED | OUTPATIENT
Start: 2025-04-25 | End: 2025-04-25

## 2025-04-25 RX ADMIN — OCTREOTIDE ACETATE 5 MICROGRAM(S)/HR: 500 INJECTION, SOLUTION INTRAVENOUS; SUBCUTANEOUS at 08:41

## 2025-04-25 RX ADMIN — Medication 3 MILLIGRAM(S): at 21:09

## 2025-04-25 RX ADMIN — LEVETIRACETAM 250 MILLIGRAM(S): 10 INJECTION, SOLUTION INTRAVENOUS at 05:58

## 2025-04-25 RX ADMIN — Medication 40 MILLIGRAM(S): at 05:57

## 2025-04-25 RX ADMIN — LIDOCAINE HYDROCHLORIDE 1 PATCH: 20 JELLY TOPICAL at 23:30

## 2025-04-25 RX ADMIN — FUROSEMIDE 40 MILLIGRAM(S): 10 INJECTION INTRAMUSCULAR; INTRAVENOUS at 08:41

## 2025-04-25 RX ADMIN — FOLIC ACID 1 MILLIGRAM(S): 1 TABLET ORAL at 11:36

## 2025-04-25 RX ADMIN — INSULIN LISPRO 2: 100 INJECTION, SOLUTION INTRAVENOUS; SUBCUTANEOUS at 06:55

## 2025-04-25 RX ADMIN — INSULIN LISPRO 2: 100 INJECTION, SOLUTION INTRAVENOUS; SUBCUTANEOUS at 17:18

## 2025-04-25 RX ADMIN — Medication 1 APPLICATION(S): at 05:56

## 2025-04-25 RX ADMIN — Medication 1 APPLICATION(S): at 17:13

## 2025-04-25 RX ADMIN — SODIUM CHLORIDE 75 MILLILITER(S): 9 INJECTION, SOLUTION INTRAVENOUS at 19:47

## 2025-04-25 RX ADMIN — LACTULOSE 20 GRAM(S): 10 SOLUTION ORAL at 00:09

## 2025-04-25 RX ADMIN — Medication 100 MILLIGRAM(S): at 17:13

## 2025-04-25 RX ADMIN — OCTREOTIDE ACETATE 5 MICROGRAM(S)/HR: 500 INJECTION, SOLUTION INTRAVENOUS; SUBCUTANEOUS at 19:47

## 2025-04-25 RX ADMIN — SODIUM ZIRCONIUM CYCLOSILICATE 5 GRAM(S): 5 POWDER, FOR SUSPENSION ORAL at 20:52

## 2025-04-25 RX ADMIN — NYSTATIN 1 APPLICATION(S): 100000 CREAM TOPICAL at 17:17

## 2025-04-25 RX ADMIN — MIDODRINE HYDROCHLORIDE 10 MILLIGRAM(S): 5 TABLET ORAL at 21:09

## 2025-04-25 RX ADMIN — ATORVASTATIN CALCIUM 80 MILLIGRAM(S): 80 TABLET, FILM COATED ORAL at 21:09

## 2025-04-25 RX ADMIN — LIDOCAINE HYDROCHLORIDE 1 PATCH: 20 JELLY TOPICAL at 11:34

## 2025-04-25 RX ADMIN — SODIUM CHLORIDE 75 MILLILITER(S): 9 INJECTION, SOLUTION INTRAVENOUS at 08:41

## 2025-04-25 RX ADMIN — Medication 100 MILLIGRAM(S): at 05:57

## 2025-04-25 RX ADMIN — LEVETIRACETAM 250 MILLIGRAM(S): 10 INJECTION, SOLUTION INTRAVENOUS at 17:14

## 2025-04-25 RX ADMIN — NYSTATIN 1 APPLICATION(S): 100000 CREAM TOPICAL at 06:00

## 2025-04-25 RX ADMIN — FUROSEMIDE 40 MILLIGRAM(S): 10 INJECTION INTRAMUSCULAR; INTRAVENOUS at 05:56

## 2025-04-25 RX ADMIN — Medication 40 MILLIGRAM(S): at 17:14

## 2025-04-25 RX ADMIN — INSULIN LISPRO 2: 100 INJECTION, SOLUTION INTRAVENOUS; SUBCUTANEOUS at 11:35

## 2025-04-25 RX ADMIN — LIDOCAINE HYDROCHLORIDE 1 PATCH: 20 JELLY TOPICAL at 19:53

## 2025-04-25 NOTE — PROGRESS NOTE ADULT - ASSESSMENT
High risk for pressure injury development or progression   Skin assessed- B/L heel blanchable redness                        Sacrum and  B/L buttock  erythema  with denuded skin and dark pigmentation - possibly due to chronic moisture and friction combination                        Deep tissue pressure injury to B/L buttock - intact dark red skin tissue surrounding macerated area - at stage of progression with complete epidermal skin loss   with severe redness           Wound and skin care recs.   Change dressing to   Clean sacrum and b/l buttock with soap and water  , pat dry  apply  silvadene dressing   Pressure  injury  preventive  measures  Skin  and incontinence care   Assess skin  and inform primary provider of any changes   Case discussed with primary Rn  Wound/ ostomy specialist  to f/u as needed     Offloading: [x ] Frequent position changes [ ] Devices/Equipment  Cleansing: [ ] Saline [ ] Soap/Water [ ] Other: ______  Topicals: [ ] Barrier Cream [ ] Antimicrobial [ ] Enzymatic Wound Debridement x] Moist  wound Healing   Dressings: [ ] Dry, sterile [ ] Allevyn  Foam [ ] Absorbant Pads [ ] Collagenase    Other Recs.   Per Primary team   Total time for bedside assessment  , review of medical records  and  discussion of plan of care with primary team greater than 35 min

## 2025-04-25 NOTE — PROGRESS NOTE ADULT - SUBJECTIVE AND OBJECTIVE BOX
Patient is a 80y old  Male who presents with a chief complaint of acute mentation changes (24 Apr 2025 15:50)        Over Night Events: Jeny overnight. off pressors. on 2 liter NC    ROS:  See HPI    PHYSICAL EXAM    ICU Vital Signs Last 24 Hrs  T(C): 36.5 (25 Apr 2025 07:01), Max: 36.7 (25 Apr 2025 04:00)  T(F): 97.7 (25 Apr 2025 07:01), Max: 98.1 (25 Apr 2025 04:00)  HR: 126 (25 Apr 2025 06:00) (53 - 145)  BP: 115/81 (25 Apr 2025 06:00) (87/52 - 170/108)  BP(mean): 92 (25 Apr 2025 06:00) (65 - 133)  RR: 18 (25 Apr 2025 07:01) (13 - 34)  SpO2: 96% (25 Apr 2025 06:00) (72% - 100%)    O2 Parameters below as of 24 Apr 2025 23:35  Patient On (Oxygen Delivery Method): nasal cannula  O2 Flow (L/min): 2    CONSTITUTIONAL:  ill appearing    ENT:   Airway patent,   NG tube    EYES:   Clear bilaterally,   pupils equal,   round and reactive to light.    CARDIAC:   S1s2 heard  no edema    RESPIRATORY:   No wheezing   Normal chest expansion  Not tachypneic,    GASTROINTESTINAL:  Abdomen soft, non-tender,   No guarding,   Positive BS  abd distended    MUSCULOSKELETAL:   Range of motion is not limited,    NEUROLOGICAL:   lethargic  confused  No motor deficits.    SKIN:   pallor        04-24-25 @ 07:01  -  04-25-25 @ 07:00  --------------------------------------------------------  IN:    dextrose 5% + sodium chloride 0.45%: 400 mL    Enteral Tube Flush: 940 mL    IV PiggyBack: 401 mL    Octreotide: 115 mL    PRBCs (Packed Red Blood Cells): 658 mL  Total IN: 2514 mL    OUT:    Blood Loss (mL): 400 mL    Dexmedetomidine: 0 mL    Indwelling Catheter - Urethral (mL): 320 mL    Norepinephrine: 0 mL  Total OUT: 720 mL    Total NET: 1794 mL    04-25-25 @ 07:01  -  04-25-25 @ 08:14  --------------------------------------------------------  IN:  Total IN: 0 mL    OUT:    Indwelling Catheter - Urethral (mL): 200 mL  Total OUT: 200 mL    Total NET: -200 mL    LABS:                        10.8   9.29  )-----------( 77       ( 25 Apr 2025 05:33 )             33.4                                               04-25    150[H]  |  118[H]  |  14  ----------------------------<  147[H]  5.2[H]   |  19  |  1.3    Ca    7.8[L]      25 Apr 2025 05:33  Mg     1.9     04-24    TPro  4.3[L]  /  Alb  2.3[L]  /  TBili  1.7[H]  /  DBili  x   /  AST  54[H]  /  ALT  14  /  AlkPhos  99  04-25      PT/INR - ( 25 Apr 2025 05:33 )   PT: 26.00 sec;   INR: 2.17 ratio         PTT - ( 25 Apr 2025 05:33 )  PTT:TNP sec                                       Urinalysis Basic - ( 25 Apr 2025 05:33 )    Color: x / Appearance: x / SG: x / pH: x  Gluc: 147 mg/dL / Ketone: x  / Bili: x / Urobili: x   Blood: x / Protein: x / Nitrite: x   Leuk Esterase: x / RBC: x / WBC x   Sq Epi: x / Non Sq Epi: x / Bacteria: x                                              LIVER FUNCTIONS - ( 25 Apr 2025 05:33 )  Alb: 2.3 g/dL / Pro: 4.3 g/dL / ALK PHOS: 99 U/L / ALT: 14 U/L / AST: 54 U/L / GGT: x                                                                                                                                       MEDICATIONS  (STANDING):  atorvastatin 80 milliGRAM(s) Oral at bedtime  chlorhexidine 2% Cloths 1 Application(s) Topical <User Schedule>  dextrose 5% + sodium chloride 0.45%. 1000 milliLiter(s) (50 mL/Hr) IV Continuous <Continuous>  dextrose 5%. 1000 milliLiter(s) (100 mL/Hr) IV Continuous <Continuous>  dextrose 5%. 1000 milliLiter(s) (50 mL/Hr) IV Continuous <Continuous>  dextrose 50% Injectable 25 Gram(s) IV Push once  dextrose 50% Injectable 12.5 Gram(s) IV Push once  dextrose 50% Injectable 25 Gram(s) IV Push once  folic acid 1 milliGRAM(s) Oral daily  glucagon  Injectable 1 milliGRAM(s) IntraMuscular once  insulin lispro (ADMELOG) corrective regimen sliding scale   SubCutaneous every 6 hours  lactulose Syrup 20 Gram(s) Enteral Tube every 3 hours  levETIRAcetam   Injectable 250 milliGRAM(s) IV Push <User Schedule>  lidocaine   4% Patch 1 Patch Transdermal daily  metroNIDAZOLE  IVPB 500 milliGRAM(s) IV Intermittent every 12 hours  midodrine 10 milliGRAM(s) Oral every 8 hours  nystatin Powder 1 Application(s) Topical two times a day  octreotide  Infusion 25 MICROgram(s)/Hr (5 mL/Hr) IV Continuous <Continuous>  pantoprazole  Injectable 40 milliGRAM(s) IV Push every 12 hours  rifAXIMin 550 milliGRAM(s) Oral two times a day  senna 2 Tablet(s) Oral at bedtime    MEDICATIONS  (PRN):  acetaminophen     Tablet .. 650 milliGRAM(s) Oral every 6 hours PRN Temp greater or equal to 38C (100.4F), Mild Pain (1 - 3)  aluminum hydroxide/magnesium hydroxide/simethicone Suspension 30 milliLiter(s) Oral every 4 hours PRN Dyspepsia  dextrose Oral Gel 15 Gram(s) Oral once PRN Blood Glucose LESS THAN 70 milliGRAM(s)/deciliter  melatonin 3 milliGRAM(s) Oral at bedtime PRN Insomnia  ondansetron Injectable 4 milliGRAM(s) IV Push every 8 hours PRN Nausea and/or Vomiting  polyethylene glycol 3350 17 Gram(s) Oral two times a day PRN Constipation      Xrays:                                                                                     ECHO

## 2025-04-25 NOTE — PROGRESS NOTE ADULT - ASSESSMENT
Impression:  Acute blood loss anemia  AMS  hepatic encephalopathy  Liver cirrhosis .  bacteroides fragilis bacteremia   Hypernatremia  Hyperkalemia  Afib        Plan:    Transfuse hb target >7  Active type and screen  free water, frequent bmp  Lokelma  GI follow up  PPI BID  Lactulose via NG tube  target BM 2-3  Abx per ID  no AC due to GI bleed  No urgent plan for ischemic evaluation per cardiology  poor prognosis  palliative care

## 2025-04-25 NOTE — PROGRESS NOTE ADULT - ASSESSMENT
IMPRESSION    Encephalopathy, metabolic vs hepatic   GIB, s/p 3 uPRBC, s/p prior EGD with duodenal ulcer  seizure hx   Liver cirrhosis   Bacteremia bacteroides  hypernatremia   CHARLENE   elevated trop   Afib     PLAN:    CNS:  neurology follow up   follow ammonia level  Hold off precedex  Haldol PRN for agitation, monitor QTC      HEENT: oral care     PULMONARY: keep pox >92% aspiration precaution   repeat cxr PRN    CARDIOVASCULAR:   follow cardiology    GI:   PPI BID, GI is following, not recommending repeat EGD, CTA A&P noted, abdominal US today, paracentesis if there is a suitable pocket Speech and swallow eval   Needs NGT for lactulose, rifaxamin  Follow LFT , hepatology / Gi follow up  NGT feeding    RENAL:  D5w 50cc/ hr if Na >145  when start feed start free water 250 cc Q4 hrs   follow NA level   follow renal  CMP daily    INFECTIOUS DISEASE:   ID  follow up   rocephin and flagyl   bldcx noted    HEMATOLOGICAL:    DVT prophylaxis. follow h/h and plt   hold AC   serial h/h    ENDOCRINE:  Follow up FS.  Insulin protocol if needed.    SDU

## 2025-04-25 NOTE — PROGRESS NOTE ADULT - SUBJECTIVE AND OBJECTIVE BOX
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 fragilis bacteremia, and acute blood loss anemia. Patient is on IV morphine for pain. Patient is on lactulose for encephalopathy, IV abx, and frequent H/H monitoring, requiring pRBC. Palliative consulted for GOC. Patient is full code.    INTERVAL EVENTS  4/24: patient without acute distress  4/25: no acute distress, on octreotide gtt, NGT in place    ADVANCE DIRECTIVES:    [ ] Full Code [ ] DNR  MOLST  [ ]  Living Will  [ ]   DECISION MAKER(s):  [ ] Health Care Proxy(s)  [ ] Surrogate(s)  [ ] Guardian           Name(s): Phone Number(s): emmett Gibbons [phone]    BASELINE (I)ADL(s) (prior to admission):  [place]: [ ]Total  [ ] Moderate [ ]Dependent  Palliative Performance Status Version 2:         %    http://npcrc.org/files/news/palliative_performance_scale_ppsv2.pdf    Allergies    No Known Allergies    Intolerances    MEDICATIONS  (STANDING):  atorvastatin 80 milliGRAM(s) Oral at bedtime  chlorhexidine 2% Cloths 1 Application(s) Topical <User Schedule>  dextrose 5% + sodium chloride 0.45%. 1000 milliLiter(s) (50 mL/Hr) IV Continuous <Continuous>  dextrose 5%. 1000 milliLiter(s) (100 mL/Hr) IV Continuous <Continuous>  dextrose 5%. 1000 milliLiter(s) (50 mL/Hr) IV Continuous <Continuous>  dextrose 5%. 1000 milliLiter(s) (75 mL/Hr) IV Continuous <Continuous>  dextrose 50% Injectable 25 Gram(s) IV Push once  dextrose 50% Injectable 12.5 Gram(s) IV Push once  dextrose 50% Injectable 25 Gram(s) IV Push once  folic acid 1 milliGRAM(s) Oral daily  furosemide   Injectable 40 milliGRAM(s) IV Push daily  glucagon  Injectable 1 milliGRAM(s) IntraMuscular once  insulin lispro (ADMELOG) corrective regimen sliding scale   SubCutaneous every 6 hours  lactulose Syrup 20 Gram(s) Enteral Tube every 3 hours  levETIRAcetam   Injectable 250 milliGRAM(s) IV Push <User Schedule>  lidocaine   4% Patch 1 Patch Transdermal daily  metroNIDAZOLE  IVPB 500 milliGRAM(s) IV Intermittent every 12 hours  midodrine 10 milliGRAM(s) Oral every 8 hours  nystatin Powder 1 Application(s) Topical two times a day  octreotide  Infusion 25 MICROgram(s)/Hr (5 mL/Hr) IV Continuous <Continuous>  pantoprazole  Injectable 40 milliGRAM(s) IV Push every 12 hours  rifAXIMin 550 milliGRAM(s) Oral two times a day  senna 2 Tablet(s) Oral at bedtime  silver sulfADIAZINE 1% Cream 1 Application(s) Topical two times a day    MEDICATIONS  (PRN):  acetaminophen     Tablet .. 650 milliGRAM(s) Oral every 6 hours PRN Temp greater or equal to 38C (100.4F), Mild Pain (1 - 3)  aluminum hydroxide/magnesium hydroxide/simethicone Suspension 30 milliLiter(s) Oral every 4 hours PRN Dyspepsia  dextrose Oral Gel 15 Gram(s) Oral once PRN Blood Glucose LESS THAN 70 milliGRAM(s)/deciliter  melatonin 3 milliGRAM(s) Oral at bedtime PRN Insomnia  ondansetron Injectable 4 milliGRAM(s) IV Push every 8 hours PRN Nausea and/or Vomiting  polyethylene glycol 3350 17 Gram(s) Oral two times a day PRN Constipation        PRESENT SYMPTOMS: [ X][ Difficult to obtain due to poor mentation   Source if other than patient:  [ ]Family   [ ]Team   [ X]All review of systems negative including pain and dyspnea unless noted below    All components of pain assessment below addressed. Blank spaces indicate that the patient did/could not complete the assessment.  Pain: [ ]yes [ ]no  QOL impact -   Location -                    Aggravating factors -  Quality -  Radiation -  Timing-  Severity (0-10 scale):  Minimal acceptable level (0-10 scale):     CPOT:    https://www.sccm.org/getattachment/thu27c35-1d1m-4r9p-1i7q-3617y9468z2i/Critical-Care-Pain-Observation-Tool-(CPOT)    PAIN AD Score: 0  http://geriatrictoolkit.[facility]/cog/painad.pdf (press ctrl +  left click to view)    Dyspnea:                           [ ]None[ ]Mild [ ]Moderate [ ]Severe     Respiratory Distress Observation Scale (RDOS): 1  A score of 0 to 2 signifies little or no respiratory distress, 3 signifies mild distress, scores 4 to 6 indicate moderate distress, and scores greater than 7 signify severe distress  [email or web address]-xpjzojghnkr-khwpaerl-jbjrrcgnlcy-tzfrf.[email or web address]    Anxiety:                             [ ]None[ ]Mild [ ]Moderate [ ]Severe   Fatigue:                             [ ]None[ ]Mild [ ]Moderate [ ]Severe   Nausea:                             [ ]None[ ]Mild [ ]Moderate [ ]Severe   Loss of appetite:              [ ]None[ ]Mild [ ]Moderate [ ]Severe   Constipation:                    [ ]None[ ]Mild [ ]Moderate [ ]Severe    Other Symptoms:    PHYSICAL EXAM:  Vital Signs Last 24 Hrs  T(C): 37.1 (25 Apr 2025 11:00), Max: 37.1 (25 Apr 2025 11:00)  T(F): 98.7 (25 Apr 2025 11:00), Max: 98.7 (25 Apr 2025 11:00)  HR: 94 (25 Apr 2025 08:44) (62 - 145)  BP: 157/79 (25 Apr 2025 08:44) (99/62 - 170/108)  BP(mean): 105 (25 Apr 2025 07:01) (73 - 133)  RR: 17 (25 Apr 2025 08:44) (14 - 34)  SpO2: 99% (25 Apr 2025 08:44) (72% - 100%)    Parameters below as of 25 Apr 2025 08:44  Patient On (Oxygen Delivery Method): nasal cannula  O2 Flow (L/min): 2      GENERAL:  [X ] No acute distress [ ]Lethargic  [ ]Unarousable  [ ]Verbal  [ ]Non-Verbal [ ]Cachexia    BEHAVIORAL/PSYCH:  [ ]Alert and Oriented x  [ ] Anxiety [ ] Delirium [ ] Agitation [X ] Calm   EYES: [ ] No scleral icterus [ ] Scleral icterus [ ] Closed  ENMT:  [ ]Dry mouth  [ ]No external oral lesions [ X] No external ear or nose lesions  CARDIOVASCULAR:  [ ]Regular [ ]Irregular [ ]Tachy [ ]Not Tachy  [ ]Jenaro [ ] Edema [ ] No edema  PULMONARY:  [ ]Tachypnea  [ ]Audible excessive secretions [X ] No labored breathing [ ] labored breathing  GASTROINTESTINAL: [ ]Soft  [ ]Distended  [ X]Not distended [ ]Non tender [ ]Tender  MUSCULOSKELETAL: [ ]No clubbing [ ] clubbing  [ X] No cyanosis [ ] cyanosis  NEUROLOGIC: [ ]No focal deficits  [ ]Follows commands  [ ]Does not follow commands  [ ]Cognitive impairment  [ ]Dysphagia  [ ]Dysarthria  [ ]Paresis   SKIN: [ ] Jaundiced [X ] Non-jaundiced [ ]Rash [ ]No Rash [ ] Warm [ ] Dry  MISC/LINES: [ ] ET tube [ ] Trach [ ]NGT/OGT [ ]PEG [ ]Michel    CRITICAL CARE:  [ ] Shock Present  [ ]Septic [ ]Cardiogenic [ ]Neurologic [ ]Hypovolemic  [ ]  Vasopressors [ ]  Inotropes   [ ]Respiratory failure present [ ]Mechanical ventilation [ ]Non-invasive ventilatory support [ ]High flow  [ ]Acute  [ ]Chronic [ ]Hypoxic  [ ]Hypercarbic [ ]Other  [ ]Other organ failure     LABS: reviewed by me, notable for: CBC WNL, elevated Na, Ca low                                   10.8   9.29  )-----------( 77       ( 25 Apr 2025 05:33 )             33.4     04-25    150[H]  |  118[H]  |  14  ----------------------------<  147[H]  5.2[H]   |  19  |  1.3    Ca    7.8[L]      25 Apr 2025 05:33  Mg     1.9     04-24        RADIOLOGY & ADDITIONAL STUDIES: CXR personally reviewed by me: 4/24: bilateral opacities    CT A/P: ascites    PROTEIN CALORIE MALNUTRITION PRESENT: [ ]mild [ ]moderate [ ]severe [ ]underweight [ ]morbid obesity  https://www.andeal.org/vault/2440/web/files/ONC/Table_Clinical%20Characteristics%20to%20Document%20Malnutrition-White%20JV%20et%20al%202012.pdf    Height (cm): 175.3 (04-17-25 @ 11:56), 175.3 (02-19-25 @ 12:20), 175.3 (01-23-25 @ 23:40)  Weight (kg): 117.8 (04-17-25 @ 11:56), 113.4 (02-19-25 @ 12:20), 120.6 (01-23-25 @ 23:40)  BMI (kg/m2): 38.3 (04-17-25 @ 11:56), 36.9 (02-19-25 @ 12:20), 39.2 (01-23-25 @ 23:40)    [ ]PPSV2 < or = to 30% [ ]significant weight loss  [ ]poor nutritional intake  [ ]anasarca      [ ]Artificial Nutrition          Palliative Care Spiritual/Emotional Screening Tool Question  Severity (0-4):                    OR                    [X ] Unable to determine/NA  Score of 2 or greater indicates recommendation of Chaplaincy referral  Chaplaincy Referral: [ ] Yes [ ] Refused [ ] Following     Caregiver [place]:  [ ] Yes [ ] No    OR    [x ] Unable to determine. Will assess at later time if appropriate.  Social Work Referral [ ]  Patient and Family Centered Care Referral [ ]    Anticipatory Grief Present: [ ] Yes [ ] No    OR     [ x] Unable to determine. Will assess at later time if appropriate.  Social Work Referral [ ]  Patient and Family Centered Care Referral [ ]    REFERRALS:   [ ]Chaplaincy  [ ]Hospice  [ ]Child Life  [ ]Social Work  [ ]Case management [ ]Holistic Therapy     Palliative care education provided to patient and/or family    Goals of Care Document:     ______________  Ang Dunne MD  Palliative Medicine  [facility]   of Geriatric and Palliative Medicine  [phone]

## 2025-04-25 NOTE — PROGRESS NOTE ADULT - SUBJECTIVE AND OBJECTIVE BOX
--------------------------------------------------------------------------------    24 hour events/subjective  F/u skin assessment , pt having multiple loose BM             ROS:  pt unable to offer    ALLERGIES & MEDICATIONS  --------------------------------------------------------------------------------  Allergies    No Known Allergies    Intolerances          STANDING INPATIENT MEDICATIONS    atorvastatin 80 milliGRAM(s) Oral at bedtime  chlorhexidine 2% Cloths 1 Application(s) Topical <User Schedule>  dextrose 5% + sodium chloride 0.45%. 1000 milliLiter(s) IV Continuous <Continuous>  dextrose 5%. 1000 milliLiter(s) IV Continuous <Continuous>  dextrose 5%. 1000 milliLiter(s) IV Continuous <Continuous>  dextrose 5%. 1000 milliLiter(s) IV Continuous <Continuous>  dextrose 50% Injectable 25 Gram(s) IV Push once  dextrose 50% Injectable 12.5 Gram(s) IV Push once  dextrose 50% Injectable 25 Gram(s) IV Push once  folic acid 1 milliGRAM(s) Oral daily  furosemide   Injectable 40 milliGRAM(s) IV Push daily  glucagon  Injectable 1 milliGRAM(s) IntraMuscular once  insulin lispro (ADMELOG) corrective regimen sliding scale   SubCutaneous every 6 hours  lactulose Syrup 20 Gram(s) Enteral Tube every 3 hours  levETIRAcetam   Injectable 250 milliGRAM(s) IV Push <User Schedule>  lidocaine   4% Patch 1 Patch Transdermal daily  metroNIDAZOLE  IVPB 500 milliGRAM(s) IV Intermittent every 12 hours  midodrine 10 milliGRAM(s) Oral every 8 hours  nystatin Powder 1 Application(s) Topical two times a day  octreotide  Infusion 25 MICROgram(s)/Hr IV Continuous <Continuous>  pantoprazole  Injectable 40 milliGRAM(s) IV Push every 12 hours  rifAXIMin 550 milliGRAM(s) Oral two times a day  senna 2 Tablet(s) Oral at bedtime      PRN INPATIENT MEDICATION  acetaminophen     Tablet .. 650 milliGRAM(s) Oral every 6 hours PRN  aluminum hydroxide/magnesium hydroxide/simethicone Suspension 30 milliLiter(s) Oral every 4 hours PRN  dextrose Oral Gel 15 Gram(s) Oral once PRN  melatonin 3 milliGRAM(s) Oral at bedtime PRN  ondansetron Injectable 4 milliGRAM(s) IV Push every 8 hours PRN  polyethylene glycol 3350 17 Gram(s) Oral two times a day PRN        VITALS/PHYSICAL EXAM-   --------------------------------------------------------------------------------  T(C): 36.5 (04-25-25 @ 07:01), Max: 36.7 (04-25-25 @ 04:00)  HR: 94 (04-25-25 @ 08:44) (62 - 145)  BP: 157/79 (04-25-25 @ 08:44) (99/62 - 170/108)  RR: 17 (04-25-25 @ 08:44) (14 - 34)  SpO2: 99% (04-25-25 @ 08:44) (72% - 100%)  Wt(kg): --        04-24-25 @ 07:01  -  04-25-25 @ 07:00  --------------------------------------------------------  IN: 2514 mL / OUT: 720 mL / NET: 1794 mL    04-25-25 @ 07:01  -  04-25-25 @ 10:39  --------------------------------------------------------  IN: 90 mL / OUT: 200 mL / NET: -110 mL            LABS/ CULTURES/ RADIOLOGY:              10.8   9.29  >-----------<  77       [04-25-25 @ 05:33]              33.4     150  |  118  |  14  ----------------------------<  147      [04-25-25 @ 05:33]  5.2   |  19  |  1.3        Ca     7.8     [04-25-25 @ 05:33]      Mg     1.9     [04-24-25 @ 05:53]    TPro  4.3  /  Alb  2.3  /  TBili  1.7  /  DBili  x   /  AST  54  /  ALT  14  /  AlkPhos  99  [04-25-25 @ 05:33]    PT/INR: PT 26.00, INR 2.17       [04-25-25 @ 05:33]  PTT: TNP        [04-25-25 @ 05:33]          [04-24-25 @ 05:53]        CAPILLARY BLOOD GLUCOSE      POCT Blood Glucose.: 155 mg/dL (25 Apr 2025 06:48)  POCT Blood Glucose.: 129 mg/dL (25 Apr 2025 00:14)  POCT Blood Glucose.: 137 mg/dL (24 Apr 2025 18:17)  POCT Blood Glucose.: 204 mg/dL (24 Apr 2025 11:33)        Triglycerides, Serum: 81 mg/dL (04-14-25 @ 05:42)                A1C with Estimated Average Glucose Result: 6.0 % (04-14-25 @ 05:42)  A1C with Estimated Average Glucose Result: 7.6 % (01-24-25 @ 07:04)

## 2025-04-25 NOTE — PROGRESS NOTE ADULT - ASSESSMENT
80 years old male history of hypertension, diabetes, subdural hematoma on Keppra, hx mechanical falls, recent admission (January 2025) for osteomyelitis of L4-5 w completion of abx.  BIBA from home status post change of mental status.     ID is consulted for bacteremia  Afebrile  WBC 7.55 < 7.45  On room air  BCx 4/14 Bacteroides fragilis  BCx 4/16 NGTD  BCx 4/17 NGTD  UA WBC 5, UCx low count P. mirabilis    CT A/P 4/13  1.  Erosive endplate changes centered at the L4-5 level, consistent with   patient's history ofrecent lumbar spine osteomyelitis.  2.  Liver cirrhosis with portal hypertension. Mild-to-moderate ascites.   Anasarca. Hepatic dome subcentimeter hypodense 1.3 cm hypodensity, not   fully characterized . Outpatient MR abdomen with IV contrast recommended.  3.  Chronic pancreatitis, with limited evaluation for acute inflammation   due to background fluid. Correlation with pancreatic enzymes recommended.    Antibiotics:  Vancomycin 4/13  Ampicillin 4/14  Ceftriaxone 4/15 - 4/21  Flagyl 4/15 ->      IMPRESSION:  Bacteroides bacteremia, potentially life threatening  Acute blood loss anemia  Possible SBP?  Liver cirrhosis  Lower GI bleed  Hx L4-L5 osteomyelitis  Liver lesion  Immunosuppression / Immunosenescence secondary to multiple comorbidities which could result in poor clinical outcome    RECOMMENDATIONS:  - Completed 7 days of ceftriaxone, completed albumin x 2 doses  - IV Flagyl 500mg q12hrs for 14 day (until 4/28)  - GI follow up  - Offloading and frequent position changes, aspiration precaution  - Trend WBC, fever curve, transaminases, creatinine daily  - Please inform ID of any patient clinical change or any new pertinent laboratory or radiographic data        * THIS IS AN INCOMPLETE NOTE. FINAL RECOMMENDATION IS PENDING *     80 years old male history of hypertension, diabetes, subdural hematoma on Keppra, hx mechanical falls, recent admission (January 2025) for osteomyelitis of L4-5 w completion of abx.  BIBA from home status post change of mental status.     ID is consulted for bacteremia  Afebrile  WBC 7.55 < 7.45  On room air  BCx 4/14 Bacteroides fragilis  BCx 4/16 NGTD  BCx 4/17 NGTD  UA WBC 5, UCx low count P. mirabilis    CT A/P 4/13  1.  Erosive endplate changes centered at the L4-5 level, consistent with   patient's history ofrecent lumbar spine osteomyelitis.  2.  Liver cirrhosis with portal hypertension. Mild-to-moderate ascites.   Anasarca. Hepatic dome subcentimeter hypodense 1.3 cm hypodensity, not   fully characterized . Outpatient MR abdomen with IV contrast recommended.  3.  Chronic pancreatitis, with limited evaluation for acute inflammation   due to background fluid. Correlation with pancreatic enzymes recommended.    Antibiotics:  Vancomycin 4/13  Ampicillin 4/14  Ceftriaxone 4/15 - 4/21  Flagyl 4/15 ->      IMPRESSION:  Bacteroides bacteremia, potentially life threatening  Acute blood loss anemia  Possible SBP?  Liver cirrhosis  Lower GI bleed  Hx L4-L5 osteomyelitis  Liver lesion  Immunosuppression / Immunosenescence secondary to multiple comorbidities which could result in poor clinical outcome    RECOMMENDATIONS:  - Completed 7 days of ceftriaxone, completed albumin x 2 doses  - IV Flagyl 500mg q12hrs for 14 day (until 4/28)  - GI follow up  - Offloading and frequent position changes, aspiration precaution  - Trend WBC, fever curve, transaminases, creatinine daily  - Please inform ID of any patient clinical change or any new pertinent laboratory or radiographic data      Nora Beck D.O.  Attending Physician  Division of Infectious Diseases  [facility] - [facility]  Please contact me via [employer] Teams

## 2025-04-25 NOTE — PROGRESS NOTE ADULT - SUBJECTIVE AND OBJECTIVE BOX
INFECTIOUS DISEASE FOLLOW UP NOTE:    Interval History/ROS: Patient is a 80y old  Male who presents with a chief complaint of acute mentation changes (25 Apr 2025 08:09)      Overnight events:    REVIEW OF SYSTEMS:        Prior hospital charts reviewed [Yes]  Primary team notes reviewed [Yes]  Other consultant notes reviewed [Yes]    Allergies:  No Known Allergies      ANTIMICROBIALS:   metroNIDAZOLE  IVPB 500 every 12 hours  rifAXIMin 550 two times a day      OTHER MEDS: MEDICATIONS  (STANDING):  acetaminophen     Tablet .. 650 every 6 hours PRN  aluminum hydroxide/magnesium hydroxide/simethicone Suspension 30 every 4 hours PRN  atorvastatin 80 at bedtime  dextrose 50% Injectable 25 once  dextrose 50% Injectable 12.5 once  dextrose 50% Injectable 25 once  dextrose Oral Gel 15 once PRN  furosemide   Injectable 40 daily  glucagon  Injectable 1 once  insulin lispro (ADMELOG) corrective regimen sliding scale  every 6 hours  lactulose Syrup 20 every 3 hours  levETIRAcetam   Injectable 250 <User Schedule>  melatonin 3 at bedtime PRN  midodrine 10 every 8 hours  octreotide  Infusion 25 <Continuous>  ondansetron Injectable 4 every 8 hours PRN  pantoprazole  Injectable 40 every 12 hours  polyethylene glycol 3350 17 two times a day PRN  senna 2 at bedtime      Vital Signs Last 24 Hrs  T(F): 97.7 (04-25-25 @ 07:01), Max: 99.1 (04-22-25 @ 07:01)    Vital Signs Last 24 Hrs  HR: 94 (04-25-25 @ 08:44) (53 - 145)  BP: 157/79 (04-25-25 @ 08:44) (87/52 - 170/108)  RR: 17 (04-25-25 @ 08:44)  SpO2: 99% (04-25-25 @ 08:44) (72% - 100%)  Wt(kg): --    EXAM:      Labs:                        10.8   9.29  )-----------( 77       ( 25 Apr 2025 05:33 )             33.4     04-25    150[H]  |  118[H]  |  14  ----------------------------<  147[H]  5.2[H]   |  19  |  1.3    Ca    7.8[L]      25 Apr 2025 05:33  Mg     1.9     04-24    TPro  4.3[L]  /  Alb  2.3[L]  /  TBili  1.7[H]  /  DBili  x   /  AST  54[H]  /  ALT  14  /  AlkPhos  99  04-25      WBC Trend:  WBC Count: 9.29 (04-25-25 @ 05:33)  WBC Count: 8.43 (04-24-25 @ 14:45)  WBC Count: 6.04 (04-24-25 @ 05:53)  WBC Count: 8.57 (04-23-25 @ 21:50)      Creatine Trend:  Creatinine: 1.3 (04-25)  Creatinine: 1.2 (04-24)  Creatinine: 1.2 (04-23)  Creatinine: 0.8 (04-23)      Liver Biochemical Testing Trend:  Alanine Aminotransferase (ALT/SGPT): 14 (04-25)  Alanine Aminotransferase (ALT/SGPT): 9 (04-24)  Alanine Aminotransferase (ALT/SGPT): 10 (04-23)  Alanine Aminotransferase (ALT/SGPT): 10 (04-22)  Alanine Aminotransferase (ALT/SGPT): 9 (04-21)  Aspartate Aminotransferase (AST/SGOT): 54 (04-25-25 @ 05:33)  Aspartate Aminotransferase (AST/SGOT): 30 (04-24-25 @ 05:53)  Aspartate Aminotransferase (AST/SGOT): 34 (04-23-25 @ 05:31)  Aspartate Aminotransferase (AST/SGOT): 35 (04-22-25 @ 13:40)  Aspartate Aminotransferase (AST/SGOT): 32 (04-21-25 @ 05:34)  Bilirubin Total: 1.7 (04-25)  Bilirubin Total: 1.1 (04-24)  Bilirubin Direct: 0.7 (04-23)  Bilirubin Total: 1.2 (04-23)  Bilirubin Direct: 0.6 (04-22)      Trend LDH  04-24-25 @ 05:53  275[H]      Urinalysis Basic - ( 25 Apr 2025 05:33 )    Color: x / Appearance: x / SG: x / pH: x  Gluc: 147 mg/dL / Ketone: x  / Bili: x / Urobili: x   Blood: x / Protein: x / Nitrite: x   Leuk Esterase: x / RBC: x / WBC x   Sq Epi: x / Non Sq Epi: x / Bacteria: x        MICROBIOLOGY:        Culture - Blood (collected 17 Apr 2025 18:30)  Source: Blood Blood  Final Report:    No growth at 5 days    Culture - Blood (collected 16 Apr 2025 15:15)  Source: Blood None  Final Report:    No growth at 5 days    Urinalysis with Rflx Culture (collected 13 Apr 2025 21:20)    Culture - Blood (collected 13 Apr 2025 21:20)  Source: Blood Blood-Peripheral  Final Report:    Growth in anaerobic bottle: Bacteroides fragilis    "Susceptibilities not performed"    Culture - Urine (collected 13 Apr 2025 21:20)  Source: Catheterized None  Final Report:    10,000 - 49,000 CFU/mL Proteus mirabilis  Organism: Proteus mirabilis  Organism: Proteus mirabilis    Sensitivities:      -  Levofloxacin: S <=0.5      -  Tobramycin: S <=2      -  Nitrofurantoin: R >64 Should not be used to treat pyelonephritis      -  Aztreonam: S <=4      -  Gentamicin: S <=2      -  Cefazolin: S <=2 For uncomplicated UTI with K. pneumoniae, E. coli, or P. mirablis: LOUIS <=16 is sensitive and LOUIS >=32 is resistant. This also predicts results for oral agents cefaclor, cefdinir, cefpodoxime, cefprozil, cefuroxime axetil, cephalexin and locarbef for uncomplicated UTI. Note that some isolates may be susceptible to these agents while testing resistant to cefazolin.      -  Cefepime: S <=2      -  Piperacillin/Tazobactam: S <=8      -  Ciprofloxacin: S <=0.25      -  Ceftriaxone: S <=1      -  Ampicillin: S <=8 These ampicillin results predict results for amoxicillin      Method Type: LOUIS      -  Meropenem: S <=1      -  Ampicillin/Sulbactam: S <=4/2      -  Cefoxitin: S <=8      -  Cefuroxime: S <=4      -  Amoxicillin/Clavulanic Acid: S <=8/4      -  Trimethoprim/Sulfamethoxazole: S <=0.5/9.5      -  Ertapenem: S <=0.5    Culture - Blood (collected 13 Apr 2025 21:20)  Source: Blood Blood-Peripheral  Final Report:    Growth in anaerobic bottle: Bacteroides fragilis "Susceptibilities not    performed"    Direct identification is available within approximately 3-5    hours either by Blood Panel Multiplexed PCR or Direct    MALDI-TOF. Details: [email or web address]  Organism: Blood Culture PCR  Organism: Blood Culture PCR    Sensitivities:      -  Bacteroides fragilis: Detec      Method Type: PCR    Culture - Blood (collected 30 Jan 2025 07:12)  Source: .Blood BLOOD  Final Report:    No growth at 5 days    Culture - Blood (collected 30 Jan 2025 07:12)  Source: .Blood BLOOD  Final Report:    No growth at 5 days    Culture - Blood (collected 29 Jan 2025 07:35)  Source: .Blood BLOOD  Final Report:    No growth at 5 days    Culture - Blood (collected 27 Jan 2025 19:13)  Source: .Blood BLOOD  Final Report:    No growth at 5 days    D-Dimer Assay, Quantitative: 6196 (04-24)    Lactate Dehydrogenase, Serum: 275 (04-24)      RADIOLOGY:  imaging below personally reviewed     INFECTIOUS DISEASE FOLLOW UP NOTE:    Interval History/ROS: Patient is a 80y old  Male who presents with a chief complaint of acute mentation changes (25 Apr 2025 08:09)    Overnight events: Lethargic, but arousable. Having watery diarrhea    REVIEW OF SYSTEMS:  Unable to provide due to cognitive impairment      Prior hospital charts reviewed [Yes]  Primary team notes reviewed [Yes]  Other consultant notes reviewed [Yes]    Allergies:  No Known Allergies      ANTIMICROBIALS:   metroNIDAZOLE  IVPB 500 every 12 hours  rifAXIMin 550 two times a day      OTHER MEDS: MEDICATIONS  (STANDING):  acetaminophen     Tablet .. 650 every 6 hours PRN  aluminum hydroxide/magnesium hydroxide/simethicone Suspension 30 every 4 hours PRN  atorvastatin 80 at bedtime  dextrose 50% Injectable 25 once  dextrose 50% Injectable 12.5 once  dextrose 50% Injectable 25 once  dextrose Oral Gel 15 once PRN  furosemide   Injectable 40 daily  glucagon  Injectable 1 once  insulin lispro (ADMELOG) corrective regimen sliding scale  every 6 hours  lactulose Syrup 20 every 3 hours  levETIRAcetam   Injectable 250 <User Schedule>  melatonin 3 at bedtime PRN  midodrine 10 every 8 hours  octreotide  Infusion 25 <Continuous>  ondansetron Injectable 4 every 8 hours PRN  pantoprazole  Injectable 40 every 12 hours  polyethylene glycol 3350 17 two times a day PRN  senna 2 at bedtime      Vital Signs Last 24 Hrs  T(F): 97.7 (04-25-25 @ 07:01), Max: 99.1 (04-22-25 @ 07:01)    Vital Signs Last 24 Hrs  HR: 94 (04-25-25 @ 08:44) (53 - 145)  BP: 157/79 (04-25-25 @ 08:44) (87/52 - 170/108)  RR: 17 (04-25-25 @ 08:44)  SpO2: 99% (04-25-25 @ 08:44) (72% - 100%)  Wt(kg): --    EXAM:  GENERAL: NAD, lying in bed  HEAD: No head lesions  EYES: Conjunctiva pink and cornea white  EAR, NOSE, MOUTH, THROAT: Normal external ears and nose, no discharges; moist mucous membranes; + NC  NECK: Supple, nontender to palpation; no JVD  RESPIRATORY: Bibasilar crackles  CARDIOVASCULAR: S1 S2  GASTROINTESTINAL: Soft, no significant distention; normoactive bowel sounds  GENITOURINARY: + loyola catheter, no CVA tenderness  EXTREMITIES: No clubbing, cyanosis, + diffuse anasarca  NERVOUS SYSTEM: Lethargic today  MUSCULOSKELETAL: No joint erythema, swelling  SKIN: No rashes or lesions, no superficial thrombophlebitis  PSYCH: Lethargic    Labs:                        10.8   9.29  )-----------( 77       ( 25 Apr 2025 05:33 )             33.4     04-25    150[H]  |  118[H]  |  14  ----------------------------<  147[H]  5.2[H]   |  19  |  1.3    Ca    7.8[L]      25 Apr 2025 05:33  Mg     1.9     04-24    TPro  4.3[L]  /  Alb  2.3[L]  /  TBili  1.7[H]  /  DBili  x   /  AST  54[H]  /  ALT  14  /  AlkPhos  99  04-25      WBC Trend:  WBC Count: 9.29 (04-25-25 @ 05:33)  WBC Count: 8.43 (04-24-25 @ 14:45)  WBC Count: 6.04 (04-24-25 @ 05:53)  WBC Count: 8.57 (04-23-25 @ 21:50)      Creatine Trend:  Creatinine: 1.3 (04-25)  Creatinine: 1.2 (04-24)  Creatinine: 1.2 (04-23)  Creatinine: 0.8 (04-23)      Liver Biochemical Testing Trend:  Alanine Aminotransferase (ALT/SGPT): 14 (04-25)  Alanine Aminotransferase (ALT/SGPT): 9 (04-24)  Alanine Aminotransferase (ALT/SGPT): 10 (04-23)  Alanine Aminotransferase (ALT/SGPT): 10 (04-22)  Alanine Aminotransferase (ALT/SGPT): 9 (04-21)  Aspartate Aminotransferase (AST/SGOT): 54 (04-25-25 @ 05:33)  Aspartate Aminotransferase (AST/SGOT): 30 (04-24-25 @ 05:53)  Aspartate Aminotransferase (AST/SGOT): 34 (04-23-25 @ 05:31)  Aspartate Aminotransferase (AST/SGOT): 35 (04-22-25 @ 13:40)  Aspartate Aminotransferase (AST/SGOT): 32 (04-21-25 @ 05:34)  Bilirubin Total: 1.7 (04-25)  Bilirubin Total: 1.1 (04-24)  Bilirubin Direct: 0.7 (04-23)  Bilirubin Total: 1.2 (04-23)  Bilirubin Direct: 0.6 (04-22)      Trend LDH  04-24-25 @ 05:53  275[H]      Urinalysis Basic - ( 25 Apr 2025 05:33 )    Color: x / Appearance: x / SG: x / pH: x  Gluc: 147 mg/dL / Ketone: x  / Bili: x / Urobili: x   Blood: x / Protein: x / Nitrite: x   Leuk Esterase: x / RBC: x / WBC x   Sq Epi: x / Non Sq Epi: x / Bacteria: x        MICROBIOLOGY:        Culture - Blood (collected 17 Apr 2025 18:30)  Source: Blood Blood  Final Report:    No growth at 5 days    Culture - Blood (collected 16 Apr 2025 15:15)  Source: Blood None  Final Report:    No growth at 5 days    Urinalysis with Rflx Culture (collected 13 Apr 2025 21:20)    Culture - Blood (collected 13 Apr 2025 21:20)  Source: Blood Blood-Peripheral  Final Report:    Growth in anaerobic bottle: Bacteroides fragilis    "Susceptibilities not performed"    Culture - Urine (collected 13 Apr 2025 21:20)  Source: Catheterized None  Final Report:    10,000 - 49,000 CFU/mL Proteus mirabilis  Organism: Proteus mirabilis  Organism: Proteus mirabilis    Sensitivities:      -  Levofloxacin: S <=0.5      -  Tobramycin: S <=2      -  Nitrofurantoin: R >64 Should not be used to treat pyelonephritis      -  Aztreonam: S <=4      -  Gentamicin: S <=2      -  Cefazolin: S <=2 For uncomplicated UTI with K. pneumoniae, E. coli, or P. mirablis: LOUIS <=16 is sensitive and LOUIS >=32 is resistant. This also predicts results for oral agents cefaclor, cefdinir, cefpodoxime, cefprozil, cefuroxime axetil, cephalexin and locarbef for uncomplicated UTI. Note that some isolates may be susceptible to these agents while testing resistant to cefazolin.      -  Cefepime: S <=2      -  Piperacillin/Tazobactam: S <=8      -  Ciprofloxacin: S <=0.25      -  Ceftriaxone: S <=1      -  Ampicillin: S <=8 These ampicillin results predict results for amoxicillin      Method Type: LOUIS      -  Meropenem: S <=1      -  Ampicillin/Sulbactam: S <=4/2      -  Cefoxitin: S <=8      -  Cefuroxime: S <=4      -  Amoxicillin/Clavulanic Acid: S <=8/4      -  Trimethoprim/Sulfamethoxazole: S <=0.5/9.5      -  Ertapenem: S <=0.5    Culture - Blood (collected 13 Apr 2025 21:20)  Source: Blood Blood-Peripheral  Final Report:    Growth in anaerobic bottle: Bacteroides fragilis "Susceptibilities not    performed"    Direct identification is available within approximately 3-5    hours either by Blood Panel Multiplexed PCR or Direct    MALDI-TOF. Details: [email or web address]  Organism: Blood Culture PCR  Organism: Blood Culture PCR    Sensitivities:      -  Bacteroides fragilis: Detec      Method Type: PCR    Culture - Blood (collected 30 Jan 2025 07:12)  Source: .Blood BLOOD  Final Report:    No growth at 5 days    Culture - Blood (collected 30 Jan 2025 07:12)  Source: .Blood BLOOD  Final Report:    No growth at 5 days    Culture - Blood (collected 29 Jan 2025 07:35)  Source: .Blood BLOOD  Final Report:    No growth at 5 days    Culture - Blood (collected 27 Jan 2025 19:13)  Source: .Blood BLOOD  Final Report:    No growth at 5 days    D-Dimer Assay, Quantitative: 6196 (04-24)    Lactate Dehydrogenase, Serum: 275 (04-24)      RADIOLOGY:  imaging below personally reviewed    < from: [facility] Duplex Upper Ext Vein Scan, Right (04.24.25 @ 12:30) >  IMPRESSION:  No evidence of right upper extremity deep venous thrombosis.  Right cephalic vein superficial thrombophlebitis    < end of copied text >    < from: [facility] Duplex Lower Extrem Arterial, Bilat (04.24.25 @ 12:27) >  Impression:    Normal arterial flow in the bilateral lower extremities.    < end of copied text >

## 2025-04-25 NOTE — PROGRESS NOTE ADULT - ASSESSMENT
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 fragilis bacteremia, and acute blood loss anemia. Patient is on IV morphine for pain. Patient is on lactulose for encephalopathy, IV abx, and frequent H/H monitoring, requiring pRBC. Palliative consulted for GOC. Patient is full code.    - left message again for wife today 4/25 with palliative callback to discuss GOC, patient is confused today; awaiting callback   - high risk, on octreotide gtt, IV lasix (monitor I/O), requiring ICU care  - monitor AMS, c/w lactulose, monitor in ICU  - will follow    ______________  Ang Dunne MD  Palliative Medicine  [facility]   of Geriatric and Palliative Medicine  [phone]

## 2025-04-25 NOTE — PROGRESS NOTE ADULT - SUBJECTIVE AND OBJECTIVE BOX
Patient is a 80y old  Male who presents with a chief complaint of acute mentation changes (24 Apr 2025 15:50)        Over Night Events:    Bloody BM noted, dark, hgb stable, likely old blood, off pressors    ROS:  See HPI    PHYSICAL EXAM    ICU Vital Signs Last 24 Hrs  T(C): 36.5 (25 Apr 2025 07:01), Max: 36.7 (25 Apr 2025 04:00)  T(F): 97.7 (25 Apr 2025 07:01), Max: 98.1 (25 Apr 2025 04:00)  HR: 126 (25 Apr 2025 06:00) (53 - 145)  BP: 115/81 (25 Apr 2025 06:00) (87/52 - 170/108)  BP(mean): 92 (25 Apr 2025 06:00) (65 - 133)  RR: 18 (25 Apr 2025 07:01) (13 - 34)  SpO2: 96% (25 Apr 2025 06:00) (72% - 100%)    O2 Parameters below as of 24 Apr 2025 23:35  Patient On (Oxygen Delivery Method): nasal cannula  O2 Flow (L/min): 2    General: NAD  Lungs: Bilateral BS  Cardiovascular: Regular   Gastrointestinal: Soft,   Extremities: No clubbing  Skin: Warm  Neurological: awake, but still confused      04-24-25 @ 07:01  -  04-25-25 @ 07:00  --------------------------------------------------------  IN:    dextrose 5% + sodium chloride 0.45%: 400 mL    Enteral Tube Flush: 940 mL    IV PiggyBack: 401 mL    Octreotide: 115 mL    PRBCs (Packed Red Blood Cells): 658 mL  Total IN: 2514 mL    OUT:    Blood Loss (mL): 400 mL    Dexmedetomidine: 0 mL    Indwelling Catheter - Urethral (mL): 320 mL    Norepinephrine: 0 mL  Total OUT: 720 mL    Total NET: 1794 mL    04-25-25 @ 07:01  -  04-25-25 @ 08:14  --------------------------------------------------------  IN:  Total IN: 0 mL    OUT:    Indwelling Catheter - Urethral (mL): 200 mL  Total OUT: 200 mL    Total NET: -200 mL    LABS:                        10.8   9.29  )-----------( 77       ( 25 Apr 2025 05:33 )             33.4                                               04-25    150[H]  |  118[H]  |  14  ----------------------------<  147[H]  5.2[H]   |  19  |  1.3    Ca    7.8[L]      25 Apr 2025 05:33  Mg     1.9     04-24    TPro  4.3[L]  /  Alb  2.3[L]  /  TBili  1.7[H]  /  DBili  x   /  AST  54[H]  /  ALT  14  /  AlkPhos  99  04-25      PT/INR - ( 25 Apr 2025 05:33 )   PT: 26.00 sec;   INR: 2.17 ratio         PTT - ( 25 Apr 2025 05:33 )  PTT:TNP sec                                       Urinalysis Basic - ( 25 Apr 2025 05:33 )    Color: x / Appearance: x / SG: x / pH: x  Gluc: 147 mg/dL / Ketone: x  / Bili: x / Urobili: x   Blood: x / Protein: x / Nitrite: x   Leuk Esterase: x / RBC: x / WBC x   Sq Epi: x / Non Sq Epi: x / Bacteria: x                                              LIVER FUNCTIONS - ( 25 Apr 2025 05:33 )  Alb: 2.3 g/dL / Pro: 4.3 g/dL / ALK PHOS: 99 U/L / ALT: 14 U/L / AST: 54 U/L / GGT: x                                                                                                                                       MEDICATIONS  (STANDING):  atorvastatin 80 milliGRAM(s) Oral at bedtime  chlorhexidine 2% Cloths 1 Application(s) Topical <User Schedule>  dextrose 5% + sodium chloride 0.45%. 1000 milliLiter(s) (50 mL/Hr) IV Continuous <Continuous>  dextrose 5%. 1000 milliLiter(s) (100 mL/Hr) IV Continuous <Continuous>  dextrose 5%. 1000 milliLiter(s) (50 mL/Hr) IV Continuous <Continuous>  dextrose 50% Injectable 25 Gram(s) IV Push once  dextrose 50% Injectable 12.5 Gram(s) IV Push once  dextrose 50% Injectable 25 Gram(s) IV Push once  folic acid 1 milliGRAM(s) Oral daily  glucagon  Injectable 1 milliGRAM(s) IntraMuscular once  insulin lispro (ADMELOG) corrective regimen sliding scale   SubCutaneous every 6 hours  lactulose Syrup 20 Gram(s) Enteral Tube every 3 hours  levETIRAcetam   Injectable 250 milliGRAM(s) IV Push <User Schedule>  lidocaine   4% Patch 1 Patch Transdermal daily  metroNIDAZOLE  IVPB 500 milliGRAM(s) IV Intermittent every 12 hours  midodrine 10 milliGRAM(s) Oral every 8 hours  nystatin Powder 1 Application(s) Topical two times a day  octreotide  Infusion 25 MICROgram(s)/Hr (5 mL/Hr) IV Continuous <Continuous>  pantoprazole  Injectable 40 milliGRAM(s) IV Push every 12 hours  rifAXIMin 550 milliGRAM(s) Oral two times a day  senna 2 Tablet(s) Oral at bedtime    MEDICATIONS  (PRN):  acetaminophen     Tablet .. 650 milliGRAM(s) Oral every 6 hours PRN Temp greater or equal to 38C (100.4F), Mild Pain (1 - 3)  aluminum hydroxide/magnesium hydroxide/simethicone Suspension 30 milliLiter(s) Oral every 4 hours PRN Dyspepsia  dextrose Oral Gel 15 Gram(s) Oral once PRN Blood Glucose LESS THAN 70 milliGRAM(s)/deciliter  melatonin 3 milliGRAM(s) Oral at bedtime PRN Insomnia  ondansetron Injectable 4 milliGRAM(s) IV Push every 8 hours PRN Nausea and/or Vomiting  polyethylene glycol 3350 17 Gram(s) Oral two times a day PRN Constipation      Xrays:                                                                                     ECHO

## 2025-04-25 NOTE — PROGRESS NOTE ADULT - ASSESSMENT
80yMale pmh HTN, DM, subdural hematoma on keppra, osteomyelitis of L4-5 had completion of abx here for AMS. GI consulted for liver cirrhosis rule HE. Patient with some blood in stool., no hematemesis.      # Liver cirrhosis with portal hypertension. Mild-to-moderate ascites.   Anasarca. Hepatic dome subcentimeter hypodense 1.3 cm hypodensity, not fully characterized    Rec  -MR liver protocol as outpatient   -For moderate ascites if there is a window, get ultrasound guided diagnostic / therapeutic paracentesis: Obtain serum albumin same day as paracentesis  -Obtain fluid studies: Paracentesis Panel which includes Cell count and differential, albumin, total protein, cytology, AFB smear and culture. Separate order of amylase fluid and triglycerides fluid  -MELD Score-15  -follow up with hepatology as outpatient   -INR higher consider IV vitamin k 10mg x 3 days  -avoid diuretics  -nephrology eval appreciated    -HE-very likely in light of elevated ammonia - give lactulose and  titrate to 3-4 bms / daily, it was just restarted night of 4/22  neurology eval appreciated  -For Vaccinations: Please f/u in clinic for being uptodate with HAV/HBV/Influenza/Pneumococcal vaccines.  -Lifestyle/Dietary modifications: Calorie intake 25-40 Kcal/kg/day; Protein intake: 1.2-1.5 gm/kg/day  -Avoid smoking and NSAIDs  -Abstain from alcohol and all illicit drugs      #anemia episode of maroon colored stool reported  rectal exam-4/15/25-brown stool in rectal vault and on finger  patient with small bm with minimal blood in it 4/23/25 reported by nursing team    Rec  -transfuse prn to hgb >7  - s/p EGD and colonoscopy revealing duodenal ulcer.   - repeat colonoscopy in one year if within goals of care  -PO PPI BID   -Monitor Hb and bms  -Follow up with our [facility] located at [address] [place] [address]. Phone Number: [phone]

## 2025-04-25 NOTE — PROGRESS NOTE ADULT - SUBJECTIVE AND OBJECTIVE BOX
80yMale  Being followed for blood in stool?  Interval history:  No hematemesis, melena, blood in stool reported today.      PAST MEDICAL & SURGICAL HISTORY:   Diabetes  Hypertension  Fall  H/O left knee surgery  History of cholecystectomy  History of appendectomy                Social History: No smoking. No alcohol. No illegal drug use.            MEDICATIONS  (STANDING):  atorvastatin 80 milliGRAM(s) Oral at bedtime  chlorhexidine 2% Cloths 1 Application(s) Topical <User Schedule>  dextrose 5% + sodium chloride 0.45%. 1000 milliLiter(s) (50 mL/Hr) IV Continuous <Continuous>  dextrose 5%. 1000 milliLiter(s) (100 mL/Hr) IV Continuous <Continuous>  dextrose 5%. 1000 milliLiter(s) (50 mL/Hr) IV Continuous <Continuous>  dextrose 5%. 1000 milliLiter(s) (75 mL/Hr) IV Continuous <Continuous>  dextrose 50% Injectable 25 Gram(s) IV Push once  dextrose 50% Injectable 12.5 Gram(s) IV Push once  dextrose 50% Injectable 25 Gram(s) IV Push once  folic acid 1 milliGRAM(s) Oral daily  furosemide   Injectable 40 milliGRAM(s) IV Push daily  glucagon  Injectable 1 milliGRAM(s) IntraMuscular once  insulin lispro (ADMELOG) corrective regimen sliding scale   SubCutaneous every 6 hours  lactulose Syrup 20 Gram(s) Enteral Tube every 3 hours  levETIRAcetam   Injectable 250 milliGRAM(s) IV Push <User Schedule>  lidocaine   4% Patch 1 Patch Transdermal daily  metroNIDAZOLE  IVPB 500 milliGRAM(s) IV Intermittent every 12 hours  midodrine 10 milliGRAM(s) Oral every 8 hours  nystatin Powder 1 Application(s) Topical two times a day  octreotide  Infusion 25 MICROgram(s)/Hr (5 mL/Hr) IV Continuous <Continuous>  pantoprazole  Injectable 40 milliGRAM(s) IV Push every 12 hours  rifAXIMin 550 milliGRAM(s) Oral two times a day  senna 2 Tablet(s) Oral at bedtime  silver sulfADIAZINE 1% Cream 1 Application(s) Topical two times a day    MEDICATIONS  (PRN):  acetaminophen     Tablet .. 650 milliGRAM(s) Oral every 6 hours PRN Temp greater or equal to 38C (100.4F), Mild Pain (1 - 3)  aluminum hydroxide/magnesium hydroxide/simethicone Suspension 30 milliLiter(s) Oral every 4 hours PRN Dyspepsia  dextrose Oral Gel 15 Gram(s) Oral once PRN Blood Glucose LESS THAN 70 milliGRAM(s)/deciliter  melatonin 3 milliGRAM(s) Oral at bedtime PRN Insomnia  ondansetron Injectable 4 milliGRAM(s) IV Push every 8 hours PRN Nausea and/or Vomiting  polyethylene glycol 3350 17 Gram(s) Oral two times a day PRN Constipation      Allergies:   No Known Allergies  Intolerances          REVIEW OF SYSTEMS:  unobtainable       VITAL SIGNS:   T(F): 98.7 (04-25-25 @ 11:00), Max: 98.7 (04-25-25 @ 11:00)  HR: 109 (04-25-25 @ 12:00) (62 - 145)  BP: 156/96 (04-25-25 @ 12:00) (99/62 - 170/108)  RR: 16 (04-25-25 @ 12:00) (14 - 34)  SpO2: 99% (04-25-25 @ 12:00) (72% - 100%)    PHYSICAL EXAM:  GENERAL: not responding to prompts +NG tube  HEAD:  Atraumatic, Normocephalic  EYES: conjunctiva and sclera clear  NECK: Supple, no thyromegaly  CHEST/LUNG: b/l rhonchi   HEART: Regular rate and rhythm; normal S1, S2, No murmurs.  ABDOMEN: Soft, nontender, nondistended; Bowel sounds present  NEUROLOGY: No asterixis or tremor.   SKIN: Intact, no jaundice            LABS:                        10.8   9.29  )-----------( 77       ( 25 Apr 2025 05:33 )             33.4     04-25    150[H]  |  118[H]  |  14  ----------------------------<  147[H]  5.2[H]   |  19  |  1.3    Ca    7.8[L]      25 Apr 2025 05:33  Mg     1.9     04-24    TPro  4.3[L]  /  Alb  2.3[L]  /  TBili  1.7[H]  /  DBili  x   /  AST  54[H]  /  ALT  14  /  AlkPhos  99  04-25    LIVER FUNCTIONS - ( 25 Apr 2025 05:33 )  Alb: 2.3 g/dL / Pro: 4.3 g/dL / ALK PHOS: 99 U/L / ALT: 14 U/L / AST: 54 U/L / GGT: x           PT/INR - ( 25 Apr 2025 05:33 )   PT: 26.00 sec;   INR: 2.17 ratio         PTT - ( 25 Apr 2025 05:33 )  PTT:TNP sec    IMAGING:    < from: CT Angio Abdomen and Pelvis w/ IV Cont (04.23.25 @ 21:11) >    ACC: [id number] EXAM:  CT ANGIO ABD PELV (W)AW IC   ORDERED BY: LYNDA LLANOS     PROCEDURE DATE:  04/23/2025          INTERPRETATION:  CLINICAL INFORMATION: Anemia. Melena. GI bleeding.   Evaluate for active extravasation.  WBC 7.11  Hgb 6.2  PMHx of   cirrhosis.DM, HTN, cholecystectomy, appendectomy    COMPARISON: CT scan of the abdomen and pelvis dated 4/13/2025      CONTRAST/COMPLICATIONS:  IV Contrast: Omnipaque 350  95 cc administered   5 cc discarded  Oral Contrast: None  Complications: None reported at time of study completion    PROCEDURE:  CT of the Abdomen and Pelvis was performed.  Precontrast, Arterial and Delayed phases were performed.  Sagittal and coronal reformats were performed.    FINDINGS:    TUBES: NG tube in place extending to the stomach.  LOWER CHEST: Moderate bilateral pleural effusions with areas of   atelectasis at both lung bases. Cardiomegaly. No pericardial effusion.    LIVER: Small lobulated cirrhotic liver. No evidence of mass. The portal   vein is patent.  BILE DUCTS: Normal caliber.  GALLBLADDER: Post cholecystectomy.  SPLEEN: Within normal limits.  PANCREAS: Pancreatic atrophy with multiple calcifications.  ADRENALS: Within normal limits.  KIDNEYS/URETERS: There is symmetric renal enhancement. Noevidence of   hydronephrosis, calcified stones, or solid renal mass.    BLADDER: The bladder is decompressed with Michel catheter in place.  REPRODUCTIVE ORGANS: No evidence of pelvic mass or fluid collection.    BOWEL: No evidence of active GI bleeding. Endoscopic clip in the region   of the descending duodenum.  No evidence of obstruction, colitis, inflammatory process. Post   appendectomy.    PERITONEUM/RETROPERITONEUM: Moderate amount of ascites is noted in the   upper abdomen and pelvis. No evidence of free intraperitoneal air.  VESSELS: Normal caliber aorta. 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  LYMPH NODES: No lymphadenopathy.  ABDOMINAL WALL: Anasarca  BONES: Degenerative changes of the spine are noted. Chronic fracture   right 10th rib.    IMPRESSION:    No evidence of active GI bleeding.    Small lobulated cirrhotic liver.    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    --- End of Report ---            ISAAC MURPHY MD; Attending Interventional Radiologist  This document has been electronically signed. Apr 23 2025 10:30PM    < end of copied text >

## 2025-04-26 LAB
ALBUMIN SERPL ELPH-MCNC: 2 G/DL — LOW (ref 3.5–5.2)
ALP SERPL-CCNC: 106 U/L — SIGNIFICANT CHANGE UP (ref 30–115)
ALT FLD-CCNC: 14 U/L — SIGNIFICANT CHANGE UP (ref 0–41)
ANION GAP SERPL CALC-SCNC: 11 MMOL/L — SIGNIFICANT CHANGE UP (ref 7–14)
ANION GAP SERPL CALC-SCNC: 16 MMOL/L — HIGH (ref 7–14)
ANION GAP SERPL CALC-SCNC: 17 MMOL/L — HIGH (ref 7–14)
ANISOCYTOSIS BLD QL: SIGNIFICANT CHANGE UP
APTT BLD: 58 SEC — HIGH (ref 27–39.2)
AST SERPL-CCNC: 51 U/L — HIGH (ref 0–41)
BASOPHILS # BLD AUTO: 0.02 K/UL — SIGNIFICANT CHANGE UP (ref 0–0.2)
BASOPHILS # BLD AUTO: 0.14 K/UL — SIGNIFICANT CHANGE UP (ref 0–0.2)
BASOPHILS NFR BLD AUTO: 0.2 % — SIGNIFICANT CHANGE UP (ref 0–1)
BASOPHILS NFR BLD AUTO: 1 % — SIGNIFICANT CHANGE UP (ref 0–1)
BILIRUB SERPL-MCNC: 1.5 MG/DL — HIGH (ref 0.2–1.2)
BUN SERPL-MCNC: 17 MG/DL — SIGNIFICANT CHANGE UP (ref 10–20)
BUN SERPL-MCNC: 18 MG/DL — SIGNIFICANT CHANGE UP (ref 10–20)
BUN SERPL-MCNC: 18 MG/DL — SIGNIFICANT CHANGE UP (ref 10–20)
BURR CELLS BLD QL SMEAR: PRESENT — SIGNIFICANT CHANGE UP
CALCIUM SERPL-MCNC: 7.6 MG/DL — LOW (ref 8.4–10.5)
CALCIUM SERPL-MCNC: 7.7 MG/DL — LOW (ref 8.4–10.5)
CALCIUM SERPL-MCNC: 7.7 MG/DL — LOW (ref 8.4–10.5)
CHLORIDE SERPL-SCNC: 116 MMOL/L — HIGH (ref 98–110)
CHLORIDE SERPL-SCNC: 119 MMOL/L — HIGH (ref 98–110)
CHLORIDE SERPL-SCNC: 120 MMOL/L — HIGH (ref 98–110)
CO2 SERPL-SCNC: 14 MMOL/L — LOW (ref 17–32)
CO2 SERPL-SCNC: 16 MMOL/L — LOW (ref 17–32)
CO2 SERPL-SCNC: 19 MMOL/L — SIGNIFICANT CHANGE UP (ref 17–32)
CREAT SERPL-MCNC: 1.4 MG/DL — SIGNIFICANT CHANGE UP (ref 0.7–1.5)
CREAT SERPL-MCNC: 1.5 MG/DL — SIGNIFICANT CHANGE UP (ref 0.7–1.5)
CREAT SERPL-MCNC: 1.6 MG/DL — HIGH (ref 0.7–1.5)
DACRYOCYTES BLD QL SMEAR: SLIGHT — SIGNIFICANT CHANGE UP
EGFR: 44 ML/MIN/1.73M2 — LOW
EGFR: 44 ML/MIN/1.73M2 — LOW
EGFR: 47 ML/MIN/1.73M2 — LOW
EGFR: 47 ML/MIN/1.73M2 — LOW
EGFR: 51 ML/MIN/1.73M2 — LOW
EGFR: 51 ML/MIN/1.73M2 — LOW
EOSINOPHIL # BLD AUTO: 0.12 K/UL — SIGNIFICANT CHANGE UP (ref 0–0.7)
EOSINOPHIL # BLD AUTO: 0.41 K/UL — SIGNIFICANT CHANGE UP (ref 0–0.7)
EOSINOPHIL NFR BLD AUTO: 1.2 % — SIGNIFICANT CHANGE UP (ref 0–8)
EOSINOPHIL NFR BLD AUTO: 3 % — SIGNIFICANT CHANGE UP (ref 0–8)
FIBRINOGEN AG PPP IA-MCNC: 106 MG/DL — LOW (ref 233–496)
GLUCOSE BLDC GLUCOMTR-MCNC: 160 MG/DL — HIGH (ref 70–99)
GLUCOSE BLDC GLUCOMTR-MCNC: 185 MG/DL — HIGH (ref 70–99)
GLUCOSE BLDC GLUCOMTR-MCNC: 191 MG/DL — HIGH (ref 70–99)
GLUCOSE SERPL-MCNC: 163 MG/DL — HIGH (ref 70–99)
GLUCOSE SERPL-MCNC: 167 MG/DL — HIGH (ref 70–99)
GLUCOSE SERPL-MCNC: 168 MG/DL — HIGH (ref 70–99)
HCT VFR BLD CALC: 22.7 % — LOW (ref 42–52)
HCT VFR BLD CALC: 23.4 % — LOW (ref 42–52)
HGB BLD-MCNC: 7.1 G/DL — LOW (ref 14–18)
HGB BLD-MCNC: 7.6 G/DL — LOW (ref 14–18)
IMM GRANULOCYTES NFR BLD AUTO: 0.9 % — HIGH (ref 0.1–0.3)
INR BLD: 2.33 RATIO — HIGH (ref 0.65–1.3)
LYMPHOCYTES # BLD AUTO: 17 % — LOW (ref 20.5–51.1)
LYMPHOCYTES # BLD AUTO: 2.24 K/UL — SIGNIFICANT CHANGE UP (ref 1.2–3.4)
LYMPHOCYTES # BLD AUTO: 2.3 K/UL — SIGNIFICANT CHANGE UP (ref 1.2–3.4)
LYMPHOCYTES # BLD AUTO: 22.6 % — SIGNIFICANT CHANGE UP (ref 20.5–51.1)
MACROCYTES BLD QL: SLIGHT — SIGNIFICANT CHANGE UP
MAGNESIUM SERPL-MCNC: 1.7 MG/DL — LOW (ref 1.8–2.4)
MCHC RBC-ENTMCNC: 30.5 PG — SIGNIFICANT CHANGE UP (ref 27–31)
MCHC RBC-ENTMCNC: 31.1 PG — HIGH (ref 27–31)
MCHC RBC-ENTMCNC: 31.3 G/DL — LOW (ref 32–37)
MCHC RBC-ENTMCNC: 32.5 G/DL — SIGNIFICANT CHANGE UP (ref 32–37)
MCV RBC AUTO: 94 FL — SIGNIFICANT CHANGE UP (ref 80–94)
MCV RBC AUTO: 99.6 FL — HIGH (ref 80–94)
MICROCYTES BLD QL: SLIGHT — SIGNIFICANT CHANGE UP
MONOCYTES # BLD AUTO: 0.68 K/UL — HIGH (ref 0.1–0.6)
MONOCYTES # BLD AUTO: 0.72 K/UL — HIGH (ref 0.1–0.6)
MONOCYTES NFR BLD AUTO: 5 % — SIGNIFICANT CHANGE UP (ref 1.7–9.3)
MONOCYTES NFR BLD AUTO: 7.3 % — SIGNIFICANT CHANGE UP (ref 1.7–9.3)
NEUTROPHILS # BLD AUTO: 10 K/UL — HIGH (ref 1.4–6.5)
NEUTROPHILS # BLD AUTO: 6.72 K/UL — HIGH (ref 1.4–6.5)
NEUTROPHILS NFR BLD AUTO: 67 % — SIGNIFICANT CHANGE UP (ref 42.2–75.2)
NEUTROPHILS NFR BLD AUTO: 67.8 % — SIGNIFICANT CHANGE UP (ref 42.2–75.2)
NEUTS BAND # BLD: 7 % — HIGH (ref 0–6)
NEUTS BAND NFR BLD: 7 % — HIGH (ref 0–6)
NRBC # BLD: 0 /100 WBCS — SIGNIFICANT CHANGE UP (ref 0–0)
NRBC BLD AUTO-RTO: 0 /100 WBCS — SIGNIFICANT CHANGE UP (ref 0–0)
NRBC BLD AUTO-RTO: SIGNIFICANT CHANGE UP /100 WBCS (ref 0–0)
NRBC BLD-RTO: 0 /100 WBCS — SIGNIFICANT CHANGE UP (ref 0–0)
PLAT MORPH BLD: NORMAL — SIGNIFICANT CHANGE UP
PLATELET # BLD AUTO: 104 K/UL — LOW (ref 130–400)
PLATELET # BLD AUTO: 89 K/UL — LOW (ref 130–400)
PLATELET COUNT - ESTIMATE: ABNORMAL
PMV BLD: 9 FL — SIGNIFICANT CHANGE UP (ref 7.4–10.4)
PMV BLD: 9.4 FL — SIGNIFICANT CHANGE UP (ref 7.4–10.4)
POLYCHROMASIA BLD QL SMEAR: SIGNIFICANT CHANGE UP
POTASSIUM SERPL-MCNC: 4.7 MMOL/L — SIGNIFICANT CHANGE UP (ref 3.5–5)
POTASSIUM SERPL-MCNC: 5.2 MMOL/L — HIGH (ref 3.5–5)
POTASSIUM SERPL-MCNC: 5.2 MMOL/L — HIGH (ref 3.5–5)
POTASSIUM SERPL-SCNC: 4.7 MMOL/L — SIGNIFICANT CHANGE UP (ref 3.5–5)
POTASSIUM SERPL-SCNC: 5.2 MMOL/L — HIGH (ref 3.5–5)
POTASSIUM SERPL-SCNC: 5.2 MMOL/L — HIGH (ref 3.5–5)
PROT SERPL-MCNC: 4 G/DL — LOW (ref 6–8)
PROTHROM AB SERPL-ACNC: 28 SEC — HIGH (ref 9.95–12.87)
RBC # BLD: 2.28 M/UL — LOW (ref 4.7–6.1)
RBC # BLD: 2.49 M/UL — LOW (ref 4.7–6.1)
RBC # FLD: 20.8 % — HIGH (ref 11.5–14.5)
RBC # FLD: 21.7 % — HIGH (ref 11.5–14.5)
RBC BLD AUTO: ABNORMAL
SCHISTOCYTES BLD QL AUTO: SLIGHT — SIGNIFICANT CHANGE UP
SODIUM SERPL-SCNC: 148 MMOL/L — HIGH (ref 135–146)
SODIUM SERPL-SCNC: 149 MMOL/L — HIGH (ref 135–146)
SODIUM SERPL-SCNC: 151 MMOL/L — HIGH (ref 135–146)
WBC # BLD: 13.51 K/UL — HIGH (ref 4.8–10.8)
WBC # BLD: 9.91 K/UL — SIGNIFICANT CHANGE UP (ref 4.8–10.8)
WBC # FLD AUTO: 13.51 K/UL — HIGH (ref 4.8–10.8)
WBC # FLD AUTO: 9.91 K/UL — SIGNIFICANT CHANGE UP (ref 4.8–10.8)

## 2025-04-26 PROCEDURE — 99233 SBSQ HOSP IP/OBS HIGH 50: CPT

## 2025-04-26 PROCEDURE — 99232 SBSQ HOSP IP/OBS MODERATE 35: CPT | Mod: GC

## 2025-04-26 RX ORDER — SODIUM CHLORIDE 9 G/1000ML
1000 INJECTION, SOLUTION INTRAVENOUS
Refills: 0 | Status: COMPLETED | OUTPATIENT
Start: 2025-04-26 | End: 2025-04-27

## 2025-04-26 RX ORDER — SODIUM CHLORIDE 9 G/1000ML
1000 INJECTION, SOLUTION INTRAVENOUS
Refills: 0 | Status: DISCONTINUED | OUTPATIENT
Start: 2025-04-26 | End: 2025-04-26

## 2025-04-26 RX ORDER — SODIUM ZIRCONIUM CYCLOSILICATE 5 G/5G
10 POWDER, FOR SUSPENSION ORAL ONCE
Refills: 0 | Status: COMPLETED | OUTPATIENT
Start: 2025-04-26 | End: 2025-04-26

## 2025-04-26 RX ADMIN — FOLIC ACID 1 MILLIGRAM(S): 1 TABLET ORAL at 12:15

## 2025-04-26 RX ADMIN — LACTULOSE 20 GRAM(S): 10 SOLUTION ORAL at 03:55

## 2025-04-26 RX ADMIN — Medication 40 MILLIGRAM(S): at 17:44

## 2025-04-26 RX ADMIN — LIDOCAINE HYDROCHLORIDE 1 PATCH: 20 JELLY TOPICAL at 19:56

## 2025-04-26 RX ADMIN — SODIUM CHLORIDE 50 MILLILITER(S): 9 INJECTION, SOLUTION INTRAVENOUS at 12:16

## 2025-04-26 RX ADMIN — Medication 1 APPLICATION(S): at 18:01

## 2025-04-26 RX ADMIN — NYSTATIN 1 APPLICATION(S): 100000 CREAM TOPICAL at 06:53

## 2025-04-26 RX ADMIN — Medication 1 APPLICATION(S): at 06:39

## 2025-04-26 RX ADMIN — LEVETIRACETAM 250 MILLIGRAM(S): 10 INJECTION, SOLUTION INTRAVENOUS at 17:44

## 2025-04-26 RX ADMIN — Medication 650 MILLIGRAM(S): at 17:43

## 2025-04-26 RX ADMIN — LACTULOSE 20 GRAM(S): 10 SOLUTION ORAL at 06:53

## 2025-04-26 RX ADMIN — NYSTATIN 1 APPLICATION(S): 100000 CREAM TOPICAL at 17:45

## 2025-04-26 RX ADMIN — MIDODRINE HYDROCHLORIDE 10 MILLIGRAM(S): 5 TABLET ORAL at 06:38

## 2025-04-26 RX ADMIN — Medication 100 MILLIGRAM(S): at 17:46

## 2025-04-26 RX ADMIN — SODIUM ZIRCONIUM CYCLOSILICATE 10 GRAM(S): 5 POWDER, FOR SUSPENSION ORAL at 20:13

## 2025-04-26 RX ADMIN — LACTULOSE 20 GRAM(S): 10 SOLUTION ORAL at 08:50

## 2025-04-26 RX ADMIN — Medication 40 MILLIGRAM(S): at 06:37

## 2025-04-26 RX ADMIN — Medication 2 TABLET(S): at 21:11

## 2025-04-26 RX ADMIN — LEVETIRACETAM 250 MILLIGRAM(S): 10 INJECTION, SOLUTION INTRAVENOUS at 06:38

## 2025-04-26 RX ADMIN — INSULIN LISPRO 2: 100 INJECTION, SOLUTION INTRAVENOUS; SUBCUTANEOUS at 12:15

## 2025-04-26 RX ADMIN — INSULIN LISPRO 2: 100 INJECTION, SOLUTION INTRAVENOUS; SUBCUTANEOUS at 07:03

## 2025-04-26 RX ADMIN — ATORVASTATIN CALCIUM 80 MILLIGRAM(S): 80 TABLET, FILM COATED ORAL at 21:11

## 2025-04-26 RX ADMIN — OCTREOTIDE ACETATE 5 MICROGRAM(S)/HR: 500 INJECTION, SOLUTION INTRAVENOUS; SUBCUTANEOUS at 08:50

## 2025-04-26 RX ADMIN — MIDODRINE HYDROCHLORIDE 10 MILLIGRAM(S): 5 TABLET ORAL at 13:50

## 2025-04-26 RX ADMIN — FUROSEMIDE 40 MILLIGRAM(S): 10 INJECTION INTRAMUSCULAR; INTRAVENOUS at 06:38

## 2025-04-26 RX ADMIN — INSULIN LISPRO 2: 100 INJECTION, SOLUTION INTRAVENOUS; SUBCUTANEOUS at 17:44

## 2025-04-26 RX ADMIN — OCTREOTIDE ACETATE 5 MICROGRAM(S)/HR: 500 INJECTION, SOLUTION INTRAVENOUS; SUBCUTANEOUS at 20:13

## 2025-04-26 RX ADMIN — LIDOCAINE HYDROCHLORIDE 1 PATCH: 20 JELLY TOPICAL at 12:15

## 2025-04-26 RX ADMIN — MIDODRINE HYDROCHLORIDE 10 MILLIGRAM(S): 5 TABLET ORAL at 21:11

## 2025-04-26 RX ADMIN — Medication 100 MILLIGRAM(S): at 06:38

## 2025-04-26 RX ADMIN — Medication 3 MILLIGRAM(S): at 21:12

## 2025-04-26 RX ADMIN — Medication 1 APPLICATION(S): at 06:40

## 2025-04-26 RX ADMIN — SODIUM CHLORIDE 80 MILLILITER(S): 9 INJECTION, SOLUTION INTRAVENOUS at 20:13

## 2025-04-26 NOTE — PROGRESS NOTE ADULT - ASSESSMENT
IMPRESSION    Encephalopathy, metabolic vs hepatic   GIB,  s/p prior EGD with duodenal ulcer  seizure hx   Liver cirrhosis   Bacteremia bacteroides  hypernatremia   CHARLENE   elevated trop   Afib     PLAN:    CNS:  neurology follow up   follow ammonia level  Hold off precedex  Haldol PRN for agitation, monitor QTC      HEENT: oral care     PULMONARY: keep pox >92% aspiration precaution   repeat cxr PRN    CARDIOVASCULAR:   follow cardiology    GI:   PPI BID, GI is following, not recommending repeat EGD,   CTA A&P noted, abdominal US today,  Speech and swallow eval   Needs NGT for lactulose, rifaxamin  Follow LFT , hepatology / Gi follow up  NGT feeding    RENAL:  D5w 50cc/ hr if Na >145  when start feed start free water 250 cc Q4 hrs   follow NA level   follow renal  CMP daily    INFECTIOUS DISEASE:   ID  follow up   rocephin and flagyl   bldcx noted    HEMATOLOGICAL:    DVT prophylaxis. follow h/h and plt   hold AC   serial h/h    ENDOCRINE:  Follow up FS.  Insulin protocol if needed.    SDU

## 2025-04-26 NOTE — PROGRESS NOTE ADULT - ASSESSMENT
Impression:  Acute blood loss anemia  AMS  hepatic encephalopathy  Liver cirrhosis .  bacteroides fragilis bacteremia   Hypernatremia- improving  Hyperkalemia- resolved  Afib        Plan:    Transfuse hb target >7  Active type and screen  free water, frequent bmp  Abdomen US noted. moderate ascitis. Paracentesis if good window  Lokelma  GI follow up  PPI BID  Lactulose via NG tube  target BM 2-3  Abx per ID  no AC due to GI bleed  No urgent plan for ischemic evaluation per cardiology  poor prognosis  palliative care

## 2025-04-26 NOTE — PROGRESS NOTE ADULT - SUBJECTIVE AND OBJECTIVE BOX
Patient is a 80y old  Male who presents with a chief complaint of acute mentation changes (25 Apr 2025 13:11)        HPI:  80 years old male history of hypertension, diabetes, subdural hematoma on Keppra, hx mechanical falls, recent admission (January 2025) for osteomyelitis of L4-5 w completion of abx.  BIBA from home status post change of mental status.  Per provider note, patient was discharged from rehab facility on April 4.  Patient was supposed to have MRI of lower back on April 5 but patient refused.  Patient baseline uses walker to ambulate.  By report patient has been doing okay since his discharge from the facility.  Patient had 1 episode of fall on his buttocks few days ago that she was able to help patient to get up with no difficulty.  Patient become confused and calling for his father tonight over the past few hours so she called EMS.  No ROS given mental status, no family at bedside.     (14 Apr 2025 00:29)      PAST MEDICAL & SURGICAL HISTORY:  Diabetes      Hypertension      Fall      H/O left knee surgery      History of cholecystectomy      History of appendectomy          FAMILY HISTORY:        Pt evaluated on rounds.  I reviewed the radiology tests and hospital record.    I reviewed previous notes on this patient.    Interval Events: No overnight events.      REVIEW OF SYSTEMS:   see HPI      OBJECTIVE:  ICU Vital Signs Last 24 Hrs  T(C): 37 (25 Apr 2025 23:06), Max: 37.1 (25 Apr 2025 11:00)  T(F): 98.6 (25 Apr 2025 23:06), Max: 98.7 (25 Apr 2025 11:00)  HR: 91 (25 Apr 2025 20:00) (91 - 127)  BP: 121/88 (25 Apr 2025 23:06) (100/56 - 157/79)  BP(mean): 98 (25 Apr 2025 23:06) (71 - 119)  ABP: --  ABP(mean): --  RR: 20 (25 Apr 2025 23:06) (16 - 20)  SpO2: 99% (25 Apr 2025 23:06) (97% - 99%)    O2 Parameters below as of 25 Apr 2025 20:00  Patient On (Oxygen Delivery Method): nasal cannula  O2 Flow (L/min): 2            04-24 @ 07:01  -  04-25 @ 07:00  --------------------------------------------------------  IN: 2514 mL / OUT: 720 mL / NET: 1794 mL    04-25 @ 07:01  - 04-26 @ 06:02  --------------------------------------------------------  IN: 1450 mL / OUT: 1200 mL / NET: 250 mL      CAPILLARY BLOOD GLUCOSE      POCT Blood Glucose.: 192 mg/dL (25 Apr 2025 17:15)        PHYSICAL EXAM:     · ENMT:   Airway patent,   Nasal mucosa clear.  Mouth with normal mucosa.   No thrush    · EYES:   Clear bilaterally,   pupils equal,   round and reactive to light.    · CARDIAC:   Normal rate,   regular rhythm.    Heart sounds S1, S2.   No murmurs, no rubs or gallops on auscultation  no edema        CAROTID:   normal systolic impulse  no bruits    · RESPIRATORY:   rales  normal chest expansion  no retractions or use of accessory muscles  percussion of chest demonstrates no hyperresonance or dullness    · GASTROINTESTINAL:  Abdomen soft,   non-tender,   + BS  liver/spleen not palpable    · SKIN:   Skin normal color for race,   warm, dry   No evidence of rash.    · HEME LYMPH:   no splenomegaly.  No cervical  lymphadenopathy.  no inguinal lymphadenopathy    HOSPITAL MEDICATIONS:  MEDICATIONS  (STANDING):  atorvastatin 80 milliGRAM(s) Oral at bedtime  chlorhexidine 2% Cloths 1 Application(s) Topical <User Schedule>  dextrose 5% + sodium chloride 0.45%. 1000 milliLiter(s) (50 mL/Hr) IV Continuous <Continuous>  dextrose 5%. 1000 milliLiter(s) (100 mL/Hr) IV Continuous <Continuous>  dextrose 5%. 1000 milliLiter(s) (50 mL/Hr) IV Continuous <Continuous>  dextrose 5%. 1000 milliLiter(s) (75 mL/Hr) IV Continuous <Continuous>  dextrose 50% Injectable 25 Gram(s) IV Push once  dextrose 50% Injectable 12.5 Gram(s) IV Push once  dextrose 50% Injectable 25 Gram(s) IV Push once  folic acid 1 milliGRAM(s) Oral daily  furosemide   Injectable 40 milliGRAM(s) IV Push daily  glucagon  Injectable 1 milliGRAM(s) IntraMuscular once  insulin lispro (ADMELOG) corrective regimen sliding scale   SubCutaneous every 6 hours  lactulose Syrup 20 Gram(s) Enteral Tube every 3 hours  levETIRAcetam   Injectable 250 milliGRAM(s) IV Push <User Schedule>  lidocaine   4% Patch 1 Patch Transdermal daily  metroNIDAZOLE  IVPB 500 milliGRAM(s) IV Intermittent every 12 hours  midodrine 10 milliGRAM(s) Oral every 8 hours  nystatin Powder 1 Application(s) Topical two times a day  octreotide  Infusion 25 MICROgram(s)/Hr (5 mL/Hr) IV Continuous <Continuous>  pantoprazole  Injectable 40 milliGRAM(s) IV Push every 12 hours  rifAXIMin 550 milliGRAM(s) Oral two times a day  senna 2 Tablet(s) Oral at bedtime  silver sulfADIAZINE 1% Cream 1 Application(s) Topical two times a day    MEDICATIONS  (PRN):  acetaminophen     Tablet .. 650 milliGRAM(s) Oral every 6 hours PRN Temp greater or equal to 38C (100.4F), Mild Pain (1 - 3)  aluminum hydroxide/magnesium hydroxide/simethicone Suspension 30 milliLiter(s) Oral every 4 hours PRN Dyspepsia  dextrose Oral Gel 15 Gram(s) Oral once PRN Blood Glucose LESS THAN 70 milliGRAM(s)/deciliter  melatonin 3 milliGRAM(s) Oral at bedtime PRN Insomnia  ondansetron Injectable 4 milliGRAM(s) IV Push every 8 hours PRN Nausea and/or Vomiting  polyethylene glycol 3350 17 Gram(s) Oral two times a day PRN Constipation    lactated ringers Bolus:   500 milliLiter(s), IV Bolus, once, infuse over 60 Minute(s), Stop After 1 Doses  dextrose 5% + sodium chloride 0.45%.: Solution, 1000 milliLiter(s) infuse at 50 mL/Hr, Stop After 1 Days  dextrose 5% + sodium chloride 0.45%.: Solution, 1000 milliLiter(s) infuse at 50 mL/Hr  dextrose 5% + sodium chloride 0.45%.: Solution, 1000 milliLiter(s) infuse at 70 mL/Hr  lactated ringers.: Solution, 1000 milliLiter(s) infuse at 75 mL/Hr  Provider's Contact #: [phone]  lactated ringers.: Solution, 1000 milliLiter(s) infuse at 75 mL/Hr  lactated ringers Bolus:   1000 milliLiter(s), IV Bolus, once, infuse over 60 Minute(s), Stop After 1 Doses  Provider's Contact #: [phone]  lactated ringers Bolus:   1000 milliLiter(s), IV Bolus, once, infuse over 60 Minute(s), Stop After 1 Doses  Provider's Contact #: [phone]      LABS:                        7.7    11.04 )-----------( 92       ( 25 Apr 2025 17:40 )             23.6     04-25    150[H]  |  118[H]  |  14  ----------------------------<  147[H]  5.2[H]   |  19  |  1.3    Ca    7.8[L]      25 Apr 2025 05:33    TPro  4.3[L]  /  Alb  2.3[L]  /  TBili  1.7[H]  /  DBili  x   /  AST  54[H]  /  ALT  14  /  AlkPhos  99  04-25    PT/INR - ( 25 Apr 2025 05:33 )   PT: 26.00 sec;   INR: 2.17 ratio         PTT - ( 25 Apr 2025 05:33 )  PTT:TNP sec  Urinalysis Basic - ( 25 Apr 2025 05:33 )    Color: x / Appearance: x / SG: x / pH: x  Gluc: 147 mg/dL / Ketone: x  / Bili: x / Urobili: x   Blood: x / Protein: x / Nitrite: x   Leuk Esterase: x / RBC: x / WBC x   Sq Epi: x / Non Sq Epi: x / Bacteria: x                            RADIOLOGY: Today I personally interpreted the latest CXR and other pertinent films.

## 2025-04-26 NOTE — PROGRESS NOTE ADULT - SUBJECTIVE AND OBJECTIVE BOX
pt seen and examined.   remains lethargic but more responsive.   had a few episodes of maroon stools w/ clots today.     PAST MEDICAL & SURGICAL HISTORY:  Diabetes  Hypertension  Fall  H/O left knee surgery  History of cholecystectomy  History of appendectomy      MEDICATIONS  (STANDING):  atorvastatin 80 milliGRAM(s) Oral at bedtime  chlorhexidine 2% Cloths 1 Application(s) Topical <User Schedule>  dextrose 5% + sodium chloride 0.45%. 1000 milliLiter(s) (50 mL/Hr) IV Continuous <Continuous>  dextrose 5%. 1000 milliLiter(s) (50 mL/Hr) IV Continuous <Continuous>  dextrose 5%. 1000 milliLiter(s) (100 mL/Hr) IV Continuous <Continuous>  dextrose 5%. 1000 milliLiter(s) (80 mL/Hr) IV Continuous <Continuous>  dextrose 50% Injectable 25 Gram(s) IV Push once  dextrose 50% Injectable 12.5 Gram(s) IV Push once  dextrose 50% Injectable 25 Gram(s) IV Push once  folic acid 1 milliGRAM(s) Oral daily  furosemide   Injectable 40 milliGRAM(s) IV Push daily  glucagon  Injectable 1 milliGRAM(s) IntraMuscular once  insulin lispro (ADMELOG) corrective regimen sliding scale   SubCutaneous every 6 hours  lactulose Syrup 20 Gram(s) Enteral Tube every 3 hours  levETIRAcetam   Injectable 250 milliGRAM(s) IV Push <User Schedule>  lidocaine   4% Patch 1 Patch Transdermal daily  metroNIDAZOLE  IVPB 500 milliGRAM(s) IV Intermittent every 12 hours  midodrine 10 milliGRAM(s) Oral every 8 hours  nystatin Powder 1 Application(s) Topical two times a day  octreotide  Infusion 25 MICROgram(s)/Hr (5 mL/Hr) IV Continuous <Continuous>  pantoprazole  Injectable 40 milliGRAM(s) IV Push every 12 hours  rifAXIMin 550 milliGRAM(s) Oral two times a day  senna 2 Tablet(s) Oral at bedtime  silver sulfADIAZINE 1% Cream 1 Application(s) Topical two times a day    MEDICATIONS  (PRN):  acetaminophen     Tablet .. 650 milliGRAM(s) Oral every 6 hours PRN Temp greater or equal to 38C (100.4F), Mild Pain (1 - 3)  aluminum hydroxide/magnesium hydroxide/simethicone Suspension 30 milliLiter(s) Oral every 4 hours PRN Dyspepsia  dextrose Oral Gel 15 Gram(s) Oral once PRN Blood Glucose LESS THAN 70 milliGRAM(s)/deciliter  melatonin 3 milliGRAM(s) Oral at bedtime PRN Insomnia  ondansetron Injectable 4 milliGRAM(s) IV Push every 8 hours PRN Nausea and/or Vomiting  polyethylene glycol 3350 17 Gram(s) Oral two times a day PRN Constipation      Allergies  No Known Allergies      Review of Systems:   Cardiovascular:  No Chest Pain, No Palpitations  Respiratory:  No Cough, No Dyspnea  Gastrointestinal:  As described in HPI    Physical Examination:  T(C): 37.1 (04-26-25 @ 15:38), Max: 37.1 (04-26-25 @ 15:38)  HR: 106 (04-26-25 @ 16:00) (97 - 106)  BP: 113/76 (04-26-25 @ 16:00) (110/55 - 127/79)  RR: 19 (04-26-25 @ 16:00) (19 - 31)  SpO2: 100% (04-26-25 @ 16:00) (98% - 100%)      04-25-25 @ 07:01  -  04-26-25 @ 07:00  --------------------------------------------------------  IN: 1450 mL / OUT: 1275 mL / NET: 175 mL    04-26-25 @ 07:01  -  04-26-25 @ 22:45  --------------------------------------------------------  IN: 590 mL / OUT: 200 mL / NET: 390 mL      Constitutional: No acute distress.  Respiratory:  No signs of respiratory distress. Lung sounds are clear bilaterally.  Cardiovascular:  S1 S2, Regular rate and rhythm.  Abdominal: Abdomen is soft, symmetric, and non-tender without distention. distended abdomen w/ anasarca.    Skin: No rashes, No Jaundice.  LE: +4 edema  Neuro: AAOx1        Data:                        7.1    9.91  )-----------( 89       ( 26 Apr 2025 15:30 )             22.7     Hgb trend:  7.1  04-26-25 @ 15:30  7.6  04-26-25 @ 07:40  7.7  04-25-25 @ 17:40  7.8  04-25-25 @ 15:45  10.8  04-25-25 @ 05:33  8.4  04-24-25 @ 14:45  7.7  04-24-25 @ 05:53      04-23-25 @ 07:01  -  04-24-25 @ 07:00  --------------------------------------------------------  IN: 1051 mL    04-24-25 @ 07:01  -  04-25-25 @ 07:00  --------------------------------------------------------  IN: 658 mL      04-26    149[H]  |  119[H]  |  18  ----------------------------<  168[H]  4.7   |  19  |  1.5    Ca    7.6[L]      26 Apr 2025 21:48  Mg     1.7     04-26    TPro  4.0[L]  /  Alb  2.0[L]  /  TBili  1.5[H]  /  DBili  x   /  AST  51[H]  /  ALT  14  /  AlkPhos  106  04-26    Liver panel trend:  TBili 1.5   /   AST 51   /   ALT 14   /   AlkP 106   /   Tptn 4.0   /   Alb 2.0    /   DBili --      04-26  TBili 1.7   /   AST 54   /   ALT 14   /   AlkP 99   /   Tptn 4.3   /   Alb 2.3    /   DBili --      04-25  TBili 1.1   /   AST 30   /   ALT 9   /   AlkP 77   /   Tptn 3.5   /   Alb 1.9    /   DBili --      04-24  TBili 1.2   /   AST 34   /   ALT 10   /   AlkP 100   /   Tptn 4.0   /   Alb 2.2    /   DBili 0.7      04-23  TBili 1.2   /   AST 35   /   ALT 10   /   AlkP 112   /   Tptn 4.3   /   Alb 2.4    /   DBili 0.6      04-22  TBili 1.3   /   AST 32   /   ALT 9   /   AlkP 92   /   Tptn 4.3   /   Alb 2.5    /   DBili --      04-21  TBili 1.6   /   AST 46   /   ALT 11   /   AlkP 97   /   Tptn 5.0   /   Alb 2.9    /   DBili --      04-20  TBili 1.6   /   AST 49   /   ALT 14   /   AlkP 97   /   Tptn 5.2   /   Alb 3.4    /   DBili --      04-19  TBili 0.9   /   AST 57   /   ALT 16   /   AlkP 109   /   Tptn 4.6   /   Alb 2.6    /   DBili --      04-18  TBili 1.3   /   AST 76   /   ALT 19   /   AlkP 120   /   Tptn 4.9   /   Alb 2.8    /   DBili --      04-17      PT/INR - ( 26 Apr 2025 07:40 )   PT: 28.00 sec;   INR: 2.33 ratio         PTT - ( 26 Apr 2025 07:40 )  PTT:58.0 sec       Radiology:      < from: CT Angio Abdomen and Pelvis w/ IV Cont (04.23.25 @ 21:11) >  IMPRESSION:    No evidence of active GI bleeding.    Small lobulated cirrhotic liver.    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    < end of copied text >

## 2025-04-26 NOTE — PROGRESS NOTE ADULT - SUBJECTIVE AND OBJECTIVE BOX
Patient is a 80y old  Male who presents with a chief complaint of acute mentation changes (26 Apr 2025 06:02)      Overnight events: on 2 liter NC. off pressor. no overnight event    Drips: Octreotide drip        ROS: as in HPI; All other systems reviewed are negative        PHYSICAL EXAM  Vital Signs Last 24 Hrs  T(C): 36.7 (26 Apr 2025 07:02), Max: 37.1 (25 Apr 2025 11:00)  T(F): 98.1 (26 Apr 2025 07:02), Max: 98.7 (25 Apr 2025 11:00)  HR: 91 (25 Apr 2025 20:00) (91 - 127)  BP: 121/88 (25 Apr 2025 23:06) (100/56 - 156/96)  BP(mean): 98 (25 Apr 2025 23:06) (71 - 119)  RR: 24 (26 Apr 2025 07:02) (16 - 24)  SpO2: 99% (26 Apr 2025 07:02) (99% - 99%)    Parameters below as of 25 Apr 2025 20:00  Patient On (Oxygen Delivery Method): nasal cannula  O2 Flow (L/min): 2        CONSTITUTIONAL:   NAD    ENT:   Airway patent,     EYES:   Clear bilaterally,   pupils equal,   round and reactive to light.    CARDIAC:   Normal rate,   regular rhythm.    edema++    RESPIRATORY:   No wheezing   Normal chest expansion  Not tachypneic,    GASTROINTESTINAL:  Abdomen soft, non-tender,   No guarding,   Positive BS  distended    MUSCULOSKELETAL:   Range of motion is not limited,    NEUROLOGICAL:   lethargic  No motor deficits.    SKIN:   pallor                 I&O's Detail    25 Apr 2025 07:01  -  26 Apr 2025 07:00  --------------------------------------------------------  IN:    dextrose 5%: 675 mL    Enteral Tube Flush: 200 mL    Glucerna: 420 mL    IV PiggyBack: 100 mL    Octreotide: 55 mL  Total IN: 1450 mL    OUT:    Indwelling Catheter - Urethral (mL): 1275 mL  Total OUT: 1275 mL    Total NET: 175 mL            LABS:                        7.6    13.51 )-----------( 104      ( 26 Apr 2025 07:40 )             23.4     26 Apr 2025 07:40    148    |  116    |  17     ----------------------------<  163    5.2     |  16     |  1.4      Ca    7.7        26 Apr 2025 07:40  Mg     1.7       26 Apr 2025 07:40    TPro  4.0    /  Alb  2.0    /  TBili  1.5    /  DBili  x      /  AST  51     /  ALT  14     /  AlkPhos  106    26 Apr 2025 07:40  Amylase x     lipase x              CAPILLARY BLOOD GLUCOSE      POCT Blood Glucose.: 160 mg/dL (26 Apr 2025 06:39)    PT/INR - ( 26 Apr 2025 07:40 )   PT: 28.00 sec;   INR: 2.33 ratio         PTT - ( 26 Apr 2025 07:40 )  PTT:58.0 sec  Urinalysis Basic - ( 26 Apr 2025 07:40 )    Color: x / Appearance: x / SG: x / pH: x  Gluc: 163 mg/dL / Ketone: x  / Bili: x / Urobili: x   Blood: x / Protein: x / Nitrite: x   Leuk Esterase: x / RBC: x / WBC x   Sq Epi: x / Non Sq Epi: x / Bacteria: x      Culture        MEDICATIONS  (STANDING):  atorvastatin 80 milliGRAM(s) Oral at bedtime  chlorhexidine 2% Cloths 1 Application(s) Topical <User Schedule>  dextrose 5% + sodium chloride 0.45%. 1000 milliLiter(s) (50 mL/Hr) IV Continuous <Continuous>  dextrose 5%. 1000 milliLiter(s) (50 mL/Hr) IV Continuous <Continuous>  dextrose 5%. 1000 milliLiter(s) (100 mL/Hr) IV Continuous <Continuous>  dextrose 5%. 1000 milliLiter(s) (75 mL/Hr) IV Continuous <Continuous>  dextrose 50% Injectable 25 Gram(s) IV Push once  dextrose 50% Injectable 12.5 Gram(s) IV Push once  dextrose 50% Injectable 25 Gram(s) IV Push once  folic acid 1 milliGRAM(s) Oral daily  furosemide   Injectable 40 milliGRAM(s) IV Push daily  glucagon  Injectable 1 milliGRAM(s) IntraMuscular once  insulin lispro (ADMELOG) corrective regimen sliding scale   SubCutaneous every 6 hours  lactulose Syrup 20 Gram(s) Enteral Tube every 3 hours  levETIRAcetam   Injectable 250 milliGRAM(s) IV Push <User Schedule>  lidocaine   4% Patch 1 Patch Transdermal daily  metroNIDAZOLE  IVPB 500 milliGRAM(s) IV Intermittent every 12 hours  midodrine 10 milliGRAM(s) Oral every 8 hours  nystatin Powder 1 Application(s) Topical two times a day  octreotide  Infusion 25 MICROgram(s)/Hr (5 mL/Hr) IV Continuous <Continuous>  pantoprazole  Injectable 40 milliGRAM(s) IV Push every 12 hours  rifAXIMin 550 milliGRAM(s) Oral two times a day  senna 2 Tablet(s) Oral at bedtime  silver sulfADIAZINE 1% Cream 1 Application(s) Topical two times a day    MEDICATIONS  (PRN):  acetaminophen     Tablet .. 650 milliGRAM(s) Oral every 6 hours PRN Temp greater or equal to 38C (100.4F), Mild Pain (1 - 3)  aluminum hydroxide/magnesium hydroxide/simethicone Suspension 30 milliLiter(s) Oral every 4 hours PRN Dyspepsia  dextrose Oral Gel 15 Gram(s) Oral once PRN Blood Glucose LESS THAN 70 milliGRAM(s)/deciliter  melatonin 3 milliGRAM(s) Oral at bedtime PRN Insomnia  ondansetron Injectable 4 milliGRAM(s) IV Push every 8 hours PRN Nausea and/or Vomiting  polyethylene glycol 3350 17 Gram(s) Oral two times a day PRN Constipation

## 2025-04-26 NOTE — PROGRESS NOTE ADULT - ASSESSMENT
80 y.o M w/ hx of HTN, DM, subdural hematoma on keppra, osteomyelitis of L4-5 had completion of abx, cirrhosis and CT findings of chronic pancreatitis, admitted w/ AMS, being followed for concern of HE and hematochezia.    # Liver cirrhosis with portal hypertension. Mild-to-moderate ascites.   Anasarca. Hepatic dome subcentimeter hypodense 1.3 cm hypodensity, not fully characterized    -HE: lactulose titrated to 3-4 bms / daily  neurology eval appreciated  c/w rifaximin 550 q12h  -please give IV vitamin k 10mg x 3 days and trend INR  -For moderate ascites we recommended diagnostic / therapeutic paracentesis w/ fluid studies and serum albumin same day as paracentesis (fluid studies: Paracentesis Panel which includes Cell count and differential, albumin, total protein, cytology, AFB smear and culture)   -attempted paracentesis today by primary team w/ no safe window  -nephrology eval appreciated    -follow up with hepatology as outpatient   -MR liver protocol as outpatient   -For Vaccinations: Please f/u in clinic for being uptodate with HAV/HBV/Influenza/Pneumococcal vaccines.  -Lifestyle/Dietary modifications: Calorie intake 25-40 Kcal/kg/day; Protein intake: 1.2-1.5 gm/kg/day  -Avoid smoking and NSAIDs  -Abstain from alcohol and all illicit drugs      #anemia episode of maroon colored stool reported  ddx includes ischemic colitis in the setting of severe illness vs SB bleeding vs others   maroon stools seen today but pt HD stable.   Hb stable in the range of 7-8    4/16/2025 EGD: Normal esophagus.  Erosive gastritis.  5 mm ulcer in the duodenal bulb status post Endo Clip.  4/17/2025 colonoscopy (BBPS score 5 - poor prep in L colon): There was a small clot seen in the cecum, otherwise liquid brownish stool was seen without evidence of bleeding. Aggressive irrigation performed, large lesions not seen however small or flat lesions could have been missed. The colon was otherwise unremarkable.    -Monitor Hb and transfuse prn to hgb >7  -s/p EGD and colonoscopy revealing duodenal ulcer.   -repeat colonoscopy in one year if within goals of care  -if pt has recurrent bleeding w/ HD instability, get CTA and inform GI STAT  -PO PPI BID   -we will follow        Time-based billing (NON-critical care).   50 minutes spent on total encounter which excludes teaching time and/or separately reported services; more than 50% of the visit was spent counseling and / or coordinating care by the attending physician.  The necessity of the time spent during the encounter on this date of service was due to: Coordination of care.

## 2025-04-27 LAB
ALBUMIN SERPL ELPH-MCNC: 2 G/DL — LOW (ref 3.5–5.2)
ALP SERPL-CCNC: 144 U/L — HIGH (ref 30–115)
ALT FLD-CCNC: 16 U/L — SIGNIFICANT CHANGE UP (ref 0–41)
ANION GAP SERPL CALC-SCNC: 12 MMOL/L — SIGNIFICANT CHANGE UP (ref 7–14)
APTT BLD: 55.7 SEC — HIGH (ref 27–39.2)
AST SERPL-CCNC: 61 U/L — HIGH (ref 0–41)
BASOPHILS # BLD AUTO: 0.05 K/UL — SIGNIFICANT CHANGE UP (ref 0–0.2)
BASOPHILS NFR BLD AUTO: 0.4 % — SIGNIFICANT CHANGE UP (ref 0–1)
BILIRUB SERPL-MCNC: 1.1 MG/DL — SIGNIFICANT CHANGE UP (ref 0.2–1.2)
BLD GP AB SCN SERPL QL: SIGNIFICANT CHANGE UP
BUN SERPL-MCNC: 19 MG/DL — SIGNIFICANT CHANGE UP (ref 10–20)
CALCIUM SERPL-MCNC: 7.6 MG/DL — LOW (ref 8.4–10.5)
CHLORIDE SERPL-SCNC: 116 MMOL/L — HIGH (ref 98–110)
CO2 SERPL-SCNC: 23 MMOL/L — SIGNIFICANT CHANGE UP (ref 17–32)
CREAT SERPL-MCNC: 1.5 MG/DL — SIGNIFICANT CHANGE UP (ref 0.7–1.5)
EGFR: 47 ML/MIN/1.73M2 — LOW
EGFR: 47 ML/MIN/1.73M2 — LOW
EOSINOPHIL # BLD AUTO: 0.59 K/UL — SIGNIFICANT CHANGE UP (ref 0–0.7)
EOSINOPHIL NFR BLD AUTO: 5.1 % — SIGNIFICANT CHANGE UP (ref 0–8)
GLUCOSE BLDC GLUCOMTR-MCNC: 165 MG/DL — HIGH (ref 70–99)
GLUCOSE BLDC GLUCOMTR-MCNC: 194 MG/DL — HIGH (ref 70–99)
GLUCOSE BLDC GLUCOMTR-MCNC: 197 MG/DL — HIGH (ref 70–99)
GLUCOSE BLDC GLUCOMTR-MCNC: 207 MG/DL — HIGH (ref 70–99)
GLUCOSE SERPL-MCNC: 156 MG/DL — HIGH (ref 70–99)
HCT VFR BLD CALC: 19.7 % — LOW (ref 42–52)
HCT VFR BLD CALC: 22.8 % — LOW (ref 42–52)
HGB BLD-MCNC: 6.2 G/DL — CRITICAL LOW (ref 14–18)
HGB BLD-MCNC: 7.3 G/DL — LOW (ref 14–18)
IMM GRANULOCYTES NFR BLD AUTO: 0.6 % — HIGH (ref 0.1–0.3)
INR BLD: 2.25 RATIO — HIGH (ref 0.65–1.3)
LYMPHOCYTES # BLD AUTO: 27.6 % — SIGNIFICANT CHANGE UP (ref 20.5–51.1)
LYMPHOCYTES # BLD AUTO: 3.16 K/UL — SIGNIFICANT CHANGE UP (ref 1.2–3.4)
MAGNESIUM SERPL-MCNC: 1.7 MG/DL — LOW (ref 1.8–2.4)
MCHC RBC-ENTMCNC: 30.8 PG — SIGNIFICANT CHANGE UP (ref 27–31)
MCHC RBC-ENTMCNC: 31.2 PG — HIGH (ref 27–31)
MCHC RBC-ENTMCNC: 31.5 G/DL — LOW (ref 32–37)
MCHC RBC-ENTMCNC: 32 G/DL — SIGNIFICANT CHANGE UP (ref 32–37)
MCV RBC AUTO: 97.4 FL — HIGH (ref 80–94)
MCV RBC AUTO: 98 FL — HIGH (ref 80–94)
MONOCYTES # BLD AUTO: 1.07 K/UL — HIGH (ref 0.1–0.6)
MONOCYTES NFR BLD AUTO: 9.3 % — SIGNIFICANT CHANGE UP (ref 1.7–9.3)
NEUTROPHILS # BLD AUTO: 6.52 K/UL — HIGH (ref 1.4–6.5)
NEUTROPHILS NFR BLD AUTO: 57 % — SIGNIFICANT CHANGE UP (ref 42.2–75.2)
NRBC BLD AUTO-RTO: 0 /100 WBCS — SIGNIFICANT CHANGE UP (ref 0–0)
NRBC BLD AUTO-RTO: 0 /100 WBCS — SIGNIFICANT CHANGE UP (ref 0–0)
PLATELET # BLD AUTO: 75 K/UL — LOW (ref 130–400)
PLATELET # BLD AUTO: 91 K/UL — LOW (ref 130–400)
PMV BLD: 9.2 FL — SIGNIFICANT CHANGE UP (ref 7.4–10.4)
PMV BLD: 9.3 FL — SIGNIFICANT CHANGE UP (ref 7.4–10.4)
POTASSIUM SERPL-MCNC: 4.3 MMOL/L — SIGNIFICANT CHANGE UP (ref 3.5–5)
POTASSIUM SERPL-SCNC: 4.3 MMOL/L — SIGNIFICANT CHANGE UP (ref 3.5–5)
PROT SERPL-MCNC: 4 G/DL — LOW (ref 6–8)
PROTHROM AB SERPL-ACNC: 27 SEC — HIGH (ref 9.95–12.87)
RBC # BLD: 2.01 M/UL — LOW (ref 4.7–6.1)
RBC # BLD: 2.34 M/UL — LOW (ref 4.7–6.1)
RBC # FLD: 20.2 % — HIGH (ref 11.5–14.5)
RBC # FLD: 21.9 % — HIGH (ref 11.5–14.5)
SODIUM SERPL-SCNC: 151 MMOL/L — HIGH (ref 135–146)
WBC # BLD: 11.46 K/UL — HIGH (ref 4.8–10.8)
WBC # BLD: 9.58 K/UL — SIGNIFICANT CHANGE UP (ref 4.8–10.8)
WBC # FLD AUTO: 11.46 K/UL — HIGH (ref 4.8–10.8)
WBC # FLD AUTO: 9.58 K/UL — SIGNIFICANT CHANGE UP (ref 4.8–10.8)

## 2025-04-27 PROCEDURE — 99232 SBSQ HOSP IP/OBS MODERATE 35: CPT | Mod: GC

## 2025-04-27 PROCEDURE — 99233 SBSQ HOSP IP/OBS HIGH 50: CPT

## 2025-04-27 RX ORDER — MAGNESIUM SULFATE 500 MG/ML
2 SYRINGE (ML) INJECTION ONCE
Refills: 0 | Status: COMPLETED | OUTPATIENT
Start: 2025-04-27 | End: 2025-04-27

## 2025-04-27 RX ADMIN — LIDOCAINE HYDROCHLORIDE 1 PATCH: 20 JELLY TOPICAL at 12:58

## 2025-04-27 RX ADMIN — INSULIN LISPRO 2: 100 INJECTION, SOLUTION INTRAVENOUS; SUBCUTANEOUS at 12:57

## 2025-04-27 RX ADMIN — LIDOCAINE HYDROCHLORIDE 1 PATCH: 20 JELLY TOPICAL at 20:00

## 2025-04-27 RX ADMIN — INSULIN LISPRO 2: 100 INJECTION, SOLUTION INTRAVENOUS; SUBCUTANEOUS at 18:16

## 2025-04-27 RX ADMIN — Medication 40 MILLIGRAM(S): at 06:26

## 2025-04-27 RX ADMIN — LACTULOSE 20 GRAM(S): 10 SOLUTION ORAL at 03:47

## 2025-04-27 RX ADMIN — LIDOCAINE HYDROCHLORIDE 1 PATCH: 20 JELLY TOPICAL at 00:00

## 2025-04-27 RX ADMIN — OCTREOTIDE ACETATE 5 MICROGRAM(S)/HR: 500 INJECTION, SOLUTION INTRAVENOUS; SUBCUTANEOUS at 06:16

## 2025-04-27 RX ADMIN — Medication 100 MILLIGRAM(S): at 18:20

## 2025-04-27 RX ADMIN — Medication 25 GRAM(S): at 12:54

## 2025-04-27 RX ADMIN — LACTULOSE 20 GRAM(S): 10 SOLUTION ORAL at 12:54

## 2025-04-27 RX ADMIN — LEVETIRACETAM 250 MILLIGRAM(S): 10 INJECTION, SOLUTION INTRAVENOUS at 06:26

## 2025-04-27 RX ADMIN — MIDODRINE HYDROCHLORIDE 10 MILLIGRAM(S): 5 TABLET ORAL at 14:47

## 2025-04-27 RX ADMIN — LACTULOSE 20 GRAM(S): 10 SOLUTION ORAL at 14:47

## 2025-04-27 RX ADMIN — LACTULOSE 20 GRAM(S): 10 SOLUTION ORAL at 18:17

## 2025-04-27 RX ADMIN — Medication 1 APPLICATION(S): at 06:27

## 2025-04-27 RX ADMIN — INSULIN LISPRO 4: 100 INJECTION, SOLUTION INTRAVENOUS; SUBCUTANEOUS at 02:00

## 2025-04-27 RX ADMIN — LEVETIRACETAM 250 MILLIGRAM(S): 10 INJECTION, SOLUTION INTRAVENOUS at 18:25

## 2025-04-27 RX ADMIN — Medication 1 APPLICATION(S): at 18:28

## 2025-04-27 RX ADMIN — MIDODRINE HYDROCHLORIDE 10 MILLIGRAM(S): 5 TABLET ORAL at 06:25

## 2025-04-27 RX ADMIN — INSULIN LISPRO 2: 100 INJECTION, SOLUTION INTRAVENOUS; SUBCUTANEOUS at 06:15

## 2025-04-27 RX ADMIN — Medication 3 MILLIGRAM(S): at 23:07

## 2025-04-27 RX ADMIN — SODIUM CHLORIDE 80 MILLILITER(S): 9 INJECTION, SOLUTION INTRAVENOUS at 14:50

## 2025-04-27 RX ADMIN — ATORVASTATIN CALCIUM 80 MILLIGRAM(S): 80 TABLET, FILM COATED ORAL at 22:23

## 2025-04-27 RX ADMIN — SODIUM CHLORIDE 80 MILLILITER(S): 9 INJECTION, SOLUTION INTRAVENOUS at 19:15

## 2025-04-27 RX ADMIN — FUROSEMIDE 40 MILLIGRAM(S): 10 INJECTION INTRAMUSCULAR; INTRAVENOUS at 06:25

## 2025-04-27 RX ADMIN — NYSTATIN 1 APPLICATION(S): 100000 CREAM TOPICAL at 06:23

## 2025-04-27 RX ADMIN — Medication 2 TABLET(S): at 22:23

## 2025-04-27 RX ADMIN — Medication 1 APPLICATION(S): at 06:25

## 2025-04-27 RX ADMIN — Medication 100 MILLIGRAM(S): at 06:26

## 2025-04-27 RX ADMIN — Medication 102 MILLIGRAM(S): at 12:52

## 2025-04-27 RX ADMIN — LACTULOSE 20 GRAM(S): 10 SOLUTION ORAL at 09:02

## 2025-04-27 RX ADMIN — LACTULOSE 20 GRAM(S): 10 SOLUTION ORAL at 06:26

## 2025-04-27 RX ADMIN — MIDODRINE HYDROCHLORIDE 10 MILLIGRAM(S): 5 TABLET ORAL at 22:23

## 2025-04-27 RX ADMIN — Medication 40 MILLIGRAM(S): at 18:24

## 2025-04-27 RX ADMIN — OCTREOTIDE ACETATE 5 MICROGRAM(S)/HR: 500 INJECTION, SOLUTION INTRAVENOUS; SUBCUTANEOUS at 20:07

## 2025-04-27 RX ADMIN — FOLIC ACID 1 MILLIGRAM(S): 1 TABLET ORAL at 12:55

## 2025-04-27 NOTE — PROGRESS NOTE ADULT - ASSESSMENT
Impression:  Acute blood loss anemia  GI bleed  AMS  hepatic encephalopathy  Liver cirrhosis .  bacteroides fragilis bacteremia   Hypernatremia- improving  Hyperkalemia- resolved  Afib  CHARLENE        Plan:    Transfuse hb target >7  Active type and screen  free water, frequent bmp  Hold lasix today  D5 drip, monitor BMP  US on 4/26, no good window for paracentesis, reevaluate in am  GI follow up. vitamin k  PPI BID  Lactulose via NG tube  target BM 2-3  Abx per ID  no AC due to GI bleed  No urgent plan for ischemic evaluation per cardiology  poor prognosis  palliative care

## 2025-04-27 NOTE — PROGRESS NOTE ADULT - SUBJECTIVE AND OBJECTIVE BOX
Nephrology progress note    Patient was seen and examined, events over the last 24 h noted .  Last seen by Nephrology 4/17  Cr was 1.4 then it is about the same now (though had imrpoved to 0.7 in between)     Allergies:  No Known Allergies    Hospital Medications:   MEDICATIONS  (STANDING):  atorvastatin 80 milliGRAM(s) Oral at bedtime  chlorhexidine 2% Cloths 1 Application(s) Topical <User Schedule>  dextrose 5% + sodium chloride 0.45%. 1000 milliLiter(s) (50 mL/Hr) IV Continuous <Continuous>  dextrose 5%. 1000 milliLiter(s) (50 mL/Hr) IV Continuous <Continuous>  dextrose 5%. 1000 milliLiter(s) (100 mL/Hr) IV Continuous <Continuous>  dextrose 5%. 1000 milliLiter(s) (80 mL/Hr) IV Continuous <Continuous>  dextrose 50% Injectable 25 Gram(s) IV Push once  dextrose 50% Injectable 12.5 Gram(s) IV Push once  dextrose 50% Injectable 25 Gram(s) IV Push once  folic acid 1 milliGRAM(s) Oral daily  glucagon  Injectable 1 milliGRAM(s) IntraMuscular once  insulin lispro (ADMELOG) corrective regimen sliding scale   SubCutaneous every 6 hours  lactulose Syrup 20 Gram(s) Enteral Tube every 3 hours  levETIRAcetam   Injectable 250 milliGRAM(s) IV Push <User Schedule>  lidocaine   4% Patch 1 Patch Transdermal daily  magnesium sulfate  IVPB 2 Gram(s) IV Intermittent once  metroNIDAZOLE  IVPB 500 milliGRAM(s) IV Intermittent every 12 hours  midodrine 10 milliGRAM(s) Oral every 8 hours  nystatin Powder 1 Application(s) Topical two times a day  octreotide  Infusion 25 MICROgram(s)/Hr (5 mL/Hr) IV Continuous <Continuous>  pantoprazole  Injectable 40 milliGRAM(s) IV Push every 12 hours  phytonadione  IVPB 10 milliGRAM(s) IV Intermittent daily  rifAXIMin 550 milliGRAM(s) Oral two times a day  senna 2 Tablet(s) Oral at bedtime  silver sulfADIAZINE 1% Cream 1 Application(s) Topical two times a day        VITALS:  T(F): 97.5 (04-27-25 @ 07:01), Max: 98.7 (04-26-25 @ 15:38)  HR: 80 (04-26-25 @ 23:00)  BP: 120/69 (04-27-25 @ 05:37)  RR: 20 (04-27-25 @ 07:01)  SpO2: 92% (04-27-25 @ 05:37)  Wt(kg): --    04-25 @ 07:01  -  04-26 @ 07:00  --------------------------------------------------------  IN: 1450 mL / OUT: 1275 mL / NET: 175 mL    04-26 @ 07:01  -  04-27 @ 07:00  --------------------------------------------------------  IN: 1525 mL / OUT: 420 mL / NET: 1105 mL          PHYSICAL EXAM:  Constitutional: has NGT   Respiratory: CTAB, no wheezes, rales or rhonchi  Cardiovascular: S1, S2, RRR  Gastrointestinal: BS+, soft, NT/ND  Extremities: No cyanosis or clubbing. No peripheral edema  :   l[place]ola.   Skin: No rashes  LABS:  04-27    151[H]  |  116[H]  |  19  ----------------------------<  156[H]  4.3   |  23  |  1.5    Ca    7.6[L]      27 Apr 2025 06:30  Mg     1.7     04-27    TPro  4.0[L]  /  Alb  2.0[L]  /  TBili  1.1  /  DBili      /  AST  61[H]  /  ALT  16  /  AlkPhos  144[H]  04-27                          6.2    11.46 )-----------( 91       ( 27 Apr 2025 06:30 )             19.7       Urine Studies:  Urinalysis Basic - ( 27 Apr 2025 06:30 )    Color:  / Appearance:  / SG:  / pH:   Gluc: 156 mg/dL / Ketone:   / Bili:  / Urobili:    Blood:  / Protein:  / Nitrite:    Leuk Esterase:  / RBC:  / WBC    Sq Epi:  / Non Sq Epi:  / Bacteria:         RADIOLOGY & ADDITIONAL STUDIES:

## 2025-04-27 NOTE — PROGRESS NOTE ADULT - SUBJECTIVE AND OBJECTIVE BOX
Patient is a 80y old  Male who presents with a chief complaint of acute mentation changes (26 Apr 2025 12:45)        HPI:  80 years old male history of hypertension, diabetes, subdural hematoma on Keppra, hx mechanical falls, recent admission (January 2025) for osteomyelitis of L4-5 w completion of abx.  BIBA from home status post change of mental status.  Per provider note, patient was discharged from rehab facility on April 4.  Patient was supposed to have MRI of lower back on April 5 but patient refused.  Patient baseline uses walker to ambulate.  By report patient has been doing okay since his discharge from the facility.  Patient had 1 episode of fall on his buttocks few days ago that she was able to help patient to get up with no difficulty.  Patient become confused and calling for his father tonight over the past few hours so she called EMS.  No ROS given mental status, no family at bedside.     (14 Apr 2025 00:29)      PAST MEDICAL & SURGICAL HISTORY:  Diabetes      Hypertension      Fall      H/O left knee surgery      History of cholecystectomy      History of appendectomy          FAMILY HISTORY:        Pt evaluated on rounds.  I reviewed the radiology tests and hospital record.    I reviewed previous notes on this patient.    Interval Events: No overnight events.      REVIEW OF SYSTEMS:   see HPI      OBJECTIVE:  ICU Vital Signs Last 24 Hrs  T(C): 36.7 (26 Apr 2025 23:00), Max: 37.1 (26 Apr 2025 15:38)  T(F): 98.1 (26 Apr 2025 23:00), Max: 98.7 (26 Apr 2025 15:38)  HR: 80 (26 Apr 2025 23:00) (80 - 106)  BP: 102/55 (26 Apr 2025 20:00) (102/55 - 127/79)  BP(mean): 73 (26 Apr 2025 20:00) (73 - 98)  ABP: --  ABP(mean): --  RR: 17 (26 Apr 2025 23:00) (17 - 31)  SpO2: 100% (26 Apr 2025 23:00) (98% - 100%)    O2 Parameters below as of 26 Apr 2025 20:00  Patient On (Oxygen Delivery Method): nasal cannula  O2 Flow (L/min): 2            04-25 @ 07:01  -  04-26 @ 07:00  --------------------------------------------------------  IN: 1450 mL / OUT: 1275 mL / NET: 175 mL    04-26 @ 07:01  - 04-27 @ 06:13  --------------------------------------------------------  IN: 1355 mL / OUT: 420 mL / NET: 935 mL      CAPILLARY BLOOD GLUCOSE      POCT Blood Glucose.: 165 mg/dL (27 Apr 2025 06:10)        PHYSICAL EXAM:     · ENMT:   Airway patent,   Nasal mucosa clear.  Mouth with normal mucosa.   No thrush    · EYES:   Clear bilaterally,   pupils equal,   round and reactive to light.    · CARDIAC:   Normal rate,   regular rhythm.    Heart sounds S1, S2.   No murmurs, no rubs or gallops on auscultation  no edema        CAROTID:   normal systolic impulse  no bruits    · RESPIRATORY:   rales  normal chest expansion  no retractions or use of accessory muscles  percussion of chest demonstrates no hyperresonance or dullness    · GASTROINTESTINAL:  Abdomen soft,   non-tender,   + BS  liver/spleen not palpable    · SKIN:   Skin normal color for race,   warm, dry   No evidence of rash.    · HEME LYMPH:   no splenomegaly.  No cervical  lymphadenopathy.  no inguinal lymphadenopathy    HOSPITAL MEDICATIONS:  MEDICATIONS  (STANDING):  atorvastatin 80 milliGRAM(s) Oral at bedtime  chlorhexidine 2% Cloths 1 Application(s) Topical <User Schedule>  dextrose 5% + sodium chloride 0.45%. 1000 milliLiter(s) (50 mL/Hr) IV Continuous <Continuous>  dextrose 5%. 1000 milliLiter(s) (50 mL/Hr) IV Continuous <Continuous>  dextrose 5%. 1000 milliLiter(s) (100 mL/Hr) IV Continuous <Continuous>  dextrose 5%. 1000 milliLiter(s) (80 mL/Hr) IV Continuous <Continuous>  dextrose 50% Injectable 25 Gram(s) IV Push once  dextrose 50% Injectable 12.5 Gram(s) IV Push once  dextrose 50% Injectable 25 Gram(s) IV Push once  folic acid 1 milliGRAM(s) Oral daily  furosemide   Injectable 40 milliGRAM(s) IV Push daily  glucagon  Injectable 1 milliGRAM(s) IntraMuscular once  insulin lispro (ADMELOG) corrective regimen sliding scale   SubCutaneous every 6 hours  lactulose Syrup 20 Gram(s) Enteral Tube every 3 hours  levETIRAcetam   Injectable 250 milliGRAM(s) IV Push <User Schedule>  lidocaine   4% Patch 1 Patch Transdermal daily  metroNIDAZOLE  IVPB 500 milliGRAM(s) IV Intermittent every 12 hours  midodrine 10 milliGRAM(s) Oral every 8 hours  nystatin Powder 1 Application(s) Topical two times a day  octreotide  Infusion 25 MICROgram(s)/Hr (5 mL/Hr) IV Continuous <Continuous>  pantoprazole  Injectable 40 milliGRAM(s) IV Push every 12 hours  rifAXIMin 550 milliGRAM(s) Oral two times a day  senna 2 Tablet(s) Oral at bedtime  silver sulfADIAZINE 1% Cream 1 Application(s) Topical two times a day    MEDICATIONS  (PRN):  acetaminophen     Tablet .. 650 milliGRAM(s) Oral every 6 hours PRN Temp greater or equal to 38C (100.4F), Mild Pain (1 - 3)  aluminum hydroxide/magnesium hydroxide/simethicone Suspension 30 milliLiter(s) Oral every 4 hours PRN Dyspepsia  dextrose Oral Gel 15 Gram(s) Oral once PRN Blood Glucose LESS THAN 70 milliGRAM(s)/deciliter  melatonin 3 milliGRAM(s) Oral at bedtime PRN Insomnia  ondansetron Injectable 4 milliGRAM(s) IV Push every 8 hours PRN Nausea and/or Vomiting  polyethylene glycol 3350 17 Gram(s) Oral two times a day PRN Constipation    lactated ringers Bolus:   500 milliLiter(s), IV Bolus, once, infuse over 60 Minute(s), Stop After 1 Doses  dextrose 5% + sodium chloride 0.45%.: Solution, 1000 milliLiter(s) infuse at 50 mL/Hr, Stop After 1 Days  dextrose 5% + sodium chloride 0.45%.: Solution, 1000 milliLiter(s) infuse at 50 mL/Hr  dextrose 5% + sodium chloride 0.45%.: Solution, 1000 milliLiter(s) infuse at 70 mL/Hr  lactated ringers.: Solution, 1000 milliLiter(s) infuse at 75 mL/Hr  Provider's Contact #: [phone]  lactated ringers.: Solution, 1000 milliLiter(s) infuse at 75 mL/Hr  lactated ringers Bolus:   1000 milliLiter(s), IV Bolus, once, infuse over 60 Minute(s), Stop After 1 Doses  Provider's Contact #: [phone]  lactated ringers Bolus:   1000 milliLiter(s), IV Bolus, once, infuse over 60 Minute(s), Stop After 1 Doses  Provider's Contact #: [phone]      LABS:                        7.1    9.91  )-----------( 89       ( 26 Apr 2025 15:30 )             22.7     04-26    149[H]  |  119[H]  |  18  ----------------------------<  168[H]  4.7   |  19  |  1.5    Ca    7.6[L]      26 Apr 2025 21:48  Mg     1.7     04-26    TPro  4.0[L]  /  Alb  2.0[L]  /  TBili  1.5[H]  /  DBili  x   /  AST  51[H]  /  ALT  14  /  AlkPhos  106  04-26    PT/INR - ( 26 Apr 2025 07:40 )   PT: 28.00 sec;   INR: 2.33 ratio         PTT - ( 26 Apr 2025 07:40 )  PTT:58.0 sec  Urinalysis Basic - ( 26 Apr 2025 21:48 )    Color: x / Appearance: x / SG: x / pH: x  Gluc: 168 mg/dL / Ketone: x  / Bili: x / Urobili: x   Blood: x / Protein: x / Nitrite: x   Leuk Esterase: x / RBC: x / WBC x   Sq Epi: x / Non Sq Epi: x / Bacteria: x          RADIOLOGY: Today I personally interpreted the latest CXR and other pertinent films.

## 2025-04-27 NOTE — PROGRESS NOTE ADULT - ASSESSMENT
IMPRESSION    Encephalopathy, metabolic vs hepatic   GIB,  s/p prior EGD with duodenal ulcer  seizure hx   Liver cirrhosis   Bacteremia bacteroides  hypernatremia   CHARLENE   elevated trop   Afib     PLAN:    CNS:  Hold off precedex  Haldol PRN for agitation, monitor QTC      HEENT: oral care     PULMONARY: keep pox >92% aspiration precaution   repeat cxr PRN    CARDIOVASCULAR:   follow cardiology    GI:   PPI BID, GI is following, not recommending repeat EGD,   Needs NGT for lactulose, rifaxamin  Follow LFT , hepatology / Gi follow up  NGT feeding    RENAL:  D5w 50cc/ hr if Na >145  follow NA level   follow renal  CMP daily    INFECTIOUS DISEASE:   ID  follow up   rocephin and flagyl   bldcx noted    HEMATOLOGICAL:    DVT prophylaxis. follow h/h and plt   hold AC   serial h/h keep Hgb >7    ENDOCRINE:  Follow up FS.  Insulin protocol if needed.    SDU

## 2025-04-27 NOTE — PROGRESS NOTE ADULT - ASSESSMENT
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 fragilis bacteremia, and acute blood loss anemia.    RAKIeceived contrast 4/23 that likely led to recurance of CHARLENE  cr is stable over past days ~ 1.4  - non oliguric  - mild hypernatremia  - CT noted/ no hydronephrosis  - s/p EGD / colonoscopy tomorrow- NPO    Recommendations:  - cont D5W  - cont [facility] / strict i/o   - can reepat UA check urine NA

## 2025-04-27 NOTE — PROGRESS NOTE ADULT - SUBJECTIVE AND OBJECTIVE BOX
Patient is a 80y old  Male who presents with a chief complaint of acute mentation changes (27 Apr 2025 06:13)      Overnight events: on NC. off pressor. D5 drip. Hb drop this am.          ROS: as in HPI; All other systems reviewed are negative        PHYSICAL EXAM  Vital Signs Last 24 Hrs  T(C): 36.4 (27 Apr 2025 07:01), Max: 37.1 (26 Apr 2025 15:38)  T(F): 97.5 (27 Apr 2025 07:01), Max: 98.7 (26 Apr 2025 15:38)  HR: 80 (26 Apr 2025 23:00) (80 - 106)  BP: 120/69 (27 Apr 2025 05:37) (102/55 - 120/69)  BP(mean): 86 (27 Apr 2025 05:37) (73 - 91)  RR: 20 (27 Apr 2025 07:01) (17 - 31)  SpO2: 92% (27 Apr 2025 05:37) (92% - 100%)    Parameters below as of 27 Apr 2025 07:01  Patient On (Oxygen Delivery Method): nasal cannula          CONSTITUTIONAL:  ill appearing    ENT:   Airway patent,   NG tube    EYES:   Clear bilaterally,   pupils equal,   round and reactive to light.    CARDIAC:   Normal rate,   regular rhythm.    edema+    RESPIRATORY:   No wheezing   Normal chest expansion  Not tachypneic,    GASTROINTESTINAL:  Abdomen soft, non-tender,   distended  No guarding,   Positive BS    MUSCULOSKELETAL:   Range of motion is not limited,    NEUROLOGICAL:   lethargic  follows simple commands  No motor deficits.    SKIN:   pallor                  I&O's Detail    26 Apr 2025 07:01  -  27 Apr 2025 07:00  --------------------------------------------------------  IN:    dextrose 5%: 350 mL    dextrose 5%: 960 mL    IV PiggyBack: 100 mL    Octreotide: 115 mL  Total IN: 1525 mL    OUT:    Indwelling Catheter - Urethral (mL): 420 mL  Total OUT: 420 mL    Total NET: 1105 mL            LABS:                        6.2    11.46 )-----------( 91       ( 27 Apr 2025 06:30 )             19.7     27 Apr 2025 06:30    151    |  116    |  19     ----------------------------<  156    4.3     |  23     |  1.5      Creatinine Trend: 1.5<--, 1.5<--, 1.6<--, 1.4<--, 1.3<--, 1.2<--    Ca    7.6        27 Apr 2025 06:30  Mg     1.7       27 Apr 2025 06:30    TPro  4.0    /  Alb  2.0    /  TBili  1.1    /  DBili  x      /  AST  61     /  ALT  16     /  AlkPhos  144    27 Apr 2025 06:30  Amylase x     lipase x              CAPILLARY BLOOD GLUCOSE      POCT Blood Glucose.: 165 mg/dL (27 Apr 2025 06:10)    PT/INR - ( 27 Apr 2025 06:30 )   PT: 27.00 sec;   INR: 2.25 ratio         PTT - ( 27 Apr 2025 06:30 )  PTT:55.7 sec  Urinalysis Basic - ( 27 Apr 2025 06:30 )    Color: x / Appearance: x / SG: x / pH: x  Gluc: 156 mg/dL / Ketone: x  / Bili: x / Urobili: x   Blood: x / Protein: x / Nitrite: x   Leuk Esterase: x / RBC: x / WBC x   Sq Epi: x / Non Sq Epi: x / Bacteria: x      Culture        MEDICATIONS  (STANDING):  atorvastatin 80 milliGRAM(s) Oral at bedtime  chlorhexidine 2% Cloths 1 Application(s) Topical <User Schedule>  dextrose 5% + sodium chloride 0.45%. 1000 milliLiter(s) (50 mL/Hr) IV Continuous <Continuous>  dextrose 5%. 1000 milliLiter(s) (50 mL/Hr) IV Continuous <Continuous>  dextrose 5%. 1000 milliLiter(s) (100 mL/Hr) IV Continuous <Continuous>  dextrose 5%. 1000 milliLiter(s) (80 mL/Hr) IV Continuous <Continuous>  dextrose 50% Injectable 25 Gram(s) IV Push once  dextrose 50% Injectable 12.5 Gram(s) IV Push once  dextrose 50% Injectable 25 Gram(s) IV Push once  folic acid 1 milliGRAM(s) Oral daily  furosemide   Injectable 40 milliGRAM(s) IV Push daily  glucagon  Injectable 1 milliGRAM(s) IntraMuscular once  insulin lispro (ADMELOG) corrective regimen sliding scale   SubCutaneous every 6 hours  lactulose Syrup 20 Gram(s) Enteral Tube every 3 hours  levETIRAcetam   Injectable 250 milliGRAM(s) IV Push <User Schedule>  lidocaine   4% Patch 1 Patch Transdermal daily  magnesium sulfate  IVPB 2 Gram(s) IV Intermittent once  metroNIDAZOLE  IVPB 500 milliGRAM(s) IV Intermittent every 12 hours  midodrine 10 milliGRAM(s) Oral every 8 hours  nystatin Powder 1 Application(s) Topical two times a day  octreotide  Infusion 25 MICROgram(s)/Hr (5 mL/Hr) IV Continuous <Continuous>  pantoprazole  Injectable 40 milliGRAM(s) IV Push every 12 hours  phytonadione  IVPB 10 milliGRAM(s) IV Intermittent daily  rifAXIMin 550 milliGRAM(s) Oral two times a day  senna 2 Tablet(s) Oral at bedtime  silver sulfADIAZINE 1% Cream 1 Application(s) Topical two times a day    MEDICATIONS  (PRN):  acetaminophen     Tablet .. 650 milliGRAM(s) Oral every 6 hours PRN Temp greater or equal to 38C (100.4F), Mild Pain (1 - 3)  aluminum hydroxide/magnesium hydroxide/simethicone Suspension 30 milliLiter(s) Oral every 4 hours PRN Dyspepsia  dextrose Oral Gel 15 Gram(s) Oral once PRN Blood Glucose LESS THAN 70 milliGRAM(s)/deciliter  melatonin 3 milliGRAM(s) Oral at bedtime PRN Insomnia  ondansetron Injectable 4 milliGRAM(s) IV Push every 8 hours PRN Nausea and/or Vomiting  polyethylene glycol 3350 17 Gram(s) Oral two times a day PRN Constipation

## 2025-04-28 LAB
ALBUMIN SERPL ELPH-MCNC: 2 G/DL — LOW (ref 3.5–5.2)
ALP SERPL-CCNC: 145 U/L — HIGH (ref 30–115)
ALT FLD-CCNC: 16 U/L — SIGNIFICANT CHANGE UP (ref 0–41)
ANION GAP SERPL CALC-SCNC: 12 MMOL/L — SIGNIFICANT CHANGE UP (ref 7–14)
ANION GAP SERPL CALC-SCNC: 12 MMOL/L — SIGNIFICANT CHANGE UP (ref 7–14)
APTT BLD: 53.6 SEC — HIGH (ref 27–39.2)
AST SERPL-CCNC: 63 U/L — HIGH (ref 0–41)
BASOPHILS # BLD AUTO: 0.01 K/UL — SIGNIFICANT CHANGE UP (ref 0–0.2)
BASOPHILS NFR BLD AUTO: 0.1 % — SIGNIFICANT CHANGE UP (ref 0–1)
BILIRUB SERPL-MCNC: 1.2 MG/DL — SIGNIFICANT CHANGE UP (ref 0.2–1.2)
BUN SERPL-MCNC: 17 MG/DL — SIGNIFICANT CHANGE UP (ref 10–20)
BUN SERPL-MCNC: 18 MG/DL — SIGNIFICANT CHANGE UP (ref 10–20)
CALCIUM SERPL-MCNC: 7.7 MG/DL — LOW (ref 8.4–10.5)
CALCIUM SERPL-MCNC: 7.7 MG/DL — LOW (ref 8.4–10.5)
CHLORIDE SERPL-SCNC: 116 MMOL/L — HIGH (ref 98–110)
CHLORIDE SERPL-SCNC: 116 MMOL/L — HIGH (ref 98–110)
CO2 SERPL-SCNC: 17 MMOL/L — SIGNIFICANT CHANGE UP (ref 17–32)
CO2 SERPL-SCNC: 22 MMOL/L — SIGNIFICANT CHANGE UP (ref 17–32)
CREAT SERPL-MCNC: 1.4 MG/DL — SIGNIFICANT CHANGE UP (ref 0.7–1.5)
CREAT SERPL-MCNC: 1.5 MG/DL — SIGNIFICANT CHANGE UP (ref 0.7–1.5)
EGFR: 47 ML/MIN/1.73M2 — LOW
EGFR: 47 ML/MIN/1.73M2 — LOW
EGFR: 51 ML/MIN/1.73M2 — LOW
EGFR: 51 ML/MIN/1.73M2 — LOW
EOSINOPHIL # BLD AUTO: 0.49 K/UL — SIGNIFICANT CHANGE UP (ref 0–0.7)
EOSINOPHIL NFR BLD AUTO: 6.1 % — SIGNIFICANT CHANGE UP (ref 0–8)
GLUCOSE BLDC GLUCOMTR-MCNC: 148 MG/DL — HIGH (ref 70–99)
GLUCOSE BLDC GLUCOMTR-MCNC: 153 MG/DL — HIGH (ref 70–99)
GLUCOSE BLDC GLUCOMTR-MCNC: 208 MG/DL — HIGH (ref 70–99)
GLUCOSE BLDC GLUCOMTR-MCNC: 210 MG/DL — HIGH (ref 70–99)
GLUCOSE SERPL-MCNC: 139 MG/DL — HIGH (ref 70–99)
GLUCOSE SERPL-MCNC: 158 MG/DL — HIGH (ref 70–99)
HCT VFR BLD CALC: 24.2 % — LOW (ref 42–52)
HCT VFR BLD CALC: 24.5 % — LOW (ref 42–52)
HGB BLD-MCNC: 7.8 G/DL — LOW (ref 14–18)
HGB BLD-MCNC: 7.9 G/DL — LOW (ref 14–18)
IMM GRANULOCYTES NFR BLD AUTO: 0.4 % — HIGH (ref 0.1–0.3)
INR BLD: 2.04 RATIO — HIGH (ref 0.65–1.3)
LYMPHOCYTES # BLD AUTO: 2.03 K/UL — SIGNIFICANT CHANGE UP (ref 1.2–3.4)
LYMPHOCYTES # BLD AUTO: 25.1 % — SIGNIFICANT CHANGE UP (ref 20.5–51.1)
MAGNESIUM SERPL-MCNC: 1.7 MG/DL — LOW (ref 1.8–2.4)
MCHC RBC-ENTMCNC: 31.3 PG — HIGH (ref 27–31)
MCHC RBC-ENTMCNC: 31.5 PG — HIGH (ref 27–31)
MCHC RBC-ENTMCNC: 32.1 G/DL — SIGNIFICANT CHANGE UP (ref 32–37)
MCHC RBC-ENTMCNC: 32.2 G/DL — SIGNIFICANT CHANGE UP (ref 32–37)
MCV RBC AUTO: 97.6 FL — HIGH (ref 80–94)
MCV RBC AUTO: 97.6 FL — HIGH (ref 80–94)
MONOCYTES # BLD AUTO: 0.89 K/UL — HIGH (ref 0.1–0.6)
MONOCYTES NFR BLD AUTO: 11 % — HIGH (ref 1.7–9.3)
NEUTROPHILS # BLD AUTO: 4.79 K/UL — SIGNIFICANT CHANGE UP (ref 1.4–6.5)
NEUTROPHILS NFR BLD AUTO: 58.6 % — SIGNIFICANT CHANGE UP (ref 42.2–75.2)
NRBC BLD AUTO-RTO: 0 /100 WBCS — SIGNIFICANT CHANGE UP (ref 0–0)
NRBC BLD AUTO-RTO: 0 /100 WBCS — SIGNIFICANT CHANGE UP (ref 0–0)
PLATELET # BLD AUTO: 77 K/UL — LOW (ref 130–400)
PLATELET # BLD AUTO: 79 K/UL — LOW (ref 130–400)
PMV BLD: 9.2 FL — SIGNIFICANT CHANGE UP (ref 7.4–10.4)
PMV BLD: 9.4 FL — SIGNIFICANT CHANGE UP (ref 7.4–10.4)
POTASSIUM SERPL-MCNC: 3.9 MMOL/L — SIGNIFICANT CHANGE UP (ref 3.5–5)
POTASSIUM SERPL-MCNC: 4.5 MMOL/L — SIGNIFICANT CHANGE UP (ref 3.5–5)
POTASSIUM SERPL-SCNC: 3.9 MMOL/L — SIGNIFICANT CHANGE UP (ref 3.5–5)
POTASSIUM SERPL-SCNC: 4.5 MMOL/L — SIGNIFICANT CHANGE UP (ref 3.5–5)
PROT SERPL-MCNC: 4.2 G/DL — LOW (ref 6–8)
PROTHROM AB SERPL-ACNC: 24.4 SEC — HIGH (ref 9.95–12.87)
RBC # BLD: 2.46 M/UL — LOW (ref 4.7–6.1)
RBC # BLD: 2.51 M/UL — LOW (ref 4.7–6.1)
RBC # FLD: 20.8 % — HIGH (ref 11.5–14.5)
RBC # FLD: 21.2 % — HIGH (ref 11.5–14.5)
SODIUM SERPL-SCNC: 145 MMOL/L — SIGNIFICANT CHANGE UP (ref 135–146)
SODIUM SERPL-SCNC: 150 MMOL/L — HIGH (ref 135–146)
WBC # BLD: 8.08 K/UL — SIGNIFICANT CHANGE UP (ref 4.8–10.8)
WBC # BLD: 8.48 K/UL — SIGNIFICANT CHANGE UP (ref 4.8–10.8)
WBC # FLD AUTO: 8.08 K/UL — SIGNIFICANT CHANGE UP (ref 4.8–10.8)
WBC # FLD AUTO: 8.48 K/UL — SIGNIFICANT CHANGE UP (ref 4.8–10.8)

## 2025-04-28 PROCEDURE — 99233 SBSQ HOSP IP/OBS HIGH 50: CPT

## 2025-04-28 PROCEDURE — 99233 SBSQ HOSP IP/OBS HIGH 50: CPT | Mod: FS

## 2025-04-28 RX ORDER — METRONIDAZOLE 250 MG
500 TABLET ORAL ONCE
Refills: 0 | Status: COMPLETED | OUTPATIENT
Start: 2025-04-28 | End: 2025-04-28

## 2025-04-28 RX ORDER — SODIUM CHLORIDE 9 G/1000ML
1000 INJECTION, SOLUTION INTRAVENOUS
Refills: 0 | Status: DISCONTINUED | OUTPATIENT
Start: 2025-04-28 | End: 2025-04-29

## 2025-04-28 RX ORDER — MAGNESIUM SULFATE 500 MG/ML
2 SYRINGE (ML) INJECTION
Refills: 0 | Status: COMPLETED | OUTPATIENT
Start: 2025-04-28 | End: 2025-04-28

## 2025-04-28 RX ADMIN — INSULIN LISPRO 4: 100 INJECTION, SOLUTION INTRAVENOUS; SUBCUTANEOUS at 18:16

## 2025-04-28 RX ADMIN — LACTULOSE 20 GRAM(S): 10 SOLUTION ORAL at 12:46

## 2025-04-28 RX ADMIN — Medication 102 MILLIGRAM(S): at 16:13

## 2025-04-28 RX ADMIN — LIDOCAINE HYDROCHLORIDE 1 PATCH: 20 JELLY TOPICAL at 20:31

## 2025-04-28 RX ADMIN — LACTULOSE 20 GRAM(S): 10 SOLUTION ORAL at 08:48

## 2025-04-28 RX ADMIN — Medication 25 GRAM(S): at 12:46

## 2025-04-28 RX ADMIN — OCTREOTIDE ACETATE 5 MICROGRAM(S)/HR: 500 INJECTION, SOLUTION INTRAVENOUS; SUBCUTANEOUS at 04:00

## 2025-04-28 RX ADMIN — Medication 25 GRAM(S): at 13:42

## 2025-04-28 RX ADMIN — Medication 40 MILLIGRAM(S): at 05:19

## 2025-04-28 RX ADMIN — LIDOCAINE HYDROCHLORIDE 1 PATCH: 20 JELLY TOPICAL at 00:00

## 2025-04-28 RX ADMIN — LACTULOSE 20 GRAM(S): 10 SOLUTION ORAL at 18:15

## 2025-04-28 RX ADMIN — ATORVASTATIN CALCIUM 80 MILLIGRAM(S): 80 TABLET, FILM COATED ORAL at 21:58

## 2025-04-28 RX ADMIN — SODIUM CHLORIDE 50 MILLILITER(S): 9 INJECTION, SOLUTION INTRAVENOUS at 08:40

## 2025-04-28 RX ADMIN — INSULIN LISPRO 2: 100 INJECTION, SOLUTION INTRAVENOUS; SUBCUTANEOUS at 06:45

## 2025-04-28 RX ADMIN — LACTULOSE 20 GRAM(S): 10 SOLUTION ORAL at 03:15

## 2025-04-28 RX ADMIN — LACTULOSE 20 GRAM(S): 10 SOLUTION ORAL at 05:18

## 2025-04-28 RX ADMIN — LEVETIRACETAM 250 MILLIGRAM(S): 10 INJECTION, SOLUTION INTRAVENOUS at 18:21

## 2025-04-28 RX ADMIN — LACTULOSE 20 GRAM(S): 10 SOLUTION ORAL at 20:30

## 2025-04-28 RX ADMIN — INSULIN LISPRO 4: 100 INJECTION, SOLUTION INTRAVENOUS; SUBCUTANEOUS at 00:45

## 2025-04-28 RX ADMIN — FOLIC ACID 1 MILLIGRAM(S): 1 TABLET ORAL at 12:46

## 2025-04-28 RX ADMIN — Medication 1 APPLICATION(S): at 05:19

## 2025-04-28 RX ADMIN — NYSTATIN 1 APPLICATION(S): 100000 CREAM TOPICAL at 05:23

## 2025-04-28 RX ADMIN — Medication 40 MILLIGRAM(S): at 18:18

## 2025-04-28 RX ADMIN — LACTULOSE 20 GRAM(S): 10 SOLUTION ORAL at 00:44

## 2025-04-28 RX ADMIN — MIDODRINE HYDROCHLORIDE 10 MILLIGRAM(S): 5 TABLET ORAL at 21:47

## 2025-04-28 RX ADMIN — LACTULOSE 20 GRAM(S): 10 SOLUTION ORAL at 16:15

## 2025-04-28 RX ADMIN — LEVETIRACETAM 250 MILLIGRAM(S): 10 INJECTION, SOLUTION INTRAVENOUS at 05:19

## 2025-04-28 RX ADMIN — Medication 1 APPLICATION(S): at 18:16

## 2025-04-28 RX ADMIN — Medication 2 TABLET(S): at 21:57

## 2025-04-28 RX ADMIN — NYSTATIN 1 APPLICATION(S): 100000 CREAM TOPICAL at 18:19

## 2025-04-28 RX ADMIN — LIDOCAINE HYDROCHLORIDE 1 PATCH: 20 JELLY TOPICAL at 12:50

## 2025-04-28 RX ADMIN — Medication 100 MILLIGRAM(S): at 05:18

## 2025-04-28 RX ADMIN — Medication 1 APPLICATION(S): at 05:23

## 2025-04-28 RX ADMIN — Medication 100 MILLIGRAM(S): at 18:15

## 2025-04-28 RX ADMIN — MIDODRINE HYDROCHLORIDE 10 MILLIGRAM(S): 5 TABLET ORAL at 13:43

## 2025-04-28 RX ADMIN — MIDODRINE HYDROCHLORIDE 10 MILLIGRAM(S): 5 TABLET ORAL at 05:18

## 2025-04-28 NOTE — PROGRESS NOTE ADULT - ASSESSMENT
Patient is a 80-year-old gentleman with past medical history of hypertension, diabetes, subdural hematoma, prior mechanical falls, and prior admission for osteomyelitis who was brought in from skilled nursing facility for change in mental status.  Patient followed by gastroenterology for liver cirrhosis with portal hypertension and ascites along with prior endoscopic evaluation this admission for hematochezia consisting of an endoscopy done in April 16 in which a 5 mm ulcer in the duodenal bulb was clipped followed by a colonoscopy on April 17 in which there was clots visible in the cecum but no polyps identified.  There is concern as he has been having more melenic stool.    Liver cirrhosis with portal hypertension. Mild-to-moderate ascites.   Anasarca. Hepatic dome subcentimeter hypodense 1.3 cm hypodensity, not fully characterized  -HE: lactulose titrated to 3-4 bms / daily  -c/w rifaximin 550 q12h  -daily inr  -attempted paracentesis today by primary team w/ no safe window  -nephrology eval appreciated  -follow up with hepatology as outpatient   -MR liver protocol as outpatient   -Lifestyle/Dietary modifications: Calorie intake 25-40 Kcal/kg/day; Protein intake: 1.2-1.5 gm/kg/day  -Avoid smoking and NSAIDs  -Abstain from alcohol and all illicit drugs      Anemia episode of maroon colored stool reported  ddx includes ischemic colitis in the setting of severe illness vs SB bleeding vs others   maroon stools seen today but pt HD stable.   Hb stable in the range of 7-8    4/16/2025 EGD: Normal esophagus.  Erosive gastritis.  5 mm ulcer in the duodenal bulb status post Endo Clip.  4/17/2025 colonoscopy (BBPS score 5 - poor prep in L colon): There was a small clot seen in the cecum, otherwise liquid brownish stool was seen without evidence of bleeding. Aggressive irrigation performed, large lesions not seen however small or flat lesions could have been missed. The colon was otherwise unremarkable.    -Monitor Hb and transfuse prn to hgb >7  -s/p EGD and colonoscopy revealing duodenal ulcer.  -PO PPI BID   -Plan EGD tomorrow given Melena- Discussed with wife and son along with ICU nurse  Patient is a 80-year-old gentleman with past medical history of hypertension, diabetes, subdural hematoma, prior mechanical falls, and prior admission for osteomyelitis who was brought in from skilled nursing facility for change in mental status.  Patient followed by gastroenterology for liver cirrhosis with portal hypertension and ascites along with prior endoscopic evaluation this admission for hematochezia consisting of an endoscopy done in April 16 in which a 5 mm ulcer in the duodenal bulb was clipped followed by a colonoscopy on April 17 in which there was clots visible in the cecum but no polyps identified.  There is concern as he has been having more melenic stool.    Liver cirrhosis with portal hypertension. Mild-to-moderate ascites.   Anasarca. Hepatic dome subcentimeter hypodense 1.3 cm hypodensity, not fully characterized  -HE: lactulose titrated to 3-4 bms / daily  -c/w rifaximin 550 q12h  -daily inr  -attempted paracentesis today by primary team w/ no safe window  -nephrology eval appreciated  -follow up with hepatology as outpatient   -MR liver protocol as outpatient   -Lifestyle/Dietary modifications: Calorie intake 25-40 Kcal/kg/day; Protein intake: 1.2-1.5 gm/kg/day  -Avoid smoking and NSAIDs  -Abstain from alcohol and all illicit drugs      Anemia melena/ alternating with maroon colored stool and clots   Hb stable in the range of 7-8    4/16/2025 EGD: Normal esophagus.  Erosive gastritis.  5 mm ulcer in the duodenal bulb status post Endo Clip.  4/17/2025 colonoscopy (BBPS score 5 - poor prep in L colon): There was a small clot seen in the cecum, otherwise liquid brownish stool was seen without evidence of bleeding. Aggressive irrigation performed, large lesions not seen however small or flat lesions could have been missed. The colon was otherwise unremarkable.    -Monitor Hb and transfuse prn to hgb >7  -s/p EGD and colonoscopy revealing duodenal ulcer.  -PO PPI BID   -Plan enteroscopy tomorrow given Melena- Discussed with wife and son along with ICU nurse

## 2025-04-28 NOTE — PROGRESS NOTE ADULT - SUBJECTIVE AND OBJECTIVE BOX
Patient is a 80-year-old gentleman with past medical history of hypertension, diabetes, subdural hematoma, prior mechanical falls, and prior admission for osteomyelitis who was brought in from skilled nursing facility for change in mental status.  Patient followed by gastroenterology for liver cirrhosis with portal hypertension and ascites along with prior endoscopic evaluation this admission for hematochezia consisting of an endoscopy done in April 16 in which a 5 mm ulcer in the duodenal bulb was clipped followed by a colonoscopy on April 17 in which there was clots visible in the cecum but no polyps identified.  There is concern as he has been having more melenic stool.      PAST MEDICAL & SURGICAL HISTORY:  Diabetes  Hypertension  Fall  H/O left knee surgery  History of cholecystectomy  History of appendectomy        MEDICATIONS  (STANDING):  atorvastatin 80 milliGRAM(s) Oral at bedtime  chlorhexidine 2% Cloths 1 Application(s) Topical <User Schedule>  dextrose 5%. 1000 milliLiter(s) (100 mL/Hr) IV Continuous <Continuous>  dextrose 5%. 1000 milliLiter(s) (50 mL/Hr) IV Continuous <Continuous>  dextrose 5%. 1000 milliLiter(s) (50 mL/Hr) IV Continuous <Continuous>  dextrose 50% Injectable 25 Gram(s) IV Push once  dextrose 50% Injectable 12.5 Gram(s) IV Push once  dextrose 50% Injectable 25 Gram(s) IV Push once  folic acid 1 milliGRAM(s) Oral daily  glucagon  Injectable 1 milliGRAM(s) IntraMuscular once  insulin lispro (ADMELOG) corrective regimen sliding scale   SubCutaneous every 6 hours  lactulose Syrup 20 Gram(s) Enteral Tube every 3 hours  levETIRAcetam   Injectable 250 milliGRAM(s) IV Push <User Schedule>  lidocaine   4% Patch 1 Patch Transdermal daily  metroNIDAZOLE  IVPB 500 milliGRAM(s) IV Intermittent once  midodrine 10 milliGRAM(s) Oral every 8 hours  nystatin Powder 1 Application(s) Topical two times a day  pantoprazole  Injectable 40 milliGRAM(s) IV Push every 12 hours  phytonadione  IVPB 10 milliGRAM(s) IV Intermittent daily  rifAXIMin 550 milliGRAM(s) Oral two times a day  senna 2 Tablet(s) Oral at bedtime  silver sulfADIAZINE 1% Cream 1 Application(s) Topical two times a day    MEDICATIONS  (PRN):  acetaminophen     Tablet .. 650 milliGRAM(s) Oral every 6 hours PRN Temp greater or equal to 38C (100.4F), Mild Pain (1 - 3)  aluminum hydroxide/magnesium hydroxide/simethicone Suspension 30 milliLiter(s) Oral every 4 hours PRN Dyspepsia  dextrose Oral Gel 15 Gram(s) Oral once PRN Blood Glucose LESS THAN 70 milliGRAM(s)/deciliter  melatonin 3 milliGRAM(s) Oral at bedtime PRN Insomnia  ondansetron Injectable 4 milliGRAM(s) IV Push every 8 hours PRN Nausea and/or Vomiting  polyethylene glycol 3350 17 Gram(s) Oral two times a day PRN Constipation      Allergies    No Known Allergies    Intolerances        REVIEW OF SYSTEMS  Due to altered mental status, subjective information were not able to be obtained from the patient. History was obtained, to the extent possible, from review of the chart and collateral sources of information.      Vital Signs Last 24 Hrs  T(C): 36.7 (28 Apr 2025 15:01), Max: 36.7 (27 Apr 2025 23:05)  T(F): 98 (28 Apr 2025 15:01), Max: 98.1 (27 Apr 2025 23:05)  HR: 86 (28 Apr 2025 13:43) (80 - 103)  BP: 90/64 (28 Apr 2025 13:43) (90/64 - 130/60)  BP(mean): 73 (28 Apr 2025 13:43) (73 - 86)  RR: 20 (28 Apr 2025 15:01) (9 - 26)  SpO2: 99% (28 Apr 2025 13:43) (99% - 100%)    Parameters below as of 28 Apr 2025 15:01  Patient On (Oxygen Delivery Method): nasal cannula    Constitutional: No acute distress.  Respiratory:  No signs of respiratory distress. Lung sounds are clear bilaterally.  Cardiovascular:  S1 S2, Regular rate and rhythm.  Abdominal: Abdomen is soft, symmetric, and non-tender, distended abdomen w/ anasarca.  Rectal exam black liquid stools- red tinged   Skin: No rashes, No Jaundice.  LE: +4 edema  Neuro: AAOx1      Labs:               7.9    8.48  )-----------( 77       ( 28 Apr 2025 15:46 )             24.5       Auto Immature Granulocyte %: 0.4 % (04-28-25 @ 05:49)    04-28    145  |  116[H]  |  17  ----------------------------<  158[H]  4.5   |  17  |  1.5      Calcium: 7.7 mg/dL (04-28-25 @ 15:46)      LFTs:             4.2  | 1.2  | 63       ------------------[145     ( 28 Apr 2025 05:49 )  2.0  | x    | 16          Coags:     24.40  ----< 2.04    ( 28 Apr 2025 05:49 )     53.6        Urinalysis Basic - ( 28 Apr 2025 15:46 )  Color: x / Appearance: x / SG: x / pH: x  Gluc: 158 mg/dL / Ketone: x  / Bili: x / Urobili: x   Blood: x / Protein: x / Nitrite: x   Leuk Esterase: x / RBC: x / WBC x   Sq Epi: x / Non Sq Epi: x / Bacteria: x

## 2025-04-28 NOTE — PROGRESS NOTE ADULT - SUBJECTIVE AND OBJECTIVE BOX
DWIGHT RIBEIRO 80y Male  MRN#: [id number]     Hospital Day: 14d    Pt is currently admitted with the primary diagnosis of encephalopathy ; GIB ; possible seizure ; hypernatremia ; bacteremia    Overnight events   -reported black stools overnight, h/h drop repeat overcorrected post transfusion                                          ----------------------------------------------------------    VITAL SIGNS: Last 24 Hours  T(C): 36.2 (28 Apr 2025 07:01), Max: 37.3 (27 Apr 2025 15:00)  T(F): 97.2 (28 Apr 2025 07:01), Max: 99.2 (27 Apr 2025 15:00)  HR: 86 (28 Apr 2025 13:43) (80 - 133)  BP: 90/64 (28 Apr 2025 13:43) (90/64 - 130/60)  BP(mean): 73 (28 Apr 2025 13:43) (73 - 86)  RR: 9 (28 Apr 2025 13:43) (9 - 26)  SpO2: 99% (28 Apr 2025 13:43) (99% - 100%)      04-27-25 @ 07:01  -  04-28-25 @ 07:00  --------------------------------------------------------  IN: 225 mL / OUT: 1490 mL / NET: -1265 mL    04-28-25 @ 07:01  -  04-28-25 @ 14:23  --------------------------------------------------------  IN: 0 mL / OUT: 200 mL / NET: -200 mL                                             --------------------------------------------------------------  LABS:                        7.8    8.08  )-----------( 79       ( 28 Apr 2025 05:49 )             24.2     04-28    150[H]  |  116[H]  |  18  ----------------------------<  139[H]  3.9   |  22  |  1.4    Ca    7.7[L]      28 Apr 2025 05:49  Mg     1.7     04-28    TPro  4.2[L]  /  Alb  2.0[L]  /  TBili  1.2  /  DBili  x   /  AST  63[H]  /  ALT  16  /  AlkPhos  145[H]  04-28    PT/INR - ( 28 Apr 2025 05:49 )   PT: 24.40 sec;   INR: 2.04 ratio         PTT - ( 28 Apr 2025 05:49 )  PTT:53.6 sec  Urinalysis Basic - ( 28 Apr 2025 05:49 )    Color: x / Appearance: x / SG: x / pH: x  Gluc: 139 mg/dL / Ketone: x  / Bili: x / Urobili: x   Blood: x / Protein: x / Nitrite: x   Leuk Esterase: x / RBC: x / WBC x   Sq Epi: x / Non Sq Epi: x / Bacteria: x                     ------------------------------------------------------------    OBJECTIVE  PAST MEDICAL & SURGICAL HISTORY  Diabetes    Hypertension    Fall    H/O left knee surgery    History of cholecystectomy    History of appendectomy                                              -----------------------------------------------------------  MEDICATIONS:  HOME MEDICATIONS  Home Medications:  acetaminophen 500 mg oral tablet: 2 tab(s) orally every 8 hours (04 Feb 2025 10:56)  aluminum hydroxide-magnesium hydroxide 200 mg-200 mg/5 mL oral suspension: 30 milliliter(s) orally every 4 hours As needed Dyspepsia (04 Feb 2025 10:56)  amLODIPine 5 mg oral tablet: 1 tab(s) orally once a day (16 Apr 2025 11:45)  aspirin 81 mg oral delayed release tablet: 1 tab(s) orally once a day (04 Feb 2025 10:56)  atorvastatin 80 mg oral tablet: 1 tab(s) orally once a day (at bedtime) (04 Feb 2025 10:56)  ceFAZolin 2 g intravenous injection: 2 gram(s) intravenous every 8 hours End date is 3/9/2025 (04 Feb 2025 10:56)  folic acid 1 mg oral tablet: 1 tab(s) orally once a day (04 Feb 2025 10:56)  gabapentin 100 mg oral capsule: 3 cap(s) orally 2 times a day (17 Sep 2022 01:58)  gabapentin 100 mg oral capsule: 1 cap(s) orally 2 times a day (16 Apr 2025 11:45)  Keppra 250 mg oral tablet: 2 tab(s) orally 2 times a day (16 Apr 2025 11:48)  Lasix 20 mg oral tablet: 1 tab(s) orally once a day (16 Apr 2025 11:45)  lidocaine 4% topical film: Apply topically to affected area every 24 hours Lower back (04 Feb 2025 10:56)  losartan 50 mg oral tablet: 0.5 tab(s) orally 2 times a day (17 Sep 2022 01:58)  metFORMIN 500 mg oral tablet: 500 milligram(s) orally 2 times a day (16 Apr 2025 11:46)  morphine: 2 milligram(s) intravenous every 8 hours as needed for  severe pain (04 Feb 2025 10:56)  nystatin 100,000 units/g topical powder: 1 Apply topically to affected area every 12 hours As needed fungal rash (04 Feb 2025 10:56)  Osteo Bi-Flex 250 mg-200 mg oral tablet: orally once a day (17 Sep 2022 01:58)  oxyCODONE 10 mg oral tablet: 1 tab(s) orally 3 times a day As needed Severe Pain (7 - 10) (04 Feb 2025 10:56)  polyethylene glycol 3350 oral powder for reconstitution: 17 gram(s) orally 2 times a day (04 Feb 2025 10:56)  QUEtiapine 50 mg oral tablet: 1 tab(s) orally 2 times a day (16 Apr 2025 11:45)  senna leaf extract oral tablet: 2 tab(s) orally once a day (at bedtime) (04 Feb 2025 10:56)  Tresiba FlexTouch: 60 unit(s) subcutaneous once a day (17 Sep 2022 01:58)    STANDING MEDICATIONS  atorvastatin 80 milliGRAM(s) Oral at bedtime  chlorhexidine 2% Cloths 1 Application(s) Topical <User Schedule>  dextrose 5%. 1000 milliLiter(s) IV Continuous <Continuous>  dextrose 5%. 1000 milliLiter(s) IV Continuous <Continuous>  dextrose 5%. 1000 milliLiter(s) IV Continuous <Continuous>  dextrose 50% Injectable 25 Gram(s) IV Push once  dextrose 50% Injectable 12.5 Gram(s) IV Push once  dextrose 50% Injectable 25 Gram(s) IV Push once  folic acid 1 milliGRAM(s) Oral daily  glucagon  Injectable 1 milliGRAM(s) IntraMuscular once  insulin lispro (ADMELOG) corrective regimen sliding scale   SubCutaneous every 6 hours  lactulose Syrup 20 Gram(s) Enteral Tube every 3 hours  levETIRAcetam   Injectable 250 milliGRAM(s) IV Push <User Schedule>  lidocaine   4% Patch 1 Patch Transdermal daily  metroNIDAZOLE  IVPB 500 milliGRAM(s) IV Intermittent once  midodrine 10 milliGRAM(s) Oral every 8 hours  nystatin Powder 1 Application(s) Topical two times a day  pantoprazole  Injectable 40 milliGRAM(s) IV Push every 12 hours  phytonadione  IVPB 10 milliGRAM(s) IV Intermittent daily  rifAXIMin 550 milliGRAM(s) Oral two times a day  senna 2 Tablet(s) Oral at bedtime  silver sulfADIAZINE 1% Cream 1 Application(s) Topical two times a day    PRN MEDICATIONS  acetaminophen     Tablet .. 650 milliGRAM(s) Oral every 6 hours PRN  aluminum hydroxide/magnesium hydroxide/simethicone Suspension 30 milliLiter(s) Oral every 4 hours PRN  dextrose Oral Gel 15 Gram(s) Oral once PRN  melatonin 3 milliGRAM(s) Oral at bedtime PRN  ondansetron Injectable 4 milliGRAM(s) IV Push every 8 hours PRN  polyethylene glycol 3350 17 Gram(s) Oral two times a day PRN                                                                                     --------------------------------------------------------------  PHYSICAL EXAM:  GENERAL: Awake, alert, oriented to person. overweight, laying in bed appearing in no acute distress  HEENT: No FNDs, atraumatic, normocephalic  LUNGS: Clear to auscultation bilaterally  HEART: S1/S2. No heaves or thrills  ABD: distended but soft  EXT/NEURO: Strength, sensation and ROM grossly intact.  SKIN: pitting edema    PLAN:  80yoM hx htn, DM, cirrhosis, seizures/SDH on keppra, falls, spinal OM here for aucte GIB and AMS    #Hepatic encephalopathy  -still present, c/w lactulose/rifaximin  -receiving 3d vit K for INR, repeat INR daily  -c/w AED, EEG without seizure potentials  -can do haldol prn    #GIB  #Acute anemia  -EGD/colono early admission showing duodenal ulcer s/p clip, cecal clot no active bleed  **4/28 overnight had drop in h/h and dark stools (reported passing clots over the weekend), h/h overcorrected with transfusion not sure initial cbc was correct  -PPI BID dc octreotide  -repeat colono in 1 year    #Bacterioides bacteremia  -s/p ceftr x7d  -done with flagyl 4/29AM    #Hypernatremia  -c/w free water through NG  -c/w d5w if NA>145  -bmp q8    #DVT ppx  -SCDs    #GI ppx  -PPI    #Diet  -tube feeds    #Activity  -PT/OT following    #Progress Note Handoff  Pending (specify): GI re-eval, encephalopathy improvement, Na improvement  Family discussion:   Disposition:

## 2025-04-28 NOTE — PROGRESS NOTE ADULT - ASSESSMENT
IMPRESSION    Encephalopathy, metabolic vs hepatic   GIB,  s/p prior EGD with duodenal ulcer  seizure hx   Liver cirrhosis   Bacteremia bacteroides  hypernatremia   CHARLENE   elevated trop   Afib     PLAN:    CNS:    off precedex  Haldol PRN for agitation, monitor QTC      HEENT: oral care     PULMONARY: keep pox >92% aspiration precaution   repeat cxr PRN    CARDIOVASCULAR:   follow cardiology    GI:   PPI BID, GI is following, not recommending repeat EGD,   Needs NGT for lactulose, rifaxamin  Follow LFT , hepatology / Gi follow up  NGT feeding  d/c octreotide if ok by GI   RENAL:  D5w 50cc/ hr if Na >145  follow NA level   follow renal  CMP daily  add free water per NG 250cc Q4 hrs   INFECTIOUS DISEASE:   ID  follow up   rocephin and flagyl   bldcx noted    HEMATOLOGICAL:    DVT prophylaxis. follow h/h and plt   hold AC   serial h/h keep Hgb >7    ENDOCRINE:  Follow up FS.  Insulin protocol if needed.    SDU IMPRESSION    Encephalopathy, metabolic vs hepatic   GIB,  s/p prior EGD with duodenal ulcer  seizure hx   Liver cirrhosis   Bacteremia bacteroides  hypernatremia   CHARLENE   elevated trop   Afib     PLAN:    CNS:    off precedex  Haldol PRN for agitation, monitor QTC      HEENT: oral care     PULMONARY: keep pox >92% aspiration precaution   repeat cxr PRN    CARDIOVASCULAR:   follow cardiology    GI:   PPI BID, GI is following, not recommending repeat EGD,   Needs NGT for lactulose, rifaxamin  Follow LFT , hepatology / Gi follow up  NGT feeding  d/c octreotide if ok by GI   RENAL:  D5w 50cc/ hr if Na >145  follow NA level   follow renal  bmp Q 8 hrs   add free water per NG 250cc Q4 hrs   INFECTIOUS DISEASE:   ID  follow up   abx as per ID   bldcx noted    HEMATOLOGICAL:    DVT prophylaxis. follow h/h and plt   hold AC   serial h/h keep Hgb >7    ENDOCRINE:  Follow up FS.  Insulin protocol if needed.     voiding trial   SDU

## 2025-04-28 NOTE — PROVIDER CONTACT NOTE (OTHER) - SITUATION
as per GI, please hold tube feedings. PT will be scheduled for EGD in am.
patient had large dark red BM
Pt admitted w/ AMS, OM of spine, gi bleed

## 2025-04-28 NOTE — PROGRESS NOTE ADULT - ASSESSMENT
80 years old male history of hypertension, diabetes, subdural hematoma on Keppra, hx mechanical falls, recent admission (January 2025) for osteomyelitis of L4-5 w completion of abx.  BIBA from home status post change of mental status.     ID is consulted for bacteremia  Afebrile  WBC 7.55 < 7.45  On room air  BCx 4/14 Bacteroides fragilis  BCx 4/16 NGTD  BCx 4/17 NGTD  UA WBC 5, UCx low count P. mirabilis    CT A/P 4/13  1.  Erosive endplate changes centered at the L4-5 level, consistent with   patient's history ofrecent lumbar spine osteomyelitis.  2.  Liver cirrhosis with portal hypertension. Mild-to-moderate ascites.   Anasarca. Hepatic dome subcentimeter hypodense 1.3 cm hypodensity, not   fully characterized . Outpatient MR abdomen with IV contrast recommended.  3.  Chronic pancreatitis, with limited evaluation for acute inflammation   due to background fluid. Correlation with pancreatic enzymes recommended.    Antibiotics:  Vancomycin 4/13  Ampicillin 4/14  Ceftriaxone 4/15 - 4/21  Flagyl 4/15 ->      IMPRESSION:  Bacteroides bacteremia, potentially life threatening  Acute blood loss anemia  Possible SBP?  Liver cirrhosis  Lower GI bleed  Hx L4-L5 osteomyelitis  Liver lesion  Immunosuppression / Immunosenescence secondary to multiple comorbidities which could result in poor clinical outcome    RECOMMENDATIONS:  - Completed 7 days of ceftriaxone, completed albumin x 2 doses  - IV Flagyl 500mg q12hrs for 14 day (until 4/28)  - GI follow up  - Offloading and frequent position changes, aspiration precaution  - Trend WBC, fever curve, transaminases, creatinine daily  - Please inform ID of any patient clinical change or any new pertinent laboratory or radiographic data      * THIS IS AN INCOMPLETE NOTE. FINAL RECOMMENDATION IS PENDING *   80 years old male history of hypertension, diabetes, subdural hematoma on Keppra, hx mechanical falls, recent admission (January 2025) for osteomyelitis of L4-5 w completion of abx.  BIBA from home status post change of mental status.     ID is consulted for bacteremia  Afebrile  WBC 7.55 < 7.45  On room air  BCx 4/14 Bacteroides fragilis  BCx 4/16 NGTD  BCx 4/17 NGTD  UA WBC 5, UCx low count P. mirabilis    CT A/P 4/13  1.  Erosive endplate changes centered at the L4-5 level, consistent with   patient's history ofrecent lumbar spine osteomyelitis.  2.  Liver cirrhosis with portal hypertension. Mild-to-moderate ascites.   Anasarca. Hepatic dome subcentimeter hypodense 1.3 cm hypodensity, not   fully characterized . Outpatient MR abdomen with IV contrast recommended.  3.  Chronic pancreatitis, with limited evaluation for acute inflammation   due to background fluid. Correlation with pancreatic enzymes recommended.    Antibiotics:  Vancomycin 4/13  Ampicillin 4/14  Ceftriaxone 4/15 - 4/21  Flagyl 4/15 ->      IMPRESSION:  Bacteroides bacteremia, potentially life threatening  Acute blood loss anemia  Possible SBP?  Liver cirrhosis  Lower GI bleed  Hx L4-L5 osteomyelitis  Liver lesion  Immunosuppression / Immunosenescence secondary to multiple comorbidities which could result in poor clinical outcome    RECOMMENDATIONS:  - Completed 7 days of ceftriaxone, completed albumin x 2 doses  - IV Flagyl 500mg q12hrs for 14 day (until 4/28)  - GI follow up  - Offloading and frequent position changes, aspiration precaution  - Trend WBC, fever curve, transaminases, creatinine daily  - Please inform ID of any patient clinical change or any new pertinent laboratory or radiographic data      Nora Beck D.O.  Attending Physician  Division of Infectious Diseases  [facility] - [facility]  Please contact me via [employer] Teams

## 2025-04-28 NOTE — PROGRESS NOTE ADULT - SUBJECTIVE AND OBJECTIVE BOX
Patient is a 80y old  Male who presents with a chief complaint of acute mentation changes (27 Apr 2025 10:45)      Over Night Events:  Patient seen and examined.   awake   follow simple command   on octreotide drip     ROS:  See HPI    PHYSICAL EXAM    ICU Vital Signs Last 24 Hrs  T(C): 36.2 (28 Apr 2025 07:01), Max: 37.3 (27 Apr 2025 15:00)  T(F): 97.2 (28 Apr 2025 07:01), Max: 99.2 (27 Apr 2025 15:00)  HR: 103 (28 Apr 2025 05:56) (80 - 157)  BP: 108/54 (28 Apr 2025 05:56) (104/54 - 159/65)  BP(mean): 78 (28 Apr 2025 05:56) (72 - 96)  ABP: --  ABP(mean): --  RR: 24 (28 Apr 2025 07:01) (13 - 35)  SpO2: 100% (28 Apr 2025 05:56) (88% - 100%)    O2 Parameters below as of 27 Apr 2025 16:00  Patient On (Oxygen Delivery Method): nasal cannula  O2 Flow (L/min): 2          General: awake   HEENT:       Ng tube             Lungs: Bilateral BS  Cardiovascular: Regular   Abdomen: Soft, Positive BS  Extremities: No clubbing   Skin: warm   Neurological:   Musculoskeletal: move all ext     I&O's Detail    27 Apr 2025 07:01  -  28 Apr 2025 07:00  --------------------------------------------------------  IN:    dextrose 5%: 160 mL    Octreotide: 65 mL  Total IN: 225 mL    OUT:    Indwelling Catheter - Urethral (mL): 1490 mL  Total OUT: 1490 mL    Total NET: -1265 mL          LABS:                          7.3    9.58  )-----------( 75       ( 27 Apr 2025 19:36 )             22.8         27 Apr 2025 06:30    151    |  116    |  19     ----------------------------<  156    4.3     |  23     |  1.5      Ca    7.6        27 Apr 2025 06:30  Mg     1.7       27 Apr 2025 06:30                                               PT/INR - ( 27 Apr 2025 06:30 )   PT: 27.00 sec;   INR: 2.25 ratio         PTT - ( 27 Apr 2025 06:30 )  PTT:55.7 sec                                       Urinalysis Basic - ( 27 Apr 2025 06:30 )    Color: x / Appearance: x / SG: x / pH: x  Gluc: 156 mg/dL / Ketone: x  / Bili: x / Urobili: x   Blood: x / Protein: x / Nitrite: x   Leuk Esterase: x / RBC: x / WBC x   Sq Epi: x / Non Sq Epi: x / Bacteria: x                                                                                                                                                     MEDICATIONS  (STANDING):  atorvastatin 80 milliGRAM(s) Oral at bedtime  chlorhexidine 2% Cloths 1 Application(s) Topical <User Schedule>  dextrose 5% + sodium chloride 0.45%. 1000 milliLiter(s) (50 mL/Hr) IV Continuous <Continuous>  dextrose 5%. 1000 milliLiter(s) (100 mL/Hr) IV Continuous <Continuous>  dextrose 5%. 1000 milliLiter(s) (50 mL/Hr) IV Continuous <Continuous>  dextrose 50% Injectable 25 Gram(s) IV Push once  dextrose 50% Injectable 12.5 Gram(s) IV Push once  dextrose 50% Injectable 25 Gram(s) IV Push once  folic acid 1 milliGRAM(s) Oral daily  glucagon  Injectable 1 milliGRAM(s) IntraMuscular once  insulin lispro (ADMELOG) corrective regimen sliding scale   SubCutaneous every 6 hours  lactulose Syrup 20 Gram(s) Enteral Tube every 3 hours  levETIRAcetam   Injectable 250 milliGRAM(s) IV Push <User Schedule>  lidocaine   4% Patch 1 Patch Transdermal daily  metroNIDAZOLE  IVPB 500 milliGRAM(s) IV Intermittent every 12 hours  midodrine 10 milliGRAM(s) Oral every 8 hours  nystatin Powder 1 Application(s) Topical two times a day  octreotide  Infusion 25 MICROgram(s)/Hr (5 mL/Hr) IV Continuous <Continuous>  pantoprazole  Injectable 40 milliGRAM(s) IV Push every 12 hours  phytonadione  IVPB 10 milliGRAM(s) IV Intermittent daily  rifAXIMin 550 milliGRAM(s) Oral two times a day  senna 2 Tablet(s) Oral at bedtime  silver sulfADIAZINE 1% Cream 1 Application(s) Topical two times a day    MEDICATIONS  (PRN):  acetaminophen     Tablet .. 650 milliGRAM(s) Oral every 6 hours PRN Temp greater or equal to 38C (100.4F), Mild Pain (1 - 3)  aluminum hydroxide/magnesium hydroxide/simethicone Suspension 30 milliLiter(s) Oral every 4 hours PRN Dyspepsia  dextrose Oral Gel 15 Gram(s) Oral once PRN Blood Glucose LESS THAN 70 milliGRAM(s)/deciliter  melatonin 3 milliGRAM(s) Oral at bedtime PRN Insomnia  ondansetron Injectable 4 milliGRAM(s) IV Push every 8 hours PRN Nausea and/or Vomiting  polyethylene glycol 3350 17 Gram(s) Oral two times a day PRN Constipation          Xrays:  TLC:  OG:  ET tube:                                                                                       ECHO:  CAM ICU:

## 2025-04-28 NOTE — PHARMACOTHERAPY INTERVENTION NOTE - COMMENTS
Patient completing 14-day course of metronidazole tonight- recommend to discontinue medication once complete

## 2025-04-28 NOTE — PHARMACOTHERAPY INTERVENTION NOTE - INTERVENTION TYPE RECOOMEND
Duration of Therapy Recommended
Timing/Frequency of Administration Recommended
Therapy Discontinuation Recommended - No indication

## 2025-04-29 ENCOUNTER — TRANSCRIPTION ENCOUNTER (OUTPATIENT)
Age: 80
End: 2025-04-29

## 2025-04-29 LAB
ALBUMIN SERPL ELPH-MCNC: 2.1 G/DL — LOW (ref 3.5–5.2)
ALP SERPL-CCNC: 141 U/L — HIGH (ref 30–115)
ALT FLD-CCNC: 15 U/L — SIGNIFICANT CHANGE UP (ref 0–41)
ANION GAP SERPL CALC-SCNC: 15 MMOL/L — HIGH (ref 7–14)
AST SERPL-CCNC: 54 U/L — HIGH (ref 0–41)
BILIRUB SERPL-MCNC: 1.3 MG/DL — HIGH (ref 0.2–1.2)
BUN SERPL-MCNC: 16 MG/DL — SIGNIFICANT CHANGE UP (ref 10–20)
CALCIUM SERPL-MCNC: 7.7 MG/DL — LOW (ref 8.4–10.5)
CHLORIDE SERPL-SCNC: 114 MMOL/L — HIGH (ref 98–110)
CO2 SERPL-SCNC: 19 MMOL/L — SIGNIFICANT CHANGE UP (ref 17–32)
CREAT SERPL-MCNC: 1.3 MG/DL — SIGNIFICANT CHANGE UP (ref 0.7–1.5)
EGFR: 56 ML/MIN/1.73M2 — LOW
EGFR: 56 ML/MIN/1.73M2 — LOW
FIBRINOGEN AG PPP IA-MCNC: 81 MG/DL — LOW (ref 233–496)
GLUCOSE BLDC GLUCOMTR-MCNC: 133 MG/DL — HIGH (ref 70–99)
GLUCOSE BLDC GLUCOMTR-MCNC: 146 MG/DL — HIGH (ref 70–99)
GLUCOSE BLDC GLUCOMTR-MCNC: 157 MG/DL — HIGH (ref 70–99)
GLUCOSE BLDC GLUCOMTR-MCNC: 161 MG/DL — HIGH (ref 70–99)
GLUCOSE BLDC GLUCOMTR-MCNC: 170 MG/DL — HIGH (ref 70–99)
GLUCOSE SERPL-MCNC: 148 MG/DL — HIGH (ref 70–99)
HCT VFR BLD CALC: 24 % — LOW (ref 42–52)
HGB BLD-MCNC: 7.6 G/DL — LOW (ref 14–18)
INR BLD: 2.01 RATIO — HIGH (ref 0.65–1.3)
MAGNESIUM SERPL-MCNC: 1.9 MG/DL — SIGNIFICANT CHANGE UP (ref 1.8–2.4)
MCHC RBC-ENTMCNC: 31.3 PG — HIGH (ref 27–31)
MCHC RBC-ENTMCNC: 31.7 G/DL — LOW (ref 32–37)
MCV RBC AUTO: 98.8 FL — HIGH (ref 80–94)
NRBC BLD AUTO-RTO: 0 /100 WBCS — SIGNIFICANT CHANGE UP (ref 0–0)
PLATELET # BLD AUTO: 74 K/UL — LOW (ref 130–400)
PMV BLD: 9.6 FL — SIGNIFICANT CHANGE UP (ref 7.4–10.4)
POTASSIUM SERPL-MCNC: 3.4 MMOL/L — LOW (ref 3.5–5)
POTASSIUM SERPL-SCNC: 3.4 MMOL/L — LOW (ref 3.5–5)
PROT SERPL-MCNC: 4.1 G/DL — LOW (ref 6–8)
PROTHROM AB SERPL-ACNC: 24 SEC — HIGH (ref 9.95–12.87)
RBC # BLD: 2.43 M/UL — LOW (ref 4.7–6.1)
RBC # FLD: 21.4 % — HIGH (ref 11.5–14.5)
SODIUM SERPL-SCNC: 148 MMOL/L — HIGH (ref 135–146)
WBC # BLD: 7.04 K/UL — SIGNIFICANT CHANGE UP (ref 4.8–10.8)
WBC # FLD AUTO: 7.04 K/UL — SIGNIFICANT CHANGE UP (ref 4.8–10.8)

## 2025-04-29 PROCEDURE — 99233 SBSQ HOSP IP/OBS HIGH 50: CPT

## 2025-04-29 PROCEDURE — 99232 SBSQ HOSP IP/OBS MODERATE 35: CPT

## 2025-04-29 PROCEDURE — 71045 X-RAY EXAM CHEST 1 VIEW: CPT | Mod: 26

## 2025-04-29 PROCEDURE — 43235 EGD DIAGNOSTIC BRUSH WASH: CPT

## 2025-04-29 RX ORDER — SODIUM CHLORIDE 9 G/1000ML
1000 INJECTION, SOLUTION INTRAVENOUS
Refills: 0 | Status: DISCONTINUED | OUTPATIENT
Start: 2025-04-29 | End: 2025-04-30

## 2025-04-29 RX ADMIN — Medication 40 MILLIGRAM(S): at 04:17

## 2025-04-29 RX ADMIN — LEVETIRACETAM 250 MILLIGRAM(S): 10 INJECTION, SOLUTION INTRAVENOUS at 17:42

## 2025-04-29 RX ADMIN — LIDOCAINE HYDROCHLORIDE 1 PATCH: 20 JELLY TOPICAL at 03:19

## 2025-04-29 RX ADMIN — LIDOCAINE HYDROCHLORIDE 1 PATCH: 20 JELLY TOPICAL at 18:40

## 2025-04-29 RX ADMIN — Medication 2 TABLET(S): at 22:13

## 2025-04-29 RX ADMIN — LACTULOSE 20 GRAM(S): 10 SOLUTION ORAL at 22:13

## 2025-04-29 RX ADMIN — LACTULOSE 20 GRAM(S): 10 SOLUTION ORAL at 00:35

## 2025-04-29 RX ADMIN — INSULIN LISPRO 2: 100 INJECTION, SOLUTION INTRAVENOUS; SUBCUTANEOUS at 17:39

## 2025-04-29 RX ADMIN — LACTULOSE 20 GRAM(S): 10 SOLUTION ORAL at 02:33

## 2025-04-29 RX ADMIN — NYSTATIN 1 APPLICATION(S): 100000 CREAM TOPICAL at 04:14

## 2025-04-29 RX ADMIN — Medication 1 APPLICATION(S): at 17:48

## 2025-04-29 RX ADMIN — NYSTATIN 1 APPLICATION(S): 100000 CREAM TOPICAL at 17:45

## 2025-04-29 RX ADMIN — INSULIN LISPRO 2: 100 INJECTION, SOLUTION INTRAVENOUS; SUBCUTANEOUS at 05:03

## 2025-04-29 RX ADMIN — ATORVASTATIN CALCIUM 80 MILLIGRAM(S): 80 TABLET, FILM COATED ORAL at 22:13

## 2025-04-29 RX ADMIN — Medication 102 MILLIGRAM(S): at 11:10

## 2025-04-29 RX ADMIN — Medication 40 MILLIGRAM(S): at 17:42

## 2025-04-29 RX ADMIN — MIDODRINE HYDROCHLORIDE 10 MILLIGRAM(S): 5 TABLET ORAL at 22:13

## 2025-04-29 RX ADMIN — Medication 1 APPLICATION(S): at 04:17

## 2025-04-29 RX ADMIN — LEVETIRACETAM 250 MILLIGRAM(S): 10 INJECTION, SOLUTION INTRAVENOUS at 04:17

## 2025-04-29 RX ADMIN — Medication 1 APPLICATION(S): at 04:14

## 2025-04-29 RX ADMIN — Medication 50 MILLIEQUIVALENT(S): at 11:11

## 2025-04-29 RX ADMIN — SODIUM CHLORIDE 50 MILLILITER(S): 9 INJECTION, SOLUTION INTRAVENOUS at 02:38

## 2025-04-29 NOTE — PROGRESS NOTE ADULT - CRITICAL CARE ATTENDING COMMENT
The patient's injury acutely impairs one or more vital organ systems, and there is a high probability of imminent or life threatening deterioration in the patient’s condition. The care of this patient requires complex medical decision making in the field of Infectious Diseases and extensive interpretation of laboratory, microbiological, and radiographic data
This patient is critically ill due to the following:  * Multiple organ failure requiring complex decision-making, and there is a high probability of imminent or life-threatening deterioration in the patient’s condition  * The patient required frequent reassessments and monitoring to ensure response to interventions and therapies.    Critical care time includes time spent evaluating and treating the patient's acute illness as well as time spent reviewing labs, radiology,  and discussing the case with a multidisciplinary team in an effort to prevent further life threatening deterioration or end organ damage. This time is independent of any procedures performed.
This patient is critically ill due to the following:  * Multiple organ failure requiring complex decision-making, and there is a high probability of imminent or life-threatening deterioration in the patient’s condition  * The patient required frequent reassessments and monitoring to ensure response to interventions and therapies.    Critical care time includes time spent evaluating and treating the patient's acute illness as well as time spent reviewing labs, radiology,  and discussing the case with a multidisciplinary team in an effort to prevent further life threatening deterioration or end organ damage. This time is independent of any procedures performed.
The patient's injury acutely impairs one or more vital organ systems, and there is a high probability of imminent or life threatening deterioration in the patient’s condition. The care of this patient requires complex medical decision making in the field of Infectious Diseases and extensive interpretation of laboratory, microbiological, and radiographic data
This patient is critically ill due to the following:  * Multiple organ failure requiring complex decision-making, and there is a high probability of imminent or life-threatening deterioration in the patient’s condition  * The patient required frequent reassessments and monitoring to ensure response to interventions and therapies.    Critical care time includes time spent evaluating and treating the patient's acute illness as well as time spent reviewing labs, radiology,  and discussing the case with a multidisciplinary team in an effort to prevent further life threatening deterioration or end organ damage. This time is independent of any procedures performed.
This patient is critically ill due to the following:  * Multiple organ failure requiring complex decision-making, and there is a high probability of imminent or life-threatening deterioration in the patient’s condition  * The patient required frequent reassessments and monitoring to ensure response to interventions and therapies.    Critical care time includes time spent evaluating and treating the patient's acute illness as well as time spent reviewing labs, radiology,  and discussing the case with a multidisciplinary team in an effort to prevent further life threatening deterioration or end organ damage. This time is independent of any procedures performed.
The patient's injury acutely impairs one or more vital organ systems, and there is a high probability of imminent or life threatening deterioration in the patient’s condition. The care of this patient requires complex medical decision making in the field of Infectious Diseases and extensive interpretation of laboratory, microbiological, and radiographic data

## 2025-04-29 NOTE — PRE-ANESTHESIA EVALUATION ADULT - BP NONINVASIVE SYSTOLIC (MM HG)
Problem: Potential for Falls  Goal: Patient will remain free of falls  INTERVENTIONS:  - Assess patient frequently for physical needs  -  Identify cognitive and physical deficits and behaviors that affect risk of falls    -  [place] fall precautions as indicated by assessment   - Educate patient/family on patient safety including physical limitations  - Instruct patient to call for assistance with activity based on assessment  - Modify environment to reduce risk of injury  - Consider OT/PT consult to assist with strengthening/mobility   Outcome: Progressing
122
119
109

## 2025-04-29 NOTE — PROGRESS NOTE ADULT - SUBJECTIVE AND OBJECTIVE BOX
DWIGHT RIBEIRO 80y Male  MRN#: [id number]     Hospital Day: 15d    Pt is currently admitted with the primary diagnosis of encephalopathy ; GIB ; possible seizure ; hypernatremia ; bacteremia    Overnight events   -No major overnight events                                          ----------------------------------------------------------    VITAL SIGNS: Last 24 Hours  T(C): 36.6 (29 Apr 2025 07:05), Max: 36.7 (28 Apr 2025 15:01)  T(F): 97.9 (29 Apr 2025 07:05), Max: 98.1 (28 Apr 2025 23:21)  HR: 83 (29 Apr 2025 07:05) (80 - 86)  BP: 113/70 (29 Apr 2025 07:05) (100/54 - 113/70)  BP(mean): 85 (29 Apr 2025 07:05) (73 - 85)  RR: 18 (29 Apr 2025 07:05) (13 - 20)  SpO2: 100% (29 Apr 2025 07:05) (98% - 100%)      04-28-25 @ 07:01  -  04-29-25 @ 07:00  --------------------------------------------------------  IN: 1475 mL / OUT: 200 mL / NET: 1275 mL    04-29-25 @ 07:01  -  04-29-25 @ 14:41  --------------------------------------------------------  IN: 500 mL / OUT: 210 mL / NET: 290 mL                                             --------------------------------------------------------------  LABS:                        7.6    7.04  )-----------( 74       ( 29 Apr 2025 06:03 )             24.0     04-29    148[H]  |  114[H]  |  16  ----------------------------<  148[H]  3.4[L]   |  19  |  1.3    Ca    7.7[L]      29 Apr 2025 06:03  Mg     1.9     04-29    TPro  4.1[L]  /  Alb  2.1[L]  /  TBili  1.3[H]  /  DBili  x   /  AST  54[H]  /  ALT  15  /  AlkPhos  141[H]  04-29    PT/INR - ( 29 Apr 2025 06:03 )   PT: 24.00 sec;   INR: 2.01 ratio         PTT - ( 28 Apr 2025 05:49 )  PTT:53.6 sec  Urinalysis Basic - ( 29 Apr 2025 06:03 )    Color: x / Appearance: x / SG: x / pH: x  Gluc: 148 mg/dL / Ketone: x  / Bili: x / Urobili: x   Blood: x / Protein: x / Nitrite: x   Leuk Esterase: x / RBC: x / WBC x   Sq Epi: x / Non Sq Epi: x / Bacteria: x                     ------------------------------------------------------------    OBJECTIVE  PAST MEDICAL & SURGICAL HISTORY  Diabetes    Hypertension    Fall    H/O left knee surgery    History of cholecystectomy    History of appendectomy                                              -----------------------------------------------------------  MEDICATIONS:  HOME MEDICATIONS  Home Medications:  acetaminophen 500 mg oral tablet: 2 tab(s) orally every 8 hours (04 Feb 2025 10:56)  aluminum hydroxide-magnesium hydroxide 200 mg-200 mg/5 mL oral suspension: 30 milliliter(s) orally every 4 hours As needed Dyspepsia (04 Feb 2025 10:56)  amLODIPine 5 mg oral tablet: 1 tab(s) orally once a day (16 Apr 2025 11:45)  aspirin 81 mg oral delayed release tablet: 1 tab(s) orally once a day (04 Feb 2025 10:56)  atorvastatin 80 mg oral tablet: 1 tab(s) orally once a day (at bedtime) (04 Feb 2025 10:56)  ceFAZolin 2 g intravenous injection: 2 gram(s) intravenous every 8 hours End date is 3/9/2025 (04 Feb 2025 10:56)  folic acid 1 mg oral tablet: 1 tab(s) orally once a day (04 Feb 2025 10:56)  gabapentin 100 mg oral capsule: 3 cap(s) orally 2 times a day (17 Sep 2022 01:58)  gabapentin 100 mg oral capsule: 1 cap(s) orally 2 times a day (16 Apr 2025 11:45)  Keppra 250 mg oral tablet: 2 tab(s) orally 2 times a day (16 Apr 2025 11:48)  Lasix 20 mg oral tablet: 1 tab(s) orally once a day (16 Apr 2025 11:45)  lidocaine 4% topical film: Apply topically to affected area every 24 hours Lower back (04 Feb 2025 10:56)  losartan 50 mg oral tablet: 0.5 tab(s) orally 2 times a day (17 Sep 2022 01:58)  metFORMIN 500 mg oral tablet: 500 milligram(s) orally 2 times a day (16 Apr 2025 11:46)  morphine: 2 milligram(s) intravenous every 8 hours as needed for  severe pain (04 Feb 2025 10:56)  nystatin 100,000 units/g topical powder: 1 Apply topically to affected area every 12 hours As needed fungal rash (04 Feb 2025 10:56)  Osteo Bi-Flex 250 mg-200 mg oral tablet: orally once a day (17 Sep 2022 01:58)  oxyCODONE 10 mg oral tablet: 1 tab(s) orally 3 times a day As needed Severe Pain (7 - 10) (04 Feb 2025 10:56)  polyethylene glycol 3350 oral powder for reconstitution: 17 gram(s) orally 2 times a day (04 Feb 2025 10:56)  QUEtiapine 50 mg oral tablet: 1 tab(s) orally 2 times a day (16 Apr 2025 11:45)  senna leaf extract oral tablet: 2 tab(s) orally once a day (at bedtime) (04 Feb 2025 10:56)  Tresiba FlexTouch: 60 unit(s) subcutaneous once a day (17 Sep 2022 01:58)    STANDING MEDICATIONS  atorvastatin 80 milliGRAM(s) Oral at bedtime  chlorhexidine 2% Cloths 1 Application(s) Topical <User Schedule>  dextrose 5%. 1000 milliLiter(s) IV Continuous <Continuous>  dextrose 5%. 1000 milliLiter(s) IV Continuous <Continuous>  dextrose 5%. 1000 milliLiter(s) IV Continuous <Continuous>  dextrose 50% Injectable 25 Gram(s) IV Push once  dextrose 50% Injectable 12.5 Gram(s) IV Push once  dextrose 50% Injectable 25 Gram(s) IV Push once  folic acid 1 milliGRAM(s) Oral daily  glucagon  Injectable 1 milliGRAM(s) IntraMuscular once  insulin lispro (ADMELOG) corrective regimen sliding scale   SubCutaneous every 6 hours  lactulose Syrup 20 Gram(s) Enteral Tube every 3 hours  levETIRAcetam   Injectable 250 milliGRAM(s) IV Push <User Schedule>  lidocaine   4% Patch 1 Patch Transdermal daily  midodrine 10 milliGRAM(s) Oral every 8 hours  nystatin Powder 1 Application(s) Topical two times a day  pantoprazole  Injectable 40 milliGRAM(s) IV Push every 12 hours  rifAXIMin 550 milliGRAM(s) Oral two times a day  senna 2 Tablet(s) Oral at bedtime  silver sulfADIAZINE 1% Cream 1 Application(s) Topical two times a day    PRN MEDICATIONS  acetaminophen     Tablet .. 650 milliGRAM(s) Oral every 6 hours PRN  aluminum hydroxide/magnesium hydroxide/simethicone Suspension 30 milliLiter(s) Oral every 4 hours PRN  dextrose Oral Gel 15 Gram(s) Oral once PRN  melatonin 3 milliGRAM(s) Oral at bedtime PRN  ondansetron Injectable 4 milliGRAM(s) IV Push every 8 hours PRN  polyethylene glycol 3350 17 Gram(s) Oral two times a day PRN                                                                                     --------------------------------------------------------------  PHYSICAL EXAM:  GENERAL: Awake, alert, oriented to person. overweight, laying in bed appearing in no acute distress  HEENT: No FNDs, atraumatic, normocephalic  LUNGS: Clear to auscultation bilaterally  HEART: S1/S2. No heaves or thrills  ABD: distended but soft  EXT/NEURO: Strength, sensation grossly intact.  SKIN: pitting edema    PLAN:  80yoM hx htn, DM, cirrhosis with upper abdominal varices, seizures/SDH on keppra, falls, spinal OM here for aucte GIB and AMS    #Hepatic encephalopathy  #Liver Cirrhosis  #Upper abdominal varices  -still present, c/w lactulose/rifaximin  -vit k x3d for coagulopathy finish 4/29/25  -c/w AED, EEG without seizure potentials  -can do haldol prn    #GIB  #Acute anemia  -EGD/colono early admission showing duodenal ulcer s/p clip, cecal clot no active bleed  **4/28 overnight had drop in h/h and dark stools (reported passing clots over the weekend), h/h overcorrected with transfusion not sure initial cbc was correct  -PPI BID dc octreotide  -pending EGD 4/29 with GI  -repeat colono in 1 year    #Bacterioides bacteremia  -s/p ceftr x7d  -done with flagyl 4/29AM    #Hypernatremia  -c/w free water through NG  -c/w d5w if NA>145  -bmp q8    #LE edema  -peripheral volume overload but resting comfortably in bed without resp distress    #DVT ppx  -SCDs    #GI ppx  -PPI q12    #Diet  -tube feeds once EGD done    #Activity  -PT/OT following    #Progress Note Handoff  Pending (specify): EGD, encephalopathy improvement, Na improvement  Family discussion:   Disposition:

## 2025-04-29 NOTE — PROGRESS NOTE ADULT - ASSESSMENT
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 fragilis bacteremia, and acute blood loss anemia. Patient is on IV morphine for pain. Patient is on lactulose for encephalopathy, IV abx, and frequent H/H monitoring, requiring pRBC. Palliative consulted for GOC. Patient is full code.    - c/w midodrine  - planning for EGD today  - monitor AMS, c/w lactulose, monitor in ICU, improving per wife  - see GOC above, full code, full medical measures for now  - signing off, reconsult PRN    ______________  Ang Dunne MD  Palliative Medicine  [facility]   of Geriatric and Palliative Medicine  [phone]

## 2025-04-29 NOTE — PROGRESS NOTE ADULT - ATTENDING COMMENTS
80 years old male with a medical  history of hypertension, diabetes, subdural hematoma on Keppra, hx mechanical falls, recent admission (January 2025) for osteomyelitis of L4-5 w completion of abx.  BIBA from home status post change of mental status. In hospital pt found with bacteremia, GI bleed and liver cirrhosis    metabolic and hepatic encephalopathy / GI bleed / acute blood loss anemia / bacteremia     - melena as per nursing   - NPO for repeat  EGD today   - s/p  EGD  with gastritis and duodenal ulcer s/p endoclip; s/p colonoscopy   - continue rifaximin and lactulose   - complete antibiotic course as per ID   - supplement  hypokalemia and follow repeat lytes   - sq insulin hospital protocol   - follow H/H and transfuse to maintain hgb>7   - free water for hypernatremia   - 50 minutes total spent today on patient care .
80 years old male with a medical  history of hypertension, diabetes, subdural hematoma on Keppra, hx mechanical falls, recent admission (January 2025) for osteomyelitis of L4-5 w completion of abx.  BIBA from home status post change of mental status. In hospital pt found with bacteremia, GI bleed and liver cirrhosis    metabolic and hepatic encephalopathy / GI bleed / acute blood loss anemia / bacteremia     - melena as per nursing   - NPO after midnight for repeat EGD in am as per GI   - s/p  EGD  with gastritis and duodenal ulcer s/p endoclip; s/p colonoscopy   - continue rifaximin and lactulose   - complete antibiotic course as per ID   - supplement  hypomagnesemia and follow repeat lytes   - sq insulin hospital protocol   - follow H/H and transfuse to maintain hgb>7   - free water for hypernatremia   - 50 minutes total spent today on patient care
Attending Statement: I have personally performed a face to face diagnostic evaluation on this patient. The patient is suffering from:    Encephalopathy, metabolic vs hpeatic   GIB, s/p 3 uPRBC, s/p prior EGD with duodenal ulcer  seizure hx   Liver cirrhosis   Bacteremia bacteroides  hypernatremia   CHARLENE   elevated trop   Afib   I have made amendments to the documentation where necessary. I have personally seen and examined this patient.  I have fully participated in the care of this patient.  I have reviewed all pertinent clinical information, including history, physical exam, plan and note.
Attending Statement: I have personally performed a face to face diagnostic evaluation on this patient. The patient is suffering from:  Encephalopathy, metabolic vs hepatic   GIB, s/p 3 uPRBC, s/p prior EGD with duodenal ulcer  seizure hx   Liver cirrhosis   Bacteremia bacteroides  hypernatremia   CHARLENE   elevated trop   Afib   I have made amendments to the documentation where necessary. I have personally seen and examined this patient.  I have fully participated in the care of this patient.  I have reviewed all pertinent clinical information, including history, physical exam, plan and note.
delerium/encephalopathy  - worsening overnight  - no BMs noted  - suspect ammonia related  - increase lactulose
Attending Statement: I have personally performed a face to face diagnostic evaluation on this patient. The patient is suffering from:  Encephalopathy, metabolic vs hepatic   GIB, s/p 3 uPRBC, s/p prior EGD with duodenal ulcer  seizure hx   Liver cirrhosis   Bacteremia bacteroides  hypernatremia   CHARLENE   elevated trop   Afib   I have made amendments to the documentation where necessary. I have personally seen and examined this patient.  I have fully participated in the care of this patient.  I have reviewed all pertinent clinical information, including history, physical exam, plan and note.
New onset ascities  - will presume SBP based on new presentaton and decompensation  - for paracentesis today if pocket of fluid localized on U/S  - rocephin for now    encephalopathy  - likely 2/2 high ammonia from liver   - GI eval    1 epsidoe of bright red blood per rectum  - cbc q8  - hgb stable so far, with some dilutional effect
Imprssion:    Acute blood loss anemia  AMS  hepatic encephalopathy  Liver cirrhosis

## 2025-04-29 NOTE — CHART NOTE - NSCHARTNOTEFT_GEN_A_CORE
80 years old male history of hypertension, diabetes, subdural hematoma on Keppra, hx mechanical falls, recent admission (January 2025) for osteomyelitis of L4-5 w completion of abx.  BIBA from home status post change of mental status.  Per provider note, patient was discharged from rehab facility on April 4.  Patient was supposed to have MRI of lower back on April 5 but patient refused.  Patient baseline uses walker to ambulate.  By report patient has been doing okay since his discharge from the facility.  Patient had 1 episode of fall on his buttocks few days ago that she was able to help patient to get up with no difficulty.  Patient become confused and calling for his father tonight over the past few hours so she called EMS.  No ROS given mental status, no family at bedside.      HOSPITAL COURSE:    Pt treated in house for encephalopathy, GIB, bacteremia, hypernatremia. Pt initially had EGD 4/16 showing small duodenal ulcer that was clipped, continued to have some dark stools and questionably acute drop in h/h but overall h/h has slowly been dropping. pt had repeat endoscopy 4/29. Pt has NGT in place for lactulose/rifaximin/medications as he remains encephalopathic secondary to decompensated liver cirrhosis not requiring pressors. Pt received IV abx ceftr/flagyl for bacteroidies bacteremia which has cleared. Pt has been received IVF as well as free water through his PEG tube with slow correction of hypernatremia.      #Hepatic encephalopathy  #Liver Cirrhosis  #Upper abdominal varices  -still present, c/w lactulose/rifaximin  -vit k x3d for coagulopathy finish 4/29/25  -c/w AED, EEG without seizure potentials  -can do haldol prn    #GIB  #Acute anemia  -EGD/colono early admission showing duodenal ulcer s/p clip, cecal clot no active bleed  **4/28 overnight had drop in h/h and dark stools (reported passing clots over the weekend), h/h overcorrected with transfusion not sure initial cbc was correct  -PPI BID dc octreotide  -pending EGD 4/29 with GI  -repeat colono in 1 year    #Bacterioides bacteremia  -s/p ceftr x7d  -done with flagyl 4/29AM    #Hypernatremia  -c/w free water through NG  -c/w d5w if NA>145  -bmp q8    #LE edema  -peripheral volume overload but resting comfortably in bed without resp distress    #DVT ppx  -SCDs    #GI ppx  -PPI q12    #Diet  -tube feeds once EGD done    #Activity  -PT/OT following    #Progress Note Handoff  Pending (specify): EGD, encephalopathy improvement, Na improvement  Family discussion:   Disposition:

## 2025-04-29 NOTE — PRE-ANESTHESIA EVALUATION ADULT - MALLAMPATI CLASS
Class III - visualization of the soft palate and the base of the uvula
Unable to evaluate.
Class II - visualization of the soft palate, fauces, and uvula

## 2025-04-29 NOTE — PRE-ANESTHESIA EVALUATION ADULT - NSANTHAPLANRD_GEN_ALL_CORE
monitored anesthesia care (MAC)
monitored anesthesia care (MAC)
general/monitored anesthesia care (MAC)

## 2025-04-29 NOTE — PROGRESS NOTE ADULT - ASSESSMENT
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 fragilis bacteremia, and acute blood loss anemia.    Received contrast 4/23 that likely led to recurrence of CHARLENE  cr is stable over past days 1's  - mild hypernatremia / on lactulose for BMs  - CT noted/ no hydronephrosis  - pending EGD today    Recommendations:  - cont D5W until pt can tolerate GT feeds, then increase free water by NGT  - replete K to 4   - cont loyola / strict i/o   - cont midodrine as needed for BP support

## 2025-04-29 NOTE — PROGRESS NOTE ADULT - SUBJECTIVE AND OBJECTIVE BOX
INFECTIOUS DISEASE FOLLOW UP NOTE:    Interval History/ROS: Patient is a 80y old  Male who presents with a chief complaint of acute mentation changes (29 Apr 2025 07:14)      Overnight events:    REVIEW OF SYSTEMS:        Prior hospital charts reviewed [Yes]  Primary team notes reviewed [Yes]  Other consultant notes reviewed [Yes]    Allergies:  No Known Allergies      ANTIMICROBIALS:   rifAXIMin 550 two times a day      OTHER MEDS: MEDICATIONS  (STANDING):  acetaminophen     Tablet .. 650 every 6 hours PRN  aluminum hydroxide/magnesium hydroxide/simethicone Suspension 30 every 4 hours PRN  atorvastatin 80 at bedtime  dextrose 50% Injectable 25 once  dextrose 50% Injectable 12.5 once  dextrose 50% Injectable 25 once  dextrose Oral Gel 15 once PRN  glucagon  Injectable 1 once  insulin lispro (ADMELOG) corrective regimen sliding scale  every 6 hours  lactulose Syrup 20 every 3 hours  levETIRAcetam   Injectable 250 <User Schedule>  melatonin 3 at bedtime PRN  midodrine 10 every 8 hours  ondansetron Injectable 4 every 8 hours PRN  pantoprazole  Injectable 40 every 12 hours  polyethylene glycol 3350 17 two times a day PRN  senna 2 at bedtime      Vital Signs Last 24 Hrs  T(F): 97.9 (04-29-25 @ 07:05), Max: 99.2 (04-27-25 @ 15:00)    Vital Signs Last 24 Hrs  HR: 83 (04-29-25 @ 07:05) (80 - 86)  BP: 113/70 (04-29-25 @ 07:05) (90/64 - 113/70)  RR: 18 (04-29-25 @ 07:05)  SpO2: 100% (04-29-25 @ 07:05) (98% - 100%)  Wt(kg): --    EXAM:      Labs:                        7.6    7.04  )-----------( 74       ( 29 Apr 2025 06:03 )             24.0     04-29    148[H]  |  114[H]  |  16  ----------------------------<  148[H]  3.4[L]   |  19  |  1.3    Ca    7.7[L]      29 Apr 2025 06:03  Mg     1.9     04-29    TPro  4.1[L]  /  Alb  2.1[L]  /  TBili  1.3[H]  /  DBili  x   /  AST  54[H]  /  ALT  15  /  AlkPhos  141[H]  04-29      WBC Trend:  WBC Count: 7.04 (04-29-25 @ 06:03)  WBC Count: 8.48 (04-28-25 @ 15:46)  WBC Count: 8.08 (04-28-25 @ 05:49)  WBC Count: 9.58 (04-27-25 @ 19:36)      Creatine Trend:  Creatinine: 1.3 (04-29)  Creatinine: 1.5 (04-28)  Creatinine: 1.4 (04-28)  Creatinine: 1.5 (04-27)      Liver Biochemical Testing Trend:  Alanine Aminotransferase (ALT/SGPT): 15 (04-29)  Alanine Aminotransferase (ALT/SGPT): 16 (04-28)  Alanine Aminotransferase (ALT/SGPT): 16 (04-27)  Alanine Aminotransferase (ALT/SGPT): 14 (04-26)  Alanine Aminotransferase (ALT/SGPT): 14 (04-25)  Aspartate Aminotransferase (AST/SGOT): 54 (04-29-25 @ 06:03)  Aspartate Aminotransferase (AST/SGOT): 63 (04-28-25 @ 05:49)  Aspartate Aminotransferase (AST/SGOT): 61 (04-27-25 @ 06:30)  Aspartate Aminotransferase (AST/SGOT): 51 (04-26-25 @ 07:40)  Aspartate Aminotransferase (AST/SGOT): 54 (04-25-25 @ 05:33)  Bilirubin Total: 1.3 (04-29)  Bilirubin Total: 1.2 (04-28)  Bilirubin Total: 1.1 (04-27)  Bilirubin Total: 1.5 (04-26)  Bilirubin Total: 1.7 (04-25)      Trend LDH  04-24-25 @ 05:53  275[H]      Urinalysis Basic - ( 29 Apr 2025 06:03 )    Color: x / Appearance: x / SG: x / pH: x  Gluc: 148 mg/dL / Ketone: x  / Bili: x / Urobili: x   Blood: x / Protein: x / Nitrite: x   Leuk Esterase: x / RBC: x / WBC x   Sq Epi: x / Non Sq Epi: x / Bacteria: x        MICROBIOLOGY:    MRSA PCR Result.: Negative (04-25-25 @ 09:00)      Culture - Blood (collected 17 Apr 2025 18:30)  Source: Blood Blood  Final Report:    No growth at 5 days    Culture - Blood (collected 16 Apr 2025 15:15)  Source: Blood None  Final Report:    No growth at 5 days    Urinalysis with Rflx Culture (collected 13 Apr 2025 21:20)    Culture - Blood (collected 13 Apr 2025 21:20)  Source: Blood Blood-Peripheral  Final Report:    Growth in anaerobic bottle: Bacteroides fragilis    "Susceptibilities not performed"    Culture - Urine (collected 13 Apr 2025 21:20)  Source: Catheterized None  Final Report:    10,000 - 49,000 CFU/mL Proteus mirabilis  Organism: Proteus mirabilis  Organism: Proteus mirabilis    Sensitivities:      -  Levofloxacin: S <=0.5      -  Tobramycin: S <=2      -  Nitrofurantoin: R >64 Should not be used to treat pyelonephritis      -  Aztreonam: S <=4      -  Gentamicin: S <=2      -  Cefazolin: S <=2 For uncomplicated UTI with K. pneumoniae, E. coli, or P. mirablis: LOUIS <=16 is sensitive and LOUSI >=32 is resistant. This also predicts results for oral agents cefaclor, cefdinir, cefpodoxime, cefprozil, cefuroxime axetil, cephalexin and locarbef for uncomplicated UTI. Note that some isolates may be susceptible to these agents while testing resistant to cefazolin.      -  Cefepime: S <=2      -  Piperacillin/Tazobactam: S <=8      -  Ciprofloxacin: S <=0.25      -  Ceftriaxone: S <=1      -  Ampicillin: S <=8 These ampicillin results predict results for amoxicillin      Method Type: LOUIS      -  Meropenem: S <=1      -  Ampicillin/Sulbactam: S <=4/2      -  Cefoxitin: S <=8      -  Cefuroxime: S <=4      -  Amoxicillin/Clavulanic Acid: S <=8/4      -  Trimethoprim/Sulfamethoxazole: S <=0.5/9.5      -  Ertapenem: S <=0.5    Culture - Blood (collected 13 Apr 2025 21:20)  Source: Blood Blood-Peripheral  Final Report:    Growth in anaerobic bottle: Bacteroides fragilis "Susceptibilities not    performed"    Direct identification is available within approximately 3-5    hours either by Blood Panel Multiplexed PCR or Direct    MALDI-TOF. Details: [email or web address]  Organism: Blood Culture PCR  Organism: Blood Culture PCR    Sensitivities:      -  Bacteroides fragilis: Detec      Method Type: PCR    Culture - Blood (collected 30 Jan 2025 07:12)  Source: .Blood BLOOD  Final Report:    No growth at 5 days    Culture - Blood (collected 30 Jan 2025 07:12)  Source: .Blood BLOOD  Final Report:    No growth at 5 days    Culture - Blood (collected 29 Jan 2025 07:35)  Source: .Blood BLOOD  Final Report:    No growth at 5 days    Culture - Blood (collected 27 Jan 2025 19:13)  Source: .Blood BLOOD  Final Report:    No growth at 5 days    D-Dimer Assay, Quantitative: 6196 (04-24)    Lactate Dehydrogenase, Serum: 275 (04-24)      RADIOLOGY:  imaging below personally reviewed    < from: [facility] Duplex Upper Ext Vein Scan, Right (04.24.25 @ 12:30) >  IMPRESSION:  No evidence of right upper extremity deep venous thrombosis.  Right cephalic vein superficial thrombophlebitis    < end of copied text >    < from: [facility] Duplex Lower Extrem Arterial, Bilat (04.24.25 @ 12:27) >  Impression:    Normal arterial flow in the bilateral lower extremities.      < end of copied text >   INFECTIOUS DISEASE FOLLOW UP NOTE:    Interval History/ROS: Patient is a 80y old  Male who presents with a chief complaint of acute mentation changes (29 Apr 2025 07:14)    Overnight events: More awake and more oriented today.    REVIEW OF SYSTEMS:  CONSTITUTIONAL: No fever or chills  HEAD: No lesion on scalp  EYES: No visual disturbance  ENT: No sore throat; + NGT  RESPIRATORY: No cough, no shortness of breath  CARDIOVASCULAR: No chest pain or palpitations  GASTROINTESTINAL: No abdominal or epigastric pain  GENITOURINARY: No dysuria  NEUROLOGICAL: No headache/dizziness  MUSCULOSKELETAL: No joint pain, erythema, or swelling; no back pain  SKIN: No itching, rashes  All other ROS negative except noted above      Prior hospital charts reviewed [Yes]  Primary team notes reviewed [Yes]  Other consultant notes reviewed [Yes]    Allergies:  No Known Allergies      ANTIMICROBIALS:   rifAXIMin 550 two times a day      OTHER MEDS: MEDICATIONS  (STANDING):  acetaminophen     Tablet .. 650 every 6 hours PRN  aluminum hydroxide/magnesium hydroxide/simethicone Suspension 30 every 4 hours PRN  atorvastatin 80 at bedtime  dextrose 50% Injectable 25 once  dextrose 50% Injectable 12.5 once  dextrose 50% Injectable 25 once  dextrose Oral Gel 15 once PRN  glucagon  Injectable 1 once  insulin lispro (ADMELOG) corrective regimen sliding scale  every 6 hours  lactulose Syrup 20 every 3 hours  levETIRAcetam   Injectable 250 <User Schedule>  melatonin 3 at bedtime PRN  midodrine 10 every 8 hours  ondansetron Injectable 4 every 8 hours PRN  pantoprazole  Injectable 40 every 12 hours  polyethylene glycol 3350 17 two times a day PRN  senna 2 at bedtime      Vital Signs Last 24 Hrs  T(F): 97.9 (04-29-25 @ 07:05), Max: 99.2 (04-27-25 @ 15:00)    Vital Signs Last 24 Hrs  HR: 83 (04-29-25 @ 07:05) (80 - 86)  BP: 113/70 (04-29-25 @ 07:05) (90/64 - 113/70)  RR: 18 (04-29-25 @ 07:05)  SpO2: 100% (04-29-25 @ 07:05) (98% - 100%)  Wt(kg): --    EXAM:  GENERAL: NAD, lying in bed  HEAD: No head lesions  EYES: Conjunctiva pink and cornea white  EAR, NOSE, MOUTH, THROAT: Normal external ears and nose, no discharges; moist mucous membranes; + NC  NECK: Supple, nontender to palpation; no JVD  RESPIRATORY: Bibasilar crackles  CARDIOVASCULAR: S1 S2  GASTROINTESTINAL: Soft, no significant distention; normoactive bowel sounds  GENITOURINARY: + loyola catheter, no CVA tenderness  EXTREMITIES: No clubbing, cyanosis, + diffuse anasarca  NERVOUS SYSTEM: Awake and alert, not oriented, able to answer simple questions  MUSCULOSKELETAL: No joint erythema, swelling  SKIN: No rashes or lesions, no superficial thrombophlebitis  PSYCH: Lethargic    Labs:                        7.6    7.04  )-----------( 74       ( 29 Apr 2025 06:03 )             24.0     04-29    148[H]  |  114[H]  |  16  ----------------------------<  148[H]  3.4[L]   |  19  |  1.3    Ca    7.7[L]      29 Apr 2025 06:03  Mg     1.9     04-29    TPro  4.1[L]  /  Alb  2.1[L]  /  TBili  1.3[H]  /  DBili  x   /  AST  54[H]  /  ALT  15  /  AlkPhos  141[H]  04-29      WBC Trend:  WBC Count: 7.04 (04-29-25 @ 06:03)  WBC Count: 8.48 (04-28-25 @ 15:46)  WBC Count: 8.08 (04-28-25 @ 05:49)  WBC Count: 9.58 (04-27-25 @ 19:36)      Creatine Trend:  Creatinine: 1.3 (04-29)  Creatinine: 1.5 (04-28)  Creatinine: 1.4 (04-28)  Creatinine: 1.5 (04-27)      Liver Biochemical Testing Trend:  Alanine Aminotransferase (ALT/SGPT): 15 (04-29)  Alanine Aminotransferase (ALT/SGPT): 16 (04-28)  Alanine Aminotransferase (ALT/SGPT): 16 (04-27)  Alanine Aminotransferase (ALT/SGPT): 14 (04-26)  Alanine Aminotransferase (ALT/SGPT): 14 (04-25)  Aspartate Aminotransferase (AST/SGOT): 54 (04-29-25 @ 06:03)  Aspartate Aminotransferase (AST/SGOT): 63 (04-28-25 @ 05:49)  Aspartate Aminotransferase (AST/SGOT): 61 (04-27-25 @ 06:30)  Aspartate Aminotransferase (AST/SGOT): 51 (04-26-25 @ 07:40)  Aspartate Aminotransferase (AST/SGOT): 54 (04-25-25 @ 05:33)  Bilirubin Total: 1.3 (04-29)  Bilirubin Total: 1.2 (04-28)  Bilirubin Total: 1.1 (04-27)  Bilirubin Total: 1.5 (04-26)  Bilirubin Total: 1.7 (04-25)      Trend LDH  04-24-25 @ 05:53  275[H]      Urinalysis Basic - ( 29 Apr 2025 06:03 )    Color: x / Appearance: x / SG: x / pH: x  Gluc: 148 mg/dL / Ketone: x  / Bili: x / Urobili: x   Blood: x / Protein: x / Nitrite: x   Leuk Esterase: x / RBC: x / WBC x   Sq Epi: x / Non Sq Epi: x / Bacteria: x        MICROBIOLOGY:    MRSA PCR Result.: Negative (04-25-25 @ 09:00)      Culture - Blood (collected 17 Apr 2025 18:30)  Source: Blood Blood  Final Report:    No growth at 5 days    Culture - Blood (collected 16 Apr 2025 15:15)  Source: Blood None  Final Report:    No growth at 5 days    Urinalysis with Rflx Culture (collected 13 Apr 2025 21:20)    Culture - Blood (collected 13 Apr 2025 21:20)  Source: Blood Blood-Peripheral  Final Report:    Growth in anaerobic bottle: Bacteroides fragilis    "Susceptibilities not performed"    Culture - Urine (collected 13 Apr 2025 21:20)  Source: Catheterized None  Final Report:    10,000 - 49,000 CFU/mL Proteus mirabilis  Organism: Proteus mirabilis  Organism: Proteus mirabilis    Sensitivities:      -  Levofloxacin: S <=0.5      -  Tobramycin: S <=2      -  Nitrofurantoin: R >64 Should not be used to treat pyelonephritis      -  Aztreonam: S <=4      -  Gentamicin: S <=2      -  Cefazolin: S <=2 For uncomplicated UTI with K. pneumoniae, E. coli, or P. mirablis: LOUIS <=16 is sensitive and LOUIS >=32 is resistant. This also predicts results for oral agents cefaclor, cefdinir, cefpodoxime, cefprozil, cefuroxime axetil, cephalexin and locarbef for uncomplicated UTI. Note that some isolates may be susceptible to these agents while testing resistant to cefazolin.      -  Cefepime: S <=2      -  Piperacillin/Tazobactam: S <=8      -  Ciprofloxacin: S <=0.25      -  Ceftriaxone: S <=1      -  Ampicillin: S <=8 These ampicillin results predict results for amoxicillin      Method Type: LOUIS      -  Meropenem: S <=1      -  Ampicillin/Sulbactam: S <=4/2      -  Cefoxitin: S <=8      -  Cefuroxime: S <=4      -  Amoxicillin/Clavulanic Acid: S <=8/4      -  Trimethoprim/Sulfamethoxazole: S <=0.5/9.5      -  Ertapenem: S <=0.5    Culture - Blood (collected 13 Apr 2025 21:20)  Source: Blood Blood-Peripheral  Final Report:    Growth in anaerobic bottle: Bacteroides fragilis "Susceptibilities not    performed"    Direct identification is available within approximately 3-5    hours either by Blood Panel Multiplexed PCR or Direct    MALDI-TOF. Details: [email or web address]  Organism: Blood Culture PCR  Organism: Blood Culture PCR    Sensitivities:      -  Bacteroides fragilis: Detec      Method Type: PCR    Culture - Blood (collected 30 Jan 2025 07:12)  Source: .Blood BLOOD  Final Report:    No growth at 5 days    Culture - Blood (collected 30 Jan 2025 07:12)  Source: .Blood BLOOD  Final Report:    No growth at 5 days    Culture - Blood (collected 29 Jan 2025 07:35)  Source: .Blood BLOOD  Final Report:    No growth at 5 days    Culture - Blood (collected 27 Jan 2025 19:13)  Source: .Blood BLOOD  Final Report:    No growth at 5 days    D-Dimer Assay, Quantitative: 6196 (04-24)    Lactate Dehydrogenase, Serum: 275 (04-24)      RADIOLOGY:  imaging below personally reviewed    < from: [facility] Duplex Upper Ext Vein Scan, Right (04.24.25 @ 12:30) >  IMPRESSION:  No evidence of right upper extremity deep venous thrombosis.  Right cephalic vein superficial thrombophlebitis    < end of copied text >    < from: [facility] Duplex Lower Extrem Arterial, Bilat (04.24.25 @ 12:27) >  Impression:    Normal arterial flow in the bilateral lower extremities.      < end of copied text >

## 2025-04-29 NOTE — CHART NOTE - NSCHARTNOTEFT_GEN_A_CORE
Registered Dietitian Follow-Up  late entry, case discussed earlier with resident, EN recommendations provided.      Patient Profile Reviewed                           Yes [X]   No []     Nutrition History Previously Obtained        Yes [X]  No []       Pertinent  Information:    pt is 80 year old male with hx of HTN, DM, SDH, mechanical falls, OM of L4-5 in 2025, BIBA 2/2 changes in mental status, recent d/c from STR. pt admitted with acute/chronic metabolic encephalopathy, elevated trops, new onset decompensated liver cirrhosis, chronic pancreatitis, CHARLENE.  anasarca, s/p SLP eval --> NPO with alternate means of nutrition. s/p NGT placement, pt remains lethargic. elevated NH4- to be given lactulose today, possible maroon colored BM noted today, GI following.     s/p EGD had clean based duodenal ulcer which was clipped   s/p colonoscopy with small clot seen in the cecum    gram negative bacteremia.    maroon loose BM's noted   overnight drop in hgb noted  s/p total of 3u PRBCs.    s/p repeat EGD--> non-erosive gastritis, findings suggestive of protal HTN gastropathy, duodenitis, no blood noted.     Diet, NPO with Tube Feed:   Tube Feeding Modality: Nasogastric  Peptamen A.F. Formula (PEPTAMENAFRTH)  Total Volume for 24 Hours (mL): 1440  Bolus  Total Volume of Bolus (mL):  360  Tube Feed Frequency: Every 6 hours   Tube Feed Start Time: 18:00  Bolus Feed Rate (mL per Hour): 360   Bolus Feed Duration (in Hours): 1  Free Water Flush  Free Water Flush Instructions:  50 ml pre and post feed (25 @ 18:09) [Active]      Anthropometrics:  Height (cm): 175.3 (25 @ 14:55)  Weight (kg): 117.8 (25 @ 14:55)  BMI (kg/m2): 38.3 (25 @ 14:55)    Daily Weight in k.5 (-), Weight in k.9 (), Weight in k (), Weight in k.5 (-), Weight in k.4 (-24), Weight in k.7 (-23)  % Weight Change    MEDICATIONS  (STANDING):  atorvastatin 80 milliGRAM(s) Oral at bedtime  folic acid 1 milliGRAM(s) Oral daily  glucagon  Injectable 1 milliGRAM(s) IntraMuscular once  insulin lispro (ADMELOG) corrective regimen sliding scale   SubCutaneous every 6 hours  lactulose Syrup 20 Gram(s) Enteral Tube every 3 hours  levETIRAcetam   Injectable 250 milliGRAM(s) IV Push <User Schedule>  lidocaine   4% Patch 1 Patch Transdermal daily  midodrine 10 milliGRAM(s) Oral every 8 hours  nystatin Powder 1 Application(s) Topical two times a day  pantoprazole  Injectable 40 milliGRAM(s) IV Push every 12 hours  rifAXIMin 550 milliGRAM(s) Oral two times a day  senna 2 Tablet(s) Oral at bedtime  silver sulfADIAZINE 1% Cream 1 Application(s) Topical two times a day    MEDICATIONS  (PRN):  acetaminophen     Tablet .. 650 milliGRAM(s) Oral every 6 hours PRN Temp greater or equal to 38C (100.4F), Mild Pain (1 - 3)  aluminum hydroxide/magnesium hydroxide/simethicone Suspension 30 milliLiter(s) Oral every 4 hours PRN Dyspepsia  dextrose Oral Gel 15 Gram(s) Oral once PRN Blood Glucose LESS THAN 70 milliGRAM(s)/deciliter  melatonin 3 milliGRAM(s) Oral at bedtime PRN Insomnia  ondansetron Injectable 4 milliGRAM(s) IV Push every 8 hours PRN Nausea and/or Vomiting  polyethylene glycol 3350 17 Gram(s) Oral two times a day PRN Constipation    Pertinent Labs:  @ 06:03: Na 148[H], BUN 16, Cr 1.3, [H], K+ 3.4[L], Phos --, Mg 1.9, Alk Phos 141[H], ALT/SGPT 15, AST/SGOT 54[H], HbA1c --    Finger Sticks:  POCT Blood Glucose.: 157 mg/dL ( @ 20:41)  POCT Blood Glucose.: 170 mg/dL ( @ 17:18)  POCT Blood Glucose.: 146 mg/dL ( @ 11:44)  POCT Blood Glucose.: 161 mg/dL ( @ 04:53)  POCT Blood Glucose.: 133 mg/dL ( @ 00:26)    Physical Findings:  - Appearance: awake  - GI function: +BS, moderate loose BM noted today  - Tubes: NGT  - Oral/Mouth  cavity:  - Skin: B/L buttock DTI           L elbow, R arm and R hand tear noted    edema: generalized +4 edema noted    Nutrition Requirements:  Weight Used: 117.8 kgs      Estimated Energy Needs      8208-5065 kcals (12-16 kcals/kg/BW)  105-140g protein (1.5-2.0g/kg.IBW)  1;1 kcal for estimated fluid needs     Nutrient Intake: present EN regimen will provide pt with 1800 kcals, 114g protein, 1458 ml free water and 1680 ml total volume meeting >80% of estimated needs      [] Previous Nutrition Diagnosis:            [] Ongoing          [x] Resolved    inadequate pro-energy intake resolved      Goal/Expected Outcome: pt to tolerate EN and meet >80% of estimated needs within 3-5 days      Indicator/Monitoring:  tolerance to EN, labs/meds, bowel fxn, NFPF    Recommendation:   - maintain on present EN regimen  -monitor sodium levels adjust additional water flushes as needed via nursing order  high risk

## 2025-04-29 NOTE — PROGRESS NOTE ADULT - SUBJECTIVE AND OBJECTIVE BOX
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 fragilis bacteremia, and acute blood loss anemia. Patient is on IV morphine for pain. Patient is on lactulose for encephalopathy, IV abx, and frequent H/H monitoring, requiring pRBC. Palliative consulted for GOC. Patient is full code.    INTERVAL EVENTS  4/24: patient without acute distress  4/25: no acute distress, on octreotide gtt, NGT in place  4/29: patient appears comfortable    ADVANCE DIRECTIVES:    [ ] Full Code [ ] DNR  MOLST  [ ]  Living Will  [ ]   DECISION MAKER(s):  [ ] Health Care Proxy(s)  [ ] Surrogate(s)  [ ] Guardian           Name(s): Phone Number(s): spouse Edwige [phone] | [phone]    BASELINE (I)ADL(s) (prior to admission):  [place]: [ ]Total  [ ] Moderate [ ]Dependent  Palliative Performance Status Version 2:         %    http://npcrc.org/files/news/palliative_performance_scale_ppsv2.pdf    Allergies    No Known Allergies    Intolerances    MEDICATIONS  (STANDING):  atorvastatin 80 milliGRAM(s) Oral at bedtime  chlorhexidine 2% Cloths 1 Application(s) Topical <User Schedule>  dextrose 5%. 1000 milliLiter(s) (100 mL/Hr) IV Continuous <Continuous>  dextrose 5%. 1000 milliLiter(s) (50 mL/Hr) IV Continuous <Continuous>  dextrose 5%. 1000 milliLiter(s) (50 mL/Hr) IV Continuous <Continuous>  dextrose 50% Injectable 25 Gram(s) IV Push once  dextrose 50% Injectable 12.5 Gram(s) IV Push once  dextrose 50% Injectable 25 Gram(s) IV Push once  folic acid 1 milliGRAM(s) Oral daily  glucagon  Injectable 1 milliGRAM(s) IntraMuscular once  insulin lispro (ADMELOG) corrective regimen sliding scale   SubCutaneous every 6 hours  lactulose Syrup 20 Gram(s) Enteral Tube every 3 hours  levETIRAcetam   Injectable 250 milliGRAM(s) IV Push <User Schedule>  lidocaine   4% Patch 1 Patch Transdermal daily  midodrine 10 milliGRAM(s) Oral every 8 hours  nystatin Powder 1 Application(s) Topical two times a day  pantoprazole  Injectable 40 milliGRAM(s) IV Push every 12 hours  rifAXIMin 550 milliGRAM(s) Oral two times a day  senna 2 Tablet(s) Oral at bedtime  silver sulfADIAZINE 1% Cream 1 Application(s) Topical two times a day    MEDICATIONS  (PRN):  acetaminophen     Tablet .. 650 milliGRAM(s) Oral every 6 hours PRN Temp greater or equal to 38C (100.4F), Mild Pain (1 - 3)  aluminum hydroxide/magnesium hydroxide/simethicone Suspension 30 milliLiter(s) Oral every 4 hours PRN Dyspepsia  dextrose Oral Gel 15 Gram(s) Oral once PRN Blood Glucose LESS THAN 70 milliGRAM(s)/deciliter  melatonin 3 milliGRAM(s) Oral at bedtime PRN Insomnia  ondansetron Injectable 4 milliGRAM(s) IV Push every 8 hours PRN Nausea and/or Vomiting  polyethylene glycol 3350 17 Gram(s) Oral two times a day PRN Constipation        PRESENT SYMPTOMS: [ X][ Difficult to obtain due to poor mentation   Source if other than patient:  [ ]Family   [ ]Team   [ X]All review of systems negative including pain and dyspnea unless noted below    All components of pain assessment below addressed. Blank spaces indicate that the patient did/could not complete the assessment.  Pain: [ ]yes [ ]no  QOL impact -   Location -                    Aggravating factors -  Quality -  Radiation -  Timing-  Severity (0-10 scale):  Minimal acceptable level (0-10 scale):     CPOT:    https://www.[facility].org/getattachment/[email or web address]/Critical-Care-Pain-Observation-Tool-(CPOT)    PAIN AD Score: 0  http://geriatrictoolkit.[place].[email or web address]/cog/painad.pdf (press ctrl +  left click to view)    Dyspnea:                           [ ]None[ ]Mild [ ]Moderate [ ]Severe     Respiratory Distress Observation Scale (RDOS): 0  A score of 0 to 2 signifies little or no respiratory distress, 3 signifies mild distress, scores 4 to 6 indicate moderate distress, and scores greater than 7 signify severe distress  [email or web address]/[email or web address]/files/[email or web address]-otqhqtfeqjv-xofpyrjw-yrjejgahqaz-bcfzz.pdf    Anxiety:                             [ ]None[ ]Mild [ ]Moderate [ ]Severe   Fatigue:                             [ ]None[ ]Mild [ ]Moderate [ ]Severe   Nausea:                             [ ]None[ ]Mild [ ]Moderate [ ]Severe   Loss of appetite:              [ ]None[ ]Mild [ ]Moderate [ ]Severe   Constipation:                    [ ]None[ ]Mild [ ]Moderate [ ]Severe    Other Symptoms:    PHYSICAL EXAM:  Vital Signs Last 24 Hrs  T(C): 36.6 (29 Apr 2025 07:05), Max: 36.7 (28 Apr 2025 15:01)  T(F): 97.9 (29 Apr 2025 07:05), Max: 98.1 (28 Apr 2025 23:21)  HR: 83 (29 Apr 2025 07:05) (80 - 86)  BP: 113/70 (29 Apr 2025 07:05) (90/64 - 113/70)  BP(mean): 85 (29 Apr 2025 07:05) (73 - 85)  RR: 18 (29 Apr 2025 07:05) (9 - 20)  SpO2: 100% (29 Apr 2025 07:05) (98% - 100%)    Parameters below as of 29 Apr 2025 07:05  Patient On (Oxygen Delivery Method): nasal cannula  O2 Flow (L/min): 2      GENERAL:  [X ] No acute distress [ ]Lethargic  [ ]Unarousable  [ ]Verbal  [ ]Non-Verbal [ ]Cachexia    BEHAVIORAL/PSYCH:  [ ]Alert and Oriented x  [ ] Anxiety [ ] Delirium [ ] Agitation [X ] Calm   EYES: [ ] No scleral icterus [ ] Scleral icterus [ ] Closed  ENMT:  [ ]Dry mouth  [ ]No external oral lesions [ X] No external ear or nose lesions  CARDIOVASCULAR:  [ ]Regular [ ]Irregular [ ]Tachy [ ]Not Tachy  [ ]Jenaro [ ] Edema [ ] No edema  PULMONARY:  [ ]Tachypnea  [ ]Audible excessive secretions [X ] No labored breathing [ ] labored breathing  GASTROINTESTINAL: [ ]Soft  [ ]Distended  [ X]Not distended [ ]Non tender [ ]Tender  MUSCULOSKELETAL: [ ]No clubbing [ ] clubbing  [ X] No cyanosis [ ] cyanosis  NEUROLOGIC: [ ]No focal deficits  [ ]Follows commands  [ ]Does not follow commands  [ ]Cognitive impairment  [ ]Dysphagia  [ ]Dysarthria  [ ]Paresis   SKIN: [ ] Jaundiced [X ] Non-jaundiced [ ]Rash [ ]No Rash [ ] Warm [ ] Dry  MISC/LINES: [ ] ET tube [ ] Trach [ ]NGT/OGT [ ]PEG [ ]Michel    CRITICAL CARE:  [ ] Shock Present  [ ]Septic [ ]Cardiogenic [ ]Neurologic [ ]Hypovolemic  [ ]  Vasopressors [ ]  Inotropes   [ ]Respiratory failure present [ ]Mechanical ventilation [ ]Non-invasive ventilatory support [ ]High flow  [ ]Acute  [ ]Chronic [ ]Hypoxic  [ ]Hypercarbic [ ]Other  [ ]Other organ failure     LABS: reviewed by me, notable for: CBC WNL, elevated Na, Ca low                                    7.6    7.04  )-----------( 74       ( 29 Apr 2025 06:03 )             24.0       04-29    148[H]  |  114[H]  |  16  ----------------------------<  148[H]  3.4[L]   |  19  |  1.3    Ca    7.7[L]      29 Apr 2025 06:03  Mg     1.9     04-29        RADIOLOGY & ADDITIONAL STUDIES: CXR personally reviewed by me: 4/24: bilateral opacities    CT A/P: ascites    CXR 4/22: bilateral opacities    PROTEIN CALORIE MALNUTRITION PRESENT: [ ]mild [ ]moderate [ ]severe [ ]underweight [ ]morbid obesity  https://www.andeal.org/vault/2440/web/files/ONC/Table_Clinical%20Characteristics%20to%20Document%20Malnutrition-White%20JV%20et%20al%202012.pdf    Height (cm): 175.3 (04-17-25 @ 11:56), 175.3 (02-19-25 @ 12:20), 175.3 (01-23-25 @ 23:40)  Weight (kg): 117.8 (04-17-25 @ 11:56), 113.4 (02-19-25 @ 12:20), 120.6 (01-23-25 @ 23:40)  BMI (kg/m2): 38.3 (04-17-25 @ 11:56), 36.9 (02-19-25 @ 12:20), 39.2 (01-23-25 @ 23:40)    [ ]PPSV2 < or = to 30% [ ]significant weight loss  [ ]poor nutritional intake  [ ]anasarca      [ ]Artificial Nutrition          Palliative Care Spiritual/Emotional Screening Tool Question  Severity (0-4):                    OR                    [X ] Unable to determine/NA  Score of 2 or greater indicates recommendation of Chaplaincy referral  Chaplaincy Referral: [ ] Yes [ ] Refused [ ] Following     Caregiver [place]:  [ ] Yes [ ] No    OR    [x ] Unable to determine. Will assess at later time if appropriate.  Social Work Referral [ ]  Patient and Family Centered Care Referral [ ]    Anticipatory Grief Present: [ ] Yes [ ] No    OR     [ x] Unable to determine. Will assess at later time if appropriate.  Social Work Referral [ ]  Patient and Family Centered Care Referral [ ]    REFERRALS:   [ ]Chaplaincy  [ ]Hospice  [ ]Child Life  [ ]Social Work  [ ]Case management [ ]Holistic Therapy     Palliative care education provided to patient and/or family    Goals of Care Document:     ______________  Ang Dunne MD  Palliative Medicine  [facility]   of Geriatric and Palliative Medicine  [phone]

## 2025-04-29 NOTE — PROGRESS NOTE ADULT - SUBJECTIVE AND OBJECTIVE BOX
Patient is a 80y old  Male who presents with a chief complaint of acute mentation changes (28 Apr 2025 17:17)      Over Night Events:  Patient seen and examined.   more awake follow command   no pressors     ROS:  See HPI    PHYSICAL EXAM    ICU Vital Signs Last 24 Hrs  T(C): 36.7 (28 Apr 2025 23:21), Max: 36.7 (28 Apr 2025 15:01)  T(F): 98.1 (28 Apr 2025 23:21), Max: 98.1 (28 Apr 2025 23:21)  HR: 80 (29 Apr 2025 05:17) (80 - 86)  BP: 100/54 (29 Apr 2025 05:17) (90/64 - 105/64)  BP(mean): 75 (29 Apr 2025 05:17) (73 - 77)  ABP: --  ABP(mean): --  RR: 13 (28 Apr 2025 15:12) (9 - 20)  SpO2: 100% (29 Apr 2025 05:17) (98% - 100%)    O2 Parameters below as of 29 Apr 2025 05:17  Patient On (Oxygen Delivery Method): nasal cannula  O2 Flow (L/min): 2          General: awake   HEENT:            NG tube     Lymph Nodes: NO cervical LN   Lungs: Bilateral BS  Cardiovascular: Regular   Abdomen: Soft, Positive BS  Extremities: No clubbing   Skin: warm   Neurological:   Musculoskeletal: move all ext     I&O's Detail    28 Apr 2025 07:01  -  29 Apr 2025 07:00  --------------------------------------------------------  IN:    dextrose 5%: 1100 mL    Enteral Tube Flush: 240 mL    IV PiggyBack: 100 mL    Octreotide: 35 mL  Total IN: 1475 mL    OUT:    Indwelling Catheter - Urethral (mL): 200 mL    Voided (mL): 0 mL  Total OUT: 200 mL    Total NET: 1275 mL          LABS:                          7.6    7.04  )-----------( 74       ( 29 Apr 2025 06:03 )             24.0         28 Apr 2025 15:46    145    |  116    |  17     ----------------------------<  158    4.5     |  17     |  1.5      Ca    7.7        28 Apr 2025 15:46  Mg     1.7       28 Apr 2025 05:49                                               PT/INR - ( 29 Apr 2025 06:03 )   PT: 24.00 sec;   INR: 2.01 ratio         PTT - ( 28 Apr 2025 05:49 )  PTT:53.6 sec                                       Urinalysis Basic - ( 28 Apr 2025 15:46 )    Color: x / Appearance: x / SG: x / pH: x  Gluc: 158 mg/dL / Ketone: x  / Bili: x / Urobili: x   Blood: x / Protein: x / Nitrite: x   Leuk Esterase: x / RBC: x / WBC x   Sq Epi: x / Non Sq Epi: x / Bacteria: x                                                                                                                                                     MEDICATIONS  (STANDING):  atorvastatin 80 milliGRAM(s) Oral at bedtime  chlorhexidine 2% Cloths 1 Application(s) Topical <User Schedule>  dextrose 5%. 1000 milliLiter(s) (100 mL/Hr) IV Continuous <Continuous>  dextrose 5%. 1000 milliLiter(s) (50 mL/Hr) IV Continuous <Continuous>  dextrose 5%. 1000 milliLiter(s) (50 mL/Hr) IV Continuous <Continuous>  dextrose 50% Injectable 25 Gram(s) IV Push once  dextrose 50% Injectable 12.5 Gram(s) IV Push once  dextrose 50% Injectable 25 Gram(s) IV Push once  folic acid 1 milliGRAM(s) Oral daily  glucagon  Injectable 1 milliGRAM(s) IntraMuscular once  insulin lispro (ADMELOG) corrective regimen sliding scale   SubCutaneous every 6 hours  lactulose Syrup 20 Gram(s) Enteral Tube every 3 hours  levETIRAcetam   Injectable 250 milliGRAM(s) IV Push <User Schedule>  lidocaine   4% Patch 1 Patch Transdermal daily  midodrine 10 milliGRAM(s) Oral every 8 hours  nystatin Powder 1 Application(s) Topical two times a day  pantoprazole  Injectable 40 milliGRAM(s) IV Push every 12 hours  phytonadione  IVPB 10 milliGRAM(s) IV Intermittent daily  rifAXIMin 550 milliGRAM(s) Oral two times a day  senna 2 Tablet(s) Oral at bedtime  silver sulfADIAZINE 1% Cream 1 Application(s) Topical two times a day    MEDICATIONS  (PRN):  acetaminophen     Tablet .. 650 milliGRAM(s) Oral every 6 hours PRN Temp greater or equal to 38C (100.4F), Mild Pain (1 - 3)  aluminum hydroxide/magnesium hydroxide/simethicone Suspension 30 milliLiter(s) Oral every 4 hours PRN Dyspepsia  dextrose Oral Gel 15 Gram(s) Oral once PRN Blood Glucose LESS THAN 70 milliGRAM(s)/deciliter  melatonin 3 milliGRAM(s) Oral at bedtime PRN Insomnia  ondansetron Injectable 4 milliGRAM(s) IV Push every 8 hours PRN Nausea and/or Vomiting  polyethylene glycol 3350 17 Gram(s) Oral two times a day PRN Constipation          Xrays:  TLC:  OG:  ET tube:                                                                                       ECHO:  [facility] ICU:

## 2025-04-29 NOTE — PROGRESS NOTE ADULT - ASSESSMENT
IMPRESSION    Encephalopathy, metabolic vs hepatic   GIB,  s/p prior EGD with duodenal ulcer  seizure hx   Liver cirrhosis   Bacteremia bacteroides  hypernatremia   CHARLENE   elevated trop   Afib     PLAN:    CNS:    off precedex  Haldol PRN for agitation, monitor QTC      HEENT: oral care     PULMONARY: keep pox >92% aspiration precaution   repeat cxr PRN    CARDIOVASCULAR:   follow cardiology    GI: GI note reviewed for EGD today   PPI BID, GI is following   Needs NGT for lactulose, rifaxamin  Follow LFT , hepatology / Gi follow up  NPO for EGD   PPI Q12 hrs   RENAL: continue  D5w 50cc/ hr    follow NA level   follow renal  bmp Q 8 hrs   add free water per NG 250cc Q4 hrs when start feed   INFECTIOUS DISEASE:   ID  follow up   abx as per ID   bldcx noted    HEMATOLOGICAL:    DVT prophylaxis. follow h/h and plt   hold AC   serial h/h keep Hgb >7    ENDOCRINE:  Follow up FS.  Insulin protocol if needed.     voiding trial   SDU

## 2025-04-29 NOTE — PROGRESS NOTE ADULT - SUBJECTIVE AND OBJECTIVE BOX
Nephrology Progress Note    DWIGHT RIBEIRO  MRN-[id number]  80y  Male    S:  Patient is seen and examined, events over the last 24h noted.    O:  Allergies:  No Known Allergies    Hospital Medications:   MEDICATIONS  (STANDING):  atorvastatin 80 milliGRAM(s) Oral at bedtime  chlorhexidine 2% Cloths 1 Application(s) Topical <User Schedule>  dextrose 5%. 1000 milliLiter(s) (100 mL/Hr) IV Continuous <Continuous>  dextrose 5%. 1000 milliLiter(s) (50 mL/Hr) IV Continuous <Continuous>  dextrose 5%. 1000 milliLiter(s) (50 mL/Hr) IV Continuous <Continuous>  dextrose 50% Injectable 25 Gram(s) IV Push once  dextrose 50% Injectable 12.5 Gram(s) IV Push once  dextrose 50% Injectable 25 Gram(s) IV Push once  folic acid 1 milliGRAM(s) Oral daily  glucagon  Injectable 1 milliGRAM(s) IntraMuscular once  insulin lispro (ADMELOG) corrective regimen sliding scale   SubCutaneous every 6 hours  lactulose Syrup 20 Gram(s) Enteral Tube every 3 hours  levETIRAcetam   Injectable 250 milliGRAM(s) IV Push <User Schedule>  lidocaine   4% Patch 1 Patch Transdermal daily  midodrine 10 milliGRAM(s) Oral every 8 hours  nystatin Powder 1 Application(s) Topical two times a day  pantoprazole  Injectable 40 milliGRAM(s) IV Push every 12 hours  rifAXIMin 550 milliGRAM(s) Oral two times a day  senna 2 Tablet(s) Oral at bedtime  silver sulfADIAZINE 1% Cream 1 Application(s) Topical two times a day    MEDICATIONS  (PRN):  acetaminophen     Tablet .. 650 milliGRAM(s) Oral every 6 hours PRN Temp greater or equal to 38C (100.4F), Mild Pain (1 - 3)  aluminum hydroxide/magnesium hydroxide/simethicone Suspension 30 milliLiter(s) Oral every 4 hours PRN Dyspepsia  dextrose Oral Gel 15 Gram(s) Oral once PRN Blood Glucose LESS THAN 70 milliGRAM(s)/deciliter  melatonin 3 milliGRAM(s) Oral at bedtime PRN Insomnia  ondansetron Injectable 4 milliGRAM(s) IV Push every 8 hours PRN Nausea and/or Vomiting  polyethylene glycol 3350 17 Gram(s) Oral two times a day PRN Constipation    Home Medications:  acetaminophen 500 mg oral tablet: 2 tab(s) orally every 8 hours (2025 10:56)  aluminum hydroxide-magnesium hydroxide 200 mg-200 mg/5 mL oral suspension: 30 milliliter(s) orally every 4 hours As needed Dyspepsia (2025 10:56)  amLODIPine 5 mg oral tablet: 1 tab(s) orally once a day (2025 11:45)  aspirin 81 mg oral delayed release tablet: 1 tab(s) orally once a day (2025 10:56)  atorvastatin 80 mg oral tablet: 1 tab(s) orally once a day (at bedtime) (2025 10:56)  ceFAZolin 2 g intravenous injection: 2 gram(s) intravenous every 8 hours End date is 3/9/2025 (2025 10:56)  folic acid 1 mg oral tablet: 1 tab(s) orally once a day (2025 10:56)  gabapentin 100 mg oral capsule: 3 cap(s) orally 2 times a day (17 Sep 2022 01:58)  gabapentin 100 mg oral capsule: 1 cap(s) orally 2 times a day (2025 11:45)  Keppra 250 mg oral tablet: 2 tab(s) orally 2 times a day (2025 11:48)  Lasix 20 mg oral tablet: 1 tab(s) orally once a day (2025 11:45)  lidocaine 4% topical film: Apply topically to affected area every 24 hours Lower back (2025 10:56)  losartan 50 mg oral tablet: 0.5 tab(s) orally 2 times a day (17 Sep 2022 01:58)  metFORMIN 500 mg oral tablet: 500 milligram(s) orally 2 times a day (2025 11:46)  morphine: 2 milligram(s) intravenous every 8 hours as needed for  severe pain (2025 10:56)  nystatin 100,000 units/g topical powder: 1 Apply topically to affected area every 12 hours As needed fungal rash (2025 10:56)  Osteo Bi-Flex 250 mg-200 mg oral tablet: orally once a day (17 Sep 2022 01:58)  oxyCODONE 10 mg oral tablet: 1 tab(s) orally 3 times a day As needed Severe Pain (7 - 10) (2025 10:56)  polyethylene glycol 3350 oral powder for reconstitution: 17 gram(s) orally 2 times a day (2025 10:56)  QUEtiapine 50 mg oral tablet: 1 tab(s) orally 2 times a day (2025 11:45)  senna leaf extract oral tablet: 2 tab(s) orally once a day (at bedtime) (2025 10:56)  Tresiba FlexTouch: 60 unit(s) subcutaneous once a day (17 Sep 2022 01:58)      VITALS:  Daily     Daily Weight in k.5 (2025 07:05)  T(F): 97.9 (25 @ 07:05), Max: 98.1 (25 @ 23:21)  HR: 83 (25 @ 07:05)  BP: 113/70 (25 @ 07:05)  RR: 18 (25 @ 07:05)  SpO2: 100% (25 @ 07:05)  Wt(kg): --  I&O's Detail    2025 07:  -  2025 07:00  --------------------------------------------------------  IN:    dextrose 5%: 1100 mL    Enteral Tube Flush: 240 mL    IV PiggyBack: 100 mL    Octreotide: 35 mL  Total IN: 1475 mL    OUT:    Indwelling Catheter - Urethral (mL): 200 mL    Voided (mL): 0 mL  Total OUT: 200 mL    Total NET: 1275 mL        I&O's Summary    2025 07:  -  2025 07:00  --------------------------------------------------------  IN: 1475 mL / OUT: 200 mL / NET: 1275 mL          PHYSICAL EXAM:  Gen: NAD  Chest: b/l breath sounds  Abd: soft  Extremities: no edema  Vascular access:       LABS:    Blood Gas Profile w/Lytes - Venous: Performed in Lab (25 @ 20:33)  Blood Gas Venous - Sodium: 141 mmol/L (25 @ 20:33)  Blood Gas Venous - Potassium: 4.1 mmol/L (25 @ 20:33)        148[H]  |  114[H]  |  16  ----------------------------<  148[H]  3.4[L]   |  19  |  1.3    Ca    7.7[L]      2025 06:03  Mg     1.9         TPro  4.1[L]  /  Alb  2.1[L]  /  TBili  1.3[H]  /  DBili      /  AST  54[H]  /  ALT  15  /  AlkPhos  141[H]      eGFR: 56 mL/min/1.73m2 (25 @ 06:03)  eGFR: 47 mL/min/1.73m2 (25 @ 15:46)    Phosphorus: 3.8 mg/dL (25 @ 04:44)  Phosphorus: 4.0 mg/dL (04-15-25 @ 21:34)                            7.6    7.04  )-----------( 74       ( 2025 06:03 )             24.0     Mean Cell Volume: 98.8 fL (25 @ 06:03)    Iron Total: 70 ug/dL (25 @ 06:15)  % Saturation, Iron: 52 % (25 @ 06:15)      % Saturation, Iron: 52 % (25 @ 06:15)    Urine Studies:  Protein, Urine: 30 mg/dL (04-15-25 @ 14:10)  White Blood Cell - Urine: 15 /HPF (04-15-25 @ 14:10)  Red Blood Cell - Urine: 6 /HPF (04-15-25 @ 14:10)  Granular Cast: Present (04-15-25 @ 14:10)  Protein, Urine: 30 mg/dL (25 @ 21:20)  White Blood Cell - Urine: 5 /HPF (25 @ 21:20)  Red Blood Cell - Urine: 12 /HPF (25 @ 21:20)  Protein, Urine: 30 mg/dL (25 @ 19:55)  White Blood Cell - Urine: 20 /HPF (25 @ 19:55)  Red Blood Cell - Urine: 5 /HPF (25 @ 19:55)      Urea Nitrogen,  Random Urine: 168 mg/dL (04-15-25 @ 14:10)    Culture Results:   No growth at 5 days ( @ 18:30)  Culture Results:   No growth at 5 days ( @ 15:15)    Creatinine trend:  Creatinine: 1.3 mg/dL (25 @ 06:03)  Creatinine: 1.5 mg/dL (25 @ 15:46)  Creatinine: 1.4 mg/dL (25 @ 05:49)  Creatinine: 1.5 mg/dL (25 @ 06:30)  Creatinine: 1.5 mg/dL (25 @ 21:48)    Sodium: 148 mmol/L (25 @ 06:03)  Sodium: 145 mmol/L (25 @ 15:46)  Sodium: 150 mmol/L (25 @ 05:49)  Sodium: 151 mmol/L (25 @ 06:30)  Sodium: 149 mmol/L (25 @ 21:48)  Sodium: 151 mmol/L (25 @ 15:30) Nephrology Progress Note    DWIGHT RIBEIRO  MRN-[id number]  80y  Male    S:  Patient is seen and examined, events over the last 24h noted.    O:  Allergies:  No Known Allergies    Hospital Medications:   MEDICATIONS  (STANDING):  atorvastatin 80 milliGRAM(s) Oral at bedtime  chlorhexidine 2% Cloths 1 Application(s) Topical <User Schedule>  dextrose 5%. 1000 milliLiter(s) (100 mL/Hr) IV Continuous <Continuous>  dextrose 5%. 1000 milliLiter(s) (50 mL/Hr) IV Continuous <Continuous>  dextrose 5%. 1000 milliLiter(s) (50 mL/Hr) IV Continuous <Continuous>  dextrose 50% Injectable 25 Gram(s) IV Push once  dextrose 50% Injectable 12.5 Gram(s) IV Push once  dextrose 50% Injectable 25 Gram(s) IV Push once  folic acid 1 milliGRAM(s) Oral daily  glucagon  Injectable 1 milliGRAM(s) IntraMuscular once  insulin lispro (ADMELOG) corrective regimen sliding scale   SubCutaneous every 6 hours  lactulose Syrup 20 Gram(s) Enteral Tube every 3 hours  levETIRAcetam   Injectable 250 milliGRAM(s) IV Push <User Schedule>  lidocaine   4% Patch 1 Patch Transdermal daily  midodrine 10 milliGRAM(s) Oral every 8 hours  nystatin Powder 1 Application(s) Topical two times a day  pantoprazole  Injectable 40 milliGRAM(s) IV Push every 12 hours  rifAXIMin 550 milliGRAM(s) Oral two times a day  senna 2 Tablet(s) Oral at bedtime  silver sulfADIAZINE 1% Cream 1 Application(s) Topical two times a day    MEDICATIONS  (PRN):  acetaminophen     Tablet .. 650 milliGRAM(s) Oral every 6 hours PRN Temp greater or equal to 38C (100.4F), Mild Pain (1 - 3)  aluminum hydroxide/magnesium hydroxide/simethicone Suspension 30 milliLiter(s) Oral every 4 hours PRN Dyspepsia  dextrose Oral Gel 15 Gram(s) Oral once PRN Blood Glucose LESS THAN 70 milliGRAM(s)/deciliter  melatonin 3 milliGRAM(s) Oral at bedtime PRN Insomnia  ondansetron Injectable 4 milliGRAM(s) IV Push every 8 hours PRN Nausea and/or Vomiting  polyethylene glycol 3350 17 Gram(s) Oral two times a day PRN Constipation    Home Medications:  acetaminophen 500 mg oral tablet: 2 tab(s) orally every 8 hours (2025 10:56)  aluminum hydroxide-magnesium hydroxide 200 mg-200 mg/5 mL oral suspension: 30 milliliter(s) orally every 4 hours As needed Dyspepsia (2025 10:56)  amLODIPine 5 mg oral tablet: 1 tab(s) orally once a day (2025 11:45)  aspirin 81 mg oral delayed release tablet: 1 tab(s) orally once a day (2025 10:56)  atorvastatin 80 mg oral tablet: 1 tab(s) orally once a day (at bedtime) (2025 10:56)  ceFAZolin 2 g intravenous injection: 2 gram(s) intravenous every 8 hours End date is 3/9/2025 (2025 10:56)  folic acid 1 mg oral tablet: 1 tab(s) orally once a day (2025 10:56)  gabapentin 100 mg oral capsule: 3 cap(s) orally 2 times a day (17 Sep 2022 01:58)  gabapentin 100 mg oral capsule: 1 cap(s) orally 2 times a day (2025 11:45)  Keppra 250 mg oral tablet: 2 tab(s) orally 2 times a day (2025 11:48)  Lasix 20 mg oral tablet: 1 tab(s) orally once a day (2025 11:45)  lidocaine 4% topical film: Apply topically to affected area every 24 hours Lower back (2025 10:56)  losartan 50 mg oral tablet: 0.5 tab(s) orally 2 times a day (17 Sep 2022 01:58)  metFORMIN 500 mg oral tablet: 500 milligram(s) orally 2 times a day (2025 11:46)  morphine: 2 milligram(s) intravenous every 8 hours as needed for  severe pain (2025 10:56)  nystatin 100,000 units/g topical powder: 1 Apply topically to affected area every 12 hours As needed fungal rash (2025 10:56)  Osteo Bi-Flex 250 mg-200 mg oral tablet: orally once a day (17 Sep 2022 01:58)  oxyCODONE 10 mg oral tablet: 1 tab(s) orally 3 times a day As needed Severe Pain (7 - 10) (2025 10:56)  polyethylene glycol 3350 oral powder for reconstitution: 17 gram(s) orally 2 times a day (2025 10:56)  QUEtiapine 50 mg oral tablet: 1 tab(s) orally 2 times a day (2025 11:45)  senna leaf extract oral tablet: 2 tab(s) orally once a day (at bedtime) (2025 10:56)  Tresiba FlexTouch: 60 unit(s) subcutaneous once a day (17 Sep 2022 01:58)      VITALS:  Daily     Daily Weight in k.5 (2025 07:05)  T(F): 97.9 (25 @ 07:05), Max: 98.1 (25 @ 23:21)  HR: 83 (25 @ 07:05)  BP: 113/70 (25 @ 07:05)  RR: 18 (25 @ 07:05)  SpO2: 100% (25 @ 07:05)  Wt(kg): --  I&O's Detail    2025 07:  -  2025 07:00  --------------------------------------------------------  IN:    dextrose 5%: 1100 mL    Enteral Tube Flush: 240 mL    IV PiggyBack: 100 mL    Octreotide: 35 mL  Total IN: 1475 mL    OUT:    Indwelling Catheter - Urethral (mL): 200 mL    Voided (mL): 0 mL  Total OUT: 200 mL    Total NET: 1275 mL        I&O's Summary    2025 07:  -  2025 07:00  --------------------------------------------------------  IN: 1475 mL / OUT: 200 mL / NET: 1275 mL          PHYSICAL EXAM:  Gen: lethargic/NGT  Chest: b/l breath sounds  Abd: soft  Extremities: no edema      LABS:        148[H]  |  114[H]  |  16  ----------------------------<  148[H]  3.4[L]   |  19  |  1.3    Ca    7.7[L]      2025 06:03  Mg     1.9         TPro  4.1[L]  /  Alb  2.1[L]  /  TBili  1.3[H]  /  DBili      /  AST  54[H]  /  ALT  15  /  AlkPhos  141[H]      Phosphorus: 3.8 mg/dL (25 @ 04:44)  Phosphorus: 4.0 mg/dL (04-15-25 @ 21:34)                            7.6    7.04  )-----------( 74       ( 2025 06:03 )             24.0     Mean Cell Volume: 98.8 fL (25 @ 06:03)    Urine Studies:  Protein, Urine: 30 mg/dL (04-15-25 @ 14:10)  White Blood Cell - Urine: 15 /HPF (04-15-25 @ 14:10)  Red Blood Cell - Urine: 6 /HPF (04-15-25 @ 14:10)    Creatinine trend:  Creatinine: 1.3 mg/dL (25 @ 06:03)  Creatinine: 1.5 mg/dL (25 @ 15:46)  Creatinine: 1.4 mg/dL (25 @ 05:49)  Creatinine: 1.5 mg/dL (25 @ 06:30)  Creatinine: 1.5 mg/dL (25 @ 21:48)    Sodium: 148 mmol/L (25 @ 06:03)  Sodium: 145 mmol/L (25 @ 15:46)  Sodium: 150 mmol/L (25 @ 05:49)  Sodium: 151 mmol/L (25 @ 06:30)  Sodium: 149 mmol/L (25 @ 21:48)  Sodium: 151 mmol/L (25 @ 15:30)

## 2025-04-29 NOTE — PROGRESS NOTE ADULT - ASSESSMENT
80 years old male history of hypertension, diabetes, subdural hematoma on Keppra, hx mechanical falls, recent admission (January 2025) for osteomyelitis of L4-5 w completion of abx.  BIBA from home status post change of mental status.     ID is consulted for bacteremia  Afebrile  WBC 7.55 < 7.45  On room air  BCx 4/14 Bacteroides fragilis  BCx 4/16 NGTD  BCx 4/17 NGTD  UA WBC 5, UCx low count P. mirabilis    CT A/P 4/13  1.  Erosive endplate changes centered at the L4-5 level, consistent with   patient's history ofrecent lumbar spine osteomyelitis.  2.  Liver cirrhosis with portal hypertension. Mild-to-moderate ascites.   Anasarca. Hepatic dome subcentimeter hypodense 1.3 cm hypodensity, not   fully characterized . Outpatient MR abdomen with IV contrast recommended.  3.  Chronic pancreatitis, with limited evaluation for acute inflammation   due to background fluid. Correlation with pancreatic enzymes recommended.    Antibiotics:  Vancomycin 4/13  Ampicillin 4/14  Ceftriaxone 4/15 - 4/21  Flagyl 4/15 ->      IMPRESSION:  Bacteroides bacteremia, completed treatment  Acute blood loss anemia  Possible SBP?  Liver cirrhosis  Lower GI bleed  Hx L4-L5 osteomyelitis  Liver lesion  Immunosuppression / Immunosenescence secondary to multiple comorbidities which could result in poor clinical outcome    RECOMMENDATIONS:  - Completed 7 days of ceftriaxone, completed albumin x 2 doses  - Completed 14 days of IV Flagyl  - Monitor off abx for now  - GI follow up  - Offloading and frequent position changes, aspiration precaution  - Trend WBC, fever curve, transaminases, creatinine daily  - Please inform ID of any patient clinical change or any new pertinent laboratory or radiographic data      * THIS IS AN INCOMPLETE NOTE. FINAL RECOMMENDATION IS PENDING *   80 years old male history of hypertension, diabetes, subdural hematoma on Keppra, hx mechanical falls, recent admission (January 2025) for osteomyelitis of L4-5 w completion of abx.  BIBA from home status post change of mental status.     ID is consulted for bacteremia  Afebrile  WBC 7.55 < 7.45  On room air  BCx 4/14 Bacteroides fragilis  BCx 4/16 NGTD  BCx 4/17 NGTD  UA WBC 5, UCx low count P. mirabilis    CT A/P 4/13  1.  Erosive endplate changes centered at the L4-5 level, consistent with   patient's history ofrecent lumbar spine osteomyelitis.  2.  Liver cirrhosis with portal hypertension. Mild-to-moderate ascites.   Anasarca. Hepatic dome subcentimeter hypodense 1.3 cm hypodensity, not   fully characterized . Outpatient MR abdomen with IV contrast recommended.  3.  Chronic pancreatitis, with limited evaluation for acute inflammation   due to background fluid. Correlation with pancreatic enzymes recommended.    Antibiotics:  Vancomycin 4/13  Ampicillin 4/14  Ceftriaxone 4/15 - 4/21  Flagyl 4/15 ->      IMPRESSION:  Bacteroides bacteremia, completed treatment  Acute blood loss anemia  Possible SBP?  Liver cirrhosis  Lower GI bleed  Hx L4-L5 osteomyelitis  Liver lesion  Immunosuppression / Immunosenescence secondary to multiple comorbidities which could result in poor clinical outcome    RECOMMENDATIONS:  - Completed 7 days of ceftriaxone, completed albumin x 2 doses  - Completed 14 days of IV Flagyl  - Monitor off abx for now  - GI follow up  - Offloading and frequent position changes, aspiration precaution  - Trend WBC, fever curve, transaminases, creatinine daily  - Please inform ID of any patient clinical change or any new pertinent laboratory or radiographic data      Nora Beck D.O.  Attending Physician  Division of Infectious Diseases  [facility]  Please contact me via [employer]

## 2025-04-29 NOTE — CHART NOTE - NSCHARTNOTEFT_GEN_A_CORE
EGD performed today, findings below:    Impressions:  Normal appearing mucosa in the whole esophagus. NG tube repositioned at end of  procedure. .  Erythema in the stomach compatible with non-erosive gastritis. Mild snakeskin  like appearing suggestive of portal htn gastropathy. .  Erythema in the duodenum compatible with duodenitis. Previously placed clip  was seen in the duodenal bulb. Bilious secretions with no old or new blood seen  during the exam.     Recommendations:  Return to floor for further management  Obtain CXR to reconfirm NG placement prior to resuming NG feeds   Recommend PPI daily  Monitor Hb and for evidence of bleeding   Consider capsule endoscopy if has further evidence of bleeding or drop in Hb  Discussed with pts wife

## 2025-04-30 LAB
ALBUMIN SERPL ELPH-MCNC: 1.9 G/DL — LOW (ref 3.5–5.2)
ALP SERPL-CCNC: 144 U/L — HIGH (ref 30–115)
ALT FLD-CCNC: 14 U/L — SIGNIFICANT CHANGE UP (ref 0–41)
ANION GAP SERPL CALC-SCNC: 12 MMOL/L — SIGNIFICANT CHANGE UP (ref 7–14)
AST SERPL-CCNC: 50 U/L — HIGH (ref 0–41)
BILIRUB SERPL-MCNC: 1 MG/DL — SIGNIFICANT CHANGE UP (ref 0.2–1.2)
BLD GP AB SCN SERPL QL: SIGNIFICANT CHANGE UP
BUN SERPL-MCNC: 17 MG/DL — SIGNIFICANT CHANGE UP (ref 10–20)
CALCIUM SERPL-MCNC: 7.5 MG/DL — LOW (ref 8.4–10.5)
CHLORIDE SERPL-SCNC: 111 MMOL/L — HIGH (ref 98–110)
CO2 SERPL-SCNC: 20 MMOL/L — SIGNIFICANT CHANGE UP (ref 17–32)
CREAT SERPL-MCNC: 1.2 MG/DL — SIGNIFICANT CHANGE UP (ref 0.7–1.5)
EGFR: 62 ML/MIN/1.73M2 — SIGNIFICANT CHANGE UP
EGFR: 62 ML/MIN/1.73M2 — SIGNIFICANT CHANGE UP
GLUCOSE BLDC GLUCOMTR-MCNC: 179 MG/DL — HIGH (ref 70–99)
GLUCOSE BLDC GLUCOMTR-MCNC: 248 MG/DL — HIGH (ref 70–99)
GLUCOSE SERPL-MCNC: 254 MG/DL — HIGH (ref 70–99)
HCT VFR BLD CALC: 22.9 % — LOW (ref 42–52)
HGB BLD-MCNC: 7.3 G/DL — LOW (ref 14–18)
INR BLD: 1.99 RATIO — HIGH (ref 0.65–1.3)
MAGNESIUM SERPL-MCNC: 1.7 MG/DL — LOW (ref 1.8–2.4)
MCHC RBC-ENTMCNC: 31.5 PG — HIGH (ref 27–31)
MCHC RBC-ENTMCNC: 31.9 G/DL — LOW (ref 32–37)
MCV RBC AUTO: 98.7 FL — HIGH (ref 80–94)
NRBC BLD AUTO-RTO: 0 /100 WBCS — SIGNIFICANT CHANGE UP (ref 0–0)
PLATELET # BLD AUTO: 67 K/UL — LOW (ref 130–400)
PMV BLD: 9.8 FL — SIGNIFICANT CHANGE UP (ref 7.4–10.4)
POTASSIUM SERPL-MCNC: 3.4 MMOL/L — LOW (ref 3.5–5)
POTASSIUM SERPL-SCNC: 3.4 MMOL/L — LOW (ref 3.5–5)
PROT SERPL-MCNC: 3.9 G/DL — LOW (ref 6–8)
PROTHROM AB SERPL-ACNC: 23.8 SEC — HIGH (ref 9.95–12.87)
RBC # BLD: 2.32 M/UL — LOW (ref 4.7–6.1)
RBC # FLD: 21.6 % — HIGH (ref 11.5–14.5)
SODIUM SERPL-SCNC: 143 MMOL/L — SIGNIFICANT CHANGE UP (ref 135–146)
WBC # BLD: 6.12 K/UL — SIGNIFICANT CHANGE UP (ref 4.8–10.8)
WBC # FLD AUTO: 6.12 K/UL — SIGNIFICANT CHANGE UP (ref 4.8–10.8)

## 2025-04-30 PROCEDURE — 99233 SBSQ HOSP IP/OBS HIGH 50: CPT | Mod: FS

## 2025-04-30 PROCEDURE — 71045 X-RAY EXAM CHEST 1 VIEW: CPT | Mod: 26

## 2025-04-30 PROCEDURE — 36600 WITHDRAWAL OF ARTERIAL BLOOD: CPT

## 2025-04-30 RX ORDER — MIDODRINE HYDROCHLORIDE 5 MG/1
5 TABLET ORAL EVERY 8 HOURS
Refills: 0 | Status: DISCONTINUED | OUTPATIENT
Start: 2025-04-30 | End: 2025-05-01

## 2025-04-30 RX ORDER — ATORVASTATIN CALCIUM 80 MG/1
20 TABLET, FILM COATED ORAL AT BEDTIME
Refills: 0 | Status: DISCONTINUED | OUTPATIENT
Start: 2025-04-30 | End: 2025-04-30

## 2025-04-30 RX ORDER — ALBUMIN (HUMAN) 12.5 G/50ML
50 INJECTION, SOLUTION INTRAVENOUS EVERY 8 HOURS
Refills: 0 | Status: COMPLETED | OUTPATIENT
Start: 2025-04-30 | End: 2025-05-01

## 2025-04-30 RX ADMIN — INSULIN LISPRO 2: 100 INJECTION, SOLUTION INTRAVENOUS; SUBCUTANEOUS at 00:16

## 2025-04-30 RX ADMIN — LEVETIRACETAM 250 MILLIGRAM(S): 10 INJECTION, SOLUTION INTRAVENOUS at 17:29

## 2025-04-30 RX ADMIN — NYSTATIN 1 APPLICATION(S): 100000 CREAM TOPICAL at 17:24

## 2025-04-30 RX ADMIN — LACTULOSE 20 GRAM(S): 10 SOLUTION ORAL at 17:26

## 2025-04-30 RX ADMIN — NYSTATIN 1 APPLICATION(S): 100000 CREAM TOPICAL at 05:51

## 2025-04-30 RX ADMIN — ALBUMIN (HUMAN) 50 MILLILITER(S): 12.5 INJECTION, SOLUTION INTRAVENOUS at 21:51

## 2025-04-30 RX ADMIN — Medication 40 MILLIGRAM(S): at 11:56

## 2025-04-30 RX ADMIN — FOLIC ACID 1 MILLIGRAM(S): 1 TABLET ORAL at 11:56

## 2025-04-30 RX ADMIN — LEVETIRACETAM 250 MILLIGRAM(S): 10 INJECTION, SOLUTION INTRAVENOUS at 06:08

## 2025-04-30 RX ADMIN — Medication 2 TABLET(S): at 21:50

## 2025-04-30 RX ADMIN — ALBUMIN (HUMAN) 50 MILLILITER(S): 12.5 INJECTION, SOLUTION INTRAVENOUS at 13:15

## 2025-04-30 RX ADMIN — LACTULOSE 20 GRAM(S): 10 SOLUTION ORAL at 05:50

## 2025-04-30 RX ADMIN — Medication 1 APPLICATION(S): at 17:24

## 2025-04-30 RX ADMIN — Medication 40 MILLIEQUIVALENT(S): at 17:27

## 2025-04-30 RX ADMIN — MIDODRINE HYDROCHLORIDE 10 MILLIGRAM(S): 5 TABLET ORAL at 05:51

## 2025-04-30 RX ADMIN — LIDOCAINE HYDROCHLORIDE 1 PATCH: 20 JELLY TOPICAL at 12:01

## 2025-04-30 RX ADMIN — LACTULOSE 20 GRAM(S): 10 SOLUTION ORAL at 15:47

## 2025-04-30 RX ADMIN — Medication 1 APPLICATION(S): at 05:50

## 2025-04-30 RX ADMIN — Medication 40 MILLIGRAM(S): at 05:50

## 2025-04-30 RX ADMIN — LIDOCAINE HYDROCHLORIDE 1 PATCH: 20 JELLY TOPICAL at 20:05

## 2025-04-30 RX ADMIN — LACTULOSE 20 GRAM(S): 10 SOLUTION ORAL at 04:03

## 2025-04-30 RX ADMIN — INSULIN LISPRO 8: 100 INJECTION, SOLUTION INTRAVENOUS; SUBCUTANEOUS at 11:50

## 2025-04-30 RX ADMIN — Medication 1 APPLICATION(S): at 05:51

## 2025-04-30 RX ADMIN — INSULIN LISPRO 6: 100 INJECTION, SOLUTION INTRAVENOUS; SUBCUTANEOUS at 17:22

## 2025-04-30 RX ADMIN — LACTULOSE 20 GRAM(S): 10 SOLUTION ORAL at 01:47

## 2025-04-30 RX ADMIN — LACTULOSE 20 GRAM(S): 10 SOLUTION ORAL at 20:04

## 2025-04-30 RX ADMIN — LACTULOSE 20 GRAM(S): 10 SOLUTION ORAL at 12:00

## 2025-04-30 RX ADMIN — LACTULOSE 20 GRAM(S): 10 SOLUTION ORAL at 08:22

## 2025-04-30 RX ADMIN — INSULIN LISPRO 4: 100 INJECTION, SOLUTION INTRAVENOUS; SUBCUTANEOUS at 06:20

## 2025-04-30 NOTE — PROGRESS NOTE ADULT - ASSESSMENT
81 yo male with h/o lumbar spine osteomyelitis, HTN, DM, subdural hematoma presented with altered mental status, found to have bacteremia and cirrhosis.     AMS  -2/2 sepsis, bacteremia  -has improved    Decompensated cirrhosis       portal hypertension      Coagulopathy      Hypoalbuminemia       hepatic encephalopathy  -on rifaximin, lactulose  -will give albumin given diffuse edema    Anemia  -likely multifactorial: sepsis, cirrhosis, poor nutrition  -s/p EGD with no sign of bleeding  -will monitor for GI bleeding    Handoff: swallow eval, NG tube removal if able to swallow, PT

## 2025-04-30 NOTE — PROGRESS NOTE ADULT - SUBJECTIVE AND OBJECTIVE BOX
CHIEF COMPLAINT:    Patient is a 80y old  Male who presents with a chief complaint of acute mentation changes (29 Apr 2025 14:41)      INTERVAL HPI/OVERNIGHT EVENTS:    Patient seen and examined at bedside with wife present. Pt was awake, sick appearing. asking for ice chip, didn't provide any complaints.     Medications:  Standing  albumin human 25% IVPB 50 milliLiter(s) IV Intermittent every 8 hours  chlorhexidine 2% Cloths 1 Application(s) Topical <User Schedule>  dextrose 5%. 1000 milliLiter(s) IV Continuous <Continuous>  dextrose 5%. 1000 milliLiter(s) IV Continuous <Continuous>  dextrose 50% Injectable 25 Gram(s) IV Push once  dextrose 50% Injectable 12.5 Gram(s) IV Push once  dextrose 50% Injectable 25 Gram(s) IV Push once  folic acid 1 milliGRAM(s) Oral daily  glucagon  Injectable 1 milliGRAM(s) IntraMuscular once  insulin lispro (ADMELOG) corrective regimen sliding scale   SubCutaneous every 6 hours  lactulose Syrup 20 Gram(s) Enteral Tube every 3 hours  levETIRAcetam   Injectable 250 milliGRAM(s) IV Push <User Schedule>  lidocaine   4% Patch 1 Patch Transdermal daily  midodrine 5 milliGRAM(s) Oral every 8 hours  nystatin Powder 1 Application(s) Topical two times a day  pantoprazole  Injectable 40 milliGRAM(s) IV Push daily  potassium chloride   Powder 40 milliEquivalent(s) Oral two times a day  rifAXIMin 550 milliGRAM(s) Oral two times a day  senna 2 Tablet(s) Oral at bedtime  silver sulfADIAZINE 1% Cream 1 Application(s) Topical two times a day    PRN Meds  acetaminophen     Tablet .. 650 milliGRAM(s) Oral every 6 hours PRN  aluminum hydroxide/magnesium hydroxide/simethicone Suspension 30 milliLiter(s) Oral every 4 hours PRN  dextrose Oral Gel 15 Gram(s) Oral once PRN  melatonin 3 milliGRAM(s) Oral at bedtime PRN  ondansetron Injectable 4 milliGRAM(s) IV Push every 8 hours PRN  polyethylene glycol 3350 17 Gram(s) Oral two times a day PRN        Vital Signs:    T(F): 98.3 (04-30-25 @ 04:52), Max: 98.5 (04-29-25 @ 20:59)  HR: 82 (04-30-25 @ 13:00) (76 - 82)  BP: 142/49 (04-30-25 @ 13:00) (114/66 - 142/49)  RR: 16 (04-30-25 @ 04:52) (16 - 16)  SpO2: 96% (04-30-25 @ 08:06) (95% - 97%)  I&O's Summary    29 Apr 2025 07:01  -  30 Apr 2025 07:00  --------------------------------------------------------  IN: 700 mL / OUT: 210 mL / NET: 490 mL    30 Apr 2025 07:01  -  30 Apr 2025 18:09  --------------------------------------------------------  IN: 460 mL / OUT: 0 mL / NET: 460 mL      Daily     Daily   CAPILLARY BLOOD GLUCOSE      POCT Blood Glucose.: 297 mg/dL (30 Apr 2025 16:38)  POCT Blood Glucose.: 316 mg/dL (30 Apr 2025 11:42)  POCT Blood Glucose.: 248 mg/dL (30 Apr 2025 06:17)  POCT Blood Glucose.: 179 mg/dL (30 Apr 2025 00:06)  POCT Blood Glucose.: 157 mg/dL (29 Apr 2025 20:41)      PHYSICAL EXAM:  GENERAL:  NAD, sick appearing  SKIN: No rashes or lesions  HEENT: Atraumatic. Normocephalic. Anicteric  NECK:  No JVD.   PULMONARY: Clear to ausculation bilaterally. No wheezing. No rales  CVS: Normal S1, S2. Regular rate and rhythm. No murmurs.  ABDOMEN/GI: Soft, distended, non tender  EXTREMITIES: diffuse edema    LABS:                        7.3    6.12  )-----------( 67       ( 30 Apr 2025 09:20 )             22.9     04-30    143  |  111[H]  |  17  ----------------------------<  254[H]  3.4[L]   |  20  |  1.2    Ca    7.5[L]      30 Apr 2025 09:20  Mg     1.7     04-30    TPro  3.9[L]  /  Alb  1.9[L]  /  TBili  1.0  /  DBili  x   /  AST  50[H]  /  ALT  14  /  AlkPhos  144[H]  04-30    PT/INR - ( 30 Apr 2025 09:20 )   PT: 23.80 sec;   INR: 1.99 ratio        RADIOLOGY & ADDITIONAL TESTS:  Imaging or report Personally Reviewed:  [ ] YES  [ ] NO -->no new images

## 2025-04-30 NOTE — CHART NOTE - NSCHARTNOTEFT_GEN_A_CORE
Phlebotomist was unable to obtain blood work due to poor peripheral vasculature.   Blood was obtained by a PA via arterial stick.  Pt tolerated procedure well without any complications.

## 2025-05-01 LAB
ALBUMIN SERPL ELPH-MCNC: 2.5 G/DL — LOW (ref 3.5–5.2)
ALP SERPL-CCNC: 133 U/L — HIGH (ref 30–115)
ALT FLD-CCNC: 12 U/L — SIGNIFICANT CHANGE UP (ref 0–41)
ANION GAP SERPL CALC-SCNC: 13 MMOL/L — SIGNIFICANT CHANGE UP (ref 7–14)
AST SERPL-CCNC: 40 U/L — SIGNIFICANT CHANGE UP (ref 0–41)
BILIRUB SERPL-MCNC: 1 MG/DL — SIGNIFICANT CHANGE UP (ref 0.2–1.2)
BUN SERPL-MCNC: 16 MG/DL — SIGNIFICANT CHANGE UP (ref 10–20)
CALCIUM SERPL-MCNC: 7.6 MG/DL — LOW (ref 8.4–10.5)
CHLORIDE SERPL-SCNC: 112 MMOL/L — HIGH (ref 98–110)
CO2 SERPL-SCNC: 19 MMOL/L — SIGNIFICANT CHANGE UP (ref 17–32)
CREAT SERPL-MCNC: 1.1 MG/DL — SIGNIFICANT CHANGE UP (ref 0.7–1.5)
EGFR: 69 ML/MIN/1.73M2 — SIGNIFICANT CHANGE UP
EGFR: 69 ML/MIN/1.73M2 — SIGNIFICANT CHANGE UP
GLUCOSE SERPL-MCNC: 202 MG/DL — HIGH (ref 70–99)
HCT VFR BLD CALC: 23 % — LOW (ref 42–52)
HGB BLD-MCNC: 7.2 G/DL — LOW (ref 14–18)
INR BLD: 1.7 RATIO — HIGH (ref 0.65–1.3)
MAGNESIUM SERPL-MCNC: 1.7 MG/DL — LOW (ref 1.8–2.4)
MCHC RBC-ENTMCNC: 30.3 PG — SIGNIFICANT CHANGE UP (ref 27–31)
MCHC RBC-ENTMCNC: 31.3 G/DL — LOW (ref 32–37)
MCV RBC AUTO: 96.6 FL — HIGH (ref 80–94)
NRBC BLD AUTO-RTO: 0 /100 WBCS — SIGNIFICANT CHANGE UP (ref 0–0)
PLATELET # BLD AUTO: 68 K/UL — LOW (ref 130–400)
PMV BLD: 10.1 FL — SIGNIFICANT CHANGE UP (ref 7.4–10.4)
POTASSIUM SERPL-MCNC: 4.1 MMOL/L — SIGNIFICANT CHANGE UP (ref 3.5–5)
POTASSIUM SERPL-SCNC: 4.1 MMOL/L — SIGNIFICANT CHANGE UP (ref 3.5–5)
PROT SERPL-MCNC: 4.6 G/DL — LOW (ref 6–8)
PROTHROM AB SERPL-ACNC: 20.3 SEC — HIGH (ref 9.95–12.87)
RBC # BLD: 2.38 M/UL — LOW (ref 4.7–6.1)
RBC # FLD: 22.3 % — HIGH (ref 11.5–14.5)
SODIUM SERPL-SCNC: 144 MMOL/L — SIGNIFICANT CHANGE UP (ref 135–146)
WBC # BLD: 6.97 K/UL — SIGNIFICANT CHANGE UP (ref 4.8–10.8)
WBC # FLD AUTO: 6.97 K/UL — SIGNIFICANT CHANGE UP (ref 4.8–10.8)

## 2025-05-01 PROCEDURE — 36600 WITHDRAWAL OF ARTERIAL BLOOD: CPT

## 2025-05-01 PROCEDURE — 99233 SBSQ HOSP IP/OBS HIGH 50: CPT | Mod: FS

## 2025-05-01 PROCEDURE — 99233 SBSQ HOSP IP/OBS HIGH 50: CPT

## 2025-05-01 RX ORDER — LACTULOSE 10 G/15ML
20 SOLUTION ORAL EVERY 8 HOURS
Refills: 0 | Status: DISCONTINUED | OUTPATIENT
Start: 2025-05-01 | End: 2025-05-03

## 2025-05-01 RX ORDER — OXYCODONE HYDROCHLORIDE 30 MG/1
5 TABLET ORAL EVERY 6 HOURS
Refills: 0 | Status: DISCONTINUED | OUTPATIENT
Start: 2025-05-01 | End: 2025-05-08

## 2025-05-01 RX ORDER — MIDODRINE HYDROCHLORIDE 5 MG/1
5 TABLET ORAL EVERY 8 HOURS
Refills: 0 | Status: DISCONTINUED | OUTPATIENT
Start: 2025-05-01 | End: 2025-05-04

## 2025-05-01 RX ADMIN — LACTULOSE 20 GRAM(S): 10 SOLUTION ORAL at 06:08

## 2025-05-01 RX ADMIN — LACTULOSE 20 GRAM(S): 10 SOLUTION ORAL at 09:19

## 2025-05-01 RX ADMIN — Medication 1 APPLICATION(S): at 17:35

## 2025-05-01 RX ADMIN — INSULIN LISPRO 4: 100 INJECTION, SOLUTION INTRAVENOUS; SUBCUTANEOUS at 06:24

## 2025-05-01 RX ADMIN — LEVETIRACETAM 250 MILLIGRAM(S): 10 INJECTION, SOLUTION INTRAVENOUS at 06:07

## 2025-05-01 RX ADMIN — LIDOCAINE HYDROCHLORIDE 1 PATCH: 20 JELLY TOPICAL at 02:34

## 2025-05-01 RX ADMIN — INSULIN LISPRO 4: 100 INJECTION, SOLUTION INTRAVENOUS; SUBCUTANEOUS at 17:22

## 2025-05-01 RX ADMIN — ALBUMIN (HUMAN) 50 MILLILITER(S): 12.5 INJECTION, SOLUTION INTRAVENOUS at 06:10

## 2025-05-01 RX ADMIN — Medication 1 APPLICATION(S): at 06:08

## 2025-05-01 RX ADMIN — Medication 40 MILLIEQUIVALENT(S): at 06:08

## 2025-05-01 RX ADMIN — NYSTATIN 1 APPLICATION(S): 100000 CREAM TOPICAL at 17:34

## 2025-05-01 RX ADMIN — LACTULOSE 20 GRAM(S): 10 SOLUTION ORAL at 03:27

## 2025-05-01 RX ADMIN — Medication 40 MILLIGRAM(S): at 15:31

## 2025-05-01 RX ADMIN — OXYCODONE HYDROCHLORIDE 5 MILLIGRAM(S): 30 TABLET ORAL at 19:12

## 2025-05-01 RX ADMIN — NYSTATIN 1 APPLICATION(S): 100000 CREAM TOPICAL at 06:11

## 2025-05-01 RX ADMIN — FOLIC ACID 1 MILLIGRAM(S): 1 TABLET ORAL at 17:24

## 2025-05-01 RX ADMIN — INSULIN LISPRO 8: 100 INJECTION, SOLUTION INTRAVENOUS; SUBCUTANEOUS at 00:25

## 2025-05-01 RX ADMIN — Medication 1 APPLICATION(S): at 06:10

## 2025-05-01 RX ADMIN — LEVETIRACETAM 250 MILLIGRAM(S): 10 INJECTION, SOLUTION INTRAVENOUS at 17:22

## 2025-05-01 RX ADMIN — LACTULOSE 20 GRAM(S): 10 SOLUTION ORAL at 00:25

## 2025-05-01 NOTE — OCCUPATIONAL THERAPY INITIAL EVALUATION ADULT - ADDITIONAL COMMENTS
PLOF obtained from wife Edwige and is as of prior to 1/23/25, was (+) .    1/23-2/4 Patient was admitted to the hospital  2/4-4/4 Rehab at [facility], then d/c to home was using RW/Rollator  4/13 AMS and brought back to hospital.
PLOF obtained from chart via wife Edwige and is as of prior to 1/23/25, was (+) .    1/23-2/4 Patient was admitted to the hospital  2/4-4/4 Rehab at [facility], then d/c to home was using RW/Rollator  4/13 AMS and brought back to hospital.

## 2025-05-01 NOTE — PHYSICAL THERAPY INITIAL EVALUATION ADULT - LEVEL OF INDEPENDENCE: SIT/SUPINE, REHAB EVAL
Unable to safely progress at this time due to significantly impaired strength balance and endurance.
unable to perform

## 2025-05-01 NOTE — PHYSICAL THERAPY INITIAL EVALUATION ADULT - LEVEL OF INDEPENDENCE: SIT/STAND, REHAB EVAL
unable to perform
Unable to safely progress at this time due to significantly impaired strength balance and endurance.

## 2025-05-01 NOTE — OCCUPATIONAL THERAPY INITIAL EVALUATION ADULT - VISUAL ASSESSMENT: TRACKING
Pt able to track and scan therapist to midline
Patient able to track therapist to midline from bilateral sides

## 2025-05-01 NOTE — PROGRESS NOTE ADULT - SUBJECTIVE AND OBJECTIVE BOX
Patient is a 80-year-old gentleman with past medical history of hypertension, diabetes, subdural hematoma, prior mechanical falls, and prior admission for osteomyelitis who was brought in from skilled nursing facility for change in mental status.  Patient followed by gastroenterology for liver cirrhosis with portal hypertension and ascites along with prior endoscopic evaluation this admission for hematochezia consisting of an endoscopy done in April 16 in which a 5 mm ulcer in the duodenal bulb was clipped followed by a colonoscopy on April 17 in which there was clots visible in the cecum but no polyps identified.  He had additional EGD April 29th for concern for Melena but EGD overall unrevealing. He feels well but again has fluctuance of mentation.       PAST MEDICAL & SURGICAL HISTORY:  Diabetes  Hypertension  Fall  H/O left knee surgery  History of cholecystectomy  History of appendectomy        MEDICATIONS  (STANDING):  atorvastatin 80 milliGRAM(s) Oral at bedtime  chlorhexidine 2% Cloths 1 Application(s) Topical <User Schedule>  dextrose 5%. 1000 milliLiter(s) (100 mL/Hr) IV Continuous <Continuous>  dextrose 5%. 1000 milliLiter(s) (50 mL/Hr) IV Continuous <Continuous>  dextrose 5%. 1000 milliLiter(s) (50 mL/Hr) IV Continuous <Continuous>  dextrose 50% Injectable 25 Gram(s) IV Push once  dextrose 50% Injectable 12.5 Gram(s) IV Push once  dextrose 50% Injectable 25 Gram(s) IV Push once  folic acid 1 milliGRAM(s) Oral daily  glucagon  Injectable 1 milliGRAM(s) IntraMuscular once  insulin lispro (ADMELOG) corrective regimen sliding scale   SubCutaneous every 6 hours  lactulose Syrup 20 Gram(s) Enteral Tube every 3 hours  levETIRAcetam   Injectable 250 milliGRAM(s) IV Push <User Schedule>  lidocaine   4% Patch 1 Patch Transdermal daily  metroNIDAZOLE  IVPB 500 milliGRAM(s) IV Intermittent once  midodrine 10 milliGRAM(s) Oral every 8 hours  nystatin Powder 1 Application(s) Topical two times a day  pantoprazole  Injectable 40 milliGRAM(s) IV Push every 12 hours  phytonadione  IVPB 10 milliGRAM(s) IV Intermittent daily  rifAXIMin 550 milliGRAM(s) Oral two times a day  senna 2 Tablet(s) Oral at bedtime  silver sulfADIAZINE 1% Cream 1 Application(s) Topical two times a day    MEDICATIONS  (PRN):  acetaminophen     Tablet .. 650 milliGRAM(s) Oral every 6 hours PRN Temp greater or equal to 38C (100.4F), Mild Pain (1 - 3)  aluminum hydroxide/magnesium hydroxide/simethicone Suspension 30 milliLiter(s) Oral every 4 hours PRN Dyspepsia  dextrose Oral Gel 15 Gram(s) Oral once PRN Blood Glucose LESS THAN 70 milliGRAM(s)/deciliter  melatonin 3 milliGRAM(s) Oral at bedtime PRN Insomnia  ondansetron Injectable 4 milliGRAM(s) IV Push every 8 hours PRN Nausea and/or Vomiting  polyethylene glycol 3350 17 Gram(s) Oral two times a day PRN Constipation      Allergies  No Known Allergies      REVIEW OF SYSTEMS  Due to altered mental status, subjective information were not able to be obtained from the patient. History was obtained, to the extent possible, from review of the chart and collateral sources of information.    Vital Signs Last 24 Hrs  T(C): 36.8 (01 May 2025 05:04), Max: 36.8 (01 May 2025 05:04)  T(F): 98.2 (01 May 2025 05:04), Max: 98.2 (01 May 2025 05:04)  HR: 83 (01 May 2025 05:04) (82 - 83)  BP: 122/64 (01 May 2025 05:04) (122/64 - 142/49)  BP(mean): --  RR: 18 (30 Apr 2025 20:25) (18 - 18)  SpO2: 97% (01 May 2025 05:04) (94% - 97%)    Parameters below as of 30 Apr 2025 20:25  Patient On (Oxygen Delivery Method): nasal cannula  O2 Flow (L/min): 1      Constitutional: No acute distress, NG noted   Respiratory:  No signs of respiratory distress. Lung sounds are clear bilaterally.  Cardiovascular:  S1 S2, Regular rate and rhythm.  Abdominal: Abdomen is soft, symmetric, and non-tender, distended abdomen w/ anasarca.  Rectal exam black liquid stools- red tinged   Skin: No rashes, No Jaundice.  LE: +4 edema  Neuro: AAOx1      Labs:                        7.3    6.12  )-----------( 67       ( 30 Apr 2025 09:20 )             22.9     04-30    143  |  111[H]  |  17  ----------------------------<  254[H]  3.4[L]   |  20  |  1.2    Ca    7.5[L]      30 Apr 2025 09:20  Mg     1.7     04-30    TPro  3.9[L]  /  Alb  1.9[L]  /  TBili  1.0  /  DBili  x   /  AST  50[H]  /  ALT  14  /  AlkPhos  144[H]  04-30   Patient is a 80-year-old gentleman with past medical history of hypertension, diabetes, subdural hematoma, prior mechanical falls, and prior admission for osteomyelitis who was brought in from skilled nursing facility for change in mental status.  Patient followed by gastroenterology for liver cirrhosis with portal hypertension and ascites along with prior endoscopic evaluation this admission for hematochezia consisting of an endoscopy done in April 16 in which a 5 mm ulcer in the duodenal bulb was clipped followed by a colonoscopy on April 17 in which there was clots visible in the cecum but no polyps identified.  He had additional EGD April 29th for concern for Melena but EGD overall unrevealing. He feels well but again has fluctuance of mentation.   No bms overnight or overt GI bleeding reported.       PAST MEDICAL & SURGICAL HISTORY:  Diabetes  Hypertension  Fall  H/O left knee surgery  History of cholecystectomy  History of appendectomy        MEDICATIONS  (STANDING):  atorvastatin 80 milliGRAM(s) Oral at bedtime  chlorhexidine 2% Cloths 1 Application(s) Topical <User Schedule>  dextrose 5%. 1000 milliLiter(s) (100 mL/Hr) IV Continuous <Continuous>  dextrose 5%. 1000 milliLiter(s) (50 mL/Hr) IV Continuous <Continuous>  dextrose 5%. 1000 milliLiter(s) (50 mL/Hr) IV Continuous <Continuous>  dextrose 50% Injectable 25 Gram(s) IV Push once  dextrose 50% Injectable 12.5 Gram(s) IV Push once  dextrose 50% Injectable 25 Gram(s) IV Push once  folic acid 1 milliGRAM(s) Oral daily  glucagon  Injectable 1 milliGRAM(s) IntraMuscular once  insulin lispro (ADMELOG) corrective regimen sliding scale   SubCutaneous every 6 hours  lactulose Syrup 20 Gram(s) Enteral Tube every 3 hours  levETIRAcetam   Injectable 250 milliGRAM(s) IV Push <User Schedule>  lidocaine   4% Patch 1 Patch Transdermal daily  metroNIDAZOLE  IVPB 500 milliGRAM(s) IV Intermittent once  midodrine 10 milliGRAM(s) Oral every 8 hours  nystatin Powder 1 Application(s) Topical two times a day  pantoprazole  Injectable 40 milliGRAM(s) IV Push every 12 hours  phytonadione  IVPB 10 milliGRAM(s) IV Intermittent daily  rifAXIMin 550 milliGRAM(s) Oral two times a day  senna 2 Tablet(s) Oral at bedtime  silver sulfADIAZINE 1% Cream 1 Application(s) Topical two times a day    MEDICATIONS  (PRN):  acetaminophen     Tablet .. 650 milliGRAM(s) Oral every 6 hours PRN Temp greater or equal to 38C (100.4F), Mild Pain (1 - 3)  aluminum hydroxide/magnesium hydroxide/simethicone Suspension 30 milliLiter(s) Oral every 4 hours PRN Dyspepsia  dextrose Oral Gel 15 Gram(s) Oral once PRN Blood Glucose LESS THAN 70 milliGRAM(s)/deciliter  melatonin 3 milliGRAM(s) Oral at bedtime PRN Insomnia  ondansetron Injectable 4 milliGRAM(s) IV Push every 8 hours PRN Nausea and/or Vomiting  polyethylene glycol 3350 17 Gram(s) Oral two times a day PRN Constipation      Allergies  No Known Allergies      REVIEW OF SYSTEMS  Due to altered mental status, subjective information were not able to be obtained from the patient. History was obtained, to the extent possible, from review of the chart and collateral sources of information.    Vital Signs Last 24 Hrs  T(C): 36.8 (01 May 2025 05:04), Max: 36.8 (01 May 2025 05:04)  T(F): 98.2 (01 May 2025 05:04), Max: 98.2 (01 May 2025 05:04)  HR: 83 (01 May 2025 05:04) (82 - 83)  BP: 122/64 (01 May 2025 05:04) (122/64 - 142/49)  BP(mean): --  RR: 18 (30 Apr 2025 20:25) (18 - 18)  SpO2: 97% (01 May 2025 05:04) (94% - 97%)    Parameters below as of 30 Apr 2025 20:25  Patient On (Oxygen Delivery Method): nasal cannula  O2 Flow (L/min): 1      Constitutional: No acute distress, NG noted   Respiratory:  No signs of respiratory distress. Lung sounds are clear bilaterally.  Cardiovascular:  S1 S2, Regular rate and rhythm.  Abdominal: Abdomen is soft, symmetric, and non-tender, distended abdomen w/ anasarca.   Skin: No rashes, No Jaundice.  LE: +4 edema  Neuro: AAOx1      Labs:                        7.3    6.12  )-----------( 67       ( 30 Apr 2025 09:20 )             22.9     04-30    143  |  111[H]  |  17  ----------------------------<  254[H]  3.4[L]   |  20  |  1.2    Ca    7.5[L]      30 Apr 2025 09:20  Mg     1.7     04-30    TPro  3.9[L]  /  Alb  1.9[L]  /  TBili  1.0  /  DBili  x   /  AST  50[H]  /  ALT  14  /  AlkPhos  144[H]  04-30

## 2025-05-01 NOTE — CHART NOTE - NSCHARTNOTEFT_GEN_A_CORE
Was recommended by speech: give a trial of soft and site sized diet with thin liquids. Calorie count and RD consult. Ok to keep NGT still in while trying po diet.

## 2025-05-01 NOTE — OCCUPATIONAL THERAPY INITIAL EVALUATION ADULT - LEVEL OF INDEPENDENCE: BED TO CHAIR, REHAB EVAL
Unable to assess at this time secondary to significantly impaired strength, balance, cognition, and endurance. To be assessed when appropriate. Pt to use Nhi at this time/unable to perform
Unable to assess at this time secondary to significantly impaired cognition and ability to follow commands. To be assessed when appropriate.

## 2025-05-01 NOTE — CHART NOTE - NSCHARTNOTEFT_GEN_A_CORE
Registered Dietitian Follow-Up     Patient Profile Reviewed                           Yes [X]   No []     Nutrition History Previously Obtained        Yes [X]  No []       Pertinent  Information:     pt is 80 year old male with hx of HTN, DM, SDH, mechanical falls, OM of L4-5 in 2025, BIBA 2/2 changes in mental status, recent d/c from [facility]. pt admitted with acute/chronic metabolic encephalopathy, elevated trops, new onset decompensated liver cirrhosis, chronic pancreatitis, CHARLENE.  anasarca, s/p SLP eval --> NPO with alternate means of nutrition. s/p NGT placement, pt remains lethargic. elevated NH4- to be given lactulose today, possible maroon colored BM noted today, GI following.     s/p EGD had clean based duodenal ulcer which was clipped   s/p colonoscopy with small clot seen in the cecum    gram negative bacteremia.    maroon loose BM's noted   overnight drop in hgb noted  s/p total of 3u PRBCs.    s/p repeat EGD--> non-erosive gastritis, findings suggestive of protal HTN gastropathy, duodenitis, no blood noted.     pt downgraded to med surg unit, NGT removed. pt started on po diet with calorie count ordered, placed in pt's room for -     Diet order:   Diet, Soft and Bite Sized (25 @ 11:13) [Active]      Anthropometrics:  Height (cm): 175.3 (25 @ 14:55)  Weight (kg): 117.8 (25 @ 14:55)  BMI (kg/m2): 38.3 (25 @ 14:55)    Daily Weight in k.5 (-), Weight in k.9 (-), Weight in k (-), Weight in k.5 ()  % Weight Change    MEDICATIONS  (STANDING):  chlorhexidine 2% Cloths 1 Application(s) Topical <User Schedule>  dextrose 5%. 1000 milliLiter(s) (100 mL/Hr) IV Continuous <Continuous>  dextrose 5%. 1000 milliLiter(s) (50 mL/Hr) IV Continuous <Continuous>  glucagon  Injectable 1 milliGRAM(s) IntraMuscular once  insulin lispro (ADMELOG) corrective regimen sliding scale   SubCutaneous every 6 hours  lactulose Syrup 20 Gram(s) Oral every 8 hours  levETIRAcetam   Injectable 250 milliGRAM(s) IV Push <User Schedule>  lidocaine   4% Patch 1 Patch Transdermal daily  midodrine 5 milliGRAM(s) Oral every 8 hours  nystatin Powder 1 Application(s) Topical two times a day  pantoprazole  Injectable 40 milliGRAM(s) IV Push daily  rifAXIMin 550 milliGRAM(s) Oral two times a day  senna 2 Tablet(s) Oral at bedtime  silver sulfADIAZINE 1% Cream 1 Application(s) Topical two times a day    MEDICATIONS  (PRN):  acetaminophen     Tablet .. 650 milliGRAM(s) Oral every 6 hours PRN Temp greater or equal to 38C (100.4F), Mild Pain (1 - 3)  aluminum hydroxide/magnesium hydroxide/simethicone Suspension 30 milliLiter(s) Oral every 4 hours PRN Dyspepsia  dextrose Oral Gel 15 Gram(s) Oral once PRN Blood Glucose LESS THAN 70 milliGRAM(s)/deciliter  melatonin 3 milliGRAM(s) Oral at bedtime PRN Insomnia  ondansetron Injectable 4 milliGRAM(s) IV Push every 8 hours PRN Nausea and/or Vomiting  oxyCODONE    IR 5 milliGRAM(s) Oral every 6 hours PRN Moderate Pain (4 - 6)    Pertinent Labs:  @ 11:30: Na 144, BUN 16, Cr 1.1, [H], K+ 4.1, Phos --, Mg 1.7[L], Alk Phos 133[H], ALT/SGPT 12, AST/SGOT 40, HbA1c --    Finger Sticks:  POCT Blood Glucose.: 227 mg/dL ( @ 20:45)  POCT Blood Glucose.: 227 mg/dL ( @ 17:00)  POCT Blood Glucose.: 214 mg/dL ( @ 16:21)  POCT Blood Glucose.: 250 mg/dL ( @ 07:43)  POCT Blood Glucose.: 225 mg/dL ( @ 06:22)  POCT Blood Glucose.: 328 mg/dL ( @ 00:06)    Physical Findings:  - Appearance: awake  - GI function: +BS   - Tubes: n/a   - Oral/Mouth cavity:  Skin: B/L buttock DTI           L elbow, R arm and R hand tear noted    edema: generalized +4 edema noted    Nutrition Requirements:  Weight Used: 117.8 kgs      Estimated Energy Needs      8858-8078 kcals (12-16 kcals/kg/BW)  105-140g protein (1.5-2.0g/kg.IBW)  1;1 kcal for estimated fluid needs       Nutrient Intake: pt consumed <50% of dinner meal    [] Previous Nutrition Diagnosis:            [] Ongoing          [] Resolved    inadequate pro-energy intake reactivated     Goal/Expected Outcome: pt to tolerate po diet and meet at least 80% of estimated needs within 3-5 days      Indicator/Monitoring: tolerance to po diet, labs/meds, NFPF    Recommendation:   -add DASH/TLC CHO consistent restriction to present diet  -glucerna shake 240 ml q 12 hrs (220 kcals, 10g protein)  -consider remeron to aid with po intake if not contraindicated with POC  HIGH RISK

## 2025-05-01 NOTE — PHYSICAL THERAPY INITIAL EVALUATION ADULT - LEVEL OF INDEPENDENCE: GAIT, REHAB EVAL
Unable to safely progress at this time due to significantly impaired strength balance and endurance. Pt not able to sit at EOB/unable to perform
unable to assess 2/2 pt is unable to perform safe sit to stand

## 2025-05-01 NOTE — OCCUPATIONAL THERAPY INITIAL EVALUATION ADULT - GENERAL OBSERVATIONS, REHAB EVAL
09:55-10:50 Chart reviewed, ok to treat by Occupational Therapist as confirmed by RN Magalys, Pt received semi-Coto's in bed (+) NGT; (+) heplock (+) O2 3L via NC in NAD. Pt in agreement with OT IE.
11:35-11:53 Chart reviewed, ok to treat by Occupational Therapist as confirmed by RN Michaela, Pt received semi-Coto's in bed (+) NGT (+) loyola (+) tele (+) IV (+) O2 via NC 2L in NAD. Pt in agreement with OT IE.

## 2025-05-01 NOTE — SWALLOW BEDSIDE ASSESSMENT ADULT - SWALLOW EVAL: RECOMMENDED DIET
soft + bite sized consistency and thin liquids. Calorie count prior to removal of NGT to ensure adequate PO intake

## 2025-05-01 NOTE — PHYSICAL THERAPY INITIAL EVALUATION ADULT - GENERAL OBSERVATIONS, REHAB EVAL
13;15-13;40 pt was seen for PT IE at bed side, pt is agreeable, chart thoroughly reviewed, RN jamilah is aware.  Pt received and left semi vail in bed, in no apparent distress, +IV, +telemonitoring, +BP cuff and +pulse ox, +loyola, +O2 via NC @ 2L, +call bell within reach, bed side table at reach, family at bed side.
10:05-10:55 Chart reviewed. Patient available to be seen for physical therapy, denies pain, confirmed with RN. Pt rec'd in bed +NG tube

## 2025-05-01 NOTE — OCCUPATIONAL THERAPY INITIAL EVALUATION ADULT - LEVEL OF INDEPENDENCE: SUPINE/SIT, REHAB EVAL
Unable to assess at this time secondary to significantly impaired cognition and ability to follow commands. To be assessed when appropriate.
Unable to assess at this time secondary to significantly impaired strength, balance, cognition, and endurance. To be assessed when appropriate./unable to perform

## 2025-05-01 NOTE — OCCUPATIONAL THERAPY INITIAL EVALUATION ADULT - SITTING BALANCE: DYNAMIC
To be assessed when appropriate
Unable to assess at this time secondary to significantly impaired cognition and ability to follow commands. To be assessed when appropriate.

## 2025-05-01 NOTE — OCCUPATIONAL THERAPY INITIAL EVALUATION ADULT - SITTING BALANCE: STATIC
Unable to assess at this time secondary to significantly impaired cognition and ability to follow commands. To be assessed when appropriate.
To be assessed when appropriate

## 2025-05-01 NOTE — PHYSICAL THERAPY INITIAL EVALUATION ADULT - PATIENT/FAMILY/SIGNIFICANT OTHER GOALS STATEMENT, PT EVAL
Pt wants to be able to move around as much as possible
pt was not able to verbalize any goals 2/2 mental status

## 2025-05-01 NOTE — OCCUPATIONAL THERAPY INITIAL EVALUATION ADULT - PERTINENT HX OF CURRENT PROBLEM, REHAB EVAL
80 years old male history of hypertension, diabetes, subdural hematoma on Keppra, hx mechanical falls, recent admission (January 2025) for osteomyelitis of L4-5 w completion of abx.  BIBA from home status post change of mental status.  Per provider note, patient was discharged from rehab facility on April 4.  Patient was supposed to have MRI of lower back on April 5 but patient refused.  Patient baseline uses walker to ambulate.  By report patient has been doing okay since his discharge from the facility.  Patient had 1 episode of fall on his buttocks few days ago that she was able to help patient to get up with no difficulty.  Patient become confused and calling for his father tonight over the past few hours so she called EMS.
81yo M PMH HTN, DM, subdural hematoma (on Keppra, h/o mechanical falls, recent admission (January 2025) for osteomyelitis of L4-L5 (completed antibiotics) presented for AMS. Found to have questionable liver cirrhosis and elevated ammonia.   CTH: Motion-degraded exam, however, no definite acute intracranial pathology.   VA Duplex: No evidence of right upper extremity deep venous thrombosis. Right cephalic vein superficial thrombophlebitis. Normal arterial flow in the bilateral lower extremities.

## 2025-05-01 NOTE — OCCUPATIONAL THERAPY INITIAL EVALUATION ADULT - LEVEL OF INDEPENDENCE: SIT/STAND, REHAB EVAL
Unable to assess at this time secondary to significantly impaired strength, balance, cognition, and endurance. To be assessed when appropriate.

## 2025-05-01 NOTE — PROGRESS NOTE ADULT - ASSESSMENT
81 yo male with h/o lumbar spine osteomyelitis, HTN, DM, subdural hematoma presented with altered mental status, found to have bacteremia and cirrhosis.     AMS  -2/2 sepsis, bacteremia  -has improved    Decompensated cirrhosis       portal hypertension      Coagulopathy      Hypoalbuminemia       hepatic encephalopathy  -on rifaximin, lactulose  -s/p albumin given diffuse edema on 4/30, will consider to repeat    Anemia  -likely multifactorial: sepsis, cirrhosis, poor nutrition, GI bleeding  -s/p EGD on 4/16 which showed 5mm ulcer in duodenal bulb post endo clip, repeat EGD on 4/29 showed no bleeding noted  -will monitor for GI bleeding  -monitor H&H, transfuse if Hb<7    Bacteremia  -blood culture positive for bacteroides   -s/p ceftriaxone and flagyl  -repeat blood culture negative    Deconditioning  -PT  -OOB    Handoff: monitor H&H, NG tube removed, monitor oral intake

## 2025-05-01 NOTE — OCCUPATIONAL THERAPY INITIAL EVALUATION ADULT - NS ASR FOLLOW COMMAND OT EVAL
with max visual and tactile cues/25% of the time/able to follow single-step instructions
unable to follow commands

## 2025-05-01 NOTE — OCCUPATIONAL THERAPY INITIAL EVALUATION ADULT - RANGE OF MOTION EXAMINATION
Unable to formally assess 2/2 patient declined AROM and became guarded when PROM was attempted; spontaneous movement of right elbow observed; To be assessed when appropriate
Limited AROM eliel UE; Noted eliel hand flex/extension; Bilateral shoulder PROM 1/4 range Elbow 1/4 range Wrist hand WFLs/deficits as listed below

## 2025-05-01 NOTE — PHYSICAL THERAPY INITIAL EVALUATION ADULT - PERTINENT HX OF CURRENT PROBLEM, REHAB EVAL
80 years old male history of hypertension, diabetes, subdural hematoma on Keppra, hx mechanical falls, recent admission (January 2025) for osteomyelitis of L4-5 w completion of abx.  BIBA from home status post change of mental status.  Per provider note, patient was discharged from rehab facility on April 4.  Patient was supposed to have MRI of lower back on April 5 but patient refused.  Patient baseline uses walker to ambulate.  By report patient has been doing okay since his discharge from the facility.  Patient had 1 episode of fall on his buttocks few days ago that she was able to help patient to get up with no difficulty.  Patient become confused and calling for his father tonight over the past few hours so she called EMS.
History of Present Illness:   80 years old male history of hypertension, diabetes, subdural hematoma on Keppra, hx mechanical falls, recent admission (January 2025) for osteomyelitis of L4-5 w completion of abx.  BIBA from home status post change of mental status.  Per provider note, patient was discharged from rehab facility on April 4.  Patient was supposed to have MRI of lower back on April 5 but patient refused.  Patient baseline uses walker to ambulate.  By report patient has been doing okay since his discharge from the facility.  Patient had 1 episode of fall on his buttocks few days ago that she was able to help patient to get up with no difficulty.  Patient become confused and calling for his father tonight over the past few hours so she called EMS.  No ROS given mental status, no family at bedside.

## 2025-05-01 NOTE — OCCUPATIONAL THERAPY INITIAL EVALUATION ADULT - PRECAUTIONS/LIMITATIONS, REHAB EVAL
3L via NC/fall precautions/oxygen therapy device and L/min
1
Principal Discharge DX:	Well baby exam, under 8 days old  
Principal Discharge DX:	Hyperbilirubinemia  
2L via NC/fall precautions/oxygen therapy device and L/min

## 2025-05-01 NOTE — OCCUPATIONAL THERAPY INITIAL EVALUATION ADULT - SHORT TERM MEMORY, REHAB EVAL
unable to formally assess 2/2 decreased attention spam, to be assessed when appropriate/impaired
impaired

## 2025-05-01 NOTE — PROGRESS NOTE ADULT - SUBJECTIVE AND OBJECTIVE BOX
CHIEF COMPLAINT:    Patient is a 80y old  Male who presents with a chief complaint of acute mentation changes (01 May 2025 08:36)      INTERVAL HPI/OVERNIGHT EVENTS:    Patient seen and examined at bedside. Pt was more awake. Had bowel movement with loose brown stool.     Medications:  Standing  chlorhexidine 2% Cloths 1 Application(s) Topical <User Schedule>  dextrose 5%. 1000 milliLiter(s) IV Continuous <Continuous>  dextrose 5%. 1000 milliLiter(s) IV Continuous <Continuous>  dextrose 50% Injectable 25 Gram(s) IV Push once  dextrose 50% Injectable 25 Gram(s) IV Push once  dextrose 50% Injectable 12.5 Gram(s) IV Push once  folic acid 1 milliGRAM(s) Oral daily  glucagon  Injectable 1 milliGRAM(s) IntraMuscular once  insulin lispro (ADMELOG) corrective regimen sliding scale   SubCutaneous every 6 hours  lactulose Syrup 20 Gram(s) Oral every 8 hours  levETIRAcetam   Injectable 250 milliGRAM(s) IV Push <User Schedule>  lidocaine   4% Patch 1 Patch Transdermal daily  midodrine 5 milliGRAM(s) Oral every 8 hours  nystatin Powder 1 Application(s) Topical two times a day  pantoprazole  Injectable 40 milliGRAM(s) IV Push daily  rifAXIMin 550 milliGRAM(s) Oral two times a day  senna 2 Tablet(s) Oral at bedtime  silver sulfADIAZINE 1% Cream 1 Application(s) Topical two times a day    PRN Meds  acetaminophen     Tablet .. 650 milliGRAM(s) Oral every 6 hours PRN  aluminum hydroxide/magnesium hydroxide/simethicone Suspension 30 milliLiter(s) Oral every 4 hours PRN  dextrose Oral Gel 15 Gram(s) Oral once PRN  melatonin 3 milliGRAM(s) Oral at bedtime PRN  ondansetron Injectable 4 milliGRAM(s) IV Push every 8 hours PRN        Vital Signs:    T(F): 97.4 (05-01-25 @ 13:45), Max: 98.2 (05-01-25 @ 05:04)  HR: 91 (05-01-25 @ 13:45) (83 - 91)  BP: 119/54 (05-01-25 @ 13:45) (119/54 - 133/74)  RR: 18 (05-01-25 @ 13:45) (18 - 18)  SpO2: 99% (05-01-25 @ 13:45) (94% - 99%)  I&O's Summary    30 Apr 2025 07:01  -  01 May 2025 07:00  --------------------------------------------------------  IN: 920 mL / OUT: 0 mL / NET: 920 mL      Daily     Daily   CAPILLARY BLOOD GLUCOSE      POCT Blood Glucose.: 214 mg/dL (01 May 2025 16:21)  POCT Blood Glucose.: 250 mg/dL (01 May 2025 07:43)  POCT Blood Glucose.: 225 mg/dL (01 May 2025 06:22)  POCT Blood Glucose.: 328 mg/dL (01 May 2025 00:06)  POCT Blood Glucose.: 328 mg/dL (30 Apr 2025 21:00)  POCT Blood Glucose.: 297 mg/dL (30 Apr 2025 16:38)    PHYSICAL EXAM:  GENERAL:  NAD, sick appearing  SKIN: No rashes or lesions  HEENT: Atraumatic. Normocephalic. Anicteric  NECK:  No JVD.   PULMONARY: Clear to ausculation bilaterally. No wheezing. No rales  CVS: Normal S1, S2. Regular rate and rhythm. No murmurs.  ABDOMEN/GI: Soft, distended, non tender  EXTREMITIES: diffuse edema    LABS:                        7.2    6.97  )-----------( 68       ( 01 May 2025 11:30 )             23.0     05-01    144  |  112[H]  |  16  ----------------------------<  202[H]  4.1   |  19  |  1.1    Ca    7.6[L]      01 May 2025 11:30  Mg     1.7     05-01    TPro  4.6[L]  /  Alb  2.5[L]  /  TBili  1.0  /  DBili  x   /  AST  40  /  ALT  12  /  AlkPhos  133[H]  05-01    PT/INR - ( 01 May 2025 11:30 )   PT: 20.30 sec;   INR: 1.70 ratio        RADIOLOGY & ADDITIONAL TESTS:  Imaging or report Personally Reviewed:  [ ] YES  [ ] NO -->no new images

## 2025-05-01 NOTE — PROGRESS NOTE ADULT - ASSESSMENT
Patient is a 80-year-old gentleman with past medical history of hypertension, diabetes, subdural hematoma, prior mechanical falls, and prior admission for osteomyelitis who was brought in from skilled nursing facility for change in mental status.  Patient followed by gastroenterology for liver cirrhosis with portal hypertension and ascites along with prior endoscopic evaluation this admission for hematochezia consisting of an endoscopy done in April 16 in which a 5 mm ulcer in the duodenal bulb was clipped followed by a colonoscopy on April 17 in which there was clots visible in the cecum but no polyps identified.  He had additional EGD April 29th for concern for Melena but EGD overall unrevealing. He feels well but again has fluctuance of mentation.     4/16/2025 EGD: Normal esophagus.  Erosive gastritis.  5 mm ulcer in the duodenal bulb status post Endo Clip.  4/17/2025 colonoscopy (BBPS score 5 - poor prep in L colon): There was a small clot seen in the cecum, otherwise liquid brownish stool was seen without evidence of bleeding. Aggressive irrigation performed, large lesions not seen however small or flat lesions could have been missed. The colon was otherwise unremarkable.  4/29/2025 EGD- No bleeding or stigmata of bleeding noted     Liver cirrhosis with portal hypertension. Mild-to-moderate ascites.   Anasarca. Hepatic dome subcentimeter hypodense 1.3 cm hypodensity, not fully characterized  -HE: lactulose titrated to 3-4 bms / daily  -c/w rifaximin 550 q12h  -daily inr  -follow up with hepatology as outpatient   - liver protocol as outpatient   -Lifestyle/Dietary modifications: Calorie intake 25-40 Kcal/kg/day; Protein intake: 1.2-1.5 gm/kg/day  -Avoid smoking and NSAIDs  -Abstain from alcohol and all illicit drugs  -Continue PPI therapy   -Monitor Hb and transfuse prn to hgb >7   Patient is a 80-year-old gentleman with past medical history of hypertension, diabetes, subdural hematoma, prior mechanical falls, and prior admission for osteomyelitis who was brought in from skilled nursing facility for change in mental status.  Patient followed by gastroenterology for liver cirrhosis with portal hypertension and ascites along with prior endoscopic evaluation this admission for hematochezia consisting of an endoscopy done in April 16 in which a 5 mm ulcer in the duodenal bulb was clipped followed by a colonoscopy on April 17 in which there was clots visible in the cecum but no polyps identified.  He had additional EGD April 29th for concern for Melena but EGD overall unrevealing. He feels well but again has fluctuance of mentation.     4/16/2025 EGD: Normal esophagus.  Erosive gastritis.  5 mm ulcer in the duodenal bulb status post Endo Clip.  4/17/2025 colonoscopy (BBPS score 5 - poor prep in L colon): There was a small clot seen in the cecum, otherwise liquid brownish stool was seen without evidence of bleeding. Aggressive irrigation performed, large lesions not seen however small or flat lesions could have been missed. The colon was otherwise unremarkable.  4/29/2025 EGD- No bleeding or stigmata of bleeding noted     Liver cirrhosis with portal hypertension. Mild-to-moderate ascites.   Anasarca. Hepatic dome subcentimeter hypodense 1.3 cm hypodensity, not fully characterized  -HE: lactulose titrated to 3-4 bms /daily  -c/w rifaximin 550 q12h  -daily inr  -follow up with hepatology as outpatient   - liver protocol as outpatient   -Lifestyle/Dietary modifications: Calorie intake 25-40 Kcal/kg/day; Protein intake: 1.2-1.5 gm/kg/day  -Avoid smoking and NSAIDs  -Abstain from alcohol and all illicit drugs  -Continue PPI therapy   -If recurrent overt GI bleeding or drop in Hb would consider VCE  -Monitor Hb and transfuse prn to hgb >7

## 2025-05-01 NOTE — OCCUPATIONAL THERAPY INITIAL EVALUATION ADULT - LIVES WITH, PROFILE
wife and son in a private house 3 steps to enter outside and 10 steps inside with eliel Handrails to main living area; (+) walk in shower with shower chair; (+) Commode (+) versa frame/children/spouse
wife and son in a private house 3 steps to enter with eliel HR outside + 10 steps with eliel HR inside to main living area. (+) walk in shower with shower chair (+) commode (+) versa frame/children/spouse

## 2025-05-01 NOTE — OCCUPATIONAL THERAPY INITIAL EVALUATION ADULT - IMPAIRMENTS CONTRIBUTING IMPAIRED BED MOBILITY, REHAB EVAL
cognition/decreased flexibility/decreased strength
impaired balance/cognition/decreased flexibility/impaired postural control/decreased ROM/decreased strength

## 2025-05-01 NOTE — OCCUPATIONAL THERAPY INITIAL EVALUATION ADULT - ORIENTATION, REHAB EVAL
Answered "yes" when asked if Aletha Leightonio was his name; Able to state month and day of birthday
able to state his name and  and say he was in a hospital/person/place

## 2025-05-01 NOTE — PHYSICAL THERAPY INITIAL EVALUATION ADULT - ADDITIONAL COMMENTS
As per chart review, pt was independent with all functional activities PTA.  Pt lives with wife and son in house with 3STE with B HR, and 10 steps to main living area.  Pt was in hospital till 2/4 then to rehab facility, discharged home on 4/4.
pt lives with his wife and son in a private house with 3 steps to enter with 1 rail and 13 steps to bedroom and bathroom area with B rails.  Pt was admitted from Rehab facility, as per wife he was able to amb 30 feet with RW and negotiate 5 steps with assistance

## 2025-05-01 NOTE — OCCUPATIONAL THERAPY INITIAL EVALUATION ADULT - PLANNED THERAPY INTERVENTIONS, OT EVAL
ADL retraining/balance training/cognitive, visual perceptual/motor coordination training/parent/caregiver training.../strengthening/transfer training
ADL retraining/balance training/bed mobility training/cognitive, visual perceptual/ROM/strengthening/transfer training

## 2025-05-01 NOTE — OCCUPATIONAL THERAPY INITIAL EVALUATION ADULT - LEVEL OF INDEPENDENCE: SIT/SUPINE, REHAB EVAL
Unable to assess at this time secondary to significantly impaired cognition and ability to follow commands. To be assessed when appropriate.
unable to perform

## 2025-05-02 LAB
ALBUMIN SERPL ELPH-MCNC: 2.1 G/DL — LOW (ref 3.5–5.2)
ALP SERPL-CCNC: 125 U/L — HIGH (ref 30–115)
ALT FLD-CCNC: 10 U/L — SIGNIFICANT CHANGE UP (ref 0–41)
ANION GAP SERPL CALC-SCNC: 10 MMOL/L — SIGNIFICANT CHANGE UP (ref 7–14)
AST SERPL-CCNC: 28 U/L — SIGNIFICANT CHANGE UP (ref 0–41)
BILIRUB SERPL-MCNC: 1 MG/DL — SIGNIFICANT CHANGE UP (ref 0.2–1.2)
BUN SERPL-MCNC: 18 MG/DL — SIGNIFICANT CHANGE UP (ref 10–20)
CALCIUM SERPL-MCNC: 7.5 MG/DL — LOW (ref 8.4–10.5)
CHLORIDE SERPL-SCNC: 111 MMOL/L — HIGH (ref 98–110)
CO2 SERPL-SCNC: 21 MMOL/L — SIGNIFICANT CHANGE UP (ref 17–32)
CREAT SERPL-MCNC: 1 MG/DL — SIGNIFICANT CHANGE UP (ref 0.7–1.5)
EGFR: 77 ML/MIN/1.73M2 — SIGNIFICANT CHANGE UP
EGFR: 77 ML/MIN/1.73M2 — SIGNIFICANT CHANGE UP
GLUCOSE SERPL-MCNC: 201 MG/DL — HIGH (ref 70–99)
HCT VFR BLD CALC: 21.9 % — LOW (ref 42–52)
HGB BLD-MCNC: 7 G/DL — LOW (ref 14–18)
INR BLD: 1.87 RATIO — HIGH (ref 0.65–1.3)
MAGNESIUM SERPL-MCNC: 1.6 MG/DL — LOW (ref 1.8–2.4)
MCHC RBC-ENTMCNC: 31.5 PG — HIGH (ref 27–31)
MCHC RBC-ENTMCNC: 32 G/DL — SIGNIFICANT CHANGE UP (ref 32–37)
MCV RBC AUTO: 98.6 FL — HIGH (ref 80–94)
NRBC BLD AUTO-RTO: 0 /100 WBCS — SIGNIFICANT CHANGE UP (ref 0–0)
PLATELET # BLD AUTO: 71 K/UL — LOW (ref 130–400)
PMV BLD: 10.8 FL — HIGH (ref 7.4–10.4)
POTASSIUM SERPL-MCNC: 4 MMOL/L — SIGNIFICANT CHANGE UP (ref 3.5–5)
POTASSIUM SERPL-SCNC: 4 MMOL/L — SIGNIFICANT CHANGE UP (ref 3.5–5)
PROT SERPL-MCNC: 4.2 G/DL — LOW (ref 6–8)
PROTHROM AB SERPL-ACNC: 22.3 SEC — HIGH (ref 9.95–12.87)
RBC # BLD: 2.22 M/UL — LOW (ref 4.7–6.1)
RBC # FLD: 22.8 % — HIGH (ref 11.5–14.5)
SODIUM SERPL-SCNC: 142 MMOL/L — SIGNIFICANT CHANGE UP (ref 135–146)
WBC # BLD: 8.08 K/UL — SIGNIFICANT CHANGE UP (ref 4.8–10.8)
WBC # FLD AUTO: 8.08 K/UL — SIGNIFICANT CHANGE UP (ref 4.8–10.8)

## 2025-05-02 PROCEDURE — 99233 SBSQ HOSP IP/OBS HIGH 50: CPT | Mod: FS

## 2025-05-02 RX ORDER — FUROSEMIDE 10 MG/ML
40 INJECTION INTRAMUSCULAR; INTRAVENOUS
Refills: 0 | Status: COMPLETED | OUTPATIENT
Start: 2025-05-02 | End: 2025-05-03

## 2025-05-02 RX ORDER — ALBUMIN (HUMAN) 12.5 G/50ML
50 INJECTION, SOLUTION INTRAVENOUS EVERY 8 HOURS
Refills: 0 | Status: COMPLETED | OUTPATIENT
Start: 2025-05-02 | End: 2025-05-03

## 2025-05-02 RX ADMIN — LEVETIRACETAM 250 MILLIGRAM(S): 10 INJECTION, SOLUTION INTRAVENOUS at 18:25

## 2025-05-02 RX ADMIN — INSULIN LISPRO 4: 100 INJECTION, SOLUTION INTRAVENOUS; SUBCUTANEOUS at 06:26

## 2025-05-02 RX ADMIN — LIDOCAINE HYDROCHLORIDE 1 PATCH: 20 JELLY TOPICAL at 18:28

## 2025-05-02 RX ADMIN — INSULIN LISPRO 4: 100 INJECTION, SOLUTION INTRAVENOUS; SUBCUTANEOUS at 11:58

## 2025-05-02 RX ADMIN — INSULIN LISPRO 4: 100 INJECTION, SOLUTION INTRAVENOUS; SUBCUTANEOUS at 17:22

## 2025-05-02 RX ADMIN — Medication 2 TABLET(S): at 00:34

## 2025-05-02 RX ADMIN — ALBUMIN (HUMAN) 50 MILLILITER(S): 12.5 INJECTION, SOLUTION INTRAVENOUS at 21:15

## 2025-05-02 RX ADMIN — Medication 2 TABLET(S): at 21:21

## 2025-05-02 RX ADMIN — ALBUMIN (HUMAN) 50 MILLILITER(S): 12.5 INJECTION, SOLUTION INTRAVENOUS at 14:01

## 2025-05-02 RX ADMIN — FUROSEMIDE 40 MILLIGRAM(S): 10 INJECTION INTRAMUSCULAR; INTRAVENOUS at 14:06

## 2025-05-02 RX ADMIN — LACTULOSE 20 GRAM(S): 10 SOLUTION ORAL at 14:05

## 2025-05-02 RX ADMIN — NYSTATIN 1 APPLICATION(S): 100000 CREAM TOPICAL at 18:18

## 2025-05-02 RX ADMIN — FOLIC ACID 1 MILLIGRAM(S): 1 TABLET ORAL at 11:49

## 2025-05-02 RX ADMIN — LACTULOSE 20 GRAM(S): 10 SOLUTION ORAL at 21:21

## 2025-05-02 RX ADMIN — Medication 1 APPLICATION(S): at 18:19

## 2025-05-02 RX ADMIN — Medication 1 APPLICATION(S): at 06:25

## 2025-05-02 RX ADMIN — Medication 40 MILLIGRAM(S): at 11:48

## 2025-05-02 RX ADMIN — MIDODRINE HYDROCHLORIDE 5 MILLIGRAM(S): 5 TABLET ORAL at 00:36

## 2025-05-02 RX ADMIN — MIDODRINE HYDROCHLORIDE 5 MILLIGRAM(S): 5 TABLET ORAL at 06:26

## 2025-05-02 RX ADMIN — LIDOCAINE HYDROCHLORIDE 1 PATCH: 20 JELLY TOPICAL at 11:48

## 2025-05-02 RX ADMIN — LACTULOSE 20 GRAM(S): 10 SOLUTION ORAL at 00:36

## 2025-05-02 RX ADMIN — LEVETIRACETAM 250 MILLIGRAM(S): 10 INJECTION, SOLUTION INTRAVENOUS at 06:26

## 2025-05-02 RX ADMIN — Medication 1 APPLICATION(S): at 06:42

## 2025-05-02 RX ADMIN — LACTULOSE 20 GRAM(S): 10 SOLUTION ORAL at 06:41

## 2025-05-02 NOTE — PROGRESS NOTE ADULT - ASSESSMENT
81 yo male with h/o lumbar spine osteomyelitis, HTN, DM, subdural hematoma presented with altered mental status, found to have bacteremia and cirrhosis.     AMS  -2/2 sepsis, bacteremia  -lethargic    Decompensated cirrhosis       portal hypertension      Coagulopathy      Hypoalbuminemia       hepatic encephalopathy  -on rifaximin, lactulose  -s/p albumin given diffuse edema on 4/30, will repeat today with lasix    Anemia  -likely multifactorial: sepsis, cirrhosis, poor nutrition, GI bleeding  -s/p EGD on 4/16 which showed 5mm ulcer in duodenal bulb post endo clip, repeat EGD on 4/29 showed no bleeding noted  -will monitor for GI bleeding  -monitor H&H, transfuse if Hb<7    Bacteremia  -blood culture positive for bacteroides   -s/p ceftriaxone and flagyl  -repeat blood culture negative    Deconditioning  -PT  -OOB    Handoff: monitor H&H,

## 2025-05-02 NOTE — SWALLOW BEDSIDE ASSESSMENT ADULT - DIET PRIOR TO ADMI
regular consistency & thin liquids
Unknown
regular consistency & thin liquids

## 2025-05-02 NOTE — SWALLOW BEDSIDE ASSESSMENT ADULT - CONSISTENCIES ADMINISTERED
not appropriate for PO intake
not appropriate for PO intake
refused all PO trials
not appropriate for PO intake
thin liquid/soft & bite-sized
not appropriate for PO intake
thin liquid/pureed/minced & moist/soft & bite-sized
thin liquid/soft & bite-sized
not appropriate for PO intake
thin liquid/pureed/soft & bite-sized

## 2025-05-02 NOTE — SWALLOW BEDSIDE ASSESSMENT ADULT - MODE OF PRESENTATION
cup/spoon/straw/fed by clinician
cup/spoon/straw/fed by clinician
spoon/straw/fed by clinician
spoon/straw/fed by clinician

## 2025-05-02 NOTE — SWALLOW BEDSIDE ASSESSMENT ADULT - ASR SWALLOW ASPIRATION MONITOR
change of breathing pattern/cough/gurgly voice/fever/pneumonia/throat clearing
Shower only
change of breathing pattern/cough/gurgly voice/fever/pneumonia/throat clearing
change of breathing pattern/cough/gurgly voice/fever/pneumonia/throat clearing/upper respiratory infection
change of breathing pattern/cough/gurgly voice/fever/pneumonia/throat clearing/upper respiratory infection
change of breathing pattern/cough/gurgly voice/fever/pneumonia/throat clearing

## 2025-05-02 NOTE — SWALLOW BEDSIDE ASSESSMENT ADULT - SWALLOW EVAL: RECOMMENDED FEEDING/EATING TECHNIQUES
maintain upright posture during/after eating for 30 mins/oral hygiene/position upright (90 degrees)
maintain upright posture during/after eating for 30 mins/oral hygiene/position upright (90 degrees)
oral hygiene/position upright (90 degrees)/small sips/bites
maintain upright posture during/after eating for 30 mins/oral hygiene/position upright (90 degrees)
oral hygiene
maintain upright posture during/after eating for 30 mins/oral hygiene/position upright (90 degrees)
oral hygiene/position upright (90 degrees)/small sips/bites
maintain upright posture during/after eating for 30 mins/oral hygiene/position upright (90 degrees)

## 2025-05-02 NOTE — SWALLOW BEDSIDE ASSESSMENT ADULT - DATE
16-Apr-2025
20-Apr-2025
24-Apr-2025
14-Apr-2025
18-Apr-2025
23-Apr-2025
15-Apr-2025
01-May-2025
02-May-2025
21-Apr-2025
17-Apr-2025

## 2025-05-02 NOTE — SWALLOW BEDSIDE ASSESSMENT ADULT - SWALLOW EVAL: DIAGNOSIS
NGT replaced. Not appropriate for PO intake at this time. Increased confusion.
Not appropriate for PO intake at this time. Increased lethargy. Decreased feeding awareness.
+toleration observed without overt s/s of aspiration/penetration for puree, soft + bite sized, and thin liquids
Declined all PO intake
Not appropriate for PO intake at this time. Increased lethargy. Decreased feeding awareness.
Not appropriate for PO intake at this time. Increased lethargy. Decreased feeding awareness.
Declined all PO intake; pt. pulled NGT
NGT replaced. Not appropriate for PO intake at this time. Increased confusion.
+toleration observed without overt s/s of aspiration/penetration for lunch tray of soft + bite sized, and thin liquids
+toleration observed without overt s/s of aspiration/penetration for soft + bite sized with thin liquids
+toleration observed without overt s/s of aspiration/penetration for puree, minced + moist, soft + bite sized, and thin liquids

## 2025-05-02 NOTE — SWALLOW BEDSIDE ASSESSMENT ADULT - SWALLOW EVAL: CURRENT DIET
NPO
NPO
Soft & bite sized with thin liquids
NPO
soft + bite sized consistency and thin liquids when awake and aware
soft + bite sized consistency and thin liquids when awake and aware
Easy to chew with thin liquids
soft + bite sized consistency and thin liquids when awake and aware
soft + bite sized consistency and thin liquids when awake and aware
NPO
NPO+NGT

## 2025-05-02 NOTE — PROGRESS NOTE ADULT - SUBJECTIVE AND OBJECTIVE BOX
CHIEF COMPLAINT:    Patient is a 80y old  Male who presents with a chief complaint of acute mentation changes (01 May 2025 16:23)      INTERVAL HPI/OVERNIGHT EVENTS:    Patient seen and examined at bedside. No acute overnight events occurred. Pt was lethargic, sick appearing.     Medications:  Standing  albumin human 25% IVPB 50 milliLiter(s) IV Intermittent every 8 hours  chlorhexidine 2% Cloths 1 Application(s) Topical <User Schedule>  dextrose 5%. 1000 milliLiter(s) IV Continuous <Continuous>  dextrose 5%. 1000 milliLiter(s) IV Continuous <Continuous>  dextrose 50% Injectable 25 Gram(s) IV Push once  dextrose 50% Injectable 12.5 Gram(s) IV Push once  dextrose 50% Injectable 25 Gram(s) IV Push once  folic acid 1 milliGRAM(s) Oral daily  furosemide   Injectable 40 milliGRAM(s) IV Push two times a day  glucagon  Injectable 1 milliGRAM(s) IntraMuscular once  insulin lispro (ADMELOG) corrective regimen sliding scale   SubCutaneous every 6 hours  lactulose Syrup 20 Gram(s) Oral every 8 hours  levETIRAcetam   Injectable 250 milliGRAM(s) IV Push <User Schedule>  lidocaine   4% Patch 1 Patch Transdermal daily  midodrine 5 milliGRAM(s) Oral every 8 hours  nystatin Powder 1 Application(s) Topical two times a day  pantoprazole  Injectable 40 milliGRAM(s) IV Push daily  rifAXIMin 550 milliGRAM(s) Oral two times a day  senna 2 Tablet(s) Oral at bedtime  silver sulfADIAZINE 1% Cream 1 Application(s) Topical two times a day    PRN Meds  acetaminophen     Tablet .. 650 milliGRAM(s) Oral every 6 hours PRN  aluminum hydroxide/magnesium hydroxide/simethicone Suspension 30 milliLiter(s) Oral every 4 hours PRN  dextrose Oral Gel 15 Gram(s) Oral once PRN  melatonin 3 milliGRAM(s) Oral at bedtime PRN  ondansetron Injectable 4 milliGRAM(s) IV Push every 8 hours PRN  oxyCODONE    IR 5 milliGRAM(s) Oral every 6 hours PRN        Vital Signs:    T(F): 97.6 (05-02-25 @ 12:43), Max: 97.6 (05-02-25 @ 12:43)  HR: 98 (05-02-25 @ 12:43) (90 - 98)  BP: 141/90 (05-02-25 @ 12:43) (120/60 - 141/90)  RR: 18 (05-02-25 @ 12:43) (18 - 18)  SpO2: 97% (05-02-25 @ 12:43) (97% - 99%)  I&O's Summary    Daily     Daily   CAPILLARY BLOOD GLUCOSE      POCT Blood Glucose.: 233 mg/dL (02 May 2025 16:38)  POCT Blood Glucose.: 205 mg/dL (02 May 2025 11:55)  POCT Blood Glucose.: 239 mg/dL (02 May 2025 06:18)  POCT Blood Glucose.: 227 mg/dL (01 May 2025 20:45)      PHYSICAL EXAM:  GENERAL:  NAD, sick appearing  SKIN: No rashes or lesions  HEENT: Atraumatic. Normocephalic. Anicteric  NECK:  No JVD.   PULMONARY: Clear to ausculation bilaterally. No wheezing. No rales  CVS: Normal S1, S2. Regular rate and rhythm. No murmurs.  ABDOMEN/GI: Soft, distended, non tender  EXTREMITIES: diffuse edema      LABS:                        7.0    8.08  )-----------( 71       ( 02 May 2025 08:18 )             21.9     05-02    142  |  111[H]  |  18  ----------------------------<  201[H]  4.0   |  21  |  1.0    Ca    7.5[L]      02 May 2025 08:18  Mg     1.6     05-02    TPro  4.2[L]  /  Alb  2.1[L]  /  TBili  1.0  /  DBili  x   /  AST  28  /  ALT  10  /  AlkPhos  125[H]  05-02    PT/INR - ( 02 May 2025 08:18 )   PT: 22.30 sec;   INR: 1.87 ratio          RADIOLOGY & ADDITIONAL TESTS:  Imaging or report Personally Reviewed:  [ ] YES  [ ] NO -->no new images

## 2025-05-02 NOTE — SWALLOW BEDSIDE ASSESSMENT ADULT - SPECIFY REASON(S)
Clinical swallow evaluation; reconsulted
Dysphagia f/u
Dysphagia f/u
Dysphagia evaluation
Dysphagia f/u

## 2025-05-02 NOTE — SWALLOW BEDSIDE ASSESSMENT ADULT - ASR SWALLOW DENTITION
present and adequate

## 2025-05-02 NOTE — SWALLOW BEDSIDE ASSESSMENT ADULT - SWALLOW EVAL: ORAL MUSCULATURE
----- Message from Ale Kelly sent at 10/30/2023  7:22 AM CDT -----  Regarding: FW: Appointment Request  Contact: [phone]  Pt is requesting to be seen and missed her annual appt. Stated there a lot of weird symptoms.  Or if there is an labs that MD would recommend so she can talk to her primary if she cant be seen   ----- Message -----  From: Rocael Jamison  Sent: 10/29/2023   3:14 PM CDT  To: Naomy Hansong [address]  Subject: Appointment Request                              Appointment Request From: Rocael Jamison    With Provider: Bijan Contreras MD [[facility] [address]]    Preferred Date Range: Any    Preferred Times: Any Time    Reason for visit: Specialist Follow-Up    Comments:  Perimenopause    
Patient confirmed appointment and scheduled for 12/1/23 at 11:40am  
Please offer 12/1 at 11:40 am.    Thanks.  KAYY  
unable to assess due to poor participation/comprehension
generalized weakness
unable to assess due to poor participation/comprehension
unable to assess due to poor participation/comprehension
generalized weakness

## 2025-05-02 NOTE — SWALLOW BEDSIDE ASSESSMENT ADULT - SLP PERTINENT HISTORY OF CURRENT PROBLEM
80 years old male history of HTN, DM, subdural hematoma on Keppra, hx mechanical falls, recent admission (January 2025) for osteomyelitis of L4-5 w completion of abx. BIBA from home status post change of mental status. Per provider note, patient was discharged from rehab facility on April 4.  Patient was supposed to have MRI of lower back on April 5 but patient refused. Patient baseline uses walker to ambulate. By report patient has been doing okay since his discharge from the facility. Patient had 1 episode of fall on his buttocks few days ago that she was able to help patient to get up with no difficulty. Patient become confused and calling for his father tonight over the past few hours so she called EMS. Admitted for #New onset decompensated liver Cirrhosis. ?hepatic encephalopathy. GI c/s for suspected GIB. CTH negative for stroke.
80 years old male history of HTN, DM, subdural hematoma on Keppra, hx mechanical falls, recent admission (January 2025) for osteomyelitis of L4-5 w completion of abx. BIBA from home status post change of mental status. Admitted for #New onset decompensated liver Cirrhosis, AMS. In hospital pt found with bacteremia, GI bleed and liver cirrhosis. s/p EGD 4/16 with gastritis and duodenal ulcer s/p endoclip. s/p colonoscopy 4/17.
80 years old male history of hypertension, diabetes, subdural hematoma on Keppra, hx mechanical falls, recent admission (January 2025) for osteomyelitis of L4-5 w completion of abx.  BIBA from home status post change of mental status.  Per provider note, patient was discharged from rehab facility on April 4.  Patient was supposed to have MRI of lower back on April 5 but patient refused.  Patient baseline uses walker to ambulate.  By report patient has been doing okay since his discharge from the facility.  Patient had 1 episode of fall on his buttocks few days ago that she was able to help patient to get up with no difficulty.  Patient become confused and calling for his father tonight over the past few hours so she called EMS.
80 years old male history of HTN, DM, subdural hematoma on Keppra, hx mechanical falls, recent admission (January 2025) for osteomyelitis of L4-5 w completion of abx. BIBA from home status post change of mental status. Admitted for #New onset decompensated liver Cirrhosis, AMS. In hospital pt found with bacteremia, GI bleed and liver cirrhosis. s/p EGD 4/16 with gastritis and duodenal ulcer s/p endoclip. s/p colonoscopy 4/17.
80 years old male history of HTN, DM, subdural hematoma on Keppra, hx mechanical falls, recent admission (January 2025) for osteomyelitis of L4-5 w completion of abx. BIBA from home status post change of mental status. Admitted for #New onset decompensated liver Cirrhosis, AMS, hepatic encephalopathy. In hospital pt found with bacteremia, GI bleed and liver cirrhosis. s/p EGD 4/16 with gastritis and duodenal ulcer s/p endoclip. s/p colonoscopy 4/17. Waxing and waning mentation.
80 years old male history of HTN, DM, subdural hematoma on Keppra, hx mechanical falls, recent admission (January 2025) for osteomyelitis of L4-5 w completion of abx. BIBA from home status post change of mental status. Admitted for #New onset decompensated liver Cirrhosis, AMS. In hospital pt found with bacteremia, GI bleed and liver cirrhosis. s/p EGD 4/16 with gastritis and duodenal ulcer s/p endoclip. s/p colonoscopy 4/17.
80 years old male history of HTN, DM, subdural hematoma on Keppra, hx mechanical falls, recent admission (January 2025) for osteomyelitis of L4-5 w completion of abx. BIBA from home status post change of mental status. Per provider note, patient was discharged from rehab facility on April 4.  Patient was supposed to have MRI of lower back on April 5 but patient refused. Patient baseline uses walker to ambulate. By report patient has been doing okay since his discharge from the facility. Patient had 1 episode of fall on his buttocks few days ago that she was able to help patient to get up with no difficulty. Patient become confused and calling for his father tonight over the past few hours so she called EMS. Admitted for #New onset decompensated liver Cirrhosis. ?hepatic encephalopathy. GI c/s for suspected GIB. CTH negative for stroke.
80 years old male history of HTN, DM, subdural hematoma on Keppra, hx mechanical falls, recent admission (January 2025) for osteomyelitis of L4-5 w completion of abx. BIBA from home status post change of mental status. Admitted for #New onset decompensated liver Cirrhosis, AMS. In hospital pt found with bacteremia, GI bleed and liver cirrhosis. s/p EGD 4/16 with gastritis and duodenal ulcer s/p endoclip. s/p colonoscopy 4/17.
80 years old male history of HTN, DM, subdural hematoma on Keppra, hx mechanical falls, recent admission (January 2025) for osteomyelitis of L4-5 w completion of abx. BIBA from home status post change of mental status. Admitted for #New onset decompensated liver Cirrhosis, AMS. In hospital pt found with bacteremia, GI bleed and liver cirrhosis. s/p EGD 4/16 with gastritis and duodenal ulcer s/p endoclip. s/p colonoscopy 4/17.
80 years old male history of HTN, DM, subdural hematoma on Keppra, hx mechanical falls, recent admission (January 2025) for osteomyelitis of L4-5 w completion of abx. BIBA from home status post change of mental status. Admitted for #New onset decompensated liver Cirrhosis, AMS, hepatic encephalopathy. In hospital pt found with bacteremia, GI bleed and liver cirrhosis. s/p EGD 4/16 with gastritis and duodenal ulcer s/p endoclip. s/p colonoscopy 4/17. Waxing and waning mentation.
80 years old male history of HTN, DM, subdural hematoma on Keppra, hx mechanical falls, recent admission (January 2025) for osteomyelitis of L4-5 w completion of abx. BIBA from home status post change of mental status. Admitted for #New onset decompensated liver Cirrhosis, AMS. In hospital pt found with bacteremia, GI bleed and liver cirrhosis. s/p EGD 4/16 with gastritis and duodenal ulcer s/p endoclip. s/p colonoscopy 4/17.

## 2025-05-03 LAB
ALBUMIN SERPL ELPH-MCNC: 2.4 G/DL — LOW (ref 3.5–5.2)
ALP SERPL-CCNC: 113 U/L — SIGNIFICANT CHANGE UP (ref 30–115)
ALT FLD-CCNC: 9 U/L — SIGNIFICANT CHANGE UP (ref 0–41)
ANION GAP SERPL CALC-SCNC: 10 MMOL/L — SIGNIFICANT CHANGE UP (ref 7–14)
AST SERPL-CCNC: 24 U/L — SIGNIFICANT CHANGE UP (ref 0–41)
BILIRUB SERPL-MCNC: 1.2 MG/DL — SIGNIFICANT CHANGE UP (ref 0.2–1.2)
BUN SERPL-MCNC: 20 MG/DL — SIGNIFICANT CHANGE UP (ref 10–20)
CALCIUM SERPL-MCNC: 7.5 MG/DL — LOW (ref 8.4–10.5)
CHLORIDE SERPL-SCNC: 113 MMOL/L — HIGH (ref 98–110)
CO2 SERPL-SCNC: 23 MMOL/L — SIGNIFICANT CHANGE UP (ref 17–32)
CREAT SERPL-MCNC: 1 MG/DL — SIGNIFICANT CHANGE UP (ref 0.7–1.5)
EGFR: 77 ML/MIN/1.73M2 — SIGNIFICANT CHANGE UP
EGFR: 77 ML/MIN/1.73M2 — SIGNIFICANT CHANGE UP
GLUCOSE SERPL-MCNC: 191 MG/DL — HIGH (ref 70–99)
HCT VFR BLD CALC: 21.4 % — LOW (ref 42–52)
HGB BLD-MCNC: 6.7 G/DL — CRITICAL LOW (ref 14–18)
INR BLD: 1.84 RATIO — HIGH (ref 0.65–1.3)
MAGNESIUM SERPL-MCNC: 1.5 MG/DL — LOW (ref 1.8–2.4)
MCHC RBC-ENTMCNC: 31.3 G/DL — LOW (ref 32–37)
MCHC RBC-ENTMCNC: 31.3 PG — HIGH (ref 27–31)
MCV RBC AUTO: 100 FL — HIGH (ref 80–94)
NRBC BLD AUTO-RTO: 0 /100 WBCS — SIGNIFICANT CHANGE UP (ref 0–0)
PLATELET # BLD AUTO: 67 K/UL — LOW (ref 130–400)
PMV BLD: 10 FL — SIGNIFICANT CHANGE UP (ref 7.4–10.4)
POTASSIUM SERPL-MCNC: 4 MMOL/L — SIGNIFICANT CHANGE UP (ref 3.5–5)
POTASSIUM SERPL-SCNC: 4 MMOL/L — SIGNIFICANT CHANGE UP (ref 3.5–5)
PROT SERPL-MCNC: 4.2 G/DL — LOW (ref 6–8)
PROTHROM AB SERPL-ACNC: 22 SEC — HIGH (ref 9.95–12.87)
RBC # BLD: 2.14 M/UL — LOW (ref 4.7–6.1)
RBC # FLD: 23.2 % — HIGH (ref 11.5–14.5)
SODIUM SERPL-SCNC: 146 MMOL/L — SIGNIFICANT CHANGE UP (ref 135–146)
WBC # BLD: 5.76 K/UL — SIGNIFICANT CHANGE UP (ref 4.8–10.8)
WBC # FLD AUTO: 5.76 K/UL — SIGNIFICANT CHANGE UP (ref 4.8–10.8)

## 2025-05-03 PROCEDURE — 99232 SBSQ HOSP IP/OBS MODERATE 35: CPT | Mod: FS

## 2025-05-03 RX ORDER — LACTULOSE 10 G/15ML
20 SOLUTION ORAL EVERY 8 HOURS
Refills: 0 | Status: DISCONTINUED | OUTPATIENT
Start: 2025-05-03 | End: 2025-06-19

## 2025-05-03 RX ORDER — LACTULOSE 10 G/15ML
30 SOLUTION ORAL EVERY 8 HOURS
Refills: 0 | Status: DISCONTINUED | OUTPATIENT
Start: 2025-05-03 | End: 2025-05-03

## 2025-05-03 RX ORDER — FUROSEMIDE 10 MG/ML
40 INJECTION INTRAMUSCULAR; INTRAVENOUS ONCE
Refills: 0 | Status: COMPLETED | OUTPATIENT
Start: 2025-05-03 | End: 2025-05-03

## 2025-05-03 RX ADMIN — LIDOCAINE HYDROCHLORIDE 1 PATCH: 20 JELLY TOPICAL at 18:54

## 2025-05-03 RX ADMIN — FUROSEMIDE 40 MILLIGRAM(S): 10 INJECTION INTRAMUSCULAR; INTRAVENOUS at 06:45

## 2025-05-03 RX ADMIN — Medication 1 APPLICATION(S): at 06:48

## 2025-05-03 RX ADMIN — LEVETIRACETAM 250 MILLIGRAM(S): 10 INJECTION, SOLUTION INTRAVENOUS at 18:22

## 2025-05-03 RX ADMIN — INSULIN LISPRO 6: 100 INJECTION, SOLUTION INTRAVENOUS; SUBCUTANEOUS at 01:01

## 2025-05-03 RX ADMIN — LIDOCAINE HYDROCHLORIDE 1 PATCH: 20 JELLY TOPICAL at 13:01

## 2025-05-03 RX ADMIN — FOLIC ACID 1 MILLIGRAM(S): 1 TABLET ORAL at 13:01

## 2025-05-03 RX ADMIN — LACTULOSE 20 GRAM(S): 10 SOLUTION ORAL at 22:30

## 2025-05-03 RX ADMIN — MIDODRINE HYDROCHLORIDE 5 MILLIGRAM(S): 5 TABLET ORAL at 14:36

## 2025-05-03 RX ADMIN — LACTULOSE 20 GRAM(S): 10 SOLUTION ORAL at 06:26

## 2025-05-03 RX ADMIN — LEVETIRACETAM 250 MILLIGRAM(S): 10 INJECTION, SOLUTION INTRAVENOUS at 06:31

## 2025-05-03 RX ADMIN — INSULIN LISPRO 4: 100 INJECTION, SOLUTION INTRAVENOUS; SUBCUTANEOUS at 06:32

## 2025-05-03 RX ADMIN — Medication 3 MILLIGRAM(S): at 22:30

## 2025-05-03 RX ADMIN — Medication 1 APPLICATION(S): at 06:33

## 2025-05-03 RX ADMIN — NYSTATIN 1 APPLICATION(S): 100000 CREAM TOPICAL at 18:23

## 2025-05-03 RX ADMIN — FUROSEMIDE 40 MILLIGRAM(S): 10 INJECTION INTRAMUSCULAR; INTRAVENOUS at 14:36

## 2025-05-03 RX ADMIN — Medication 1 APPLICATION(S): at 18:23

## 2025-05-03 RX ADMIN — Medication 2 TABLET(S): at 22:30

## 2025-05-03 RX ADMIN — NYSTATIN 1 APPLICATION(S): 100000 CREAM TOPICAL at 06:48

## 2025-05-03 RX ADMIN — LIDOCAINE HYDROCHLORIDE 1 PATCH: 20 JELLY TOPICAL at 19:34

## 2025-05-03 RX ADMIN — INSULIN LISPRO 2: 100 INJECTION, SOLUTION INTRAVENOUS; SUBCUTANEOUS at 17:09

## 2025-05-03 RX ADMIN — ALBUMIN (HUMAN) 50 MILLILITER(S): 12.5 INJECTION, SOLUTION INTRAVENOUS at 06:35

## 2025-05-03 RX ADMIN — Medication 40 MILLIGRAM(S): at 13:01

## 2025-05-03 NOTE — PROGRESS NOTE ADULT - SUBJECTIVE AND OBJECTIVE BOX
CHIEF COMPLAINT:    Patient is a 80y old  Male who presents with a chief complaint of acute mentation changes (02 May 2025 18:35)      INTERVAL HPI/OVERNIGHT EVENTS:    Patient seen and examined at bedside. Pt was awake, sick appearing. Had BM this am, brown color stool, no sign of bleeding.     ROS: All other systems are negative.    Medications:  Standing  chlorhexidine 2% Cloths 1 Application(s) Topical <User Schedule>  dextrose 5%. 1000 milliLiter(s) IV Continuous <Continuous>  dextrose 5%. 1000 milliLiter(s) IV Continuous <Continuous>  dextrose 50% Injectable 25 Gram(s) IV Push once  dextrose 50% Injectable 25 Gram(s) IV Push once  dextrose 50% Injectable 12.5 Gram(s) IV Push once  folic acid 1 milliGRAM(s) Oral daily  furosemide   Injectable 40 milliGRAM(s) IV Push once  glucagon  Injectable 1 milliGRAM(s) IntraMuscular once  insulin lispro (ADMELOG) corrective regimen sliding scale   SubCutaneous every 6 hours  lactulose Syrup 20 Gram(s) Oral every 8 hours  levETIRAcetam   Injectable 250 milliGRAM(s) IV Push <User Schedule>  lidocaine   4% Patch 1 Patch Transdermal daily  midodrine 5 milliGRAM(s) Oral every 8 hours  nystatin Powder 1 Application(s) Topical two times a day  pantoprazole  Injectable 40 milliGRAM(s) IV Push daily  rifAXIMin 550 milliGRAM(s) Oral two times a day  senna 2 Tablet(s) Oral at bedtime  silver sulfADIAZINE 1% Cream 1 Application(s) Topical two times a day    PRN Meds  acetaminophen     Tablet .. 650 milliGRAM(s) Oral every 6 hours PRN  aluminum hydroxide/magnesium hydroxide/simethicone Suspension 30 milliLiter(s) Oral every 4 hours PRN  dextrose Oral Gel 15 Gram(s) Oral once PRN  melatonin 3 milliGRAM(s) Oral at bedtime PRN  ondansetron Injectable 4 milliGRAM(s) IV Push every 8 hours PRN  oxyCODONE    IR 5 milliGRAM(s) Oral every 6 hours PRN        Vital Signs:    T(F): 98.1 (05-03-25 @ 11:15), Max: 98.3 (05-03-25 @ 04:43)  HR: 95 (05-03-25 @ 11:15) (92 - 104)  BP: 92/51 (05-03-25 @ 11:15) (92/51 - 132/90)  RR: 20 (05-03-25 @ 11:15) (18 - 20)  SpO2: 99% (05-03-25 @ 11:15) (96% - 99%)  I&O's Summary    Daily     Daily   CAPILLARY BLOOD GLUCOSE      POCT Blood Glucose.: 148 mg/dL (03 May 2025 11:23)  POCT Blood Glucose.: 223 mg/dL (03 May 2025 07:24)  POCT Blood Glucose.: 233 mg/dL (03 May 2025 06:23)  POCT Blood Glucose.: 251 mg/dL (03 May 2025 00:32)  POCT Blood Glucose.: 233 mg/dL (02 May 2025 16:38)    PHYSICAL EXAM:  GENERAL:  NAD, sick appearing  SKIN: No rashes or lesions  HEENT: Atraumatic. Normocephalic. Anicteric  NECK:  No JVD.   PULMONARY: Clear to ausculation bilaterally. No wheezing. No rales  CVS: Normal S1, S2. Regular rate and rhythm. No murmurs.  ABDOMEN/GI: Soft, distended, non tender  EXTREMITIES: diffuse edema, anasarca    LABS:                        6.7    5.76  )-----------( 67       ( 03 May 2025 06:30 )             21.4     05-03    146  |  113[H]  |  20  ----------------------------<  191[H]  4.0   |  23  |  1.0    Ca    7.5[L]      03 May 2025 06:30  Mg     1.5     05-03    TPro  4.2[L]  /  Alb  2.4[L]  /  TBili  1.2  /  DBili  x   /  AST  24  /  ALT  9   /  AlkPhos  113  05-03    PT/INR - ( 03 May 2025 06:30 )   PT: 22.00 sec;   INR: 1.84 ratio         RADIOLOGY & ADDITIONAL TESTS:  Imaging or report Personally Reviewed:  [ ] YES  [ ] NO -->no new images

## 2025-05-03 NOTE — PROGRESS NOTE ADULT - ASSESSMENT
79 yo male with h/o lumbar spine osteomyelitis, HTN, DM, subdural hematoma presented with altered mental status, found to have bacteremia and cirrhosis.     AMS  -2/2 sepsis, bacteremia  -lethargic    Decompensated cirrhosis       portal hypertension      Coagulopathy      Hypoalbuminemia       hepatic encephalopathy  -on rifaximin, lactulose  -s/p albumin given diffuse edema on 4/30, 5/2    Anemia  -likely multifactorial: sepsis, cirrhosis, poor nutrition, GI bleeding  -s/p EGD on 4/16 which showed 5mm ulcer in duodenal bulb post endo clip, repeat EGD on 4/29 showed no bleeding noted  -will monitor for GI bleeding  -Hb<6.7, will transfuse one unit of PRBC     Bacteremia  -blood culture positive for bacteroides   -s/p ceftriaxone and flagyl  -repeat blood culture negative    Deconditioning  -PT  -OOB    Handoff: clinical improvement

## 2025-05-04 LAB
ALBUMIN SERPL ELPH-MCNC: 2.3 G/DL — LOW (ref 3.5–5.2)
ALP SERPL-CCNC: 136 U/L — HIGH (ref 30–115)
ALT FLD-CCNC: 11 U/L — SIGNIFICANT CHANGE UP (ref 0–41)
ANION GAP SERPL CALC-SCNC: 17 MMOL/L — HIGH (ref 7–14)
AST SERPL-CCNC: 32 U/L — SIGNIFICANT CHANGE UP (ref 0–41)
BILIRUB SERPL-MCNC: 2.2 MG/DL — HIGH (ref 0.2–1.2)
BUN SERPL-MCNC: 21 MG/DL — HIGH (ref 10–20)
CALCIUM SERPL-MCNC: 7.6 MG/DL — LOW (ref 8.4–10.5)
CHLORIDE SERPL-SCNC: 110 MMOL/L — SIGNIFICANT CHANGE UP (ref 98–110)
CO2 SERPL-SCNC: 17 MMOL/L — SIGNIFICANT CHANGE UP (ref 17–32)
CREAT SERPL-MCNC: 0.9 MG/DL — SIGNIFICANT CHANGE UP (ref 0.7–1.5)
EGFR: 87 ML/MIN/1.73M2 — SIGNIFICANT CHANGE UP
EGFR: 87 ML/MIN/1.73M2 — SIGNIFICANT CHANGE UP
GLUCOSE SERPL-MCNC: 146 MG/DL — HIGH (ref 70–99)
HCT VFR BLD CALC: 28.6 % — LOW (ref 42–52)
HGB BLD-MCNC: 9.3 G/DL — LOW (ref 14–18)
INR BLD: 1.6 RATIO — HIGH (ref 0.65–1.3)
MAGNESIUM SERPL-MCNC: 1.3 MG/DL — LOW (ref 1.8–2.4)
MCHC RBC-ENTMCNC: 30.8 PG — SIGNIFICANT CHANGE UP (ref 27–31)
MCHC RBC-ENTMCNC: 32.5 G/DL — SIGNIFICANT CHANGE UP (ref 32–37)
MCV RBC AUTO: 94.7 FL — HIGH (ref 80–94)
NRBC BLD AUTO-RTO: 0 /100 WBCS — SIGNIFICANT CHANGE UP (ref 0–0)
PLATELET # BLD AUTO: 96 K/UL — LOW (ref 130–400)
PMV BLD: 10.3 FL — SIGNIFICANT CHANGE UP (ref 7.4–10.4)
POTASSIUM SERPL-MCNC: 3.6 MMOL/L — SIGNIFICANT CHANGE UP (ref 3.5–5)
POTASSIUM SERPL-SCNC: 3.6 MMOL/L — SIGNIFICANT CHANGE UP (ref 3.5–5)
PROT SERPL-MCNC: 4.7 G/DL — LOW (ref 6–8)
PROTHROM AB SERPL-ACNC: 19.1 SEC — HIGH (ref 9.95–12.87)
RBC # BLD: 3.02 M/UL — LOW (ref 4.7–6.1)
RBC # FLD: 24.6 % — HIGH (ref 11.5–14.5)
SODIUM SERPL-SCNC: 144 MMOL/L — SIGNIFICANT CHANGE UP (ref 135–146)
WBC # BLD: 6.25 K/UL — SIGNIFICANT CHANGE UP (ref 4.8–10.8)
WBC # FLD AUTO: 6.25 K/UL — SIGNIFICANT CHANGE UP (ref 4.8–10.8)

## 2025-05-04 PROCEDURE — 99233 SBSQ HOSP IP/OBS HIGH 50: CPT | Mod: FS

## 2025-05-04 RX ORDER — CEFTRIAXONE 500 MG/1
INJECTION, POWDER, FOR SOLUTION INTRAMUSCULAR; INTRAVENOUS
Refills: 0 | Status: DISCONTINUED | OUTPATIENT
Start: 2025-05-04 | End: 2025-05-07

## 2025-05-04 RX ORDER — CEFTRIAXONE 500 MG/1
1000 INJECTION, POWDER, FOR SOLUTION INTRAMUSCULAR; INTRAVENOUS EVERY 24 HOURS
Refills: 0 | Status: DISCONTINUED | OUTPATIENT
Start: 2025-05-05 | End: 2025-05-07

## 2025-05-04 RX ORDER — CEFTRIAXONE 500 MG/1
1000 INJECTION, POWDER, FOR SOLUTION INTRAMUSCULAR; INTRAVENOUS ONCE
Refills: 0 | Status: COMPLETED | OUTPATIENT
Start: 2025-05-04 | End: 2025-05-04

## 2025-05-04 RX ORDER — MIDODRINE HYDROCHLORIDE 5 MG/1
10 TABLET ORAL EVERY 8 HOURS
Refills: 0 | Status: COMPLETED | OUTPATIENT
Start: 2025-05-04 | End: 2025-06-03

## 2025-05-04 RX ORDER — MAGNESIUM SULFATE 500 MG/ML
2 SYRINGE (ML) INJECTION ONCE
Refills: 0 | Status: COMPLETED | OUTPATIENT
Start: 2025-05-04 | End: 2025-05-04

## 2025-05-04 RX ADMIN — LACTULOSE 20 GRAM(S): 10 SOLUTION ORAL at 06:55

## 2025-05-04 RX ADMIN — NYSTATIN 1 APPLICATION(S): 100000 CREAM TOPICAL at 17:09

## 2025-05-04 RX ADMIN — INSULIN LISPRO 2: 100 INJECTION, SOLUTION INTRAVENOUS; SUBCUTANEOUS at 17:08

## 2025-05-04 RX ADMIN — Medication 2 TABLET(S): at 22:53

## 2025-05-04 RX ADMIN — Medication 1 APPLICATION(S): at 17:08

## 2025-05-04 RX ADMIN — Medication 1 APPLICATION(S): at 06:57

## 2025-05-04 RX ADMIN — Medication 25 GRAM(S): at 18:10

## 2025-05-04 RX ADMIN — LEVETIRACETAM 250 MILLIGRAM(S): 10 INJECTION, SOLUTION INTRAVENOUS at 17:08

## 2025-05-04 RX ADMIN — NYSTATIN 1 APPLICATION(S): 100000 CREAM TOPICAL at 06:56

## 2025-05-04 RX ADMIN — FOLIC ACID 1 MILLIGRAM(S): 1 TABLET ORAL at 11:45

## 2025-05-04 RX ADMIN — LIDOCAINE HYDROCHLORIDE 1 PATCH: 20 JELLY TOPICAL at 11:46

## 2025-05-04 RX ADMIN — LACTULOSE 20 GRAM(S): 10 SOLUTION ORAL at 22:53

## 2025-05-04 RX ADMIN — INSULIN LISPRO 2: 100 INJECTION, SOLUTION INTRAVENOUS; SUBCUTANEOUS at 12:29

## 2025-05-04 RX ADMIN — INSULIN LISPRO 2: 100 INJECTION, SOLUTION INTRAVENOUS; SUBCUTANEOUS at 06:53

## 2025-05-04 RX ADMIN — MIDODRINE HYDROCHLORIDE 5 MILLIGRAM(S): 5 TABLET ORAL at 06:54

## 2025-05-04 RX ADMIN — Medication 40 MILLIGRAM(S): at 11:45

## 2025-05-04 RX ADMIN — MIDODRINE HYDROCHLORIDE 10 MILLIGRAM(S): 5 TABLET ORAL at 13:08

## 2025-05-04 RX ADMIN — LIDOCAINE HYDROCHLORIDE 1 PATCH: 20 JELLY TOPICAL at 19:21

## 2025-05-04 RX ADMIN — CEFTRIAXONE 100 MILLIGRAM(S): 500 INJECTION, POWDER, FOR SOLUTION INTRAMUSCULAR; INTRAVENOUS at 15:24

## 2025-05-04 RX ADMIN — LIDOCAINE HYDROCHLORIDE 1 PATCH: 20 JELLY TOPICAL at 00:35

## 2025-05-04 RX ADMIN — LEVETIRACETAM 250 MILLIGRAM(S): 10 INJECTION, SOLUTION INTRAVENOUS at 06:54

## 2025-05-04 RX ADMIN — Medication 1 APPLICATION(S): at 06:55

## 2025-05-04 RX ADMIN — INSULIN LISPRO 2: 100 INJECTION, SOLUTION INTRAVENOUS; SUBCUTANEOUS at 00:39

## 2025-05-04 NOTE — CHART NOTE - NSCHARTNOTEFT_GEN_A_CORE
LABS:  Mg 1.3  replete mg  rpt mg level in am  fup labs  monitor vss  monitor pt  awaits call back from attending

## 2025-05-04 NOTE — PROGRESS NOTE ADULT - SUBJECTIVE AND OBJECTIVE BOX
CHIEF COMPLAINT:    Patient is a 80y old  Male who presents with a chief complaint of acute mentation changes (03 May 2025 13:10)      INTERVAL HPI/OVERNIGHT EVENTS:    Patient seen and examined at bedside. Pt received one unit of PRBC transfusion yesterday. Low grade fever noted.     Medications:  Standing  cefTRIAXone   IVPB 1000 milliGRAM(s) IV Intermittent once  cefTRIAXone   IVPB      chlorhexidine 2% Cloths 1 Application(s) Topical <User Schedule>  dextrose 5%. 1000 milliLiter(s) IV Continuous <Continuous>  dextrose 5%. 1000 milliLiter(s) IV Continuous <Continuous>  dextrose 50% Injectable 12.5 Gram(s) IV Push once  dextrose 50% Injectable 25 Gram(s) IV Push once  dextrose 50% Injectable 25 Gram(s) IV Push once  folic acid 1 milliGRAM(s) Oral daily  glucagon  Injectable 1 milliGRAM(s) IntraMuscular once  insulin lispro (ADMELOG) corrective regimen sliding scale   SubCutaneous every 6 hours  lactulose Syrup 20 Gram(s) Oral every 8 hours  levETIRAcetam   Injectable 250 milliGRAM(s) IV Push <User Schedule>  lidocaine   4% Patch 1 Patch Transdermal daily  midodrine 10 milliGRAM(s) Oral every 8 hours  nystatin Powder 1 Application(s) Topical two times a day  pantoprazole  Injectable 40 milliGRAM(s) IV Push daily  rifAXIMin 550 milliGRAM(s) Oral two times a day  senna 2 Tablet(s) Oral at bedtime  silver sulfADIAZINE 1% Cream 1 Application(s) Topical two times a day    PRN Meds  acetaminophen     Tablet .. 650 milliGRAM(s) Oral every 6 hours PRN  aluminum hydroxide/magnesium hydroxide/simethicone Suspension 30 milliLiter(s) Oral every 4 hours PRN  dextrose Oral Gel 15 Gram(s) Oral once PRN  melatonin 3 milliGRAM(s) Oral at bedtime PRN  ondansetron Injectable 4 milliGRAM(s) IV Push every 8 hours PRN  oxyCODONE    IR 5 milliGRAM(s) Oral every 6 hours PRN        Vital Signs:    T(F): 99.8 (05-04-25 @ 14:26), Max: 100 (05-03-25 @ 20:30)  HR: 96 (05-04-25 @ 14:26) (87 - 96)  BP: 101/65 (05-04-25 @ 14:26) (98/54 - 101/65)  RR: 18 (05-04-25 @ 14:26) (18 - 18)  SpO2: 100% (05-04-25 @ 14:26) (94% - 100%)  I&O's Summary    03 May 2025 07:01  -  04 May 2025 07:00  --------------------------------------------------------  IN: 0 mL / OUT: 2 mL / NET: -2 mL      Daily     Daily   CAPILLARY BLOOD GLUCOSE      POCT Blood Glucose.: 171 mg/dL (04 May 2025 12:18)  POCT Blood Glucose.: 176 mg/dL (04 May 2025 08:00)  POCT Blood Glucose.: 173 mg/dL (04 May 2025 06:22)  POCT Blood Glucose.: 199 mg/dL (04 May 2025 00:05)  POCT Blood Glucose.: 213 mg/dL (03 May 2025 22:11)  POCT Blood Glucose.: 190 mg/dL (03 May 2025 16:18)    PHYSICAL EXAM:  GENERAL:  NAD, sick appearing  SKIN: No rashes or lesions  HEENT: Atraumatic. Normocephalic. Anicteric  NECK:  No JVD.   PULMONARY: Clear to ausculation bilaterally. No wheezing. No rales  CVS: Normal S1, S2. Regular rate and rhythm. No murmurs.  ABDOMEN/GI: Soft, distended, non tender  EXTREMITIES: diffuse edema, anasarca    LABS:                        6.7    5.76  )-----------( 67       ( 03 May 2025 06:30 )             21.4     05-03    146  |  113[H]  |  20  ----------------------------<  191[H]  4.0   |  23  |  1.0    Ca    7.5[L]      03 May 2025 06:30  Mg     1.5     05-03    TPro  4.2[L]  /  Alb  2.4[L]  /  TBili  1.2  /  DBili  x   /  AST  24  /  ALT  9   /  AlkPhos  113  05-03    PT/INR - ( 04 May 2025 14:15 )   PT: 19.10 sec;   INR: 1.60 ratio      RADIOLOGY & ADDITIONAL TESTS:  Imaging or report Personally Reviewed:  [ ] YES  [ ] NO -->no new images

## 2025-05-05 LAB
ALBUMIN SERPL ELPH-MCNC: 2 G/DL — LOW (ref 3.5–5.2)
ALP SERPL-CCNC: 126 U/L — HIGH (ref 30–115)
ALT FLD-CCNC: 11 U/L — SIGNIFICANT CHANGE UP (ref 0–41)
ANION GAP SERPL CALC-SCNC: 11 MMOL/L — SIGNIFICANT CHANGE UP (ref 7–14)
APPEARANCE UR: CLEAR — SIGNIFICANT CHANGE UP
AST SERPL-CCNC: 29 U/L — SIGNIFICANT CHANGE UP (ref 0–41)
BACTERIA # UR AUTO: ABNORMAL /HPF
BILIRUB SERPL-MCNC: 1.4 MG/DL — HIGH (ref 0.2–1.2)
BILIRUB UR-MCNC: ABNORMAL
BUN SERPL-MCNC: 24 MG/DL — HIGH (ref 10–20)
CALCIUM SERPL-MCNC: 7.4 MG/DL — LOW (ref 8.4–10.5)
CHLORIDE SERPL-SCNC: 114 MMOL/L — HIGH (ref 98–110)
CO2 SERPL-SCNC: 21 MMOL/L — SIGNIFICANT CHANGE UP (ref 17–32)
COLOR SPEC: SIGNIFICANT CHANGE UP
CREAT SERPL-MCNC: 1 MG/DL — SIGNIFICANT CHANGE UP (ref 0.7–1.5)
DIFF PNL FLD: ABNORMAL
EGFR: 77 ML/MIN/1.73M2 — SIGNIFICANT CHANGE UP
EGFR: 77 ML/MIN/1.73M2 — SIGNIFICANT CHANGE UP
EPI CELLS # UR: PRESENT
GLUCOSE SERPL-MCNC: 198 MG/DL — HIGH (ref 70–99)
GLUCOSE UR QL: NEGATIVE MG/DL — SIGNIFICANT CHANGE UP
HCT VFR BLD CALC: 24.2 % — LOW (ref 42–52)
HGB BLD-MCNC: 7.7 G/DL — LOW (ref 14–18)
KETONES UR-MCNC: NEGATIVE MG/DL — SIGNIFICANT CHANGE UP
LEUKOCYTE ESTERASE UR-ACNC: ABNORMAL
MAGNESIUM SERPL-MCNC: 1.6 MG/DL — LOW (ref 1.8–2.4)
MCHC RBC-ENTMCNC: 30.7 PG — SIGNIFICANT CHANGE UP (ref 27–31)
MCHC RBC-ENTMCNC: 31.8 G/DL — LOW (ref 32–37)
MCV RBC AUTO: 96.4 FL — HIGH (ref 80–94)
MUCOUS THREADS # UR AUTO: PRESENT
NITRITE UR-MCNC: NEGATIVE — SIGNIFICANT CHANGE UP
NRBC BLD AUTO-RTO: 0 /100 WBCS — SIGNIFICANT CHANGE UP (ref 0–0)
PH UR: 5.5 — SIGNIFICANT CHANGE UP (ref 5–8)
PLATELET # BLD AUTO: 76 K/UL — LOW (ref 130–400)
PMV BLD: 10.4 FL — SIGNIFICANT CHANGE UP (ref 7.4–10.4)
POTASSIUM SERPL-MCNC: 3.5 MMOL/L — SIGNIFICANT CHANGE UP (ref 3.5–5)
POTASSIUM SERPL-SCNC: 3.5 MMOL/L — SIGNIFICANT CHANGE UP (ref 3.5–5)
PROT SERPL-MCNC: 4.1 G/DL — LOW (ref 6–8)
PROT UR-MCNC: 30 MG/DL
RBC # BLD: 2.51 M/UL — LOW (ref 4.7–6.1)
RBC # FLD: 24.5 % — HIGH (ref 11.5–14.5)
RBC CASTS # UR COMP ASSIST: 10 /HPF — HIGH (ref 0–4)
SODIUM SERPL-SCNC: 146 MMOL/L — SIGNIFICANT CHANGE UP (ref 135–146)
SP GR SPEC: 1.02 — SIGNIFICANT CHANGE UP (ref 1–1.03)
SQUAMOUS # UR AUTO: 2 /HPF — SIGNIFICANT CHANGE UP (ref 0–5)
UROBILINOGEN FLD QL: 0.2 MG/DL — SIGNIFICANT CHANGE UP (ref 0.2–1)
WBC # BLD: 6.96 K/UL — SIGNIFICANT CHANGE UP (ref 4.8–10.8)
WBC # FLD AUTO: 6.96 K/UL — SIGNIFICANT CHANGE UP (ref 4.8–10.8)
WBC UR QL: 30 /HPF — HIGH (ref 0–5)
YEAST-LIKE CELLS: PRESENT

## 2025-05-05 PROCEDURE — 74018 RADEX ABDOMEN 1 VIEW: CPT | Mod: 26

## 2025-05-05 PROCEDURE — 99233 SBSQ HOSP IP/OBS HIGH 50: CPT | Mod: FS

## 2025-05-05 RX ADMIN — OXYCODONE HYDROCHLORIDE 5 MILLIGRAM(S): 30 TABLET ORAL at 12:28

## 2025-05-05 RX ADMIN — NYSTATIN 1 APPLICATION(S): 100000 CREAM TOPICAL at 18:17

## 2025-05-05 RX ADMIN — Medication 1 APPLICATION(S): at 06:52

## 2025-05-05 RX ADMIN — MIDODRINE HYDROCHLORIDE 10 MILLIGRAM(S): 5 TABLET ORAL at 06:51

## 2025-05-05 RX ADMIN — FOLIC ACID 1 MILLIGRAM(S): 1 TABLET ORAL at 13:25

## 2025-05-05 RX ADMIN — Medication 40 MILLIGRAM(S): at 12:29

## 2025-05-05 RX ADMIN — LEVETIRACETAM 250 MILLIGRAM(S): 10 INJECTION, SOLUTION INTRAVENOUS at 06:51

## 2025-05-05 RX ADMIN — MIDODRINE HYDROCHLORIDE 10 MILLIGRAM(S): 5 TABLET ORAL at 13:24

## 2025-05-05 RX ADMIN — INSULIN LISPRO 4: 100 INJECTION, SOLUTION INTRAVENOUS; SUBCUTANEOUS at 12:28

## 2025-05-05 RX ADMIN — INSULIN LISPRO 4: 100 INJECTION, SOLUTION INTRAVENOUS; SUBCUTANEOUS at 07:00

## 2025-05-05 RX ADMIN — MIDODRINE HYDROCHLORIDE 10 MILLIGRAM(S): 5 TABLET ORAL at 21:18

## 2025-05-05 RX ADMIN — OXYCODONE HYDROCHLORIDE 5 MILLIGRAM(S): 30 TABLET ORAL at 12:58

## 2025-05-05 RX ADMIN — CEFTRIAXONE 100 MILLIGRAM(S): 500 INJECTION, POWDER, FOR SOLUTION INTRAMUSCULAR; INTRAVENOUS at 14:04

## 2025-05-05 RX ADMIN — LEVETIRACETAM 250 MILLIGRAM(S): 10 INJECTION, SOLUTION INTRAVENOUS at 18:13

## 2025-05-05 RX ADMIN — NYSTATIN 1 APPLICATION(S): 100000 CREAM TOPICAL at 06:52

## 2025-05-05 RX ADMIN — LACTULOSE 20 GRAM(S): 10 SOLUTION ORAL at 21:18

## 2025-05-05 RX ADMIN — LACTULOSE 20 GRAM(S): 10 SOLUTION ORAL at 13:24

## 2025-05-05 RX ADMIN — Medication 2 TABLET(S): at 21:18

## 2025-05-05 RX ADMIN — Medication 1 APPLICATION(S): at 18:15

## 2025-05-05 NOTE — PROGRESS NOTE ADULT - SUBJECTIVE AND OBJECTIVE BOX
INFECTIOUS DISEASE FOLLOW UP NOTE:    Interval History/ROS: Patient is a 80y old  Male who presents with a chief complaint of acute mentation changes (04 May 2025 14:46)    Overnight events: Keeps yelling having buttock pain, and ?abdominal pain and burning sensation in urination    REVIEW OF SYSTEMS:  Limited due to cognitive impairment  But keeps yelling about pain in the buttock, questionable dysuria and abdominal pain    Prior hospital charts reviewed [Yes]  Primary team notes reviewed [Yes]  Other consultant notes reviewed [Yes]    Allergies:  No Known Allergies      ANTIMICROBIALS:   cefTRIAXone   IVPB 1000 every 24 hours  cefTRIAXone   IVPB    rifAXIMin 550 two times a day      OTHER MEDS: MEDICATIONS  (STANDING):  acetaminophen     Tablet .. 650 every 6 hours PRN  aluminum hydroxide/magnesium hydroxide/simethicone Suspension 30 every 4 hours PRN  dextrose 50% Injectable 25 once  dextrose 50% Injectable 12.5 once  dextrose 50% Injectable 25 once  dextrose Oral Gel 15 once PRN  glucagon  Injectable 1 once  insulin lispro (ADMELOG) corrective regimen sliding scale  every 6 hours  lactulose Syrup 20 every 8 hours  levETIRAcetam   Injectable 250 <User Schedule>  melatonin 3 at bedtime PRN  midodrine 10 every 8 hours  ondansetron Injectable 4 every 8 hours PRN  oxyCODONE    IR 5 every 6 hours PRN  pantoprazole  Injectable 40 daily  senna 2 at bedtime      Vital Signs Last 24 Hrs  T(F): 96.7 (05-05-25 @ 14:34), Max: 100 (05-03-25 @ 20:30)    Vital Signs Last 24 Hrs  HR: 98 (05-05-25 @ 14:34) (87 - 98)  BP: 129/73 (05-05-25 @ 14:34) (110/55 - 147/70)  RR: 18 (05-05-25 @ 14:34)  SpO2: 98% (05-05-25 @ 14:34) (98% - 98%)  Wt(kg): --    EXAM:  GENERAL: NAD, lying in bed  HEAD: No head lesions  EYES: Conjunctiva pink and cornea white  EAR, NOSE, MOUTH, THROAT: Normal external ears and nose, no discharges; moist mucous membranes; + NC  NECK: Supple, nontender to palpation; no JVD  RESPIRATORY: Bibasilar crackles  CARDIOVASCULAR: S1 S2  GASTROINTESTINAL: Soft, no significant distention; normoactive bowel sounds  GENITOURINARY: no loyola catheter, no CVA tenderness  EXTREMITIES: No clubbing, cyanosis, + diffuse anasarca  NERVOUS SYSTEM: Awake and alert, not oriented, able to answer simple questions  MUSCULOSKELETAL: No joint erythema, swelling  SKIN: Sacral deep tissue injury, no superficial thrombophlebitis  PSYCH: agitated    Labs:                        7.7    6.96  )-----------( 76       ( 05 May 2025 06:54 )             24.2     05-05    146  |  114[H]  |  24[H]  ----------------------------<  198[H]  3.5   |  21  |  1.0    Ca    7.4[L]      05 May 2025 06:54  Mg     1.6     05-05    TPro  4.1[L]  /  Alb  2.0[L]  /  TBili  1.4[H]  /  DBili  x   /  AST  29  /  ALT  11  /  AlkPhos  126[H]  05-05      WBC Trend:  WBC Count: 6.96 (05-05-25 @ 06:54)  WBC Count: 6.25 (05-04-25 @ 14:15)  WBC Count: 5.76 (05-03-25 @ 06:30)  WBC Count: 8.08 (05-02-25 @ 08:18)      Creatine Trend:  Creatinine: 1.0 (05-05)  Creatinine: 0.9 (05-04)  Creatinine: 1.0 (05-03)  Creatinine: 1.0 (05-02)      Liver Biochemical Testing Trend:  Alanine Aminotransferase (ALT/SGPT): 11 (05-05)  Alanine Aminotransferase (ALT/SGPT): 11 (05-04)  Alanine Aminotransferase (ALT/SGPT): 9 (05-03)  Alanine Aminotransferase (ALT/SGPT): 10 (05-02)  Alanine Aminotransferase (ALT/SGPT): 12 (05-01)  Aspartate Aminotransferase (AST/SGOT): 29 (05-05-25 @ 06:54)  Aspartate Aminotransferase (AST/SGOT): 32 (05-04-25 @ 14:15)  Aspartate Aminotransferase (AST/SGOT): 24 (05-03-25 @ 06:30)  Aspartate Aminotransferase (AST/SGOT): 28 (05-02-25 @ 08:18)  Aspartate Aminotransferase (AST/SGOT): 40 (05-01-25 @ 11:30)  Bilirubin Total: 1.4 (05-05)  Bilirubin Total: 2.2 (05-04)  Bilirubin Total: 1.2 (05-03)  Bilirubin Total: 1.0 (05-02)  Bilirubin Total: 1.0 (05-01)      Trend LDH  04-24-25 @ 05:53  275[H]      Urinalysis Basic - ( 05 May 2025 06:54 )    Color: x / Appearance: x / SG: x / pH: x  Gluc: 198 mg/dL / Ketone: x  / Bili: x / Urobili: x   Blood: x / Protein: x / Nitrite: x   Leuk Esterase: x / RBC: x / WBC x   Sq Epi: x / Non Sq Epi: x / Bacteria: x        MICROBIOLOGY:    MRSA PCR Result.: Negative (04-25-25 @ 09:00)      Culture - Blood (collected 17 Apr 2025 18:30)  Source: Blood Blood  Final Report:    No growth at 5 days    Culture - Blood (collected 16 Apr 2025 15:15)  Source: Blood None  Final Report:    No growth at 5 days    Urinalysis with Rflx Culture (collected 13 Apr 2025 21:20)    Culture - Blood (collected 13 Apr 2025 21:20)  Source: Blood Blood-Peripheral  Final Report:    Growth in anaerobic bottle: Bacteroides fragilis    "Susceptibilities not performed"    Culture - Urine (collected 13 Apr 2025 21:20)  Source: Catheterized None  Final Report:    10,000 - 49,000 CFU/mL Proteus mirabilis  Organism: Proteus mirabilis  Organism: Proteus mirabilis    Sensitivities:      -  Levofloxacin: S <=0.5      -  Tobramycin: S <=2      -  Nitrofurantoin: R >64 Should not be used to treat pyelonephritis      -  Aztreonam: S <=4      -  Gentamicin: S <=2      -  Cefazolin: S <=2 For uncomplicated UTI with K. pneumoniae, E. coli, or P. mirablis: LOUIS <=16 is sensitive and LOUIS >=32 is resistant. This also predicts results for oral agents cefaclor, cefdinir, cefpodoxime, cefprozil, cefuroxime axetil, cephalexin and locarbef for uncomplicated UTI. Note that some isolates may be susceptible to these agents while testing resistant to cefazolin.      -  Cefepime: S <=2      -  Piperacillin/Tazobactam: S <=8      -  Ciprofloxacin: S <=0.25      -  Ceftriaxone: S <=1      -  Ampicillin: S <=8 These ampicillin results predict results for amoxicillin      Method Type: LOUIS      -  Meropenem: S <=1      -  Ampicillin/Sulbactam: S <=4/2      -  Cefoxitin: S <=8      -  Cefuroxime: S <=4      -  Amoxicillin/Clavulanic Acid: S <=8/4      -  Trimethoprim/Sulfamethoxazole: S <=0.5/9.5      -  Ertapenem: S <=0.5    Culture - Blood (collected 13 Apr 2025 21:20)  Source: Blood Blood-Peripheral  Final Report:    Growth in anaerobic bottle: Bacteroides fragilis "Susceptibilities not    performed"    Direct identification is available within approximately 3-5    hours either by Blood Panel Multiplexed PCR or Direct    MALDI-TOF. Details: [email or web address]  Organism: Blood Culture PCR  Organism: Blood Culture PCR    Sensitivities:      -  Bacteroides fragilis: Detec      Method Type: PCR    Culture - Blood (collected 30 Jan 2025 07:12)  Source: .Blood BLOOD  Final Report:    No growth at 5 days    Culture - Blood (collected 30 Jan 2025 07:12)  Source: .Blood BLOOD  Final Report:    No growth at 5 days    Culture - Blood (collected 29 Jan 2025 07:35)  Source: .Blood BLOOD  Final Report:    No growth at 5 days    Culture - Blood (collected 27 Jan 2025 19:13)  Source: .Blood BLOOD  Final Report:    No growth at 5 days      RADIOLOGY:  imaging below personally reviewed    < from: Xray Chest 1 View- PORTABLE-Routine (Xray Chest 1 View- PORTABLE-Routine in AM.) (04.30.25 @ 07:35) >  IMPRESSION:    Increased bilateral opacities/effusions.    < end of copied text >

## 2025-05-05 NOTE — PROGRESS NOTE ADULT - ASSESSMENT
80 years old male history of hypertension, diabetes, subdural hematoma on Keppra, hx mechanical falls, recent admission (January 2025) for osteomyelitis of L4-5 w completion of abx.  BIBA from home status post change of mental status.     ID is consulted for bacteremia  Afebrile  WBC 7.55 < 7.45  On room air  BCx 4/14 Bacteroides fragilis  BCx 4/16 NGTD  BCx 4/17 NGTD  UA WBC 5, UCx low count P. mirabilis    CT A/P 4/13  1.  Erosive endplate changes centered at the L4-5 level, consistent with   patient's history ofrecent lumbar spine osteomyelitis.  2.  Liver cirrhosis with portal hypertension. Mild-to-moderate ascites.   Anasarca. Hepatic dome subcentimeter hypodense 1.3 cm hypodensity, not   fully characterized . Outpatient MR abdomen with IV contrast recommended.  3.  Chronic pancreatitis, with limited evaluation for acute inflammation   due to background fluid. Correlation with pancreatic enzymes recommended.    Antibiotics:  Vancomycin 4/13  Ampicillin 4/14  Ceftriaxone 4/15 - 4/21  Flagyl 4/15 ->      IMPRESSION:  Acute metabolic encephalopathy  Bacteroides bacteremia, completed treatment  Acute blood loss anemia  Possible SBP?  Liver cirrhosis  Lower GI bleed  Hx L4-L5 osteomyelitis  Liver lesion  Immunosuppression / Immunosenescence secondary to multiple comorbidities which could result in poor clinical outcome    RECOMMENDATIONS:  - Restarted on IV ceftriaxone 1g q24hrs due to low grade fever  - Obtain 2 sets of repeat BCx  - UA and UCx after straight cath today  - Local wound care for sacral DTI  - GI follow up  - Offloading and frequent position changes, aspiration precaution  - Trend WBC, fever curve, transaminases, creatinine daily  - Please inform ID of any patient clinical change or any new pertinent laboratory or radiographic data      Nora Beck D.O.  Attending Physician  Division of Infectious Diseases  [facility]  Please contact me via [employer] Teams

## 2025-05-05 NOTE — PROGRESS NOTE ADULT - ASSESSMENT
81 yo male with h/o lumbar spine osteomyelitis, HTN, DM, subdural hematoma presented with altered mental status, found to have bacteremia and cirrhosis.     Fever  -low grade fever  -given pt's poor vascular status, anasarca, hard stick, obtain blood culture was extremely difficult  -will start abx for empiric coverage, ID consulted for further management    AMS  -2/2 underlying disease, metabolic encephalopathy  -lethargic    Decompensated cirrhosis       portal hypertension      Coagulopathy      Hypoalbuminemia       hepatic encephalopathy  -on rifaximin, lactulose  -s/p albumin given diffuse edema on 4/30, 5/2    Anemia  -likely multifactorial: sepsis, cirrhosis, poor nutrition, GI bleeding  -s/p EGD on 4/16 which showed 5mm ulcer in duodenal bulb post endo clip, repeat EGD on 4/29 showed no bleeding noted  -will monitor for GI bleeding  -Hb<6.7, will transfuse one unit of PRBC     Bacteremia  -blood culture positive for bacteroides   -s/p ceftriaxone and flagyl  -repeat blood culture negative    Deconditioning  -PT  -OOB    Sacral pressure ulcer  -local wound care    Handoff: clinical improvement, palliative consult

## 2025-05-05 NOTE — PROGRESS NOTE ADULT - SUBJECTIVE AND OBJECTIVE BOX
CHIEF COMPLAINT:    Patient is a 80y old  Male who presents with a chief complaint of acute mentation changes (05 May 2025 15:34)      INTERVAL HPI/OVERNIGHT EVENTS:    Patient seen and examined at bedside. Pt has low grade fever, sick appearing, lethargic.    Medications:  Standing  cefTRIAXone   IVPB 1000 milliGRAM(s) IV Intermittent every 24 hours  cefTRIAXone   IVPB      chlorhexidine 2% Cloths 1 Application(s) Topical <User Schedule>  dextrose 5%. 1000 milliLiter(s) IV Continuous <Continuous>  dextrose 5%. 1000 milliLiter(s) IV Continuous <Continuous>  dextrose 50% Injectable 25 Gram(s) IV Push once  dextrose 50% Injectable 12.5 Gram(s) IV Push once  dextrose 50% Injectable 25 Gram(s) IV Push once  folic acid 1 milliGRAM(s) Oral daily  glucagon  Injectable 1 milliGRAM(s) IntraMuscular once  insulin lispro (ADMELOG) corrective regimen sliding scale   SubCutaneous every 6 hours  lactulose Syrup 20 Gram(s) Oral every 8 hours  levETIRAcetam   Injectable 250 milliGRAM(s) IV Push <User Schedule>  lidocaine   4% Patch 1 Patch Transdermal daily  midodrine 10 milliGRAM(s) Oral every 8 hours  nystatin Powder 1 Application(s) Topical two times a day  pantoprazole  Injectable 40 milliGRAM(s) IV Push daily  rifAXIMin 550 milliGRAM(s) Oral two times a day  senna 2 Tablet(s) Oral at bedtime  silver sulfADIAZINE 1% Cream 1 Application(s) Topical two times a day    PRN Meds  acetaminophen     Tablet .. 650 milliGRAM(s) Oral every 6 hours PRN  aluminum hydroxide/magnesium hydroxide/simethicone Suspension 30 milliLiter(s) Oral every 4 hours PRN  dextrose Oral Gel 15 Gram(s) Oral once PRN  melatonin 3 milliGRAM(s) Oral at bedtime PRN  ondansetron Injectable 4 milliGRAM(s) IV Push every 8 hours PRN  oxyCODONE    IR 5 milliGRAM(s) Oral every 6 hours PRN        Vital Signs:    T(F): 96.7 (05-05-25 @ 14:34), Max: 99.8 (05-05-25 @ 03:15)  HR: 98 (05-05-25 @ 14:34) (87 - 98)  BP: 129/73 (05-05-25 @ 14:34) (110/55 - 147/70)  RR: 18 (05-05-25 @ 14:34) (18 - 18)  SpO2: 98% (05-05-25 @ 14:34) (98% - 98%)  I&O's Summary    05 May 2025 07:01  -  05 May 2025 16:23  --------------------------------------------------------  IN: 0 mL / OUT: 175 mL / NET: -175 mL      Daily     Daily   CAPILLARY BLOOD GLUCOSE      POCT Blood Glucose.: 205 mg/dL (05 May 2025 11:48)  POCT Blood Glucose.: 220 mg/dL (05 May 2025 07:40)  POCT Blood Glucose.: 210 mg/dL (05 May 2025 06:04)  POCT Blood Glucose.: 193 mg/dL (04 May 2025 21:34)  POCT Blood Glucose.: 194 mg/dL (04 May 2025 16:40)    PHYSICAL EXAM:  GENERAL:  NAD, sick appearing  SKIN: No rashes or lesions  HEENT: Atraumatic. Normocephalic. Anicteric  NECK:  No JVD.   PULMONARY: Clear to ausculation bilaterally. No wheezing. No rales  CVS: Normal S1, S2. Regular rate and rhythm. No murmurs.  ABDOMEN/GI: Soft, distended, non tender  EXTREMITIES: diffuse edema, anasarca    LABS:                        7.7    6.96  )-----------( 76       ( 05 May 2025 06:54 )             24.2     05-05    146  |  114[H]  |  24[H]  ----------------------------<  198[H]  3.5   |  21  |  1.0    Ca    7.4[L]      05 May 2025 06:54  Mg     1.6     05-05    TPro  4.1[L]  /  Alb  2.0[L]  /  TBili  1.4[H]  /  DBili  x   /  AST  29  /  ALT  11  /  AlkPhos  126[H]  05-05    PT/INR - ( 04 May 2025 14:15 )   PT: 19.10 sec;   INR: 1.60 ratio        RADIOLOGY & ADDITIONAL TESTS:  Imaging or report Personally Reviewed:  [ ] YES  [ ] NO -->no new images

## 2025-05-06 DIAGNOSIS — K74.60 UNSPECIFIED CIRRHOSIS OF LIVER: ICD-10-CM

## 2025-05-06 DIAGNOSIS — G93.41 METABOLIC ENCEPHALOPATHY: ICD-10-CM

## 2025-05-06 PROCEDURE — 99233 SBSQ HOSP IP/OBS HIGH 50: CPT

## 2025-05-06 PROCEDURE — 99233 SBSQ HOSP IP/OBS HIGH 50: CPT | Mod: FS

## 2025-05-06 RX ADMIN — LIDOCAINE HYDROCHLORIDE 1 PATCH: 20 JELLY TOPICAL at 12:08

## 2025-05-06 RX ADMIN — NYSTATIN 1 APPLICATION(S): 100000 CREAM TOPICAL at 05:14

## 2025-05-06 RX ADMIN — LEVETIRACETAM 250 MILLIGRAM(S): 10 INJECTION, SOLUTION INTRAVENOUS at 18:15

## 2025-05-06 RX ADMIN — Medication 40 MILLIGRAM(S): at 12:08

## 2025-05-06 RX ADMIN — MIDODRINE HYDROCHLORIDE 10 MILLIGRAM(S): 5 TABLET ORAL at 14:07

## 2025-05-06 RX ADMIN — LACTULOSE 20 GRAM(S): 10 SOLUTION ORAL at 14:07

## 2025-05-06 RX ADMIN — INSULIN LISPRO 4: 100 INJECTION, SOLUTION INTRAVENOUS; SUBCUTANEOUS at 06:23

## 2025-05-06 RX ADMIN — LEVETIRACETAM 250 MILLIGRAM(S): 10 INJECTION, SOLUTION INTRAVENOUS at 05:12

## 2025-05-06 RX ADMIN — Medication 50 MILLIEQUIVALENT(S): at 14:07

## 2025-05-06 RX ADMIN — LACTULOSE 20 GRAM(S): 10 SOLUTION ORAL at 05:13

## 2025-05-06 RX ADMIN — INSULIN LISPRO 4: 100 INJECTION, SOLUTION INTRAVENOUS; SUBCUTANEOUS at 12:09

## 2025-05-06 RX ADMIN — OXYCODONE HYDROCHLORIDE 5 MILLIGRAM(S): 30 TABLET ORAL at 14:07

## 2025-05-06 RX ADMIN — NYSTATIN 1 APPLICATION(S): 100000 CREAM TOPICAL at 18:14

## 2025-05-06 RX ADMIN — Medication 1 APPLICATION(S): at 05:12

## 2025-05-06 RX ADMIN — INSULIN LISPRO 4: 100 INJECTION, SOLUTION INTRAVENOUS; SUBCUTANEOUS at 17:05

## 2025-05-06 RX ADMIN — CEFTRIAXONE 100 MILLIGRAM(S): 500 INJECTION, POWDER, FOR SOLUTION INTRAMUSCULAR; INTRAVENOUS at 14:07

## 2025-05-06 RX ADMIN — LIDOCAINE HYDROCHLORIDE 1 PATCH: 20 JELLY TOPICAL at 18:54

## 2025-05-06 RX ADMIN — Medication 1 APPLICATION(S): at 18:13

## 2025-05-06 RX ADMIN — Medication 1 APPLICATION(S): at 05:13

## 2025-05-06 RX ADMIN — FOLIC ACID 1 MILLIGRAM(S): 1 TABLET ORAL at 12:08

## 2025-05-06 NOTE — PROGRESS NOTE ADULT - ASSESSMENT
79 yo male with h/o lumbar spine osteomyelitis, HTN, DM, subdural hematoma presented with altered mental status, found to have bacteremia and cirrhosis.     Fever  -low grade fever, improving  -given pt's poor vascular status, anasarca, hard stick, obtain blood culture was extremely difficult  -restarted abx for empiric coverage, ID consulted for further management    AMS  -2/2 underlying disease, metabolic encephalopathy  -lethargic    Decompensated cirrhosis       portal hypertension      Coagulopathy      Hypoalbuminemia       hepatic encephalopathy  -on rifaximin, lactulose  -s/p albumin given diffuse edema on 4/30, 5/2    Anemia  -likely multifactorial: sepsis, cirrhosis, poor nutrition, GI bleeding  -s/p EGD on 4/16 which showed 5mm ulcer in duodenal bulb post endo clip, repeat EGD on 4/29 showed no bleeding noted  -will monitor for GI bleeding  -Hb<6.7, transfused one unit of PRBC on 5/3    Bacteremia  -blood culture positive for bacteroides on presentation  -s/p ceftriaxone and flagyl  -repeat blood culture negative, pending new blood culture after episode of fever    Deconditioning  -PT  -OOB    Sacral pressure ulcer  -local wound care    Overall very poor prognosis    SVT PPX: SCD due to coagulopathy    Handoff: clinical improvement, palliative consult 79 yo male with h/o lumbar spine osteomyelitis, HTN, DM, subdural hematoma presented with altered mental status, found to have bacteremia and cirrhosis.     Fever  -low grade fever, improving  -given pt's poor vascular status, anasarca, hard stick, obtain blood culture was extremely difficult  -restarted abx for empiric coverage, ID consulted for further management    AMS  -2/2 underlying disease, metabolic encephalopathy  -lethargic    Decompensated cirrhosis       portal hypertension      Coagulopathy      Hypoalbuminemia       hepatic encephalopathy  -on rifaximin, lactulose  -s/p albumin given diffuse edema on 4/30, 5/2    Anemia  -likely multifactorial: sepsis, cirrhosis, poor nutrition, GI bleeding  -s/p EGD on 4/16 which showed 5mm ulcer in duodenal bulb post endo clip, repeat EGD on 4/29 showed no bleeding noted  -will monitor for GI bleeding  -Hb<6.7, transfused one unit of PRBC on 5/3    Bacteremia  -blood culture positive for bacteroides on presentation  -s/p ceftriaxone and flagyl  -repeat blood culture negative, pending new blood culture after episode of fever    Afib  -found during this admission  -rate controlled, not candidate for AC given anemia, coagulopathy    Deconditioning  -PT  -OOB    Sacral pressure ulcer  -local wound care    Overall very poor prognosis    SVT PPX: SCD due to coagulopathy    Handoff: clinical improvement, palliative consult

## 2025-05-06 NOTE — PROGRESS NOTE ADULT - SUBJECTIVE AND OBJECTIVE BOX
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 fragilis bacteremia, and acute blood loss anemia. Patient is on IV morphine for pain. Patient is on lactulose for encephalopathy, IV abx, and frequent H/H monitoring, requiring pRBC. Palliative consulted for GOC. Patient is full code.    INTERVAL EVENTS  4/24: patient without acute distress  4/25: no acute distress, on octreotide gtt, NGT in place  5/6: Patient without acute distress.     ADVANCE DIRECTIVES:    [ ] Full Code [ ] DNR  MOLST  [ ]  Living Will  [ ]   DECISION MAKER(s):  [ ] Health Care Proxy(s)  [ ] Surrogate(s)  [ ] Guardian           Name(s): Phone Number(s): spouse Edwige [phone] | [phone]    BASELINE (I)ADL(s) (prior to admission):  [place]: [ ]Total  [ ] Moderate [ ]Dependent  Palliative Performance Status Version 2:         %    http://npcrc.org/files/news/palliative_performance_scale_ppsv2.pdf    Allergies    No Known Allergies    Intolerances    MEDICATIONS  (STANDING):  cefTRIAXone   IVPB 1000 milliGRAM(s) IV Intermittent every 24 hours  cefTRIAXone   IVPB      chlorhexidine 2% Cloths 1 Application(s) Topical <User Schedule>  dextrose 5%. 1000 milliLiter(s) (50 mL/Hr) IV Continuous <Continuous>  dextrose 5%. 1000 milliLiter(s) (100 mL/Hr) IV Continuous <Continuous>  dextrose 50% Injectable 12.5 Gram(s) IV Push once  dextrose 50% Injectable 25 Gram(s) IV Push once  dextrose 50% Injectable 25 Gram(s) IV Push once  folic acid 1 milliGRAM(s) Oral daily  glucagon  Injectable 1 milliGRAM(s) IntraMuscular once  insulin lispro (ADMELOG) corrective regimen sliding scale   SubCutaneous every 6 hours  lactulose Syrup 20 Gram(s) Oral every 8 hours  levETIRAcetam   Injectable 250 milliGRAM(s) IV Push <User Schedule>  lidocaine   4% Patch 1 Patch Transdermal daily  midodrine 10 milliGRAM(s) Oral every 8 hours  nystatin Powder 1 Application(s) Topical two times a day  pantoprazole  Injectable 40 milliGRAM(s) IV Push daily  rifAXIMin 550 milliGRAM(s) Oral two times a day  senna 2 Tablet(s) Oral at bedtime  silver sulfADIAZINE 1% Cream 1 Application(s) Topical two times a day    MEDICATIONS  (PRN):  acetaminophen     Tablet .. 650 milliGRAM(s) Oral every 6 hours PRN Temp greater or equal to 38C (100.4F), Mild Pain (1 - 3)  aluminum hydroxide/magnesium hydroxide/simethicone Suspension 30 milliLiter(s) Oral every 4 hours PRN Dyspepsia  dextrose Oral Gel 15 Gram(s) Oral once PRN Blood Glucose LESS THAN 70 milliGRAM(s)/deciliter  melatonin 3 milliGRAM(s) Oral at bedtime PRN Insomnia  ondansetron Injectable 4 milliGRAM(s) IV Push every 8 hours PRN Nausea and/or Vomiting  oxyCODONE    IR 5 milliGRAM(s) Oral every 6 hours PRN Moderate Pain (4 - 6)        PRESENT SYMPTOMS: [ X][ Difficult to obtain due to poor mentation   Source if other than patient:  [ ]Family   [ ]Team   [ X]All review of systems negative including pain and dyspnea unless noted below    All components of pain assessment below addressed. Blank spaces indicate that the patient did/could not complete the assessment.  Pain: [ ]yes [ ]no  QOL impact -   Location -                    Aggravating factors -  Quality -  Radiation -  Timing-  Severity (0-10 scale):  Minimal acceptable level (0-10 scale):     CPOT:    https://www.sccm.org/getattachment/hpl63b37-0m5x-2i6n-9u3i-7825e3132h4l/Critical-Care-Pain-Observation-Tool-(CPOT)    PAIN AD Score: 0  http://geriatrictoolkit.[facility]/cog/painad.pdf (press ctrl +  left click to view)    Dyspnea:                           [ ]None[ ]Mild [ ]Moderate [ ]Severe     Respiratory Distress Observation Scale (RDOS): 0  A score of 0 to 2 signifies little or no respiratory distress, 3 signifies mild distress, scores 4 to 6 indicate moderate distress, and scores greater than 7 signify severe distress  [email or web address]-[email or web address]-[email or web address]    Anxiety:                             [ ]None[ ]Mild [ ]Moderate [ ]Severe   Fatigue:                             [ ]None[ ]Mild [ ]Moderate [ ]Severe   Nausea:                             [ ]None[ ]Mild [ ]Moderate [ ]Severe   Loss of appetite:              [ ]None[ ]Mild [ ]Moderate [ ]Severe   Constipation:                    [ ]None[ ]Mild [ ]Moderate [ ]Severe    Other Symptoms:    PHYSICAL EXAM:    GENERAL:  [X ] No acute distress [ ]Lethargic  [ ]Unarousable  [ ]Verbal  [ ]Non-Verbal [ ]Cachexia    BEHAVIORAL/PSYCH:  [ ]Alert and Oriented x  [ ] Anxiety [ ] Delirium [ ] Agitation [X ] Calm   EYES: [ ] No scleral icterus [ ] Scleral icterus [ ] Closed  ENMT:  [ ]Dry mouth  [ ]No external oral lesions [ X] No external ear or nose lesions  CARDIOVASCULAR:  [ ]Regular [ ]Irregular [ ]Tachy [ ]Not Tachy  [ ]Jenaro [ ] Edema [ ] No edema  PULMONARY:  [ ]Tachypnea  [ ]Audible excessive secretions [X ] No labored breathing [ ] labored breathing  GASTROINTESTINAL: [ ]Soft  [ ]Distended  [ X]Not distended [ ]Non tender [ ]Tender  MUSCULOSKELETAL: [ ]No clubbing [ ] clubbing  [ X] No cyanosis [ ] cyanosis  NEUROLOGIC: [ ]No focal deficits  [ ]Follows commands  [ ]Does not follow commands  [ ]Cognitive impairment  [ ]Dysphagia  [ ]Dysarthria  [ ]Paresis   SKIN: [ ] Jaundiced [X ] Non-jaundiced [ ]Rash [ ]No Rash [ ] Warm [ ] Dry  MISC/LINES: [ ] ET tube [ ] Trach [ ]NGT/OGT [ ]PEG [ ]Michel    PPS: 30%    CRITICAL CARE:  [ ] Shock Present  [ ]Septic [ ]Cardiogenic [ ]Neurologic [ ]Hypovolemic  [ ]  Vasopressors [ ]  Inotropes   [ ]Respiratory failure present [ ]Mechanical ventilation [ ]Non-invasive ventilatory support [ ]High flow  [ ]Acute  [ ]Chronic [ ]Hypoxic  [ ]Hypercarbic [ ]Other  [ ]Other organ failure     LABS: reviewed by me, notable for:                          7.7    6.96  )-----------( 76       ( 05 May 2025 06:54 )             24.2       05-05    146  |  114[H]  |  24[H]  ----------------------------<  198[H]  3.5   |  21  |  1.0    Ca    7.4[L]      05 May 2025 06:54  Mg     1.6     05-05    TPro  4.1[L]  /  Alb  2.0[L]  /  TBili  1.4[H]  /  DBili  x   /  AST  29  /  ALT  11  /  AlkPhos  126[H]  05-05          PT/INR - ( 04 May 2025 14:15 )   PT: 19.10 sec;   INR: 1.60 ratio               RADIOLOGY & ADDITIONAL STUDIES: CXR personally reviewed and interpretated by me:     5/5: Nonspecific air distended loops of bowel.    Stable osseous structures.    PROTEIN CALORIE MALNUTRITION PRESENT: [ ]mild [ ]moderate [ ]severe [ ]underweight [ ]morbid obesity  https://www.andeal.org/vault/2440/web/files/ONC/Table_Clinical%20Characteristics%20to%20Document%20Malnutrition-White%20JV%20et%20al%202012.pdf    Height (cm): 175.3 (04-17-25 @ 11:56), 175.3 (02-19-25 @ 12:20), 175.3 (01-23-25 @ 23:40)  Weight (kg): 117.8 (04-17-25 @ 11:56), 113.4 (02-19-25 @ 12:20), 120.6 (01-23-25 @ 23:40)  BMI (kg/m2): 38.3 (04-17-25 @ 11:56), 36.9 (02-19-25 @ 12:20), 39.2 (01-23-25 @ 23:40)    [ ]PPSV2 < or = to 30% [ ]significant weight loss  [ ]poor nutritional intake  [ ]anasarca      [ ]Artificial Nutrition          Palliative Care Spiritual/Emotional Screening Tool Question  Severity (0-4):                    OR                    [X ] Unable to determine/NA  Score of 2 or greater indicates recommendation of Chaplaincy referral  Chaplaincy Referral: [ ] Yes [ ] Refused [ ] Following     Caregiver [place]:  [ ] Yes [ ] No    OR    [x ] Unable to determine. Will assess at later time if appropriate.  Social Work Referral [ ]  Patient and Family Centered Care Referral [ ]    Anticipatory Grief Present: [ ] Yes [ ] No    OR     [ x] Unable to determine. Will assess at later time if appropriate.  Social Work Referral [ ]  Patient and Family Centered Care Referral [ ]    REFERRALS:   [ ]Chaplaincy  [ ]Hospice  [ ]Child Life  [ ]Social Work  [ ]Case management [ ]Holistic Therapy     Palliative care education provided to patient and/or family    Goals of Care Document:

## 2025-05-06 NOTE — PROGRESS NOTE ADULT - SUBJECTIVE AND OBJECTIVE BOX
INFECTIOUS DISEASE FOLLOW UP NOTE:    Interval History/ROS: Patient is a 80y old  Male who presents with a chief complaint of acute mentation changes (05 May 2025 16:22)    Overnight events: Much calmer today. Reports no abdominal pain    REVIEW OF SYSTEMS:  CONSTITUTIONAL: No fever or chills  HEAD: No lesion on scalp  EYES: No visual disturbance  ENT: No sore throat; + NGT  RESPIRATORY: No cough, no shortness of breath  CARDIOVASCULAR: No chest pain or palpitations  GASTROINTESTINAL: No abdominal or epigastric pain  GENITOURINARY: No dysuria  NEUROLOGICAL: No headache/dizziness  MUSCULOSKELETAL: No joint pain, erythema, or swelling; no back pain  SKIN: No itching, rashes  All other ROS negative except noted above      Prior hospital charts reviewed [Yes]  Primary team notes reviewed [Yes]  Other consultant notes reviewed [Yes]    Allergies:  No Known Allergies      ANTIMICROBIALS:   cefTRIAXone   IVPB 1000 every 24 hours  cefTRIAXone   IVPB    rifAXIMin 550 two times a day      OTHER MEDS: MEDICATIONS  (STANDING):  acetaminophen     Tablet .. 650 every 6 hours PRN  aluminum hydroxide/magnesium hydroxide/simethicone Suspension 30 every 4 hours PRN  dextrose 50% Injectable 25 once  dextrose 50% Injectable 12.5 once  dextrose 50% Injectable 25 once  dextrose Oral Gel 15 once PRN  glucagon  Injectable 1 once  insulin lispro (ADMELOG) corrective regimen sliding scale  every 6 hours  lactulose Syrup 20 every 8 hours  levETIRAcetam   Injectable 250 <User Schedule>  melatonin 3 at bedtime PRN  midodrine 10 every 8 hours  ondansetron Injectable 4 every 8 hours PRN  oxyCODONE    IR 5 every 6 hours PRN  pantoprazole  Injectable 40 daily  senna 2 at bedtime      Vital Signs Last 24 Hrs  T(F): 97.4 (25 @ 04:38), Max: 100 (25 @ 20:30)    Vital Signs Last 24 Hrs  HR: 85 (25 @ 04:38) (85 - 98)  BP: 131/70 (25 @ 04:38) (126/71 - 131/70)  RR: 18 (25 @ 04:38)  SpO2: 98% (25 @ 04:38) (98% - 98%)  Wt(kg): --    EXAM:  GENERAL: NAD, lying in bed  HEAD: No head lesions  EYES: Conjunctiva pink and cornea white  EAR, NOSE, MOUTH, THROAT: Normal external ears and nose, no discharges; moist mucous membranes; + NC  NECK: Supple, nontender to palpation; no JVD  RESPIRATORY: Bibasilar crackles  CARDIOVASCULAR: S1 S2  GASTROINTESTINAL: Soft, no significant distention; normoactive bowel sounds  GENITOURINARY: + loyola catheter, no CVA tenderness  EXTREMITIES: No clubbing, cyanosis, + diffuse anasarca  NERVOUS SYSTEM: Awake and alert, not oriented, able to answer simple questions  MUSCULOSKELETAL: No joint erythema, swelling  SKIN: Sacral DTI, no superficial thrombophlebitis  PSYCH: Slightly lethargic    Labs:                        7.7    6.96  )-----------( 76       ( 05 May 2025 06:54 )             24.2         146  |  114[H]  |  24[H]  ----------------------------<  198[H]  3.5   |  21  |  1.0    Ca    7.4[L]      05 May 2025 06:54  Mg     1.6         TPro  4.1[L]  /  Alb  2.0[L]  /  TBili  1.4[H]  /  DBili  x   /  AST  29  /  ALT  11  /  AlkPhos  126[H]        WBC Trend:  WBC Count: 6.96 (25 @ 06:54)  WBC Count: 6.25 (25 @ 14:15)  WBC Count: 5.76 (25 @ 06:30)  WBC Count: 8.08 (25 @ 08:18)      Creatine Trend:  Creatinine: 1.0 ()  Creatinine: 0.9 ()  Creatinine: 1.0 ()  Creatinine: 1.0 ()      Liver Biochemical Testing Trend:  Alanine Aminotransferase (ALT/SGPT): 11 ()  Alanine Aminotransferase (ALT/SGPT): 11 ()  Alanine Aminotransferase (ALT/SGPT): 9 ()  Alanine Aminotransferase (ALT/SGPT): 10 ()  Alanine Aminotransferase (ALT/SGPT): 12 ()  Aspartate Aminotransferase (AST/SGOT): 29 (25 @ 06:54)  Aspartate Aminotransferase (AST/SGOT): 32 (25 @ 14:15)  Aspartate Aminotransferase (AST/SGOT): 24 (25 @ 06:30)  Aspartate Aminotransferase (AST/SGOT): 28 (25 @ 08:18)  Aspartate Aminotransferase (AST/SGOT): 40 (25 @ 11:30)  Bilirubin Total: 1.4 ()  Bilirubin Total: 2.2 ()  Bilirubin Total: 1.2 ()  Bilirubin Total: 1.0 ()  Bilirubin Total: 1.0 ()      Trend LDH  25 @ 05:53  275[H]      Urinalysis Basic - ( 05 May 2025 12:48 )    Color: Dark Yellow / Appearance: Clear / S.021 / pH: x  Gluc: x / Ketone: Negative mg/dL  / Bili: Small / Urobili: 0.2 mg/dL   Blood: x / Protein: 30 mg/dL / Nitrite: Negative   Leuk Esterase: Small / RBC: 10 /HPF / WBC 30 /HPF   Sq Epi: x / Non Sq Epi: 2 /HPF / Bacteria: Moderate /HPF        MICROBIOLOGY:    MRSA PCR Result.: Negative (25 @ 09:00)      Culture - Blood (collected 2025 18:30)  Source: Blood Blood  Final Report:    No growth at 5 days    Culture - Blood (collected 2025 15:15)  Source: Blood None  Final Report:    No growth at 5 days    Urinalysis with Rflx Culture (collected 2025 21:20)    Culture - Blood (collected 2025 21:20)  Source: Blood Blood-Peripheral  Final Report:    Growth in anaerobic bottle: Bacteroides fragilis    "Susceptibilities not performed"    Culture - Urine (collected 2025 21:20)  Source: Catheterized None  Final Report:    10,000 - 49,000 CFU/mL Proteus mirabilis  Organism: Proteus mirabilis  Organism: Proteus mirabilis    Sensitivities:      -  Levofloxacin: S <=0.5      -  Tobramycin: S <=2      -  Nitrofurantoin: R >64 Should not be used to treat pyelonephritis      -  Aztreonam: S <=4      -  Gentamicin: S <=2      -  Cefazolin: S <=2 For uncomplicated UTI with K. pneumoniae, E. coli, or P. mirablis: LOUIS <=16 is sensitive and LOUIS >=32 is resistant. This also predicts results for oral agents cefaclor, cefdinir, cefpodoxime, cefprozil, cefuroxime axetil, cephalexin and locarbef for uncomplicated UTI. Note that some isolates may be susceptible to these agents while testing resistant to cefazolin.      -  Cefepime: S <=2      -  Piperacillin/Tazobactam: S <=8      -  Ciprofloxacin: S <=0.25      -  Ceftriaxone: S <=1      -  Ampicillin: S <=8 These ampicillin results predict results for amoxicillin      Method Type: LOUIS      -  Meropenem: S <=1      -  Ampicillin/Sulbactam: S <=4/2      -  Cefoxitin: S <=8      -  Cefuroxime: S <=4      -  Amoxicillin/Clavulanic Acid: S <=8/4      -  Trimethoprim/Sulfamethoxazole: S <=0.5/9.5      -  Ertapenem: S <=0.5    Culture - Blood (collected 2025 21:20)  Source: Blood Blood-Peripheral  Final Report:    Growth in anaerobic bottle: Bacteroides fragilis "Susceptibilities not    performed"    Direct identification is available within approximately 3-5    hours either by Blood Panel Multiplexed PCR or Direct    MALDI-TOF. Details: [email or web address]  Organism: Blood Culture PCR  Organism: Blood Culture PCR    Sensitivities:      -  Bacteroides fragilis: Detec      Method Type: PCR    Culture - Blood (collected 2025 07:12)  Source: .Blood BLOOD  Final Report:    No growth at 5 days    Culture - Blood (collected 2025 07:12)  Source: .Blood BLOOD  Final Report:    No growth at 5 days    Culture - Blood (collected 2025 07:35)  Source: .Blood BLOOD  Final Report:    No growth at 5 days    Culture - Blood (collected 2025 19:13)  Source: .Blood BLOOD  Final Report:    No growth at 5 days      RADIOLOGY:  imaging below personally reviewed    < from: Xray Kidney Ureter Bladder (25 @ 18:55) >  Findings/  impression:    Nonspecific air distended loops of bowel.    Stable osseous structures.    < end of copied text >

## 2025-05-06 NOTE — PROGRESS NOTE ADULT - SUBJECTIVE AND OBJECTIVE BOX
--------------------------------------------------------------------------------    24 hour events/subjective:  NAD,  Re- consult , F/U skin assessment           ROS:   pt unable to offer    ALLERGIES & MEDICATIONS  --------------------------------------------------------------------------------  Allergies    No Known Allergies    Intolerances          STANDING INPATIENT MEDICATIONS    cefTRIAXone   IVPB 1000 milliGRAM(s) IV Intermittent every 24 hours  cefTRIAXone   IVPB      chlorhexidine 2% Cloths 1 Application(s) Topical <User Schedule>  dextrose 5%. 1000 milliLiter(s) IV Continuous <Continuous>  dextrose 5%. 1000 milliLiter(s) IV Continuous <Continuous>  dextrose 50% Injectable 25 Gram(s) IV Push once  dextrose 50% Injectable 12.5 Gram(s) IV Push once  dextrose 50% Injectable 25 Gram(s) IV Push once  folic acid 1 milliGRAM(s) Oral daily  glucagon  Injectable 1 milliGRAM(s) IntraMuscular once  insulin lispro (ADMELOG) corrective regimen sliding scale   SubCutaneous every 6 hours  lactulose Syrup 20 Gram(s) Oral every 8 hours  levETIRAcetam   Injectable 250 milliGRAM(s) IV Push <User Schedule>  lidocaine   4% Patch 1 Patch Transdermal daily  midodrine 10 milliGRAM(s) Oral every 8 hours  nystatin Powder 1 Application(s) Topical two times a day  pantoprazole  Injectable 40 milliGRAM(s) IV Push daily  rifAXIMin 550 milliGRAM(s) Oral two times a day  senna 2 Tablet(s) Oral at bedtime  silver sulfADIAZINE 1% Cream 1 Application(s) Topical two times a day      PRN INPATIENT MEDICATION  acetaminophen     Tablet .. 650 milliGRAM(s) Oral every 6 hours PRN  aluminum hydroxide/magnesium hydroxide/simethicone Suspension 30 milliLiter(s) Oral every 4 hours PRN  dextrose Oral Gel 15 Gram(s) Oral once PRN  melatonin 3 milliGRAM(s) Oral at bedtime PRN  ondansetron Injectable 4 milliGRAM(s) IV Push every 8 hours PRN  oxyCODONE    IR 5 milliGRAM(s) Oral every 6 hours PRN        VITALS/PHYSICAL EXAM-   --------------------------------------------------------------------------------  T(C): 36.3 (05-06-25 @ 04:38), Max: 36.3 (05-06-25 @ 04:38)  HR: 85 (05-06-25 @ 04:38) (85 - 98)  BP: 131/70 (05-06-25 @ 04:38) (126/71 - 131/70)  RR: 18 (05-06-25 @ 04:38) (18 - 18)  SpO2: 98% (05-06-25 @ 04:38) (98% - 98%)  Wt(kg): --        05-05-25 @ 07:01  -  05-06-25 @ 07:00  --------------------------------------------------------  IN: 0 mL / OUT: 175 mL / NET: -175 mL            LABS/ CULTURES/ RADIOLOGY:              7.7    6.96  >-----------<  76       [05-05-25 @ 06:54]              24.2     146  |  114  |  24  ----------------------------<  198      [05-05-25 @ 06:54]  3.5   |  21  |  1.0        Ca     7.4     [05-05-25 @ 06:54]      Mg     1.6     [05-05-25 @ 06:54]    TPro  4.1  /  Alb  2.0  /  TBili  1.4  /  DBili  x   /  AST  29  /  ALT  11  /  AlkPhos  126  [05-05-25 @ 06:54]    PT/INR: PT 19.10, INR 1.60       [05-04-25 @ 14:15]          CAPILLARY BLOOD GLUCOSE      POCT Blood Glucose.: 204 mg/dL (06 May 2025 07:32)  POCT Blood Glucose.: 222 mg/dL (06 May 2025 06:16)  POCT Blood Glucose.: 226 mg/dL (05 May 2025 16:35)  POCT Blood Glucose.: 205 mg/dL (05 May 2025 11:48)        Triglycerides, Serum: 81 mg/dL (04-14-25 @ 05:42)                A1C with Estimated Average Glucose Result: 6.0 % (04-14-25 @ 05:42)  A1C with Estimated Average Glucose Result: 7.6 % (01-24-25 @ 07:04)

## 2025-05-06 NOTE — PROGRESS NOTE ADULT - ASSESSMENT
High risk for pressure injury development or progression   Skin assessed- B/L heel blanchable redness                        Sacrum and  B/L buttock  erythema  with denuded skin and dark pigmentation - possibly due to chronic moisture and friction combination                        Deep tissue pressure injury to B/L buttock - intact dark red skin tissue surrounding macerated area - progressed to black devitalised skin tissue           Wound and skin care recs.   Continue current treatment   Clean sacrum and b/l buttock with soap and water  , pat dry  apply  silvadene dressing   Pressure  injury  preventive  measures  Skin  and incontinence care   Assess skin  and inform primary provider of any changes   Case discussed with primary Rn  Wound/ ostomy specialist  to f/u as needed     Offloading: [x ] Frequent position changes [ ] Devices/Equipment  Cleansing: [ ] Saline [ ] Soap/Water [ ] Other: ______  Topicals: [ ] Barrier Cream [ ] Antimicrobial [ ] Enzymatic Wound Debridement x] Moist  wound Healing   Dressings: [ ] Dry, sterile [ ] Allevyn  Foam [ ] Absorbant Pads [ ] Collagenase    Other Recs.   Per Primary team   Total time for bedside assessment  , review of medical records  and  discussion of plan of care with primary team greater than 35 min

## 2025-05-06 NOTE — CHART NOTE - NSCHARTNOTEFT_GEN_A_CORE
Patient adamantly refused lactulose and midodrine.  Ammonia level WNL for now   MAP 79 /67  Nursing to re attempt meds on next med admin time

## 2025-05-06 NOTE — PROGRESS NOTE ADULT - ASSESSMENT
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 fragilis bacteremia, and acute blood loss anemia. Palliative team had met with patient's spouse, who indicated wanting all life-prolonging interventions. We are reconsulted as patient's spouse and daughter sees things differently.     Notified that patient's daughter (Edwige [phone]) is an RN and is open to palliative care involvement. I spoke to her and she indicated that she is aware that patient's health will continue to decline, but she does not think that patient's spouse is ready to accept this. If it were up to the daughter, she would want patient to be DNR. She requests that I speak to patient's spouse. I was able to call her (also named Edwige [phone]), and to reintroduce our team. She states that it has been a difficult period of time because she is dealing with her own health issues. She was also not expecting us to find so many different medical problems in the patient in such a short amount of time. She became emotional during our discussion and asks if we can talk more in person. At present, she is feeling sick and will call me when she comes to the hospital.     Patient AOx1 and unable to participate in full conversation today.     Plan:  - symptoms per primary team  - continue current medical interventions  - ongoing discussions as above  - full code    Palliative care will continue to follow.   Please call [phone] with questions or concerns 24/7.

## 2025-05-06 NOTE — PROGRESS NOTE ADULT - SUBJECTIVE AND OBJECTIVE BOX
CHIEF COMPLAINT:    Patient is a 80y old  Male who presents with a chief complaint of acute mentation changes (06 May 2025 11:39)    INTERVAL HPI/OVERNIGHT EVENTS:    Patient seen and examined at bedside. Clinically unchanged. sick appearing, lethargic. Better fever curve.     Medications:  Standing  cefTRIAXone   IVPB 1000 milliGRAM(s) IV Intermittent every 24 hours  cefTRIAXone   IVPB      chlorhexidine 2% Cloths 1 Application(s) Topical <User Schedule>  dextrose 5%. 1000 milliLiter(s) IV Continuous <Continuous>  dextrose 5%. 1000 milliLiter(s) IV Continuous <Continuous>  dextrose 50% Injectable 25 Gram(s) IV Push once  dextrose 50% Injectable 12.5 Gram(s) IV Push once  dextrose 50% Injectable 25 Gram(s) IV Push once  folic acid 1 milliGRAM(s) Oral daily  glucagon  Injectable 1 milliGRAM(s) IntraMuscular once  insulin lispro (ADMELOG) corrective regimen sliding scale   SubCutaneous every 6 hours  lactulose Syrup 20 Gram(s) Oral every 8 hours  levETIRAcetam   Injectable 250 milliGRAM(s) IV Push <User Schedule>  lidocaine   4% Patch 1 Patch Transdermal daily  midodrine 10 milliGRAM(s) Oral every 8 hours  nystatin Powder 1 Application(s) Topical two times a day  pantoprazole  Injectable 40 milliGRAM(s) IV Push daily  potassium chloride  20 mEq/100 mL IVPB 20 milliEquivalent(s) IV Intermittent once  rifAXIMin 550 milliGRAM(s) Oral two times a day  senna 2 Tablet(s) Oral at bedtime  silver sulfADIAZINE 1% Cream 1 Application(s) Topical two times a day    PRN Meds  acetaminophen     Tablet .. 650 milliGRAM(s) Oral every 6 hours PRN  aluminum hydroxide/magnesium hydroxide/simethicone Suspension 30 milliLiter(s) Oral every 4 hours PRN  dextrose Oral Gel 15 Gram(s) Oral once PRN  melatonin 3 milliGRAM(s) Oral at bedtime PRN  ondansetron Injectable 4 milliGRAM(s) IV Push every 8 hours PRN  oxyCODONE    IR 5 milliGRAM(s) Oral every 6 hours PRN    Vital Signs:    T(F): 97.4 (05-06-25 @ 04:38), Max: 97.4 (05-06-25 @ 04:38)  HR: 85 (05-06-25 @ 04:38) (85 - 98)  BP: 131/70 (05-06-25 @ 04:38) (126/71 - 131/70)  RR: 18 (05-06-25 @ 04:38) (18 - 18)  SpO2: 98% (05-06-25 @ 04:38) (98% - 98%)  I&O's Summary    05 May 2025 07:01  -  06 May 2025 07:00  --------------------------------------------------------  IN: 0 mL / OUT: 175 mL / NET: -175 mL      Daily     Daily   CAPILLARY BLOOD GLUCOSE      POCT Blood Glucose.: 222 mg/dL (06 May 2025 11:52)  POCT Blood Glucose.: 204 mg/dL (06 May 2025 07:32)  POCT Blood Glucose.: 222 mg/dL (06 May 2025 06:16)  POCT Blood Glucose.: 226 mg/dL (05 May 2025 16:35)    PHYSICAL EXAM:  GENERAL:  NAD, sick appearing  SKIN: No rashes or lesions  HEENT: Atraumatic. Normocephalic. Anicteric  NECK:  No JVD.   PULMONARY: Clear to ausculation bilaterally. No wheezing. No rales  CVS: Normal S1, S2. Regular rate and rhythm. No murmurs.  ABDOMEN/GI: Soft, distended, non tender  EXTREMITIES: diffuse edema, anasarca    LABS:                        7.7    6.96  )-----------( 76       ( 05 May 2025 06:54 )             24.2     05-05    146  |  114[H]  |  24[H]  ----------------------------<  198[H]  3.5   |  21  |  1.0    Ca    7.4[L]      05 May 2025 06:54  Mg     1.6     05-05    TPro  4.1[L]  /  Alb  2.0[L]  /  TBili  1.4[H]  /  DBili  x   /  AST  29  /  ALT  11  /  AlkPhos  126[H]  05-05    PT/INR - ( 04 May 2025 14:15 )   PT: 19.10 sec;   INR: 1.60 ratio      RADIOLOGY & ADDITIONAL TESTS:  Imaging or report Personally Reviewed:  [ ] YES  [ ] NO -->no new images

## 2025-05-06 NOTE — PROGRESS NOTE ADULT - CONVERSATION DETAILS
Discussed with wife, who states patient had all ADLs at home, and feels he is becoming more alert. She is hopeful he can improve. Discussed ACP, including chances of meaningful benefit, and she states he would want full resuscitation and medical measures.
Notified that patient's daughter (Edwige [phone]) is an RN and is open to palliative care involvement. I spoke to her and she indicated that she is aware that patient's health will continue to decline, but she does not think that patient's spouse is ready to accept this. If it were up to the daughter, she would want patient to be DNR. She requests that I speak to patient's spouse. I was able to call her (also named Edwige [phone]), and to reintroduce our team. She states that it has been a difficult period of time because she is dealing with her own health issues. She was also not expecting us to find so many different medical problems in the patient in such a short amount of time. She became emotional during our discussion and asks if we can talk more in person. At present, she is feeling sick and will call me when she comes to the hospital.

## 2025-05-06 NOTE — PROGRESS NOTE ADULT - ASSESSMENT
80 years old male history of hypertension, diabetes, subdural hematoma on Keppra, hx mechanical falls, recent admission (January 2025) for osteomyelitis of L4-5 w completion of abx.  BIBA from home status post change of mental status.     ID is consulted for bacteremia  Afebrile  WBC 7.55 < 7.45  On room air  BCx 4/14 Bacteroides fragilis  BCx 4/16 NGTD  BCx 4/17 NGTD  UA WBC 5, UCx low count P. mirabilis    Repeat UA WBC 30, UCx pending    CT A/P 4/13  1.  Erosive endplate changes centered at the L4-5 level, consistent with   patient's history ofrecent lumbar spine osteomyelitis.  2.  Liver cirrhosis with portal hypertension. Mild-to-moderate ascites.   Anasarca. Hepatic dome subcentimeter hypodense 1.3 cm hypodensity, not   fully characterized . Outpatient MR abdomen with IV contrast recommended.  3.  Chronic pancreatitis, with limited evaluation for acute inflammation   due to background fluid. Correlation with pancreatic enzymes recommended.    Antibiotics:  Vancomycin 4/13  Ampicillin 4/14  Ceftriaxone 4/15 - 4/21, 5/4 ->  Flagyl 4/15 - 4/28      IMPRESSION:  Possible UTI  Bacteroides bacteremia, completed treatment  Acute blood loss anemia  Possible SBP?  Liver cirrhosis  Lower GI bleed  Hx L4-L5 osteomyelitis  Liver lesion  Immunosuppression / Immunosenescence secondary to multiple comorbidities which could result in poor clinical outcome    RECOMMENDATIONS:  - IV ceftriaxone 1g q24hrs  - Obtain 2 sets of repeat BCx  - Follow up UCx  - Local wound care for sacral DTI  - GI follow up  - Offloading and frequent position changes, aspiration precaution  - Trend WBC, fever curve, transaminases, creatinine daily  - Please inform ID of any patient clinical change or any new pertinent laboratory or radiographic data      Nora Beck D.O.  Attending Physician  Division of Infectious Diseases  [facility]  Please contact me via [employer] Teams

## 2025-05-07 LAB
ALBUMIN SERPL ELPH-MCNC: 1.8 G/DL — LOW (ref 3.5–5.2)
ALP SERPL-CCNC: 164 U/L — HIGH (ref 30–115)
ALT FLD-CCNC: 14 U/L — SIGNIFICANT CHANGE UP (ref 0–41)
ANION GAP SERPL CALC-SCNC: 9 MMOL/L — SIGNIFICANT CHANGE UP (ref 7–14)
AST SERPL-CCNC: 48 U/L — HIGH (ref 0–41)
BILIRUB SERPL-MCNC: 1.3 MG/DL — HIGH (ref 0.2–1.2)
BUN SERPL-MCNC: 23 MG/DL — HIGH (ref 10–20)
CALCIUM SERPL-MCNC: 7.4 MG/DL — LOW (ref 8.4–10.5)
CHLORIDE SERPL-SCNC: 114 MMOL/L — HIGH (ref 98–110)
CO2 SERPL-SCNC: 21 MMOL/L — SIGNIFICANT CHANGE UP (ref 17–32)
CREAT SERPL-MCNC: 0.8 MG/DL — SIGNIFICANT CHANGE UP (ref 0.7–1.5)
EGFR: 91 ML/MIN/1.73M2 — SIGNIFICANT CHANGE UP
EGFR: 91 ML/MIN/1.73M2 — SIGNIFICANT CHANGE UP
GLUCOSE SERPL-MCNC: 168 MG/DL — HIGH (ref 70–99)
HCT VFR BLD CALC: 25.7 % — LOW (ref 42–52)
HGB BLD-MCNC: 8 G/DL — LOW (ref 14–18)
MAGNESIUM SERPL-MCNC: 1.5 MG/DL — LOW (ref 1.8–2.4)
MCHC RBC-ENTMCNC: 30.4 PG — SIGNIFICANT CHANGE UP (ref 27–31)
MCHC RBC-ENTMCNC: 31.1 G/DL — LOW (ref 32–37)
MCV RBC AUTO: 97.7 FL — HIGH (ref 80–94)
NRBC BLD AUTO-RTO: 0 /100 WBCS — SIGNIFICANT CHANGE UP (ref 0–0)
PLATELET # BLD AUTO: 79 K/UL — LOW (ref 130–400)
PMV BLD: 10.2 FL — SIGNIFICANT CHANGE UP (ref 7.4–10.4)
POTASSIUM SERPL-MCNC: 3.6 MMOL/L — SIGNIFICANT CHANGE UP (ref 3.5–5)
POTASSIUM SERPL-SCNC: 3.6 MMOL/L — SIGNIFICANT CHANGE UP (ref 3.5–5)
PROT SERPL-MCNC: 4.2 G/DL — LOW (ref 6–8)
RBC # BLD: 2.63 M/UL — LOW (ref 4.7–6.1)
RBC # FLD: 24 % — HIGH (ref 11.5–14.5)
SODIUM SERPL-SCNC: 144 MMOL/L — SIGNIFICANT CHANGE UP (ref 135–146)
WBC # BLD: 7.78 K/UL — SIGNIFICANT CHANGE UP (ref 4.8–10.8)
WBC # FLD AUTO: 7.78 K/UL — SIGNIFICANT CHANGE UP (ref 4.8–10.8)

## 2025-05-07 PROCEDURE — 99232 SBSQ HOSP IP/OBS MODERATE 35: CPT

## 2025-05-07 RX ORDER — SPIRONOLACTONE 25 MG
50 TABLET ORAL DAILY
Refills: 0 | Status: DISCONTINUED | OUTPATIENT
Start: 2025-05-07 | End: 2025-05-14

## 2025-05-07 RX ORDER — MAGNESIUM SULFATE 500 MG/ML
2 SYRINGE (ML) INJECTION ONCE
Refills: 0 | Status: COMPLETED | OUTPATIENT
Start: 2025-05-07 | End: 2025-05-08

## 2025-05-07 RX ADMIN — Medication 1 APPLICATION(S): at 17:33

## 2025-05-07 RX ADMIN — CEFTRIAXONE 100 MILLIGRAM(S): 500 INJECTION, POWDER, FOR SOLUTION INTRAMUSCULAR; INTRAVENOUS at 14:47

## 2025-05-07 RX ADMIN — OXYCODONE HYDROCHLORIDE 5 MILLIGRAM(S): 30 TABLET ORAL at 05:29

## 2025-05-07 RX ADMIN — Medication 40 MILLIGRAM(S): at 12:11

## 2025-05-07 RX ADMIN — FOLIC ACID 1 MILLIGRAM(S): 1 TABLET ORAL at 12:11

## 2025-05-07 RX ADMIN — LACTULOSE 20 GRAM(S): 10 SOLUTION ORAL at 14:47

## 2025-05-07 RX ADMIN — LACTULOSE 20 GRAM(S): 10 SOLUTION ORAL at 22:25

## 2025-05-07 RX ADMIN — LIDOCAINE HYDROCHLORIDE 1 PATCH: 20 JELLY TOPICAL at 19:40

## 2025-05-07 RX ADMIN — NYSTATIN 1 APPLICATION(S): 100000 CREAM TOPICAL at 17:34

## 2025-05-07 RX ADMIN — MIDODRINE HYDROCHLORIDE 10 MILLIGRAM(S): 5 TABLET ORAL at 14:45

## 2025-05-07 RX ADMIN — NYSTATIN 1 APPLICATION(S): 100000 CREAM TOPICAL at 05:29

## 2025-05-07 RX ADMIN — Medication 1 APPLICATION(S): at 05:30

## 2025-05-07 RX ADMIN — Medication 2 TABLET(S): at 22:25

## 2025-05-07 RX ADMIN — LEVETIRACETAM 250 MILLIGRAM(S): 10 INJECTION, SOLUTION INTRAVENOUS at 17:33

## 2025-05-07 RX ADMIN — Medication 50 MILLIGRAM(S): at 22:26

## 2025-05-07 RX ADMIN — MIDODRINE HYDROCHLORIDE 10 MILLIGRAM(S): 5 TABLET ORAL at 05:31

## 2025-05-07 RX ADMIN — LEVETIRACETAM 250 MILLIGRAM(S): 10 INJECTION, SOLUTION INTRAVENOUS at 05:30

## 2025-05-07 RX ADMIN — INSULIN LISPRO 2: 100 INJECTION, SOLUTION INTRAVENOUS; SUBCUTANEOUS at 12:14

## 2025-05-07 RX ADMIN — LIDOCAINE HYDROCHLORIDE 1 PATCH: 20 JELLY TOPICAL at 12:11

## 2025-05-07 RX ADMIN — INSULIN LISPRO 2: 100 INJECTION, SOLUTION INTRAVENOUS; SUBCUTANEOUS at 17:41

## 2025-05-07 RX ADMIN — LACTULOSE 20 GRAM(S): 10 SOLUTION ORAL at 05:31

## 2025-05-07 NOTE — PROGRESS NOTE ADULT - ASSESSMENT
DWIGHT RIBEIRO 80y Male  MRN#: [id number]   CODE STATUS:________      SUBJECTIVE  Patient is a 80y old Male who presents with a chief complaint of acute mentation changes (06 May 2025 13:40)  Currently admitted to medicine with the primary diagnosis of Metabolic encephalopathy      Today is hospital day 23d, and this morning he is somnolent. afebrile. Appears edematous           OBJECTIVE  PAST MEDICAL & SURGICAL HISTORY  Diabetes    Hypertension    Fall    H/O left knee surgery    History of cholecystectomy    History of appendectomy      ALLERGIES:  No Known Allergies    MEDICATIONS:  STANDING MEDICATIONS  cefTRIAXone   IVPB 1000 milliGRAM(s) IV Intermittent every 24 hours  cefTRIAXone   IVPB      chlorhexidine 2% Cloths 1 Application(s) Topical <User Schedule>  dextrose 5%. 1000 milliLiter(s) IV Continuous <Continuous>  dextrose 5%. 1000 milliLiter(s) IV Continuous <Continuous>  dextrose 50% Injectable 25 Gram(s) IV Push once  dextrose 50% Injectable 12.5 Gram(s) IV Push once  dextrose 50% Injectable 25 Gram(s) IV Push once  folic acid 1 milliGRAM(s) Oral daily  glucagon  Injectable 1 milliGRAM(s) IntraMuscular once  insulin lispro (ADMELOG) corrective regimen sliding scale   SubCutaneous every 6 hours  lactulose Syrup 20 Gram(s) Oral every 8 hours  levETIRAcetam   Injectable 250 milliGRAM(s) IV Push <User Schedule>  lidocaine   4% Patch 1 Patch Transdermal daily  magnesium sulfate  IVPB 2 Gram(s) IV Intermittent once  midodrine 10 milliGRAM(s) Oral every 8 hours  nystatin Powder 1 Application(s) Topical two times a day  pantoprazole  Injectable 40 milliGRAM(s) IV Push daily  rifAXIMin 550 milliGRAM(s) Oral two times a day  senna 2 Tablet(s) Oral at bedtime  silver sulfADIAZINE 1% Cream 1 Application(s) Topical two times a day  spironolactone 50 milliGRAM(s) Oral daily    PRN MEDICATIONS  acetaminophen     Tablet .. 650 milliGRAM(s) Oral every 6 hours PRN  aluminum hydroxide/magnesium hydroxide/simethicone Suspension 30 milliLiter(s) Oral every 4 hours PRN  dextrose Oral Gel 15 Gram(s) Oral once PRN  melatonin 3 milliGRAM(s) Oral at bedtime PRN  ondansetron Injectable 4 milliGRAM(s) IV Push every 8 hours PRN  oxyCODONE    IR 5 milliGRAM(s) Oral every 6 hours PRN      VITAL SIGNS: Last 24 Hours  T(C): 36.7 (07 May 2025 14:33), Max: 37.1 (06 May 2025 20:41)  T(F): 98.1 (07 May 2025 14:33), Max: 98.7 (06 May 2025 20:41)  HR: 87 (07 May 2025 04:36) (87 - 87)  BP: 120/70 (07 May 2025 04:36) (103/67 - 120/70)  BP(mean): --  RR: 18 (07 May 2025 04:36) (18 - 18)  SpO2: 98% (07 May 2025 04:36) (96% - 98%)    LABS:                        8.0    7.78  )-----------( 79       ( 07 May 2025 08:02 )             25.7     05-07    144  |  114[H]  |  23[H]  ----------------------------<  168[H]  3.6   |  21  |  0.8    Ca    7.4[L]      07 May 2025 08:02  Mg     1.5     05-07    TPro  4.2[L]  /  Alb  1.8[L]  /  TBili  1.3[H]  /  DBili  x   /  AST  48[H]  /  ALT  14  /  AlkPhos  164[H]  05-07      Urinalysis Basic - ( 07 May 2025 08:02 )    Color: x / Appearance: x / SG: x / pH: x  Gluc: 168 mg/dL / Ketone: x  / Bili: x / Urobili: x   Blood: x / Protein: x / Nitrite: x   Leuk Esterase: x / RBC: x / WBC x   Sq Epi: x / Non Sq Epi: x / Bacteria: x            PHYSICAL EXAM:    GENERAL: NAD, well-developed, somnulent   HEENT:  Atraumatic, Normocephalic. EOMI, PERRLA, conjunctiva and sclera clear, No JVD  PULMONARY: Clear to auscultation bilaterally; No wheeze  CARDIOVASCULAR: Regular rate and rhythm; No murmurs, rubs, or gallops  GASTROINTESTINAL: Soft, Nontender, Nondistended; Bowel sounds present  MUSCULOSKELETAL:  2+ Peripheral Pulses, 2+ edema in upper and lower ext  NEUROLOGY: non-focal  SKIN: No rashes or lesions        81 yo male with h/o lumbar spine osteomyelitis, HTN, DM, subdural hematoma presented with altered mental status, found to have bacteremia and cirrhosis.     Fever  -low grade fever, improving  -given pt's poor vascular status, anasarca, hard stick, obtain blood culture was extremely difficult, will try again   -restarted abx for empiric coverage, ID consulted for further management  as per ID; IV ceftriaxone 1g q24hrs  - Obtain 2 sets of repeat BCx  - Follow up UCx      AMS  -2/2 underlying disease, metabolic encephalopathy  -lethargic  f/u ammonia level    Decompensated cirrhosis       portal hypertension      Coagulopathy      Hypoalbuminemia       hepatic encephalopathy  -on rifaximin, lactulose  -s/p albumin given diffuse edema on 4/30, 5/2  pt is very edematous, start spironolactone and monitor bp     Anemia  -likely multifactorial: sepsis, cirrhosis, poor nutrition, GI bleeding  -s/p EGD on 4/16 which showed 5mm ulcer in duodenal bulb post endo clip, repeat EGD on 4/29 showed no bleeding noted  -will monitor for GI bleeding  -Hb<6.7, transfused one unit of PRBC on 5/3    Bacteremia  -blood culture positive for bacteroides on presentation  -s/p ceftriaxone and flagyl  -repeat blood culture negative, pending new blood culture after episode of fever    Afib  -found during this admission  -rate controlled, not candidate for AC given anemia, coagulopathy    Deconditioning  -PT  -OOB    Sacral pressure ulcer  -local wound care    Overall very poor prognosis paliative care consulted   dispo: f/u ammonia level, repeat cultures, ID following     SVT PPX: SCD due to coagulopathy

## 2025-05-07 NOTE — PROGRESS NOTE ADULT - ASSESSMENT
80 years old male history of hypertension, diabetes, subdural hematoma on Keppra, hx mechanical falls, recent admission (January 2025) for osteomyelitis of L4-5 w completion of abx.  BIBA from home status post change of mental status.     ID is consulted for bacteremia  Afebrile  WBC Count: 7.78 (05-07-25 @ 08:02)  WBC Count: 6.96 (05-05-25 @ 06:54)  WBC Count: 6.25 (05-04-25 @ 14:15)  WBC Count: 5.76 (05-03-25 @ 06:30)  WBC Count: 8.08 (05-02-25 @ 08:18)  WBC Count: 6.97 (05-01-25 @ 11:30)  On room air  BCx 4/14 Bacteroides fragilis  BCx 4/16 NGTD  BCx 4/17 NGTD  UA WBC 5, UCx low count P. mirabilis    Repeat UA WBC 30, UCx pending    CT A/P 4/13  1.  Erosive endplate changes centered at the L4-5 level, consistent with   patient's history ofrecent lumbar spine osteomyelitis.  2.  Liver cirrhosis with portal hypertension. Mild-to-moderate ascites.   Anasarca. Hepatic dome subcentimeter hypodense 1.3 cm hypodensity, not   fully characterized . Outpatient MR abdomen with IV contrast recommended.  3.  Chronic pancreatitis, with limited evaluation for acute inflammation   due to background fluid. Correlation with pancreatic enzymes recommended.    Antibiotics:  Vancomycin 4/13  Ampicillin 4/14  Ceftriaxone 4/15 - 4/21, 5/4 ->  Flagyl 4/15 - 4/28      IMPRESSION:  Hepatic encephalopathy  Bacteroides bacteremia, completed treatment  Acute blood loss anemia  Possible SBP?  Liver cirrhosis  Lower GI bleed  Hx L4-L5 osteomyelitis  Liver lesion  Immunosuppression / Immunosenescence secondary to multiple comorbidities which could result in poor clinical outcome    RECOMMENDATIONS:  - D/C ceftriaxone, UCx growing yeast, likely a contaminant  - Diuresis as per primary  - Follow up UCx  - Local wound care for sacral DTI  - GI follow up  - Offloading and frequent position changes, aspiration precaution  - Trend WBC, fever curve, transaminases, creatinine daily  - Please inform ID of any patient clinical change or any new pertinent laboratory or radiographic data      Nora Beck D.O.  Attending Physician  Division of Infectious Diseases  [facility]  Please contact me via [employer] Teams

## 2025-05-07 NOTE — PROGRESS NOTE ADULT - SUBJECTIVE AND OBJECTIVE BOX
INFECTIOUS DISEASE FOLLOW UP NOTE:    Interval History/ROS: Patient is a 80y old  Male who presents with a chief complaint of acute mentation changes (07 May 2025 17:19)    Overnight events: No overnight event    REVIEW OF SYSTEMS:  Unable to provide due to cognitive impairment      Prior hospital charts reviewed [Yes]  Primary team notes reviewed [Yes]  Other consultant notes reviewed [Yes]    Allergies:  No Known Allergies      ANTIMICROBIALS:   cefTRIAXone   IVPB 1000 every 24 hours  cefTRIAXone   IVPB    rifAXIMin 550 two times a day      OTHER MEDS: MEDICATIONS  (STANDING):  acetaminophen     Tablet .. 650 every 6 hours PRN  aluminum hydroxide/magnesium hydroxide/simethicone Suspension 30 every 4 hours PRN  dextrose 50% Injectable 25 once  dextrose 50% Injectable 12.5 once  dextrose 50% Injectable 25 once  dextrose Oral Gel 15 once PRN  glucagon  Injectable 1 once  insulin lispro (ADMELOG) corrective regimen sliding scale  every 6 hours  lactulose Syrup 20 every 8 hours  levETIRAcetam   Injectable 250 <User Schedule>  melatonin 3 at bedtime PRN  midodrine 10 every 8 hours  ondansetron Injectable 4 every 8 hours PRN  oxyCODONE    IR 5 every 6 hours PRN  pantoprazole  Injectable 40 daily  senna 2 at bedtime  spironolactone 50 daily      Vital Signs Last 24 Hrs  T(F): 99.8 (05-07-25 @ 21:24), Max: 100 (05-03-25 @ 20:30)    Vital Signs Last 24 Hrs  HR: 94 (05-07-25 @ 21:24) (87 - 94)  BP: 125/67 (05-07-25 @ 21:24) (120/70 - 125/67)  RR: 18 (05-07-25 @ 21:24)  SpO2: 97% (05-07-25 @ 21:24) (97% - 98%)  Wt(kg): --    EXAM:  GENERAL: NAD, lying in bed  HEAD: No head lesions  EYES: Conjunctiva pink and cornea white  EAR, NOSE, MOUTH, THROAT: Normal external ears and nose, no discharges; moist mucous membranes; + NC  NECK: Supple, nontender to palpation; no JVD  RESPIRATORY: Bibasilar crackles  CARDIOVASCULAR: S1 S2  GASTROINTESTINAL: Soft, no significant distention; normoactive bowel sounds  GENITOURINARY: + loyola catheter, no CVA tenderness  EXTREMITIES: No clubbing, cyanosis, + diffuse anasarca  NERVOUS SYSTEM: Awake and alert, not oriented, able to answer simple questions  MUSCULOSKELETAL: No joint erythema, swelling  SKIN: Sacral DTI, no superficial thrombophlebitis  PSYCH: Slightly lethargic    Labs:                        8.0    7.78  )-----------( 79       ( 07 May 2025 08:02 )             25.7     05-07    144  |  114[H]  |  23[H]  ----------------------------<  168[H]  3.6   |  21  |  0.8    Ca    7.4[L]      07 May 2025 08:02  Mg     1.5     05-07    TPro  4.2[L]  /  Alb  1.8[L]  /  TBili  1.3[H]  /  DBili  x   /  AST  48[H]  /  ALT  14  /  AlkPhos  164[H]  05-07      WBC Trend:  WBC Count: 7.78 (05-07-25 @ 08:02)  WBC Count: 6.96 (05-05-25 @ 06:54)  WBC Count: 6.25 (05-04-25 @ 14:15)  WBC Count: 5.76 (05-03-25 @ 06:30)      Creatine Trend:  Creatinine: 0.8 (05-07)  Creatinine: 1.0 (05-05)  Creatinine: 0.9 (05-04)  Creatinine: 1.0 (05-03)      Liver Biochemical Testing Trend:  Alanine Aminotransferase (ALT/SGPT): 14 (05-07)  Alanine Aminotransferase (ALT/SGPT): 11 (05-05)  Alanine Aminotransferase (ALT/SGPT): 11 (05-04)  Alanine Aminotransferase (ALT/SGPT): 9 (05-03)  Alanine Aminotransferase (ALT/SGPT): 10 (05-02)  Aspartate Aminotransferase (AST/SGOT): 48 (05-07-25 @ 08:02)  Aspartate Aminotransferase (AST/SGOT): 29 (05-05-25 @ 06:54)  Aspartate Aminotransferase (AST/SGOT): 32 (05-04-25 @ 14:15)  Aspartate Aminotransferase (AST/SGOT): 24 (05-03-25 @ 06:30)  Aspartate Aminotransferase (AST/SGOT): 28 (05-02-25 @ 08:18)  Bilirubin Total: 1.3 (05-07)  Bilirubin Total: 1.4 (05-05)  Bilirubin Total: 2.2 (05-04)  Bilirubin Total: 1.2 (05-03)  Bilirubin Total: 1.0 (05-02)      Trend LDH  04-24-25 @ 05:53  275[H]      Urinalysis Basic - ( 07 May 2025 08:02 )    Color: x / Appearance: x / SG: x / pH: x  Gluc: 168 mg/dL / Ketone: x  / Bili: x / Urobili: x   Blood: x / Protein: x / Nitrite: x   Leuk Esterase: x / RBC: x / WBC x   Sq Epi: x / Non Sq Epi: x / Bacteria: x        MICROBIOLOGY:    MRSA PCR Result.: Negative (04-25-25 @ 09:00)      Culture - Urine (collected 05 May 2025 12:48)  Source: Catheterized Catheterized  Preliminary Report:    10,000 - 49,000 CFU/mL Yeast Identification to follow.    Culture - Blood (collected 17 Apr 2025 18:30)  Source: Blood Blood  Final Report:    No growth at 5 days    Culture - Blood (collected 16 Apr 2025 15:15)  Source: Blood None  Final Report:    No growth at 5 days    Urinalysis with Rflx Culture (collected 13 Apr 2025 21:20)    Culture - Blood (collected 13 Apr 2025 21:20)  Source: Blood Blood-Peripheral  Final Report:    Growth in anaerobic bottle: Bacteroides fragilis    "Susceptibilities not performed"    Culture - Urine (collected 13 Apr 2025 21:20)  Source: Catheterized None  Final Report:    10,000 - 49,000 CFU/mL Proteus mirabilis  Organism: Proteus mirabilis  Organism: Proteus mirabilis    Sensitivities:      -  Levofloxacin: S <=0.5      -  Tobramycin: S <=2      -  Nitrofurantoin: R >64 Should not be used to treat pyelonephritis      -  Aztreonam: S <=4      -  Gentamicin: S <=2      -  Cefazolin: S <=2 For uncomplicated UTI with K. pneumoniae, E. coli, or P. mirablis: LOUIS <=16 is sensitive and LOUIS >=32 is resistant. This also predicts results for oral agents cefaclor, cefdinir, cefpodoxime, cefprozil, cefuroxime axetil, cephalexin and locarbef for uncomplicated UTI. Note that some isolates may be susceptible to these agents while testing resistant to cefazolin.      -  Cefepime: S <=2      -  Piperacillin/Tazobactam: S <=8      -  Ciprofloxacin: S <=0.25      -  Ceftriaxone: S <=1      -  Ampicillin: S <=8 These ampicillin results predict results for amoxicillin      Method Type: LOUIS      -  Meropenem: S <=1      -  Ampicillin/Sulbactam: S <=4/2      -  Cefoxitin: S <=8      -  Cefuroxime: S <=4      -  Amoxicillin/Clavulanic Acid: S <=8/4      -  Trimethoprim/Sulfamethoxazole: S <=0.5/9.5      -  Ertapenem: S <=0.5    Culture - Blood (collected 13 Apr 2025 21:20)  Source: Blood Blood-Peripheral  Final Report:    Growth in anaerobic bottle: Bacteroides fragilis "Susceptibilities not    performed"    Direct identification is available within approximately 3-5    hours either by Blood Panel Multiplexed PCR or Direct    MALDI-TOF. Details: [email or web address]  Organism: Blood Culture PCR  Organism: Blood Culture PCR    Sensitivities:      -  Bacteroides fragilis: Detec      Method Type: PCR    Culture - Blood (collected 30 Jan 2025 07:12)  Source: .Blood BLOOD  Final Report:    No growth at 5 days    Culture - Blood (collected 30 Jan 2025 07:12)  Source: .Blood BLOOD  Final Report:    No growth at 5 days    Culture - Blood (collected 29 Jan 2025 07:35)  Source: .Blood BLOOD  Final Report:    No growth at 5 days      RADIOLOGY:  imaging below personally reviewed    < from: Xray Kidney Ureter Bladder (05.05.25 @ 18:55) >  Findings/  impression:    Nonspecific air distended loops of bowel.    Stable osseous structures.    < end of copied text >

## 2025-05-08 DIAGNOSIS — I48.91 UNSPECIFIED ATRIAL FIBRILLATION: ICD-10-CM

## 2025-05-08 DIAGNOSIS — D64.9 ANEMIA, UNSPECIFIED: ICD-10-CM

## 2025-05-08 LAB
ALBUMIN SERPL ELPH-MCNC: 1.7 G/DL — LOW (ref 3.5–5.2)
ALP SERPL-CCNC: 218 U/L — HIGH (ref 30–115)
ALT FLD-CCNC: 20 U/L — SIGNIFICANT CHANGE UP (ref 0–41)
AMMONIA BLD-MCNC: 39 UMOL/L — SIGNIFICANT CHANGE UP (ref 11–55)
ANION GAP SERPL CALC-SCNC: 10 MMOL/L — SIGNIFICANT CHANGE UP (ref 7–14)
APTT BLD: 46.6 SEC — HIGH (ref 27–39.2)
AST SERPL-CCNC: 62 U/L — HIGH (ref 0–41)
BILIRUB SERPL-MCNC: 1 MG/DL — SIGNIFICANT CHANGE UP (ref 0.2–1.2)
BUN SERPL-MCNC: 24 MG/DL — HIGH (ref 10–20)
CALCIUM SERPL-MCNC: 7.3 MG/DL — LOW (ref 8.4–10.5)
CHLORIDE SERPL-SCNC: 113 MMOL/L — HIGH (ref 98–110)
CO2 SERPL-SCNC: 21 MMOL/L — SIGNIFICANT CHANGE UP (ref 17–32)
CREAT SERPL-MCNC: 0.9 MG/DL — SIGNIFICANT CHANGE UP (ref 0.7–1.5)
CULTURE RESULTS: ABNORMAL
EGFR: 87 ML/MIN/1.73M2 — SIGNIFICANT CHANGE UP
EGFR: 87 ML/MIN/1.73M2 — SIGNIFICANT CHANGE UP
GLUCOSE SERPL-MCNC: 264 MG/DL — HIGH (ref 70–99)
HCT VFR BLD CALC: 23.6 % — LOW (ref 42–52)
HGB BLD-MCNC: 7.6 G/DL — LOW (ref 14–18)
INR BLD: 1.6 RATIO — HIGH (ref 0.65–1.3)
MCHC RBC-ENTMCNC: 31 PG — SIGNIFICANT CHANGE UP (ref 27–31)
MCHC RBC-ENTMCNC: 32.2 G/DL — SIGNIFICANT CHANGE UP (ref 32–37)
MCV RBC AUTO: 96.3 FL — HIGH (ref 80–94)
NRBC BLD AUTO-RTO: 0 /100 WBCS — SIGNIFICANT CHANGE UP (ref 0–0)
PLATELET # BLD AUTO: 78 K/UL — LOW (ref 130–400)
PMV BLD: 9.7 FL — SIGNIFICANT CHANGE UP (ref 7.4–10.4)
POTASSIUM SERPL-MCNC: 3.6 MMOL/L — SIGNIFICANT CHANGE UP (ref 3.5–5)
POTASSIUM SERPL-SCNC: 3.6 MMOL/L — SIGNIFICANT CHANGE UP (ref 3.5–5)
PROT SERPL-MCNC: 4 G/DL — LOW (ref 6–8)
PROTHROM AB SERPL-ACNC: 19.1 SEC — HIGH (ref 9.95–12.87)
RBC # BLD: 2.45 M/UL — LOW (ref 4.7–6.1)
RBC # FLD: 23.7 % — HIGH (ref 11.5–14.5)
SODIUM SERPL-SCNC: 144 MMOL/L — SIGNIFICANT CHANGE UP (ref 135–146)
SPECIMEN SOURCE: SIGNIFICANT CHANGE UP
WBC # BLD: 9.21 K/UL — SIGNIFICANT CHANGE UP (ref 4.8–10.8)
WBC # FLD AUTO: 9.21 K/UL — SIGNIFICANT CHANGE UP (ref 4.8–10.8)

## 2025-05-08 PROCEDURE — 99232 SBSQ HOSP IP/OBS MODERATE 35: CPT

## 2025-05-08 PROCEDURE — 99233 SBSQ HOSP IP/OBS HIGH 50: CPT

## 2025-05-08 RX ORDER — INSULIN LISPRO 100 U/ML
INJECTION, SOLUTION INTRAVENOUS; SUBCUTANEOUS
Refills: 0 | Status: DISCONTINUED | OUTPATIENT
Start: 2025-05-08 | End: 2025-06-19

## 2025-05-08 RX ORDER — ALBUMIN (HUMAN) 12.5 G/50ML
50 INJECTION, SOLUTION INTRAVENOUS ONCE
Refills: 0 | Status: COMPLETED | OUTPATIENT
Start: 2025-05-08 | End: 2025-05-08

## 2025-05-08 RX ORDER — FUROSEMIDE 10 MG/ML
40 INJECTION INTRAMUSCULAR; INTRAVENOUS DAILY
Refills: 0 | Status: DISCONTINUED | OUTPATIENT
Start: 2025-05-08 | End: 2025-05-09

## 2025-05-08 RX ADMIN — LEVETIRACETAM 250 MILLIGRAM(S): 10 INJECTION, SOLUTION INTRAVENOUS at 18:58

## 2025-05-08 RX ADMIN — Medication 50 MILLIGRAM(S): at 05:53

## 2025-05-08 RX ADMIN — NYSTATIN 1 APPLICATION(S): 100000 CREAM TOPICAL at 05:54

## 2025-05-08 RX ADMIN — LACTULOSE 20 GRAM(S): 10 SOLUTION ORAL at 22:05

## 2025-05-08 RX ADMIN — Medication 40 MILLIGRAM(S): at 12:14

## 2025-05-08 RX ADMIN — Medication 1 APPLICATION(S): at 05:54

## 2025-05-08 RX ADMIN — OXYCODONE HYDROCHLORIDE 5 MILLIGRAM(S): 30 TABLET ORAL at 19:56

## 2025-05-08 RX ADMIN — LIDOCAINE HYDROCHLORIDE 1 PATCH: 20 JELLY TOPICAL at 00:08

## 2025-05-08 RX ADMIN — Medication 25 GRAM(S): at 12:14

## 2025-05-08 RX ADMIN — FOLIC ACID 1 MILLIGRAM(S): 1 TABLET ORAL at 12:14

## 2025-05-08 RX ADMIN — OXYCODONE HYDROCHLORIDE 5 MILLIGRAM(S): 30 TABLET ORAL at 20:56

## 2025-05-08 RX ADMIN — INSULIN LISPRO 6: 100 INJECTION, SOLUTION INTRAVENOUS; SUBCUTANEOUS at 16:48

## 2025-05-08 RX ADMIN — LIDOCAINE HYDROCHLORIDE 1 PATCH: 20 JELLY TOPICAL at 12:14

## 2025-05-08 RX ADMIN — Medication 2 TABLET(S): at 22:02

## 2025-05-08 RX ADMIN — LEVETIRACETAM 250 MILLIGRAM(S): 10 INJECTION, SOLUTION INTRAVENOUS at 05:53

## 2025-05-08 RX ADMIN — ALBUMIN (HUMAN) 50 MILLILITER(S): 12.5 INJECTION, SOLUTION INTRAVENOUS at 21:55

## 2025-05-08 RX ADMIN — LACTULOSE 20 GRAM(S): 10 SOLUTION ORAL at 05:53

## 2025-05-08 RX ADMIN — INSULIN LISPRO 8: 100 INJECTION, SOLUTION INTRAVENOUS; SUBCUTANEOUS at 05:52

## 2025-05-08 RX ADMIN — Medication 1 APPLICATION(S): at 18:57

## 2025-05-08 RX ADMIN — LIDOCAINE HYDROCHLORIDE 1 PATCH: 20 JELLY TOPICAL at 19:30

## 2025-05-08 RX ADMIN — LIDOCAINE HYDROCHLORIDE 1 PATCH: 20 JELLY TOPICAL at 23:20

## 2025-05-08 RX ADMIN — NYSTATIN 1 APPLICATION(S): 100000 CREAM TOPICAL at 18:57

## 2025-05-08 RX ADMIN — INSULIN LISPRO 6: 100 INJECTION, SOLUTION INTRAVENOUS; SUBCUTANEOUS at 12:13

## 2025-05-08 RX ADMIN — INSULIN LISPRO 4: 100 INJECTION, SOLUTION INTRAVENOUS; SUBCUTANEOUS at 21:54

## 2025-05-08 RX ADMIN — MIDODRINE HYDROCHLORIDE 10 MILLIGRAM(S): 5 TABLET ORAL at 22:00

## 2025-05-08 NOTE — PROGRESS NOTE ADULT - ASSESSMENT
DWIGHT RIBEIRO 80y Male  MRN#: [id number]   CODE STATUS:________      SUBJECTIVE  Patient is a 80y old Male who presents with a chief complaint of acute mentation changes (08 May 2025 14:37)  Currently admitted to medicine with the primary diagnosis of Metabolic encephalopathy      Today is hospital day 25d, and this morning he is confused , screaming out for his mother. Not following commands   afebrile           OBJECTIVE  PAST MEDICAL & SURGICAL HISTORY  Diabetes    Hypertension    Fall    H/O left knee surgery    History of cholecystectomy    History of appendectomy      ALLERGIES:  No Known Allergies    MEDICATIONS:  STANDING MEDICATIONS  chlorhexidine 2% Cloths 1 Application(s) Topical <User Schedule>  dextrose 5%. 1000 milliLiter(s) IV Continuous <Continuous>  dextrose 5%. 1000 milliLiter(s) IV Continuous <Continuous>  dextrose 50% Injectable 25 Gram(s) IV Push once  dextrose 50% Injectable 12.5 Gram(s) IV Push once  dextrose 50% Injectable 25 Gram(s) IV Push once  folic acid 1 milliGRAM(s) Oral daily  furosemide    Tablet 40 milliGRAM(s) Oral daily  glucagon  Injectable 1 milliGRAM(s) IntraMuscular once  insulin lispro (ADMELOG) corrective regimen sliding scale   SubCutaneous Before meals and at bedtime  lactulose Syrup 20 Gram(s) Oral every 8 hours  levETIRAcetam   Injectable 250 milliGRAM(s) IV Push <User Schedule>  lidocaine   4% Patch 1 Patch Transdermal daily  midodrine 10 milliGRAM(s) Oral every 8 hours  nystatin Powder 1 Application(s) Topical two times a day  pantoprazole  Injectable 40 milliGRAM(s) IV Push daily  rifAXIMin 550 milliGRAM(s) Oral two times a day  senna 2 Tablet(s) Oral at bedtime  silver sulfADIAZINE 1% Cream 1 Application(s) Topical two times a day  spironolactone 50 milliGRAM(s) Oral daily    PRN MEDICATIONS  acetaminophen     Tablet .. 650 milliGRAM(s) Oral every 6 hours PRN  aluminum hydroxide/magnesium hydroxide/simethicone Suspension 30 milliLiter(s) Oral every 4 hours PRN  dextrose Oral Gel 15 Gram(s) Oral once PRN  melatonin 3 milliGRAM(s) Oral at bedtime PRN  ondansetron Injectable 4 milliGRAM(s) IV Push every 8 hours PRN  oxyCODONE    IR 5 milliGRAM(s) Oral every 6 hours PRN      VITAL SIGNS: Last 24 Hours  T(C): 36.8 (08 May 2025 04:50), Max: 37.7 (07 May 2025 21:24)  T(F): 98.3 (08 May 2025 04:50), Max: 99.8 (07 May 2025 21:24)  HR: 91 (08 May 2025 04:50) (91 - 94)  BP: 121/73 (08 May 2025 04:50) (121/73 - 125/67)  BP(mean): --  RR: 18 (08 May 2025 04:50) (18 - 18)  SpO2: 98% (08 May 2025 04:50) (97% - 98%)    LABS:                        7.6    9.21  )-----------( 78       ( 08 May 2025 08:07 )             23.6     05-08    144  |  113[H]  |  24[H]  ----------------------------<  264[H]  3.6   |  21  |  0.9    Ca    7.3[L]      08 May 2025 08:07  Mg     1.5     05-07    TPro  4.0[L]  /  Alb  1.7[L]  /  TBili  1.0  /  DBili  x   /  AST  62[H]  /  ALT  20  /  AlkPhos  218[H]  05-08    PT/INR - ( 08 May 2025 08:07 )   PT: 19.10 sec;   INR: 1.60 ratio         PTT - ( 08 May 2025 08:07 )  PTT:46.6 sec  Urinalysis Basic - ( 08 May 2025 08:07 )    Color: x / Appearance: x / SG: x / pH: x  Gluc: 264 mg/dL / Ketone: x  / Bili: x / Urobili: x   Blood: x / Protein: x / Nitrite: x   Leuk Esterase: x / RBC: x / WBC x   Sq Epi: x / Non Sq Epi: x / Bacteria: x                  PHYSICAL EXAM:    GENERAL: NAD, well-developed, AAOx3, obese male, anasarca, confused   HEENT:  Atraumatic, Normocephalic. EOMI, PERRLA, conjunctiva and sclera clear, No JVD  PULMONARY: Clear to auscultation bilaterally; No wheeze  CARDIOVASCULAR: Regular rate and rhythm; No murmurs, rubs, or gallops  GASTROINTESTINAL: Soft, Nontender, Nondistended; Bowel sounds present  MUSCULOSKELETAL:  2+ Peripheral Pulses, No clubbing, cyanosis,, 2+ edema bl upper and lower ext   NEUROLOGY: non-focal  SKIN: No rashes or lesions      81 yo male with h/o lumbar spine osteomyelitis, HTN, DM, subdural hematoma presented with altered mental status, found to have bacteremia and cirrhosis.     Fever  -low grade fever, improving, trend temp  -given pt's poor vascular status, anasarca, hard stick, obtain blood culture was extremely difficult, will try again in the am   -restarted abx for empiric coverage, ID consulted for further management  as per discussions with  ID , IV ceftriaxone 1g q24hrs Dc   - Obtain 2 sets of repeat BCx  - Follow up UCx, ucx growing yeast, likely a contaminant       AMS  -2/2 underlying disease, metabolic encephalopathy  -lethargic  ammonia level 39(not sig. elevated)    Decompensated cirrhosis       portal hypertension      Coagulopathy      Hypoalbuminemia       hepatic encephalopathy  -on rifaximin, lactulose  -s/p albumin given diffuse edema on 4/30, 5/2  pt is very edematous, cont  spironolactone 50 mg and will try to titrate up to 100mg  and start lasix 40mg daily   cont to monitor bp   will give another dose of albumin     Anemia  -likely multifactorial: sepsis, cirrhosis, poor nutrition, GI bleeding  -s/p EGD on 4/16 which showed 5mm ulcer in duodenal bulb post endo clip, repeat EGD on 4/29 showed no bleeding noted  -will monitor for GI bleeding   transfused one unit of PRBC on 5/3  -Hb 7.6 , cont to monitor     Bacteremia  -blood culture positive for bacteroides on presentation  -s/p ceftriaxone and flagyl  -repeat blood culture negative, pending new blood culture after episode of fever  as per ID hold off antibiotics     Afib  -found during this admission  -rate controlled, not candidate for AC given anemia, coagulopathy    Deconditioning  -PT  -OOB    Sacral pressure ulcer  -local wound care    hypocalcemia:  albumin is 1.7, corrected calcium is 9.1  will give albumin     Overall very poor prognosis palliative care following   dispo: f/u ammonia level, repeat cultures, ID following     SVT PPX: SCD due to coagulopathy, risk of Gi bleed /anemia

## 2025-05-08 NOTE — PROGRESS NOTE ADULT - SUBJECTIVE AND OBJECTIVE BOX
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 fragilis bacteremia, and acute blood loss anemia. Patient is on IV morphine for pain. Patient is on lactulose for encephalopathy, IV abx, and frequent H/H monitoring, requiring pRBC. Palliative consulted for GOC. Patient is full code.    INTERVAL EVENTS  4/24: patient without acute distress  4/25: no acute distress, on octreotide gtt, NGT in place  5/6: Patient without acute distress.   5/8: NAD, no family present.     ADVANCE DIRECTIVES:    [ ] Full Code [ ] DNR  MOLST  [ ]  Living Will  [ ]   DECISION MAKER(s):  [ ] Health Care Proxy(s)  [ ] Surrogate(s)  [ ] Guardian           Name(s): Phone Number(s): spouse Edwige [phone] | [phone]    BASELINE (I)ADL(s) (prior to admission):  Rabun: [ ]Total  [ ] Moderate [ ]Dependent  Palliative Performance Status Version 2:         %    http://npcrc.org/files/news/palliative_performance_scale_ppsv2.pdf    Allergies    No Known Allergies    Intolerances    MEDICATIONS  (STANDING):  chlorhexidine 2% Cloths 1 Application(s) Topical <User Schedule>  dextrose 5%. 1000 milliLiter(s) (100 mL/Hr) IV Continuous <Continuous>  dextrose 5%. 1000 milliLiter(s) (50 mL/Hr) IV Continuous <Continuous>  dextrose 50% Injectable 25 Gram(s) IV Push once  dextrose 50% Injectable 12.5 Gram(s) IV Push once  dextrose 50% Injectable 25 Gram(s) IV Push once  folic acid 1 milliGRAM(s) Oral daily  glucagon  Injectable 1 milliGRAM(s) IntraMuscular once  insulin lispro (ADMELOG) corrective regimen sliding scale   SubCutaneous Before meals and at bedtime  lactulose Syrup 20 Gram(s) Oral every 8 hours  levETIRAcetam   Injectable 250 milliGRAM(s) IV Push <User Schedule>  lidocaine   4% Patch 1 Patch Transdermal daily  midodrine 10 milliGRAM(s) Oral every 8 hours  nystatin Powder 1 Application(s) Topical two times a day  pantoprazole  Injectable 40 milliGRAM(s) IV Push daily  rifAXIMin 550 milliGRAM(s) Oral two times a day  senna 2 Tablet(s) Oral at bedtime  silver sulfADIAZINE 1% Cream 1 Application(s) Topical two times a day  spironolactone 50 milliGRAM(s) Oral daily    MEDICATIONS  (PRN):  acetaminophen     Tablet .. 650 milliGRAM(s) Oral every 6 hours PRN Temp greater or equal to 38C (100.4F), Mild Pain (1 - 3)  aluminum hydroxide/magnesium hydroxide/simethicone Suspension 30 milliLiter(s) Oral every 4 hours PRN Dyspepsia  dextrose Oral Gel 15 Gram(s) Oral once PRN Blood Glucose LESS THAN 70 milliGRAM(s)/deciliter  melatonin 3 milliGRAM(s) Oral at bedtime PRN Insomnia  ondansetron Injectable 4 milliGRAM(s) IV Push every 8 hours PRN Nausea and/or Vomiting  oxyCODONE    IR 5 milliGRAM(s) Oral every 6 hours PRN Moderate Pain (4 - 6)        PRESENT SYMPTOMS: [ X][ Difficult to obtain due to poor mentation   Source if other than patient:  [ ]Family   [ ]Team   [ X]All review of systems negative including pain and dyspnea unless noted below    All components of pain assessment below addressed. Blank spaces indicate that the patient did/could not complete the assessment.  Pain: [ ]yes [ ]no  QOL impact -   Location -                    Aggravating factors -  Quality -  Radiation -  Timing-  Severity (0-10 scale):  Minimal acceptable level (0-10 scale):     CPOT:    https://[email or web address]/Critical-Care-Pain-Observation-Tool-(CPOT)    PAIN AD Score: 0  [email or web address].[email or web address]/painad.pdf (press ctrl +  left click to view)    Dyspnea:                           [ ]None[ ]Mild [ ]Moderate [ ]Severe     Respiratory Distress Observation Scale (RDOS): 0  A score of 0 to 2 signifies little or no respiratory distress, 3 signifies mild distress, scores 4 to 6 indicate moderate distress, and scores greater than 7 signify severe distress  [email or web address]-gotrpaqcubb-zehwqgrt-[email or web address]    Anxiety:                             [ ]None[ ]Mild [ ]Moderate [ ]Severe   Fatigue:                             [ ]None[ ]Mild [ ]Moderate [ ]Severe   Nausea:                             [ ]None[ ]Mild [ ]Moderate [ ]Severe   Loss of appetite:              [ ]None[ ]Mild [ ]Moderate [ ]Severe   Constipation:                    [ ]None[ ]Mild [ ]Moderate [ ]Severe    Other Symptoms:    PHYSICAL EXAM:    GENERAL:  [X ] No acute distress [ ]Lethargic  [ ]Unarousable  [ ]Verbal  [ ]Non-Verbal [ ]Cachexia    BEHAVIORAL/PSYCH:  [ ]Alert and Oriented x  [ ] Anxiety [ ] Delirium [ ] Agitation [X ] Calm   EYES: [ ] No scleral icterus [ ] Scleral icterus [ ] Closed  ENMT:  [ ]Dry mouth  [ ]No external oral lesions [ X] No external ear or nose lesions  CARDIOVASCULAR:  [ ]Regular [ ]Irregular [ ]Tachy [ ]Not Tachy  [ ]Jenaro [ ] Edema [ ] No edema  PULMONARY:  [ ]Tachypnea  [ ]Audible excessive secretions [X ] No labored breathing [ ] labored breathing  GASTROINTESTINAL: [ ]Soft  [ ]Distended  [ X]Not distended [ ]Non tender [ ]Tender  MUSCULOSKELETAL: [ ]No clubbing [ ] clubbing  [ X] No cyanosis [ ] cyanosis  NEUROLOGIC: [ ]No focal deficits  [ ]Follows commands  [ ]Does not follow commands  [ ]Cognitive impairment  [ ]Dysphagia  [ ]Dysarthria  [ ]Paresis   SKIN: [ ] Jaundiced [X ] Non-jaundiced [ ]Rash [ ]No Rash [ ] Warm [ ] Dry             PPS: 30%    CRITICAL CARE:  [ ] Shock Present  [ ]Septic [ ]Cardiogenic [ ]Neurologic [ ]Hypovolemic  [ ]  Vasopressors [ ]  Inotropes   [ ]Respiratory failure present [ ]Mechanical ventilation [ ]Non-invasive ventilatory support [ ]High flow  [ ]Acute  [ ]Chronic [ ]Hypoxic  [ ]Hypercarbic [ ]Other  [ ]Other organ failure     LABS: reviewed by me, notable for:                  7.6    9.21  )-----------( 78       ( 08 May 2025 08:07 )             23.6       05-08    144  |  113[H]  |  24[H]  ----------------------------<  264[H]  3.6   |  21  |  0.9    Ca    7.3[L]      08 May 2025 08:07  Mg     1.5     05-07    TPro  4.0[L]  /  Alb  1.7[L]  /  TBili  1.0  /  DBili  x   /  AST  62[H]  /  ALT  20  /  AlkPhos  218[H]  05-08          PT/INR - ( 08 May 2025 08:07 )   PT: 19.10 sec;   INR: 1.60 ratio         PTT - ( 08 May 2025 08:07 )  PTT:46.6 secMISC/LINES: [ ] ET tube [ ] Trach [ ]NGT/OGT [ ]PEG [ ]Michel              RADIOLOGY & ADDITIONAL STUDIES: CXR personally reviewed and interpretated by me:     5/5: Nonspecific air distended loops of bowel.    Stable osseous structures.    PROTEIN CALORIE MALNUTRITION PRESENT: [ ]mild [ ]moderate [ ]severe [ ]underweight [ ]morbid obesity  https://www.andeal.org/vault/2440/web/files/ONC/Table_Clinical%20Characteristics%20to%20Document%20Malnutrition-White%20JV%20et%20al%202012.pdf    Height (cm): 175.3 (04-17-25 @ 11:56), 175.3 (02-19-25 @ 12:20), 175.3 (01-23-25 @ 23:40)  Weight (kg): 117.8 (04-17-25 @ 11:56), 113.4 (02-19-25 @ 12:20), 120.6 (01-23-25 @ 23:40)  BMI (kg/m2): 38.3 (04-17-25 @ 11:56), 36.9 (02-19-25 @ 12:20), 39.2 (01-23-25 @ 23:40)    [ ]PPSV2 < or = to 30% [ ]significant weight loss  [ ]poor nutritional intake  [ ]anasarca      [ ]Artificial Nutrition          Palliative Care Spiritual/Emotional Screening Tool Question  Severity (0-4):                    OR                    [X ] Unable to determine/NA  Score of 2 or greater indicates recommendation of Chaplaincy referral  Chaplaincy Referral: [ ] Yes [ ] Refused [ ] Following     Caregiver [place]:  [ ] Yes [ ] No    OR    [x ] Unable to determine. Will assess at later time if appropriate.  Social Work Referral [ ]  Patient and Family Centered Care Referral [ ]    Anticipatory Grief Present: [ ] Yes [ ] No    OR     [ x] Unable to determine. Will assess at later time if appropriate.  Social Work Referral [ ]  Patient and Family Centered Care Referral [ ]    REFERRALS:   [ ]Chaplaincy  [ ]Hospice  [ ]Child Life  [ ]Social Work  [ ]Case management [ ]Holistic Therapy     Palliative care education provided to patient and/or family    Goals of Care Document:

## 2025-05-08 NOTE — PROGRESS NOTE ADULT - ASSESSMENT
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 fragilis bacteremia, and acute blood loss anemia. Palliative team had met with patient's spouse, who indicated wanting all life-prolonging interventions. We are reconsulted as patient's spouse and daughter sees things differently.     5/6: Notified that patient's daughter (Edwige [phone]) is an RN and is open to palliative care involvement. I spoke to her and she indicated that she is aware that patient's health will continue to decline, but she does not think that patient's spouse is ready to accept this. If it were up to the daughter, she would want patient to be DNR. She requests that I speak to patient's spouse. I was able to call her (also named Edwige [phone]), and to reintroduce our team. She states that it has been a difficult period of time because she is dealing with her own health issues. She was also not expecting us to find so many different medical problems in the patient in such a short amount of time. She became emotional during our discussion and asks if we can talk more in person. At present, she is feeling sick and will call me when she comes to the hospital.     5/8: I have not heard back from patient's spouse. I called to f/u but she did not answer.     Plan:  - symptoms per primary team  - continue current medical interventions  - ongoing discussions as above  - full code    I have reviewed documentation from: ERNIE MUSTAFA    Palliative care will continue to follow.   Please call [phone] with questions or concerns 24/7.

## 2025-05-09 ENCOUNTER — APPOINTMENT (OUTPATIENT)
Dept: VASCULAR SURGERY | Facility: CLINIC | Age: 80
End: 2025-05-09

## 2025-05-09 LAB
ALBUMIN SERPL ELPH-MCNC: 2 G/DL — LOW (ref 3.5–5.2)
ALP SERPL-CCNC: 231 U/L — HIGH (ref 30–115)
ALT FLD-CCNC: 25 U/L — SIGNIFICANT CHANGE UP (ref 0–41)
ANION GAP SERPL CALC-SCNC: 10 MMOL/L — SIGNIFICANT CHANGE UP (ref 7–14)
AST SERPL-CCNC: 65 U/L — HIGH (ref 0–41)
BASOPHILS # BLD AUTO: 0 K/UL — SIGNIFICANT CHANGE UP (ref 0–0.2)
BASOPHILS NFR BLD AUTO: 0 % — SIGNIFICANT CHANGE UP (ref 0–1)
BILIRUB SERPL-MCNC: 1.3 MG/DL — HIGH (ref 0.2–1.2)
BUN SERPL-MCNC: 21 MG/DL — HIGH (ref 10–20)
CALCIUM SERPL-MCNC: 7.6 MG/DL — LOW (ref 8.4–10.5)
CHLORIDE SERPL-SCNC: 113 MMOL/L — HIGH (ref 98–110)
CO2 SERPL-SCNC: 21 MMOL/L — SIGNIFICANT CHANGE UP (ref 17–32)
CREAT SERPL-MCNC: 0.9 MG/DL — SIGNIFICANT CHANGE UP (ref 0.7–1.5)
EGFR: 87 ML/MIN/1.73M2 — SIGNIFICANT CHANGE UP
EGFR: 87 ML/MIN/1.73M2 — SIGNIFICANT CHANGE UP
EOSINOPHIL NFR BLD AUTO: 0 % — SIGNIFICANT CHANGE UP (ref 0–8)
GLUCOSE SERPL-MCNC: 236 MG/DL — HIGH (ref 70–99)
HCT VFR BLD CALC: 27.1 % — LOW (ref 42–52)
HGB BLD-MCNC: 8.6 G/DL — LOW (ref 14–18)
LYMPHOCYTES # BLD AUTO: 0.58 K/UL — LOW (ref 1.2–3.4)
LYMPHOCYTES # BLD AUTO: 7 % — LOW (ref 20.5–51.1)
MCHC RBC-ENTMCNC: 30.9 PG — SIGNIFICANT CHANGE UP (ref 27–31)
MCHC RBC-ENTMCNC: 31.7 G/DL — LOW (ref 32–37)
MCV RBC AUTO: 97.5 FL — HIGH (ref 80–94)
MONOCYTES # BLD AUTO: 0.33 K/UL — SIGNIFICANT CHANGE UP (ref 0.1–0.6)
MONOCYTES NFR BLD AUTO: 4 % — SIGNIFICANT CHANGE UP (ref 1.7–9.3)
NEUTROPHILS # BLD AUTO: 7.27 K/UL — HIGH (ref 1.4–6.5)
NEUTROPHILS NFR BLD AUTO: 84 % — HIGH (ref 42.2–75.2)
NRBC BLD AUTO-RTO: SIGNIFICANT CHANGE UP /100 WBCS (ref 0–0)
PLATELET # BLD AUTO: 80 K/UL — LOW (ref 130–400)
PMV BLD: 10 FL — SIGNIFICANT CHANGE UP (ref 7.4–10.4)
POTASSIUM SERPL-MCNC: 3.5 MMOL/L — SIGNIFICANT CHANGE UP (ref 3.5–5)
POTASSIUM SERPL-SCNC: 3.5 MMOL/L — SIGNIFICANT CHANGE UP (ref 3.5–5)
PROT SERPL-MCNC: 4.6 G/DL — LOW (ref 6–8)
RBC # BLD: 2.78 M/UL — LOW (ref 4.7–6.1)
RBC # FLD: 23.9 % — HIGH (ref 11.5–14.5)
SODIUM SERPL-SCNC: 144 MMOL/L — SIGNIFICANT CHANGE UP (ref 135–146)
WBC # BLD: 8.26 K/UL — SIGNIFICANT CHANGE UP (ref 4.8–10.8)
WBC # FLD AUTO: 8.26 K/UL — SIGNIFICANT CHANGE UP (ref 4.8–10.8)

## 2025-05-09 PROCEDURE — 99232 SBSQ HOSP IP/OBS MODERATE 35: CPT

## 2025-05-09 PROCEDURE — 74018 RADEX ABDOMEN 1 VIEW: CPT | Mod: 26

## 2025-05-09 PROCEDURE — 99233 SBSQ HOSP IP/OBS HIGH 50: CPT

## 2025-05-09 RX ORDER — OXYCODONE HYDROCHLORIDE 30 MG/1
5 TABLET ORAL EVERY 6 HOURS
Refills: 0 | Status: DISCONTINUED | OUTPATIENT
Start: 2025-05-09 | End: 2025-05-09

## 2025-05-09 RX ORDER — BUMETANIDE 1 MG/1
1 TABLET ORAL DAILY
Refills: 0 | Status: DISCONTINUED | OUTPATIENT
Start: 2025-05-09 | End: 2025-05-15

## 2025-05-09 RX ORDER — ALBUMIN (HUMAN) 12.5 G/50ML
50 INJECTION, SOLUTION INTRAVENOUS ONCE
Refills: 0 | Status: COMPLETED | OUTPATIENT
Start: 2025-05-09 | End: 2025-05-10

## 2025-05-09 RX ADMIN — OXYCODONE HYDROCHLORIDE 5 MILLIGRAM(S): 30 TABLET ORAL at 13:24

## 2025-05-09 RX ADMIN — LACTULOSE 20 GRAM(S): 10 SOLUTION ORAL at 05:47

## 2025-05-09 RX ADMIN — Medication 50 MILLIGRAM(S): at 05:45

## 2025-05-09 RX ADMIN — BUMETANIDE 1 MILLIGRAM(S): 1 TABLET ORAL at 17:00

## 2025-05-09 RX ADMIN — LACTULOSE 20 GRAM(S): 10 SOLUTION ORAL at 14:42

## 2025-05-09 RX ADMIN — Medication 1 APPLICATION(S): at 05:49

## 2025-05-09 RX ADMIN — OXYCODONE HYDROCHLORIDE 5 MILLIGRAM(S): 30 TABLET ORAL at 06:41

## 2025-05-09 RX ADMIN — INSULIN LISPRO 6: 100 INJECTION, SOLUTION INTRAVENOUS; SUBCUTANEOUS at 08:19

## 2025-05-09 RX ADMIN — LIDOCAINE HYDROCHLORIDE 1 PATCH: 20 JELLY TOPICAL at 11:21

## 2025-05-09 RX ADMIN — Medication 2 TABLET(S): at 21:51

## 2025-05-09 RX ADMIN — FOLIC ACID 1 MILLIGRAM(S): 1 TABLET ORAL at 11:21

## 2025-05-09 RX ADMIN — NYSTATIN 1 APPLICATION(S): 100000 CREAM TOPICAL at 05:49

## 2025-05-09 RX ADMIN — OXYCODONE HYDROCHLORIDE 5 MILLIGRAM(S): 30 TABLET ORAL at 12:54

## 2025-05-09 RX ADMIN — OXYCODONE HYDROCHLORIDE 5 MILLIGRAM(S): 30 TABLET ORAL at 05:41

## 2025-05-09 RX ADMIN — Medication 40 MILLIGRAM(S): at 11:21

## 2025-05-09 RX ADMIN — INSULIN LISPRO 2: 100 INJECTION, SOLUTION INTRAVENOUS; SUBCUTANEOUS at 12:00

## 2025-05-09 RX ADMIN — FUROSEMIDE 40 MILLIGRAM(S): 10 INJECTION INTRAMUSCULAR; INTRAVENOUS at 05:41

## 2025-05-09 RX ADMIN — LEVETIRACETAM 250 MILLIGRAM(S): 10 INJECTION, SOLUTION INTRAVENOUS at 05:50

## 2025-05-09 RX ADMIN — LACTULOSE 20 GRAM(S): 10 SOLUTION ORAL at 21:52

## 2025-05-09 RX ADMIN — MIDODRINE HYDROCHLORIDE 10 MILLIGRAM(S): 5 TABLET ORAL at 05:45

## 2025-05-09 RX ADMIN — Medication 1 APPLICATION(S): at 17:05

## 2025-05-09 RX ADMIN — INSULIN LISPRO 6: 100 INJECTION, SOLUTION INTRAVENOUS; SUBCUTANEOUS at 22:00

## 2025-05-09 RX ADMIN — LEVETIRACETAM 250 MILLIGRAM(S): 10 INJECTION, SOLUTION INTRAVENOUS at 17:06

## 2025-05-09 RX ADMIN — INSULIN LISPRO 6: 100 INJECTION, SOLUTION INTRAVENOUS; SUBCUTANEOUS at 16:53

## 2025-05-09 RX ADMIN — NYSTATIN 1 APPLICATION(S): 100000 CREAM TOPICAL at 17:06

## 2025-05-09 NOTE — CONSULT NOTE ADULT - SUBJECTIVE AND OBJECTIVE BOX
-------------------------------------------------------------------------------------------------------------------------------------------------------------------------------  HEPATOLOGY INITIAL CONSULT  -------------------------------------------------------------------------------------------------------------------------------------------------------------------------------    HPI:  80 years old male history of hypertension, diabetes, subdural hematoma on Keppra, hx mechanical falls, recent admission (January 2025) for osteomyelitis of L4-5 w completion of abx.  BIBA from home status post change of mental status.  Per provider note, patient was discharged from rehab facility on April 4.  Patient was supposed to have MRI of lower back on April 5 but patient refused.  Patient baseline uses walker to ambulate.  By report patient has been doing okay since his discharge from the facility.  Patient had 1 episode of fall on his buttocks few days ago that she was able to help patient to get up with no difficulty.  Patient become confused and calling for his father tonight over the past few hours so she called EMS.  No ROS given mental status, no family at bedside.     (14 Apr 2025 00:29)      Outpatient GI Provider:    PAST MEDICAL & SURGICAL HISTORY:  Diabetes      Hypertension      Fall      H/O left knee surgery      History of cholecystectomy      History of appendectomy          Review of Systems: Negative except as per HPI.    MEDICATIONS  (STANDING):  chlorhexidine 2% Cloths 1 Application(s) Topical <User Schedule>  dextrose 5%. 1000 milliLiter(s) (100 mL/Hr) IV Continuous <Continuous>  dextrose 5%. 1000 milliLiter(s) (50 mL/Hr) IV Continuous <Continuous>  dextrose 50% Injectable 25 Gram(s) IV Push once  dextrose 50% Injectable 12.5 Gram(s) IV Push once  dextrose 50% Injectable 25 Gram(s) IV Push once  folic acid 1 milliGRAM(s) Oral daily  furosemide    Tablet 40 milliGRAM(s) Oral daily  glucagon  Injectable 1 milliGRAM(s) IntraMuscular once  insulin lispro (ADMELOG) corrective regimen sliding scale   SubCutaneous Before meals and at bedtime  lactulose Syrup 20 Gram(s) Oral every 8 hours  levETIRAcetam   Injectable 250 milliGRAM(s) IV Push <User Schedule>  lidocaine   4% Patch 1 Patch Transdermal daily  midodrine 10 milliGRAM(s) Oral every 8 hours  nystatin Powder 1 Application(s) Topical two times a day  pantoprazole  Injectable 40 milliGRAM(s) IV Push daily  rifAXIMin 550 milliGRAM(s) Oral two times a day  senna 2 Tablet(s) Oral at bedtime  silver sulfADIAZINE 1% Cream 1 Application(s) Topical two times a day  spironolactone 50 milliGRAM(s) Oral daily    MEDICATIONS  (PRN):  acetaminophen     Tablet .. 650 milliGRAM(s) Oral every 6 hours PRN Temp greater or equal to 38C (100.4F), Mild Pain (1 - 3)  aluminum hydroxide/magnesium hydroxide/simethicone Suspension 30 milliLiter(s) Oral every 4 hours PRN Dyspepsia  dextrose Oral Gel 15 Gram(s) Oral once PRN Blood Glucose LESS THAN 70 milliGRAM(s)/deciliter  melatonin 3 milliGRAM(s) Oral at bedtime PRN Insomnia  ondansetron Injectable 4 milliGRAM(s) IV Push every 8 hours PRN Nausea and/or Vomiting  oxyCODONE    IR 5 milliGRAM(s) Oral every 6 hours PRN Moderate Pain (4 - 6)      ALLERGIES:      SOCIAL HISTORY:  - Alcohol:  - Recreational drug use:    FAMILY HISTORY:      Vital Signs Last 24 Hrs  T(C): 36.6 (09 May 2025 14:14), Max: 36.7 (09 May 2025 05:30)  T(F): 97.9 (09 May 2025 14:14), Max: 98 (09 May 2025 05:30)  HR: 75 (09 May 2025 14:14) (75 - 96)  BP: 150/54 (09 May 2025 14:14) (114/60 - 150/54)  BP(mean): --  RR: 18 (09 May 2025 14:14) (18 - 19)  SpO2: 95% (09 May 2025 14:14) (95% - 97%)    Parameters below as of 09 May 2025 14:14  Patient On (Oxygen Delivery Method): nasal cannula  O2 Flow (L/min): 4      PHYSICAL EXAM:  Constitutional:  lethargic E3M6V3  Eyes: no icterus  Gastrointestinal: patulous soft,, nontender  Extremities: b/l  LE edema  Neurological: AAOx3, no asterixis Tremors +  Skin: no rashes, bruises, petechiae    LABS:                        8.6    8.26  )-----------( 80       ( 09 May 2025 08:06 )             27.1     05-09    144  |  113[H]  |  21[H]  ----------------------------<  236[H]  3.5   |  21  |  0.9    Ca    7.6[L]      09 May 2025 08:06    TPro  4.6[L]  /  Alb  2.0[L]  /  TBili  1.3[H]  /  DBili  x   /  AST  65[H]  /  ALT  25  /  AlkPhos  231[H]  05-09    PT/INR - ( 08 May 2025 08:07 )   PT: 19.10 sec;   INR: 1.60 ratio         PTT - ( 08 May 2025 08:07 )  PTT:46.6 sec  LIVER FUNCTIONS - ( 09 May 2025 08:06 )  Alb: 2.0 g/dL / Pro: 4.6 g/dL / ALK PHOS: 231 U/L / ALT: 25 U/L / AST: 65 U/L / GGT: x             RADIOLOGY & ADDITIONAL STUDIES:

## 2025-05-09 NOTE — CONSULT NOTE ADULT - ASSESSMENT
80  y o  M with T2DM HTN  hx mechanical falls subdural hematoma on Keppra admitted with change in mental status       Hx of admission in January 2025 with osteomyelitis of L4-5.  CT on admission showing cirrhosis  KUB dilated bowel loops  Echo - normal EF mod LAE No RVSD        Cirrhosis likely MASLD with portal hypotension MELD 3.0 - 15 Child B9  Anasarca with hypoalbuminemia  Encephalopathy ? metabolic doubt HE  Acute cholestatic hepatitis - likely due DILI from antibotics - macrolides  Macrocytic anemia  Dilated bowel loops  Mild proteinuria    Would use bumetanide with hypoalbuminemia and anasarca  Can continue spironolactone  Repeat abdominal Xray  If has dilated bowel loops avoid lactulose and use Mirlax / bowel prep instead  Continue rifaximin  Avoid opiates sedatives  2g Na 1.5 g/kg protein diet  PT   OOB to chair

## 2025-05-09 NOTE — PROGRESS NOTE ADULT - ASSESSMENT
DWIGHT RIBEIRO 80y Male  MRN#: [id number]         SUBJECTIVE  Patient is a 80y old Male who presents with a chief complaint of acute mentation changes (09 May 2025 15:04)  Currently admitted to medicine with the primary diagnosis of Metabolic encephalopathy      Today is hospital day 26d, and this morning he is confused, not following commands. Appears edematous          OBJECTIVE  PAST MEDICAL & SURGICAL HISTORY  Diabetes    Hypertension    Fall    H/O left knee surgery    History of cholecystectomy    History of appendectomy      ALLERGIES:  No Known Allergies    MEDICATIONS:  STANDING MEDICATIONS  albumin human 25% IVPB 50 milliLiter(s) IV Intermittent once  buMETAnide Injectable 1 milliGRAM(s) IV Push daily  chlorhexidine 2% Cloths 1 Application(s) Topical <User Schedule>  dextrose 5%. 1000 milliLiter(s) IV Continuous <Continuous>  dextrose 5%. 1000 milliLiter(s) IV Continuous <Continuous>  dextrose 50% Injectable 25 Gram(s) IV Push once  dextrose 50% Injectable 12.5 Gram(s) IV Push once  dextrose 50% Injectable 25 Gram(s) IV Push once  folic acid 1 milliGRAM(s) Oral daily  glucagon  Injectable 1 milliGRAM(s) IntraMuscular once  insulin lispro (ADMELOG) corrective regimen sliding scale   SubCutaneous Before meals and at bedtime  lactulose Syrup 20 Gram(s) Oral every 8 hours  levETIRAcetam   Injectable 250 milliGRAM(s) IV Push <User Schedule>  lidocaine   4% Patch 1 Patch Transdermal daily  midodrine 10 milliGRAM(s) Oral every 8 hours  nystatin Powder 1 Application(s) Topical two times a day  pantoprazole  Injectable 40 milliGRAM(s) IV Push daily  rifAXIMin 550 milliGRAM(s) Oral two times a day  senna 2 Tablet(s) Oral at bedtime  silver sulfADIAZINE 1% Cream 1 Application(s) Topical two times a day  spironolactone 50 milliGRAM(s) Oral daily    PRN MEDICATIONS  acetaminophen     Tablet .. 650 milliGRAM(s) Oral every 6 hours PRN  aluminum hydroxide/magnesium hydroxide/simethicone Suspension 30 milliLiter(s) Oral every 4 hours PRN  dextrose Oral Gel 15 Gram(s) Oral once PRN  melatonin 3 milliGRAM(s) Oral at bedtime PRN  ondansetron Injectable 4 milliGRAM(s) IV Push every 8 hours PRN      VITAL SIGNS: Last 24 Hours  T(C): 36.6 (09 May 2025 14:14), Max: 36.7 (09 May 2025 05:30)  T(F): 97.9 (09 May 2025 14:14), Max: 98 (09 May 2025 05:30)  HR: 75 (09 May 2025 14:14) (75 - 96)  BP: 150/54 (09 May 2025 14:14) (114/60 - 150/54)  BP(mean): --  RR: 18 (09 May 2025 14:14) (18 - 19)  SpO2: 95% (09 May 2025 14:14) (95% - 97%)    LABS:                        8.6    8.26  )-----------( 80       ( 09 May 2025 08:06 )             27.1     05-09    144  |  113[H]  |  21[H]  ----------------------------<  236[H]  3.5   |  21  |  0.9    Ca    7.6[L]      09 May 2025 08:06    TPro  4.6[L]  /  Alb  2.0[L]  /  TBili  1.3[H]  /  DBili  x   /  AST  65[H]  /  ALT  25  /  AlkPhos  231[H]  05-09    PT/INR - ( 08 May 2025 08:07 )   PT: 19.10 sec;   INR: 1.60 ratio         PTT - ( 08 May 2025 08:07 )  PTT:46.6 sec  Urinalysis Basic - ( 09 May 2025 08:06 )    Color: x / Appearance: x / SG: x / pH: x  Gluc: 236 mg/dL / Ketone: x  / Bili: x / Urobili: x   Blood: x / Protein: x / Nitrite: x   Leuk Esterase: x / RBC: x / WBC x   Sq Epi: x / Non Sq Epi: x / Bacteria: x                PHYSICAL EXAM:    GENERAL: NAD, well-developed, AAOx3, obese male, anasarca, confused   HEENT:  Atraumatic, Normocephalic. EOMI, PERRLA, conjunctiva and sclera clear, No JVD  PULMONARY: Clear to auscultation bilaterally; No wheeze  CARDIOVASCULAR: Regular rate and rhythm; No murmurs, rubs, or gallops  GASTROINTESTINAL: Soft, Nontender, Nondistended; Bowel sounds present  MUSCULOSKELETAL:  2+ Peripheral Pulses, No clubbing, cyanosis,, 2+ edema bl upper and lower ext   NEUROLOGY: non-focal  SKIN: No rashes or lesions      79 yo male with h/o lumbar spine osteomyelitis, HTN, DM, subdural hematoma presented with altered mental status, found to have bacteremia and cirrhosis.     Fever  -low grade fever, improving, cont to  trend temp  -restarted abx for empiric coverage, ID consulted for further management  as per discussions with  ID , IV ceftriaxone 1g q24hrs Dc now  - f/u  repeat BCx  -  ucx growing yeast, likely a contaminant   fever resolved and wbc wnl    AMS  -2/2 underlying disease, metabolic encephalopathy  -lethargic  ammonia level 39(not sig. elevated)  hold opioids     Decompensated cirrhosis       portal hypertension      Coagulopathy      Hypoalbuminemia       hepatic encephalopathy  -on rifaximin, lactulose  -s/p albumin given diffuse edema on 4/30, 5/2, 5/8, 5/9  pt is very edematous, cont  spironolactone 50 mg and will try to titrate up to 100mg  and as per discussion with hepatology, will dc  lasix 40mg daily an duse bumix IV instead   cont to monitor bp   will give another dose of albumin today   as per hepatology will order KUB and abdominal US     Anemia  -likely multifactorial: sepsis, cirrhosis, poor nutrition, GI bleeding  -s/p EGD on 4/16 which showed 5mm ulcer in duodenal bulb post endo clip, repeat EGD on 4/29 showed no bleeding noted  -will monitor for GI bleeding   transfused one unit of PRBC on 5/3  -Hb 7.6 , cont to monitor     Bacteremia  -blood culture positive for bacteroides on presentation  -s/p ceftriaxone and flagyl  -repeat blood culture negative, pending new blood culture drawn this am   as per ID hold off antibiotics     Afib  -found during this admission  -rate controlled, not candidate for AC given anemia, coagulopathy    Deconditioning  -PT  -OOB    Sacral pressure ulcer  -local wound care    hypocalcemia:  albumin is 1.7, corrected calcium is 9.1   given albumin     SVT PPX: SCD due to coagulopathy, risk of Gi bleed /anemia       Overall very poor prognosis palliative care following   dispo: f/u  repeat cultures, ID following   followup KUB, Abdominal US

## 2025-05-10 LAB
ALBUMIN SERPL ELPH-MCNC: 2.1 G/DL — LOW (ref 3.5–5.2)
ALP SERPL-CCNC: 227 U/L — HIGH (ref 30–115)
ALT FLD-CCNC: 23 U/L — SIGNIFICANT CHANGE UP (ref 0–41)
ANION GAP SERPL CALC-SCNC: 11 MMOL/L — SIGNIFICANT CHANGE UP (ref 7–14)
APTT BLD: 42.8 SEC — HIGH (ref 27–39.2)
AST SERPL-CCNC: 46 U/L — HIGH (ref 0–41)
BILIRUB SERPL-MCNC: 1.4 MG/DL — HIGH (ref 0.2–1.2)
BUN SERPL-MCNC: 19 MG/DL — SIGNIFICANT CHANGE UP (ref 10–20)
CALCIUM SERPL-MCNC: 7.6 MG/DL — LOW (ref 8.4–10.5)
CHLORIDE SERPL-SCNC: 111 MMOL/L — HIGH (ref 98–110)
CO2 SERPL-SCNC: 23 MMOL/L — SIGNIFICANT CHANGE UP (ref 17–32)
CREAT SERPL-MCNC: 0.8 MG/DL — SIGNIFICANT CHANGE UP (ref 0.7–1.5)
EGFR: 91 ML/MIN/1.73M2 — SIGNIFICANT CHANGE UP
EGFR: 91 ML/MIN/1.73M2 — SIGNIFICANT CHANGE UP
GLUCOSE SERPL-MCNC: 202 MG/DL — HIGH (ref 70–99)
HCT VFR BLD CALC: 25.9 % — LOW (ref 42–52)
HGB BLD-MCNC: 8.2 G/DL — LOW (ref 14–18)
INR BLD: 1.5 RATIO — HIGH (ref 0.65–1.3)
MCHC RBC-ENTMCNC: 31.2 PG — HIGH (ref 27–31)
MCHC RBC-ENTMCNC: 31.7 G/DL — LOW (ref 32–37)
MCV RBC AUTO: 98.5 FL — HIGH (ref 80–94)
NRBC BLD AUTO-RTO: 0 /100 WBCS — SIGNIFICANT CHANGE UP (ref 0–0)
PLATELET # BLD AUTO: 86 K/UL — LOW (ref 130–400)
PMV BLD: 10.1 FL — SIGNIFICANT CHANGE UP (ref 7.4–10.4)
POTASSIUM SERPL-MCNC: 3.4 MMOL/L — LOW (ref 3.5–5)
POTASSIUM SERPL-SCNC: 3.4 MMOL/L — LOW (ref 3.5–5)
PROT SERPL-MCNC: 4.7 G/DL — LOW (ref 6–8)
PROTHROM AB SERPL-ACNC: 17.9 SEC — HIGH (ref 9.95–12.87)
RBC # BLD: 2.63 M/UL — LOW (ref 4.7–6.1)
RBC # FLD: 23.8 % — HIGH (ref 11.5–14.5)
SODIUM SERPL-SCNC: 145 MMOL/L — SIGNIFICANT CHANGE UP (ref 135–146)
WBC # BLD: 7.84 K/UL — SIGNIFICANT CHANGE UP (ref 4.8–10.8)
WBC # FLD AUTO: 7.84 K/UL — SIGNIFICANT CHANGE UP (ref 4.8–10.8)

## 2025-05-10 PROCEDURE — 99233 SBSQ HOSP IP/OBS HIGH 50: CPT

## 2025-05-10 PROCEDURE — 76700 US EXAM ABDOM COMPLETE: CPT | Mod: 26

## 2025-05-10 RX ORDER — OXYCODONE HYDROCHLORIDE 30 MG/1
5 TABLET ORAL EVERY 6 HOURS
Refills: 0 | Status: DISCONTINUED | OUTPATIENT
Start: 2025-05-10 | End: 2025-05-17

## 2025-05-10 RX ADMIN — LIDOCAINE HYDROCHLORIDE 1 PATCH: 20 JELLY TOPICAL at 15:01

## 2025-05-10 RX ADMIN — INSULIN LISPRO 4: 100 INJECTION, SOLUTION INTRAVENOUS; SUBCUTANEOUS at 08:43

## 2025-05-10 RX ADMIN — Medication 1 APPLICATION(S): at 05:27

## 2025-05-10 RX ADMIN — INSULIN LISPRO 4: 100 INJECTION, SOLUTION INTRAVENOUS; SUBCUTANEOUS at 16:53

## 2025-05-10 RX ADMIN — LIDOCAINE HYDROCHLORIDE 1 PATCH: 20 JELLY TOPICAL at 20:56

## 2025-05-10 RX ADMIN — OXYCODONE HYDROCHLORIDE 5 MILLIGRAM(S): 30 TABLET ORAL at 17:30

## 2025-05-10 RX ADMIN — Medication 1 APPLICATION(S): at 17:31

## 2025-05-10 RX ADMIN — MIDODRINE HYDROCHLORIDE 10 MILLIGRAM(S): 5 TABLET ORAL at 15:04

## 2025-05-10 RX ADMIN — Medication 3 MILLIGRAM(S): at 21:24

## 2025-05-10 RX ADMIN — FOLIC ACID 1 MILLIGRAM(S): 1 TABLET ORAL at 15:04

## 2025-05-10 RX ADMIN — Medication 50 MILLIGRAM(S): at 05:36

## 2025-05-10 RX ADMIN — INSULIN LISPRO 4: 100 INJECTION, SOLUTION INTRAVENOUS; SUBCUTANEOUS at 12:00

## 2025-05-10 RX ADMIN — LACTULOSE 20 GRAM(S): 10 SOLUTION ORAL at 21:23

## 2025-05-10 RX ADMIN — Medication 40 MILLIGRAM(S): at 15:03

## 2025-05-10 RX ADMIN — BUMETANIDE 1 MILLIGRAM(S): 1 TABLET ORAL at 05:28

## 2025-05-10 RX ADMIN — LACTULOSE 20 GRAM(S): 10 SOLUTION ORAL at 15:03

## 2025-05-10 RX ADMIN — LACTULOSE 20 GRAM(S): 10 SOLUTION ORAL at 05:28

## 2025-05-10 RX ADMIN — LEVETIRACETAM 250 MILLIGRAM(S): 10 INJECTION, SOLUTION INTRAVENOUS at 05:34

## 2025-05-10 RX ADMIN — Medication 1 APPLICATION(S): at 05:31

## 2025-05-10 RX ADMIN — LEVETIRACETAM 250 MILLIGRAM(S): 10 INJECTION, SOLUTION INTRAVENOUS at 18:29

## 2025-05-10 RX ADMIN — ALBUMIN (HUMAN) 50 MILLILITER(S): 12.5 INJECTION, SOLUTION INTRAVENOUS at 04:56

## 2025-05-10 RX ADMIN — Medication 2 TABLET(S): at 21:23

## 2025-05-10 RX ADMIN — OXYCODONE HYDROCHLORIDE 5 MILLIGRAM(S): 30 TABLET ORAL at 10:49

## 2025-05-10 RX ADMIN — MIDODRINE HYDROCHLORIDE 10 MILLIGRAM(S): 5 TABLET ORAL at 21:24

## 2025-05-10 RX ADMIN — NYSTATIN 1 APPLICATION(S): 100000 CREAM TOPICAL at 17:31

## 2025-05-10 RX ADMIN — NYSTATIN 1 APPLICATION(S): 100000 CREAM TOPICAL at 05:26

## 2025-05-10 RX ADMIN — INSULIN LISPRO 4: 100 INJECTION, SOLUTION INTRAVENOUS; SUBCUTANEOUS at 21:24

## 2025-05-10 NOTE — PROGRESS NOTE ADULT - ASSESSMENT
DWIGHT RIBEIRO 80y Male  MRN#: [id number]   CODE STATUS:__full ______      SUBJECTIVE  Patient is a 80y old Male who presents with a chief complaint of acute mentation changes (09 May 2025 15:52)  Currently admitted to medicine with the primary diagnosis of Metabolic encephalopathy      Today is hospital day 27d, and this morning he is more alert , answering some questions but oriented to person only           OBJECTIVE  PAST MEDICAL & SURGICAL HISTORY  Diabetes    Hypertension    Fall    H/O left knee surgery    History of cholecystectomy    History of appendectomy      ALLERGIES:  No Known Allergies    MEDICATIONS:  STANDING MEDICATIONS  buMETAnide Injectable 1 milliGRAM(s) IV Push daily  chlorhexidine 2% Cloths 1 Application(s) Topical <User Schedule>  dextrose 5%. 1000 milliLiter(s) IV Continuous <Continuous>  dextrose 5%. 1000 milliLiter(s) IV Continuous <Continuous>  dextrose 50% Injectable 25 Gram(s) IV Push once  dextrose 50% Injectable 12.5 Gram(s) IV Push once  dextrose 50% Injectable 25 Gram(s) IV Push once  folic acid 1 milliGRAM(s) Oral daily  glucagon  Injectable 1 milliGRAM(s) IntraMuscular once  insulin lispro (ADMELOG) corrective regimen sliding scale   SubCutaneous Before meals and at bedtime  lactulose Syrup 20 Gram(s) Oral every 8 hours  levETIRAcetam   Injectable 250 milliGRAM(s) IV Push <User Schedule>  lidocaine   4% Patch 1 Patch Transdermal daily  midodrine 10 milliGRAM(s) Oral every 8 hours  nystatin Powder 1 Application(s) Topical two times a day  pantoprazole  Injectable 40 milliGRAM(s) IV Push daily  rifAXIMin 550 milliGRAM(s) Oral two times a day  senna 2 Tablet(s) Oral at bedtime  silver sulfADIAZINE 1% Cream 1 Application(s) Topical two times a day  spironolactone 50 milliGRAM(s) Oral daily    PRN MEDICATIONS  acetaminophen     Tablet .. 650 milliGRAM(s) Oral every 6 hours PRN  aluminum hydroxide/magnesium hydroxide/simethicone Suspension 30 milliLiter(s) Oral every 4 hours PRN  dextrose Oral Gel 15 Gram(s) Oral once PRN  melatonin 3 milliGRAM(s) Oral at bedtime PRN  ondansetron Injectable 4 milliGRAM(s) IV Push every 8 hours PRN  oxyCODONE    IR 5 milliGRAM(s) Oral every 6 hours PRN      VITAL SIGNS: Last 24 Hours  T(C): 36.3 (10 May 2025 13:51), Max: 36.9 (09 May 2025 20:02)  T(F): 97.4 (10 May 2025 13:51), Max: 98.4 (09 May 2025 20:02)  HR: 94 (10 May 2025 13:51) (93 - 95)  BP: 117/68 (10 May 2025 13:51) (112/70 - 136/71)  BP(mean): --  RR: 18 (10 May 2025 13:51) (18 - 18)  SpO2: 98% (10 May 2025 13:51) (98% - 100%)    LABS:                        8.2    7.84  )-----------( 86       ( 10 May 2025 07:15 )             25.9     05-10    145  |  111[H]  |  19  ----------------------------<  202[H]  3.4[L]   |  23  |  0.8    Ca    7.6[L]      10 May 2025 07:15    TPro  4.7[L]  /  Alb  2.1[L]  /  TBili  1.4[H]  /  DBili  x   /  AST  46[H]  /  ALT  23  /  AlkPhos  227[H]  05-10    PT/INR - ( 10 May 2025 07:15 )   PT: 17.90 sec;   INR: 1.50 ratio         PTT - ( 10 May 2025 07:15 )  PTT:42.8 sec  Urinalysis Basic - ( 10 May 2025 07:15 )    Color: x / Appearance: x / SG: x / pH: x  Gluc: 202 mg/dL / Ketone: x  / Bili: x / Urobili: x   Blood: x / Protein: x / Nitrite: x   Leuk Esterase: x / RBC: x / WBC x   Sq Epi: x / Non Sq Epi: x / Bacteria: x              PHYSICAL EXAM:    GENERAL: NAD, well-developed, AAOx3, obese male, anasarca, confused   HEENT:  Atraumatic, Normocephalic. EOMI, PERRLA, conjunctiva and sclera clear, No JVD  PULMONARY: Clear to auscultation bilaterally; No wheeze  CARDIOVASCULAR: Regular rate and rhythm; No murmurs, rubs, or gallops  GASTROINTESTINAL: Soft, Nontender, Nondistended; Bowel sounds present  MUSCULOSKELETAL:  2+ Peripheral Pulses, No clubbing, cyanosis,, 2+ edema bl upper and lower ext   NEUROLOGY: non-focal  SKIN: No rashes or lesions      79 yo male with h/o lumbar spine osteomyelitis, HTN, DM, subdural hematoma presented with altered mental status, found to have bacteremia and cirrhosis.     Fever  -low grade fever, improving, cont to  trend temp  -restarted abx for empiric coverage, ID consulted for further management  as per discussions with  ID , IV ceftriaxone 1g q24hrs Dc now  - repeat BCx was not sent due to hard stick  -  ucx growing yeast, likely a contaminant   fever resolved and wbc wnl  cont to monitor     AMS  -2/2 underlying disease, metabolic encephalopathy  -lethargic  ammonia level 39(not sig. elevated)  hold opioids     Decompensated cirrhosis       portal hypertension      Coagulopathy      Hypoalbuminemia       hepatic encephalopathy  -on rifaximin, lactulose  -s/p albumin given diffuse edema on 4/30, 5/2, 5/8, 5/9  pt is very edematous, cont  spironolactone 50 mg and will try to titrate up to 100mg  and as per discussion with hepatology,  cont bumix IV   cont to monitor bp   s/p albumin IV 5/9  as per hepatology f/u abdominal US , KUB shows :Nonspecific air distended loops of bowel.    Anemia  -likely multifactorial: sepsis, cirrhosis, poor nutrition, GI bleeding  -s/p EGD on 4/16 which showed 5mm ulcer in duodenal bulb post endo clip, repeat EGD on 4/29 showed no bleeding noted  -will monitor for GI bleeding   transfused one unit of PRBC on 5/3  -Hb 8.1 , cont to monitor     Bacteremia  -blood culture positive for bacteroides on presentation  -s/p ceftriaxone and flagyl  -repeat blood culture negative,   as per ID hold off antibiotics     Afib  -found during this admission  -rate controlled, not candidate for AC given anemia, coagulopathy    Deconditioning  -PT  -OOB    Sacral pressure ulcer  -local wound care    hypocalcemia:  albumin is 1.7, corrected calcium is 9.1   given albumin     SVT PPX: SCD due to coagulopathy, risk of Gi bleed /anemia       Overall very poor prognosis palliative care following   dispo: f/u  am, ID following , hepatology  poor prognosis  will discuss code status with his family   followup KUB, Abdominal US                   Attestation Statements:    Attestation Statements:  Risk Statement (NON-critical care).     On this date of service, level of risk to patient is considered: High.     Due to: Assessment of the patient, discussion with medical staff and review of results.

## 2025-05-11 LAB
ALBUMIN SERPL ELPH-MCNC: 2.1 G/DL — LOW (ref 3.5–5.2)
ALP SERPL-CCNC: 238 U/L — HIGH (ref 30–115)
ALT FLD-CCNC: 23 U/L — SIGNIFICANT CHANGE UP (ref 0–41)
ANION GAP SERPL CALC-SCNC: 14 MMOL/L — SIGNIFICANT CHANGE UP (ref 7–14)
AST SERPL-CCNC: 59 U/L — HIGH (ref 0–41)
BASOPHILS # BLD AUTO: 0.03 K/UL — SIGNIFICANT CHANGE UP (ref 0–0.2)
BASOPHILS NFR BLD AUTO: 0.4 % — SIGNIFICANT CHANGE UP (ref 0–1)
BILIRUB SERPL-MCNC: 1.7 MG/DL — HIGH (ref 0.2–1.2)
BUN SERPL-MCNC: 17 MG/DL — SIGNIFICANT CHANGE UP (ref 10–20)
CALCIUM SERPL-MCNC: 7.9 MG/DL — LOW (ref 8.4–10.5)
CHLORIDE SERPL-SCNC: 110 MMOL/L — SIGNIFICANT CHANGE UP (ref 98–110)
CO2 SERPL-SCNC: 22 MMOL/L — SIGNIFICANT CHANGE UP (ref 17–32)
CREAT SERPL-MCNC: 0.8 MG/DL — SIGNIFICANT CHANGE UP (ref 0.7–1.5)
EGFR: 91 ML/MIN/1.73M2 — SIGNIFICANT CHANGE UP
EGFR: 91 ML/MIN/1.73M2 — SIGNIFICANT CHANGE UP
EOSINOPHIL # BLD AUTO: 0.17 K/UL — SIGNIFICANT CHANGE UP (ref 0–0.7)
EOSINOPHIL NFR BLD AUTO: 2.1 % — SIGNIFICANT CHANGE UP (ref 0–8)
GLUCOSE SERPL-MCNC: 190 MG/DL — HIGH (ref 70–99)
HCT VFR BLD CALC: 24.8 % — LOW (ref 42–52)
HGB BLD-MCNC: 7.8 G/DL — LOW (ref 14–18)
IMM GRANULOCYTES NFR BLD AUTO: 1.6 % — HIGH (ref 0.1–0.3)
LYMPHOCYTES # BLD AUTO: 0.96 K/UL — LOW (ref 1.2–3.4)
LYMPHOCYTES # BLD AUTO: 11.6 % — LOW (ref 20.5–51.1)
MCHC RBC-ENTMCNC: 30.1 PG — SIGNIFICANT CHANGE UP (ref 27–31)
MCHC RBC-ENTMCNC: 31.5 G/DL — LOW (ref 32–37)
MCV RBC AUTO: 95.8 FL — HIGH (ref 80–94)
MONOCYTES # BLD AUTO: 0.62 K/UL — HIGH (ref 0.1–0.6)
MONOCYTES NFR BLD AUTO: 7.5 % — SIGNIFICANT CHANGE UP (ref 1.7–9.3)
NEUTROPHILS # BLD AUTO: 6.37 K/UL — SIGNIFICANT CHANGE UP (ref 1.4–6.5)
NEUTROPHILS NFR BLD AUTO: 76.8 % — HIGH (ref 42.2–75.2)
NRBC BLD AUTO-RTO: 0 /100 WBCS — SIGNIFICANT CHANGE UP (ref 0–0)
PLATELET # BLD AUTO: 99 K/UL — LOW (ref 130–400)
PMV BLD: 10.1 FL — SIGNIFICANT CHANGE UP (ref 7.4–10.4)
POTASSIUM SERPL-MCNC: 3.4 MMOL/L — LOW (ref 3.5–5)
POTASSIUM SERPL-SCNC: 3.4 MMOL/L — LOW (ref 3.5–5)
PROT SERPL-MCNC: 4.5 G/DL — LOW (ref 6–8)
RBC # BLD: 2.59 M/UL — LOW (ref 4.7–6.1)
RBC # FLD: 23.9 % — HIGH (ref 11.5–14.5)
SODIUM SERPL-SCNC: 146 MMOL/L — SIGNIFICANT CHANGE UP (ref 135–146)
WBC # BLD: 8.28 K/UL — SIGNIFICANT CHANGE UP (ref 4.8–10.8)
WBC # FLD AUTO: 8.28 K/UL — SIGNIFICANT CHANGE UP (ref 4.8–10.8)

## 2025-05-11 PROCEDURE — 99233 SBSQ HOSP IP/OBS HIGH 50: CPT

## 2025-05-11 PROCEDURE — 71045 X-RAY EXAM CHEST 1 VIEW: CPT | Mod: 26

## 2025-05-11 RX ADMIN — LEVETIRACETAM 250 MILLIGRAM(S): 10 INJECTION, SOLUTION INTRAVENOUS at 17:16

## 2025-05-11 RX ADMIN — MIDODRINE HYDROCHLORIDE 10 MILLIGRAM(S): 5 TABLET ORAL at 17:10

## 2025-05-11 RX ADMIN — MIDODRINE HYDROCHLORIDE 10 MILLIGRAM(S): 5 TABLET ORAL at 06:04

## 2025-05-11 RX ADMIN — LACTULOSE 20 GRAM(S): 10 SOLUTION ORAL at 06:04

## 2025-05-11 RX ADMIN — NYSTATIN 1 APPLICATION(S): 100000 CREAM TOPICAL at 06:05

## 2025-05-11 RX ADMIN — OXYCODONE HYDROCHLORIDE 5 MILLIGRAM(S): 30 TABLET ORAL at 18:42

## 2025-05-11 RX ADMIN — LEVETIRACETAM 250 MILLIGRAM(S): 10 INJECTION, SOLUTION INTRAVENOUS at 06:04

## 2025-05-11 RX ADMIN — Medication 1 APPLICATION(S): at 06:03

## 2025-05-11 RX ADMIN — FOLIC ACID 1 MILLIGRAM(S): 1 TABLET ORAL at 17:09

## 2025-05-11 RX ADMIN — LIDOCAINE HYDROCHLORIDE 1 PATCH: 20 JELLY TOPICAL at 03:00

## 2025-05-11 RX ADMIN — INSULIN LISPRO 4: 100 INJECTION, SOLUTION INTRAVENOUS; SUBCUTANEOUS at 07:50

## 2025-05-11 RX ADMIN — MIDODRINE HYDROCHLORIDE 10 MILLIGRAM(S): 5 TABLET ORAL at 21:59

## 2025-05-11 RX ADMIN — OXYCODONE HYDROCHLORIDE 5 MILLIGRAM(S): 30 TABLET ORAL at 07:50

## 2025-05-11 RX ADMIN — LIDOCAINE HYDROCHLORIDE 1 PATCH: 20 JELLY TOPICAL at 17:12

## 2025-05-11 RX ADMIN — Medication 50 MILLIGRAM(S): at 06:05

## 2025-05-11 RX ADMIN — Medication 2 TABLET(S): at 21:59

## 2025-05-11 RX ADMIN — INSULIN LISPRO 6: 100 INJECTION, SOLUTION INTRAVENOUS; SUBCUTANEOUS at 16:51

## 2025-05-11 RX ADMIN — NYSTATIN 1 APPLICATION(S): 100000 CREAM TOPICAL at 17:13

## 2025-05-11 RX ADMIN — BUMETANIDE 1 MILLIGRAM(S): 1 TABLET ORAL at 06:04

## 2025-05-11 RX ADMIN — INSULIN LISPRO 4: 100 INJECTION, SOLUTION INTRAVENOUS; SUBCUTANEOUS at 11:52

## 2025-05-11 RX ADMIN — Medication 40 MILLIGRAM(S): at 17:11

## 2025-05-11 RX ADMIN — Medication 1 APPLICATION(S): at 06:05

## 2025-05-11 RX ADMIN — LACTULOSE 20 GRAM(S): 10 SOLUTION ORAL at 17:13

## 2025-05-11 RX ADMIN — Medication 1 APPLICATION(S): at 17:14

## 2025-05-11 RX ADMIN — INSULIN LISPRO 6: 100 INJECTION, SOLUTION INTRAVENOUS; SUBCUTANEOUS at 23:08

## 2025-05-11 RX ADMIN — LACTULOSE 20 GRAM(S): 10 SOLUTION ORAL at 21:59

## 2025-05-11 NOTE — PROGRESS NOTE ADULT - ASSESSMENT
DWIGHT RIBEIRO 80y Male  MRN#: [id number]   CODE STATUS:________      SUBJECTIVE  Patient is a 80y old Male who presents with a chief complaint of acute mentation changes (10 May 2025 15:13)  Currently admitted to medicine with the primary diagnosis of Metabolic encephalopathy      Today is hospital day 27d, and this morning he is           OBJECTIVE  PAST MEDICAL & SURGICAL HISTORY  Diabetes    Hypertension    Fall    H/O left knee surgery    History of cholecystectomy    History of appendectomy      ALLERGIES:  No Known Allergies    MEDICATIONS:  STANDING MEDICATIONS  buMETAnide Injectable 1 milliGRAM(s) IV Push daily  chlorhexidine 2% Cloths 1 Application(s) Topical <User Schedule>  dextrose 5%. 1000 milliLiter(s) IV Continuous <Continuous>  dextrose 5%. 1000 milliLiter(s) IV Continuous <Continuous>  dextrose 50% Injectable 25 Gram(s) IV Push once  dextrose 50% Injectable 12.5 Gram(s) IV Push once  dextrose 50% Injectable 25 Gram(s) IV Push once  folic acid 1 milliGRAM(s) Oral daily  glucagon  Injectable 1 milliGRAM(s) IntraMuscular once  insulin lispro (ADMELOG) corrective regimen sliding scale   SubCutaneous Before meals and at bedtime  lactulose Syrup 20 Gram(s) Oral every 8 hours  levETIRAcetam   Injectable 250 milliGRAM(s) IV Push <User Schedule>  lidocaine   4% Patch 1 Patch Transdermal daily  midodrine 10 milliGRAM(s) Oral every 8 hours  nystatin Powder 1 Application(s) Topical two times a day  pantoprazole  Injectable 40 milliGRAM(s) IV Push daily  rifAXIMin 550 milliGRAM(s) Oral two times a day  senna 2 Tablet(s) Oral at bedtime  silver sulfADIAZINE 1% Cream 1 Application(s) Topical two times a day  spironolactone 50 milliGRAM(s) Oral daily    PRN MEDICATIONS  acetaminophen     Tablet .. 650 milliGRAM(s) Oral every 6 hours PRN  aluminum hydroxide/magnesium hydroxide/simethicone Suspension 30 milliLiter(s) Oral every 4 hours PRN  dextrose Oral Gel 15 Gram(s) Oral once PRN  melatonin 3 milliGRAM(s) Oral at bedtime PRN  ondansetron Injectable 4 milliGRAM(s) IV Push every 8 hours PRN  oxyCODONE    IR 5 milliGRAM(s) Oral every 6 hours PRN      VITAL SIGNS: Last 24 Hours  T(C): 37.1 (11 May 2025 05:28), Max: 37.6 (10 May 2025 21:02)  T(F): 98.7 (11 May 2025 05:28), Max: 99.6 (10 May 2025 21:02)  HR: 90 (11 May 2025 05:28) (90 - 104)  BP: 108/67 (11 May 2025 05:28) (106/63 - 117/73)  BP(mean): --  RR: 16 (11 May 2025 05:28) (16 - 18)  SpO2: 98% (11 May 2025 05:28) (96% - 98%)    LABS:                        7.8    8.28  )-----------( 99       ( 11 May 2025 07:19 )             24.8     05-11    146  |  110  |  17  ----------------------------<  190[H]  3.4[L]   |  22  |  0.8    Ca    7.9[L]      11 May 2025 07:19    TPro  4.5[L]  /  Alb  2.1[L]  /  TBili  1.7[H]  /  DBili  x   /  AST  59[H]  /  ALT  23  /  AlkPhos  238[H]  05-11    PT/INR - ( 10 May 2025 07:15 )   PT: 17.90 sec;   INR: 1.50 ratio         PTT - ( 10 May 2025 07:15 )  PTT:42.8 sec  Urinalysis Basic - ( 11 May 2025 07:19 )    Color: x / Appearance: x / SG: x / pH: x  Gluc: 190 mg/dL / Ketone: x  / Bili: x / Urobili: x   Blood: x / Protein: x / Nitrite: x   Leuk Esterase: x / RBC: x / WBC x   Sq Epi: x / Non Sq Epi: x / Bacteria: x            Culture - Blood (collected 09 May 2025 08:06)  Source: Blood None  Preliminary Report (10 May 2025 22:02):    No growth at 24 hours            PHYSICAL EXAM:    GENERAL: NAD, well-developed, AAOx3, obese male, anasarca, confused   HEENT:  Atraumatic, Normocephalic. EOMI, PERRLA, conjunctiva and sclera clear, No JVD  PULMONARY: Clear to auscultation bilaterally; No wheeze  CARDIOVASCULAR: Regular rate and rhythm; No murmurs, rubs, or gallops  GASTROINTESTINAL: Soft, Nontender, Nondistended; Bowel sounds present  MUSCULOSKELETAL:  2+ Peripheral Pulses, No clubbing, cyanosis,, 2+ edema bl upper and lower ext   NEUROLOGY: non-focal  SKIN: No rashes or lesions      81 yo male with h/o lumbar spine osteomyelitis, HTN, DM, subdural hematoma presented with altered mental status, found to have bacteremia and cirrhosis.     Fever  -low grade fever, improving, cont to  trend temp  -restarted abx for empiric coverage, ID consulted for further management  as per discussions with  ID , IV ceftriaxone 1g q24hrs Dc now  - repeat BCx was not sent due to hard stick  -  ucx growing yeast, likely a contaminant   fever resolved and wbc wnl  cont to monitor     AMS  -2/2 underlying disease, metabolic encephalopathy  -lethargic  ammonia level 39(not sig. elevated)  hold opioids     Decompensated cirrhosis       portal hypertension      Coagulopathy      Hypoalbuminemia       hepatic encephalopathy  -on rifaximin, lactulose  -s/p albumin given diffuse edema on 4/30, 5/2, 5/8, 5/9  pt is very edematous, cont  spironolactone 50 mg and will try to titrate up to 100mg  and as per discussion with hepatology,  cont bumix IV   cont to monitor bp   s/p albumin IV 5/9  abdominal US:.  Nodular liver contour consistent with liver cirrhosis. 2.  Post-cholecystectomy. 3.  Severe abdominal ascites.  4.  Right pleural effusion.   KUB shows :Nonspecific air distended loops of bowel.  f/u cxr to evaluate effusion     Anemia  -likely multifactorial: sepsis, cirrhosis, poor nutrition, GI bleeding  -s/p EGD on 4/16 which showed 5mm ulcer in duodenal bulb post endo clip, repeat EGD on 4/29 showed no bleeding noted  -will monitor for GI bleeding   transfused one unit of PRBC on 5/3  -Hb 8.1 , cont to monitor     Bacteremia  -blood culture positive for bacteroides on presentation  -s/p ceftriaxone and flagyl  -repeat blood culture negative,   as per ID hold off antibiotics     Afib  -found during this admission  -rate controlled, not candidate for AC given anemia, coagulopathy    Deconditioning  -PT  -OOB    Sacral pressure ulcer  -local wound care    hypocalcemia:  albumin is 1.7, corrected calcium is 9.1   given albumin     SVT PPX: SCD due to coagulopathy, risk of Gi bleed /anemia       Overall very poor prognosis palliative care following   dispo: f/u  am, ID following , hepatology  poor prognosis  will discuss code status with his family               DWIGHT RIBEIRO 80y Male  MRN#: [id number]   CODE STATUS:________      SUBJECTIVE  Patient is a 80y old Male who presents with a chief complaint of acute mentation changes (10 May 2025 15:13)  Currently admitted to medicine with the primary diagnosis of Metabolic encephalopathy, cirrhosis       Today is hospital day 27d, and this morning he is           OBJECTIVE  PAST MEDICAL & SURGICAL HISTORY  Diabetes    Hypertension    Fall    H/O left knee surgery    History of cholecystectomy    History of appendectomy      ALLERGIES:  No Known Allergies    MEDICATIONS:  STANDING MEDICATIONS  buMETAnide Injectable 1 milliGRAM(s) IV Push daily  chlorhexidine 2% Cloths 1 Application(s) Topical <User Schedule>  dextrose 5%. 1000 milliLiter(s) IV Continuous <Continuous>  dextrose 5%. 1000 milliLiter(s) IV Continuous <Continuous>  dextrose 50% Injectable 25 Gram(s) IV Push once  dextrose 50% Injectable 12.5 Gram(s) IV Push once  dextrose 50% Injectable 25 Gram(s) IV Push once  folic acid 1 milliGRAM(s) Oral daily  glucagon  Injectable 1 milliGRAM(s) IntraMuscular once  insulin lispro (ADMELOG) corrective regimen sliding scale   SubCutaneous Before meals and at bedtime  lactulose Syrup 20 Gram(s) Oral every 8 hours  levETIRAcetam   Injectable 250 milliGRAM(s) IV Push <User Schedule>  lidocaine   4% Patch 1 Patch Transdermal daily  midodrine 10 milliGRAM(s) Oral every 8 hours  nystatin Powder 1 Application(s) Topical two times a day  pantoprazole  Injectable 40 milliGRAM(s) IV Push daily  rifAXIMin 550 milliGRAM(s) Oral two times a day  senna 2 Tablet(s) Oral at bedtime  silver sulfADIAZINE 1% Cream 1 Application(s) Topical two times a day  spironolactone 50 milliGRAM(s) Oral daily    PRN MEDICATIONS  acetaminophen     Tablet .. 650 milliGRAM(s) Oral every 6 hours PRN  aluminum hydroxide/magnesium hydroxide/simethicone Suspension 30 milliLiter(s) Oral every 4 hours PRN  dextrose Oral Gel 15 Gram(s) Oral once PRN  melatonin 3 milliGRAM(s) Oral at bedtime PRN  ondansetron Injectable 4 milliGRAM(s) IV Push every 8 hours PRN  oxyCODONE    IR 5 milliGRAM(s) Oral every 6 hours PRN      VITAL SIGNS: Last 24 Hours  T(C): 37.1 (11 May 2025 05:28), Max: 37.6 (10 May 2025 21:02)  T(F): 98.7 (11 May 2025 05:28), Max: 99.6 (10 May 2025 21:02)  HR: 90 (11 May 2025 05:28) (90 - 104)  BP: 108/67 (11 May 2025 05:28) (106/63 - 117/73)  BP(mean): --  RR: 16 (11 May 2025 05:28) (16 - 18)  SpO2: 98% (11 May 2025 05:28) (96% - 98%)    LABS:                        7.8    8.28  )-----------( 99       ( 11 May 2025 07:19 )             24.8     05-11    146  |  110  |  17  ----------------------------<  190[H]  3.4[L]   |  22  |  0.8    Ca    7.9[L]      11 May 2025 07:19    TPro  4.5[L]  /  Alb  2.1[L]  /  TBili  1.7[H]  /  DBili  x   /  AST  59[H]  /  ALT  23  /  AlkPhos  238[H]  05-11    PT/INR - ( 10 May 2025 07:15 )   PT: 17.90 sec;   INR: 1.50 ratio         PTT - ( 10 May 2025 07:15 )  PTT:42.8 sec  Urinalysis Basic - ( 11 May 2025 07:19 )    Color: x / Appearance: x / SG: x / pH: x  Gluc: 190 mg/dL / Ketone: x  / Bili: x / Urobili: x   Blood: x / Protein: x / Nitrite: x   Leuk Esterase: x / RBC: x / WBC x   Sq Epi: x / Non Sq Epi: x / Bacteria: x            Culture - Blood (collected 09 May 2025 08:06)  Source: Blood None  Preliminary Report (10 May 2025 22:02):    No growth at 24 hours            PHYSICAL EXAM:    GENERAL: NAD, well-developed, AAOx3, obese male, anasarca, confused   HEENT:  Atraumatic, Normocephalic. EOMI, PERRLA, conjunctiva and sclera clear, No JVD  PULMONARY: Clear to auscultation bilaterally; No wheeze  CARDIOVASCULAR: Regular rate and rhythm; No murmurs, rubs, or gallops  GASTROINTESTINAL: Soft, Nontender, Nondistended; Bowel sounds present, enlarged abdomen   MUSCULOSKELETAL:  2+ Peripheral Pulses, No clubbing, cyanosis,, 2+ edema bl upper and lower ext   NEUROLOGY: non-focal  SKIN: No rashes or lesions      81 yo male with h/o lumbar spine osteomyelitis, HTN, DM, subdural hematoma presented with altered mental status, found to have bacteremia and cirrhosis.     Fever  -low grade fever, improving, cont to  trend temp  -restarted abx for empiric coverage, ID consulted for further management  as per discussions with  ID , IV ceftriaxone 1g q24hrs Dc now  - repeat BCx was not sent due to hard stick  -  ucx growing yeast, likely a contaminant   fever resolved and wbc wnl  cont to monitor     AMS  -2/2 underlying disease, metabolic encephalopathy  -lethargic  ammonia level 39(not sig. 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  Nodular liver contour consistent with liver cirrhosis. 2.  Post-cholecystectomy. 3.  Severe abdominal ascites.  4.  Right pleural effusion.   KUB shows :Nonspecific air distended loops of bowel.  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   f/u cxr to evaluate effusion       Anemia  -likely multifactorial: sepsis, cirrhosis, poor nutrition, GI bleeding  -s/p EGD on 4/16 which showed 5mm ulcer in duodenal bulb post endo clip, repeat EGD on 4/29 showed no bleeding noted  -will monitor for GI bleeding   transfused one unit of PRBC on 5/3  -Hb 8.1 , cont to monitor     Bacteremia  -blood culture positive for bacteroides on presentation  -s/p ceftriaxone and flagyl  -repeat blood culture negative,   as per ID hold off further antibiotics     Afib  -found during this admission  -rate controlled, not candidate for AC given anemia, coagulopathy    Deconditioning  -PT/OT   -OOB    Sacral pressure ulcer  -local wound care  -rotate the patient q2hrs     hypocalcemia:  albumin is 1.7, corrected calcium is 9.1   given albumin     SVT PPX: SCD due to coagulopathy, risk of Gi bleed /anemia       Overall very poor prognosis palliative care following   dispo: f/u cmp/inr am, ID following ,   f/u with hepatology  poor prognosis  code: as per discussion with his wife he is Full code                DWIGHT RIBEIRO 80y Male  MRN#: [id number]   CODE STATUS:___full       SUBJECTIVE  Patient is a 80y old Male who presents with a chief complaint of acute mentation changes (10 May 2025 15:13)  Currently admitted to medicine with the primary diagnosis of Metabolic encephalopathy, cirrhosis       Today is hospital day 27d, and this morning he is awake, speaking to family but altered           OBJECTIVE  PAST MEDICAL & SURGICAL HISTORY  Diabetes    Hypertension    Fall    H/O left knee surgery    History of cholecystectomy    History of appendectomy      ALLERGIES:  No Known Allergies    MEDICATIONS:  STANDING MEDICATIONS  buMETAnide Injectable 1 milliGRAM(s) IV Push daily  chlorhexidine 2% Cloths 1 Application(s) Topical <User Schedule>  dextrose 5%. 1000 milliLiter(s) IV Continuous <Continuous>  dextrose 5%. 1000 milliLiter(s) IV Continuous <Continuous>  dextrose 50% Injectable 25 Gram(s) IV Push once  dextrose 50% Injectable 12.5 Gram(s) IV Push once  dextrose 50% Injectable 25 Gram(s) IV Push once  folic acid 1 milliGRAM(s) Oral daily  glucagon  Injectable 1 milliGRAM(s) IntraMuscular once  insulin lispro (ADMELOG) corrective regimen sliding scale   SubCutaneous Before meals and at bedtime  lactulose Syrup 20 Gram(s) Oral every 8 hours  levETIRAcetam   Injectable 250 milliGRAM(s) IV Push <User Schedule>  lidocaine   4% Patch 1 Patch Transdermal daily  midodrine 10 milliGRAM(s) Oral every 8 hours  nystatin Powder 1 Application(s) Topical two times a day  pantoprazole  Injectable 40 milliGRAM(s) IV Push daily  rifAXIMin 550 milliGRAM(s) Oral two times a day  senna 2 Tablet(s) Oral at bedtime  silver sulfADIAZINE 1% Cream 1 Application(s) Topical two times a day  spironolactone 50 milliGRAM(s) Oral daily    PRN MEDICATIONS  acetaminophen     Tablet .. 650 milliGRAM(s) Oral every 6 hours PRN  aluminum hydroxide/magnesium hydroxide/simethicone Suspension 30 milliLiter(s) Oral every 4 hours PRN  dextrose Oral Gel 15 Gram(s) Oral once PRN  melatonin 3 milliGRAM(s) Oral at bedtime PRN  ondansetron Injectable 4 milliGRAM(s) IV Push every 8 hours PRN  oxyCODONE    IR 5 milliGRAM(s) Oral every 6 hours PRN      VITAL SIGNS: Last 24 Hours  T(C): 37.1 (11 May 2025 05:28), Max: 37.6 (10 May 2025 21:02)  T(F): 98.7 (11 May 2025 05:28), Max: 99.6 (10 May 2025 21:02)  HR: 90 (11 May 2025 05:28) (90 - 104)  BP: 108/67 (11 May 2025 05:28) (106/63 - 117/73)  BP(mean): --  RR: 16 (11 May 2025 05:28) (16 - 18)  SpO2: 98% (11 May 2025 05:28) (96% - 98%)    LABS:                        7.8    8.28  )-----------( 99       ( 11 May 2025 07:19 )             24.8     05-11    146  |  110  |  17  ----------------------------<  190[H]  3.4[L]   |  22  |  0.8    Ca    7.9[L]      11 May 2025 07:19    TPro  4.5[L]  /  Alb  2.1[L]  /  TBili  1.7[H]  /  DBili  x   /  AST  59[H]  /  ALT  23  /  AlkPhos  238[H]  05-11    PT/INR - ( 10 May 2025 07:15 )   PT: 17.90 sec;   INR: 1.50 ratio         PTT - ( 10 May 2025 07:15 )  PTT:42.8 sec  Urinalysis Basic - ( 11 May 2025 07:19 )    Color: x / Appearance: x / SG: x / pH: x  Gluc: 190 mg/dL / Ketone: x  / Bili: x / Urobili: x   Blood: x / Protein: x / Nitrite: x   Leuk Esterase: x / RBC: x / WBC x   Sq Epi: x / Non Sq Epi: x / Bacteria: x            Culture - Blood (collected 09 May 2025 08:06)  Source: Blood None  Preliminary Report (10 May 2025 22:02):    No growth at 24 hours            PHYSICAL EXAM:    GENERAL: NAD, well-developed, AAOx3, obese male, anasarca, confused   HEENT:  Atraumatic, Normocephalic. EOMI, PERRLA, conjunctiva and sclera clear, No JVD  PULMONARY: Clear to auscultation bilaterally; No wheeze  CARDIOVASCULAR: Regular rate and rhythm; No murmurs, rubs, or gallops  GASTROINTESTINAL: Soft, Nontender, Nondistended; Bowel sounds present, enlarged abdomen   MUSCULOSKELETAL:  2+ Peripheral Pulses, No clubbing, cyanosis,, 2+ edema bl upper and lower ext   NEUROLOGY: non-focal  SKIN: No rashes or lesions      79 yo male with h/o lumbar spine osteomyelitis, HTN, DM, subdural hematoma presented with altered mental status, found to have bacteremia and cirrhosis.     Fever  -low grade fever, improving, cont to  trend temp  -restarted abx for empiric coverage, ID consulted for further management  as per discussions with  ID , IV ceftriaxone 1g q24hrs Dc now  - repeat BCx was not sent due to hard stick  -  ucx growing yeast, likely a contaminant   fever resolved and wbc wnl  cont to monitor     AMS  -2/2 underlying disease, metabolic encephalopathy  -lethargic  ammonia level 39(not sig. 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  Nodular liver contour consistent with liver cirrhosis. 2.  Post-cholecystectomy. 3.  Severe abdominal ascites.  4.  Right pleural effusion.   KUB shows :Nonspecific air distended loops of bowel.  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   f/u cxr to evaluate effusion       Anemia  -likely multifactorial: sepsis, cirrhosis, poor nutrition, GI bleeding  -s/p EGD on 4/16 which showed 5mm ulcer in duodenal bulb post endo clip, repeat EGD on 4/29 showed no bleeding noted  -will monitor for GI bleeding   transfused one unit of PRBC on 5/3  -Hb 8.1 , cont to monitor     Bacteremia  -blood culture positive for bacteroides on presentation  -s/p ceftriaxone and flagyl  -repeat blood culture negative,   as per ID hold off further antibiotics     Afib  -found during this admission  -rate controlled, not candidate for AC given anemia, coagulopathy    Deconditioning  -PT/OT   -OOB    Sacral pressure ulcer  -local wound care  -rotate the patient q2hrs     hypocalcemia:  albumin is 1.7, corrected calcium is 9.1   given albumin     SVT PPX: SCD due to coagulopathy, risk of Gi bleed /anemia       Overall very poor prognosis palliative care following   dispo: f/u cmp/inr am, ID following ,   f/u with hepatology  poor prognosis  code: as per discussion with his wife he is Full code                DWIGHT RIBEIRO 80y Male  MRN#: [id number]   CODE STATUS:___full       SUBJECTIVE  Patient is a 80y old Male who presents with a chief complaint of acute mentation changes (10 May 2025 15:13)  Currently admitted to medicine with the primary diagnosis of Metabolic encephalopathy, cirrhosis       Today is hospital day 27d, and this morning he is awake, speaking to family but altered   edema improved         OBJECTIVE  PAST MEDICAL & SURGICAL HISTORY  Diabetes    Hypertension    Fall    H/O left knee surgery    History of cholecystectomy    History of appendectomy      ALLERGIES:  No Known Allergies    MEDICATIONS:  STANDING MEDICATIONS  buMETAnide Injectable 1 milliGRAM(s) IV Push daily  chlorhexidine 2% Cloths 1 Application(s) Topical <User Schedule>  dextrose 5%. 1000 milliLiter(s) IV Continuous <Continuous>  dextrose 5%. 1000 milliLiter(s) IV Continuous <Continuous>  dextrose 50% Injectable 25 Gram(s) IV Push once  dextrose 50% Injectable 12.5 Gram(s) IV Push once  dextrose 50% Injectable 25 Gram(s) IV Push once  folic acid 1 milliGRAM(s) Oral daily  glucagon  Injectable 1 milliGRAM(s) IntraMuscular once  insulin lispro (ADMELOG) corrective regimen sliding scale   SubCutaneous Before meals and at bedtime  lactulose Syrup 20 Gram(s) Oral every 8 hours  levETIRAcetam   Injectable 250 milliGRAM(s) IV Push <User Schedule>  lidocaine   4% Patch 1 Patch Transdermal daily  midodrine 10 milliGRAM(s) Oral every 8 hours  nystatin Powder 1 Application(s) Topical two times a day  pantoprazole  Injectable 40 milliGRAM(s) IV Push daily  rifAXIMin 550 milliGRAM(s) Oral two times a day  senna 2 Tablet(s) Oral at bedtime  silver sulfADIAZINE 1% Cream 1 Application(s) Topical two times a day  spironolactone 50 milliGRAM(s) Oral daily    PRN MEDICATIONS  acetaminophen     Tablet .. 650 milliGRAM(s) Oral every 6 hours PRN  aluminum hydroxide/magnesium hydroxide/simethicone Suspension 30 milliLiter(s) Oral every 4 hours PRN  dextrose Oral Gel 15 Gram(s) Oral once PRN  melatonin 3 milliGRAM(s) Oral at bedtime PRN  ondansetron Injectable 4 milliGRAM(s) IV Push every 8 hours PRN  oxyCODONE    IR 5 milliGRAM(s) Oral every 6 hours PRN      VITAL SIGNS: Last 24 Hours  T(C): 37.1 (11 May 2025 05:28), Max: 37.6 (10 May 2025 21:02)  T(F): 98.7 (11 May 2025 05:28), Max: 99.6 (10 May 2025 21:02)  HR: 90 (11 May 2025 05:28) (90 - 104)  BP: 108/67 (11 May 2025 05:28) (106/63 - 117/73)  BP(mean): --  RR: 16 (11 May 2025 05:28) (16 - 18)  SpO2: 98% (11 May 2025 05:28) (96% - 98%)    LABS:                        7.8    8.28  )-----------( 99       ( 11 May 2025 07:19 )             24.8     05-11    146  |  110  |  17  ----------------------------<  190[H]  3.4[L]   |  22  |  0.8    Ca    7.9[L]      11 May 2025 07:19    TPro  4.5[L]  /  Alb  2.1[L]  /  TBili  1.7[H]  /  DBili  x   /  AST  59[H]  /  ALT  23  /  AlkPhos  238[H]  05-11    PT/INR - ( 10 May 2025 07:15 )   PT: 17.90 sec;   INR: 1.50 ratio         PTT - ( 10 May 2025 07:15 )  PTT:42.8 sec  Urinalysis Basic - ( 11 May 2025 07:19 )    Color: x / Appearance: x / SG: x / pH: x  Gluc: 190 mg/dL / Ketone: x  / Bili: x / Urobili: x   Blood: x / Protein: x / Nitrite: x   Leuk Esterase: x / RBC: x / WBC x   Sq Epi: x / Non Sq Epi: x / Bacteria: x            Culture - Blood (collected 09 May 2025 08:06)  Source: Blood None  Preliminary Report (10 May 2025 22:02):    No growth at 24 hours            PHYSICAL EXAM:    GENERAL: NAD, well-developed, AAOx3, obese male, anasarca, confused   HEENT:  Atraumatic, Normocephalic. EOMI, PERRLA, conjunctiva and sclera clear, No JVD  PULMONARY: Clear to auscultation bilaterally; No wheeze  CARDIOVASCULAR: Regular rate and rhythm; No murmurs, rubs, or gallops  GASTROINTESTINAL: Soft, Nontender, Nondistended; Bowel sounds present, enlarged abdomen   MUSCULOSKELETAL:  2+ Peripheral Pulses, No clubbing, cyanosis,, 2+ edema bl upper and lower ext   NEUROLOGY: non-focal  SKIN: No rashes or lesions      79 yo male with h/o lumbar spine osteomyelitis, HTN, DM, subdural hematoma presented with altered mental status, found to have bacteremia and cirrhosis.     Fever  -low grade fever, improving, cont to  trend temp  -restarted abx for empiric coverage, ID consulted for further management  as per discussions with  ID , IV ceftriaxone 1g q24hrs Dc now  - repeat BCx was not sent due to hard stick  -  ucx growing yeast, likely a contaminant   fever resolved and wbc wnl  cont to monitor     AMS  -2/2 underlying disease, metabolic encephalopathy  -lethargic  ammonia level 39(not sig. 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  Nodular liver contour consistent with liver cirrhosis. 2.  Post-cholecystectomy. 3.  Severe abdominal ascites.  4.  Right pleural effusion.   KUB shows :Nonspecific air distended loops of bowel.  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   f/u cxr to evaluate effusion       Anemia  -likely multifactorial: sepsis, cirrhosis, poor nutrition, GI bleeding  -s/p EGD on 4/16 which showed 5mm ulcer in duodenal bulb post endo clip, repeat EGD on 4/29 showed no bleeding noted  -will monitor for GI bleeding   transfused one unit of PRBC on 5/3  -Hb 8.1 , cont to monitor     Bacteremia  -blood culture positive for bacteroides on presentation  -s/p ceftriaxone and flagyl  -repeat blood culture negative,   as per ID hold off further antibiotics     Afib  -found during this admission  -rate controlled, not candidate for AC given anemia, coagulopathy    Deconditioning  -PT/OT   -OOB    Sacral pressure ulcer  -local wound care  -rotate the patient q2hrs     hypocalcemia:  albumin is 1.7, corrected calcium is 9.1   given albumin     SVT PPX: SCD due to coagulopathy, risk of Gi bleed /anemia       Overall very poor prognosis palliative care following   dispo: f/u cmp/inr am, ID following ,   f/u with hepatology  poor prognosis  code: as per discussion with his wife he is Full code

## 2025-05-12 LAB
ALBUMIN SERPL ELPH-MCNC: 1.9 G/DL — LOW (ref 3.5–5.2)
ALP SERPL-CCNC: 262 U/L — HIGH (ref 30–115)
ALT FLD-CCNC: 27 U/L — SIGNIFICANT CHANGE UP (ref 0–41)
ANION GAP SERPL CALC-SCNC: 14 MMOL/L — SIGNIFICANT CHANGE UP (ref 7–14)
APTT BLD: 30.8 SEC — SIGNIFICANT CHANGE UP (ref 27–39.2)
AST SERPL-CCNC: 69 U/L — HIGH (ref 0–41)
BILIRUB SERPL-MCNC: 1.8 MG/DL — HIGH (ref 0.2–1.2)
BUN SERPL-MCNC: 17 MG/DL — SIGNIFICANT CHANGE UP (ref 10–20)
CALCIUM SERPL-MCNC: 7.4 MG/DL — LOW (ref 8.4–10.5)
CHLORIDE SERPL-SCNC: 105 MMOL/L — SIGNIFICANT CHANGE UP (ref 98–110)
CO2 SERPL-SCNC: 21 MMOL/L — SIGNIFICANT CHANGE UP (ref 17–32)
CREAT SERPL-MCNC: 0.8 MG/DL — SIGNIFICANT CHANGE UP (ref 0.7–1.5)
EGFR: 91 ML/MIN/1.73M2 — SIGNIFICANT CHANGE UP
EGFR: 91 ML/MIN/1.73M2 — SIGNIFICANT CHANGE UP
GLUCOSE SERPL-MCNC: 230 MG/DL — HIGH (ref 70–99)
HCT VFR BLD CALC: 25.8 % — LOW (ref 42–52)
HGB BLD-MCNC: 8.2 G/DL — LOW (ref 14–18)
INR BLD: 1.51 RATIO — HIGH (ref 0.65–1.3)
MCHC RBC-ENTMCNC: 31.2 PG — HIGH (ref 27–31)
MCHC RBC-ENTMCNC: 31.8 G/DL — LOW (ref 32–37)
MCV RBC AUTO: 98.1 FL — HIGH (ref 80–94)
NRBC BLD AUTO-RTO: 0 /100 WBCS — SIGNIFICANT CHANGE UP (ref 0–0)
PLATELET # BLD AUTO: 103 K/UL — LOW (ref 130–400)
PMV BLD: 9.1 FL — SIGNIFICANT CHANGE UP (ref 7.4–10.4)
POTASSIUM SERPL-MCNC: 3.4 MMOL/L — LOW (ref 3.5–5)
POTASSIUM SERPL-SCNC: 3.4 MMOL/L — LOW (ref 3.5–5)
PROT SERPL-MCNC: 4.9 G/DL — LOW (ref 6–8)
PROTHROM AB SERPL-ACNC: 18 SEC — HIGH (ref 9.95–12.87)
RBC # BLD: 2.63 M/UL — LOW (ref 4.7–6.1)
RBC # FLD: 24.5 % — HIGH (ref 11.5–14.5)
SODIUM SERPL-SCNC: 140 MMOL/L — SIGNIFICANT CHANGE UP (ref 135–146)
WBC # BLD: 8.22 K/UL — SIGNIFICANT CHANGE UP (ref 4.8–10.8)
WBC # FLD AUTO: 8.22 K/UL — SIGNIFICANT CHANGE UP (ref 4.8–10.8)

## 2025-05-12 PROCEDURE — 99232 SBSQ HOSP IP/OBS MODERATE 35: CPT

## 2025-05-12 RX ORDER — ALBUMIN (HUMAN) 12.5 G/50ML
250 INJECTION, SOLUTION INTRAVENOUS ONCE
Refills: 0 | Status: COMPLETED | OUTPATIENT
Start: 2025-05-12 | End: 2025-05-12

## 2025-05-12 RX ADMIN — LIDOCAINE HYDROCHLORIDE 1 PATCH: 20 JELLY TOPICAL at 19:25

## 2025-05-12 RX ADMIN — LEVETIRACETAM 250 MILLIGRAM(S): 10 INJECTION, SOLUTION INTRAVENOUS at 18:22

## 2025-05-12 RX ADMIN — Medication 40 MILLIGRAM(S): at 12:04

## 2025-05-12 RX ADMIN — INSULIN LISPRO 6: 100 INJECTION, SOLUTION INTRAVENOUS; SUBCUTANEOUS at 12:04

## 2025-05-12 RX ADMIN — Medication 650 MILLIGRAM(S): at 17:35

## 2025-05-12 RX ADMIN — INSULIN LISPRO 2: 100 INJECTION, SOLUTION INTRAVENOUS; SUBCUTANEOUS at 18:20

## 2025-05-12 RX ADMIN — NYSTATIN 1 APPLICATION(S): 100000 CREAM TOPICAL at 19:05

## 2025-05-12 RX ADMIN — Medication 1 APPLICATION(S): at 06:09

## 2025-05-12 RX ADMIN — LACTULOSE 20 GRAM(S): 10 SOLUTION ORAL at 12:04

## 2025-05-12 RX ADMIN — INSULIN LISPRO 2: 100 INJECTION, SOLUTION INTRAVENOUS; SUBCUTANEOUS at 21:43

## 2025-05-12 RX ADMIN — ALBUMIN (HUMAN) 125 MILLILITER(S): 12.5 INJECTION, SOLUTION INTRAVENOUS at 21:39

## 2025-05-12 RX ADMIN — LEVETIRACETAM 250 MILLIGRAM(S): 10 INJECTION, SOLUTION INTRAVENOUS at 06:07

## 2025-05-12 RX ADMIN — OXYCODONE HYDROCHLORIDE 5 MILLIGRAM(S): 30 TABLET ORAL at 19:06

## 2025-05-12 RX ADMIN — LACTULOSE 20 GRAM(S): 10 SOLUTION ORAL at 21:55

## 2025-05-12 RX ADMIN — NYSTATIN 1 APPLICATION(S): 100000 CREAM TOPICAL at 06:10

## 2025-05-12 RX ADMIN — OXYCODONE HYDROCHLORIDE 5 MILLIGRAM(S): 30 TABLET ORAL at 18:22

## 2025-05-12 RX ADMIN — Medication 650 MILLIGRAM(S): at 12:04

## 2025-05-12 RX ADMIN — Medication 2 TABLET(S): at 21:54

## 2025-05-12 RX ADMIN — FOLIC ACID 1 MILLIGRAM(S): 1 TABLET ORAL at 12:04

## 2025-05-12 RX ADMIN — Medication 100 MILLIEQUIVALENT(S): at 20:09

## 2025-05-12 RX ADMIN — BUMETANIDE 1 MILLIGRAM(S): 1 TABLET ORAL at 06:07

## 2025-05-12 RX ADMIN — OXYCODONE HYDROCHLORIDE 5 MILLIGRAM(S): 30 TABLET ORAL at 07:58

## 2025-05-12 RX ADMIN — Medication 1 APPLICATION(S): at 18:19

## 2025-05-12 RX ADMIN — LIDOCAINE HYDROCHLORIDE 1 PATCH: 20 JELLY TOPICAL at 12:15

## 2025-05-12 RX ADMIN — OXYCODONE HYDROCHLORIDE 5 MILLIGRAM(S): 30 TABLET ORAL at 09:59

## 2025-05-12 RX ADMIN — INSULIN LISPRO 4: 100 INJECTION, SOLUTION INTRAVENOUS; SUBCUTANEOUS at 08:10

## 2025-05-12 NOTE — PROGRESS NOTE ADULT - NS ATTEST RISK PROBLEM GEN_ALL_CORE FT
Assessment of the patient, discussion with medical staff and review of results.
Assessment of the patient, review of results and discussions with medical staff
Assessment of the patient , review of results and discussions with medicals staff
Assessment of the patient , review of results, discussions with medical staff
Assessment of the patient, discussion with medical staff and review of results.
Assessement of the patient, discussion with medical staff and review of results

## 2025-05-12 NOTE — PROGRESS NOTE ADULT - ASSESSMENT
DWIGHT RIBEIRO 80y Male  MRN#: [id number]   CODE STATUS:__full______      SUBJECTIVE  Patient is a 80y old Male who presents with a chief complaint of acute mentation changes (11 May 2025 09:53)  Currently admitted to medicine with the primary diagnosis of Metabolic encephalopathy      Today is hospital day 29d, and this morning he was refusing am medications  Today is knows he is in the hospital and answering questions appropriately  denies pain, nausea/sob , chest pain            OBJECTIVE  PAST MEDICAL & SURGICAL HISTORY  Diabetes    Hypertension    Fall    H/O left knee surgery    History of cholecystectomy    History of appendectomy      ALLERGIES:  No Known Allergies    MEDICATIONS:  STANDING MEDICATIONS  albumin human  5% IVPB 250 milliLiter(s) IV Intermittent once  buMETAnide Injectable 1 milliGRAM(s) IV Push daily  chlorhexidine 2% Cloths 1 Application(s) Topical <User Schedule>  dextrose 5%. 1000 milliLiter(s) IV Continuous <Continuous>  dextrose 5%. 1000 milliLiter(s) IV Continuous <Continuous>  dextrose 50% Injectable 25 Gram(s) IV Push once  dextrose 50% Injectable 12.5 Gram(s) IV Push once  dextrose 50% Injectable 25 Gram(s) IV Push once  folic acid 1 milliGRAM(s) Oral daily  glucagon  Injectable 1 milliGRAM(s) IntraMuscular once  insulin lispro (ADMELOG) corrective regimen sliding scale   SubCutaneous Before meals and at bedtime  lactulose Syrup 20 Gram(s) Oral every 8 hours  levETIRAcetam   Injectable 250 milliGRAM(s) IV Push <User Schedule>  lidocaine   4% Patch 1 Patch Transdermal daily  midodrine 10 milliGRAM(s) Oral every 8 hours  nystatin Powder 1 Application(s) Topical two times a day  pantoprazole  Injectable 40 milliGRAM(s) IV Push daily  potassium chloride  20 mEq/100 mL IVPB 20 milliEquivalent(s) IV Intermittent once  rifAXIMin 550 milliGRAM(s) Oral two times a day  senna 2 Tablet(s) Oral at bedtime  silver sulfADIAZINE 1% Cream 1 Application(s) Topical two times a day  spironolactone 50 milliGRAM(s) Oral daily    PRN MEDICATIONS  acetaminophen     Tablet .. 650 milliGRAM(s) Oral every 6 hours PRN  aluminum hydroxide/magnesium hydroxide/simethicone Suspension 30 milliLiter(s) Oral every 4 hours PRN  dextrose Oral Gel 15 Gram(s) Oral once PRN  melatonin 3 milliGRAM(s) Oral at bedtime PRN  ondansetron Injectable 4 milliGRAM(s) IV Push every 8 hours PRN  oxyCODONE    IR 5 milliGRAM(s) Oral every 6 hours PRN      VITAL SIGNS: Last 24 Hours  T(C): 36.4 (12 May 2025 14:45), Max: 36.9 (12 May 2025 04:37)  T(F): 97.6 (12 May 2025 14:45), Max: 98.4 (12 May 2025 04:37)  HR: 75 (12 May 2025 14:45) (75 - 99)  BP: 117/62 (12 May 2025 14:45) (96/59 - 134/59)  BP(mean): --  RR: 18 (12 May 2025 14:45) (18 - 18)  SpO2: 97% (12 May 2025 14:45) (96% - 99%)    LABS:                        8.2    8.22  )-----------( 103      ( 12 May 2025 04:30 )             25.8     05-12    140  |  105  |  17  ----------------------------<  230[H]  3.4[L]   |  21  |  0.8    Ca    7.4[L]      12 May 2025 04:30    TPro  4.9[L]  /  Alb  1.9[L]  /  TBili  1.8[H]  /  DBili  x   /  AST  69[H]  /  ALT  27  /  AlkPhos  262[H]  05-12    PT/INR - ( 12 May 2025 04:30 )   PT: 18.00 sec;   INR: 1.51 ratio         PTT - ( 12 May 2025 04:30 )  PTT:30.8 sec  Urinalysis Basic - ( 12 May 2025 04:30 )    Color: x / Appearance: x / SG: x / pH: x  Gluc: 230 mg/dL / Ketone: x  / Bili: x / Urobili: x   Blood: x / Protein: x / Nitrite: x   Leuk Esterase: x / RBC: x / WBC x   Sq Epi: x / Non Sq Epi: x / Bacteria: x              PHYSICAL EXAM:    GENERAL: NAD, well-developed, AAOx3, obese male, anasarca  HEENT:  Atraumatic, Normocephalic. EOMI, PERRLA, conjunctiva and sclera clear, No JVD  PULMONARY: Clear to auscultation bilaterally; No wheeze  CARDIOVASCULAR: Regular rate and rhythm; No murmurs, rubs, or gallops  GASTROINTESTINAL: Soft, Nontender, Nondistended; Bowel sounds present, enlarged abdomen   MUSCULOSKELETAL:  2+ Peripheral Pulses, No clubbing, cyanosis,, 2+ edema bl upper and lower ext (improved )  weeping skin  of the left thigh   NEUROLOGY: non-focal  SKIN: No rashes or lesions      79 yo male with h/o lumbar spine osteomyelitis, HTN, DM, subdural hematoma presented with altered mental status, found to have bacteremia and cirrhosis.     Fever  -low grade fever, resolved   -was on abx for empiric coverage, ID consulted for further management  as per discussions with  ID , IV ceftriaxone 1g q24hrs Dc now  - repeat BCx was not sent due to hard stick  -  ucx growing yeast, likely a contaminant   fever resolved and wbc wnl  cont to monitor     AMS  -2/2 underlying disease, metabolic encephalopathy  -lethargic  ammonia level 39(not sig. 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  Nodular liver contour consistent with liver cirrhosis. 2.  Post-cholecystectomy. 3.  Severe abdominal ascites.  4.  Right pleural effusion.   KUB shows :Nonspecific air distended loops of bowel.  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   will give another dose of albumin iv today    cxr does not show an effusion , shows non specific right opacity       Anemia  -likely multifactorial: sepsis, cirrhosis, poor nutrition, GI bleeding  -s/p EGD on 4/16 which showed 5mm ulcer in duodenal bulb post endo clip, repeat EGD on 4/29 showed no bleeding noted  -will monitor for GI bleeding   transfused one unit of PRBC on 5/3  -Hb stable , cont to monitor     Bacteremia  -blood culture positive for bacteroides on presentation  -s/p ceftriaxone and flagyl  -repeat blood culture negative,   as per ID hold off further antibiotics     Afib  -found during this admission  -rate controlled, not candidate for AC given anemia, coagulopathy    Deconditioning  -PT/OT   -OOB    Sacral pressure ulcer  -local wound care  -rotate the patient q2hrs     hypocalcemia:  albumin is 1.7, corrected calcium is 9.1   given albumin     hypokalemia: replaced with iv potassium   f/u level in am     SVT PPX: SCD due to coagulopathy, risk of Gi bleed /anemia     dispo: Overall very poor prognosis  as per my discussion with her she wants him home possibly with home health aids. Discussed poor prognosis of the the cirrhosis and his overall condition ,will need further discussions with his wife about goals of care   dispo: f/u cmp/inr am,  f/u with hepatology  poor prognosis  code: as per discussion with his wife he is Full code

## 2025-05-13 LAB
ALBUMIN SERPL ELPH-MCNC: 2 G/DL — LOW (ref 3.5–5.2)
ALP SERPL-CCNC: 235 U/L — HIGH (ref 30–115)
ALT FLD-CCNC: 27 U/L — SIGNIFICANT CHANGE UP (ref 0–41)
ANION GAP SERPL CALC-SCNC: 14 MMOL/L — SIGNIFICANT CHANGE UP (ref 7–14)
APTT BLD: 39.7 SEC — HIGH (ref 27–39.2)
AST SERPL-CCNC: 69 U/L — HIGH (ref 0–41)
BILIRUB SERPL-MCNC: 1.7 MG/DL — HIGH (ref 0.2–1.2)
BUN SERPL-MCNC: 17 MG/DL — SIGNIFICANT CHANGE UP (ref 10–20)
CALCIUM SERPL-MCNC: 7.5 MG/DL — LOW (ref 8.4–10.5)
CHLORIDE SERPL-SCNC: 108 MMOL/L — SIGNIFICANT CHANGE UP (ref 98–110)
CO2 SERPL-SCNC: 22 MMOL/L — SIGNIFICANT CHANGE UP (ref 17–32)
CREAT SERPL-MCNC: 0.9 MG/DL — SIGNIFICANT CHANGE UP (ref 0.7–1.5)
EGFR: 87 ML/MIN/1.73M2 — SIGNIFICANT CHANGE UP
EGFR: 87 ML/MIN/1.73M2 — SIGNIFICANT CHANGE UP
GLUCOSE SERPL-MCNC: 189 MG/DL — HIGH (ref 70–99)
INR BLD: 1.55 RATIO — HIGH (ref 0.65–1.3)
POTASSIUM SERPL-MCNC: 3.7 MMOL/L — SIGNIFICANT CHANGE UP (ref 3.5–5)
POTASSIUM SERPL-SCNC: 3.7 MMOL/L — SIGNIFICANT CHANGE UP (ref 3.5–5)
PROT SERPL-MCNC: 5 G/DL — LOW (ref 6–8)
PROTHROM AB SERPL-ACNC: 18.5 SEC — HIGH (ref 9.95–12.87)
SODIUM SERPL-SCNC: 144 MMOL/L — SIGNIFICANT CHANGE UP (ref 135–146)

## 2025-05-13 PROCEDURE — 99233 SBSQ HOSP IP/OBS HIGH 50: CPT

## 2025-05-13 PROCEDURE — 99232 SBSQ HOSP IP/OBS MODERATE 35: CPT

## 2025-05-13 RX ADMIN — OXYCODONE HYDROCHLORIDE 5 MILLIGRAM(S): 30 TABLET ORAL at 14:53

## 2025-05-13 RX ADMIN — Medication 2 TABLET(S): at 21:36

## 2025-05-13 RX ADMIN — LEVETIRACETAM 250 MILLIGRAM(S): 10 INJECTION, SOLUTION INTRAVENOUS at 18:03

## 2025-05-13 RX ADMIN — LIDOCAINE HYDROCHLORIDE 1 PATCH: 20 JELLY TOPICAL at 19:14

## 2025-05-13 RX ADMIN — Medication 40 MILLIGRAM(S): at 12:23

## 2025-05-13 RX ADMIN — MIDODRINE HYDROCHLORIDE 10 MILLIGRAM(S): 5 TABLET ORAL at 14:40

## 2025-05-13 RX ADMIN — LIDOCAINE HYDROCHLORIDE 1 PATCH: 20 JELLY TOPICAL at 00:00

## 2025-05-13 RX ADMIN — BUMETANIDE 1 MILLIGRAM(S): 1 TABLET ORAL at 06:16

## 2025-05-13 RX ADMIN — OXYCODONE HYDROCHLORIDE 5 MILLIGRAM(S): 30 TABLET ORAL at 06:29

## 2025-05-13 RX ADMIN — NYSTATIN 1 APPLICATION(S): 100000 CREAM TOPICAL at 18:07

## 2025-05-13 RX ADMIN — Medication 1 APPLICATION(S): at 06:15

## 2025-05-13 RX ADMIN — INSULIN LISPRO 4: 100 INJECTION, SOLUTION INTRAVENOUS; SUBCUTANEOUS at 12:24

## 2025-05-13 RX ADMIN — INSULIN LISPRO 4: 100 INJECTION, SOLUTION INTRAVENOUS; SUBCUTANEOUS at 17:58

## 2025-05-13 RX ADMIN — LACTULOSE 20 GRAM(S): 10 SOLUTION ORAL at 14:40

## 2025-05-13 RX ADMIN — LACTULOSE 20 GRAM(S): 10 SOLUTION ORAL at 21:37

## 2025-05-13 RX ADMIN — INSULIN LISPRO 6: 100 INJECTION, SOLUTION INTRAVENOUS; SUBCUTANEOUS at 21:49

## 2025-05-13 RX ADMIN — Medication 50 MILLIGRAM(S): at 06:12

## 2025-05-13 RX ADMIN — MIDODRINE HYDROCHLORIDE 10 MILLIGRAM(S): 5 TABLET ORAL at 06:12

## 2025-05-13 RX ADMIN — FOLIC ACID 1 MILLIGRAM(S): 1 TABLET ORAL at 12:24

## 2025-05-13 RX ADMIN — LEVETIRACETAM 250 MILLIGRAM(S): 10 INJECTION, SOLUTION INTRAVENOUS at 06:16

## 2025-05-13 RX ADMIN — LIDOCAINE HYDROCHLORIDE 1 PATCH: 20 JELLY TOPICAL at 12:23

## 2025-05-13 RX ADMIN — Medication 1 APPLICATION(S): at 18:07

## 2025-05-13 RX ADMIN — NYSTATIN 1 APPLICATION(S): 100000 CREAM TOPICAL at 06:13

## 2025-05-13 RX ADMIN — Medication 1 APPLICATION(S): at 06:17

## 2025-05-13 RX ADMIN — LACTULOSE 20 GRAM(S): 10 SOLUTION ORAL at 06:15

## 2025-05-13 RX ADMIN — MIDODRINE HYDROCHLORIDE 10 MILLIGRAM(S): 5 TABLET ORAL at 21:37

## 2025-05-13 NOTE — PROGRESS NOTE ADULT - ASSESSMENT
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 fragilis bacteremia, and acute blood loss anemia. Palliative team had met with patient's spouse, who indicated wanting all life-prolonging interventions. We are reconsulted as patient's spouse and daughter sees things differently.     5/6: Notified that patient's daughter (Edwige [phone]) is an RN and is open to palliative care involvement. I spoke to her and she indicated that she is aware that patient's health will continue to decline, but she does not think that patient's spouse is ready to accept this. If it were up to the daughter, she would want patient to be DNR. She requests that I speak to patient's spouse. I was able to call her (also named Edwige [phone]), and to reintroduce our team. She states that it has been a difficult period of time because she is dealing with her own health issues. She was also not expecting us to find so many different medical problems in the patient in such a short amount of time. She became emotional during our discussion and asks if we can talk more in person. At present, she is feeling sick and will call me when she comes to the hospital.     5/8: I have not heard back from patient's spouse. I called to f/u but she did not answer.     5/13: unable to reach spouse today, attempted to reach, not present at bedside    Plan:  - symptoms per primary team  - c/w IV bumex, monitor I/O, hemodynamics  - continue current medical interventions  - ongoing discussions as above  - full code for now  - discussed with medical attending re: current plan of care and GOC  ______________  Ang Dunne MD  Palliative Medicine  [facility]  [phone]

## 2025-05-13 NOTE — PROGRESS NOTE ADULT - ASSESSMENT
DWIGHT RIBEIRO 80y Male  MRN#: [id number]   CODE STATUS:__full______      SUBJECTIVE  Patient is a 80y old Male who presents with a chief complaint of acute mentation changes (13 May 2025 14:46)  Currently admitted to medicine with the primary diagnosis of Metabolic encephalopathy      Today is hospital day 30d, and this morning he is more awake and alert, denies abdominal pain. Knows he is in the hospital, no new complaints   legs are less edematous           OBJECTIVE  PAST MEDICAL & SURGICAL HISTORY  Diabetes    Hypertension    Fall    H/O left knee surgery    History of cholecystectomy    History of appendectomy      ALLERGIES:  No Known Allergies    MEDICATIONS:  STANDING MEDICATIONS  buMETAnide Injectable 1 milliGRAM(s) IV Push daily  chlorhexidine 2% Cloths 1 Application(s) Topical <User Schedule>  dextrose 5%. 1000 milliLiter(s) IV Continuous <Continuous>  dextrose 5%. 1000 milliLiter(s) IV Continuous <Continuous>  dextrose 50% Injectable 25 Gram(s) IV Push once  dextrose 50% Injectable 12.5 Gram(s) IV Push once  dextrose 50% Injectable 25 Gram(s) IV Push once  folic acid 1 milliGRAM(s) Oral daily  glucagon  Injectable 1 milliGRAM(s) IntraMuscular once  insulin lispro (ADMELOG) corrective regimen sliding scale   SubCutaneous Before meals and at bedtime  lactulose Syrup 20 Gram(s) Oral every 8 hours  levETIRAcetam   Injectable 250 milliGRAM(s) IV Push <User Schedule>  lidocaine   4% Patch 1 Patch Transdermal daily  midodrine 10 milliGRAM(s) Oral every 8 hours  nystatin Powder 1 Application(s) Topical two times a day  pantoprazole  Injectable 40 milliGRAM(s) IV Push daily  rifAXIMin 550 milliGRAM(s) Oral two times a day  senna 2 Tablet(s) Oral at bedtime  silver sulfADIAZINE 1% Cream 1 Application(s) Topical two times a day  spironolactone 50 milliGRAM(s) Oral daily    PRN MEDICATIONS  acetaminophen     Tablet .. 650 milliGRAM(s) Oral every 6 hours PRN  aluminum hydroxide/magnesium hydroxide/simethicone Suspension 30 milliLiter(s) Oral every 4 hours PRN  dextrose Oral Gel 15 Gram(s) Oral once PRN  melatonin 3 milliGRAM(s) Oral at bedtime PRN  ondansetron Injectable 4 milliGRAM(s) IV Push every 8 hours PRN  oxyCODONE    IR 5 milliGRAM(s) Oral every 6 hours PRN      VITAL SIGNS: Last 24 Hours  T(C): 36.2 (13 May 2025 07:45), Max: 36.6 (12 May 2025 20:22)  T(F): 97.2 (13 May 2025 07:45), Max: 97.9 (12 May 2025 20:22)  HR: 89 (13 May 2025 07:45) (89 - 93)  BP: 149/75 (13 May 2025 07:45) (137/62 - 149/75)  BP(mean): 99 (13 May 2025 07:45) (99 - 99)  RR: 18 (13 May 2025 07:45) (18 - 18)  SpO2: 99% (13 May 2025 07:45) (97% - 99%)    LABS:                        8.2    8.22  )-----------( 103      ( 12 May 2025 04:30 )             25.8     05-13    144  |  108  |  17  ----------------------------<  189[H]  3.7   |  22  |  0.9    Ca    7.5[L]      13 May 2025 07:57    TPro  5.0[L]  /  Alb  2.0[L]  /  TBili  1.7[H]  /  DBili  x   /  AST  69[H]  /  ALT  27  /  AlkPhos  235[H]  05-13    PT/INR - ( 13 May 2025 07:57 )   PT: 18.50 sec;   INR: 1.55 ratio         PTT - ( 13 May 2025 07:57 )  PTT:39.7 sec  Urinalysis Basic - ( 13 May 2025 07:57 )    Color: x / Appearance: x / SG: x / pH: x  Gluc: 189 mg/dL / Ketone: x  / Bili: x / Urobili: x   Blood: x / Protein: x / Nitrite: x   Leuk Esterase: x / RBC: x / WBC x   Sq Epi: x / Non Sq Epi: x / Bacteria: x                PHYSICAL EXAM:    GENERAL: NAD, well-developed, AAOx3, obese male, anasarca  HEENT:  Atraumatic, Normocephalic. EOMI, PERRLA, conjunctiva and sclera clear, No JVD  PULMONARY: Clear to auscultation bilaterally; No wheeze  CARDIOVASCULAR: Regular rate and rhythm; No murmurs, rubs, or gallops  GASTROINTESTINAL: Soft, Nontender, Nondistended; Bowel sounds present, enlarged abdomen   MUSCULOSKELETAL:  2+ Peripheral Pulses, No clubbing, cyanosis,, 2+ edema bl upper and lower ext (improved )  weeping skin  of the left thigh   NEUROLOGY: non-focal  SKIN: No rashes or lesions      79 yo male with h/o lumbar spine osteomyelitis, HTN, DM, subdural hematoma presented with altered mental status, found to have bacteremia and cirrhosis.     Fever  -low grade fever, resolved   -was on abx for empiric coverage, ID consulted for further management  as per discussions with  ID , IV ceftriaxone 1g q24hrs Dc now  - repeat BCx was not sent due to hard stick  -  ucx growing yeast, likely a contaminant   fever resolved and wbc wnl  cont to monitor     AMS  -2/2 underlying disease, metabolic encephalopathy  -lethargic  ammonia level 39(not sig. 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  Nodular liver contour consistent with liver cirrhosis. 2.  Post-cholecystectomy. 3.  Severe abdominal ascites.  4.  Right pleural effusion.  -cxr does not show an effusion , shows non specific right opacity    KUB shows :Nonspecific air distended loops of bowel.  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    s/p albumin IV multiple times     Anemia  -likely multifactorial: sepsis, cirrhosis, poor nutrition, GI bleeding  -s/p EGD on 4/16 which showed 5mm ulcer in duodenal bulb post endo clip, repeat EGD on 4/29 showed no bleeding noted  -will monitor for GI bleeding   transfused one unit of PRBC on 5/3  -Hb stable , cont to monitor     Bacteremia  -blood culture positive for bacteroides on presentation  -s/p ceftriaxone and flagyl  -repeat blood culture negative,   as per ID hold off further antibiotics     Afib  -found during this admission  -rate controlled, not candidate for AC given anemia, coagulopathy    Deconditioning  -PT/OT   -OOB    Sacral pressure ulcer  -local wound care  -rotate the patient q2hrs     hypocalcemia:  albumin is 1.7, corrected calcium is 9.1   given albumin     hypokalemia: replaced with iv potassium   f/u level in am     SVT PPX: SCD due to coagulopathy, risk of Gi bleed /anemia     dispo: Overall very poor prognosis  as per my discussion with her she wants him home possibly with home health aids. Discussed poor prognosis of the the cirrhosis and his overall condition ,will need further discussions with his wife about goals of care   discussed the patients condition with his daughter today who understands the prognosis but is having difficulty getting her mom to understand   dispo: f/u cmp/inr am,  f/u with hepatology  poor prognosis  code: as per discussion with his wife he is Full code

## 2025-05-13 NOTE — PROGRESS NOTE ADULT - SUBJECTIVE AND OBJECTIVE BOX
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 fragilis bacteremia, and acute blood loss anemia. Patient is on IV morphine for pain. Patient is on lactulose for encephalopathy, IV abx, and frequent H/H monitoring, requiring pRBC. Palliative consulted for GOC. Patient is full code.    INTERVAL EVENTS  4/24: patient without acute distress  4/25: no acute distress, on octreotide gtt, NGT in place  5/6: Patient without acute distress.   5/8: NAD, no family present  5/13: patient appears comfortable, cousins at bedside    ADVANCE DIRECTIVES:    [ ] Full Code [ ] DNR  MOLST  [ ]  Living Will  [ ]   DECISION MAKER(s):  [ ] Health Care Proxy(s)  [ ] Surrogate(s)  [ ] Guardian           Name(s): Phone Number(s): spouse Edwige [phone] | [phone]    BASELINE (I)ADL(s) (prior to admission):  [place]: [ ]Total  [ ] Moderate [ ]Dependent  Palliative Performance Status Version 2:         %    http://npcrc.org/files/news/palliative_performance_scale_ppsv2.pdf    Allergies    No Known Allergies    Intolerances    MEDICATIONS  (STANDING):  buMETAnide Injectable 1 milliGRAM(s) IV Push daily  chlorhexidine 2% Cloths 1 Application(s) Topical <User Schedule>  dextrose 5%. 1000 milliLiter(s) (50 mL/Hr) IV Continuous <Continuous>  dextrose 5%. 1000 milliLiter(s) (100 mL/Hr) IV Continuous <Continuous>  dextrose 50% Injectable 25 Gram(s) IV Push once  dextrose 50% Injectable 12.5 Gram(s) IV Push once  dextrose 50% Injectable 25 Gram(s) IV Push once  folic acid 1 milliGRAM(s) Oral daily  glucagon  Injectable 1 milliGRAM(s) IntraMuscular once  insulin lispro (ADMELOG) corrective regimen sliding scale   SubCutaneous Before meals and at bedtime  lactulose Syrup 20 Gram(s) Oral every 8 hours  levETIRAcetam   Injectable 250 milliGRAM(s) IV Push <User Schedule>  lidocaine   4% Patch 1 Patch Transdermal daily  midodrine 10 milliGRAM(s) Oral every 8 hours  nystatin Powder 1 Application(s) Topical two times a day  pantoprazole  Injectable 40 milliGRAM(s) IV Push daily  rifAXIMin 550 milliGRAM(s) Oral two times a day  senna 2 Tablet(s) Oral at bedtime  silver sulfADIAZINE 1% Cream 1 Application(s) Topical two times a day  spironolactone 50 milliGRAM(s) Oral daily    MEDICATIONS  (PRN):  acetaminophen     Tablet .. 650 milliGRAM(s) Oral every 6 hours PRN Temp greater or equal to 38C (100.4F), Mild Pain (1 - 3)  aluminum hydroxide/magnesium hydroxide/simethicone Suspension 30 milliLiter(s) Oral every 4 hours PRN Dyspepsia  dextrose Oral Gel 15 Gram(s) Oral once PRN Blood Glucose LESS THAN 70 milliGRAM(s)/deciliter  melatonin 3 milliGRAM(s) Oral at bedtime PRN Insomnia  ondansetron Injectable 4 milliGRAM(s) IV Push every 8 hours PRN Nausea and/or Vomiting  oxyCODONE    IR 5 milliGRAM(s) Oral every 6 hours PRN Moderate Pain (4 - 6)      PRESENT SYMPTOMS: [ X][ Difficult to obtain due to poor mentation   Source if other than patient:  [ ]Family   [ ]Team   [ X]All review of systems negative including pain and dyspnea unless noted below    All components of pain assessment below addressed. Blank spaces indicate that the patient did/could not complete the assessment.  Pain: [ ]yes [ ]no  QOL impact -   Location -                    Aggravating factors -  Quality -  Radiation -  Timing-  Severity (0-10 scale):  Minimal acceptable level (0-10 scale):     CPOT:    https://www.sccm.org/getattachment/rvf48c26-9t7e-4j2n-5q1z-3110o3487u3k/Critical-Care-Pain-Observation-Tool-(CPOT)    PAIN AD Score: 0  http://geriatrictoolkit.[place].[email or web address]/cog/painad.pdf (press ctrl +  left click to view)    Dyspnea:                           [ ]None[ ]Mild [ ]Moderate [ ]Severe     Respiratory Distress Observation Scale (RDOS): 0  A score of 0 to 2 signifies little or no respiratory distress, 3 signifies mild distress, scores 4 to 6 indicate moderate distress, and scores greater than 7 signify severe distress  [email or web address]/sites/default/[email or web address]-[email or web address].pdf    Anxiety:                             [ ]None[ ]Mild [ ]Moderate [ ]Severe   Fatigue:                             [ ]None[ ]Mild [ ]Moderate [ ]Severe   Nausea:                             [ ]None[ ]Mild [ ]Moderate [ ]Severe   Loss of appetite:              [ ]None[ ]Mild [ ]Moderate [ ]Severe   Constipation:                    [ ]None[ ]Mild [ ]Moderate [ ]Severe    Other Symptoms:    PHYSICAL EXAM:  Vital Signs Last 24 Hrs  T(C): 36.2 (13 May 2025 07:45), Max: 36.6 (12 May 2025 20:22)  T(F): 97.2 (13 May 2025 07:45), Max: 97.9 (12 May 2025 20:22)  HR: 89 (13 May 2025 07:45) (89 - 93)  BP: 149/75 (13 May 2025 07:45) (137/62 - 149/75)  BP(mean): 99 (13 May 2025 07:45) (99 - 99)  RR: 18 (13 May 2025 07:45) (18 - 18)  SpO2: 99% (13 May 2025 07:45) (97% - 99%)    Parameters below as of 13 May 2025 07:45  Patient On (Oxygen Delivery Method): nasal cannula  O2 Flow (L/min): 2    GENERAL:  [X ] No acute distress [ ]Lethargic  [ ]Unarousable  [ ]Verbal  [ ]Non-Verbal [ ]Cachexia    BEHAVIORAL/PSYCH:  [ ]Alert and Oriented x  [ ] Anxiety [ ] Delirium [ ] Agitation [X ] Calm   EYES: [ ] No scleral icterus [ ] Scleral icterus [ ] Closed  ENMT:  [ ]Dry mouth  [ ]No external oral lesions [ X] No external ear or nose lesions  CARDIOVASCULAR:  [ ]Regular [ ]Irregular [ ]Tachy [ ]Not Tachy  [ ]Jenaro [ ] Edema [ ] No edema  PULMONARY:  [ ]Tachypnea  [ ]Audible excessive secretions [X ] No labored breathing [ ] labored breathing  GASTROINTESTINAL: [ ]Soft  [ ]Distended  [ X]Not distended [ ]Non tender [ ]Tender  MUSCULOSKELETAL: [ ]No clubbing [ ] clubbing  [ X] No cyanosis [ ] cyanosis  NEUROLOGIC: [ ]No focal deficits  [ ]Follows commands  [ ]Does not follow commands  [ ]Cognitive impairment  [ ]Dysphagia  [ ]Dysarthria  [ ]Paresis   SKIN: [ ] Jaundiced [X ] Non-jaundiced [ ]Rash [ ]No Rash [ ] Warm [ ] Dry             PPS: 30%    CRITICAL CARE:  [ ] Shock Present  [ ]Septic [ ]Cardiogenic [ ]Neurologic [ ]Hypovolemic  [ ]  Vasopressors [ ]  Inotropes   [ ]Respiratory failure present [ ]Mechanical ventilation [ ]Non-invasive ventilatory support [ ]High flow  [ ]Acute  [ ]Chronic [ ]Hypoxic  [ ]Hypercarbic [ ]Other  [ ]Other organ failure     LABS: reviewed by me, notable for: CBC, BMP WNL, Ca low                          8.2    8.22  )-----------( 103      ( 12 May 2025 04:30 )             25.8     05-13    144  |  108  |  17  ----------------------------<  189[H]  3.7   |  22  |  0.9    Ca    7.5[L]      13 May 2025 07:57              RADIOLOGY & ADDITIONAL STUDIES: CXR personally reviewed and interpretated by me:     5/5: Nonspecific air distended loops of bowel.    Stable osseous structures.    PROTEIN CALORIE MALNUTRITION PRESENT: [ ]mild [ ]moderate [ ]severe [ ]underweight [ ]morbid obesity  https://www.andeal.org/vault/2440/web/files/ONC/Table_Clinical%20Characteristics%20to%20Document%20Malnutrition-White%20JV%20et%20al%202012.pdf    Height (cm): 175.3 (04-17-25 @ 11:56), 175.3 (02-19-25 @ 12:20), 175.3 (01-23-25 @ 23:40)  Weight (kg): 117.8 (04-17-25 @ 11:56), 113.4 (02-19-25 @ 12:20), 120.6 (01-23-25 @ 23:40)  BMI (kg/m2): 38.3 (04-17-25 @ 11:56), 36.9 (02-19-25 @ 12:20), 39.2 (01-23-25 @ 23:40)    [ ]PPSV2 < or = to 30% [ ]significant weight loss  [ ]poor nutritional intake  [ ]anasarca      [ ]Artificial Nutrition          Palliative Care Spiritual/Emotional Screening Tool Question  Severity (0-4):                    OR                    [X ] Unable to determine/NA  Score of 2 or greater indicates recommendation of Chaplaincy referral  Chaplaincy Referral: [ ] Yes [ ] Refused [ ] Following     Caregiver [place]:  [ ] Yes [ ] No    OR    [x ] Unable to determine. Will assess at later time if appropriate.  Social Work Referral [ ]  Patient and Family Centered Care Referral [ ]    Anticipatory Grief Present: [ ] Yes [ ] No    OR     [ x] Unable to determine. Will assess at later time if appropriate.  Social Work Referral [ ]  Patient and Family Centered Care Referral [ ]    REFERRALS:   [ ]Chaplaincy  [ ]Hospice  [ ]Child Life  [ ]Social Work  [ ]Case management [ ]Holistic Therapy     Palliative care education provided to patient and/or family    Goals of Care Document:

## 2025-05-14 LAB
CULTURE RESULTS: SIGNIFICANT CHANGE UP
GLUCOSE BLDC GLUCOMTR-MCNC: 165 MG/DL — HIGH (ref 70–99)
GLUCOSE BLDC GLUCOMTR-MCNC: 195 MG/DL — HIGH (ref 70–99)
GLUCOSE BLDC GLUCOMTR-MCNC: 200 MG/DL — HIGH (ref 70–99)
SPECIMEN SOURCE: SIGNIFICANT CHANGE UP

## 2025-05-14 PROCEDURE — 99232 SBSQ HOSP IP/OBS MODERATE 35: CPT

## 2025-05-14 PROCEDURE — 99233 SBSQ HOSP IP/OBS HIGH 50: CPT

## 2025-05-14 RX ORDER — SPIRONOLACTONE 25 MG
100 TABLET ORAL DAILY
Refills: 0 | Status: DISCONTINUED | OUTPATIENT
Start: 2025-05-14 | End: 2025-06-16

## 2025-05-14 RX ADMIN — LACTULOSE 20 GRAM(S): 10 SOLUTION ORAL at 06:07

## 2025-05-14 RX ADMIN — LEVETIRACETAM 250 MILLIGRAM(S): 10 INJECTION, SOLUTION INTRAVENOUS at 06:07

## 2025-05-14 RX ADMIN — INSULIN LISPRO 2: 100 INJECTION, SOLUTION INTRAVENOUS; SUBCUTANEOUS at 17:17

## 2025-05-14 RX ADMIN — NYSTATIN 1 APPLICATION(S): 100000 CREAM TOPICAL at 06:10

## 2025-05-14 RX ADMIN — INSULIN LISPRO 4: 100 INJECTION, SOLUTION INTRAVENOUS; SUBCUTANEOUS at 08:16

## 2025-05-14 RX ADMIN — INSULIN LISPRO 2: 100 INJECTION, SOLUTION INTRAVENOUS; SUBCUTANEOUS at 22:07

## 2025-05-14 RX ADMIN — Medication 50 MILLIGRAM(S): at 06:07

## 2025-05-14 RX ADMIN — Medication 1 APPLICATION(S): at 06:10

## 2025-05-14 RX ADMIN — MIDODRINE HYDROCHLORIDE 10 MILLIGRAM(S): 5 TABLET ORAL at 06:06

## 2025-05-14 RX ADMIN — LACTULOSE 20 GRAM(S): 10 SOLUTION ORAL at 13:47

## 2025-05-14 RX ADMIN — INSULIN LISPRO 2: 100 INJECTION, SOLUTION INTRAVENOUS; SUBCUTANEOUS at 12:17

## 2025-05-14 RX ADMIN — Medication 40 MILLIGRAM(S): at 12:16

## 2025-05-14 RX ADMIN — Medication 1 APPLICATION(S): at 06:06

## 2025-05-14 RX ADMIN — MIDODRINE HYDROCHLORIDE 10 MILLIGRAM(S): 5 TABLET ORAL at 13:47

## 2025-05-14 RX ADMIN — Medication 1 APPLICATION(S): at 17:27

## 2025-05-14 RX ADMIN — FOLIC ACID 1 MILLIGRAM(S): 1 TABLET ORAL at 12:17

## 2025-05-14 RX ADMIN — BUMETANIDE 1 MILLIGRAM(S): 1 TABLET ORAL at 06:58

## 2025-05-14 RX ADMIN — LEVETIRACETAM 250 MILLIGRAM(S): 10 INJECTION, SOLUTION INTRAVENOUS at 17:18

## 2025-05-14 RX ADMIN — NYSTATIN 1 APPLICATION(S): 100000 CREAM TOPICAL at 17:27

## 2025-05-14 NOTE — PROGRESS NOTE ADULT - ASSESSMENT
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 fragilis bacteremia, and acute blood loss anemia. Palliative team had met with patient's spouse, who indicated wanting all life-prolonging interventions. We are reconsulted as patient's spouse and daughter sees things differently.     5/6: Notified that patient's daughter (Edwige [phone]) is an RN and is open to palliative care involvement. I spoke to her and she indicated that she is aware that patient's health will continue to decline, but she does not think that patient's spouse is ready to accept this. If it were up to the daughter, she would want patient to be DNR. She requests that I speak to patient's spouse. I was able to call her (also named Edwige [phone]), and to reintroduce our team. She states that it has been a difficult period of time because she is dealing with her own health issues. She was also not expecting us to find so many different medical problems in the patient in such a short amount of time. She became emotional during our discussion and asks if we can talk more in person. At present, she is feeling sick and will call me when she comes to the hospital.     5/8: I have not heard back from patient's spouse. I called to f/u but she did not answer.     5/13, 5/14: unable to reach spouse today, attempted to reach, not present at bedside    Plan:  - symptoms per primary team  - c/w IV bumex, monitor I/O, hemodynamics  - continue current medical interventions  - ongoing discussions as above  - full code for now  - notes reviewed: case management, hospitalist    ______________  Ang Dunne MD  Palliative Medicine  [facility]   of Geriatric and Palliative Medicine  [phone]

## 2025-05-14 NOTE — PROGRESS NOTE ADULT - ASSESSMENT
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 elevated   -hold opioids   -mentation improving  -portal hypertension  -Coagulopathy  -Hypoalbuminemia   -on rifaximin, lactulose  -s/p albumin given diffuse edema on 4/30, 5/2, 5/8, 5/9, 5/12  pt is very edematous, cont  spironolactone 50 mg and will try to titrate up to 100mg  and as per discussion with hepatology,  cont bumix IV   cont to monitor bp   abdominal US:.  Nodular liver contour consistent with liver cirrhosis. 2.  Post-cholecystectomy. 3.  Severe abdominal ascites.  4.  Right pleural effusion.  -cxr does not show an effusion , shows non specific right opacity    KUB shows :Nonspecific air distended loops of bowel.  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    s/p albumin IV multiple times     #Anemia  -likely multifactorial: sepsis, cirrhosis, poor nutrition, GI bleeding  -s/p EGD on 4/16 which showed 5mm ulcer in duodenal bulb post endo clip, repeat EGD on 4/29 showed no bleeding noted  -will monitor for GI bleeding   transfused one unit of PRBC on 5/3  -Hb stable , cont to monitor     #Afib  -found during this admission  -rate controlled, not candidate for AC given anemia, coagulopathy    #Deconditioning  -PT/OT   -OOB    #Sacral pressure ulcer  -local wound care  -rotate the patient q2hrs     #hypocalcemia:  albumin is 1.7, corrected calcium is 9.1   given albumin     #hypokalemia  replaced with iv potassium   f/u level in am remain low at 3.7    SVT PPX: SCD due to coagulopathy, risk of Gi bleed /anemia     dispo: Overall very poor prognosis            #Misc  - DVT Prophylaxis:  - GI Prophylaxis:  - Diet:  - Activity:  - IV Fluids:  - Code Status:    Dispo:   Mr Mendoza is a 80 YOM w a PMH of 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 elevated   -hold opioids   -mentation improving  -portal hypertension  -Coagulopathy  -Hypoalbuminemia   -on rifaximin, lactulose  -s/p albumin given diffuse edema on 4/30, 5/2, 5/8, 5/9, 5/12  pt is very edematous, cont  spironolactone  titrate up to 100mg  and as per discussion with hepatology,  cont bumix IV   cont to monitor bp   abdominal US:.  Nodular liver contour consistent with liver cirrhosis. 2.  Post-cholecystectomy. 3.  Severe abdominal ascites.  4.  Right pleural effusion.  -cxr does not show an effusion , shows non specific right opacity    KUB shows :Nonspecific air distended loops of bowel.  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    s/p albumin IV multiple times     #Anemia  -likely multifactorial: sepsis, cirrhosis, poor nutrition, GI bleeding  -s/p EGD on 4/16 which showed 5mm ulcer in duodenal bulb post endo clip, repeat EGD on 4/29 showed no bleeding noted  -will monitor for GI bleeding   transfused one unit of PRBC on 5/3  -Hb stable , cont to monitor     #Afib  -found during this admission  -rate controlled, not candidate for AC given anemia, coagulopathy    #Deconditioning  -PT/OT   -OOB    #Sacral pressure ulcer  -local wound care  -rotate the patient q2hrs     #hypocalcemia:  albumin is 1.7, corrected calcium is 9.1   given albumin     #hypokalemia  replaced with iv potassium   f/u level in am remain low at 3.7    SVT PPX: SCD due to coagulopathy, risk of Gi bleed /anemia     dispo: Overall very poor prognosis            #Misc  - DVT Prophylaxis:  - GI Prophylaxis:  - Diet:  - Activity:  - IV Fluids:  - Code Status:    Dispo:

## 2025-05-14 NOTE — PROGRESS NOTE ADULT - SUBJECTIVE AND OBJECTIVE BOX
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 fragilis bacteremia, and acute blood loss anemia. Patient is on IV morphine for pain. Patient is on lactulose for encephalopathy, IV abx, and frequent H/H monitoring, requiring pRBC. Palliative consulted for GOC. Patient is full code.    INTERVAL EVENTS  4/24: patient without acute distress  4/25: no acute distress, on octreotide gtt, NGT in place  5/6: Patient without acute distress.   5/8: NAD, no family present  5/13: patient appears comfortable, cousins at bedside  5/14: patient awake, denies major symptoms    ADVANCE DIRECTIVES:    [ ] Full Code [ ] DNR  MOLST  [ ]  Living Will  [ ]   DECISION MAKER(s):  [ ] Health Care Proxy(s)  [ ] Surrogate(s)  [ ] Guardian           Name(s): Phone Number(s): spouse Edwige [phone] | [phone]    BASELINE (I)ADL(s) (prior to admission):  [place]: [ ]Total  [ ] Moderate [ ]Dependent  Palliative Performance Status Version 2:         %    http://npcrc.org/files/news/palliative_performance_scale_ppsv2.pdf    Allergies    No Known Allergies    Intolerances    MEDICATIONS  (STANDING):  buMETAnide Injectable 1 milliGRAM(s) IV Push daily  chlorhexidine 2% Cloths 1 Application(s) Topical <User Schedule>  dextrose 5%. 1000 milliLiter(s) (100 mL/Hr) IV Continuous <Continuous>  dextrose 5%. 1000 milliLiter(s) (50 mL/Hr) IV Continuous <Continuous>  dextrose 50% Injectable 25 Gram(s) IV Push once  dextrose 50% Injectable 12.5 Gram(s) IV Push once  dextrose 50% Injectable 25 Gram(s) IV Push once  folic acid 1 milliGRAM(s) Oral daily  glucagon  Injectable 1 milliGRAM(s) IntraMuscular once  insulin lispro (ADMELOG) corrective regimen sliding scale   SubCutaneous Before meals and at bedtime  lactulose Syrup 20 Gram(s) Oral every 8 hours  levETIRAcetam   Injectable 250 milliGRAM(s) IV Push <User Schedule>  lidocaine   4% Patch 1 Patch Transdermal daily  midodrine 10 milliGRAM(s) Oral every 8 hours  nystatin Powder 1 Application(s) Topical two times a day  pantoprazole  Injectable 40 milliGRAM(s) IV Push daily  rifAXIMin 550 milliGRAM(s) Oral two times a day  senna 2 Tablet(s) Oral at bedtime  silver sulfADIAZINE 1% Cream 1 Application(s) Topical two times a day  spironolactone 100 milliGRAM(s) Oral daily    MEDICATIONS  (PRN):  acetaminophen     Tablet .. 650 milliGRAM(s) Oral every 6 hours PRN Temp greater or equal to 38C (100.4F), Mild Pain (1 - 3)  aluminum hydroxide/magnesium hydroxide/simethicone Suspension 30 milliLiter(s) Oral every 4 hours PRN Dyspepsia  dextrose Oral Gel 15 Gram(s) Oral once PRN Blood Glucose LESS THAN 70 milliGRAM(s)/deciliter  melatonin 3 milliGRAM(s) Oral at bedtime PRN Insomnia  ondansetron Injectable 4 milliGRAM(s) IV Push every 8 hours PRN Nausea and/or Vomiting  oxyCODONE    IR 5 milliGRAM(s) Oral every 6 hours PRN Moderate Pain (4 - 6)      PRESENT SYMPTOMS: [ X][ Difficult to obtain due to poor mentation   Source if other than patient:  [ ]Family   [ ]Team   [ X]All review of systems negative including pain and dyspnea unless noted below    All components of pain assessment below addressed. Blank spaces indicate that the patient did/could not complete the assessment.  Pain: [ ]yes [ ]no  QOL impact -   Location -                    Aggravating factors -  Quality -  Radiation -  Timing-  Severity (0-10 scale):  Minimal acceptable level (0-10 scale):     CPOT:    https://www.sccm.org/getattachment/ine41x98-4n5e-1b1l-7k9c-0716x7157o2l/Critical-Care-Pain-Observation-Tool-(CPOT)    PAIN AD Score: 0  http://geriatrictoolkit.[place].[email or web address]/cog/painad.pdf (press ctrl +  left click to view)    Dyspnea:                           [ ]None[ ]Mild [ ]Moderate [ ]Severe     Respiratory Distress Observation Scale (RDOS): 0  A score of 0 to 2 signifies little or no respiratory distress, 3 signifies mild distress, scores 4 to 6 indicate moderate distress, and scores greater than 7 signify severe distress  [email or web address]://[email or web address]/[email or web address]/files/[email or web address]-ncoypxajbxi-[email or web address].pdf    Anxiety:                             [ ]None[ ]Mild [ ]Moderate [ ]Severe   Fatigue:                             [ ]None[ ]Mild [ ]Moderate [ ]Severe   Nausea:                             [ ]None[ ]Mild [ ]Moderate [ ]Severe   Loss of appetite:              [ ]None[ ]Mild [ ]Moderate [ ]Severe   Constipation:                    [ ]None[ ]Mild [ ]Moderate [ ]Severe    Other Symptoms:    PHYSICAL EXAM:  Vital Signs Last 24 Hrs  T(C): 36.4 (14 May 2025 13:30), Max: 36.7 (13 May 2025 21:00)  T(F): 97.6 (14 May 2025 13:30), Max: 98.1 (13 May 2025 21:00)  HR: 86 (14 May 2025 13:30) (86 - 91)  BP: 120/70 (14 May 2025 13:30) (101/58 - 122/55)  BP(mean): --  RR: 18 (14 May 2025 13:30) (18 - 18)  SpO2: 100% (14 May 2025 13:30) (100% - 100%)    Parameters below as of 14 May 2025 13:30  Patient On (Oxygen Delivery Method): nasal cannula  O2 Flow (L/min): 2      GENERAL:  [X ] No acute distress [ ]Lethargic  [ ]Unarousable  [ ]Verbal  [ ]Non-Verbal [ ]Cachexia    BEHAVIORAL/PSYCH:  [ ]Alert and Oriented x  [ ] Anxiety [ ] Delirium [ ] Agitation [X ] Calm   EYES: [ ] No scleral icterus [ ] Scleral icterus [ ] Closed  ENMT:  [ ]Dry mouth  [ ]No external oral lesions [ X] No external ear or nose lesions  CARDIOVASCULAR:  [ ]Regular [ ]Irregular [ ]Tachy [ ]Not Tachy  [ ]Jenaro [ ] Edema [ ] No edema  PULMONARY:  [ ]Tachypnea  [ ]Audible excessive secretions [X ] No labored breathing [ ] labored breathing  GASTROINTESTINAL: [ ]Soft  [ ]Distended  [ X]Not distended [ ]Non tender [ ]Tender  MUSCULOSKELETAL: [ ]No clubbing [ ] clubbing  [ X] No cyanosis [ ] cyanosis  NEUROLOGIC: [ ]No focal deficits  [ ]Follows commands  [ ]Does not follow commands  [ ]Cognitive impairment  [ ]Dysphagia  [ ]Dysarthria  [ ]Paresis   SKIN: [ ] Jaundiced [X ] Non-jaundiced [ ]Rash [ ]No Rash [ ] Warm [ ] Dry             PPS: 30%    CRITICAL CARE:  [ ] Shock Present  [ ]Septic [ ]Cardiogenic [ ]Neurologic [ ]Hypovolemic  [ ]  Vasopressors [ ]  Inotropes   [ ]Respiratory failure present [ ]Mechanical ventilation [ ]Non-invasive ventilatory support [ ]High flow  [ ]Acute  [ ]Chronic [ ]Hypoxic  [ ]Hypercarbic [ ]Other  [ ]Other organ failure     LABS: reviewed by me, notable for:   no AM labs for review  POCT glucose WNL        RADIOLOGY & ADDITIONAL STUDIES: CXR personally reviewed and interpretated by me: 5/11: R opacity    5/5: Nonspecific air distended loops of bowel.    Stable osseous structures.    PROTEIN CALORIE MALNUTRITION PRESENT: [ ]mild [ ]moderate [ ]severe [ ]underweight [ ]morbid obesity  https://www.andeal.org/vault/2440/web/files/ONC/Table_Clinical%20Characteristics%20to%20Document%20Malnutrition-White%20JV%20et%20al%174879.pdf    Height (cm): 175.3 (04-17-25 @ 11:56), 175.3 (02-19-25 @ 12:20), 175.3 (01-23-25 @ 23:40)  Weight (kg): 117.8 (04-17-25 @ 11:56), 113.4 (02-19-25 @ 12:20), 120.6 (01-23-25 @ 23:40)  BMI (kg/m2): 38.3 (04-17-25 @ 11:56), 36.9 (02-19-25 @ 12:20), 39.2 (01-23-25 @ 23:40)    [ ]PPSV2 < or = to 30% [ ]significant weight loss  [ ]poor nutritional intake  [ ]anasarca      [ ]Artificial Nutrition          Palliative Care Spiritual/Emotional Screening Tool Question  Severity (0-4):                    OR                    [X ] Unable to determine/NA  Score of 2 or greater indicates recommendation of Chaplaincy referral  Chaplaincy Referral: [ ] Yes [ ] Refused [ ] Following     Caregiver [place]:  [ ] Yes [ ] No    OR    [x ] Unable to determine. Will assess at later time if appropriate.  Social Work Referral [ ]  Patient and Family Centered Care Referral [ ]    Anticipatory Grief Present: [ ] Yes [ ] No    OR     [ x] Unable to determine. Will assess at later time if appropriate.  Social Work Referral [ ]  Patient and Family Centered Care Referral [ ]    REFERRALS:   [ ]Chaplaincy  [ ]Hospice  [ ]Child Life  [ ]Social Work  [ ]Case management [ ]Holistic Therapy     Palliative care education provided to patient and/or family    Goals of Care Document:

## 2025-05-14 NOTE — PROGRESS NOTE ADULT - SUBJECTIVE AND OBJECTIVE BOX
DWIGHT RIBEIRO 80y Male  MRN#: [id number]   Hospital Day: 30d    SUBJECTIVE  Patient is a 80y old Male who presents with a chief complaint of acute mentation changes (13 May 2025 20:01)  Currently admitted to medicine with the primary diagnosis of Metabolic encephalopathy      INTERVAL HPI AND OVERNIGHT EVENTS:  Patient was examined and seen at bedside. This morning he is resting comfortably in bed and reports no issues or overnight events.    REVIEW OF SYMPTOMS:  CONSTITUTIONAL: No weakness, fevers or chills; No headaches  EYES: No visual changes, eye pain, or discharge  ENT: No vertigo; No ear pain or change in hearing; No sore throat or difficulty swallowing  NECK: No pain or stiffness  RESPIRATORY: No cough, wheezing, or hemoptysis; No shortness of breath  CARDIOVASCULAR: No chest pain or palpitations  GASTROINTESTINAL: No abdominal or epigastric pain; No nausea, vomiting, or hematemesis; No diarrhea or constipation; No melena or hematochezia  GENITOURINARY: No dysuria, frequency or hematuria  MUSCULOSKELETAL: No joint pain, no muscle pain, no weakness  NEUROLOGICAL: No numbness or weakness  SKIN: No itching or rashes    OBJECTIVE  PAST MEDICAL & SURGICAL HISTORY  Diabetes    Hypertension    Fall    H/O left knee surgery    History of cholecystectomy    History of appendectomy      ALLERGIES:  No Known Allergies    MEDICATIONS:  STANDING MEDICATIONS  buMETAnide Injectable 1 milliGRAM(s) IV Push daily  chlorhexidine 2% Cloths 1 Application(s) Topical <User Schedule>  dextrose 5%. 1000 milliLiter(s) IV Continuous <Continuous>  dextrose 5%. 1000 milliLiter(s) IV Continuous <Continuous>  dextrose 50% Injectable 25 Gram(s) IV Push once  dextrose 50% Injectable 12.5 Gram(s) IV Push once  dextrose 50% Injectable 25 Gram(s) IV Push once  folic acid 1 milliGRAM(s) Oral daily  glucagon  Injectable 1 milliGRAM(s) IntraMuscular once  insulin lispro (ADMELOG) corrective regimen sliding scale   SubCutaneous Before meals and at bedtime  lactulose Syrup 20 Gram(s) Oral every 8 hours  levETIRAcetam   Injectable 250 milliGRAM(s) IV Push <User Schedule>  lidocaine   4% Patch 1 Patch Transdermal daily  midodrine 10 milliGRAM(s) Oral every 8 hours  nystatin Powder 1 Application(s) Topical two times a day  pantoprazole  Injectable 40 milliGRAM(s) IV Push daily  rifAXIMin 550 milliGRAM(s) Oral two times a day  senna 2 Tablet(s) Oral at bedtime  silver sulfADIAZINE 1% Cream 1 Application(s) Topical two times a day  spironolactone 50 milliGRAM(s) Oral daily    PRN MEDICATIONS  acetaminophen     Tablet .. 650 milliGRAM(s) Oral every 6 hours PRN  aluminum hydroxide/magnesium hydroxide/simethicone Suspension 30 milliLiter(s) Oral every 4 hours PRN  dextrose Oral Gel 15 Gram(s) Oral once PRN  melatonin 3 milliGRAM(s) Oral at bedtime PRN  ondansetron Injectable 4 milliGRAM(s) IV Push every 8 hours PRN  oxyCODONE    IR 5 milliGRAM(s) Oral every 6 hours PRN      VITAL SIGNS: Last 24 Hours  T(C): 36.3 (14 May 2025 04:57), Max: 36.7 (13 May 2025 21:00)  T(F): 97.3 (14 May 2025 04:57), Max: 98.1 (13 May 2025 21:00)  HR: 91 (14 May 2025 04:57) (90 - 91)  BP: 122/55 (14 May 2025 04:57) (101/58 - 122/55)  BP(mean): --  RR: 18 (13 May 2025 21:00) (18 - 18)  SpO2: 100% (14 May 2025 04:57) (100% - 100%)    LABS:    05-13    144  |  108  |  17  ----------------------------<  189[H]  3.7   |  22  |  0.9    Ca    7.5[L]      13 May 2025 07:57    TPro  5.0[L]  /  Alb  2.0[L]  /  TBili  1.7[H]  /  DBili  x   /  AST  69[H]  /  ALT  27  /  AlkPhos  235[H]  05-13    PT/INR - ( 13 May 2025 07:57 )   PT: 18.50 sec;   INR: 1.55 ratio         PTT - ( 13 May 2025 07:57 )  PTT:39.7 sec  Urinalysis Basic - ( 13 May 2025 07:57 )    Color: x / Appearance: x / SG: x / pH: x  Gluc: 189 mg/dL / Ketone: x  / Bili: x / Urobili: x   Blood: x / Protein: x / Nitrite: x   Leuk Esterase: x / RBC: x / WBC x   Sq Epi: x / Non Sq Epi: x / Bacteria: x                RADIOLOGY:      PHYSICAL EXAM:  CONSTITUTIONAL: No acute distress, well-developed, well-groomed, AAOx3  HEAD: Atraumatic, normocephalic  EYES: EOM intact, PERRLA, conjunctiva and sclera clear  ENT: Supple, no masses, no thyromegaly, no bruits, no JVD; moist mucous membranes  PULMONARY: Clear to auscultation bilaterally; no wheezes, rales, or rhonchi  CARDIOVASCULAR: Regular rate and rhythm; no murmurs, rubs, or gallops  GASTROINTESTINAL: Soft, non-tender, non-distended; bowel sounds present  MUSCULOSKELETAL: 2+ peripheral pulses; no clubbing, no cyanosis, no edema  NEUROLOGY: non-focal  SKIN: No rashes or lesions; warm and dry     DWIGHT RIBEIRO 80y Male  MRN#: [id number]   Hospital Day: 30d    SUBJECTIVE  Patient is a 80y old Male who presents with a chief complaint of acute mentation changes (13 May 2025 20:01)  Currently admitted to medicine with the primary diagnosis of Metabolic encephalopathy      INTERVAL HPI AND OVERNIGHT EVENTS:  Patient was examined and seen at bedside. This morning he is resting comfortably in bed and is confused but attempting to answer questions appropriately     REVIEW OF SYMPTOMS:  unable to assess at this time due to AMS    OBJECTIVE  PAST MEDICAL & SURGICAL HISTORY  Diabetes    Hypertension    Fall    H/O left knee surgery    History of cholecystectomy    History of appendectomy      ALLERGIES:  No Known Allergies    MEDICATIONS:  STANDING MEDICATIONS  buMETAnide Injectable 1 milliGRAM(s) IV Push daily  chlorhexidine 2% Cloths 1 Application(s) Topical <User Schedule>  dextrose 5%. 1000 milliLiter(s) IV Continuous <Continuous>  dextrose 5%. 1000 milliLiter(s) IV Continuous <Continuous>  dextrose 50% Injectable 25 Gram(s) IV Push once  dextrose 50% Injectable 12.5 Gram(s) IV Push once  dextrose 50% Injectable 25 Gram(s) IV Push once  folic acid 1 milliGRAM(s) Oral daily  glucagon  Injectable 1 milliGRAM(s) IntraMuscular once  insulin lispro (ADMELOG) corrective regimen sliding scale   SubCutaneous Before meals and at bedtime  lactulose Syrup 20 Gram(s) Oral every 8 hours  levETIRAcetam   Injectable 250 milliGRAM(s) IV Push <User Schedule>  lidocaine   4% Patch 1 Patch Transdermal daily  midodrine 10 milliGRAM(s) Oral every 8 hours  nystatin Powder 1 Application(s) Topical two times a day  pantoprazole  Injectable 40 milliGRAM(s) IV Push daily  rifAXIMin 550 milliGRAM(s) Oral two times a day  senna 2 Tablet(s) Oral at bedtime  silver sulfADIAZINE 1% Cream 1 Application(s) Topical two times a day  spironolactone 50 milliGRAM(s) Oral daily    PRN MEDICATIONS  acetaminophen     Tablet .. 650 milliGRAM(s) Oral every 6 hours PRN  aluminum hydroxide/magnesium hydroxide/simethicone Suspension 30 milliLiter(s) Oral every 4 hours PRN  dextrose Oral Gel 15 Gram(s) Oral once PRN  melatonin 3 milliGRAM(s) Oral at bedtime PRN  ondansetron Injectable 4 milliGRAM(s) IV Push every 8 hours PRN  oxyCODONE    IR 5 milliGRAM(s) Oral every 6 hours PRN      VITAL SIGNS: Last 24 Hours  T(C): 36.3 (14 May 2025 04:57), Max: 36.7 (13 May 2025 21:00)  T(F): 97.3 (14 May 2025 04:57), Max: 98.1 (13 May 2025 21:00)  HR: 91 (14 May 2025 04:57) (90 - 91)  BP: 122/55 (14 May 2025 04:57) (101/58 - 122/55)  BP(mean): --  RR: 18 (13 May 2025 21:00) (18 - 18)  SpO2: 100% (14 May 2025 04:57) (100% - 100%)    LABS:    05-13    144  |  108  |  17  ----------------------------<  189[H]  3.7   |  22  |  0.9    Ca    7.5[L]      13 May 2025 07:57    TPro  5.0[L]  /  Alb  2.0[L]  /  TBili  1.7[H]  /  DBili  x   /  AST  69[H]  /  ALT  27  /  AlkPhos  235[H]  05-13    PT/INR - ( 13 May 2025 07:57 )   PT: 18.50 sec;   INR: 1.55 ratio         PTT - ( 13 May 2025 07:57 )  PTT:39.7 sec  Urinalysis Basic - ( 13 May 2025 07:57 )    Color: x / Appearance: x / SG: x / pH: x  Gluc: 189 mg/dL / Ketone: x  / Bili: x / Urobili: x   Blood: x / Protein: x / Nitrite: x   Leuk Esterase: x / RBC: x / WBC x   Sq Epi: x / Non Sq Epi: x / Bacteria: x                RADIOLOGY:      PHYSICAL EXAM:  CONSTITUTIONAL: No acute distress, well-developed, calm, follows simple commands   HEAD: Atraumatic, normocephalic  EYES: PERRLA, conjunctiva and sclera clear  ENT: Supple, no masses, no thyromegaly, no bruits  PULMONARY: Clear to auscultation bilaterally; no wheezes, rales, or rhonchi  CARDIOVASCULAR: Regular rate and rhythm; no murmurs, rubs, or gallops  GASTROINTESTINAL: Soft, non-tender, non-distended; bowel sounds present  MUSCULOSKELETAL: 2+ peripheral pulses; no clubbing, no cyanosis, 2+ ANASARCA BL wrists wrapped in gauze   NEUROLOGY: as above   SKIN: No rashes or lesions; warm and dry

## 2025-05-15 LAB
ANION GAP SERPL CALC-SCNC: 17 MMOL/L — HIGH (ref 7–14)
BUN SERPL-MCNC: 18 MG/DL — SIGNIFICANT CHANGE UP (ref 10–20)
CALCIUM SERPL-MCNC: 7.4 MG/DL — LOW (ref 8.4–10.5)
CHLORIDE SERPL-SCNC: 104 MMOL/L — SIGNIFICANT CHANGE UP (ref 98–110)
CO2 SERPL-SCNC: 22 MMOL/L — SIGNIFICANT CHANGE UP (ref 17–32)
CREAT SERPL-MCNC: 1.1 MG/DL — SIGNIFICANT CHANGE UP (ref 0.7–1.5)
EGFR: 69 ML/MIN/1.73M2 — SIGNIFICANT CHANGE UP
EGFR: 69 ML/MIN/1.73M2 — SIGNIFICANT CHANGE UP
GLUCOSE BLDC GLUCOMTR-MCNC: 174 MG/DL — HIGH (ref 70–99)
GLUCOSE BLDC GLUCOMTR-MCNC: 184 MG/DL — HIGH (ref 70–99)
GLUCOSE BLDC GLUCOMTR-MCNC: 188 MG/DL — HIGH (ref 70–99)
GLUCOSE BLDC GLUCOMTR-MCNC: 203 MG/DL — HIGH (ref 70–99)
GLUCOSE SERPL-MCNC: 155 MG/DL — HIGH (ref 70–99)
HCT VFR BLD CALC: 30.2 % — LOW (ref 42–52)
HGB BLD-MCNC: 9.5 G/DL — LOW (ref 14–18)
MCHC RBC-ENTMCNC: 31.5 G/DL — LOW (ref 32–37)
MCHC RBC-ENTMCNC: 31.5 PG — HIGH (ref 27–31)
MCV RBC AUTO: 100 FL — HIGH (ref 80–94)
NRBC BLD AUTO-RTO: 0 /100 WBCS — SIGNIFICANT CHANGE UP (ref 0–0)
PLATELET # BLD AUTO: 115 K/UL — LOW (ref 130–400)
PMV BLD: 9.2 FL — SIGNIFICANT CHANGE UP (ref 7.4–10.4)
POTASSIUM SERPL-MCNC: 3.8 MMOL/L — SIGNIFICANT CHANGE UP (ref 3.5–5)
POTASSIUM SERPL-SCNC: 3.8 MMOL/L — SIGNIFICANT CHANGE UP (ref 3.5–5)
RBC # BLD: 3.02 M/UL — LOW (ref 4.7–6.1)
RBC # FLD: 25.1 % — HIGH (ref 11.5–14.5)
SODIUM SERPL-SCNC: 143 MMOL/L — SIGNIFICANT CHANGE UP (ref 135–146)
WBC # BLD: 7.71 K/UL — SIGNIFICANT CHANGE UP (ref 4.8–10.8)
WBC # FLD AUTO: 7.71 K/UL — SIGNIFICANT CHANGE UP (ref 4.8–10.8)

## 2025-05-15 PROCEDURE — 99233 SBSQ HOSP IP/OBS HIGH 50: CPT

## 2025-05-15 RX ORDER — BUMETANIDE 1 MG/1
1 TABLET ORAL DAILY
Refills: 0 | Status: DISCONTINUED | OUTPATIENT
Start: 2025-05-16 | End: 2025-05-25

## 2025-05-15 RX ADMIN — OXYCODONE HYDROCHLORIDE 5 MILLIGRAM(S): 30 TABLET ORAL at 17:50

## 2025-05-15 RX ADMIN — MIDODRINE HYDROCHLORIDE 10 MILLIGRAM(S): 5 TABLET ORAL at 21:42

## 2025-05-15 RX ADMIN — Medication 1 APPLICATION(S): at 18:04

## 2025-05-15 RX ADMIN — LACTULOSE 20 GRAM(S): 10 SOLUTION ORAL at 06:41

## 2025-05-15 RX ADMIN — INSULIN LISPRO 2: 100 INJECTION, SOLUTION INTRAVENOUS; SUBCUTANEOUS at 17:26

## 2025-05-15 RX ADMIN — Medication 40 MILLIGRAM(S): at 11:47

## 2025-05-15 RX ADMIN — Medication 100 MILLIGRAM(S): at 06:42

## 2025-05-15 RX ADMIN — INSULIN LISPRO 4: 100 INJECTION, SOLUTION INTRAVENOUS; SUBCUTANEOUS at 11:48

## 2025-05-15 RX ADMIN — Medication 1 APPLICATION(S): at 06:52

## 2025-05-15 RX ADMIN — Medication 1 APPLICATION(S): at 06:40

## 2025-05-15 RX ADMIN — OXYCODONE HYDROCHLORIDE 5 MILLIGRAM(S): 30 TABLET ORAL at 11:51

## 2025-05-15 RX ADMIN — MIDODRINE HYDROCHLORIDE 10 MILLIGRAM(S): 5 TABLET ORAL at 13:17

## 2025-05-15 RX ADMIN — FOLIC ACID 1 MILLIGRAM(S): 1 TABLET ORAL at 11:46

## 2025-05-15 RX ADMIN — NYSTATIN 1 APPLICATION(S): 100000 CREAM TOPICAL at 06:52

## 2025-05-15 RX ADMIN — MIDODRINE HYDROCHLORIDE 10 MILLIGRAM(S): 5 TABLET ORAL at 06:41

## 2025-05-15 RX ADMIN — OXYCODONE HYDROCHLORIDE 5 MILLIGRAM(S): 30 TABLET ORAL at 18:20

## 2025-05-15 RX ADMIN — LEVETIRACETAM 250 MILLIGRAM(S): 10 INJECTION, SOLUTION INTRAVENOUS at 06:43

## 2025-05-15 RX ADMIN — LACTULOSE 20 GRAM(S): 10 SOLUTION ORAL at 21:42

## 2025-05-15 RX ADMIN — NYSTATIN 1 APPLICATION(S): 100000 CREAM TOPICAL at 18:04

## 2025-05-15 RX ADMIN — LIDOCAINE HYDROCHLORIDE 1 PATCH: 20 JELLY TOPICAL at 06:29

## 2025-05-15 RX ADMIN — Medication 2 TABLET(S): at 21:42

## 2025-05-15 RX ADMIN — INSULIN LISPRO 2: 100 INJECTION, SOLUTION INTRAVENOUS; SUBCUTANEOUS at 07:44

## 2025-05-15 RX ADMIN — BUMETANIDE 1 MILLIGRAM(S): 1 TABLET ORAL at 06:42

## 2025-05-15 RX ADMIN — INSULIN LISPRO 2: 100 INJECTION, SOLUTION INTRAVENOUS; SUBCUTANEOUS at 21:40

## 2025-05-15 RX ADMIN — LIDOCAINE HYDROCHLORIDE 1 PATCH: 20 JELLY TOPICAL at 11:47

## 2025-05-15 RX ADMIN — LEVETIRACETAM 250 MILLIGRAM(S): 10 INJECTION, SOLUTION INTRAVENOUS at 17:26

## 2025-05-15 RX ADMIN — LACTULOSE 20 GRAM(S): 10 SOLUTION ORAL at 13:17

## 2025-05-15 RX ADMIN — OXYCODONE HYDROCHLORIDE 5 MILLIGRAM(S): 30 TABLET ORAL at 12:21

## 2025-05-15 NOTE — PROGRESS NOTE ADULT - SUBJECTIVE AND OBJECTIVE BOX
DWIGHT RIBEIRO 80y Male  MRN#: [id number]   Hospital Day: 31d    SUBJECTIVE  Patient is a 80y old Male who presents with a chief complaint of acute mentation changes (14 May 2025 15:37)  Currently admitted to medicine with the primary diagnosis of Metabolic encephalopathy      INTERVAL HPI AND OVERNIGHT EVENTS:  Patient was examined and seen at bedside. This morning he is resting comfortably in bed.    REVIEW OF SYMPTOMS:  unable to obtain at this time     OBJECTIVE  PAST MEDICAL & SURGICAL HISTORY  Diabetes    Hypertension    Fall    H/O left knee surgery    History of cholecystectomy    History of appendectomy      ALLERGIES:  No Known Allergies    MEDICATIONS:  STANDING MEDICATIONS  buMETAnide Injectable 1 milliGRAM(s) IV Push daily  chlorhexidine 2% Cloths 1 Application(s) Topical <User Schedule>  dextrose 5%. 1000 milliLiter(s) IV Continuous <Continuous>  dextrose 5%. 1000 milliLiter(s) IV Continuous <Continuous>  dextrose 50% Injectable 25 Gram(s) IV Push once  dextrose 50% Injectable 12.5 Gram(s) IV Push once  dextrose 50% Injectable 25 Gram(s) IV Push once  folic acid 1 milliGRAM(s) Oral daily  glucagon  Injectable 1 milliGRAM(s) IntraMuscular once  insulin lispro (ADMELOG) corrective regimen sliding scale   SubCutaneous Before meals and at bedtime  lactulose Syrup 20 Gram(s) Oral every 8 hours  levETIRAcetam   Injectable 250 milliGRAM(s) IV Push <User Schedule>  lidocaine   4% Patch 1 Patch Transdermal daily  midodrine 10 milliGRAM(s) Oral every 8 hours  nystatin Powder 1 Application(s) Topical two times a day  pantoprazole  Injectable 40 milliGRAM(s) IV Push daily  rifAXIMin 550 milliGRAM(s) Oral two times a day  senna 2 Tablet(s) Oral at bedtime  silver sulfADIAZINE 1% Cream 1 Application(s) Topical two times a day  spironolactone 100 milliGRAM(s) Oral daily    PRN MEDICATIONS  acetaminophen     Tablet .. 650 milliGRAM(s) Oral every 6 hours PRN  aluminum hydroxide/magnesium hydroxide/simethicone Suspension 30 milliLiter(s) Oral every 4 hours PRN  dextrose Oral Gel 15 Gram(s) Oral once PRN  melatonin 3 milliGRAM(s) Oral at bedtime PRN  ondansetron Injectable 4 milliGRAM(s) IV Push every 8 hours PRN  oxyCODONE    IR 5 milliGRAM(s) Oral every 6 hours PRN      VITAL SIGNS: Last 24 Hours  T(C): 36.7 (15 May 2025 04:35), Max: 36.7 (15 May 2025 04:35)  T(F): 98 (15 May 2025 04:35), Max: 98 (15 May 2025 04:35)  HR: 96 (15 May 2025 04:35) (86 - 96)  BP: 138/75 (15 May 2025 04:35) (113/57 - 138/75)  BP(mean): --  RR: 18 (15 May 2025 04:35) (18 - 18)  SpO2: 98% (15 May 2025 04:35) (98% - 100%)    LABS:                          RADIOLOGY:      PHYSICAL EXAM:  CONSTITUTIONAL: No acute distress, well-developed, well-groomed   HEAD: Atraumatic, normocephalic  EYES: PERRLA, conjunctiva and sclera clear  ENT: Supple, no masses, no thyromegaly, moist mucous membranes  PULMONARY: Clear to auscultation bilaterally; no wheezes, rales, or rhonchi  CARDIOVASCULAR: Regular rate and rhythm; no murmurs, rubs, or gallops  GASTROINTESTINAL: Soft, non-tender, non-distended; bowel sounds present  MUSCULOSKELETAL: 2+ peripheral pulses; no clubbing, no cyanosis, Anasarca   NEUROLOGY: non-focal, attempts to answer questions appropriately but gets confused  SKIN: No rashes or lesions; warm and dry     DWIGHT RIBEIRO 80y Male  MRN#: [id number]   Hospital Day: 31d    SUBJECTIVE  Patient is a 80y old Male who presents with a chief complaint of acute mentation changes (14 May 2025 15:37)  Currently admitted to medicine with the primary diagnosis of Metabolic encephalopathy      INTERVAL HPI AND OVERNIGHT EVENTS:  Patient was examined and seen at bedside. This morning he is resting comfortably in bed and is asking how author is doing     REVIEW OF SYMPTOMS:  unable to obtain at this time     OBJECTIVE  PAST MEDICAL & SURGICAL HISTORY  Diabetes    Hypertension    Fall    H/O left knee surgery    History of cholecystectomy    History of appendectomy      ALLERGIES:  No Known Allergies    MEDICATIONS:  STANDING MEDICATIONS  buMETAnide Injectable 1 milliGRAM(s) IV Push daily  chlorhexidine 2% Cloths 1 Application(s) Topical <User Schedule>  dextrose 5%. 1000 milliLiter(s) IV Continuous <Continuous>  dextrose 5%. 1000 milliLiter(s) IV Continuous <Continuous>  dextrose 50% Injectable 25 Gram(s) IV Push once  dextrose 50% Injectable 12.5 Gram(s) IV Push once  dextrose 50% Injectable 25 Gram(s) IV Push once  folic acid 1 milliGRAM(s) Oral daily  glucagon  Injectable 1 milliGRAM(s) IntraMuscular once  insulin lispro (ADMELOG) corrective regimen sliding scale   SubCutaneous Before meals and at bedtime  lactulose Syrup 20 Gram(s) Oral every 8 hours  levETIRAcetam   Injectable 250 milliGRAM(s) IV Push <User Schedule>  lidocaine   4% Patch 1 Patch Transdermal daily  midodrine 10 milliGRAM(s) Oral every 8 hours  nystatin Powder 1 Application(s) Topical two times a day  pantoprazole  Injectable 40 milliGRAM(s) IV Push daily  rifAXIMin 550 milliGRAM(s) Oral two times a day  senna 2 Tablet(s) Oral at bedtime  silver sulfADIAZINE 1% Cream 1 Application(s) Topical two times a day  spironolactone 100 milliGRAM(s) Oral daily    PRN MEDICATIONS  acetaminophen     Tablet .. 650 milliGRAM(s) Oral every 6 hours PRN  aluminum hydroxide/magnesium hydroxide/simethicone Suspension 30 milliLiter(s) Oral every 4 hours PRN  dextrose Oral Gel 15 Gram(s) Oral once PRN  melatonin 3 milliGRAM(s) Oral at bedtime PRN  ondansetron Injectable 4 milliGRAM(s) IV Push every 8 hours PRN  oxyCODONE    IR 5 milliGRAM(s) Oral every 6 hours PRN      VITAL SIGNS: Last 24 Hours  T(C): 36.7 (15 May 2025 04:35), Max: 36.7 (15 May 2025 04:35)  T(F): 98 (15 May 2025 04:35), Max: 98 (15 May 2025 04:35)  HR: 96 (15 May 2025 04:35) (86 - 96)  BP: 138/75 (15 May 2025 04:35) (113/57 - 138/75)  BP(mean): --  RR: 18 (15 May 2025 04:35) (18 - 18)  SpO2: 98% (15 May 2025 04:35) (98% - 100%)    LABS:                          9.5    7.71  )-----------( 115      ( 15 May 2025 08:35 )             30.2     05-15    143  |  104  |  18  ----------------------------<  155[H]  3.8   |  22  |  1.1    Ca    7.4[L]      15 May 2025 08:35        RADIOLOGY:      PHYSICAL EXAM:  CONSTITUTIONAL: No acute distress, well-developed, well-groomed, anasarca, cooperative, MS MUCH BETTER THEN YESTERDAY  HEAD: Atraumatic, normocephalic  EYES: PERRLA, conjunctiva and sclera clear  ENT: Supple, no masses, no thyromegaly, moist mucous membranes  PULMONARY: Clear to auscultation bilaterally; no wheezes, rales, or rhonchi  CARDIOVASCULAR: Regular rate and rhythm; no murmurs, rubs, or gallops  GASTROINTESTINAL: Soft, non-tender, non-distended; bowel sounds present  MUSCULOSKELETAL: 2+ peripheral pulses; no clubbing, no cyanosis, Anasarca   NEUROLOGY: non-focal, attempts to answer questions appropriately but gets confused  SKIN: No rashes or lesions; warm and dry

## 2025-05-15 NOTE — PROGRESS NOTE ADULT - ASSESSMENT
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 elevated   -hold opioids   -mentation improving  -portal hypertension  -Coagulopathy  -Hypoalbuminemia   -on rifaximin, lactulose  -s/p albumin given diffuse edema on 4/30, 5/2, 5/8, 5/9, 5/12  pt is very edematous, cont  spironolactone  titrate up to 100mg  and as per discussion with hepatology,  cont bumix IV   cont to monitor bp   abdominal US:.  Nodular liver contour consistent with liver cirrhosis. 2.  Post-cholecystectomy. 3.  Severe abdominal ascites.  4.  Right pleural effusion.  -cxr does not show an effusion , shows non specific right opacity    KUB shows :Nonspecific air distended loops of bowel.  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    s/p albumin IV multiple times     #Anemia  -likely multifactorial: sepsis, cirrhosis, poor nutrition, GI bleeding  -s/p EGD on 4/16 which showed 5mm ulcer in duodenal bulb post endo clip, repeat EGD on 4/29 showed no bleeding noted  -will monitor for GI bleeding   transfused one unit of PRBC on 5/3  -Hb stable , cont to monitor     #Afib  -found during this admission  -rate controlled, not candidate for AC given anemia, coagulopathy    #Deconditioning  -PT/OT   -OOB    #Sacral pressure ulcer  -local wound care  -rotate the patient q2hrs     #hypocalcemia:  albumin is 1.7, corrected calcium is 9.1   given albumin     #hypokalemia  replaced with iv potassium   f/u level in am remain low at 3.7    SVT PPX: SCD due to coagulopathy, risk of Gi bleed /anemia     dispo: Overall very poor prognosis            #Misc  - DVT Prophylaxis:  - GI Prophylaxis:  - Diet:  - Activity:  - IV Fluids:  - Code Status:    Dispo:   Mr Mendoza is a 80 YOM w a PMH of 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 elevated   -hold opioids   -mentation improving  -portal hypertension  -Coagulopathy  -Hypoalbuminemia   -on rifaximin, lactulose  -s/p albumin given diffuse edema on 4/30, 5/2, 5/8, 5/9, 5/12  pt is very edematous, cont  spironolactone  titrate up to 100mg  and as per discussion with hepatology,    Change IV bumex to PO   abdominal US:.  Nodular liver contour consistent with liver cirrhosis. 2.  Post-cholecystectomy. 3.  Severe abdominal ascites.  4.  Right pleural effusion.  -cxr does not show an effusion , shows non specific right opacity    KUB shows :Nonspecific air distended loops of bowel.  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    s/p albumin IV multiple times     #Anemia  -likely multifactorial: sepsis, cirrhosis, poor nutrition, GI bleeding  -s/p EGD on 4/16 which showed 5mm ulcer in duodenal bulb post endo clip, repeat EGD on 4/29 showed no bleeding noted  -will monitor for GI bleeding   transfused one unit of PRBC on 5/3  -Hb stable , cont to monitor     #Afib  -found during this admission  -rate controlled, not candidate for AC given anemia, coagulopathy    #Deconditioning  -PT/OT   -OOB    #Sacral pressure ulcer  -local wound care  -rotate the patient q2hrs     #hypocalcemia:  albumin is 1.7, corrected calcium is 9.1   given albumin     #hypokalemia  replaced with iv potassium   f/u level in am remain low at 3.7    SVT PPX: SCD due to coagulopathy, risk of Gi bleed /anemia     dispo: Overall very poor prognosis            #Misc  - DVT Prophylaxis:  - GI Prophylaxis:  - Diet:  - Activity:  - IV Fluids:  - Code Status:    Dispo:

## 2025-05-16 LAB
ANION GAP SERPL CALC-SCNC: 16 MMOL/L — HIGH (ref 7–14)
BUN SERPL-MCNC: 18 MG/DL — SIGNIFICANT CHANGE UP (ref 10–20)
CALCIUM SERPL-MCNC: 7.2 MG/DL — LOW (ref 8.4–10.5)
CHLORIDE SERPL-SCNC: 102 MMOL/L — SIGNIFICANT CHANGE UP (ref 98–110)
CO2 SERPL-SCNC: 21 MMOL/L — SIGNIFICANT CHANGE UP (ref 17–32)
CREAT SERPL-MCNC: 1 MG/DL — SIGNIFICANT CHANGE UP (ref 0.7–1.5)
EGFR: 77 ML/MIN/1.73M2 — SIGNIFICANT CHANGE UP
EGFR: 77 ML/MIN/1.73M2 — SIGNIFICANT CHANGE UP
GLUCOSE BLDC GLUCOMTR-MCNC: 154 MG/DL — HIGH (ref 70–99)
GLUCOSE BLDC GLUCOMTR-MCNC: 208 MG/DL — HIGH (ref 70–99)
GLUCOSE BLDC GLUCOMTR-MCNC: 208 MG/DL — HIGH (ref 70–99)
GLUCOSE BLDC GLUCOMTR-MCNC: 228 MG/DL — HIGH (ref 70–99)
GLUCOSE SERPL-MCNC: 157 MG/DL — HIGH (ref 70–99)
HCT VFR BLD CALC: 27.4 % — LOW (ref 42–52)
HGB BLD-MCNC: 8.6 G/DL — LOW (ref 14–18)
MCHC RBC-ENTMCNC: 31 PG — SIGNIFICANT CHANGE UP (ref 27–31)
MCHC RBC-ENTMCNC: 31.4 G/DL — LOW (ref 32–37)
MCV RBC AUTO: 98.9 FL — HIGH (ref 80–94)
NRBC BLD AUTO-RTO: 0 /100 WBCS — SIGNIFICANT CHANGE UP (ref 0–0)
PLATELET # BLD AUTO: 151 K/UL — SIGNIFICANT CHANGE UP (ref 130–400)
PMV BLD: 9.3 FL — SIGNIFICANT CHANGE UP (ref 7.4–10.4)
POTASSIUM SERPL-MCNC: 3.6 MMOL/L — SIGNIFICANT CHANGE UP (ref 3.5–5)
POTASSIUM SERPL-SCNC: 3.6 MMOL/L — SIGNIFICANT CHANGE UP (ref 3.5–5)
RBC # BLD: 2.77 M/UL — LOW (ref 4.7–6.1)
RBC # FLD: 25.1 % — HIGH (ref 11.5–14.5)
SODIUM SERPL-SCNC: 139 MMOL/L — SIGNIFICANT CHANGE UP (ref 135–146)
WBC # BLD: 8.86 K/UL — SIGNIFICANT CHANGE UP (ref 4.8–10.8)
WBC # FLD AUTO: 8.86 K/UL — SIGNIFICANT CHANGE UP (ref 4.8–10.8)

## 2025-05-16 PROCEDURE — 99232 SBSQ HOSP IP/OBS MODERATE 35: CPT

## 2025-05-16 PROCEDURE — 99239 HOSP IP/OBS DSCHRG MGMT >30: CPT

## 2025-05-16 RX ADMIN — Medication 1 APPLICATION(S): at 06:17

## 2025-05-16 RX ADMIN — MIDODRINE HYDROCHLORIDE 10 MILLIGRAM(S): 5 TABLET ORAL at 21:19

## 2025-05-16 RX ADMIN — Medication 2 MILLIGRAM(S): at 12:49

## 2025-05-16 RX ADMIN — INSULIN LISPRO 4: 100 INJECTION, SOLUTION INTRAVENOUS; SUBCUTANEOUS at 16:49

## 2025-05-16 RX ADMIN — INSULIN LISPRO 4: 100 INJECTION, SOLUTION INTRAVENOUS; SUBCUTANEOUS at 13:18

## 2025-05-16 RX ADMIN — MIDODRINE HYDROCHLORIDE 10 MILLIGRAM(S): 5 TABLET ORAL at 13:12

## 2025-05-16 RX ADMIN — LACTULOSE 20 GRAM(S): 10 SOLUTION ORAL at 13:12

## 2025-05-16 RX ADMIN — LEVETIRACETAM 250 MILLIGRAM(S): 10 INJECTION, SOLUTION INTRAVENOUS at 05:36

## 2025-05-16 RX ADMIN — Medication 100 MILLIGRAM(S): at 05:37

## 2025-05-16 RX ADMIN — NYSTATIN 1 APPLICATION(S): 100000 CREAM TOPICAL at 17:12

## 2025-05-16 RX ADMIN — Medication 1 APPLICATION(S): at 05:38

## 2025-05-16 RX ADMIN — Medication 2 MILLIGRAM(S): at 13:14

## 2025-05-16 RX ADMIN — Medication 1 APPLICATION(S): at 17:12

## 2025-05-16 RX ADMIN — BUMETANIDE 1 MILLIGRAM(S): 1 TABLET ORAL at 06:27

## 2025-05-16 RX ADMIN — LACTULOSE 20 GRAM(S): 10 SOLUTION ORAL at 21:50

## 2025-05-16 RX ADMIN — NYSTATIN 1 APPLICATION(S): 100000 CREAM TOPICAL at 05:37

## 2025-05-16 RX ADMIN — OXYCODONE HYDROCHLORIDE 5 MILLIGRAM(S): 30 TABLET ORAL at 09:15

## 2025-05-16 RX ADMIN — LACTULOSE 20 GRAM(S): 10 SOLUTION ORAL at 05:36

## 2025-05-16 RX ADMIN — OXYCODONE HYDROCHLORIDE 5 MILLIGRAM(S): 30 TABLET ORAL at 08:53

## 2025-05-16 RX ADMIN — Medication 40 MILLIGRAM(S): at 13:12

## 2025-05-16 RX ADMIN — LIDOCAINE HYDROCHLORIDE 1 PATCH: 20 JELLY TOPICAL at 13:12

## 2025-05-16 RX ADMIN — MIDODRINE HYDROCHLORIDE 10 MILLIGRAM(S): 5 TABLET ORAL at 05:37

## 2025-05-16 RX ADMIN — LEVETIRACETAM 250 MILLIGRAM(S): 10 INJECTION, SOLUTION INTRAVENOUS at 17:11

## 2025-05-16 RX ADMIN — Medication 2 TABLET(S): at 21:19

## 2025-05-16 RX ADMIN — FOLIC ACID 1 MILLIGRAM(S): 1 TABLET ORAL at 13:12

## 2025-05-16 RX ADMIN — INSULIN LISPRO 2: 100 INJECTION, SOLUTION INTRAVENOUS; SUBCUTANEOUS at 08:10

## 2025-05-16 NOTE — PROGRESS NOTE ADULT - ASSESSMENT
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 fragilis bacteremia, and acute blood loss anemia. Palliative team had met with patient's spouse, who indicated wanting all life-prolonging interventions. We are reconsulted as patient's spouse and daughter sees things differently.     5/6: Notified that patient's daughter (Edwige [phone]) is an RN and is open to palliative care involvement. I spoke to her and she indicated that she is aware that patient's health will continue to decline, but she does not think that patient's spouse is ready to accept this. If it were up to the daughter, she would want patient to be DNR. She requests that I speak to patient's spouse. I was able to call her (also named Edwige [phone]), and to reintroduce our team. She states that it has been a difficult period of time because she is dealing with her own health issues. She was also not expecting us to find so many different medical problems in the patient in such a short amount of time. She became emotional during our discussion and asks if we can talk more in person. At present, she is feeling sick and will call me when she comes to the hospital.     5/8: I have not heard back from patient's spouse. I called to f/u but she did not answer.     5/13, 5/14, 5/16: unable to reach spouse today, attempted to reach, not present at bedside    Plan:  - symptoms per primary team  - c/w PO bumex, monitor I/O, hemodynamics  - continue current medical interventions  - ongoing discussions as above, if able to reach wife, palliative is available next week between 10am and 3pm for a family meeting, [phone] as needed  - full code for now    ______________  Ang Dunne MD  Palliative Medicine  [facility]   [facility]  [phone]

## 2025-05-16 NOTE — PROGRESS NOTE ADULT - SUBJECTIVE AND OBJECTIVE BOX
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 fragilis bacteremia, and acute blood loss anemia. Patient is on IV morphine for pain. Patient is on lactulose for encephalopathy, IV abx, and frequent H/H monitoring, requiring pRBC. Palliative consulted for GOC. Patient is full code.    INTERVAL EVENTS  4/24: patient without acute distress  4/25: no acute distress, on octreotide gtt, NGT in place  5/6: Patient without acute distress.   5/8: NAD, no family present  5/13: patient appears comfortable, cousins at bedside  5/14: patient awake, denies major symptoms  5/16: patient in no acute distress    ADVANCE DIRECTIVES:    [ ] Full Code [ ] DNR  MOLST  [ ]  Living Will  [ ]   DECISION MAKER(s):  [ ] Health Care Proxy(s)  [ ] Surrogate(s)  [ ] Guardian           Name(s): Phone Number(s): spouse Edwige [phone] | [phone]    BASELINE (I)ADL(s) (prior to admission):  [place]: [ ]Total  [ ] Moderate [ ]Dependent  Palliative Performance Status Version 2:         %    http://npcrc.org/files/news/palliative_performance_scale_ppsv2.pdf    Allergies    No Known Allergies    Intolerances    MEDICATIONS  (STANDING):  buMETAnide 1 milliGRAM(s) Oral daily  chlorhexidine 2% Cloths 1 Application(s) Topical <User Schedule>  dextrose 5%. 1000 milliLiter(s) (50 mL/Hr) IV Continuous <Continuous>  dextrose 5%. 1000 milliLiter(s) (100 mL/Hr) IV Continuous <Continuous>  dextrose 50% Injectable 25 Gram(s) IV Push once  dextrose 50% Injectable 12.5 Gram(s) IV Push once  dextrose 50% Injectable 25 Gram(s) IV Push once  folic acid 1 milliGRAM(s) Oral daily  glucagon  Injectable 1 milliGRAM(s) IntraMuscular once  insulin lispro (ADMELOG) corrective regimen sliding scale   SubCutaneous Before meals and at bedtime  lactulose Syrup 20 Gram(s) Oral every 8 hours  levETIRAcetam   Injectable 250 milliGRAM(s) IV Push <User Schedule>  lidocaine   4% Patch 1 Patch Transdermal daily  midodrine 10 milliGRAM(s) Oral every 8 hours  nystatin Powder 1 Application(s) Topical two times a day  pantoprazole  Injectable 40 milliGRAM(s) IV Push daily  rifAXIMin 550 milliGRAM(s) Oral two times a day  senna 2 Tablet(s) Oral at bedtime  silver sulfADIAZINE 1% Cream 1 Application(s) Topical two times a day  spironolactone 100 milliGRAM(s) Oral daily    MEDICATIONS  (PRN):  acetaminophen     Tablet .. 650 milliGRAM(s) Oral every 6 hours PRN Temp greater or equal to 38C (100.4F), Mild Pain (1 - 3)  aluminum hydroxide/magnesium hydroxide/simethicone Suspension 30 milliLiter(s) Oral every 4 hours PRN Dyspepsia  dextrose Oral Gel 15 Gram(s) Oral once PRN Blood Glucose LESS THAN 70 milliGRAM(s)/deciliter  melatonin 3 milliGRAM(s) Oral at bedtime PRN Insomnia  ondansetron Injectable 4 milliGRAM(s) IV Push every 8 hours PRN Nausea and/or Vomiting  oxyCODONE    IR 5 milliGRAM(s) Oral every 6 hours PRN Moderate Pain (4 - 6)      PRESENT SYMPTOMS: [ X][ Difficult to obtain due to poor mentation   Source if other than patient:  [ ]Family   [ ]Team   [ X]All review of systems negative including pain and dyspnea unless noted below    All components of pain assessment below addressed. Blank spaces indicate that the patient did/could not complete the assessment.  Pain: [ ]yes [ ]no  QOL impact -   Location -                    Aggravating factors -  Quality -  Radiation -  Timing-  Severity (0-10 scale):  Minimal acceptable level (0-10 scale):     CPOT:    https://www.sccm.org/getattachment/cug56a17-0n0p-0n9z-0r4m-3648e0186w4z/Critical-Care-Pain-Observation-Tool-(CPOT)    PAIN AD Score: 0  [email or web address].[email or web address]/painad.pdf (press ctrl +  left click to view)    Dyspnea:                           [ ]None[ ]Mild [ ]Moderate [ ]Severe     Respiratory Distress Observation Scale (RDOS): 0  A score of 0 to 2 signifies little or no respiratory distress, 3 signifies mild distress, scores 4 to 6 indicate moderate distress, and scores greater than 7 signify severe distress  [email or web address]/sites/default/files/PDFS/689550-yaxobhapspg-lophwenv-bnrurjnnzxs-sqvzs.pdf    Anxiety:                             [ ]None[ ]Mild [ ]Moderate [ ]Severe   Fatigue:                             [ ]None[ ]Mild [ ]Moderate [ ]Severe   Nausea:                             [ ]None[ ]Mild [ ]Moderate [ ]Severe   Loss of appetite:              [ ]None[ ]Mild [ ]Moderate [ ]Severe   Constipation:                    [ ]None[ ]Mild [ ]Moderate [ ]Severe    Other Symptoms:    PHYSICAL EXAM:  Vital Signs Last 24 Hrs  T(C): 36.5 (16 May 2025 04:51), Max: 36.9 (15 May 2025 14:20)  T(F): 97.7 (16 May 2025 04:51), Max: 98.5 (15 May 2025 14:20)  HR: 81 (16 May 2025 04:51) (56 - 84)  BP: 131/73 (16 May 2025 04:51) (93/59 - 134/78)  BP(mean): --  RR: 18 (16 May 2025 04:51) (18 - 18)  SpO2: 99% (16 May 2025 04:51) (97% - 100%)    Parameters below as of 16 May 2025 04:51  Patient On (Oxygen Delivery Method): nasal cannula  O2 Flow (L/min): 2      GENERAL:  [X ] No acute distress [ ]Lethargic  [ ]Unarousable  [ ]Verbal  [ ]Non-Verbal [ ]Cachexia    BEHAVIORAL/PSYCH:  [ ]Alert and Oriented x  [ ] Anxiety [ ] Delirium [ ] Agitation [X ] Calm   EYES: [ ] No scleral icterus [ ] Scleral icterus [ ] Closed  ENMT:  [ ]Dry mouth  [ ]No external oral lesions [ X] No external ear or nose lesions  CARDIOVASCULAR:  [ ]Regular [ ]Irregular [ ]Tachy [ ]Not Tachy  [ ]Jenaro [ ] Edema [ ] No edema  PULMONARY:  [ ]Tachypnea  [ ]Audible excessive secretions [X ] No labored breathing [ ] labored breathing  GASTROINTESTINAL: [ ]Soft  [ ]Distended  [ X]Not distended [ ]Non tender [ ]Tender  MUSCULOSKELETAL: [ ]No clubbing [ ] clubbing  [ X] No cyanosis [ ] cyanosis  NEUROLOGIC: [ ]No focal deficits  [ ]Follows commands  [ ]Does not follow commands  [ ]Cognitive impairment  [ ]Dysphagia  [ ]Dysarthria  [ ]Paresis   SKIN: [ ] Jaundiced [X ] Non-jaundiced [ ]Rash [ ]No Rash [ ] Warm [ ] Dry             PPS: 30%    CRITICAL CARE:  [ ] Shock Present  [ ]Septic [ ]Cardiogenic [ ]Neurologic [ ]Hypovolemic  [ ]  Vasopressors [ ]  Inotropes   [ ]Respiratory failure present [ ]Mechanical ventilation [ ]Non-invasive ventilatory support [ ]High flow  [ ]Acute  [ ]Chronic [ ]Hypoxic  [ ]Hypercarbic [ ]Other  [ ]Other organ failure     LABS: reviewed by me, notable for: CBC, BMP WNL, low Ca                          8.6    8.86  )-----------( 151      ( 16 May 2025 08:58 )             27.4     05-16    139  |  102  |  18  ----------------------------<  157[H]  3.6   |  21  |  1.0    Ca    7.2[L]      16 May 2025 08:58            RADIOLOGY & ADDITIONAL STUDIES: CXR personally reviewed and interpretated by me: 5/11: R opacity    5/5: Nonspecific air distended loops of bowel.    Stable osseous structures.    PROTEIN CALORIE MALNUTRITION PRESENT: [ ]mild [ ]moderate [ ]severe [ ]underweight [ ]morbid obesity  https://www.andeal.org/vault/2440/web/files/ONC/Table_Clinical%20Characteristics%20to%20Document%20Malnutrition-White%20JV%20et%20al%461186.pdf    Height (cm): 175.3 (04-17-25 @ 11:56), 175.3 (02-19-25 @ 12:20), 175.3 (01-23-25 @ 23:40)  Weight (kg): 117.8 (04-17-25 @ 11:56), 113.4 (02-19-25 @ 12:20), 120.6 (01-23-25 @ 23:40)  BMI (kg/m2): 38.3 (04-17-25 @ 11:56), 36.9 (02-19-25 @ 12:20), 39.2 (01-23-25 @ 23:40)    [ ]PPSV2 < or = to 30% [ ]significant weight loss  [ ]poor nutritional intake  [ ]anasarca      [ ]Artificial Nutrition          Palliative Care Spiritual/Emotional Screening Tool Question  Severity (0-4):                    OR                    [X ] Unable to determine/NA  Score of 2 or greater indicates recommendation of Chaplaincy referral  Chaplaincy Referral: [ ] Yes [ ] Refused [ ] Following     Caregiver [place]:  [ ] Yes [ ] No    OR    [x ] Unable to determine. Will assess at later time if appropriate.  Social Work Referral [ ]  Patient and Family Centered Care Referral [ ]    Anticipatory Grief Present: [ ] Yes [ ] No    OR     [ x] Unable to determine. Will assess at later time if appropriate.  Social Work Referral [ ]  Patient and Family Centered Care Referral [ ]    REFERRALS:   [ ]Chaplaincy  [ ]Hospice  [ ]Child Life  [ ]Social Work  [ ]Case management [ ]Holistic Therapy     Palliative care education provided to patient and/or family    Goals of Care Document:

## 2025-05-16 NOTE — PROGRESS NOTE ADULT - SUBJECTIVE AND OBJECTIVE BOX
DWIGHT RIBEIRO 80y Male  MRN#: [id number]   Hospital Day: 32d    SUBJECTIVE  Patient is a 80y old Male who presents with a chief complaint of acute mentation changes (15 May 2025 08:12)  Currently admitted to medicine with the primary diagnosis of Metabolic encephalopathy      INTERVAL HPI AND OVERNIGHT EVENTS:  Patient was examined and seen at bedside. This morning he is resting comfortably in bed and denies questions about pain/nausea/SOB. Keeps asking how author is feeling     REVIEW OF SYMPTOMS:  unable to obtain at this time due to AMS     OBJECTIVE  PAST MEDICAL & SURGICAL HISTORY  Diabetes    Hypertension    Fall    H/O left knee surgery    History of cholecystectomy    History of appendectomy      ALLERGIES:  No Known Allergies    MEDICATIONS:  STANDING MEDICATIONS  buMETAnide 1 milliGRAM(s) Oral daily  chlorhexidine 2% Cloths 1 Application(s) Topical <User Schedule>  dextrose 5%. 1000 milliLiter(s) IV Continuous <Continuous>  dextrose 5%. 1000 milliLiter(s) IV Continuous <Continuous>  dextrose 50% Injectable 25 Gram(s) IV Push once  dextrose 50% Injectable 12.5 Gram(s) IV Push once  dextrose 50% Injectable 25 Gram(s) IV Push once  folic acid 1 milliGRAM(s) Oral daily  glucagon  Injectable 1 milliGRAM(s) IntraMuscular once  insulin lispro (ADMELOG) corrective regimen sliding scale   SubCutaneous Before meals and at bedtime  lactulose Syrup 20 Gram(s) Oral every 8 hours  levETIRAcetam   Injectable 250 milliGRAM(s) IV Push <User Schedule>  lidocaine   4% Patch 1 Patch Transdermal daily  midodrine 10 milliGRAM(s) Oral every 8 hours  nystatin Powder 1 Application(s) Topical two times a day  pantoprazole  Injectable 40 milliGRAM(s) IV Push daily  rifAXIMin 550 milliGRAM(s) Oral two times a day  senna 2 Tablet(s) Oral at bedtime  silver sulfADIAZINE 1% Cream 1 Application(s) Topical two times a day  spironolactone 100 milliGRAM(s) Oral daily    PRN MEDICATIONS  acetaminophen     Tablet .. 650 milliGRAM(s) Oral every 6 hours PRN  aluminum hydroxide/magnesium hydroxide/simethicone Suspension 30 milliLiter(s) Oral every 4 hours PRN  dextrose Oral Gel 15 Gram(s) Oral once PRN  melatonin 3 milliGRAM(s) Oral at bedtime PRN  ondansetron Injectable 4 milliGRAM(s) IV Push every 8 hours PRN  oxyCODONE    IR 5 milliGRAM(s) Oral every 6 hours PRN      VITAL SIGNS: Last 24 Hours  T(C): 36.5 (16 May 2025 04:51), Max: 36.9 (15 May 2025 14:20)  T(F): 97.7 (16 May 2025 04:51), Max: 98.5 (15 May 2025 14:20)  HR: 81 (16 May 2025 04:51) (56 - 84)  BP: 131/73 (16 May 2025 04:51) (93/59 - 134/78)  BP(mean): --  RR: 18 (16 May 2025 04:51) (18 - 18)  SpO2: 99% (16 May 2025 04:51) (97% - 100%)    LABS:                        9.5    7.71  )-----------( 115      ( 15 May 2025 08:35 )             30.2     05-15    143  |  104  |  18  ----------------------------<  155[H]  3.8   |  22  |  1.1    Ca    7.4[L]      15 May 2025 08:35        Urinalysis Basic - ( 15 May 2025 08:35 )    Color: x / Appearance: x / SG: x / pH: x  Gluc: 155 mg/dL / Ketone: x  / Bili: x / Urobili: x   Blood: x / Protein: x / Nitrite: x   Leuk Esterase: x / RBC: x / WBC x   Sq Epi: x / Non Sq Epi: x / Bacteria: x                RADIOLOGY:      PHYSICAL EXAM:  CONSTITUTIONAL: No acute distress, well-developed, well-groomed, MS at baseline, cooperative, follows simple commands   HEAD: Atraumatic, normocephalic  EYES: PERRLA, conjunctiva and sclera clear  ENT: Supple, no bruits, no JVD; moist mucous membranes  PULMONARY: Clear to auscultation bilaterally; no wheezes, rales, or rhonchi  CARDIOVASCULAR: Regular rate and rhythm; no murmurs, rubs, or gallops  GASTROINTESTINAL: Soft, non-tender, non-distended; bowel sounds present  MUSCULOSKELETAL: 2+ peripheral pulses; no clubbing, no cyanosis, Anasarca   NEUROLOGY: as above, baseline dementia   SKIN: No rashes or lesions; warm and dry     DWIGHT RIBEIRO 80y Male  MRN#: [id number]   Hospital Day: 32d    SUBJECTIVE  Patient is a 80y old Male who presents with a chief complaint of acute mentation changes (15 May 2025 08:12)  Currently admitted to medicine with the primary diagnosis of Metabolic encephalopathy      INTERVAL HPI AND OVERNIGHT EVENTS:  Patient was examined and seen at bedside. This morning he is resting comfortably in bed and denies questions about pain/nausea/SOB. Keeps asking about "Jairo"     REVIEW OF SYMPTOMS:  unable to obtain at this time due to AMS     OBJECTIVE  PAST MEDICAL & SURGICAL HISTORY  Diabetes    Hypertension    Fall    H/O left knee surgery    History of cholecystectomy    History of appendectomy      ALLERGIES:  No Known Allergies    MEDICATIONS:  STANDING MEDICATIONS  buMETAnide 1 milliGRAM(s) Oral daily  chlorhexidine 2% Cloths 1 Application(s) Topical <User Schedule>  dextrose 5%. 1000 milliLiter(s) IV Continuous <Continuous>  dextrose 5%. 1000 milliLiter(s) IV Continuous <Continuous>  dextrose 50% Injectable 25 Gram(s) IV Push once  dextrose 50% Injectable 12.5 Gram(s) IV Push once  dextrose 50% Injectable 25 Gram(s) IV Push once  folic acid 1 milliGRAM(s) Oral daily  glucagon  Injectable 1 milliGRAM(s) IntraMuscular once  insulin lispro (ADMELOG) corrective regimen sliding scale   SubCutaneous Before meals and at bedtime  lactulose Syrup 20 Gram(s) Oral every 8 hours  levETIRAcetam   Injectable 250 milliGRAM(s) IV Push <User Schedule>  lidocaine   4% Patch 1 Patch Transdermal daily  midodrine 10 milliGRAM(s) Oral every 8 hours  nystatin Powder 1 Application(s) Topical two times a day  pantoprazole  Injectable 40 milliGRAM(s) IV Push daily  rifAXIMin 550 milliGRAM(s) Oral two times a day  senna 2 Tablet(s) Oral at bedtime  silver sulfADIAZINE 1% Cream 1 Application(s) Topical two times a day  spironolactone 100 milliGRAM(s) Oral daily    PRN MEDICATIONS  acetaminophen     Tablet .. 650 milliGRAM(s) Oral every 6 hours PRN  aluminum hydroxide/magnesium hydroxide/simethicone Suspension 30 milliLiter(s) Oral every 4 hours PRN  dextrose Oral Gel 15 Gram(s) Oral once PRN  melatonin 3 milliGRAM(s) Oral at bedtime PRN  ondansetron Injectable 4 milliGRAM(s) IV Push every 8 hours PRN  oxyCODONE    IR 5 milliGRAM(s) Oral every 6 hours PRN      VITAL SIGNS: Last 24 Hours  T(C): 36.5 (16 May 2025 04:51), Max: 36.9 (15 May 2025 14:20)  T(F): 97.7 (16 May 2025 04:51), Max: 98.5 (15 May 2025 14:20)  HR: 81 (16 May 2025 04:51) (56 - 84)  BP: 131/73 (16 May 2025 04:51) (93/59 - 134/78)  BP(mean): --  RR: 18 (16 May 2025 04:51) (18 - 18)  SpO2: 99% (16 May 2025 04:51) (97% - 100%)    LABS:                        9.5    7.71  )-----------( 115      ( 15 May 2025 08:35 )             30.2     05-15    143  |  104  |  18  ----------------------------<  155[H]  3.8   |  22  |  1.1    Ca    7.4[L]      15 May 2025 08:35        Urinalysis Basic - ( 15 May 2025 08:35 )    Color: x / Appearance: x / SG: x / pH: x  Gluc: 155 mg/dL / Ketone: x  / Bili: x / Urobili: x   Blood: x / Protein: x / Nitrite: x   Leuk Esterase: x / RBC: x / WBC x   Sq Epi: x / Non Sq Epi: x / Bacteria: x                RADIOLOGY:      PHYSICAL EXAM:  CONSTITUTIONAL: No acute distress, well-groomed, annoyed today that Jairo isnt here,  follows simple commands   HEAD: Atraumatic, normocephalic  EYES: PERRLA, conjunctiva and sclera clear  ENT: Supple, no bruits, no JVD; moist mucous membranes  PULMONARY: Clear to auscultation bilaterally; no wheezes, rales, or rhonchi  CARDIOVASCULAR: Regular rate and rhythm; no murmurs, rubs, or gallops  GASTROINTESTINAL: Soft, non-tender, non-distended; bowel sounds present  MUSCULOSKELETAL: 2+ peripheral pulses; no clubbing, no cyanosis, Anasarca +2  NEUROLOGY: as above, baseline dementia   SKIN: No rashes or lesions; warm and dry

## 2025-05-16 NOTE — CHART NOTE - NSCHARTNOTEFT_GEN_A_CORE
Registered Dietitian Follow-Up     Patient Profile Reviewed                           Yes [x]   No []     Nutrition History Previously Obtained        Yes [x]  No []            Pertinent Medical Interventions: Aldo is a 79yo M with a PMHx of SDH (on keppra), DM, HTN, lumbar spine osteomyelitis, admitted for AMS. Was found to have bacteremia and cirrhosis. Calorie count was placed for 5/2-5/4, but was not able to be located.      Diet order: Diet, Soft and Bite Sized:   Consistent Carbohydrate {Evening Snack} (CSTCHOSN)  DASH/TLC {Sodium & Cholesterol Restricted} (DASH)  Supplement Feeding Modality:  Oral  Glucerna Shake Cans or Servings Per Day:  1       Frequency:  Two Times a day (05-02-25 @ 10:27) [Active]    Patient noted with anemia, hyperglycemia (resolved), elevated LFTs.       Anthropometrics:  Height: 175.3cm  Weight: 117.8kg  BMI: 38.3    Daily   % Weight Change    MEDICATIONS  (STANDING):  buMETAnide 1 milliGRAM(s) Oral daily  chlorhexidine 2% Cloths 1 Application(s) Topical <User Schedule>  dextrose 5%. 1000 milliLiter(s) (100 mL/Hr) IV Continuous <Continuous>  dextrose 5%. 1000 milliLiter(s) (50 mL/Hr) IV Continuous <Continuous>  dextrose 50% Injectable 25 Gram(s) IV Push once  dextrose 50% Injectable 12.5 Gram(s) IV Push once  dextrose 50% Injectable 25 Gram(s) IV Push once  folic acid 1 milliGRAM(s) Oral daily  glucagon  Injectable 1 milliGRAM(s) IntraMuscular once  insulin lispro (ADMELOG) corrective regimen sliding scale   SubCutaneous Before meals and at bedtime  lactulose Syrup 20 Gram(s) Oral every 8 hours  levETIRAcetam   Injectable 250 milliGRAM(s) IV Push <User Schedule>  lidocaine   4% Patch 1 Patch Transdermal daily  midodrine 10 milliGRAM(s) Oral every 8 hours  nystatin Powder 1 Application(s) Topical two times a day  pantoprazole  Injectable 40 milliGRAM(s) IV Push daily  rifAXIMin 550 milliGRAM(s) Oral two times a day  senna 2 Tablet(s) Oral at bedtime  silver sulfADIAZINE 1% Cream 1 Application(s) Topical two times a day  spironolactone 100 milliGRAM(s) Oral daily    MEDICATIONS  (PRN):  acetaminophen     Tablet .. 650 milliGRAM(s) Oral every 6 hours PRN Temp greater or equal to 38C (100.4F), Mild Pain (1 - 3)  aluminum hydroxide/magnesium hydroxide/simethicone Suspension 30 milliLiter(s) Oral every 4 hours PRN Dyspepsia  dextrose Oral Gel 15 Gram(s) Oral once PRN Blood Glucose LESS THAN 70 milliGRAM(s)/deciliter  melatonin 3 milliGRAM(s) Oral at bedtime PRN Insomnia  ondansetron Injectable 4 milliGRAM(s) IV Push every 8 hours PRN Nausea and/or Vomiting  oxyCODONE    IR 5 milliGRAM(s) Oral every 6 hours PRN Moderate Pain (4 - 6)    Pertinent Labs: 05-16 @ 08:58: Na 139, BUN 18, Cr 1.0, [H], K+ 3.6, Phos --, Mg --, Alk Phos --, ALT/SGPT --, AST/SGOT --, HbA1c --    Finger Sticks:  POCT Blood Glucose.: 208 mg/dL (05-16 @ 20:45)  POCT Blood Glucose.: 228 mg/dL (05-16 @ 16:43)  POCT Blood Glucose.: 208 mg/dL (05-16 @ 11:25)  POCT Blood Glucose.: 154 mg/dL (05-16 @ 07:20)    Physical Findings:  - Appearance: lethargic  - GI function: No s/s of dysfunction per flowsheet; last BM was on 5/14/25 per flowsheet.   - Tubes: none  - Oral/Mouth cavity: tolerating current diet texture  - Edema: moderate edema to L and R arms  - Skin: DTI to B/L buttocks     Nutrition Requirements: with consideration for age, weight, cirrhosis  Weight Used: previous 117.8kg     Estimated Energy Needs    Continue [x]  Adjust []  1413-1884kcal/day using 12-16kcal/kg   Estimated Protein Needs    Continue [x]  Adjust []   105-140g/day using 1.5-2g/kg   Estimated Fluid Needs        Continue [x]  Adjust []  1mL/kcal/day     Nutrient Intake: Per family at bedside, family has been consuming 50-75% of meals recently. Has been drinking the Glucernas; Glucerna noted to be 75% completed at time of RD visit. Patient did not eat much today as they were given pain medications today which caused them to sleep through most meals today.     [x] Previous Nutrition Diagnosis: Inadequate Protein Energy Intake            [x] Ongoing          [] Resolved    [] No active nutrition diagnosis identified at this time     Nutrition Education: Discussed diet order and encouraged family to assist patient to maximize PO intake as able.     Goal/Expected Outcome: Patient to meet 75% or more of estimated nutrient needs via PO intake within 5-7 days     Interventions:  -Continue current diet order     Monitor:  -PO intake  -Diet/texture tolerance  -Weight  -Nutrition related labs  -Nutrition focused physical findings  -GOC    Moderate Nutrition Risk Follow Up    Boubacar Beck RD via Teams

## 2025-05-16 NOTE — PROGRESS NOTE ADULT - ASSESSMENT
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 elevated   -hold opioids   -mentation improving  -portal hypertension  -Coagulopathy  -Hypoalbuminemia   -on rifaximin, lactulose  -s/p albumin given diffuse edema on 4/30, 5/2, 5/8, 5/9, 5/12  pt is remains edematous, cont  spironolactone  titrate up to 100mg  and as per discussion with hepatology,    c/w Bumex 1mg PO qd  abdominal US:.  Nodular liver contour consistent with liver cirrhosis. 2.  Post-cholecystectomy. 3.  Severe abdominal ascites.  4.  Right pleural effusion.  -cxr does not show an effusion , shows non specific right opacity    KUB shows :Nonspecific air distended loops of bowel.  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    s/p albumin IV multiple times     #Anemia  -likely multifactorial: sepsis, cirrhosis, poor nutrition, GI bleeding  -s/p EGD on 4/16 which showed 5mm ulcer in duodenal bulb post endo clip, repeat EGD on 4/29 showed no bleeding noted  -will monitor for GI bleeding   transfused one unit of PRBC on 5/3  -Hb stable , cont to monitor     #Afib  -found during this admission  -rate controlled, not candidate for AC given anemia, coagulopathy    #Deconditioning  -PT/OT   -OOB    #Sacral pressure ulcer  -local wound care  -rotate the patient q2hrs     #hypocalcemia:  albumin is 1.7, corrected calcium is 9.1   given albumin     #hypokalemia  replaced with iv potassium   f/u level in am remain low at 3.7    SVT PPX: SCD due to coagulopathy, risk of Gi bleed /anemia     dispo: Overall very poor prognosis            #Misc  - DVT Prophylaxis:  - GI Prophylaxis:  - Diet:  - Activity:  - IV Fluids:  - Code Status:    Dispo:   Mr Mendoza is a 80 YOM w a PMH of 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 elevated   -hold opioids   -mentation improving  -portal hypertension  -Coagulopathy  -Hypoalbuminemia   -on rifaximin, lactulose  -s/p albumin given diffuse edema on 4/30, 5/2, 5/8, 5/9, 5/12  pt is remains edematous, cont  spironolactone  titrate up to 100mg  and as per discussion with hepatology,    c/w Bumex 1mg PO qd  abdominal US:.  Nodular liver contour consistent with liver cirrhosis. 2.  Post-cholecystectomy. 3.  Severe abdominal ascites.  4.  Right pleural effusion.  -cxr does not show an effusion , shows non specific right opacity    KUB shows :Nonspecific air distended loops of bowel.  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    s/p albumin IV multiple times   -strict I&O    #Anemia  -likely multifactorial: sepsis, cirrhosis, poor nutrition, GI bleeding  -s/p EGD on 4/16 which showed 5mm ulcer in duodenal bulb post endo clip, repeat EGD on 4/29 showed no bleeding noted  -will monitor for GI bleeding   transfused one unit of PRBC on 5/3  -Hb stable , cont to monitor     #Afib  -found during this admission  -rate controlled, not candidate for AC given anemia, coagulopathy    #Deconditioning  -PT/OT   -OOB    #Sacral pressure ulcer  -local wound care  -rotate the patient q2hrs     #hypocalcemia:  albumin is 1.7, corrected calcium is 9.1   given albumin     #hypokalemia  replaced with iv potassium   f/u level in am remain low at 3.7    SVT PPX: SCD due to coagulopathy, risk of Gi bleed /anemia     dispo: Overall very poor prognosis             Mr Mendoza is a 80 YOM w a PMH of 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 elevated   -hold opioids   -mentation improving  -portal hypertension  -Coagulopathy  -Hypoalbuminemia   -on rifaximin, lactulose  -s/p albumin given diffuse edema on 4/30, 5/2, 5/8, 5/9, 5/12  pt is remains edematous, cont  spironolactone  titrate up to 100mg  and as per discussion with hepatology,    c/w Bumex 1mg PO qd  abdominal US:.  Nodular liver contour consistent with liver cirrhosis. 2.  Post-cholecystectomy. 3.  Severe abdominal ascites.  4.  Right pleural effusion.  -cxr does not show an effusion , shows non specific right opacity    KUB shows :Nonspecific air distended loops of bowel.  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    s/p albumin IV multiple times   -strict I&O    #Anemia  -likely multifactorial: sepsis, cirrhosis, poor nutrition, GI bleeding  -s/p EGD on 4/16 which showed 5mm ulcer in duodenal bulb post endo clip, repeat EGD on 4/29 showed no bleeding noted  -will monitor for GI bleeding   transfused one unit of PRBC on 5/3  -Hb stable , cont to monitor     #Afib  -found during this admission  -rate controlled, not candidate for AC given anemia, coagulopathy    #Deconditioning  -PT/OT   -OOB    #Sacral pressure ulcer  -local wound care  -rotate the patient q2hrs     #hypocalcemia:  albumin is 1.7, corrected calcium is 9.1   given albumin     #hypokalemia  replaced with iv potassium   f/u level in am remain low at 3.7    SVT PPX: SCD due to coagulopathy, risk of Gi bleed /anemia     dispo: Overall very poor prognosis

## 2025-05-17 LAB
ANION GAP SERPL CALC-SCNC: 17 MMOL/L — HIGH (ref 7–14)
BUN SERPL-MCNC: 19 MG/DL — SIGNIFICANT CHANGE UP (ref 10–20)
CALCIUM SERPL-MCNC: 7.2 MG/DL — LOW (ref 8.4–10.5)
CHLORIDE SERPL-SCNC: 103 MMOL/L — SIGNIFICANT CHANGE UP (ref 98–110)
CO2 SERPL-SCNC: 21 MMOL/L — SIGNIFICANT CHANGE UP (ref 17–32)
CREAT SERPL-MCNC: 1 MG/DL — SIGNIFICANT CHANGE UP (ref 0.7–1.5)
EGFR: 77 ML/MIN/1.73M2 — SIGNIFICANT CHANGE UP
EGFR: 77 ML/MIN/1.73M2 — SIGNIFICANT CHANGE UP
GLUCOSE BLDC GLUCOMTR-MCNC: 145 MG/DL — HIGH (ref 70–99)
GLUCOSE BLDC GLUCOMTR-MCNC: 159 MG/DL — HIGH (ref 70–99)
GLUCOSE BLDC GLUCOMTR-MCNC: 193 MG/DL — HIGH (ref 70–99)
GLUCOSE BLDC GLUCOMTR-MCNC: 220 MG/DL — HIGH (ref 70–99)
GLUCOSE SERPL-MCNC: 142 MG/DL — HIGH (ref 70–99)
HCT VFR BLD CALC: 25.5 % — LOW (ref 42–52)
HGB BLD-MCNC: 8.1 G/DL — LOW (ref 14–18)
MCHC RBC-ENTMCNC: 31.6 PG — HIGH (ref 27–31)
MCHC RBC-ENTMCNC: 31.8 G/DL — LOW (ref 32–37)
MCV RBC AUTO: 99.6 FL — HIGH (ref 80–94)
NRBC BLD AUTO-RTO: 0 /100 WBCS — SIGNIFICANT CHANGE UP (ref 0–0)
PLATELET # BLD AUTO: 104 K/UL — LOW (ref 130–400)
PMV BLD: 9.9 FL — SIGNIFICANT CHANGE UP (ref 7.4–10.4)
POTASSIUM SERPL-MCNC: 3.9 MMOL/L — SIGNIFICANT CHANGE UP (ref 3.5–5)
POTASSIUM SERPL-SCNC: 3.9 MMOL/L — SIGNIFICANT CHANGE UP (ref 3.5–5)
RBC # BLD: 2.56 M/UL — LOW (ref 4.7–6.1)
RBC # FLD: 25.2 % — HIGH (ref 11.5–14.5)
SODIUM SERPL-SCNC: 141 MMOL/L — SIGNIFICANT CHANGE UP (ref 135–146)
WBC # BLD: 6.75 K/UL — SIGNIFICANT CHANGE UP (ref 4.8–10.8)
WBC # FLD AUTO: 6.75 K/UL — SIGNIFICANT CHANGE UP (ref 4.8–10.8)

## 2025-05-17 PROCEDURE — 99232 SBSQ HOSP IP/OBS MODERATE 35: CPT

## 2025-05-17 RX ADMIN — Medication 1 APPLICATION(S): at 05:03

## 2025-05-17 RX ADMIN — OXYCODONE HYDROCHLORIDE 5 MILLIGRAM(S): 30 TABLET ORAL at 13:17

## 2025-05-17 RX ADMIN — LEVETIRACETAM 250 MILLIGRAM(S): 10 INJECTION, SOLUTION INTRAVENOUS at 05:00

## 2025-05-17 RX ADMIN — BUMETANIDE 1 MILLIGRAM(S): 1 TABLET ORAL at 05:02

## 2025-05-17 RX ADMIN — INSULIN LISPRO 2: 100 INJECTION, SOLUTION INTRAVENOUS; SUBCUTANEOUS at 08:00

## 2025-05-17 RX ADMIN — NYSTATIN 1 APPLICATION(S): 100000 CREAM TOPICAL at 17:09

## 2025-05-17 RX ADMIN — MIDODRINE HYDROCHLORIDE 10 MILLIGRAM(S): 5 TABLET ORAL at 13:16

## 2025-05-17 RX ADMIN — LEVETIRACETAM 250 MILLIGRAM(S): 10 INJECTION, SOLUTION INTRAVENOUS at 17:06

## 2025-05-17 RX ADMIN — MIDODRINE HYDROCHLORIDE 10 MILLIGRAM(S): 5 TABLET ORAL at 05:01

## 2025-05-17 RX ADMIN — FOLIC ACID 1 MILLIGRAM(S): 1 TABLET ORAL at 11:09

## 2025-05-17 RX ADMIN — LIDOCAINE HYDROCHLORIDE 1 PATCH: 20 JELLY TOPICAL at 11:09

## 2025-05-17 RX ADMIN — Medication 40 MILLIGRAM(S): at 13:16

## 2025-05-17 RX ADMIN — Medication 100 MILLIGRAM(S): at 05:01

## 2025-05-17 RX ADMIN — LACTULOSE 20 GRAM(S): 10 SOLUTION ORAL at 05:01

## 2025-05-17 RX ADMIN — LACTULOSE 20 GRAM(S): 10 SOLUTION ORAL at 21:06

## 2025-05-17 RX ADMIN — NYSTATIN 1 APPLICATION(S): 100000 CREAM TOPICAL at 05:03

## 2025-05-17 RX ADMIN — LACTULOSE 20 GRAM(S): 10 SOLUTION ORAL at 13:16

## 2025-05-17 RX ADMIN — OXYCODONE HYDROCHLORIDE 5 MILLIGRAM(S): 30 TABLET ORAL at 11:11

## 2025-05-17 RX ADMIN — MIDODRINE HYDROCHLORIDE 10 MILLIGRAM(S): 5 TABLET ORAL at 21:06

## 2025-05-17 RX ADMIN — Medication 1 APPLICATION(S): at 17:08

## 2025-05-17 NOTE — PROGRESS NOTE ADULT - SUBJECTIVE AND OBJECTIVE BOX
DWIGHT RIBEIRO 80y Male  MRN#: [id number]   Hospital Day: 33d    SUBJECTIVE  Patient is a 80y old Male who presents with a chief complaint of acute mentation changes (16 May 2025 10:58)  Currently admitted to medicine with the primary diagnosis of Metabolic encephalopathy      INTERVAL HPI AND OVERNIGHT EVENTS:  Patient was examined and seen at bedside. This morning he is resting comfortably in bed and reports he needs to clean all the fish he caught but he cant find Jairo     REVIEW OF SYMPTOMS:  unable to obtain at this time     OBJECTIVE  PAST MEDICAL & SURGICAL HISTORY  Diabetes    Hypertension    Fall    H/O left knee surgery    History of cholecystectomy    History of appendectomy      ALLERGIES:  No Known Allergies    MEDICATIONS:  STANDING MEDICATIONS  buMETAnide 1 milliGRAM(s) Oral daily  chlorhexidine 2% Cloths 1 Application(s) Topical <User Schedule>  dextrose 5%. 1000 milliLiter(s) IV Continuous <Continuous>  dextrose 5%. 1000 milliLiter(s) IV Continuous <Continuous>  dextrose 50% Injectable 25 Gram(s) IV Push once  dextrose 50% Injectable 12.5 Gram(s) IV Push once  dextrose 50% Injectable 25 Gram(s) IV Push once  folic acid 1 milliGRAM(s) Oral daily  glucagon  Injectable 1 milliGRAM(s) IntraMuscular once  insulin lispro (ADMELOG) corrective regimen sliding scale   SubCutaneous Before meals and at bedtime  lactulose Syrup 20 Gram(s) Oral every 8 hours  levETIRAcetam   Injectable 250 milliGRAM(s) IV Push <User Schedule>  lidocaine   4% Patch 1 Patch Transdermal daily  midodrine 10 milliGRAM(s) Oral every 8 hours  nystatin Powder 1 Application(s) Topical two times a day  pantoprazole  Injectable 40 milliGRAM(s) IV Push daily  rifAXIMin 550 milliGRAM(s) Oral two times a day  senna 2 Tablet(s) Oral at bedtime  silver sulfADIAZINE 1% Cream 1 Application(s) Topical two times a day  spironolactone 100 milliGRAM(s) Oral daily    PRN MEDICATIONS  acetaminophen     Tablet .. 650 milliGRAM(s) Oral every 6 hours PRN  aluminum hydroxide/magnesium hydroxide/simethicone Suspension 30 milliLiter(s) Oral every 4 hours PRN  dextrose Oral Gel 15 Gram(s) Oral once PRN  melatonin 3 milliGRAM(s) Oral at bedtime PRN  ondansetron Injectable 4 milliGRAM(s) IV Push every 8 hours PRN  oxyCODONE    IR 5 milliGRAM(s) Oral every 6 hours PRN      VITAL SIGNS: Last 24 Hours  T(C): 36.7 (17 May 2025 05:00), Max: 36.8 (16 May 2025 14:40)  T(F): 98.1 (17 May 2025 05:00), Max: 98.3 (16 May 2025 14:40)  HR: 90 (17 May 2025 05:00) (83 - 96)  BP: 118/75 (17 May 2025 05:00) (115/74 - 128/77)  BP(mean): --  RR: 18 (17 May 2025 05:00) (18 - 18)  SpO2: 96% (17 May 2025 05:00) (96% - 96%)    LABS:                        8.1    6.75  )-----------( 104      ( 17 May 2025 08:15 )             25.5     05-17    141  |  103  |  19  ----------------------------<  142[H]  3.9   |  21  |  1.0    Ca    7.2[L]      17 May 2025 08:15        Urinalysis Basic - ( 17 May 2025 08:15 )    Color: x / Appearance: x / SG: x / pH: x  Gluc: 142 mg/dL / Ketone: x  / Bili: x / Urobili: x   Blood: x / Protein: x / Nitrite: x   Leuk Esterase: x / RBC: x / WBC x   Sq Epi: x / Non Sq Epi: x / Bacteria: x                RADIOLOGY:      PHYSICAL EXAM:  CONSTITUTIONAL: No acute distress, well-developed, well-groomed, responds to name  HEAD: Atraumatic, normocephalic  EYES: PERRLA, conjunctiva and sclera clear  ENT: Supple,  no bruits, moist mucous membranes  PULMONARY:  no wheezes, rales, or rhonchi  CARDIOVASCULAR: Regular rate and rhythm; no murmurs, rubs, or gallops  GASTROINTESTINAL: Soft, non-tender, non-distended; bowel sounds present  MUSCULOSKELETAL: 2+ peripheral pulses; no clubbing, no cyanosis, +1-2 Anasarca   NEUROLOGY: poor historian, confused   SKIN: No rashes or lesions; warm and dry

## 2025-05-17 NOTE — PROGRESS NOTE ADULT - ASSESSMENT
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 elevated   -hold opioids   -mentation improving  -portal hypertension  -Coagulopathy  -Hypoalbuminemia   -on rifaximin, lactulose  -s/p albumin given diffuse edema on 4/30, 5/2, 5/8, 5/9, 5/12  pt is remains edematous, cont  spironolactone  titrate up to 100mg  and as per discussion with hepatology,    c/w Bumex 1mg PO qd  abdominal US:.  Nodular liver contour consistent with liver cirrhosis. 2.  Post-cholecystectomy. 3.  Severe abdominal ascites.  4.  Right pleural effusion.  -cxr does not show an effusion , shows non specific right opacity    KUB shows :Nonspecific air distended loops of bowel.  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    s/p albumin IV multiple times   -strict I&O    #Anemia  -likely multifactorial: sepsis, cirrhosis, poor nutrition, GI bleeding  -s/p EGD on 4/16 which showed 5mm ulcer in duodenal bulb post endo clip, repeat EGD on 4/29 showed no bleeding noted  -will monitor for GI bleeding   transfused one unit of PRBC on 5/3  -Hb stable , cont to monitor     #Afib  -found during this admission  -rate controlled, not candidate for AC given anemia, coagulopathy    #Deconditioning  -PT/OT   -OOB    #Sacral pressure ulcer  -local wound care  -rotate the patient q2hrs     #hypocalcemia:  albumin is 1.7, corrected calcium is 9.1   given albumin     #hypokalemia  replaced with iv potassium   f/u level in am remain low at 3.7    SVT PPX: SCD due to coagulopathy, risk of Gi bleed /anemia     dispo: Overall very poor prognosis

## 2025-05-18 LAB
GLUCOSE BLDC GLUCOMTR-MCNC: 193 MG/DL — HIGH (ref 70–99)
GLUCOSE BLDC GLUCOMTR-MCNC: 204 MG/DL — HIGH (ref 70–99)
GLUCOSE BLDC GLUCOMTR-MCNC: 224 MG/DL — HIGH (ref 70–99)
GLUCOSE BLDC GLUCOMTR-MCNC: 229 MG/DL — HIGH (ref 70–99)

## 2025-05-18 PROCEDURE — 99232 SBSQ HOSP IP/OBS MODERATE 35: CPT

## 2025-05-18 RX ADMIN — Medication 1 APPLICATION(S): at 05:39

## 2025-05-18 RX ADMIN — INSULIN LISPRO 4: 100 INJECTION, SOLUTION INTRAVENOUS; SUBCUTANEOUS at 21:17

## 2025-05-18 RX ADMIN — INSULIN LISPRO 2: 100 INJECTION, SOLUTION INTRAVENOUS; SUBCUTANEOUS at 12:10

## 2025-05-18 RX ADMIN — NYSTATIN 1 APPLICATION(S): 100000 CREAM TOPICAL at 05:37

## 2025-05-18 RX ADMIN — INSULIN LISPRO 4: 100 INJECTION, SOLUTION INTRAVENOUS; SUBCUTANEOUS at 08:42

## 2025-05-18 RX ADMIN — LACTULOSE 20 GRAM(S): 10 SOLUTION ORAL at 21:16

## 2025-05-18 RX ADMIN — Medication 1 APPLICATION(S): at 17:06

## 2025-05-18 RX ADMIN — FOLIC ACID 1 MILLIGRAM(S): 1 TABLET ORAL at 11:33

## 2025-05-18 RX ADMIN — LACTULOSE 20 GRAM(S): 10 SOLUTION ORAL at 05:36

## 2025-05-18 RX ADMIN — BUMETANIDE 1 MILLIGRAM(S): 1 TABLET ORAL at 05:37

## 2025-05-18 RX ADMIN — MIDODRINE HYDROCHLORIDE 10 MILLIGRAM(S): 5 TABLET ORAL at 05:37

## 2025-05-18 RX ADMIN — Medication 100 MILLIGRAM(S): at 05:37

## 2025-05-18 RX ADMIN — Medication 2 TABLET(S): at 21:15

## 2025-05-18 RX ADMIN — LIDOCAINE HYDROCHLORIDE 1 PATCH: 20 JELLY TOPICAL at 11:33

## 2025-05-18 RX ADMIN — Medication 40 MILLIGRAM(S): at 11:33

## 2025-05-18 RX ADMIN — LEVETIRACETAM 250 MILLIGRAM(S): 10 INJECTION, SOLUTION INTRAVENOUS at 05:40

## 2025-05-18 RX ADMIN — NYSTATIN 1 APPLICATION(S): 100000 CREAM TOPICAL at 17:06

## 2025-05-18 RX ADMIN — LEVETIRACETAM 250 MILLIGRAM(S): 10 INJECTION, SOLUTION INTRAVENOUS at 17:05

## 2025-05-18 RX ADMIN — LACTULOSE 20 GRAM(S): 10 SOLUTION ORAL at 15:22

## 2025-05-18 NOTE — PROGRESS NOTE ADULT - ASSESSMENT
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 elevated   -hold opioids   -mentation improving  -portal hypertension  -Coagulopathy  -Hypoalbuminemia   -on rifaximin, lactulose  -s/p albumin given diffuse edema on 4/30, 5/2, 5/8, 5/9, 5/12  pt is remains edematous, cont  spironolactone  titrate up to 100mg  and as per discussion with hepatology,    c/w Bumex 1mg PO qd  abdominal US:.  Nodular liver contour consistent with liver cirrhosis. 2.  Post-cholecystectomy. 3.  Severe abdominal ascites.  4.  Right pleural effusion.  -cxr does not show an effusion , shows non specific right opacity    KUB shows :Nonspecific air distended loops of bowel.  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    s/p albumin IV multiple times   -strict I&O    #Anemia  -likely multifactorial: sepsis, cirrhosis, poor nutrition, GI bleeding  -s/p EGD on 4/16 which showed 5mm ulcer in duodenal bulb post endo clip, repeat EGD on 4/29 showed no bleeding noted  -will monitor for GI bleeding   transfused one unit of PRBC on 5/3  -Hb stable , cont to monitor     #Afib  -found during this admission  -rate controlled, not candidate for AC given anemia, coagulopathy    #Deconditioning  -PT/OT   -OOB    #Sacral pressure ulcer  -local wound care  -rotate the patient q2hrs     #hypocalcemia:  albumin is 1.7, corrected calcium is 9.1   given albumin     #hypokalemia  replaced with iv potassium   f/u level in am remain low at 3.7    SVT PPX: SCD due to coagulopathy, risk of Gi bleed /anemia     dispo: Overall very poor prognosis             Mr Mendoza is a 80 YOM w a PMH of 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 elevated   -hold opioids   -mentation improving  -portal hypertension  -Coagulopathy  -Hypoalbuminemia   -on rifaximin, lactulose  -s/p albumin given diffuse edema on 4/30, 5/2, 5/8, 5/9, 5/12  pt is remains edematous, cont  spironolactone  titrate up to 100mg  and as per discussion with hepatology,    c/w Bumex 1mg PO qd  abdominal US:.  Nodular liver contour consistent with liver cirrhosis. 2.  Post-cholecystectomy. 3.  Severe abdominal ascites.  4.  Right pleural effusion.  -cxr does not show an effusion , shows non specific right opacity    KUB shows :Nonspecific air distended loops of bowel.  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    s/p albumin IV multiple times   -strict I&O    #Anemia  -likely multifactorial: sepsis, cirrhosis, poor nutrition, GI bleeding  -s/p EGD on 4/16 which showed 5mm ulcer in duodenal bulb post endo clip, repeat EGD on 4/29 showed no bleeding noted  -will monitor for GI bleeding   transfused one unit of PRBC on 5/3  -Hb stable , cont to monitor     #Afib  -found during this admission  -rate controlled, not candidate for AC given anemia, coagulopathy    #Deconditioning  -PT/OT   -OOB    #Sacral pressure ulcer  -local wound care  -rotate the patient q2hrs     #hypocalcemia:  albumin is 1.7, corrected calcium is 9.1   given albumin     #hypokalemia  replaced with iv potassium   f/u level in am remain low at 3.7    SVT PPX: SCD due to coagulopathy, risk of Gi bleed /anemia     dispo: Overall very poor prognosis      DVT ppx -   pending -   prepare for DC order placed __, anticipate DC in __ hours  Floor status -     I personally reviewed labs and imaging and ordered necessary testing/medications  I discussed care of the patient with licensed providers  I personally reviewed chart and consultant recommendations  Pt has complex medical issues that require extensive diagnosis and intervention / threat to life

## 2025-05-18 NOTE — PROGRESS NOTE ADULT - SUBJECTIVE AND OBJECTIVE BOX
DWIGHT RIBEIRO 80y Male  MRN#: [id number]   Hospital Day: 34d    SUBJECTIVE  Patient is a 80y old Male who presents with a chief complaint of acute mentation changes (17 May 2025 13:18)  Currently admitted to medicine with the primary diagnosis of Metabolic encephalopathy      INTERVAL HPI AND OVERNIGHT EVENTS:  Patient was examined and seen at bedside. This morning he is resting comfortably in bed and reports      REVIEW OF SYMPTOMS:  CONSTITUTIONAL: No weakness, fevers or chills; No headaches  EYES: No visual changes, eye pain, or discharge  ENT: No vertigo; No ear pain or change in hearing; No sore throat or difficulty swallowing  NECK: No pain or stiffness  RESPIRATORY: No cough, wheezing, or hemoptysis; No shortness of breath  CARDIOVASCULAR: No chest pain or palpitations  GASTROINTESTINAL: No abdominal or epigastric pain; No nausea, vomiting, or hematemesis; No diarrhea or constipation; No melena or hematochezia  GENITOURINARY: No dysuria, frequency or hematuria  MUSCULOSKELETAL: No joint pain, no muscle pain, no weakness  NEUROLOGICAL: No numbness or weakness  SKIN: No itching or rashes    OBJECTIVE  PAST MEDICAL & SURGICAL HISTORY  Diabetes    Hypertension    Fall    H/O left knee surgery    History of cholecystectomy    History of appendectomy      ALLERGIES:  No Known Allergies    MEDICATIONS:  STANDING MEDICATIONS  buMETAnide 1 milliGRAM(s) Oral daily  chlorhexidine 2% Cloths 1 Application(s) Topical <User Schedule>  dextrose 5%. 1000 milliLiter(s) IV Continuous <Continuous>  dextrose 5%. 1000 milliLiter(s) IV Continuous <Continuous>  dextrose 50% Injectable 25 Gram(s) IV Push once  dextrose 50% Injectable 12.5 Gram(s) IV Push once  dextrose 50% Injectable 25 Gram(s) IV Push once  folic acid 1 milliGRAM(s) Oral daily  glucagon  Injectable 1 milliGRAM(s) IntraMuscular once  insulin lispro (ADMELOG) corrective regimen sliding scale   SubCutaneous Before meals and at bedtime  lactulose Syrup 20 Gram(s) Oral every 8 hours  levETIRAcetam   Injectable 250 milliGRAM(s) IV Push <User Schedule>  lidocaine   4% Patch 1 Patch Transdermal daily  midodrine 10 milliGRAM(s) Oral every 8 hours  nystatin Powder 1 Application(s) Topical two times a day  pantoprazole  Injectable 40 milliGRAM(s) IV Push daily  rifAXIMin 550 milliGRAM(s) Oral two times a day  senna 2 Tablet(s) Oral at bedtime  silver sulfADIAZINE 1% Cream 1 Application(s) Topical two times a day  spironolactone 100 milliGRAM(s) Oral daily    PRN MEDICATIONS  acetaminophen     Tablet .. 650 milliGRAM(s) Oral every 6 hours PRN  aluminum hydroxide/magnesium hydroxide/simethicone Suspension 30 milliLiter(s) Oral every 4 hours PRN  dextrose Oral Gel 15 Gram(s) Oral once PRN  melatonin 3 milliGRAM(s) Oral at bedtime PRN  ondansetron Injectable 4 milliGRAM(s) IV Push every 8 hours PRN      VITAL SIGNS: Last 24 Hours  T(C): 36.7 (18 May 2025 05:13), Max: 37.2 (17 May 2025 20:15)  T(F): 98 (18 May 2025 05:13), Max: 99 (17 May 2025 20:15)  HR: 80 (18 May 2025 05:13) (80 - 89)  BP: 132/82 (18 May 2025 05:13) (112/64 - 132/82)  BP(mean): --  RR: 18 (18 May 2025 05:13) (18 - 18)  SpO2: 97% (18 May 2025 05:13) (97% - 98%)    LABS:                        8.1    6.75  )-----------( 104      ( 17 May 2025 08:15 )             25.5     05-17    141  |  103  |  19  ----------------------------<  142[H]  3.9   |  21  |  1.0    Ca    7.2[L]      17 May 2025 08:15        Urinalysis Basic - ( 17 May 2025 08:15 )    Color: x / Appearance: x / SG: x / pH: x  Gluc: 142 mg/dL / Ketone: x  / Bili: x / Urobili: x   Blood: x / Protein: x / Nitrite: x   Leuk Esterase: x / RBC: x / WBC x   Sq Epi: x / Non Sq Epi: x / Bacteria: x                RADIOLOGY:      PHYSICAL EXAM:  CONSTITUTIONAL: No acute distress, well-developed, well-groomed, AAOx3  HEAD: Atraumatic, normocephalic  EYES: EOM intact, PERRLA, conjunctiva and sclera clear  ENT: Supple, no masses, no thyromegaly, no bruits, no JVD; moist mucous membranes  PULMONARY: Clear to auscultation bilaterally; no wheezes, rales, or rhonchi  CARDIOVASCULAR: Regular rate and rhythm; no murmurs, rubs, or gallops  GASTROINTESTINAL: Soft, non-tender, non-distended; bowel sounds present  MUSCULOSKELETAL: 2+ peripheral pulses; no clubbing, no cyanosis, no edema  NEUROLOGY: non-focal  SKIN: No rashes or lesions; warm and dry     DWIGHT RIBEIRO 80y Male  MRN#: [id number]   Hospital Day: 34d    SUBJECTIVE  Patient is a 80y old Male who presents with a chief complaint of acute mentation changes (17 May 2025 13:18)  Currently admitted to medicine with the primary diagnosis of Metabolic encephalopathy      INTERVAL HPI AND OVERNIGHT EVENTS:  Patient was examined and seen at bedside. This morning he is resting comfortably in bed and would like to speak w Jairo     REVIEW OF SYMPTOMS:  unable to obtain at this time due to advanced dementia     OBJECTIVE  PAST MEDICAL & SURGICAL HISTORY  Diabetes    Hypertension    Fall    H/O left knee surgery    History of cholecystectomy    History of appendectomy      ALLERGIES:  No Known Allergies    MEDICATIONS:  STANDING MEDICATIONS  buMETAnide 1 milliGRAM(s) Oral daily  chlorhexidine 2% Cloths 1 Application(s) Topical <User Schedule>  dextrose 5%. 1000 milliLiter(s) IV Continuous <Continuous>  dextrose 5%. 1000 milliLiter(s) IV Continuous <Continuous>  dextrose 50% Injectable 25 Gram(s) IV Push once  dextrose 50% Injectable 12.5 Gram(s) IV Push once  dextrose 50% Injectable 25 Gram(s) IV Push once  folic acid 1 milliGRAM(s) Oral daily  glucagon  Injectable 1 milliGRAM(s) IntraMuscular once  insulin lispro (ADMELOG) corrective regimen sliding scale   SubCutaneous Before meals and at bedtime  lactulose Syrup 20 Gram(s) Oral every 8 hours  levETIRAcetam   Injectable 250 milliGRAM(s) IV Push <User Schedule>  lidocaine   4% Patch 1 Patch Transdermal daily  midodrine 10 milliGRAM(s) Oral every 8 hours  nystatin Powder 1 Application(s) Topical two times a day  pantoprazole  Injectable 40 milliGRAM(s) IV Push daily  rifAXIMin 550 milliGRAM(s) Oral two times a day  senna 2 Tablet(s) Oral at bedtime  silver sulfADIAZINE 1% Cream 1 Application(s) Topical two times a day  spironolactone 100 milliGRAM(s) Oral daily    PRN MEDICATIONS  acetaminophen     Tablet .. 650 milliGRAM(s) Oral every 6 hours PRN  aluminum hydroxide/magnesium hydroxide/simethicone Suspension 30 milliLiter(s) Oral every 4 hours PRN  dextrose Oral Gel 15 Gram(s) Oral once PRN  melatonin 3 milliGRAM(s) Oral at bedtime PRN  ondansetron Injectable 4 milliGRAM(s) IV Push every 8 hours PRN      VITAL SIGNS: Last 24 Hours  T(C): 36.7 (18 May 2025 05:13), Max: 37.2 (17 May 2025 20:15)  T(F): 98 (18 May 2025 05:13), Max: 99 (17 May 2025 20:15)  HR: 80 (18 May 2025 05:13) (80 - 89)  BP: 132/82 (18 May 2025 05:13) (112/64 - 132/82)  BP(mean): --  RR: 18 (18 May 2025 05:13) (18 - 18)  SpO2: 97% (18 May 2025 05:13) (97% - 98%)    LABS:                        8.1    6.75  )-----------( 104      ( 17 May 2025 08:15 )             25.5     05-17    141  |  103  |  19  ----------------------------<  142[H]  3.9   |  21  |  1.0    Ca    7.2[L]      17 May 2025 08:15        Urinalysis Basic - ( 17 May 2025 08:15 )    Color: x / Appearance: x / SG: x / pH: x  Gluc: 142 mg/dL / Ketone: x  / Bili: x / Urobili: x   Blood: x / Protein: x / Nitrite: x   Leuk Esterase: x / RBC: x / WBC x   Sq Epi: x / Non Sq Epi: x / Bacteria: x                RADIOLOGY:      PHYSICAL EXAM:  CONSTITUTIONAL: No acute distress, well-developed, well-groomed, oriented to name only   HEAD: Atraumatic, normocephalic  EYES:  PERRLA, conjunctiva and sclera clear  ENT:  no bruits, no JVD; moist mucous membranes  PULMONARY:  no wheezes, rales, or rhonchi  CARDIOVASCULAR: Regular rate and rhythm; no murmurs, rubs, or gallops  GASTROINTESTINAL: Soft, non-tender, non-distended; bowel sounds present  MUSCULOSKELETAL: 2+ peripheral pulses; no clubbing, no cyanosis, +1 Pitting anasarca (Improving)   NEUROLOGY: as above, follows simple commands when he wants to   SKIN: No rashes or lesions; warm and dry

## 2025-05-19 LAB
GLUCOSE BLDC GLUCOMTR-MCNC: 153 MG/DL — HIGH (ref 70–99)
GLUCOSE BLDC GLUCOMTR-MCNC: 164 MG/DL — HIGH (ref 70–99)
GLUCOSE BLDC GLUCOMTR-MCNC: 166 MG/DL — HIGH (ref 70–99)
GLUCOSE BLDC GLUCOMTR-MCNC: 169 MG/DL — HIGH (ref 70–99)

## 2025-05-19 PROCEDURE — 99232 SBSQ HOSP IP/OBS MODERATE 35: CPT

## 2025-05-19 RX ORDER — LEVETIRACETAM 10 MG/ML
250 INJECTION, SOLUTION INTRAVENOUS
Refills: 0 | Status: DISCONTINUED | OUTPATIENT
Start: 2025-05-20 | End: 2025-06-19

## 2025-05-19 RX ADMIN — INSULIN LISPRO 2: 100 INJECTION, SOLUTION INTRAVENOUS; SUBCUTANEOUS at 08:07

## 2025-05-19 RX ADMIN — MIDODRINE HYDROCHLORIDE 10 MILLIGRAM(S): 5 TABLET ORAL at 11:57

## 2025-05-19 RX ADMIN — INSULIN LISPRO 2: 100 INJECTION, SOLUTION INTRAVENOUS; SUBCUTANEOUS at 21:07

## 2025-05-19 RX ADMIN — NYSTATIN 1 APPLICATION(S): 100000 CREAM TOPICAL at 06:57

## 2025-05-19 RX ADMIN — Medication 1 APPLICATION(S): at 07:04

## 2025-05-19 RX ADMIN — NYSTATIN 1 APPLICATION(S): 100000 CREAM TOPICAL at 17:57

## 2025-05-19 RX ADMIN — MIDODRINE HYDROCHLORIDE 10 MILLIGRAM(S): 5 TABLET ORAL at 06:57

## 2025-05-19 RX ADMIN — LACTULOSE 20 GRAM(S): 10 SOLUTION ORAL at 21:10

## 2025-05-19 RX ADMIN — LEVETIRACETAM 250 MILLIGRAM(S): 10 INJECTION, SOLUTION INTRAVENOUS at 17:57

## 2025-05-19 RX ADMIN — LACTULOSE 20 GRAM(S): 10 SOLUTION ORAL at 11:57

## 2025-05-19 RX ADMIN — LEVETIRACETAM 250 MILLIGRAM(S): 10 INJECTION, SOLUTION INTRAVENOUS at 08:41

## 2025-05-19 RX ADMIN — Medication 100 MILLIGRAM(S): at 06:56

## 2025-05-19 RX ADMIN — BUMETANIDE 1 MILLIGRAM(S): 1 TABLET ORAL at 07:03

## 2025-05-19 RX ADMIN — Medication 2 TABLET(S): at 21:10

## 2025-05-19 RX ADMIN — Medication 3 MILLIGRAM(S): at 21:10

## 2025-05-19 RX ADMIN — INSULIN LISPRO 2: 100 INJECTION, SOLUTION INTRAVENOUS; SUBCUTANEOUS at 18:49

## 2025-05-19 RX ADMIN — LACTULOSE 20 GRAM(S): 10 SOLUTION ORAL at 06:56

## 2025-05-19 RX ADMIN — LIDOCAINE HYDROCHLORIDE 1 PATCH: 20 JELLY TOPICAL at 11:57

## 2025-05-19 RX ADMIN — INSULIN LISPRO 2: 100 INJECTION, SOLUTION INTRAVENOUS; SUBCUTANEOUS at 11:59

## 2025-05-19 RX ADMIN — FOLIC ACID 1 MILLIGRAM(S): 1 TABLET ORAL at 11:57

## 2025-05-19 RX ADMIN — Medication 40 MILLIGRAM(S): at 12:07

## 2025-05-19 RX ADMIN — Medication 1 APPLICATION(S): at 06:57

## 2025-05-19 RX ADMIN — Medication 1 APPLICATION(S): at 17:57

## 2025-05-19 RX ADMIN — MIDODRINE HYDROCHLORIDE 10 MILLIGRAM(S): 5 TABLET ORAL at 21:10

## 2025-05-19 NOTE — PROGRESS NOTE ADULT - SUBJECTIVE AND OBJECTIVE BOX
DWIGHT RIBEIRO 80y Male  MRN#: [id number]   Hospital Day: 35d    SUBJECTIVE  Patient is a 80y old Male who presents with a chief complaint of acute mentation changes (18 May 2025 10:03)  Currently admitted to medicine with the primary diagnosis of Metabolic encephalopathy      INTERVAL HPI AND OVERNIGHT EVENTS:  Patient was examined and seen at bedside. This morning he is resting comfortably in bed and "Raquel got all kinds of porblems, for one i cant get my pants off" (of note patient not wearing pants, gown only which is open and only covering the top of his chest. When this was pointed out to patient he said "i know but i still need help with them."     REVIEW OF SYMPTOMS:  very poor historian unable to obtain at this time   when asked he said "Well Jairo keeps telling me im fine so go figure."     OBJECTIVE  PAST MEDICAL & SURGICAL HISTORY  Diabetes    Hypertension    Fall    H/O left knee surgery    History of cholecystectomy    History of appendectomy      ALLERGIES:  No Known Allergies    MEDICATIONS:  STANDING MEDICATIONS  buMETAnide 1 milliGRAM(s) Oral daily  chlorhexidine 2% Cloths 1 Application(s) Topical <User Schedule>  dextrose 5%. 1000 milliLiter(s) IV Continuous <Continuous>  dextrose 5%. 1000 milliLiter(s) IV Continuous <Continuous>  dextrose 50% Injectable 25 Gram(s) IV Push once  dextrose 50% Injectable 12.5 Gram(s) IV Push once  dextrose 50% Injectable 25 Gram(s) IV Push once  folic acid 1 milliGRAM(s) Oral daily  glucagon  Injectable 1 milliGRAM(s) IntraMuscular once  insulin lispro (ADMELOG) corrective regimen sliding scale   SubCutaneous Before meals and at bedtime  lactulose Syrup 20 Gram(s) Oral every 8 hours  levETIRAcetam   Injectable 250 milliGRAM(s) IV Push <User Schedule>  lidocaine   4% Patch 1 Patch Transdermal daily  midodrine 10 milliGRAM(s) Oral every 8 hours  nystatin Powder 1 Application(s) Topical two times a day  pantoprazole  Injectable 40 milliGRAM(s) IV Push daily  rifAXIMin 550 milliGRAM(s) Oral two times a day  senna 2 Tablet(s) Oral at bedtime  silver sulfADIAZINE 1% Cream 1 Application(s) Topical two times a day  spironolactone 100 milliGRAM(s) Oral daily    PRN MEDICATIONS  acetaminophen     Tablet .. 650 milliGRAM(s) Oral every 6 hours PRN  aluminum hydroxide/magnesium hydroxide/simethicone Suspension 30 milliLiter(s) Oral every 4 hours PRN  dextrose Oral Gel 15 Gram(s) Oral once PRN  melatonin 3 milliGRAM(s) Oral at bedtime PRN  ondansetron Injectable 4 milliGRAM(s) IV Push every 8 hours PRN      VITAL SIGNS: Last 24 Hours  T(C): 36.7 (19 May 2025 05:08), Max: 36.7 (18 May 2025 14:31)  T(F): 98.1 (19 May 2025 05:08), Max: 98.1 (19 May 2025 05:08)  HR: 91 (19 May 2025 05:08) (88 - 93)  BP: 112/69 (19 May 2025 05:08) (112/69 - 144/74)  BP(mean): --  RR: 18 (19 May 2025 05:08) (18 - 18)  SpO2: 100% (19 May 2025 05:08) (96% - 100%)    LABS:                        RADIOLOGY:      PHYSICAL EXAM:  CONSTITUTIONAL: No acute distress, well-groomed, oriented to name only   HEAD: Atraumatic, normocephalic  EYES: PERRLA, conjunctiva and sclera clear  ENT: Supple, no bruits, no JVD; moist mucous membranes  PULMONARY: Clear to auscultation bilaterally; no wheezes, rales, or rhonchi  CARDIOVASCULAR: Regular rate and rhythm; no murmurs, rubs, or gallops  GASTROINTESTINAL: Soft, non-tender, non-distended; bowel sounds present  MUSCULOSKELETAL: 2+ peripheral pulses; no clubbing, no cyanosis, +1 anasarca   NEUROLOGY: no tremor, no saccades  SKIN: No rashes or lesions; warm and dry

## 2025-05-19 NOTE — ASU PREOP CHECKLIST - WEIGHT IN LBS
Status post cardioversion.  He is maintaining sinus rhythm.    Okay for discharge this afternoon.  Will stop digoxin.    Reduce atenolol to 25 mg twice daily continue anticoagulation.    Follow-up in 2 weeks    
250

## 2025-05-19 NOTE — PROGRESS NOTE ADULT - ASSESSMENT
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 elevated   -hold opioids   -mentation improving  -portal hypertension  -Coagulopathy  -Hypoalbuminemia   -on rifaximin, lactulose  -s/p albumin given diffuse edema on 4/30, 5/2, 5/8, 5/9, 5/12  pt is remains edematous, cont  spironolactone  titrate up to 100mg  and as per discussion with hepatology,    c/w Bumex 1mg PO qd  abdominal US:.  Nodular liver contour consistent with liver cirrhosis. 2.  Post-cholecystectomy. 3.  Severe abdominal ascites.  4.  Right pleural effusion.  -cxr does not show an effusion , shows non specific right opacity    KUB shows :Nonspecific air distended loops of bowel.  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    s/p albumin IV multiple times   -strict I&O    #Anemia  -likely multifactorial: sepsis, cirrhosis, poor nutrition, GI bleeding  -s/p EGD on 4/16 which showed 5mm ulcer in duodenal bulb post endo clip, repeat EGD on 4/29 showed no bleeding noted  -will monitor for GI bleeding   transfused one unit of PRBC on 5/3  -Hb stable , cont to monitor     #Afib  -found during this admission  -rate controlled, not candidate for AC given anemia, coagulopathy    #Deconditioning  -PT/OT   -OOB    #Sacral pressure ulcer  -local wound care  -rotate the patient q2hrs     #hypocalcemia:  albumin is 1.7, corrected calcium is 9.1   given albumin     #hypokalemia  replaced with iv potassium   f/u level in am remain low at 3.7    SVT PPX: SCD due to coagulopathy, risk of Gi bleed /anemia     dispo: Overall very poor prognosis      DVT ppx -   pending -   prepare for DC order placed __, anticipate DC in __ hours  Floor status -     I personally reviewed labs and imaging and ordered necessary testing/medications  I discussed care of the patient with licensed providers  I personally reviewed chart and consultant recommendations  Pt has complex medical issues that require extensive diagnosis and intervention / threat to life           Mr Mendoza is a 80 YOM w a PMH of 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 elevated   -hold opioids   -mentation improving  -portal hypertension  -Coagulopathy  -Hypoalbuminemia   -on rifaximin, lactulose  -s/p albumin given diffuse edema on 4/30, 5/2, 5/8, 5/9, 5/12  pt is remains edematous, cont  spironolactone  titrate up to 100mg  and as per discussion with hepatology,    c/w Bumex 1mg PO qd  abdominal US:.  Nodular liver contour consistent with liver cirrhosis. 2.  Post-cholecystectomy. 3.  Severe abdominal ascites.  4.  Right pleural effusion.  -cxr does not show an effusion , shows non specific right opacity    KUB shows :Nonspecific air distended loops of bowel.  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    s/p albumin IV multiple times   -strict I&O    #Anemia  -likely multifactorial: sepsis, cirrhosis, poor nutrition, GI bleeding  -s/p EGD on 4/16 which showed 5mm ulcer in duodenal bulb post endo clip, repeat EGD on 4/29 showed no bleeding noted  -will monitor for GI bleeding   transfused one unit of PRBC on 5/3  -Hb stable , cont to monitor     #Afib  -found during this admission  -rate controlled, not candidate for AC given anemia, coagulopathy    #Deconditioning  -PT/OT   -OOB    #Sacral pressure ulcer  -local wound care  -rotate the patient q2hrs     #hypocalcemia:  albumin is 1.7, corrected calcium is 9.1   given albumin     #hypokalemia  replaced with iv potassium   f/u level in am remain low at 3.7    SVT PPX: SCD due to coagulopathy, risk of Gi bleed /anemia     dispo: Overall very poor prognosis, ANTICIPATED FOR 5/20     I personally reviewed labs and imaging and ordered necessary testing/medications  I discussed care of the patient with licensed providers  I personally reviewed chart and consultant recommendations  Pt has complex medical issues that require extensive diagnosis and intervention / threat to life    ---PB Handoff Note---    Pending/Follow up items (tests, labs, procedures,consults): PLACEMENT    Indication for continued inpatient management: NONE patient ready for DC       Family contact:  Dispo (home, SNF, etc): STR    Prepare for DC order [x] - updated CROW is: 5/20/25   DC provider note prep:    -------------------------------Time spent-----------------------------------------------------------------------  Initial visit:             mins [ ] -- 55-74 mins [ ]  Subsequent visit: 50-79 mins[ ]    -- 35-49mins [ ]     --------------------------------# and complexity of problems---------------------------------------------  [x ] chronic illness with severe exacerbation , progression, treatment side effect  [ ] acute or chronic illness with threat to life or bodily funtion                         --------------------------------Complexity of data reviewed ----------------------------------------------  The Patients complexity of data reviewed  is Low [x ] Moderate [ ] High [ ] due to the following:   A:      Reviewed prior external records [ ]      Considering/ Ordered a unique test:  Labs [x ] Imaging [x ] Stress Test  [ ] Other: Specify [ ]      Reviewed each unique test result [x ]      Assessment requiring an independent historian [ ]  B:       Independent interpretation of:   X-Ray [ ] EKG [ ]  Other: Specify  [ ]  C:       Discussion of management of tests with clinician outside of my group [ ]    -------------------------------------Risk of Morbidity-------------------------------------------------------  The Patients risk of morbidity is Low [ ] Moderate [ ] High [ ] due to the following:   Mod:  Prescription Drug Management [ ]  Decision regarding elective or emergent: minor surgery [ ] major surgery [ ]  Decision regarding hospitalization or escalation of hospital-level care [ ]  SDOH: Hearing/Vision impaired [ ] Food insecurity [ ] Homelessness/unsafe condition [ ]   Financial strain [ ] un/underemployed [ ] Lack of Transport [ ]  High:  DNR or De-Escalation of care because of poor prognosis [ ]  Drug Therapy requiring intensive monitoring for toxicity [ ]  Parenteral controlled substance [ ]  ------------------------------------------------------------------------------------------------------------------

## 2025-05-20 ENCOUNTER — TRANSCRIPTION ENCOUNTER (OUTPATIENT)
Age: 80
End: 2025-05-20

## 2025-05-20 LAB
GLUCOSE BLDC GLUCOMTR-MCNC: 122 MG/DL — HIGH (ref 70–99)
GLUCOSE BLDC GLUCOMTR-MCNC: 133 MG/DL — HIGH (ref 70–99)
GLUCOSE BLDC GLUCOMTR-MCNC: 146 MG/DL — HIGH (ref 70–99)
GLUCOSE BLDC GLUCOMTR-MCNC: 157 MG/DL — HIGH (ref 70–99)

## 2025-05-20 PROCEDURE — 99233 SBSQ HOSP IP/OBS HIGH 50: CPT

## 2025-05-20 RX ORDER — SPIRONOLACTONE 25 MG
4 TABLET ORAL
Qty: 120 | Refills: 0
Start: 2025-05-20 | End: 2025-06-18

## 2025-05-20 RX ORDER — SENNA 187 MG
2 TABLET ORAL
Qty: 60 | Refills: 0
Start: 2025-05-20 | End: 2025-06-18

## 2025-05-20 RX ORDER — BUMETANIDE 1 MG/1
1 TABLET ORAL
Qty: 30 | Refills: 0
Start: 2025-05-20 | End: 2025-06-18

## 2025-05-20 RX ADMIN — LEVETIRACETAM 250 MILLIGRAM(S): 10 INJECTION, SOLUTION INTRAVENOUS at 05:21

## 2025-05-20 RX ADMIN — NYSTATIN 1 APPLICATION(S): 100000 CREAM TOPICAL at 17:24

## 2025-05-20 RX ADMIN — LACTULOSE 20 GRAM(S): 10 SOLUTION ORAL at 11:34

## 2025-05-20 RX ADMIN — Medication 1 APPLICATION(S): at 05:22

## 2025-05-20 RX ADMIN — BUMETANIDE 1 MILLIGRAM(S): 1 TABLET ORAL at 05:20

## 2025-05-20 RX ADMIN — INSULIN LISPRO 2: 100 INJECTION, SOLUTION INTRAVENOUS; SUBCUTANEOUS at 17:24

## 2025-05-20 RX ADMIN — LACTULOSE 20 GRAM(S): 10 SOLUTION ORAL at 22:28

## 2025-05-20 RX ADMIN — LACTULOSE 20 GRAM(S): 10 SOLUTION ORAL at 05:20

## 2025-05-20 RX ADMIN — NYSTATIN 1 APPLICATION(S): 100000 CREAM TOPICAL at 05:21

## 2025-05-20 RX ADMIN — Medication 1 APPLICATION(S): at 17:27

## 2025-05-20 RX ADMIN — LEVETIRACETAM 250 MILLIGRAM(S): 10 INJECTION, SOLUTION INTRAVENOUS at 17:16

## 2025-05-20 RX ADMIN — Medication 2 TABLET(S): at 22:27

## 2025-05-20 RX ADMIN — LIDOCAINE HYDROCHLORIDE 1 PATCH: 20 JELLY TOPICAL at 11:35

## 2025-05-20 RX ADMIN — Medication 100 MILLIGRAM(S): at 05:19

## 2025-05-20 RX ADMIN — MIDODRINE HYDROCHLORIDE 10 MILLIGRAM(S): 5 TABLET ORAL at 11:34

## 2025-05-20 RX ADMIN — MIDODRINE HYDROCHLORIDE 10 MILLIGRAM(S): 5 TABLET ORAL at 05:20

## 2025-05-20 RX ADMIN — Medication 1 APPLICATION(S): at 05:21

## 2025-05-20 RX ADMIN — Medication 40 MILLIGRAM(S): at 11:33

## 2025-05-20 RX ADMIN — FOLIC ACID 1 MILLIGRAM(S): 1 TABLET ORAL at 11:34

## 2025-05-20 RX ADMIN — MIDODRINE HYDROCHLORIDE 10 MILLIGRAM(S): 5 TABLET ORAL at 22:28

## 2025-05-20 NOTE — DISCHARGE NOTE PROVIDER - NPI NUMBER (FOR SYSADMIN USE ONLY) :
[[id number]] [[id number]] [id number]]

## 2025-05-20 NOTE — DISCHARGE NOTE PROVIDER - NSDCCPCAREPLAN_GEN_ALL_CORE_FT
PRINCIPAL DISCHARGE DIAGNOSIS  Diagnosis: Metabolic encephalopathy  Assessment and Plan of Treatment: Mixed etiology including infection and liver cirrhosis. Managed medically and optimized. Continued rifaximin and lactulose. On diuretics for ascites fluid and welling. Blood given for anemia as inficated. Electrolytes were replenished.     PRINCIPAL DISCHARGE DIAGNOSIS  Diagnosis: Metabolic encephalopathy  Assessment and Plan of Treatment: Mixed etiology including infection and liver cirrhosis. Managed medically and optimized. Continued rifaximin and lactulose. On diuretics for ascites fluid and welling. Blood given for anemia as inficated. Electrolytes were replenished.      SECONDARY DISCHARGE DIAGNOSES  Diagnosis: Decompensated liver disease  Assessment and Plan of Treatment: Decompensated cirrhosis, continue rifaximin, lactulose,  seen by GI ,  follow up with hepatology as outpatient  need MR liver protocol as outpatient , Avoid smoking and NSAIDs, Abstain from alcohol and all illicit drugs      Diagnosis: Decubital ulcer  Assessment and Plan of Treatment: -s/p debridement on 6/10, Bacteremia with bacteroides , repeat negative, s/p ceftriaxone and flagyl, seen by  ID, to be discharged on  PO doxycycline 100mg q12hrs and PO Augmentin 875mg q12hrs to complete 14-day (until 6/24)  continue local wound care - Clean sacrum and b/l buttock with soap and water  , pat dry  apply  silvadene dressing    Diagnosis: Anemia of chronic disease  Assessment and Plan of Treatment: Macrocytic Anemia:  multifactorial: sepsis, cirrhosis, poor nutrition, GI bleeding ,s/p EGD on 4/16 which showed 5mm ulcer in duodenal bulb post endo clip, repeat EGD on 4/29 showed no bleeding noted   transfused one unit of PRBC on 5/3, cw PPI       Diagnosis: New onset a-fib  Assessment and Plan of Treatment: rate controlled, you are not candidate for AC given anemia, coagulopathy in setting of cirrhosis     PRINCIPAL DISCHARGE DIAGNOSIS  Diagnosis: Metabolic encephalopathy  Assessment and Plan of Treatment: Mixed etiology including infection and liver cirrhosis. Managed medically and optimized. Continued rifaximin and lactulose. On diuretics for ascites fluid and welling. Blood given for anemia as inficated. Electrolytes were replenished.  You will benefit from long term health aids after discharge from your rehab facility if you continue to be unable to walk, are unable to manage toiletting activities, need continued would care, or require other aides to be present for symptom management.  Please discuss with the rehab facility regarding home health aides prior to leaving the rehab facility.      SECONDARY DISCHARGE DIAGNOSES  Diagnosis: Decompensated liver disease  Assessment and Plan of Treatment: Decompensated cirrhosis, continue rifaximin, lactulose, seen by GI ,  follow up with hepatology as outpatient  need MR liver protocol as outpatient , Avoid smoking and NSAIDs, Abstain from alcohol and all illicit drugs      Diagnosis: Decubital ulcer  Assessment and Plan of Treatment: -s/p debridement on 6/10, Bacteremia with bacteroides , repeat negative, s/p ceftriaxone and flagyl, seen by  ID, to be discharged on  PO doxycycline 100mg q12hrs and PO Augmentin 875mg q12hrs to complete 14-day (until 6/24)  continue local wound care - Clean sacrum and b/l buttock with soap and water , pat dry apply ilvadene dressing    Diagnosis: Anemia of chronic disease  Assessment and Plan of Treatment: Macrocytic Anemia:  multifactorial: sepsis, cirrhosis, poor nutrition, GI bleeding ,s/p EGD on 4/16 which showed 5mm ulcer in duodenal bulb post endo clip, repeat EGD on 4/29 showed no bleeding noted   transfused one unit of PRBC on 5/3, cw PPI.  Recommend repeating CBC as outpatient if you are noted to be more fatigued or short of breath that than baseline to ensure that anemia has not worsened.      Diagnosis: New onset a-fib  Assessment and Plan of Treatment: rate controlled, you are not candidate for anticoagulation given anemia and coagulopathy in setting of cirrhosis

## 2025-05-20 NOTE — PROGRESS NOTE ADULT - SUBJECTIVE AND OBJECTIVE BOX
DWIGHT RIBEIRO 80y Male  MRN#: [id number]   Hospital Day: 36d    SUBJECTIVE  Patient is a 80y old Male who presents with a chief complaint of acute mentation changes (19 May 2025 14:24)  Currently admitted to medicine with the primary diagnosis of Metabolic encephalopathy      INTERVAL HPI AND OVERNIGHT EVENTS:  Patient was examined and seen at bedside. This morning he is resting comfortably in bed and reports no issues or overnight events.    REVIEW OF SYMPTOMS:  poor historian unable to obtain     OBJECTIVE  PAST MEDICAL & SURGICAL HISTORY  Diabetes    Hypertension    Fall    H/O left knee surgery    History of cholecystectomy    History of appendectomy      ALLERGIES:  No Known Allergies    MEDICATIONS:  STANDING MEDICATIONS  buMETAnide 1 milliGRAM(s) Oral daily  chlorhexidine 2% Cloths 1 Application(s) Topical <User Schedule>  dextrose 5%. 1000 milliLiter(s) IV Continuous <Continuous>  dextrose 5%. 1000 milliLiter(s) IV Continuous <Continuous>  dextrose 50% Injectable 25 Gram(s) IV Push once  dextrose 50% Injectable 12.5 Gram(s) IV Push once  dextrose 50% Injectable 25 Gram(s) IV Push once  folic acid 1 milliGRAM(s) Oral daily  glucagon  Injectable 1 milliGRAM(s) IntraMuscular once  insulin lispro (ADMELOG) corrective regimen sliding scale   SubCutaneous Before meals and at bedtime  lactulose Syrup 20 Gram(s) Oral every 8 hours  levETIRAcetam  Solution 250 milliGRAM(s) Oral two times a day  lidocaine   4% Patch 1 Patch Transdermal daily  midodrine 10 milliGRAM(s) Oral every 8 hours  nystatin Powder 1 Application(s) Topical two times a day  pantoprazole  Injectable 40 milliGRAM(s) IV Push daily  rifAXIMin 550 milliGRAM(s) Oral two times a day  senna 2 Tablet(s) Oral at bedtime  silver sulfADIAZINE 1% Cream 1 Application(s) Topical two times a day  spironolactone 100 milliGRAM(s) Oral daily    PRN MEDICATIONS  acetaminophen     Tablet .. 650 milliGRAM(s) Oral every 6 hours PRN  aluminum hydroxide/magnesium hydroxide/simethicone Suspension 30 milliLiter(s) Oral every 4 hours PRN  dextrose Oral Gel 15 Gram(s) Oral once PRN  melatonin 3 milliGRAM(s) Oral at bedtime PRN  ondansetron Injectable 4 milliGRAM(s) IV Push every 8 hours PRN      VITAL SIGNS: Last 24 Hours  T(C): 36.7 (20 May 2025 04:43), Max: 36.7 (20 May 2025 04:43)  T(F): 98 (20 May 2025 04:43), Max: 98 (20 May 2025 04:43)  HR: 60 (20 May 2025 04:43) (60 - 91)  BP: 125/67 (20 May 2025 04:43) (106/54 - 146/78)  BP(mean): --  RR: 18 (19 May 2025 20:02) (18 - 18)  SpO2: 100% (20 May 2025 04:43) (96% - 100%)    LABS:                        RADIOLOGY:      PHYSICAL EXAM:  CONSTITUTIONAL: No acute distress, well-developed, well-groomed, AAOx3  HEAD: Atraumatic, normocephalic  EYES: EOM intact, PERRLA, conjunctiva and sclera clear  ENT: Supple, no masses, no thyromegaly, no bruits, no JVD; moist mucous membranes  PULMONARY: Clear to auscultation bilaterally; no wheezes, rales, or rhonchi  CARDIOVASCULAR: Regular rate and rhythm; no murmurs, rubs, or gallops  GASTROINTESTINAL: Soft, non-tender, non-distended; bowel sounds present  MUSCULOSKELETAL: 2+ peripheral pulses; no clubbing, no cyanosis, no edema  NEUROLOGY: non-focal  SKIN: No rashes or lesions; warm and dry

## 2025-05-20 NOTE — DISCHARGE NOTE PROVIDER - CARE PROVIDERS DIRECT ADDRESSES
,DirectAddress_Unknown ,DirectAddress_Unknown,DirectAddress_Unknown ,DirectAddress_Unknown,DirectAddress_Unknown,DirectAddress_Unknown,[email or web address],DirectAddress_Unknown,[email or web address]

## 2025-05-20 NOTE — PROGRESS NOTE ADULT - ASSESSMENT
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 elevated   -hold opioids   -mentation improving  -portal hypertension  -Coagulopathy  -Hypoalbuminemia   -on rifaximin, lactulose  -s/p albumin given diffuse edema on 4/30, 5/2, 5/8, 5/9, 5/12  pt is remains edematous, cont  spironolactone  titrate up to 100mg  and as per discussion with hepatology,    c/w Bumex 1mg PO qd  abdominal US:.  Nodular liver contour consistent with liver cirrhosis. 2.  Post-cholecystectomy. 3.  Severe abdominal ascites.  4.  Right pleural effusion.  -cxr does not show an effusion , shows non specific right opacity    KUB shows :Nonspecific air distended loops of bowel.  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    s/p albumin IV multiple times   -strict I&O    #Anemia  -likely multifactorial: sepsis, cirrhosis, poor nutrition, GI bleeding  -s/p EGD on 4/16 which showed 5mm ulcer in duodenal bulb post endo clip, repeat EGD on 4/29 showed no bleeding noted  -will monitor for GI bleeding   transfused one unit of PRBC on 5/3  -Hb stable , cont to monitor     #Afib  -found during this admission  -rate controlled, not candidate for AC given anemia, coagulopathy    #Deconditioning  -PT/OT   -OOB    #Sacral pressure ulcer  -local wound care  -rotate the patient q2hrs     #hypocalcemia:  albumin is 1.7, corrected calcium is 9.1   given albumin     #hypokalemia  replaced with iv potassium   f/u level in am remain low at 3.7    SVT PPX: SCD due to coagulopathy, risk of Gi bleed /anemia     dispo: Overall very poor prognosis, ANTICIPATED FOR 5/20     I personally reviewed labs and imaging and ordered necessary testing/medications  I discussed care of the patient with licensed providers  I personally reviewed chart and consultant recommendations  Pt has complex medical issues that require extensive diagnosis and intervention / threat to life    ---PB Handoff Note---    Pending/Follow up items (tests, labs, procedures,consults): PLACEMENT    Indication for continued inpatient management: NONE patient ready for DC       Family contact:  Dispo (home, SNF, etc): STR    Prepare for DC order [x] - updated CROW is: 5/20/25   DC provider note prep:    -------------------------------Time spent-----------------------------------------------------------------------  Initial visit:             mins [ ] -- 55-74 mins [ ]  Subsequent visit: 50-79 mins[ ]    -- 35-49mins [ ]     --------------------------------# and complexity of problems---------------------------------------------  [x ] chronic illness with severe exacerbation , progression, treatment side effect  [ ] acute or chronic illness with threat to life or bodily funtion                         --------------------------------Complexity of data reviewed ----------------------------------------------  The Patients complexity of data reviewed  is Low [x ] Moderate [ ] High [ ] due to the following:   A:      Reviewed prior external records [ ]      Considering/ Ordered a unique test:  Labs [x ] Imaging [x ] Stress Test  [ ] Other: Specify [ ]      Reviewed each unique test result [x ]      Assessment requiring an independent historian [ ]  B:       Independent interpretation of:   X-Ray [ ] EKG [ ]  Other: Specify  [ ]  C:       Discussion of management of tests with clinician outside of my group [ ]    -------------------------------------Risk of Morbidity-------------------------------------------------------  The Patients risk of morbidity is Low [ ] Moderate [ ] High [ ] due to the following:   Mod:  Prescription Drug Management [ ]  Decision regarding elective or emergent: minor surgery [ ] major surgery [ ]  Decision regarding hospitalization or escalation of hospital-level care [ ]  SDOH: Hearing/Vision impaired [ ] Food insecurity [ ] Homelessness/unsafe condition [ ]   Financial strain [ ] un/underemployed [ ] Lack of Transport [ ]  High:  DNR or De-Escalation of care because of poor prognosis [ ]  Drug Therapy requiring intensive monitoring for toxicity [ ]  Parenteral controlled substance [ ]  ------------------------------------------------------------------------------------------------------------------

## 2025-05-20 NOTE — DISCHARGE NOTE PROVIDER - INSTRUCTIONS
Supplement Feeding Modality:  Oral  Glucerna Shake Cans or Servings Per Day:  1       Frequency:  Two Times a day

## 2025-05-20 NOTE — DISCHARGE NOTE PROVIDER - NSDCFUSCHEDAPPT_GEN_ALL_CORE_FT
Karli Rosario  [facility]  Scheduled Appointment: 08/13/2025    [facility]  [facility]  [place]   Scheduled Appointment: 08/13/2025

## 2025-05-20 NOTE — DISCHARGE NOTE PROVIDER - NSDCMRMEDTOKEN_GEN_ALL_CORE_FT
acetaminophen 500 mg oral tablet: 2 tab(s) orally every 8 hours  aluminum hydroxide-magnesium hydroxide 200 mg-200 mg/5 mL oral suspension: 30 milliliter(s) orally every 4 hours As needed Dyspepsia  amLODIPine 5 mg oral tablet: 1 tab(s) orally once a day  aspirin 81 mg oral delayed release tablet: 1 tab(s) orally once a day  atorvastatin 80 mg oral tablet: 1 tab(s) orally once a day (at bedtime)  ceFAZolin 2 g intravenous injection: 2 gram(s) intravenous every 8 hours End date is 3/9/2025  folic acid 1 mg oral tablet: 1 tab(s) orally once a day  gabapentin 100 mg oral capsule: 3 cap(s) orally 2 times a day  gabapentin 100 mg oral capsule: 1 cap(s) orally 2 times a day  Keppra 250 mg oral tablet: 2 tab(s) orally 2 times a day  Lasix 20 mg oral tablet: 1 tab(s) orally once a day  lidocaine 4% topical film: Apply topically to affected area every 24 hours Lower back  losartan 50 mg oral tablet: 0.5 tab(s) orally 2 times a day  metFORMIN 500 mg oral tablet: 500 milligram(s) orally 2 times a day  morphine: 2 milligram(s) intravenous every 8 hours as needed for  severe pain  nystatin 100,000 units/g topical powder: 1 Apply topically to affected area every 12 hours As needed fungal rash  Osteo Bi-Flex 250 mg-200 mg oral tablet: orally once a day  oxyCODONE 10 mg oral tablet: 1 tab(s) orally 3 times a day As needed Severe Pain (7 - 10)  polyethylene glycol 3350 oral powder for reconstitution: 17 gram(s) orally 2 times a day  QUEtiapine 50 mg oral tablet: 1 tab(s) orally 2 times a day  senna leaf extract oral tablet: 2 tab(s) orally once a day (at bedtime)  Tresiba FlexTouch: 60 unit(s) subcutaneous once a day   acetaminophen 500 mg oral tablet: 2 tab(s) orally every 8 hours  aluminum hydroxide-magnesium hydroxide 200 mg-200 mg/5 mL oral suspension: 30 milliliter(s) orally every 4 hours As needed Dyspepsia  amLODIPine 5 mg oral tablet: 1 tab(s) orally once a day  aspirin 81 mg oral delayed release tablet: 1 tab(s) orally once a day  atorvastatin 80 mg oral tablet: 1 tab(s) orally once a day (at bedtime)  bumetanide 1 mg oral tablet: 1 tab(s) orally once a day  ceFAZolin 2 g intravenous injection: 2 gram(s) intravenous every 8 hours End date is 3/9/2025  folic acid 1 mg oral tablet: 1 tab(s) orally once a day  gabapentin 100 mg oral capsule: 3 cap(s) orally 2 times a day  gabapentin 100 mg oral capsule: 1 cap(s) orally 2 times a day  Keppra 250 mg oral tablet: 2 tab(s) orally 2 times a day  Lasix 20 mg oral tablet: 1 tab(s) orally once a day  lidocaine 4% topical film: Apply topically to affected area every 24 hours Lower back  losartan 50 mg oral tablet: 0.5 tab(s) orally 2 times a day  metFORMIN 500 mg oral tablet: 500 milligram(s) orally 2 times a day  morphine: 2 milligram(s) intravenous every 8 hours as needed for  severe pain  nystatin 100,000 units/g topical powder: 1 Apply topically to affected area every 12 hours As needed fungal rash  Osteo Bi-Flex 250 mg-200 mg oral tablet: orally once a day  oxyCODONE 10 mg oral tablet: 1 tab(s) orally 3 times a day As needed Severe Pain (7 - 10)  polyethylene glycol 3350 oral powder for reconstitution: 17 gram(s) orally 2 times a day  QUEtiapine 50 mg oral tablet: 1 tab(s) orally 2 times a day  senna leaf extract oral tablet: 2 tab(s) orally once a day (at bedtime)  senna leaf extract oral tablet: 2 tab(s) orally once a day (at bedtime)  spironolactone 25 mg oral tablet: 4 tab(s) orally once a day  Tresiba FlexTouch: 60 unit(s) subcutaneous once a day   amoxicillin-clavulanate 875 mg-125 mg oral tablet: 1 tab(s) orally 2 times a day End of therapy 6/24/25  aspirin 81 mg oral delayed release tablet: 1 tab(s) orally once a day  bumetanide 1 mg oral tablet: 1 tab(s) orally once a day  doxycycline monohydrate 150 mg oral tablet: 1 tab(s) orally 2 times a day End of therapy 6/24/25  folic acid 1 mg oral tablet: 1 tab(s) orally once a day  lactulose 10 g/15 mL oral syrup: 45 milliliter(s) orally 3 times a day  levETIRAcetam 100 mg/mL oral solution: 2.5 milliliter(s) orally 2 times a day  metFORMIN 500 mg oral tablet: 500 milligram(s) orally 2 times a day  midodrine 5 mg oral tablet: 1 tab(s) orally every 8 hours  nystatin 100,000 units/g topical powder: 1 Apply topically to affected area every 12 hours As needed fungal rash  oxyCODONE 5 mg oral tablet: 1 tab(s) orally once a day as needed for Wound dressing change  potassium chloride 20 mEq oral tablet, extended release: 1 tab(s) orally once a day  rifAXIMin 550 mg oral tablet: 1 tab(s) orally 2 times a day  saccharomyces boulardii lyo 250 mg oral capsule: 1 cap(s) orally 2 times a day  senna leaf extract oral tablet: 2 tab(s) orally once a day (at bedtime)  spironolactone 25 mg oral tablet: 2 tab(s) orally once a day  Tresiba FlexTouch 100 units/mL subcutaneous solution: 15 unit(s) subcutaneous once a day (at bedtime)

## 2025-05-20 NOTE — DISCHARGE NOTE PROVIDER - HOSPITAL COURSE
Mr Mendoza is a 80 YOM w a PMH of SDH (on keppra), DM II, HTN, h/o lumbar spine osteomyelitis, presenting with altered mental status      #Fever  #Bacteremia  Blood culture positive for bacteroides on presentation. Was placed on ceftriaxone and flagyl. Repeat blood culture negative. Urine cx growing yeast, likely a contaminant   fever resolved and wbc wnl. Discussed with ID and monitored off antibiotics thereafter.     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 Intermittently lethargic and delirious. Ammonia level 39 (not sig. elevated). Continued on rifaximin, lactulose. S/p albumin given diffuse edema on 4/30, 5/2, 5/8, 5/9, 5/12. Pt remained edematous. Continued spironolactone titrated up to 100mg and Bumex 1mg PO qd per hepatology.     #Anemia  Likely multifactorial in setting of sepsis, cirrhosis, poor nutrition, GI bleeding. S/p EGD on 4/16 which showed 5mm ulcer in duodenal bulb post endo clip, repeat EGD on 4/29 showed no bleeding noted. Was transfused one unit of PRBC on 5/3. Hgb stable, cont to monitor.    #Afib  Found during this admission. Rate controlled, not candidate for AC given anemia, coagulopathy    #Deconditioning  -PT/OT  recommended for STR. Encouraged OOB    #Sacral pressure ulcer  Provided local wound care. Rotated q2hrs     #hypocalcemia:  Albumin is 1.7, corrected calcium is 9.1. Given albumin as above     #hypokalemia  Replaced with iv potassium      81 yo male with h/o SDH, lumbar spine OM s/p abx initially admitted with AMS. Found to have bacteremia, sepsis and cirrhosis. Pt was treated with abx for bacteremia. Diuresis for anasarca and supportive treatment for cirrhosis. Pt also has decubitus ulcer, s/p bedside debridement and local wound care. Currently, pt was stable clinically, pending dispo.     Decompensated cirrhosis  -continue rifaximin, lactulose, bumex, seen by GI , to follow up with hepatology as outpatient  need MR liver protocol as outpatient   -Lifestyle/Dietary modifications: Calorie intake 25-40 Kcal/kg/day; Protein intake: 1.2-1.5 gm/kg/day  -Avoid smoking and NSAIDs  -Abstain from alcohol and all illicit drugs  -Continue PPI therapy   -If recurrent overt GI bleeding or drop in Hb would consider VCE      Anasarca  -2/2 hypoalbuminemia  -swelling has improved after diuresis  -reduce bumex and spironolactone dose    Sacral decubitus ulcer  -s/p debridement on 6/10  Bacteremia with bacteroides   -blood culture positive for bacteroides on presentation  -s/p ceftriaxone and flagyl  -repeat blood culture negative  -continue local wound care - Clean sacrum and b/l buttock with soap and water  , pat dry  apply  silvadene dressing   Pressure  injury  preventive  measures  -short duration of abx per ID, to be discharged on discharge switch to PO doxycycline 100mg q12hrs and PO Augmentin 875mg q12hrs to complete 14-day (until 6/24)    Lumbar OM  -s/p abx treatment  -galium scan was obtained, no abnormal uptake in the lumbar spine      Macrocytic Anemia:  multifactorial: sepsis, cirrhosis, poor nutrition, GI bleeding  -s/p EGD on 4/16 which showed 5mm ulcer in duodenal bulb post endo clip, repeat EGD on 4/29 showed no bleeding noted   transfused one unit of PRBC on 5/3    New Onset Afib  -found during this admission  -rate controlled, not candidate for AC given anemia, coagulopathy in setting of cirrhosis, pt is a poor candidate for AC     Dispo [place]    Admission HPI:  80 years old male history of hypertension, diabetes, subdural hematoma on Keppra, hx mechanical falls, recent admission (January 2025) for osteomyelitis of L4-5 w completion of abx.  BIBA from home status post change of mental status.  Per provider note, patient was discharged from rehab facility on April 4.  Patient was supposed to have MRI of lower back on April 5 but patient refused.  Patient baseline uses walker to ambulate.  By report patient has been doing okay since his discharge from the facility.  Patient had 1 episode of fall on his buttocks few days ago that she was able to help patient to get up with no difficulty.  Patient become confused and calling for his father tonight over the past few hours so she called EMS.  No ROS given mental status, no family at bedside.    Hospital course:  Patient had prolonged hospitalization in the ICU due to cirrhosis with eventual downgrade to medical floor on midodrine for chronic hypotension.  His cirrhosis was thought to be due to multiple etiologies including metabolic dysfunction associated steatotic liver disease and outpatient use of a macrolide which induced hepatitis.  He was placed on bumex and spironolactone with improvement in ascites.  He was placed on lactulose and rifaximin for elevated ammonia with improvement in his mental status.  The patient unfortunately developed a worsening sacral decubitus ulcer due to his prolonged illness and underwent debridement for this by general surgery.  He was placed on a 14 day course of antibiotics for this.  He is medically stable for discharge to rehab to follow up with his outpatient providers.    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  Post-cholecystectomy.   3.  Severe abdominal ascites.4.  Right pleural effusion. 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   -s/p albumin given diffuse edema on 4/30, 5/2, 5/8, 5/9, 5/12  -Bumex 1mg BID, Spironolactone 100mg QD  -Lactulose 30mg Q8H, Rifaximin 550mg BID    #Sacral decubitus ulcer stage IV with initial purulence noted  General surgery and infectious disease consulted  Bedside debridement 6/10  Wound care following  rotate the patient q2hrs  Oxycodone 5mg PRN daily to give prior to dressing changes  Augmentin 875mg twice daily end of therapy 6/24  Doxycyline 100mg twice daily end of therapy 6/24    #Ambulatory Dysfunction   #H/O Lumbar osteomyelitis in January 2025  #New osteomyelitis in L3-L4 region  -Completed Course of antibiotics at that time   -Now with Difficulty with ambulation   -MR Lumbar Spine w/wo IV Cont  -Since the previous lumbar spine MRI dated 1/29/2025:  1.  The findings of L4-5 discitis osteomyelitis have equivocally changed   the bone marrow edema/enhancement within the L4 and L5 vertebral bodies   has mildly decreased. However there is new mild vertebral body height   loss of L5, increased L5 superior endplate erosion, and increased L4-5 disc edema.  2.  New mild edema within the L3-4 intervertebral disc with faint erosions involving the opposing endplates suspicious for additional level   of discitis osteomyelitis.  3.  No evidence of epidural inflammation /  fluid collection.  4.  Mild paraspinal edema without abscess.  5.  Stable degenerative changes.  -ID Follow-up appreciated>>started on Daptomycin and Rocephin   discussed with family and ID on 5/28 - family learning toward gallium scan to see collection and if need be would proceed with IR biopsy  however in regards to the IR Biopsy of new L3-L4 OM, however poor candidate as pr intermittently does not follow- command   -was on IV daptomycin 900mg q24hrs,  on 5/24, trend qweekly, was also  ceftriaxone 2g q24hrs  - ESR/CRP pending - elevated  -gallium scan 5/30: Normal 24 hour postinjection whole body gallium images and SPECT images   over the abdomen.  Specifically, no abnormal gallium uptake in the lumbar spine is   determined to suspicious for active inflammation process.  -spoke with ID about this on 5/31, we will stop abx  -spoke with ID on 6/2, repeat MRI in 3 months, follow up with infectious disease outpatient to arrange this.  Patient and family may also follow up with [facility] or another healthcare facility if they desire for a second opinion, as was previously discussed with infectious disease.    #Bacteremia with bacteroides  -blood culture positive for bacteroides on presentation  -s/p ceftriaxone and flagyl  -repeat blood culture negative    #Macrocytic Anemia:  multifactorial: sepsis, cirrhosis, poor nutrition, GI bleeding  -s/p EGD on 4/16 which showed 5mm ulcer in duodenal bulb post endo clip, repeat EGD on 4/29 showed no bleeding noted   transfused one unit of PRBC on 5/3, -Hb stable , cont to monitor   -Keep Active T/S   -folate and b12 stable, chronic issue   Can consider checking vitamin D level and TSH    #New Onset paroxysmal Afib  -found during this admission  -rate controlled, not candidate for AC given anemia, coagulopathy in setting of cirrhosis, pt is a poor candidate for AC     #Hypokalemia - potassium chloride 20 meq QD  #Hypomagnesemia - magnesium oxide 400mg TID, magnesium gluconate 500mg QD

## 2025-05-20 NOTE — DISCHARGE NOTE PROVIDER - CARE PROVIDER_API CALL
Karli Rosario  Internal Medicine  [address] [place] [address]  Phone: [phone]  Fax: [phone]  Follow Up Time: Routine   Karli Rosario  Internal Medicine  [address] [place] [address]  Phone: [phone]  Fax: [phone]  Follow Up Time: Routine    MARLEEN ALMARAZ  Internal Medicine  [address]  [place] [address]  Phone: [phone]  Fax: [phone])-  Established Patient  Follow Up Time: 1-3 days   Karli Rosario  Internal Medicine  [address] [place] [address]  Phone: [phone]  Fax: [phone]  Follow Up Time: Routine    MARLEEN ALMARAZ  Internal Medicine  [address]  [place] [address]  Phone: ([phone]  Fax: ()-  Established Patient  Follow Up Time: 1-3 days    Nora Beck  Infectious Disease  [address] [place] [address]  Phone: [phone]  Fax: [phone]  Follow Up Time: 1 month    Lincoln Meyers  Surgery (General Surgery)  [address] [facility], [address]  [place] [address]  Phone: [phone]  Fax: [phone]  Follow Up Time: 1 month    Keven Calixto  Internal Medicine Hepatology  [address] [place] [address]  Phone: [phone]  Fax: [phone]  Follow Up Time: 1 month    Tulio Molina  Cardiovascular Disease  [address] [place] [address]  Phone: [phone]  Fax: [phone]  Follow Up Time: 1 month    Sebas Ernandez  Gastroenterology  [address] [place] [address]  Phone: [phone]  Fax: [phone]  Follow Up Time: 1 month    Janes Aguiar  Neurology  [address] [place] [address], [address]  [place] [address]  Phone: [phone]  Fax: [phone]  Follow Up Time: 2 months

## 2025-05-20 NOTE — DISCHARGE NOTE PROVIDER - DISCHARGE DIET
DASH Diet/Soft and Bite-Sized Diet/Consistent Carbohydrate Diabetic Diets/Nutrition Supplements DASH Diet/Low Sodium Diet/Soft and Bite-Sized Diet/Consistent Carbohydrate Diabetic Diets

## 2025-05-20 NOTE — CHART NOTE - NSCHARTNOTEFT_GEN_A_CORE
patient initially planned for DC however family only want [place], [facility] has no bed availability  family now saying patient drove himself to [facility] in Jan, was feeding himself, ambulating several steps on his own and had no bed sore (however then told me she had spent a lot of money on alavyn and topical creams)  family requesting neuro  neuro consulted and dc canceled.  unclear what baseline is (the story changed several times and son and mother disagreed about ambulatory status)

## 2025-05-20 NOTE — DISCHARGE NOTE PROVIDER - PROVIDER TOKENS
PROVIDER:[TOKEN:[[id number]],FOLLOWUP:[Routine]] PROVIDER:[TOKEN:[[id number]],FOLLOWUP:[Routine]],PROVIDER:[TOKEN:[[id number]],FOLLOWUP:[1-3 days],ESTABLISHEDPATIENT:[T]] PROVIDER:[TOKEN:[[id number]],FOLLOWUP:[Routine]],PROVIDER:[TOKEN:[[id number]],FOLLOWUP:[1-3 days],ESTABLISHEDPATIENT:[T]],PROVIDER:[TOKEN:[[id number]],FOLLOWUP:[1 month]],PROVIDER:[TOKEN:[[id number]],FOLLOWUP:[1 month]],PROVIDER:[TOKEN:[[id number]],FOLLOWUP:[1 month]],PROVIDER:[TOKEN:[[id number]],FOLLOWUP:[1 month]],PROVIDER:[TOKEN:[[id number]],FOLLOWUP:[1 month]],PROVIDER:[TOKEN:[[id number]],FOLLOWUP:[2 months]]

## 2025-05-20 NOTE — DISCHARGE NOTE PROVIDER - ATTENDING DISCHARGE PHYSICAL EXAMINATION:
CONSTITUTIONAL: No acute distress, well-groomed, oriented to name only   HEAD: Atraumatic, normocephalic  EYES: PERRLA, conjunctiva and sclera clear  ENT: Supple, no bruits, no JVD; moist mucous membranes  PULMONARY: Clear to auscultation bilaterally; no wheezes, rales, or rhonchi  CARDIOVASCULAR: Regular rate and rhythm; no murmurs, rubs, or gallops  GASTROINTESTINAL: Soft, non-tender, non-distended; bowel sounds present  MUSCULOSKELETAL: 2+ peripheral pulses; no clubbing, no cyanosis, +1 anasarca   NEUROLOGY: no tremor, no saccades  SKIN: No rashes or lesions; warm and dry   Vital Signs Last 24 Hrs  T(C): 36.7 (19 Jun 2025 13:12), Max: 36.7 (19 Jun 2025 13:12)  T(F): 98.1 (19 Jun 2025 13:12), Max: 98.1 (19 Jun 2025 13:12)  HR: 88 (19 Jun 2025 13:12) (84 - 88)  BP: 129/74 (19 Jun 2025 13:12) (115/66 - 129/74)  BP(mean): --  RR: 18 (19 Jun 2025 13:12) (18 - 18)  SpO2: 100% (19 Jun 2025 13:12) (100% - 100%)    Parameters below as of 19 Jun 2025 13:12  Patient On (Oxygen Delivery Method): room air    PHYSICAL EXAM:  GENERAL: NAD, sitting up in bed  PSYCH: no agitation, mildly confused but near baseline mentation, following commands, and to provide history regarding pain and symptoms  NERVOUS SYSTEM:  Alert, freely moving all extremities  PULMONARY: Clear to percussion bilaterally  CARDIOVASCULAR: Regular rate and rhythm  GI: Soft, Nontender, mildly distended though improved during hospital course  EXTREMITIES:  No cyanosis or edema  SKIN: No obvious rashes or lesions

## 2025-05-21 LAB — GLUCOSE BLDC GLUCOMTR-MCNC: 126 MG/DL — HIGH (ref 70–99)

## 2025-05-21 PROCEDURE — 99233 SBSQ HOSP IP/OBS HIGH 50: CPT | Mod: FS

## 2025-05-21 PROCEDURE — 99233 SBSQ HOSP IP/OBS HIGH 50: CPT

## 2025-05-21 RX ORDER — LORAZEPAM 4 MG/ML
1 VIAL (ML) INJECTION ONCE
Refills: 0 | Status: DISCONTINUED | OUTPATIENT
Start: 2025-05-21 | End: 2025-05-21

## 2025-05-21 RX ADMIN — Medication 40 MILLIGRAM(S): at 12:47

## 2025-05-21 RX ADMIN — LIDOCAINE HYDROCHLORIDE 1 PATCH: 20 JELLY TOPICAL at 19:00

## 2025-05-21 RX ADMIN — LIDOCAINE HYDROCHLORIDE 1 PATCH: 20 JELLY TOPICAL at 12:47

## 2025-05-21 RX ADMIN — Medication 1 APPLICATION(S): at 05:35

## 2025-05-21 RX ADMIN — Medication 2 TABLET(S): at 21:48

## 2025-05-21 RX ADMIN — Medication 1 APPLICATION(S): at 05:52

## 2025-05-21 RX ADMIN — MIDODRINE HYDROCHLORIDE 10 MILLIGRAM(S): 5 TABLET ORAL at 05:37

## 2025-05-21 RX ADMIN — LEVETIRACETAM 250 MILLIGRAM(S): 10 INJECTION, SOLUTION INTRAVENOUS at 05:37

## 2025-05-21 RX ADMIN — INSULIN LISPRO 2: 100 INJECTION, SOLUTION INTRAVENOUS; SUBCUTANEOUS at 21:59

## 2025-05-21 RX ADMIN — BUMETANIDE 1 MILLIGRAM(S): 1 TABLET ORAL at 05:36

## 2025-05-21 RX ADMIN — LEVETIRACETAM 250 MILLIGRAM(S): 10 INJECTION, SOLUTION INTRAVENOUS at 17:48

## 2025-05-21 RX ADMIN — Medication 1 APPLICATION(S): at 17:47

## 2025-05-21 RX ADMIN — Medication 100 MILLIGRAM(S): at 05:37

## 2025-05-21 RX ADMIN — LACTULOSE 20 GRAM(S): 10 SOLUTION ORAL at 21:48

## 2025-05-21 RX ADMIN — LACTULOSE 20 GRAM(S): 10 SOLUTION ORAL at 05:35

## 2025-05-21 RX ADMIN — NYSTATIN 1 APPLICATION(S): 100000 CREAM TOPICAL at 05:52

## 2025-05-21 RX ADMIN — NYSTATIN 1 APPLICATION(S): 100000 CREAM TOPICAL at 17:48

## 2025-05-21 RX ADMIN — LACTULOSE 20 GRAM(S): 10 SOLUTION ORAL at 13:18

## 2025-05-21 RX ADMIN — MIDODRINE HYDROCHLORIDE 10 MILLIGRAM(S): 5 TABLET ORAL at 13:18

## 2025-05-21 RX ADMIN — FOLIC ACID 1 MILLIGRAM(S): 1 TABLET ORAL at 12:47

## 2025-05-21 NOTE — PROGRESS NOTE ADULT - SUBJECTIVE AND OBJECTIVE BOX
DWIGHT RIBEIRO 80y Male  MRN#: [id number]     Hospital Day: 37d      SUBJECTIVE  No acute events overnight, pt seen and examined this morning.                                          ----------------------------------------------------------  OBJECTIVE  PAST MEDICAL & SURGICAL HISTORY  Diabetes    Hypertension    Fall    H/O left knee surgery    History of cholecystectomy    History of appendectomy                                              -----------------------------------------------------------  ALLERGIES:  No Known Allergies                                            ------------------------------------------------------------    HOME MEDICATIONS  Home Medications:  acetaminophen 500 mg oral tablet: 2 tab(s) orally every 8 hours (04 Feb 2025 10:56)  aluminum hydroxide-magnesium hydroxide 200 mg-200 mg/5 mL oral suspension: 30 milliliter(s) orally every 4 hours As needed Dyspepsia (04 Feb 2025 10:56)  amLODIPine 5 mg oral tablet: 1 tab(s) orally once a day (16 Apr 2025 11:45)  aspirin 81 mg oral delayed release tablet: 1 tab(s) orally once a day (04 Feb 2025 10:56)  atorvastatin 80 mg oral tablet: 1 tab(s) orally once a day (at bedtime) (04 Feb 2025 10:56)  ceFAZolin 2 g intravenous injection: 2 gram(s) intravenous every 8 hours End date is 3/9/2025 (04 Feb 2025 10:56)  folic acid 1 mg oral tablet: 1 tab(s) orally once a day (04 Feb 2025 10:56)  gabapentin 100 mg oral capsule: 3 cap(s) orally 2 times a day (17 Sep 2022 01:58)  gabapentin 100 mg oral capsule: 1 cap(s) orally 2 times a day (16 Apr 2025 11:45)  Keppra 250 mg oral tablet: 2 tab(s) orally 2 times a day (16 Apr 2025 11:48)  Lasix 20 mg oral tablet: 1 tab(s) orally once a day (16 Apr 2025 11:45)  lidocaine 4% topical film: Apply topically to affected area every 24 hours Lower back (04 Feb 2025 10:56)  losartan 50 mg oral tablet: 0.5 tab(s) orally 2 times a day (17 Sep 2022 01:58)  metFORMIN 500 mg oral tablet: 500 milligram(s) orally 2 times a day (16 Apr 2025 11:46)  morphine: 2 milligram(s) intravenous every 8 hours as needed for  severe pain (04 Feb 2025 10:56)  nystatin 100,000 units/g topical powder: 1 Apply topically to affected area every 12 hours As needed fungal rash (04 Feb 2025 10:56)  Osteo Bi-Flex 250 mg-200 mg oral tablet: orally once a day (17 Sep 2022 01:58)  oxyCODONE 10 mg oral tablet: 1 tab(s) orally 3 times a day As needed Severe Pain (7 - 10) (04 Feb 2025 10:56)  polyethylene glycol 3350 oral powder for reconstitution: 17 gram(s) orally 2 times a day (04 Feb 2025 10:56)  QUEtiapine 50 mg oral tablet: 1 tab(s) orally 2 times a day (16 Apr 2025 11:45)  senna leaf extract oral tablet: 2 tab(s) orally once a day (at bedtime) (04 Feb 2025 10:56)  Tresiba FlexTouch: 60 unit(s) subcutaneous once a day (17 Sep 2022 01:58)                           MEDICATIONS:  STANDING MEDICATIONS  buMETAnide 1 milliGRAM(s) Oral daily  chlorhexidine 2% Cloths 1 Application(s) Topical <User Schedule>  dextrose 5%. 1000 milliLiter(s) IV Continuous <Continuous>  dextrose 5%. 1000 milliLiter(s) IV Continuous <Continuous>  dextrose 50% Injectable 25 Gram(s) IV Push once  dextrose 50% Injectable 12.5 Gram(s) IV Push once  dextrose 50% Injectable 25 Gram(s) IV Push once  folic acid 1 milliGRAM(s) Oral daily  glucagon  Injectable 1 milliGRAM(s) IntraMuscular once  insulin lispro (ADMELOG) corrective regimen sliding scale   SubCutaneous Before meals and at bedtime  lactulose Syrup 20 Gram(s) Oral every 8 hours  levETIRAcetam  Solution 250 milliGRAM(s) Oral two times a day  lidocaine   4% Patch 1 Patch Transdermal daily  midodrine 10 milliGRAM(s) Oral every 8 hours  nystatin Powder 1 Application(s) Topical two times a day  pantoprazole  Injectable 40 milliGRAM(s) IV Push daily  rifAXIMin 550 milliGRAM(s) Oral two times a day  senna 2 Tablet(s) Oral at bedtime  silver sulfADIAZINE 1% Cream 1 Application(s) Topical two times a day  spironolactone 100 milliGRAM(s) Oral daily    PRN MEDICATIONS  acetaminophen     Tablet .. 650 milliGRAM(s) Oral every 6 hours PRN  aluminum hydroxide/magnesium hydroxide/simethicone Suspension 30 milliLiter(s) Oral every 4 hours PRN  dextrose Oral Gel 15 Gram(s) Oral once PRN  melatonin 3 milliGRAM(s) Oral at bedtime PRN  ondansetron Injectable 4 milliGRAM(s) IV Push every 8 hours PRN                                            ------------------------------------------------------------  VITAL SIGNS: Last 24 Hours  T(C): 36.9 (21 May 2025 14:13), Max: 36.9 (21 May 2025 14:13)  T(F): 98.4 (21 May 2025 14:13), Max: 98.4 (21 May 2025 14:13)  HR: 62 (21 May 2025 14:13) (62 - 101)  BP: 135/70 (21 May 2025 14:13) (109/69 - 135/70)  BP(mean): --  RR: 18 (21 May 2025 14:13) (18 - 18)  SpO2: 94% (21 May 2025 14:13) (94% - 98%)      05-20-25 @ 07:01  -  05-21-25 @ 07:00  --------------------------------------------------------  IN: 236 mL / OUT: 1 mL / NET: 235 mL                                             --------------------------------------------------------------  LABS:                                                                    -------------------------------------------------------------  RADIOLOGY:  CXR      CT                                            --------------------------------------------------------------    PHYSICAL EXAM:  GENERAL: NAD, lying in bed comfortably  CHEST/LUNG: Clear to auscultation bilaterally; No rales, rhonchi, wheezing, or rubs. Unlabored respirations  HEART: Regular rate and rhythm; No murmurs, rubs, or gallops  ABDOMEN: Soft, nontender, nondistended  EXTREMITIES:  No clubbing, cyanosis, or edema  NERVOUS SYSTEM:  A&Ox3         DWIGHT RIBEIRO 80y Male  MRN#: [id number]     Hospital Day: 37d      SUBJECTIVE  No acute events overnight, pt seen and examined this morning.                                          ----------------------------------------------------------  OBJECTIVE  PAST MEDICAL & SURGICAL HISTORY  Diabetes    Hypertension    Fall    H/O left knee surgery    History of cholecystectomy    History of appendectomy                                              -----------------------------------------------------------  ALLERGIES:  No Known Allergies                                            ------------------------------------------------------------    HOME MEDICATIONS  Home Medications:  acetaminophen 500 mg oral tablet: 2 tab(s) orally every 8 hours (04 Feb 2025 10:56)  aluminum hydroxide-magnesium hydroxide 200 mg-200 mg/5 mL oral suspension: 30 milliliter(s) orally every 4 hours As needed Dyspepsia (04 Feb 2025 10:56)  amLODIPine 5 mg oral tablet: 1 tab(s) orally once a day (16 Apr 2025 11:45)  aspirin 81 mg oral delayed release tablet: 1 tab(s) orally once a day (04 Feb 2025 10:56)  atorvastatin 80 mg oral tablet: 1 tab(s) orally once a day (at bedtime) (04 Feb 2025 10:56)  ceFAZolin 2 g intravenous injection: 2 gram(s) intravenous every 8 hours End date is 3/9/2025 (04 Feb 2025 10:56)  folic acid 1 mg oral tablet: 1 tab(s) orally once a day (04 Feb 2025 10:56)  gabapentin 100 mg oral capsule: 3 cap(s) orally 2 times a day (17 Sep 2022 01:58)  gabapentin 100 mg oral capsule: 1 cap(s) orally 2 times a day (16 Apr 2025 11:45)  Keppra 250 mg oral tablet: 2 tab(s) orally 2 times a day (16 Apr 2025 11:48)  Lasix 20 mg oral tablet: 1 tab(s) orally once a day (16 Apr 2025 11:45)  lidocaine 4% topical film: Apply topically to affected area every 24 hours Lower back (04 Feb 2025 10:56)  losartan 50 mg oral tablet: 0.5 tab(s) orally 2 times a day (17 Sep 2022 01:58)  metFORMIN 500 mg oral tablet: 500 milligram(s) orally 2 times a day (16 Apr 2025 11:46)  morphine: 2 milligram(s) intravenous every 8 hours as needed for  severe pain (04 Feb 2025 10:56)  nystatin 100,000 units/g topical powder: 1 Apply topically to affected area every 12 hours As needed fungal rash (04 Feb 2025 10:56)  Osteo Bi-Flex 250 mg-200 mg oral tablet: orally once a day (17 Sep 2022 01:58)  oxyCODONE 10 mg oral tablet: 1 tab(s) orally 3 times a day As needed Severe Pain (7 - 10) (04 Feb 2025 10:56)  polyethylene glycol 3350 oral powder for reconstitution: 17 gram(s) orally 2 times a day (04 Feb 2025 10:56)  QUEtiapine 50 mg oral tablet: 1 tab(s) orally 2 times a day (16 Apr 2025 11:45)  senna leaf extract oral tablet: 2 tab(s) orally once a day (at bedtime) (04 Feb 2025 10:56)  Tresiba FlexTouch: 60 unit(s) subcutaneous once a day (17 Sep 2022 01:58)                           MEDICATIONS:  STANDING MEDICATIONS  buMETAnide 1 milliGRAM(s) Oral daily  chlorhexidine 2% Cloths 1 Application(s) Topical <User Schedule>  dextrose 5%. 1000 milliLiter(s) IV Continuous <Continuous>  dextrose 5%. 1000 milliLiter(s) IV Continuous <Continuous>  dextrose 50% Injectable 25 Gram(s) IV Push once  dextrose 50% Injectable 12.5 Gram(s) IV Push once  dextrose 50% Injectable 25 Gram(s) IV Push once  folic acid 1 milliGRAM(s) Oral daily  glucagon  Injectable 1 milliGRAM(s) IntraMuscular once  insulin lispro (ADMELOG) corrective regimen sliding scale   SubCutaneous Before meals and at bedtime  lactulose Syrup 20 Gram(s) Oral every 8 hours  levETIRAcetam  Solution 250 milliGRAM(s) Oral two times a day  lidocaine   4% Patch 1 Patch Transdermal daily  midodrine 10 milliGRAM(s) Oral every 8 hours  nystatin Powder 1 Application(s) Topical two times a day  pantoprazole  Injectable 40 milliGRAM(s) IV Push daily  rifAXIMin 550 milliGRAM(s) Oral two times a day  senna 2 Tablet(s) Oral at bedtime  silver sulfADIAZINE 1% Cream 1 Application(s) Topical two times a day  spironolactone 100 milliGRAM(s) Oral daily    PRN MEDICATIONS  acetaminophen     Tablet .. 650 milliGRAM(s) Oral every 6 hours PRN  aluminum hydroxide/magnesium hydroxide/simethicone Suspension 30 milliLiter(s) Oral every 4 hours PRN  dextrose Oral Gel 15 Gram(s) Oral once PRN  melatonin 3 milliGRAM(s) Oral at bedtime PRN  ondansetron Injectable 4 milliGRAM(s) IV Push every 8 hours PRN                                            ------------------------------------------------------------  VITAL SIGNS: Last 24 Hours  T(C): 36.9 (21 May 2025 14:13), Max: 36.9 (21 May 2025 14:13)  T(F): 98.4 (21 May 2025 14:13), Max: 98.4 (21 May 2025 14:13)  HR: 62 (21 May 2025 14:13) (62 - 101)  BP: 135/70 (21 May 2025 14:13) (109/69 - 135/70)  BP(mean): --  RR: 18 (21 May 2025 14:13) (18 - 18)  SpO2: 94% (21 May 2025 14:13) (94% - 98%)      05-20-25 @ 07:01  -  05-21-25 @ 07:00  --------------------------------------------------------  IN: 236 mL / OUT: 1 mL / NET: 235 mL                                             --------------------------------------------------------------  LABS:                                                                    -------------------------------------------------------------  RADIOLOGY:  CXR      CT                                            --------------------------------------------------------------    PHYSICAL EXAM:  GENERAL: NAD, lying in bed   CHEST/LUNG: decreased breath sounds, Unlabored respirations  HEART: Regular rate and rhythm; No murmurs, rubs, or gallops  ABDOMEN: Soft, nontender, nondistended  EXTREMITIES:  2+ edema in LE   NERVOUS SYSTEM:  A&Ox1-2

## 2025-05-21 NOTE — PROGRESS NOTE ADULT - ASSESSMENT
81yo M PMH HTN, DM, subdural hematoma (on Keppra, h/o mechanical falls, recent admission (January 2025) for osteomyelitis of L4-L5 (completed antibiotics) presented for AMS. Found to have questionable liver cirrhosis and elevated ammonia. Initially AMS thought to be secondary to hepatic encephalopathy. Highest ammonia level during this hospitalization was 244; now reduced to within normal range (39). rEEG without seizure activity. Neuro recalled at request of family who states previously patient was AOx3 and still not at baseline.    81yo M PMH HTN, DM, subdural hematoma (on Keppra, h/o mechanical falls, recent admission (January 2025) for osteomyelitis of L4-L5 (completed antibiotics) presented for AMS. Found to have questionable liver cirrhosis and elevated ammonia. Initially AMS thought to be secondary to hepatic encephalopathy. Highest ammonia level during this hospitalization was 244; now reduced to within normal range (39). rEEG without seizure activity. Neuro recalled at request of family who states previously patient was AOx3 and still not at baseline. However, patient's mental status has improved over the course of hospitalization.  MRI Brain January 2025 unremarkable. Etiology probably multifactorial, including encephalopathy and hospital induced delirium.     Recommendations  - Still recommending MRI Lumbar Spine with and without contrast, as per consult note (had been recommended after hospitalization in January but patient had not gone)  - Recall neuro prn  - Medical management per primary team    Discussed with attending Dr Villeda    81yo M PMH HTN, DM, subdural hematoma (on Keppra, h/o mechanical falls, recent admission (January 2025) for osteomyelitis of L4-L5 (completed antibiotics) presented for AMS. Found to have questionable liver cirrhosis and elevated ammonia. Initially AMS thought to be secondary to hepatic encephalopathy. Highest ammonia level during this hospitalization was 244; now reduced to within normal range (39). rEEG without seizure activity. Neuro recalled at request of family who states previously patient was AOx3 and still not at baseline. However, patient's mental status has improved over the course of hospitalization: now awake, alert, following simple (but not complex) commands, oriented to self, president and hospital but not month/year, language intact; LUE drift unchanged from prior (limited due to pain). MRI Brain January 2025 unremarkable. Etiology of AMS probably multifactorial, including toxic-metabolic encephalopathy and hospital induced delirium; low suspicion for rapidly progressive dementia.    Recommendations  - Still recommending MRI Lumbar Spine with and without contrast, as per consult note (had been recommended after hospitalization in January but patient had not gone)  - delirium precautions  - Recall neuro prn  - Medical management per primary team    Discussed with attending Dr iVlleda

## 2025-05-21 NOTE — PROGRESS NOTE ADULT - SUBJECTIVE AND OBJECTIVE BOX
NEUROLOGY PROGRESS NOTE     HPI: Patient seen and examined at bedside. He denies acute complaints.     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  BIBA from home status post change of mental status.  Per provider note, patient was discharged from rehab facility on April 4.  Patient was supposed to have MRI of lower back on April 5 but patient refused.  Patient baseline uses walker to ambulate.  By report patient has been doing okay since his discharge from the facility.  Patient had 1 episode of fall on his buttocks few days ago that she was able to help patient to get up with no difficulty.  Patient become confused and calling for his father tonight over the past few hours so she called EMS.  No ROS given mental status, no family at bedside.     (14 Apr 2025 00:29)    Last seen by neuro inpatient in January 2025 for intermittent confusion over 2months. At that time he was AOx2 with difficulty following multi step commands; he had drift in LUE at that time as well due to L elbow pain. Diagnosed with eith TME v TIA, EEG at the time no seizure activity. He was discharged on ASA 81mg and Keppra unchanged.      MEDICATIONS  Home Medications:  acetaminophen 500 mg oral tablet: 2 tab(s) orally every 8 hours (04 Feb 2025 10:56)  aluminum hydroxide-magnesium hydroxide 200 mg-200 mg/5 mL oral suspension: 30 milliliter(s) orally every 4 hours As needed Dyspepsia (04 Feb 2025 10:56)  amLODIPine 5 mg oral tablet: 1 tab(s) orally once a day (16 Apr 2025 11:45)  aspirin 81 mg oral delayed release tablet: 1 tab(s) orally once a day (04 Feb 2025 10:56)  atorvastatin 80 mg oral tablet: 1 tab(s) orally once a day (at bedtime) (04 Feb 2025 10:56)  ceFAZolin 2 g intravenous injection: 2 gram(s) intravenous every 8 hours End date is 3/9/2025 (04 Feb 2025 10:56)  folic acid 1 mg oral tablet: 1 tab(s) orally once a day (04 Feb 2025 10:56)  gabapentin 100 mg oral capsule: 3 cap(s) orally 2 times a day (17 Sep 2022 01:58)  gabapentin 100 mg oral capsule: 1 cap(s) orally 2 times a day (16 Apr 2025 11:45)  Keppra 250 mg oral tablet: 2 tab(s) orally 2 times a day (16 Apr 2025 11:48)  Lasix 20 mg oral tablet: 1 tab(s) orally once a day (16 Apr 2025 11:45)  lidocaine 4% topical film: Apply topically to affected area every 24 hours Lower back (04 Feb 2025 10:56)  losartan 50 mg oral tablet: 0.5 tab(s) orally 2 times a day (17 Sep 2022 01:58)  metFORMIN 500 mg oral tablet: 500 milligram(s) orally 2 times a day (16 Apr 2025 11:46)  morphine: 2 milligram(s) intravenous every 8 hours as needed for  severe pain (04 Feb 2025 10:56)  nystatin 100,000 units/g topical powder: 1 Apply topically to affected area every 12 hours As needed fungal rash (04 Feb 2025 10:56)  Osteo Bi-Flex 250 mg-200 mg oral tablet: orally once a day (17 Sep 2022 01:58)  oxyCODONE 10 mg oral tablet: 1 tab(s) orally 3 times a day As needed Severe Pain (7 - 10) (04 Feb 2025 10:56)  polyethylene glycol 3350 oral powder for reconstitution: 17 gram(s) orally 2 times a day (04 Feb 2025 10:56)  QUEtiapine 50 mg oral tablet: 1 tab(s) orally 2 times a day (16 Apr 2025 11:45)  senna leaf extract oral tablet: 2 tab(s) orally once a day (at bedtime) (04 Feb 2025 10:56)  Tresiba FlexTouch: 60 unit(s) subcutaneous once a day (17 Sep 2022 01:58)    MEDICATIONS  (STANDING):  buMETAnide 1 milliGRAM(s) Oral daily  chlorhexidine 2% Cloths 1 Application(s) Topical <User Schedule>  dextrose 5%. 1000 milliLiter(s) (100 mL/Hr) IV Continuous <Continuous>  dextrose 5%. 1000 milliLiter(s) (50 mL/Hr) IV Continuous <Continuous>  dextrose 50% Injectable 25 Gram(s) IV Push once  dextrose 50% Injectable 12.5 Gram(s) IV Push once  dextrose 50% Injectable 25 Gram(s) IV Push once  folic acid 1 milliGRAM(s) Oral daily  glucagon  Injectable 1 milliGRAM(s) IntraMuscular once  insulin lispro (ADMELOG) corrective regimen sliding scale   SubCutaneous Before meals and at bedtime  lactulose Syrup 20 Gram(s) Oral every 8 hours  levETIRAcetam  Solution 250 milliGRAM(s) Oral two times a day  lidocaine   4% Patch 1 Patch Transdermal daily  midodrine 10 milliGRAM(s) Oral every 8 hours  nystatin Powder 1 Application(s) Topical two times a day  pantoprazole  Injectable 40 milliGRAM(s) IV Push daily  rifAXIMin 550 milliGRAM(s) Oral two times a day  senna 2 Tablet(s) Oral at bedtime  silver sulfADIAZINE 1% Cream 1 Application(s) Topical two times a day  spironolactone 100 milliGRAM(s) Oral daily    MEDICATIONS  (PRN):  acetaminophen     Tablet .. 650 milliGRAM(s) Oral every 6 hours PRN Temp greater or equal to 38C (100.4F), Mild Pain (1 - 3)  aluminum hydroxide/magnesium hydroxide/simethicone Suspension 30 milliLiter(s) Oral every 4 hours PRN Dyspepsia  dextrose Oral Gel 15 Gram(s) Oral once PRN Blood Glucose LESS THAN 70 milliGRAM(s)/deciliter  melatonin 3 milliGRAM(s) Oral at bedtime PRN Insomnia  ondansetron Injectable 4 milliGRAM(s) IV Push every 8 hours PRN Nausea and/or Vomiting      FAMILY HISTORY:    Allergies    No Known Allergies        GEN: NAD, pleasant, cooperative    NEURO:   MENTAL STATUS: AAOx1. Believes he is in [place]. Even with multiple choice, he states he is not in the hospital. He is unsure where he is. States month is February and year he can not recall even with multiple choice. Difficulty with some multi step commands. He knows president is Trrosenda. Able to do simple math   LANG/SPEECH: Fluent, intact naming, repetition & comprehension  CRANIAL NERVES:  II: Blink intact to threat   III, IV, VI: EOM intact, no gaze preference or deviation  V: normal  VII: no facial asymmetry  VIII: normal hearing to speech  MOTOR: 2+/5 LUE, pain limited. No drift in RUE. Able to wiggle toes, but can not move legs across plane of bed.   REFLEXES: downgoing toes   SENSORY: Normal to light touch. No neglect   COORD: No tremor.         CAPILLARY BLOOD GLUCOSE      POCT Blood Glucose.: 126 mg/dL (21 May 2025 07:44)        RADIOLOGY/TESTING    EEG 04.16.2025  Impression:  Abnormal due to diffusely attenuated severe slowing    Clinical Correlation:  Findings consistent with diffuse electrocerebral dysfunction secondary to nonspecific etiology    Cecily Ko MD  Attending Neurologist, Division of Epilepsy             NEUROLOGY PROGRESS NOTE     HPI: Patient seen and examined at bedside. He denies acute complaints. No LBP.     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  BIBA from home status post change of mental status.  Per provider note, patient was discharged from rehab facility on April 4.  Patient was supposed to have MRI of lower back on April 5 but patient refused.  Patient baseline uses walker to ambulate.  By report patient has been doing okay since his discharge from the facility.  Patient had 1 episode of fall on his buttocks few days ago that she was able to help patient to get up with no difficulty.  Patient become confused and calling for his father tonight over the past few hours so she called EMS.  No ROS given mental status, no family at bedside.     (14 Apr 2025 00:29)    Last seen by neuro inpatient in January 2025 for intermittent confusion over 2months. At that time he was AOx2 with difficulty following multi step commands; he had drift in LUE at that time as well due to L elbow pain. Diagnosed with eith TME v TIA, EEG at the time no seizure activity. He was discharged on ASA 81mg and Keppra unchanged.      MEDICATIONS  Home Medications:  acetaminophen 500 mg oral tablet: 2 tab(s) orally every 8 hours (04 Feb 2025 10:56)  aluminum hydroxide-magnesium hydroxide 200 mg-200 mg/5 mL oral suspension: 30 milliliter(s) orally every 4 hours As needed Dyspepsia (04 Feb 2025 10:56)  amLODIPine 5 mg oral tablet: 1 tab(s) orally once a day (16 Apr 2025 11:45)  aspirin 81 mg oral delayed release tablet: 1 tab(s) orally once a day (04 Feb 2025 10:56)  atorvastatin 80 mg oral tablet: 1 tab(s) orally once a day (at bedtime) (04 Feb 2025 10:56)  ceFAZolin 2 g intravenous injection: 2 gram(s) intravenous every 8 hours End date is 3/9/2025 (04 Feb 2025 10:56)  folic acid 1 mg oral tablet: 1 tab(s) orally once a day (04 Feb 2025 10:56)  gabapentin 100 mg oral capsule: 3 cap(s) orally 2 times a day (17 Sep 2022 01:58)  gabapentin 100 mg oral capsule: 1 cap(s) orally 2 times a day (16 Apr 2025 11:45)  Keppra 250 mg oral tablet: 2 tab(s) orally 2 times a day (16 Apr 2025 11:48)  Lasix 20 mg oral tablet: 1 tab(s) orally once a day (16 Apr 2025 11:45)  lidocaine 4% topical film: Apply topically to affected area every 24 hours Lower back (04 Feb 2025 10:56)  losartan 50 mg oral tablet: 0.5 tab(s) orally 2 times a day (17 Sep 2022 01:58)  metFORMIN 500 mg oral tablet: 500 milligram(s) orally 2 times a day (16 Apr 2025 11:46)  morphine: 2 milligram(s) intravenous every 8 hours as needed for  severe pain (04 Feb 2025 10:56)  nystatin 100,000 units/g topical powder: 1 Apply topically to affected area every 12 hours As needed fungal rash (04 Feb 2025 10:56)  Osteo Bi-Flex 250 mg-200 mg oral tablet: orally once a day (17 Sep 2022 01:58)  oxyCODONE 10 mg oral tablet: 1 tab(s) orally 3 times a day As needed Severe Pain (7 - 10) (04 Feb 2025 10:56)  polyethylene glycol 3350 oral powder for reconstitution: 17 gram(s) orally 2 times a day (04 Feb 2025 10:56)  QUEtiapine 50 mg oral tablet: 1 tab(s) orally 2 times a day (16 Apr 2025 11:45)  senna leaf extract oral tablet: 2 tab(s) orally once a day (at bedtime) (04 Feb 2025 10:56)  Tresiba FlexTouch: 60 unit(s) subcutaneous once a day (17 Sep 2022 01:58)    MEDICATIONS  (STANDING):  buMETAnide 1 milliGRAM(s) Oral daily  chlorhexidine 2% Cloths 1 Application(s) Topical <User Schedule>  dextrose 5%. 1000 milliLiter(s) (100 mL/Hr) IV Continuous <Continuous>  dextrose 5%. 1000 milliLiter(s) (50 mL/Hr) IV Continuous <Continuous>  dextrose 50% Injectable 25 Gram(s) IV Push once  dextrose 50% Injectable 12.5 Gram(s) IV Push once  dextrose 50% Injectable 25 Gram(s) IV Push once  folic acid 1 milliGRAM(s) Oral daily  glucagon  Injectable 1 milliGRAM(s) IntraMuscular once  insulin lispro (ADMELOG) corrective regimen sliding scale   SubCutaneous Before meals and at bedtime  lactulose Syrup 20 Gram(s) Oral every 8 hours  levETIRAcetam  Solution 250 milliGRAM(s) Oral two times a day  lidocaine   4% Patch 1 Patch Transdermal daily  midodrine 10 milliGRAM(s) Oral every 8 hours  nystatin Powder 1 Application(s) Topical two times a day  pantoprazole  Injectable 40 milliGRAM(s) IV Push daily  rifAXIMin 550 milliGRAM(s) Oral two times a day  senna 2 Tablet(s) Oral at bedtime  silver sulfADIAZINE 1% Cream 1 Application(s) Topical two times a day  spironolactone 100 milliGRAM(s) Oral daily    MEDICATIONS  (PRN):  acetaminophen     Tablet .. 650 milliGRAM(s) Oral every 6 hours PRN Temp greater or equal to 38C (100.4F), Mild Pain (1 - 3)  aluminum hydroxide/magnesium hydroxide/simethicone Suspension 30 milliLiter(s) Oral every 4 hours PRN Dyspepsia  dextrose Oral Gel 15 Gram(s) Oral once PRN Blood Glucose LESS THAN 70 milliGRAM(s)/deciliter  melatonin 3 milliGRAM(s) Oral at bedtime PRN Insomnia  ondansetron Injectable 4 milliGRAM(s) IV Push every 8 hours PRN Nausea and/or Vomiting      FAMILY HISTORY:    Allergies    No Known Allergies        GEN: NAD, pleasant, cooperative    NEURO:   MENTAL STATUS: AAOx1. Believes he is in [place]. Even with multiple choice, he states he is not in the hospital. He is unsure where he is. States month is February and year he can not recall even with multiple choice. Difficulty with some multi step commands. He knows president is [place]. Able to do simple math   LANG/SPEECH: Fluent, intact naming, repetition & comprehension  CRANIAL NERVES:  II: Blink intact to threat   III, IV, VI: EOM intact, no gaze preference or deviation  V: normal  VII: no facial asymmetry  VIII: normal hearing to speech  MOTOR: 2+/5 LUE, pain limited. No drift in RUE. Able to wiggle toes, but can not move legs across plane of bed.   REFLEXES: downgoing toes   SENSORY: Normal to light touch. No neglect   COORD: No tremor.         CAPILLARY BLOOD GLUCOSE      POCT Blood Glucose.: 126 mg/dL (21 May 2025 07:44)        RADIOLOGY/TESTING    EEG 04.16.2025  Impression:  Abnormal due to diffusely attenuated severe slowing    Clinical Correlation:  Findings consistent with diffuse electrocerebral dysfunction secondary to nonspecific etiology    Cecily Ko MD  Attending Neurologist, Division of Epilepsy

## 2025-05-21 NOTE — PROGRESS NOTE ADULT - ASSESSMENT
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  Nodular liver contour consistent with liver cirrhosis. 2.  Post-cholecystectomy. 3.  Severe abdominal ascites.  4.  Right pleural effusion.  -cxr does not show an effusion , shows non specific right opacity    KUB shows :Nonspecific air distended loops of bowel.  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    s/p albumin IV multiple times   -strict I&O    #Hx Lumbar osteo in january  -check MRI lumabr as requeseted by neuro     #Anemia  -likely multifactorial: sepsis, cirrhosis, poor nutrition, GI bleeding  -s/p EGD on 4/16 which showed 5mm ulcer in duodenal bulb post endo clip, repeat EGD on 4/29 showed no bleeding noted  -will monitor for GI bleeding   transfused one unit of PRBC on 5/3  -Hb stable , cont to monitor     #Afib  -found during this admission  -rate controlled, not candidate for AC given anemia, coagulopathy    #Deconditioning  -PT/OT   -OOB    #Sacral pressure ulcer  -local wound care  -rotate the patient q2hrs     #hypocalcemia:  albumin is 1.7, corrected calcium is 9.1   given albumin     #hypokalemia  replaced with iv potassium       SVT PPX: SCD due to coagulopathy, risk of Gi bleed /anemia     Pending: MRI lumbar, then dispo planning, anticipate

## 2025-05-21 NOTE — PROGRESS NOTE ADULT - NS ATTEND AMEND GEN_ALL_CORE FT
Patient seen and examined.  Agree with above.   - ECG with baseline artifact and what appears to be AF  - Tele with SR upon evaluation   - However given dropping H/H, GIB, and history of subdural hematoma, would hold off on ac at this time   - Monitor tele and check daily ECG  - Follow up GI   - Further workup pending above and clinical course    Iris Elmore MD
Patient seen and examined.  Agree with above.   - In brief, 80 years old male history of hypertension, diabetes, subdural hematoma on Keppra, hx mechanical falls, recent admission (January 2025) for osteomyelitis of L4-5 w completion of abx.  BIBA from home status post change of mental status.  - Pt. with SR with PAC's   - Elevated troponin likely in the setting of demand  - Given MS (AO X 0 and unable to answer questions) as well history of subdural hematoma, no urgent ischemic evaluation planned at this time  - Continue with supportive care per ICU   - Monitor tele  - Follow up neuro, GI    Iris Elmore MD
Patient seen and examined during the GI rounds, case discussed with the GI fellow, plan communicated to the primary team, assessment, recommendations and plan as above. Reviewed all pertinent clinical information and reviewed all relevant imaging and diagnostic studies.  Counseled the HCP about diagnostic testing and treatment plan, risks and benefits of repeat upper endoscopic evaluation discussed and HCP is agreeable . All questions were answered.  Discussed recommendations with the primary team and coordinated care.
Pt appears more alert though mentation fluctuating, NG in place, SLP note reviewed. No further overt GI bleeding reported, Hb overall stable. Continue PPI, monitor bms. Would consider VCE if recurrent bleeding.
Pt with having intermittent streaks of blood in stool. No ongoing overt Gi bleeding currently, Hb overall stable. Recent EGD/colonoscopy performed. Continue PPI, monitor Hb and for evidence of bleeding. Would defer repeat endoscopy currently in setting of recent exams, if has recurrent bleeding or drop in Hb would reassess at that time. Discussed with pts family at bedside.
Patient discussed and seen with the PA. All pertinent labs, medications, images, notes, vitals reviewed personally. I agree with the above assessment and plan, which I have reviewed and edited.
agree w/ the above - rectal exam w/ brown stools today. no active bleeding. no endoscopic intervention. MRI liver protocol as OP. Rest of recommendations per the note above.   d/w pt's wife and sons at the bedside.    Time-based billing (NON-critical care).   50 minutes spent on total encounter which excludes teaching time and/or separately reported services; more than 50% of the visit was spent counseling and / or coordinating care by the attending physician.  The necessity of the time spent during the encounter on this date of service was due to: Coordination of care.
I edited the note
Pt remains in ICU, lethargic and confused, reportedly having intermittent streaks of blood in stool with decline in Hb. No ongoing overt Gi bleeding currently. Recent EGD/colonoscopy performed. Continue PPI, monitor Hb and for evidence of bleeding. If has recurrent bleeding or drop in Hb would consider need for repeat endoscopy if benefits outweigh risks. Discussed with pts family at bedside.

## 2025-05-21 NOTE — CHART NOTE - NSCHARTNOTEFT_GEN_A_CORE
Neurology recommended MRI Lumbar Spine with and without contrast.  MRI screening form was discussed with pt and his daughter at bedside.   No contraindications reported.

## 2025-05-21 NOTE — PROGRESS NOTE ADULT - NS_MD_PANP_GEN_ALL_CORE

## 2025-05-22 PROCEDURE — 72158 MRI LUMBAR SPINE W/O & W/DYE: CPT | Mod: 26

## 2025-05-22 PROCEDURE — 99233 SBSQ HOSP IP/OBS HIGH 50: CPT

## 2025-05-22 PROCEDURE — 99231 SBSQ HOSP IP/OBS SF/LOW 25: CPT

## 2025-05-22 RX ADMIN — NYSTATIN 1 APPLICATION(S): 100000 CREAM TOPICAL at 18:32

## 2025-05-22 RX ADMIN — MIDODRINE HYDROCHLORIDE 10 MILLIGRAM(S): 5 TABLET ORAL at 05:06

## 2025-05-22 RX ADMIN — LACTULOSE 20 GRAM(S): 10 SOLUTION ORAL at 05:58

## 2025-05-22 RX ADMIN — MIDODRINE HYDROCHLORIDE 10 MILLIGRAM(S): 5 TABLET ORAL at 14:14

## 2025-05-22 RX ADMIN — INSULIN LISPRO 4: 100 INJECTION, SOLUTION INTRAVENOUS; SUBCUTANEOUS at 16:40

## 2025-05-22 RX ADMIN — LIDOCAINE HYDROCHLORIDE 1 PATCH: 20 JELLY TOPICAL at 12:19

## 2025-05-22 RX ADMIN — BUMETANIDE 1 MILLIGRAM(S): 1 TABLET ORAL at 05:58

## 2025-05-22 RX ADMIN — NYSTATIN 1 APPLICATION(S): 100000 CREAM TOPICAL at 06:00

## 2025-05-22 RX ADMIN — FOLIC ACID 1 MILLIGRAM(S): 1 TABLET ORAL at 12:19

## 2025-05-22 RX ADMIN — Medication 2 TABLET(S): at 22:12

## 2025-05-22 RX ADMIN — Medication 40 MILLIGRAM(S): at 12:19

## 2025-05-22 RX ADMIN — MIDODRINE HYDROCHLORIDE 10 MILLIGRAM(S): 5 TABLET ORAL at 22:12

## 2025-05-22 RX ADMIN — Medication 1 APPLICATION(S): at 05:59

## 2025-05-22 RX ADMIN — LACTULOSE 20 GRAM(S): 10 SOLUTION ORAL at 14:15

## 2025-05-22 RX ADMIN — Medication 1 APPLICATION(S): at 05:53

## 2025-05-22 RX ADMIN — Medication 100 MILLIGRAM(S): at 05:59

## 2025-05-22 RX ADMIN — Medication 1 APPLICATION(S): at 18:31

## 2025-05-22 RX ADMIN — LEVETIRACETAM 250 MILLIGRAM(S): 10 INJECTION, SOLUTION INTRAVENOUS at 05:58

## 2025-05-22 RX ADMIN — LEVETIRACETAM 250 MILLIGRAM(S): 10 INJECTION, SOLUTION INTRAVENOUS at 18:28

## 2025-05-22 RX ADMIN — LACTULOSE 20 GRAM(S): 10 SOLUTION ORAL at 22:12

## 2025-05-22 NOTE — PROGRESS NOTE ADULT - SUBJECTIVE AND OBJECTIVE BOX
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 fragilis bacteremia, and acute blood loss anemia. Patient is on IV morphine for pain. Patient is on lactulose for encephalopathy, IV abx, and frequent H/H monitoring, requiring pRBC. Palliative consulted for GOC. Patient is full code.    INTERVAL EVENTS  4/24: patient without acute distress  4/25: no acute distress, on octreotide gtt, NGT in place  5/6: Patient without acute distress.   5/8: NAD, no family present  5/13: patient appears comfortable, cousins at bedside  5/14: patient awake, denies major symptoms  5/16: patient in no acute distress  5/22: patient comfortable, resting    ADVANCE DIRECTIVES:    [ ] Full Code [ ] DNR  MOLST  [ ]  Living Will  [ ]   DECISION MAKER(s):  [ ] Health Care Proxy(s)  [ ] Surrogate(s)  [ ] Guardian           Name(s): Phone Number(s): spouse Edwige [phone] | [phone]    BASELINE (I)ADL(s) (prior to admission):  [place]: [ ]Total  [ ] Moderate [ ]Dependent  Palliative Performance Status Version 2:         %    http://npcrc.org/files/news/palliative_performance_scale_ppsv2.pdf    Allergies    No Known Allergies    Intolerances    MEDICATIONS  (STANDING):  buMETAnide 1 milliGRAM(s) Oral daily  chlorhexidine 2% Cloths 1 Application(s) Topical <User Schedule>  dextrose 5%. 1000 milliLiter(s) (100 mL/Hr) IV Continuous <Continuous>  dextrose 5%. 1000 milliLiter(s) (50 mL/Hr) IV Continuous <Continuous>  dextrose 50% Injectable 25 Gram(s) IV Push once  dextrose 50% Injectable 12.5 Gram(s) IV Push once  dextrose 50% Injectable 25 Gram(s) IV Push once  folic acid 1 milliGRAM(s) Oral daily  glucagon  Injectable 1 milliGRAM(s) IntraMuscular once  insulin lispro (ADMELOG) corrective regimen sliding scale   SubCutaneous Before meals and at bedtime  lactulose Syrup 20 Gram(s) Oral every 8 hours  levETIRAcetam  Solution 250 milliGRAM(s) Oral two times a day  lidocaine   4% Patch 1 Patch Transdermal daily  midodrine 10 milliGRAM(s) Oral every 8 hours  nystatin Powder 1 Application(s) Topical two times a day  pantoprazole  Injectable 40 milliGRAM(s) IV Push daily  rifAXIMin 550 milliGRAM(s) Oral two times a day  senna 2 Tablet(s) Oral at bedtime  silver sulfADIAZINE 1% Cream 1 Application(s) Topical two times a day  spironolactone 100 milliGRAM(s) Oral daily    MEDICATIONS  (PRN):  acetaminophen     Tablet .. 650 milliGRAM(s) Oral every 6 hours PRN Temp greater or equal to 38C (100.4F), Mild Pain (1 - 3)  aluminum hydroxide/magnesium hydroxide/simethicone Suspension 30 milliLiter(s) Oral every 4 hours PRN Dyspepsia  dextrose Oral Gel 15 Gram(s) Oral once PRN Blood Glucose LESS THAN 70 milliGRAM(s)/deciliter  melatonin 3 milliGRAM(s) Oral at bedtime PRN Insomnia  ondansetron Injectable 4 milliGRAM(s) IV Push every 8 hours PRN Nausea and/or Vomiting      PRESENT SYMPTOMS: [ X][ Difficult to obtain due to poor mentation   Source if other than patient:  [ ]Family   [ ]Team   [ X]All review of systems negative including pain and dyspnea unless noted below    All components of pain assessment below addressed. Blank spaces indicate that the patient did/could not complete the assessment.  Pain: [ ]yes [ ]no  QOL impact -   Location -                    Aggravating factors -  Quality -  Radiation -  Timing-  Severity (0-10 scale):  Minimal acceptable level (0-10 scale):     CPOT:    https://www.sccm.org/getattachment/xsl37p22-4x2d-7w6h-1f4p-4272r9498l3h/Critical-Care-Pain-Observation-Tool-(CPOT)    PAIN AD Score: 0  http://geriatrictoolkit.[place].[email or web address]/painad.pdf (press ctrl +  left click to view)    Dyspnea:                           [ ]None[ ]Mild [ ]Moderate [ ]Severe     Respiratory Distress Observation Scale (RDOS): 0  A score of 0 to 2 signifies little or no respiratory distress, 3 signifies mild distress, scores 4 to 6 indicate moderate distress, and scores greater than 7 signify severe distress  https://[email or web address].ca/sites/default/files/PDFS/326609-wmbpkamjxop-txuhzake-dossgivzate-ajwaq.pdf    Anxiety:                             [ ]None[ ]Mild [ ]Moderate [ ]Severe   Fatigue:                             [ ]None[ ]Mild [ ]Moderate [ ]Severe   Nausea:                             [ ]None[ ]Mild [ ]Moderate [ ]Severe   Loss of appetite:              [ ]None[ ]Mild [ ]Moderate [ ]Severe   Constipation:                    [ ]None[ ]Mild [ ]Moderate [ ]Severe    Other Symptoms:    PHYSICAL EXAM:  Vital Signs Last 24 Hrs  T(C): 36.4 (22 May 2025 05:05), Max: 36.9 (21 May 2025 14:13)  T(F): 97.5 (22 May 2025 05:05), Max: 98.4 (21 May 2025 14:13)  HR: 72 (22 May 2025 05:38) (62 - 89)  BP: 104/57 (22 May 2025 05:38) (80/53 - 144/74)  BP(mean): --  RR: 18 (22 May 2025 05:38) (18 - 18)  SpO2: 99% (22 May 2025 05:38) (94% - 100%)    Parameters below as of 21 May 2025 20:05  Patient On (Oxygen Delivery Method): nasal cannula  O2 Flow (L/min): 2      GENERAL:  [X ] No acute distress [ ]Lethargic  [ ]Unarousable  [ ]Verbal  [ ]Non-Verbal [ ]Cachexia    BEHAVIORAL/PSYCH:  [ ]Alert and Oriented x  [ ] Anxiety [ ] Delirium [ ] Agitation [X ] Calm   EYES: [ ] No scleral icterus [ ] Scleral icterus [ ] Closed  ENMT:  [ ]Dry mouth  [ ]No external oral lesions [ X] No external ear or nose lesions  CARDIOVASCULAR:  [ ]Regular [ ]Irregular [ ]Tachy [ ]Not Tachy  [ ]Jenaro [ ] Edema [ ] No edema  PULMONARY:  [ ]Tachypnea  [ ]Audible excessive secretions [X ] No labored breathing [ ] labored breathing  GASTROINTESTINAL: [ ]Soft  [ ]Distended  [ X]Not distended [ ]Non tender [ ]Tender  MUSCULOSKELETAL: [ ]No clubbing [ ] clubbing  [ X] No cyanosis [ ] cyanosis  NEUROLOGIC: [ ]No focal deficits  [ ]Follows commands  [ ]Does not follow commands  [ ]Cognitive impairment  [ ]Dysphagia  [ ]Dysarthria  [ ]Paresis   SKIN: [ ] Jaundiced [X ] Non-jaundiced [ ]Rash [ ]No Rash [ ] Warm [ ] Dry             PPS: 30%    CRITICAL CARE:  [ ] Shock Present  [ ]Septic [ ]Cardiogenic [ ]Neurologic [ ]Hypovolemic  [ ]  Vasopressors [ ]  Inotropes   [ ]Respiratory failure present [ ]Mechanical ventilation [ ]Non-invasive ventilatory support [ ]High flow  [ ]Acute  [ ]Chronic [ ]Hypoxic  [ ]Hypercarbic [ ]Other  [ ]Other organ failure     LABS: reviewed by me, notable for: no AM labs    RADIOLOGY & ADDITIONAL STUDIES: CXR personally reviewed and interpretated by me: 5/11: R opacity    5/5: Nonspecific air distended loops of bowel.    Stable osseous structures.    PROTEIN CALORIE MALNUTRITION PRESENT: [ ]mild [ ]moderate [ ]severe [ ]underweight [ ]morbid obesity  https://www.andeal.org/vault/2440/web/files/ONC/Table_Clinical%20Characteristics%20to%20Document%20Malnutrition-White%20JV%20et%20al%753743.pdf    Height (cm): 175.3 (04-17-25 @ 11:56), 175.3 (02-19-25 @ 12:20), 175.3 (01-23-25 @ 23:40)  Weight (kg): 117.8 (04-17-25 @ 11:56), 113.4 (02-19-25 @ 12:20), 120.6 (01-23-25 @ 23:40)  BMI (kg/m2): 38.3 (04-17-25 @ 11:56), 36.9 (02-19-25 @ 12:20), 39.2 (01-23-25 @ 23:40)    [ ]PPSV2 < or = to 30% [ ]significant weight loss  [ ]poor nutritional intake  [ ]anasarca      [ ]Artificial Nutrition          Palliative Care Spiritual/Emotional Screening Tool Question  Severity (0-4):                    OR                    [X ] Unable to determine/NA  Score of 2 or greater indicates recommendation of Chaplaincy referral  Chaplaincy Referral: [ ] Yes [ ] Refused [ ] Following     Caregiver [place]:  [ ] Yes [ ] No    OR    [x ] Unable to determine. Will assess at later time if appropriate.  Social Work Referral [ ]  Patient and Family Centered Care Referral [ ]    Anticipatory Grief Present: [ ] Yes [ ] No    OR     [ x] Unable to determine. Will assess at later time if appropriate.  Social Work Referral [ ]  Patient and Family Centered Care Referral [ ]    REFERRALS:   [ ]Chaplaincy  [ ]Hospice  [ ]Child Life  [ ]Social Work  [ ]Case management [ ]Holistic Therapy     Palliative care education provided to patient and/or family    Goals of Care Document:

## 2025-05-22 NOTE — PROGRESS NOTE ADULT - SUBJECTIVE AND OBJECTIVE BOX
DWIGHT RIBEIRO 80y Male  MRN#: [id number]     Hospital Day: 38d      SUBJECTIVE  No acute events overnight, pt seen and examined this morning.                                          ----------------------------------------------------------  OBJECTIVE  PAST MEDICAL & SURGICAL HISTORY  Diabetes    Hypertension    Fall    H/O left knee surgery    History of cholecystectomy    History of appendectomy                                              -----------------------------------------------------------  ALLERGIES:  No Known Allergies                                            ------------------------------------------------------------    HOME MEDICATIONS  Home Medications:  acetaminophen 500 mg oral tablet: 2 tab(s) orally every 8 hours (04 Feb 2025 10:56)  aluminum hydroxide-magnesium hydroxide 200 mg-200 mg/5 mL oral suspension: 30 milliliter(s) orally every 4 hours As needed Dyspepsia (04 Feb 2025 10:56)  amLODIPine 5 mg oral tablet: 1 tab(s) orally once a day (16 Apr 2025 11:45)  aspirin 81 mg oral delayed release tablet: 1 tab(s) orally once a day (04 Feb 2025 10:56)  atorvastatin 80 mg oral tablet: 1 tab(s) orally once a day (at bedtime) (04 Feb 2025 10:56)  ceFAZolin 2 g intravenous injection: 2 gram(s) intravenous every 8 hours End date is 3/9/2025 (04 Feb 2025 10:56)  folic acid 1 mg oral tablet: 1 tab(s) orally once a day (04 Feb 2025 10:56)  gabapentin 100 mg oral capsule: 3 cap(s) orally 2 times a day (17 Sep 2022 01:58)  gabapentin 100 mg oral capsule: 1 cap(s) orally 2 times a day (16 Apr 2025 11:45)  Keppra 250 mg oral tablet: 2 tab(s) orally 2 times a day (16 Apr 2025 11:48)  Lasix 20 mg oral tablet: 1 tab(s) orally once a day (16 Apr 2025 11:45)  lidocaine 4% topical film: Apply topically to affected area every 24 hours Lower back (04 Feb 2025 10:56)  losartan 50 mg oral tablet: 0.5 tab(s) orally 2 times a day (17 Sep 2022 01:58)  metFORMIN 500 mg oral tablet: 500 milligram(s) orally 2 times a day (16 Apr 2025 11:46)  morphine: 2 milligram(s) intravenous every 8 hours as needed for  severe pain (04 Feb 2025 10:56)  nystatin 100,000 units/g topical powder: 1 Apply topically to affected area every 12 hours As needed fungal rash (04 Feb 2025 10:56)  Osteo Bi-Flex 250 mg-200 mg oral tablet: orally once a day (17 Sep 2022 01:58)  oxyCODONE 10 mg oral tablet: 1 tab(s) orally 3 times a day As needed Severe Pain (7 - 10) (04 Feb 2025 10:56)  polyethylene glycol 3350 oral powder for reconstitution: 17 gram(s) orally 2 times a day (04 Feb 2025 10:56)  QUEtiapine 50 mg oral tablet: 1 tab(s) orally 2 times a day (16 Apr 2025 11:45)  senna leaf extract oral tablet: 2 tab(s) orally once a day (at bedtime) (04 Feb 2025 10:56)  Tresiba FlexTouch: 60 unit(s) subcutaneous once a day (17 Sep 2022 01:58)                           MEDICATIONS:  STANDING MEDICATIONS  buMETAnide 1 milliGRAM(s) Oral daily  chlorhexidine 2% Cloths 1 Application(s) Topical <User Schedule>  dextrose 5%. 1000 milliLiter(s) IV Continuous <Continuous>  dextrose 5%. 1000 milliLiter(s) IV Continuous <Continuous>  dextrose 50% Injectable 25 Gram(s) IV Push once  dextrose 50% Injectable 12.5 Gram(s) IV Push once  dextrose 50% Injectable 25 Gram(s) IV Push once  folic acid 1 milliGRAM(s) Oral daily  glucagon  Injectable 1 milliGRAM(s) IntraMuscular once  insulin lispro (ADMELOG) corrective regimen sliding scale   SubCutaneous Before meals and at bedtime  lactulose Syrup 20 Gram(s) Oral every 8 hours  levETIRAcetam  Solution 250 milliGRAM(s) Oral two times a day  lidocaine   4% Patch 1 Patch Transdermal daily  midodrine 10 milliGRAM(s) Oral every 8 hours  nystatin Powder 1 Application(s) Topical two times a day  pantoprazole  Injectable 40 milliGRAM(s) IV Push daily  rifAXIMin 550 milliGRAM(s) Oral two times a day  senna 2 Tablet(s) Oral at bedtime  silver sulfADIAZINE 1% Cream 1 Application(s) Topical two times a day  spironolactone 100 milliGRAM(s) Oral daily    PRN MEDICATIONS  acetaminophen     Tablet .. 650 milliGRAM(s) Oral every 6 hours PRN  aluminum hydroxide/magnesium hydroxide/simethicone Suspension 30 milliLiter(s) Oral every 4 hours PRN  dextrose Oral Gel 15 Gram(s) Oral once PRN  melatonin 3 milliGRAM(s) Oral at bedtime PRN  ondansetron Injectable 4 milliGRAM(s) IV Push every 8 hours PRN                                            ------------------------------------------------------------  VITAL SIGNS: Last 24 Hours  T(C): 36.6 (22 May 2025 14:37), Max: 36.8 (21 May 2025 20:05)  T(F): 97.9 (22 May 2025 14:37), Max: 98.3 (21 May 2025 20:05)  HR: 72 (22 May 2025 05:38) (72 - 89)  BP: 133/71 (22 May 2025 14:37) (80/53 - 144/74)  BP(mean): --  RR: 18 (22 May 2025 14:37) (18 - 18)  SpO2: 100% (22 May 2025 14:37) (98% - 100%)                                             --------------------------------------------------------------  LABS:                                                                    -------------------------------------------------------------  RADIOLOGY:  CXR      CT                                            --------------------------------------------------------------    PHYSICAL EXAM:  GENERAL: NAD, lying in bed   CHEST/LUNG: decreased breath sounds, Unlabored respirations  HEART: Regular rate and rhythm; No murmurs, rubs, or gallops  ABDOMEN: Soft, nontender, nondistended  EXTREMITIES:  2+ edema in LE

## 2025-05-22 NOTE — PROGRESS NOTE ADULT - ASSESSMENT
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 fragilis bacteremia, and acute blood loss anemia. Palliative team had met with patient's spouse, who indicated wanting all life-prolonging interventions. We are reconsulted as patient's spouse and daughter sees things differently.     - awaiting MRI  - no major symptoms currently  - will sign off, reconsult PRN    ______________  Ang Dunne MD  Palliative Medicine  [facility]   of Geriatric and Palliative Medicine  [phone]

## 2025-05-22 NOTE — PROGRESS NOTE ADULT - ASSESSMENT
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  Nodular liver contour consistent with liver cirrhosis. 2.  Post-cholecystectomy. 3.  Severe abdominal ascites.  4.  Right pleural effusion.  -cxr does not show an effusion , shows non specific right opacity    KUB shows :Nonspecific air distended loops of bowel.  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    s/p albumin IV multiple times   -strict I&O    #Hx Lumbar osteo in january  -check MRI lumbar as requested by neuro     #Anemia  -likely multifactorial: sepsis, cirrhosis, poor nutrition, GI bleeding  -s/p EGD on 4/16 which showed 5mm ulcer in duodenal bulb post endo clip, repeat EGD on 4/29 showed no bleeding noted  -will monitor for GI bleeding   transfused one unit of PRBC on 5/3  -Hb stable , cont to monitor     #Afib  -found during this admission  -rate controlled, not candidate for AC given anemia, coagulopathy    #Deconditioning  -PT/OT   -OOB    #Sacral pressure ulcer  -local wound care  -rotate the patient q2hrs     #hypocalcemia:  albumin is 1.7, corrected calcium is 9.1   given albumin as above     #hypokalemia  replaced with iv potassium       SVT PPX: SCD due to coagulopathy, risk of Gi bleed /anemia     Pending: MRI lumbar, then dispo planning, anticipate

## 2025-05-22 NOTE — PROGRESS NOTE ADULT - PROBLEM SELECTOR PROBLEM 3
Encounter for palliative care
Encounter for palliative care
Cirrhosis
Encounter for palliative care
Cirrhosis
Encounter for palliative care
Cirrhosis

## 2025-05-22 NOTE — PROGRESS NOTE ADULT - PROBLEM SELECTOR PROBLEM 1
Metabolic encephalopathy
Altered mental state
Metabolic encephalopathy
Altered mental state
Metabolic encephalopathy
Altered mental state
Metabolic encephalopathy

## 2025-05-23 LAB
GLUCOSE BLDC GLUCOMTR-MCNC: 164 MG/DL — HIGH (ref 70–99)
GLUCOSE BLDC GLUCOMTR-MCNC: 245 MG/DL — HIGH (ref 70–99)

## 2025-05-23 PROCEDURE — 99232 SBSQ HOSP IP/OBS MODERATE 35: CPT

## 2025-05-23 RX ORDER — BUTYROSPERMUM PARKII(SHEA BUTTER), SIMMONDSIA CHINENSIS (JOJOBA) SEED OIL, ALOE BARBADENSIS LEAF EXTRACT .01; 1; 3.5 G/100G; G/100G; G/100G
250 LIQUID TOPICAL
Refills: 0 | Status: DISCONTINUED | OUTPATIENT
Start: 2025-05-23 | End: 2025-06-19

## 2025-05-23 RX ORDER — LACTOBACILLUS ACIDOPHILUS/PECT 75 MM-100
1 CAPSULE ORAL
Refills: 0 | Status: DISCONTINUED | OUTPATIENT
Start: 2025-05-23 | End: 2025-06-19

## 2025-05-23 RX ORDER — DAPTOMYCIN 500 MG/10ML
900 INJECTION, POWDER, LYOPHILIZED, FOR SOLUTION INTRAVENOUS EVERY 24 HOURS
Refills: 0 | Status: DISCONTINUED | OUTPATIENT
Start: 2025-05-23 | End: 2025-05-31

## 2025-05-23 RX ORDER — CEFTRIAXONE 500 MG/1
2000 INJECTION, POWDER, FOR SOLUTION INTRAMUSCULAR; INTRAVENOUS EVERY 24 HOURS
Refills: 0 | Status: COMPLETED | OUTPATIENT
Start: 2025-05-23 | End: 2025-05-29

## 2025-05-23 RX ADMIN — LACTULOSE 20 GRAM(S): 10 SOLUTION ORAL at 22:35

## 2025-05-23 RX ADMIN — Medication 1 APPLICATION(S): at 18:02

## 2025-05-23 RX ADMIN — NYSTATIN 1 APPLICATION(S): 100000 CREAM TOPICAL at 06:01

## 2025-05-23 RX ADMIN — DAPTOMYCIN 136 MILLIGRAM(S): 500 INJECTION, POWDER, LYOPHILIZED, FOR SOLUTION INTRAVENOUS at 22:35

## 2025-05-23 RX ADMIN — LACTULOSE 20 GRAM(S): 10 SOLUTION ORAL at 06:01

## 2025-05-23 RX ADMIN — LACTULOSE 20 GRAM(S): 10 SOLUTION ORAL at 13:06

## 2025-05-23 RX ADMIN — LEVETIRACETAM 250 MILLIGRAM(S): 10 INJECTION, SOLUTION INTRAVENOUS at 17:55

## 2025-05-23 RX ADMIN — Medication 1 APPLICATION(S): at 06:01

## 2025-05-23 RX ADMIN — LIDOCAINE HYDROCHLORIDE 1 PATCH: 20 JELLY TOPICAL at 13:11

## 2025-05-23 RX ADMIN — INSULIN LISPRO 2: 100 INJECTION, SOLUTION INTRAVENOUS; SUBCUTANEOUS at 11:53

## 2025-05-23 RX ADMIN — MIDODRINE HYDROCHLORIDE 10 MILLIGRAM(S): 5 TABLET ORAL at 13:06

## 2025-05-23 RX ADMIN — MIDODRINE HYDROCHLORIDE 10 MILLIGRAM(S): 5 TABLET ORAL at 22:35

## 2025-05-23 RX ADMIN — Medication 100 MILLIGRAM(S): at 06:00

## 2025-05-23 RX ADMIN — LIDOCAINE HYDROCHLORIDE 1 PATCH: 20 JELLY TOPICAL at 19:25

## 2025-05-23 RX ADMIN — CEFTRIAXONE 100 MILLIGRAM(S): 500 INJECTION, POWDER, FOR SOLUTION INTRAMUSCULAR; INTRAVENOUS at 18:45

## 2025-05-23 RX ADMIN — BUMETANIDE 1 MILLIGRAM(S): 1 TABLET ORAL at 06:00

## 2025-05-23 RX ADMIN — INSULIN LISPRO 4: 100 INJECTION, SOLUTION INTRAVENOUS; SUBCUTANEOUS at 22:14

## 2025-05-23 RX ADMIN — Medication 40 MILLIGRAM(S): at 11:59

## 2025-05-23 RX ADMIN — LEVETIRACETAM 250 MILLIGRAM(S): 10 INJECTION, SOLUTION INTRAVENOUS at 06:24

## 2025-05-23 RX ADMIN — Medication 1 APPLICATION(S): at 06:00

## 2025-05-23 RX ADMIN — LIDOCAINE HYDROCHLORIDE 1 PATCH: 20 JELLY TOPICAL at 11:56

## 2025-05-23 RX ADMIN — LIDOCAINE HYDROCHLORIDE 1 PATCH: 20 JELLY TOPICAL at 23:50

## 2025-05-23 RX ADMIN — NYSTATIN 1 APPLICATION(S): 100000 CREAM TOPICAL at 18:02

## 2025-05-23 RX ADMIN — Medication 2 TABLET(S): at 22:35

## 2025-05-23 RX ADMIN — MIDODRINE HYDROCHLORIDE 10 MILLIGRAM(S): 5 TABLET ORAL at 06:00

## 2025-05-23 RX ADMIN — FOLIC ACID 1 MILLIGRAM(S): 1 TABLET ORAL at 11:55

## 2025-05-23 NOTE — PROGRESS NOTE ADULT - SUBJECTIVE AND OBJECTIVE BOX
Patient is a 80y old  Male who presents with a chief complaint of acute mentation changes (22 May 2025 15:17)        MEDICATIONS  (STANDING):  buMETAnide 1 milliGRAM(s) Oral daily  cefTRIAXone   IVPB 2000 milliGRAM(s) IV Intermittent every 24 hours  chlorhexidine 2% Cloths 1 Application(s) Topical <User Schedule>  DAPTOmycin IVPB 900 milliGRAM(s) IV Intermittent every 24 hours  dextrose 5%. 1000 milliLiter(s) (100 mL/Hr) IV Continuous <Continuous>  dextrose 5%. 1000 milliLiter(s) (50 mL/Hr) IV Continuous <Continuous>  dextrose 50% Injectable 25 Gram(s) IV Push once  dextrose 50% Injectable 12.5 Gram(s) IV Push once  dextrose 50% Injectable 25 Gram(s) IV Push once  folic acid 1 milliGRAM(s) Oral daily  glucagon  Injectable 1 milliGRAM(s) IntraMuscular once  insulin lispro (ADMELOG) corrective regimen sliding scale   SubCutaneous Before meals and at bedtime  lactobacillus acidophilus 1 Tablet(s) Oral two times a day with meals  lactulose Syrup 20 Gram(s) Oral every 8 hours  levETIRAcetam  Solution 250 milliGRAM(s) Oral two times a day  lidocaine   4% Patch 1 Patch Transdermal daily  midodrine 10 milliGRAM(s) Oral every 8 hours  nystatin Powder 1 Application(s) Topical two times a day  pantoprazole  Injectable 40 milliGRAM(s) IV Push daily  rifAXIMin 550 milliGRAM(s) Oral two times a day  saccharomyces boulardii 250 milliGRAM(s) Oral two times a day  senna 2 Tablet(s) Oral at bedtime  silver sulfADIAZINE 1% Cream 1 Application(s) Topical two times a day  spironolactone 100 milliGRAM(s) Oral daily    MEDICATIONS  (PRN):  acetaminophen     Tablet .. 650 milliGRAM(s) Oral every 6 hours PRN Temp greater or equal to 38C (100.4F), Mild Pain (1 - 3)  aluminum hydroxide/magnesium hydroxide/simethicone Suspension 30 milliLiter(s) Oral every 4 hours PRN Dyspepsia  dextrose Oral Gel 15 Gram(s) Oral once PRN Blood Glucose LESS THAN 70 milliGRAM(s)/deciliter  melatonin 3 milliGRAM(s) Oral at bedtime PRN Insomnia  ondansetron Injectable 4 milliGRAM(s) IV Push every 8 hours PRN Nausea and/or Vomiting      CAPILLARY BLOOD GLUCOSE  POCT Blood Glucose.: 164 mg/dL (23 May 2025 11:46)  POCT Blood Glucose.: 124 mg/dL (23 May 2025 07:31)  POCT Blood Glucose.: 141 mg/dL (22 May 2025 21:16)    I&O's Summary      PHYSICAL EXAM:  Vital Signs Last 24 Hrs  T(C): 36.6 (23 May 2025 14:38), Max: 36.9 (23 May 2025 05:09)  T(F): 97.9 (23 May 2025 14:38), Max: 98.4 (23 May 2025 05:09)  HR: 100 (23 May 2025 14:38) (74 - 100)  BP: 139/75 (23 May 2025 14:38) (103/63 - 139/75)  BP(mean): --  RR: 18 (23 May 2025 14:38) (18 - 18)  SpO2: 98% (23 May 2025 14:38) (98% - 100%)    Parameters below as of 22 May 2025 21:03  Patient On (Oxygen Delivery Method): nasal cannula      GENERAL: No acute distress, well-developed  HEAD:  Atraumatic, Normocephalic  EYES:  Jaundiced sclera   NECK: Supple,no JVD  CHEST/LUNG: CTAB; No wheezes, rales, or rhonchi  HEART: Regular rate and rhythm; No murmurs, rubs, or gallops  ABDOMEN: Soft, distended normal bowel sound  EXTREMITIES: B/L Lower extremity pitting edema   NEUROLOGY: A&O x 1-2 , no focal deficits  SKIN: No rashes or lesions

## 2025-05-23 NOTE — PROGRESS NOTE ADULT - ASSESSMENT
Mr Reji is a 80 YOM w a PMH of 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  Post-cholecystectomy.   3.  Severe abdominal ascites.4.  Right pleural effusion. 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   -Repeat Abdominal sonogram in am     Ambulatory Dysfunction   H/O Lumbar osteomyelitis in January 2025  Completed Course of antibiotics at that time   Now with Difficulty with  ambulation   Neuro consult appreciated   MR Lumbar Spine w/wo IV Cont  Since the previous lumbar spine MRI dated 1/29/2025:  1.  The findings of L4-5 discitis osteomyelitis have equivocally changed:   the bone marrow edema/enhancement within the L4 and L5 vertebral bodies   has mildly decreased. However there is new mild vertebral body height   loss of L5, increased L5 superior endplate erosion, and increased L4-5 disc edema.    2.  New mild edema within the L3-4 intervertebral disc with faint erosions involving the opposing endplates suspicious for additional level   of discitis osteomyelitis.  3.  No evidence of epidural inflammation /  fluid collection.  4.  Mild paraspinal edema without abscess.  5.  Stable degenerative changes.  ID Follow-up appreciated>>started on Daptomycin and Rocephin   -Will need IR consult for Possible Drainage       Bacteremia  -blood culture positive for bacteroides on presentation  -s/p ceftriaxone and flagyl  -repeat blood culture negative,   as per ID hold off further antibiotics       Normocytic Anemia:  multifactorial: sepsis, cirrhosis, poor nutrition, GI bleeding  -s/p EGD on 4/16 which showed 5mm ulcer in duodenal bulb post endo clip, repeat EGD on 4/29 showed no bleeding noted   transfused one unit of PRBC on 5/3, -Hb stable , cont to monitor   -Keep Active T/S     New Onset Afib  -found during this admission  -rate controlled, not candidate for AC given anemia, coagulopathy    Deconditioning  Sacral pressure ulcer  -PT/OT/OOB  -local wound care  -rotate the patient q2hrs     Pseudohypocalcemia: corrected calcium is 9.1  hypokalemia: monitor and supplement       SVT PPX: SCD due to coagulopathy, risk of Gi bleed /anemia   GI PPX: PPI   Full Code   Dispo: from home, needs STR after Possible IR drainage for Lumbar  OM

## 2025-05-23 NOTE — CHART NOTE - NSCHARTNOTEFT_GEN_A_CORE
MRI Brain acknowledged. Recommend neurosurgical, infectious disease consult as appropriate. No further inpatient neurology workup. Recall neuro prn.     Discussed with attending Dr Villeda

## 2025-05-24 LAB
ALBUMIN SERPL ELPH-MCNC: 2.1 G/DL — LOW (ref 3.5–5.2)
ALP SERPL-CCNC: 203 U/L — HIGH (ref 30–115)
ALT FLD-CCNC: 21 U/L — SIGNIFICANT CHANGE UP (ref 0–41)
ANION GAP SERPL CALC-SCNC: 15 MMOL/L — HIGH (ref 7–14)
AST SERPL-CCNC: 53 U/L — HIGH (ref 0–41)
BASOPHILS # BLD AUTO: 0.02 K/UL — SIGNIFICANT CHANGE UP (ref 0–0.2)
BASOPHILS NFR BLD AUTO: 0.3 % — SIGNIFICANT CHANGE UP (ref 0–1)
BILIRUB SERPL-MCNC: 1.6 MG/DL — HIGH (ref 0.2–1.2)
BLD GP AB SCN SERPL QL: SIGNIFICANT CHANGE UP
BUN SERPL-MCNC: 12 MG/DL — SIGNIFICANT CHANGE UP (ref 10–20)
CALCIUM SERPL-MCNC: 6.9 MG/DL — LOW (ref 8.4–10.5)
CHLORIDE SERPL-SCNC: 100 MMOL/L — SIGNIFICANT CHANGE UP (ref 98–110)
CK SERPL-CCNC: 102 U/L — SIGNIFICANT CHANGE UP (ref 0–225)
CO2 SERPL-SCNC: 25 MMOL/L — SIGNIFICANT CHANGE UP (ref 17–32)
CREAT SERPL-MCNC: 0.8 MG/DL — SIGNIFICANT CHANGE UP (ref 0.7–1.5)
EGFR: 91 ML/MIN/1.73M2 — SIGNIFICANT CHANGE UP
EGFR: 91 ML/MIN/1.73M2 — SIGNIFICANT CHANGE UP
EOSINOPHIL # BLD AUTO: 0.23 K/UL — SIGNIFICANT CHANGE UP (ref 0–0.7)
EOSINOPHIL NFR BLD AUTO: 3.9 % — SIGNIFICANT CHANGE UP (ref 0–8)
GLUCOSE BLDC GLUCOMTR-MCNC: 164 MG/DL — HIGH (ref 70–99)
GLUCOSE BLDC GLUCOMTR-MCNC: 175 MG/DL — HIGH (ref 70–99)
GLUCOSE BLDC GLUCOMTR-MCNC: 200 MG/DL — HIGH (ref 70–99)
GLUCOSE BLDC GLUCOMTR-MCNC: 240 MG/DL — HIGH (ref 70–99)
GLUCOSE SERPL-MCNC: 157 MG/DL — HIGH (ref 70–99)
HCT VFR BLD CALC: 25.8 % — LOW (ref 42–52)
HGB BLD-MCNC: 8.1 G/DL — LOW (ref 14–18)
IMM GRANULOCYTES NFR BLD AUTO: 0.7 % — HIGH (ref 0.1–0.3)
INR BLD: 1.59 RATIO — HIGH (ref 0.65–1.3)
LYMPHOCYTES # BLD AUTO: 1.32 K/UL — SIGNIFICANT CHANGE UP (ref 1.2–3.4)
LYMPHOCYTES # BLD AUTO: 22.3 % — SIGNIFICANT CHANGE UP (ref 20.5–51.1)
MAGNESIUM SERPL-MCNC: 0.8 MG/DL — LOW (ref 1.8–2.4)
MCHC RBC-ENTMCNC: 31.4 G/DL — LOW (ref 32–37)
MCHC RBC-ENTMCNC: 31.6 PG — HIGH (ref 27–31)
MCV RBC AUTO: 100.8 FL — HIGH (ref 80–94)
MONOCYTES # BLD AUTO: 0.95 K/UL — HIGH (ref 0.1–0.6)
MONOCYTES NFR BLD AUTO: 16 % — HIGH (ref 1.7–9.3)
NEUTROPHILS # BLD AUTO: 3.37 K/UL — SIGNIFICANT CHANGE UP (ref 1.4–6.5)
NEUTROPHILS NFR BLD AUTO: 56.8 % — SIGNIFICANT CHANGE UP (ref 42.2–75.2)
NRBC BLD AUTO-RTO: 0 /100 WBCS — SIGNIFICANT CHANGE UP (ref 0–0)
PHOSPHATE SERPL-MCNC: 2.5 MG/DL — SIGNIFICANT CHANGE UP (ref 2.1–4.9)
PLATELET # BLD AUTO: 117 K/UL — LOW (ref 130–400)
PMV BLD: 8.8 FL — SIGNIFICANT CHANGE UP (ref 7.4–10.4)
POTASSIUM SERPL-MCNC: 3.1 MMOL/L — LOW (ref 3.5–5)
POTASSIUM SERPL-SCNC: 3.1 MMOL/L — LOW (ref 3.5–5)
PROT SERPL-MCNC: 5.3 G/DL — LOW (ref 6–8)
PROTHROM AB SERPL-ACNC: 19 SEC — HIGH (ref 9.95–12.87)
RBC # BLD: 2.56 M/UL — LOW (ref 4.7–6.1)
RBC # FLD: 24.1 % — HIGH (ref 11.5–14.5)
SODIUM SERPL-SCNC: 140 MMOL/L — SIGNIFICANT CHANGE UP (ref 135–146)
WBC # BLD: 5.93 K/UL — SIGNIFICANT CHANGE UP (ref 4.8–10.8)
WBC # FLD AUTO: 5.93 K/UL — SIGNIFICANT CHANGE UP (ref 4.8–10.8)

## 2025-05-24 PROCEDURE — 99232 SBSQ HOSP IP/OBS MODERATE 35: CPT

## 2025-05-24 RX ORDER — TRAMADOL HYDROCHLORIDE 50 MG/1
25 TABLET, FILM COATED ORAL ONCE
Refills: 0 | Status: DISCONTINUED | OUTPATIENT
Start: 2025-05-24 | End: 2025-05-24

## 2025-05-24 RX ADMIN — NYSTATIN 1 APPLICATION(S): 100000 CREAM TOPICAL at 16:43

## 2025-05-24 RX ADMIN — BUMETANIDE 1 MILLIGRAM(S): 1 TABLET ORAL at 07:00

## 2025-05-24 RX ADMIN — BUTYROSPERMUM PARKII(SHEA BUTTER), SIMMONDSIA CHINENSIS (JOJOBA) SEED OIL, ALOE BARBADENSIS LEAF EXTRACT 250 MILLIGRAM(S): .01; 1; 3.5 LIQUID TOPICAL at 07:00

## 2025-05-24 RX ADMIN — MIDODRINE HYDROCHLORIDE 10 MILLIGRAM(S): 5 TABLET ORAL at 21:57

## 2025-05-24 RX ADMIN — FOLIC ACID 1 MILLIGRAM(S): 1 TABLET ORAL at 12:13

## 2025-05-24 RX ADMIN — Medication 650 MILLIGRAM(S): at 16:30

## 2025-05-24 RX ADMIN — Medication 650 MILLIGRAM(S): at 15:46

## 2025-05-24 RX ADMIN — INSULIN LISPRO 2: 100 INJECTION, SOLUTION INTRAVENOUS; SUBCUTANEOUS at 21:56

## 2025-05-24 RX ADMIN — MIDODRINE HYDROCHLORIDE 10 MILLIGRAM(S): 5 TABLET ORAL at 13:10

## 2025-05-24 RX ADMIN — Medication 2 TABLET(S): at 21:57

## 2025-05-24 RX ADMIN — LEVETIRACETAM 250 MILLIGRAM(S): 10 INJECTION, SOLUTION INTRAVENOUS at 07:00

## 2025-05-24 RX ADMIN — LEVETIRACETAM 250 MILLIGRAM(S): 10 INJECTION, SOLUTION INTRAVENOUS at 16:43

## 2025-05-24 RX ADMIN — Medication 1 APPLICATION(S): at 07:02

## 2025-05-24 RX ADMIN — LACTULOSE 20 GRAM(S): 10 SOLUTION ORAL at 13:11

## 2025-05-24 RX ADMIN — LACTULOSE 20 GRAM(S): 10 SOLUTION ORAL at 21:57

## 2025-05-24 RX ADMIN — Medication 40 MILLIGRAM(S): at 12:12

## 2025-05-24 RX ADMIN — TRAMADOL HYDROCHLORIDE 25 MILLIGRAM(S): 50 TABLET, FILM COATED ORAL at 16:42

## 2025-05-24 RX ADMIN — Medication 1 TABLET(S): at 12:12

## 2025-05-24 RX ADMIN — MIDODRINE HYDROCHLORIDE 10 MILLIGRAM(S): 5 TABLET ORAL at 07:00

## 2025-05-24 RX ADMIN — CEFTRIAXONE 100 MILLIGRAM(S): 500 INJECTION, POWDER, FOR SOLUTION INTRAMUSCULAR; INTRAVENOUS at 16:47

## 2025-05-24 RX ADMIN — INSULIN LISPRO 4: 100 INJECTION, SOLUTION INTRAVENOUS; SUBCUTANEOUS at 16:45

## 2025-05-24 RX ADMIN — LACTULOSE 20 GRAM(S): 10 SOLUTION ORAL at 07:00

## 2025-05-24 RX ADMIN — LIDOCAINE HYDROCHLORIDE 1 PATCH: 20 JELLY TOPICAL at 12:17

## 2025-05-24 RX ADMIN — INSULIN LISPRO 2: 100 INJECTION, SOLUTION INTRAVENOUS; SUBCUTANEOUS at 08:20

## 2025-05-24 RX ADMIN — Medication 100 MILLIGRAM(S): at 07:02

## 2025-05-24 RX ADMIN — Medication 1 TABLET(S): at 16:42

## 2025-05-24 RX ADMIN — TRAMADOL HYDROCHLORIDE 25 MILLIGRAM(S): 50 TABLET, FILM COATED ORAL at 17:32

## 2025-05-24 RX ADMIN — Medication 1 APPLICATION(S): at 16:43

## 2025-05-24 RX ADMIN — BUTYROSPERMUM PARKII(SHEA BUTTER), SIMMONDSIA CHINENSIS (JOJOBA) SEED OIL, ALOE BARBADENSIS LEAF EXTRACT 250 MILLIGRAM(S): .01; 1; 3.5 LIQUID TOPICAL at 16:42

## 2025-05-24 RX ADMIN — DAPTOMYCIN 136 MILLIGRAM(S): 500 INJECTION, POWDER, LYOPHILIZED, FOR SOLUTION INTRAVENOUS at 16:49

## 2025-05-24 RX ADMIN — NYSTATIN 1 APPLICATION(S): 100000 CREAM TOPICAL at 07:03

## 2025-05-24 RX ADMIN — INSULIN LISPRO 2: 100 INJECTION, SOLUTION INTRAVENOUS; SUBCUTANEOUS at 12:12

## 2025-05-24 NOTE — PROGRESS NOTE ADULT - ASSESSMENT
Mr Reji is a 80 YOM w a PMH of 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  Post-cholecystectomy.   3.  Severe abdominal ascites.4.  Right pleural effusion. 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   -F/up Repeat Abdominal sonogram     Ambulatory Dysfunction   H/O Lumbar osteomyelitis in January 2025  Completed Course of antibiotics at that time   Now with Difficulty with  ambulation   Neuro consult appreciated   MR Lumbar Spine w/wo IV Cont  Since the previous lumbar spine MRI dated 1/29/2025:  1.  The findings of L4-5 discitis osteomyelitis have equivocally changed:   the bone marrow edema/enhancement within the L4 and L5 vertebral bodies   has mildly decreased. However there is new mild vertebral body height   loss of L5, increased L5 superior endplate erosion, and increased L4-5 disc edema.    2.  New mild edema within the L3-4 intervertebral disc with faint erosions involving the opposing endplates suspicious for additional level   of discitis osteomyelitis.  3.  No evidence of epidural inflammation /  fluid collection.  4.  Mild paraspinal edema without abscess.  5.  Stable degenerative changes.  ID Follow-up appreciated>>started on Daptomycin and Rocephin   -Will need IR consult for Possible Drainage       Bacteremia  -blood culture positive for bacteroides on presentation  -s/p ceftriaxone and flagyl  -repeat blood culture negative,   as per ID hold off further antibiotics       Normocytic Anemia:  multifactorial: sepsis, cirrhosis, poor nutrition, GI bleeding  -s/p EGD on 4/16 which showed 5mm ulcer in duodenal bulb post endo clip, repeat EGD on 4/29 showed no bleeding noted   transfused one unit of PRBC on 5/3, -Hb stable , cont to monitor   -Keep Active T/S     New Onset Afib  -found during this admission  -rate controlled, not candidate for AC given anemia, coagulopathy    Deconditioning  Sacral pressure ulcer  -PT/OT/OOB  -local wound care  -rotate the patient q2hrs     Pseudohypocalcemia: corrected calcium is 9.1  hypokalemia: monitor and supplement       SVT PPX: SCD due to coagulopathy, risk of Gi bleed /anemia   GI PPX: PPI   Full Code   Dispo: from home, needs STR after Possible IR drainage for Lumbar  OM

## 2025-05-24 NOTE — PROGRESS NOTE ADULT - SUBJECTIVE AND OBJECTIVE BOX
Patient is a 80y old  Male who presents with a chief complaint of acute mentation changes (23 May 2025 18:10)        MEDICATIONS  (STANDING):  buMETAnide 1 milliGRAM(s) Oral daily  cefTRIAXone   IVPB 2000 milliGRAM(s) IV Intermittent every 24 hours  chlorhexidine 2% Cloths 1 Application(s) Topical <User Schedule>  DAPTOmycin IVPB 900 milliGRAM(s) IV Intermittent every 24 hours  dextrose 5%. 1000 milliLiter(s) (50 mL/Hr) IV Continuous <Continuous>  dextrose 5%. 1000 milliLiter(s) (100 mL/Hr) IV Continuous <Continuous>  dextrose 50% Injectable 25 Gram(s) IV Push once  dextrose 50% Injectable 12.5 Gram(s) IV Push once  dextrose 50% Injectable 25 Gram(s) IV Push once  folic acid 1 milliGRAM(s) Oral daily  glucagon  Injectable 1 milliGRAM(s) IntraMuscular once  insulin lispro (ADMELOG) corrective regimen sliding scale   SubCutaneous Before meals and at bedtime  lactobacillus acidophilus 1 Tablet(s) Oral two times a day with meals  lactulose Syrup 20 Gram(s) Oral every 8 hours  levETIRAcetam  Solution 250 milliGRAM(s) Oral two times a day  lidocaine   4% Patch 1 Patch Transdermal daily  midodrine 10 milliGRAM(s) Oral every 8 hours  nystatin Powder 1 Application(s) Topical two times a day  pantoprazole  Injectable 40 milliGRAM(s) IV Push daily  rifAXIMin 550 milliGRAM(s) Oral two times a day  saccharomyces boulardii 250 milliGRAM(s) Oral two times a day  senna 2 Tablet(s) Oral at bedtime  silver sulfADIAZINE 1% Cream 1 Application(s) Topical two times a day  spironolactone 100 milliGRAM(s) Oral daily    MEDICATIONS  (PRN):  acetaminophen     Tablet .. 650 milliGRAM(s) Oral every 6 hours PRN Temp greater or equal to 38C (100.4F), Mild Pain (1 - 3)  aluminum hydroxide/magnesium hydroxide/simethicone Suspension 30 milliLiter(s) Oral every 4 hours PRN Dyspepsia  dextrose Oral Gel 15 Gram(s) Oral once PRN Blood Glucose LESS THAN 70 milliGRAM(s)/deciliter  melatonin 3 milliGRAM(s) Oral at bedtime PRN Insomnia  ondansetron Injectable 4 milliGRAM(s) IV Push every 8 hours PRN Nausea and/or Vomiting      CAPILLARY BLOOD GLUCOSE      POCT Blood Glucose.: 175 mg/dL (24 May 2025 07:22)  POCT Blood Glucose.: 245 mg/dL (23 May 2025 21:24)  POCT Blood Glucose.: 164 mg/dL (23 May 2025 11:46)    I&O's Summary      PHYSICAL EXAM:  Vital Signs Last 24 Hrs  T(C): 37 (24 May 2025 04:34), Max: 37 (24 May 2025 04:34)  T(F): 98.6 (24 May 2025 04:34), Max: 98.6 (24 May 2025 04:34)  HR: 82 (24 May 2025 04:34) (82 - 100)  BP: 135/71 (24 May 2025 04:34) (120/70 - 139/75)  BP(mean): --  RR: 18 (24 May 2025 04:34) (18 - 18)  SpO2: 100% (24 May 2025 04:34) (98% - 100%)    Parameters below as of 23 May 2025 20:12  Patient On (Oxygen Delivery Method): nasal cannula          GENERAL: No acute distress, well-developed  HEAD:  Atraumatic, Normocephalic  EYES:  Jaundiced sclera   NECK: Supple,no JVD  CHEST/LUNG: CTAB; No wheezes, rales, or rhonchi  HEART: Regular rate and rhythm; No murmurs, rubs, or gallops  ABDOMEN: Soft, distended normal bowel sound  EXTREMITIES: B/L Lower extremity pitting edema   NEUROLOGY: A&O x 1-2 , no focal deficits  SKIN: No rashes or lesions

## 2025-05-25 LAB
GLUCOSE BLDC GLUCOMTR-MCNC: 147 MG/DL — HIGH (ref 70–99)
GLUCOSE BLDC GLUCOMTR-MCNC: 167 MG/DL — HIGH (ref 70–99)
GLUCOSE BLDC GLUCOMTR-MCNC: 169 MG/DL — HIGH (ref 70–99)
GLUCOSE BLDC GLUCOMTR-MCNC: 183 MG/DL — HIGH (ref 70–99)
GLUCOSE BLDC GLUCOMTR-MCNC: 196 MG/DL — HIGH (ref 70–99)

## 2025-05-25 PROCEDURE — 99232 SBSQ HOSP IP/OBS MODERATE 35: CPT

## 2025-05-25 RX ORDER — MAGNESIUM OXIDE 400 MG
400 TABLET ORAL
Refills: 0 | Status: DISCONTINUED | OUTPATIENT
Start: 2025-05-25 | End: 2025-06-19

## 2025-05-25 RX ORDER — BUMETANIDE 1 MG/1
1 TABLET ORAL EVERY 12 HOURS
Refills: 0 | Status: DISCONTINUED | OUTPATIENT
Start: 2025-05-25 | End: 2025-06-16

## 2025-05-25 RX ADMIN — MIDODRINE HYDROCHLORIDE 10 MILLIGRAM(S): 5 TABLET ORAL at 05:21

## 2025-05-25 RX ADMIN — NYSTATIN 1 APPLICATION(S): 100000 CREAM TOPICAL at 18:21

## 2025-05-25 RX ADMIN — Medication 100 MILLIGRAM(S): at 05:20

## 2025-05-25 RX ADMIN — Medication 40 MILLIGRAM(S): at 13:06

## 2025-05-25 RX ADMIN — Medication 400 MILLIGRAM(S): at 13:06

## 2025-05-25 RX ADMIN — INSULIN LISPRO 2: 100 INJECTION, SOLUTION INTRAVENOUS; SUBCUTANEOUS at 17:07

## 2025-05-25 RX ADMIN — BUMETANIDE 1 MILLIGRAM(S): 1 TABLET ORAL at 18:18

## 2025-05-25 RX ADMIN — INSULIN LISPRO 2: 100 INJECTION, SOLUTION INTRAVENOUS; SUBCUTANEOUS at 12:06

## 2025-05-25 RX ADMIN — LACTULOSE 20 GRAM(S): 10 SOLUTION ORAL at 05:22

## 2025-05-25 RX ADMIN — Medication 40 MILLIEQUIVALENT(S): at 15:07

## 2025-05-25 RX ADMIN — NYSTATIN 1 APPLICATION(S): 100000 CREAM TOPICAL at 05:22

## 2025-05-25 RX ADMIN — Medication 1 TABLET(S): at 08:54

## 2025-05-25 RX ADMIN — LIDOCAINE HYDROCHLORIDE 1 PATCH: 20 JELLY TOPICAL at 13:04

## 2025-05-25 RX ADMIN — CEFTRIAXONE 100 MILLIGRAM(S): 500 INJECTION, POWDER, FOR SOLUTION INTRAMUSCULAR; INTRAVENOUS at 18:12

## 2025-05-25 RX ADMIN — Medication 2 TABLET(S): at 22:33

## 2025-05-25 RX ADMIN — Medication 3 MILLIGRAM(S): at 01:20

## 2025-05-25 RX ADMIN — Medication 3 MILLIGRAM(S): at 22:33

## 2025-05-25 RX ADMIN — Medication 1 APPLICATION(S): at 18:20

## 2025-05-25 RX ADMIN — Medication 1 TABLET(S): at 18:14

## 2025-05-25 RX ADMIN — Medication 400 MILLIGRAM(S): at 18:14

## 2025-05-25 RX ADMIN — FOLIC ACID 1 MILLIGRAM(S): 1 TABLET ORAL at 13:04

## 2025-05-25 RX ADMIN — BUMETANIDE 1 MILLIGRAM(S): 1 TABLET ORAL at 05:20

## 2025-05-25 RX ADMIN — DAPTOMYCIN 136 MILLIGRAM(S): 500 INJECTION, POWDER, LYOPHILIZED, FOR SOLUTION INTRAVENOUS at 19:20

## 2025-05-25 RX ADMIN — Medication 40 MILLIEQUIVALENT(S): at 10:40

## 2025-05-25 RX ADMIN — BUTYROSPERMUM PARKII(SHEA BUTTER), SIMMONDSIA CHINENSIS (JOJOBA) SEED OIL, ALOE BARBADENSIS LEAF EXTRACT 250 MILLIGRAM(S): .01; 1; 3.5 LIQUID TOPICAL at 18:17

## 2025-05-25 RX ADMIN — Medication 1 APPLICATION(S): at 05:20

## 2025-05-25 RX ADMIN — INSULIN LISPRO 2: 100 INJECTION, SOLUTION INTRAVENOUS; SUBCUTANEOUS at 23:37

## 2025-05-25 RX ADMIN — Medication 1 APPLICATION(S): at 05:22

## 2025-05-25 RX ADMIN — LACTULOSE 20 GRAM(S): 10 SOLUTION ORAL at 15:07

## 2025-05-25 RX ADMIN — MIDODRINE HYDROCHLORIDE 10 MILLIGRAM(S): 5 TABLET ORAL at 15:07

## 2025-05-25 RX ADMIN — LACTULOSE 20 GRAM(S): 10 SOLUTION ORAL at 22:33

## 2025-05-25 RX ADMIN — BUTYROSPERMUM PARKII(SHEA BUTTER), SIMMONDSIA CHINENSIS (JOJOBA) SEED OIL, ALOE BARBADENSIS LEAF EXTRACT 250 MILLIGRAM(S): .01; 1; 3.5 LIQUID TOPICAL at 05:21

## 2025-05-25 RX ADMIN — MIDODRINE HYDROCHLORIDE 10 MILLIGRAM(S): 5 TABLET ORAL at 22:33

## 2025-05-25 RX ADMIN — LIDOCAINE HYDROCHLORIDE 1 PATCH: 20 JELLY TOPICAL at 20:18

## 2025-05-25 RX ADMIN — LEVETIRACETAM 250 MILLIGRAM(S): 10 INJECTION, SOLUTION INTRAVENOUS at 18:15

## 2025-05-25 RX ADMIN — LEVETIRACETAM 250 MILLIGRAM(S): 10 INJECTION, SOLUTION INTRAVENOUS at 05:21

## 2025-05-25 NOTE — PROGRESS NOTE ADULT - ASSESSMENT
Mr Reji is a 80 YOM w a PMH of 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  Post-cholecystectomy.   3.  Severe abdominal ascites.4.  Right pleural effusion. 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   -F/up Repeat Abdominal sonogram     Ambulatory Dysfunction   H/O Lumbar osteomyelitis in January 2025  Completed Course of antibiotics at that time   Now with Difficulty with  ambulation   Neuro consult appreciated   MR Lumbar Spine w/wo IV Cont  Since the previous lumbar spine MRI dated 1/29/2025:  1.  The findings of L4-5 discitis osteomyelitis have equivocally changed   the bone marrow edema/enhancement within the L4 and L5 vertebral bodies   has mildly decreased. However there is new mild vertebral body height   loss of L5, increased L5 superior endplate erosion, and increased L4-5 disc edema.    2.  New mild edema within the L3-4 intervertebral disc with faint erosions involving the opposing endplates suspicious for additional level   of discitis osteomyelitis.  3.  No evidence of epidural inflammation /  fluid collection.  4.  Mild paraspinal edema without abscess.  5.  Stable degenerative changes.  ID Follow-up appreciated>>started on Daptomycin and Rocephin   -Will need IR consult for Possible Drainage     Bacteremia  -blood culture positive for bacteroides on presentation  -s/p ceftriaxone and flagyl  -repeat blood culture negative,   as per ID hold off further antibiotics       Normocytic Anemia:  multifactorial: sepsis, cirrhosis, poor nutrition, GI bleeding  -s/p EGD on 4/16 which showed 5mm ulcer in duodenal bulb post endo clip, repeat EGD on 4/29 showed no bleeding noted   transfused one unit of PRBC on 5/3, -Hb stable , cont to monitor   -Keep Active T/S     New Onset Afib  -found during this admission  -rate controlled, not candidate for AC given anemia, coagulopathy    Deconditioning  Sacral pressure ulcer  -PT/OT/OOB  -local wound care  -rotate the patient q2hrs     Pseudohypocalcemia: corrected calcium is 9.1  hypokalemia: monitor and supplement     SVT PPX: SCD due to coagulopathy, risk of Gi bleed /anemia   GI PPX: PPI   Full Code   Dispo: from home, needs STR after Possible IR drainage for Lumbar  OM and final abx regimen

## 2025-05-25 NOTE — PROGRESS NOTE ADULT - SUBJECTIVE AND OBJECTIVE BOX
Patient is a 80y old  Male who presents with a chief complaint of acute mentation changes (24 May 2025 09:04)        MEDICATIONS  (STANDING):  buMETAnide 1 milliGRAM(s) Oral every 12 hours  cefTRIAXone   IVPB 2000 milliGRAM(s) IV Intermittent every 24 hours  chlorhexidine 2% Cloths 1 Application(s) Topical <User Schedule>  DAPTOmycin IVPB 900 milliGRAM(s) IV Intermittent every 24 hours  dextrose 5%. 1000 milliLiter(s) (100 mL/Hr) IV Continuous <Continuous>  dextrose 5%. 1000 milliLiter(s) (50 mL/Hr) IV Continuous <Continuous>  dextrose 50% Injectable 25 Gram(s) IV Push once  dextrose 50% Injectable 12.5 Gram(s) IV Push once  dextrose 50% Injectable 25 Gram(s) IV Push once  folic acid 1 milliGRAM(s) Oral daily  glucagon  Injectable 1 milliGRAM(s) IntraMuscular once  insulin lispro (ADMELOG) corrective regimen sliding scale   SubCutaneous Before meals and at bedtime  lactobacillus acidophilus 1 Tablet(s) Oral two times a day with meals  lactulose Syrup 20 Gram(s) Oral every 8 hours  levETIRAcetam  Solution 250 milliGRAM(s) Oral two times a day  lidocaine   4% Patch 1 Patch Transdermal daily  magnesium oxide 400 milliGRAM(s) Oral three times a day with meals  midodrine 10 milliGRAM(s) Oral every 8 hours  nystatin Powder 1 Application(s) Topical two times a day  pantoprazole  Injectable 40 milliGRAM(s) IV Push daily  potassium chloride   Powder 40 milliEquivalent(s) Oral every 4 hours  rifAXIMin 550 milliGRAM(s) Oral two times a day  saccharomyces boulardii 250 milliGRAM(s) Oral two times a day  senna 2 Tablet(s) Oral at bedtime  silver sulfADIAZINE 1% Cream 1 Application(s) Topical two times a day  spironolactone 100 milliGRAM(s) Oral daily    MEDICATIONS  (PRN):  acetaminophen     Tablet .. 650 milliGRAM(s) Oral every 6 hours PRN Temp greater or equal to 38C (100.4F), Mild Pain (1 - 3)  aluminum hydroxide/magnesium hydroxide/simethicone Suspension 30 milliLiter(s) Oral every 4 hours PRN Dyspepsia  dextrose Oral Gel 15 Gram(s) Oral once PRN Blood Glucose LESS THAN 70 milliGRAM(s)/deciliter  melatonin 3 milliGRAM(s) Oral at bedtime PRN Insomnia  ondansetron Injectable 4 milliGRAM(s) IV Push every 8 hours PRN Nausea and/or Vomiting      CAPILLARY BLOOD   POCT Blood Glucose.: 196 mg/dL (25 May 2025 11:27)  POCT Blood Glucose.: 147 mg/dL (25 May 2025 07:44)  POCT Blood Glucose.: 164 mg/dL (24 May 2025 21:11)  POCT Blood Glucose.: 240 mg/dL (24 May 2025 16:25)    I&O's Summary      PHYSICAL EXAM:  Vital Signs Last 24 Hrs  T(C): 37 (25 May 2025 05:09), Max: 37 (25 May 2025 05:09)  T(F): 98.6 (25 May 2025 05:09), Max: 98.6 (25 May 2025 05:09)  HR: 98 (25 May 2025 05:09) (85 - 98)  BP: 128/69 (25 May 2025 05:09) (128/69 - 132/76)  BP(mean): --  RR: 18 (25 May 2025 05:09) (18 - 18)  SpO2: 99% (25 May 2025 05:09) (99% - 100%)    Parameters below as of 25 May 2025 05:09  Patient On (Oxygen Delivery Method): nasal cannula  O2 Flow (L/min): 2      GENERAL: No acute distress, well-developed  HEAD:  Atraumatic, Normocephalic  EYES:  Jaundiced sclera   NECK: Supple,no JVD  CHEST/LUNG: CTAB; No wheezes, rales, or rhonchi  HEART: Regular rate and rhythm; No murmurs, rubs, or gallops  ABDOMEN: Soft, distended normal bowel sound  EXTREMITIES: B/L Lower extremity pitting edema   NEUROLOGY: A&O x 1-2 , no focal deficits  SKIN: No rashes or lesions  LABS:                        8.1    5.93  )-----------( 117      ( 24 May 2025 07:27 )             25.8     05-24    140  |  100  |  12  ----------------------------<  157[H]  3.1[L]   |  25  |  0.8    Ca    6.9[L]      24 May 2025 07:27  Phos  2.5     05-24  Mg     0.8     05-24    TPro  5.3[L]  /  Alb  2.1[L]  /  TBili  1.6[H]  /  DBili  x   /  AST  53[H]  /  ALT  21  /  AlkPhos  203[H]  05-24    PT/INR - ( 24 May 2025 07:27 )   PT: 19.00 sec;   INR: 1.59 ratio          Urinalysis Basic - ( 24 May 2025 07:27 )    Color: x / Appearance: x / SG: x / pH: x  Gluc: 157 mg/dL / Ketone: x  / Bili: x / Urobili: x   Blood: x / Protein: x / Nitrite: x   Leuk Esterase: x / RBC: x / WBC x   Sq Epi: x / Non Sq Epi: x / Bacteria: x

## 2025-05-26 LAB
ALBUMIN SERPL ELPH-MCNC: 1.9 G/DL — LOW (ref 3.5–5.2)
ALP SERPL-CCNC: 193 U/L — HIGH (ref 30–115)
ALT FLD-CCNC: 19 U/L — SIGNIFICANT CHANGE UP (ref 0–41)
ANION GAP SERPL CALC-SCNC: 14 MMOL/L — SIGNIFICANT CHANGE UP (ref 7–14)
AST SERPL-CCNC: 41 U/L — SIGNIFICANT CHANGE UP (ref 0–41)
BASOPHILS # BLD AUTO: 0.02 K/UL — SIGNIFICANT CHANGE UP (ref 0–0.2)
BASOPHILS NFR BLD AUTO: 0.4 % — SIGNIFICANT CHANGE UP (ref 0–1)
BILIRUB SERPL-MCNC: 1.6 MG/DL — HIGH (ref 0.2–1.2)
BUN SERPL-MCNC: 11 MG/DL — SIGNIFICANT CHANGE UP (ref 10–20)
CALCIUM SERPL-MCNC: 7.1 MG/DL — LOW (ref 8.4–10.5)
CHLORIDE SERPL-SCNC: 98 MMOL/L — SIGNIFICANT CHANGE UP (ref 98–110)
CO2 SERPL-SCNC: 28 MMOL/L — SIGNIFICANT CHANGE UP (ref 17–32)
CREAT SERPL-MCNC: 0.8 MG/DL — SIGNIFICANT CHANGE UP (ref 0.7–1.5)
EGFR: 91 ML/MIN/1.73M2 — SIGNIFICANT CHANGE UP
EGFR: 91 ML/MIN/1.73M2 — SIGNIFICANT CHANGE UP
EOSINOPHIL # BLD AUTO: 0.26 K/UL — SIGNIFICANT CHANGE UP (ref 0–0.7)
EOSINOPHIL NFR BLD AUTO: 4.8 % — SIGNIFICANT CHANGE UP (ref 0–8)
GLUCOSE BLDC GLUCOMTR-MCNC: 137 MG/DL — HIGH (ref 70–99)
GLUCOSE BLDC GLUCOMTR-MCNC: 140 MG/DL — HIGH (ref 70–99)
GLUCOSE BLDC GLUCOMTR-MCNC: 160 MG/DL — HIGH (ref 70–99)
GLUCOSE BLDC GLUCOMTR-MCNC: 171 MG/DL — HIGH (ref 70–99)
GLUCOSE BLDC GLUCOMTR-MCNC: 206 MG/DL — HIGH (ref 70–99)
GLUCOSE SERPL-MCNC: 130 MG/DL — HIGH (ref 70–99)
HCT VFR BLD CALC: 26.7 % — LOW (ref 42–52)
HGB BLD-MCNC: 8.4 G/DL — LOW (ref 14–18)
IMM GRANULOCYTES NFR BLD AUTO: 0.6 % — HIGH (ref 0.1–0.3)
INR BLD: 1.82 RATIO — HIGH (ref 0.65–1.3)
LYMPHOCYTES # BLD AUTO: 1.16 K/UL — LOW (ref 1.2–3.4)
LYMPHOCYTES # BLD AUTO: 21.6 % — SIGNIFICANT CHANGE UP (ref 20.5–51.1)
MAGNESIUM SERPL-MCNC: 0.8 MG/DL — LOW (ref 1.8–2.4)
MCHC RBC-ENTMCNC: 31.5 G/DL — LOW (ref 32–37)
MCHC RBC-ENTMCNC: 32.3 PG — HIGH (ref 27–31)
MCV RBC AUTO: 102.7 FL — HIGH (ref 80–94)
MONOCYTES # BLD AUTO: 0.75 K/UL — HIGH (ref 0.1–0.6)
MONOCYTES NFR BLD AUTO: 14 % — HIGH (ref 1.7–9.3)
NEUTROPHILS # BLD AUTO: 3.15 K/UL — SIGNIFICANT CHANGE UP (ref 1.4–6.5)
NEUTROPHILS NFR BLD AUTO: 58.6 % — SIGNIFICANT CHANGE UP (ref 42.2–75.2)
NRBC BLD AUTO-RTO: 0 /100 WBCS — SIGNIFICANT CHANGE UP (ref 0–0)
PHOSPHATE SERPL-MCNC: 2.8 MG/DL — SIGNIFICANT CHANGE UP (ref 2.1–4.9)
PLATELET # BLD AUTO: 112 K/UL — LOW (ref 130–400)
PMV BLD: 9 FL — SIGNIFICANT CHANGE UP (ref 7.4–10.4)
POTASSIUM SERPL-MCNC: 3.8 MMOL/L — SIGNIFICANT CHANGE UP (ref 3.5–5)
POTASSIUM SERPL-SCNC: 3.8 MMOL/L — SIGNIFICANT CHANGE UP (ref 3.5–5)
PROT SERPL-MCNC: 4.9 G/DL — LOW (ref 6–8)
PROTHROM AB SERPL-ACNC: 21.7 SEC — HIGH (ref 9.95–12.87)
RBC # BLD: 2.6 M/UL — LOW (ref 4.7–6.1)
RBC # FLD: 23.3 % — HIGH (ref 11.5–14.5)
SODIUM SERPL-SCNC: 140 MMOL/L — SIGNIFICANT CHANGE UP (ref 135–146)
WBC # BLD: 5.37 K/UL — SIGNIFICANT CHANGE UP (ref 4.8–10.8)
WBC # FLD AUTO: 5.37 K/UL — SIGNIFICANT CHANGE UP (ref 4.8–10.8)

## 2025-05-26 PROCEDURE — 99232 SBSQ HOSP IP/OBS MODERATE 35: CPT

## 2025-05-26 RX ORDER — MAGNESIUM SULFATE 500 MG/ML
2 SYRINGE (ML) INJECTION EVERY 6 HOURS
Refills: 0 | Status: COMPLETED | OUTPATIENT
Start: 2025-05-26 | End: 2025-05-26

## 2025-05-26 RX ADMIN — BUMETANIDE 1 MILLIGRAM(S): 1 TABLET ORAL at 05:52

## 2025-05-26 RX ADMIN — Medication 400 MILLIGRAM(S): at 12:01

## 2025-05-26 RX ADMIN — Medication 40 MILLIGRAM(S): at 12:01

## 2025-05-26 RX ADMIN — NYSTATIN 1 APPLICATION(S): 100000 CREAM TOPICAL at 17:27

## 2025-05-26 RX ADMIN — MIDODRINE HYDROCHLORIDE 10 MILLIGRAM(S): 5 TABLET ORAL at 05:52

## 2025-05-26 RX ADMIN — LIDOCAINE HYDROCHLORIDE 1 PATCH: 20 JELLY TOPICAL at 00:18

## 2025-05-26 RX ADMIN — BUTYROSPERMUM PARKII(SHEA BUTTER), SIMMONDSIA CHINENSIS (JOJOBA) SEED OIL, ALOE BARBADENSIS LEAF EXTRACT 250 MILLIGRAM(S): .01; 1; 3.5 LIQUID TOPICAL at 06:53

## 2025-05-26 RX ADMIN — LIDOCAINE HYDROCHLORIDE 1 PATCH: 20 JELLY TOPICAL at 12:00

## 2025-05-26 RX ADMIN — Medication 40 MILLIEQUIVALENT(S): at 15:39

## 2025-05-26 RX ADMIN — FOLIC ACID 1 MILLIGRAM(S): 1 TABLET ORAL at 12:01

## 2025-05-26 RX ADMIN — Medication 1 APPLICATION(S): at 06:53

## 2025-05-26 RX ADMIN — Medication 400 MILLIGRAM(S): at 08:18

## 2025-05-26 RX ADMIN — Medication 12.5 GRAM(S): at 14:12

## 2025-05-26 RX ADMIN — NYSTATIN 1 APPLICATION(S): 100000 CREAM TOPICAL at 06:53

## 2025-05-26 RX ADMIN — Medication 1 APPLICATION(S): at 17:25

## 2025-05-26 RX ADMIN — LEVETIRACETAM 250 MILLIGRAM(S): 10 INJECTION, SOLUTION INTRAVENOUS at 05:52

## 2025-05-26 RX ADMIN — LACTULOSE 20 GRAM(S): 10 SOLUTION ORAL at 15:40

## 2025-05-26 RX ADMIN — Medication 1 TABLET(S): at 08:18

## 2025-05-26 RX ADMIN — Medication 100 MILLIGRAM(S): at 05:51

## 2025-05-26 RX ADMIN — LACTULOSE 20 GRAM(S): 10 SOLUTION ORAL at 06:52

## 2025-05-26 RX ADMIN — INSULIN LISPRO 4: 100 INJECTION, SOLUTION INTRAVENOUS; SUBCUTANEOUS at 16:45

## 2025-05-26 RX ADMIN — Medication 12.5 GRAM(S): at 17:51

## 2025-05-26 RX ADMIN — MIDODRINE HYDROCHLORIDE 10 MILLIGRAM(S): 5 TABLET ORAL at 15:38

## 2025-05-26 NOTE — PROGRESS NOTE ADULT - ASSESSMENT
Mr Reji is a 80 YOM w a PMH of 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  Post-cholecystectomy.   3.  Severe abdominal ascites.4.  Right pleural effusion. 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   - Repeat Abdominal sonogram in AM     Ambulatory Dysfunction   H/O Lumbar osteomyelitis in January 2025  Completed Course of antibiotics at that time   Now with Difficulty with  ambulation   Neuro consult appreciated   MR Lumbar Spine w/wo IV Cont  Since the previous lumbar spine MRI dated 1/29/2025:  1.  The findings of L4-5 discitis osteomyelitis have equivocally changed   the bone marrow edema/enhancement within the L4 and L5 vertebral bodies   has mildly decreased. However there is new mild vertebral body height   loss of L5, increased L5 superior endplate erosion, and increased L4-5 disc edema.    2.  New mild edema within the L3-4 intervertebral disc with faint erosions involving the opposing endplates suspicious for additional level   of discitis osteomyelitis.  3.  No evidence of epidural inflammation /  fluid collection.  4.  Mild paraspinal edema without abscess.  5.  Stable degenerative changes.  ID Follow-up appreciated>>started on Daptomycin and Rocephin   Plan was to get IR consult for biopsy but per teams conversation> would be a dry tap   -Will need Final ABX recs      Bacteremia  -blood culture positive for bacteroides on presentation  -s/p ceftriaxone and flagyl  -repeat blood culture negative,   as per ID hold off further antibiotics       Normocytic Anemia:  multifactorial: sepsis, cirrhosis, poor nutrition, GI bleeding  -s/p EGD on 4/16 which showed 5mm ulcer in duodenal bulb post endo clip, repeat EGD on 4/29 showed no bleeding noted   transfused one unit of PRBC on 5/3, -Hb stable , cont to monitor   -Keep Active T/S     New Onset Afib  -found during this admission  -rate controlled, not candidate for AC given anemia, coagulopathy    Deconditioning  Sacral pressure ulcer  -PT/OT/OOB  -local wound care  -rotate the patient q2hrs     Pseudohypocalcemia: corrected calcium is 9.1  hypokalemia: monitor and supplement     SVT PPX: SCD due to coagulopathy, risk of Gi bleed /anemia   GI PPX: PPI   Full Code   Dispo: from home  Needs STR pending final abx regimen and bed availability

## 2025-05-26 NOTE — PROGRESS NOTE ADULT - SUBJECTIVE AND OBJECTIVE BOX
Patient is a 80y old  Male who presents with a chief complaint of acute mentation changes (25 May 2025 12:48)      MEDICATIONS  (STANDING):  buMETAnide 1 milliGRAM(s) Oral every 12 hours  cefTRIAXone   IVPB 2000 milliGRAM(s) IV Intermittent every 24 hours  chlorhexidine 2% Cloths 1 Application(s) Topical <User Schedule>  DAPTOmycin IVPB 900 milliGRAM(s) IV Intermittent every 24 hours  dextrose 5%. 1000 milliLiter(s) (100 mL/Hr) IV Continuous <Continuous>  dextrose 5%. 1000 milliLiter(s) (50 mL/Hr) IV Continuous <Continuous>  dextrose 50% Injectable 25 Gram(s) IV Push once  dextrose 50% Injectable 12.5 Gram(s) IV Push once  dextrose 50% Injectable 25 Gram(s) IV Push once  folic acid 1 milliGRAM(s) Oral daily  glucagon  Injectable 1 milliGRAM(s) IntraMuscular once  insulin lispro (ADMELOG) corrective regimen sliding scale   SubCutaneous Before meals and at bedtime  lactobacillus acidophilus 1 Tablet(s) Oral two times a day with meals  lactulose Syrup 20 Gram(s) Oral every 8 hours  levETIRAcetam  Solution 250 milliGRAM(s) Oral two times a day  lidocaine   4% Patch 1 Patch Transdermal daily  magnesium oxide 400 milliGRAM(s) Oral three times a day with meals  magnesium sulfate  IVPB 2 Gram(s) IV Intermittent every 6 hours  midodrine 10 milliGRAM(s) Oral every 8 hours  nystatin Powder 1 Application(s) Topical two times a day  pantoprazole  Injectable 40 milliGRAM(s) IV Push daily  potassium chloride   Powder 40 milliEquivalent(s) Oral once  rifAXIMin 550 milliGRAM(s) Oral two times a day  saccharomyces boulardii 250 milliGRAM(s) Oral two times a day  senna 2 Tablet(s) Oral at bedtime  silver sulfADIAZINE 1% Cream 1 Application(s) Topical two times a day  spironolactone 100 milliGRAM(s) Oral daily    MEDICATIONS  (PRN):  acetaminophen     Tablet .. 650 milliGRAM(s) Oral every 6 hours PRN Temp greater or equal to 38C (100.4F), Mild Pain (1 - 3)  aluminum hydroxide/magnesium hydroxide/simethicone Suspension 30 milliLiter(s) Oral every 4 hours PRN Dyspepsia  dextrose Oral Gel 15 Gram(s) Oral once PRN Blood Glucose LESS THAN 70 milliGRAM(s)/deciliter  melatonin 3 milliGRAM(s) Oral at bedtime PRN Insomnia  ondansetron Injectable 4 milliGRAM(s) IV Push every 8 hours PRN Nausea and/or Vomiting      CAPILLARY BLOOD GLUCOSE  POCT Blood Glucose.: 140 mg/dL (26 May 2025 11:58)  POCT Blood Glucose.: 137 mg/dL (26 May 2025 07:34)  POCT Blood Glucose.: 183 mg/dL (25 May 2025 23:22)  POCT Blood Glucose.: 167 mg/dL (25 May 2025 21:00)  POCT Blood Glucose.: 169 mg/dL (25 May 2025 16:43)    I&O's Summary      PHYSICAL EXAM:  Vital Signs Last 24 Hrs  T(C): 36.7 (26 May 2025 04:44), Max: 36.9 (25 May 2025 18:38)  T(F): 98.1 (26 May 2025 04:44), Max: 98.5 (25 May 2025 18:38)  HR: 90 (26 May 2025 04:44) (83 - 91)  BP: 131/76 (26 May 2025 04:44) (101/68 - 135/72)  BP(mean): --  RR: 18 (26 May 2025 04:44) (18 - 18)  SpO2: 99% (26 May 2025 04:44) (94% - 100%)    Parameters below as of 26 May 2025 04:44  Patient On (Oxygen Delivery Method): nasal cannula  O2 Flow (L/min): 2    GENERAL: No acute distress, well-developed  HEAD:  Atraumatic, Normocephalic  EYES:  Jaundiced sclera   NECK: Supple,no JVD  CHEST/LUNG: CTAB; No wheezes, rales, or rhonchi  HEART: Regular rate and rhythm; No murmurs, rubs, or gallops  ABDOMEN: Soft, distended normal bowel sound  EXTREMITIES: B/L Lower extremity pitting edema   NEUROLOGY: A&O x 1-2 , no focal deficits  SKIN: No rashes or lesions      LABS:                        8.4    5.37  )-----------( 112      ( 26 May 2025 07:58 )             26.7     05-26    140  |  98  |  11  ----------------------------<  130[H]  3.8   |  28  |  0.8    Ca    7.1[L]      26 May 2025 07:58  Phos  2.8     05-26  Mg     0.8     05-26    TPro  4.9[L]  /  Alb  1.9[L]  /  TBili  1.6[H]  /  DBili  x   /  AST  41  /  ALT  19  /  AlkPhos  193[H]  05-26    PT/INR - ( 26 May 2025 07:58 )   PT: 21.70 sec;   INR: 1.82 ratio          Urinalysis Basic - ( 26 May 2025 07:58 )    Color: x / Appearance: x / SG: x / pH: x  Gluc: 130 mg/dL / Ketone: x  / Bili: x / Urobili: x   Blood: x / Protein: x / Nitrite: x   Leuk Esterase: x / RBC: x / WBC x   Sq Epi: x / Non Sq Epi: x / Bacteria: x          RADIOLOGY & ADDITIONAL TESTS:  Results Reviewed:   Imaging Personally Reviewed:  Electrocardiogram Personally Reviewed:    COORDINATION OF CARE:  Care Discussed with Consultants/Other Providers [Y/N]:  Prior or Outpatient Records Reviewed [Y/N]:

## 2025-05-27 LAB
ALBUMIN SERPL ELPH-MCNC: 2 G/DL — LOW (ref 3.5–5.2)
ALP SERPL-CCNC: 205 U/L — HIGH (ref 30–115)
ALT FLD-CCNC: 19 U/L — SIGNIFICANT CHANGE UP (ref 0–41)
ANION GAP SERPL CALC-SCNC: 13 MMOL/L — SIGNIFICANT CHANGE UP (ref 7–14)
APTT BLD: 39.6 SEC — HIGH (ref 27–39.2)
AST SERPL-CCNC: 41 U/L — SIGNIFICANT CHANGE UP (ref 0–41)
BASOPHILS # BLD AUTO: 0.02 K/UL — SIGNIFICANT CHANGE UP (ref 0–0.2)
BASOPHILS NFR BLD AUTO: 0.4 % — SIGNIFICANT CHANGE UP (ref 0–1)
BILIRUB SERPL-MCNC: 1.8 MG/DL — HIGH (ref 0.2–1.2)
BUN SERPL-MCNC: 12 MG/DL — SIGNIFICANT CHANGE UP (ref 10–20)
CALCIUM SERPL-MCNC: 7 MG/DL — LOW (ref 8.4–10.5)
CHLORIDE SERPL-SCNC: 95 MMOL/L — LOW (ref 98–110)
CO2 SERPL-SCNC: 28 MMOL/L — SIGNIFICANT CHANGE UP (ref 17–32)
CREAT SERPL-MCNC: 0.8 MG/DL — SIGNIFICANT CHANGE UP (ref 0.7–1.5)
EGFR: 91 ML/MIN/1.73M2 — SIGNIFICANT CHANGE UP
EGFR: 91 ML/MIN/1.73M2 — SIGNIFICANT CHANGE UP
EOSINOPHIL # BLD AUTO: 0.17 K/UL — SIGNIFICANT CHANGE UP (ref 0–0.7)
EOSINOPHIL NFR BLD AUTO: 3.2 % — SIGNIFICANT CHANGE UP (ref 0–8)
GLUCOSE BLDC GLUCOMTR-MCNC: 129 MG/DL — HIGH (ref 70–99)
GLUCOSE BLDC GLUCOMTR-MCNC: 154 MG/DL — HIGH (ref 70–99)
GLUCOSE BLDC GLUCOMTR-MCNC: 173 MG/DL — HIGH (ref 70–99)
GLUCOSE BLDC GLUCOMTR-MCNC: 200 MG/DL — HIGH (ref 70–99)
GLUCOSE SERPL-MCNC: 124 MG/DL — HIGH (ref 70–99)
HCT VFR BLD CALC: 26.4 % — LOW (ref 42–52)
HGB BLD-MCNC: 8.6 G/DL — LOW (ref 14–18)
IMM GRANULOCYTES NFR BLD AUTO: 0.6 % — HIGH (ref 0.1–0.3)
INR BLD: 1.6 RATIO — HIGH (ref 0.65–1.3)
LYMPHOCYTES # BLD AUTO: 1.07 K/UL — LOW (ref 1.2–3.4)
LYMPHOCYTES # BLD AUTO: 20.2 % — LOW (ref 20.5–51.1)
MAGNESIUM SERPL-MCNC: 1.4 MG/DL — LOW (ref 1.8–2.4)
MCHC RBC-ENTMCNC: 32.6 G/DL — SIGNIFICANT CHANGE UP (ref 32–37)
MCHC RBC-ENTMCNC: 32.6 PG — HIGH (ref 27–31)
MCV RBC AUTO: 100 FL — HIGH (ref 80–94)
MONOCYTES # BLD AUTO: 0.73 K/UL — HIGH (ref 0.1–0.6)
MONOCYTES NFR BLD AUTO: 13.7 % — HIGH (ref 1.7–9.3)
NEUTROPHILS # BLD AUTO: 3.29 K/UL — SIGNIFICANT CHANGE UP (ref 1.4–6.5)
NEUTROPHILS NFR BLD AUTO: 61.9 % — SIGNIFICANT CHANGE UP (ref 42.2–75.2)
NRBC BLD AUTO-RTO: 0 /100 WBCS — SIGNIFICANT CHANGE UP (ref 0–0)
PHOSPHATE SERPL-MCNC: 2.4 MG/DL — SIGNIFICANT CHANGE UP (ref 2.1–4.9)
PLATELET # BLD AUTO: 117 K/UL — LOW (ref 130–400)
PMV BLD: 8.9 FL — SIGNIFICANT CHANGE UP (ref 7.4–10.4)
POTASSIUM SERPL-MCNC: 3.7 MMOL/L — SIGNIFICANT CHANGE UP (ref 3.5–5)
POTASSIUM SERPL-SCNC: 3.7 MMOL/L — SIGNIFICANT CHANGE UP (ref 3.5–5)
PROT SERPL-MCNC: 5.3 G/DL — LOW (ref 6–8)
PROTHROM AB SERPL-ACNC: 19.1 SEC — HIGH (ref 9.95–12.87)
RBC # BLD: 2.64 M/UL — LOW (ref 4.7–6.1)
RBC # FLD: 22.9 % — HIGH (ref 11.5–14.5)
SODIUM SERPL-SCNC: 136 MMOL/L — SIGNIFICANT CHANGE UP (ref 135–146)
WBC # BLD: 5.31 K/UL — SIGNIFICANT CHANGE UP (ref 4.8–10.8)
WBC # FLD AUTO: 5.31 K/UL — SIGNIFICANT CHANGE UP (ref 4.8–10.8)

## 2025-05-27 PROCEDURE — 99233 SBSQ HOSP IP/OBS HIGH 50: CPT

## 2025-05-27 RX ORDER — MAGNESIUM SULFATE 500 MG/ML
2 SYRINGE (ML) INJECTION EVERY 6 HOURS
Refills: 0 | Status: COMPLETED | OUTPATIENT
Start: 2025-05-27 | End: 2025-05-27

## 2025-05-27 RX ADMIN — Medication 400 MILLIGRAM(S): at 12:34

## 2025-05-27 RX ADMIN — CEFTRIAXONE 100 MILLIGRAM(S): 500 INJECTION, POWDER, FOR SOLUTION INTRAMUSCULAR; INTRAVENOUS at 16:51

## 2025-05-27 RX ADMIN — Medication 650 MILLIGRAM(S): at 16:50

## 2025-05-27 RX ADMIN — Medication 2 TABLET(S): at 00:01

## 2025-05-27 RX ADMIN — NYSTATIN 1 APPLICATION(S): 100000 CREAM TOPICAL at 05:51

## 2025-05-27 RX ADMIN — Medication 1 APPLICATION(S): at 05:52

## 2025-05-27 RX ADMIN — FOLIC ACID 1 MILLIGRAM(S): 1 TABLET ORAL at 12:32

## 2025-05-27 RX ADMIN — Medication 1 APPLICATION(S): at 17:05

## 2025-05-27 RX ADMIN — Medication 1 TABLET(S): at 12:32

## 2025-05-27 RX ADMIN — INSULIN LISPRO 2: 100 INJECTION, SOLUTION INTRAVENOUS; SUBCUTANEOUS at 00:00

## 2025-05-27 RX ADMIN — DAPTOMYCIN 136 MILLIGRAM(S): 500 INJECTION, POWDER, LYOPHILIZED, FOR SOLUTION INTRAVENOUS at 17:06

## 2025-05-27 RX ADMIN — LIDOCAINE HYDROCHLORIDE 1 PATCH: 20 JELLY TOPICAL at 12:31

## 2025-05-27 RX ADMIN — LEVETIRACETAM 250 MILLIGRAM(S): 10 INJECTION, SOLUTION INTRAVENOUS at 16:53

## 2025-05-27 RX ADMIN — CEFTRIAXONE 100 MILLIGRAM(S): 500 INJECTION, POWDER, FOR SOLUTION INTRAMUSCULAR; INTRAVENOUS at 01:48

## 2025-05-27 RX ADMIN — Medication 1 APPLICATION(S): at 05:51

## 2025-05-27 RX ADMIN — Medication 12.5 GRAM(S): at 17:04

## 2025-05-27 RX ADMIN — INSULIN LISPRO 2: 100 INJECTION, SOLUTION INTRAVENOUS; SUBCUTANEOUS at 21:15

## 2025-05-27 RX ADMIN — DAPTOMYCIN 136 MILLIGRAM(S): 500 INJECTION, POWDER, LYOPHILIZED, FOR SOLUTION INTRAVENOUS at 00:00

## 2025-05-27 RX ADMIN — Medication 1 TABLET(S): at 16:51

## 2025-05-27 RX ADMIN — MIDODRINE HYDROCHLORIDE 10 MILLIGRAM(S): 5 TABLET ORAL at 00:01

## 2025-05-27 RX ADMIN — LACTULOSE 20 GRAM(S): 10 SOLUTION ORAL at 12:35

## 2025-05-27 RX ADMIN — Medication 650 MILLIGRAM(S): at 17:37

## 2025-05-27 RX ADMIN — MIDODRINE HYDROCHLORIDE 10 MILLIGRAM(S): 5 TABLET ORAL at 12:36

## 2025-05-27 RX ADMIN — Medication 400 MILLIGRAM(S): at 16:50

## 2025-05-27 RX ADMIN — Medication 400 MILLIGRAM(S): at 18:13

## 2025-05-27 RX ADMIN — Medication 12.5 GRAM(S): at 20:36

## 2025-05-27 RX ADMIN — BUMETANIDE 1 MILLIGRAM(S): 1 TABLET ORAL at 17:18

## 2025-05-27 RX ADMIN — NYSTATIN 1 APPLICATION(S): 100000 CREAM TOPICAL at 17:01

## 2025-05-27 RX ADMIN — LACTULOSE 20 GRAM(S): 10 SOLUTION ORAL at 00:00

## 2025-05-27 RX ADMIN — Medication 40 MILLIGRAM(S): at 12:34

## 2025-05-27 RX ADMIN — INSULIN LISPRO 2: 100 INJECTION, SOLUTION INTRAVENOUS; SUBCUTANEOUS at 16:57

## 2025-05-27 RX ADMIN — BUTYROSPERMUM PARKII(SHEA BUTTER), SIMMONDSIA CHINENSIS (JOJOBA) SEED OIL, ALOE BARBADENSIS LEAF EXTRACT 250 MILLIGRAM(S): .01; 1; 3.5 LIQUID TOPICAL at 16:51

## 2025-05-27 NOTE — CONSULT NOTE ADULT - SUBJECTIVE AND OBJECTIVE BOX
INTERVENTIONAL RADIOLOGY CONSULT:     Procedure Requested:     HPI:  80 years old male history of hypertension, diabetes, subdural hematoma on Keppra, hx mechanical falls, recent admission (January 2025) for osteomyelitis of L4-5 w completion of abx.  BIBA from home status post change of mental status.  Per provider note, patient was discharged from rehab facility on April 4.  Patient was supposed to have MRI of lower back on April 5 but patient refused.  Patient baseline uses walker to ambulate.  By report patient has been doing okay since his discharge from the facility.  Patient had 1 episode of fall on his buttocks few days ago that she was able to help patient to get up with no difficulty.  Patient become confused and calling for his father tonight over the past few hours so she called EMS.  No ROS given mental status, no family at bedside.     (14 Apr 2025 00:29)      PAST MEDICAL & SURGICAL HISTORY:  Diabetes      Hypertension      Fall      H/O left knee surgery      History of cholecystectomy      History of appendectomy          MEDICATIONS  (STANDING):  buMETAnide 1 milliGRAM(s) Oral every 12 hours  cefTRIAXone   IVPB 2000 milliGRAM(s) IV Intermittent every 24 hours  chlorhexidine 2% Cloths 1 Application(s) Topical <User Schedule>  DAPTOmycin IVPB 900 milliGRAM(s) IV Intermittent every 24 hours  dextrose 5%. 1000 milliLiter(s) (100 mL/Hr) IV Continuous <Continuous>  dextrose 5%. 1000 milliLiter(s) (50 mL/Hr) IV Continuous <Continuous>  dextrose 50% Injectable 25 Gram(s) IV Push once  dextrose 50% Injectable 12.5 Gram(s) IV Push once  dextrose 50% Injectable 25 Gram(s) IV Push once  folic acid 1 milliGRAM(s) Oral daily  glucagon  Injectable 1 milliGRAM(s) IntraMuscular once  insulin lispro (ADMELOG) corrective regimen sliding scale   SubCutaneous Before meals and at bedtime  lactobacillus acidophilus 1 Tablet(s) Oral two times a day with meals  lactulose Syrup 20 Gram(s) Oral every 8 hours  levETIRAcetam  Solution 250 milliGRAM(s) Oral two times a day  lidocaine   4% Patch 1 Patch Transdermal daily  magnesium oxide 400 milliGRAM(s) Oral three times a day with meals  magnesium sulfate  IVPB 2 Gram(s) IV Intermittent every 6 hours  midodrine 10 milliGRAM(s) Oral every 8 hours  nystatin Powder 1 Application(s) Topical two times a day  pantoprazole  Injectable 40 milliGRAM(s) IV Push daily  rifAXIMin 550 milliGRAM(s) Oral two times a day  saccharomyces boulardii 250 milliGRAM(s) Oral two times a day  senna 2 Tablet(s) Oral at bedtime  silver sulfADIAZINE 1% Cream 1 Application(s) Topical two times a day  spironolactone 100 milliGRAM(s) Oral daily    MEDICATIONS  (PRN):  acetaminophen     Tablet .. 650 milliGRAM(s) Oral every 6 hours PRN Temp greater or equal to 38C (100.4F), Mild Pain (1 - 3)  aluminum hydroxide/magnesium hydroxide/simethicone Suspension 30 milliLiter(s) Oral every 4 hours PRN Dyspepsia  dextrose Oral Gel 15 Gram(s) Oral once PRN Blood Glucose LESS THAN 70 milliGRAM(s)/deciliter  melatonin 3 milliGRAM(s) Oral at bedtime PRN Insomnia  ondansetron Injectable 4 milliGRAM(s) IV Push every 8 hours PRN Nausea and/or Vomiting      Allergies    No Known Allergies    Intolerances          FAMILY HISTORY:      Physical Exam:   Vital Signs Last 24 Hrs  T(C): 37.1 (27 May 2025 05:08), Max: 37.1 (27 May 2025 05:08)  T(F): 98.7 (27 May 2025 05:08), Max: 98.7 (27 May 2025 05:08)  HR: 84 (27 May 2025 05:08) (65 - 84)  BP: 120/75 (27 May 2025 05:08) (120/75 - 150/79)  BP(mean): --  RR: 18 (27 May 2025 05:08) (18 - 18)  SpO2: 97% (27 May 2025 08:23) (97% - 98%)    Parameters below as of 27 May 2025 08:23  Patient On (Oxygen Delivery Method): nasal cannula  O2 Flow (L/min): 2        Labs:                         8.6    5.31  )-----------( 117      ( 27 May 2025 08:21 )             26.4     05-27    136  |  95[L]  |  12  ----------------------------<  124[H]  3.7   |  28  |  0.8    Ca    7.0[L]      27 May 2025 08:21  Phos  2.4     05-27  Mg     1.4     05-27    TPro  5.3[L]  /  Alb  2.0[L]  /  TBili  1.8[H]  /  DBili  x   /  AST  41  /  ALT  19  /  AlkPhos  205[H]  05-27    PT/INR - ( 27 May 2025 08:21 )   PT: 19.10 sec;   INR: 1.60 ratio         PTT - ( 27 May 2025 08:21 )  PTT:39.6 sec    Pertinent labs:                      8.6    5.31  )-----------( 117      ( 27 May 2025 08:21 )             26.4       05-27    136  |  95[L]  |  12  ----------------------------<  124[H]  3.7   |  28  |  0.8    Ca    7.0[L]      27 May 2025 08:21  Phos  2.4     05-27  Mg     1.4     05-27    TPro  5.3[L]  /  Alb  2.0[L]  /  TBili  1.8[H]  /  DBili  x   /  AST  41  /  ALT  19  /  AlkPhos  205[H]  05-27      PT/INR - ( 27 May 2025 08:21 )   PT: 19.10 sec;   INR: 1.60 ratio         PTT - ( 27 May 2025 08:21 )  PTT:39.6 sec    Radiology & Additional Studies:     Radiology imaging reviewed.       ASSESSMENT AND PLAN:  -Consulted for   -NPO after midnight  -draw CBC/CMP/Coags prior to procedure  -hold anticoagulation until after procedure    Please call Interventional Radiology with questions or concerns:   - M-F [phone]: [phone]   - All other hours: [phone]   INTERVENTIONAL RADIOLOGY CONSULT:     Procedure Requested:     HPI:  80 years old male history of hypertension, diabetes, subdural hematoma on Keppra, hx mechanical falls, recent admission (January 2025) for osteomyelitis of L4-5 w completion of abx.  BIBA from home status post change of mental status.  Per provider note, patient was discharged from rehab facility on April 4.  Patient was supposed to have MRI of lower back on April 5 but patient refused.  Patient baseline uses walker to ambulate.  By report patient has been doing okay since his discharge from the facility.  Patient had 1 episode of fall on his buttocks few days ago that she was able to help patient to get up with no difficulty.  Patient become confused and calling for his father tonight over the past few hours so she called EMS.  No ROS given mental status, no family at bedside.     (14 Apr 2025 00:29)      PAST MEDICAL & SURGICAL HISTORY:  Diabetes      Hypertension    Fall    H/O left knee surgery    History of cholecystectomy    History of appendectomy      MEDICATIONS  (STANDING):  buMETAnide 1 milliGRAM(s) Oral every 12 hours  cefTRIAXone   IVPB 2000 milliGRAM(s) IV Intermittent every 24 hours  chlorhexidine 2% Cloths 1 Application(s) Topical <User Schedule>  DAPTOmycin IVPB 900 milliGRAM(s) IV Intermittent every 24 hours  dextrose 5%. 1000 milliLiter(s) (100 mL/Hr) IV Continuous <Continuous>  dextrose 5%. 1000 milliLiter(s) (50 mL/Hr) IV Continuous <Continuous>  dextrose 50% Injectable 25 Gram(s) IV Push once  dextrose 50% Injectable 12.5 Gram(s) IV Push once  dextrose 50% Injectable 25 Gram(s) IV Push once  folic acid 1 milliGRAM(s) Oral daily  glucagon  Injectable 1 milliGRAM(s) IntraMuscular once  insulin lispro (ADMELOG) corrective regimen sliding scale   SubCutaneous Before meals and at bedtime  lactobacillus acidophilus 1 Tablet(s) Oral two times a day with meals  lactulose Syrup 20 Gram(s) Oral every 8 hours  levETIRAcetam  Solution 250 milliGRAM(s) Oral two times a day  lidocaine   4% Patch 1 Patch Transdermal daily  magnesium oxide 400 milliGRAM(s) Oral three times a day with meals  magnesium sulfate  IVPB 2 Gram(s) IV Intermittent every 6 hours  midodrine 10 milliGRAM(s) Oral every 8 hours  nystatin Powder 1 Application(s) Topical two times a day  pantoprazole  Injectable 40 milliGRAM(s) IV Push daily  rifAXIMin 550 milliGRAM(s) Oral two times a day  saccharomyces boulardii 250 milliGRAM(s) Oral two times a day  senna 2 Tablet(s) Oral at bedtime  silver sulfADIAZINE 1% Cream 1 Application(s) Topical two times a day  spironolactone 100 milliGRAM(s) Oral daily    MEDICATIONS  (PRN):  acetaminophen     Tablet .. 650 milliGRAM(s) Oral every 6 hours PRN Temp greater or equal to 38C (100.4F), Mild Pain (1 - 3)  aluminum hydroxide/magnesium hydroxide/simethicone Suspension 30 milliLiter(s) Oral every 4 hours PRN Dyspepsia  dextrose Oral Gel 15 Gram(s) Oral once PRN Blood Glucose LESS THAN 70 milliGRAM(s)/deciliter  melatonin 3 milliGRAM(s) Oral at bedtime PRN Insomnia  ondansetron Injectable 4 milliGRAM(s) IV Push every 8 hours PRN Nausea and/or Vomiting      Allergies    No Known Allergies    Intolerances          FAMILY HISTORY:      Physical Exam:   Vital Signs Last 24 Hrs  T(C): 37.1 (27 May 2025 05:08), Max: 37.1 (27 May 2025 05:08)  T(F): 98.7 (27 May 2025 05:08), Max: 98.7 (27 May 2025 05:08)  HR: 84 (27 May 2025 05:08) (65 - 84)  BP: 120/75 (27 May 2025 05:08) (120/75 - 150/79)  BP(mean): --  RR: 18 (27 May 2025 05:08) (18 - 18)  SpO2: 97% (27 May 2025 08:23) (97% - 98%)    Parameters below as of 27 May 2025 08:23  Patient On (Oxygen Delivery Method): nasal cannula  O2 Flow (L/min): 2        Labs:                         8.6    5.31  )-----------( 117      ( 27 May 2025 08:21 )             26.4     05-27    136  |  95[L]  |  12  ----------------------------<  124[H]  3.7   |  28  |  0.8    Ca    7.0[L]      27 May 2025 08:21  Phos  2.4     05-27  Mg     1.4     05-27    TPro  5.3[L]  /  Alb  2.0[L]  /  TBili  1.8[H]  /  DBili  x   /  AST  41  /  ALT  19  /  AlkPhos  205[H]  05-27    PT/INR - ( 27 May 2025 08:21 )   PT: 19.10 sec;   INR: 1.60 ratio         PTT - ( 27 May 2025 08:21 )  PTT:39.6 sec    Pertinent labs:                      8.6    5.31  )-----------( 117      ( 27 May 2025 08:21 )             26.4       05-27    136  |  95[L]  |  12  ----------------------------<  124[H]  3.7   |  28  |  0.8    Ca    7.0[L]      27 May 2025 08:21  Phos  2.4     05-27  Mg     1.4     05-27    TPro  5.3[L]  /  Alb  2.0[L]  /  TBili  1.8[H]  /  DBili  x   /  AST  41  /  ALT  19  /  AlkPhos  205[H]  05-27      PT/INR - ( 27 May 2025 08:21 )   PT: 19.10 sec;   INR: 1.60 ratio         PTT - ( 27 May 2025 08:21 )  PTT:39.6 sec    Radiology & Additional Studies:     Radiology imaging reviewed.       ASSESSMENT AND PLAN:  -MRI of lumbar spine from 5/22/2025 demonstrates stable appearance compared to MRI of lumbar spine from 01/29/2025.    Findings' of L4-5 discitis osteomyelitis have equivocally changed: the bone marrow edema/enhancement within the L4 and L5 vertebral bodies has mildly decreased.    Question of new subtle erosive changes. However there is new mild vertebral body height loss of L5, increased L5 superior endplate erosion, and increased L4-5 disc edema. IR was consulted for CT guided soft tissue biopsy. Spoke with Dr. Nora Beck from ID. 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     Will contact and reconsult IR, once family is on board and if amendable to plan of care.       Please call Interventional Radiology with questions or concerns:   - M-F [phone]: [phone]   - All other hours: [phone]   INTERVENTIONAL RADIOLOGY CONSULT:     Procedure Requested: CT guide soft tissue biopsy of lumbar spine     HPI:  80 years old male history of hypertension, diabetes, subdural hematoma on Keppra, hx mechanical falls, recent admission (January 2025) for osteomyelitis of L4-5 w completion of abx.  BIBA from home status post change of mental status.  Per provider note, patient was discharged from rehab facility on April 4.  Patient was supposed to have MRI of lower back on April 5 but patient refused.  Patient baseline uses walker to ambulate.  By report patient has been doing okay since his discharge from the facility.  Patient had 1 episode of fall on his buttocks few days ago that she was able to help patient to get up with no difficulty.  Patient become confused and calling for his father tonight over the past few hours so she called EMS.  No ROS given mental status, no family at bedside.     (14 Apr 2025 00:29)      PAST MEDICAL & SURGICAL HISTORY:  Diabetes    Hypertension    Fall    H/O left knee surgery    History of cholecystectomy    History of appendectomy      MEDICATIONS  (STANDING):  buMETAnide 1 milliGRAM(s) Oral every 12 hours  cefTRIAXone   IVPB 2000 milliGRAM(s) IV Intermittent every 24 hours  chlorhexidine 2% Cloths 1 Application(s) Topical <User Schedule>  DAPTOmycin IVPB 900 milliGRAM(s) IV Intermittent every 24 hours  dextrose 5%. 1000 milliLiter(s) (100 mL/Hr) IV Continuous <Continuous>  dextrose 5%. 1000 milliLiter(s) (50 mL/Hr) IV Continuous <Continuous>  dextrose 50% Injectable 25 Gram(s) IV Push once  dextrose 50% Injectable 12.5 Gram(s) IV Push once  dextrose 50% Injectable 25 Gram(s) IV Push once  folic acid 1 milliGRAM(s) Oral daily  glucagon  Injectable 1 milliGRAM(s) IntraMuscular once  insulin lispro (ADMELOG) corrective regimen sliding scale   SubCutaneous Before meals and at bedtime  lactobacillus acidophilus 1 Tablet(s) Oral two times a day with meals  lactulose Syrup 20 Gram(s) Oral every 8 hours  levETIRAcetam  Solution 250 milliGRAM(s) Oral two times a day  lidocaine   4% Patch 1 Patch Transdermal daily  magnesium oxide 400 milliGRAM(s) Oral three times a day with meals  magnesium sulfate  IVPB 2 Gram(s) IV Intermittent every 6 hours  midodrine 10 milliGRAM(s) Oral every 8 hours  nystatin Powder 1 Application(s) Topical two times a day  pantoprazole  Injectable 40 milliGRAM(s) IV Push daily  rifAXIMin 550 milliGRAM(s) Oral two times a day  saccharomyces boulardii 250 milliGRAM(s) Oral two times a day  senna 2 Tablet(s) Oral at bedtime  silver sulfADIAZINE 1% Cream 1 Application(s) Topical two times a day  spironolactone 100 milliGRAM(s) Oral daily    MEDICATIONS  (PRN):  acetaminophen     Tablet .. 650 milliGRAM(s) Oral every 6 hours PRN Temp greater or equal to 38C (100.4F), Mild Pain (1 - 3)  aluminum hydroxide/magnesium hydroxide/simethicone Suspension 30 milliLiter(s) Oral every 4 hours PRN Dyspepsia  dextrose Oral Gel 15 Gram(s) Oral once PRN Blood Glucose LESS THAN 70 milliGRAM(s)/deciliter  melatonin 3 milliGRAM(s) Oral at bedtime PRN Insomnia  ondansetron Injectable 4 milliGRAM(s) IV Push every 8 hours PRN Nausea and/or Vomiting      Allergies    No Known Allergies    Intolerances      FAMILY HISTORY:    Physical Exam:   Vital Signs Last 24 Hrs  T(C): 37.1 (27 May 2025 05:08), Max: 37.1 (27 May 2025 05:08)  T(F): 98.7 (27 May 2025 05:08), Max: 98.7 (27 May 2025 05:08)  HR: 84 (27 May 2025 05:08) (65 - 84)  BP: 120/75 (27 May 2025 05:08) (120/75 - 150/79)  BP(mean): --  RR: 18 (27 May 2025 05:08) (18 - 18)  SpO2: 97% (27 May 2025 08:23) (97% - 98%)    Parameters below as of 27 May 2025 08:23  Patient On (Oxygen Delivery Method): nasal cannula  O2 Flow (L/min): 2        Labs:                         8.6    5.31  )-----------( 117      ( 27 May 2025 08:21 )             26.4     05-27    136  |  95[L]  |  12  ----------------------------<  124[H]  3.7   |  28  |  0.8    Ca    7.0[L]      27 May 2025 08:21  Phos  2.4     05-27  Mg     1.4     05-27    TPro  5.3[L]  /  Alb  2.0[L]  /  TBili  1.8[H]  /  DBili  x   /  AST  41  /  ALT  19  /  AlkPhos  205[H]  05-27    PT/INR - ( 27 May 2025 08:21 )   PT: 19.10 sec;   INR: 1.60 ratio         PTT - ( 27 May 2025 08:21 )  PTT:39.6 sec    Pertinent labs:                      8.6    5.31  )-----------( 117      ( 27 May 2025 08:21 )             26.4       05-27    136  |  95[L]  |  12  ----------------------------<  124[H]  3.7   |  28  |  0.8    Ca    7.0[L]      27 May 2025 08:21  Phos  2.4     05-27  Mg     1.4     05-27    TPro  5.3[L]  /  Alb  2.0[L]  /  TBili  1.8[H]  /  DBili  x   /  AST  41  /  ALT  19  /  AlkPhos  205[H]  05-27      PT/INR - ( 27 May 2025 08:21 )   PT: 19.10 sec;   INR: 1.60 ratio         PTT - ( 27 May 2025 08:21 )  PTT:39.6 sec    Radiology & Additional Studies:     Radiology imaging reviewed.       ASSESSMENT AND PLAN:  -MRI of lumbar spine from 5/22/2025 demonstrates stable appearance compared to MRI of lumbar spine from 01/29/2025.    Findings' of L4-5 discitis osteomyelitis have equivocally changed: the bone marrow edema/enhancement within the L4 and L5 vertebral bodies has mildly decreased.    Question of new subtle erosive changes. However there is new mild vertebral body height loss of L5, increased L5 superior endplate erosion, and increased L4-5 disc edema. IR was consulted for CT guide soft tissue biopsy of lumbar spine Spoke with Dr. Nora Beck from ID. 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     Will contact and reconsult IR, once family is on board and if amendable to plan of care.       Please call Interventional Radiology with questions or concerns:   - M-F [phone]: [phone]   - All other hours: [phone]   INTERVENTIONAL RADIOLOGY CONSULT:     Procedure Requested: CT guide soft tissue biopsy of lumbar spine     HPI:  80 years old male history of hypertension, diabetes, subdural hematoma on Keppra, hx mechanical falls, recent admission (January 2025) for osteomyelitis of L4-5 w completion of abx.  BIBA from home status post change of mental status.  Per provider note, patient was discharged from rehab facility on April 4.  Patient was supposed to have MRI of lower back on April 5 but patient refused.  Patient baseline uses walker to ambulate.  By report patient has been doing okay since his discharge from the facility.  Patient had 1 episode of fall on his buttocks few days ago that she was able to help patient to get up with no difficulty.  Patient become confused and calling for his father tonight over the past few hours so she called EMS.  No ROS given mental status, no family at bedside.     (14 Apr 2025 00:29)      PAST MEDICAL & SURGICAL HISTORY:  Diabetes    Hypertension    Fall    H/O left knee surgery    History of cholecystectomy    History of appendectomy      MEDICATIONS  (STANDING):  buMETAnide 1 milliGRAM(s) Oral every 12 hours  cefTRIAXone   IVPB 2000 milliGRAM(s) IV Intermittent every 24 hours  chlorhexidine 2% Cloths 1 Application(s) Topical <User Schedule>  DAPTOmycin IVPB 900 milliGRAM(s) IV Intermittent every 24 hours  dextrose 5%. 1000 milliLiter(s) (100 mL/Hr) IV Continuous <Continuous>  dextrose 5%. 1000 milliLiter(s) (50 mL/Hr) IV Continuous <Continuous>  dextrose 50% Injectable 25 Gram(s) IV Push once  dextrose 50% Injectable 12.5 Gram(s) IV Push once  dextrose 50% Injectable 25 Gram(s) IV Push once  folic acid 1 milliGRAM(s) Oral daily  glucagon  Injectable 1 milliGRAM(s) IntraMuscular once  insulin lispro (ADMELOG) corrective regimen sliding scale   SubCutaneous Before meals and at bedtime  lactobacillus acidophilus 1 Tablet(s) Oral two times a day with meals  lactulose Syrup 20 Gram(s) Oral every 8 hours  levETIRAcetam  Solution 250 milliGRAM(s) Oral two times a day  lidocaine   4% Patch 1 Patch Transdermal daily  magnesium oxide 400 milliGRAM(s) Oral three times a day with meals  magnesium sulfate  IVPB 2 Gram(s) IV Intermittent every 6 hours  midodrine 10 milliGRAM(s) Oral every 8 hours  nystatin Powder 1 Application(s) Topical two times a day  pantoprazole  Injectable 40 milliGRAM(s) IV Push daily  rifAXIMin 550 milliGRAM(s) Oral two times a day  saccharomyces boulardii 250 milliGRAM(s) Oral two times a day  senna 2 Tablet(s) Oral at bedtime  silver sulfADIAZINE 1% Cream 1 Application(s) Topical two times a day  spironolactone 100 milliGRAM(s) Oral daily    MEDICATIONS  (PRN):  acetaminophen     Tablet .. 650 milliGRAM(s) Oral every 6 hours PRN Temp greater or equal to 38C (100.4F), Mild Pain (1 - 3)  aluminum hydroxide/magnesium hydroxide/simethicone Suspension 30 milliLiter(s) Oral every 4 hours PRN Dyspepsia  dextrose Oral Gel 15 Gram(s) Oral once PRN Blood Glucose LESS THAN 70 milliGRAM(s)/deciliter  melatonin 3 milliGRAM(s) Oral at bedtime PRN Insomnia  ondansetron Injectable 4 milliGRAM(s) IV Push every 8 hours PRN Nausea and/or Vomiting      Allergies    No Known Allergies    Intolerances      FAMILY HISTORY:    Physical Exam:   Vital Signs Last 24 Hrs  T(C): 37.1 (27 May 2025 05:08), Max: 37.1 (27 May 2025 05:08)  T(F): 98.7 (27 May 2025 05:08), Max: 98.7 (27 May 2025 05:08)  HR: 84 (27 May 2025 05:08) (65 - 84)  BP: 120/75 (27 May 2025 05:08) (120/75 - 150/79)  BP(mean): --  RR: 18 (27 May 2025 05:08) (18 - 18)  SpO2: 97% (27 May 2025 08:23) (97% - 98%)    Parameters below as of 27 May 2025 08:23  Patient On (Oxygen Delivery Method): nasal cannula  O2 Flow (L/min): 2        Labs:                         8.6    5.31  )-----------( 117      ( 27 May 2025 08:21 )             26.4     05-27    136  |  95[L]  |  12  ----------------------------<  124[H]  3.7   |  28  |  0.8    Ca    7.0[L]      27 May 2025 08:21  Phos  2.4     05-27  Mg     1.4     05-27    TPro  5.3[L]  /  Alb  2.0[L]  /  TBili  1.8[H]  /  DBili  x   /  AST  41  /  ALT  19  /  AlkPhos  205[H]  05-27    PT/INR - ( 27 May 2025 08:21 )   PT: 19.10 sec;   INR: 1.60 ratio         PTT - ( 27 May 2025 08:21 )  PTT:39.6 sec    Pertinent labs:                      8.6    5.31  )-----------( 117      ( 27 May 2025 08:21 )             26.4       05-27    136  |  95[L]  |  12  ----------------------------<  124[H]  3.7   |  28  |  0.8    Ca    7.0[L]      27 May 2025 08:21  Phos  2.4     05-27  Mg     1.4     05-27    TPro  5.3[L]  /  Alb  2.0[L]  /  TBili  1.8[H]  /  DBili  x   /  AST  41  /  ALT  19  /  AlkPhos  205[H]  05-27      PT/INR - ( 27 May 2025 08:21 )   PT: 19.10 sec;   INR: 1.60 ratio         PTT - ( 27 May 2025 08:21 )  PTT:39.6 sec    Radiology & Additional Studies:     Radiology imaging reviewed.       ASSESSMENT AND PLAN:  -MRI of lumbar spine from 5/22/2025 demonstrates stable appearance compared to MRI of lumbar spine from 01/29/2025.    Findings' of L4-5 discitis osteomyelitis have equivocally changed: the bone marrow edema/enhancement within the L4 and L5 vertebral bodies has mildly decreased.    Question of new subtle erosive changes. However there is new mild vertebral body height loss of L5, increased L5 superior endplate erosion, and increased L4-5 disc edema. IR was consulted for CT guide soft tissue biopsy of lumbar spine Spoke with Dr. Nora Beck from ID. 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     Please reconsult or contact IR once family if and when family is amendable to plan of care.       Please call Interventional Radiology with questions or concerns:   - M-F [phone]: [phone]   - All other hours: [phone]

## 2025-05-27 NOTE — CONSULT NOTE ADULT - CONSULT REASON
CT guided tissue biopsy CT guided tissue biopsy of lumbar spine region CT guide soft tissue biopsy of lumbar spine

## 2025-05-27 NOTE — PROGRESS NOTE ADULT - SUBJECTIVE AND OBJECTIVE BOX
Patient is a 80y old  Male who presents with a chief complaint of acute mentation changes (27 May 2025 14:37)      MEDICATIONS  (STANDING):  buMETAnide 1 milliGRAM(s) Oral every 12 hours  cefTRIAXone   IVPB 2000 milliGRAM(s) IV Intermittent every 24 hours  chlorhexidine 2% Cloths 1 Application(s) Topical <User Schedule>  DAPTOmycin IVPB 900 milliGRAM(s) IV Intermittent every 24 hours  dextrose 5%. 1000 milliLiter(s) (100 mL/Hr) IV Continuous <Continuous>  dextrose 5%. 1000 milliLiter(s) (50 mL/Hr) IV Continuous <Continuous>  dextrose 50% Injectable 25 Gram(s) IV Push once  dextrose 50% Injectable 12.5 Gram(s) IV Push once  dextrose 50% Injectable 25 Gram(s) IV Push once  folic acid 1 milliGRAM(s) Oral daily  glucagon  Injectable 1 milliGRAM(s) IntraMuscular once  insulin lispro (ADMELOG) corrective regimen sliding scale   SubCutaneous Before meals and at bedtime  lactobacillus acidophilus 1 Tablet(s) Oral two times a day with meals  lactulose Syrup 20 Gram(s) Oral every 8 hours  levETIRAcetam  Solution 250 milliGRAM(s) Oral two times a day  lidocaine   4% Patch 1 Patch Transdermal daily  magnesium oxide 400 milliGRAM(s) Oral three times a day with meals  magnesium sulfate  IVPB 2 Gram(s) IV Intermittent every 6 hours  midodrine 10 milliGRAM(s) Oral every 8 hours  nystatin Powder 1 Application(s) Topical two times a day  pantoprazole  Injectable 40 milliGRAM(s) IV Push daily  rifAXIMin 550 milliGRAM(s) Oral two times a day  saccharomyces boulardii 250 milliGRAM(s) Oral two times a day  senna 2 Tablet(s) Oral at bedtime  silver sulfADIAZINE 1% Cream 1 Application(s) Topical two times a day  spironolactone 100 milliGRAM(s) Oral daily    MEDICATIONS  (PRN):  acetaminophen     Tablet .. 650 milliGRAM(s) Oral every 6 hours PRN Temp greater or equal to 38C (100.4F), Mild Pain (1 - 3)  aluminum hydroxide/magnesium hydroxide/simethicone Suspension 30 milliLiter(s) Oral every 4 hours PRN Dyspepsia  dextrose Oral Gel 15 Gram(s) Oral once PRN Blood Glucose LESS THAN 70 milliGRAM(s)/deciliter  melatonin 3 milliGRAM(s) Oral at bedtime PRN Insomnia  ondansetron Injectable 4 milliGRAM(s) IV Push every 8 hours PRN Nausea and/or Vomiting      CAPILLARY BLOOD GLUCOSE  POCT Blood Glucose.: 173 mg/dL (27 May 2025 16:28)  POCT Blood Glucose.: 154 mg/dL (27 May 2025 11:39)  POCT Blood Glucose.: 129 mg/dL (27 May 2025 07:53)  POCT Blood Glucose.: 160 mg/dL (26 May 2025 23:27)  POCT Blood Glucose.: 171 mg/dL (26 May 2025 21:18)    I&O's Summary    27 May 2025 07:01  -  27 May 2025 17:08  --------------------------------------------------------  IN: 520 mL / OUT: 0 mL / NET: 520 mL        PHYSICAL EXAM:  Vital Signs Last 24 Hrs  T(C): 36.9 (27 May 2025 13:12), Max: 37.1 (27 May 2025 05:08)  T(F): 98.4 (27 May 2025 13:12), Max: 98.7 (27 May 2025 05:08)  HR: 94 (27 May 2025 13:12) (65 - 94)  BP: 111/53 (27 May 2025 13:12) (111/53 - 150/79)  BP(mean): --  RR: 18 (27 May 2025 05:08) (18 - 18)  SpO2: 95% (27 May 2025 13:12) (95% - 98%)    Parameters below as of 27 May 2025 13:12  Patient On (Oxygen Delivery Method): room air    GENERAL: No acute distress, well-developed  HEAD:  Atraumatic, Normocephalic  EYES:  Jaundiced sclera   NECK: Supple,no JVD  CHEST/LUNG: CTAB; No wheezes, rales, or rhonchi  HEART: Regular rate and rhythm; No murmurs, rubs, or gallops  ABDOMEN: Soft, distended normal bowel sound  EXTREMITIES: B/L Lower extremity pitting edema   NEUROLOGY: A&O x 1-2 , no focal deficits  SKIN: No rashes or lesions      LABS:                        8.6    5.31  )-----------( 117      ( 27 May 2025 08:21 )             26.4     05-27    136  |  95[L]  |  12  ----------------------------<  124[H]  3.7   |  28  |  0.8    Ca    7.0[L]      27 May 2025 08:21  Phos  2.4     05-27  Mg     1.4     05-27    TPro  5.3[L]  /  Alb  2.0[L]  /  TBili  1.8[H]  /  DBili  x   /  AST  41  /  ALT  19  /  AlkPhos  205[H]  05-27    PT/INR - ( 27 May 2025 08:21 )   PT: 19.10 sec;   INR: 1.60 ratio         PTT - ( 27 May 2025 08:21 )  PTT:39.6 sec      Urinalysis Basic - ( 27 May 2025 08:21 )    Color: x / Appearance: x / SG: x / pH: x  Gluc: 124 mg/dL / Ketone: x  / Bili: x / Urobili: x   Blood: x / Protein: x / Nitrite: x   Leuk Esterase: x / RBC: x / WBC x   Sq Epi: x / Non Sq Epi: x / Bacteria: x

## 2025-05-27 NOTE — PROGRESS NOTE ADULT - SUBJECTIVE AND OBJECTIVE BOX
INFECTIOUS DISEASE FOLLOW UP NOTE:    Interval History/ROS: Patient is a 80y old  Male who presents with a chief complaint of acute mentation changes (27 May 2025 17:08)    Overnight events: Awake and alert but not oriented.     REVIEW OF SYSTEMS:  Unable to provide due to cognitive impairment      Prior hospital charts reviewed [Yes]  Primary team notes reviewed [Yes]  Other consultant notes reviewed [Yes]    Allergies:  No Known Allergies      ANTIMICROBIALS:   cefTRIAXone   IVPB 2000 every 24 hours  DAPTOmycin IVPB 900 every 24 hours  rifAXIMin 550 two times a day      OTHER MEDS: MEDICATIONS  (STANDING):  acetaminophen     Tablet .. 650 every 6 hours PRN  aluminum hydroxide/magnesium hydroxide/simethicone Suspension 30 every 4 hours PRN  buMETAnide 1 every 12 hours  dextrose 50% Injectable 25 once  dextrose 50% Injectable 12.5 once  dextrose 50% Injectable 25 once  dextrose Oral Gel 15 once PRN  glucagon  Injectable 1 once  insulin lispro (ADMELOG) corrective regimen sliding scale  Before meals and at bedtime  lactulose Syrup 20 every 8 hours  levETIRAcetam  Solution 250 two times a day  melatonin 3 at bedtime PRN  midodrine 10 every 8 hours  ondansetron Injectable 4 every 8 hours PRN  pantoprazole  Injectable 40 daily  senna 2 at bedtime  spironolactone 100 daily      Vital Signs Last 24 Hrs  T(F): 97.5 (05-27-25 @ 20:30), Max: 98.7 (05-27-25 @ 05:08)    Vital Signs Last 24 Hrs  HR: 94 (05-27-25 @ 20:30) (84 - 94)  BP: 107/61 (05-27-25 @ 20:30) (107/61 - 120/75)  RR: 18 (05-27-25 @ 20:30)  SpO2: 99% (05-27-25 @ 20:30) (95% - 99%)  Wt(kg): --    EXAM:  GENERAL: NAD, lying in bed  HEAD: No head lesions  EYES: Conjunctiva pink and cornea white  EAR, NOSE, MOUTH, THROAT: Normal external ears and nose, no discharges; moist mucous membranes;  NECK: Supple, nontender to palpation; no JVD  RESPIRATORY: Bibasilar crackles  CARDIOVASCULAR: S1 S2  GASTROINTESTINAL: Soft, no significant distention; normoactive bowel sounds  GENITOURINARY: no loyola catheter, no CVA tenderness  EXTREMITIES: No clubbing, cyanosis, + diffuse anasarca  NERVOUS SYSTEM: Awake and alert, not oriented, able to answer simple questions  MUSCULOSKELETAL: No joint erythema, swelling  SKIN: Sacral DTI, no superficial thrombophlebitis  PSYCH: Slightly agitated    Labs:                        8.6    5.31  )-----------( 117      ( 27 May 2025 08:21 )             26.4     05-27    136  |  95[L]  |  12  ----------------------------<  124[H]  3.7   |  28  |  0.8    Ca    7.0[L]      27 May 2025 08:21  Phos  2.4     05-27  Mg     1.4     05-27    TPro  5.3[L]  /  Alb  2.0[L]  /  TBili  1.8[H]  /  DBili  x   /  AST  41  /  ALT  19  /  AlkPhos  205[H]  05-27      WBC Trend:  WBC Count: 5.31 (05-27-25 @ 08:21)  WBC Count: 5.37 (05-26-25 @ 07:58)  WBC Count: 5.93 (05-24-25 @ 07:27)      Creatine Trend:  Creatinine: 0.8 (05-27)  Creatinine: 0.8 (05-26)  Creatinine: 0.8 (05-24)      Liver Biochemical Testing Trend:  Alanine Aminotransferase (ALT/SGPT): 19 (05-27)  Alanine Aminotransferase (ALT/SGPT): 19 (05-26)  Alanine Aminotransferase (ALT/SGPT): 21 (05-24)  Alanine Aminotransferase (ALT/SGPT): 27 (05-13)  Alanine Aminotransferase (ALT/SGPT): 27 (05-12)  Aspartate Aminotransferase (AST/SGOT): 41 (05-27-25 @ 08:21)  Aspartate Aminotransferase (AST/SGOT): 41 (05-26-25 @ 07:58)  Aspartate Aminotransferase (AST/SGOT): 53 (05-24-25 @ 07:27)  Aspartate Aminotransferase (AST/SGOT): 69 (05-13-25 @ 07:57)  Aspartate Aminotransferase (AST/SGOT): 69 (05-12-25 @ 04:30)  Bilirubin Total: 1.8 (05-27)  Bilirubin Total: 1.6 (05-26)  Bilirubin Total: 1.6 (05-24)  Bilirubin Total: 1.7 (05-13)  Bilirubin Total: 1.8 (05-12)      Trend LDH  04-24-25 @ 05:53  275[H]      Urinalysis Basic - ( 27 May 2025 08:21 )    Color: x / Appearance: x / SG: x / pH: x  Gluc: 124 mg/dL / Ketone: x  / Bili: x / Urobili: x   Blood: x / Protein: x / Nitrite: x   Leuk Esterase: x / RBC: x / WBC x   Sq Epi: x / Non Sq Epi: x / Bacteria: x        MICROBIOLOGY:    MRSA PCR Result.: Negative (04-25-25 @ 09:00)      Culture - Blood (collected 09 May 2025 08:06)  Source: Blood None  Final Report:    No growth at 5 days    Culture - Urine (collected 05 May 2025 12:48)  Source: Catheterized Catheterized  Final Report:    10,000 - 49,000 CFU/mL Candida albicans "Susceptibilities not performed"    Culture - Blood (collected 17 Apr 2025 18:30)  Source: Blood Blood  Final Report:    No growth at 5 days    Culture - Blood (collected 16 Apr 2025 15:15)  Source: Blood None  Final Report:    No growth at 5 days    Urinalysis with Rflx Culture (collected 13 Apr 2025 21:20)    Culture - Blood (collected 13 Apr 2025 21:20)  Source: Blood Blood-Peripheral  Final Report:    Growth in anaerobic bottle: Bacteroides fragilis    "Susceptibilities not performed"    Culture - Urine (collected 13 Apr 2025 21:20)  Source: Catheterized None  Final Report:    10,000 - 49,000 CFU/mL Proteus mirabilis  Organism: Proteus mirabilis  Organism: Proteus mirabilis    Sensitivities:      -  Levofloxacin: S <=0.5      -  Tobramycin: S <=2      -  Nitrofurantoin: R >64 Should not be used to treat pyelonephritis      -  Aztreonam: S <=4      -  Gentamicin: S <=2      -  Cefazolin: S <=2 For uncomplicated UTI with K. pneumoniae, E. coli, or P. mirablis: LOUIS <=16 is sensitive and LOUIS >=32 is resistant. This also predicts results for oral agents cefaclor, cefdinir, cefpodoxime, cefprozil, cefuroxime axetil, cephalexin and locarbef for uncomplicated UTI. Note that some isolates may be susceptible to these agents while testing resistant to cefazolin.      -  Cefepime: S <=2      -  Piperacillin/Tazobactam: S <=8      -  Ciprofloxacin: S <=0.25      -  Ceftriaxone: S <=1      -  Ampicillin: S <=8 These ampicillin results predict results for amoxicillin      Method Type: LOUIS      -  Meropenem: S <=1      -  Ampicillin/Sulbactam: S <=4/2      -  Cefoxitin: S <=8      -  Cefuroxime: S <=4      -  Amoxicillin/Clavulanic Acid: S <=8/4      -  Trimethoprim/Sulfamethoxazole: S <=0.5/9.5      -  Ertapenem: S <=0.5    Culture - Blood (collected 13 Apr 2025 21:20)  Source: Blood Blood-Peripheral  Final Report:    Growth in anaerobic bottle: Bacteroides fragilis "Susceptibilities not    performed"    Direct identification is available within approximately 3-5    hours either by Blood Panel Multiplexed PCR or Direct    MALDI-TOF. Details: [email or web address]  Organism: Blood Culture PCR  Organism: Blood Culture PCR    Sensitivities:      -  Bacteroides fragilis: Detec      Method Type: PCR    Culture - Blood (collected 30 Jan 2025 07:12)  Source: .Blood BLOOD  Final Report:    No growth at 5 days    Culture - Blood (collected 30 Jan 2025 07:12)  Source: .Blood BLOOD  Final Report:    No growth at 5 days      RADIOLOGY:  imaging below personally reviewed    < from: MR Lumbar Spine w/wo IV Cont (05.22.25 @ 21:09) >  IMPRESSION:  Since the previous lumbar spine MRI dated 1/29/2025:    1.  The findings of L4-5 discitis osteomyelitis have equivocally changed:   the bone marrow edema/enhancement within the L4 and L5 vertebral bodies   has mildly decreased. However there is new mild vertebral body height   loss of L5, increased L5 superior endplate erosion, and increased L4-5   disc edema.    2.  New mild edema within the L3-4 intervertebral disc with faint   erosions involving the opposing endplates suspicious for additional level   of discitis osteomyelitis.    3.  No evidence of epidural inflammation /  fluid collection.    4.  Mild paraspinal edema without abscess.    5.  Stable degenerative changes.    < end of copied text >

## 2025-05-27 NOTE — PROGRESS NOTE ADULT - ASSESSMENT
80 years old male history of hypertension, diabetes, subdural hematoma on Keppra, hx mechanical falls, recent admission (January 2025) for osteomyelitis of L4-5 w completion of abx.  BIBA from home status post change of mental status.     ID is consulted for bacteremia  Afebrile  WBC Count: 7.78 (05-07-25 @ 08:02)  WBC Count: 6.96 (05-05-25 @ 06:54)  WBC Count: 6.25 (05-04-25 @ 14:15)  WBC Count: 5.76 (05-03-25 @ 06:30)  WBC Count: 8.08 (05-02-25 @ 08:18)  WBC Count: 6.97 (05-01-25 @ 11:30)  On room air  BCx 4/14 Bacteroides fragilis  BCx 4/16 NGTD  BCx 4/17 NGTD  UA WBC 5, UCx low count P. mirabilis    Repeat UA WBC 30, UCx pending    CT A/P 4/13  1.  Erosive endplate changes centered at the L4-5 level, consistent with   patient's history ofrecent lumbar spine osteomyelitis.  2.  Liver cirrhosis with portal hypertension. Mild-to-moderate ascites.   Anasarca. Hepatic dome subcentimeter hypodense 1.3 cm hypodensity, not   fully characterized . Outpatient MR abdomen with IV contrast recommended.  3.  Chronic pancreatitis, with limited evaluation for acute inflammation   due to background fluid. Correlation with pancreatic enzymes recommended.    MR Lumbar Spine w/wo IV Cont (05.22.25 @ 21:09)  IMPRESSION:  Since the previous lumbar spine MRI dated 1/29/2025:  1.  The findings of L4-5 discitis osteomyelitis have equivocally changed:   the bone marrow edema/enhancement within the L4 and L5 vertebral bodies   has mildly decreased. However there is new mild vertebral body height   loss of L5, increased L5 superior endplate erosion, and increased L4-5   disc edema.  2.  New mild edema within the L3-4 intervertebral disc with faint   erosions involving the opposing endplates suspicious for additional level   of discitis osteomyelitis.  3.  No evidence of epidural inflammation /  fluid collection.  4.  Mild paraspinal edema without abscess.  5.  Stable degenerative changes.      Antibiotics:  Vancomycin 4/13  Ampicillin 4/14  Ceftriaxone 4/15 - 4/21, 5/4 - 5/7, 5/23 ->  Flagyl 4/15 - 4/28  Daptomycin 5/23 ->      IMPRESSION:  Hepatic encephalopathy  Bacteroides bacteremia, completed treatment  Acute blood loss anemia  Possible SBP?  Liver cirrhosis  Lower GI bleed  Hx L4-L5 osteomyelitis  Liver lesion  Immunosuppression / Immunosenescence secondary to multiple comorbidities which could result in poor clinical outcome    RECOMMENDATIONS:  - IV daptomycin 900mg q24hrs,  on 5/24, trend qweekly  - IV ceftriaxone 2g q24hrs  - ESR/CRP  - Discussed with Radiology Dr. Muñoz, reasonable to biopsy new OM at L3-4; L4-5 site inflammation is decreased; Can do nuclear scan first to better evaluate L3-4 before biopsy  - Discussed with Medicine Dr. Sharif, will discuss with family if they want to pursue further testing, if so can consider Gallium scan  - Local wound care for sacral DTI  - GI follow up  - Offloading and frequent position changes, aspiration precaution  - Trend WBC, fever curve, transaminases, creatinine daily  - Please inform ID of any patient clinical change or any new pertinent laboratory or radiographic data      Nora Beck D.O.  Attending Physician  Division of Infectious Diseases  [facility]  Please contact me via [employer] Teams

## 2025-05-27 NOTE — PROGRESS NOTE ADULT - ASSESSMENT
Mr Reji is a 80 YOM w a PMH of 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  Post-cholecystectomy.   3.  Severe abdominal ascites.4.  Right pleural effusion. 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     Ambulatory Dysfunction   H/O Lumbar osteomyelitis in January 2025  Completed Course of antibiotics at that time   Now with Difficulty with  ambulation   Neuro consult appreciated   MR Lumbar Spine w/wo IV Cont  Since the previous lumbar spine MRI dated 1/29/2025:  1.  The findings of L4-5 discitis osteomyelitis have equivocally changed   the bone marrow edema/enhancement within the L4 and L5 vertebral bodies   has mildly decreased. However there is new mild vertebral body height   loss of L5, increased L5 superior endplate erosion, and increased L4-5 disc edema.  2.  New mild edema within the L3-4 intervertebral disc with faint erosions involving the opposing endplates suspicious for additional level   of discitis osteomyelitis.  3.  No evidence of epidural inflammation /  fluid collection.  4.  Mild paraspinal edema without abscess.  5.  Stable degenerative changes.  ID Follow-up appreciated>>started on Daptomycin and Rocephin   ********Plan: IR Biopsy of new L3-L4 OM, however poor candidate as pr intermittently does not follow- command   *********Next option  is for gallium scan< spoke with family they will discuss options and let physician know  ********however they are leaning towards gallium scan *******      Bacteremia  -blood culture positive for bacteroides on presentation  -s/p ceftriaxone and flagyl  -repeat blood culture negative,   as per ID hold off further antibiotics       Normocytic Anemia:  multifactorial: sepsis, cirrhosis, poor nutrition, GI bleeding  -s/p EGD on 4/16 which showed 5mm ulcer in duodenal bulb post endo clip, repeat EGD on 4/29 showed no bleeding noted   transfused one unit of PRBC on 5/3, -Hb stable , cont to monitor   -Keep Active T/S     New Onset Afib  -found during this admission  -rate controlled, not candidate for AC given anemia, coagulopathy    Deconditioning  Sacral pressure ulcer  -PT/OT/OOB  -local wound care  -rotate the patient q2hrs     Pseudohypocalcemia: corrected calcium is 9.1  hypokalemia: monitor and supplement     SVT PPX: SCD due to coagulopathy, risk of Gi bleed /anemia   GI PPX: PPI   Full Code   Dispo: from home  Needs STR pending gallium scan v.s IR biopsy and final abx regimen and bed availability

## 2025-05-28 LAB — GLUCOSE BLDC GLUCOMTR-MCNC: 144 MG/DL — HIGH (ref 70–99)

## 2025-05-28 PROCEDURE — 99233 SBSQ HOSP IP/OBS HIGH 50: CPT

## 2025-05-28 RX ADMIN — FOLIC ACID 1 MILLIGRAM(S): 1 TABLET ORAL at 11:29

## 2025-05-28 RX ADMIN — Medication 650 MILLIGRAM(S): at 09:50

## 2025-05-28 RX ADMIN — LEVETIRACETAM 250 MILLIGRAM(S): 10 INJECTION, SOLUTION INTRAVENOUS at 17:49

## 2025-05-28 RX ADMIN — Medication 1 APPLICATION(S): at 06:20

## 2025-05-28 RX ADMIN — INSULIN LISPRO 2: 100 INJECTION, SOLUTION INTRAVENOUS; SUBCUTANEOUS at 11:56

## 2025-05-28 RX ADMIN — NYSTATIN 1 APPLICATION(S): 100000 CREAM TOPICAL at 18:06

## 2025-05-28 RX ADMIN — Medication 400 MILLIGRAM(S): at 17:49

## 2025-05-28 RX ADMIN — Medication 650 MILLIGRAM(S): at 15:50

## 2025-05-28 RX ADMIN — Medication 650 MILLIGRAM(S): at 12:06

## 2025-05-28 RX ADMIN — MIDODRINE HYDROCHLORIDE 10 MILLIGRAM(S): 5 TABLET ORAL at 17:48

## 2025-05-28 RX ADMIN — CEFTRIAXONE 100 MILLIGRAM(S): 500 INJECTION, POWDER, FOR SOLUTION INTRAMUSCULAR; INTRAVENOUS at 17:50

## 2025-05-28 RX ADMIN — Medication 650 MILLIGRAM(S): at 15:59

## 2025-05-28 RX ADMIN — Medication 1 TABLET(S): at 17:48

## 2025-05-28 RX ADMIN — NYSTATIN 1 APPLICATION(S): 100000 CREAM TOPICAL at 06:00

## 2025-05-28 RX ADMIN — DAPTOMYCIN 136 MILLIGRAM(S): 500 INJECTION, POWDER, LYOPHILIZED, FOR SOLUTION INTRAVENOUS at 17:51

## 2025-05-28 RX ADMIN — Medication 40 MILLIGRAM(S): at 11:27

## 2025-05-28 RX ADMIN — Medication 1 APPLICATION(S): at 17:50

## 2025-05-28 RX ADMIN — BUMETANIDE 1 MILLIGRAM(S): 1 TABLET ORAL at 17:50

## 2025-05-28 RX ADMIN — Medication 1 TABLET(S): at 08:52

## 2025-05-28 RX ADMIN — Medication 400 MILLIGRAM(S): at 08:52

## 2025-05-28 RX ADMIN — Medication 400 MILLIGRAM(S): at 11:57

## 2025-05-28 NOTE — PROGRESS NOTE ADULT - SUBJECTIVE AND OBJECTIVE BOX
Patient is a 80y old  Male who presents with a chief complaint of acute mentation changes (05-27-25)      Pt seen and examined at bedside. No CP or SOB.          PAST MEDICAL & SURGICAL HISTORY:  Diabetes    Hypertension    Fall    H/O left knee surgery    History of cholecystectomy    History of appendectomy        VITAL SIGNS (Last 24 hrs):  T(C): 36.7 (05-28-25 @ 14:31), Max: 36.7 (05-28-25 @ 14:31)  HR: 99 (05-28-25 @ 14:31) (81 - 99)  BP: 120/63 (05-28-25 @ 14:31) (107/61 - 120/63)  RR: 18 (05-28-25 @ 14:31) (18 - 18)  SpO2: 100% (05-28-25 @ 14:31) (97% - 100%)  Wt(kg): --  Daily     Daily     I&O's Summary    27 May 2025 07:01  -  28 May 2025 07:00  --------------------------------------------------------  IN: 520 mL / OUT: 0 mL / NET: 520 mL        PHYSICAL EXAM:  GENERAL: NAD, well-developed  HEAD:  Atraumatic, Normocephalic  EYES: EOMI, PERRLA, conjunctiva and sclera clear  NECK: Supple, No JVD  CHEST/LUNG: Clear to auscultation bilaterally; No wheeze  HEART: Regular rate and rhythm; No murmurs, rubs, or gallops  ABDOMEN: Soft, Nontender, Nondistended; Bowel sounds present  EXTREMITIES:  2+ Peripheral Pulses, No clubbing, cyanosis, or edema  PSYCH: AAOx3  NEUROLOGY: non-focal  SKIN: No rashes or lesions    Labs Reviewed  Spoke to patient in regards to abnormal labs.    CBC Full  -  ( 27 May 2025 08:21 )  WBC Count : 5.31 K/uL  Hemoglobin : 8.6 g/dL  Hematocrit : 26.4 %  Platelet Count - Automated : 117 K/uL  Mean Cell Volume : 100.0 fL  Mean Cell Hemoglobin : 32.6 pg  Mean Cell Hemoglobin Concentration : 32.6 g/dL  Auto Neutrophil # : x  Auto Lymphocyte # : x  Auto Monocyte # : x  Auto Eosinophil # : x  Auto Basophil # : x  Auto Neutrophil % : x  Auto Lymphocyte % : x  Auto Monocyte % : x  Auto Eosinophil % : x  Auto Basophil % : x    BMP:    05-27 @ 08:21    Blood Urea Nitrogen - 12  Calcium - 7.0  Carbond Dioxide - 28  Chloride - 95  Creatinine - 0.8  Glucose - 124  Potassium - 3.7  Sodium - 136      Hemoglobin A1c -   PT/INR - ( 27 May 2025 08:21 )   PT: 19.10 sec;   INR: 1.60 ratio         PTT - ( 27 May 2025 08:21 )  PTT:39.6 sec  Urine Culture:        COVID Labs  CRP:      D-Dimer:            Imaging reviewed independently and reviewed read        MEDICATIONS  (STANDING):  buMETAnide 1 milliGRAM(s) Oral every 12 hours  cefTRIAXone   IVPB 2000 milliGRAM(s) IV Intermittent every 24 hours  chlorhexidine 2% Cloths 1 Application(s) Topical <User Schedule>  DAPTOmycin IVPB 900 milliGRAM(s) IV Intermittent every 24 hours  dextrose 5%. 1000 milliLiter(s) (100 mL/Hr) IV Continuous <Continuous>  dextrose 5%. 1000 milliLiter(s) (50 mL/Hr) IV Continuous <Continuous>  dextrose 50% Injectable 25 Gram(s) IV Push once  dextrose 50% Injectable 12.5 Gram(s) IV Push once  dextrose 50% Injectable 25 Gram(s) IV Push once  folic acid 1 milliGRAM(s) Oral daily  glucagon  Injectable 1 milliGRAM(s) IntraMuscular once  insulin lispro (ADMELOG) corrective regimen sliding scale   SubCutaneous Before meals and at bedtime  lactobacillus acidophilus 1 Tablet(s) Oral two times a day with meals  lactulose Syrup 20 Gram(s) Oral every 8 hours  levETIRAcetam  Solution 250 milliGRAM(s) Oral two times a day  lidocaine   4% Patch 1 Patch Transdermal daily  magnesium oxide 400 milliGRAM(s) Oral three times a day with meals  midodrine 10 milliGRAM(s) Oral every 8 hours  nystatin Powder 1 Application(s) Topical two times a day  pantoprazole  Injectable 40 milliGRAM(s) IV Push daily  rifAXIMin 550 milliGRAM(s) Oral two times a day  saccharomyces boulardii 250 milliGRAM(s) Oral two times a day  senna 2 Tablet(s) Oral at bedtime  silver sulfADIAZINE 1% Cream 1 Application(s) Topical two times a day  spironolactone 100 milliGRAM(s) Oral daily    MEDICATIONS  (PRN):  acetaminophen     Tablet .. 650 milliGRAM(s) Oral every 6 hours PRN Temp greater or equal to 38C (100.4F), Mild Pain (1 - 3)  aluminum hydroxide/magnesium hydroxide/simethicone Suspension 30 milliLiter(s) Oral every 4 hours PRN Dyspepsia  dextrose Oral Gel 15 Gram(s) Oral once PRN Blood Glucose LESS THAN 70 milliGRAM(s)/deciliter  melatonin 3 milliGRAM(s) Oral at bedtime PRN Insomnia  ondansetron Injectable 4 milliGRAM(s) IV Push every 8 hours PRN Nausea and/or Vomiting

## 2025-05-28 NOTE — PROGRESS NOTE ADULT - ASSESSMENT
Mr Reji is a 80 YOM w a PMH of 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  Post-cholecystectomy.   3.  Severe abdominal ascites.4.  Right pleural effusion. 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     Ambulatory Dysfunction   H/O Lumbar osteomyelitis in January 2025  Completed Course of antibiotics at that time   Now with Difficulty with  ambulation   Neuro consult appreciated   MR Lumbar Spine w/wo IV Cont  Since the previous lumbar spine MRI dated 1/29/2025:  1.  The findings of L4-5 discitis osteomyelitis have equivocally changed   the bone marrow edema/enhancement within the L4 and L5 vertebral bodies   has mildly decreased. However there is new mild vertebral body height   loss of L5, increased L5 superior endplate erosion, and increased L4-5 disc edema.  2.  New mild edema within the L3-4 intervertebral disc with faint erosions involving the opposing endplates suspicious for additional level   of discitis osteomyelitis.  3.  No evidence of epidural inflammation /  fluid collection.  4.  Mild paraspinal edema without abscess.  5.  Stable degenerative changes.  ID Follow-up appreciated>>started on Daptomycin and Rocephin   discussed with family and ID on 5/28 - family learning toward gallium scan to see collection and if need be would proceed with IR biopsy  however in regards to the IR Biopsy of new L3-L4 OM, however poor candidate as pr intermittently does not follow- command   - IV daptomycin 900mg q24hrs,  on 5/24, trend qweekly  - IV ceftriaxone 2g q24hrs  - ESR/CRP pending     Bacteremia with bacteroides  -blood culture positive for bacteroides on presentation  -s/p ceftriaxone and flagyl  -repeat blood culture negative,     Normocytic Anemia:  multifactorial: sepsis, cirrhosis, poor nutrition, GI bleeding  -s/p EGD on 4/16 which showed 5mm ulcer in duodenal bulb post endo clip, repeat EGD on 4/29 showed no bleeding noted   transfused one unit of PRBC on 5/3, -Hb stable , cont to monitor   -Keep Active T/S     New Onset Afib  -found during this admission  -rate controlled, not candidate for AC given anemia, coagulopathy    Deconditioning  Sacral pressure ulcer  -PT/OT/OOB  -local wound care  -rotate the patient q2hrs     Pseudohypocalcemia: corrected calcium is 9.1  hypokalemia: monitor and supplement     SVT PPX: SCD due to coagulopathy, risk of Gi bleed /anemia   GI PPX: PPI   Full Code   Dispo: from home  Needs STR pending gallium scan v.s IR biopsy and final abx regimen and bed availability

## 2025-05-29 LAB
ALBUMIN SERPL ELPH-MCNC: 2.3 G/DL — LOW (ref 3.5–5.2)
ALP SERPL-CCNC: 217 U/L — HIGH (ref 30–115)
ALT FLD-CCNC: 20 U/L — SIGNIFICANT CHANGE UP (ref 0–41)
ANION GAP SERPL CALC-SCNC: 15 MMOL/L — HIGH (ref 7–14)
AST SERPL-CCNC: 55 U/L — HIGH (ref 0–41)
BILIRUB DIRECT SERPL-MCNC: 0.7 MG/DL — HIGH (ref 0–0.3)
BILIRUB INDIRECT FLD-MCNC: 1 MG/DL — SIGNIFICANT CHANGE UP (ref 0.2–1.2)
BILIRUB SERPL-MCNC: 1.7 MG/DL — HIGH (ref 0.2–1.2)
BUN SERPL-MCNC: 14 MG/DL — SIGNIFICANT CHANGE UP (ref 10–20)
CALCIUM SERPL-MCNC: 7.4 MG/DL — LOW (ref 8.4–10.5)
CHLORIDE SERPL-SCNC: 95 MMOL/L — LOW (ref 98–110)
CO2 SERPL-SCNC: 25 MMOL/L — SIGNIFICANT CHANGE UP (ref 17–32)
CREAT SERPL-MCNC: 1.1 MG/DL — SIGNIFICANT CHANGE UP (ref 0.7–1.5)
CRP SERPL-MCNC: 61.2 MG/L — HIGH
EGFR: 69 ML/MIN/1.73M2 — SIGNIFICANT CHANGE UP
EGFR: 69 ML/MIN/1.73M2 — SIGNIFICANT CHANGE UP
ERYTHROCYTE [SEDIMENTATION RATE] IN BLOOD: 25 MM/HR — HIGH (ref 0–15)
GLUCOSE SERPL-MCNC: 159 MG/DL — HIGH (ref 70–99)
HCT VFR BLD CALC: 26.9 % — LOW (ref 42–52)
HGB BLD-MCNC: 8.6 G/DL — LOW (ref 14–18)
MAGNESIUM SERPL-MCNC: 1.6 MG/DL — LOW (ref 1.8–2.4)
MCHC RBC-ENTMCNC: 32 G/DL — SIGNIFICANT CHANGE UP (ref 32–37)
MCHC RBC-ENTMCNC: 32.6 PG — HIGH (ref 27–31)
MCV RBC AUTO: 101.9 FL — HIGH (ref 80–94)
NRBC BLD AUTO-RTO: 0 /100 WBCS — SIGNIFICANT CHANGE UP (ref 0–0)
PLATELET # BLD AUTO: 106 K/UL — LOW (ref 130–400)
PMV BLD: 9.3 FL — SIGNIFICANT CHANGE UP (ref 7.4–10.4)
POTASSIUM SERPL-MCNC: 4.2 MMOL/L — SIGNIFICANT CHANGE UP (ref 3.5–5)
POTASSIUM SERPL-SCNC: 4.2 MMOL/L — SIGNIFICANT CHANGE UP (ref 3.5–5)
PROT SERPL-MCNC: 5.8 G/DL — LOW (ref 6–8)
RBC # BLD: 2.64 M/UL — LOW (ref 4.7–6.1)
RBC # FLD: 23.2 % — HIGH (ref 11.5–14.5)
SODIUM SERPL-SCNC: 135 MMOL/L — SIGNIFICANT CHANGE UP (ref 135–146)
WBC # BLD: 5.81 K/UL — SIGNIFICANT CHANGE UP (ref 4.8–10.8)
WBC # FLD AUTO: 5.81 K/UL — SIGNIFICANT CHANGE UP (ref 4.8–10.8)

## 2025-05-29 PROCEDURE — 99232 SBSQ HOSP IP/OBS MODERATE 35: CPT

## 2025-05-29 RX ORDER — MAGNESIUM SULFATE 500 MG/ML
2 SYRINGE (ML) INJECTION ONCE
Refills: 0 | Status: COMPLETED | OUTPATIENT
Start: 2025-05-29 | End: 2025-05-29

## 2025-05-29 RX ADMIN — Medication 1 TABLET(S): at 17:27

## 2025-05-29 RX ADMIN — NYSTATIN 1 APPLICATION(S): 100000 CREAM TOPICAL at 17:40

## 2025-05-29 RX ADMIN — CEFTRIAXONE 100 MILLIGRAM(S): 500 INJECTION, POWDER, FOR SOLUTION INTRAMUSCULAR; INTRAVENOUS at 17:27

## 2025-05-29 RX ADMIN — Medication 1 TABLET(S): at 08:05

## 2025-05-29 RX ADMIN — Medication 1 APPLICATION(S): at 05:06

## 2025-05-29 RX ADMIN — BUMETANIDE 1 MILLIGRAM(S): 1 TABLET ORAL at 05:07

## 2025-05-29 RX ADMIN — Medication 400 MILLIGRAM(S): at 08:05

## 2025-05-29 RX ADMIN — Medication 650 MILLIGRAM(S): at 08:05

## 2025-05-29 RX ADMIN — LACTULOSE 20 GRAM(S): 10 SOLUTION ORAL at 21:32

## 2025-05-29 RX ADMIN — LACTULOSE 20 GRAM(S): 10 SOLUTION ORAL at 05:02

## 2025-05-29 RX ADMIN — Medication 2 TABLET(S): at 21:32

## 2025-05-29 RX ADMIN — LEVETIRACETAM 250 MILLIGRAM(S): 10 INJECTION, SOLUTION INTRAVENOUS at 17:26

## 2025-05-29 RX ADMIN — DAPTOMYCIN 136 MILLIGRAM(S): 500 INJECTION, POWDER, LYOPHILIZED, FOR SOLUTION INTRAVENOUS at 17:29

## 2025-05-29 RX ADMIN — FOLIC ACID 1 MILLIGRAM(S): 1 TABLET ORAL at 11:35

## 2025-05-29 RX ADMIN — MIDODRINE HYDROCHLORIDE 10 MILLIGRAM(S): 5 TABLET ORAL at 21:32

## 2025-05-29 RX ADMIN — BUMETANIDE 1 MILLIGRAM(S): 1 TABLET ORAL at 17:26

## 2025-05-29 RX ADMIN — INSULIN LISPRO 2: 100 INJECTION, SOLUTION INTRAVENOUS; SUBCUTANEOUS at 08:04

## 2025-05-29 RX ADMIN — LEVETIRACETAM 250 MILLIGRAM(S): 10 INJECTION, SOLUTION INTRAVENOUS at 05:02

## 2025-05-29 RX ADMIN — MIDODRINE HYDROCHLORIDE 10 MILLIGRAM(S): 5 TABLET ORAL at 15:51

## 2025-05-29 RX ADMIN — LACTULOSE 20 GRAM(S): 10 SOLUTION ORAL at 15:51

## 2025-05-29 RX ADMIN — NYSTATIN 1 APPLICATION(S): 100000 CREAM TOPICAL at 05:08

## 2025-05-29 RX ADMIN — INSULIN LISPRO 2: 100 INJECTION, SOLUTION INTRAVENOUS; SUBCUTANEOUS at 11:56

## 2025-05-29 RX ADMIN — Medication 1 APPLICATION(S): at 05:01

## 2025-05-29 RX ADMIN — Medication 650 MILLIGRAM(S): at 08:59

## 2025-05-29 RX ADMIN — Medication 400 MILLIGRAM(S): at 17:28

## 2025-05-29 RX ADMIN — INSULIN LISPRO 4: 100 INJECTION, SOLUTION INTRAVENOUS; SUBCUTANEOUS at 21:34

## 2025-05-29 RX ADMIN — Medication 40 MILLIGRAM(S): at 11:35

## 2025-05-29 RX ADMIN — Medication 100 MILLIGRAM(S): at 05:01

## 2025-05-29 RX ADMIN — Medication 1 APPLICATION(S): at 17:28

## 2025-05-29 RX ADMIN — BUTYROSPERMUM PARKII(SHEA BUTTER), SIMMONDSIA CHINENSIS (JOJOBA) SEED OIL, ALOE BARBADENSIS LEAF EXTRACT 250 MILLIGRAM(S): .01; 1; 3.5 LIQUID TOPICAL at 05:08

## 2025-05-29 RX ADMIN — Medication 400 MILLIGRAM(S): at 11:35

## 2025-05-29 RX ADMIN — INSULIN LISPRO 2: 100 INJECTION, SOLUTION INTRAVENOUS; SUBCUTANEOUS at 17:26

## 2025-05-29 RX ADMIN — Medication 25 GRAM(S): at 17:28

## 2025-05-29 NOTE — PROGRESS NOTE ADULT - ASSESSMENT
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  Post-cholecystectomy.   3.  Severe abdominal ascites.4.  Right pleural effusion. 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     Ambulatory Dysfunction   H/O Lumbar osteomyelitis in January 2025  new osteo in L3-L4 region  -Completed Course of antibiotics at that time   -Now with Difficulty with  ambulation   -MR Lumbar Spine w/wo IV Cont  -Since the previous lumbar spine MRI dated 1/29/2025:  1.  The findings of L4-5 discitis osteomyelitis have equivocally changed   the bone marrow edema/enhancement within the L4 and L5 vertebral bodies   has mildly decreased. However there is new mild vertebral body height   loss of L5, increased L5 superior endplate erosion, and increased L4-5 disc edema.  2.  New mild edema within the L3-4 intervertebral disc with faint erosions involving the opposing endplates suspicious for additional level   of discitis osteomyelitis.  3.  No evidence of epidural inflammation /  fluid collection.  4.  Mild paraspinal edema without abscess.  5.  Stable degenerative changes.  -ID Follow-up appreciated>>started on Daptomycin and Rocephin   discussed with family and ID on 5/28 - family learning toward gallium scan to see collection and if need be would proceed with IR biopsy  however in regards to the IR Biopsy of new L3-L4 OM, however poor candidate as pr intermittently does not follow- command   - IV daptomycin 900mg q24hrs,  on 5/24, trend qweekly  - IV ceftriaxone 2g q24hrs  - ESR/CRP pending     Bacteremia with bacteroides  -blood culture positive for bacteroides on presentation  -s/p ceftriaxone and flagyl  -repeat blood culture negative,     Normocytic Anemia:  multifactorial: sepsis, cirrhosis, poor nutrition, GI bleeding  -s/p EGD on 4/16 which showed 5mm ulcer in duodenal bulb post endo clip, repeat EGD on 4/29 showed no bleeding noted   transfused one unit of PRBC on 5/3, -Hb stable , cont to monitor   -Keep Active T/S     New Onset Afib  -found during this admission  -rate controlled, not candidate for AC given anemia, coagulopathy    Deconditioning  Sacral pressure ulcer  -PT/OT/OOB  -local wound care  -rotate the patient q2hrs     Pseudohypocalcemia: corrected calcium is 9.1  hypokalemia: monitor and supplement     SVT PPX: SCD due to coagulopathy, risk of Gi bleed /anemia   GI PPX: PPI   Full Code   Dispo: from home  Gallium scan 5/30

## 2025-05-29 NOTE — PROGRESS NOTE ADULT - SUBJECTIVE AND OBJECTIVE BOX
Patient is a 80y old  Male who presents with a chief complaint of acute mentation changes (05-28-25)      Pt seen and examined at bedside. No CP or SOB.          PAST MEDICAL & SURGICAL HISTORY:  Diabetes    Hypertension    Fall    H/O left knee surgery    History of cholecystectomy    History of appendectomy        VITAL SIGNS (Last 24 hrs):  T(C): 36.4 (05-29-25 @ 14:54), Max: 36.7 (05-29-25 @ 04:44)  HR: 81 (05-29-25 @ 14:54) (70 - 89)  BP: 118/64 (05-29-25 @ 14:54) (118/64 - 129/76)  RR: 18 (05-29-25 @ 14:54) (18 - 18)  SpO2: 96% (05-29-25 @ 14:54) (96% - 99%)  Wt(kg): --  Daily     Daily     I&O's Summary      PHYSICAL EXAM:  GENERAL: NAD, well-developed  HEAD:  Atraumatic, Normocephalic  EYES: EOMI, PERRLA, conjunctiva and sclera clear  NECK: Supple, No JVD  CHEST/LUNG: Clear to auscultation bilaterally; No wheeze  HEART: Regular rate and rhythm; No murmurs, rubs, or gallops  ABDOMEN: Soft, Nontender, Nondistended; Bowel sounds present  EXTREMITIES:  2+ Peripheral Pulses, No clubbing, cyanosis, or edema  PSYCH: AAOx3  NEUROLOGY: non-focal  SKIN: No rashes or lesions    Labs Reviewed  Spoke to patient in regards to abnormal labs.    CBC Full  -  ( 29 May 2025 06:24 )  WBC Count : 5.81 K/uL  Hemoglobin : 8.6 g/dL  Hematocrit : 26.9 %  Platelet Count - Automated : 106 K/uL  Mean Cell Volume : 101.9 fL  Mean Cell Hemoglobin : 32.6 pg  Mean Cell Hemoglobin Concentration : 32.0 g/dL  Auto Neutrophil # : x  Auto Lymphocyte # : x  Auto Monocyte # : x  Auto Eosinophil # : x  Auto Basophil # : x  Auto Neutrophil % : x  Auto Lymphocyte % : x  Auto Monocyte % : x  Auto Eosinophil % : x  Auto Basophil % : x    BMP:    05-29 @ 06:24    Blood Urea Nitrogen - 14  Calcium - 7.4  Carbond Dioxide - 25  Chloride - 95  Creatinine - 1.1  Glucose - 159  Potassium - 4.2  Sodium - 135      Hemoglobin A1c -   PT/INR - ( 27 May 2025 08:21 )   PT: 19.10 sec;   INR: 1.60 ratio         PTT - ( 27 May 2025 08:21 )  PTT:39.6 sec  Urine Culture:        COVID Labs  CRP:  61.2 (05-29-25)      D-Dimer:            Imaging reviewed independently and reviewed read        MEDICATIONS  (STANDING):  buMETAnide 1 milliGRAM(s) Oral every 12 hours  cefTRIAXone   IVPB 2000 milliGRAM(s) IV Intermittent every 24 hours  chlorhexidine 2% Cloths 1 Application(s) Topical <User Schedule>  DAPTOmycin IVPB 900 milliGRAM(s) IV Intermittent every 24 hours  dextrose 5%. 1000 milliLiter(s) (100 mL/Hr) IV Continuous <Continuous>  dextrose 5%. 1000 milliLiter(s) (50 mL/Hr) IV Continuous <Continuous>  dextrose 50% Injectable 25 Gram(s) IV Push once  dextrose 50% Injectable 12.5 Gram(s) IV Push once  dextrose 50% Injectable 25 Gram(s) IV Push once  folic acid 1 milliGRAM(s) Oral daily  glucagon  Injectable 1 milliGRAM(s) IntraMuscular once  insulin lispro (ADMELOG) corrective regimen sliding scale   SubCutaneous Before meals and at bedtime  lactobacillus acidophilus 1 Tablet(s) Oral two times a day with meals  lactulose Syrup 20 Gram(s) Oral every 8 hours  levETIRAcetam  Solution 250 milliGRAM(s) Oral two times a day  lidocaine   4% Patch 1 Patch Transdermal daily  magnesium oxide 400 milliGRAM(s) Oral three times a day with meals  magnesium sulfate  IVPB 2 Gram(s) IV Intermittent once  midodrine 10 milliGRAM(s) Oral every 8 hours  nystatin Powder 1 Application(s) Topical two times a day  pantoprazole  Injectable 40 milliGRAM(s) IV Push daily  rifAXIMin 550 milliGRAM(s) Oral two times a day  saccharomyces boulardii 250 milliGRAM(s) Oral two times a day  senna 2 Tablet(s) Oral at bedtime  silver sulfADIAZINE 1% Cream 1 Application(s) Topical two times a day  spironolactone 100 milliGRAM(s) Oral daily    MEDICATIONS  (PRN):  acetaminophen     Tablet .. 650 milliGRAM(s) Oral every 6 hours PRN Temp greater or equal to 38C (100.4F), Mild Pain (1 - 3)  aluminum hydroxide/magnesium hydroxide/simethicone Suspension 30 milliLiter(s) Oral every 4 hours PRN Dyspepsia  dextrose Oral Gel 15 Gram(s) Oral once PRN Blood Glucose LESS THAN 70 milliGRAM(s)/deciliter  melatonin 3 milliGRAM(s) Oral at bedtime PRN Insomnia  ondansetron Injectable 4 milliGRAM(s) IV Push every 8 hours PRN Nausea and/or Vomiting

## 2025-05-29 NOTE — PHARMACOTHERAPY INTERVENTION NOTE - COMMENTS
As per policy ordered a CPK for 5/31 AM since patient is on daptomycin and is due for a weekly CPK.      Carly MirD   Clinical Pharmacy Specialist, Infectious Diseases  Tele-[facility] Program (Tele-[facility])  Tele-[facility] Phone: [phone]

## 2025-05-30 LAB
ANION GAP SERPL CALC-SCNC: 11 MMOL/L — SIGNIFICANT CHANGE UP (ref 7–14)
BUN SERPL-MCNC: 12 MG/DL — SIGNIFICANT CHANGE UP (ref 10–20)
CALCIUM SERPL-MCNC: 7.3 MG/DL — LOW (ref 8.4–10.5)
CHLORIDE SERPL-SCNC: 97 MMOL/L — LOW (ref 98–110)
CO2 SERPL-SCNC: 27 MMOL/L — SIGNIFICANT CHANGE UP (ref 17–32)
CREAT SERPL-MCNC: 0.9 MG/DL — SIGNIFICANT CHANGE UP (ref 0.7–1.5)
EGFR: 87 ML/MIN/1.73M2 — SIGNIFICANT CHANGE UP
EGFR: 87 ML/MIN/1.73M2 — SIGNIFICANT CHANGE UP
GLUCOSE SERPL-MCNC: 161 MG/DL — HIGH (ref 70–99)
HCT VFR BLD CALC: 26.5 % — LOW (ref 42–52)
HGB BLD-MCNC: 8.4 G/DL — LOW (ref 14–18)
MAGNESIUM SERPL-MCNC: 1.7 MG/DL — LOW (ref 1.8–2.4)
MCHC RBC-ENTMCNC: 31.7 G/DL — LOW (ref 32–37)
MCHC RBC-ENTMCNC: 32.7 PG — HIGH (ref 27–31)
MCV RBC AUTO: 103.1 FL — HIGH (ref 80–94)
NRBC BLD AUTO-RTO: 0 /100 WBCS — SIGNIFICANT CHANGE UP (ref 0–0)
PLATELET # BLD AUTO: 117 K/UL — LOW (ref 130–400)
PMV BLD: 9 FL — SIGNIFICANT CHANGE UP (ref 7.4–10.4)
POTASSIUM SERPL-MCNC: 3.4 MMOL/L — LOW (ref 3.5–5)
POTASSIUM SERPL-SCNC: 3.4 MMOL/L — LOW (ref 3.5–5)
RBC # BLD: 2.57 M/UL — LOW (ref 4.7–6.1)
RBC # FLD: 22.5 % — HIGH (ref 11.5–14.5)
SODIUM SERPL-SCNC: 135 MMOL/L — SIGNIFICANT CHANGE UP (ref 135–146)
WBC # BLD: 6.11 K/UL — SIGNIFICANT CHANGE UP (ref 4.8–10.8)
WBC # FLD AUTO: 6.11 K/UL — SIGNIFICANT CHANGE UP (ref 4.8–10.8)

## 2025-05-30 PROCEDURE — 99232 SBSQ HOSP IP/OBS MODERATE 35: CPT

## 2025-05-30 PROCEDURE — 78803 RP LOCLZJ TUM SPECT 1 AREA: CPT | Mod: 26,52

## 2025-05-30 PROCEDURE — 78800 RP LOCLZJ TUM 1 AREA 1 D IMG: CPT | Mod: 26,59

## 2025-05-30 RX ORDER — MAGNESIUM SULFATE 500 MG/ML
2 SYRINGE (ML) INJECTION ONCE
Refills: 0 | Status: COMPLETED | OUTPATIENT
Start: 2025-05-30 | End: 2025-05-31

## 2025-05-30 RX ORDER — OXYCODONE HYDROCHLORIDE 30 MG/1
5 TABLET ORAL EVERY 6 HOURS
Refills: 0 | Status: DISCONTINUED | OUTPATIENT
Start: 2025-05-30 | End: 2025-06-02

## 2025-05-30 RX ADMIN — MIDODRINE HYDROCHLORIDE 10 MILLIGRAM(S): 5 TABLET ORAL at 21:22

## 2025-05-30 RX ADMIN — Medication 2 TABLET(S): at 21:23

## 2025-05-30 RX ADMIN — Medication 1 APPLICATION(S): at 05:37

## 2025-05-30 RX ADMIN — Medication 1 APPLICATION(S): at 16:56

## 2025-05-30 RX ADMIN — OXYCODONE HYDROCHLORIDE 5 MILLIGRAM(S): 30 TABLET ORAL at 16:25

## 2025-05-30 RX ADMIN — INSULIN LISPRO 2: 100 INJECTION, SOLUTION INTRAVENOUS; SUBCUTANEOUS at 08:41

## 2025-05-30 RX ADMIN — NYSTATIN 1 APPLICATION(S): 100000 CREAM TOPICAL at 05:36

## 2025-05-30 RX ADMIN — DAPTOMYCIN 136 MILLIGRAM(S): 500 INJECTION, POWDER, LYOPHILIZED, FOR SOLUTION INTRAVENOUS at 17:19

## 2025-05-30 RX ADMIN — LACTULOSE 20 GRAM(S): 10 SOLUTION ORAL at 21:22

## 2025-05-30 RX ADMIN — OXYCODONE HYDROCHLORIDE 5 MILLIGRAM(S): 30 TABLET ORAL at 15:06

## 2025-05-30 RX ADMIN — INSULIN LISPRO 4: 100 INJECTION, SOLUTION INTRAVENOUS; SUBCUTANEOUS at 21:23

## 2025-05-30 RX ADMIN — OXYCODONE HYDROCHLORIDE 5 MILLIGRAM(S): 30 TABLET ORAL at 21:22

## 2025-05-30 RX ADMIN — Medication 1 APPLICATION(S): at 05:36

## 2025-05-30 RX ADMIN — NYSTATIN 1 APPLICATION(S): 100000 CREAM TOPICAL at 16:57

## 2025-05-30 RX ADMIN — INSULIN LISPRO 4: 100 INJECTION, SOLUTION INTRAVENOUS; SUBCUTANEOUS at 17:20

## 2025-05-30 RX ADMIN — OXYCODONE HYDROCHLORIDE 5 MILLIGRAM(S): 30 TABLET ORAL at 23:22

## 2025-05-30 NOTE — PROGRESS NOTE ADULT - ASSESSMENT
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  Post-cholecystectomy.   3.  Severe abdominal ascites.4.  Right pleural effusion. 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     Ambulatory Dysfunction   H/O Lumbar osteomyelitis in January 2025  new osteo in L3-L4 region  -Completed Course of antibiotics at that time   -Now with Difficulty with  ambulation   -MR Lumbar Spine w/wo IV Cont  -Since the previous lumbar spine MRI dated 1/29/2025:  1.  The findings of L4-5 discitis osteomyelitis have equivocally changed   the bone marrow edema/enhancement within the L4 and L5 vertebral bodies   has mildly decreased. However there is new mild vertebral body height   loss of L5, increased L5 superior endplate erosion, and increased L4-5 disc edema.  2.  New mild edema within the L3-4 intervertebral disc with faint erosions involving the opposing endplates suspicious for additional level   of discitis osteomyelitis.  3.  No evidence of epidural inflammation /  fluid collection.  4.  Mild paraspinal edema without abscess.  5.  Stable degenerative changes.  -ID Follow-up appreciated>>started on Daptomycin and Rocephin   discussed with family and ID on 5/28 - family learning toward gallium scan to see collection and if need be would proceed with IR biopsy  however in regards to the IR Biopsy of new L3-L4 OM, however poor candidate as pr intermittently does not follow- command   - IV daptomycin 900mg q24hrs,  on 5/24, trend qweekly  - IV ceftriaxone 2g q24hrs  - ESR/CRP pending - elevated  -gallium scan 5/30: Normal 24 hour postinjection whole body gallium images and SPECT images   over the abdomen.  Specifically, no abnormal gallium uptake in the lumbar spine is   determined to suspicious for active inflammation process.    Bacteremia with bacteroides  -blood culture positive for bacteroides on presentation  -s/p ceftriaxone and flagyl  -repeat blood culture negative,     Normocytic Anemia:  multifactorial: sepsis, cirrhosis, poor nutrition, GI bleeding  -s/p EGD on 4/16 which showed 5mm ulcer in duodenal bulb post endo clip, repeat EGD on 4/29 showed no bleeding noted   transfused one unit of PRBC on 5/3, -Hb stable , cont to monitor   -Keep Active T/S     New Onset Afib  -found during this admission  -rate controlled, not candidate for AC given anemia, coagulopathy    Deconditioning  Sacral pressure ulcer  -PT/OT/OOB  -local wound care  -rotate the patient q2hrs     Pseudohypocalcemia: corrected calcium is 9.1  hypokalemia: monitor and supplement     SVT PPX: SCD due to coagulopathy, risk of Gi bleed /anemia   GI PPX: PPI   Full Code   Dispo: from home  Gallium scan 5/30     Mr Mendoza is a 80 YOM w a PMH of 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  Post-cholecystectomy.   3.  Severe abdominal ascites.4.  Right pleural effusion. 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     Ambulatory Dysfunction   H/O Lumbar osteomyelitis in January 2025  new osteo in L3-L4 region  -Completed Course of antibiotics at that time   -Now with Difficulty with  ambulation   -MR Lumbar Spine w/wo IV Cont  -Since the previous lumbar spine MRI dated 1/29/2025:  1.  The findings of L4-5 discitis osteomyelitis have equivocally changed   the bone marrow edema/enhancement within the L4 and L5 vertebral bodies   has mildly decreased. However there is new mild vertebral body height   loss of L5, increased L5 superior endplate erosion, and increased L4-5 disc edema.  2.  New mild edema within the L3-4 intervertebral disc with faint erosions involving the opposing endplates suspicious for additional level   of discitis osteomyelitis.  3.  No evidence of epidural inflammation /  fluid collection.  4.  Mild paraspinal edema without abscess.  5.  Stable degenerative changes.  -ID Follow-up appreciated>>started on Daptomycin and Rocephin   discussed with family and ID on 5/28 - family learning toward gallium scan to see collection and if need be would proceed with IR biopsy  however in regards to the IR Biopsy of new L3-L4 OM, however poor candidate as pr intermittently does not follow- command   - IV daptomycin 900mg q24hrs,  on 5/24, trend qweekly  - IV ceftriaxone 2g q24hrs  - ESR/CRP pending - elevated  -gallium scan 5/30: Normal 24 hour postinjection whole body gallium images and SPECT images   over the abdomen.  Specifically, no abnormal gallium uptake in the lumbar spine is   determined to suspicious for active inflammation process.  -spoke with ID about this on 5/31, we will stop abx, ID will speak with radiology regarding the read and if we need to proceed with biopsy     Bacteremia with bacteroides  -blood culture positive for bacteroides on presentation  -s/p ceftriaxone and flagyl  -repeat blood culture negative,     Normocytic Anemia:  multifactorial: sepsis, cirrhosis, poor nutrition, GI bleeding  -s/p EGD on 4/16 which showed 5mm ulcer in duodenal bulb post endo clip, repeat EGD on 4/29 showed no bleeding noted   transfused one unit of PRBC on 5/3, -Hb stable , cont to monitor   -Keep Active T/S     New Onset Afib  -found during this admission  -rate controlled, not candidate for AC given anemia, coagulopathy    Deconditioning  Sacral pressure ulcer  -PT/OT/OOB  -local wound care  -rotate the patient q2hrs     Pseudohypocalcemia: corrected calcium is 9.1  hypokalemia: monitor and supplement     SVT PPX: SCD due to coagulopathy, risk of Gi bleed /anemia   GI PPX: PPI   Full Code   Dispo: from home  Gallium scan 5/30

## 2025-05-30 NOTE — PROGRESS NOTE ADULT - SUBJECTIVE AND OBJECTIVE BOX
Patient is a 80y old  Male who presents with a chief complaint of acute mentation changes (05-29-25)      Pt seen and examined at bedside. No CP or SOB.          PAST MEDICAL & SURGICAL HISTORY:  Diabetes    Hypertension    Fall    H/O left knee surgery    History of cholecystectomy    History of appendectomy        VITAL SIGNS (Last 24 hrs):  T(C): 36.8 (05-30-25 @ 14:30), Max: 36.8 (05-29-25 @ 21:53)  HR: 96 (05-30-25 @ 14:30) (79 - 96)  BP: 143/66 (05-30-25 @ 14:30) (103/53 - 143/66)  RR: 18 (05-30-25 @ 14:30) (18 - 18)  SpO2: 98% (05-30-25 @ 14:30) (98% - 99%)  Wt(kg): --  Daily     Daily     I&O's Summary      PHYSICAL EXAM:  GENERAL: NAD, well-developed  HEAD:  Atraumatic, Normocephalic  EYES: EOMI, PERRLA, conjunctiva and sclera clear  NECK: Supple, No JVD  CHEST/LUNG: Clear to auscultation bilaterally; No wheeze  HEART: Regular rate and rhythm; No murmurs, rubs, or gallops  ABDOMEN: Soft, Nontender, Nondistended; Bowel sounds present  EXTREMITIES:  2+ Peripheral Pulses, No clubbing, cyanosis, or edema  PSYCH: AAOx3  NEUROLOGY: non-focal  SKIN: No rashes or lesions    Labs Reviewed  Spoke to patient in regards to abnormal labs.    CBC Full  -  ( 30 May 2025 08:20 )  WBC Count : 6.11 K/uL  Hemoglobin : 8.4 g/dL  Hematocrit : 26.5 %  Platelet Count - Automated : 117 K/uL  Mean Cell Volume : 103.1 fL  Mean Cell Hemoglobin : 32.7 pg  Mean Cell Hemoglobin Concentration : 31.7 g/dL  Auto Neutrophil # : x  Auto Lymphocyte # : x  Auto Monocyte # : x  Auto Eosinophil # : x  Auto Basophil # : x  Auto Neutrophil % : x  Auto Lymphocyte % : x  Auto Monocyte % : x  Auto Eosinophil % : x  Auto Basophil % : x    BMP:    05-30 @ 08:20    Blood Urea Nitrogen - 12  Calcium - 7.3  Carbond Dioxide - 27  Chloride - 97  Creatinine - 0.9  Glucose - 161  Potassium - 3.4  Sodium - 135      Hemoglobin A1c -   PT/INR - ( 27 May 2025 08:21 )   PT: 19.10 sec;   INR: 1.60 ratio         PTT - ( 27 May 2025 08:21 )  PTT:39.6 sec  Urine Culture:        COVID Labs  CRP:  61.2 (05-29-25)      D-Dimer:            Imaging reviewed independently and reviewed read        MEDICATIONS  (STANDING):  buMETAnide 1 milliGRAM(s) Oral every 12 hours  chlorhexidine 2% Cloths 1 Application(s) Topical <User Schedule>  DAPTOmycin IVPB 900 milliGRAM(s) IV Intermittent every 24 hours  dextrose 5%. 1000 milliLiter(s) (100 mL/Hr) IV Continuous <Continuous>  dextrose 5%. 1000 milliLiter(s) (50 mL/Hr) IV Continuous <Continuous>  dextrose 50% Injectable 25 Gram(s) IV Push once  dextrose 50% Injectable 12.5 Gram(s) IV Push once  dextrose 50% Injectable 25 Gram(s) IV Push once  folic acid 1 milliGRAM(s) Oral daily  glucagon  Injectable 1 milliGRAM(s) IntraMuscular once  insulin lispro (ADMELOG) corrective regimen sliding scale   SubCutaneous Before meals and at bedtime  lactobacillus acidophilus 1 Tablet(s) Oral two times a day with meals  lactulose Syrup 20 Gram(s) Oral every 8 hours  levETIRAcetam  Solution 250 milliGRAM(s) Oral two times a day  lidocaine   4% Patch 1 Patch Transdermal daily  magnesium oxide 400 milliGRAM(s) Oral three times a day with meals  midodrine 10 milliGRAM(s) Oral every 8 hours  nystatin Powder 1 Application(s) Topical two times a day  pantoprazole  Injectable 40 milliGRAM(s) IV Push daily  rifAXIMin 550 milliGRAM(s) Oral two times a day  saccharomyces boulardii 250 milliGRAM(s) Oral two times a day  senna 2 Tablet(s) Oral at bedtime  silver sulfADIAZINE 1% Cream 1 Application(s) Topical two times a day  spironolactone 100 milliGRAM(s) Oral daily    MEDICATIONS  (PRN):  acetaminophen     Tablet .. 650 milliGRAM(s) Oral every 6 hours PRN Temp greater or equal to 38C (100.4F), Mild Pain (1 - 3)  aluminum hydroxide/magnesium hydroxide/simethicone Suspension 30 milliLiter(s) Oral every 4 hours PRN Dyspepsia  dextrose Oral Gel 15 Gram(s) Oral once PRN Blood Glucose LESS THAN 70 milliGRAM(s)/deciliter  melatonin 3 milliGRAM(s) Oral at bedtime PRN Insomnia  ondansetron Injectable 4 milliGRAM(s) IV Push every 8 hours PRN Nausea and/or Vomiting  oxyCODONE    IR 5 milliGRAM(s) Oral every 6 hours PRN Moderate Pain (4 - 6)       Patient is a 80y old  Male who presents with a chief complaint of acute mentation changes (05-29-25)      Pt seen and examined at bedside. No CP or SOB.          PAST MEDICAL & SURGICAL HISTORY:  Diabetes    Hypertension    Fall    H/O left knee surgery    History of cholecystectomy    History of appendectomy        VITAL SIGNS (Last 24 hrs):  T(C): 36.8 (05-30-25 @ 14:30), Max: 36.8 (05-29-25 @ 21:53)  HR: 96 (05-30-25 @ 14:30) (79 - 96)  BP: 143/66 (05-30-25 @ 14:30) (103/53 - 143/66)  RR: 18 (05-30-25 @ 14:30) (18 - 18)  SpO2: 98% (05-30-25 @ 14:30) (98% - 99%)  Wt(kg): --  Daily     Daily     I&O's Summary      PHYSICAL EXAM:  GENERAL: NAD, well-developed  HEAD:  Atraumatic, Normocephalic  EYES: EOMI, PERRLA, conjunctiva and sclera clear  NECK: Supple, No JVD  CHEST/LUNG: Clear to auscultation bilaterally; No wheeze  HEART: Regular rate and rhythm; No murmurs, rubs, or gallops  ABDOMEN: Soft, Nontender, Nondistended; Bowel sounds present  EXTREMITIES:  2+ Peripheral Pulses, No clubbing, cyanosis, or edema  NEUROLOGY: non-focal  SKIN: No rashes or lesions    Labs Reviewed  Spoke to patient in regards to abnormal labs.    CBC Full  -  ( 30 May 2025 08:20 )  WBC Count : 6.11 K/uL  Hemoglobin : 8.4 g/dL  Hematocrit : 26.5 %  Platelet Count - Automated : 117 K/uL  Mean Cell Volume : 103.1 fL  Mean Cell Hemoglobin : 32.7 pg  Mean Cell Hemoglobin Concentration : 31.7 g/dL  Auto Neutrophil # : x  Auto Lymphocyte # : x  Auto Monocyte # : x  Auto Eosinophil # : x  Auto Basophil # : x  Auto Neutrophil % : x  Auto Lymphocyte % : x  Auto Monocyte % : x  Auto Eosinophil % : x  Auto Basophil % : x    BMP:    05-30 @ 08:20    Blood Urea Nitrogen - 12  Calcium - 7.3  Carbond Dioxide - 27  Chloride - 97  Creatinine - 0.9  Glucose - 161  Potassium - 3.4  Sodium - 135      Hemoglobin A1c -   PT/INR - ( 27 May 2025 08:21 )   PT: 19.10 sec;   INR: 1.60 ratio         PTT - ( 27 May 2025 08:21 )  PTT:39.6 sec  Urine Culture:        COVID Labs  CRP:  61.2 (05-29-25)      D-Dimer:            Imaging reviewed independently and reviewed read        MEDICATIONS  (STANDING):  buMETAnide 1 milliGRAM(s) Oral every 12 hours  chlorhexidine 2% Cloths 1 Application(s) Topical <User Schedule>  DAPTOmycin IVPB 900 milliGRAM(s) IV Intermittent every 24 hours  dextrose 5%. 1000 milliLiter(s) (100 mL/Hr) IV Continuous <Continuous>  dextrose 5%. 1000 milliLiter(s) (50 mL/Hr) IV Continuous <Continuous>  dextrose 50% Injectable 25 Gram(s) IV Push once  dextrose 50% Injectable 12.5 Gram(s) IV Push once  dextrose 50% Injectable 25 Gram(s) IV Push once  folic acid 1 milliGRAM(s) Oral daily  glucagon  Injectable 1 milliGRAM(s) IntraMuscular once  insulin lispro (ADMELOG) corrective regimen sliding scale   SubCutaneous Before meals and at bedtime  lactobacillus acidophilus 1 Tablet(s) Oral two times a day with meals  lactulose Syrup 20 Gram(s) Oral every 8 hours  levETIRAcetam  Solution 250 milliGRAM(s) Oral two times a day  lidocaine   4% Patch 1 Patch Transdermal daily  magnesium oxide 400 milliGRAM(s) Oral three times a day with meals  midodrine 10 milliGRAM(s) Oral every 8 hours  nystatin Powder 1 Application(s) Topical two times a day  pantoprazole  Injectable 40 milliGRAM(s) IV Push daily  rifAXIMin 550 milliGRAM(s) Oral two times a day  saccharomyces boulardii 250 milliGRAM(s) Oral two times a day  senna 2 Tablet(s) Oral at bedtime  silver sulfADIAZINE 1% Cream 1 Application(s) Topical two times a day  spironolactone 100 milliGRAM(s) Oral daily    MEDICATIONS  (PRN):  acetaminophen     Tablet .. 650 milliGRAM(s) Oral every 6 hours PRN Temp greater or equal to 38C (100.4F), Mild Pain (1 - 3)  aluminum hydroxide/magnesium hydroxide/simethicone Suspension 30 milliLiter(s) Oral every 4 hours PRN Dyspepsia  dextrose Oral Gel 15 Gram(s) Oral once PRN Blood Glucose LESS THAN 70 milliGRAM(s)/deciliter  melatonin 3 milliGRAM(s) Oral at bedtime PRN Insomnia  ondansetron Injectable 4 milliGRAM(s) IV Push every 8 hours PRN Nausea and/or Vomiting  oxyCODONE    IR 5 milliGRAM(s) Oral every 6 hours PRN Moderate Pain (4 - 6)

## 2025-05-31 LAB
ANION GAP SERPL CALC-SCNC: 15 MMOL/L — HIGH (ref 7–14)
BUN SERPL-MCNC: 13 MG/DL — SIGNIFICANT CHANGE UP (ref 10–20)
CALCIUM SERPL-MCNC: 7.8 MG/DL — LOW (ref 8.4–10.5)
CHLORIDE SERPL-SCNC: 97 MMOL/L — LOW (ref 98–110)
CK SERPL-CCNC: 249 U/L — HIGH (ref 0–225)
CO2 SERPL-SCNC: 26 MMOL/L — SIGNIFICANT CHANGE UP (ref 17–32)
CREAT SERPL-MCNC: 1 MG/DL — SIGNIFICANT CHANGE UP (ref 0.7–1.5)
EGFR: 77 ML/MIN/1.73M2 — SIGNIFICANT CHANGE UP
EGFR: 77 ML/MIN/1.73M2 — SIGNIFICANT CHANGE UP
GLUCOSE SERPL-MCNC: 205 MG/DL — HIGH (ref 70–99)
HCT VFR BLD CALC: 26.8 % — LOW (ref 42–52)
HGB BLD-MCNC: 8.6 G/DL — LOW (ref 14–18)
MAGNESIUM SERPL-MCNC: 1.6 MG/DL — LOW (ref 1.8–2.4)
MCHC RBC-ENTMCNC: 32.1 G/DL — SIGNIFICANT CHANGE UP (ref 32–37)
MCHC RBC-ENTMCNC: 33.1 PG — HIGH (ref 27–31)
MCV RBC AUTO: 103.1 FL — HIGH (ref 80–94)
NRBC BLD AUTO-RTO: 0 /100 WBCS — SIGNIFICANT CHANGE UP (ref 0–0)
PLATELET # BLD AUTO: 112 K/UL — LOW (ref 130–400)
PMV BLD: 9 FL — SIGNIFICANT CHANGE UP (ref 7.4–10.4)
POTASSIUM SERPL-MCNC: 3.4 MMOL/L — LOW (ref 3.5–5)
POTASSIUM SERPL-SCNC: 3.4 MMOL/L — LOW (ref 3.5–5)
RBC # BLD: 2.6 M/UL — LOW (ref 4.7–6.1)
RBC # FLD: 22.3 % — HIGH (ref 11.5–14.5)
SODIUM SERPL-SCNC: 138 MMOL/L — SIGNIFICANT CHANGE UP (ref 135–146)
WBC # BLD: 6.35 K/UL — SIGNIFICANT CHANGE UP (ref 4.8–10.8)
WBC # FLD AUTO: 6.35 K/UL — SIGNIFICANT CHANGE UP (ref 4.8–10.8)

## 2025-05-31 PROCEDURE — 99233 SBSQ HOSP IP/OBS HIGH 50: CPT

## 2025-05-31 RX ORDER — MAGNESIUM GLUCONATE 27 MG(500)
500 TABLET ORAL DAILY
Refills: 0 | Status: DISCONTINUED | OUTPATIENT
Start: 2025-05-31 | End: 2025-06-19

## 2025-05-31 RX ADMIN — LEVETIRACETAM 250 MILLIGRAM(S): 10 INJECTION, SOLUTION INTRAVENOUS at 17:00

## 2025-05-31 RX ADMIN — Medication 1 APPLICATION(S): at 16:56

## 2025-05-31 RX ADMIN — Medication 25 GRAM(S): at 12:02

## 2025-05-31 RX ADMIN — Medication 1 APPLICATION(S): at 05:22

## 2025-05-31 RX ADMIN — Medication 1 APPLICATION(S): at 07:31

## 2025-05-31 RX ADMIN — Medication 2 TABLET(S): at 23:03

## 2025-05-31 RX ADMIN — Medication 1 TABLET(S): at 16:58

## 2025-05-31 RX ADMIN — INSULIN LISPRO 4: 100 INJECTION, SOLUTION INTRAVENOUS; SUBCUTANEOUS at 08:33

## 2025-05-31 RX ADMIN — Medication 40 MILLIEQUIVALENT(S): at 12:02

## 2025-05-31 RX ADMIN — INSULIN LISPRO 2: 100 INJECTION, SOLUTION INTRAVENOUS; SUBCUTANEOUS at 17:00

## 2025-05-31 RX ADMIN — NYSTATIN 1 APPLICATION(S): 100000 CREAM TOPICAL at 07:31

## 2025-05-31 RX ADMIN — LACTULOSE 20 GRAM(S): 10 SOLUTION ORAL at 05:20

## 2025-05-31 RX ADMIN — LEVETIRACETAM 250 MILLIGRAM(S): 10 INJECTION, SOLUTION INTRAVENOUS at 05:20

## 2025-05-31 RX ADMIN — Medication 40 MILLIGRAM(S): at 12:01

## 2025-05-31 RX ADMIN — BUMETANIDE 1 MILLIGRAM(S): 1 TABLET ORAL at 17:02

## 2025-05-31 RX ADMIN — MIDODRINE HYDROCHLORIDE 10 MILLIGRAM(S): 5 TABLET ORAL at 23:02

## 2025-05-31 RX ADMIN — DAPTOMYCIN 136 MILLIGRAM(S): 500 INJECTION, POWDER, LYOPHILIZED, FOR SOLUTION INTRAVENOUS at 17:03

## 2025-05-31 RX ADMIN — INSULIN LISPRO 4: 100 INJECTION, SOLUTION INTRAVENOUS; SUBCUTANEOUS at 11:57

## 2025-05-31 RX ADMIN — FOLIC ACID 1 MILLIGRAM(S): 1 TABLET ORAL at 12:01

## 2025-05-31 RX ADMIN — Medication 1 TABLET(S): at 08:34

## 2025-05-31 RX ADMIN — LACTULOSE 20 GRAM(S): 10 SOLUTION ORAL at 23:02

## 2025-05-31 RX ADMIN — Medication 400 MILLIGRAM(S): at 08:34

## 2025-05-31 RX ADMIN — LACTULOSE 20 GRAM(S): 10 SOLUTION ORAL at 12:01

## 2025-05-31 RX ADMIN — Medication 100 MILLIGRAM(S): at 05:21

## 2025-05-31 RX ADMIN — Medication 400 MILLIGRAM(S): at 12:01

## 2025-05-31 RX ADMIN — NYSTATIN 1 APPLICATION(S): 100000 CREAM TOPICAL at 16:59

## 2025-05-31 RX ADMIN — INSULIN LISPRO 4: 100 INJECTION, SOLUTION INTRAVENOUS; SUBCUTANEOUS at 22:00

## 2025-05-31 RX ADMIN — Medication 400 MILLIGRAM(S): at 16:58

## 2025-06-01 LAB
ANION GAP SERPL CALC-SCNC: 10 MMOL/L — SIGNIFICANT CHANGE UP (ref 7–14)
BUN SERPL-MCNC: 13 MG/DL — SIGNIFICANT CHANGE UP (ref 10–20)
CALCIUM SERPL-MCNC: 7.9 MG/DL — LOW (ref 8.4–10.5)
CHLORIDE SERPL-SCNC: 97 MMOL/L — LOW (ref 98–110)
CO2 SERPL-SCNC: 28 MMOL/L — SIGNIFICANT CHANGE UP (ref 17–32)
CREAT SERPL-MCNC: 0.8 MG/DL — SIGNIFICANT CHANGE UP (ref 0.7–1.5)
EGFR: 91 ML/MIN/1.73M2 — SIGNIFICANT CHANGE UP
EGFR: 91 ML/MIN/1.73M2 — SIGNIFICANT CHANGE UP
GLUCOSE SERPL-MCNC: 201 MG/DL — HIGH (ref 70–99)
MAGNESIUM SERPL-MCNC: 1.6 MG/DL — LOW (ref 1.8–2.4)
POTASSIUM SERPL-MCNC: 4 MMOL/L — SIGNIFICANT CHANGE UP (ref 3.5–5)
POTASSIUM SERPL-SCNC: 4 MMOL/L — SIGNIFICANT CHANGE UP (ref 3.5–5)
SODIUM SERPL-SCNC: 135 MMOL/L — SIGNIFICANT CHANGE UP (ref 135–146)

## 2025-06-01 PROCEDURE — 99232 SBSQ HOSP IP/OBS MODERATE 35: CPT

## 2025-06-01 RX ADMIN — Medication 1 APPLICATION(S): at 06:39

## 2025-06-01 RX ADMIN — INSULIN LISPRO 4: 100 INJECTION, SOLUTION INTRAVENOUS; SUBCUTANEOUS at 08:09

## 2025-06-01 RX ADMIN — Medication 650 MILLIGRAM(S): at 18:01

## 2025-06-01 RX ADMIN — MIDODRINE HYDROCHLORIDE 10 MILLIGRAM(S): 5 TABLET ORAL at 06:36

## 2025-06-01 RX ADMIN — BUMETANIDE 1 MILLIGRAM(S): 1 TABLET ORAL at 06:36

## 2025-06-01 RX ADMIN — INSULIN LISPRO 6: 100 INJECTION, SOLUTION INTRAVENOUS; SUBCUTANEOUS at 17:16

## 2025-06-01 RX ADMIN — INSULIN LISPRO 8: 100 INJECTION, SOLUTION INTRAVENOUS; SUBCUTANEOUS at 21:10

## 2025-06-01 RX ADMIN — BUMETANIDE 1 MILLIGRAM(S): 1 TABLET ORAL at 17:19

## 2025-06-01 RX ADMIN — LIDOCAINE HYDROCHLORIDE 1 PATCH: 20 JELLY TOPICAL at 23:27

## 2025-06-01 RX ADMIN — LACTULOSE 20 GRAM(S): 10 SOLUTION ORAL at 06:37

## 2025-06-01 RX ADMIN — Medication 400 MILLIGRAM(S): at 08:09

## 2025-06-01 RX ADMIN — BUTYROSPERMUM PARKII(SHEA BUTTER), SIMMONDSIA CHINENSIS (JOJOBA) SEED OIL, ALOE BARBADENSIS LEAF EXTRACT 250 MILLIGRAM(S): .01; 1; 3.5 LIQUID TOPICAL at 06:35

## 2025-06-01 RX ADMIN — Medication 650 MILLIGRAM(S): at 17:18

## 2025-06-01 RX ADMIN — LEVETIRACETAM 250 MILLIGRAM(S): 10 INJECTION, SOLUTION INTRAVENOUS at 06:35

## 2025-06-01 RX ADMIN — OXYCODONE HYDROCHLORIDE 5 MILLIGRAM(S): 30 TABLET ORAL at 12:33

## 2025-06-01 RX ADMIN — LEVETIRACETAM 250 MILLIGRAM(S): 10 INJECTION, SOLUTION INTRAVENOUS at 17:18

## 2025-06-01 RX ADMIN — BUTYROSPERMUM PARKII(SHEA BUTTER), SIMMONDSIA CHINENSIS (JOJOBA) SEED OIL, ALOE BARBADENSIS LEAF EXTRACT 250 MILLIGRAM(S): .01; 1; 3.5 LIQUID TOPICAL at 17:19

## 2025-06-01 RX ADMIN — NYSTATIN 1 APPLICATION(S): 100000 CREAM TOPICAL at 06:40

## 2025-06-01 RX ADMIN — Medication 1 TABLET(S): at 17:19

## 2025-06-01 RX ADMIN — LACTULOSE 20 GRAM(S): 10 SOLUTION ORAL at 13:09

## 2025-06-01 RX ADMIN — Medication 100 MILLIGRAM(S): at 06:36

## 2025-06-01 RX ADMIN — OXYCODONE HYDROCHLORIDE 5 MILLIGRAM(S): 30 TABLET ORAL at 13:30

## 2025-06-01 RX ADMIN — LIDOCAINE HYDROCHLORIDE 1 PATCH: 20 JELLY TOPICAL at 19:34

## 2025-06-01 RX ADMIN — Medication 500 MILLIGRAM(S): at 12:30

## 2025-06-01 RX ADMIN — FOLIC ACID 1 MILLIGRAM(S): 1 TABLET ORAL at 12:29

## 2025-06-01 RX ADMIN — Medication 40 MILLIEQUIVALENT(S): at 12:30

## 2025-06-01 RX ADMIN — Medication 1 APPLICATION(S): at 06:40

## 2025-06-01 RX ADMIN — NYSTATIN 1 APPLICATION(S): 100000 CREAM TOPICAL at 17:25

## 2025-06-01 RX ADMIN — LACTULOSE 20 GRAM(S): 10 SOLUTION ORAL at 21:12

## 2025-06-01 RX ADMIN — Medication 40 MILLIGRAM(S): at 12:30

## 2025-06-01 RX ADMIN — Medication 1 TABLET(S): at 08:09

## 2025-06-01 RX ADMIN — LIDOCAINE HYDROCHLORIDE 1 PATCH: 20 JELLY TOPICAL at 12:29

## 2025-06-01 RX ADMIN — Medication 400 MILLIGRAM(S): at 17:18

## 2025-06-01 RX ADMIN — Medication 1 APPLICATION(S): at 17:27

## 2025-06-01 RX ADMIN — Medication 2 TABLET(S): at 21:12

## 2025-06-01 RX ADMIN — Medication 400 MILLIGRAM(S): at 12:30

## 2025-06-01 RX ADMIN — INSULIN LISPRO 6: 100 INJECTION, SOLUTION INTRAVENOUS; SUBCUTANEOUS at 12:31

## 2025-06-01 NOTE — PROGRESS NOTE ADULT - SUBJECTIVE AND OBJECTIVE BOX
Patient is a 80y old  Male who presents with a chief complaint of acute mentation changes (05-30-25)      Pt seen and examined at bedside. No CP or SOB.          PAST MEDICAL & SURGICAL HISTORY:  Diabetes    Hypertension    Fall    H/O left knee surgery    History of cholecystectomy    History of appendectomy        VITAL SIGNS (Last 24 hrs):  T(C): 36.4 (06-01-25 @ 13:09), Max: 36.8 (06-01-25 @ 04:30)  HR: 58 (06-01-25 @ 13:09) (58 - 88)  BP: 135/72 (06-01-25 @ 13:09) (126/74 - 135/72)  RR: 18 (06-01-25 @ 13:09) (18 - 18)  SpO2: 98% (06-01-25 @ 13:09) (94% - 98%)  Wt(kg): --  Daily     Daily     I&O's Summary    01 Jun 2025 07:01  -  01 Jun 2025 17:06  --------------------------------------------------------  IN: 360 mL / OUT: 2400 mL / NET: -2040 mL        PHYSICAL EXAM:  GENERAL: NAD, well-developed  HEAD:  Atraumatic, Normocephalic  EYES: EOMI, PERRLA, conjunctiva and sclera clear  NECK: Supple, No JVD  CHEST/LUNG: Clear to auscultation bilaterally; No wheeze  HEART: Regular rate and rhythm; No murmurs, rubs, or gallops  ABDOMEN: Soft, Nontender, Nondistended; Bowel sounds present  EXTREMITIES:  2+ Peripheral Pulses, No clubbing, cyanosis, or edema  PSYCH: AAOx3  NEUROLOGY: non-focal  SKIN: No rashes or lesions    Labs Reviewed  Spoke to patient in regards to abnormal labs.    CBC Full  -  ( 31 May 2025 08:53 )  WBC Count : 6.35 K/uL  Hemoglobin : 8.6 g/dL  Hematocrit : 26.8 %  Platelet Count - Automated : 112 K/uL  Mean Cell Volume : 103.1 fL  Mean Cell Hemoglobin : 33.1 pg  Mean Cell Hemoglobin Concentration : 32.1 g/dL  Auto Neutrophil # : x  Auto Lymphocyte # : x  Auto Monocyte # : x  Auto Eosinophil # : x  Auto Basophil # : x  Auto Neutrophil % : x  Auto Lymphocyte % : x  Auto Monocyte % : x  Auto Eosinophil % : x  Auto Basophil % : x    BMP:    06-01 @ 06:27    Blood Urea Nitrogen - 13  Calcium - 7.9  Carbond Dioxide - 28  Chloride - 97  Creatinine - 0.8  Glucose - 201  Potassium - 4.0  Sodium - 135      Hemoglobin A1c -     Urine Culture:        COVID Labs  CRP:  61.2 (05-29-25)      D-Dimer:            Imaging reviewed independently and reviewed read        MEDICATIONS  (STANDING):  buMETAnide 1 milliGRAM(s) Oral every 12 hours  chlorhexidine 2% Cloths 1 Application(s) Topical <User Schedule>  dextrose 5%. 1000 milliLiter(s) (100 mL/Hr) IV Continuous <Continuous>  dextrose 5%. 1000 milliLiter(s) (50 mL/Hr) IV Continuous <Continuous>  dextrose 50% Injectable 25 Gram(s) IV Push once  dextrose 50% Injectable 12.5 Gram(s) IV Push once  dextrose 50% Injectable 25 Gram(s) IV Push once  folic acid 1 milliGRAM(s) Oral daily  glucagon  Injectable 1 milliGRAM(s) IntraMuscular once  insulin lispro (ADMELOG) corrective regimen sliding scale   SubCutaneous Before meals and at bedtime  lactobacillus acidophilus 1 Tablet(s) Oral two times a day with meals  lactulose Syrup 20 Gram(s) Oral every 8 hours  levETIRAcetam  Solution 250 milliGRAM(s) Oral two times a day  lidocaine   4% Patch 1 Patch Transdermal daily  magnesium gluconate 500 milliGRAM(s) Oral daily  magnesium oxide 400 milliGRAM(s) Oral three times a day with meals  midodrine 10 milliGRAM(s) Oral every 8 hours  nystatin Powder 1 Application(s) Topical two times a day  pantoprazole  Injectable 40 milliGRAM(s) IV Push daily  potassium chloride   Powder 40 milliEquivalent(s) Oral daily  rifAXIMin 550 milliGRAM(s) Oral two times a day  saccharomyces boulardii 250 milliGRAM(s) Oral two times a day  senna 2 Tablet(s) Oral at bedtime  silver sulfADIAZINE 1% Cream 1 Application(s) Topical two times a day  spironolactone 100 milliGRAM(s) Oral daily    MEDICATIONS  (PRN):  acetaminophen     Tablet .. 650 milliGRAM(s) Oral every 6 hours PRN Temp greater or equal to 38C (100.4F), Mild Pain (1 - 3)  aluminum hydroxide/magnesium hydroxide/simethicone Suspension 30 milliLiter(s) Oral every 4 hours PRN Dyspepsia  dextrose Oral Gel 15 Gram(s) Oral once PRN Blood Glucose LESS THAN 70 milliGRAM(s)/deciliter  melatonin 3 milliGRAM(s) Oral at bedtime PRN Insomnia  ondansetron Injectable 4 milliGRAM(s) IV Push every 8 hours PRN Nausea and/or Vomiting  oxyCODONE    IR 5 milliGRAM(s) Oral every 6 hours PRN Moderate Pain (4 - 6)

## 2025-06-01 NOTE — PROGRESS NOTE ADULT - ASSESSMENT
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  Post-cholecystectomy.   3.  Severe abdominal ascites.4.  Right pleural effusion. 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     Ambulatory Dysfunction   H/O Lumbar osteomyelitis in January 2025  new osteo in L3-L4 region  -Completed Course of antibiotics at that time   -Now with Difficulty with  ambulation   -MR Lumbar Spine w/wo IV Cont  -Since the previous lumbar spine MRI dated 1/29/2025:  1.  The findings of L4-5 discitis osteomyelitis have equivocally changed   the bone marrow edema/enhancement within the L4 and L5 vertebral bodies   has mildly decreased. However there is new mild vertebral body height   loss of L5, increased L5 superior endplate erosion, and increased L4-5 disc edema.  2.  New mild edema within the L3-4 intervertebral disc with faint erosions involving the opposing endplates suspicious for additional level   of discitis osteomyelitis.  3.  No evidence of epidural inflammation /  fluid collection.  4.  Mild paraspinal edema without abscess.  5.  Stable degenerative changes.  -ID Follow-up appreciated>>started on Daptomycin and Rocephin   discussed with family and ID on 5/28 - family learning toward gallium scan to see collection and if need be would proceed with IR biopsy  however in regards to the IR Biopsy of new L3-L4 OM, however poor candidate as pr intermittently does not follow- command   -was onIV daptomycin 900mg q24hrs,  on 5/24, trend qweekly  - was also on IV ceftriaxone 2g q24hrs  - ESR/CRP pending - elevated  -gallium scan 5/30: Normal 24 hour postinjection whole body gallium images and SPECT images   over the abdomen.  Specifically, no abnormal gallium uptake in the lumbar spine is   determined to suspicious for active inflammation process.  -spoke with ID about this on 5/31, we will stop abx, ID will speak with radiology regarding the read and if we need to proceed with biopsy     Bacteremia with bacteroides  -blood culture positive for bacteroides on presentation  -s/p ceftriaxone and flagyl  -repeat blood culture negative,     Normocytic Anemia:  multifactorial: sepsis, cirrhosis, poor nutrition, GI bleeding  -s/p EGD on 4/16 which showed 5mm ulcer in duodenal bulb post endo clip, repeat EGD on 4/29 showed no bleeding noted   transfused one unit of PRBC on 5/3, -Hb stable , cont to monitor   -Keep Active T/S     New Onset Afib  -found during this admission  -rate controlled, not candidate for AC given anemia, coagulopathy in setting of cirrhosis, pt is a poor candidate for AC     Deconditioning  Sacral pressure ulcer  -PT/OT/OOB  -local wound care  -rotate the patient q2hrs     Pseudohypocalcemia: corrected calcium is 9.1  hypokalemia: monitor and supplement     SVT PPX: SCD due to coagulopathy, risk of Gi bleed /anemia   GI PPX: PPI   Full Code   Dispo: from home, will go to rehab

## 2025-06-02 LAB
ANION GAP SERPL CALC-SCNC: 14 MMOL/L — SIGNIFICANT CHANGE UP (ref 7–14)
APTT BLD: 32.7 SEC — SIGNIFICANT CHANGE UP (ref 27–39.2)
BUN SERPL-MCNC: 18 MG/DL — SIGNIFICANT CHANGE UP (ref 10–20)
CALCIUM SERPL-MCNC: 8.4 MG/DL — SIGNIFICANT CHANGE UP (ref 8.4–10.5)
CHLORIDE SERPL-SCNC: 95 MMOL/L — LOW (ref 98–110)
CO2 SERPL-SCNC: 25 MMOL/L — SIGNIFICANT CHANGE UP (ref 17–32)
CREAT SERPL-MCNC: 1.1 MG/DL — SIGNIFICANT CHANGE UP (ref 0.7–1.5)
EGFR: 69 ML/MIN/1.73M2 — SIGNIFICANT CHANGE UP
EGFR: 69 ML/MIN/1.73M2 — SIGNIFICANT CHANGE UP
GLUCOSE SERPL-MCNC: 262 MG/DL — HIGH (ref 70–99)
HCT VFR BLD CALC: 26.8 % — LOW (ref 42–52)
HGB BLD-MCNC: 8.6 G/DL — LOW (ref 14–18)
INR BLD: 1.37 RATIO — HIGH (ref 0.65–1.3)
MCHC RBC-ENTMCNC: 32.1 G/DL — SIGNIFICANT CHANGE UP (ref 32–37)
MCHC RBC-ENTMCNC: 33.2 PG — HIGH (ref 27–31)
MCV RBC AUTO: 103.5 FL — HIGH (ref 80–94)
NRBC BLD AUTO-RTO: 0 /100 WBCS — SIGNIFICANT CHANGE UP (ref 0–0)
PLATELET # BLD AUTO: 122 K/UL — LOW (ref 130–400)
PMV BLD: 8.7 FL — SIGNIFICANT CHANGE UP (ref 7.4–10.4)
POTASSIUM SERPL-MCNC: 4.1 MMOL/L — SIGNIFICANT CHANGE UP (ref 3.5–5)
POTASSIUM SERPL-SCNC: 4.1 MMOL/L — SIGNIFICANT CHANGE UP (ref 3.5–5)
PROTHROM AB SERPL-ACNC: 16.3 SEC — HIGH (ref 9.95–12.87)
RBC # BLD: 2.59 M/UL — LOW (ref 4.7–6.1)
RBC # FLD: 21.6 % — HIGH (ref 11.5–14.5)
SODIUM SERPL-SCNC: 134 MMOL/L — LOW (ref 135–146)
WBC # BLD: 6.78 K/UL — SIGNIFICANT CHANGE UP (ref 4.8–10.8)
WBC # FLD AUTO: 6.78 K/UL — SIGNIFICANT CHANGE UP (ref 4.8–10.8)

## 2025-06-02 PROCEDURE — 99233 SBSQ HOSP IP/OBS HIGH 50: CPT

## 2025-06-02 RX ADMIN — LIDOCAINE HYDROCHLORIDE 1 PATCH: 20 JELLY TOPICAL at 13:23

## 2025-06-02 RX ADMIN — LACTULOSE 20 GRAM(S): 10 SOLUTION ORAL at 21:36

## 2025-06-02 RX ADMIN — Medication 400 MILLIGRAM(S): at 18:08

## 2025-06-02 RX ADMIN — Medication 40 MILLIGRAM(S): at 13:30

## 2025-06-02 RX ADMIN — FOLIC ACID 1 MILLIGRAM(S): 1 TABLET ORAL at 13:25

## 2025-06-02 RX ADMIN — OXYCODONE HYDROCHLORIDE 5 MILLIGRAM(S): 30 TABLET ORAL at 14:17

## 2025-06-02 RX ADMIN — INSULIN LISPRO 6: 100 INJECTION, SOLUTION INTRAVENOUS; SUBCUTANEOUS at 08:12

## 2025-06-02 RX ADMIN — Medication 1 APPLICATION(S): at 05:59

## 2025-06-02 RX ADMIN — LEVETIRACETAM 250 MILLIGRAM(S): 10 INJECTION, SOLUTION INTRAVENOUS at 05:58

## 2025-06-02 RX ADMIN — BUTYROSPERMUM PARKII(SHEA BUTTER), SIMMONDSIA CHINENSIS (JOJOBA) SEED OIL, ALOE BARBADENSIS LEAF EXTRACT 250 MILLIGRAM(S): .01; 1; 3.5 LIQUID TOPICAL at 05:58

## 2025-06-02 RX ADMIN — BUTYROSPERMUM PARKII(SHEA BUTTER), SIMMONDSIA CHINENSIS (JOJOBA) SEED OIL, ALOE BARBADENSIS LEAF EXTRACT 250 MILLIGRAM(S): .01; 1; 3.5 LIQUID TOPICAL at 18:09

## 2025-06-02 RX ADMIN — NYSTATIN 1 APPLICATION(S): 100000 CREAM TOPICAL at 06:02

## 2025-06-02 RX ADMIN — Medication 1 APPLICATION(S): at 05:57

## 2025-06-02 RX ADMIN — LIDOCAINE HYDROCHLORIDE 1 PATCH: 20 JELLY TOPICAL at 19:32

## 2025-06-02 RX ADMIN — INSULIN LISPRO 4: 100 INJECTION, SOLUTION INTRAVENOUS; SUBCUTANEOUS at 17:25

## 2025-06-02 RX ADMIN — LACTULOSE 20 GRAM(S): 10 SOLUTION ORAL at 13:31

## 2025-06-02 RX ADMIN — NYSTATIN 1 APPLICATION(S): 100000 CREAM TOPICAL at 17:44

## 2025-06-02 RX ADMIN — INSULIN LISPRO 6: 100 INJECTION, SOLUTION INTRAVENOUS; SUBCUTANEOUS at 13:10

## 2025-06-02 RX ADMIN — Medication 1 TABLET(S): at 08:07

## 2025-06-02 RX ADMIN — Medication 1 APPLICATION(S): at 18:09

## 2025-06-02 RX ADMIN — Medication 1 TABLET(S): at 18:08

## 2025-06-02 RX ADMIN — BUMETANIDE 1 MILLIGRAM(S): 1 TABLET ORAL at 18:16

## 2025-06-02 RX ADMIN — INSULIN LISPRO 6: 100 INJECTION, SOLUTION INTRAVENOUS; SUBCUTANEOUS at 21:35

## 2025-06-02 RX ADMIN — OXYCODONE HYDROCHLORIDE 5 MILLIGRAM(S): 30 TABLET ORAL at 06:00

## 2025-06-02 RX ADMIN — BUMETANIDE 1 MILLIGRAM(S): 1 TABLET ORAL at 05:58

## 2025-06-02 RX ADMIN — Medication 100 MILLIGRAM(S): at 05:57

## 2025-06-02 RX ADMIN — LEVETIRACETAM 250 MILLIGRAM(S): 10 INJECTION, SOLUTION INTRAVENOUS at 18:08

## 2025-06-02 RX ADMIN — OXYCODONE HYDROCHLORIDE 5 MILLIGRAM(S): 30 TABLET ORAL at 06:20

## 2025-06-02 RX ADMIN — Medication 500 MILLIGRAM(S): at 13:25

## 2025-06-02 RX ADMIN — Medication 40 MILLIEQUIVALENT(S): at 13:24

## 2025-06-02 RX ADMIN — Medication 2 TABLET(S): at 21:37

## 2025-06-02 RX ADMIN — Medication 400 MILLIGRAM(S): at 13:25

## 2025-06-02 RX ADMIN — Medication 400 MILLIGRAM(S): at 08:08

## 2025-06-02 NOTE — PROGRESS NOTE ADULT - ASSESSMENT
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  Post-cholecystectomy.   3.  Severe abdominal ascites.4.  Right pleural effusion. 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     Ambulatory Dysfunction   H/O Lumbar osteomyelitis in January 2025  new osteo in L3-L4 region  -Completed Course of antibiotics at that time   -Now with Difficulty with  ambulation   -MR Lumbar Spine w/wo IV Cont  -Since the previous lumbar spine MRI dated 1/29/2025:  1.  The findings of L4-5 discitis osteomyelitis have equivocally changed   the bone marrow edema/enhancement within the L4 and L5 vertebral bodies   has mildly decreased. However there is new mild vertebral body height   loss of L5, increased L5 superior endplate erosion, and increased L4-5 disc edema.  2.  New mild edema within the L3-4 intervertebral disc with faint erosions involving the opposing endplates suspicious for additional level   of discitis osteomyelitis.  3.  No evidence of epidural inflammation /  fluid collection.  4.  Mild paraspinal edema without abscess.  5.  Stable degenerative changes.  -ID Follow-up appreciated>>started on Daptomycin and Rocephin   discussed with family and ID on 5/28 - family learning toward gallium scan to see collection and if need be would proceed with IR biopsy  however in regards to the IR Biopsy of new L3-L4 OM, however poor candidate as pr intermittently does not follow- command   -was onIV daptomycin 900mg q24hrs,  on 5/24, trend qweekly, was also  ceftriaxone 2g q24hrs  - ESR/CRP pending - elevated  -gallium scan 5/30: Normal 24 hour postinjection whole body gallium images and SPECT images   over the abdomen.  Specifically, no abnormal gallium uptake in the lumbar spine is   determined to suspicious for active inflammation process.  -spoke with ID about this on 5/31, we will stop abx, ID will speak with radiology regarding the read and if we need to proceed with biopsy   -spoke with ID on 6/2, awaiting radiology conversation     Bacteremia with bacteroides  -blood culture positive for bacteroides on presentation  -s/p ceftriaxone and flagyl  -repeat blood culture negative,     macrocytic Anemia:  multifactorial: sepsis, cirrhosis, poor nutrition, GI bleeding  -s/p EGD on 4/16 which showed 5mm ulcer in duodenal bulb post endo clip, repeat EGD on 4/29 showed no bleeding noted   transfused one unit of PRBC on 5/3, -Hb stable , cont to monitor   -Keep Active T/S   -folate and b12 stable, chronic issue     New Onset Afib  -found during this admission  -rate controlled, not candidate for AC given anemia, coagulopathy in setting of cirrhosis, pt is a poor candidate for AC     Deconditioning  Sacral pressure ulcer  -PT/OT/OOB  -local wound care  -rotate the patient q2hrs     Pseudohypocalcemia: corrected calcium is 9.1  hypokalemia: monitor and supplement     SVT PPX: SCD due to coagulopathy, risk of Gi bleed /anemia   GI PPX: PPI   Full Code   Dispo: from home, will go to rehab  Mr Mendoza is a 80 YOM w a PMH of 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  Post-cholecystectomy.   3.  Severe abdominal ascites.4.  Right pleural effusion. 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     Ambulatory Dysfunction   H/O Lumbar osteomyelitis in January 2025  new osteo in L3-L4 region  -Completed Course of antibiotics at that time   -Now with Difficulty with  ambulation   -MR Lumbar Spine w/wo IV Cont  -Since the previous lumbar spine MRI dated 1/29/2025:  1.  The findings of L4-5 discitis osteomyelitis have equivocally changed   the bone marrow edema/enhancement within the L4 and L5 vertebral bodies   has mildly decreased. However there is new mild vertebral body height   loss of L5, increased L5 superior endplate erosion, and increased L4-5 disc edema.  2.  New mild edema within the L3-4 intervertebral disc with faint erosions involving the opposing endplates suspicious for additional level   of discitis osteomyelitis.  3.  No evidence of epidural inflammation /  fluid collection.  4.  Mild paraspinal edema without abscess.  5.  Stable degenerative changes.  -ID Follow-up appreciated>>started on Daptomycin and Rocephin   discussed with family and ID on 5/28 - family learning toward gallium scan to see collection and if need be would proceed with IR biopsy  however in regards to the IR Biopsy of new L3-L4 OM, however poor candidate as pr intermittently does not follow- command   -was onIV daptomycin 900mg q24hrs,  on 5/24, trend qweekly, was also  ceftriaxone 2g q24hrs  - ESR/CRP pending - elevated  -gallium scan 5/30: Normal 24 hour postinjection whole body gallium images and SPECT images   over the abdomen.  Specifically, no abnormal gallium uptake in the lumbar spine is   determined to suspicious for active inflammation process.  -spoke with ID about this on 5/31, we will stop abx, ID will speak with radiology regarding the read and if we need to proceed with biopsy   -spoke with ID on 6/2, repeat MRI in 3 months   -get UA and UClx 6/2     Bacteremia with bacteroides  -blood culture positive for bacteroides on presentation  -s/p ceftriaxone and flagyl  -repeat blood culture negative,     macrocytic Anemia:  multifactorial: sepsis, cirrhosis, poor nutrition, GI bleeding  -s/p EGD on 4/16 which showed 5mm ulcer in duodenal bulb post endo clip, repeat EGD on 4/29 showed no bleeding noted   transfused one unit of PRBC on 5/3, -Hb stable , cont to monitor   -Keep Active T/S   -folate and b12 stable, chronic issue     New Onset Afib  -found during this admission  -rate controlled, not candidate for AC given anemia, coagulopathy in setting of cirrhosis, pt is a poor candidate for AC     Deconditioning  Sacral pressure ulcer  -PT/OT/OOB  -local wound care  -rotate the patient q2hrs     Pseudohypocalcemia: corrected calcium is 9.1  hypokalemia: monitor and supplement     SVT PPX: SCD due to coagulopathy, risk of Gi bleed /anemia   GI PPX: PPI   Full Code   Dispo: from home, will go to rehab     ---PB Handoff Note---    Pending/Follow up items (tests, labs, procedures,consults):    Indication for continued inpatient management: spok      Family contact:  Dispo (home, SNF, etc): rehab    Prepare for DC order [x] - updated CROW is:   DC provider note prep:    -------------------------------Time spent-----------------------------------------------------------------------  Initial visit:             mins [ ] -- 55-74 mins [ ]  Subsequent visit: 50-79 mins[ ]    -- 35-49mins [ ]     --------------------------------# and complexity of problems---------------------------------------------  [ ] chronic illness with severe exacerbation , progression, treatment side effect  [ ] acute or chronic illness with threat to life or bodily funtion                         --------------------------------Complexity of data reviewed ----------------------------------------------  The Patients complexity of data reviewed  is Low [ ] Moderate [ ] High [x ] due to the following:   A:      Reviewed prior external records [ ]      Considering/ Ordered a unique test:  Labs [x ] Imaging [x ] Stress Test  [ ] Other: Specify [ ]      Reviewed each unique test result [x ]      Assessment requiring an independent historian [x ]  B:       Independent interpretation of:   X-Ray [x ] EKG [ ]  Other: Specify  [ ]  C:       Discussion of management of tests with clinician outside of my group [x ]    -------------------------------------Risk of Morbidity-------------------------------------------------------  The Patients risk of morbidity is Low [ ] Moderate [x ] High [ ] due to the following:   Mod:  Prescription Drug Management [ ]  Decision regarding elective or emergent: minor surgery [ ] major surgery [ ]  Decision regarding hospitalization or escalation of hospital-level care [ ]  SDOH: Hearing/Vision impaired [ ] Food insecurity [ ] Homelessness/unsafe condition [ ]   Financial strain [ ] un/underemployed [ ] Lack of Transport [ ]  High:  DNR or De-Escalation of care because of poor prognosis [ ]  Drug Therapy requiring intensive monitoring for toxicity [ ]  Parenteral controlled substance [ ]  ------------------------------------------------------------------------------------------------------------------         Mr Mendoza is a 80 YOM w a PMH of 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  Post-cholecystectomy.   3.  Severe abdominal ascites.4.  Right pleural effusion. 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     Ambulatory Dysfunction   H/O Lumbar osteomyelitis in January 2025  new osteo in L3-L4 region  -Completed Course of antibiotics at that time   -Now with Difficulty with  ambulation   -MR Lumbar Spine w/wo IV Cont  -Since the previous lumbar spine MRI dated 1/29/2025:  1.  The findings of L4-5 discitis osteomyelitis have equivocally changed   the bone marrow edema/enhancement within the L4 and L5 vertebral bodies   has mildly decreased. However there is new mild vertebral body height   loss of L5, increased L5 superior endplate erosion, and increased L4-5 disc edema.  2.  New mild edema within the L3-4 intervertebral disc with faint erosions involving the opposing endplates suspicious for additional level   of discitis osteomyelitis.  3.  No evidence of epidural inflammation /  fluid collection.  4.  Mild paraspinal edema without abscess.  5.  Stable degenerative changes.  -ID Follow-up appreciated>>started on Daptomycin and Rocephin   discussed with family and ID on 5/28 - family learning toward gallium scan to see collection and if need be would proceed with IR biopsy  however in regards to the IR Biopsy of new L3-L4 OM, however poor candidate as pr intermittently does not follow- command   -was onIV daptomycin 900mg q24hrs,  on 5/24, trend qweekly, was also  ceftriaxone 2g q24hrs  - ESR/CRP pending - elevated  -gallium scan 5/30: Normal 24 hour postinjection whole body gallium images and SPECT images   over the abdomen.  Specifically, no abnormal gallium uptake in the lumbar spine is   determined to suspicious for active inflammation process.  -spoke with ID about this on 5/31, we will stop abx, ID will speak with radiology regarding the read and if we need to proceed with biopsy   -spoke with ID on 6/2, repeat MRI in 3 months   -get UA and UClx 6/2     Bacteremia with bacteroides  -blood culture positive for bacteroides on presentation  -s/p ceftriaxone and flagyl  -repeat blood culture negative,     macrocytic Anemia:  multifactorial: sepsis, cirrhosis, poor nutrition, GI bleeding  -s/p EGD on 4/16 which showed 5mm ulcer in duodenal bulb post endo clip, repeat EGD on 4/29 showed no bleeding noted   transfused one unit of PRBC on 5/3, -Hb stable , cont to monitor   -Keep Active T/S   -folate and b12 stable, chronic issue     New Onset Afib  -found during this admission  -rate controlled, not candidate for AC given anemia, coagulopathy in setting of cirrhosis, pt is a poor candidate for AC     Deconditioning  Sacral pressure ulcer  -PT/OT/OOB  -local wound care  -rotate the patient q2hrs     Pseudohypocalcemia: corrected calcium is 9.1  hypokalemia: monitor and supplement     SVT PPX: SCD due to coagulopathy, risk of Gi bleed /anemia   GI PPX: PPI   Full Code   Dispo: from home, will go to rehab     ---PB Handoff Note---    Pending/Follow up items (tests, labs, procedures,consults): ID on board, awaiting radiology discussion with ID, hold abx, ID recommending on 6/2 to get cultures of urine     Indication for continued inpatient management: UA and Uclx       Family contact:  Dispo (home, SNF, etc): rehab    Prepare for DC order [x] - updated CROW is:     -------------------------------Time spent-----------------------------------------------------------------------  Initial visit:             mins [ ] -- 55-74 mins [ ]  Subsequent visit: 50-79 mins[ ]    -- 35-49mins [ ]     --------------------------------# and complexity of problems---------------------------------------------  [ ] chronic illness with severe exacerbation , progression, treatment side effect  [ ] acute or chronic illness with threat to life or bodily funtion                         --------------------------------Complexity of data reviewed ----------------------------------------------  The Patients complexity of data reviewed  is Low [ ] Moderate [ ] High [x ] due to the following:   A:      Reviewed prior external records [ ]      Considering/ Ordered a unique test:  Labs [x ] Imaging [x ] Stress Test  [ ] Other: Specify [ ]      Reviewed each unique test result [x ]      Assessment requiring an independent historian [x ]  B:       Independent interpretation of:   X-Ray [x ] EKG [ ]  Other: Specify  [ ]  C:       Discussion of management of tests with clinician outside of my group [x ]    -------------------------------------Risk of Morbidity-------------------------------------------------------  The Patients risk of morbidity is Low [ ] Moderate [x ] High [ ] due to the following:   Mod:  Prescription Drug Management [ ]  Decision regarding elective or emergent: minor surgery [ ] major surgery [ ]  Decision regarding hospitalization or escalation of hospital-level care [ ]  SDOH: Hearing/Vision impaired [ ] Food insecurity [ ] Homelessness/unsafe condition [ ]   Financial strain [ ] un/underemployed [ ] Lack of Transport [ ]  High:  DNR or De-Escalation of care because of poor prognosis [ ]  Drug Therapy requiring intensive monitoring for toxicity [ ]  Parenteral controlled substance [ ]  ------------------------------------------------------------------------------------------------------------------

## 2025-06-02 NOTE — PROGRESS NOTE ADULT - SUBJECTIVE AND OBJECTIVE BOX
Patient is a 80y old  Male who presents with a chief complaint of acute mentation changes (06-01-25)      Pt seen and examined at bedside. No CP or SOB.          PAST MEDICAL & SURGICAL HISTORY:  Diabetes    Hypertension    Fall    H/O left knee surgery    History of cholecystectomy    History of appendectomy        VITAL SIGNS (Last 24 hrs):  T(C): 36.9 (06-01-25 @ 23:21), Max: 37.1 (06-01-25 @ 20:23)  HR: 91 (06-02-25 @ 04:59) (53 - 99)  BP: 130/57 (06-02-25 @ 04:59) (119/71 - 130/57)  RR: 18 (06-02-25 @ 04:59) (18 - 18)  SpO2: 100% (06-02-25 @ 04:59) (99% - 100%)  Wt(kg): --  Daily     Daily     I&O's Summary    01 Jun 2025 07:01  -  02 Jun 2025 07:00  --------------------------------------------------------  IN: 360 mL / OUT: 3300 mL / NET: -2940 mL        PHYSICAL EXAM:  GENERAL: NAD, well-developed  HEAD:  Atraumatic, Normocephalic  EYES: EOMI, PERRLA, conjunctiva and sclera clear  NECK: Supple, No JVD  CHEST/LUNG: Clear to auscultation bilaterally; No wheeze  HEART: Regular rate and rhythm; No murmurs, rubs, or gallops  ABDOMEN: Soft, Nontender, Nondistended; Bowel sounds present  EXTREMITIES:  2+ Peripheral Pulses, No clubbing, cyanosis, or edema  NEUROLOGY: non-focal  SKIN: No rashes or lesions    Labs Reviewed  Spoke to patient in regards to abnormal labs.    CBC Full  -  ( 02 Jun 2025 07:27 )  WBC Count : 6.78 K/uL  Hemoglobin : 8.6 g/dL  Hematocrit : 26.8 %  Platelet Count - Automated : 122 K/uL  Mean Cell Volume : 103.5 fL  Mean Cell Hemoglobin : 33.2 pg  Mean Cell Hemoglobin Concentration : 32.1 g/dL  Auto Neutrophil # : x  Auto Lymphocyte # : x  Auto Monocyte # : x  Auto Eosinophil # : x  Auto Basophil # : x  Auto Neutrophil % : x  Auto Lymphocyte % : x  Auto Monocyte % : x  Auto Eosinophil % : x  Auto Basophil % : x    BMP:    06-02 @ 07:27    Blood Urea Nitrogen - 18  Calcium - 8.4  Carbond Dioxide - 25  Chloride - 95  Creatinine - 1.1  Glucose - 262  Potassium - 4.1  Sodium - 134      Hemoglobin A1c -   PT/INR - ( 02 Jun 2025 07:27 )   PT: 16.30 sec;   INR: 1.37 ratio         PTT - ( 02 Jun 2025 07:27 )  PTT:32.7 sec  Urine Culture:        COVID Labs  CRP:  61.2 (05-29-25)      D-Dimer:            Imaging reviewed independently and reviewed read        MEDICATIONS  (STANDING):  buMETAnide 1 milliGRAM(s) Oral every 12 hours  chlorhexidine 2% Cloths 1 Application(s) Topical <User Schedule>  dextrose 5%. 1000 milliLiter(s) (50 mL/Hr) IV Continuous <Continuous>  dextrose 5%. 1000 milliLiter(s) (100 mL/Hr) IV Continuous <Continuous>  dextrose 50% Injectable 12.5 Gram(s) IV Push once  dextrose 50% Injectable 25 Gram(s) IV Push once  dextrose 50% Injectable 25 Gram(s) IV Push once  folic acid 1 milliGRAM(s) Oral daily  glucagon  Injectable 1 milliGRAM(s) IntraMuscular once  insulin lispro (ADMELOG) corrective regimen sliding scale   SubCutaneous Before meals and at bedtime  lactobacillus acidophilus 1 Tablet(s) Oral two times a day with meals  lactulose Syrup 20 Gram(s) Oral every 8 hours  levETIRAcetam  Solution 250 milliGRAM(s) Oral two times a day  lidocaine   4% Patch 1 Patch Transdermal daily  magnesium gluconate 500 milliGRAM(s) Oral daily  magnesium oxide 400 milliGRAM(s) Oral three times a day with meals  midodrine 10 milliGRAM(s) Oral every 8 hours  nystatin Powder 1 Application(s) Topical two times a day  pantoprazole  Injectable 40 milliGRAM(s) IV Push daily  potassium chloride   Powder 40 milliEquivalent(s) Oral daily  rifAXIMin 550 milliGRAM(s) Oral two times a day  saccharomyces boulardii 250 milliGRAM(s) Oral two times a day  senna 2 Tablet(s) Oral at bedtime  silver sulfADIAZINE 1% Cream 1 Application(s) Topical two times a day  spironolactone 100 milliGRAM(s) Oral daily    MEDICATIONS  (PRN):  acetaminophen     Tablet .. 650 milliGRAM(s) Oral every 6 hours PRN Temp greater or equal to 38C (100.4F), Mild Pain (1 - 3)  aluminum hydroxide/magnesium hydroxide/simethicone Suspension 30 milliLiter(s) Oral every 4 hours PRN Dyspepsia  dextrose Oral Gel 15 Gram(s) Oral once PRN Blood Glucose LESS THAN 70 milliGRAM(s)/deciliter  melatonin 3 milliGRAM(s) Oral at bedtime PRN Insomnia  ondansetron Injectable 4 milliGRAM(s) IV Push every 8 hours PRN Nausea and/or Vomiting  oxyCODONE    IR 5 milliGRAM(s) Oral every 6 hours PRN Moderate Pain (4 - 6)

## 2025-06-02 NOTE — PROGRESS NOTE ADULT - SUBJECTIVE AND OBJECTIVE BOX
INFECTIOUS DISEASE FOLLOW UP NOTE:    Interval History/ROS: Patient is a 80y old  Male who presents with a chief complaint of acute mentation changes (02 Jun 2025 13:37)    Overnight events: No overnight event. Patient is still confused but talking to his wife.    REVIEW OF SYSTEMS:  Unable to provide due to cognitive impairment      Prior hospital charts reviewed [Yes]  Primary team notes reviewed [Yes]  Other consultant notes reviewed [Yes]    Allergies:  No Known Allergies      ANTIMICROBIALS:   rifAXIMin 550 two times a day      OTHER MEDS: MEDICATIONS  (STANDING):  acetaminophen     Tablet .. 650 every 6 hours PRN  aluminum hydroxide/magnesium hydroxide/simethicone Suspension 30 every 4 hours PRN  buMETAnide 1 every 12 hours  dextrose 50% Injectable 12.5 once  dextrose 50% Injectable 25 once  dextrose 50% Injectable 25 once  dextrose Oral Gel 15 once PRN  glucagon  Injectable 1 once  insulin lispro (ADMELOG) corrective regimen sliding scale  Before meals and at bedtime  lactulose Syrup 20 every 8 hours  levETIRAcetam  Solution 250 two times a day  melatonin 3 at bedtime PRN  midodrine 10 every 8 hours  ondansetron Injectable 4 every 8 hours PRN  pantoprazole  Injectable 40 daily  senna 2 at bedtime  spironolactone 100 daily      Vital Signs Last 24 Hrs  T(F): 98.2 (06-02-25 @ 13:56), Max: 98.8 (06-01-25 @ 20:23)    Vital Signs Last 24 Hrs  HR: 75 (06-02-25 @ 13:56) (53 - 99)  BP: 115/63 (06-02-25 @ 13:56) (115/63 - 130/57)  RR: 18 (06-02-25 @ 13:56)  SpO2: 92% (06-02-25 @ 13:56) (92% - 100%)  Wt(kg): --    EXAM:  GENERAL: NAD, lying in bed  HEAD: No head lesions  EYES: Conjunctiva pink and cornea white  EAR, NOSE, MOUTH, THROAT: Normal external ears and nose, no discharges; moist mucous membranes;  NECK: Supple, nontender to palpation; no JVD  RESPIRATORY: Bibasilar crackles  CARDIOVASCULAR: S1 S2  GASTROINTESTINAL: Soft, no significant distention; normoactive bowel sounds  GENITOURINARY: no loyola catheter, no CVA tenderness  EXTREMITIES: No clubbing, cyanosis, + diffuse anasarca  NERVOUS SYSTEM: Awake and alert, not oriented, able to answer simple questions  MUSCULOSKELETAL: No joint erythema, swelling  SKIN: Sacral DTI, no superficial thrombophlebitis  PSYCH: Slightly agitated    Labs:                        8.6    6.78  )-----------( 122      ( 02 Jun 2025 07:27 )             26.8     06-02    134[L]  |  95[L]  |  18  ----------------------------<  262[H]  4.1   |  25  |  1.1    Ca    8.4      02 Jun 2025 07:27  Mg     1.6     06-01        WBC Trend:  WBC Count: 6.78 (06-02-25 @ 07:27)  WBC Count: 6.35 (05-31-25 @ 08:53)  WBC Count: 6.11 (05-30-25 @ 08:20)  WBC Count: 5.81 (05-29-25 @ 06:24)      Creatine Trend:  Creatinine: 1.1 (06-02)  Creatinine: 0.8 (06-01)  Creatinine: 1.0 (05-31)  Creatinine: 0.9 (05-30)      Liver Biochemical Testing Trend:  Alanine Aminotransferase (ALT/SGPT): 20 (05-29)  Alanine Aminotransferase (ALT/SGPT): 19 (05-27)  Alanine Aminotransferase (ALT/SGPT): 19 (05-26)  Alanine Aminotransferase (ALT/SGPT): 21 (05-24)  Alanine Aminotransferase (ALT/SGPT): 27 (05-13)  Aspartate Aminotransferase (AST/SGOT): 55 (05-29-25 @ 06:24)  Aspartate Aminotransferase (AST/SGOT): 41 (05-27-25 @ 08:21)  Aspartate Aminotransferase (AST/SGOT): 41 (05-26-25 @ 07:58)  Aspartate Aminotransferase (AST/SGOT): 53 (05-24-25 @ 07:27)  Aspartate Aminotransferase (AST/SGOT): 69 (05-13-25 @ 07:57)  Bilirubin Direct: 0.7 (05-29)  Bilirubin Total: 1.7 (05-29)  Bilirubin Total: 1.8 (05-27)  Bilirubin Total: 1.6 (05-26)  Bilirubin Total: 1.6 (05-24)      Trend LDH  04-24-25 @ 05:53  275[H]      Urinalysis Basic - ( 02 Jun 2025 07:27 )    Color: x / Appearance: x / SG: x / pH: x  Gluc: 262 mg/dL / Ketone: x  / Bili: x / Urobili: x   Blood: x / Protein: x / Nitrite: x   Leuk Esterase: x / RBC: x / WBC x   Sq Epi: x / Non Sq Epi: x / Bacteria: x        MICROBIOLOGY:    MRSA PCR Result.: Negative (04-25-25 @ 09:00)      Culture - Blood (collected 09 May 2025 08:06)  Source: Blood None  Final Report:    No growth at 5 days    Culture - Urine (collected 05 May 2025 12:48)  Source: Catheterized Catheterized  Final Report:    10,000 - 49,000 CFU/mL Candida albicans "Susceptibilities not performed"    Culture - Blood (collected 17 Apr 2025 18:30)  Source: Blood Blood  Final Report:    No growth at 5 days    Culture - Blood (collected 16 Apr 2025 15:15)  Source: Blood None  Final Report:    No growth at 5 days    Urinalysis with Rflx Culture (collected 13 Apr 2025 21:20)    Culture - Blood (collected 13 Apr 2025 21:20)  Source: Blood Blood-Peripheral  Final Report:    Growth in anaerobic bottle: Bacteroides fragilis    "Susceptibilities not performed"    Culture - Urine (collected 13 Apr 2025 21:20)  Source: Catheterized None  Final Report:    10,000 - 49,000 CFU/mL Proteus mirabilis  Organism: Proteus mirabilis  Organism: Proteus mirabilis    Sensitivities:      -  Levofloxacin: S <=0.5      -  Tobramycin: S <=2      -  Nitrofurantoin: R >64 Should not be used to treat pyelonephritis      -  Aztreonam: S <=4      -  Gentamicin: S <=2      -  Cefazolin: S <=2 For uncomplicated UTI with K. pneumoniae, E. coli, or P. mirablis: LOUIS <=16 is sensitive and LOUIS >=32 is resistant. This also predicts results for oral agents cefaclor, cefdinir, cefpodoxime, cefprozil, cefuroxime axetil, cephalexin and locarbef for uncomplicated UTI. Note that some isolates may be susceptible to these agents while testing resistant to cefazolin.      -  Cefepime: S <=2      -  Piperacillin/Tazobactam: S <=8      -  Ciprofloxacin: S <=0.25      -  Ceftriaxone: S <=1      -  Ampicillin: S <=8 These ampicillin results predict results for amoxicillin      Method Type: LOUIS      -  Meropenem: S <=1      -  Ampicillin/Sulbactam: S <=4/2      -  Cefoxitin: S <=8      -  Cefuroxime: S <=4      -  Amoxicillin/Clavulanic Acid: S <=8/4      -  Trimethoprim/Sulfamethoxazole: S <=0.5/9.5      -  Ertapenem: S <=0.5    Culture - Blood (collected 13 Apr 2025 21:20)  Source: Blood Blood-Peripheral  Final Report:    Growth in anaerobic bottle: Bacteroides fragilis "Susceptibilities not    performed"    Direct identification is available within approximately 3-5    hours either by Blood Panel Multiplexed PCR or Direct    MALDI-TOF. Details: [email or web address]  Organism: Blood Culture PCR  Organism: Blood Culture PCR    Sensitivities:      -  Bacteroides fragilis: Detec      Method Type: PCR    Culture - Blood (collected 30 Jan 2025 07:12)  Source: .Blood BLOOD  Final Report:    No growth at 5 days    Culture - Blood (collected 30 Jan 2025 07:12)  Source: .Blood BLOOD  Final Report:    No growth at 5 days    C-Reactive Protein: 61.2 (05-29)    RADIOLOGY:  imaging below personally reviewed    < from: NM Inflammatory Loc Gallium, Single Area Single Day (05.30.25 @ 13:10) >    Impression:  Normal 24 hour postinjection whole body gallium images and SPECT images   over the abdomen.  Specifically, no abnormal gallium uptake in the lumbar spine is   determined to suspicious for active inflammation process.    < end of copied text >    < from: MR Lumbar Spine w/wo IV Cont (05.22.25 @ 21:09) >  IMPRESSION:  Since the previous lumbar spine MRI dated 1/29/2025:    1.  The findings of L4-5 discitis osteomyelitis have equivocally changed:   the bone marrow edema/enhancement within the L4 and L5 vertebral bodies   has mildly decreased. However there is new mild vertebral body height   loss of L5, increased L5 superior endplate erosion, and increased L4-5   disc edema.    2.  New mild edema within the L3-4 intervertebral disc with faint   erosions involving the opposing endplates suspicious for additional level   of discitis osteomyelitis.    3.  No evidence of epidural inflammation /  fluid collection.    4.  Mild paraspinal edema without abscess.    5.  Stable degenerative changes.      < end of copied text >

## 2025-06-02 NOTE — PROGRESS NOTE ADULT - ASSESSMENT
80 years old male history of hypertension, diabetes, subdural hematoma on Keppra, hx mechanical falls, recent admission (January 2025) for osteomyelitis of L4-5 w completion of abx.  BIBA from home status post change of mental status.     ID is consulted for bacteremia  Afebrile  WBC Count: 6.78 (06-02-25 @ 07:27)  WBC Count: 6.35 (05-31-25 @ 08:53)  WBC Count: 6.11 (05-30-25 @ 08:20)  WBC Count: 5.81 (05-29-25 @ 06:24)  WBC Count: 5.31 (05-27-25 @ 08:21)  On room air  BCx 4/14 Bacteroides fragilis  BCx 4/16 NGTD  BCx 4/17 NGTD  UA WBC 5, UCx low count P. mirabilis    Repeat UA WBC 30, UCx C. albicans    CT A/P 4/13  1.  Erosive endplate changes centered at the L4-5 level, consistent with   patient's history ofrecent lumbar spine osteomyelitis.  2.  Liver cirrhosis with portal hypertension. Mild-to-moderate ascites.   Anasarca. Hepatic dome subcentimeter hypodense 1.3 cm hypodensity, not   fully characterized . Outpatient MR abdomen with IV contrast recommended.  3.  Chronic pancreatitis, with limited evaluation for acute inflammation   due to background fluid. Correlation with pancreatic enzymes recommended.    MR Lumbar Spine w/wo IV Cont (05.22.25 @ 21:09)  IMPRESSION:  Since the previous lumbar spine MRI dated 1/29/2025:  1.  The findings of L4-5 discitis osteomyelitis have equivocally changed:   the bone marrow edema/enhancement within the L4 and L5 vertebral bodies   has mildly decreased. However there is new mild vertebral body height   loss of L5, increased L5 superior endplate erosion, and increased L4-5   disc edema.  2.  New mild edema within the L3-4 intervertebral disc with faint   erosions involving the opposing endplates suspicious for additional level   of discitis osteomyelitis.  3.  No evidence of epidural inflammation /  fluid collection.  4.  Mild paraspinal edema without abscess.  5.  Stable degenerative changes.    5/29 ESR 25, CRP 61.2      Antibiotics:  Vancomycin 4/13  Ampicillin 4/14  Ceftriaxone 4/15 - 4/21, 5/4 - 5/7, 5/23 - 5/31  Flagyl 4/15 - 4/28  Daptomycin 5/23 - 5/31      IMPRESSION:  Hepatic encephalopathy  Bacteroides bacteremia, completed treatment  Acute blood loss anemia  Possible SBP?  Liver cirrhosis  Lower GI bleed  Hx L4-L5 osteomyelitis  Liver lesion  Immunosuppression / Immunosenescence secondary to multiple comorbidities which could result in poor clinical outcome    RECOMMENDATIONS:  - Monitor off abx for now  - Discussed with Radiology Dr. Muñoz regarding gallium scan result, it is possible that patient has no active lumbar OM. Given currently clinical context, reasonable to hold antibiotics and repeat MR lumbar w/wo contrast 3 months later, or sooner if worsening back pain  - Had a long discussion with patient's wife at bedside, discussed the risks of invasive procedure (core biopsy) and prolonged abx treatment. She agrees to hold off on abx for now and repeat imaging outpatient. She also will seek a second opinion at [place]  - Repeat UA and UCx  - Local wound care for sacral DTI  - GI follow up  - Offloading and frequent position changes, aspiration precaution  - Trend WBC, fever curve, transaminases, creatinine daily  - Please inform ID of any patient clinical change or any new pertinent laboratory or radiographic data      Nora Beck D.O.  Attending Physician  Division of Infectious Diseases  [facility]  Please contact me via [employer] Teams

## 2025-06-03 LAB
ANION GAP SERPL CALC-SCNC: 14 MMOL/L — SIGNIFICANT CHANGE UP (ref 7–14)
APPEARANCE UR: ABNORMAL
BILIRUB UR-MCNC: NEGATIVE — SIGNIFICANT CHANGE UP
BUN SERPL-MCNC: 21 MG/DL — HIGH (ref 10–20)
CALCIUM SERPL-MCNC: 8.8 MG/DL — SIGNIFICANT CHANGE UP (ref 8.4–10.5)
CHLORIDE SERPL-SCNC: 96 MMOL/L — LOW (ref 98–110)
CO2 SERPL-SCNC: 26 MMOL/L — SIGNIFICANT CHANGE UP (ref 17–32)
COLOR SPEC: YELLOW — SIGNIFICANT CHANGE UP
CREAT SERPL-MCNC: 1.1 MG/DL — SIGNIFICANT CHANGE UP (ref 0.7–1.5)
DIFF PNL FLD: ABNORMAL
EGFR: 69 ML/MIN/1.73M2 — SIGNIFICANT CHANGE UP
EGFR: 69 ML/MIN/1.73M2 — SIGNIFICANT CHANGE UP
GLUCOSE SERPL-MCNC: 256 MG/DL — HIGH (ref 70–99)
GLUCOSE UR QL: NEGATIVE MG/DL — SIGNIFICANT CHANGE UP
HCT VFR BLD CALC: 27.9 % — LOW (ref 42–52)
HGB BLD-MCNC: 8.8 G/DL — LOW (ref 14–18)
KETONES UR QL: NEGATIVE MG/DL — SIGNIFICANT CHANGE UP
LEUKOCYTE ESTERASE UR-ACNC: ABNORMAL
MCHC RBC-ENTMCNC: 31.5 G/DL — LOW (ref 32–37)
MCHC RBC-ENTMCNC: 32.8 PG — HIGH (ref 27–31)
MCV RBC AUTO: 104.1 FL — HIGH (ref 80–94)
NITRITE UR-MCNC: POSITIVE
NRBC BLD AUTO-RTO: 0 /100 WBCS — SIGNIFICANT CHANGE UP (ref 0–0)
PH UR: 7 — SIGNIFICANT CHANGE UP (ref 5–8)
PLATELET # BLD AUTO: 112 K/UL — LOW (ref 130–400)
PMV BLD: 9 FL — SIGNIFICANT CHANGE UP (ref 7.4–10.4)
POTASSIUM SERPL-MCNC: 4.5 MMOL/L — SIGNIFICANT CHANGE UP (ref 3.5–5)
POTASSIUM SERPL-SCNC: 4.5 MMOL/L — SIGNIFICANT CHANGE UP (ref 3.5–5)
PROT UR-MCNC: NEGATIVE MG/DL — SIGNIFICANT CHANGE UP
RBC # BLD: 2.68 M/UL — LOW (ref 4.7–6.1)
RBC # FLD: 21.7 % — HIGH (ref 11.5–14.5)
SODIUM SERPL-SCNC: 136 MMOL/L — SIGNIFICANT CHANGE UP (ref 135–146)
SP GR SPEC: 1.01 — SIGNIFICANT CHANGE UP (ref 1–1.03)
UROBILINOGEN FLD QL: 0.2 MG/DL — SIGNIFICANT CHANGE UP (ref 0.2–1)
WBC # BLD: 7.13 K/UL — SIGNIFICANT CHANGE UP (ref 4.8–10.8)
WBC # FLD AUTO: 7.13 K/UL — SIGNIFICANT CHANGE UP (ref 4.8–10.8)

## 2025-06-03 PROCEDURE — 99233 SBSQ HOSP IP/OBS HIGH 50: CPT

## 2025-06-03 RX ADMIN — LACTULOSE 20 GRAM(S): 10 SOLUTION ORAL at 21:47

## 2025-06-03 RX ADMIN — Medication 400 MILLIGRAM(S): at 11:38

## 2025-06-03 RX ADMIN — Medication 1 APPLICATION(S): at 05:14

## 2025-06-03 RX ADMIN — FOLIC ACID 1 MILLIGRAM(S): 1 TABLET ORAL at 11:39

## 2025-06-03 RX ADMIN — Medication 400 MILLIGRAM(S): at 08:01

## 2025-06-03 RX ADMIN — Medication 1 TABLET(S): at 18:26

## 2025-06-03 RX ADMIN — NYSTATIN 1 APPLICATION(S): 100000 CREAM TOPICAL at 18:29

## 2025-06-03 RX ADMIN — LIDOCAINE HYDROCHLORIDE 1 PATCH: 20 JELLY TOPICAL at 23:49

## 2025-06-03 RX ADMIN — NYSTATIN 1 APPLICATION(S): 100000 CREAM TOPICAL at 05:12

## 2025-06-03 RX ADMIN — INSULIN LISPRO 4: 100 INJECTION, SOLUTION INTRAVENOUS; SUBCUTANEOUS at 12:19

## 2025-06-03 RX ADMIN — LEVETIRACETAM 250 MILLIGRAM(S): 10 INJECTION, SOLUTION INTRAVENOUS at 05:12

## 2025-06-03 RX ADMIN — LACTULOSE 20 GRAM(S): 10 SOLUTION ORAL at 05:13

## 2025-06-03 RX ADMIN — LIDOCAINE HYDROCHLORIDE 1 PATCH: 20 JELLY TOPICAL at 21:55

## 2025-06-03 RX ADMIN — INSULIN LISPRO 6: 100 INJECTION, SOLUTION INTRAVENOUS; SUBCUTANEOUS at 07:54

## 2025-06-03 RX ADMIN — Medication 1 TABLET(S): at 07:56

## 2025-06-03 RX ADMIN — Medication 1 APPLICATION(S): at 18:28

## 2025-06-03 RX ADMIN — LACTULOSE 20 GRAM(S): 10 SOLUTION ORAL at 14:56

## 2025-06-03 RX ADMIN — LEVETIRACETAM 250 MILLIGRAM(S): 10 INJECTION, SOLUTION INTRAVENOUS at 18:26

## 2025-06-03 RX ADMIN — Medication 650 MILLIGRAM(S): at 07:56

## 2025-06-03 RX ADMIN — BUTYROSPERMUM PARKII(SHEA BUTTER), SIMMONDSIA CHINENSIS (JOJOBA) SEED OIL, ALOE BARBADENSIS LEAF EXTRACT 250 MILLIGRAM(S): .01; 1; 3.5 LIQUID TOPICAL at 18:27

## 2025-06-03 RX ADMIN — BUMETANIDE 1 MILLIGRAM(S): 1 TABLET ORAL at 18:33

## 2025-06-03 RX ADMIN — INSULIN LISPRO 4: 100 INJECTION, SOLUTION INTRAVENOUS; SUBCUTANEOUS at 17:14

## 2025-06-03 RX ADMIN — Medication 500 MILLIGRAM(S): at 11:40

## 2025-06-03 RX ADMIN — BUTYROSPERMUM PARKII(SHEA BUTTER), SIMMONDSIA CHINENSIS (JOJOBA) SEED OIL, ALOE BARBADENSIS LEAF EXTRACT 250 MILLIGRAM(S): .01; 1; 3.5 LIQUID TOPICAL at 05:14

## 2025-06-03 RX ADMIN — Medication 1 APPLICATION(S): at 05:12

## 2025-06-03 RX ADMIN — MIDODRINE HYDROCHLORIDE 10 MILLIGRAM(S): 5 TABLET ORAL at 05:13

## 2025-06-03 RX ADMIN — BUMETANIDE 1 MILLIGRAM(S): 1 TABLET ORAL at 05:14

## 2025-06-03 RX ADMIN — LIDOCAINE HYDROCHLORIDE 1 PATCH: 20 JELLY TOPICAL at 11:43

## 2025-06-03 RX ADMIN — LIDOCAINE HYDROCHLORIDE 1 PATCH: 20 JELLY TOPICAL at 02:22

## 2025-06-03 RX ADMIN — INSULIN LISPRO 10: 100 INJECTION, SOLUTION INTRAVENOUS; SUBCUTANEOUS at 21:46

## 2025-06-03 RX ADMIN — Medication 2 TABLET(S): at 21:47

## 2025-06-03 RX ADMIN — Medication 650 MILLIGRAM(S): at 09:40

## 2025-06-03 RX ADMIN — Medication 40 MILLIGRAM(S): at 11:40

## 2025-06-03 RX ADMIN — Medication 100 MILLIGRAM(S): at 05:13

## 2025-06-03 RX ADMIN — Medication 400 MILLIGRAM(S): at 18:26

## 2025-06-03 NOTE — PROGRESS NOTE ADULT - ASSESSMENT
High risk for pressure injury development or progression   Skin assessed- B/L heel blanchable redness                        Sacrum and  B/L buttock  erythema  with denuded skin and dark pigmentation - possibly due to chronic moisture and friction combination                        Deep tissue pressure injury to B/L buttock - intact dark red skin tissue surrounding macerated area - progressed to black devitalised skin tissue           Wound and skin care recs.   Continue current treatment   Clean sacrum and b/l buttock with soap and water  , pat dry  apply  silvadene dressing   Pressure  injury  preventive  measures  Skin  and incontinence care   Assess skin  and inform primary provider of any changes   Case discussed with primary Rn  Wound/ ostomy specialist  to f/u as needed     Offloading: [x ] Frequent position changes [ ] Devices/Equipment  Cleansing: [ ] Saline [ ] Soap/Water [ ] Other: ______  Topicals: [ ] Barrier Cream [ ] Antimicrobial [ ] Enzymatic Wound Debridement x] Moist  wound Healing   Dressings: [ ] Dry, sterile [ ] Allevyn  Foam [ ] Absorbant Pads [ ] Collagenase    Other Recs.   Per Primary team   Total time for bedside assessment  , review of medical records  and  discussion of plan of care with primary team greater than 35 min                            High risk for pressure injury development or progression   Skin assessed- B/L heel blanchable redness                        Sacrum and  B/L buttock  erythema  with denuded skin and dark pigmentation - possibly due to chronic moisture and friction combination                        Deep tissue pressure injury to B/L buttock - intact dark red skin tissue surrounding macerated area - progressed to black devitalised skin tissue           Wound and skin care recs.   Consider surgical consult for possible bedside debridement   Continue current treatment   Clean sacrum and b/l buttock with soap and water  , pat dry  apply  silvadene dressing   Pressure  injury  preventive  measures  Skin  and incontinence care   Assess skin  and inform primary provider of any changes   Case discussed with primary Rn  Wound/ ostomy specialist  to f/u as needed     Offloading: [x ] Frequent position changes [ ] Devices/Equipment  Cleansing: [ ] Saline [ ] Soap/Water [ ] Other: ______  Topicals: [ ] Barrier Cream [ ] Antimicrobial [ ] Enzymatic Wound Debridement x] Moist  wound Healing   Dressings: [ ] Dry, sterile [ ] Allevyn  Foam [ ] Absorbant Pads [ ] Collagenase    Other Recs.   Per Primary team   Total time for bedside assessment  , review of medical records  and  discussion of plan of care with primary team greater than 35 min

## 2025-06-03 NOTE — PROGRESS NOTE ADULT - SUBJECTIVE AND OBJECTIVE BOX
--------------------------------------------------------------------------------    24 hour events/subjective:   NAD, F/u skin assessment     ROS:   all other systems negative    ALLERGIES & MEDICATIONS  --------------------------------------------------------------------------------  Allergies    No Known Allergies    Intolerances          STANDING INPATIENT MEDICATIONS    buMETAnide 1 milliGRAM(s) Oral every 12 hours  chlorhexidine 2% Cloths 1 Application(s) Topical <User Schedule>  dextrose 5%. 1000 milliLiter(s) IV Continuous <Continuous>  dextrose 5%. 1000 milliLiter(s) IV Continuous <Continuous>  dextrose 50% Injectable 25 Gram(s) IV Push once  dextrose 50% Injectable 12.5 Gram(s) IV Push once  dextrose 50% Injectable 25 Gram(s) IV Push once  folic acid 1 milliGRAM(s) Oral daily  glucagon  Injectable 1 milliGRAM(s) IntraMuscular once  insulin lispro (ADMELOG) corrective regimen sliding scale   SubCutaneous Before meals and at bedtime  lactobacillus acidophilus 1 Tablet(s) Oral two times a day with meals  lactulose Syrup 20 Gram(s) Oral every 8 hours  levETIRAcetam  Solution 250 milliGRAM(s) Oral two times a day  lidocaine   4% Patch 1 Patch Transdermal daily  magnesium gluconate 500 milliGRAM(s) Oral daily  magnesium oxide 400 milliGRAM(s) Oral three times a day with meals  nystatin Powder 1 Application(s) Topical two times a day  pantoprazole  Injectable 40 milliGRAM(s) IV Push daily  potassium chloride   Powder 40 milliEquivalent(s) Oral daily  rifAXIMin 550 milliGRAM(s) Oral two times a day  saccharomyces boulardii 250 milliGRAM(s) Oral two times a day  senna 2 Tablet(s) Oral at bedtime  silver sulfADIAZINE 1% Cream 1 Application(s) Topical two times a day  spironolactone 100 milliGRAM(s) Oral daily      PRN INPATIENT MEDICATION  acetaminophen     Tablet .. 650 milliGRAM(s) Oral every 6 hours PRN  aluminum hydroxide/magnesium hydroxide/simethicone Suspension 30 milliLiter(s) Oral every 4 hours PRN  dextrose Oral Gel 15 Gram(s) Oral once PRN  melatonin 3 milliGRAM(s) Oral at bedtime PRN  ondansetron Injectable 4 milliGRAM(s) IV Push every 8 hours PRN        VITALS/PHYSICAL EXAM-  --------------------------------------------------------------------------------  T(C): 37.1 (06-03-25 @ 05:01), Max: 37.1 (06-03-25 @ 05:01)  HR: 98 (06-03-25 @ 05:01) (72 - 98)  BP: 128/78 (06-03-25 @ 05:01) (115/63 - 128/87)  RR: 18 (06-02-25 @ 20:20) (18 - 18)  SpO2: 98% (06-03-25 @ 05:01) (92% - 98%)  Wt(kg): --        06-02-25 @ 07:01  -  06-03-25 @ 07:00  --------------------------------------------------------  IN: 0 mL / OUT: 300 mL / NET: -300 mL            LABS/ CULTURES/ RADIOLOGY:              8.8    7.13  >-----------<  112      [06-03-25 @ 08:00]              27.9     136  |  96  |  21  ----------------------------<  256      [06-03-25 @ 08:00]  4.5   |  26  |  1.1        Ca     8.8     [06-03-25 @ 08:00]      PT/INR: PT 16.30, INR 1.37       [06-02-25 @ 07:27]  PTT: 32.7       [06-02-25 @ 07:27]          CAPILLARY BLOOD GLUCOSE      POCT Blood Glucose.: 241 mg/dL (03 Jun 2025 11:45)  POCT Blood Glucose.: 260 mg/dL (03 Jun 2025 07:37)  POCT Blood Glucose.: 282 mg/dL (02 Jun 2025 21:04)  POCT Blood Glucose.: 248 mg/dL (02 Jun 2025 16:30)                      A1C with Estimated Average Glucose Result: 6.0 % (04-14-25 @ 05:42)  A1C with Estimated Average Glucose Result: 7.6 % (01-24-25 @ 07:04)

## 2025-06-03 NOTE — PROGRESS NOTE ADULT - ASSESSMENT
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  Post-cholecystectomy.   3.  Severe abdominal ascites.4.  Right pleural effusion. 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     Ambulatory Dysfunction   H/O Lumbar osteomyelitis in January 2025  new osteo in L3-L4 region  -Completed Course of antibiotics at that time   -Now with Difficulty with  ambulation   -MR Lumbar Spine w/wo IV Cont  -Since the previous lumbar spine MRI dated 1/29/2025:  1.  The findings of L4-5 discitis osteomyelitis have equivocally changed   the bone marrow edema/enhancement within the L4 and L5 vertebral bodies   has mildly decreased. However there is new mild vertebral body height   loss of L5, increased L5 superior endplate erosion, and increased L4-5 disc edema.  2.  New mild edema within the L3-4 intervertebral disc with faint erosions involving the opposing endplates suspicious for additional level   of discitis osteomyelitis.  3.  No evidence of epidural inflammation /  fluid collection.  4.  Mild paraspinal edema without abscess.  5.  Stable degenerative changes.  -ID Follow-up appreciated>>started on Daptomycin and Rocephin   discussed with family and ID on 5/28 - family learning toward gallium scan to see collection and if need be would proceed with IR biopsy  however in regards to the IR Biopsy of new L3-L4 OM, however poor candidate as pr intermittently does not follow- command   -was onIV daptomycin 900mg q24hrs,  on 5/24, trend qweekly, was also  ceftriaxone 2g q24hrs  - ESR/CRP pending - elevated  -gallium scan 5/30: Normal 24 hour postinjection whole body gallium images and SPECT images   over the abdomen.  Specifically, no abnormal gallium uptake in the lumbar spine is   determined to suspicious for active inflammation process.  -spoke with ID about this on 5/31, we will stop abx, ID will speak with radiology regarding the read and if we need to proceed with biopsy   -spoke with ID on 6/2, repeat MRI in 3 months   -get UA and UClx 6/2 -> UA positive, waiting for ID recs prior to DC     Bacteremia with bacteroides  -blood culture positive for bacteroides on presentation  -s/p ceftriaxone and flagyl  -repeat blood culture negative,     macrocytic Anemia:  multifactorial: sepsis, cirrhosis, poor nutrition, GI bleeding  -s/p EGD on 4/16 which showed 5mm ulcer in duodenal bulb post endo clip, repeat EGD on 4/29 showed no bleeding noted   transfused one unit of PRBC on 5/3, -Hb stable , cont to monitor   -Keep Active T/S   -folate and b12 stable, chronic issue     New Onset Afib  -found during this admission  -rate controlled, not candidate for AC given anemia, coagulopathy in setting of cirrhosis, pt is a poor candidate for AC     Deconditioning  Sacral pressure ulcer  -PT/OT/OOB  -local wound care  -rotate the patient q2hrs     Pseudohypocalcemia: corrected calcium is 9.1  hypokalemia: monitor and supplement     SVT PPX: SCD due to coagulopathy, risk of Gi bleed /anemia   GI PPX: PPI   Full Code   Dispo: from home, will go to rehab     ---PB Handoff Note---    Pending/Follow up items (tests, labs, procedures,consults): ID on board, awaiting radiology discussion with ID, hold abx, ID recommending on 6/2 to get cultures of urine. after UA, pt is stable for DC, awaiting ID recs     Indication for continued inpatient management: UA and Uclx       Family contact:  Dispo (home, SNF, etc): rehab    Prepare for DC order [x] - updated CROW is:     -------------------------------Time spent-----------------------------------------------------------------------  Initial visit:             mins [ ] -- 55-74 mins [ ]  Subsequent visit: 50-79 mins[ ]    -- 35-49mins [ ]     --------------------------------# and complexity of problems---------------------------------------------  [x ] chronic illness with severe exacerbation , progression, treatment side effect  [ ] acute or chronic illness with threat to life or bodily funtion                         --------------------------------Complexity of data reviewed ----------------------------------------------  The Patients complexity of data reviewed  is Low [ ] Moderate [ ] High [x ] due to the following:   A:      Reviewed prior external records [ ]      Considering/ Ordered a unique test:  Labs [x ] Imaging [x ] Stress Test  [ ] Other: Specify [ ]      Reviewed each unique test result [x ]      Assessment requiring an independent historian [x ]  B:       Independent interpretation of:   X-Ray [x ] EKG [ ]  Other: Specify  [ ]  C:       Discussion of management of tests with clinician outside of my group [x ] spoke with ID today regarding plans for UA results     -------------------------------------Risk of Morbidity-------------------------------------------------------  The Patients risk of morbidity is Low [ ] Moderate [x ] High [ ] due to the following:   Mod:  Prescription Drug Management [ ]  Decision regarding elective or emergent: minor surgery [ ] major surgery [ ]  Decision regarding hospitalization or escalation of hospital-level care [ ]  SDOH: Hearing/Vision impaired [ ] Food insecurity [ ] Homelessness/unsafe condition [ ]   Financial strain [ ] un/underemployed [ ] Lack of Transport [ ]  High:  DNR or De-Escalation of care because of poor prognosis [ ]  Drug Therapy requiring intensive monitoring for toxicity [ ]  Parenteral controlled substance [ ]  ------------------------------------------------------------------------------------------------------------------

## 2025-06-04 LAB
ANION GAP SERPL CALC-SCNC: 15 MMOL/L — HIGH (ref 7–14)
BUN SERPL-MCNC: 23 MG/DL — HIGH (ref 10–20)
CALCIUM SERPL-MCNC: 8.5 MG/DL — SIGNIFICANT CHANGE UP (ref 8.4–10.5)
CHLORIDE SERPL-SCNC: 93 MMOL/L — LOW (ref 98–110)
CO2 SERPL-SCNC: 26 MMOL/L — SIGNIFICANT CHANGE UP (ref 17–32)
CREAT SERPL-MCNC: 1 MG/DL — SIGNIFICANT CHANGE UP (ref 0.7–1.5)
CULTURE RESULTS: SIGNIFICANT CHANGE UP
EGFR: 77 ML/MIN/1.73M2 — SIGNIFICANT CHANGE UP
EGFR: 77 ML/MIN/1.73M2 — SIGNIFICANT CHANGE UP
GLUCOSE BLDC GLUCOMTR-MCNC: 284 MG/DL — HIGH (ref 70–99)
GLUCOSE BLDC GLUCOMTR-MCNC: 308 MG/DL — HIGH (ref 70–99)
GLUCOSE BLDC GLUCOMTR-MCNC: 327 MG/DL — HIGH (ref 70–99)
GLUCOSE SERPL-MCNC: 271 MG/DL — HIGH (ref 70–99)
HCT VFR BLD CALC: 27.2 % — LOW (ref 42–52)
HGB BLD-MCNC: 8.8 G/DL — LOW (ref 14–18)
MCHC RBC-ENTMCNC: 32.4 G/DL — SIGNIFICANT CHANGE UP (ref 32–37)
MCHC RBC-ENTMCNC: 33.2 PG — HIGH (ref 27–31)
MCV RBC AUTO: 102.6 FL — HIGH (ref 80–94)
NRBC BLD AUTO-RTO: 0 /100 WBCS — SIGNIFICANT CHANGE UP (ref 0–0)
PLATELET # BLD AUTO: 121 K/UL — LOW (ref 130–400)
PMV BLD: 9 FL — SIGNIFICANT CHANGE UP (ref 7.4–10.4)
POTASSIUM SERPL-MCNC: 4.3 MMOL/L — SIGNIFICANT CHANGE UP (ref 3.5–5)
POTASSIUM SERPL-SCNC: 4.3 MMOL/L — SIGNIFICANT CHANGE UP (ref 3.5–5)
RBC # BLD: 2.65 M/UL — LOW (ref 4.7–6.1)
RBC # FLD: 21.4 % — HIGH (ref 11.5–14.5)
SODIUM SERPL-SCNC: 134 MMOL/L — LOW (ref 135–146)
SPECIMEN SOURCE: SIGNIFICANT CHANGE UP
WBC # BLD: 7.13 K/UL — SIGNIFICANT CHANGE UP (ref 4.8–10.8)
WBC # FLD AUTO: 7.13 K/UL — SIGNIFICANT CHANGE UP (ref 4.8–10.8)

## 2025-06-04 PROCEDURE — 99232 SBSQ HOSP IP/OBS MODERATE 35: CPT

## 2025-06-04 RX ORDER — INSULIN LISPRO 100 U/ML
3 INJECTION, SOLUTION INTRAVENOUS; SUBCUTANEOUS
Refills: 0 | Status: DISCONTINUED | OUTPATIENT
Start: 2025-06-04 | End: 2025-06-07

## 2025-06-04 RX ORDER — INSULIN GLARGINE-YFGN 100 [IU]/ML
9 INJECTION, SOLUTION SUBCUTANEOUS AT BEDTIME
Refills: 0 | Status: DISCONTINUED | OUTPATIENT
Start: 2025-06-04 | End: 2025-06-07

## 2025-06-04 RX ADMIN — Medication 1 APPLICATION(S): at 06:44

## 2025-06-04 RX ADMIN — Medication 1 TABLET(S): at 17:55

## 2025-06-04 RX ADMIN — Medication 500 MILLIGRAM(S): at 11:42

## 2025-06-04 RX ADMIN — NYSTATIN 1 APPLICATION(S): 100000 CREAM TOPICAL at 17:55

## 2025-06-04 RX ADMIN — BUMETANIDE 1 MILLIGRAM(S): 1 TABLET ORAL at 17:54

## 2025-06-04 RX ADMIN — LIDOCAINE HYDROCHLORIDE 1 PATCH: 20 JELLY TOPICAL at 11:41

## 2025-06-04 RX ADMIN — Medication 400 MILLIGRAM(S): at 11:42

## 2025-06-04 RX ADMIN — LIDOCAINE HYDROCHLORIDE 1 PATCH: 20 JELLY TOPICAL at 19:27

## 2025-06-04 RX ADMIN — INSULIN GLARGINE-YFGN 9 UNIT(S): 100 INJECTION, SOLUTION SUBCUTANEOUS at 21:11

## 2025-06-04 RX ADMIN — INSULIN LISPRO 3 UNIT(S): 100 INJECTION, SOLUTION INTRAVENOUS; SUBCUTANEOUS at 11:53

## 2025-06-04 RX ADMIN — Medication 400 MILLIGRAM(S): at 17:54

## 2025-06-04 RX ADMIN — LEVETIRACETAM 250 MILLIGRAM(S): 10 INJECTION, SOLUTION INTRAVENOUS at 17:54

## 2025-06-04 RX ADMIN — Medication 100 MILLIGRAM(S): at 06:40

## 2025-06-04 RX ADMIN — Medication 1 APPLICATION(S): at 17:55

## 2025-06-04 RX ADMIN — Medication 1 TABLET(S): at 10:00

## 2025-06-04 RX ADMIN — BUMETANIDE 1 MILLIGRAM(S): 1 TABLET ORAL at 07:02

## 2025-06-04 RX ADMIN — INSULIN LISPRO 8: 100 INJECTION, SOLUTION INTRAVENOUS; SUBCUTANEOUS at 11:54

## 2025-06-04 RX ADMIN — INSULIN LISPRO 8: 100 INJECTION, SOLUTION INTRAVENOUS; SUBCUTANEOUS at 17:47

## 2025-06-04 RX ADMIN — Medication 1 APPLICATION(S): at 07:02

## 2025-06-04 RX ADMIN — Medication 650 MILLIGRAM(S): at 07:09

## 2025-06-04 RX ADMIN — LACTULOSE 20 GRAM(S): 10 SOLUTION ORAL at 06:41

## 2025-06-04 RX ADMIN — Medication 40 MILLIGRAM(S): at 11:43

## 2025-06-04 RX ADMIN — Medication 650 MILLIGRAM(S): at 07:53

## 2025-06-04 RX ADMIN — NYSTATIN 1 APPLICATION(S): 100000 CREAM TOPICAL at 06:49

## 2025-06-04 RX ADMIN — BUTYROSPERMUM PARKII(SHEA BUTTER), SIMMONDSIA CHINENSIS (JOJOBA) SEED OIL, ALOE BARBADENSIS LEAF EXTRACT 250 MILLIGRAM(S): .01; 1; 3.5 LIQUID TOPICAL at 17:55

## 2025-06-04 RX ADMIN — FOLIC ACID 1 MILLIGRAM(S): 1 TABLET ORAL at 11:42

## 2025-06-04 RX ADMIN — LEVETIRACETAM 250 MILLIGRAM(S): 10 INJECTION, SOLUTION INTRAVENOUS at 06:41

## 2025-06-04 RX ADMIN — INSULIN LISPRO 3 UNIT(S): 100 INJECTION, SOLUTION INTRAVENOUS; SUBCUTANEOUS at 17:47

## 2025-06-04 RX ADMIN — INSULIN LISPRO 6: 100 INJECTION, SOLUTION INTRAVENOUS; SUBCUTANEOUS at 07:39

## 2025-06-04 RX ADMIN — LACTULOSE 20 GRAM(S): 10 SOLUTION ORAL at 21:13

## 2025-06-04 RX ADMIN — Medication 2 TABLET(S): at 21:13

## 2025-06-04 RX ADMIN — Medication 400 MILLIGRAM(S): at 10:00

## 2025-06-04 RX ADMIN — BUTYROSPERMUM PARKII(SHEA BUTTER), SIMMONDSIA CHINENSIS (JOJOBA) SEED OIL, ALOE BARBADENSIS LEAF EXTRACT 250 MILLIGRAM(S): .01; 1; 3.5 LIQUID TOPICAL at 06:41

## 2025-06-04 RX ADMIN — Medication 40 MILLIEQUIVALENT(S): at 11:42

## 2025-06-04 RX ADMIN — INSULIN LISPRO 6: 100 INJECTION, SOLUTION INTRAVENOUS; SUBCUTANEOUS at 21:12

## 2025-06-04 NOTE — PROGRESS NOTE ADULT - ASSESSMENT
Mr Reji is a 80 YOM w a PMH of 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  Post-cholecystectomy.   3.  Severe abdominal ascites.4.  Right pleural effusion. 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  The findings of L4-5 discitis osteomyelitis have equivocally changed   the bone marrow edema/enhancement within the L4 and L5 vertebral bodies   has mildly decreased. However there is new mild vertebral body height   loss of L5, increased L5 superior endplate erosion, and increased L4-5 disc edema.  2.  New mild edema within the L3-4 intervertebral disc with faint erosions involving the opposing endplates suspicious for additional level   of discitis osteomyelitis.  3.  No evidence of epidural inflammation /  fluid collection.  4.  Mild paraspinal edema without abscess.  5.  Stable degenerative changes.  -ID Follow-up appreciated>>started on Daptomycin and Rocephin   discussed with family and ID on 5/28 - family learning toward gallium scan to see collection and if need be would proceed with IR biopsy  however in regards to the IR Biopsy of new L3-L4 OM, however poor candidate as pr intermittently does not follow- command   -was onIV daptomycin 900mg q24hrs,  on 5/24, trend qweekly, was also  ceftriaxone 2g q24hrs  - ESR/CRP pending - elevated  -gallium scan 5/30: Normal 24 hour postinjection whole body gallium images and SPECT images   over the abdomen.  Specifically, no abnormal gallium uptake in the lumbar spine is   determined to suspicious for active inflammation process.  -spoke with ID about this on 5/31, we will stop abx, ID will speak with radiology regarding the read and if we need to proceed with biopsy   -spoke with ID on 6/2, repeat MRI in 3 months   -get UA and UClx 6/2 -> UA positive, waiting for ID recs prior to DC  Continuing to monitor off antibiotics    #Bacteremia with bacteroides  -blood culture positive for bacteroides on presentation  -s/p ceftriaxone and flagyl  -repeat blood culture negative    #Macrocytic Anemia:  multifactorial: sepsis, cirrhosis, poor nutrition, GI bleeding  -s/p EGD on 4/16 which showed 5mm ulcer in duodenal bulb post endo clip, repeat EGD on 4/29 showed no bleeding noted   transfused one unit of PRBC on 5/3, -Hb stable , cont to monitor   -Keep Active T/S   -folate and b12 stable, chronic issue     #New Onset Afib  -found during this admission  -rate controlled, not candidate for AC given anemia, coagulopathy in setting of cirrhosis, pt is a poor candidate for AC     #Deconditioning  #Sacral pressure ulcer  -PT/OT/OOB  -local wound care  -rotate the patient q2hrs     #Hypokalemia - potassium chloride 40 meq QD  #Hypomagnesemia - magnesium oxide 400mg TID, magnesium gluconate 500mg QD    SVT PPX: SCD due to coagulopathy, risk of Gi bleed /anemia   GI PPX: PPI   Code statu: Full Code   Dispo: from home, will go to rehab    ---PB Handoff Note---    Pending/Follow up items (tests, labs, procedures, consults): Monitor for fevers off antibiotics    Indication for continued inpatient management:    Goals of Care note:     Family contact:  Dispo (home, SNF, etc):    Prepare for DC order [ ] - updated CROW is:   DC provider note prep:    -------------------------------Time spent-----------------------------------------------------------------------  Initial visit:             mins [ ] -- 55-74 mins [ ]  Subsequent visit: 50-79 mins[ ]    -- 35-49mins [x ]     --------------------------------# and complexity of problems---------------------------------------------  [ ] chronic illness with severe exacerbation, progression, treatment side effect  [ ] acute or chronic illness with threat to life or bodily function                         --------------------------------Complexity of data reviewed ----------------------------------------------  The Patients complexity of data reviewed  is Low [ ] Moderate [ ] High [ ] due to the following:   A:      Reviewed prior external records [ ]      Considering/ Ordered a unique test:  Labs [ ] Imaging [ ] Stress Test  [ ] Other: Specify [ ]      Reviewed each unique test result [ ]      Assessment requiring an independent historian [ ]  B:       Independent interpretation of:   X-Ray [ ] EKG [ ]  Other: Specify  [ ]  C:       Discussion of management of tests with clinician outside of my group [ ]    -------------------------------------Risk of Morbidity-------------------------------------------------------  The Patients risk of morbidity is Low [ ] Moderate [ ] High [ ] due to the following:   Mod:  Prescription Drug Management [ ]  Decision regarding elective or emergent: minor surgery [ ] major surgery [ ]  Decision regarding hospitalization or escalation of hospital-level care [ ]  SDOH: Hearing/Vision impaired [ ] Food insecurity [ ] Homelessness/unsafe condition [ ]   Financial strain [ ] un/underemployed [ ] Lack of Transport [ ]  High:  DNR or De-Escalation of care because of poor prognosis [ ]  Drug Therapy requiring intensive monitoring for toxicity [ ]  Parenteral controlled substance [ ]  ------------------------------------------------------------------------------------------------------------------

## 2025-06-04 NOTE — PROGRESS NOTE ADULT - SUBJECTIVE AND OBJECTIVE BOX
DWIGHT RIBEIRO  80y  Male      Patient is a 80y old  Male who presents with a chief complaint of acute mentation changes (03 Jun 2025 17:26)      INTERVAL HPI/OVERNIGHT EVENTS:  No acute events    T(C): 36.5 (06-04-25 @ 14:15), Max: 36.9 (06-04-25 @ 05:49)  HR: 89 (06-04-25 @ 14:15) (87 - 93)  BP: 134/73 (06-04-25 @ 14:15) (112/60 - 134/73)  RR: 18 (06-04-25 @ 14:15) (18 - 18)  SpO2: 99% (06-04-25 @ 14:15) (99% - 100%)  Wt(kg): --Vital Signs Last 24 Hrs  T(C): 36.5 (04 Jun 2025 14:15), Max: 36.9 (04 Jun 2025 05:49)  T(F): 97.7 (04 Jun 2025 14:15), Max: 98.5 (04 Jun 2025 05:49)  HR: 89 (04 Jun 2025 14:15) (87 - 93)  BP: 134/73 (04 Jun 2025 14:15) (112/60 - 134/73)  BP(mean): --  RR: 18 (04 Jun 2025 14:15) (18 - 18)  SpO2: 99% (04 Jun 2025 14:15) (99% - 100%)    Parameters below as of 04 Jun 2025 14:15  Patient On (Oxygen Delivery Method): room air          06-03-25 @ 07:01  -  06-04-25 @ 07:00  --------------------------------------------------------  IN: 0 mL / OUT: 1800 mL / NET: -1800 mL    06-04-25 @ 07:01  -  06-04-25 @ 17:16  --------------------------------------------------------  IN: 0 mL / OUT: 500 mL / NET: -500 mL        PHYSICAL EXAM:  GENERAL: NAD, sitting up in bed  PSYCH: no agitation, baseline mentation  NERVOUS SYSTEM:  Alert, freely moving all extremities  PULMONARY: Clear to percussion bilaterally  CARDIOVASCULAR: Regular rate and rhythm  GI: Soft, Nontender  EXTREMITIES:  No cyanosis or edema  SKIN: No obvious rashes or lesions    Consultant(s) Notes Reviewed:  [x ] YES  [ ] NO    Discussed with Consultants/Other Providers [ x] YES     LABS                          8.8    7.13  )-----------( 121      ( 04 Jun 2025 07:20 )             27.2     06-04    134[L]  |  93[L]  |  23[H]  ----------------------------<  271[H]  4.3   |  26  |  1.0    Ca    8.5      04 Jun 2025 07:20        Urinalysis Basic - ( 04 Jun 2025 07:20 )    Color: x / Appearance: x / SG: x / pH: x  Gluc: 271 mg/dL / Ketone: x  / Bili: x / Urobili: x   Blood: x / Protein: x / Nitrite: x   Leuk Esterase: x / RBC: x / WBC x   Sq Epi: x / Non Sq Epi: x / Bacteria: x        Lactate Trend        CAPILLARY BLOOD GLUCOSE      POCT Blood Glucose.: 327 mg/dL (04 Jun 2025 16:49)        RADIOLOGY & ADDITIONAL TESTS:    Imaging Personally Reviewed:  [ ] YES  [ ] NO    HEALTH ISSUES - PROBLEM Dx:  Pain    Advanced care planning/counseling discussion    Encounter for palliative care    Cirrhosis    Metabolic encephalopathy    Anemia    Afib

## 2025-06-05 LAB
A1C WITH ESTIMATED AVERAGE GLUCOSE RESULT: 5.6 % — SIGNIFICANT CHANGE UP (ref 4–5.6)
ALBUMIN SERPL ELPH-MCNC: 2.2 G/DL — LOW (ref 3.5–5.2)
ALP SERPL-CCNC: 196 U/L — HIGH (ref 30–115)
ALT FLD-CCNC: 31 U/L — SIGNIFICANT CHANGE UP (ref 0–41)
ANION GAP SERPL CALC-SCNC: 12 MMOL/L — SIGNIFICANT CHANGE UP (ref 7–14)
AST SERPL-CCNC: 64 U/L — HIGH (ref 0–41)
BASOPHILS # BLD AUTO: 0.01 K/UL — SIGNIFICANT CHANGE UP (ref 0–0.2)
BASOPHILS NFR BLD AUTO: 0.2 % — SIGNIFICANT CHANGE UP (ref 0–1)
BILIRUB SERPL-MCNC: 1.3 MG/DL — HIGH (ref 0.2–1.2)
BUN SERPL-MCNC: 21 MG/DL — HIGH (ref 10–20)
CALCIUM SERPL-MCNC: 8.6 MG/DL — SIGNIFICANT CHANGE UP (ref 8.4–10.5)
CHLORIDE SERPL-SCNC: 94 MMOL/L — LOW (ref 98–110)
CO2 SERPL-SCNC: 30 MMOL/L — SIGNIFICANT CHANGE UP (ref 17–32)
CREAT SERPL-MCNC: 1 MG/DL — SIGNIFICANT CHANGE UP (ref 0.7–1.5)
CRP SERPL-MCNC: 66.4 MG/L — HIGH
EGFR: 77 ML/MIN/1.73M2 — SIGNIFICANT CHANGE UP
EGFR: 77 ML/MIN/1.73M2 — SIGNIFICANT CHANGE UP
EOSINOPHIL # BLD AUTO: 0.17 K/UL — SIGNIFICANT CHANGE UP (ref 0–0.7)
EOSINOPHIL NFR BLD AUTO: 2.7 % — SIGNIFICANT CHANGE UP (ref 0–8)
ERYTHROCYTE [SEDIMENTATION RATE] IN BLOOD: 35 MM/HR — HIGH (ref 0–15)
ESTIMATED AVERAGE GLUCOSE: 114 MG/DL — SIGNIFICANT CHANGE UP (ref 68–114)
GLUCOSE BLDC GLUCOMTR-MCNC: 169 MG/DL — HIGH (ref 70–99)
GLUCOSE BLDC GLUCOMTR-MCNC: 182 MG/DL — HIGH (ref 70–99)
GLUCOSE BLDC GLUCOMTR-MCNC: 191 MG/DL — HIGH (ref 70–99)
GLUCOSE BLDC GLUCOMTR-MCNC: 204 MG/DL — HIGH (ref 70–99)
GLUCOSE BLDC GLUCOMTR-MCNC: 232 MG/DL — HIGH (ref 70–99)
GLUCOSE SERPL-MCNC: 163 MG/DL — HIGH (ref 70–99)
HCT VFR BLD CALC: 28.3 % — LOW (ref 42–52)
HGB BLD-MCNC: 9 G/DL — LOW (ref 14–18)
IMM GRANULOCYTES NFR BLD AUTO: 0.8 % — HIGH (ref 0.1–0.3)
LYMPHOCYTES # BLD AUTO: 1.17 K/UL — LOW (ref 1.2–3.4)
LYMPHOCYTES # BLD AUTO: 18.8 % — LOW (ref 20.5–51.1)
MAGNESIUM SERPL-MCNC: 1.3 MG/DL — LOW (ref 1.8–2.4)
MCHC RBC-ENTMCNC: 31.8 G/DL — LOW (ref 32–37)
MCHC RBC-ENTMCNC: 32.7 PG — HIGH (ref 27–31)
MCV RBC AUTO: 102.9 FL — HIGH (ref 80–94)
MONOCYTES # BLD AUTO: 0.82 K/UL — HIGH (ref 0.1–0.6)
MONOCYTES NFR BLD AUTO: 13.2 % — HIGH (ref 1.7–9.3)
NEUTROPHILS # BLD AUTO: 4 K/UL — SIGNIFICANT CHANGE UP (ref 1.4–6.5)
NEUTROPHILS NFR BLD AUTO: 64.3 % — SIGNIFICANT CHANGE UP (ref 42.2–75.2)
NRBC BLD AUTO-RTO: 0 /100 WBCS — SIGNIFICANT CHANGE UP (ref 0–0)
PHOSPHATE SERPL-MCNC: 3.5 MG/DL — SIGNIFICANT CHANGE UP (ref 2.1–4.9)
PLATELET # BLD AUTO: 110 K/UL — LOW (ref 130–400)
PMV BLD: 8.7 FL — SIGNIFICANT CHANGE UP (ref 7.4–10.4)
POTASSIUM SERPL-MCNC: 4.1 MMOL/L — SIGNIFICANT CHANGE UP (ref 3.5–5)
POTASSIUM SERPL-SCNC: 4.1 MMOL/L — SIGNIFICANT CHANGE UP (ref 3.5–5)
PROT SERPL-MCNC: 5.4 G/DL — LOW (ref 6–8)
RBC # BLD: 2.75 M/UL — LOW (ref 4.7–6.1)
RBC # FLD: 21.2 % — HIGH (ref 11.5–14.5)
SODIUM SERPL-SCNC: 136 MMOL/L — SIGNIFICANT CHANGE UP (ref 135–146)
WBC # BLD: 6.22 K/UL — SIGNIFICANT CHANGE UP (ref 4.8–10.8)
WBC # FLD AUTO: 6.22 K/UL — SIGNIFICANT CHANGE UP (ref 4.8–10.8)

## 2025-06-05 PROCEDURE — 99232 SBSQ HOSP IP/OBS MODERATE 35: CPT

## 2025-06-05 RX ORDER — MAGNESIUM SULFATE 500 MG/ML
4 SYRINGE (ML) INJECTION ONCE
Refills: 0 | Status: DISCONTINUED | OUTPATIENT
Start: 2025-06-05 | End: 2025-06-05

## 2025-06-05 RX ORDER — MAGNESIUM SULFATE 500 MG/ML
2 SYRINGE (ML) INJECTION
Refills: 0 | Status: COMPLETED | OUTPATIENT
Start: 2025-06-05 | End: 2025-06-06

## 2025-06-05 RX ADMIN — Medication 650 MILLIGRAM(S): at 14:14

## 2025-06-05 RX ADMIN — BUTYROSPERMUM PARKII(SHEA BUTTER), SIMMONDSIA CHINENSIS (JOJOBA) SEED OIL, ALOE BARBADENSIS LEAF EXTRACT 250 MILLIGRAM(S): .01; 1; 3.5 LIQUID TOPICAL at 05:27

## 2025-06-05 RX ADMIN — INSULIN LISPRO 3 UNIT(S): 100 INJECTION, SOLUTION INTRAVENOUS; SUBCUTANEOUS at 17:31

## 2025-06-05 RX ADMIN — NYSTATIN 1 APPLICATION(S): 100000 CREAM TOPICAL at 17:33

## 2025-06-05 RX ADMIN — Medication 400 MILLIGRAM(S): at 07:52

## 2025-06-05 RX ADMIN — BUTYROSPERMUM PARKII(SHEA BUTTER), SIMMONDSIA CHINENSIS (JOJOBA) SEED OIL, ALOE BARBADENSIS LEAF EXTRACT 250 MILLIGRAM(S): .01; 1; 3.5 LIQUID TOPICAL at 17:32

## 2025-06-05 RX ADMIN — INSULIN LISPRO 4: 100 INJECTION, SOLUTION INTRAVENOUS; SUBCUTANEOUS at 17:31

## 2025-06-05 RX ADMIN — NYSTATIN 1 APPLICATION(S): 100000 CREAM TOPICAL at 05:28

## 2025-06-05 RX ADMIN — LIDOCAINE HYDROCHLORIDE 1 PATCH: 20 JELLY TOPICAL at 01:30

## 2025-06-05 RX ADMIN — LEVETIRACETAM 250 MILLIGRAM(S): 10 INJECTION, SOLUTION INTRAVENOUS at 17:25

## 2025-06-05 RX ADMIN — INSULIN LISPRO 2: 100 INJECTION, SOLUTION INTRAVENOUS; SUBCUTANEOUS at 11:33

## 2025-06-05 RX ADMIN — Medication 40 MILLIGRAM(S): at 11:35

## 2025-06-05 RX ADMIN — LIDOCAINE HYDROCHLORIDE 1 PATCH: 20 JELLY TOPICAL at 11:41

## 2025-06-05 RX ADMIN — Medication 1 APPLICATION(S): at 05:26

## 2025-06-05 RX ADMIN — INSULIN LISPRO 2: 100 INJECTION, SOLUTION INTRAVENOUS; SUBCUTANEOUS at 22:20

## 2025-06-05 RX ADMIN — Medication 1 TABLET(S): at 17:25

## 2025-06-05 RX ADMIN — INSULIN LISPRO 3 UNIT(S): 100 INJECTION, SOLUTION INTRAVENOUS; SUBCUTANEOUS at 07:53

## 2025-06-05 RX ADMIN — Medication 400 MILLIGRAM(S): at 17:26

## 2025-06-05 RX ADMIN — Medication 1 TABLET(S): at 07:53

## 2025-06-05 RX ADMIN — Medication 400 MILLIGRAM(S): at 11:34

## 2025-06-05 RX ADMIN — Medication 500 MILLIGRAM(S): at 11:34

## 2025-06-05 RX ADMIN — FOLIC ACID 1 MILLIGRAM(S): 1 TABLET ORAL at 11:34

## 2025-06-05 RX ADMIN — BUMETANIDE 1 MILLIGRAM(S): 1 TABLET ORAL at 05:26

## 2025-06-05 RX ADMIN — BUMETANIDE 1 MILLIGRAM(S): 1 TABLET ORAL at 17:26

## 2025-06-05 RX ADMIN — INSULIN LISPRO 3 UNIT(S): 100 INJECTION, SOLUTION INTRAVENOUS; SUBCUTANEOUS at 11:34

## 2025-06-05 RX ADMIN — Medication 1 APPLICATION(S): at 17:32

## 2025-06-05 RX ADMIN — INSULIN GLARGINE-YFGN 9 UNIT(S): 100 INJECTION, SOLUTION SUBCUTANEOUS at 22:20

## 2025-06-05 RX ADMIN — Medication 1 APPLICATION(S): at 05:28

## 2025-06-05 RX ADMIN — Medication 100 MILLIGRAM(S): at 05:26

## 2025-06-05 RX ADMIN — Medication 40 MILLIEQUIVALENT(S): at 11:36

## 2025-06-05 RX ADMIN — Medication 25 GRAM(S): at 22:36

## 2025-06-05 RX ADMIN — INSULIN LISPRO 4: 100 INJECTION, SOLUTION INTRAVENOUS; SUBCUTANEOUS at 07:53

## 2025-06-05 NOTE — PROGRESS NOTE ADULT - ASSESSMENT
Mr Reji is a 80 YOM w a PMH of 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  Post-cholecystectomy.   3.  Severe abdominal ascites.4.  Right pleural effusion. 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  The findings of L4-5 discitis osteomyelitis have equivocally changed   the bone marrow edema/enhancement within the L4 and L5 vertebral bodies   has mildly decreased. However there is new mild vertebral body height   loss of L5, increased L5 superior endplate erosion, and increased L4-5 disc edema.  2.  New mild edema within the L3-4 intervertebral disc with faint erosions involving the opposing endplates suspicious for additional level   of discitis osteomyelitis.  3.  No evidence of epidural inflammation /  fluid collection.  4.  Mild paraspinal edema without abscess.  5.  Stable degenerative changes.  -ID Follow-up appreciated>>started on Daptomycin and Rocephin   discussed with family and ID on 5/28 - family learning toward gallium scan to see collection and if need be would proceed with IR biopsy  however in regards to the IR Biopsy of new L3-L4 OM, however poor candidate as pr intermittently does not follow- command   -was onIV daptomycin 900mg q24hrs,  on 5/24, trend qweekly, was also  ceftriaxone 2g q24hrs  - ESR/CRP pending - elevated  -gallium scan 5/30: Normal 24 hour postinjection whole body gallium images and SPECT images   over the abdomen.  Specifically, no abnormal gallium uptake in the lumbar spine is   determined to suspicious for active inflammation process.  -spoke with ID about this on 5/31, we will stop abx, ID will speak with radiology regarding the read and if we need to proceed with biopsy   -spoke with ID on 6/2, repeat MRI in 3 months   -get UA and UClx 6/2 -> UA positive, waiting for ID recs prior to DC  Continuing to monitor off antibiotics    #Bacteremia with bacteroides  -blood culture positive for bacteroides on presentation  -s/p ceftriaxone and flagyl  -repeat blood culture negative    #Macrocytic Anemia:  multifactorial: sepsis, cirrhosis, poor nutrition, GI bleeding  -s/p EGD on 4/16 which showed 5mm ulcer in duodenal bulb post endo clip, repeat EGD on 4/29 showed no bleeding noted   transfused one unit of PRBC on 5/3, -Hb stable , cont to monitor   -Keep Active T/S   -folate and b12 stable, chronic issue   Rechecking folate and B12    #New Onset Afib  -found during this admission  -rate controlled, not candidate for AC given anemia, coagulopathy in setting of cirrhosis, pt is a poor candidate for AC     #Deconditioning  #Sacral pressure ulcer  -PT/OT/OOB  -local wound care  -rotate the patient q2hrs     #Hypokalemia - potassium chloride 40 meq QD  #Hypomagnesemia - magnesium oxide 400mg TID, magnesium gluconate 500mg QD    SVT PPX: SCD due to coagulopathy, risk of Gi bleed /anemia   GI PPX: PPI   Code statu: Full Code   Dispo: from home, will go to rehab    -----PB Handoff Note-----    Pending/Follow up items (tests, labs, procedures, consults):    Indication for continued inpatient management: Monitoring off antibiotics    Goals of Care note:     Family contact:  Dispo (home, SNF, etc):    Prepare for DC order [ ] - updated CROW is:   DC provider note prep:    -----Time spent-----  Initial visit:             mins [ ] -- 55-74 mins [ ]  Subsequent visit: 50-79 mins[ ]    -- 35-49mins [x ]     -----# and complexity of problems-----  [ ] chronic illness with severe exacerbation, progression, treatment side effect  [ ] acute or chronic illness with threat to life or bodily function                         -----Complexity of data reviewed-----  The Patients complexity of data reviewed  is Low [ ] Moderate [ ] High [ ] due to the following:   A:      Reviewed prior external records [ ]      Considering/ Ordered a unique test:  Labs [ ] Imaging [ ] Stress Test  [ ] Other: Specify [ ]      Reviewed each unique test result [ ]      Assessment requiring an independent historian [ ]  B:       Independent interpretation of:   X-Ray [ ] EKG [ ]  Other: Specify  [ ]  C:       Discussion of management of tests with clinician outside of my group [ ]    -----Risk of Morbidity-----  The Patients risk of morbidity is Low [ ] Moderate [ ] High [ ] due to the following:   Mod:  Prescription Drug Management [ ]  Decision regarding elective or emergent: minor surgery [ ] major surgery [ ]  Decision regarding hospitalization or escalation of hospital-level care [ ]  SDOH: Hearing/Vision impaired [ ] Food insecurity [ ] Homelessness/unsafe condition [ ]   Financial strain [ ] un/underemployed [ ] Lack of Transport [ ]  High:  DNR or De-Escalation of care because of poor prognosis [ ]  Drug Therapy requiring intensive monitoring for toxicity [ ]  Parenteral controlled substance [ ]

## 2025-06-05 NOTE — PROGRESS NOTE ADULT - SUBJECTIVE AND OBJECTIVE BOX
DWIGHT RIBEIRO  80y  Male      Patient is a 80y old  Male who presents with a chief complaint of acute mentation changes (04 Jun 2025 17:16)      INTERVAL HPI/OVERNIGHT EVENTS:  Patient having back pain.  No fevers.    T(C): 36.3 (06-05-25 @ 14:38), Max: 37.1 (06-04-25 @ 23:03)  HR: 80 (06-05-25 @ 14:38) (80 - 94)  BP: 100/50 (06-05-25 @ 14:38) (100/50 - 124/65)  RR: 18 (06-05-25 @ 14:38) (17 - 18)  SpO2: 98% (06-05-25 @ 14:38) (98% - 100%)  Wt(kg): --Vital Signs Last 24 Hrs  T(C): 36.3 (05 Jun 2025 14:38), Max: 37.1 (04 Jun 2025 23:03)  T(F): 97.4 (05 Jun 2025 14:38), Max: 98.8 (04 Jun 2025 23:03)  HR: 80 (05 Jun 2025 14:38) (80 - 94)  BP: 100/50 (05 Jun 2025 14:38) (100/50 - 124/65)  BP(mean): --  RR: 18 (05 Jun 2025 14:38) (17 - 18)  SpO2: 98% (05 Jun 2025 14:38) (98% - 100%)    Parameters below as of 04 Jun 2025 23:03  Patient On (Oxygen Delivery Method): room air          06-04-25 @ 07:01  -  06-05-25 @ 07:00  --------------------------------------------------------  IN: 0 mL / OUT: 2900 mL / NET: -2900 mL        PHYSICAL EXAM:  GENERAL: NAD, sitting up in bed  PSYCH: no agitation, baseline mentation  NERVOUS SYSTEM:  Alert, freely moving all extremities  PULMONARY: Clear to percussion bilaterally  CARDIOVASCULAR: Regular rate and rhythm  GI: Soft, Nontender  EXTREMITIES:  No cyanosis or edema  SKIN: No obvious rashes or lesions    Consultant(s) Notes Reviewed:  [x ] YES  [ ] NO    Discussed with Consultants/Other Providers [ x] YES     LABS                          9.0    6.22  )-----------( 110      ( 05 Jun 2025 10:50 )             28.3     06-05    136  |  94[L]  |  21[H]  ----------------------------<  163[H]  4.1   |  30  |  1.0    Ca    8.6      05 Jun 2025 10:50  Phos  3.5     06-05  Mg     1.3     06-05    TPro  5.4[L]  /  Alb  2.2[L]  /  TBili  1.3[H]  /  DBili  x   /  AST  64[H]  /  ALT  31  /  AlkPhos  196[H]  06-05      Urinalysis Basic - ( 05 Jun 2025 10:50 )    Color: x / Appearance: x / SG: x / pH: x  Gluc: 163 mg/dL / Ketone: x  / Bili: x / Urobili: x   Blood: x / Protein: x / Nitrite: x   Leuk Esterase: x / RBC: x / WBC x   Sq Epi: x / Non Sq Epi: x / Bacteria: x        Lactate Trend        CAPILLARY BLOOD GLUCOSE      POCT Blood Glucose.: 204 mg/dL (05 Jun 2025 16:38)        RADIOLOGY & ADDITIONAL TESTS:    Imaging Personally Reviewed:  [ ] YES  [ ] NO    HEALTH ISSUES - PROBLEM Dx:  Pain    Advanced care planning/counseling discussion    Encounter for palliative care    Cirrhosis    Metabolic encephalopathy    Anemia    Afib

## 2025-06-06 LAB
FOLATE SERPL-MCNC: 17.6 NG/ML — SIGNIFICANT CHANGE UP
GLUCOSE BLDC GLUCOMTR-MCNC: 176 MG/DL — HIGH (ref 70–99)
GLUCOSE BLDC GLUCOMTR-MCNC: 197 MG/DL — HIGH (ref 70–99)
GLUCOSE BLDC GLUCOMTR-MCNC: 214 MG/DL — HIGH (ref 70–99)
GLUCOSE BLDC GLUCOMTR-MCNC: 227 MG/DL — HIGH (ref 70–99)
VIT B12 SERPL-MCNC: 1418 PG/ML — HIGH (ref 232–1245)

## 2025-06-06 PROCEDURE — 99221 1ST HOSP IP/OBS SF/LOW 40: CPT

## 2025-06-06 PROCEDURE — 99232 SBSQ HOSP IP/OBS MODERATE 35: CPT

## 2025-06-06 RX ADMIN — INSULIN LISPRO 3 UNIT(S): 100 INJECTION, SOLUTION INTRAVENOUS; SUBCUTANEOUS at 12:27

## 2025-06-06 RX ADMIN — Medication 3 MILLIGRAM(S): at 22:07

## 2025-06-06 RX ADMIN — LIDOCAINE HYDROCHLORIDE 1 PATCH: 20 JELLY TOPICAL at 12:29

## 2025-06-06 RX ADMIN — Medication 1 APPLICATION(S): at 05:31

## 2025-06-06 RX ADMIN — Medication 400 MILLIGRAM(S): at 17:03

## 2025-06-06 RX ADMIN — Medication 500 MILLIGRAM(S): at 12:30

## 2025-06-06 RX ADMIN — NYSTATIN 1 APPLICATION(S): 100000 CREAM TOPICAL at 05:32

## 2025-06-06 RX ADMIN — BUMETANIDE 1 MILLIGRAM(S): 1 TABLET ORAL at 17:02

## 2025-06-06 RX ADMIN — LIDOCAINE HYDROCHLORIDE 1 PATCH: 20 JELLY TOPICAL at 22:10

## 2025-06-06 RX ADMIN — Medication 650 MILLIGRAM(S): at 16:53

## 2025-06-06 RX ADMIN — BUMETANIDE 1 MILLIGRAM(S): 1 TABLET ORAL at 05:32

## 2025-06-06 RX ADMIN — FOLIC ACID 1 MILLIGRAM(S): 1 TABLET ORAL at 12:28

## 2025-06-06 RX ADMIN — Medication 650 MILLIGRAM(S): at 12:28

## 2025-06-06 RX ADMIN — NYSTATIN 1 APPLICATION(S): 100000 CREAM TOPICAL at 17:07

## 2025-06-06 RX ADMIN — INSULIN GLARGINE-YFGN 9 UNIT(S): 100 INJECTION, SOLUTION SUBCUTANEOUS at 22:07

## 2025-06-06 RX ADMIN — Medication 400 MILLIGRAM(S): at 12:28

## 2025-06-06 RX ADMIN — Medication 40 MILLIEQUIVALENT(S): at 12:28

## 2025-06-06 RX ADMIN — BUTYROSPERMUM PARKII(SHEA BUTTER), SIMMONDSIA CHINENSIS (JOJOBA) SEED OIL, ALOE BARBADENSIS LEAF EXTRACT 250 MILLIGRAM(S): .01; 1; 3.5 LIQUID TOPICAL at 05:33

## 2025-06-06 RX ADMIN — Medication 1 APPLICATION(S): at 05:33

## 2025-06-06 RX ADMIN — Medication 40 MILLIGRAM(S): at 12:28

## 2025-06-06 RX ADMIN — LACTULOSE 20 GRAM(S): 10 SOLUTION ORAL at 05:32

## 2025-06-06 RX ADMIN — INSULIN LISPRO 4: 100 INJECTION, SOLUTION INTRAVENOUS; SUBCUTANEOUS at 22:06

## 2025-06-06 RX ADMIN — BUTYROSPERMUM PARKII(SHEA BUTTER), SIMMONDSIA CHINENSIS (JOJOBA) SEED OIL, ALOE BARBADENSIS LEAF EXTRACT 250 MILLIGRAM(S): .01; 1; 3.5 LIQUID TOPICAL at 17:46

## 2025-06-06 RX ADMIN — INSULIN LISPRO 3 UNIT(S): 100 INJECTION, SOLUTION INTRAVENOUS; SUBCUTANEOUS at 08:21

## 2025-06-06 RX ADMIN — Medication 1 APPLICATION(S): at 17:02

## 2025-06-06 RX ADMIN — LEVETIRACETAM 250 MILLIGRAM(S): 10 INJECTION, SOLUTION INTRAVENOUS at 17:03

## 2025-06-06 RX ADMIN — INSULIN LISPRO 4: 100 INJECTION, SOLUTION INTRAVENOUS; SUBCUTANEOUS at 12:27

## 2025-06-06 RX ADMIN — Medication 1 TABLET(S): at 17:03

## 2025-06-06 RX ADMIN — Medication 400 MILLIGRAM(S): at 08:22

## 2025-06-06 RX ADMIN — LEVETIRACETAM 250 MILLIGRAM(S): 10 INJECTION, SOLUTION INTRAVENOUS at 05:35

## 2025-06-06 RX ADMIN — Medication 1 TABLET(S): at 08:22

## 2025-06-06 RX ADMIN — INSULIN LISPRO 2: 100 INJECTION, SOLUTION INTRAVENOUS; SUBCUTANEOUS at 08:20

## 2025-06-06 RX ADMIN — Medication 25 GRAM(S): at 01:38

## 2025-06-06 RX ADMIN — Medication 100 MILLIGRAM(S): at 05:32

## 2025-06-06 RX ADMIN — LACTULOSE 20 GRAM(S): 10 SOLUTION ORAL at 13:37

## 2025-06-06 NOTE — CONSULT NOTE ADULT - ASSESSMENT
80 YOM w a PMH of 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    Surgical consult requested for possible debridement of sacral decub    Pt seen with Dr. Meyers:    -will plan for bedside debridement on Monday 6/9

## 2025-06-06 NOTE — CONSULT NOTE ADULT - CONSULT REQUESTED DATE/TIME
06-Jun-2025 16:34
15-Apr-2025 10:01
16-Apr-2025 09:16
15-Apr-2025 07:26
15-Apr-2025 11:09
15-Apr-2025 15:00
15-Apr-2025 03:27
15-Apr-2025 11:36
09-May-2025 15:04
14-Apr-2025 10:49
27-May-2025 14:38
23-Apr-2025 00:00

## 2025-06-06 NOTE — CHART NOTE - NSCHARTNOTEFT_GEN_A_CORE
Called family to provide updates, left voicemail asking for call back.  The phone number of the patient's wife does not appear to be listed as an emergency contact in the patient's chart.

## 2025-06-06 NOTE — CONSULT NOTE ADULT - SUBJECTIVE AND OBJECTIVE BOX
DWIGHT RIBEIRO [id number]  80y Male  55d    HPI:  80 years old male history of hypertension, diabetes, subdural hematoma on Keppra, hx mechanical falls, recent admission (January 2025) for osteomyelitis of L4-5 w completion of abx.  BIBA from home status post change of mental status.  Per provider note, patient was discharged from rehab facility on April 4.  Patient was supposed to have MRI of lower back on April 5 but patient refused.  Patient baseline uses walker to ambulate.  By report patient has been doing okay since his discharge from the facility.  Patient had 1 episode of fall on his buttocks few days ago that she was able to help patient to get up with no difficulty.  Patient become confused and calling for his father tonight over the past few hours so she called EMS.  No ROS given mental status, no family at bedside.     (14 Apr 2025 00:29)      PAST MEDICAL & SURGICAL HISTORY:  Diabetes      Hypertension      Fall      H/O left knee surgery      History of cholecystectomy      History of appendectomy            MEDICATIONS  (STANDING):  buMETAnide 1 milliGRAM(s) Oral every 12 hours  chlorhexidine 2% Cloths 1 Application(s) Topical <User Schedule>  dextrose 5%. 1000 milliLiter(s) (50 mL/Hr) IV Continuous <Continuous>  dextrose 5%. 1000 milliLiter(s) (100 mL/Hr) IV Continuous <Continuous>  dextrose 50% Injectable 25 Gram(s) IV Push once  dextrose 50% Injectable 12.5 Gram(s) IV Push once  dextrose 50% Injectable 25 Gram(s) IV Push once  folic acid 1 milliGRAM(s) Oral daily  glucagon  Injectable 1 milliGRAM(s) IntraMuscular once  insulin glargine Injectable (LANTUS) 9 Unit(s) SubCutaneous at bedtime  insulin lispro (ADMELOG) corrective regimen sliding scale   SubCutaneous Before meals and at bedtime  insulin lispro Injectable (ADMELOG) 3 Unit(s) SubCutaneous three times a day before meals  lactobacillus acidophilus 1 Tablet(s) Oral two times a day with meals  lactulose Syrup 20 Gram(s) Oral every 8 hours  levETIRAcetam  Solution 250 milliGRAM(s) Oral two times a day  lidocaine   4% Patch 1 Patch Transdermal daily  magnesium gluconate 500 milliGRAM(s) Oral daily  magnesium oxide 400 milliGRAM(s) Oral three times a day with meals  nystatin Powder 1 Application(s) Topical two times a day  pantoprazole  Injectable 40 milliGRAM(s) IV Push daily  potassium chloride   Powder 40 milliEquivalent(s) Oral daily  rifAXIMin 550 milliGRAM(s) Oral two times a day  saccharomyces boulardii 250 milliGRAM(s) Oral two times a day  senna 2 Tablet(s) Oral at bedtime  silver sulfADIAZINE 1% Cream 1 Application(s) Topical two times a day  spironolactone 100 milliGRAM(s) Oral daily    MEDICATIONS  (PRN):  acetaminophen     Tablet .. 650 milliGRAM(s) Oral every 6 hours PRN Temp greater or equal to 38C (100.4F), Mild Pain (1 - 3)  aluminum hydroxide/magnesium hydroxide/simethicone Suspension 30 milliLiter(s) Oral every 4 hours PRN Dyspepsia  dextrose Oral Gel 15 Gram(s) Oral once PRN Blood Glucose LESS THAN 70 milliGRAM(s)/deciliter  melatonin 3 milliGRAM(s) Oral at bedtime PRN Insomnia  ondansetron Injectable 4 milliGRAM(s) IV Push every 8 hours PRN Nausea and/or Vomiting      Allergies    No Known Allergies    Intolerances        REVIEW OF SYSTEMS    [ ] A ten-point review of systems was otherwise negative except as noted.  [ ] Due to altered mental status/intubation, subjective information were not able to be obtained from the patient. History was obtained, to the extent possible, from review of the chart and collateral sources of information.      Vital Signs Last 24 Hrs  T(C): 36.6 (06 Jun 2025 14:30), Max: 36.8 (06 Jun 2025 05:00)  T(F): 97.9 (06 Jun 2025 14:30), Max: 98.3 (06 Jun 2025 05:00)  HR: 90 (06 Jun 2025 14:30) (74 - 97)  BP: 120/69 (06 Jun 2025 14:30) (115/58 - 126/71)  BP(mean): --  RR: 18 (06 Jun 2025 14:30) (18 - 18)  SpO2: 99% (06 Jun 2025 14:30) (98% - 99%)    Parameters below as of 06 Jun 2025 14:30  Patient On (Oxygen Delivery Method): room air        PHYSICAL EXAM:  GENERAL: NAD,   BACk: DTI noted to sacrum    LABS:  Labs:  CAPILLARY BLOOD GLUCOSE      POCT Blood Glucose.: 176 mg/dL (06 Jun 2025 16:27)  POCT Blood Glucose.: 227 mg/dL (06 Jun 2025 11:36)  POCT Blood Glucose.: 197 mg/dL (06 Jun 2025 07:41)  POCT Blood Glucose.: 191 mg/dL (05 Jun 2025 22:19)  POCT Blood Glucose.: 182 mg/dL (05 Jun 2025 20:54)  POCT Blood Glucose.: 204 mg/dL (05 Jun 2025 16:38)                          9.0    6.22  )-----------( 110      ( 05 Jun 2025 10:50 )             28.3         06-05    136  |  94[L]  |  21[H]  ----------------------------<  163[H]  4.1   |  30  |  1.0          LFTs:             5.4  | 1.3  | 64       ------------------[196     ( 05 Jun 2025 10:50 )  2.2  | x    | 31          Lipase:x      Amylase:x             Coags:            Urinalysis Basic - ( 05 Jun 2025 10:50 )    Color: x / Appearance: x / SG: x / pH: x  Gluc: 163 mg/dL / Ketone: x  / Bili: x / Urobili: x   Blood: x / Protein: x / Nitrite: x   Leuk Esterase: x / RBC: x / WBC x   Sq Epi: x / Non Sq Epi: x / Bacteria: x        Culture - Urine (collected 03 Jun 2025 16:40)  Source: Clean Catch Clean Catch (Midstream)  Final Report (04 Jun 2025 22:14):    >=3 organisms. Probable collection contamination.          RADIOLOGY & ADDITIONAL STUDIES:

## 2025-06-06 NOTE — PROGRESS NOTE ADULT - ASSESSMENT
Mr Reji is a 80 YOM w a PMH of 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  Post-cholecystectomy.   3.  Severe abdominal ascites.4.  Right pleural effusion. 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  The findings of L4-5 discitis osteomyelitis have equivocally changed   the bone marrow edema/enhancement within the L4 and L5 vertebral bodies   has mildly decreased. However there is new mild vertebral body height   loss of L5, increased L5 superior endplate erosion, and increased L4-5 disc edema.  2.  New mild edema within the L3-4 intervertebral disc with faint erosions involving the opposing endplates suspicious for additional level   of discitis osteomyelitis.  3.  No evidence of epidural inflammation /  fluid collection.  4.  Mild paraspinal edema without abscess.  5.  Stable degenerative changes.  -ID Follow-up appreciated>>started on Daptomycin and Rocephin   discussed with family and ID on 5/28 - family learning toward gallium scan to see collection and if need be would proceed with IR biopsy  however in regards to the IR Biopsy of new L3-L4 OM, however poor candidate as pr intermittently does not follow- command   -was on IV daptomycin 900mg q24hrs,  on 5/24, trend qweekly, was also  ceftriaxone 2g q24hrs  - ESR/CRP pending - elevated  -gallium scan 5/30: Normal 24 hour postinjection whole body gallium images and SPECT images   over the abdomen.  Specifically, no abnormal gallium uptake in the lumbar spine is   determined to suspicious for active inflammation process.  -spoke with ID about this on 5/31, we will stop abx, ID will speak with radiology regarding the read and if we need to proceed with biopsy   -spoke with ID on 6/2, repeat MRI in 3 months   -get UA and UClx 6/2 -> UA positive, waiting for ID recs prior to DC  Continuing to monitor off antibiotics    #Bacteremia with bacteroides  -blood culture positive for bacteroides on presentation  -s/p ceftriaxone and flagyl  -repeat blood culture negative    #Macrocytic Anemia:  multifactorial: sepsis, cirrhosis, poor nutrition, GI bleeding  -s/p EGD on 4/16 which showed 5mm ulcer in duodenal bulb post endo clip, repeat EGD on 4/29 showed no bleeding noted   transfused one unit of PRBC on 5/3, -Hb stable , cont to monitor   -Keep Active T/S   -folate and b12 stable, chronic issue   Rechecking folate and B12, still pending    #New Onset Afib  -found during this admission  -rate controlled, not candidate for AC given anemia, coagulopathy in setting of cirrhosis, pt is a poor candidate for AC     #Hypokalemia - potassium chloride 40 meq QD  #Hypomagnesemia - magnesium oxide 400mg TID, magnesium gluconate 500mg QD    SVT PPX: SCD due to coagulopathy, risk of Gi bleed /anemia   GI PPX: PPI   Code statu: Full Code   Dispo: from home, will go to rehab    -----PB Handoff Note-----    Pending/Follow up items (tests, labs, procedures, consults):    Indication for continued inpatient management:    Goals of Care note:     Family contact:  Dispo (home, SNF, etc):    Prepare for DC order [ ] - updated CROW is:   DC provider note prep:    -----Time spent-----  Initial visit:             mins [ ] -- 55-74 mins [ ]  Subsequent visit: 50-79 mins[ ]    -- 35-49mins [x ]     -----# and complexity of problems-----  [ ] chronic illness with severe exacerbation, progression, treatment side effect  [ ] acute or chronic illness with threat to life or bodily function                         -----Complexity of data reviewed-----  The Patients complexity of data reviewed  is Low [ ] Moderate [ ] High [ ] due to the following:   A:      Reviewed prior external records [ ]      Considering/ Ordered a unique test:  Labs [ ] Imaging [ ] Stress Test  [ ] Other: Specify [ ]      Reviewed each unique test result [ ]      Assessment requiring an independent historian [ ]  B:       Independent interpretation of:   X-Ray [ ] EKG [ ]  Other: Specify  [ ]  C:       Discussion of management of tests with clinician outside of my group [ ]    -----Risk of Morbidity-----  The Patients risk of morbidity is Low [ ] Moderate [ ] High [ ] due to the following:   Mod:  Prescription Drug Management [ ]  Decision regarding elective or emergent: minor surgery [ ] major surgery [ ]  Decision regarding hospitalization or escalation of hospital-level care [ ]  SDOH: Hearing/Vision impaired [ ] Food insecurity [ ] Homelessness/unsafe condition [ ]   Financial strain [ ] un/underemployed [ ] Lack of Transport [ ]  High:  DNR or De-Escalation of care because of poor prognosis [ ]  Drug Therapy requiring intensive monitoring for toxicity [ ]  Parenteral controlled substance [ ]

## 2025-06-06 NOTE — PROGRESS NOTE ADULT - SUBJECTIVE AND OBJECTIVE BOX
DWIGHT RIBEIRO  80y  Male      Patient is a 80y old  Male who presents with a chief complaint of acute mentation changes (06 Jun 2025 16:33)      INTERVAL HPI/OVERNIGHT EVENTS:  Wound care noted that sacral ulcer worsening.  Patient initially refused assessment to me.  General surgery consulted.    T(C): 36.6 (06-06-25 @ 14:30), Max: 36.8 (06-06-25 @ 05:00)  HR: 90 (06-06-25 @ 14:30) (74 - 97)  BP: 120/69 (06-06-25 @ 14:30) (115/58 - 126/71)  RR: 18 (06-06-25 @ 14:30) (18 - 18)  SpO2: 99% (06-06-25 @ 14:30) (98% - 99%)  Wt(kg): --Vital Signs Last 24 Hrs  T(C): 36.6 (06 Jun 2025 14:30), Max: 36.8 (06 Jun 2025 05:00)  T(F): 97.9 (06 Jun 2025 14:30), Max: 98.3 (06 Jun 2025 05:00)  HR: 90 (06 Jun 2025 14:30) (74 - 97)  BP: 120/69 (06 Jun 2025 14:30) (115/58 - 126/71)  BP(mean): --  RR: 18 (06 Jun 2025 14:30) (18 - 18)  SpO2: 99% (06 Jun 2025 14:30) (98% - 99%)    Parameters below as of 06 Jun 2025 14:30  Patient On (Oxygen Delivery Method): room air          06-06-25 @ 07:01  -  06-06-25 @ 18:55  --------------------------------------------------------  IN: 0 mL / OUT: 500 mL / NET: -500 mL        PHYSICAL EXAM:  GENERAL: NAD, sitting up in bed  PSYCH: no agitation, baseline mentation  NERVOUS SYSTEM:  Alert, freely moving all extremities  PULMONARY: Clear to percussion bilaterally  CARDIOVASCULAR: Regular rate and rhythm  GI: Soft, Nontender  EXTREMITIES:  No cyanosis or edema    Consultant(s) Notes Reviewed:  [x ] YES  [ ] NO    Discussed with Consultants/Other Providers [ x] YES     LABS                          9.0    6.22  )-----------( 110      ( 05 Jun 2025 10:50 )             28.3     06-05    136  |  94[L]  |  21[H]  ----------------------------<  163[H]  4.1   |  30  |  1.0    Ca    8.6      05 Jun 2025 10:50  Phos  3.5     06-05  Mg     1.3     06-05    TPro  5.4[L]  /  Alb  2.2[L]  /  TBili  1.3[H]  /  DBili  x   /  AST  64[H]  /  ALT  31  /  AlkPhos  196[H]  06-05      Urinalysis Basic - ( 05 Jun 2025 10:50 )    Color: x / Appearance: x / SG: x / pH: x  Gluc: 163 mg/dL / Ketone: x  / Bili: x / Urobili: x   Blood: x / Protein: x / Nitrite: x   Leuk Esterase: x / RBC: x / WBC x   Sq Epi: x / Non Sq Epi: x / Bacteria: x        Lactate Trend        CAPILLARY BLOOD GLUCOSE      POCT Blood Glucose.: 176 mg/dL (06 Jun 2025 16:27)        RADIOLOGY & ADDITIONAL TESTS:    Imaging Personally Reviewed:  [ ] YES  [ ] NO    HEALTH ISSUES - PROBLEM Dx:  Pain    Advanced care planning/counseling discussion    Encounter for palliative care    Cirrhosis    Metabolic encephalopathy    Anemia    Afib

## 2025-06-06 NOTE — CONSULT NOTE ADULT - TIME BILLING
The submitted E/M billing level for this visit reflects the total time spent on the day of the visit including face-to-face time spent with the patient, non-face-to-face review of medical records and relevant information, documentation, and asynchronous communication with the patient after a visit via phone, email, or patients EHR portal after the visit.  The medical records reviewed are either scanned into the chart or reviewed with the patient using a patient's electronic medical record portal for patients with records not available to [facility] via electronic transmission platforms from other institutions and labs.  Time spent counseling and performing coordination of care was also included in determining the appropriate EM billing level.  ?  I have reviewed and verified information regarding the chief complaint and history recorded by the ancillary staff and/or the patient. I have independently reviewed and interpreted tests performed by other physicians and facilities as necessary.  ?  I have discussed with the patient differential diagnosis, reason for auxiliary tests if ordered, risks, benefits, alternatives, and complications of each form of therapy were discussed.

## 2025-06-06 NOTE — CONSULT NOTE ADULT - REASON FOR ADMISSION
acute mentation changes

## 2025-06-06 NOTE — CONSULT NOTE ADULT - CONSULT REQUESTED BY NAME
3K
Dr Loomis
Magui Sharif I
Medicine
hosp
hospitalist
Dr Mcguire
Dr. Gonzalez
Medicine/Hospitalist
Medicine
primary team
service

## 2025-06-06 NOTE — CONSULT NOTE ADULT - NS ATTEND AMEND GEN_ALL_CORE FT
I edited the note. Patient seen and examined during the GI rounds, case discussed with the GI team (Fellow / Resident / PA), plan communicated to the primary team, assessment, recommendations and plan as above.       Time-based billing (NON-critical care).   75 minutes spent on total encounter which excludes teaching time and/or separately reported services; more than 50% of the visit was spent counseling and / or coordinating care by the attending physician.  The necessity of the time spent during the encounter on this date of service was due to: Coordination of care.
Patient seen and examined. Pertinent labs, imaging and telemetry reviewed. I agree with the above:     Patient responding only to painful stimuli.   -Per ED team, this is his baseline at admission.   -EKG non-ischemic. Initially read as AF but on further review, appears to be NSR with 1st degree AVB with PAC/PVCs.   -Telemetry showing same NSR.   -Trops elevated but flat.   -Check TTE.   -AMS likely not due to cardiac cause.   -May likely need ischemic work up when neurologic causes worked up and pt A&O.
I saw and examined the patient.  He has an unstageable sacral decubitus ulcer with some minimal soilage and necrotic tissue.  He will be cleansed and treated currently with IV antibiotics.  Plan for bedside debridement on Monday.

## 2025-06-06 NOTE — CONSULT NOTE ADULT - PROVIDER SPECIALTY LIST ADULT
Critical Care
Nephrology
Infectious Disease
Cardiology
Gastroenterology
Intervent Radiology
Neurology
Palliative Care
Hepatology
Pulmonology
Surgery
Wound Care

## 2025-06-07 LAB
GLUCOSE BLDC GLUCOMTR-MCNC: 167 MG/DL — HIGH (ref 70–99)
GLUCOSE BLDC GLUCOMTR-MCNC: 171 MG/DL — HIGH (ref 70–99)
GLUCOSE BLDC GLUCOMTR-MCNC: 220 MG/DL — HIGH (ref 70–99)
GLUCOSE BLDC GLUCOMTR-MCNC: 236 MG/DL — HIGH (ref 70–99)

## 2025-06-07 PROCEDURE — 99232 SBSQ HOSP IP/OBS MODERATE 35: CPT

## 2025-06-07 RX ORDER — INSULIN GLARGINE-YFGN 100 [IU]/ML
15 INJECTION, SOLUTION SUBCUTANEOUS AT BEDTIME
Refills: 0 | Status: DISCONTINUED | OUTPATIENT
Start: 2025-06-07 | End: 2025-06-19

## 2025-06-07 RX ORDER — OXYCODONE HYDROCHLORIDE 30 MG/1
5 TABLET ORAL ONCE
Refills: 0 | Status: DISCONTINUED | OUTPATIENT
Start: 2025-06-07 | End: 2025-06-07

## 2025-06-07 RX ORDER — HEPARIN SODIUM 1000 [USP'U]/ML
5000 INJECTION INTRAVENOUS; SUBCUTANEOUS EVERY 12 HOURS
Refills: 0 | Status: DISCONTINUED | OUTPATIENT
Start: 2025-06-07 | End: 2025-06-07

## 2025-06-07 RX ORDER — INSULIN LISPRO 100 U/ML
5 INJECTION, SOLUTION INTRAVENOUS; SUBCUTANEOUS
Refills: 0 | Status: DISCONTINUED | OUTPATIENT
Start: 2025-06-07 | End: 2025-06-19

## 2025-06-07 RX ADMIN — BUMETANIDE 1 MILLIGRAM(S): 1 TABLET ORAL at 05:44

## 2025-06-07 RX ADMIN — BUTYROSPERMUM PARKII(SHEA BUTTER), SIMMONDSIA CHINENSIS (JOJOBA) SEED OIL, ALOE BARBADENSIS LEAF EXTRACT 250 MILLIGRAM(S): .01; 1; 3.5 LIQUID TOPICAL at 05:45

## 2025-06-07 RX ADMIN — LIDOCAINE HYDROCHLORIDE 1 PATCH: 20 JELLY TOPICAL at 11:41

## 2025-06-07 RX ADMIN — BUMETANIDE 1 MILLIGRAM(S): 1 TABLET ORAL at 17:17

## 2025-06-07 RX ADMIN — Medication 40 MILLIGRAM(S): at 11:40

## 2025-06-07 RX ADMIN — BUTYROSPERMUM PARKII(SHEA BUTTER), SIMMONDSIA CHINENSIS (JOJOBA) SEED OIL, ALOE BARBADENSIS LEAF EXTRACT 250 MILLIGRAM(S): .01; 1; 3.5 LIQUID TOPICAL at 17:17

## 2025-06-07 RX ADMIN — INSULIN LISPRO 4: 100 INJECTION, SOLUTION INTRAVENOUS; SUBCUTANEOUS at 16:30

## 2025-06-07 RX ADMIN — INSULIN LISPRO 3 UNIT(S): 100 INJECTION, SOLUTION INTRAVENOUS; SUBCUTANEOUS at 08:02

## 2025-06-07 RX ADMIN — LEVETIRACETAM 250 MILLIGRAM(S): 10 INJECTION, SOLUTION INTRAVENOUS at 05:42

## 2025-06-07 RX ADMIN — LIDOCAINE HYDROCHLORIDE 1 PATCH: 20 JELLY TOPICAL at 23:00

## 2025-06-07 RX ADMIN — LIDOCAINE HYDROCHLORIDE 1 PATCH: 20 JELLY TOPICAL at 02:50

## 2025-06-07 RX ADMIN — INSULIN LISPRO 3 UNIT(S): 100 INJECTION, SOLUTION INTRAVENOUS; SUBCUTANEOUS at 11:39

## 2025-06-07 RX ADMIN — OXYCODONE HYDROCHLORIDE 5 MILLIGRAM(S): 30 TABLET ORAL at 22:15

## 2025-06-07 RX ADMIN — Medication 3 MILLIGRAM(S): at 21:45

## 2025-06-07 RX ADMIN — Medication 1 APPLICATION(S): at 05:45

## 2025-06-07 RX ADMIN — Medication 1 APPLICATION(S): at 17:15

## 2025-06-07 RX ADMIN — Medication 100 MILLIGRAM(S): at 05:43

## 2025-06-07 RX ADMIN — Medication 400 MILLIGRAM(S): at 17:17

## 2025-06-07 RX ADMIN — LACTULOSE 20 GRAM(S): 10 SOLUTION ORAL at 11:42

## 2025-06-07 RX ADMIN — FOLIC ACID 1 MILLIGRAM(S): 1 TABLET ORAL at 11:41

## 2025-06-07 RX ADMIN — Medication 1 TABLET(S): at 17:17

## 2025-06-07 RX ADMIN — Medication 2 TABLET(S): at 21:15

## 2025-06-07 RX ADMIN — INSULIN GLARGINE-YFGN 15 UNIT(S): 100 INJECTION, SOLUTION SUBCUTANEOUS at 21:41

## 2025-06-07 RX ADMIN — INSULIN LISPRO 4: 100 INJECTION, SOLUTION INTRAVENOUS; SUBCUTANEOUS at 11:39

## 2025-06-07 RX ADMIN — INSULIN LISPRO 2: 100 INJECTION, SOLUTION INTRAVENOUS; SUBCUTANEOUS at 08:02

## 2025-06-07 RX ADMIN — Medication 650 MILLIGRAM(S): at 11:14

## 2025-06-07 RX ADMIN — Medication 400 MILLIGRAM(S): at 11:40

## 2025-06-07 RX ADMIN — Medication 500 MILLIGRAM(S): at 11:40

## 2025-06-07 RX ADMIN — NYSTATIN 1 APPLICATION(S): 100000 CREAM TOPICAL at 05:45

## 2025-06-07 RX ADMIN — Medication 1 APPLICATION(S): at 05:42

## 2025-06-07 RX ADMIN — Medication 1 TABLET(S): at 08:01

## 2025-06-07 RX ADMIN — Medication 40 MILLIEQUIVALENT(S): at 11:40

## 2025-06-07 RX ADMIN — INSULIN LISPRO 2: 100 INJECTION, SOLUTION INTRAVENOUS; SUBCUTANEOUS at 21:16

## 2025-06-07 RX ADMIN — NYSTATIN 1 APPLICATION(S): 100000 CREAM TOPICAL at 17:16

## 2025-06-07 RX ADMIN — OXYCODONE HYDROCHLORIDE 5 MILLIGRAM(S): 30 TABLET ORAL at 21:45

## 2025-06-07 RX ADMIN — INSULIN LISPRO 3 UNIT(S): 100 INJECTION, SOLUTION INTRAVENOUS; SUBCUTANEOUS at 16:30

## 2025-06-07 RX ADMIN — Medication 400 MILLIGRAM(S): at 08:02

## 2025-06-07 RX ADMIN — LEVETIRACETAM 250 MILLIGRAM(S): 10 INJECTION, SOLUTION INTRAVENOUS at 17:16

## 2025-06-07 RX ADMIN — Medication 650 MILLIGRAM(S): at 10:02

## 2025-06-07 RX ADMIN — LACTULOSE 20 GRAM(S): 10 SOLUTION ORAL at 21:15

## 2025-06-07 RX ADMIN — LACTULOSE 20 GRAM(S): 10 SOLUTION ORAL at 05:42

## 2025-06-07 NOTE — PROGRESS NOTE ADULT - ASSESSMENT
Mr Reji is a 80 YOM w a PMH of 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  Post-cholecystectomy.   3.  Severe abdominal ascites.4.  Right pleural effusion. 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  The findings of L4-5 discitis osteomyelitis have equivocally changed   the bone marrow edema/enhancement within the L4 and L5 vertebral bodies   has mildly decreased. However there is new mild vertebral body height   loss of L5, increased L5 superior endplate erosion, and increased L4-5 disc edema.  2.  New mild edema within the L3-4 intervertebral disc with faint erosions involving the opposing endplates suspicious for additional level   of discitis osteomyelitis.  3.  No evidence of epidural inflammation /  fluid collection.  4.  Mild paraspinal edema without abscess.  5.  Stable degenerative changes.  -ID Follow-up appreciated>>started on Daptomycin and Rocephin   discussed with family and ID on 5/28 - family learning toward gallium scan to see collection and if need be would proceed with IR biopsy  however in regards to the IR Biopsy of new L3-L4 OM, however poor candidate as pr intermittently does not follow- command   -was on IV daptomycin 900mg q24hrs,  on 5/24, trend qweekly, was also  ceftriaxone 2g q24hrs  - ESR/CRP pending - elevated  -gallium scan 5/30: Normal 24 hour postinjection whole body gallium images and SPECT images   over the abdomen.  Specifically, no abnormal gallium uptake in the lumbar spine is   determined to suspicious for active inflammation process.  -spoke with ID about this on 5/31, we will stop abx, ID will speak with radiology regarding the read and if we need to proceed with biopsy   -spoke with ID on 6/2, repeat MRI in 3 months   -get UA and UClx 6/2 -> UA positive, waiting for ID recs prior to DC  Continuing to monitor off antibiotics    #Bacteremia with bacteroides  -blood culture positive for bacteroides on presentation  -s/p ceftriaxone and flagyl  -repeat blood culture negative    #Macrocytic Anemia:  multifactorial: sepsis, cirrhosis, poor nutrition, GI bleeding  -s/p EGD on 4/16 which showed 5mm ulcer in duodenal bulb post endo clip, repeat EGD on 4/29 showed no bleeding noted   transfused one unit of PRBC on 5/3, -Hb stable , cont to monitor   -Keep Active T/S   -folate and b12 stable, chronic issue   Rechecking folate and B12, still pending    #New Onset Afib  -found during this admission  -rate controlled, not candidate for AC given anemia, coagulopathy in setting of cirrhosis, pt is a poor candidate for AC     #Hypokalemia - potassium chloride 40 meq QD  #Hypomagnesemia - magnesium oxide 400mg TID, magnesium gluconate 500mg QD    SVT PPX: SCDs only due to coagulopathy, risk of Gi bleed /anemia   GI PPX: PPI   Code statu: Full Code   Dispo: from home, will go to rehab, labs ordered for tomorrow

## 2025-06-07 NOTE — PROGRESS NOTE ADULT - SUBJECTIVE AND OBJECTIVE BOX
DWIGHT RIBEIRO  80y  Male      Patient is a 80y old  Male who presents with a chief complaint of acute mentation changes (06 Jun 2025 18:55)      INTERVAL HPI/OVERNIGHT EVENTS:  No acute complaints.    T(C): 36.5 (06-07-25 @ 14:46), Max: 36.6 (06-07-25 @ 04:39)  HR: 81 (06-07-25 @ 17:19) (81 - 93)  BP: 110/62 (06-07-25 @ 17:19) (95/49 - 122/82)  RR: 18 (06-07-25 @ 04:39) (18 - 18)  SpO2: 100% (06-07-25 @ 15:33) (98% - 100%)  Wt(kg): --Vital Signs Last 24 Hrs  T(C): 36.5 (07 Jun 2025 14:46), Max: 36.6 (07 Jun 2025 04:39)  T(F): 97.7 (07 Jun 2025 14:46), Max: 97.8 (07 Jun 2025 04:39)  HR: 81 (07 Jun 2025 17:19) (81 - 93)  BP: 110/62 (07 Jun 2025 17:19) (95/49 - 122/82)  BP(mean): --  RR: 18 (07 Jun 2025 04:39) (18 - 18)  SpO2: 100% (07 Jun 2025 15:33) (98% - 100%)    Parameters below as of 07 Jun 2025 15:33  Patient On (Oxygen Delivery Method): nasal cannula  O2 Flow (L/min): 2        06-06-25 @ 07:01  -  06-07-25 @ 07:00  --------------------------------------------------------  IN: 0 mL / OUT: 500 mL / NET: -500 mL        PHYSICAL EXAM:  GENERAL: NAD, sitting up in bed  PSYCH: no agitation, baseline mentation  NERVOUS SYSTEM:  Alert, freely moving all extremities  PULMONARY: Clear to percussion bilaterally  CARDIOVASCULAR: Regular rate and rhythm  GI: Soft, Nontender  EXTREMITIES:  No cyanosis or edema    Consultant(s) Notes Reviewed:  [x ] YES  [ ] NO    Discussed with Consultants/Other Providers [ x] YES     LABS                  Lactate Trend        CAPILLARY BLOOD GLUCOSE      POCT Blood Glucose.: 171 mg/dL (07 Jun 2025 20:32)        RADIOLOGY & ADDITIONAL TESTS:    Imaging Personally Reviewed:  [ ] YES  [ ] NO    HEALTH ISSUES - PROBLEM Dx:  Pain    Advanced care planning/counseling discussion    Encounter for palliative care    Cirrhosis    Metabolic encephalopathy    Anemia    Afib

## 2025-06-08 LAB
ALBUMIN SERPL ELPH-MCNC: 2.2 G/DL — LOW (ref 3.5–5.2)
ALP SERPL-CCNC: 209 U/L — HIGH (ref 30–115)
ALT FLD-CCNC: 36 U/L — SIGNIFICANT CHANGE UP (ref 0–41)
ANION GAP SERPL CALC-SCNC: 13 MMOL/L — SIGNIFICANT CHANGE UP (ref 7–14)
APTT BLD: 29.9 SEC — SIGNIFICANT CHANGE UP (ref 27–39.2)
AST SERPL-CCNC: 73 U/L — HIGH (ref 0–41)
BASOPHILS # BLD AUTO: 0.02 K/UL — SIGNIFICANT CHANGE UP (ref 0–0.2)
BASOPHILS NFR BLD AUTO: 0.3 % — SIGNIFICANT CHANGE UP (ref 0–1)
BILIRUB SERPL-MCNC: 1.2 MG/DL — SIGNIFICANT CHANGE UP (ref 0.2–1.2)
BUN SERPL-MCNC: 26 MG/DL — HIGH (ref 10–20)
CALCIUM SERPL-MCNC: 8.5 MG/DL — SIGNIFICANT CHANGE UP (ref 8.4–10.5)
CHLORIDE SERPL-SCNC: 96 MMOL/L — LOW (ref 98–110)
CO2 SERPL-SCNC: 28 MMOL/L — SIGNIFICANT CHANGE UP (ref 17–32)
CREAT SERPL-MCNC: 1.2 MG/DL — SIGNIFICANT CHANGE UP (ref 0.7–1.5)
CRP SERPL-MCNC: 124.1 MG/L — HIGH
EGFR: 62 ML/MIN/1.73M2 — SIGNIFICANT CHANGE UP
EGFR: 62 ML/MIN/1.73M2 — SIGNIFICANT CHANGE UP
EOSINOPHIL # BLD AUTO: 0.2 K/UL — SIGNIFICANT CHANGE UP (ref 0–0.7)
EOSINOPHIL NFR BLD AUTO: 3.2 % — SIGNIFICANT CHANGE UP (ref 0–8)
ERYTHROCYTE [SEDIMENTATION RATE] IN BLOOD: 66 MM/HR — HIGH (ref 0–15)
GLUCOSE BLDC GLUCOMTR-MCNC: 184 MG/DL — HIGH (ref 70–99)
GLUCOSE BLDC GLUCOMTR-MCNC: 188 MG/DL — HIGH (ref 70–99)
GLUCOSE BLDC GLUCOMTR-MCNC: 191 MG/DL — HIGH (ref 70–99)
GLUCOSE BLDC GLUCOMTR-MCNC: 207 MG/DL — HIGH (ref 70–99)
GLUCOSE SERPL-MCNC: 222 MG/DL — HIGH (ref 70–99)
HCT VFR BLD CALC: 27.6 % — LOW (ref 42–52)
HGB BLD-MCNC: 8.7 G/DL — LOW (ref 14–18)
IMM GRANULOCYTES NFR BLD AUTO: 1.4 % — HIGH (ref 0.1–0.3)
INR BLD: 1.23 RATIO — SIGNIFICANT CHANGE UP (ref 0.65–1.3)
LYMPHOCYTES # BLD AUTO: 1.15 K/UL — LOW (ref 1.2–3.4)
LYMPHOCYTES # BLD AUTO: 18.2 % — LOW (ref 20.5–51.1)
MAGNESIUM SERPL-MCNC: 1.8 MG/DL — SIGNIFICANT CHANGE UP (ref 1.8–2.4)
MCHC RBC-ENTMCNC: 31.5 G/DL — LOW (ref 32–37)
MCHC RBC-ENTMCNC: 33.3 PG — HIGH (ref 27–31)
MCV RBC AUTO: 105.7 FL — HIGH (ref 80–94)
MONOCYTES # BLD AUTO: 0.91 K/UL — HIGH (ref 0.1–0.6)
MONOCYTES NFR BLD AUTO: 14.4 % — HIGH (ref 1.7–9.3)
NEUTROPHILS # BLD AUTO: 3.96 K/UL — SIGNIFICANT CHANGE UP (ref 1.4–6.5)
NEUTROPHILS NFR BLD AUTO: 62.5 % — SIGNIFICANT CHANGE UP (ref 42.2–75.2)
NRBC BLD AUTO-RTO: 0 /100 WBCS — SIGNIFICANT CHANGE UP (ref 0–0)
PLATELET # BLD AUTO: 136 K/UL — SIGNIFICANT CHANGE UP (ref 130–400)
PMV BLD: 9.1 FL — SIGNIFICANT CHANGE UP (ref 7.4–10.4)
POTASSIUM SERPL-MCNC: 4.2 MMOL/L — SIGNIFICANT CHANGE UP (ref 3.5–5)
POTASSIUM SERPL-SCNC: 4.2 MMOL/L — SIGNIFICANT CHANGE UP (ref 3.5–5)
PROT SERPL-MCNC: 5.6 G/DL — LOW (ref 6–8)
PROTHROM AB SERPL-ACNC: 14.6 SEC — HIGH (ref 9.95–12.87)
RBC # BLD: 2.61 M/UL — LOW (ref 4.7–6.1)
RBC # FLD: 20.5 % — HIGH (ref 11.5–14.5)
SODIUM SERPL-SCNC: 137 MMOL/L — SIGNIFICANT CHANGE UP (ref 135–146)
WBC # BLD: 6.33 K/UL — SIGNIFICANT CHANGE UP (ref 4.8–10.8)
WBC # FLD AUTO: 6.33 K/UL — SIGNIFICANT CHANGE UP (ref 4.8–10.8)

## 2025-06-08 PROCEDURE — 99232 SBSQ HOSP IP/OBS MODERATE 35: CPT

## 2025-06-08 RX ORDER — ACETAMINOPHEN 500 MG/5ML
650 LIQUID (ML) ORAL EVERY 8 HOURS
Refills: 0 | Status: DISCONTINUED | OUTPATIENT
Start: 2025-06-08 | End: 2025-06-19

## 2025-06-08 RX ORDER — OXYCODONE HYDROCHLORIDE 30 MG/1
5 TABLET ORAL EVERY 6 HOURS
Refills: 0 | Status: DISCONTINUED | OUTPATIENT
Start: 2025-06-08 | End: 2025-06-14

## 2025-06-08 RX ORDER — OXYCODONE HYDROCHLORIDE 30 MG/1
5 TABLET ORAL ONCE
Refills: 0 | Status: DISCONTINUED | OUTPATIENT
Start: 2025-06-08 | End: 2025-06-09

## 2025-06-08 RX ADMIN — INSULIN LISPRO 2: 100 INJECTION, SOLUTION INTRAVENOUS; SUBCUTANEOUS at 22:12

## 2025-06-08 RX ADMIN — BUTYROSPERMUM PARKII(SHEA BUTTER), SIMMONDSIA CHINENSIS (JOJOBA) SEED OIL, ALOE BARBADENSIS LEAF EXTRACT 250 MILLIGRAM(S): .01; 1; 3.5 LIQUID TOPICAL at 05:00

## 2025-06-08 RX ADMIN — LIDOCAINE HYDROCHLORIDE 1 PATCH: 20 JELLY TOPICAL at 12:35

## 2025-06-08 RX ADMIN — Medication 1 TABLET(S): at 08:44

## 2025-06-08 RX ADMIN — Medication 1 TABLET(S): at 17:36

## 2025-06-08 RX ADMIN — LACTULOSE 20 GRAM(S): 10 SOLUTION ORAL at 04:57

## 2025-06-08 RX ADMIN — Medication 400 MILLIGRAM(S): at 08:44

## 2025-06-08 RX ADMIN — NYSTATIN 1 APPLICATION(S): 100000 CREAM TOPICAL at 17:37

## 2025-06-08 RX ADMIN — LEVETIRACETAM 250 MILLIGRAM(S): 10 INJECTION, SOLUTION INTRAVENOUS at 04:57

## 2025-06-08 RX ADMIN — FOLIC ACID 1 MILLIGRAM(S): 1 TABLET ORAL at 12:35

## 2025-06-08 RX ADMIN — NYSTATIN 1 APPLICATION(S): 100000 CREAM TOPICAL at 05:00

## 2025-06-08 RX ADMIN — BUMETANIDE 1 MILLIGRAM(S): 1 TABLET ORAL at 17:42

## 2025-06-08 RX ADMIN — Medication 2 MILLIGRAM(S): at 06:49

## 2025-06-08 RX ADMIN — INSULIN GLARGINE-YFGN 15 UNIT(S): 100 INJECTION, SOLUTION SUBCUTANEOUS at 22:43

## 2025-06-08 RX ADMIN — Medication 1 APPLICATION(S): at 05:00

## 2025-06-08 RX ADMIN — Medication 400 MILLIGRAM(S): at 12:35

## 2025-06-08 RX ADMIN — Medication 500 MILLIGRAM(S): at 12:35

## 2025-06-08 RX ADMIN — Medication 2 MILLIGRAM(S): at 07:02

## 2025-06-08 RX ADMIN — LACTULOSE 20 GRAM(S): 10 SOLUTION ORAL at 17:42

## 2025-06-08 RX ADMIN — Medication 1 APPLICATION(S): at 04:57

## 2025-06-08 RX ADMIN — Medication 1 APPLICATION(S): at 17:37

## 2025-06-08 RX ADMIN — Medication 40 MILLIGRAM(S): at 12:59

## 2025-06-08 RX ADMIN — OXYCODONE HYDROCHLORIDE 5 MILLIGRAM(S): 30 TABLET ORAL at 20:08

## 2025-06-08 RX ADMIN — INSULIN LISPRO 5 UNIT(S): 100 INJECTION, SOLUTION INTRAVENOUS; SUBCUTANEOUS at 12:36

## 2025-06-08 RX ADMIN — Medication 2 TABLET(S): at 22:46

## 2025-06-08 RX ADMIN — INSULIN LISPRO 2: 100 INJECTION, SOLUTION INTRAVENOUS; SUBCUTANEOUS at 17:35

## 2025-06-08 RX ADMIN — LEVETIRACETAM 250 MILLIGRAM(S): 10 INJECTION, SOLUTION INTRAVENOUS at 17:35

## 2025-06-08 RX ADMIN — INSULIN LISPRO 5 UNIT(S): 100 INJECTION, SOLUTION INTRAVENOUS; SUBCUTANEOUS at 17:36

## 2025-06-08 RX ADMIN — BUMETANIDE 1 MILLIGRAM(S): 1 TABLET ORAL at 04:58

## 2025-06-08 RX ADMIN — Medication 400 MILLIGRAM(S): at 17:36

## 2025-06-08 RX ADMIN — LACTULOSE 20 GRAM(S): 10 SOLUTION ORAL at 22:46

## 2025-06-08 RX ADMIN — Medication 100 MILLIGRAM(S): at 04:58

## 2025-06-08 RX ADMIN — INSULIN LISPRO 5 UNIT(S): 100 INJECTION, SOLUTION INTRAVENOUS; SUBCUTANEOUS at 08:44

## 2025-06-08 RX ADMIN — BUTYROSPERMUM PARKII(SHEA BUTTER), SIMMONDSIA CHINENSIS (JOJOBA) SEED OIL, ALOE BARBADENSIS LEAF EXTRACT 250 MILLIGRAM(S): .01; 1; 3.5 LIQUID TOPICAL at 17:36

## 2025-06-08 RX ADMIN — INSULIN LISPRO 4: 100 INJECTION, SOLUTION INTRAVENOUS; SUBCUTANEOUS at 08:43

## 2025-06-08 RX ADMIN — Medication 40 MILLIEQUIVALENT(S): at 12:35

## 2025-06-08 RX ADMIN — INSULIN LISPRO 2: 100 INJECTION, SOLUTION INTRAVENOUS; SUBCUTANEOUS at 12:34

## 2025-06-08 RX ADMIN — OXYCODONE HYDROCHLORIDE 5 MILLIGRAM(S): 30 TABLET ORAL at 21:05

## 2025-06-08 NOTE — PROGRESS NOTE ADULT - ASSESSMENT
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  Post-cholecystectomy.   3.  Severe abdominal ascites.4.  Right pleural effusion. 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   -s/p albumin given diffuse edema on 4/30, 5/2, 5/8, 5/9, 5/12  -Bumex 1mg BID, Spironolactone 100mg QD  -Lactulose 20mg Q8H, Rifaximin 550mg BID    #Worsening sacral decubitus ulcer  General surgery consulted  Plan bedside debridement on 6/9  Wound care following  rotate the patient q2hrs  Oxycodone 5mg PRN Q6H for pain  Give morphine 4mg IV just prior to debridement for pain    #Ambulatory Dysfunction   #H/O Lumbar osteomyelitis in January 2025  #New osteomyelitis in L3-L4 region  -Completed Course of antibiotics at that time   -Now with Difficulty with ambulation   -MR Lumbar Spine w/wo IV Cont  -Since the previous lumbar spine MRI dated 1/29/2025:  1.  The findings of L4-5 discitis osteomyelitis have equivocally changed   the bone marrow edema/enhancement within the L4 and L5 vertebral bodies   has mildly decreased. However there is new mild vertebral body height   loss of L5, increased L5 superior endplate erosion, and increased L4-5 disc edema.  2.  New mild edema within the L3-4 intervertebral disc with faint erosions involving the opposing endplates suspicious for additional level   of discitis osteomyelitis.  3.  No evidence of epidural inflammation /  fluid collection.  4.  Mild paraspinal edema without abscess.  5.  Stable degenerative changes.  -ID Follow-up appreciated>>started on Daptomycin and Rocephin   discussed with family and ID on 5/28 - family learning toward gallium scan to see collection and if need be would proceed with IR biopsy  however in regards to the IR Biopsy of new L3-L4 OM, however poor candidate as pr intermittently does not follow- command   -was on IV daptomycin 900mg q24hrs,  on 5/24, trend qweekly, was also  ceftriaxone 2g q24hrs  - ESR/CRP pending - elevated  -gallium scan 5/30: Normal 24 hour postinjection whole body gallium images and SPECT images   over the abdomen.  Specifically, no abnormal gallium uptake in the lumbar spine is   determined to suspicious for active inflammation process.  -spoke with ID about this on 5/31, we will stop abx, ID will speak with radiology regarding the read and if we need to proceed with biopsy   -spoke with ID on 6/2, repeat MRI in 3 months   -get UA and UClx 6/2 -> UA positive, waiting for ID recs prior to DC  Continuing to monitor off antibiotics    #Bacteremia with bacteroides  -blood culture positive for bacteroides on presentation  -s/p ceftriaxone and flagyl  -repeat blood culture negative    #Macrocytic Anemia:  multifactorial: sepsis, cirrhosis, poor nutrition, GI bleeding  -s/p EGD on 4/16 which showed 5mm ulcer in duodenal bulb post endo clip, repeat EGD on 4/29 showed no bleeding noted   transfused one unit of PRBC on 5/3, -Hb stable , cont to monitor   -Keep Active T/S   -folate and b12 stable, chronic issue   Can consider checking vitamin D level and TSH    #New Onset Afib  -found during this admission  -rate controlled, not candidate for AC given anemia, coagulopathy in setting of cirrhosis, pt is a poor candidate for AC     #Hypokalemia - potassium chloride 40 meq QD  #Hypomagnesemia - magnesium oxide 400mg TID, magnesium gluconate 500mg QD    SVT PPX: SCDs only due to coagulopathy, risk of Gi bleed /anemia   GI PPX: Pantoprazole 40mg daily  Diet: Soft and bite sized, consistent carbohydrate, DASH.  Discontinued glucerna shakes, patient is refusing these.  Code status: Full Code   Dispo: from home, will go to rehab    -----PB Handoff Note-----    Pending/Follow up items (tests, labs, procedures, consults):    Indication for continued inpatient management: bedside debridement tomorrow    Goals of Care note:     Family contact:  Dispo (home, SNF, etc):    Prepare for DC order [ ] - updated CROW is:   DC provider note prep:    -----Time spent-----  Initial visit:             mins [ ] -- 55-74 mins [ ]  Subsequent visit: 50-79 mins[ ]    -- 35-49mins [x ]     -----# and complexity of problems-----  [ ] chronic illness with severe exacerbation, progression, treatment side effect  [ ] acute or chronic illness with threat to life or bodily function                         -----Complexity of data reviewed-----  The Patients complexity of data reviewed  is Low [ ] Moderate [ ] High [ ] due to the following:   A:      Reviewed prior external records [ ]      Considering/ Ordered a unique test:  Labs [ ] Imaging [ ] Stress Test  [ ] Other: Specify [ ]      Reviewed each unique test result [ ]      Assessment requiring an independent historian [ ]  B:       Independent interpretation of:   X-Ray [ ] EKG [ ]  Other: Specify  [ ]  C:       Discussion of management of tests with clinician outside of my group [ ]    -----Risk of Morbidity-----  The Patients risk of morbidity is Low [ ] Moderate [ ] High [ ] due to the following:   Mod:  Prescription Drug Management [ ]  Decision regarding elective or emergent: minor surgery [ ] major surgery [ ]  Decision regarding hospitalization or escalation of hospital-level care [ ]  SDOH: Hearing/Vision impaired [ ] Food insecurity [ ] Homelessness/unsafe condition [ ]   Financial strain [ ] un/underemployed [ ] Lack of Transport [ ]  High:  DNR or De-Escalation of care because of poor prognosis [ ]  Drug Therapy requiring intensive monitoring for toxicity [ ]  Parenteral controlled substance [ ]

## 2025-06-08 NOTE — PROGRESS NOTE ADULT - SUBJECTIVE AND OBJECTIVE BOX
DWIGHT RIBEIRO  80y  Male      Patient is a 80y old  Male who presents with a chief complaint of acute mentation changes (07 Jun 2025 21:18)      INTERVAL HPI/OVERNIGHT EVENTS:  Patient in more pain today, intermittently moaning.    T(C): 36.8 (06-08-25 @ 14:37), Max: 36.8 (06-07-25 @ 20:13)  HR: 92 (06-08-25 @ 14:37) (84 - 92)  BP: 130/73 (06-08-25 @ 14:37) (111/63 - 130/73)  RR: 18 (06-08-25 @ 14:37) (18 - 18)  SpO2: 100% (06-08-25 @ 14:37) (98% - 100%)  Wt(kg): --Vital Signs Last 24 Hrs  T(C): 36.8 (08 Jun 2025 14:37), Max: 36.8 (07 Jun 2025 20:13)  T(F): 98.2 (08 Jun 2025 14:37), Max: 98.3 (08 Jun 2025 05:42)  HR: 92 (08 Jun 2025 14:37) (84 - 92)  BP: 130/73 (08 Jun 2025 14:37) (111/63 - 130/73)  BP(mean): --  RR: 18 (08 Jun 2025 14:37) (18 - 18)  SpO2: 100% (08 Jun 2025 14:37) (98% - 100%)    Parameters below as of 08 Jun 2025 14:37  Patient On (Oxygen Delivery Method): nasal cannula  O2 Flow (L/min): 2        06-07-25 @ 07:01  -  06-08-25 @ 07:00  --------------------------------------------------------  IN: 0 mL / OUT: 900 mL / NET: -900 mL        PHYSICAL EXAM:  GENERAL: NAD, sitting up in bed  PSYCH: no agitation, baseline mentation  NERVOUS SYSTEM:  Alert, freely moving all extremities  PULMONARY: Clear to percussion bilaterally  CARDIOVASCULAR: Regular rate and rhythm  GI: Soft, Nontender  EXTREMITIES:  No cyanosis or edema  SKIN: No obvious rashes or lesions    Consultant(s) Notes Reviewed:  [x ] YES  [ ] NO    Discussed with Consultants/Other Providers [ x] YES     LABS                          8.7    6.33  )-----------( 136      ( 08 Jun 2025 07:12 )             27.6     06-08    137  |  96[L]  |  26[H]  ----------------------------<  222[H]  4.2   |  28  |  1.2    Ca    8.5      08 Jun 2025 07:12  Mg     1.8     06-08    TPro  5.6[L]  /  Alb  2.2[L]  /  TBili  1.2  /  DBili  x   /  AST  73[H]  /  ALT  36  /  AlkPhos  209[H]  06-08      Urinalysis Basic - ( 08 Jun 2025 07:12 )    Color: x / Appearance: x / SG: x / pH: x  Gluc: 222 mg/dL / Ketone: x  / Bili: x / Urobili: x   Blood: x / Protein: x / Nitrite: x   Leuk Esterase: x / RBC: x / WBC x   Sq Epi: x / Non Sq Epi: x / Bacteria: x      PT/INR - ( 08 Jun 2025 07:12 )   PT: 14.60 sec;   INR: 1.23 ratio         PTT - ( 08 Jun 2025 07:12 )  PTT:29.9 sec  Lactate Trend        CAPILLARY BLOOD GLUCOSE      POCT Blood Glucose.: 188 mg/dL (08 Jun 2025 16:43)        RADIOLOGY & ADDITIONAL TESTS:    Imaging Personally Reviewed:  [ ] YES  [ ] NO    HEALTH ISSUES - PROBLEM Dx:  Pain    Advanced care planning/counseling discussion    Encounter for palliative care    Cirrhosis    Metabolic encephalopathy    Anemia    Afib

## 2025-06-09 LAB
GLUCOSE BLDC GLUCOMTR-MCNC: 191 MG/DL — HIGH (ref 70–99)
GLUCOSE BLDC GLUCOMTR-MCNC: 222 MG/DL — HIGH (ref 70–99)
GLUCOSE BLDC GLUCOMTR-MCNC: 235 MG/DL — HIGH (ref 70–99)
GLUCOSE BLDC GLUCOMTR-MCNC: 248 MG/DL — HIGH (ref 70–99)

## 2025-06-09 PROCEDURE — 99232 SBSQ HOSP IP/OBS MODERATE 35: CPT

## 2025-06-09 RX ADMIN — Medication 2 TABLET(S): at 21:08

## 2025-06-09 RX ADMIN — NYSTATIN 1 APPLICATION(S): 100000 CREAM TOPICAL at 17:27

## 2025-06-09 RX ADMIN — INSULIN LISPRO 5 UNIT(S): 100 INJECTION, SOLUTION INTRAVENOUS; SUBCUTANEOUS at 08:08

## 2025-06-09 RX ADMIN — LIDOCAINE HYDROCHLORIDE 1 PATCH: 20 JELLY TOPICAL at 11:53

## 2025-06-09 RX ADMIN — LIDOCAINE HYDROCHLORIDE 1 PATCH: 20 JELLY TOPICAL at 23:30

## 2025-06-09 RX ADMIN — OXYCODONE HYDROCHLORIDE 5 MILLIGRAM(S): 30 TABLET ORAL at 01:23

## 2025-06-09 RX ADMIN — BUMETANIDE 1 MILLIGRAM(S): 1 TABLET ORAL at 17:25

## 2025-06-09 RX ADMIN — LEVETIRACETAM 250 MILLIGRAM(S): 10 INJECTION, SOLUTION INTRAVENOUS at 05:45

## 2025-06-09 RX ADMIN — BUMETANIDE 1 MILLIGRAM(S): 1 TABLET ORAL at 05:45

## 2025-06-09 RX ADMIN — OXYCODONE HYDROCHLORIDE 5 MILLIGRAM(S): 30 TABLET ORAL at 03:29

## 2025-06-09 RX ADMIN — LEVETIRACETAM 250 MILLIGRAM(S): 10 INJECTION, SOLUTION INTRAVENOUS at 17:26

## 2025-06-09 RX ADMIN — LACTULOSE 20 GRAM(S): 10 SOLUTION ORAL at 21:08

## 2025-06-09 RX ADMIN — Medication 1 APPLICATION(S): at 05:56

## 2025-06-09 RX ADMIN — INSULIN LISPRO 4: 100 INJECTION, SOLUTION INTRAVENOUS; SUBCUTANEOUS at 21:08

## 2025-06-09 RX ADMIN — LACTULOSE 20 GRAM(S): 10 SOLUTION ORAL at 14:15

## 2025-06-09 RX ADMIN — INSULIN LISPRO 5 UNIT(S): 100 INJECTION, SOLUTION INTRAVENOUS; SUBCUTANEOUS at 11:52

## 2025-06-09 RX ADMIN — Medication 1 TABLET(S): at 08:08

## 2025-06-09 RX ADMIN — BUTYROSPERMUM PARKII(SHEA BUTTER), SIMMONDSIA CHINENSIS (JOJOBA) SEED OIL, ALOE BARBADENSIS LEAF EXTRACT 250 MILLIGRAM(S): .01; 1; 3.5 LIQUID TOPICAL at 05:46

## 2025-06-09 RX ADMIN — Medication 400 MILLIGRAM(S): at 17:26

## 2025-06-09 RX ADMIN — OXYCODONE HYDROCHLORIDE 5 MILLIGRAM(S): 30 TABLET ORAL at 02:39

## 2025-06-09 RX ADMIN — Medication 1 APPLICATION(S): at 17:26

## 2025-06-09 RX ADMIN — FOLIC ACID 1 MILLIGRAM(S): 1 TABLET ORAL at 11:54

## 2025-06-09 RX ADMIN — Medication 40 MILLIGRAM(S): at 05:48

## 2025-06-09 RX ADMIN — INSULIN LISPRO 5 UNIT(S): 100 INJECTION, SOLUTION INTRAVENOUS; SUBCUTANEOUS at 17:27

## 2025-06-09 RX ADMIN — INSULIN LISPRO 4: 100 INJECTION, SOLUTION INTRAVENOUS; SUBCUTANEOUS at 17:28

## 2025-06-09 RX ADMIN — LACTULOSE 20 GRAM(S): 10 SOLUTION ORAL at 05:44

## 2025-06-09 RX ADMIN — OXYCODONE HYDROCHLORIDE 5 MILLIGRAM(S): 30 TABLET ORAL at 00:21

## 2025-06-09 RX ADMIN — Medication 400 MILLIGRAM(S): at 11:54

## 2025-06-09 RX ADMIN — Medication 1 TABLET(S): at 17:25

## 2025-06-09 RX ADMIN — Medication 40 MILLIEQUIVALENT(S): at 11:53

## 2025-06-09 RX ADMIN — NYSTATIN 1 APPLICATION(S): 100000 CREAM TOPICAL at 06:19

## 2025-06-09 RX ADMIN — INSULIN LISPRO 4: 100 INJECTION, SOLUTION INTRAVENOUS; SUBCUTANEOUS at 08:09

## 2025-06-09 RX ADMIN — INSULIN LISPRO 2: 100 INJECTION, SOLUTION INTRAVENOUS; SUBCUTANEOUS at 11:52

## 2025-06-09 RX ADMIN — Medication 400 MILLIGRAM(S): at 08:08

## 2025-06-09 RX ADMIN — BUTYROSPERMUM PARKII(SHEA BUTTER), SIMMONDSIA CHINENSIS (JOJOBA) SEED OIL, ALOE BARBADENSIS LEAF EXTRACT 250 MILLIGRAM(S): .01; 1; 3.5 LIQUID TOPICAL at 17:26

## 2025-06-09 RX ADMIN — INSULIN GLARGINE-YFGN 15 UNIT(S): 100 INJECTION, SOLUTION SUBCUTANEOUS at 21:09

## 2025-06-09 RX ADMIN — LIDOCAINE HYDROCHLORIDE 1 PATCH: 20 JELLY TOPICAL at 19:24

## 2025-06-09 RX ADMIN — Medication 500 MILLIGRAM(S): at 11:55

## 2025-06-09 RX ADMIN — Medication 100 MILLIGRAM(S): at 05:44

## 2025-06-09 NOTE — PROGRESS NOTE ADULT - SUBJECTIVE AND OBJECTIVE BOX
DWIGHT RIBEIRO  80y  Male      Patient is a 80y old  Male who presents with a chief complaint of acute mentation changes (08 Jun 2025 18:49)      INTERVAL HPI/OVERNIGHT EVENTS:  Patient for bedside debridement with surgery today.    T(C): 36.6 (06-09-25 @ 04:42), Max: 36.9 (06-08-25 @ 20:18)  HR: 79 (06-09-25 @ 04:42) (79 - 95)  BP: 133/73 (06-09-25 @ 04:42) (118/67 - 133/73)  RR: 18 (06-09-25 @ 04:42) (18 - 18)  SpO2: 99% (06-08-25 @ 20:18) (99% - 100%)  Wt(kg): --Vital Signs Last 24 Hrs  T(C): 36.6 (09 Jun 2025 04:42), Max: 36.9 (08 Jun 2025 20:18)  T(F): 97.9 (09 Jun 2025 04:42), Max: 98.5 (08 Jun 2025 20:18)  HR: 79 (09 Jun 2025 04:42) (79 - 95)  BP: 133/73 (09 Jun 2025 04:42) (118/67 - 133/73)  BP(mean): --  RR: 18 (09 Jun 2025 04:42) (18 - 18)  SpO2: 99% (08 Jun 2025 20:18) (99% - 100%)    Parameters below as of 08 Jun 2025 20:18  Patient On (Oxygen Delivery Method): nasal cannula  O2 Flow (L/min): 2        06-08-25 @ 07:01  -  06-09-25 @ 07:00  --------------------------------------------------------  IN: 0 mL / OUT: 1500 mL / NET: -1500 mL        PHYSICAL EXAM:  GENERAL: NAD, sitting up in bed  PSYCH: no agitation, baseline mentation  NERVOUS SYSTEM:  Alert, freely moving all extremities  PULMONARY: Clear to percussion bilaterally  CARDIOVASCULAR: Regular rate and rhythm  GI: Soft, Nontender    Consultant(s) Notes Reviewed:  [x ] YES  [ ] NO    Discussed with Consultants/Other Providers [ x] YES     LABS                          8.7    6.33  )-----------( 136      ( 08 Jun 2025 07:12 )             27.6     06-08    137  |  96[L]  |  26[H]  ----------------------------<  222[H]  4.2   |  28  |  1.2    Ca    8.5      08 Jun 2025 07:12  Mg     1.8     06-08    TPro  5.6[L]  /  Alb  2.2[L]  /  TBili  1.2  /  DBili  x   /  AST  73[H]  /  ALT  36  /  AlkPhos  209[H]  06-08      Urinalysis Basic - ( 08 Jun 2025 07:12 )    Color: x / Appearance: x / SG: x / pH: x  Gluc: 222 mg/dL / Ketone: x  / Bili: x / Urobili: x   Blood: x / Protein: x / Nitrite: x   Leuk Esterase: x / RBC: x / WBC x   Sq Epi: x / Non Sq Epi: x / Bacteria: x      PT/INR - ( 08 Jun 2025 07:12 )   PT: 14.60 sec;   INR: 1.23 ratio         PTT - ( 08 Jun 2025 07:12 )  PTT:29.9 sec  Lactate Trend        CAPILLARY BLOOD GLUCOSE      POCT Blood Glucose.: 191 mg/dL (09 Jun 2025 11:41)        RADIOLOGY & ADDITIONAL TESTS:    Imaging Personally Reviewed:  [ ] YES  [ ] NO    HEALTH ISSUES - PROBLEM Dx:  Pain    Advanced care planning/counseling discussion    Encounter for palliative care    Cirrhosis    Metabolic encephalopathy    Anemia    Afib

## 2025-06-09 NOTE — PROGRESS NOTE ADULT - ASSESSMENT
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  Post-cholecystectomy.   3.  Severe abdominal ascites.4.  Right pleural effusion. 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   -s/p albumin given diffuse edema on 4/30, 5/2, 5/8, 5/9, 5/12  -Bumex 1mg BID, Spironolactone 100mg QD  -Lactulose 20mg Q8H, Rifaximin 550mg BID    #Worsening sacral decubitus ulcer  General surgery consulted  Bedside debridement today  Wound care following  rotate the patient q2hrs  Oxycodone 5mg PRN Q6H for pain  Give morphine 4mg IV just prior to debridement for pain, can give extra dose if needed    #Ambulatory Dysfunction   #H/O Lumbar osteomyelitis in January 2025  #New osteomyelitis in L3-L4 region  -Completed Course of antibiotics at that time   -Now with Difficulty with ambulation   -MR Lumbar Spine w/wo IV Cont  -Since the previous lumbar spine MRI dated 1/29/2025:  1.  The findings of L4-5 discitis osteomyelitis have equivocally changed   the bone marrow edema/enhancement within the L4 and L5 vertebral bodies   has mildly decreased. However there is new mild vertebral body height   loss of L5, increased L5 superior endplate erosion, and increased L4-5 disc edema.  2.  New mild edema within the L3-4 intervertebral disc with faint erosions involving the opposing endplates suspicious for additional level   of discitis osteomyelitis.  3.  No evidence of epidural inflammation /  fluid collection.  4.  Mild paraspinal edema without abscess.  5.  Stable degenerative changes.  -ID Follow-up appreciated>>started on Daptomycin and Rocephin   discussed with family and ID on 5/28 - family learning toward gallium scan to see collection and if need be would proceed with IR biopsy  however in regards to the IR Biopsy of new L3-L4 OM, however poor candidate as pr intermittently does not follow- command   -was on IV daptomycin 900mg q24hrs,  on 5/24, trend qweekly, was also  ceftriaxone 2g q24hrs  - ESR/CRP pending - elevated  -gallium scan 5/30: Normal 24 hour postinjection whole body gallium images and SPECT images   over the abdomen.  Specifically, no abnormal gallium uptake in the lumbar spine is   determined to suspicious for active inflammation process.  -spoke with ID about this on 5/31, we will stop abx, ID will speak with radiology regarding the read and if we need to proceed with biopsy   -spoke with ID on 6/2, repeat MRI in 3 months   -get UA and UClx 6/2 -> UA positive, waiting for ID recs prior to DC  Continuing to monitor off antibiotics    #Bacteremia with bacteroides  -blood culture positive for bacteroides on presentation  -s/p ceftriaxone and flagyl  -repeat blood culture negative    #Macrocytic Anemia:  multifactorial: sepsis, cirrhosis, poor nutrition, GI bleeding  -s/p EGD on 4/16 which showed 5mm ulcer in duodenal bulb post endo clip, repeat EGD on 4/29 showed no bleeding noted   transfused one unit of PRBC on 5/3, -Hb stable , cont to monitor   -Keep Active T/S   -folate and b12 stable, chronic issue   Can consider checking vitamin D level and TSH    #New Onset Afib  -found during this admission  -rate controlled, not candidate for AC given anemia, coagulopathy in setting of cirrhosis, pt is a poor candidate for AC     #Hypokalemia - potassium chloride 40 meq QD  #Hypomagnesemia - magnesium oxide 400mg TID, magnesium gluconate 500mg QD    SVT PPX: SCDs only due to coagulopathy, risk of Gi bleed /anemia   GI PPX: Pantoprazole 40mg daily  Diet: Soft and bite sized, consistent carbohydrate, DASH.  Discontinued glucerna shakes, patient is refusing these.  Code status: Full Code   Dispo: from home, will go to rehab    -----PB Handoff Note-----    Pending/Follow up items (tests, labs, procedures, consults): pending surgery    Indication for continued inpatient management: bedside debridement today    Goals of Care note:     Family contact:  Dispo (home, SNF, etc):    Prepare for DC order [ ] - updated CROW is:   DC provider note prep:    -----Time spent-----  Initial visit:             mins [ ] -- 55-74 mins [ ]  Subsequent visit: 50-79 mins[ ]    -- 35-49mins [x ]     -----# and complexity of problems-----  [ ] chronic illness with severe exacerbation, progression, treatment side effect  [ ] acute or chronic illness with threat to life or bodily function                         -----Complexity of data reviewed-----  The Patients complexity of data reviewed  is Low [ ] Moderate [ ] High [ ] due to the following:   A:      Reviewed prior external records [ ]      Considering/ Ordered a unique test:  Labs [ ] Imaging [ ] Stress Test  [ ] Other: Specify [ ]      Reviewed each unique test result [ ]      Assessment requiring an independent historian [ ]  B:       Independent interpretation of:   X-Ray [ ] EKG [ ]  Other: Specify  [ ]  C:       Discussion of management of tests with clinician outside of my group [ ]    -----Risk of Morbidity-----  The Patients risk of morbidity is Low [ ] Moderate [ ] High [ ] due to the following:   Mod:  Prescription Drug Management [ ]  Decision regarding elective or emergent: minor surgery [ ] major surgery [ ]  Decision regarding hospitalization or escalation of hospital-level care [ ]  SDOH: Hearing/Vision impaired [ ] Food insecurity [ ] Homelessness/unsafe condition [ ]   Financial strain [ ] un/underemployed [ ] Lack of Transport [ ]  High:  DNR or De-Escalation of care because of poor prognosis [ ]  Drug Therapy requiring intensive monitoring for toxicity [ ]  Parenteral controlled substance [ ]

## 2025-06-10 LAB
GLUCOSE BLDC GLUCOMTR-MCNC: 153 MG/DL — HIGH (ref 70–99)
GLUCOSE BLDC GLUCOMTR-MCNC: 159 MG/DL — HIGH (ref 70–99)
GLUCOSE BLDC GLUCOMTR-MCNC: 178 MG/DL — HIGH (ref 70–99)
GLUCOSE BLDC GLUCOMTR-MCNC: 200 MG/DL — HIGH (ref 70–99)

## 2025-06-10 PROCEDURE — 97597 DBRDMT OPN WND 1ST 20 CM/<: CPT

## 2025-06-10 PROCEDURE — 97598 DBRDMT OPN WND ADDL 20CM/<: CPT

## 2025-06-10 PROCEDURE — 99232 SBSQ HOSP IP/OBS MODERATE 35: CPT

## 2025-06-10 RX ADMIN — Medication 1 APPLICATION(S): at 05:04

## 2025-06-10 RX ADMIN — LACTULOSE 20 GRAM(S): 10 SOLUTION ORAL at 21:23

## 2025-06-10 RX ADMIN — INSULIN LISPRO 5 UNIT(S): 100 INJECTION, SOLUTION INTRAVENOUS; SUBCUTANEOUS at 11:46

## 2025-06-10 RX ADMIN — Medication 1 TABLET(S): at 17:20

## 2025-06-10 RX ADMIN — INSULIN LISPRO 2: 100 INJECTION, SOLUTION INTRAVENOUS; SUBCUTANEOUS at 11:46

## 2025-06-10 RX ADMIN — INSULIN LISPRO 2: 100 INJECTION, SOLUTION INTRAVENOUS; SUBCUTANEOUS at 21:25

## 2025-06-10 RX ADMIN — BUTYROSPERMUM PARKII(SHEA BUTTER), SIMMONDSIA CHINENSIS (JOJOBA) SEED OIL, ALOE BARBADENSIS LEAF EXTRACT 250 MILLIGRAM(S): .01; 1; 3.5 LIQUID TOPICAL at 17:14

## 2025-06-10 RX ADMIN — OXYCODONE HYDROCHLORIDE 5 MILLIGRAM(S): 30 TABLET ORAL at 16:30

## 2025-06-10 RX ADMIN — Medication 40 MILLIGRAM(S): at 05:04

## 2025-06-10 RX ADMIN — BUTYROSPERMUM PARKII(SHEA BUTTER), SIMMONDSIA CHINENSIS (JOJOBA) SEED OIL, ALOE BARBADENSIS LEAF EXTRACT 250 MILLIGRAM(S): .01; 1; 3.5 LIQUID TOPICAL at 05:04

## 2025-06-10 RX ADMIN — Medication 2 TABLET(S): at 21:23

## 2025-06-10 RX ADMIN — Medication 1 APPLICATION(S): at 17:14

## 2025-06-10 RX ADMIN — Medication 400 MILLIGRAM(S): at 11:45

## 2025-06-10 RX ADMIN — Medication 500 MILLIGRAM(S): at 17:20

## 2025-06-10 RX ADMIN — INSULIN LISPRO 5 UNIT(S): 100 INJECTION, SOLUTION INTRAVENOUS; SUBCUTANEOUS at 08:36

## 2025-06-10 RX ADMIN — INSULIN LISPRO 2: 100 INJECTION, SOLUTION INTRAVENOUS; SUBCUTANEOUS at 17:20

## 2025-06-10 RX ADMIN — LEVETIRACETAM 250 MILLIGRAM(S): 10 INJECTION, SOLUTION INTRAVENOUS at 05:04

## 2025-06-10 RX ADMIN — Medication 400 MILLIGRAM(S): at 08:34

## 2025-06-10 RX ADMIN — FOLIC ACID 1 MILLIGRAM(S): 1 TABLET ORAL at 11:45

## 2025-06-10 RX ADMIN — LIDOCAINE HYDROCHLORIDE 1 PATCH: 20 JELLY TOPICAL at 11:43

## 2025-06-10 RX ADMIN — INSULIN GLARGINE-YFGN 15 UNIT(S): 100 INJECTION, SOLUTION SUBCUTANEOUS at 21:22

## 2025-06-10 RX ADMIN — Medication 100 MILLIGRAM(S): at 05:03

## 2025-06-10 RX ADMIN — LACTULOSE 20 GRAM(S): 10 SOLUTION ORAL at 05:03

## 2025-06-10 RX ADMIN — LACTULOSE 20 GRAM(S): 10 SOLUTION ORAL at 11:45

## 2025-06-10 RX ADMIN — Medication 1 TABLET(S): at 08:34

## 2025-06-10 RX ADMIN — OXYCODONE HYDROCHLORIDE 5 MILLIGRAM(S): 30 TABLET ORAL at 04:43

## 2025-06-10 RX ADMIN — LIDOCAINE HYDROCHLORIDE 1 PATCH: 20 JELLY TOPICAL at 19:00

## 2025-06-10 RX ADMIN — OXYCODONE HYDROCHLORIDE 5 MILLIGRAM(S): 30 TABLET ORAL at 05:06

## 2025-06-10 RX ADMIN — Medication 1 APPLICATION(S): at 05:03

## 2025-06-10 RX ADMIN — INSULIN LISPRO 5 UNIT(S): 100 INJECTION, SOLUTION INTRAVENOUS; SUBCUTANEOUS at 17:21

## 2025-06-10 RX ADMIN — Medication 40 MILLIEQUIVALENT(S): at 11:45

## 2025-06-10 RX ADMIN — INSULIN LISPRO 2: 100 INJECTION, SOLUTION INTRAVENOUS; SUBCUTANEOUS at 08:35

## 2025-06-10 RX ADMIN — OXYCODONE HYDROCHLORIDE 5 MILLIGRAM(S): 30 TABLET ORAL at 17:22

## 2025-06-10 RX ADMIN — NYSTATIN 1 APPLICATION(S): 100000 CREAM TOPICAL at 17:21

## 2025-06-10 RX ADMIN — Medication 400 MILLIGRAM(S): at 17:20

## 2025-06-10 RX ADMIN — BUMETANIDE 1 MILLIGRAM(S): 1 TABLET ORAL at 05:04

## 2025-06-10 RX ADMIN — NYSTATIN 1 APPLICATION(S): 100000 CREAM TOPICAL at 05:03

## 2025-06-10 RX ADMIN — BUMETANIDE 1 MILLIGRAM(S): 1 TABLET ORAL at 17:20

## 2025-06-10 RX ADMIN — LEVETIRACETAM 250 MILLIGRAM(S): 10 INJECTION, SOLUTION INTRAVENOUS at 17:20

## 2025-06-10 NOTE — CHART NOTE - NSCHARTNOTEFT_GEN_A_CORE
SURGERY    Patient now s/p bedside debridement of sacral ulcer, area approximately 5 cm x 7 cm x 3 cm. Ulcer extended to sacral bone, with pus in some areas. Cleaned wound with vashe, packed with vashe-soaked kerlix.     Recommend enzymatic ointment such as medi-honey to further debride remaining tissue.

## 2025-06-10 NOTE — PROCEDURE NOTE - NSINFORMCONSENT_GEN_A_CORE
Benefits, risks, and possible complications of procedure explained to patient/caregiver who verbalized understanding and gave verbal consent.
from patient's daughter/Benefits, risks, and possible complications of procedure explained to patient/caregiver who verbalized understanding and gave written consent.

## 2025-06-10 NOTE — PROGRESS NOTE ADULT - ASSESSMENT
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  Post-cholecystectomy.   3.  Severe abdominal ascites.4.  Right pleural effusion. 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   -s/p albumin given diffuse edema on 4/30, 5/2, 5/8, 5/9, 5/12  -Bumex 1mg BID, Spironolactone 100mg QD  -Lactulose 20mg Q8H, Rifaximin 550mg BID    #Worsening sacral decubitus ulcer  General surgery consulted  Bedside debridement 6/10  Request ID follow to see if antibiotics are needed given pus was present in wound during debridement  Wound care following  rotate the patient q2hrs  Oxycodone 5mg PRN Q6H for pain    #Ambulatory Dysfunction   #H/O Lumbar osteomyelitis in January 2025  #New osteomyelitis in L3-L4 region  -Completed Course of antibiotics at that time   -Now with Difficulty with ambulation   -MR Lumbar Spine w/wo IV Cont  -Since the previous lumbar spine MRI dated 1/29/2025:  1.  The findings of L4-5 discitis osteomyelitis have equivocally changed   the bone marrow edema/enhancement within the L4 and L5 vertebral bodies   has mildly decreased. However there is new mild vertebral body height   loss of L5, increased L5 superior endplate erosion, and increased L4-5 disc edema.  2.  New mild edema within the L3-4 intervertebral disc with faint erosions involving the opposing endplates suspicious for additional level   of discitis osteomyelitis.  3.  No evidence of epidural inflammation /  fluid collection.  4.  Mild paraspinal edema without abscess.  5.  Stable degenerative changes.  -ID Follow-up appreciated>>started on Daptomycin and Rocephin   discussed with family and ID on 5/28 - family learning toward gallium scan to see collection and if need be would proceed with IR biopsy  however in regards to the IR Biopsy of new L3-L4 OM, however poor candidate as pr intermittently does not follow- command   -was on IV daptomycin 900mg q24hrs,  on 5/24, trend qweekly, was also  ceftriaxone 2g q24hrs  - ESR/CRP pending - elevated  -gallium scan 5/30: Normal 24 hour postinjection whole body gallium images and SPECT images   over the abdomen.  Specifically, no abnormal gallium uptake in the lumbar spine is   determined to suspicious for active inflammation process.  -spoke with ID about this on 5/31, we will stop abx, ID will speak with radiology regarding the read and if we need to proceed with biopsy   -spoke with ID on 6/2, repeat MRI in 3 months     #Bacteremia with bacteroides  -blood culture positive for bacteroides on presentation  -s/p ceftriaxone and flagyl  -repeat blood culture negative    #Macrocytic Anemia:  multifactorial: sepsis, cirrhosis, poor nutrition, GI bleeding  -s/p EGD on 4/16 which showed 5mm ulcer in duodenal bulb post endo clip, repeat EGD on 4/29 showed no bleeding noted   transfused one unit of PRBC on 5/3, -Hb stable , cont to monitor   -Keep Active T/S   -folate and b12 stable, chronic issue   Can consider checking vitamin D level and TSH    #New Onset Afib  -found during this admission  -rate controlled, not candidate for AC given anemia, coagulopathy in setting of cirrhosis, pt is a poor candidate for AC     #Hypokalemia - potassium chloride 40 meq QD  #Hypomagnesemia - magnesium oxide 400mg TID, magnesium gluconate 500mg QD    SVT PPX: SCDs only due to coagulopathy, risk of Gi bleed /anemia   GI PPX: Pantoprazole 40mg daily  Diet: Soft and bite sized, consistent carbohydrate, DASH.  Discontinued glucerna shakes, patient is refusing these.  Code status: Full Code   Dispo: from home, will go to rehab    -----PB Handoff Note-----    Pending/Follow up items (tests, labs, procedures, consults): pending surgery    Indication for continued inpatient management: bedside debridement today    Goals of Care note:     Family contact:  Dispo (home, SNF, etc):    Prepare for DC order [ ] - updated CROW is:   DC provider note prep:    -----Time spent-----  Initial visit:             mins [ ] -- 55-74 mins [ ]  Subsequent visit: 50-79 mins[ ]    -- 35-49mins [x ]     -----# and complexity of problems-----  [ ] chronic illness with severe exacerbation, progression, treatment side effect  [ ] acute or chronic illness with threat to life or bodily function                         -----Complexity of data reviewed-----  The Patients complexity of data reviewed  is Low [ ] Moderate [ ] High [ ] due to the following:   A:      Reviewed prior external records [ ]      Considering/ Ordered a unique test:  Labs [ ] Imaging [ ] Stress Test  [ ] Other: Specify [ ]      Reviewed each unique test result [ ]      Assessment requiring an independent historian [ ]  B:       Independent interpretation of:   X-Ray [ ] EKG [ ]  Other: Specify  [ ]  C:       Discussion of management of tests with clinician outside of my group [ ]    -----Risk of Morbidity-----  The Patients risk of morbidity is Low [ ] Moderate [ ] High [ ] due to the following:   Mod:  Prescription Drug Management [ ]  Decision regarding elective or emergent: minor surgery [ ] major surgery [ ]  Decision regarding hospitalization or escalation of hospital-level care [ ]  SDOH: Hearing/Vision impaired [ ] Food insecurity [ ] Homelessness/unsafe condition [ ]   Financial strain [ ] un/underemployed [ ] Lack of Transport [ ]  High:  DNR or De-Escalation of care because of poor prognosis [ ]  Drug Therapy requiring intensive monitoring for toxicity [ ]  Parenteral controlled substance [ ]

## 2025-06-10 NOTE — PROGRESS NOTE ADULT - SUBJECTIVE AND OBJECTIVE BOX
DWIGHT RIBEIRO  80y  Male      Patient is a 80y old  Male who presents with a chief complaint of acute mentation changes (09 Jun 2025 12:55)      INTERVAL HPI/OVERNIGHT EVENTS:  Bedside debridement of sacral ulcer performed today by surgery team.    T(C): 36.8 (06-10-25 @ 14:25), Max: 37.6 (06-10-25 @ 04:45)  HR: 101 (06-10-25 @ 14:25) (98 - 101)  BP: 117/64 (06-10-25 @ 14:25) (117/64 - 127/78)  RR: 17 (06-10-25 @ 04:45) (17 - 17)  SpO2: 100% (06-10-25 @ 14:25) (97% - 100%)  Wt(kg): --Vital Signs Last 24 Hrs  T(C): 36.8 (10 Stone 2025 14:25), Max: 37.6 (10 Stone 2025 04:45)  T(F): 98.2 (10 Stone 2025 14:25), Max: 99.7 (10 Stone 2025 04:45)  HR: 101 (10 Stone 2025 14:25) (98 - 101)  BP: 117/64 (10 Stone 2025 14:25) (117/64 - 127/78)  BP(mean): --  RR: 17 (10 Stone 2025 04:45) (17 - 17)  SpO2: 100% (10 Stone 2025 14:25) (97% - 100%)    Parameters below as of 10 Stone 2025 14:25  Patient On (Oxygen Delivery Method): nasal cannula  O2 Flow (L/min): 2        06-09-25 @ 07:01  -  06-10-25 @ 07:00  --------------------------------------------------------  IN: 420 mL / OUT: 500 mL / NET: -80 mL        PHYSICAL EXAM:  GENERAL: NAD, sitting up in bed  PSYCH: no agitation, baseline mentation  NERVOUS SYSTEM:  Alert, freely moving all extremities  PULMONARY: Clear to percussion bilaterally  CARDIOVASCULAR: Regular rate and rhythm  GI: Soft, Nontender  EXTREMITIES:  No cyanosis, trace edema    Consultant(s) Notes Reviewed:  [x ] YES  [ ] NO    Discussed with Consultants/Other Providers [ x] YES     LABS                  Lactate Trend        CAPILLARY BLOOD GLUCOSE      POCT Blood Glucose.: 159 mg/dL (10 Stone 2025 17:08)        RADIOLOGY & ADDITIONAL TESTS:    Imaging Personally Reviewed:  [ ] YES  [ ] NO    HEALTH ISSUES - PROBLEM Dx:  Pain    Advanced care planning/counseling discussion    Encounter for palliative care    Cirrhosis    Metabolic encephalopathy    Anemia    Afib

## 2025-06-10 NOTE — PROCEDURE NOTE - ADDITIONAL PROCEDURE DETAILS
Patient with unstageable sacral decubitus ulcer with evidence of necrotic tissue, used vashe to clean area. Used scalpel to excise necrotic tissue, some areas with purulent exudate. Ulcer extended to sacral bone

## 2025-06-11 LAB
ALBUMIN SERPL ELPH-MCNC: 2.3 G/DL — LOW (ref 3.5–5.2)
ALP SERPL-CCNC: 235 U/L — HIGH (ref 30–115)
ALT FLD-CCNC: 40 U/L — SIGNIFICANT CHANGE UP (ref 0–41)
ANION GAP SERPL CALC-SCNC: 15 MMOL/L — HIGH (ref 7–14)
ANISOCYTOSIS BLD QL: SLIGHT — SIGNIFICANT CHANGE UP
AST SERPL-CCNC: 63 U/L — HIGH (ref 0–41)
BASOPHILS # BLD AUTO: 0 K/UL — SIGNIFICANT CHANGE UP (ref 0–0.2)
BASOPHILS NFR BLD AUTO: 0 % — SIGNIFICANT CHANGE UP (ref 0–1)
BILIRUB SERPL-MCNC: 1.4 MG/DL — HIGH (ref 0.2–1.2)
BUN SERPL-MCNC: 30 MG/DL — HIGH (ref 10–20)
CALCIUM SERPL-MCNC: 8.9 MG/DL — SIGNIFICANT CHANGE UP (ref 8.4–10.5)
CHLORIDE SERPL-SCNC: 97 MMOL/L — LOW (ref 98–110)
CO2 SERPL-SCNC: 26 MMOL/L — SIGNIFICANT CHANGE UP (ref 17–32)
CREAT SERPL-MCNC: 1.3 MG/DL — SIGNIFICANT CHANGE UP (ref 0.7–1.5)
DACRYOCYTES BLD QL SMEAR: SLIGHT — SIGNIFICANT CHANGE UP
EGFR: 56 ML/MIN/1.73M2 — LOW
EGFR: 56 ML/MIN/1.73M2 — LOW
ELLIPTOCYTES BLD QL SMEAR: SLIGHT — SIGNIFICANT CHANGE UP
EOSINOPHIL # BLD AUTO: 0.07 K/UL — SIGNIFICANT CHANGE UP (ref 0–0.7)
EOSINOPHIL NFR BLD AUTO: 1 % — SIGNIFICANT CHANGE UP (ref 0–8)
GLUCOSE BLDC GLUCOMTR-MCNC: 182 MG/DL — HIGH (ref 70–99)
GLUCOSE SERPL-MCNC: 170 MG/DL — HIGH (ref 70–99)
HCT VFR BLD CALC: 29.6 % — LOW (ref 42–52)
HGB BLD-MCNC: 9.4 G/DL — LOW (ref 14–18)
LYMPHOCYTES # BLD AUTO: 1.16 K/UL — LOW (ref 1.2–3.4)
LYMPHOCYTES # BLD AUTO: 17 % — LOW (ref 20.5–51.1)
MACROCYTES BLD QL: SLIGHT — SIGNIFICANT CHANGE UP
MAGNESIUM SERPL-MCNC: 1.8 MG/DL — SIGNIFICANT CHANGE UP (ref 1.8–2.4)
MANUAL SMEAR VERIFICATION: SIGNIFICANT CHANGE UP
MCHC RBC-ENTMCNC: 31.8 G/DL — LOW (ref 32–37)
MCHC RBC-ENTMCNC: 33.3 PG — HIGH (ref 27–31)
MCV RBC AUTO: 105 FL — HIGH (ref 80–94)
MONOCYTES # BLD AUTO: 0.68 K/UL — HIGH (ref 0.1–0.6)
MONOCYTES NFR BLD AUTO: 10 % — HIGH (ref 1.7–9.3)
MYELOCYTES NFR BLD: 2 % — HIGH (ref 0–0)
NEUTROPHILS # BLD AUTO: 4.77 K/UL — SIGNIFICANT CHANGE UP (ref 1.4–6.5)
NEUTROPHILS NFR BLD AUTO: 69 % — SIGNIFICANT CHANGE UP (ref 42.2–75.2)
NEUTS BAND # BLD: 1 % — SIGNIFICANT CHANGE UP (ref 0–6)
NEUTS BAND NFR BLD: 1 % — SIGNIFICANT CHANGE UP (ref 0–6)
NRBC # BLD: 0 /100 WBCS — SIGNIFICANT CHANGE UP (ref 0–0)
NRBC BLD AUTO-RTO: SIGNIFICANT CHANGE UP /100 WBCS (ref 0–0)
NRBC BLD-RTO: 0 /100 WBCS — SIGNIFICANT CHANGE UP (ref 0–0)
PLAT MORPH BLD: NORMAL — SIGNIFICANT CHANGE UP
PLATELET # BLD AUTO: 147 K/UL — SIGNIFICANT CHANGE UP (ref 130–400)
PLATELET COUNT - ESTIMATE: NORMAL — SIGNIFICANT CHANGE UP
PMV BLD: 8.9 FL — SIGNIFICANT CHANGE UP (ref 7.4–10.4)
POLYCHROMASIA BLD QL SMEAR: SLIGHT — SIGNIFICANT CHANGE UP
POTASSIUM SERPL-MCNC: 5 MMOL/L — SIGNIFICANT CHANGE UP (ref 3.5–5)
POTASSIUM SERPL-SCNC: 5 MMOL/L — SIGNIFICANT CHANGE UP (ref 3.5–5)
PROT SERPL-MCNC: 5.8 G/DL — LOW (ref 6–8)
RBC # BLD: 2.82 M/UL — LOW (ref 4.7–6.1)
RBC # FLD: 19.2 % — HIGH (ref 11.5–14.5)
RBC BLD AUTO: ABNORMAL
SODIUM SERPL-SCNC: 138 MMOL/L — SIGNIFICANT CHANGE UP (ref 135–146)
TOXIC GRANULES BLD QL SMEAR: PRESENT — SIGNIFICANT CHANGE UP
WBC # BLD: 6.81 K/UL — SIGNIFICANT CHANGE UP (ref 4.8–10.8)
WBC # FLD AUTO: 6.81 K/UL — SIGNIFICANT CHANGE UP (ref 4.8–10.8)

## 2025-06-11 PROCEDURE — 99233 SBSQ HOSP IP/OBS HIGH 50: CPT | Mod: FS

## 2025-06-11 RX ORDER — AMPICILLIN SODIUM AND SULBACTAM SODIUM 1; .5 G/1; G/1
3 INJECTION, POWDER, FOR SOLUTION INTRAMUSCULAR; INTRAVENOUS ONCE
Refills: 0 | Status: COMPLETED | OUTPATIENT
Start: 2025-06-11 | End: 2025-06-11

## 2025-06-11 RX ORDER — AMPICILLIN SODIUM AND SULBACTAM SODIUM 1; .5 G/1; G/1
INJECTION, POWDER, FOR SOLUTION INTRAMUSCULAR; INTRAVENOUS
Refills: 0 | Status: DISCONTINUED | OUTPATIENT
Start: 2025-06-11 | End: 2025-06-19

## 2025-06-11 RX ORDER — DOXYCYCLINE HYCLATE 100 MG
100 TABLET ORAL EVERY 12 HOURS
Refills: 0 | Status: DISCONTINUED | OUTPATIENT
Start: 2025-06-11 | End: 2025-06-19

## 2025-06-11 RX ORDER — AMPICILLIN SODIUM AND SULBACTAM SODIUM 1; .5 G/1; G/1
3 INJECTION, POWDER, FOR SOLUTION INTRAMUSCULAR; INTRAVENOUS EVERY 6 HOURS
Refills: 0 | Status: DISCONTINUED | OUTPATIENT
Start: 2025-06-11 | End: 2025-06-19

## 2025-06-11 RX ADMIN — AMPICILLIN SODIUM AND SULBACTAM SODIUM 200 GRAM(S): 1; .5 INJECTION, POWDER, FOR SOLUTION INTRAMUSCULAR; INTRAVENOUS at 16:22

## 2025-06-11 RX ADMIN — Medication 1 APPLICATION(S): at 05:30

## 2025-06-11 RX ADMIN — Medication 40 MILLIGRAM(S): at 06:09

## 2025-06-11 RX ADMIN — LEVETIRACETAM 250 MILLIGRAM(S): 10 INJECTION, SOLUTION INTRAVENOUS at 17:28

## 2025-06-11 RX ADMIN — INSULIN LISPRO 4: 100 INJECTION, SOLUTION INTRAVENOUS; SUBCUTANEOUS at 21:45

## 2025-06-11 RX ADMIN — Medication 1 TABLET(S): at 08:33

## 2025-06-11 RX ADMIN — AMPICILLIN SODIUM AND SULBACTAM SODIUM 200 GRAM(S): 1; .5 INJECTION, POWDER, FOR SOLUTION INTRAMUSCULAR; INTRAVENOUS at 23:55

## 2025-06-11 RX ADMIN — BUMETANIDE 1 MILLIGRAM(S): 1 TABLET ORAL at 06:09

## 2025-06-11 RX ADMIN — Medication 400 MILLIGRAM(S): at 17:28

## 2025-06-11 RX ADMIN — LACTULOSE 20 GRAM(S): 10 SOLUTION ORAL at 21:26

## 2025-06-11 RX ADMIN — INSULIN GLARGINE-YFGN 15 UNIT(S): 100 INJECTION, SOLUTION SUBCUTANEOUS at 21:27

## 2025-06-11 RX ADMIN — Medication 100 MILLIGRAM(S): at 17:28

## 2025-06-11 RX ADMIN — Medication 100 MILLIGRAM(S): at 06:09

## 2025-06-11 RX ADMIN — INSULIN LISPRO 5 UNIT(S): 100 INJECTION, SOLUTION INTRAVENOUS; SUBCUTANEOUS at 08:34

## 2025-06-11 RX ADMIN — Medication 1 TABLET(S): at 17:28

## 2025-06-11 RX ADMIN — Medication 1 APPLICATION(S): at 05:31

## 2025-06-11 RX ADMIN — LEVETIRACETAM 250 MILLIGRAM(S): 10 INJECTION, SOLUTION INTRAVENOUS at 06:09

## 2025-06-11 RX ADMIN — NYSTATIN 1 APPLICATION(S): 100000 CREAM TOPICAL at 17:25

## 2025-06-11 RX ADMIN — BUTYROSPERMUM PARKII(SHEA BUTTER), SIMMONDSIA CHINENSIS (JOJOBA) SEED OIL, ALOE BARBADENSIS LEAF EXTRACT 250 MILLIGRAM(S): .01; 1; 3.5 LIQUID TOPICAL at 06:09

## 2025-06-11 RX ADMIN — BUTYROSPERMUM PARKII(SHEA BUTTER), SIMMONDSIA CHINENSIS (JOJOBA) SEED OIL, ALOE BARBADENSIS LEAF EXTRACT 250 MILLIGRAM(S): .01; 1; 3.5 LIQUID TOPICAL at 17:28

## 2025-06-11 RX ADMIN — BUMETANIDE 1 MILLIGRAM(S): 1 TABLET ORAL at 17:28

## 2025-06-11 RX ADMIN — Medication 2 TABLET(S): at 21:26

## 2025-06-11 RX ADMIN — AMPICILLIN SODIUM AND SULBACTAM SODIUM 200 GRAM(S): 1; .5 INJECTION, POWDER, FOR SOLUTION INTRAMUSCULAR; INTRAVENOUS at 17:27

## 2025-06-11 RX ADMIN — OXYCODONE HYDROCHLORIDE 5 MILLIGRAM(S): 30 TABLET ORAL at 09:25

## 2025-06-11 RX ADMIN — INSULIN LISPRO 2: 100 INJECTION, SOLUTION INTRAVENOUS; SUBCUTANEOUS at 08:34

## 2025-06-11 RX ADMIN — Medication 1 APPLICATION(S): at 17:25

## 2025-06-11 RX ADMIN — NYSTATIN 1 APPLICATION(S): 100000 CREAM TOPICAL at 05:32

## 2025-06-11 RX ADMIN — LACTULOSE 20 GRAM(S): 10 SOLUTION ORAL at 06:09

## 2025-06-11 RX ADMIN — LIDOCAINE HYDROCHLORIDE 1 PATCH: 20 JELLY TOPICAL at 00:04

## 2025-06-11 RX ADMIN — Medication 400 MILLIGRAM(S): at 08:33

## 2025-06-11 RX ADMIN — OXYCODONE HYDROCHLORIDE 5 MILLIGRAM(S): 30 TABLET ORAL at 11:57

## 2025-06-11 RX ADMIN — INSULIN LISPRO 5 UNIT(S): 100 INJECTION, SOLUTION INTRAVENOUS; SUBCUTANEOUS at 17:33

## 2025-06-11 NOTE — PROGRESS NOTE ADULT - ASSESSMENT
This is a 80 year-old M with a pertinent PMHx of DM, cirrhosis, and anasarca presenting with a sacral decub.  Patient was s/p bedside debridement 06/10/25.  Patient is reported to have elevated glucose in urine.    PLAN:  - Continue MediHoney every day.     Case and plan reviewed/ discussed with Dr. Meyers.    This is a 80 year-old M with a pertinent PMHx of DM, cirrhosis, and anasarca presenting with a sacral decub.  Patient was s/p bedside debridement 06/10/25.  Patient is reported to have elevated glucose in urine.    PLAN:  - Continue MediHoney daily dressing   - Nursing care  - No further surgical intervention, recall prn    Case and plan reviewed/ discussed with Dr. Meyers.

## 2025-06-11 NOTE — PROGRESS NOTE ADULT - SUBJECTIVE AND OBJECTIVE BOX
CHIEF COMPLAINT:    Patient is a 80y old  Male who presents with a chief complaint of acute mentation changes (11 Jun 2025 14:50)    INTERVAL HPI/OVERNIGHT EVENTS:    Patient seen and examined at bedside. s/p sacral wound debridement yesterday. Pt seemed comfortable, chronic sick appearing.     Medications:  Standing  ampicillin/sulbactam  IVPB      ampicillin/sulbactam  IVPB 3 Gram(s) IV Intermittent every 6 hours  buMETAnide 1 milliGRAM(s) Oral every 12 hours  chlorhexidine 2% Cloths 1 Application(s) Topical <User Schedule>  dextrose 5%. 1000 milliLiter(s) IV Continuous <Continuous>  dextrose 5%. 1000 milliLiter(s) IV Continuous <Continuous>  dextrose 50% Injectable 25 Gram(s) IV Push once  dextrose 50% Injectable 12.5 Gram(s) IV Push once  dextrose 50% Injectable 25 Gram(s) IV Push once  doxycycline monohydrate Capsule 100 milliGRAM(s) Oral every 12 hours  folic acid 1 milliGRAM(s) Oral daily  glucagon  Injectable 1 milliGRAM(s) IntraMuscular once  insulin glargine Injectable (LANTUS) 15 Unit(s) SubCutaneous at bedtime  insulin lispro (ADMELOG) corrective regimen sliding scale   SubCutaneous Before meals and at bedtime  insulin lispro Injectable (ADMELOG) 5 Unit(s) SubCutaneous three times a day before meals  lactobacillus acidophilus 1 Tablet(s) Oral two times a day with meals  lactulose Syrup 20 Gram(s) Oral every 8 hours  levETIRAcetam  Solution 250 milliGRAM(s) Oral two times a day  lidocaine   4% Patch 1 Patch Transdermal daily  magnesium gluconate 500 milliGRAM(s) Oral daily  magnesium oxide 400 milliGRAM(s) Oral three times a day with meals  nystatin Powder 1 Application(s) Topical two times a day  pantoprazole    Tablet 40 milliGRAM(s) Oral before breakfast  potassium chloride   Powder 40 milliEquivalent(s) Oral daily  rifAXIMin 550 milliGRAM(s) Oral two times a day  saccharomyces boulardii 250 milliGRAM(s) Oral two times a day  senna 2 Tablet(s) Oral at bedtime  silver sulfADIAZINE 1% Cream 1 Application(s) Topical two times a day  spironolactone 100 milliGRAM(s) Oral daily    PRN Meds  acetaminophen     Tablet .. 650 milliGRAM(s) Oral every 8 hours PRN  aluminum hydroxide/magnesium hydroxide/simethicone Suspension 30 milliLiter(s) Oral every 4 hours PRN  dextrose Oral Gel 15 Gram(s) Oral once PRN  melatonin 3 milliGRAM(s) Oral at bedtime PRN  ondansetron Injectable 4 milliGRAM(s) IV Push every 8 hours PRN  oxyCODONE    IR 5 milliGRAM(s) Oral every 6 hours PRN    Vital Signs:    T(F): 98.3 (06-11-25 @ 14:44), Max: 98.6 (06-10-25 @ 21:03)  HR: 90 (06-11-25 @ 15:06) (68 - 99)  BP: 90/55 (06-11-25 @ 16:27) (88/59 - 132/64)  RR: 18 (06-11-25 @ 14:44) (18 - 18)  SpO2: 96% (06-11-25 @ 14:44) (96% - 100%)  I&O's Summary    Daily     Daily   CAPILLARY BLOOD GLUCOSE    POCT Blood Glucose.: 117 mg/dL (11 Jun 2025 16:25)  POCT Blood Glucose.: 104 mg/dL (11 Jun 2025 11:45)  POCT Blood Glucose.: 182 mg/dL (11 Jun 2025 07:50)  POCT Blood Glucose.: 178 mg/dL (10 Stone 2025 20:54)      PHYSICAL EXAM:  GENERAL:  NAD  SKIN: No rashes or lesions  HEENT: Atraumatic. Normocephalic. Anicteric  NECK:  No JVD.   PULMONARY: Clear to ausculation bilaterally. No wheezing. No rales  CVS: Normal S1, S2. Regular rate and rhythm. No murmurs.  ABDOMEN/GI: Soft, Nontender, Nondistended; Bowel sounds are present  EXTREMITIES: trace edema on b/l LE  NEUROLOGIC:  lethargic, sick appearing, no focal deficit    LABS:                        9.4    6.81  )-----------( 147      ( 11 Jun 2025 06:58 )             29.6     06-11    138  |  97[L]  |  30[H]  ----------------------------<  170[H]  5.0   |  26  |  1.3    Ca    8.9      11 Jun 2025 06:58  Mg     1.8     06-11    TPro  5.8[L]  /  Alb  2.3[L]  /  TBili  1.4[H]  /  DBili  x   /  AST  63[H]  /  ALT  40  /  AlkPhos  235[H]  06-11    RADIOLOGY & ADDITIONAL TESTS:  Imaging or report Personally Reviewed:  [ ] YES  [ ] NO -->no new images

## 2025-06-11 NOTE — PROGRESS NOTE ADULT - SUBJECTIVE AND OBJECTIVE BOX
INFECTIOUS DISEASE FOLLOW UP NOTE:    Interval History/ROS: Patient is a 80y old  Male who presents with a chief complaint of Sacral decub (11 Jun 2025 12:40)    Overnight events: No overnight event. Wound was dressed today.    REVIEW OF SYSTEMS:  CONSTITUTIONAL: No fever or chills  HEAD: No lesion on scalp  EYES: No visual disturbance  ENT: No sore throat  RESPIRATORY: No cough, no shortness of breath  CARDIOVASCULAR: No chest pain or palpitations  GASTROINTESTINAL: No abdominal or epigastric pain  GENITOURINARY: No dysuria  NEUROLOGICAL: No headache/dizziness  MUSCULOSKELETAL: No joint pain, erythema, or swelling; no back pain; +bedbound  SKIN: No itching, rashes  All other ROS negative except noted above      Prior hospital charts reviewed [Yes]  Primary team notes reviewed [Yes]  Other consultant notes reviewed [Yes]    Allergies:  No Known Allergies      ANTIMICROBIALS:   ampicillin/sulbactam  IVPB    doxycycline monohydrate Capsule 100 every 12 hours  rifAXIMin 550 two times a day      OTHER MEDS: MEDICATIONS  (STANDING):  acetaminophen     Tablet .. 650 every 8 hours PRN  aluminum hydroxide/magnesium hydroxide/simethicone Suspension 30 every 4 hours PRN  buMETAnide 1 every 12 hours  dextrose 50% Injectable 25 once  dextrose 50% Injectable 12.5 once  dextrose 50% Injectable 25 once  dextrose Oral Gel 15 once PRN  glucagon  Injectable 1 once  insulin glargine Injectable (LANTUS) 15 at bedtime  insulin lispro (ADMELOG) corrective regimen sliding scale  Before meals and at bedtime  insulin lispro Injectable (ADMELOG) 5 three times a day before meals  lactulose Syrup 20 every 8 hours  levETIRAcetam  Solution 250 two times a day  melatonin 3 at bedtime PRN  ondansetron Injectable 4 every 8 hours PRN  oxyCODONE    IR 5 every 6 hours PRN  pantoprazole    Tablet 40 before breakfast  senna 2 at bedtime  spironolactone 100 daily      Vital Signs Last 24 Hrs  T(F): 98.3 (06-11-25 @ 14:44), Max: 99.7 (06-10-25 @ 04:45)    Vital Signs Last 24 Hrs  HR: 68 (06-11-25 @ 14:44) (68 - 99)  BP: 88/59 (06-11-25 @ 14:44) (88/59 - 132/64)  RR: 18 (06-11-25 @ 14:44)  SpO2: 96% (06-11-25 @ 14:44) (96% - 100%)  Wt(kg): --    EXAM:  GENERAL: NAD, lying in bed  HEAD: No head lesions  EYES: Conjunctiva pink and cornea white  EAR, NOSE, MOUTH, THROAT: Normal external ears and nose, no discharges; moist mucous membranes;  NECK: Supple, nontender to palpation; no JVD  RESPIRATORY: Bibasilar crackles  CARDIOVASCULAR: S1 S2  GASTROINTESTINAL: Soft, no significant distention; normoactive bowel sounds  GENITOURINARY: no loyola catheter, no CVA tenderness  EXTREMITIES: No clubbing, cyanosis, + diffuse anasarca  NERVOUS SYSTEM: Awake and alert, not oriented, able to answer simple questions  MUSCULOSKELETAL: No joint erythema, swelling  SKIN: Sacral wound with dressing, no superficial thrombophlebitis  PSYCH: Calm    Labs:                        9.4    6.81  )-----------( 147      ( 11 Jun 2025 06:58 )             29.6     06-11    138  |  97[L]  |  30[H]  ----------------------------<  170[H]  5.0   |  26  |  1.3    Ca    8.9      11 Jun 2025 06:58  Mg     1.8     06-11    TPro  5.8[L]  /  Alb  2.3[L]  /  TBili  1.4[H]  /  DBili  x   /  AST  63[H]  /  ALT  40  /  AlkPhos  235[H]  06-11      WBC Trend:  WBC Count: 6.81 (06-11-25 @ 06:58)  WBC Count: 6.33 (06-08-25 @ 07:12)      Creatine Trend:  Creatinine: 1.3 (06-11)  Creatinine: 1.2 (06-08)  Creatinine: 1.0 (06-05)      Liver Biochemical Testing Trend:  Alanine Aminotransferase (ALT/SGPT): 40 (06-11)  Alanine Aminotransferase (ALT/SGPT): 36 (06-08)  Alanine Aminotransferase (ALT/SGPT): 31 (06-05)  Alanine Aminotransferase (ALT/SGPT): 20 (05-29)  Alanine Aminotransferase (ALT/SGPT): 19 (05-27)  Aspartate Aminotransferase (AST/SGOT): 63 (06-11-25 @ 06:58)  Aspartate Aminotransferase (AST/SGOT): 73 (06-08-25 @ 07:12)  Aspartate Aminotransferase (AST/SGOT): 64 (06-05-25 @ 10:50)  Aspartate Aminotransferase (AST/SGOT): 55 (05-29-25 @ 06:24)  Aspartate Aminotransferase (AST/SGOT): 41 (05-27-25 @ 08:21)  Bilirubin Total: 1.4 (06-11)  Bilirubin Total: 1.2 (06-08)  Bilirubin Total: 1.3 (06-05)  Bilirubin Direct: 0.7 (05-29)  Bilirubin Total: 1.7 (05-29)      Trend LDH  04-24-25 @ 05:53  275[H]      Urinalysis Basic - ( 11 Jun 2025 06:58 )    Color: x / Appearance: x / SG: x / pH: x  Gluc: 170 mg/dL / Ketone: x  / Bili: x / Urobili: x   Blood: x / Protein: x / Nitrite: x   Leuk Esterase: x / RBC: x / WBC x   Sq Epi: x / Non Sq Epi: x / Bacteria: x        MICROBIOLOGY:    MRSA PCR Result.: Negative (04-25-25 @ 09:00)      Culture - Urine (collected 03 Jun 2025 16:40)  Source: Clean Catch Clean Catch (Midstream)  Final Report:    >=3 organisms. Probable collection contamination.    Culture - Blood (collected 09 May 2025 08:06)  Source: Blood None  Final Report:    No growth at 5 days    Culture - Urine (collected 05 May 2025 12:48)  Source: Catheterized Catheterized  Final Report:    10,000 - 49,000 CFU/mL Candida albicans "Susceptibilities not performed"    Culture - Blood (collected 17 Apr 2025 18:30)  Source: Blood Blood  Final Report:    No growth at 5 days    Culture - Blood (collected 16 Apr 2025 15:15)  Source: Blood None  Final Report:    No growth at 5 days    Urinalysis with Rflx Culture (collected 13 Apr 2025 21:20)    Culture - Blood (collected 13 Apr 2025 21:20)  Source: Blood Blood-Peripheral  Final Report:    Growth in anaerobic bottle: Bacteroides fragilis    "Susceptibilities not performed"    Culture - Urine (collected 13 Apr 2025 21:20)  Source: Catheterized None  Final Report:    10,000 - 49,000 CFU/mL Proteus mirabilis  Organism: Proteus mirabilis  Organism: Proteus mirabilis    Sensitivities:      -  Levofloxacin: S <=0.5      -  Tobramycin: S <=2      -  Nitrofurantoin: R >64 Should not be used to treat pyelonephritis      -  Aztreonam: S <=4      -  Gentamicin: S <=2      -  Cefazolin: S <=2 For uncomplicated UTI with K. pneumoniae, E. coli, or P. mirablis: LOUIS <=16 is sensitive and LOUIS >=32 is resistant. This also predicts results for oral agents cefaclor, cefdinir, cefpodoxime, cefprozil, cefuroxime axetil, cephalexin and locarbef for uncomplicated UTI. Note that some isolates may be susceptible to these agents while testing resistant to cefazolin.      -  Cefepime: S <=2      -  Piperacillin/Tazobactam: S <=8      -  Ciprofloxacin: S <=0.25      -  Ceftriaxone: S <=1      -  Ampicillin: S <=8 These ampicillin results predict results for amoxicillin      Method Type: LOUIS      -  Meropenem: S <=1      -  Ampicillin/Sulbactam: S <=4/2      -  Cefoxitin: S <=8      -  Cefuroxime: S <=4      -  Amoxicillin/Clavulanic Acid: S <=8/4      -  Trimethoprim/Sulfamethoxazole: S <=0.5/9.5      -  Ertapenem: S <=0.5    Culture - Blood (collected 13 Apr 2025 21:20)  Source: Blood Blood-Peripheral  Final Report:    Growth in anaerobic bottle: Bacteroides fragilis "Susceptibilities not    performed"    Direct identification is available within approximately 3-5    hours either by Blood Panel Multiplexed PCR or Direct    MALDI-TOF. Details: [email or web address]  Organism: Blood Culture PCR  Organism: Blood Culture PCR    Sensitivities:      -  Bacteroides fragilis: Detec      Method Type: PCR    Culture - Blood (collected 30 Jan 2025 07:12)  Source: .Blood BLOOD  Final Report:    No growth at 5 days      C-Reactive Protein: 124.1 (06-08)  C-Reactive Protein: 66.4 (06-05)      RADIOLOGY:  imaging below personally reviewed    < from: NM Inflammatory Loc Gallium, Single Area Single Day (05.30.25 @ 13:10) >  Impression:  Normal 24 hour postinjection whole body gallium images and SPECT images   over the abdomen.  Specifically, no abnormal gallium uptake in the lumbar spine is   determined to suspicious for active inflammation process.    < end of copied text >

## 2025-06-11 NOTE — PROGRESS NOTE ADULT - ASSESSMENT
80 YOM w a PMH of 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  Post-cholecystectomy.   3.  Severe abdominal ascites.4.  Right pleural effusion. 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   -s/p albumin given diffuse edema on 4/30, 5/2, 5/8, 5/9, 5/12  -Bumex 1mg BID, Spironolactone 100mg QD  -Lactulose 20mg Q8H, Rifaximin 550mg BID    #Worsening sacral decubitus ulcer  General surgery consulted  Bedside debridement 6/10  ID on board, started unasyn  Wound care following  rotate the patient q2hrs  Oxycodone 5mg PRN Q6H for pain    #Ambulatory Dysfunction   #H/O Lumbar osteomyelitis in January 2025  #New osteomyelitis in L3-L4 region  -Completed Course of antibiotics at that time   -Now with Difficulty with ambulation   -MR Lumbar Spine w/wo IV Cont  -Since the previous lumbar spine MRI dated 1/29/2025:  1.  The findings of L4-5 discitis osteomyelitis have equivocally changed   the bone marrow edema/enhancement within the L4 and L5 vertebral bodies   has mildly decreased. However there is new mild vertebral body height   loss of L5, increased L5 superior endplate erosion, and increased L4-5 disc edema.  2.  New mild edema within the L3-4 intervertebral disc with faint erosions involving the opposing endplates suspicious for additional level   of discitis osteomyelitis.  3.  No evidence of epidural inflammation /  fluid collection.  4.  Mild paraspinal edema without abscess.  5.  Stable degenerative changes.  -ID Follow-up appreciated>>started on Daptomycin and Rocephin   discussed with family and ID on 5/28 - family learning toward gallium scan to see collection and if need be would proceed with IR biopsy  however in regards to the IR Biopsy of new L3-L4 OM, however poor candidate as pr intermittently does not follow- command   -was on IV daptomycin 900mg q24hrs,  on 5/24, trend qweekly, was also  ceftriaxone 2g q24hrs  - ESR/CRP pending - elevated  -gallium scan 5/30: Normal 24 hour postinjection whole body gallium images and SPECT images   over the abdomen.  Specifically, no abnormal gallium uptake in the lumbar spine is   determined to suspicious for active inflammation process.  -spoke with ID about this on 5/31, we will stop abx, ID will speak with radiology regarding the read and if we need to proceed with biopsy   -spoke with ID on 6/2, repeat MRI in 3 months     #Bacteremia with bacteroides  -blood culture positive for bacteroides on presentation  -s/p ceftriaxone and flagyl  -repeat blood culture negative    #Macrocytic Anemia:  multifactorial: sepsis, cirrhosis, poor nutrition, GI bleeding  -s/p EGD on 4/16 which showed 5mm ulcer in duodenal bulb post endo clip, repeat EGD on 4/29 showed no bleeding noted   transfused one unit of PRBC on 5/3, -Hb stable , cont to monitor   -Keep Active T/S   -folate and b12 stable, chronic issue   Can consider checking vitamin D level and TSH    #New Onset Afib  -found during this admission  -rate controlled, not candidate for AC given anemia, coagulopathy in setting of cirrhosis, pt is a poor candidate for AC     #Hypokalemia - potassium chloride 40 meq QD  #Hypomagnesemia - magnesium oxide 400mg TID, magnesium gluconate 500mg QD    SVT PPX: SCDs only due to coagulopathy, risk of Gi bleed /anemia   GI PPX: Pantoprazole 40mg daily  Diet: Soft and bite sized, consistent carbohydrate, DASH.  Discontinued glucerna shakes, patient is refusing these.  Code status: Full Code   Dispo: from home, will go to rehab    Overall prognosis poor

## 2025-06-11 NOTE — PROGRESS NOTE ADULT - SUBJECTIVE AND OBJECTIVE BOX
HPI:  This is a 80 year-old M with a pertinent PMHx of DM, cirrhosis, and anasarca presenting with a sacral decub.  Patient was s/p bedside debridement 06/10/25.  Patient is comfortable. No pain was noted.   No other complaints or concerns were made.     PAST MEDICAL & SURGICAL HISTORY:  - Diabetes  - Hypertension  - Fall  - H/O left knee surgery  - History of cholecystectomy  - History of appendectomy      Allergies  - No Known Allergies    Intolerances        MEDICATIONS  (STANDING):  buMETAnide 1 milliGRAM(s) Oral every 12 hours  chlorhexidine 2% Cloths 1 Application(s) Topical <User Schedule>  dextrose 5%. 1000 milliLiter(s) (50 mL/Hr) IV Continuous <Continuous>  dextrose 5%. 1000 milliLiter(s) (100 mL/Hr) IV Continuous <Continuous>  dextrose 50% Injectable 25 Gram(s) IV Push once  dextrose 50% Injectable 12.5 Gram(s) IV Push once  dextrose 50% Injectable 25 Gram(s) IV Push once  folic acid 1 milliGRAM(s) Oral daily  glucagon  Injectable 1 milliGRAM(s) IntraMuscular once  insulin glargine Injectable (LANTUS) 15 Unit(s) SubCutaneous at bedtime  insulin lispro (ADMELOG) corrective regimen sliding scale   SubCutaneous Before meals and at bedtime  insulin lispro Injectable (ADMELOG) 5 Unit(s) SubCutaneous three times a day before meals  lactobacillus acidophilus 1 Tablet(s) Oral two times a day with meals  lactulose Syrup 20 Gram(s) Oral every 8 hours  levETIRAcetam  Solution 250 milliGRAM(s) Oral two times a day  lidocaine   4% Patch 1 Patch Transdermal daily  magnesium gluconate 500 milliGRAM(s) Oral daily  magnesium oxide 400 milliGRAM(s) Oral three times a day with meals  nystatin Powder 1 Application(s) Topical two times a day  pantoprazole    Tablet 40 milliGRAM(s) Oral before breakfast  potassium chloride   Powder 40 milliEquivalent(s) Oral daily  rifAXIMin 550 milliGRAM(s) Oral two times a day  saccharomyces boulardii 250 milliGRAM(s) Oral two times a day  senna 2 Tablet(s) Oral at bedtime  silver sulfADIAZINE 1% Cream 1 Application(s) Topical two times a day  spironolactone 100 milliGRAM(s) Oral daily    MEDICATIONS  (PRN):  acetaminophen     Tablet .. 650 milliGRAM(s) Oral every 8 hours PRN Mild Pain (1 - 3)  aluminum hydroxide/magnesium hydroxide/simethicone Suspension 30 milliLiter(s) Oral every 4 hours PRN Dyspepsia  dextrose Oral Gel 15 Gram(s) Oral once PRN Blood Glucose LESS THAN 70 milliGRAM(s)/deciliter  melatonin 3 milliGRAM(s) Oral at bedtime PRN Insomnia  ondansetron Injectable 4 milliGRAM(s) IV Push every 8 hours PRN Nausea and/or Vomiting  oxyCODONE    IR 5 milliGRAM(s) Oral every 6 hours PRN Severe Pain (7 - 10)      General: no fever, weight loss,  chills  Skin: Ulcer, no rash   Respiratory and Thorax: no cough, wheeze,  sob  Cardiovascular:	no chest pain, palpitations, dizziness  Gastrointestinal:	no nausea, vomiting, diarrhea, abd pain  Genitourinary:	no dysuria, hematuria  Musculoskeletal:	no joint pains      Vital Signs Last 24 Hrs  T(C): 36.9 (11 Jun 2025 04:30), Max: 37 (10 Stone 2025 21:03)  T(F): 98.4 (11 Jun 2025 04:30), Max: 98.6 (10 Stone 2025 21:03)  HR: 87 (11 Jun 2025 04:30) (87 - 101)  BP: 132/64 (11 Jun 2025 04:30) (117/64 - 132/64)  BP(mean): --  RR: 18 (11 Jun 2025 04:30) (18 - 18)  SpO2: 97% (11 Jun 2025 04:30) (97% - 100%)    PHYSICAL EXAM:  Constitutional: A&Ox4  Respiratory: cta b/l  Cardiovascular: s1 s2 rrr  Gastrointestinal: soft nt  nd + bs no rebound or guarding  Genitourinary: no cva tenderness  Extremities: normal rom, no edema, calf tenderness  Neurological: no focal deficits  Skin: no rash                            9.4    6.81  )-----------( 147      ( 11 Jun 2025 06:58 )             29.6       06-11    138  |  97[L]  |  30[H]  ----------------------------<  170[H]  5.0   |  26  |  1.3    Ca    8.9      11 Jun 2025 06:58  Mg     1.8     06-11    TPro  5.8[L]  /  Alb  2.3[L]  /  TBili  1.4[H]  /  DBili  x   /  AST  63[H]  /  ALT  40  /  AlkPhos  235[H]  06-11          Urinalysis Basic - ( 11 Jun 2025 06:58 )  Gluc: 170 mg/dL

## 2025-06-11 NOTE — PROGRESS NOTE ADULT - ASSESSMENT
80 years old male history of hypertension, diabetes, subdural hematoma on Keppra, hx mechanical falls, recent admission (January 2025) for osteomyelitis of L4-5 w completion of abx.  BIBA from home status post change of mental status.     ID is consulted for bacteremia  Afebrile  WBC Count: 6.78 (06-02-25 @ 07:27)  WBC Count: 6.35 (05-31-25 @ 08:53)  WBC Count: 6.11 (05-30-25 @ 08:20)  WBC Count: 5.81 (05-29-25 @ 06:24)  WBC Count: 5.31 (05-27-25 @ 08:21)  On room air  BCx 4/14 Bacteroides fragilis  BCx 4/16 NGTD  BCx 4/17 NGTD  UA WBC 5, UCx low count P. mirabilis    Repeat UA WBC 30, UCx C. albicans    CT A/P 4/13  1.  Erosive endplate changes centered at the L4-5 level, consistent with   patient's history ofrecent lumbar spine osteomyelitis.  2.  Liver cirrhosis with portal hypertension. Mild-to-moderate ascites.   Anasarca. Hepatic dome subcentimeter hypodense 1.3 cm hypodensity, not   fully characterized . Outpatient MR abdomen with IV contrast recommended.  3.  Chronic pancreatitis, with limited evaluation for acute inflammation   due to background fluid. Correlation with pancreatic enzymes recommended.    MR Lumbar Spine w/wo IV Cont (05.22.25 @ 21:09)  IMPRESSION:  Since the previous lumbar spine MRI dated 1/29/2025:  1.  The findings of L4-5 discitis osteomyelitis have equivocally changed:   the bone marrow edema/enhancement within the L4 and L5 vertebral bodies   has mildly decreased. However there is new mild vertebral body height   loss of L5, increased L5 superior endplate erosion, and increased L4-5   disc edema.  2.  New mild edema within the L3-4 intervertebral disc with faint   erosions involving the opposing endplates suspicious for additional level   of discitis osteomyelitis.  3.  No evidence of epidural inflammation /  fluid collection.  4.  Mild paraspinal edema without abscess.  5.  Stable degenerative changes.    5/29 ESR 25, CRP 61.2      Antibiotics:  Vancomycin 4/13  Ampicillin 4/14  Ceftriaxone 4/15 - 4/21, 5/4 - 5/7, 5/23 - 5/31  Flagyl 4/15 - 4/28  Daptomycin 5/23 - 5/31      IMPRESSION:  Infected stage 4 sacral ulcer  Hepatic encephalopathy  Bacteroides bacteremia, completed treatment  Acute blood loss anemia  Possible SBP?  Liver cirrhosis  Lower GI bleed  Hx L4-L5 osteomyelitis  Liver lesion  Immunosuppression / Immunosenescence secondary to multiple comorbidities which could result in poor clinical outcome    RECOMMENDATIONS:  - IV Unasyn 3g q6hrs  - PO doxycycline 100mg q12hrs  - Improved after debridement. Patient is bedbound. Given the depth of sacral ulcer, it is very difficult for the wound to completely heal and "cure" the underlying OM; Patient will need frequent offloading, nutrition optimization and routine wound care to prevent secondary infection; Without skin grafting, this will progress to chronic OM and prolonged course of antibiotics will do more harm 2/2 abx-related adverse effect. At this time will only treat for short course for SSTIs  - Discussed with Radiology Dr. Muñoz regarding gallium scan result, it is possible that patient has no active lumbar OM. Given currently clinical context, reasonable to hold antibiotics and repeat MR lumbar w/wo contrast 3 months later, or sooner if worsening back pain  - Had a long discussion with patient's wife at bedside, discussed the risks of invasive procedure (core biopsy) and prolonged abx treatment. She agrees to hold off on abx for now and repeat imaging outpatient. She also will seek a second opinion at [facility]  - Local wound care for sacral DTI  - GI follow up  - Offloading and frequent position changes, aspiration precaution  - Trend WBC, fever curve, transaminases, creatinine daily  - Please inform ID of any patient clinical change or any new pertinent laboratory or radiographic data      Nora Beck D.O.  Attending Physician  Division of Infectious Diseases  [facility]  Please contact me via [employer] Teams

## 2025-06-12 LAB
ANION GAP SERPL CALC-SCNC: 12 MMOL/L — SIGNIFICANT CHANGE UP (ref 7–14)
BUN SERPL-MCNC: 36 MG/DL — HIGH (ref 10–20)
CALCIUM SERPL-MCNC: 8.4 MG/DL — SIGNIFICANT CHANGE UP (ref 8.4–10.5)
CHLORIDE SERPL-SCNC: 98 MMOL/L — SIGNIFICANT CHANGE UP (ref 98–110)
CO2 SERPL-SCNC: 29 MMOL/L — SIGNIFICANT CHANGE UP (ref 17–32)
CREAT SERPL-MCNC: 1.6 MG/DL — HIGH (ref 0.7–1.5)
EGFR: 44 ML/MIN/1.73M2 — LOW
EGFR: 44 ML/MIN/1.73M2 — LOW
GLUCOSE SERPL-MCNC: 183 MG/DL — HIGH (ref 70–99)
HCT VFR BLD CALC: 27.3 % — LOW (ref 42–52)
HGB BLD-MCNC: 8.7 G/DL — LOW (ref 14–18)
MAGNESIUM SERPL-MCNC: 1.8 MG/DL — SIGNIFICANT CHANGE UP (ref 1.8–2.4)
MCHC RBC-ENTMCNC: 31.9 G/DL — LOW (ref 32–37)
MCHC RBC-ENTMCNC: 33 PG — HIGH (ref 27–31)
MCV RBC AUTO: 103.4 FL — HIGH (ref 80–94)
NRBC BLD AUTO-RTO: 0 /100 WBCS — SIGNIFICANT CHANGE UP (ref 0–0)
PLATELET # BLD AUTO: 130 K/UL — SIGNIFICANT CHANGE UP (ref 130–400)
PMV BLD: 9 FL — SIGNIFICANT CHANGE UP (ref 7.4–10.4)
POTASSIUM SERPL-MCNC: 4.3 MMOL/L — SIGNIFICANT CHANGE UP (ref 3.5–5)
POTASSIUM SERPL-SCNC: 4.3 MMOL/L — SIGNIFICANT CHANGE UP (ref 3.5–5)
RBC # BLD: 2.64 M/UL — LOW (ref 4.7–6.1)
RBC # FLD: 19 % — HIGH (ref 11.5–14.5)
SODIUM SERPL-SCNC: 139 MMOL/L — SIGNIFICANT CHANGE UP (ref 135–146)
WBC # BLD: 6.78 K/UL — SIGNIFICANT CHANGE UP (ref 4.8–10.8)
WBC # FLD AUTO: 6.78 K/UL — SIGNIFICANT CHANGE UP (ref 4.8–10.8)

## 2025-06-12 PROCEDURE — 99232 SBSQ HOSP IP/OBS MODERATE 35: CPT | Mod: FS

## 2025-06-12 PROCEDURE — 99233 SBSQ HOSP IP/OBS HIGH 50: CPT

## 2025-06-12 RX ORDER — MIDODRINE HYDROCHLORIDE 5 MG/1
5 TABLET ORAL EVERY 8 HOURS
Refills: 0 | Status: DISCONTINUED | OUTPATIENT
Start: 2025-06-12 | End: 2025-06-19

## 2025-06-12 RX ADMIN — Medication 650 MILLIGRAM(S): at 17:13

## 2025-06-12 RX ADMIN — INSULIN GLARGINE-YFGN 15 UNIT(S): 100 INJECTION, SOLUTION SUBCUTANEOUS at 21:25

## 2025-06-12 RX ADMIN — LACTULOSE 20 GRAM(S): 10 SOLUTION ORAL at 21:19

## 2025-06-12 RX ADMIN — MIDODRINE HYDROCHLORIDE 5 MILLIGRAM(S): 5 TABLET ORAL at 13:46

## 2025-06-12 RX ADMIN — INSULIN LISPRO 4: 100 INJECTION, SOLUTION INTRAVENOUS; SUBCUTANEOUS at 08:02

## 2025-06-12 RX ADMIN — AMPICILLIN SODIUM AND SULBACTAM SODIUM 200 GRAM(S): 1; .5 INJECTION, POWDER, FOR SOLUTION INTRAMUSCULAR; INTRAVENOUS at 13:29

## 2025-06-12 RX ADMIN — Medication 400 MILLIGRAM(S): at 12:29

## 2025-06-12 RX ADMIN — OXYCODONE HYDROCHLORIDE 5 MILLIGRAM(S): 30 TABLET ORAL at 12:23

## 2025-06-12 RX ADMIN — OXYCODONE HYDROCHLORIDE 5 MILLIGRAM(S): 30 TABLET ORAL at 13:27

## 2025-06-12 RX ADMIN — Medication 400 MILLIGRAM(S): at 10:09

## 2025-06-12 RX ADMIN — LEVETIRACETAM 250 MILLIGRAM(S): 10 INJECTION, SOLUTION INTRAVENOUS at 05:10

## 2025-06-12 RX ADMIN — Medication 1 TABLET(S): at 17:11

## 2025-06-12 RX ADMIN — Medication 1 TABLET(S): at 10:09

## 2025-06-12 RX ADMIN — LACTULOSE 20 GRAM(S): 10 SOLUTION ORAL at 05:09

## 2025-06-12 RX ADMIN — LEVETIRACETAM 250 MILLIGRAM(S): 10 INJECTION, SOLUTION INTRAVENOUS at 17:20

## 2025-06-12 RX ADMIN — BUTYROSPERMUM PARKII(SHEA BUTTER), SIMMONDSIA CHINENSIS (JOJOBA) SEED OIL, ALOE BARBADENSIS LEAF EXTRACT 250 MILLIGRAM(S): .01; 1; 3.5 LIQUID TOPICAL at 05:10

## 2025-06-12 RX ADMIN — Medication 500 MILLIGRAM(S): at 13:30

## 2025-06-12 RX ADMIN — AMPICILLIN SODIUM AND SULBACTAM SODIUM 200 GRAM(S): 1; .5 INJECTION, POWDER, FOR SOLUTION INTRAMUSCULAR; INTRAVENOUS at 17:24

## 2025-06-12 RX ADMIN — LIDOCAINE HYDROCHLORIDE 1 PATCH: 20 JELLY TOPICAL at 19:38

## 2025-06-12 RX ADMIN — BUTYROSPERMUM PARKII(SHEA BUTTER), SIMMONDSIA CHINENSIS (JOJOBA) SEED OIL, ALOE BARBADENSIS LEAF EXTRACT 250 MILLIGRAM(S): .01; 1; 3.5 LIQUID TOPICAL at 17:14

## 2025-06-12 RX ADMIN — INSULIN LISPRO 4: 100 INJECTION, SOLUTION INTRAVENOUS; SUBCUTANEOUS at 21:25

## 2025-06-12 RX ADMIN — INSULIN LISPRO 2: 100 INJECTION, SOLUTION INTRAVENOUS; SUBCUTANEOUS at 12:27

## 2025-06-12 RX ADMIN — BUMETANIDE 1 MILLIGRAM(S): 1 TABLET ORAL at 17:11

## 2025-06-12 RX ADMIN — Medication 1 APPLICATION(S): at 05:12

## 2025-06-12 RX ADMIN — Medication 100 MILLIGRAM(S): at 05:13

## 2025-06-12 RX ADMIN — Medication 100 MILLIGRAM(S): at 05:09

## 2025-06-12 RX ADMIN — Medication 40 MILLIGRAM(S): at 05:09

## 2025-06-12 RX ADMIN — LACTULOSE 20 GRAM(S): 10 SOLUTION ORAL at 13:30

## 2025-06-12 RX ADMIN — AMPICILLIN SODIUM AND SULBACTAM SODIUM 200 GRAM(S): 1; .5 INJECTION, POWDER, FOR SOLUTION INTRAMUSCULAR; INTRAVENOUS at 23:11

## 2025-06-12 RX ADMIN — FOLIC ACID 1 MILLIGRAM(S): 1 TABLET ORAL at 12:23

## 2025-06-12 RX ADMIN — Medication 400 MILLIGRAM(S): at 17:11

## 2025-06-12 RX ADMIN — NYSTATIN 1 APPLICATION(S): 100000 CREAM TOPICAL at 05:12

## 2025-06-12 RX ADMIN — BUMETANIDE 1 MILLIGRAM(S): 1 TABLET ORAL at 05:10

## 2025-06-12 RX ADMIN — INSULIN LISPRO 5 UNIT(S): 100 INJECTION, SOLUTION INTRAVENOUS; SUBCUTANEOUS at 12:28

## 2025-06-12 RX ADMIN — INSULIN LISPRO 2: 100 INJECTION, SOLUTION INTRAVENOUS; SUBCUTANEOUS at 17:12

## 2025-06-12 RX ADMIN — NYSTATIN 1 APPLICATION(S): 100000 CREAM TOPICAL at 17:12

## 2025-06-12 RX ADMIN — LIDOCAINE HYDROCHLORIDE 1 PATCH: 20 JELLY TOPICAL at 12:23

## 2025-06-12 RX ADMIN — INSULIN LISPRO 5 UNIT(S): 100 INJECTION, SOLUTION INTRAVENOUS; SUBCUTANEOUS at 08:03

## 2025-06-12 RX ADMIN — AMPICILLIN SODIUM AND SULBACTAM SODIUM 200 GRAM(S): 1; .5 INJECTION, POWDER, FOR SOLUTION INTRAMUSCULAR; INTRAVENOUS at 05:09

## 2025-06-12 RX ADMIN — Medication 100 MILLIGRAM(S): at 17:11

## 2025-06-12 NOTE — PROGRESS NOTE ADULT - SUBJECTIVE AND OBJECTIVE BOX
INFECTIOUS DISEASE FOLLOW UP NOTE:    Interval History/ROS: Patient is a 80y old  Male who presents with a chief complaint of acute mentation changes (12 Jun 2025 12:27)    Overnight events: No overnight event. Has a good mood today    REVIEW OF SYSTEMS:  CONSTITUTIONAL: No fever or chills  HEAD: No lesion on scalp  EYES: No visual disturbance  ENT: No sore throat  RESPIRATORY: No cough, no shortness of breath  CARDIOVASCULAR: No chest pain or palpitations  GASTROINTESTINAL: No abdominal or epigastric pain  GENITOURINARY: No dysuria  NEUROLOGICAL: No headache/dizziness  MUSCULOSKELETAL: No joint pain, erythema, or swelling; no back pain; +bedbound  SKIN: No itching, rashes  All other ROS negative except noted above      Prior hospital charts reviewed [Yes]  Primary team notes reviewed [Yes]  Other consultant notes reviewed [Yes]    Allergies:  No Known Allergies      ANTIMICROBIALS:   ampicillin/sulbactam  IVPB    ampicillin/sulbactam  IVPB 3 every 6 hours  doxycycline monohydrate Capsule 100 every 12 hours  rifAXIMin 550 two times a day      OTHER MEDS: MEDICATIONS  (STANDING):  acetaminophen     Tablet .. 650 every 8 hours PRN  aluminum hydroxide/magnesium hydroxide/simethicone Suspension 30 every 4 hours PRN  buMETAnide 1 every 12 hours  dextrose 50% Injectable 25 once  dextrose 50% Injectable 12.5 once  dextrose 50% Injectable 25 once  dextrose Oral Gel 15 once PRN  glucagon  Injectable 1 once  insulin glargine Injectable (LANTUS) 15 at bedtime  insulin lispro (ADMELOG) corrective regimen sliding scale  Before meals and at bedtime  insulin lispro Injectable (ADMELOG) 5 three times a day before meals  lactulose Syrup 20 every 8 hours  levETIRAcetam  Solution 250 two times a day  melatonin 3 at bedtime PRN  midodrine. 5 every 8 hours  ondansetron Injectable 4 every 8 hours PRN  oxyCODONE    IR 5 every 6 hours PRN  pantoprazole    Tablet 40 before breakfast  senna 2 at bedtime  spironolactone 100 daily      Vital Signs Last 24 Hrs  T(F): 98.2 (06-12-25 @ 14:45), Max: 99.7 (06-10-25 @ 04:45)    Vital Signs Last 24 Hrs  HR: 74 (06-12-25 @ 14:59) (74 - 92)  BP: 117/68 (06-12-25 @ 14:59) (86/60 - 119/60)  RR: 18 (06-12-25 @ 14:45)  SpO2: 100% (06-12-25 @ 14:45) (100% - 100%)  Wt(kg): --    EXAM:  GENERAL: NAD, lying in bed  HEAD: No head lesions  EYES: Conjunctiva pink and cornea white  EAR, NOSE, MOUTH, THROAT: Normal external ears and nose, no discharges; moist mucous membranes;  NECK: Supple, nontender to palpation; no JVD  RESPIRATORY: Bibasilar crackles  CARDIOVASCULAR: S1 S2  GASTROINTESTINAL: Soft, no significant distention; normoactive bowel sounds  GENITOURINARY: no loyola catheter, no CVA tenderness  EXTREMITIES: No clubbing, cyanosis, + diffuse anasarca  NERVOUS SYSTEM: Awake and alert, not oriented, able to answer simple questions  MUSCULOSKELETAL: No joint erythema, swelling  SKIN: Stage 4 Sacral wound fibrotic tissue at wound base, minimal periwound erythema, no purulence, no superficial thrombophlebitis  PSYCH: Calm    Labs:                        8.7    6.78  )-----------( 130      ( 12 Jun 2025 10:04 )             27.3     06-12    139  |  98  |  36[H]  ----------------------------<  183[H]  4.3   |  29  |  1.6[H]    Ca    8.4      12 Jun 2025 10:04  Mg     1.8     06-12    TPro  5.8[L]  /  Alb  2.3[L]  /  TBili  1.4[H]  /  DBili  x   /  AST  63[H]  /  ALT  40  /  AlkPhos  235[H]  06-11      WBC Trend:  WBC Count: 6.78 (06-12-25 @ 10:04)  WBC Count: 6.81 (06-11-25 @ 06:58)  WBC Count: 6.33 (06-08-25 @ 07:12)      Creatine Trend:  Creatinine: 1.6 (06-12)  Creatinine: 1.3 (06-11)  Creatinine: 1.2 (06-08)      Liver Biochemical Testing Trend:  Alanine Aminotransferase (ALT/SGPT): 40 (06-11)  Alanine Aminotransferase (ALT/SGPT): 36 (06-08)  Alanine Aminotransferase (ALT/SGPT): 31 (06-05)  Alanine Aminotransferase (ALT/SGPT): 20 (05-29)  Alanine Aminotransferase (ALT/SGPT): 19 (05-27)  Aspartate Aminotransferase (AST/SGOT): 63 (06-11-25 @ 06:58)  Aspartate Aminotransferase (AST/SGOT): 73 (06-08-25 @ 07:12)  Aspartate Aminotransferase (AST/SGOT): 64 (06-05-25 @ 10:50)  Aspartate Aminotransferase (AST/SGOT): 55 (05-29-25 @ 06:24)  Aspartate Aminotransferase (AST/SGOT): 41 (05-27-25 @ 08:21)  Bilirubin Total: 1.4 (06-11)  Bilirubin Total: 1.2 (06-08)  Bilirubin Total: 1.3 (06-05)  Bilirubin Direct: 0.7 (05-29)  Bilirubin Total: 1.7 (05-29)      Trend LDH  04-24-25 @ 05:53  275[H]      Urinalysis Basic - ( 12 Jun 2025 10:04 )    Color: x / Appearance: x / SG: x / pH: x  Gluc: 183 mg/dL / Ketone: x  / Bili: x / Urobili: x   Blood: x / Protein: x / Nitrite: x   Leuk Esterase: x / RBC: x / WBC x   Sq Epi: x / Non Sq Epi: x / Bacteria: x        MICROBIOLOGY:    MRSA PCR Result.: Negative (04-25-25 @ 09:00)      Culture - Urine (collected 03 Jun 2025 16:40)  Source: Clean Catch Clean Catch (Midstream)  Final Report:    >=3 organisms. Probable collection contamination.    Culture - Blood (collected 09 May 2025 08:06)  Source: Blood None  Final Report:    No growth at 5 days    Culture - Urine (collected 05 May 2025 12:48)  Source: Catheterized Catheterized  Final Report:    10,000 - 49,000 CFU/mL Candida albicans "Susceptibilities not performed"    Culture - Blood (collected 17 Apr 2025 18:30)  Source: Blood Blood  Final Report:    No growth at 5 days    Culture - Blood (collected 16 Apr 2025 15:15)  Source: Blood None  Final Report:    No growth at 5 days    Urinalysis with Rflx Culture (collected 13 Apr 2025 21:20)    Culture - Blood (collected 13 Apr 2025 21:20)  Source: Blood Blood-Peripheral  Final Report:    Growth in anaerobic bottle: Bacteroides fragilis    "Susceptibilities not performed"    Culture - Urine (collected 13 Apr 2025 21:20)  Source: Catheterized None  Final Report:    10,000 - 49,000 CFU/mL Proteus mirabilis  Organism: Proteus mirabilis  Organism: Proteus mirabilis    Sensitivities:      -  Levofloxacin: S <=0.5      -  Tobramycin: S <=2      -  Nitrofurantoin: R >64 Should not be used to treat pyelonephritis      -  Aztreonam: S <=4      -  Gentamicin: S <=2      -  Cefazolin: S <=2 For uncomplicated UTI with K. pneumoniae, E. coli, or P. mirablis: LOUIS <=16 is sensitive and LOUIS >=32 is resistant. This also predicts results for oral agents cefaclor, cefdinir, cefpodoxime, cefprozil, cefuroxime axetil, cephalexin and locarbef for uncomplicated UTI. Note that some isolates may be susceptible to these agents while testing resistant to cefazolin.      -  Cefepime: S <=2      -  Piperacillin/Tazobactam: S <=8      -  Ciprofloxacin: S <=0.25      -  Ceftriaxone: S <=1      -  Ampicillin: S <=8 These ampicillin results predict results for amoxicillin      Method Type: LOUIS      -  Meropenem: S <=1      -  Ampicillin/Sulbactam: S <=4/2      -  Cefoxitin: S <=8      -  Cefuroxime: S <=4      -  Amoxicillin/Clavulanic Acid: S <=8/4      -  Trimethoprim/Sulfamethoxazole: S <=0.5/9.5      -  Ertapenem: S <=0.5    Culture - Blood (collected 13 Apr 2025 21:20)  Source: Blood Blood-Peripheral  Final Report:    Growth in anaerobic bottle: Bacteroides fragilis "Susceptibilities not    performed"    Direct identification is available within approximately 3-5    hours either by Blood Panel Multiplexed PCR or Direct    MALDI-TOF. Details: [email or web address]  Organism: Blood Culture PCR  Organism: Blood Culture PCR    Sensitivities:      -  Bacteroides fragilis: Detec      Method Type: PCR    Culture - Blood (collected 30 Jan 2025 07:12)  Source: .Blood BLOOD  Final Report:    No growth at 5 days    C-Reactive Protein: 124.1 (06-08)    RADIOLOGY:  imaging below personally reviewed    < from: NM Inflammatory Loc Gallium, Single Area Single Day (05.30.25 @ 13:10) >    Impression:  Normal 24 hour postinjection whole body gallium images and SPECT images   over the abdomen.  Specifically, no abnormal gallium uptake in the lumbar spine is   determined to suspicious for active inflammation process.    < end of copied text >

## 2025-06-12 NOTE — PROGRESS NOTE ADULT - ASSESSMENT
80 years old male history of hypertension, diabetes, subdural hematoma on Keppra, hx mechanical falls, recent admission (January 2025) for osteomyelitis of L4-5 w completion of abx.  BIBA from home status post change of mental status.     ID is consulted for bacteremia  Afebrile  WBC Count: 6.78 (06-02-25 @ 07:27)  WBC Count: 6.35 (05-31-25 @ 08:53)  WBC Count: 6.11 (05-30-25 @ 08:20)  WBC Count: 5.81 (05-29-25 @ 06:24)  WBC Count: 5.31 (05-27-25 @ 08:21)  On room air  BCx 4/14 Bacteroides fragilis  BCx 4/16 NGTD  BCx 4/17 NGTD  UA WBC 5, UCx low count P. mirabilis    Repeat UA WBC 30, UCx C. albicans    CT A/P 4/13  1.  Erosive endplate changes centered at the L4-5 level, consistent with   patient's history ofrecent lumbar spine osteomyelitis.  2.  Liver cirrhosis with portal hypertension. Mild-to-moderate ascites.   Anasarca. Hepatic dome subcentimeter hypodense 1.3 cm hypodensity, not   fully characterized . Outpatient MR abdomen with IV contrast recommended.  3.  Chronic pancreatitis, with limited evaluation for acute inflammation   due to background fluid. Correlation with pancreatic enzymes recommended.    MR Lumbar Spine w/wo IV Cont (05.22.25 @ 21:09)  IMPRESSION:  Since the previous lumbar spine MRI dated 1/29/2025:  1.  The findings of L4-5 discitis osteomyelitis have equivocally changed:   the bone marrow edema/enhancement within the L4 and L5 vertebral bodies   has mildly decreased. However there is new mild vertebral body height   loss of L5, increased L5 superior endplate erosion, and increased L4-5   disc edema.  2.  New mild edema within the L3-4 intervertebral disc with faint   erosions involving the opposing endplates suspicious for additional level   of discitis osteomyelitis.  3.  No evidence of epidural inflammation /  fluid collection.  4.  Mild paraspinal edema without abscess.  5.  Stable degenerative changes.    5/29 ESR 25, CRP 61.2      Antibiotics:  Vancomycin 4/13  Ampicillin 4/14  Ceftriaxone 4/15 - 4/21, 5/4 - 5/7, 5/23 - 5/31  Flagyl 4/15 - 4/28  Daptomycin 5/23 - 5/31      IMPRESSION:  Infected stage 4 sacral ulcer  Hepatic encephalopathy  Bacteroides bacteremia, completed treatment  Acute blood loss anemia  Possible SBP?  Liver cirrhosis  Lower GI bleed  Hx L4-L5 osteomyelitis  Liver lesion  Immunosuppression / Immunosenescence secondary to multiple comorbidities which could result in poor clinical outcome    RECOMMENDATIONS:  - IV Unasyn 3g q6hrs  - PO doxycycline 100mg q12hrs  - On discharge switch to PO doxycycline 100mg q12hrs and PO Augmentin 875mg q12hrs to complete 14-day (until 6/24)  - Improved after debridement. Patient is bedbound. Given the depth of sacral ulcer, it is very difficult for the wound to completely heal and "cure" the underlying OM; Patient will need frequent offloading, nutrition optimization and routine wound care to prevent secondary infection; Without skin grafting, this will progress to chronic OM and prolonged course of antibiotics will do more harm 2/2 abx-related adverse effect. At this time will only treat for short course for SSTIs  - Discussed with Radiology Dr. Muñoz regarding gallium scan result, it is possible that patient has no active lumbar OM. Given currently clinical context, reasonable to hold antibiotics and repeat MR lumbar w/wo contrast 3 months later, or sooner if worsening back pain  - Had a long discussion with patient's wife at bedside, discussed the risks of invasive procedure (core biopsy) and prolonged abx treatment. She agrees to hold off on abx for now and repeat imaging outpatient. She also will seek a second opinion at [place]  - Local wound care for sacral DTI  - GI follow up  - Offloading and frequent position changes, aspiration precaution  - Trend WBC, fever curve, transaminases, creatinine daily  - Please inform ID of any patient clinical change or any new pertinent laboratory or radiographic data      Nora Beck D.O.  Attending Physician  Division of Infectious Diseases  [facility]  Please contact me via [employer] Teams

## 2025-06-12 NOTE — PROGRESS NOTE ADULT - ASSESSMENT
High risk for pressure injury development or progression   Skin assessed- B/L heel blanchable redness                        Sacrum and  B/L buttock  erythema  with denuded skin and dark pigmentation - possibly due to chronic moisture and friction combination                     6/12-   Deep tissue pressure injury to B/L buttock - intact dark red skin tissue surrounding macerated area - progressed to brown  devitalised skin tissue  - s/p debridement by surgical team 6/10           Wound and skin care recs.    Continue current treatment   Clean sacrum and b/l buttock with soap and water  , pat dry  apply  silvadene dressing   Pressure  injury  preventive  measures  Skin  and incontinence care   Assess skin  and inform primary provider of any changes   Case discussed with primary Rn  Wound/ ostomy specialist  to f/u as needed     Offloading: [x ] Frequent position changes [ ] Devices/Equipment  Cleansing: [ ] Saline [ ] Soap/Water [ ] Other: ______  Topicals: [ ] Barrier Cream [ ] Antimicrobial [ ] Enzymatic Wound Debridement x] Moist  wound Healing   Dressings: [ ] Dry, sterile [ ] Allevyn  Foam [ ] Absorbant Pads [ ] Collagenase    Other Recs.   Per Primary team   Total time for bedside assessment  , review of medical records  and  discussion of plan of care with primary team greater than 35 min

## 2025-06-12 NOTE — PROGRESS NOTE ADULT - SUBJECTIVE AND OBJECTIVE BOX
CHIEF COMPLAINT:    Patient is a 80y old  Male who presents with a chief complaint of acute mentation changes (12 Jun 2025 14:23)      INTERVAL HPI/OVERNIGHT EVENTS:    Patient seen and examined at bedside. No acute overnight events occurred. Clinically unchanged. BP was soft today, started midodrine.     Medications:  Standing  ampicillin/sulbactam  IVPB      ampicillin/sulbactam  IVPB 3 Gram(s) IV Intermittent every 6 hours  buMETAnide 1 milliGRAM(s) Oral every 12 hours  chlorhexidine 2% Cloths 1 Application(s) Topical <User Schedule>  dextrose 5%. 1000 milliLiter(s) IV Continuous <Continuous>  dextrose 5%. 1000 milliLiter(s) IV Continuous <Continuous>  dextrose 50% Injectable 25 Gram(s) IV Push once  dextrose 50% Injectable 12.5 Gram(s) IV Push once  dextrose 50% Injectable 25 Gram(s) IV Push once  doxycycline monohydrate Capsule 100 milliGRAM(s) Oral every 12 hours  folic acid 1 milliGRAM(s) Oral daily  glucagon  Injectable 1 milliGRAM(s) IntraMuscular once  insulin glargine Injectable (LANTUS) 15 Unit(s) SubCutaneous at bedtime  insulin lispro (ADMELOG) corrective regimen sliding scale   SubCutaneous Before meals and at bedtime  insulin lispro Injectable (ADMELOG) 5 Unit(s) SubCutaneous three times a day before meals  lactobacillus acidophilus 1 Tablet(s) Oral two times a day with meals  lactulose Syrup 20 Gram(s) Oral every 8 hours  levETIRAcetam  Solution 250 milliGRAM(s) Oral two times a day  lidocaine   4% Patch 1 Patch Transdermal daily  magnesium gluconate 500 milliGRAM(s) Oral daily  magnesium oxide 400 milliGRAM(s) Oral three times a day with meals  midodrine. 5 milliGRAM(s) Oral every 8 hours  nystatin Powder 1 Application(s) Topical two times a day  pantoprazole    Tablet 40 milliGRAM(s) Oral before breakfast  rifAXIMin 550 milliGRAM(s) Oral two times a day  saccharomyces boulardii 250 milliGRAM(s) Oral two times a day  senna 2 Tablet(s) Oral at bedtime  silver sulfADIAZINE 1% Cream 1 Application(s) Topical two times a day  spironolactone 100 milliGRAM(s) Oral daily    PRN Meds  acetaminophen     Tablet .. 650 milliGRAM(s) Oral every 8 hours PRN  aluminum hydroxide/magnesium hydroxide/simethicone Suspension 30 milliLiter(s) Oral every 4 hours PRN  dextrose Oral Gel 15 Gram(s) Oral once PRN  melatonin 3 milliGRAM(s) Oral at bedtime PRN  ondansetron Injectable 4 milliGRAM(s) IV Push every 8 hours PRN  oxyCODONE    IR 5 milliGRAM(s) Oral every 6 hours PRN        Vital Signs:    T(F): 98.2 (06-12-25 @ 14:45), Max: 98.3 (06-11-25 @ 20:55)  HR: 74 (06-12-25 @ 14:59) (74 - 92)  BP: 117/68 (06-12-25 @ 14:59) (86/60 - 119/60)  RR: 18 (06-12-25 @ 14:45) (18 - 18)  SpO2: 100% (06-12-25 @ 14:45) (100% - 100%)  I&O's Summary    11 Jun 2025 07:01  -  12 Jun 2025 07:00  --------------------------------------------------------  IN: 0 mL / OUT: 250 mL / NET: -250 mL      Daily     Daily   CAPILLARY BLOOD GLUCOSE      POCT Blood Glucose.: 159 mg/dL (12 Jun 2025 11:31)  POCT Blood Glucose.: 233 mg/dL (12 Jun 2025 07:44)  POCT Blood Glucose.: 230 mg/dL (11 Jun 2025 21:31)  POCT Blood Glucose.: 117 mg/dL (11 Jun 2025 16:25)      PHYSICAL EXAM:  GENERAL:  NAD  SKIN: No rashes or lesions  HEENT: Atraumatic. Normocephalic. Anicteric  NECK:  No JVD.   PULMONARY: Clear to ausculation bilaterally. No wheezing. No rales  CVS: Normal S1, S2. Regular rate and rhythm. No murmurs.  ABDOMEN/GI: Soft, Nontender, Nondistended; Bowel sounds are present  EXTREMITIES: trace edema on b/l LE  NEUROLOGIC: awake, alert, sick appearing, no focal deficit    LABS:                        8.7    6.78  )-----------( 130      ( 12 Jun 2025 10:04 )             27.3     06-12    139  |  98  |  36[H]  ----------------------------<  183[H]  4.3   |  29  |  1.6[H]    Ca    8.4      12 Jun 2025 10:04  Mg     1.8     06-12    TPro  5.8[L]  /  Alb  2.3[L]  /  TBili  1.4[H]  /  DBili  x   /  AST  63[H]  /  ALT  40  /  AlkPhos  235[H]  06-11      RADIOLOGY & ADDITIONAL TESTS:  Imaging or report Personally Reviewed:  [ ] YES  [ ] NO -->no new images

## 2025-06-12 NOTE — PROGRESS NOTE ADULT - ASSESSMENT
80 YOM w a PMH of 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  Post-cholecystectomy.   3.  Severe abdominal ascites.4.  Right pleural effusion. 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   -s/p albumin given diffuse edema on 4/30, 5/2, 5/8, 5/9, 5/12  -Bumex 1mg BID, Spironolactone 100mg QD  -Lactulose 20mg Q8H, Rifaximin 550mg BID    #Worsening sacral decubitus ulcer  General surgery consulted  Bedside debridement 6/10  ID on board, started unasyn  Wound care following  rotate the patient q2hrs  Oxycodone 5mg PRN Q6H for pain    #Ambulatory Dysfunction   #H/O Lumbar osteomyelitis in January 2025  #New osteomyelitis in L3-L4 region  -Completed Course of antibiotics at that time   -Now with Difficulty with ambulation   -MR Lumbar Spine w/wo IV Cont  -Since the previous lumbar spine MRI dated 1/29/2025:  1.  The findings of L4-5 discitis osteomyelitis have equivocally changed   the bone marrow edema/enhancement within the L4 and L5 vertebral bodies   has mildly decreased. However there is new mild vertebral body height   loss of L5, increased L5 superior endplate erosion, and increased L4-5 disc edema.  2.  New mild edema within the L3-4 intervertebral disc with faint erosions involving the opposing endplates suspicious for additional level   of discitis osteomyelitis.  3.  No evidence of epidural inflammation /  fluid collection.  4.  Mild paraspinal edema without abscess.  5.  Stable degenerative changes.  -ID Follow-up appreciated>>started on Daptomycin and Rocephin   discussed with family and ID on 5/28 - family learning toward gallium scan to see collection and if need be would proceed with IR biopsy  however in regards to the IR Biopsy of new L3-L4 OM, however poor candidate as pr intermittently does not follow- command   -was on IV daptomycin 900mg q24hrs,  on 5/24, trend qweekly, was also  ceftriaxone 2g q24hrs  - ESR/CRP pending - elevated  -gallium scan 5/30: Normal 24 hour postinjection whole body gallium images and SPECT images   over the abdomen.  Specifically, no abnormal gallium uptake in the lumbar spine is   determined to suspicious for active inflammation process.  -spoke with ID about this on 5/31, we will stop abx, ID will speak with radiology regarding the read and if we need to proceed with biopsy   -spoke with ID on 6/2, repeat MRI in 3 months     #Bacteremia with bacteroides  -blood culture positive for bacteroides on presentation  -s/p ceftriaxone and flagyl  -repeat blood culture negative    #Macrocytic Anemia:  multifactorial: sepsis, cirrhosis, poor nutrition, GI bleeding  -s/p EGD on 4/16 which showed 5mm ulcer in duodenal bulb post endo clip, repeat EGD on 4/29 showed no bleeding noted   transfused one unit of PRBC on 5/3, -Hb stable , cont to monitor   -Keep Active T/S   -folate and b12 stable, chronic issue   Can consider checking vitamin D level and TSH    #New Onset Afib  -found during this admission  -rate controlled, not candidate for AC given anemia, coagulopathy in setting of cirrhosis, pt is a poor candidate for AC     #Hypokalemia - potassium chloride 40 meq QD  #Hypomagnesemia - magnesium oxide 400mg TID, magnesium gluconate 500mg QD    SVT PPX: SCDs only due to coagulopathy, risk of Gi bleed /anemia   GI PPX: Pantoprazole 40mg daily  Diet: Soft and bite sized, consistent carbohydrate, DASH.    Code status: Full Code   Dispo: from home, will go to rehab vs home    Overall prognosis poor

## 2025-06-12 NOTE — PROGRESS NOTE ADULT - SUBJECTIVE AND OBJECTIVE BOX
[facility]-- WOUND TEAM -- FOLLOW UP NOTE  --------------------------------------------------------------------------------    24 hour events/subjective:          Diet:      ROS: General/ SKIN/ MSK/ Neuro/ GI see HPI  all other systems negative  pt unable to offer    ALLERGIES & MEDICATIONS  --------------------------------------------------------------------------------  Allergies    No Known Allergies    Intolerances          STANDING INPATIENT MEDICATIONS    ampicillin/sulbactam  IVPB      ampicillin/sulbactam  IVPB 3 Gram(s) IV Intermittent every 6 hours  buMETAnide 1 milliGRAM(s) Oral every 12 hours  chlorhexidine 2% Cloths 1 Application(s) Topical <User Schedule>  dextrose 5%. 1000 milliLiter(s) IV Continuous <Continuous>  dextrose 5%. 1000 milliLiter(s) IV Continuous <Continuous>  dextrose 50% Injectable 25 Gram(s) IV Push once  dextrose 50% Injectable 12.5 Gram(s) IV Push once  dextrose 50% Injectable 25 Gram(s) IV Push once  doxycycline monohydrate Capsule 100 milliGRAM(s) Oral every 12 hours  folic acid 1 milliGRAM(s) Oral daily  glucagon  Injectable 1 milliGRAM(s) IntraMuscular once  insulin glargine Injectable (LANTUS) 15 Unit(s) SubCutaneous at bedtime  insulin lispro (ADMELOG) corrective regimen sliding scale   SubCutaneous Before meals and at bedtime  insulin lispro Injectable (ADMELOG) 5 Unit(s) SubCutaneous three times a day before meals  lactobacillus acidophilus 1 Tablet(s) Oral two times a day with meals  lactulose Syrup 20 Gram(s) Oral every 8 hours  levETIRAcetam  Solution 250 milliGRAM(s) Oral two times a day  lidocaine   4% Patch 1 Patch Transdermal daily  magnesium gluconate 500 milliGRAM(s) Oral daily  magnesium oxide 400 milliGRAM(s) Oral three times a day with meals  nystatin Powder 1 Application(s) Topical two times a day  pantoprazole    Tablet 40 milliGRAM(s) Oral before breakfast  rifAXIMin 550 milliGRAM(s) Oral two times a day  saccharomyces boulardii 250 milliGRAM(s) Oral two times a day  senna 2 Tablet(s) Oral at bedtime  silver sulfADIAZINE 1% Cream 1 Application(s) Topical two times a day  spironolactone 100 milliGRAM(s) Oral daily      PRN INPATIENT MEDICATION  acetaminophen     Tablet .. 650 milliGRAM(s) Oral every 8 hours PRN  aluminum hydroxide/magnesium hydroxide/simethicone Suspension 30 milliLiter(s) Oral every 4 hours PRN  dextrose Oral Gel 15 Gram(s) Oral once PRN  melatonin 3 milliGRAM(s) Oral at bedtime PRN  ondansetron Injectable 4 milliGRAM(s) IV Push every 8 hours PRN  oxyCODONE    IR 5 milliGRAM(s) Oral every 6 hours PRN        VITALS/PHYSICAL EXAM- wound speciifc   --------------------------------------------------------------------------------  T(C): 36.6 (06-12-25 @ 04:38), Max: 36.8 (06-11-25 @ 14:44)  HR: 85 (06-12-25 @ 04:38) (68 - 92)  BP: 110/73 (06-12-25 @ 04:38) (88/59 - 119/60)  RR: 18 (06-12-25 @ 04:38) (18 - 18)  SpO2: 100% (06-12-25 @ 04:38) (96% - 100%)  Wt(kg): --        06-11-25 @ 07:01  -  06-12-25 @ 07:00  --------------------------------------------------------  IN: 0 mL / OUT: 250 mL / NET: -250 mL            LABS/ CULTURES/ RADIOLOGY:              8.7    6.78  >-----------<  130      [06-12-25 @ 10:04]              27.3     139  |  98  |  36  ----------------------------<  183      [06-12-25 @ 10:04]  4.3   |  29  |  1.6        Ca     8.4     [06-12-25 @ 10:04]      Mg     1.8     [06-12-25 @ 10:04]    TPro  5.8  /  Alb  2.3  /  TBili  1.4  /  DBili  x   /  AST  63  /  ALT  40  /  AlkPhos  235  [06-11-25 @ 06:58]              CAPILLARY BLOOD GLUCOSE      POCT Blood Glucose.: 159 mg/dL (12 Jun 2025 11:31)  POCT Blood Glucose.: 233 mg/dL (12 Jun 2025 07:44)  POCT Blood Glucose.: 230 mg/dL (11 Jun 2025 21:31)  POCT Blood Glucose.: 117 mg/dL (11 Jun 2025 16:25)                      A1C with Estimated Average Glucose Result: 5.6 % (06-05-25 @ 10:50)  A1C with Estimated Average Glucose Result: 6.0 % (04-14-25 @ 05:42)  A1C with Estimated Average Glucose Result: 7.6 % (01-24-25 @ 07:04)

## 2025-06-13 LAB
ANION GAP SERPL CALC-SCNC: 15 MMOL/L — HIGH (ref 7–14)
BUN SERPL-MCNC: 37 MG/DL — HIGH (ref 10–20)
CALCIUM SERPL-MCNC: 8.4 MG/DL — SIGNIFICANT CHANGE UP (ref 8.4–10.5)
CHLORIDE SERPL-SCNC: 95 MMOL/L — LOW (ref 98–110)
CO2 SERPL-SCNC: 28 MMOL/L — SIGNIFICANT CHANGE UP (ref 17–32)
CREAT SERPL-MCNC: 1.3 MG/DL — SIGNIFICANT CHANGE UP (ref 0.7–1.5)
EGFR: 56 ML/MIN/1.73M2 — LOW
EGFR: 56 ML/MIN/1.73M2 — LOW
GLUCOSE SERPL-MCNC: 182 MG/DL — HIGH (ref 70–99)
HCT VFR BLD CALC: 29.4 % — LOW (ref 42–52)
HGB BLD-MCNC: 9.4 G/DL — LOW (ref 14–18)
MCHC RBC-ENTMCNC: 32 G/DL — SIGNIFICANT CHANGE UP (ref 32–37)
MCHC RBC-ENTMCNC: 32.4 PG — HIGH (ref 27–31)
MCV RBC AUTO: 101.4 FL — HIGH (ref 80–94)
NRBC BLD AUTO-RTO: 0 /100 WBCS — SIGNIFICANT CHANGE UP (ref 0–0)
PLATELET # BLD AUTO: 148 K/UL — SIGNIFICANT CHANGE UP (ref 130–400)
PMV BLD: 9.3 FL — SIGNIFICANT CHANGE UP (ref 7.4–10.4)
POTASSIUM SERPL-MCNC: 4.2 MMOL/L — SIGNIFICANT CHANGE UP (ref 3.5–5)
POTASSIUM SERPL-SCNC: 4.2 MMOL/L — SIGNIFICANT CHANGE UP (ref 3.5–5)
RBC # BLD: 2.9 M/UL — LOW (ref 4.7–6.1)
RBC # FLD: 18.6 % — HIGH (ref 11.5–14.5)
SODIUM SERPL-SCNC: 138 MMOL/L — SIGNIFICANT CHANGE UP (ref 135–146)
WBC # BLD: 7.43 K/UL — SIGNIFICANT CHANGE UP (ref 4.8–10.8)
WBC # FLD AUTO: 7.43 K/UL — SIGNIFICANT CHANGE UP (ref 4.8–10.8)

## 2025-06-13 PROCEDURE — 99232 SBSQ HOSP IP/OBS MODERATE 35: CPT | Mod: FS

## 2025-06-13 RX ORDER — QUETIAPINE FUMARATE 25 MG/1
12.5 TABLET ORAL ONCE
Refills: 0 | Status: COMPLETED | OUTPATIENT
Start: 2025-06-13 | End: 2025-06-13

## 2025-06-13 RX ADMIN — Medication 500 MILLIGRAM(S): at 11:38

## 2025-06-13 RX ADMIN — OXYCODONE HYDROCHLORIDE 5 MILLIGRAM(S): 30 TABLET ORAL at 13:12

## 2025-06-13 RX ADMIN — INSULIN LISPRO 5 UNIT(S): 100 INJECTION, SOLUTION INTRAVENOUS; SUBCUTANEOUS at 17:33

## 2025-06-13 RX ADMIN — Medication 400 MILLIGRAM(S): at 08:16

## 2025-06-13 RX ADMIN — INSULIN LISPRO 5 UNIT(S): 100 INJECTION, SOLUTION INTRAVENOUS; SUBCUTANEOUS at 11:48

## 2025-06-13 RX ADMIN — BUTYROSPERMUM PARKII(SHEA BUTTER), SIMMONDSIA CHINENSIS (JOJOBA) SEED OIL, ALOE BARBADENSIS LEAF EXTRACT 250 MILLIGRAM(S): .01; 1; 3.5 LIQUID TOPICAL at 17:15

## 2025-06-13 RX ADMIN — LEVETIRACETAM 250 MILLIGRAM(S): 10 INJECTION, SOLUTION INTRAVENOUS at 17:15

## 2025-06-13 RX ADMIN — Medication 100 MILLIGRAM(S): at 05:36

## 2025-06-13 RX ADMIN — LIDOCAINE HYDROCHLORIDE 1 PATCH: 20 JELLY TOPICAL at 19:36

## 2025-06-13 RX ADMIN — Medication 400 MILLIGRAM(S): at 11:40

## 2025-06-13 RX ADMIN — LIDOCAINE HYDROCHLORIDE 1 PATCH: 20 JELLY TOPICAL at 00:00

## 2025-06-13 RX ADMIN — AMPICILLIN SODIUM AND SULBACTAM SODIUM 200 GRAM(S): 1; .5 INJECTION, POWDER, FOR SOLUTION INTRAMUSCULAR; INTRAVENOUS at 05:36

## 2025-06-13 RX ADMIN — Medication 1 TABLET(S): at 08:16

## 2025-06-13 RX ADMIN — OXYCODONE HYDROCHLORIDE 5 MILLIGRAM(S): 30 TABLET ORAL at 21:51

## 2025-06-13 RX ADMIN — BUMETANIDE 1 MILLIGRAM(S): 1 TABLET ORAL at 05:37

## 2025-06-13 RX ADMIN — LACTULOSE 20 GRAM(S): 10 SOLUTION ORAL at 05:37

## 2025-06-13 RX ADMIN — QUETIAPINE FUMARATE 12.5 MILLIGRAM(S): 25 TABLET ORAL at 23:25

## 2025-06-13 RX ADMIN — INSULIN LISPRO 2: 100 INJECTION, SOLUTION INTRAVENOUS; SUBCUTANEOUS at 21:16

## 2025-06-13 RX ADMIN — OXYCODONE HYDROCHLORIDE 5 MILLIGRAM(S): 30 TABLET ORAL at 17:14

## 2025-06-13 RX ADMIN — Medication 1 APPLICATION(S): at 05:37

## 2025-06-13 RX ADMIN — INSULIN GLARGINE-YFGN 15 UNIT(S): 100 INJECTION, SOLUTION SUBCUTANEOUS at 21:16

## 2025-06-13 RX ADMIN — INSULIN LISPRO 5 UNIT(S): 100 INJECTION, SOLUTION INTRAVENOUS; SUBCUTANEOUS at 08:15

## 2025-06-13 RX ADMIN — Medication 2 TABLET(S): at 21:15

## 2025-06-13 RX ADMIN — Medication 40 MILLIGRAM(S): at 05:37

## 2025-06-13 RX ADMIN — AMPICILLIN SODIUM AND SULBACTAM SODIUM 200 GRAM(S): 1; .5 INJECTION, POWDER, FOR SOLUTION INTRAMUSCULAR; INTRAVENOUS at 23:11

## 2025-06-13 RX ADMIN — AMPICILLIN SODIUM AND SULBACTAM SODIUM 200 GRAM(S): 1; .5 INJECTION, POWDER, FOR SOLUTION INTRAMUSCULAR; INTRAVENOUS at 11:38

## 2025-06-13 RX ADMIN — LACTULOSE 20 GRAM(S): 10 SOLUTION ORAL at 11:36

## 2025-06-13 RX ADMIN — MIDODRINE HYDROCHLORIDE 5 MILLIGRAM(S): 5 TABLET ORAL at 11:39

## 2025-06-13 RX ADMIN — Medication 650 MILLIGRAM(S): at 17:16

## 2025-06-13 RX ADMIN — INSULIN LISPRO 2: 100 INJECTION, SOLUTION INTRAVENOUS; SUBCUTANEOUS at 11:47

## 2025-06-13 RX ADMIN — BUTYROSPERMUM PARKII(SHEA BUTTER), SIMMONDSIA CHINENSIS (JOJOBA) SEED OIL, ALOE BARBADENSIS LEAF EXTRACT 250 MILLIGRAM(S): .01; 1; 3.5 LIQUID TOPICAL at 05:36

## 2025-06-13 RX ADMIN — FOLIC ACID 1 MILLIGRAM(S): 1 TABLET ORAL at 11:37

## 2025-06-13 RX ADMIN — Medication 400 MILLIGRAM(S): at 17:15

## 2025-06-13 RX ADMIN — NYSTATIN 1 APPLICATION(S): 100000 CREAM TOPICAL at 05:51

## 2025-06-13 RX ADMIN — NYSTATIN 1 APPLICATION(S): 100000 CREAM TOPICAL at 17:14

## 2025-06-13 RX ADMIN — LEVETIRACETAM 250 MILLIGRAM(S): 10 INJECTION, SOLUTION INTRAVENOUS at 05:36

## 2025-06-13 RX ADMIN — BUMETANIDE 1 MILLIGRAM(S): 1 TABLET ORAL at 17:23

## 2025-06-13 RX ADMIN — LACTULOSE 20 GRAM(S): 10 SOLUTION ORAL at 21:15

## 2025-06-13 RX ADMIN — LIDOCAINE HYDROCHLORIDE 1 PATCH: 20 JELLY TOPICAL at 23:38

## 2025-06-13 RX ADMIN — LIDOCAINE HYDROCHLORIDE 1 PATCH: 20 JELLY TOPICAL at 12:15

## 2025-06-13 RX ADMIN — AMPICILLIN SODIUM AND SULBACTAM SODIUM 200 GRAM(S): 1; .5 INJECTION, POWDER, FOR SOLUTION INTRAMUSCULAR; INTRAVENOUS at 17:16

## 2025-06-13 RX ADMIN — Medication 100 MILLIGRAM(S): at 05:37

## 2025-06-13 RX ADMIN — Medication 1 TABLET(S): at 17:16

## 2025-06-13 RX ADMIN — INSULIN LISPRO 2: 100 INJECTION, SOLUTION INTRAVENOUS; SUBCUTANEOUS at 08:15

## 2025-06-13 RX ADMIN — Medication 100 MILLIGRAM(S): at 17:23

## 2025-06-13 RX ADMIN — OXYCODONE HYDROCHLORIDE 5 MILLIGRAM(S): 30 TABLET ORAL at 23:38

## 2025-06-13 RX ADMIN — MIDODRINE HYDROCHLORIDE 5 MILLIGRAM(S): 5 TABLET ORAL at 05:37

## 2025-06-13 NOTE — PROGRESS NOTE ADULT - ASSESSMENT
80 YOM w a PMH of 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  Post-cholecystectomy.   3.  Severe abdominal ascites.4.  Right pleural effusion. 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   -s/p albumin given diffuse edema on 4/30, 5/2, 5/8, 5/9, 5/12  -Bumex 1mg BID, Spironolactone 100mg QD  -Lactulose 20mg Q8H, Rifaximin 550mg BID    #Worsening sacral decubitus ulcer  General surgery consulted  Bedside debridement 6/10  ID on board, started unasyn  Wound care following  rotate the patient q2hrs  Oxycodone 5mg PRN Q6H for pain    #Ambulatory Dysfunction   #H/O Lumbar osteomyelitis in January 2025  #New osteomyelitis in L3-L4 region  -Completed Course of antibiotics at that time   -Now with Difficulty with ambulation   -MR Lumbar Spine w/wo IV Cont  -Since the previous lumbar spine MRI dated 1/29/2025:  1.  The findings of L4-5 discitis osteomyelitis have equivocally changed   the bone marrow edema/enhancement within the L4 and L5 vertebral bodies   has mildly decreased. However there is new mild vertebral body height   loss of L5, increased L5 superior endplate erosion, and increased L4-5 disc edema.  2.  New mild edema within the L3-4 intervertebral disc with faint erosions involving the opposing endplates suspicious for additional level   of discitis osteomyelitis.  3.  No evidence of epidural inflammation /  fluid collection.  4.  Mild paraspinal edema without abscess.  5.  Stable degenerative changes.  -ID Follow-up appreciated>>started on Daptomycin and Rocephin   discussed with family and ID on 5/28 - family learning toward gallium scan to see collection and if need be would proceed with IR biopsy  however in regards to the IR Biopsy of new L3-L4 OM, however poor candidate as pr intermittently does not follow- command   -was on IV daptomycin 900mg q24hrs,  on 5/24, trend qweekly, was also  ceftriaxone 2g q24hrs  - ESR/CRP pending - elevated  -gallium scan 5/30: Normal 24 hour postinjection whole body gallium images and SPECT images   over the abdomen.  Specifically, no abnormal gallium uptake in the lumbar spine is   determined to suspicious for active inflammation process.  -spoke with ID about this on 5/31, we will stop abx, ID will speak with radiology regarding the read and if we need to proceed with biopsy   -spoke with ID on 6/2, repeat MRI in 3 months     #Bacteremia with bacteroides  -blood culture positive for bacteroides on presentation  -s/p ceftriaxone and flagyl  -repeat blood culture negative    #Macrocytic Anemia:  multifactorial: sepsis, cirrhosis, poor nutrition, GI bleeding  -s/p EGD on 4/16 which showed 5mm ulcer in duodenal bulb post endo clip, repeat EGD on 4/29 showed no bleeding noted   transfused one unit of PRBC on 5/3, -Hb stable , cont to monitor   -Keep Active T/S   -folate and b12 stable, chronic issue   Can consider checking vitamin D level and TSH    #New Onset Afib  -found during this admission  -rate controlled, not candidate for AC given anemia, coagulopathy in setting of cirrhosis, pt is a poor candidate for AC     #Hypomagnesemia - magnesium oxide 400mg TID, magnesium gluconate 500mg QD    SVT PPX: SCDs only due to coagulopathy, risk of Gi bleed /anemia   GI PPX: Pantoprazole 40mg daily  Diet: Soft and bite sized, consistent carbohydrate, DASH.    Code status: Full Code   Dispo: from home, will go to rehab vs home    Overall prognosis poor

## 2025-06-13 NOTE — PROGRESS NOTE ADULT - SUBJECTIVE AND OBJECTIVE BOX
CHIEF COMPLAINT:    Patient is a 80y old  Male who presents with a chief complaint of acute mentation changes (12 Jun 2025 15:51)    INTERVAL HPI/OVERNIGHT EVENTS:    Patient seen and examined at bedside. No acute overnight events occurred. Pt was lying in bed comfortable.     Medications:  Standing  ampicillin/sulbactam  IVPB      ampicillin/sulbactam  IVPB 3 Gram(s) IV Intermittent every 6 hours  buMETAnide 1 milliGRAM(s) Oral every 12 hours  chlorhexidine 2% Cloths 1 Application(s) Topical <User Schedule>  dextrose 5%. 1000 milliLiter(s) IV Continuous <Continuous>  dextrose 5%. 1000 milliLiter(s) IV Continuous <Continuous>  dextrose 50% Injectable 25 Gram(s) IV Push once  dextrose 50% Injectable 12.5 Gram(s) IV Push once  dextrose 50% Injectable 25 Gram(s) IV Push once  doxycycline monohydrate Capsule 100 milliGRAM(s) Oral every 12 hours  folic acid 1 milliGRAM(s) Oral daily  glucagon  Injectable 1 milliGRAM(s) IntraMuscular once  insulin glargine Injectable (LANTUS) 15 Unit(s) SubCutaneous at bedtime  insulin lispro (ADMELOG) corrective regimen sliding scale   SubCutaneous Before meals and at bedtime  insulin lispro Injectable (ADMELOG) 5 Unit(s) SubCutaneous three times a day before meals  lactobacillus acidophilus 1 Tablet(s) Oral two times a day with meals  lactulose Syrup 20 Gram(s) Oral every 8 hours  levETIRAcetam  Solution 250 milliGRAM(s) Oral two times a day  lidocaine   4% Patch 1 Patch Transdermal daily  magnesium gluconate 500 milliGRAM(s) Oral daily  magnesium oxide 400 milliGRAM(s) Oral three times a day with meals  midodrine. 5 milliGRAM(s) Oral every 8 hours  nystatin Powder 1 Application(s) Topical two times a day  pantoprazole    Tablet 40 milliGRAM(s) Oral before breakfast  rifAXIMin 550 milliGRAM(s) Oral two times a day  saccharomyces boulardii 250 milliGRAM(s) Oral two times a day  senna 2 Tablet(s) Oral at bedtime  silver sulfADIAZINE 1% Cream 1 Application(s) Topical two times a day  spironolactone 100 milliGRAM(s) Oral daily    PRN Meds  acetaminophen     Tablet .. 650 milliGRAM(s) Oral every 8 hours PRN  aluminum hydroxide/magnesium hydroxide/simethicone Suspension 30 milliLiter(s) Oral every 4 hours PRN  dextrose Oral Gel 15 Gram(s) Oral once PRN  melatonin 3 milliGRAM(s) Oral at bedtime PRN  ondansetron Injectable 4 milliGRAM(s) IV Push every 8 hours PRN  oxyCODONE    IR 5 milliGRAM(s) Oral every 6 hours PRN    Vital Signs:    T(F): 97.7 (06-13-25 @ 14:18), Max: 97.9 (06-12-25 @ 21:13)  HR: 97 (06-13-25 @ 14:18) (85 - 97)  BP: 128/68 (06-13-25 @ 14:18) (101/54 - 128/68)  RR: 18 (06-13-25 @ 14:18) (18 - 18)  SpO2: 100% (06-13-25 @ 14:18) (99% - 100%)  I&O's Summary    Daily     Daily   CAPILLARY BLOOD GLUCOSE    POCT Blood Glucose.: 185 mg/dL (13 Jun 2025 11:44)  POCT Blood Glucose.: 173 mg/dL (13 Jun 2025 07:49)  POCT Blood Glucose.: 233 mg/dL (12 Jun 2025 21:20)  POCT Blood Glucose.: 164 mg/dL (12 Jun 2025 16:32)      PHYSICAL EXAM:  GENERAL:  NAD  SKIN: No rashes or lesions  HEENT: Atraumatic. Normocephalic. Anicteric  NECK:  No JVD.   PULMONARY: Clear to ausculation bilaterally. No wheezing. No rales  CVS: Normal S1, S2. Regular rate and rhythm. No murmurs.  ABDOMEN/GI: Soft, Nontender, Nondistended; Bowel sounds are present  EXTREMITIES: trace edema on b/l LE  BACK: left buttock big sacral pressure ulcer  NEUROLOGIC: awake, alert, sick appearing, no focal deficit      LABS:                        9.4    7.43  )-----------( 148      ( 13 Jun 2025 08:56 )             29.4     06-13    138  |  95[L]  |  37[H]  ----------------------------<  182[H]  4.2   |  28  |  1.3    Ca    8.4      13 Jun 2025 08:56  Mg     1.8     06-12    RADIOLOGY & ADDITIONAL TESTS:  Imaging or report Personally Reviewed:  [ ] YES  [ ] NO -->no new images

## 2025-06-14 PROCEDURE — 99232 SBSQ HOSP IP/OBS MODERATE 35: CPT | Mod: FS

## 2025-06-14 RX ORDER — LACTULOSE 10 G/15ML
200 SOLUTION ORAL ONCE
Refills: 0 | Status: COMPLETED | OUTPATIENT
Start: 2025-06-14 | End: 2025-06-14

## 2025-06-14 RX ADMIN — Medication 100 MILLIGRAM(S): at 05:18

## 2025-06-14 RX ADMIN — Medication 400 MILLIGRAM(S): at 17:50

## 2025-06-14 RX ADMIN — OXYCODONE HYDROCHLORIDE 5 MILLIGRAM(S): 30 TABLET ORAL at 09:47

## 2025-06-14 RX ADMIN — MIDODRINE HYDROCHLORIDE 5 MILLIGRAM(S): 5 TABLET ORAL at 13:00

## 2025-06-14 RX ADMIN — AMPICILLIN SODIUM AND SULBACTAM SODIUM 200 GRAM(S): 1; .5 INJECTION, POWDER, FOR SOLUTION INTRAMUSCULAR; INTRAVENOUS at 12:05

## 2025-06-14 RX ADMIN — AMPICILLIN SODIUM AND SULBACTAM SODIUM 200 GRAM(S): 1; .5 INJECTION, POWDER, FOR SOLUTION INTRAMUSCULAR; INTRAVENOUS at 05:17

## 2025-06-14 RX ADMIN — BUTYROSPERMUM PARKII(SHEA BUTTER), SIMMONDSIA CHINENSIS (JOJOBA) SEED OIL, ALOE BARBADENSIS LEAF EXTRACT 250 MILLIGRAM(S): .01; 1; 3.5 LIQUID TOPICAL at 05:19

## 2025-06-14 RX ADMIN — Medication 1 APPLICATION(S): at 17:10

## 2025-06-14 RX ADMIN — Medication 500 MILLIGRAM(S): at 12:06

## 2025-06-14 RX ADMIN — LACTULOSE 20 GRAM(S): 10 SOLUTION ORAL at 05:19

## 2025-06-14 RX ADMIN — INSULIN LISPRO 2: 100 INJECTION, SOLUTION INTRAVENOUS; SUBCUTANEOUS at 21:19

## 2025-06-14 RX ADMIN — INSULIN LISPRO 5 UNIT(S): 100 INJECTION, SOLUTION INTRAVENOUS; SUBCUTANEOUS at 17:10

## 2025-06-14 RX ADMIN — Medication 400 MILLIGRAM(S): at 12:05

## 2025-06-14 RX ADMIN — INSULIN LISPRO 5 UNIT(S): 100 INJECTION, SOLUTION INTRAVENOUS; SUBCUTANEOUS at 12:04

## 2025-06-14 RX ADMIN — Medication 1 TABLET(S): at 08:21

## 2025-06-14 RX ADMIN — BUMETANIDE 1 MILLIGRAM(S): 1 TABLET ORAL at 17:11

## 2025-06-14 RX ADMIN — LEVETIRACETAM 250 MILLIGRAM(S): 10 INJECTION, SOLUTION INTRAVENOUS at 05:18

## 2025-06-14 RX ADMIN — FOLIC ACID 1 MILLIGRAM(S): 1 TABLET ORAL at 12:05

## 2025-06-14 RX ADMIN — LACTULOSE 200 GRAM(S): 10 SOLUTION ORAL at 12:06

## 2025-06-14 RX ADMIN — NYSTATIN 1 APPLICATION(S): 100000 CREAM TOPICAL at 05:46

## 2025-06-14 RX ADMIN — Medication 40 MILLIGRAM(S): at 05:18

## 2025-06-14 RX ADMIN — INSULIN GLARGINE-YFGN 15 UNIT(S): 100 INJECTION, SOLUTION SUBCUTANEOUS at 21:19

## 2025-06-14 RX ADMIN — Medication 400 MILLIGRAM(S): at 08:21

## 2025-06-14 RX ADMIN — AMPICILLIN SODIUM AND SULBACTAM SODIUM 200 GRAM(S): 1; .5 INJECTION, POWDER, FOR SOLUTION INTRAMUSCULAR; INTRAVENOUS at 17:03

## 2025-06-14 RX ADMIN — Medication 100 MILLIGRAM(S): at 17:11

## 2025-06-14 RX ADMIN — Medication 1 APPLICATION(S): at 05:19

## 2025-06-14 RX ADMIN — OXYCODONE HYDROCHLORIDE 5 MILLIGRAM(S): 30 TABLET ORAL at 10:45

## 2025-06-14 RX ADMIN — Medication 1 TABLET(S): at 17:15

## 2025-06-14 RX ADMIN — LACTULOSE 20 GRAM(S): 10 SOLUTION ORAL at 21:20

## 2025-06-14 RX ADMIN — INSULIN LISPRO 2: 100 INJECTION, SOLUTION INTRAVENOUS; SUBCUTANEOUS at 12:04

## 2025-06-14 RX ADMIN — BUMETANIDE 1 MILLIGRAM(S): 1 TABLET ORAL at 05:21

## 2025-06-14 RX ADMIN — LEVETIRACETAM 250 MILLIGRAM(S): 10 INJECTION, SOLUTION INTRAVENOUS at 17:14

## 2025-06-14 RX ADMIN — INSULIN LISPRO 5 UNIT(S): 100 INJECTION, SOLUTION INTRAVENOUS; SUBCUTANEOUS at 08:20

## 2025-06-14 RX ADMIN — NYSTATIN 1 APPLICATION(S): 100000 CREAM TOPICAL at 17:10

## 2025-06-14 RX ADMIN — Medication 2 TABLET(S): at 21:19

## 2025-06-14 RX ADMIN — INSULIN LISPRO 2: 100 INJECTION, SOLUTION INTRAVENOUS; SUBCUTANEOUS at 08:20

## 2025-06-14 RX ADMIN — MIDODRINE HYDROCHLORIDE 5 MILLIGRAM(S): 5 TABLET ORAL at 21:20

## 2025-06-14 RX ADMIN — Medication 100 MILLIGRAM(S): at 05:17

## 2025-06-14 NOTE — PROGRESS NOTE ADULT - ASSESSMENT
80 YOM w a PMH of 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  Spouse [phone]  Daughter Edwige Mendzoa (ICU nurse) [phone]    #Decompensated cirrhosis   #Immunosuppression / Immunosenescence secondary to multiple comorbidities which could result in poor clinical outcome  Abdominal US: Nodular liver contour consistent with liver cirrhosis.2.  Post-cholecystectomy.   3.  Severe abdominal ascites.4.  Right pleural effusion. 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   -s/p albumin given diffuse edema on 4/30, 5/2, 5/8, 5/9, 5/12  -Bumex 1mg BID, Spironolactone 100mg QD  -Lactulose 20mg Q8H, Rifaximin 550mg BID    #Worsening sacral decubitus ulcer  General surgery consulted  Bedside debridement 6/10  ID on board, started unasyn  Wound care following  rotate the patient q2hrs  Oxycodone 5mg PRN Q6H for pain    #Ambulatory Dysfunction   #H/O Lumbar osteomyelitis in January 2025  #New osteomyelitis in L3-L4 region  -Completed Course of antibiotics at that time   -Now with Difficulty with ambulation   -MR Lumbar Spine w/wo IV Cont  -Since the previous lumbar spine MRI dated 1/29/2025:  1.  The findings of L4-5 discitis osteomyelitis have equivocally changed   the bone marrow edema/enhancement within the L4 and L5 vertebral bodies   has mildly decreased. However there is new mild vertebral body height   loss of L5, increased L5 superior endplate erosion, and increased L4-5 disc edema.  2.  New mild edema within the L3-4 intervertebral disc with faint erosions involving the opposing endplates suspicious for additional level   of discitis osteomyelitis.  3.  No evidence of epidural inflammation /  fluid collection.  4.  Mild paraspinal edema without abscess.  5.  Stable degenerative changes.  -ID Follow-up appreciated>>started on Daptomycin and Rocephin   discussed with family and ID on 5/28 - family learning toward gallium scan to see collection and if need be would proceed with IR biopsy  however in regards to the IR Biopsy of new L3-L4 OM, however poor candidate as pr intermittently does not follow- command   -was on IV daptomycin 900mg q24hrs,  on 5/24, trend qweekly, was also  ceftriaxone 2g q24hrs  - ESR/CRP pending - elevated  -gallium scan 5/30: Normal 24 hour postinjection whole body gallium images and SPECT images   over the abdomen.  Specifically, no abnormal gallium uptake in the lumbar spine is   determined to suspicious for active inflammation process.  -spoke with ID about this on 5/31, we will stop abx, ID will speak with radiology regarding the read and if we need to proceed with biopsy   -spoke with ID on 6/2, repeat MRI in 3 months     #Bacteremia with bacteroides  -blood culture positive for bacteroides on presentation  -s/p ceftriaxone and flagyl  -repeat blood culture negative    #Macrocytic Anemia:  multifactorial: sepsis, cirrhosis, poor nutrition, GI bleeding  -s/p EGD on 4/16 which showed 5mm ulcer in duodenal bulb post endo clip, repeat EGD on 4/29 showed no bleeding noted   transfused one unit of PRBC on 5/3, -Hb stable , cont to monitor   -Keep Active T/S   -folate and b12 stable, chronic issue   Can consider checking vitamin D level and TSH    #New Onset Afib  -found during this admission  -rate controlled, not candidate for AC given anemia, coagulopathy in setting of cirrhosis, pt is a poor candidate for AC     #Hypomagnesemia - magnesium oxide 400mg TID, magnesium gluconate 500mg QD    SVT PPX: SCDs only due to coagulopathy, risk of Gi bleed /anemia   GI PPX: Pantoprazole 40mg daily  Diet: Soft and bite sized, consistent carbohydrate  Code status: Full Code   Dispo: from home, will go to rehab vs home    Overall prognosis poor

## 2025-06-14 NOTE — PROGRESS NOTE ADULT - SUBJECTIVE AND OBJECTIVE BOX
CHIEF COMPLAINT:    Patient is a 80y old  Male who presents with a chief complaint of acute mentation changes (13 Jun 2025 15:58)    INTERVAL HPI/OVERNIGHT EVENTS:    Patient seen and examined at bedside. No acute overnight events occurred. Pt c/o constipation.     Medications:  Standing  ampicillin/sulbactam  IVPB      ampicillin/sulbactam  IVPB 3 Gram(s) IV Intermittent every 6 hours  buMETAnide 1 milliGRAM(s) Oral every 12 hours  chlorhexidine 2% Cloths 1 Application(s) Topical <User Schedule>  dextrose 5%. 1000 milliLiter(s) IV Continuous <Continuous>  dextrose 5%. 1000 milliLiter(s) IV Continuous <Continuous>  dextrose 50% Injectable 25 Gram(s) IV Push once  dextrose 50% Injectable 12.5 Gram(s) IV Push once  dextrose 50% Injectable 25 Gram(s) IV Push once  doxycycline monohydrate Capsule 100 milliGRAM(s) Oral every 12 hours  folic acid 1 milliGRAM(s) Oral daily  glucagon  Injectable 1 milliGRAM(s) IntraMuscular once  insulin glargine Injectable (LANTUS) 15 Unit(s) SubCutaneous at bedtime  insulin lispro (ADMELOG) corrective regimen sliding scale   SubCutaneous Before meals and at bedtime  insulin lispro Injectable (ADMELOG) 5 Unit(s) SubCutaneous three times a day before meals  lactobacillus acidophilus 1 Tablet(s) Oral two times a day with meals  lactulose Syrup 20 Gram(s) Oral every 8 hours  levETIRAcetam  Solution 250 milliGRAM(s) Oral two times a day  lidocaine   4% Patch 1 Patch Transdermal daily  magnesium gluconate 500 milliGRAM(s) Oral daily  magnesium oxide 400 milliGRAM(s) Oral three times a day with meals  midodrine. 5 milliGRAM(s) Oral every 8 hours  nystatin Powder 1 Application(s) Topical two times a day  pantoprazole    Tablet 40 milliGRAM(s) Oral before breakfast  rifAXIMin 550 milliGRAM(s) Oral two times a day  saccharomyces boulardii 250 milliGRAM(s) Oral two times a day  senna 2 Tablet(s) Oral at bedtime  silver sulfADIAZINE 1% Cream 1 Application(s) Topical two times a day  spironolactone 100 milliGRAM(s) Oral daily    PRN Meds  acetaminophen     Tablet .. 650 milliGRAM(s) Oral every 8 hours PRN  aluminum hydroxide/magnesium hydroxide/simethicone Suspension 30 milliLiter(s) Oral every 4 hours PRN  dextrose Oral Gel 15 Gram(s) Oral once PRN  melatonin 3 milliGRAM(s) Oral at bedtime PRN  ondansetron Injectable 4 milliGRAM(s) IV Push every 8 hours PRN  oxyCODONE    IR 5 milliGRAM(s) Oral every 6 hours PRN    Vital Signs:    T(F): 97.8 (06-14-25 @ 14:02), Max: 98.8 (06-13-25 @ 21:19)  HR: 100 (06-14-25 @ 14:02) (98 - 109)  BP: 104/49 (06-14-25 @ 14:02) (104/49 - 129/80)  RR: 18 (06-14-25 @ 05:00) (18 - 18)  SpO2: 92% (06-14-25 @ 14:02) (92% - 100%)  I&O's Summary    Daily     Daily   CAPILLARY BLOOD GLUCOSE    POCT Blood Glucose.: 197 mg/dL (14 Jun 2025 11:28)  POCT Blood Glucose.: 171 mg/dL (14 Jun 2025 07:41)  POCT Blood Glucose.: 161 mg/dL (13 Jun 2025 20:31)  POCT Blood Glucose.: 116 mg/dL (13 Jun 2025 16:45)    PHYSICAL EXAM:  GENERAL:  NAD  SKIN: No rashes or lesions  HEENT: Atraumatic. Normocephalic. Anicteric  NECK:  No JVD.   PULMONARY: Clear to ausculation bilaterally. No wheezing. No rales  CVS: Normal S1, S2. Regular rate and rhythm. No murmurs.  ABDOMEN/GI: Soft, Nontender, Nondistended; Bowel sounds are present  EXTREMITIES: trace edema on b/l LE  BACK: left buttock big sacral pressure ulcer  NEUROLOGIC: awake, alert, sick appearing, no focal deficit    LABS:                        9.4    7.43  )-----------( 148      ( 13 Jun 2025 08:56 )             29.4     06-13    138  |  95[L]  |  37[H]  ----------------------------<  182[H]  4.2   |  28  |  1.3    Ca    8.4      13 Jun 2025 08:56    RADIOLOGY & ADDITIONAL TESTS:  Imaging or report Personally Reviewed:  [ ] YES  [ ] NO -->no new images

## 2025-06-15 LAB
ANION GAP SERPL CALC-SCNC: 14 MMOL/L — SIGNIFICANT CHANGE UP (ref 7–14)
BUN SERPL-MCNC: 34 MG/DL — HIGH (ref 10–20)
CALCIUM SERPL-MCNC: 8.2 MG/DL — LOW (ref 8.4–10.5)
CHLORIDE SERPL-SCNC: 98 MMOL/L — SIGNIFICANT CHANGE UP (ref 98–110)
CO2 SERPL-SCNC: 30 MMOL/L — SIGNIFICANT CHANGE UP (ref 17–32)
CREAT SERPL-MCNC: 1.2 MG/DL — SIGNIFICANT CHANGE UP (ref 0.7–1.5)
EGFR: 62 ML/MIN/1.73M2 — SIGNIFICANT CHANGE UP
EGFR: 62 ML/MIN/1.73M2 — SIGNIFICANT CHANGE UP
GLUCOSE SERPL-MCNC: 118 MG/DL — HIGH (ref 70–99)
HCT VFR BLD CALC: 31.2 % — LOW (ref 42–52)
HGB BLD-MCNC: 9.9 G/DL — LOW (ref 14–18)
MAGNESIUM SERPL-MCNC: 1.8 MG/DL — SIGNIFICANT CHANGE UP (ref 1.8–2.4)
MCHC RBC-ENTMCNC: 31.7 G/DL — LOW (ref 32–37)
MCHC RBC-ENTMCNC: 32.5 PG — HIGH (ref 27–31)
MCV RBC AUTO: 102.3 FL — HIGH (ref 80–94)
NRBC BLD AUTO-RTO: 0 /100 WBCS — SIGNIFICANT CHANGE UP (ref 0–0)
PLATELET # BLD AUTO: 158 K/UL — SIGNIFICANT CHANGE UP (ref 130–400)
PMV BLD: 8.8 FL — SIGNIFICANT CHANGE UP (ref 7.4–10.4)
POTASSIUM SERPL-MCNC: 4 MMOL/L — SIGNIFICANT CHANGE UP (ref 3.5–5)
POTASSIUM SERPL-SCNC: 4 MMOL/L — SIGNIFICANT CHANGE UP (ref 3.5–5)
RBC # BLD: 3.05 M/UL — LOW (ref 4.7–6.1)
RBC # FLD: 18.7 % — HIGH (ref 11.5–14.5)
SODIUM SERPL-SCNC: 142 MMOL/L — SIGNIFICANT CHANGE UP (ref 135–146)
WBC # BLD: 8.57 K/UL — SIGNIFICANT CHANGE UP (ref 4.8–10.8)
WBC # FLD AUTO: 8.57 K/UL — SIGNIFICANT CHANGE UP (ref 4.8–10.8)

## 2025-06-15 PROCEDURE — 99232 SBSQ HOSP IP/OBS MODERATE 35: CPT | Mod: FS

## 2025-06-15 RX ADMIN — Medication 40 MILLIGRAM(S): at 05:01

## 2025-06-15 RX ADMIN — Medication 400 MILLIGRAM(S): at 12:00

## 2025-06-15 RX ADMIN — BUMETANIDE 1 MILLIGRAM(S): 1 TABLET ORAL at 17:07

## 2025-06-15 RX ADMIN — AMPICILLIN SODIUM AND SULBACTAM SODIUM 200 GRAM(S): 1; .5 INJECTION, POWDER, FOR SOLUTION INTRAMUSCULAR; INTRAVENOUS at 17:08

## 2025-06-15 RX ADMIN — INSULIN LISPRO 4: 100 INJECTION, SOLUTION INTRAVENOUS; SUBCUTANEOUS at 21:00

## 2025-06-15 RX ADMIN — Medication 1 APPLICATION(S): at 17:09

## 2025-06-15 RX ADMIN — BUMETANIDE 1 MILLIGRAM(S): 1 TABLET ORAL at 05:00

## 2025-06-15 RX ADMIN — INSULIN LISPRO 6: 100 INJECTION, SOLUTION INTRAVENOUS; SUBCUTANEOUS at 17:06

## 2025-06-15 RX ADMIN — AMPICILLIN SODIUM AND SULBACTAM SODIUM 200 GRAM(S): 1; .5 INJECTION, POWDER, FOR SOLUTION INTRAMUSCULAR; INTRAVENOUS at 12:00

## 2025-06-15 RX ADMIN — INSULIN LISPRO 5 UNIT(S): 100 INJECTION, SOLUTION INTRAVENOUS; SUBCUTANEOUS at 17:08

## 2025-06-15 RX ADMIN — FOLIC ACID 1 MILLIGRAM(S): 1 TABLET ORAL at 12:01

## 2025-06-15 RX ADMIN — MIDODRINE HYDROCHLORIDE 5 MILLIGRAM(S): 5 TABLET ORAL at 05:00

## 2025-06-15 RX ADMIN — MIDODRINE HYDROCHLORIDE 5 MILLIGRAM(S): 5 TABLET ORAL at 13:39

## 2025-06-15 RX ADMIN — INSULIN GLARGINE-YFGN 15 UNIT(S): 100 INJECTION, SOLUTION SUBCUTANEOUS at 21:50

## 2025-06-15 RX ADMIN — Medication 1 TABLET(S): at 08:29

## 2025-06-15 RX ADMIN — LEVETIRACETAM 250 MILLIGRAM(S): 10 INJECTION, SOLUTION INTRAVENOUS at 06:24

## 2025-06-15 RX ADMIN — Medication 100 MILLIGRAM(S): at 05:00

## 2025-06-15 RX ADMIN — BUTYROSPERMUM PARKII(SHEA BUTTER), SIMMONDSIA CHINENSIS (JOJOBA) SEED OIL, ALOE BARBADENSIS LEAF EXTRACT 250 MILLIGRAM(S): .01; 1; 3.5 LIQUID TOPICAL at 17:08

## 2025-06-15 RX ADMIN — Medication 500 MILLIGRAM(S): at 12:01

## 2025-06-15 RX ADMIN — Medication 100 MILLIGRAM(S): at 17:07

## 2025-06-15 RX ADMIN — LACTULOSE 20 GRAM(S): 10 SOLUTION ORAL at 21:50

## 2025-06-15 RX ADMIN — MIDODRINE HYDROCHLORIDE 5 MILLIGRAM(S): 5 TABLET ORAL at 21:50

## 2025-06-15 RX ADMIN — Medication 1 APPLICATION(S): at 06:25

## 2025-06-15 RX ADMIN — Medication 2 TABLET(S): at 21:50

## 2025-06-15 RX ADMIN — LACTULOSE 20 GRAM(S): 10 SOLUTION ORAL at 13:39

## 2025-06-15 RX ADMIN — AMPICILLIN SODIUM AND SULBACTAM SODIUM 200 GRAM(S): 1; .5 INJECTION, POWDER, FOR SOLUTION INTRAMUSCULAR; INTRAVENOUS at 05:01

## 2025-06-15 RX ADMIN — LACTULOSE 20 GRAM(S): 10 SOLUTION ORAL at 04:59

## 2025-06-15 RX ADMIN — Medication 1 TABLET(S): at 17:07

## 2025-06-15 RX ADMIN — BUTYROSPERMUM PARKII(SHEA BUTTER), SIMMONDSIA CHINENSIS (JOJOBA) SEED OIL, ALOE BARBADENSIS LEAF EXTRACT 250 MILLIGRAM(S): .01; 1; 3.5 LIQUID TOPICAL at 05:01

## 2025-06-15 RX ADMIN — NYSTATIN 1 APPLICATION(S): 100000 CREAM TOPICAL at 17:09

## 2025-06-15 RX ADMIN — LEVETIRACETAM 250 MILLIGRAM(S): 10 INJECTION, SOLUTION INTRAVENOUS at 17:07

## 2025-06-15 RX ADMIN — INSULIN LISPRO 5 UNIT(S): 100 INJECTION, SOLUTION INTRAVENOUS; SUBCUTANEOUS at 12:01

## 2025-06-15 RX ADMIN — NYSTATIN 1 APPLICATION(S): 100000 CREAM TOPICAL at 06:24

## 2025-06-15 RX ADMIN — Medication 100 MILLIGRAM(S): at 05:02

## 2025-06-15 RX ADMIN — INSULIN LISPRO 5 UNIT(S): 100 INJECTION, SOLUTION INTRAVENOUS; SUBCUTANEOUS at 08:22

## 2025-06-15 RX ADMIN — Medication 400 MILLIGRAM(S): at 08:23

## 2025-06-15 RX ADMIN — Medication 1 APPLICATION(S): at 05:02

## 2025-06-15 RX ADMIN — Medication 400 MILLIGRAM(S): at 17:08

## 2025-06-15 NOTE — PROGRESS NOTE ADULT - ASSESSMENT
79 yo male with h/o SDH, lumbar spine OM s/p abx initially admitted with AMS. Found to have bacteremia, sepsis and cirrhosis. Pt was treated with abx for bacteremia. Diuresis for anasarca and supportive treatment for cirrhosis. Pt also has decubitus ulcer, s/p bedside debridement and local wound care. Currently, pt was stable clinically, pending dispo.     Decompensated cirrhosis  -continue rifaximin, lactulose, bumex    Sacral decubitus ulcer  -s/p debridement on 6/10  -continue local wound care  -short duration of abx per ID    Lumbar OM  -s/p abx treatment  -galium scan was obtained, no abnormal uptake in the lumbar spine    Bacteremia with bacteroides   -blood culture positive for bacteroides on presentation  -s/p ceftriaxone and flagyl  -repeat blood culture negative    Macrocytic Anemia:  multifactorial: sepsis, cirrhosis, poor nutrition, GI bleeding  -s/p EGD on 4/16 which showed 5mm ulcer in duodenal bulb post endo clip, repeat EGD on 4/29 showed no bleeding noted   transfused one unit of PRBC on 5/3    New Onset Afib  -found during this admission  -rate controlled, not candidate for AC given anemia, coagulopathy in setting of cirrhosis, pt is a poor candidate for AC     Handoff: pending dispo

## 2025-06-15 NOTE — PROGRESS NOTE ADULT - SUBJECTIVE AND OBJECTIVE BOX
CHIEF COMPLAINT:    Patient is a 80y old  Male who presents with a chief complaint of acute mentation changes (14 Jun 2025 15:02)    INTERVAL HPI/OVERNIGHT EVENTS:    Patient seen and examined at bedside. Pt asked to drink water, no other complaints.     Medications:  Standing  ampicillin/sulbactam  IVPB 3 Gram(s) IV Intermittent every 6 hours  ampicillin/sulbactam  IVPB      buMETAnide 1 milliGRAM(s) Oral every 12 hours  chlorhexidine 2% Cloths 1 Application(s) Topical <User Schedule>  dextrose 5%. 1000 milliLiter(s) IV Continuous <Continuous>  dextrose 5%. 1000 milliLiter(s) IV Continuous <Continuous>  dextrose 50% Injectable 25 Gram(s) IV Push once  dextrose 50% Injectable 12.5 Gram(s) IV Push once  dextrose 50% Injectable 25 Gram(s) IV Push once  doxycycline monohydrate Capsule 100 milliGRAM(s) Oral every 12 hours  folic acid 1 milliGRAM(s) Oral daily  glucagon  Injectable 1 milliGRAM(s) IntraMuscular once  insulin glargine Injectable (LANTUS) 15 Unit(s) SubCutaneous at bedtime  insulin lispro (ADMELOG) corrective regimen sliding scale   SubCutaneous Before meals and at bedtime  insulin lispro Injectable (ADMELOG) 5 Unit(s) SubCutaneous three times a day before meals  lactobacillus acidophilus 1 Tablet(s) Oral two times a day with meals  lactulose Syrup 20 Gram(s) Oral every 8 hours  levETIRAcetam  Solution 250 milliGRAM(s) Oral two times a day  lidocaine   4% Patch 1 Patch Transdermal daily  magnesium gluconate 500 milliGRAM(s) Oral daily  magnesium oxide 400 milliGRAM(s) Oral three times a day with meals  midodrine. 5 milliGRAM(s) Oral every 8 hours  nystatin Powder 1 Application(s) Topical two times a day  pantoprazole    Tablet 40 milliGRAM(s) Oral before breakfast  rifAXIMin 550 milliGRAM(s) Oral two times a day  saccharomyces boulardii 250 milliGRAM(s) Oral two times a day  senna 2 Tablet(s) Oral at bedtime  silver sulfADIAZINE 1% Cream 1 Application(s) Topical two times a day  spironolactone 100 milliGRAM(s) Oral daily    PRN Meds  acetaminophen     Tablet .. 650 milliGRAM(s) Oral every 8 hours PRN  aluminum hydroxide/magnesium hydroxide/simethicone Suspension 30 milliLiter(s) Oral every 4 hours PRN  dextrose Oral Gel 15 Gram(s) Oral once PRN  melatonin 3 milliGRAM(s) Oral at bedtime PRN  ondansetron Injectable 4 milliGRAM(s) IV Push every 8 hours PRN  oxyCODONE    IR 5 milliGRAM(s) Oral every 6 hours PRN    Vital Signs:    T(F): 98.2 (06-15-25 @ 04:25), Max: 98.2 (06-15-25 @ 04:25)  HR: 85 (06-15-25 @ 04:25) (85 - 100)  BP: 105/62 (06-15-25 @ 04:25) (102/58 - 105/62)  RR: 18 (06-15-25 @ 04:25) (18 - 18)  SpO2: 99% (06-15-25 @ 04:25) (92% - 99%)  I&O's Summary    Daily     Daily   CAPILLARY BLOOD GLUCOSE    POCT Blood Glucose.: 127 mg/dL (15 Stone 2025 11:32)  POCT Blood Glucose.: 126 mg/dL (15 Stone 2025 08:21)  POCT Blood Glucose.: 128 mg/dL (15 Stone 2025 07:18)  POCT Blood Glucose.: 177 mg/dL (14 Jun 2025 21:12)  POCT Blood Glucose.: 167 mg/dL (14 Jun 2025 19:43)  POCT Blood Glucose.: 96 mg/dL (14 Jun 2025 16:50)    PHYSICAL EXAM:  GENERAL:  NAD  SKIN: No rashes or lesions  HEENT: Atraumatic. Normocephalic. Anicteric  NECK:  No JVD.   PULMONARY: Clear to ausculation bilaterally. No wheezing. No rales  CVS: Normal S1, S2. Regular rate and rhythm. No murmurs.  ABDOMEN/GI: Soft, Nontender, Nondistended; Bowel sounds are present  EXTREMITIES: trace edema on b/l LE  BACK: left buttock big sacral pressure ulcer  NEUROLOGIC: awake, alert, sick appearing, no focal deficit    LABS:                        9.9    8.57  )-----------( 158      ( 15 Stone 2025 09:21 )             31.2     06-15    142  |  98  |  34[H]  ----------------------------<  118[H]  4.0   |  30  |  1.2    Ca    8.2[L]      15 Stone 2025 09:21  Mg     1.8     06-15      RADIOLOGY & ADDITIONAL TESTS:  Imaging or report Personally Reviewed:  [ ] YES  [ ] NO -->no new images

## 2025-06-16 PROCEDURE — 99232 SBSQ HOSP IP/OBS MODERATE 35: CPT | Mod: FS

## 2025-06-16 RX ORDER — SPIRONOLACTONE 25 MG
50 TABLET ORAL DAILY
Refills: 0 | Status: DISCONTINUED | OUTPATIENT
Start: 2025-06-16 | End: 2025-06-19

## 2025-06-16 RX ORDER — OXYCODONE HYDROCHLORIDE 30 MG/1
5 TABLET ORAL EVERY 6 HOURS
Refills: 0 | Status: DISCONTINUED | OUTPATIENT
Start: 2025-06-16 | End: 2025-06-18

## 2025-06-16 RX ORDER — BUMETANIDE 1 MG/1
1 TABLET ORAL DAILY
Refills: 0 | Status: DISCONTINUED | OUTPATIENT
Start: 2025-06-16 | End: 2025-06-19

## 2025-06-16 RX ADMIN — INSULIN LISPRO 5 UNIT(S): 100 INJECTION, SOLUTION INTRAVENOUS; SUBCUTANEOUS at 16:43

## 2025-06-16 RX ADMIN — INSULIN LISPRO 5 UNIT(S): 100 INJECTION, SOLUTION INTRAVENOUS; SUBCUTANEOUS at 07:53

## 2025-06-16 RX ADMIN — Medication 500 MILLIGRAM(S): at 13:14

## 2025-06-16 RX ADMIN — AMPICILLIN SODIUM AND SULBACTAM SODIUM 200 GRAM(S): 1; .5 INJECTION, POWDER, FOR SOLUTION INTRAMUSCULAR; INTRAVENOUS at 17:12

## 2025-06-16 RX ADMIN — INSULIN LISPRO 5 UNIT(S): 100 INJECTION, SOLUTION INTRAVENOUS; SUBCUTANEOUS at 11:50

## 2025-06-16 RX ADMIN — Medication 100 MILLIGRAM(S): at 05:05

## 2025-06-16 RX ADMIN — INSULIN LISPRO 4: 100 INJECTION, SOLUTION INTRAVENOUS; SUBCUTANEOUS at 07:53

## 2025-06-16 RX ADMIN — LACTULOSE 20 GRAM(S): 10 SOLUTION ORAL at 21:58

## 2025-06-16 RX ADMIN — MIDODRINE HYDROCHLORIDE 5 MILLIGRAM(S): 5 TABLET ORAL at 04:58

## 2025-06-16 RX ADMIN — LACTULOSE 20 GRAM(S): 10 SOLUTION ORAL at 04:56

## 2025-06-16 RX ADMIN — LEVETIRACETAM 250 MILLIGRAM(S): 10 INJECTION, SOLUTION INTRAVENOUS at 17:14

## 2025-06-16 RX ADMIN — Medication 2 TABLET(S): at 21:58

## 2025-06-16 RX ADMIN — OXYCODONE HYDROCHLORIDE 5 MILLIGRAM(S): 30 TABLET ORAL at 11:17

## 2025-06-16 RX ADMIN — INSULIN LISPRO 4: 100 INJECTION, SOLUTION INTRAVENOUS; SUBCUTANEOUS at 21:00

## 2025-06-16 RX ADMIN — LEVETIRACETAM 250 MILLIGRAM(S): 10 INJECTION, SOLUTION INTRAVENOUS at 04:57

## 2025-06-16 RX ADMIN — Medication 1 APPLICATION(S): at 17:12

## 2025-06-16 RX ADMIN — MIDODRINE HYDROCHLORIDE 5 MILLIGRAM(S): 5 TABLET ORAL at 21:58

## 2025-06-16 RX ADMIN — Medication 1 APPLICATION(S): at 05:05

## 2025-06-16 RX ADMIN — Medication 400 MILLIGRAM(S): at 16:45

## 2025-06-16 RX ADMIN — BUMETANIDE 1 MILLIGRAM(S): 1 TABLET ORAL at 04:59

## 2025-06-16 RX ADMIN — Medication 1 TABLET(S): at 07:55

## 2025-06-16 RX ADMIN — Medication 40 MILLIGRAM(S): at 04:56

## 2025-06-16 RX ADMIN — Medication 100 MILLIGRAM(S): at 17:13

## 2025-06-16 RX ADMIN — BUTYROSPERMUM PARKII(SHEA BUTTER), SIMMONDSIA CHINENSIS (JOJOBA) SEED OIL, ALOE BARBADENSIS LEAF EXTRACT 250 MILLIGRAM(S): .01; 1; 3.5 LIQUID TOPICAL at 04:58

## 2025-06-16 RX ADMIN — AMPICILLIN SODIUM AND SULBACTAM SODIUM 200 GRAM(S): 1; .5 INJECTION, POWDER, FOR SOLUTION INTRAMUSCULAR; INTRAVENOUS at 11:18

## 2025-06-16 RX ADMIN — OXYCODONE HYDROCHLORIDE 5 MILLIGRAM(S): 30 TABLET ORAL at 13:01

## 2025-06-16 RX ADMIN — AMPICILLIN SODIUM AND SULBACTAM SODIUM 200 GRAM(S): 1; .5 INJECTION, POWDER, FOR SOLUTION INTRAMUSCULAR; INTRAVENOUS at 04:55

## 2025-06-16 RX ADMIN — NYSTATIN 1 APPLICATION(S): 100000 CREAM TOPICAL at 17:12

## 2025-06-16 RX ADMIN — LIDOCAINE HYDROCHLORIDE 1 PATCH: 20 JELLY TOPICAL at 11:18

## 2025-06-16 RX ADMIN — INSULIN GLARGINE-YFGN 15 UNIT(S): 100 INJECTION, SOLUTION SUBCUTANEOUS at 21:30

## 2025-06-16 RX ADMIN — LACTULOSE 20 GRAM(S): 10 SOLUTION ORAL at 13:14

## 2025-06-16 RX ADMIN — Medication 1 APPLICATION(S): at 05:02

## 2025-06-16 RX ADMIN — INSULIN LISPRO 4: 100 INJECTION, SOLUTION INTRAVENOUS; SUBCUTANEOUS at 16:43

## 2025-06-16 RX ADMIN — BUTYROSPERMUM PARKII(SHEA BUTTER), SIMMONDSIA CHINENSIS (JOJOBA) SEED OIL, ALOE BARBADENSIS LEAF EXTRACT 250 MILLIGRAM(S): .01; 1; 3.5 LIQUID TOPICAL at 17:13

## 2025-06-16 RX ADMIN — Medication 100 MILLIGRAM(S): at 05:00

## 2025-06-16 RX ADMIN — FOLIC ACID 1 MILLIGRAM(S): 1 TABLET ORAL at 11:17

## 2025-06-16 RX ADMIN — Medication 400 MILLIGRAM(S): at 07:55

## 2025-06-16 RX ADMIN — Medication 400 MILLIGRAM(S): at 11:20

## 2025-06-16 RX ADMIN — NYSTATIN 1 APPLICATION(S): 100000 CREAM TOPICAL at 05:05

## 2025-06-16 RX ADMIN — AMPICILLIN SODIUM AND SULBACTAM SODIUM 200 GRAM(S): 1; .5 INJECTION, POWDER, FOR SOLUTION INTRAMUSCULAR; INTRAVENOUS at 00:48

## 2025-06-16 RX ADMIN — INSULIN LISPRO 2: 100 INJECTION, SOLUTION INTRAVENOUS; SUBCUTANEOUS at 11:50

## 2025-06-16 RX ADMIN — MIDODRINE HYDROCHLORIDE 5 MILLIGRAM(S): 5 TABLET ORAL at 13:14

## 2025-06-16 RX ADMIN — Medication 50 MILLIGRAM(S): at 13:59

## 2025-06-16 RX ADMIN — Medication 1 TABLET(S): at 16:45

## 2025-06-16 NOTE — PROGRESS NOTE ADULT - SUBJECTIVE AND OBJECTIVE BOX
CHIEF COMPLAINT:    Patient is a 80y old  Male who presents with a chief complaint of acute mentation changes (15 Stoen 2025 12:51)    INTERVAL HPI/OVERNIGHT EVENTS:    Patient seen and examined at bedside. No acute overnight events occurred. Clinically unchanged.     Medications:  Standing  ampicillin/sulbactam  IVPB      ampicillin/sulbactam  IVPB 3 Gram(s) IV Intermittent every 6 hours  bumetanide 1 milliGRAM(s) Oral daily  chlorhexidine 2% Cloths 1 Application(s) Topical <User Schedule>  dextrose 5%. 1000 milliLiter(s) IV Continuous <Continuous>  dextrose 5%. 1000 milliLiter(s) IV Continuous <Continuous>  dextrose 50% Injectable 25 Gram(s) IV Push once  dextrose 50% Injectable 12.5 Gram(s) IV Push once  dextrose 50% Injectable 25 Gram(s) IV Push once  doxycycline monohydrate Capsule 100 milliGRAM(s) Oral every 12 hours  folic acid 1 milliGRAM(s) Oral daily  glucagon  Injectable 1 milliGRAM(s) IntraMuscular once  insulin glargine Injectable (LANTUS) 15 Unit(s) SubCutaneous at bedtime  insulin lispro (ADMELOG) corrective regimen sliding scale   SubCutaneous Before meals and at bedtime  insulin lispro Injectable (ADMELOG) 5 Unit(s) SubCutaneous three times a day before meals  lactobacillus acidophilus 1 Tablet(s) Oral two times a day with meals  lactulose Syrup 20 Gram(s) Oral every 8 hours  levETIRAcetam  Solution 250 milliGRAM(s) Oral two times a day  lidocaine   4% Patch 1 Patch Transdermal daily  magnesium gluconate 500 milliGRAM(s) Oral daily  magnesium oxide 400 milliGRAM(s) Oral three times a day with meals  midodrine. 5 milliGRAM(s) Oral every 8 hours  nystatin Powder 1 Application(s) Topical two times a day  pantoprazole    Tablet 40 milliGRAM(s) Oral before breakfast  rifAXIMin 550 milliGRAM(s) Oral two times a day  saccharomyces boulardii 250 milliGRAM(s) Oral two times a day  senna 2 Tablet(s) Oral at bedtime  silver sulfADIAZINE 1% Cream 1 Application(s) Topical two times a day  spironolactone 50 milliGRAM(s) Oral daily    PRN Meds  acetaminophen     Tablet .. 650 milliGRAM(s) Oral every 8 hours PRN  aluminum hydroxide/magnesium hydroxide/simethicone Suspension 30 milliLiter(s) Oral every 4 hours PRN  dextrose Oral Gel 15 Gram(s) Oral once PRN  melatonin 3 milliGRAM(s) Oral at bedtime PRN  ondansetron Injectable 4 milliGRAM(s) IV Push every 8 hours PRN  oxyCODONE    IR 5 milliGRAM(s) Oral every 6 hours PRN    Vital Signs:    T(F): 99.2 (06-16-25 @ 12:50), Max: 99.2 (06-16-25 @ 12:50)  HR: 82 (06-16-25 @ 12:50) (82 - 83)  BP: 121/56 (06-16-25 @ 12:50) (101/60 - 121/56)  RR: 18 (06-16-25 @ 12:50) (18 - 18)  SpO2: 99% (06-16-25 @ 12:50) (99% - 100%)  I&O's Summary    15 Stone 2025 07:01  -  16 Jun 2025 07:00  --------------------------------------------------------  IN: 0 mL / OUT: 400 mL / NET: -400 mL      Daily     Daily   CAPILLARY BLOOD GLUCOSE    POCT Blood Glucose.: 202 mg/dL (16 Jun 2025 16:17)  POCT Blood Glucose.: 163 mg/dL (16 Jun 2025 11:24)  POCT Blood Glucose.: 214 mg/dL (16 Jun 2025 07:30)  POCT Blood Glucose.: 215 mg/dL (15 Stone 2025 20:52)    PHYSICAL EXAM:  GENERAL:  NAD  SKIN: No rashes or lesions  HEENT: Atraumatic. Normocephalic. Anicteric  NECK:  No JVD.   PULMONARY: Clear to ausculation bilaterally. No wheezing. No rales  CVS: Normal S1, S2. Regular rate and rhythm. No murmurs.  ABDOMEN/GI: Soft, Nontender, Nondistended; Bowel sounds are present  EXTREMITIES: trace edema on b/l LE  BACK: left buttock big sacral pressure ulcer  NEUROLOGIC: awake, alert, sick appearing, no focal deficit    LABS:                        9.9    8.57  )-----------( 158      ( 15 Stone 2025 09:21 )             31.2     06-15    142  |  98  |  34[H]  ----------------------------<  118[H]  4.0   |  30  |  1.2    Ca    8.2[L]      15 Stone 2025 09:21  Mg     1.8     06-15      RADIOLOGY & ADDITIONAL TESTS:  Imaging or report Personally Reviewed:  [ ] YES  [ ] NO -->no new images

## 2025-06-16 NOTE — PROGRESS NOTE ADULT - ASSESSMENT
81 yo male with h/o SDH, lumbar spine OM s/p abx initially admitted with AMS. Found to have bacteremia, sepsis and cirrhosis. Pt was treated with abx for bacteremia. Diuresis for anasarca and supportive treatment for cirrhosis. Pt also has decubitus ulcer, s/p bedside debridement and local wound care. Currently, pt was stable clinically, pending dispo.     Decompensated cirrhosis  -continue rifaximin, lactulose, bumex    Sacral decubitus ulcer  -s/p debridement on 6/10  -continue local wound care  -short duration of abx per ID    Lumbar OM  -s/p abx treatment  -galium scan was obtained, no abnormal uptake in the lumbar spine    Bacteremia with bacteroides   -blood culture positive for bacteroides on presentation  -s/p ceftriaxone and flagyl  -repeat blood culture negative    Macrocytic Anemia:  multifactorial: sepsis, cirrhosis, poor nutrition, GI bleeding  -s/p EGD on 4/16 which showed 5mm ulcer in duodenal bulb post endo clip, repeat EGD on 4/29 showed no bleeding noted   transfused one unit of PRBC on 5/3    New Onset Afib  -found during this admission  -rate controlled, not candidate for AC given anemia, coagulopathy in setting of cirrhosis, pt is a poor candidate for AC     Handoff: pending dispo

## 2025-06-17 LAB
ANION GAP SERPL CALC-SCNC: 14 MMOL/L — SIGNIFICANT CHANGE UP (ref 7–14)
BUN SERPL-MCNC: 24 MG/DL — HIGH (ref 10–20)
CALCIUM SERPL-MCNC: 7.9 MG/DL — LOW (ref 8.4–10.5)
CHLORIDE SERPL-SCNC: 97 MMOL/L — LOW (ref 98–110)
CO2 SERPL-SCNC: 25 MMOL/L — SIGNIFICANT CHANGE UP (ref 17–32)
CREAT SERPL-MCNC: 1 MG/DL — SIGNIFICANT CHANGE UP (ref 0.7–1.5)
EGFR: 77 ML/MIN/1.73M2 — SIGNIFICANT CHANGE UP
EGFR: 77 ML/MIN/1.73M2 — SIGNIFICANT CHANGE UP
GLUCOSE SERPL-MCNC: 160 MG/DL — HIGH (ref 70–99)
HCT VFR BLD CALC: 28.8 % — LOW (ref 39–50)
HGB BLD-MCNC: 9 G/DL — LOW (ref 13–17)
MAGNESIUM SERPL-MCNC: 1.6 MG/DL — LOW (ref 1.8–2.4)
MCHC RBC-ENTMCNC: 31.3 G/DL — LOW (ref 32–36)
MCHC RBC-ENTMCNC: 31.6 PG — SIGNIFICANT CHANGE UP (ref 27–34)
MCV RBC AUTO: 101.1 FL — HIGH (ref 80–100)
NRBC # BLD AUTO: 0 K/UL — SIGNIFICANT CHANGE UP (ref 0–0)
NRBC # FLD: 0 K/UL — SIGNIFICANT CHANGE UP (ref 0–0)
NRBC BLD AUTO-RTO: 0 /100 WBCS — SIGNIFICANT CHANGE UP (ref 0–0)
PLATELET # BLD AUTO: 149 K/UL — LOW (ref 150–400)
PMV BLD: 9 FL — SIGNIFICANT CHANGE UP (ref 7–13)
POTASSIUM SERPL-MCNC: 3.8 MMOL/L — SIGNIFICANT CHANGE UP (ref 3.5–5)
POTASSIUM SERPL-SCNC: 3.8 MMOL/L — SIGNIFICANT CHANGE UP (ref 3.5–5)
RBC # BLD: 2.85 M/UL — LOW (ref 4.2–5.8)
RBC # FLD: 18.2 % — HIGH (ref 10.3–14.5)
SODIUM SERPL-SCNC: 136 MMOL/L — SIGNIFICANT CHANGE UP (ref 135–146)
WBC # BLD: 8.6 K/UL — SIGNIFICANT CHANGE UP (ref 3.8–10.5)
WBC # FLD AUTO: 8.6 K/UL — SIGNIFICANT CHANGE UP (ref 3.8–10.5)

## 2025-06-17 PROCEDURE — 99232 SBSQ HOSP IP/OBS MODERATE 35: CPT | Mod: FS

## 2025-06-17 RX ADMIN — Medication 40 MILLIGRAM(S): at 06:50

## 2025-06-17 RX ADMIN — Medication 1 TABLET(S): at 17:10

## 2025-06-17 RX ADMIN — Medication 1 APPLICATION(S): at 06:44

## 2025-06-17 RX ADMIN — LACTULOSE 20 GRAM(S): 10 SOLUTION ORAL at 21:18

## 2025-06-17 RX ADMIN — AMPICILLIN SODIUM AND SULBACTAM SODIUM 200 GRAM(S): 1; .5 INJECTION, POWDER, FOR SOLUTION INTRAMUSCULAR; INTRAVENOUS at 17:09

## 2025-06-17 RX ADMIN — LEVETIRACETAM 250 MILLIGRAM(S): 10 INJECTION, SOLUTION INTRAVENOUS at 17:09

## 2025-06-17 RX ADMIN — LEVETIRACETAM 250 MILLIGRAM(S): 10 INJECTION, SOLUTION INTRAVENOUS at 06:45

## 2025-06-17 RX ADMIN — Medication 100 MILLIGRAM(S): at 17:10

## 2025-06-17 RX ADMIN — Medication 1 APPLICATION(S): at 07:22

## 2025-06-17 RX ADMIN — MIDODRINE HYDROCHLORIDE 5 MILLIGRAM(S): 5 TABLET ORAL at 21:19

## 2025-06-17 RX ADMIN — Medication 100 MILLIGRAM(S): at 06:50

## 2025-06-17 RX ADMIN — MIDODRINE HYDROCHLORIDE 5 MILLIGRAM(S): 5 TABLET ORAL at 06:50

## 2025-06-17 RX ADMIN — INSULIN LISPRO 5 UNIT(S): 100 INJECTION, SOLUTION INTRAVENOUS; SUBCUTANEOUS at 17:11

## 2025-06-17 RX ADMIN — Medication 2 TABLET(S): at 21:18

## 2025-06-17 RX ADMIN — Medication 400 MILLIGRAM(S): at 08:00

## 2025-06-17 RX ADMIN — BUTYROSPERMUM PARKII(SHEA BUTTER), SIMMONDSIA CHINENSIS (JOJOBA) SEED OIL, ALOE BARBADENSIS LEAF EXTRACT 250 MILLIGRAM(S): .01; 1; 3.5 LIQUID TOPICAL at 17:12

## 2025-06-17 RX ADMIN — LACTULOSE 20 GRAM(S): 10 SOLUTION ORAL at 06:45

## 2025-06-17 RX ADMIN — AMPICILLIN SODIUM AND SULBACTAM SODIUM 200 GRAM(S): 1; .5 INJECTION, POWDER, FOR SOLUTION INTRAMUSCULAR; INTRAVENOUS at 06:42

## 2025-06-17 RX ADMIN — AMPICILLIN SODIUM AND SULBACTAM SODIUM 200 GRAM(S): 1; .5 INJECTION, POWDER, FOR SOLUTION INTRAMUSCULAR; INTRAVENOUS at 00:16

## 2025-06-17 RX ADMIN — MIDODRINE HYDROCHLORIDE 5 MILLIGRAM(S): 5 TABLET ORAL at 13:58

## 2025-06-17 RX ADMIN — FOLIC ACID 1 MILLIGRAM(S): 1 TABLET ORAL at 11:55

## 2025-06-17 RX ADMIN — INSULIN LISPRO 2: 100 INJECTION, SOLUTION INTRAVENOUS; SUBCUTANEOUS at 16:49

## 2025-06-17 RX ADMIN — INSULIN LISPRO 2: 100 INJECTION, SOLUTION INTRAVENOUS; SUBCUTANEOUS at 11:54

## 2025-06-17 RX ADMIN — Medication 500 MILLIGRAM(S): at 11:55

## 2025-06-17 RX ADMIN — Medication 1 APPLICATION(S): at 17:12

## 2025-06-17 RX ADMIN — AMPICILLIN SODIUM AND SULBACTAM SODIUM 200 GRAM(S): 1; .5 INJECTION, POWDER, FOR SOLUTION INTRAMUSCULAR; INTRAVENOUS at 11:55

## 2025-06-17 RX ADMIN — INSULIN LISPRO 5 UNIT(S): 100 INJECTION, SOLUTION INTRAVENOUS; SUBCUTANEOUS at 08:00

## 2025-06-17 RX ADMIN — LACTULOSE 20 GRAM(S): 10 SOLUTION ORAL at 13:57

## 2025-06-17 RX ADMIN — Medication 1 TABLET(S): at 08:00

## 2025-06-17 RX ADMIN — Medication 400 MILLIGRAM(S): at 11:55

## 2025-06-17 RX ADMIN — NYSTATIN 1 APPLICATION(S): 100000 CREAM TOPICAL at 06:43

## 2025-06-17 RX ADMIN — BUMETANIDE 1 MILLIGRAM(S): 1 TABLET ORAL at 06:50

## 2025-06-17 RX ADMIN — Medication 400 MILLIGRAM(S): at 17:10

## 2025-06-17 RX ADMIN — INSULIN LISPRO 5 UNIT(S): 100 INJECTION, SOLUTION INTRAVENOUS; SUBCUTANEOUS at 11:54

## 2025-06-17 RX ADMIN — Medication 50 MILLIGRAM(S): at 06:51

## 2025-06-17 RX ADMIN — INSULIN GLARGINE-YFGN 15 UNIT(S): 100 INJECTION, SOLUTION SUBCUTANEOUS at 21:19

## 2025-06-17 RX ADMIN — NYSTATIN 1 APPLICATION(S): 100000 CREAM TOPICAL at 17:12

## 2025-06-17 RX ADMIN — BUTYROSPERMUM PARKII(SHEA BUTTER), SIMMONDSIA CHINENSIS (JOJOBA) SEED OIL, ALOE BARBADENSIS LEAF EXTRACT 250 MILLIGRAM(S): .01; 1; 3.5 LIQUID TOPICAL at 06:50

## 2025-06-17 NOTE — PROGRESS NOTE ADULT - SUBJECTIVE AND OBJECTIVE BOX
CHIEF COMPLAINT:    Patient is a 80y old  Male who presents with a chief complaint of acute mentation changes (16 Jun 2025 19:38)    INTERVAL HPI/OVERNIGHT EVENTS:    Patient seen and examined at bedside. No acute overnight events occurred.  Pt has no complaints.     Medications:  Standing  ampicillin/sulbactam  IVPB      ampicillin/sulbactam  IVPB 3 Gram(s) IV Intermittent every 6 hours  bumetanide 1 milliGRAM(s) Oral daily  chlorhexidine 2% Cloths 1 Application(s) Topical <User Schedule>  dextrose 5%. 1000 milliLiter(s) IV Continuous <Continuous>  dextrose 5%. 1000 milliLiter(s) IV Continuous <Continuous>  dextrose 50% Injectable 25 Gram(s) IV Push once  dextrose 50% Injectable 12.5 Gram(s) IV Push once  dextrose 50% Injectable 25 Gram(s) IV Push once  doxycycline monohydrate Capsule 100 milliGRAM(s) Oral every 12 hours  folic acid 1 milliGRAM(s) Oral daily  glucagon  Injectable 1 milliGRAM(s) IntraMuscular once  insulin glargine Injectable (LANTUS) 15 Unit(s) SubCutaneous at bedtime  insulin lispro (ADMELOG) corrective regimen sliding scale   SubCutaneous Before meals and at bedtime  insulin lispro Injectable (ADMELOG) 5 Unit(s) SubCutaneous three times a day before meals  lactobacillus acidophilus 1 Tablet(s) Oral two times a day with meals  lactulose Syrup 20 Gram(s) Oral every 8 hours  levETIRAcetam  Solution 250 milliGRAM(s) Oral two times a day  lidocaine   4% Patch 1 Patch Transdermal daily  magnesium gluconate 500 milliGRAM(s) Oral daily  magnesium oxide 400 milliGRAM(s) Oral three times a day with meals  midodrine. 5 milliGRAM(s) Oral every 8 hours  nystatin Powder 1 Application(s) Topical two times a day  pantoprazole    Tablet 40 milliGRAM(s) Oral before breakfast  rifAXIMin 550 milliGRAM(s) Oral two times a day  saccharomyces boulardii 250 milliGRAM(s) Oral two times a day  senna 2 Tablet(s) Oral at bedtime  silver sulfADIAZINE 1% Cream 1 Application(s) Topical two times a day  spironolactone 50 milliGRAM(s) Oral daily    PRN Meds  acetaminophen     Tablet .. 650 milliGRAM(s) Oral every 8 hours PRN  aluminum hydroxide/magnesium hydroxide/simethicone Suspension 30 milliLiter(s) Oral every 4 hours PRN  dextrose Oral Gel 15 Gram(s) Oral once PRN  melatonin 3 milliGRAM(s) Oral at bedtime PRN  ondansetron Injectable 4 milliGRAM(s) IV Push every 8 hours PRN  oxyCODONE    IR 5 milliGRAM(s) Oral every 6 hours PRN    Vital Signs:    T(F): 98.4 (06-17-25 @ 14:28), Max: 98.4 (06-17-25 @ 14:28)  HR: 83 (06-17-25 @ 14:28) (78 - 87)  BP: 106/58 (06-17-25 @ 14:28) (106/58 - 114/57)  RR: 16 (06-17-25 @ 14:28) (16 - 18)  SpO2: 99% (06-17-25 @ 14:28) (99% - 100%)  I&O's Summary    17 Jun 2025 07:01  -  17 Jun 2025 16:35  --------------------------------------------------------  IN: 0 mL / OUT: 650 mL / NET: -650 mL    Daily     Daily   CAPILLARY BLOOD GLUCOSE    POCT Blood Glucose.: 158 mg/dL (17 Jun 2025 16:09)  POCT Blood Glucose.: 174 mg/dL (17 Jun 2025 11:32)  POCT Blood Glucose.: 137 mg/dL (17 Jun 2025 07:29)  POCT Blood Glucose.: 230 mg/dL (16 Jun 2025 20:42)    PHYSICAL EXAM:  GENERAL:  NAD  SKIN: No rashes or lesions  HEENT: Atraumatic. Normocephalic. Anicteric  NECK:  No JVD.   PULMONARY: Clear to ausculation bilaterally. No wheezing. No rales  CVS: Normal S1, S2. Regular rate and rhythm. No murmurs.  ABDOMEN/GI: Soft, Nontender, Nondistended; Bowel sounds are present  EXTREMITIES: trace edema on b/l LE  BACK: left buttock big sacral pressure ulcer    LABS:                        9.0    8.60  )-----------( 149      ( 17 Jun 2025 15:06 )             28.8     06-17    136  |  97[L]  |  24[H]  ----------------------------<  160[H]  3.8   |  25  |  1.0    Ca    7.9[L]      17 Jun 2025 15:06  Mg     1.6     06-17    RADIOLOGY & ADDITIONAL TESTS:  Imaging or report Personally Reviewed:  [ ] YES  [ ] NO -->no new images

## 2025-06-17 NOTE — PROGRESS NOTE ADULT - ASSESSMENT
79 yo male with h/o SDH, lumbar spine OM s/p abx initially admitted with AMS. Found to have bacteremia, sepsis and cirrhosis. Pt was treated with abx for bacteremia. Diuresis for anasarca and supportive treatment for cirrhosis. Pt also has decubitus ulcer, s/p bedside debridement and local wound care. Currently, pt was stable clinically, pending dispo.     Decompensated cirrhosis  -continue rifaximin, lactulose, bumex    Anasarca  -2/2 hypoalbuminemia  -swelling has improved after diuresis  -reduce bumex and spironolactone dose    Sacral decubitus ulcer  -s/p debridement on 6/10  -continue local wound care  -short duration of abx per ID    Lumbar OM  -s/p abx treatment  -galium scan was obtained, no abnormal uptake in the lumbar spine    Bacteremia with bacteroides   -blood culture positive for bacteroides on presentation  -s/p ceftriaxone and flagyl  -repeat blood culture negative    Macrocytic Anemia:  multifactorial: sepsis, cirrhosis, poor nutrition, GI bleeding  -s/p EGD on 4/16 which showed 5mm ulcer in duodenal bulb post endo clip, repeat EGD on 4/29 showed no bleeding noted   transfused one unit of PRBC on 5/3    New Onset Afib  -found during this admission  -rate controlled, not candidate for AC given anemia, coagulopathy in setting of cirrhosis, pt is a poor candidate for AC     Handoff: pending dispo

## 2025-06-18 LAB
ALBUMIN SERPL ELPH-MCNC: 2.1 G/DL — LOW (ref 3.5–5.2)
ALBUMIN SERPL ELPH-MCNC: 2.1 G/DL — LOW (ref 3.5–5.2)
ALP SERPL-CCNC: 340 U/L — HIGH (ref 30–115)
ALP SERPL-CCNC: 342 U/L — HIGH (ref 30–115)
ALT FLD-CCNC: 62 U/L — HIGH (ref 0–41)
ALT FLD-CCNC: 63 U/L — HIGH (ref 0–41)
AMMONIA BLD-MCNC: 47 UMOL/L — SIGNIFICANT CHANGE UP (ref 11–55)
AMMONIA BLD-MCNC: 58 UMOL/L — HIGH (ref 11–55)
ANION GAP SERPL CALC-SCNC: 17 MMOL/L — HIGH (ref 7–14)
ANION GAP SERPL CALC-SCNC: 17 MMOL/L — HIGH (ref 7–14)
APPEARANCE UR: ABNORMAL
AST SERPL-CCNC: 100 U/L — HIGH (ref 0–41)
AST SERPL-CCNC: 90 U/L — HIGH (ref 0–41)
BACTERIA # UR AUTO: ABNORMAL /HPF
BASOPHILS # BLD AUTO: 0.02 K/UL — SIGNIFICANT CHANGE UP (ref 0–0.2)
BASOPHILS # BLD AUTO: 0.03 K/UL — SIGNIFICANT CHANGE UP (ref 0–0.2)
BASOPHILS NFR BLD AUTO: 0.2 % — SIGNIFICANT CHANGE UP (ref 0–2)
BASOPHILS NFR BLD AUTO: 0.4 % — SIGNIFICANT CHANGE UP (ref 0–2)
BILIRUB SERPL-MCNC: 1 MG/DL — SIGNIFICANT CHANGE UP (ref 0.2–1.2)
BILIRUB SERPL-MCNC: 1.3 MG/DL — HIGH (ref 0.2–1.2)
BILIRUB UR-MCNC: NEGATIVE — SIGNIFICANT CHANGE UP
BUN SERPL-MCNC: 24 MG/DL — HIGH (ref 10–20)
BUN SERPL-MCNC: 25 MG/DL — HIGH (ref 10–20)
CALCIUM SERPL-MCNC: 7.9 MG/DL — LOW (ref 8.4–10.5)
CALCIUM SERPL-MCNC: 8.1 MG/DL — LOW (ref 8.4–10.5)
CHLORIDE SERPL-SCNC: 97 MMOL/L — LOW (ref 98–110)
CHLORIDE SERPL-SCNC: 99 MMOL/L — SIGNIFICANT CHANGE UP (ref 98–110)
CO2 SERPL-SCNC: 24 MMOL/L — SIGNIFICANT CHANGE UP (ref 17–32)
CO2 SERPL-SCNC: 25 MMOL/L — SIGNIFICANT CHANGE UP (ref 17–32)
COLOR SPEC: YELLOW — SIGNIFICANT CHANGE UP
CREAT SERPL-MCNC: 1 MG/DL — SIGNIFICANT CHANGE UP (ref 0.7–1.5)
CREAT SERPL-MCNC: 1.2 MG/DL — SIGNIFICANT CHANGE UP (ref 0.7–1.5)
DIFF PNL FLD: ABNORMAL
EGFR: 62 ML/MIN/1.73M2 — SIGNIFICANT CHANGE UP
EGFR: 62 ML/MIN/1.73M2 — SIGNIFICANT CHANGE UP
EGFR: 77 ML/MIN/1.73M2 — SIGNIFICANT CHANGE UP
EGFR: 77 ML/MIN/1.73M2 — SIGNIFICANT CHANGE UP
EOSINOPHIL # BLD AUTO: 0.15 K/UL — SIGNIFICANT CHANGE UP (ref 0–0.5)
EOSINOPHIL # BLD AUTO: 0.17 K/UL — SIGNIFICANT CHANGE UP (ref 0–0.5)
EOSINOPHIL NFR BLD AUTO: 1.8 % — SIGNIFICANT CHANGE UP (ref 0–6)
EOSINOPHIL NFR BLD AUTO: 2.1 % — SIGNIFICANT CHANGE UP (ref 0–6)
EPI CELLS # UR: PRESENT
GLUCOSE BLDC GLUCOMTR-MCNC: 161 MG/DL — HIGH (ref 70–99)
GLUCOSE BLDC GLUCOMTR-MCNC: 200 MG/DL — HIGH (ref 70–99)
GLUCOSE BLDC GLUCOMTR-MCNC: 205 MG/DL — HIGH (ref 70–99)
GLUCOSE SERPL-MCNC: 185 MG/DL — HIGH (ref 70–99)
GLUCOSE SERPL-MCNC: 211 MG/DL — HIGH (ref 70–99)
GLUCOSE UR QL: NEGATIVE MG/DL — SIGNIFICANT CHANGE UP
HCT VFR BLD CALC: 26.9 % — LOW (ref 39–50)
HCT VFR BLD CALC: 29.6 % — LOW (ref 39–50)
HGB BLD-MCNC: 8.5 G/DL — LOW (ref 13–17)
HGB BLD-MCNC: 9.2 G/DL — LOW (ref 13–17)
IMM GRANULOCYTES # BLD AUTO: 0.09 K/UL — HIGH (ref 0–0.07)
IMM GRANULOCYTES # BLD AUTO: 0.1 K/UL — HIGH (ref 0–0.07)
IMM GRANULOCYTES NFR BLD AUTO: 1.1 % — HIGH (ref 0–0.9)
IMM GRANULOCYTES NFR BLD AUTO: 1.2 % — HIGH (ref 0–0.9)
KETONES UR QL: NEGATIVE MG/DL — SIGNIFICANT CHANGE UP
LEUKOCYTE ESTERASE UR-ACNC: ABNORMAL
LYMPHOCYTES # BLD AUTO: 2.09 K/UL — SIGNIFICANT CHANGE UP (ref 1–3.3)
LYMPHOCYTES # BLD AUTO: 2.12 K/UL — SIGNIFICANT CHANGE UP (ref 1–3.3)
LYMPHOCYTES NFR BLD AUTO: 25.6 % — SIGNIFICANT CHANGE UP (ref 13–44)
LYMPHOCYTES NFR BLD AUTO: 26 % — SIGNIFICANT CHANGE UP (ref 13–44)
MAGNESIUM SERPL-MCNC: 1.6 MG/DL — LOW (ref 1.8–2.4)
MAGNESIUM SERPL-MCNC: 1.9 MG/DL — SIGNIFICANT CHANGE UP (ref 1.8–2.4)
MCHC RBC-ENTMCNC: 31.1 G/DL — LOW (ref 32–36)
MCHC RBC-ENTMCNC: 31.2 PG — SIGNIFICANT CHANGE UP (ref 27–34)
MCHC RBC-ENTMCNC: 31.5 PG — SIGNIFICANT CHANGE UP (ref 27–34)
MCHC RBC-ENTMCNC: 31.6 G/DL — LOW (ref 32–36)
MCV RBC AUTO: 100.3 FL — HIGH (ref 80–100)
MCV RBC AUTO: 99.6 FL — SIGNIFICANT CHANGE UP (ref 80–100)
MONOCYTES # BLD AUTO: 0.83 K/UL — SIGNIFICANT CHANGE UP (ref 0–0.9)
MONOCYTES # BLD AUTO: 0.87 K/UL — SIGNIFICANT CHANGE UP (ref 0–0.9)
MONOCYTES NFR BLD AUTO: 10.1 % — SIGNIFICANT CHANGE UP (ref 2–14)
MONOCYTES NFR BLD AUTO: 10.7 % — SIGNIFICANT CHANGE UP (ref 2–14)
NEUTROPHILS # BLD AUTO: 4.88 K/UL — SIGNIFICANT CHANGE UP (ref 1.8–7.4)
NEUTROPHILS # BLD AUTO: 4.99 K/UL — SIGNIFICANT CHANGE UP (ref 1.8–7.4)
NEUTROPHILS NFR BLD AUTO: 59.7 % — SIGNIFICANT CHANGE UP (ref 43–77)
NEUTROPHILS NFR BLD AUTO: 61.1 % — SIGNIFICANT CHANGE UP (ref 43–77)
NITRITE UR-MCNC: NEGATIVE — SIGNIFICANT CHANGE UP
NRBC # BLD AUTO: 0 K/UL — SIGNIFICANT CHANGE UP (ref 0–0)
NRBC # BLD AUTO: 0 K/UL — SIGNIFICANT CHANGE UP (ref 0–0)
NRBC # FLD: 0 K/UL — SIGNIFICANT CHANGE UP (ref 0–0)
NRBC # FLD: 0 K/UL — SIGNIFICANT CHANGE UP (ref 0–0)
NRBC BLD AUTO-RTO: 0 /100 WBCS — SIGNIFICANT CHANGE UP (ref 0–0)
NRBC BLD AUTO-RTO: 0 /100 WBCS — SIGNIFICANT CHANGE UP (ref 0–0)
PH UR: 6.5 — SIGNIFICANT CHANGE UP (ref 5–8)
PHOSPHATE SERPL-MCNC: 2.2 MG/DL — SIGNIFICANT CHANGE UP (ref 2.1–4.9)
PHOSPHATE SERPL-MCNC: 2.6 MG/DL — SIGNIFICANT CHANGE UP (ref 2.1–4.9)
PLATELET # BLD AUTO: 152 K/UL — SIGNIFICANT CHANGE UP (ref 150–400)
PLATELET # BLD AUTO: 171 K/UL — SIGNIFICANT CHANGE UP (ref 150–400)
PMV BLD: 9.1 FL — SIGNIFICANT CHANGE UP (ref 7–13)
PMV BLD: 9.3 FL — SIGNIFICANT CHANGE UP (ref 7–13)
POTASSIUM SERPL-MCNC: 3.3 MMOL/L — LOW (ref 3.5–5)
POTASSIUM SERPL-MCNC: 3.6 MMOL/L — SIGNIFICANT CHANGE UP (ref 3.5–5)
POTASSIUM SERPL-SCNC: 3.3 MMOL/L — LOW (ref 3.5–5)
POTASSIUM SERPL-SCNC: 3.6 MMOL/L — SIGNIFICANT CHANGE UP (ref 3.5–5)
PROT SERPL-MCNC: 5 G/DL — LOW (ref 6–8)
PROT SERPL-MCNC: 5.4 G/DL — LOW (ref 6–8)
PROT UR-MCNC: NEGATIVE MG/DL — SIGNIFICANT CHANGE UP
RBC # BLD: 2.7 M/UL — LOW (ref 4.2–5.8)
RBC # BLD: 2.95 M/UL — LOW (ref 4.2–5.8)
RBC # FLD: 18 % — HIGH (ref 10.3–14.5)
RBC # FLD: 18.1 % — HIGH (ref 10.3–14.5)
RBC CASTS # UR COMP ASSIST: 50 /HPF — HIGH (ref 0–4)
RENAL EPI CELLS # UR COMP ASSIST: PRESENT
SODIUM SERPL-SCNC: 139 MMOL/L — SIGNIFICANT CHANGE UP (ref 135–146)
SODIUM SERPL-SCNC: 140 MMOL/L — SIGNIFICANT CHANGE UP (ref 135–146)
SP GR SPEC: 1.01 — SIGNIFICANT CHANGE UP (ref 1–1.03)
SQUAMOUS # UR AUTO: 15 /HPF — HIGH (ref 0–5)
TRANS CELLS #/AREA URNS HPF: PRESENT
UROBILINOGEN FLD QL: 0.2 MG/DL — SIGNIFICANT CHANGE UP (ref 0.2–1)
WBC # BLD: 8.16 K/UL — SIGNIFICANT CHANGE UP (ref 3.8–10.5)
WBC # BLD: 8.18 K/UL — SIGNIFICANT CHANGE UP (ref 3.8–10.5)
WBC # FLD AUTO: 8.16 K/UL — SIGNIFICANT CHANGE UP (ref 3.8–10.5)
WBC # FLD AUTO: 8.18 K/UL — SIGNIFICANT CHANGE UP (ref 3.8–10.5)
WBC UR QL: ABNORMAL /HPF (ref 0–5)
YEAST-LIKE CELLS: PRESENT

## 2025-06-18 PROCEDURE — 99232 SBSQ HOSP IP/OBS MODERATE 35: CPT

## 2025-06-18 RX ORDER — OXYCODONE HYDROCHLORIDE 30 MG/1
2.5 TABLET ORAL
Refills: 0 | Status: DISCONTINUED | OUTPATIENT
Start: 2025-06-18 | End: 2025-06-19

## 2025-06-18 RX ORDER — MAGNESIUM SULFATE 500 MG/ML
2 SYRINGE (ML) INJECTION ONCE
Refills: 0 | Status: COMPLETED | OUTPATIENT
Start: 2025-06-18 | End: 2025-06-18

## 2025-06-18 RX ADMIN — AMPICILLIN SODIUM AND SULBACTAM SODIUM 200 GRAM(S): 1; .5 INJECTION, POWDER, FOR SOLUTION INTRAMUSCULAR; INTRAVENOUS at 00:00

## 2025-06-18 RX ADMIN — Medication 50 MILLIGRAM(S): at 05:46

## 2025-06-18 RX ADMIN — INSULIN LISPRO 5 UNIT(S): 100 INJECTION, SOLUTION INTRAVENOUS; SUBCUTANEOUS at 12:34

## 2025-06-18 RX ADMIN — Medication 400 MILLIGRAM(S): at 08:14

## 2025-06-18 RX ADMIN — Medication 400 MILLIGRAM(S): at 17:40

## 2025-06-18 RX ADMIN — INSULIN LISPRO 2: 100 INJECTION, SOLUTION INTRAVENOUS; SUBCUTANEOUS at 08:15

## 2025-06-18 RX ADMIN — Medication 500 MILLIGRAM(S): at 12:35

## 2025-06-18 RX ADMIN — Medication 400 MILLIGRAM(S): at 12:35

## 2025-06-18 RX ADMIN — Medication 100 MILLIGRAM(S): at 17:39

## 2025-06-18 RX ADMIN — Medication 1 APPLICATION(S): at 05:47

## 2025-06-18 RX ADMIN — LEVETIRACETAM 250 MILLIGRAM(S): 10 INJECTION, SOLUTION INTRAVENOUS at 17:38

## 2025-06-18 RX ADMIN — LEVETIRACETAM 250 MILLIGRAM(S): 10 INJECTION, SOLUTION INTRAVENOUS at 05:46

## 2025-06-18 RX ADMIN — LIDOCAINE HYDROCHLORIDE 1 PATCH: 20 JELLY TOPICAL at 19:00

## 2025-06-18 RX ADMIN — INSULIN LISPRO 2: 100 INJECTION, SOLUTION INTRAVENOUS; SUBCUTANEOUS at 12:34

## 2025-06-18 RX ADMIN — AMPICILLIN SODIUM AND SULBACTAM SODIUM 200 GRAM(S): 1; .5 INJECTION, POWDER, FOR SOLUTION INTRAMUSCULAR; INTRAVENOUS at 05:45

## 2025-06-18 RX ADMIN — INSULIN GLARGINE-YFGN 15 UNIT(S): 100 INJECTION, SOLUTION SUBCUTANEOUS at 22:17

## 2025-06-18 RX ADMIN — LACTULOSE 20 GRAM(S): 10 SOLUTION ORAL at 05:46

## 2025-06-18 RX ADMIN — INSULIN LISPRO 4: 100 INJECTION, SOLUTION INTRAVENOUS; SUBCUTANEOUS at 17:37

## 2025-06-18 RX ADMIN — NYSTATIN 1 APPLICATION(S): 100000 CREAM TOPICAL at 17:39

## 2025-06-18 RX ADMIN — INSULIN LISPRO 2: 100 INJECTION, SOLUTION INTRAVENOUS; SUBCUTANEOUS at 22:17

## 2025-06-18 RX ADMIN — AMPICILLIN SODIUM AND SULBACTAM SODIUM 200 GRAM(S): 1; .5 INJECTION, POWDER, FOR SOLUTION INTRAMUSCULAR; INTRAVENOUS at 12:35

## 2025-06-18 RX ADMIN — LIDOCAINE HYDROCHLORIDE 1 PATCH: 20 JELLY TOPICAL at 12:35

## 2025-06-18 RX ADMIN — NYSTATIN 1 APPLICATION(S): 100000 CREAM TOPICAL at 05:46

## 2025-06-18 RX ADMIN — BUTYROSPERMUM PARKII(SHEA BUTTER), SIMMONDSIA CHINENSIS (JOJOBA) SEED OIL, ALOE BARBADENSIS LEAF EXTRACT 250 MILLIGRAM(S): .01; 1; 3.5 LIQUID TOPICAL at 05:46

## 2025-06-18 RX ADMIN — Medication 650 MILLIGRAM(S): at 19:00

## 2025-06-18 RX ADMIN — MIDODRINE HYDROCHLORIDE 5 MILLIGRAM(S): 5 TABLET ORAL at 05:46

## 2025-06-18 RX ADMIN — MIDODRINE HYDROCHLORIDE 5 MILLIGRAM(S): 5 TABLET ORAL at 13:52

## 2025-06-18 RX ADMIN — FOLIC ACID 1 MILLIGRAM(S): 1 TABLET ORAL at 12:35

## 2025-06-18 RX ADMIN — Medication 1 TABLET(S): at 17:41

## 2025-06-18 RX ADMIN — INSULIN LISPRO 5 UNIT(S): 100 INJECTION, SOLUTION INTRAVENOUS; SUBCUTANEOUS at 08:15

## 2025-06-18 RX ADMIN — AMPICILLIN SODIUM AND SULBACTAM SODIUM 200 GRAM(S): 1; .5 INJECTION, POWDER, FOR SOLUTION INTRAMUSCULAR; INTRAVENOUS at 17:40

## 2025-06-18 RX ADMIN — LACTULOSE 20 GRAM(S): 10 SOLUTION ORAL at 13:52

## 2025-06-18 RX ADMIN — Medication 1 APPLICATION(S): at 17:38

## 2025-06-18 RX ADMIN — BUMETANIDE 1 MILLIGRAM(S): 1 TABLET ORAL at 05:45

## 2025-06-18 RX ADMIN — Medication 650 MILLIGRAM(S): at 18:26

## 2025-06-18 RX ADMIN — Medication 100 MILLIGRAM(S): at 05:46

## 2025-06-18 RX ADMIN — Medication 25 GRAM(S): at 13:52

## 2025-06-18 RX ADMIN — Medication 1 TABLET(S): at 08:14

## 2025-06-18 RX ADMIN — BUTYROSPERMUM PARKII(SHEA BUTTER), SIMMONDSIA CHINENSIS (JOJOBA) SEED OIL, ALOE BARBADENSIS LEAF EXTRACT 250 MILLIGRAM(S): .01; 1; 3.5 LIQUID TOPICAL at 17:39

## 2025-06-18 RX ADMIN — INSULIN LISPRO 5 UNIT(S): 100 INJECTION, SOLUTION INTRAVENOUS; SUBCUTANEOUS at 17:37

## 2025-06-18 NOTE — PROGRESS NOTE ADULT - SUBJECTIVE AND OBJECTIVE BOX
DWIGHT RIBEIRO  80y  Male      Patient is a 80y old  Male who presents with a chief complaint of acute mentation changes (17 Jun 2025 16:25)      INTERVAL HPI/OVERNIGHT EVENTS:  Patient more confused per family    T(C): 37.1 (06-18-25 @ 13:47), Max: 37.1 (06-18-25 @ 13:47)  HR: 84 (06-18-25 @ 20:25) (75 - 94)  BP: 115/66 (06-18-25 @ 20:25) (104/62 - 132/66)  RR: 18 (06-18-25 @ 20:25) (16 - 18)  SpO2: 100% (06-18-25 @ 20:25) (99% - 100%)  Wt(kg): --Vital Signs Last 24 Hrs  T(C): 37.1 (18 Jun 2025 13:47), Max: 37.1 (18 Jun 2025 13:47)  T(F): 98.7 (18 Jun 2025 13:47), Max: 98.7 (18 Jun 2025 13:47)  HR: 84 (18 Jun 2025 20:25) (75 - 94)  BP: 115/66 (18 Jun 2025 20:25) (104/62 - 132/66)  BP(mean): 76 (18 Jun 2025 04:38) (76 - 76)  RR: 18 (18 Jun 2025 20:25) (16 - 18)  SpO2: 100% (18 Jun 2025 20:25) (99% - 100%)    Parameters below as of 18 Jun 2025 20:25  Patient On (Oxygen Delivery Method): room air          06-17-25 @ 07:01  -  06-18-25 @ 07:00  --------------------------------------------------------  IN: 0 mL / OUT: 1050 mL / NET: -1050 mL    06-18-25 @ 07:01  -  06-18-25 @ 22:30  --------------------------------------------------------  IN: 0 mL / OUT: 100 mL / NET: -100 mL        PHYSICAL EXAM:  GENERAL: NAD, sitting up in bed  PSYCH: no agitation, confused  NERVOUS SYSTEM:  Alert, freely moving all extremities  PULMONARY: Clear to percussion bilaterally  CARDIOVASCULAR: Regular rate and rhythm  GI: Soft, Nontender  EXTREMITIES:  No cyanosis or edema  SKIN: No obvious rashes or lesions    Consultant(s) Notes Reviewed:  [x ] YES  [ ] NO    Discussed with Consultants/Other Providers [ x] YES     LABS                          8.5    8.18  )-----------( 152      ( 18 Jun 2025 19:52 )             26.9     06-18    140  |  99  |  24[H]  ----------------------------<  185[H]  3.3[L]   |  24  |  1.2    Ca    7.9[L]      18 Jun 2025 19:52  Phos  2.2     06-18  Mg     1.9     06-18    TPro  5.0[L]  /  Alb  2.1[L]  /  TBili  1.0  /  DBili  x   /  AST  100[H]  /  ALT  62[H]  /  AlkPhos  340[H]  06-18      Urinalysis Basic - ( 18 Jun 2025 19:52 )    Color: x / Appearance: x / SG: x / pH: x  Gluc: 185 mg/dL / Ketone: x  / Bili: x / Urobili: x   Blood: x / Protein: x / Nitrite: x   Leuk Esterase: x / RBC: x / WBC x   Sq Epi: x / Non Sq Epi: x / Bacteria: x        Lactate Trend        CAPILLARY BLOOD GLUCOSE      POCT Blood Glucose.: 161 mg/dL (18 Jun 2025 21:35)        RADIOLOGY & ADDITIONAL TESTS:    Imaging Personally Reviewed:  [ ] YES  [ ] NO    HEALTH ISSUES - PROBLEM Dx:  Pain    Advanced care planning/counseling discussion    Encounter for palliative care    Cirrhosis    Metabolic encephalopathy    Anemia    Afib

## 2025-06-18 NOTE — PROGRESS NOTE ADULT - ASSESSMENT
79 yo male with h/o SDH, lumbar spine OM s/p abx initially admitted with AMS. Found to have bacteremia, sepsis and cirrhosis. Pt was treated with abx for bacteremia. Diuresis for anasarca and supportive treatment for cirrhosis. Pt also has decubitus ulcer, s/p bedside debridement and local wound care. Currently, pt was stable clinically, pending dispo.   Spouse named Edwige (primary contact) [phone]  Son Neo [phone]  Daughter Edwige Mendoza (ICU nurse) [phone]    recheck ammonia, UA  Urine culture    Decompensated cirrhosis  -continue rifaximin, lactulose, bumex    Anasarca  -2/2 hypoalbuminemia  -swelling has improved after diuresis  -reduce bumex and spironolactone dose    Sacral decubitus ulcer  -s/p debridement on 6/10  -continue local wound care  -short duration of abx per ID    Lumbar OM  -s/p abx treatment  -galium scan was obtained, no abnormal uptake in the lumbar spine    Bacteremia with bacteroides   -blood culture positive for bacteroides on presentation  -s/p ceftriaxone and flagyl  -repeat blood culture negative    Macrocytic Anemia:  multifactorial: sepsis, cirrhosis, poor nutrition, GI bleeding  -s/p EGD on 4/16 which showed 5mm ulcer in duodenal bulb post endo clip, repeat EGD on 4/29 showed no bleeding noted   transfused one unit of PRBC on 5/3    New Onset Afib  -found during this admission  -rate controlled, not candidate for AC given anemia, coagulopathy in setting of cirrhosis, pt is a poor candidate for AC     Handoff: rehab

## 2025-06-19 ENCOUNTER — TRANSCRIPTION ENCOUNTER (OUTPATIENT)
Age: 80
End: 2025-06-19

## 2025-06-19 VITALS
TEMPERATURE: 98 F | HEART RATE: 73 BPM | SYSTOLIC BLOOD PRESSURE: 128 MMHG | DIASTOLIC BLOOD PRESSURE: 63 MMHG | RESPIRATION RATE: 18 BRPM

## 2025-06-19 LAB
FERRITIN SERPL-MCNC: 326 NG/ML — SIGNIFICANT CHANGE UP (ref 30–400)
FOLATE SERPL-MCNC: >20 NG/ML — SIGNIFICANT CHANGE UP
GLUCOSE BLDC GLUCOMTR-MCNC: 129 MG/DL — HIGH (ref 70–99)
GLUCOSE BLDC GLUCOMTR-MCNC: 154 MG/DL — HIGH (ref 70–99)
IRON SATN MFR SERPL: 35 % — SIGNIFICANT CHANGE UP (ref 15–50)
IRON SATN MFR SERPL: 48 UG/DL — SIGNIFICANT CHANGE UP (ref 35–150)
TIBC SERPL-MCNC: 137 UG/DL — LOW (ref 220–430)
TRANSFERRIN SERPL-MCNC: 111 MG/DL — LOW (ref 200–360)
UIBC SERPL-MCNC: 89 UG/DL — LOW (ref 110–370)
VIT B12 SERPL-MCNC: 1620 PG/ML — HIGH (ref 232–1245)

## 2025-06-19 PROCEDURE — 99232 SBSQ HOSP IP/OBS MODERATE 35: CPT

## 2025-06-19 RX ORDER — DOXYCYCLINE HYCLATE 100 MG
100 TABLET ORAL ONCE
Refills: 0 | Status: DISCONTINUED | OUTPATIENT
Start: 2025-06-19 | End: 2025-06-19

## 2025-06-19 RX ORDER — SENNA 187 MG
2 TABLET ORAL
Qty: 0 | Refills: 0 | DISCHARGE
Start: 2025-06-19

## 2025-06-19 RX ORDER — AMLODIPINE BESYLATE 10 MG/1
1 TABLET ORAL
Refills: 0 | DISCHARGE

## 2025-06-19 RX ORDER — OXYCODONE HYDROCHLORIDE 30 MG/1
1 TABLET ORAL
Qty: 0 | Refills: 0 | DISCHARGE
Start: 2025-06-19

## 2025-06-19 RX ORDER — AMOXICILLIN AND CLAVULANATE POTASSIUM 500; 125 MG/1; MG/1
1 TABLET, FILM COATED ORAL
Refills: 0 | Status: DISCONTINUED | OUTPATIENT
Start: 2025-06-19 | End: 2025-06-19

## 2025-06-19 RX ORDER — DOXYCYCLINE HYCLATE 100 MG
1 TABLET ORAL
Qty: 12 | Refills: 0
Start: 2025-06-19 | End: 2025-06-24

## 2025-06-19 RX ORDER — AMOXICILLIN AND CLAVULANATE POTASSIUM 500; 125 MG/1; MG/1
1 TABLET, FILM COATED ORAL
Qty: 12 | Refills: 0
Start: 2025-06-19 | End: 2025-06-24

## 2025-06-19 RX ORDER — BUTYROSPERMUM PARKII(SHEA BUTTER), SIMMONDSIA CHINENSIS (JOJOBA) SEED OIL, ALOE BARBADENSIS LEAF EXTRACT .01; 1; 3.5 G/100G; G/100G; G/100G
1 LIQUID TOPICAL
Qty: 0 | Refills: 0 | DISCHARGE
Start: 2025-06-19

## 2025-06-19 RX ORDER — QUETIAPINE FUMARATE 25 MG/1
1 TABLET ORAL
Refills: 0 | DISCHARGE

## 2025-06-19 RX ORDER — AMOXICILLIN AND CLAVULANATE POTASSIUM 500; 125 MG/1; MG/1
1 TABLET, FILM COATED ORAL ONCE
Refills: 0 | Status: COMPLETED | OUTPATIENT
Start: 2025-06-19 | End: 2025-06-19

## 2025-06-19 RX ORDER — LACTULOSE 10 G/15ML
45 SOLUTION ORAL
Qty: 0 | Refills: 0 | DISCHARGE
Start: 2025-06-19

## 2025-06-19 RX ORDER — SPIRONOLACTONE 25 MG
2 TABLET ORAL
Qty: 0 | Refills: 0 | DISCHARGE
Start: 2025-06-19

## 2025-06-19 RX ORDER — LACTULOSE 10 G/15ML
30 SOLUTION ORAL THREE TIMES A DAY
Refills: 0 | Status: DISCONTINUED | OUTPATIENT
Start: 2025-06-19 | End: 2025-06-19

## 2025-06-19 RX ORDER — RIFAXIMIN 550 MG/1
1 TABLET ORAL
Qty: 0 | Refills: 0 | DISCHARGE
Start: 2025-06-19

## 2025-06-19 RX ORDER — LACTULOSE 10 G/15ML
20 SOLUTION ORAL
Refills: 0 | Status: DISCONTINUED | OUTPATIENT
Start: 2025-06-19 | End: 2025-06-19

## 2025-06-19 RX ORDER — INSULIN DEGLUDEC 100 U/ML
15 INJECTION, SOLUTION SUBCUTANEOUS
Qty: 3 | Refills: 0
Start: 2025-06-19 | End: 2025-07-18

## 2025-06-19 RX ORDER — FUROSEMIDE 10 MG/ML
1 INJECTION INTRAMUSCULAR; INTRAVENOUS
Refills: 0 | DISCHARGE

## 2025-06-19 RX ORDER — BUMETANIDE 1 MG/1
1 TABLET ORAL
Qty: 0 | Refills: 0 | DISCHARGE
Start: 2025-06-19

## 2025-06-19 RX ORDER — LEVETIRACETAM 10 MG/ML
2.5 INJECTION, SOLUTION INTRAVENOUS
Qty: 0 | Refills: 0 | DISCHARGE
Start: 2025-06-19

## 2025-06-19 RX ORDER — MIDODRINE HYDROCHLORIDE 5 MG/1
1 TABLET ORAL
Qty: 0 | Refills: 0 | DISCHARGE
Start: 2025-06-19

## 2025-06-19 RX ORDER — OXYCODONE HYDROCHLORIDE 30 MG/1
5 TABLET ORAL DAILY
Refills: 0 | Status: DISCONTINUED | OUTPATIENT
Start: 2025-06-19 | End: 2025-06-19

## 2025-06-19 RX ORDER — GABAPENTIN 400 MG/1
1 CAPSULE ORAL
Refills: 0 | DISCHARGE

## 2025-06-19 RX ORDER — DOXYCYCLINE HYCLATE 100 MG
100 TABLET ORAL EVERY 12 HOURS
Refills: 0 | Status: DISCONTINUED | OUTPATIENT
Start: 2025-06-19 | End: 2025-06-19

## 2025-06-19 RX ADMIN — BUMETANIDE 1 MILLIGRAM(S): 1 TABLET ORAL at 05:31

## 2025-06-19 RX ADMIN — BUTYROSPERMUM PARKII(SHEA BUTTER), SIMMONDSIA CHINENSIS (JOJOBA) SEED OIL, ALOE BARBADENSIS LEAF EXTRACT 250 MILLIGRAM(S): .01; 1; 3.5 LIQUID TOPICAL at 05:31

## 2025-06-19 RX ADMIN — Medication 400 MILLIGRAM(S): at 08:01

## 2025-06-19 RX ADMIN — INSULIN LISPRO 5 UNIT(S): 100 INJECTION, SOLUTION INTRAVENOUS; SUBCUTANEOUS at 08:01

## 2025-06-19 RX ADMIN — Medication 100 MILLIGRAM(S): at 05:31

## 2025-06-19 RX ADMIN — AMPICILLIN SODIUM AND SULBACTAM SODIUM 200 GRAM(S): 1; .5 INJECTION, POWDER, FOR SOLUTION INTRAMUSCULAR; INTRAVENOUS at 05:30

## 2025-06-19 RX ADMIN — MIDODRINE HYDROCHLORIDE 5 MILLIGRAM(S): 5 TABLET ORAL at 13:33

## 2025-06-19 RX ADMIN — AMPICILLIN SODIUM AND SULBACTAM SODIUM 200 GRAM(S): 1; .5 INJECTION, POWDER, FOR SOLUTION INTRAMUSCULAR; INTRAVENOUS at 00:46

## 2025-06-19 RX ADMIN — INSULIN LISPRO 5 UNIT(S): 100 INJECTION, SOLUTION INTRAVENOUS; SUBCUTANEOUS at 12:14

## 2025-06-19 RX ADMIN — NYSTATIN 1 APPLICATION(S): 100000 CREAM TOPICAL at 06:52

## 2025-06-19 RX ADMIN — Medication 1 APPLICATION(S): at 06:52

## 2025-06-19 RX ADMIN — LACTULOSE 20 GRAM(S): 10 SOLUTION ORAL at 08:00

## 2025-06-19 RX ADMIN — Medication 50 MILLIGRAM(S): at 05:31

## 2025-06-19 RX ADMIN — LIDOCAINE HYDROCHLORIDE 1 PATCH: 20 JELLY TOPICAL at 00:35

## 2025-06-19 RX ADMIN — LEVETIRACETAM 250 MILLIGRAM(S): 10 INJECTION, SOLUTION INTRAVENOUS at 05:33

## 2025-06-19 RX ADMIN — LACTULOSE 30 GRAM(S): 10 SOLUTION ORAL at 13:35

## 2025-06-19 RX ADMIN — Medication 500 MILLIGRAM(S): at 12:15

## 2025-06-19 RX ADMIN — OXYCODONE HYDROCHLORIDE 2.5 MILLIGRAM(S): 30 TABLET ORAL at 09:28

## 2025-06-19 RX ADMIN — Medication 400 MILLIGRAM(S): at 12:15

## 2025-06-19 RX ADMIN — INSULIN LISPRO 2: 100 INJECTION, SOLUTION INTRAVENOUS; SUBCUTANEOUS at 12:14

## 2025-06-19 RX ADMIN — Medication 40 MILLIGRAM(S): at 05:31

## 2025-06-19 RX ADMIN — Medication 1 TABLET(S): at 08:01

## 2025-06-19 RX ADMIN — AMOXICILLIN AND CLAVULANATE POTASSIUM 1 TABLET(S): 500; 125 TABLET, FILM COATED ORAL at 10:30

## 2025-06-19 RX ADMIN — LIDOCAINE HYDROCHLORIDE 1 PATCH: 20 JELLY TOPICAL at 12:15

## 2025-06-19 RX ADMIN — Medication 1 APPLICATION(S): at 05:32

## 2025-06-19 RX ADMIN — Medication 20 MILLIEQUIVALENT(S): at 09:29

## 2025-06-19 RX ADMIN — Medication 20 MILLIEQUIVALENT(S): at 12:15

## 2025-06-19 RX ADMIN — FOLIC ACID 1 MILLIGRAM(S): 1 TABLET ORAL at 12:15

## 2025-06-19 NOTE — PROGRESS NOTE ADULT - SUBJECTIVE AND OBJECTIVE BOX
DWIGHT RIBEIRO  80y  Male      Patient is a 80y old  Male who presents with a chief complaint of acute mentation changes (18 Jun 2025 22:30)      INTERVAL HPI/OVERNIGHT EVENTS:  Patient seen today during wound dressing change.  Discussed with length at family that patient is medically stable for discharge to rehab for continued wound care.  Addressed concerns of family to their satisfaction.    T(C): 36.7 (06-19-25 @ 13:12), Max: 36.7 (06-19-25 @ 13:12)  HR: 88 (06-19-25 @ 13:12) (84 - 88)  BP: 129/74 (06-19-25 @ 13:12) (115/66 - 129/74)  RR: 18 (06-19-25 @ 13:12) (18 - 18)  SpO2: 100% (06-19-25 @ 13:12) (100% - 100%)  Wt(kg): --Vital Signs Last 24 Hrs  T(C): 36.7 (19 Jun 2025 13:12), Max: 36.7 (19 Jun 2025 13:12)  T(F): 98.1 (19 Jun 2025 13:12), Max: 98.1 (19 Jun 2025 13:12)  HR: 88 (19 Jun 2025 13:12) (84 - 88)  BP: 129/74 (19 Jun 2025 13:12) (115/66 - 129/74)  BP(mean): --  RR: 18 (19 Jun 2025 13:12) (18 - 18)  SpO2: 100% (19 Jun 2025 13:12) (100% - 100%)    Parameters below as of 19 Jun 2025 13:12  Patient On (Oxygen Delivery Method): room air          06-18-25 @ 07:01  -  06-19-25 @ 07:00  --------------------------------------------------------  IN: 0 mL / OUT: 100 mL / NET: -100 mL        PHYSICAL EXAM:  GENERAL: NAD, sitting up in bed  PSYCH: no agitation, baseline mentation  NERVOUS SYSTEM:  Alert, freely moving all extremities  PULMONARY: Clear to percussion bilaterally  CARDIOVASCULAR: Regular rate and rhythm  GI: Soft, Nontender  SKIN: sacral decubitus ulcer present    Consultant(s) Notes Reviewed:  [x ] YES  [ ] NO    Discussed with Consultants/Other Providers [ x] YES     LABS                          8.5    8.18  )-----------( 152      ( 18 Jun 2025 19:52 )             26.9     06-18    140  |  99  |  24[H]  ----------------------------<  185[H]  3.3[L]   |  24  |  1.2    Ca    7.9[L]      18 Jun 2025 19:52  Phos  2.2     06-18  Mg     1.9     06-18    TPro  5.0[L]  /  Alb  2.1[L]  /  TBili  1.0  /  DBili  x   /  AST  100[H]  /  ALT  62[H]  /  AlkPhos  340[H]  06-18      Urinalysis Basic - ( 18 Jun 2025 19:52 )    Color: x / Appearance: x / SG: x / pH: x  Gluc: 185 mg/dL / Ketone: x  / Bili: x / Urobili: x   Blood: x / Protein: x / Nitrite: x   Leuk Esterase: x / RBC: x / WBC x   Sq Epi: x / Non Sq Epi: x / Bacteria: x        Lactate Trend        CAPILLARY BLOOD GLUCOSE      POCT Blood Glucose.: 154 mg/dL (19 Jun 2025 11:25)        RADIOLOGY & ADDITIONAL TESTS:    Imaging Personally Reviewed:  [ ] YES  [ ] NO    HEALTH ISSUES - PROBLEM Dx:  Pain    Advanced care planning/counseling discussion    Encounter for palliative care    Cirrhosis    Metabolic encephalopathy    Anemia    Afib

## 2025-06-19 NOTE — DISCHARGE NOTE NURSING/CASE MANAGEMENT/SOCIAL WORK - NSDCPEFALRISK_GEN_ALL_CORE
For information on Fall & Injury Prevention, visit: [email or web address] OR  [email or web address] OR  https://www.cdc.gov/steadi/patient.html

## 2025-06-19 NOTE — PROGRESS NOTE ADULT - PROVIDER SPECIALTY LIST ADULT
Cardiology
Critical Care
Gastroenterology
Hospitalist
Infectious Disease
Infectious Disease
Internal Medicine
Internal Medicine
Pulmonology
Wound Care
Critical Care
Gastroenterology
Hospitalist
Infectious Disease
Nephrology
Neurology
Palliative Care
Palliative Care
Pulmonology
Wound Care
Cardiology
Critical Care
Critical Care
Hospitalist
Infectious Disease
MICU
Nephrology
Palliative Care
Pulmonology
Pulmonology
Wound Care
Wound Care
Gastroenterology
Gastroenterology
Hospitalist
Infectious Disease
Infectious Disease
MICU
Nephrology
Nephrology
Surgery
Infectious Disease
Internal Medicine
Pulmonology
Infectious Disease
Infectious Disease
Internal Medicine
Internal Medicine
Palliative Care
Pulmonology
Infectious Disease
Palliative Care

## 2025-06-19 NOTE — DISCHARGE NOTE NURSING/CASE MANAGEMENT/SOCIAL WORK - PATIENT PORTAL LINK FT
You can access the FollowMyHealth Patient Portal offered by [facility] by registering at the following website: [email or web address]. By joining [facility]’s FollowMyHealth portal, you will also be able to view your health information using other applications (apps) compatible with our system.

## 2025-06-19 NOTE — PROGRESS NOTE ADULT - TIME BILLING
I have personally seen and examined this patient.    I have reviewed all pertinent clinical information and reviewed all relevant imaging and diagnostic studies personally.   I discussed recommendations with the primary team.
Direct patient care, interdisciplinary rounds
I have personally seen and examined this patient.    I have reviewed all pertinent clinical information and reviewed all relevant imaging and diagnostic studies personally.   I discussed recommendations with the primary team.
Coordination of care
Direct patient care, interdisciplinary rounds
Direct patient care, interdisciplinary rounds
I have personally seen and examined this patient.    I have reviewed all pertinent clinical information and reviewed all relevant imaging and diagnostic studies personally.   I discussed recommendations with the primary team.
Coordination of care
Total time spent caring for the patient includes time spent before the visit reviewing the chart, time spent during the visit, time spent after the visit on documentation, and time spent communicating with patient/family, and other providers.
Review of imaging and chart; obtaining history; examination of patient; discussion and coordination of care.

## 2025-06-19 NOTE — PROGRESS NOTE ADULT - REASON FOR ADMISSION
Sacral decub
acute mentation changes

## 2025-06-19 NOTE — PROGRESS NOTE ADULT - ASSESSMENT
81 yo male with h/o SDH, lumbar spine OM s/p abx initially admitted with AMS. Found to have bacteremia, sepsis and cirrhosis. Pt was treated with abx for bacteremia. Diuresis for anasarca and supportive treatment for cirrhosis. Pt also has decubitus ulcer, s/p bedside debridement and local wound care. Currently, pt was stable clinically, pending dispo.   Spouse named Edwige (primary contact) [phone]  Son Neo [phone]  Daughter Edwige Mendoza (ICU nurse) [phone]    Patient medically stable for discharge to rehab, see discharge summary.    Decompensated cirrhosis  -continue rifaximin, lactulose, bumex    Anasarca  -2/2 hypoalbuminemia  -swelling has improved after diuresis  -reduce bumex and spironolactone dose    Sacral decubitus ulcer  -s/p debridement on 6/10  -continue local wound care  -short duration of abx per ID    Lumbar OM  -s/p abx treatment  -galium scan was obtained, no abnormal uptake in the lumbar spine    Bacteremia with bacteroides   -blood culture positive for bacteroides on presentation  -s/p ceftriaxone and flagyl  -repeat blood culture negative    Macrocytic Anemia:  multifactorial: sepsis, cirrhosis, poor nutrition, GI bleeding  -s/p EGD on 4/16 which showed 5mm ulcer in duodenal bulb post endo clip, repeat EGD on 4/29 showed no bleeding noted   transfused one unit of PRBC on 5/3    New Onset Afib  -found during this admission  -rate controlled, not candidate for AC given anemia, coagulopathy in setting of cirrhosis, pt is a poor candidate for AC

## 2025-06-19 NOTE — DISCHARGE NOTE NURSING/CASE MANAGEMENT/SOCIAL WORK - FINANCIAL ASSISTANCE
[facility] provides services at a reduced cost to those who are determined to be eligible through [facility]’s financial assistance program. Information regarding [facility]’s financial assistance program can be found by going to [email or web address] or by calling [phone].

## 2025-06-19 NOTE — CHART NOTE - NSCHARTNOTESELECT_GEN_ALL_CORE
Event Note
GI Updates/Event Note
Nutrition Services
PA Note/Event Note
PA Note/Event Note
Surgery/Event Note
neurology/Event Note
Colonoscopy/Event Note
EGD/Event Note
Event Note
Nutrition Services
Nutrition Services
PA Note/Event Note
PA Note/Event Note
RD f/u/Nutrition Services
SDU to floor/Transfer Note
refusal/Event Note

## 2025-06-23 LAB
CULTURE RESULTS: ABNORMAL
SPECIMEN SOURCE: SIGNIFICANT CHANGE UP

## 2025-07-02 DIAGNOSIS — E11.9 TYPE 2 DIABETES MELLITUS WITHOUT COMPLICATIONS: ICD-10-CM

## 2025-07-02 DIAGNOSIS — K26.4 CHRONIC OR UNSPECIFIED DUODENAL ULCER WITH HEMORRHAGE: ICD-10-CM

## 2025-07-02 DIAGNOSIS — E83.42 HYPOMAGNESEMIA: ICD-10-CM

## 2025-07-02 DIAGNOSIS — A41.9 SEPSIS, UNSPECIFIED ORGANISM: ICD-10-CM

## 2025-07-02 DIAGNOSIS — D84.81 IMMUNODEFICIENCY DUE TO CONDITIONS CLASSIFIED ELSEWHERE: ICD-10-CM

## 2025-07-02 DIAGNOSIS — K29.70 GASTRITIS, UNSPECIFIED, WITHOUT BLEEDING: ICD-10-CM

## 2025-07-02 DIAGNOSIS — D68.9 COAGULATION DEFECT, UNSPECIFIED: ICD-10-CM

## 2025-07-02 DIAGNOSIS — E87.1 HYPO-OSMOLALITY AND HYPONATREMIA: ICD-10-CM

## 2025-07-02 DIAGNOSIS — K76.6 PORTAL HYPERTENSION: ICD-10-CM

## 2025-07-02 DIAGNOSIS — D63.8 ANEMIA IN OTHER CHRONIC DISEASES CLASSIFIED ELSEWHERE: ICD-10-CM

## 2025-07-02 DIAGNOSIS — E87.6 HYPOKALEMIA: ICD-10-CM

## 2025-07-02 DIAGNOSIS — G93.41 METABOLIC ENCEPHALOPATHY: ICD-10-CM

## 2025-07-02 DIAGNOSIS — K76.82 HEPATIC ENCEPHALOPATHY: ICD-10-CM

## 2025-07-02 DIAGNOSIS — E87.5 HYPERKALEMIA: ICD-10-CM

## 2025-07-02 DIAGNOSIS — R18.8 OTHER ASCITES: ICD-10-CM

## 2025-07-02 DIAGNOSIS — T36.3X5A ADVERSE EFFECT OF MACROLIDES, INITIAL ENCOUNTER: ICD-10-CM

## 2025-07-02 DIAGNOSIS — I10 ESSENTIAL (PRIMARY) HYPERTENSION: ICD-10-CM

## 2025-07-02 DIAGNOSIS — N17.9 ACUTE KIDNEY FAILURE, UNSPECIFIED: ICD-10-CM

## 2025-07-02 DIAGNOSIS — K74.60 UNSPECIFIED CIRRHOSIS OF LIVER: ICD-10-CM

## 2025-07-02 DIAGNOSIS — I48.0 PAROXYSMAL ATRIAL FIBRILLATION: ICD-10-CM

## 2025-07-02 DIAGNOSIS — D62 ACUTE POSTHEMORRHAGIC ANEMIA: ICD-10-CM

## 2025-07-02 DIAGNOSIS — L89.154 PRESSURE ULCER OF SACRAL REGION, STAGE 4: ICD-10-CM

## 2025-07-17 ENCOUNTER — RESULT REVIEW (OUTPATIENT)
Age: 80
End: 2025-07-17

## 2025-08-14 LAB
-  AZTREONAM: SIGNIFICANT CHANGE UP
-  CEFEPIME: SIGNIFICANT CHANGE UP
-  CEFTAZIDIME: SIGNIFICANT CHANGE UP
-  CIPROFLOXACIN: SIGNIFICANT CHANGE UP
-  IMIPENEM: SIGNIFICANT CHANGE UP
-  LEVOFLOXACIN: SIGNIFICANT CHANGE UP
-  MEROPENEM: SIGNIFICANT CHANGE UP
-  PIPERACILLIN/TAZOBACTAM: SIGNIFICANT CHANGE UP
CULTURE RESULTS: ABNORMAL
METHOD TYPE: SIGNIFICANT CHANGE UP
ORGANISM # SPEC MICROSCOPIC CNT: ABNORMAL
ORGANISM # SPEC MICROSCOPIC CNT: SIGNIFICANT CHANGE UP
SPECIMEN SOURCE: SIGNIFICANT CHANGE UP

## 2025-08-15 DIAGNOSIS — G92.8 OTHER TOXIC ENCEPHALOPATHY: ICD-10-CM

## 2025-08-15 DIAGNOSIS — Z90.49 ACQUIRED ABSENCE OF OTHER SPECIFIED PARTS OF DIGESTIVE TRACT: ICD-10-CM

## 2025-08-15 DIAGNOSIS — E43 UNSPECIFIED SEVERE PROTEIN-CALORIE MALNUTRITION: ICD-10-CM

## 2025-08-15 DIAGNOSIS — D62 ACUTE POSTHEMORRHAGIC ANEMIA: ICD-10-CM

## 2025-08-15 DIAGNOSIS — Z66 DO NOT RESUSCITATE: ICD-10-CM

## 2025-08-15 DIAGNOSIS — K72.00 ACUTE AND SUBACUTE HEPATIC FAILURE WITHOUT COMA: ICD-10-CM

## 2025-08-15 DIAGNOSIS — R65.21 SEVERE SEPSIS WITH SEPTIC SHOCK: ICD-10-CM

## 2025-08-15 DIAGNOSIS — M46.28 OSTEOMYELITIS OF VERTEBRA, SACRAL AND SACROCOCCYGEAL REGION: ICD-10-CM

## 2025-08-15 DIAGNOSIS — J96.01 ACUTE RESPIRATORY FAILURE WITH HYPOXIA: ICD-10-CM

## 2025-08-15 DIAGNOSIS — D84.81 IMMUNODEFICIENCY DUE TO CONDITIONS CLASSIFIED ELSEWHERE: ICD-10-CM

## 2025-08-15 DIAGNOSIS — N39.0 URINARY TRACT INFECTION, SITE NOT SPECIFIED: ICD-10-CM

## 2025-08-15 DIAGNOSIS — I48.0 PAROXYSMAL ATRIAL FIBRILLATION: ICD-10-CM

## 2025-08-15 DIAGNOSIS — D65 DISSEMINATED INTRAVASCULAR COAGULATION [DEFIBRINATION SYNDROME]: ICD-10-CM

## 2025-08-15 DIAGNOSIS — E11.69 TYPE 2 DIABETES MELLITUS WITH OTHER SPECIFIED COMPLICATION: ICD-10-CM

## 2025-08-15 DIAGNOSIS — E83.42 HYPOMAGNESEMIA: ICD-10-CM

## 2025-08-15 DIAGNOSIS — D69.6 THROMBOCYTOPENIA, UNSPECIFIED: ICD-10-CM

## 2025-08-15 DIAGNOSIS — I21.A1 MYOCARDIAL INFARCTION TYPE 2: ICD-10-CM

## 2025-08-15 DIAGNOSIS — L89.154 PRESSURE ULCER OF SACRAL REGION, STAGE 4: ICD-10-CM

## 2025-08-15 DIAGNOSIS — Z79.82 LONG TERM (CURRENT) USE OF ASPIRIN: ICD-10-CM

## 2025-08-15 DIAGNOSIS — E87.0 HYPEROSMOLALITY AND HYPERNATREMIA: ICD-10-CM

## 2025-08-15 DIAGNOSIS — N17.0 ACUTE KIDNEY FAILURE WITH TUBULAR NECROSIS: ICD-10-CM

## 2025-08-15 DIAGNOSIS — I10 ESSENTIAL (PRIMARY) HYPERTENSION: ICD-10-CM

## 2025-08-15 DIAGNOSIS — Z79.84 LONG TERM (CURRENT) USE OF ORAL HYPOGLYCEMIC DRUGS: ICD-10-CM

## 2025-08-15 DIAGNOSIS — G93.41 METABOLIC ENCEPHALOPATHY: ICD-10-CM

## 2025-08-15 DIAGNOSIS — E87.20 ACIDOSIS, UNSPECIFIED: ICD-10-CM

## 2025-08-15 DIAGNOSIS — K74.60 UNSPECIFIED CIRRHOSIS OF LIVER: ICD-10-CM

## 2025-08-15 DIAGNOSIS — Z86.73 PERSONAL HISTORY OF TRANSIENT ISCHEMIC ATTACK (TIA), AND CEREBRAL INFARCTION WITHOUT RESIDUAL DEFICITS: ICD-10-CM

## 2025-08-15 DIAGNOSIS — Z51.5 ENCOUNTER FOR PALLIATIVE CARE: ICD-10-CM

## 2025-08-15 DIAGNOSIS — M86.9 OSTEOMYELITIS, UNSPECIFIED: ICD-10-CM

## 2025-08-15 DIAGNOSIS — K76.82 HEPATIC ENCEPHALOPATHY: ICD-10-CM

## 2025-08-15 DIAGNOSIS — A41.9 SEPSIS, UNSPECIFIED ORGANISM: ICD-10-CM

## 2025-08-15 DIAGNOSIS — K26.4 CHRONIC OR UNSPECIFIED DUODENAL ULCER WITH HEMORRHAGE: ICD-10-CM

## 2025-08-15 DIAGNOSIS — L89.616 PRESSURE-INDUCED DEEP TISSUE DAMAGE OF RIGHT HEEL: ICD-10-CM

## 2025-08-15 DIAGNOSIS — E11.649 TYPE 2 DIABETES MELLITUS WITH HYPOGLYCEMIA WITHOUT COMA: ICD-10-CM
